# Patient Record
Sex: MALE | Race: WHITE | Employment: OTHER | URBAN - METROPOLITAN AREA
[De-identification: names, ages, dates, MRNs, and addresses within clinical notes are randomized per-mention and may not be internally consistent; named-entity substitution may affect disease eponyms.]

---

## 2017-08-05 PROBLEM — I25.10 ASCVD (ARTERIOSCLEROTIC CARDIOVASCULAR DISEASE): Status: ACTIVE | Noted: 2017-08-05

## 2017-08-05 PROBLEM — I50.9 CHF (CONGESTIVE HEART FAILURE) (HCC): Status: ACTIVE | Noted: 2017-08-05

## 2017-08-05 PROBLEM — E11.9 DIABETES MELLITUS (HCC): Status: ACTIVE | Noted: 2017-08-05

## 2017-08-05 PROBLEM — G47.00 INSOMNIA: Status: ACTIVE | Noted: 2017-08-05

## 2017-08-05 PROBLEM — I42.9 CARDIOMYOPATHY (HCC): Status: ACTIVE | Noted: 2017-08-05

## 2017-08-05 PROBLEM — K21.9 GERD (GASTROESOPHAGEAL REFLUX DISEASE): Status: ACTIVE | Noted: 2017-08-05

## 2017-08-05 PROBLEM — M19.90 DJD (DEGENERATIVE JOINT DISEASE): Status: ACTIVE | Noted: 2017-08-05

## 2017-08-05 PROBLEM — I12.9 HYPERTENSION WITH RENAL DISEASE: Status: ACTIVE | Noted: 2017-08-05

## 2017-08-05 PROBLEM — E78.5 HYPERLIPIDEMIA: Status: ACTIVE | Noted: 2017-08-05

## 2017-08-05 PROBLEM — N40.0 BPH (BENIGN PROSTATIC HYPERPLASIA): Status: ACTIVE | Noted: 2017-08-05

## 2017-08-05 PROBLEM — Z79.899 ON STATIN THERAPY: Status: ACTIVE | Noted: 2017-08-05

## 2017-08-05 PROBLEM — N18.4 CKD (CHRONIC KIDNEY DISEASE), STAGE IV (HCC): Status: ACTIVE | Noted: 2017-08-05

## 2017-08-05 PROBLEM — N52.9 ED (ERECTILE DYSFUNCTION): Status: ACTIVE | Noted: 2017-08-05

## 2017-08-05 PROBLEM — F41.9 ANXIETY: Status: ACTIVE | Noted: 2017-08-05

## 2017-08-05 PROBLEM — M10.9 GOUT: Status: ACTIVE | Noted: 2017-08-05

## 2017-08-05 PROBLEM — D64.9 ANEMIA: Status: ACTIVE | Noted: 2017-08-05

## 2017-08-05 PROBLEM — E66.9 OBESITY: Status: ACTIVE | Noted: 2017-08-05

## 2017-08-05 PROBLEM — E11.40 DIABETIC NEUROPATHY (HCC): Status: ACTIVE | Noted: 2017-08-05

## 2017-08-05 PROBLEM — E03.9 HYPOTHYROID: Status: ACTIVE | Noted: 2017-08-05

## 2017-08-05 PROBLEM — G25.81 RESTLESS LEG: Status: ACTIVE | Noted: 2017-08-05

## 2017-08-05 RX ORDER — METOLAZONE 2.5 MG/1
TABLET ORAL AS NEEDED
COMMUNITY
End: 2018-02-14 | Stop reason: SDUPTHER

## 2017-08-05 RX ORDER — PRAMIPEXOLE DIHYDROCHLORIDE 0.25 MG/1
0.25 TABLET ORAL
COMMUNITY
End: 2017-11-13 | Stop reason: SDUPTHER

## 2017-08-05 RX ORDER — CYCLOBENZAPRINE HCL 10 MG
TABLET ORAL
COMMUNITY
End: 2017-08-28 | Stop reason: ALTCHOICE

## 2017-08-05 RX ORDER — COLCHICINE 0.6 MG/1
0.6 TABLET ORAL AS NEEDED
COMMUNITY
End: 2019-05-27

## 2017-08-05 RX ORDER — VALSARTAN 320 MG/1
TABLET ORAL DAILY
COMMUNITY
End: 2018-04-18 | Stop reason: SDUPTHER

## 2017-08-05 RX ORDER — TEMAZEPAM 30 MG/1
CAPSULE ORAL
COMMUNITY
End: 2017-08-28 | Stop reason: ALTCHOICE

## 2017-08-05 RX ORDER — LANOLIN ALCOHOL/MO/W.PET/CERES
1000 CREAM (GRAM) TOPICAL DAILY
COMMUNITY
End: 2017-08-28 | Stop reason: ALTCHOICE

## 2017-08-05 RX ORDER — CARVEDILOL 12.5 MG/1
TABLET ORAL 2 TIMES DAILY WITH MEALS
COMMUNITY
End: 2018-12-14 | Stop reason: SDUPTHER

## 2017-08-05 RX ORDER — FEBUXOSTAT 40 MG/1
40 TABLET, FILM COATED ORAL DAILY
COMMUNITY
End: 2017-11-08 | Stop reason: SDUPTHER

## 2017-08-17 ENCOUNTER — HOSPITAL ENCOUNTER (EMERGENCY)
Age: 67
Discharge: HOME | End: 2017-08-17
Attending: EMERGENCY MEDICINE | Admitting: EMERGENCY MEDICINE
Payer: MEDICARE

## 2017-08-17 ENCOUNTER — APPOINTMENT (OUTPATIENT)
Dept: GENERAL RADIOLOGY | Age: 67
End: 2017-08-17
Attending: EMERGENCY MEDICINE
Payer: MEDICARE

## 2017-08-17 VITALS
RESPIRATION RATE: 16 BRPM | HEIGHT: 76 IN | DIASTOLIC BLOOD PRESSURE: 93 MMHG | WEIGHT: 252.43 LBS | SYSTOLIC BLOOD PRESSURE: 142 MMHG | BODY MASS INDEX: 30.74 KG/M2 | TEMPERATURE: 98.5 F | HEART RATE: 70 BPM | OXYGEN SATURATION: 100 %

## 2017-08-17 DIAGNOSIS — L03.032 CELLULITIS OF TOE OF LEFT FOOT: Primary | ICD-10-CM

## 2017-08-17 DIAGNOSIS — L97.922: ICD-10-CM

## 2017-08-17 LAB
ALBUMIN SERPL-MCNC: 3.3 G/DL (ref 3.5–5)
ALBUMIN/GLOB SERPL: 0.7 {RATIO} (ref 1.1–2.2)
ALP SERPL-CCNC: 102 U/L (ref 45–117)
ALT SERPL-CCNC: 21 U/L (ref 12–78)
ANION GAP SERPL CALC-SCNC: 9 MMOL/L (ref 5–15)
AST SERPL-CCNC: 19 U/L (ref 15–37)
BASOPHILS # BLD: 0 K/UL (ref 0–0.1)
BASOPHILS NFR BLD: 1 % (ref 0–1)
BILIRUB SERPL-MCNC: 0.4 MG/DL (ref 0.2–1)
BUN SERPL-MCNC: 125 MG/DL (ref 6–20)
BUN/CREAT SERPL: 37 (ref 12–20)
CALCIUM SERPL-MCNC: 8.6 MG/DL (ref 8.5–10.1)
CHLORIDE SERPL-SCNC: 90 MMOL/L (ref 97–108)
CO2 SERPL-SCNC: 27 MMOL/L (ref 21–32)
CREAT SERPL-MCNC: 3.36 MG/DL (ref 0.7–1.3)
EOSINOPHIL # BLD: 0.7 K/UL (ref 0–0.4)
EOSINOPHIL NFR BLD: 11 % (ref 0–7)
ERYTHROCYTE [DISTWIDTH] IN BLOOD BY AUTOMATED COUNT: 14.1 % (ref 11.5–14.5)
GLOBULIN SER CALC-MCNC: 4.8 G/DL (ref 2–4)
GLUCOSE SERPL-MCNC: 224 MG/DL (ref 65–100)
HCT VFR BLD AUTO: 33.3 % (ref 36.6–50.3)
HGB BLD-MCNC: 11.3 G/DL (ref 12.1–17)
LYMPHOCYTES # BLD: 0.9 K/UL (ref 0.8–3.5)
LYMPHOCYTES NFR BLD: 15 % (ref 12–49)
MCH RBC QN AUTO: 28.2 PG (ref 26–34)
MCHC RBC AUTO-ENTMCNC: 33.9 G/DL (ref 30–36.5)
MCV RBC AUTO: 83 FL (ref 80–99)
MONOCYTES # BLD: 0.5 K/UL (ref 0–1)
MONOCYTES NFR BLD: 8 % (ref 5–13)
NEUTS SEG # BLD: 4.1 K/UL (ref 1.8–8)
NEUTS SEG NFR BLD: 65 % (ref 32–75)
PLATELET # BLD AUTO: 119 K/UL (ref 150–400)
POTASSIUM SERPL-SCNC: 3.7 MMOL/L (ref 3.5–5.1)
PROT SERPL-MCNC: 8.1 G/DL (ref 6.4–8.2)
RBC # BLD AUTO: 4.01 M/UL (ref 4.1–5.7)
SODIUM SERPL-SCNC: 126 MMOL/L (ref 136–145)
WBC # BLD AUTO: 6.2 K/UL (ref 4.1–11.1)

## 2017-08-17 PROCEDURE — 85025 COMPLETE CBC W/AUTO DIFF WBC: CPT | Performed by: EMERGENCY MEDICINE

## 2017-08-17 PROCEDURE — 73630 X-RAY EXAM OF FOOT: CPT

## 2017-08-17 PROCEDURE — 36415 COLL VENOUS BLD VENIPUNCTURE: CPT | Performed by: EMERGENCY MEDICINE

## 2017-08-17 PROCEDURE — 80053 COMPREHEN METABOLIC PANEL: CPT | Performed by: EMERGENCY MEDICINE

## 2017-08-17 PROCEDURE — 99282 EMERGENCY DEPT VISIT SF MDM: CPT

## 2017-08-17 PROCEDURE — 96365 THER/PROPH/DIAG IV INF INIT: CPT

## 2017-08-17 PROCEDURE — 74011250636 HC RX REV CODE- 250/636: Performed by: EMERGENCY MEDICINE

## 2017-08-17 RX ORDER — LEVOFLOXACIN 5 MG/ML
750 INJECTION, SOLUTION INTRAVENOUS
Status: COMPLETED | OUTPATIENT
Start: 2017-08-17 | End: 2017-08-17

## 2017-08-17 RX ORDER — LEVOFLOXACIN 750 MG/1
750 TABLET ORAL EVERY OTHER DAY
Qty: 7 TAB | Refills: 0 | Status: SHIPPED | OUTPATIENT
Start: 2017-08-17 | End: 2017-08-28 | Stop reason: ALTCHOICE

## 2017-08-17 RX ADMIN — LEVOFLOXACIN 750 MG: 5 INJECTION, SOLUTION INTRAVENOUS at 17:47

## 2017-08-17 NOTE — ED PROVIDER NOTES
HPI Comments: Royal Tori Hernandez, 74 y.o. Male with PMHx of HTN, DM, CAD, DJD, ASCVD, CHF, CKD, GERD, DM neuropathy, and cardiomyopathy presents ambulatory to the ED with cc of left great toe pain, erythema and drainage from wound on bottom of left foot. Pt first noticed the sxs 3 days ago and was seen by his PCP the next day. He was placed on Bactrim and reports taking it for the last 1.5 days without improvement. Pt notes that the wound on the bottom of his left foot is from a blister that split open and became infected. He was treated here for the issue and had a debridement performed here where he acquired MRSA. Pt reports the wound never closed after the debridement and that he cleans it BID with AntiSept. He has changed the bandage twice today due to drainage from the site. He has never had osteomyelitis. He denies any fevers, chills, N/V/D, CP, or SOB. PCP: Gabriela Whipple MD    Social history significant for: - Tobacco, - EtOH, - Illicit Drug Use    There are no other complaints, changes, or physical findings at this time. Written by IDALIA Schwab, as dictated by Jim Layne M.D. The history is provided by the patient. No  was used.         Past Medical History:   Diagnosis Date    Anemia 8/5/2017    Anxiety 8/5/2017    Arrhythmia     atrial fibrillation 2014    ASCVD (arteriosclerotic cardiovascular disease) 8/5/2017    BPH (benign prostatic hyperplasia) 8/5/2017    CAD (coronary artery disease)     h/o stents    Cancer ([address]     h/o skin cancer    Cardiomyopathy ([address] 8/5/2017    CHF (congestive heart failure) ([address] 8/5/2017    CKD (chronic kidney disease), stage IV (HCC) 8/5/2017    Diabetes ([address]     Diabetes mellitus ([address] 8/5/2017    Diabetic neuropathy ([address] 8/5/2017    DJD (degenerative joint disease) 8/5/2017    ED (erectile dysfunction) 8/5/2017    GERD (gastroesophageal reflux disease) 8/5/2017    Gout 8/5/2017    High cholesterol  Hyperlipidemia 8/5/2017    Hypertension     Hypertension with renal disease 8/5/2017    Hypothyroid 8/5/2017    Insomnia 8/5/2017    Obesity 8/5/2017    On statin therapy 8/5/2017    Restless leg 8/5/2017    Thyroid disease     hypothyroid       Past Surgical History:   Procedure Laterality Date    CARDIAC SURG PROCEDURE UNLIST      cardiac stents    COLONOSCOPY N/A 6/28/2016    COLON[facility] Micki [facility]aga [facility] ENDOSCOPY    HX APPENDECTOMY      HX HEENT      HX ORTHOPAEDIC      left shoulder surgery x 2    HX ORTHOPAEDIC      back surgery x 2    HX PACEMAKER           Family History:   Problem Relation Age of Onset    Heart Disease Mother     Kidney Disease Mother     Heart Disease Father     Kidney Disease Father     Cancer Sister      Breast       Social History     Social History    Marital status: UNKNOWN     Spouse name: N/A    Number of children: N/A    Years of education: N/A     Occupational History    Not on file. Social History Main Topics    Smoking status: Former Smoker     Quit date: 3/6/1972    Smokeless tobacco: Not on file    Alcohol use Yes      Comment: rare    Drug use: No    Sexual activity: Not Currently     Other Topics Concern    Not on file     Social History Narrative         ALLERGIES: Niacin; Levemir [insulin detemir]; Other medication; Primaxin [imipenem-cilastatin]; and Xarelto [rivaroxaban]    Review of Systems   Constitutional: Negative for chills and fever. Respiratory: Negative for cough and shortness of breath. Cardiovascular: Negative for chest pain. Gastrointestinal: Negative for constipation, diarrhea, nausea and vomiting. Skin: Positive for color change (L great toe) and wound (bottom of left foot). Neurological: Negative for weakness and numbness. All other systems reviewed and are negative.       Patient Vitals for the past 12 hrs:   Temp Pulse Resp BP SpO2   08/17/17 1930 - - - (!) 142/93 -   08/17/17 1915 - - - (!) 154/98 -   08/17/17 1900 - - - 138/82 -   08/17/17 1846 - - - 138/87 -   08/17/17 1542 98.5 °F (36.9 °C) 70 16 141/79 100 %       Physical Exam   Constitutional: He is oriented to person, place, and time. He appears well-developed and well-nourished. HENT:   Head: Normocephalic and atraumatic. Eyes: Conjunctivae and EOM are normal.   Neck: Normal range of motion. Neck supple. Cardiovascular: Normal rate and regular rhythm. Murmur heard. Pulses:       Dorsalis pedis pulses are 2+ on the right side, and 2+ on the left side. Pulmonary/Chest: Effort normal and breath sounds normal. No respiratory distress. Abdominal: Soft. He exhibits no distension. There is no tenderness. Musculoskeletal: Normal range of motion. mild edema of ankle and dorsum of feet. Neurological: He is alert and oriented to person, place, and time. He displays abnormal reflex. Skin: Skin is warm and dry. L great toe swelling and erythema. At ball of L great toe pt has dime sized grade 2 ulcer. Unable to visualize any bone or muscle. Unable to express pus. There is TTP and warm to touch. Psychiatric: He has a normal mood and affect. Nursing note and vitals reviewed. MDM  Number of Diagnoses or Management Options  Cellulitis of toe of left foot:   Ulcer, chronic, neurogenic of lower limb, left, with fat layer exposed [facility]):   Diagnosis management comments: Patient presents with skin erythema and warmth of ulcer. DDx: cellulitis, abscess, osteomyelitis, necrotizing fascitis, folliculitis. Will get labs and possibly imaging. Will cover for MRSA and pseudomonas. No signs of osteomyelitis with normal CBC and afebrile.  Will give abx and discharge since vitals okay         Amount and/or Complexity of Data Reviewed  Clinical lab tests: ordered and reviewed  Tests in the radiology section of CPT®: ordered and reviewed  Review and summarize past medical records: yes    Patient Progress  Patient progress: stable    ED Course       Procedures      5:27 PM  Pt reports GFR of 20%. Pharmacy states given renal function stop Bactrim and give the pt Levaquin IV and then  mg every 48 hours for 10-14 days. Which will cover MRSA, pseudomonas and Gram negatives. Written by Tai Guo ED Scribe, as dictated by Diana Vazquez M.D.    LABORATORY TESTS:  Recent Results (from the past 12 hour(s))   CBC WITH AUTOMATED DIFF    Collection Time: 08/17/17  4:09 PM   Result Value Ref Range    WBC 6.2 4.1 - 11.1 K/uL    RBC 4.01 (L) 4.10 - 5.70 M/uL    HGB 11.3 (L) 12.1 - 17.0 g/dL    HCT 33.3 (L) 36.6 - 50.3 %    MCV 83.0 80.0 - 99.0 FL    MCH 28.2 26.0 - 34.0 PG    MCHC 33.9 30.0 - 36.5 g/dL    RDW 14.1 11.5 - 14.5 %    PLATELET 199 (L) 810 - 400 K/uL    NEUTROPHILS 65 32 - 75 %    LYMPHOCYTES 15 12 - 49 %    MONOCYTES 8 5 - 13 %    EOSINOPHILS 11 (H) 0 - 7 %    BASOPHILS 1 0 - 1 %    ABS. NEUTROPHILS 4.1 1.8 - 8.0 K/UL    ABS. LYMPHOCYTES 0.9 0.8 - 3.5 K/UL    ABS. MONOCYTES 0.5 0.0 - 1.0 K/UL    ABS. EOSINOPHILS 0.7 (H) 0.0 - 0.4 K/UL    ABS. BASOPHILS 0.0 0.0 - 0.1 K/UL   METABOLIC PANEL, COMPREHENSIVE    Collection Time: 08/17/17  4:09 PM   Result Value Ref Range    Sodium 126 (L) 136 - 145 mmol/L    Potassium 3.7 3.5 - 5.1 mmol/L    Chloride 90 (L) 97 - 108 mmol/L    CO2 27 21 - 32 mmol/L    Anion gap 9 5 - 15 mmol/L    Glucose 224 (H) 65 - 100 mg/dL     (H) 6 - 20 MG/DL    Creatinine 3.36 (H) 0.70 - 1.30 MG/DL    BUN/Creatinine ratio 37 (H) 12 - 20      GFR est AA 22 (L) >60 ml/min/1.73m2    GFR est non-AA 18 (L) >60 ml/min/1.73m2    Calcium 8.6 8.5 - 10.1 MG/DL    Bilirubin, total 0.4 0.2 - 1.0 MG/DL    ALT (SGPT) 21 12 - 78 U/L    AST (SGOT) 19 15 - 37 U/L    Alk.  phosphatase 102 45 - 117 U/L    Protein, total 8.1 6.4 - 8.2 g/dL    Albumin 3.3 (L) 3.5 - 5.0 g/dL    Globulin 4.8 (H) 2.0 - 4.0 g/dL    A-G Ratio 0.7 (L) 1.1 - 2.2         IMAGING RESULTS:  XR FOOT LT MIN 3 V   Final Result   EXAM:  XR FOOT LT MIN 3 V     INDICATION:   infection/cellulitis. Diabetic ulcer on the bottom of the left  foot, present for 18 years. Swelling, failure, and discoloration. Treated as an  outpatient with antibiotics.     COMPARISON:  None.     FINDINGS:  Three views of the left foot demonstrate ulceration and soft tissue  swelling of the plantar skin over the metatarsophalangeal joints. No definite  erosion or focal osteopenia on lateral radiograph to suggest osteomyelitis,  though this is insensitive. Well-corticated erosions of the medial first  metatarsal head with adjacent soft tissue calcifications suggest old episodes of  gout. Bony mineralization is normal.     IMPRESSION  IMPRESSION:  No radiographic findings of osteomyelitis.          MEDICATIONS GIVEN:  Medications   levoFLOXacin (LEVAQUIN) 750 mg in D5W IVPB (0 mg IntraVENous IV Completed 8/17/17 1917)       IMPRESSION:  1. Cellulitis of toe of left foot    2. Ulcer, chronic, neurogenic of lower limb, left, with fat layer exposed ([facility]        PLAN:  1. Discharge Medication List as of 8/17/2017  5:31 PM      START taking these medications    Details   levoFLOXacin (LEVAQUIN) 750 mg tablet Take 1 Tab by mouth every other day for 14 days. , Print, Disp-7 Tab, R-0         CONTINUE these medications which have NOT CHANGED    Details   glucose blood VI test strips (TRUETEST TEST STRIPS) strip by Does Not Apply route See Admin Instructions. , Historical Med      cyclobenzaprine (FLEXERIL) 10 mg tablet Take  by mouth three (3) times daily as needed for Muscle Spasm(s). , Historical Med      pramipexole (MIRAPEX) 0.25 mg tablet Take 0.25 mg by mouth three (3) times daily. , Historical Med      metOLazone (ZAROXOLYN) 2.5 mg tablet Take  by mouth daily. , Historical Med      valsartan (DIOVAN) 320 mg tablet Take  by mouth daily. , Historical Med      carvedilol (COREG) 12.5 mg tablet Take  by mouth two (2) times daily (with meals). , Historical Med      febuxostat (ULORIC) 40 mg tab tablet Take 40 mg by mouth daily. , Historical Med      PEN NEEDLE, DIABETIC (BD ULTRA-FINE DEVIN PEN NEEDLES) by Does Not Apply route., Historical Med      temazepam (RESTORIL) 30 mg capsule Take  by mouth nightly as needed for Sleep., Historical Med      cyanocobalamin (VITAMIN B-12) 1,000 mcg tablet Take 1,000 mcg by mouth daily. , Historical Med      colchicine (COLCRYS) 0.6 mg tablet Take 0.6 mg by mouth daily. , Historical Med      linagliptin (TRADJENTA) 5 mg tablet Take 5 mg by mouth daily. , Historical Med      bumetanide (BUMEX) 2 mg tablet Take 4 mg by mouth two (2) times a day., Historical Med      ferrous sulfate 325 mg (65 mg iron) tablet Take 325 mg by mouth Daily (before breakfast). , Historical Med      ascorbic acid (VITAMIN C) 250 mg tablet Take 250 mg by mouth daily. , Historical Med      Liraglutide (VICTOZA) 0.6 mg/0.1 mL (18 mg/3 mL) sub-q pen 0.6 mg by SubCUTAneous route., Historical Med      OTHER Pill for PD to help with restless legs, Historical Med      apixaban (ELIQUIS) 5 mg tablet Take 5 mg by mouth two (2) times a day., Historical Med      finasteride (PROSCAR) 5 mg tablet Take 5 mg by mouth daily. , Historical Med      omega-3 fatty acids-vitamin e (FISH OIL) 1,000 mg cap Take 1 Cap by mouth., Historical Med      omeprazole (PRILOSEC) 20 mg capsule Take 20 mg by mouth daily. , Historical Med      amLODIPine (NORVASC) 10 mg tablet Take 5 mg by mouth daily. , Historical Med      tamsulosin (FLOMAX) 0.4 mg capsule Take 0.8 mg by mouth daily. , Historical Med      clonazePAM (KLONOPIN) 1 mg tablet Take 1 mg by mouth nightly., Historical Med      levothyroxine (SYNTHROID) 50 mcg tablet Take 50 mcg by mouth daily. , Historical Med      insulin glargine (LANTUS SOLOSTAR) 100 unit/mL (3 mL) pen 18 Units by SubCUTAneous route daily. , Historical Med           2.    Follow-up Information     Follow up With Details Comments 307 Hattie Case MD   [address] [place] [address] [facility] [address]  [phone]      Your Podiatrist Schedule an appointment as soon as possible for a visit in 1 week For wound re-check         Return to ED if worse     Discharge Note:  7:55 PM  The pt is ready for discharge. The pt's signs, symptoms, diagnosis, and discharge instructions have been discussed and pt has conveyed their understanding. The pt is to follow up as recommended or return to ER should their symptoms worsen. Plan has been discussed and pt is in agreement. This note is prepared by Chrystal Wan, acting as a Scribe for AVEYR Mendenhall M.D: The scribe's documentation has been prepared under my direction and personally reviewed by me in its entirety. I confirm that the notes above accurately reflects all work, treatment, procedures, and medical decision making performed by me.

## 2017-08-17 NOTE — DISCHARGE INSTRUCTIONS

## 2017-08-17 NOTE — ED NOTES
Discharge instructions reviewed with patient. Discharge instructions given to patient per Dr. Arnav Tsai. Patient able to return/verbalize discharge instructions. Copy of discharge instructions provided. Patient condition stable, respiratory status within normal limits, neuro status intact. Ambulatory out of ER, accompanied by wife.

## 2017-08-28 ENCOUNTER — OFFICE VISIT (OUTPATIENT)
Dept: INTERNAL MEDICINE CLINIC | Age: 67
End: 2017-08-28

## 2017-08-28 VITALS
HEART RATE: 70 BPM | SYSTOLIC BLOOD PRESSURE: 144 MMHG | WEIGHT: 249.2 LBS | BODY MASS INDEX: 30.35 KG/M2 | RESPIRATION RATE: 18 BRPM | HEIGHT: 76 IN | OXYGEN SATURATION: 98 % | TEMPERATURE: 98.4 F | DIASTOLIC BLOOD PRESSURE: 86 MMHG

## 2017-08-28 DIAGNOSIS — I25.5 ISCHEMIC CARDIOMYOPATHY: ICD-10-CM

## 2017-08-28 DIAGNOSIS — N18.4 CKD (CHRONIC KIDNEY DISEASE), STAGE IV (HCC): ICD-10-CM

## 2017-08-28 DIAGNOSIS — Z79.4 TYPE 2 DIABETES MELLITUS WITH FOOT ULCER, WITH LONG-TERM CURRENT USE OF INSULIN (HCC): ICD-10-CM

## 2017-08-28 DIAGNOSIS — I48.0 PAROXYSMAL ATRIAL FIBRILLATION (HCC): ICD-10-CM

## 2017-08-28 DIAGNOSIS — E11.42 DIABETIC POLYNEUROPATHY ASSOCIATED WITH TYPE 2 DIABETES MELLITUS (HCC): ICD-10-CM

## 2017-08-28 DIAGNOSIS — E11.621 TYPE 2 DIABETES MELLITUS WITH FOOT ULCER, WITH LONG-TERM CURRENT USE OF INSULIN (HCC): ICD-10-CM

## 2017-08-28 DIAGNOSIS — E66.09 NON MORBID OBESITY DUE TO EXCESS CALORIES: ICD-10-CM

## 2017-08-28 DIAGNOSIS — I12.9 HYPERTENSION WITH RENAL DISEASE: Primary | ICD-10-CM

## 2017-08-28 DIAGNOSIS — E78.2 MIXED HYPERLIPIDEMIA: ICD-10-CM

## 2017-08-28 DIAGNOSIS — Z12.11 COLON CANCER SCREENING: ICD-10-CM

## 2017-08-28 DIAGNOSIS — M15.9 PRIMARY OSTEOARTHRITIS INVOLVING MULTIPLE JOINTS: ICD-10-CM

## 2017-08-28 DIAGNOSIS — I50.42 CHRONIC COMBINED SYSTOLIC AND DIASTOLIC CONGESTIVE HEART FAILURE (HCC): ICD-10-CM

## 2017-08-28 DIAGNOSIS — I25.10 ASCVD (ARTERIOSCLEROTIC CARDIOVASCULAR DISEASE): ICD-10-CM

## 2017-08-28 DIAGNOSIS — Z00.00 MEDICARE ANNUAL WELLNESS VISIT, INITIAL: ICD-10-CM

## 2017-08-28 DIAGNOSIS — L97.509 TYPE 2 DIABETES MELLITUS WITH FOOT ULCER, WITH LONG-TERM CURRENT USE OF INSULIN (HCC): ICD-10-CM

## 2017-08-28 DIAGNOSIS — E11.9 TYPE 2 DIABETES MELLITUS WITHOUT COMPLICATION, WITHOUT LONG-TERM CURRENT USE OF INSULIN (HCC): ICD-10-CM

## 2017-08-28 DIAGNOSIS — K21.9 GASTROESOPHAGEAL REFLUX DISEASE WITHOUT ESOPHAGITIS: ICD-10-CM

## 2017-08-28 LAB
BUN BLD-MCNC: 82 MG/DL (ref 9–20)
CALCIUM BLD-MCNC: 9.7 MG/DL (ref 8.4–10.2)
CHLORIDE BLD-SCNC: 101 MMOL/L (ref 98–107)
CO2 POC: 27 MMOL/L (ref 22–32)
CREAT BLD-MCNC: 2.1 MG/DL (ref 0.8–1.5)
EGFR (POC): 31.4
GLUCOSE POC: 141 MG/DL (ref 75–110)
GRAN# POC: 5.7 K/UL (ref 2–7.8)
GRAN% POC: 79.4 % (ref 37–92)
HCT VFR BLD CALC: 37.5 % (ref 37–51)
HGB BLD-MCNC: 12.5 G/DL (ref 12–18)
LY# POC: 1 K/UL (ref 0.6–4.1)
LY% POC: 15.6 % (ref 10–58.5)
MCH RBC QN: 28.9 PG (ref 26–32)
MCHC RBC-ENTMCNC: 33.2 G/DL (ref 30–36)
MCV RBC: 87 FL (ref 80–97)
MICROALBUMIN UR TEST STR-MCNC: >100 MG/L (ref 0–20)
MID #, POC: 0.3 K/UL (ref 0–1.8)
MID% POC: 5 % (ref 0.1–24)
PLATELET # BLD: 170 K/UL (ref 140–440)
POTASSIUM SERPL-SCNC: 4.7 MMOL/L (ref 3.6–5)
RBC # BLD: 4.31 M/UL (ref 4.2–6.3)
SODIUM SERPL-SCNC: 142 MMOL/L (ref 137–145)
WBC # BLD: 7 K/UL (ref 4.1–10.9)

## 2017-08-28 RX ORDER — LEVOFLOXACIN 750 MG/1
750 TABLET ORAL EVERY OTHER DAY
Qty: 7 TAB | Refills: 0 | Status: SHIPPED | OUTPATIENT
Start: 2017-08-28 | End: 2017-09-11

## 2017-08-28 NOTE — PROGRESS NOTES
This is an Initial Medicare Annual Wellness Exam (AWV) (Performed 12 months after IPPE or effective date of Medicare Part B enrollment, Once in a lifetime)    I have reviewed the patient's medical history in detail and updated the computerized patient record. He presents today for initial annual Medicare wellness examination. He is also here for follow-up of his complex medical problems to include hypertension, diabetes, hyperlipidemia, ASCVD, cardiomyopathy, chronic CHF, DJD, GERD, gout, diabetic neuropathy, and diabetic foot ulcer that has been nonhealing for quite some time and become worse recently. This is infected transition to care follow-up appointment as well from when he was seen in emergency room on 817 he was given Levaquin 750 every other day for 14 days and he just completed that although it has not been 14 days he says he is completed a prescription. I reviewed the ER note and noted that his BUN was 125 and creatinine was about 3-1/2 at that point which are above his baseline. He is followed by renal DrSiva Soto. He denies any chest pain shortness of breath cardiovascular complaints. There are no GI/ complaints. He notes no pain in the foot which is actually part of the problem while he develops a diabetic foot ulcer. He notes no fevers or chills. There are no other complaints on complete review of systems. He is taking his medication trying to follow his diet. He does not get all of exercise. He is really worked on trying to get his weight down however.     History     Past Medical History:   Diagnosis Date    Anemia 8/5/2017    Anxiety 8/5/2017    Arrhythmia     atrial fibrillation 2014    ASCVD (arteriosclerotic cardiovascular disease) 8/5/2017    BPH (benign prostatic hyperplasia) 8/5/2017    CAD (coronary artery disease)     h/o stents    Cancer ([address]     h/o skin cancer    Cardiomyopathy [facility] 8/5/2017    CHF (congestive heart failure) ([facility] 8/5/2017    CKD (chronic kidney disease), stage IV ([facility] 8/5/2017    Diabetes ([facility]     Diabetes mellitus ([facility] 8/5/2017    Diabetic neuropathy ([facility] 8/5/2017    DJD (degenerative joint disease) 8/5/2017    ED (erectile dysfunction) 8/5/2017    GERD (gastroesophageal reflux disease) 8/5/2017    Gout 8/5/2017    High cholesterol     Hyperlipidemia 8/5/2017    Hypertension     Hypertension with renal disease 8/5/2017    Hypothyroid 8/5/2017    Insomnia 8/5/2017    Obesity 8/5/2017    On statin therapy 8/5/2017    Restless leg 8/5/2017    Thyroid disease     hypothyroid      Past Surgical History:   Procedure Laterality Date    CARDIAC SURG PROCEDURE UNLIST      cardiac stents    COLONOSCOPY N/A 6/28/2016    COLONOSCOPY performed by Karen Paige [facility] at [facility] ENDOSCOPY    HX APPENDECTOMY      HX HEENT      HX ORTHOPAEDIC      left shoulder surgery x 2    HX ORTHOPAEDIC      back surgery x 2    HX ORTHOPAEDIC      Knee surgery x2    HX PACEMAKER       Current Outpatient Prescriptions   Medication Sig Dispense Refill    levoFLOXacin (LEVAQUIN) 750 mg tablet Take 1 Tab by mouth every other day for 14 days. 7 Tab 0    pramipexole (MIRAPEX) 0.25 mg tablet Take 0.25 mg by mouth nightly.  metOLazone (ZAROXOLYN) 2.5 mg tablet Take  by mouth as needed (pt takes approximately twice a month if has edema. ).  valsartan (DIOVAN) 320 mg tablet Take  by mouth daily.  carvedilol (COREG) 12.5 mg tablet Take  by mouth two (2) times daily (with meals).  febuxostat (ULORIC) 40 mg tab tablet Take 40 mg by mouth daily.  PEN NEEDLE, DIABETIC (BD ULTRA-FINE DEVIN PEN NEEDLES) by Does Not Apply route.  colchicine (COLCRYS) 0.6 mg tablet Take 0.6 mg by mouth daily. Takes 1 tablet 3 times a week.  bumetanide (BUMEX) 2 mg tablet Take 4 mg by mouth two (2) times a day.  Liraglutide (VICTOZA) 0.6 mg/0.1 mL (18 mg/3 mL) sub-q pen 0.6 mg by SubCUTAneous route.       apixaban (ELIQUIS) 5 mg tablet Take 5 mg by mouth two (2) times a day.  finasteride (PROSCAR) 5 mg tablet Take 5 mg by mouth daily.  omega-3 fatty acids-vitamin e (FISH OIL) 1,000 mg cap Take 1 Cap by mouth.  amLODIPine (NORVASC) 10 mg tablet Take 5 mg by mouth daily.  tamsulosin (FLOMAX) 0.4 mg capsule Take 0.8 mg by mouth daily.  clonazePAM (KLONOPIN) 1 mg tablet Take 1-2 mg by mouth nightly.  insulin glargine (LANTUS SOLOSTAR) 100 unit/mL (3 mL) pen 18 Units by SubCUTAneous route daily. Allergies   Allergen Reactions    Niacin Unknown (comments)    Levemir [Insulin Detemir] Hives    Other Medication Other (comments)     ?  Allergy to Duraprep causing chemical burn    Primaxin [Imipenem-Cilastatin] Diarrhea and Rash    Xarelto [Rivaroxaban] Rash and Itching     Family History   Problem Relation Age of Onset    Heart Disease Mother     Kidney Disease Mother     Heart Disease Father     Kidney Disease Father     Cancer Sister      Breast     Social History   Substance Use Topics    Smoking status: Former Smoker     Quit date: 3/6/1972    Smokeless tobacco: Never Used    Alcohol use No      Comment: rare, 1 drink per year     Patient Active Problem List   Diagnosis Code    Atrial fibrillation (HCC) I48.91    DJD (degenerative joint disease) M19.90    ASCVD (arteriosclerotic cardiovascular disease) I25.10    Gout M10.9    CHF (congestive heart failure) ([facility] I50.9    Anemia D64.9    On statin therapy Z79.899    Hyperlipidemia E78.5    Diabetes mellitus ([facility] E11.9    CKD (chronic kidney disease), stage IV ([facility]) N18.4    Hypertension with renal disease I12.9    Restless leg G25.81    Obesity E66.9    Insomnia G47.00    Hypothyroid E03.9    GERD (gastroesophageal reflux disease) K21.9    ED (erectile dysfunction) N52.9    Diabetic neuropathy (HCC) E11.40    Cardiomyopathy ([facility] I42.9    BPH (benign prostatic hyperplasia) N40.0    Anxiety F41.9    Type 2 diabetes mellitus with foot ulcer ([facility] E11.621, L97.509    Medicare annual wellness visit, initial Z00.00    Colon cancer screening Z12.11       Depression Risk Factor Screening:     PHQ over the last two weeks 8/28/2017   Little interest or pleasure in doing things Not at all   Feeling down, depressed or hopeless Not at all   Total Score PHQ 2 0     Alcohol Risk Factor Screening: You do not drink alcohol or very rarely. Functional Ability and Level of Safety:     Hearing Loss  Hearing is good. Activities of Daily Living  The home contains: no safety equipment  Patient does total self care    Fall Risk  Fall Risk Assessment, last 12 mths 8/28/2017   Able to walk? Yes   Fall in past 12 months? No       Abuse Screen  Patient is not abused    Cognitive Screening   Evaluation of Cognitive Function:  Has your family/caregiver stated any concerns about your memory: no  Normal     ROS:    Constitutional: He denies fevers, weight loss, sweats. Eyes: No blurred or double vision. ENT: No difficulty with swallowing, taste, speech or smell. Neck: no stiffness or swelling  Respiratory: No cough wheezing or shortness of breath. Cardiovascular: Denies chest pain, palpitations, unexplained indigestion or syncope. Gastrointestinal:  No changes in bowel movements, no abdominal pain, no bloating. Genitourinary:  He denies frequency, nocturia or stranguria. Extremities: No joint pain, stiffness or swelling. Recurrent foot ulcer bottom of his left foot over the first MTP region has been debrided by Dr. Dotty Gomez  Neurological:  No numbness, tingling, burring paresthesias or loss of motor strength. No syncope, dizziness or frequent headache, decreased sensation in both feet from mid metatarsal region distally without change  Lymphatic: no adenopathy noted  Hematologic: no easy bruising or bleeding gums  Skin:  No recent rashes or mole changes. Foot also bottom of left foot first MTP region  Psychiatric/Behavioral:  Negative for depression.       Vitals: 08/28/17 1357 08/28/17 1440   BP: (!) 156/94 144/86   Pulse: 70    Resp: 18    Temp: 98.4 °F (36.9 °C)    TempSrc: Oral    SpO2: 98%    Weight: 249 lb 3.2 oz (113 kg)    Height: 6' 4\" (1.93 m)    PainSc:   2    PainLoc: Knee         PHYSICAL EXAM:    General appearance - alert, well appearing, and in no distress  Mental status - alert, oriented to person, place, and time  HEENT:  Ears - bilateral TM's and external ear canals clear  Eyes - pupillary responses were normal.  Extraocular muscle function intact. Lids and conjunctiva not injected. Fundoscopic exam revealed sharp disc margins. eye movements intact  Pharynx- clear with teeth in good repair. No masses were noted  Neck - supple without thyromegaly or burit. No JVD noted  Lungs - clear to auscultation and percussion  Cardiac- normal rate, regular rhythm without murmurs. PMI not displaced. No gallop, rub or click  Abdomen - flat, soft, non-tender without palpable organomegaly or mass. No pulsatile mass was felt, and not bruit was heard. Bowel sounds were active  : Circumcised, Testes descended w/o masses  Rectal: normal sphincter tone, prostate normal, no masses, stool brown and hemacult negative  Extremities -  no clubbing cyanosis or edema  Lymphatics - no palpable lymphadenopathy, no hepatosplenomegaly  Hematologic: no petechiae or purpura  Peripheral vascular -Femoral, Dorsalis pedis and posterior tibial pulses felt without difficulty  Skin - no rash or unusual mole change noted. One and half centimeter foot ulcer bottom of left foot first MTP region some callus around the edges clear serous drainage no evidence of current secondary cellulitis  Neurological - Cranial nerves II-XII grossly intact. Motor strength 5/5. DTR's 2+ and symmetric. Station and gait normal no sensation to pinprick from mid forefoot distally on both feet. Absent proprioception of all toes.   Back exam - full range of motion, no tenderness, palpable spasm or pain on motion  Musculoskeletal - no joint tenderness, deformity or swelling      Patient Care Team   Patient Care Team:  Ki Kaye MD as PCP - General (Internal Medicine)    Advice/Referrals/Counseling   Education and counseling provided:  Are appropriate based on today's review and evaluation  Colorectal cancer screening tests    Assessment/Plan     ASSESSMENT:   1. Hypertension with renal disease    2. Mixed hyperlipidemia    3. Type 2 diabetes mellitus without complication, without long-term current use of insulin ([address]    4. ASCVD (arteriosclerotic cardiovascular disease)    5. Ischemic cardiomyopathy    6. Gastroesophageal reflux disease without esophagitis    7. CKD (chronic kidney disease), stage IV ([address]    8. Primary osteoarthritis involving multiple joints    9. Chronic combined systolic and diastolic congestive heart failure ([address]    10. Paroxysmal atrial fibrillation (HCC)    11. Non morbid obesity due to excess calories    12. Diabetic polyneuropathy associated with type 2 diabetes mellitus ([address]    13. Type 2 diabetes mellitus with foot ulcer, with long-term current use of insulin ([address]    14. Medicare annual wellness visit, initial    13. Colon cancer screening      Impression  1. Hypertension that is not quite controlled although better by my check at 144/86 them by the nurses check at 156/94 stressed watching the sodium in his diet  2. Hyperlipidemia reviewed his last numbers with him from July visit HDL was low at 35 triglycerides up at 291 stressed diet and exercise  3. Diabetes last A1c in July was 8.4 he has been working hard with his diet is get his weight down we will see what the numbers look like today as far as his blood sugar  4. ASCVD clinically stable  5. Cardiomyopathy stable  6. GERD stable  7. Chronic kidney disease that is becoming more of a major problem he is actively followed by renal and has appointment in 2 weeks with his renal specialist  8. DJD that is stable  9. CHF clinically compensated  10. A. fib currently in sinus rhythm  11. Obesity weight today is 249 which is improved from 260 at last visit  12. Diabetic neuropathy certainly make him prone to developing the foot ulcer  13. Diabetic foot ulcer on the renew his Levaquin and make sure that we hopefully avoid secondary infection while he continues with wound care and he does have an appointment at the wound care center which he will keep that  Medicare annual wellness examination and questionnaire performed today. The results were reviewed with him and his questions were answered. Lifestyle recommendations modifications discussed at length. And recommendations made. His wound was cleansed and sterilely dressed today. Labs are pending as noted I will make further recommendations based upon those. Tended to follow-up set up for a month or sooner should they be a problem high complexity decision making made today and this prolonged office visit of 40 minutes exclusive of the Medicare wellness exam    PLAN:  .  Orders Placed This Encounter    AMB POC COMPLETE CBC,AUTOMATED ENTER    AMB POC BASIC METABOLIC PANEL    AMB POC FECAL OCCULT BLOOD QL-3 CARDS    AMB POC URINE, MICROALBUMIN, SEMIQUANT (1 RESULT)    levoFLOXacin (LEVAQUIN) 750 mg tablet         ATTENTION:   This medical record was transcribed using an electronic medical records system. Although proofread, it may and can contain electronic and spelling errors. Other human spelling and other errors may be present. Corrections may be executed at a later time. Please feel free to contact us for any clarifications as needed. Follow-up Disposition:  Return in about 4 weeks (around 9/25/2017).       Justo Chavira MD  Health Maintenance Due   Topic Date Due    EYE EXAM RETINAL OR DILATED Q1  12/28/1958    DTaP/Tdap/Td series (1 - Tdap) 12/28/1969    FOBT Q 1 YEAR AGE 50-75  12/28/1998    ZOSTER VACCINE AGE 60>  10/28/2008    GLAUCOMA SCREENING Q2Y  12/28/2013    INFLUENZA AGE 9 TO ADULT  08/01/2017

## 2017-08-28 NOTE — PROGRESS NOTES
1 month follow up. 1. Have you been to the ER, urgent care clinic since your last visit? Hospitalized since your last visit? ED [facility] ER on 8- for diabetic ulcer on left foot. Given Levaquin 750mg 1 every other day for 14 days. 2. Have you seen or consulted any other health care providers outside of the [address] [place] [address] since your last visit? Include any pap smears or colon screening.-    Yes. Dr Luis Tierney, Ped. After ER visit for evaluation of ulcer and trimming. Was referred to a [address] [place] [address] 9-1 -2017.     Also, saw Dr. Ct Solis, kidney specialist.

## 2017-09-01 ENCOUNTER — HOSPITAL ENCOUNTER (OUTPATIENT)
Dept: WOUND CARE | Age: 67
Discharge: HOME | End: 2017-09-01
Payer: MEDICARE

## 2017-09-01 PROCEDURE — 29445 APPL RIGID TOT CNTC LEG CAST: CPT | Performed by: PODIATRIST

## 2017-09-01 PROCEDURE — 97597 DBRDMT OPN WND 1ST 20 CM/<: CPT | Performed by: PODIATRIST

## 2017-09-01 NOTE — H&P
Jadiel Villavicencio, [address] [facility]   WOUND CARE HISTORY AND PHYSICAL       Name:  Weston Law   MR#:  [id number]   :  1950   Account #:  [de-identified]        Date of Adm:  2017       CHIEF COMPLAINT: This 24-year-old white male presents for   continued treatment of a chronic ulceration, plantar left foot. The   patient states that the wound has been recurring for more than 18   years. He was recently seen in the emergency room at [facility] after the foot became red. He was given Levaquin 750 mg for   14 days. He denies any pain. PAST MEDICAL HISTORY: Anemia, anxiety, cataracts, atrial   fibrillation, atherosclerotic cardiovascular disease, benign prostatic   hyperplasia, coronary artery disease, history of stents, cancer, history   of skin cancer, cardiomyopathy, congestive heart failure, stage IV   kidney disease, diabetes, diabetic neuropathy, degenerative joint   disease, GERD, gout, hypercholesterolemia, hyperlipidemia,   hypertension, hypothyroid, insomnia, obesity, restless leg syndrome. PAST SURGICAL HISTORY: Cardiac stents, colonoscopy,   appendectomy, orthopedic surgery x2 on his shoulder, orthopedic   surgery on his back, orthopedic surgery to bilateral knees, has a   history of a pacemaker. CURRENT MEDICATIONS   1. Levaquin 750 mg twice a day. 2. Mirapex 0.2 mg nightly. 3. Zaroxolyn 2.5 mg twice a month as needed for edema. 4. Diovan 300 mg daily. 5. Coreg 12.5 mg 2 times a day. 6. Uloric 40 mg daily. 7. Colcrys 0.6 mg 1-3 tablets 3 times a week if needed. 8. Bumex 2 mg, the patient takes 4 mg 2 times a day. 9. Victoza 0.6 mg subcutaneous daily. 10. Eliquis 5 mg b.i.d.   11. Proscar 5 mg daily. 12. Omega 3 fish oil daily. 13. Norvasc 5 mg daily. 14. Flomax 0.4 mg tablets 0.8 daily. 15. Klonopin 1 mg 1-2 at night. ALLERGIES   1. NIACIN. 2. LEVEMIR.   3. IMIPENEM. 4. Leim Larry.     SOCIAL HISTORY: Former smoker. The patient quit in 1972. REVIEW OF SYSTEMS: No fevers, sweats or chills. The patient wears   glasses. Otherwise, all systems are normal and the patient has no   complaints. PHYSICAL EXAMINATION   VITAL SIGNS: Temperature 98.6, pulse rate 69, respirations 20, blood   pressure 124/75. HEAD AND NECK: No abnormal findings. LUNGS: Normal upon auscultation. CARDIOVASCULAR: Normal heart sounds. NEUROLOGIC: Cranial nerves 2-12 appear to be an grossly intact;   however, the patient does have diminished epicritic sensation, lower   extremities, diminished epicritic sensation, diminished light tactile   sensation. MUSCULOSKELETAL: No complaints of arthralgias. Upper and lower   extremities are symmetrical.   FOCUSED EVALUATION OF THE LOWER EXTREMITIES: Palpable   pedal pulses with fairly good muscle strength, lower extremities. Grossly diminished epicritic sensation. The patient has an abducted   hallux bilateral. Fat pad atrophy, plantar forefoot, plantar first left MPJ   with a granulating beefy red, grade 2 ulcer that does not appear to be   infected. Has surrounding hyperkeratotic tissue with a pale red beefy   granulating base that measures 1.3 x 1.6 x 0.1 cm. ASSESSMENT: Diabetic with neuropathy with a great toe ulcer,   plantar first left metatarsophalangeal joint. Most recent x-rays did not reveal any evidence of osteomyelitis. PLAN: Selective debridement was performed today and an aerobic as   well as anaerobic wound culture was taken. The patient was placed in   a total contact cast. He will have followup visit in the wound care center   in 1 week. I did discuss with the patient and his wife that if the wound   fails to heal, we will progress to amputation of the first left   metatarsophalangeal joint.         DORIS Morales / Светлана.Spencer   D:  09/01/2017   18:21   T:  09/01/2017   19:34   Job #:  [id number]

## 2017-09-02 NOTE — OP NOTES
Seymour Leo Villavicencio, [address] [facility]       Name:  Destiney Adkins   MR#:  [id number]   :  1950   Account #:  [de-identified]        Date of Adm:  2017       PREOPERATIVE DIAGNOSIS: Grade 2 diabetic ulcer, plantar first left   metatarsophalangeal joint. POSTOPERATIVE DIAGNOSIS: Grade 2 diabetic ulcer, plantar first   left metatarsophalangeal joint. PROCEDURES PERFORMED: Selective debridement, diabetic ulcer,   plantar first left metatarsophalangeal joint. ANESTHESIA: None needed. ESTIMATED BLOOD LOSS: Minimal.    SPECIMENS REMOVED: Wound cultures. INDICATIONS: This 75-year-old white male presents for continued   treatment of chronic ulceration of more than 18 years, plantar first left   MPJ. Most recent x-rays did not reveal any evidence of osteomyelitis. The area was recently infected and the patient was seen at [facility] and he was given Levaquin 750 mg to take twice a   day. PHYSICAL EXAMINATION   VITAL SIGNS: Temperature 98.6, pulse rate 69, respirations 20, blood   pressure 124/75. EXTREMITIES: Palpable pedal pulses, fairly good muscle strength,   lower extremities bilateral with diminished epicritic sensation. The   patient has an abducted hallux bilateral, plantar first left MPJ, with   pale, beefy red granulating ulceration that measures 1.3 x 1.6 x 0.1   cm, does not appear to be infected. DESCRIPTION OF PROCEDURE: Using a curette and a #15 blade, a   selective debridement was performed into the dermal layer, removing   dermal granulomatous hyperkeratotic tissue. Aerobic as well as   anaerobic wound cultures were taken. Wound was now dressed with   silver calcium alginate, followed by a total contact cast. It was   discussed with the patient that if at any time the cast becomes   uncomfortable, he is to call the wound care center and/or go to the   emergency room. Otherwise, he will have followup visit in the wound   care center with myself in 1 week.         DORIS Rodriguez / SERENA   D:  09/01/2017   18:24   T:  09/01/2017   20:41   Job #:  [id number]

## 2017-09-06 ENCOUNTER — HOSPITAL ENCOUNTER (OUTPATIENT)
Dept: WOUND CARE | Age: 67
Discharge: HOME | End: 2017-09-06
Payer: MEDICARE

## 2017-09-06 DIAGNOSIS — L97.529 DIABETIC ULCER OF OTHER PART OF LEFT FOOT ASSOCIATED WITH TYPE 2 DIABETES MELLITUS: ICD-10-CM

## 2017-09-06 DIAGNOSIS — E11.621 DIABETIC ULCER OF OTHER PART OF LEFT FOOT ASSOCIATED WITH TYPE 2 DIABETES MELLITUS: ICD-10-CM

## 2017-09-07 RX ORDER — CLONAZEPAM 1 MG/1
TABLET ORAL
Qty: 90 TAB | Refills: 1 | Status: SHIPPED | OUTPATIENT
Start: 2017-09-07 | End: 2017-09-19 | Stop reason: SDUPTHER

## 2017-09-07 NOTE — TELEPHONE ENCOUNTER
Requested Prescriptions     Pending Prescriptions Disp Refills    clonazePAM (KLONOPIN) 1 mg tablet [Pharmacy Med Name: CLONAZEPAM 1MG TABLETS] 90 Tab 1     Sig: TAKE 1 TABLET BY MOUTH EVERY NIGHT AT BEDTIME

## 2017-09-15 ENCOUNTER — HOSPITAL ENCOUNTER (OUTPATIENT)
Dept: WOUND CARE | Age: 67
Discharge: HOME | End: 2017-09-15
Payer: MEDICARE

## 2017-09-15 RX ORDER — CLINDAMYCIN HYDROCHLORIDE 300 MG/1
300 CAPSULE ORAL 4 TIMES DAILY
COMMUNITY
Start: 2017-09-08 | End: 2017-09-19

## 2017-09-15 NOTE — OP NOTES
[facility]   Maye, [facility] [facility]       Name:  Irma Bowen   MR#:  [id number]   :  1950   Account #:  [de-identified]        Date of Adm:  09/15/2017       PREOPERATIVE DIAGNOSIS: Diabetic ulcer, plantar aspect, left foot. POSTOPERATIVE DIAGNOSIS: Diabetic ulcer, plantar aspect, left   foot. PROCEDURES PERFORMED: Selective debridement with application   of total contact cast, ulcer, plantar aspect, left foot. SURGEON: Elizabeth Andre DPM    ANESTHESIA: None needed. ESTIMATED BLOOD LOSS: Minimal.    SPECIMENS REMOVED: None. INDICATIONS: This 64-year-old white male presents for continued   treatment of chronic ulceration, plantar aspect, left foot. Most recently   has been treated with some progress using total contact casting. PHYSICAL EXAMINATION   VITAL SIGNS: Temperature 98.7, pulse rate 69, respirations 16, blood   pressure 135/79. EXTREMITIES: Palpable pedal pulses, fairly good muscle strength,   lower extremities bilateral. Diminished epicritic sensation. He has an   abducted hallux bilateral with plantar fat pad atrophy, bilateral forefoot. Plantar aspect, first left MPJ is with a grade 2 ulcer that measures 0.5 x   0.7 x 0.1 cm. At last visit it measured 1.2 x 1.2 x 0.1 cm. It has a beefy   red granulating base with surrounding hyperkeratotic tissue, does not   appear to be infected. DESCRIPTION OF PROCEDURE: The wound was cleansed with   normal saline. Using a #15 blade, a selective debridement was   performed through the dermal layer, removing dermal granulomatous   and hyperkeratotic tissue. The wound was cleansed with normal saline   and dressed with silver calcium alginate followed by a total contact   casting. PLAN: The patient will have followup visit in the [address] [place] [address]   with myself in 1 week.       We also discussed if the wound continues to progress, we will continue   with the current therapy. If it does not, we will further discuss removal   of the first left metatarsophalangeal joint.         DORIS Tena / JULIETA   D:  09/15/2017   15:17   T:  09/15/2017   16:59   Job #:  [id number]

## 2017-09-19 ENCOUNTER — OFFICE VISIT (OUTPATIENT)
Dept: INTERNAL MEDICINE CLINIC | Age: 67
End: 2017-09-19

## 2017-09-19 ENCOUNTER — HOSPITAL ENCOUNTER (OUTPATIENT)
Dept: WOUND CARE | Age: 67
Discharge: HOME | End: 2017-09-19
Payer: MEDICARE

## 2017-09-19 VITALS
OXYGEN SATURATION: 97 % | RESPIRATION RATE: 18 BRPM | WEIGHT: 253 LBS | HEIGHT: 76 IN | BODY MASS INDEX: 30.81 KG/M2 | DIASTOLIC BLOOD PRESSURE: 80 MMHG | SYSTOLIC BLOOD PRESSURE: 140 MMHG | HEART RATE: 70 BPM

## 2017-09-19 DIAGNOSIS — N18.4 CKD (CHRONIC KIDNEY DISEASE), STAGE IV (HCC): ICD-10-CM

## 2017-09-19 DIAGNOSIS — E11.9 TYPE 2 DIABETES MELLITUS WITHOUT COMPLICATION, WITHOUT LONG-TERM CURRENT USE OF INSULIN (HCC): ICD-10-CM

## 2017-09-19 DIAGNOSIS — Z01.810 PRE-OPERATIVE CARDIOVASCULAR EXAMINATION: Primary | ICD-10-CM

## 2017-09-19 DIAGNOSIS — H25.9 AGE-RELATED CATARACT OF RIGHT EYE, UNSPECIFIED AGE-RELATED CATARACT TYPE: ICD-10-CM

## 2017-09-19 DIAGNOSIS — I25.5 ISCHEMIC CARDIOMYOPATHY: ICD-10-CM

## 2017-09-19 DIAGNOSIS — I12.9 HYPERTENSION WITH RENAL DISEASE: ICD-10-CM

## 2017-09-19 RX ORDER — CLONAZEPAM 1 MG/1
TABLET ORAL
Qty: 90 TAB | Refills: 1 | Status: SHIPPED | OUTPATIENT
Start: 2017-09-19 | End: 2018-02-14 | Stop reason: SDUPTHER

## 2017-09-19 NOTE — MR AVS SNAPSHOT
Visit Information Date & Time Provider Department Dept. Phone Encounter #  
 9/19/2017  1:50 PM Davey Eli MD [facility] [phone] [id number] Follow-up Instructions Return if symptoms worsen or fail to improve. Your Appointments 9/25/2017  1:20 PM  
[facility] 10 [facility] ([address]) Appt Note: 1 mo  follow up [address] [phone] [address] [facility] [address]  
  
    
 10/17/2017  1:20 PM  
PRE OP with MD TAMIKA Rogers [facility] ([address]) Appt Note: pre-op eye surgery [address] [phone] [address] [facility] [phone] Upcoming Health Maintenance Date Due  
 EYE EXAM RETINAL OR DILATED Q1 12/28/1958 DTaP/Tdap/Td series (1 - Tdap) 12/28/1969 FOBT Q 1 YEAR AGE 50-75 12/28/1998 ZOSTER VACCINE AGE 60> 10/28/2008 GLAUCOMA SCREENING Q2Y 12/28/2013 INFLUENZA AGE 9 TO ADULT 8/1/2017 HEMOGLOBIN A1C Q6M 1/7/2018 LIPID PANEL Q1 7/7/2018 FOOT EXAM Q1 8/28/2018 MICROALBUMIN Q1 8/28/2018 MEDICARE YEARLY EXAM 8/29/2018 Allergies as of 9/19/2017  Review Complete On: 9/19/2017 By: Davey Eli MD  
  
 Severity Noted Reaction Type Reactions Niacin High 03/06/2014    Unknown (comments) Levemir [Insulin Detemir]  04/05/2013    Hives Other Medication  05/21/2013    Other (comments) ? Allergy to Mardel Mon causing chemical burn Primaxin [Imipenem-cilastatin]  05/23/2013    Diarrhea, Rash Xarelto [Rivaroxaban]  03/06/2014    Rash, Itching Current Immunizations  Never Reviewed Name Date Influenza Vaccine 10/26/2016, 10/21/2015 Pneumococcal Conjugate (PCV-13) 8/7/2015 Pneumococcal Vaccine (Unspecified Type) 1/1/2014 Not reviewed this visit You Were Diagnosed With   
  
 Codes Comments Pre-operative cardiovascular examination    -  Primary ICD-10-CM: Z01.810 ICD-9-CM: V72.81 Age-related cataract of right eye, unspecified age-related cataract type     ICD-10-CM: H25.9 ICD-9-CM: 366.10 Hypertension with renal disease     ICD-10-CM: I12.9 ICD-9-CM: 403.90 CKD (chronic kidney disease), stage IV ([facility] [address]     ICD-10-CM: N18.4 ICD-9-CM: 585.4 Ischemic cardiomyopathy     ICD-10-CM: I25.5 ICD-9-CM: 414.8 Vitals BP Pulse Resp Height(growth percentile) Weight(growth percentile) SpO2  
 140/80 (BP 1 Location: Left arm, BP Patient Position: Sitting) 70 18 6' 4\" (1.93 m) 253 lb (114.8 kg) 97% BMI Smoking Status 30.8 kg/m2 Former Smoker BMI and BSA Data Body Mass Index Body Surface Area  
 30.8 kg/m 2 2.48 m 2 Preferred Pharmacy Pharmacy Name Phone [phone] - [address], [address] AT [place] [address] [phone] Your Updated Medication List  
  
   
This list is accurate as of: 9/19/17  3:56 PM.  Always use your most recent med list.  
  
  
  
  
 BD ULTRA-FINE DEVIN PEN NEEDLES  
by Does Not Apply route. bumetanide 2 mg tablet Commonly known as:  Brittni Bora Take 4 mg by mouth two (2) times a day. clindamycin 300 mg capsule Commonly known as:  CLEOCIN Take 300 mg by mouth four (4) times daily. clonazePAM 1 mg tablet Commonly known as:  KlonoPIN  
TAKE 1 TABLET BY MOUTH EVERY NIGHT AT BEDTIME COLCRYS 0.6 mg tablet Generic drug:  colchicine Take 0.6 mg by mouth daily. Takes 1 tablet 3 times a week. COREG 12.5 mg tablet Generic drug:  carvedilol Take  by mouth two (2) times daily (with meals). DIOVAN 320 mg tablet Generic drug:  valsartan Take  by mouth daily. ELIQUIS 5 mg tablet Generic drug:  apixaban Take 5 mg by mouth two (2) times a day. finasteride 5 mg tablet Commonly known as:  PROSCAR Take 5 mg by mouth daily. LANTUS SOLOSTAR 100 unit/mL (3 mL) Inpn Generic drug:  insulin glargine 20 Units by SubCUTAneous route daily. Liraglutide 0.6 mg/0.1 mL (18 mg/3 mL) Pnij Commonly known as:  VICTOZA  
0.6 mg by SubCUTAneous route.  
  
 metOLazone 2.5 mg tablet Commonly known as:  Luvenia Bunde Take  by mouth as needed (pt takes approximately twice a month if has edema. ). pramipexole 0.25 mg tablet Commonly known as:  MIRAPEX Take 0.25 mg by mouth nightly. tamsulosin 0.4 mg capsule Commonly known as:  FLOMAX Take 0.8 mg by mouth daily. ULORIC 40 mg Tab tablet Generic drug:  febuxostat Take 40 mg by mouth daily. Prescriptions Printed Refills  
 clonazePAM (KLONOPIN) 1 mg tablet 1 Sig: TAKE 1 TABLET BY MOUTH EVERY NIGHT AT BEDTIME Class: Print Follow-up Instructions Return if symptoms worsen or fail to improve. To-Do List   
 09/22/2017 1:45 PM  
  Appointment with Tomas Litten, DPM at [address] [place] [address]. ([phone]) Introducing \[facility] [place] [facility]\"" & HEALTH SERVICES! [address] [place] [address] introduces Like[employer] patient portal. Now you can access parts of your medical record, email your doctor's office, and request medication refills online. 1. In your internet browser, go to [email or web address] 2. Click on the First Time User? Click Here link in the Sign In box. You will see the New Member Sign Up page. 3. Enter your Like[employer] Access Code exactly as it appears below. You will not need to use this code after youve completed the sign-up process. If you do not sign up before the expiration date, you must request a new code. · Like[employer] Access Code: [email or web address] Expires: 11/15/2017  5:31 PM 
 
4.  Enter the last four digits of your Social Security Number (xxxx) and Date of Birth (mm/dd/yyyy) as indicated and click Submit. You will be taken to the next sign-up page. 5. Create a Reqlut ID. This will be your Reqlut login ID and cannot be changed, so think of one that is secure and easy to remember. 6. Create a Reqlut password. You can change your password at any time. 7. Enter your Password Reset Question and Answer. This can be used at a later time if you forget your password. 8. Enter your e-mail address. You will receive e-mail notification when new information is available in [address] 9. Click Sign Up. You can now view and download portions of your medical record. 10. Click the Download Summary menu link to download a portable copy of your medical information. If you have questions, please visit the Frequently Asked Questions section of the Reqlut website. Remember, Reqlut is NOT to be used for urgent needs. For medical emergencies, dial 911. Now available from your iPhone and Android! Please provide this summary of care documentation to your next provider. Your primary care clinician is listed as Hunter. If you have any questions after today's visit, please call [phone].

## 2017-09-19 NOTE — PROGRESS NOTES
[address]   WOUND CARE PROGRESS NOTE       Name:  Neha Moncada   MR#:  [id number]   :  1950   Account #:  [de-identified]        Date of Adm:  2017       DATE OF SERVICE: 2017     SUBJECTIVE: The patient returns with a left plantar foot diabetic ulcer. He is a patient of Dr. Hema Wellington. He is being treated with a total contact   cast, which he said was becoming painful, especially at his heel. He   presented for a cast change. PHYSICAL EXAMINATION: Revealed the ulcer to be clean and   measure approximately 0.5 x 1.5 x 0.1 cm. It is clean and granulating. There is no evidence of infection. There is no exposed tendon or bone. ASSESSMENT: Grade 2 left foot diabetic ulcer. Requires selective   debridement. PROCEDURE: Selective debridement. DESCRIPTION OF PROCEDURE: Under topical anesthesia,   nonviable tissue was sharply excised from the base of the left foot   ulcer using a ring curette in the above dimensions. Blood loss was less   than 5 mL, and there were no specimens or complications. PLAN: Total contact cast reapplied. Follow up next Friday with Dr. Hema Wellington.         Toan Peng MD        / Bhavesh Mejia   D:  2017   11:41   T:  2017   12:05   Job #:  [id number]

## 2017-09-19 NOTE — PROGRESS NOTES
HPI:   74 y.o.  presents for medical consultation and clearance before planned cataract surgery to be done on his right eye on 9/26/2017 by Dr. Karla Matt at [facility]. He did have a injection into his eye yesterday they help decrease the problems he would have with his surgery. He does have a lot of scleral injection and bleeding from the injection he received yesterday. He did have pain but that is now controlled since he was seen by the ophthalmologist today. He is normally followed by me for hypertension, chronic kidney disease, diabetes, hyperlipidemia, atherosclerotic coronary vascular disease, cardiomyopathy, DJD, compensated congestive heart failure, atrial fibrillation, gout, hypothyroidism, obesity, and BPH. He currently denies any chest pain shortness of breath or cardiorespiratory complaints. He denies any GI/ complaints. He has recently had a diabetic foot ulcer on his left foot and is wearing a tight fitting cast to help with the healing of that. There are no other complaints noted on complete review of systems other than decreased vision.     Patient Active Problem List    Diagnosis    DJD (degenerative joint disease)    ASCVD (arteriosclerotic cardiovascular disease)    CHF (congestive heart failure) (HCC)    Hyperlipidemia    Diabetes mellitus ([address]    CKD (chronic kidney disease), stage IV ([address]    Hypertension with renal disease    GERD (gastroesophageal reflux disease)    Cardiomyopathy ([address]    Atrial fibrillation ([address]    Gout    Obesity    Hypothyroid    Diabetic neuropathy (HCC)    BPH (benign prostatic hyperplasia)    Pre-operative cardiovascular examination    Age-related cataract    Type 2 diabetes mellitus with foot ulcer ([address]    Medicare annual wellness visit, initial    Anemia    On statin therapy    Restless leg    Insomnia    ED (erectile dysfunction)    Anxiety       Past Medical History:   Diagnosis Date    Anemia 8/5/2017    Anxiety 8/5/2017    Arrhythmia     atrial fibrillation 2014    ASCVD (arteriosclerotic cardiovascular disease) 8/5/2017    BPH (benign prostatic hyperplasia) 8/5/2017    CAD (coronary artery disease)     h/o stents    Cancer ([facility] [address]     h/o skin cancer    Cardiomyopathy ([facility] [address] 8/5/2017    CHF (congestive heart failure) ([facility] [address] 8/5/2017    CKD (chronic kidney disease), stage IV (HCC) 8/5/2017    Diabetes ([facility] [address]     Diabetes mellitus ([facility] [address] 8/5/2017    Diabetic neuropathy ([facility] [address] 8/5/2017    DJD (degenerative joint disease) 8/5/2017    ED (erectile dysfunction) 8/5/2017    GERD (gastroesophageal reflux disease) 8/5/2017    Gout 8/5/2017    High cholesterol     Hyperlipidemia 8/5/2017    Hypertension     Hypertension with renal disease 8/5/2017    Hypothyroid 8/5/2017    Insomnia 8/5/2017    Obesity 8/5/2017    On statin therapy 8/5/2017    Restless leg 8/5/2017    Thyroid disease     hypothyroid       Social History   Substance Use Topics    Smoking status: Former Smoker     Quit date: 3/6/1972    Smokeless tobacco: Never Used    Alcohol use No      Comment: rare, 1 drink per year       Family History   Problem Relation Age of Onset    Heart Disease Mother     Kidney Disease Mother     Heart Disease Father     Kidney Disease Father     Cancer Sister      Breast       Outpatient Prescriptions Marked as Taking for the 9/19/17 encounter (Office Visit) with Sigrid Dumont MD   Medication Sig Dispense Refill    clonazePAM (KLONOPIN) 1 mg tablet TAKE 1 TABLET BY MOUTH EVERY NIGHT AT BEDTIME 90 Tab 1    clindamycin (CLEOCIN) 300 mg capsule Take 300 mg by mouth four (4) times daily.  pramipexole (MIRAPEX) 0.25 mg tablet Take 0.25 mg by mouth nightly.  metOLazone (ZAROXOLYN) 2.5 mg tablet Take  by mouth as needed (pt takes approximately twice a month if has edema. ).  valsartan (DIOVAN) 320 mg tablet Take  by mouth daily.       carvedilol (COREG) 12.5 mg tablet Take  by mouth two (2) times daily (with meals).  febuxostat (ULORIC) 40 mg tab tablet Take 40 mg by mouth daily.  PEN NEEDLE, DIABETIC (BD ULTRA-FINE DEVIN PEN NEEDLES) by Does Not Apply route.  colchicine (COLCRYS) 0.6 mg tablet Take 0.6 mg by mouth daily. Takes 1 tablet 3 times a week.  bumetanide (BUMEX) 2 mg tablet Take 4 mg by mouth two (2) times a day.  Liraglutide (VICTOZA) 0.6 mg/0.1 mL (18 mg/3 mL) sub-q pen 0.6 mg by SubCUTAneous route.  apixaban (ELIQUIS) 5 mg tablet Take 5 mg by mouth two (2) times a day.  finasteride (PROSCAR) 5 mg tablet Take 5 mg by mouth daily.  tamsulosin (FLOMAX) 0.4 mg capsule Take 0.8 mg by mouth daily.  insulin glargine (LANTUS SOLOSTAR) 100 unit/mL (3 mL) pen 20 Units by SubCUTAneous route daily. Allergies   Allergen Reactions    Niacin Unknown (comments)    Levemir [Insulin Detemir] Hives    Other Medication Other (comments)     ? Allergy to Duraprep causing chemical burn    Primaxin [Imipenem-Cilastatin] Diarrhea and Rash    Xarelto [Rivaroxaban] Rash and Itching       ROS:     Constitutional: He denies fevers, weight loss, sweats. Eyes: No blurred or double vision. General decreased vision and bleeding into the sclera from injection yesterday. ENT: No difficulty with swallowing, taste, speech or smell. Neck: no stiffness or swelling  Respiratory: No cough wheezing or shortness of breath. Cardiovascular: Denies chest pain, palpitations, unexplained indigestion or syncope. Gastrointestinal:  No changes in bowel movements, no abdominal pain, no bloating. Genitourinary:  He denies frequency, nocturia or stranguria. Extremities: No joint pain, stiffness or swelling. Wearing a tight fitting cast left lower extremity secondary to diabetic foot ulcer  Neurological:  No numbness, tingling, burring paresthesias or loss of motor strength.   No syncope, dizziness or frequent headache  Lymphatic: no adenopathy noted  Hematologic: no easy bruising or bleeding gums  Skin:  No recent rashes or mole changes. Psychiatric/Behavioral:  Negative for depression. The patient is not nervous/anxious. PE:     Vitals:    09/19/17 1524   BP: 140/80   Pulse: 70   Resp: 18   SpO2: 97%   Weight: 253 lb (114.8 kg)   Height: 6' 4\" (1.93 m)   PainSc:   8   PainLoc: Eye        General appearance - alert, well appearing, and in no distress  Mental status - alert, oriented to person, place, and time  HEENT:  Ears - bilateral TM's and external ear canals clear  Eyes - pupillary responses were normal.  Extraocular muscle function intact. Lids and conjunctiva not injected. Fundoscopic exam revealed sharp disc margins. eye movements intact scleral injection with bleeding post injection done yesterday  Pharynx- clear with teeth in good repair. No masses were noted  Neck - supple without thyromegaly or burit. No JVD noted  Lungs - clear to auscultation and percussion  Cardiac- normal rate, regular rhythm without murmurs. PMI not displaced. No gallop, rub or click  Abdomen - flat, soft, non-tender without palpable organomegaly or mass. No pulsatile mass was felt, and not bruit was heard. Bowel sounds were active  Lymphatics - no palpable lymphadenopathy, no hepatosplenomegaly  Hematologic: no petechiae or purpura  Peripheral vascular -Femoral, Dorsalis pedis and posterior tibial pulses felt without difficulty  Skin - no rash or unusual mole change noted cast left lower extremity due to diabetic foot ulcer  Neurological - Cranial nerves II-XII grossly intact. Motor strength 5/5. DTR's 2+ and symmetric. Station and gait normal  Back exam - full range of motion, no tenderness, palpable spasm or pain on motion  Musculoskeletal - no joint tenderness, deformity or swelling      Assessment/Plan:     ASSESSMENT:   1. Pre-operative cardiovascular examination    2. Age-related cataract of right eye, unspecified age-related cataract type    3. Hypertension with renal disease    4. CKD (chronic kidney disease), stage IV ([facility] [address]    5. Ischemic cardiomyopathy    6. Type 2 diabetes mellitus without complication, without long-term current use of insulin ([facility])      Impression  1. He is medically stable for the planned surgery from a cardiovascular standpoint. 2.  Hypertension control is adequate although borderline dietary measures discussed  3. Chronic kidney disease glomerular filtration rate of 30 reviewed and numbers recently done by his renal doctor  4. Ischemic cardiomyopathy stable  5. Diabetes A1c still above goal at 8.4 most recent check 9/15  Approved for surgery copy to Dr. Alexandra Freire.    PLAN:  .  Tiana José This Encounter    clonazePAM (KLONOPIN) 1 mg tablet         ATTENTION:   This medical record was transcribed using an electronic medical records system. Although proofread, it may and can contain electronic and spelling errors. Other human spelling and other errors may be present. Corrections may be executed at a later time. Please feel free to contact us for any clarifications as needed. Follow-up Disposition:  Return if symptoms worsen or fail to improve. Nhi Foss MD    Health Maintenance reviewed - updated. Orders Placed This Encounter    clonazePAM (KLONOPIN) 1 mg tablet     Sig: TAKE 1 TABLET BY MOUTH EVERY NIGHT AT BEDTIME     Dispense:  90 Tab     Refill:  1       Medications Discontinued During This Encounter   Medication Reason    clonazePAM (KLONOPIN) 1 mg tablet Reorder       Current Outpatient Prescriptions   Medication Sig Dispense Refill    clonazePAM (KLONOPIN) 1 mg tablet TAKE 1 TABLET BY MOUTH EVERY NIGHT AT BEDTIME 90 Tab 1    clindamycin (CLEOCIN) 300 mg capsule Take 300 mg by mouth four (4) times daily.  pramipexole (MIRAPEX) 0.25 mg tablet Take 0.25 mg by mouth nightly.       metOLazone (ZAROXOLYN) 2.5 mg tablet Take  by mouth as needed (pt takes approximately twice a month if has edema. ).  valsartan (DIOVAN) 320 mg tablet Take  by mouth daily.  carvedilol (COREG) 12.5 mg tablet Take  by mouth two (2) times daily (with meals).  febuxostat (ULORIC) 40 mg tab tablet Take 40 mg by mouth daily.  PEN NEEDLE, DIABETIC (BD ULTRA-FINE DEVIN PEN NEEDLES) by Does Not Apply route.  colchicine (COLCRYS) 0.6 mg tablet Take 0.6 mg by mouth daily. Takes 1 tablet 3 times a week.  bumetanide (BUMEX) 2 mg tablet Take 4 mg by mouth two (2) times a day.  Liraglutide (VICTOZA) 0.6 mg/0.1 mL (18 mg/3 mL) sub-q pen 0.6 mg by SubCUTAneous route.  apixaban (ELIQUIS) 5 mg tablet Take 5 mg by mouth two (2) times a day.  finasteride (PROSCAR) 5 mg tablet Take 5 mg by mouth daily.  tamsulosin (FLOMAX) 0.4 mg capsule Take 0.8 mg by mouth daily.  insulin glargine (LANTUS SOLOSTAR) 100 unit/mL (3 mL) pen 20 Units by SubCUTAneous route daily. No results found for any visits on 09/19/17. Recommended healthy diet low in carbohydrates, fats, sodium and cholesterol. Recommended regular cardiovascular exercise 3-6 times per week for 30-60 minutes daily. Verbal and written instructions (see AVS) provided. Patient expresses understanding of diagnosis and treatment plan.

## 2017-09-19 NOTE — PROGRESS NOTES
1. Have you been to the ER, urgent care clinic since your last visit? Hospitalized since your last visit? No    2. Have you seen or consulted any other health care providers outside of the [address] [place] [address] since your last visit? Include any pap smears or colon screening. Yes  [facility].       Pre-op for cataract surgery , right eye on 9- by Dr. Gaye Cunningham.

## 2017-09-22 ENCOUNTER — HOSPITAL ENCOUNTER (OUTPATIENT)
Dept: WOUND CARE | Age: 67
Discharge: HOME | End: 2017-09-22
Payer: MEDICARE

## 2017-09-22 LAB
BACTERIA SPEC AEROBE CULT: ABNORMAL
BACTERIA SPEC ANAEROBE CULT: ABNORMAL
BACTERIA SPEC ANAEROBE CULT: ABNORMAL

## 2017-09-22 NOTE — OP NOTES
[place] Leo Villavicencio, [address] [facility]       Name:  Priscila Tierney   MR#:  [id number]   :  1950   Account #:  [de-identified]        Date of Adm:  2017       PREOPERATIVE DIAGNOSIS: Grade 2 diabetic ulcer, plantar aspect,   first left metatarsophalangeal joint. POSTOPERATIVE DIAGNOSIS: Grade 2 diabetic ulcer, plantar   aspect, first left metatarsophalangeal joint. PROCEDURES PERFORMED: Selective debridement, diabetic ulcer,   plantar first left metatarsophalangeal joint. ANESTHESIA: None needed. ESTIMATED BLOOD LOSS: Minimal.    SPECIMENS REMOVED: None. INDICATIONS: This 69-year-old white male presents for continued   treatment of chronic ulceration, plantar first left MPJ. Most recent   therapy has consisted of a total contact cast. The patient is on his third   total contact casting. The patient states that the wound has periodically   opened for the past more than 2 years. Two weeks ago he was given a   course of Bactrim DS twice a day to take for 10 days. The wound had   previously cultured staph. The patient has a history of heart disease   and diabetes. The patient recently had episodes of recurrent atrial   fibrillation and is being monitored by his cardiologist.     PHYSICAL EXAMINATION   VITAL SIGNS: Temperature 97.4, pulse rate 69, respirations 16, blood   pressure 109/70. EXTREMITIES: He has palpable pedal pulses, fairly good muscle   strength, lower extremities bilateral. Grossly diminished epicritic   sensation. First left toe is abducted with a hallux abductovalgus   deformity. Plantar first left MPJ is with a pale, beefy red granulating   ulceration grade 2 that has a coating of yellow exudate. It has   surrounding macerated hyperkeratotic tissue. It measures 0.5 x 1 x   0.1. At last visit, it measured 0.1 x 0.1 x 0.1.     DESCRIPTION OF PROCEDURE: Using a curette, a selective debridement was performed into the dermal layer, removing dermal   granulomatous and hyperkeratotic tissue. Good bleeding base was   achieved. Wound was flushed with copious amounts of saline and   dressed with silver calcium alginate. The patient is to continue every   other day. PLAN: At this time, I do not feel that this wound is going to heal without   surgical intervention and I do not think the patient is progressing using   the total contact casting. I will contact his cardiologist to gain clearance   for surgical intervention; however, the patient probably will need to wait   until his atrial fibrillation episode is handled; however, he will have a   followup visit in the [address] [place] [address] with myself in 1 week.         DORIS Juarez / Clare Caldera   D:  09/22/2017   14:39   T:  09/22/2017   15:13   Job #:  [id number]

## 2017-09-25 ENCOUNTER — OFFICE VISIT (OUTPATIENT)
Dept: INTERNAL MEDICINE CLINIC | Age: 67
End: 2017-09-25

## 2017-09-25 VITALS
WEIGHT: 250.6 LBS | OXYGEN SATURATION: 98 % | RESPIRATION RATE: 18 BRPM | HEART RATE: 70 BPM | TEMPERATURE: 98.3 F | HEIGHT: 77 IN | BODY MASS INDEX: 29.59 KG/M2 | SYSTOLIC BLOOD PRESSURE: 118 MMHG | DIASTOLIC BLOOD PRESSURE: 78 MMHG

## 2017-09-25 DIAGNOSIS — N18.4 CKD (CHRONIC KIDNEY DISEASE), STAGE IV (HCC): Primary | ICD-10-CM

## 2017-09-25 DIAGNOSIS — I12.9 HYPERTENSION WITH RENAL DISEASE: Primary | ICD-10-CM

## 2017-09-25 RX ORDER — CLINDAMYCIN HYDROCHLORIDE 150 MG/1
CAPSULE ORAL
Refills: 1 | COMMUNITY
Start: 2017-09-06 | End: 2017-10-17 | Stop reason: ALTCHOICE

## 2017-09-25 NOTE — MR AVS SNAPSHOT
Visit Information Date & Time Provider Department Dept. Phone Encounter #  
 9/25/2017  1:30 PM Vandana Story NP [facility] [phone] [id number] Follow-up Instructions Return in about 1 month (around 10/25/2017). Your Appointments 10/17/2017  1:20 PM  
PRE OP with Sigrid Dumont MD  
Fiorella Rollins ([address]) Appt Note: pre-op eye surgery [address] [phone] [address] [address] Upcoming Health Maintenance Date Due  
 EYE EXAM RETINAL OR DILATED Q1 12/28/1958 DTaP/Tdap/Td series (1 - Tdap) 12/28/1969 FOBT Q 1 YEAR AGE 50-75 12/28/1998 ZOSTER VACCINE AGE 60> 10/28/2008 GLAUCOMA SCREENING Q2Y 12/28/2013 INFLUENZA AGE 9 TO ADULT 10/25/2017* HEMOGLOBIN A1C Q6M 1/7/2018 LIPID PANEL Q1 7/7/2018 FOOT EXAM Q1 8/28/2018 MICROALBUMIN Q1 8/28/2018 MEDICARE YEARLY EXAM 8/29/2018 *Topic was postponed. The date shown is not the original due date. Allergies as of 9/25/2017  Review Complete On: 9/25/2017 By: Sigrid Dumont MD  
  
 Severity Noted Reaction Type Reactions Niacin High 03/06/2014    Unknown (comments) Levemir [Insulin Detemir]  04/05/2013    Hives Other Medication  05/21/2013    Other (comments) ? Allergy to Margaret Cool causing chemical burn Primaxin [Imipenem-cilastatin]  05/23/2013    Diarrhea, Rash Xarelto [Rivaroxaban]  03/06/2014    Rash, Itching Current Immunizations  Never Reviewed Name Date Influenza Vaccine 10/26/2016, 10/21/2015 Pneumococcal Conjugate (PCV-13) 8/7/2015 Pneumococcal Vaccine (Unspecified Type) 1/1/2014 Not reviewed this visit You Were Diagnosed With   
  
 Codes Comments CKD (chronic kidney disease), stage IV ([facility]    -  Primary ICD-10-CM: N18.4 ICD-9-CM: 348. 4 Vitals Smoking Status Former Smoker Preferred Pharmacy Pharmacy Name Phone [facility] [phone] - [address], [address] [place] [address] [phone] Your Updated Medication List  
  
   
This list is accurate as of: 9/25/17 11:59 PM.  Always use your most recent med list.  
  
  
  
  
 BD ULTRA-FINE DEVIN PEN NEEDLES  
by Does Not Apply route. bumetanide 2 mg tablet Commonly known as:  Tura Rose Take 4 mg by mouth two (2) times a day. clindamycin 150 mg capsule Commonly known as:  CLEOCIN  
TK ONE C PO QID  
  
 clonazePAM 1 mg tablet Commonly known as:  KlonoPIN  
TAKE 1 TABLET BY MOUTH EVERY NIGHT AT BEDTIME COLCRYS 0.6 mg tablet Generic drug:  colchicine Take 0.6 mg by mouth daily. Takes 1 tablet 3 times a week. COREG 12.5 mg tablet Generic drug:  carvedilol Take  by mouth two (2) times daily (with meals). DIOVAN 320 mg tablet Generic drug:  valsartan Take  by mouth daily. ELIQUIS 5 mg tablet Generic drug:  apixaban Take 5 mg by mouth two (2) times a day. finasteride 5 mg tablet Commonly known as:  PROSCAR Take 5 mg by mouth daily. LANTUS SOLOSTAR 100 unit/mL (3 mL) Inpn Generic drug:  insulin glargine 20 Units by SubCUTAneous route daily. Liraglutide 0.6 mg/0.1 mL (18 mg/3 mL) Pnij Commonly known as:  VICTOZA  
0.6 mg by SubCUTAneous route.  
  
 metOLazone 2.5 mg tablet Commonly known as:  Lenette Oklahoma Take  by mouth as needed (pt takes approximately twice a month if has edema. ). pramipexole 0.25 mg tablet Commonly known as:  MIRAPEX Take 0.25 mg by mouth nightly. tamsulosin 0.4 mg capsule Commonly known as:  FLOMAX Take 0.8 mg by mouth daily. ULORIC 40 mg Tab tablet Generic drug:  febuxostat Take 40 mg by mouth daily. Follow-up Instructions Return in about 1 month (around 10/25/2017). To-Do List   
 09/29/2017 1:45 PM  
  Appointment with Myra Pink DPM; [facility] at [address] [place] [address]. ([phone]) Patient Instructions End-Stage Renal Disease: Care Instructions Your Care Instructions End-stage renal (or kidney) disease happens when your kidneys can no longer do their jobs. They can't remove waste from your blood. And they aren't able to balance your body's fluids and chemicals. This stage of the disease usually occurs after you have chronic kidney disease for years. Now the kidneys work so poorly that you need dialysis or a kidney transplant. Dialysis uses a machine to filter your waste. A transplant is surgery to give you a healthy kidney from another person. Follow-up care is a key part of your treatment and safety. Be sure to make and go to all appointments, and call your doctor if you are having problems. It's also a good idea to know your test results and keep a list of the medicines you take. How can you care for yourself at home? · Be safe with medicines. Take your medicines exactly as prescribed. Call your doctor if you have any problems with your medicine. You also may take medicine to control your blood pressure or to treat diabetes. Many people who have diabetes take blood pressure medicine. · If you have diabetes, do your best to keep your blood sugar in your target range. You may do this by taking medicine, eating healthy food, and exercising. · Follow your dialysis schedule. · Do not take ibuprofen (Advil, Motrin), naproxen (Aleve), or similar medicines, unless your doctor tells you to. These may make chronic kidney disease worse. · Make sure your doctor knows all of the medicines, vitamins, supplements, and herbal remedies you take. · Do not smoke or use other tobacco products. Smoking can reduce blood flow to the kidneys. If you need help quitting, talk to your doctor about stop-smoking programs and medicines.  These can increase your chances of quitting for good. · Do not drink alcohol or use illegal drugs. · If your doctor recommends it, get more exercise. Walking is a good choice. · If you have an advance directive, let your doctor know. It may include a living will and a durable power of  for health care. If you don't have one, you may want to prepare one. It lets your doctor and loved ones know your health care wishes if you become unable to speak for yourself. Diet · Talk to a registered dietitian. He or she can help you make a meal plan that is right for you. Most people with kidney disease need to limit salt (sodium), fluids, and protein. Some also have to limit potassium and phosphorus. When should you call for help? Call 911 anytime you think you may need emergency care. For example, call if: 
· You passed out (lost consciousness). Call your doctor now or seek immediate medical care if: 
· You have nausea and vomiting. · You have a fever. · You have chest pain or shortness of breath. · You are feeling confused or are weaker or more tired than usual. 
· You do not get hungry. · You have new swelling of your arms or feet, or your swelling is worse. · You have peritoneal dialysis and you have belly pain. · You have symptoms of infection, such as: 
¨ Increased pain, swelling, warmth, or redness at your catheter or dialysis access site. ¨ Red streaks leading from the site. ¨ Pus draining from the site. ¨ A fever. Watch closely for changes in your health, and be sure to contact your doctor if you have any problems. Where can you learn more? Go to http://gildardo-palak.info/. Enter K574 in the search box to learn more about \"End-Stage Renal Disease: Care Instructions. \" Current as of: April 3, 2017 Content Version: 11.3 © 8999-3705 [employer], [employer].  Care instructions adapted under license by [employer] (which disclaims liability or warranty for this information). If you have questions about a medical condition or this instruction, always ask your healthcare professional. [facility] any warranty or liability for your use of this information. Introducing [facility] SERVICES! Brenda Rodriguez introduces EaglEyeMed patient portal. Now you can access parts of your medical record, email your doctor's office, and request medication refills online. 1. In your internet browser, go to [email or web address]/[facility] 2. Click on the First Time User? Click Here link in the Sign In box. You will see the New Member Sign Up page. 3. Enter your EaglEyeMed Access Code exactly as it appears below. You will not need to use this code after youve completed the sign-up process. If you do not sign up before the expiration date, you must request a new code. · EaglEyeMed Access Code: [email or web address] Expires: 11/15/2017  5:31 PM 
 
4. Enter the last four digits of your Social Security Number (xxxx) and Date of Birth (mm/dd/yyyy) as indicated and click Submit. You will be taken to the next sign-up page. 5. Create a EaglEyeMed ID. This will be your EaglEyeMed login ID and cannot be changed, so think of one that is secure and easy to remember. 6. Create a EaglEyeMed password. You can change your password at any time. 7. Enter your Password Reset Question and Answer. This can be used at a later time if you forget your password. 8. Enter your e-mail address. You will receive e-mail notification when new information is available in [address] 9. Click Sign Up. You can now view and download portions of your medical record. 10. Click the Download Summary menu link to download a portable copy of your medical information. If you have questions, please visit the Frequently Asked Questions section of the EaglEyeMed website. Remember, EaglEyeMed is NOT to be used for urgent needs. For medical emergencies, dial 911. Now available from your iPhone and Android! Please provide this summary of care documentation to your next provider. Your primary care clinician is listed as Hunter. If you have any questions after today's visit, please call [phone].

## 2017-09-25 NOTE — PROGRESS NOTES
Patient was not seen by me on this visit as I was called away from the office on emergency. He was seen by the nurse practitioner for evaluation of his medical problems including chronic kidney disease and hypertension.

## 2017-09-25 NOTE — PROGRESS NOTES
Chief Complaint   Patient presents with    Hypertension     f/u    Chronic Kidney Disease     f/u    Diabetes     f/u     1. Have you been to the ER, urgent care clinic since your last visit? Hospitalized since your last visit? No    2. Have you seen or consulted any other health care providers outside of the [address] [place] [address] since your last visit? Include any pap smears or colon screening.  No

## 2017-09-25 NOTE — MR AVS SNAPSHOT
Visit Information Date & Time Provider Department Dept. Phone Encounter #  
 9/25/2017  1:20 PM Olamide Solis MD 20 [facility] [phone] [id number] Your Appointments 10/17/2017  1:20 PM  
PRE OP with Olamide Solis MD  
Fiorella Maddox 26 ([facility]) Appt Note: pre-op eye surgery [address] [phone] [address] [address] Upcoming Health Maintenance Date Due  
 EYE EXAM RETINAL OR DILATED Q1 12/28/1958 DTaP/Tdap/Td series (1 - Tdap) 12/28/1969 FOBT Q 1 YEAR AGE 50-75 12/28/1998 ZOSTER VACCINE AGE 60> 10/28/2008 GLAUCOMA SCREENING Q2Y 12/28/2013 INFLUENZA AGE 9 TO ADULT 10/25/2017* HEMOGLOBIN A1C Q6M 1/7/2018 LIPID PANEL Q1 7/7/2018 FOOT EXAM Q1 8/28/2018 MICROALBUMIN Q1 8/28/2018 MEDICARE YEARLY EXAM 8/29/2018 *Topic was postponed. The date shown is not the original due date. Allergies as of 9/25/2017  Review Complete On: 9/25/2017 By: Orest Brittle, LPN Severity Noted Reaction Type Reactions Niacin High 03/06/2014    Unknown (comments) Levemir [Insulin Detemir]  04/05/2013    Hives Other Medication  05/21/2013    Other (comments) ? Allergy to Lucila Sera causing chemical burn Primaxin [Imipenem-cilastatin]  05/23/2013    Diarrhea, Rash Xarelto [Rivaroxaban]  03/06/2014    Rash, Itching Current Immunizations  Never Reviewed Name Date Influenza Vaccine 10/26/2016, 10/21/2015 Pneumococcal Conjugate (PCV-13) 8/7/2015 Pneumococcal Vaccine (Unspecified Type) 1/1/2014 Not reviewed this visit Vitals BP Pulse Temp Resp Height(growth percentile) Weight(growth percentile) 118/78 (BP 1 Location: Left arm, BP Patient Position: Sitting) 70 98.3 °F (36.8 °C) (Oral) 18 6' 5\" (1.956 m) 250 lb 9.6 oz (113.7 kg) SpO2 BMI Smoking Status 98% 29.72 kg/m2 Former Smoker BMI and BSA Data Body Mass Index Body Surface Area  
 29.72 kg/m 2 2.49 m 2 Preferred Pharmacy Pharmacy Name Phone [facility] [address] - [address], [address] AT [place] [address] [phone] Your Updated Medication List  
  
   
This list is accurate as of: 9/25/17  3:14 PM.  Always use your most recent med list.  
  
  
  
  
 BD ULTRA-FINE DEVIN PEN NEEDLES  
by Does Not Apply route. bumetanide 2 mg tablet Commonly known as:  Aram Kussmaul Take 4 mg by mouth two (2) times a day. clindamycin 150 mg capsule Commonly known as:  CLEOCIN  
TK ONE C PO QID  
  
 clonazePAM 1 mg tablet Commonly known as:  KlonoPIN  
TAKE 1 TABLET BY MOUTH EVERY NIGHT AT BEDTIME COLCRYS 0.6 mg tablet Generic drug:  colchicine Take 0.6 mg by mouth daily. Takes 1 tablet 3 times a week. COREG 12.5 mg tablet Generic drug:  carvedilol Take  by mouth two (2) times daily (with meals). DIOVAN 320 mg tablet Generic drug:  valsartan Take  by mouth daily. ELIQUIS 5 mg tablet Generic drug:  apixaban Take 5 mg by mouth two (2) times a day. finasteride 5 mg tablet Commonly known as:  PROSCAR Take 5 mg by mouth daily. LANTUS SOLOSTAR 100 unit/mL (3 mL) Inpn Generic drug:  insulin glargine 20 Units by SubCUTAneous route daily. Liraglutide 0.6 mg/0.1 mL (18 mg/3 mL) Pnij Commonly known as:  VICTOZA  
0.6 mg by SubCUTAneous route.  
  
 metOLazone 2.5 mg tablet Commonly known as:  Manjula Adele Take  by mouth as needed (pt takes approximately twice a month if has edema. ). pramipexole 0.25 mg tablet Commonly known as:  MIRAPEX Take 0.25 mg by mouth nightly. tamsulosin 0.4 mg capsule Commonly known as:  FLOMAX Take 0.8 mg by mouth daily. ULORIC 40 mg Tab tablet Generic drug:  febuxostat Take 40 mg by mouth daily. To-Do List   
 09/29/2017 1:45 PM  
  Appointment with Fany Carvajal DPM at [address] [place] [address]. ([phone]) Introducing [facility] & HEALTH SERVICES! Kiki Allan introduces enGreet patient portal. Now you can access parts of your medical record, email your doctor's office, and request medication refills online. 1. In your internet browser, go to [email or web address] [email or web address] 2. Click on the First Time User? Click Here link in the Sign In box. You will see the New Member Sign Up page. 3. Enter your enGreet Access Code exactly as it appears below. You will not need to use this code after youve completed the sign-up process. If you do not sign up before the expiration date, you must request a new code. · enGreet Access Code: [email or web address] Expires: 11/15/2017  5:31 PM 
 
4. Enter the last four digits of your Social Security Number (xxxx) and Date of Birth (mm/dd/yyyy) as indicated and click Submit. You will be taken to the next sign-up page. 5. Create a enGreet ID. This will be your enGreet login ID and cannot be changed, so think of one that is secure and easy to remember. 6. Create a enGreet password. You can change your password at any time. 7. Enter your Password Reset Question and Answer. This can be used at a later time if you forget your password. 8. Enter your e-mail address. You will receive e-mail notification when new information is available in [address] 9. Click Sign Up. You can now view and download portions of your medical record. 10. Click the Download Summary menu link to download a portable copy of your medical information. If you have questions, please visit the Frequently Asked Questions section of the enGreet website. Remember, enGreet is NOT to be used for urgent needs. For medical emergencies, dial 911. Now available from your iPhone and Android! Please provide this summary of care documentation to your next provider. Your primary care clinician is listed as Hunter. If you have any questions after today's visit, please call [phone].

## 2017-09-26 NOTE — PATIENT INSTRUCTIONS
End-Stage Renal Disease: Care Instructions  Your Care Instructions  End-stage renal (or kidney) disease happens when your kidneys can no longer do their jobs. They can't remove waste from your blood. And they aren't able to balance your body's fluids and chemicals. This stage of the disease usually occurs after you have chronic kidney disease for years. Now the kidneys work so poorly that you need dialysis or a kidney transplant. Dialysis uses a machine to filter your waste. A transplant is surgery to give you a healthy kidney from another person. Follow-up care is a key part of your treatment and safety. Be sure to make and go to all appointments, and call your doctor if you are having problems. It's also a good idea to know your test results and keep a list of the medicines you take. How can you care for yourself at home? · Be safe with medicines. Take your medicines exactly as prescribed. Call your doctor if you have any problems with your medicine. You also may take medicine to control your blood pressure or to treat diabetes. Many people who have diabetes take blood pressure medicine. · If you have diabetes, do your best to keep your blood sugar in your target range. You may do this by taking medicine, eating healthy food, and exercising. · Follow your dialysis schedule. · Do not take ibuprofen (Advil, Motrin), naproxen (Aleve), or similar medicines, unless your doctor tells you to. These may make chronic kidney disease worse. · Make sure your doctor knows all of the medicines, vitamins, supplements, and herbal remedies you take. · Do not smoke or use other tobacco products. Smoking can reduce blood flow to the kidneys. If you need help quitting, talk to your doctor about stop-smoking programs and medicines. These can increase your chances of quitting for good. · Do not drink alcohol or use illegal drugs. · If your doctor recommends it, get more exercise. Walking is a good choice.   · If you have an advance directive, let your doctor know. It may include a living will and a durable power of  for health care. If you don't have one, you may want to prepare one. It lets your doctor and loved ones know your health care wishes if you become unable to speak for yourself. Diet  · Talk to a registered dietitian. He or she can help you make a meal plan that is right for you. Most people with kidney disease need to limit salt (sodium), fluids, and protein. Some also have to limit potassium and phosphorus. When should you call for help? Call 911 anytime you think you may need emergency care. For example, call if:  · You passed out (lost consciousness). Call your doctor now or seek immediate medical care if:  · You have nausea and vomiting. · You have a fever. · You have chest pain or shortness of breath. · You are feeling confused or are weaker or more tired than usual.  · You do not get hungry. · You have new swelling of your arms or feet, or your swelling is worse. · You have peritoneal dialysis and you have belly pain. · You have symptoms of infection, such as:  ¨ Increased pain, swelling, warmth, or redness at your catheter or dialysis access site. ¨ Red streaks leading from the site. ¨ Pus draining from the site. ¨ A fever. Watch closely for changes in your health, and be sure to contact your doctor if you have any problems. Where can you learn more? Go to http://gildardo-palak.info/. Enter P928 in the search box to learn more about \"End-Stage Renal Disease: Care Instructions. \"  Current as of: April 3, 2017  Content Version: 11.3  © 0509-2013 Spark. Care instructions adapted under license by [employer] (which disclaims liability or warranty for this information).  If you have questions about a medical condition or this instruction, always ask your healthcare professional. [address] any warranty or liability for your use of this information.

## 2017-09-26 NOTE — PROGRESS NOTES
Subjective:  Mr. Abran Smiley is a pleasant 74year old gentleman, who comes in with his wife in attendance. He is a patient of Dr. Efren Gamboa, who is here for his one month follow up. Dr. Efren Gamboa had an emergency, so therefore I agreed to see him. Actually he saw Dr. Efren Gamboa about a week ago for a preop history and physical in anticipation of his right cataract surgery. Since his visit with Dr. Efren Gamboa a week ago, he has done well. Today he really has no complaints. Past Medical History:  Fairly extensive, including hypertension, CKD, diabetes, hyperlipidemia, cardiomyopathy, congestive heart failure, atrial fibrillation, gout and hypothyroidism. Today he denies any headaches, dizziness or blurred vision. He denies any chest pain or palpitations. He denies any shortness of breath. Physical Examination:  GENERAL:  On exam he is a pleasant gentleman in no acute distress. He is alert and oriented. He has a normal gait. VITALS:  BP: 118/78. P: 70.  R: 18.  T: 98.3. O2 sat: 98%. WT: 250. NECK:  Supple. CHEST:  Lungs were clear to auscultation, no rales or wheezes. CARDIAC:  Heart regular rhythm without murmur. EXTREMITIES:  Trace pitting edema. No calf tenderness. Distal pulses were present. Impression:  1. Hypertension. 2. CKD. 3. Cardiomyopathy. 4. Type 2 diabetes mellitus. Plan:  1. He will continue his current regimen and follow up with Dr. Efren Gamboa in one month. 2. He will call should he have any concerns prior to that scheduled appointment.

## 2017-09-29 ENCOUNTER — HOSPITAL ENCOUNTER (OUTPATIENT)
Dept: WOUND CARE | Age: 67
Discharge: HOME | End: 2017-09-29
Payer: MEDICARE

## 2017-09-29 PROCEDURE — 97597 DBRDMT OPN WND 1ST 20 CM/<: CPT

## 2017-09-29 NOTE — OP NOTES
Jadiel Villavicencio, [facility]       Name:  Salma Vences   MR#:  [id number]   :  1950   Account #:  [de-identified]        Date of Adm:  2017       DATE OF PROCEDURE: 2017     PREOPERATIVE DIAGNOS IS:   Grade 2 ulcer, plantar aspect, first left   metatarsophalangeal joint on neuropathic diabetic. POSTOPERATIVE DIAGNOS IS:   Grade 2 ulcer, plantar aspect, first   left metatarsophalangeal joint on neuropathic diabetic. PROCEDURES PERFORMED:  Selective debridement, grade 2   diabetic ulcer, plantar aspect of the first left metatarsophalangeal joint. ESTIMATED BLOOD LOSS:  Minimal.     SPECIMENS REMOVED:  None. ANESTHESIA:  None needed. INDICATIONS: This 68-year-old white male presents for continued   treatment of a chronic ulceration, plantar aspect, first left MPJ. He has   previously been treated using total contact casting. Most recent   treatment consisted of silver calcium alginate and dry sterile dressings. PHYSICAL EXAMINATION   VITAL SIGNS: Temperature 97.9, pulse rate 69, respirations 16, blood   pressure 130/86. EXTREMITIES: Patient palpable pedal pulses, fairly good muscle   strength, lower extremities, with diminished epicritic sensation. Plantar   aspect, first left MPJ is with a beefy red granulating grade 2 ulcer that   measures 0.4 x 1 x 0.1 cm. At last visit, it measured 0.5 x 1 x 0.1 cm. It   appears to be progressing uneventfully. DESCRIPTION OF PROCEDURE: The wound was cleansed with   normal saline. Using a curette, a selective debridement was performed   into the dermal layer, removing dermal granulomatous and   hyperkeratotic tissue. It was cleansed with normal saline, dressed with   silver calcium alginate followed by dry sterile dressings. The patient is   to continue silver calcium alginate every other day.  He will have   [facility]up [facility] with myself in 1 week. We have discussed and agreed that if the wound fails to heal, we will   proceed to amputation of the first left metatarsal head. However, at this   point, the wound appears to be responding to treatment.         DORIS Slade / Jose Damon   D:  09/29/2017   14:34   T:  09/29/2017   14:51   Job #:  [id number]

## 2017-10-06 ENCOUNTER — HOSPITAL ENCOUNTER (OUTPATIENT)
Dept: WOUND CARE | Age: 67
Discharge: HOME | End: 2017-10-06
Payer: MEDICARE

## 2017-10-06 PROCEDURE — 97597 DBRDMT OPN WND 1ST 20 CM/<: CPT

## 2017-10-06 NOTE — OP NOTES
Ashton Leo Villavicencio, [facility]       Name:  Colton Augustin   MR#:  [id number]   :  1950   Account #:  [de-identified]        Date of Adm:  10/06/2017       PREOPERATIVE DIAGNOSIS: Diabetic ulcer, plantar aspect, first left   metatarsophalangeal joint. POSTOPERATIVE DIAGNOSIS: Diabetic ulcer, plantar aspect, first   left metatarsophalangeal joint. PROCEDURES PERFORMED: Selective debridement, diabetic ulcer,   plantar aspect, first left metatarsophalangeal joint. ANESTHESIA: None needed. ESTIMATED BLOOD LOSS: Minimal.    SPECIMENS REMOVED: None. INDICATIONS: This 25-year-old white male presents for continued   treatment of chronic ulceration, plantar left foot. The patient states that   his foot was doing better until he decided to work in work boots 2 days   ago and the wound increased. The patient has a history of diabetes. Otherwise, medical history and physical is unchanged from admitting   history and physical.    PHYSICAL EXAMINATION   VITAL SIGNS: Temperature 97.8, pulse rate 69, respirations 18, blood   pressure 155/70. EXTREMITIES: The patient has palpable pedal pulses with fairly good   muscle strength, lower extremities bilateral. Diminished epicritic   sensation. He has an abducted hallux, left foot. Plantar first left MPJ is   with a granulating grade 2 ulcer with a beefy red base that measures   0.4 x 1 x 0.1 cm. It does not appear to be infected. DESCRIPTION OF PROCEDURE: The wound was cleansed with   normal saline. A selective debridement was performed using a #15   blade and a curette into the dermal layer, removing dermal,   granulomatous, and hyperkeratotic tissue. The wound was dressed   with Endoform bandaging the patient is to continue daily. PLAN: The patient is pending surgical intervention.  I plan to remove   the first metatarsal head, left foot; however, the patient is currently   undergoing cataract surgery, as well as having his pacemaker   replaced. We will try to delay his foot surgery until after those other   issues have cleared up. He will have a followup visit in the [address] [place] [address] with myself in 2 weeks.         DORIS Morales / Shakir Palu   D:  10/06/2017   15:35   T:  10/06/2017   15:55   Job #:  [id number]

## 2017-10-17 ENCOUNTER — OFFICE VISIT (OUTPATIENT)
Dept: INTERNAL MEDICINE CLINIC | Age: 67
End: 2017-10-17

## 2017-10-17 VITALS
DIASTOLIC BLOOD PRESSURE: 77 MMHG | RESPIRATION RATE: 16 BRPM | HEIGHT: 77 IN | BODY MASS INDEX: 29.4 KG/M2 | HEART RATE: 69 BPM | OXYGEN SATURATION: 98 % | WEIGHT: 249 LBS | SYSTOLIC BLOOD PRESSURE: 129 MMHG | TEMPERATURE: 97.8 F

## 2017-10-17 DIAGNOSIS — Z01.810 PRE-OPERATIVE CARDIOVASCULAR EXAMINATION: Primary | ICD-10-CM

## 2017-10-17 DIAGNOSIS — E11.9 TYPE 2 DIABETES MELLITUS WITHOUT COMPLICATION, WITHOUT LONG-TERM CURRENT USE OF INSULIN (HCC): ICD-10-CM

## 2017-10-17 DIAGNOSIS — I48.0 PAROXYSMAL ATRIAL FIBRILLATION (HCC): ICD-10-CM

## 2017-10-17 DIAGNOSIS — I12.9 HYPERTENSION WITH RENAL DISEASE: ICD-10-CM

## 2017-10-17 DIAGNOSIS — Z23 ENCOUNTER FOR IMMUNIZATION: ICD-10-CM

## 2017-10-17 DIAGNOSIS — I25.5 ISCHEMIC CARDIOMYOPATHY: ICD-10-CM

## 2017-10-17 DIAGNOSIS — H25.9 SENILE CATARACT OF RIGHT EYE, UNSPECIFIED AGE-RELATED CATARACT TYPE: ICD-10-CM

## 2017-10-17 RX ORDER — MOXIFLOXACIN 5 MG/ML
SOLUTION/ DROPS OPHTHALMIC
Refills: 1 | COMMUNITY
Start: 2017-10-24 | End: 2017-11-14

## 2017-10-17 RX ORDER — PREDNISOLONE ACETATE 10 MG/ML
SUSPENSION/ DROPS OPHTHALMIC
Refills: 1 | COMMUNITY
Start: 2017-10-24 | End: 2017-11-14

## 2017-10-17 RX ORDER — OFLOXACIN 3 MG/ML
SOLUTION/ DROPS OPHTHALMIC
Refills: 1 | COMMUNITY
Start: 2017-10-24 | End: 2017-11-14

## 2017-10-17 NOTE — MR AVS SNAPSHOT
Visit Information Date & Time Provider Department Dept. Phone Encounter #  
 10/17/2017  1:20 PM Cory Bishop MD [facility] [id number] Follow-up Instructions Return if symptoms worsen or fail to improve. Upcoming Health Maintenance Date Due  
 EYE EXAM RETINAL OR DILATED Q1 12/28/1958 DTaP/Tdap/Td series (1 - Tdap) 12/28/1969 FOBT Q 1 YEAR AGE 50-75 12/28/1998 ZOSTER VACCINE AGE 60> 10/28/2008 GLAUCOMA SCREENING Q2Y 12/28/2013 INFLUENZA AGE 9 TO ADULT 10/25/2017* HEMOGLOBIN A1C Q6M 3/15/2018 LIPID PANEL Q1 7/7/2018 FOOT EXAM Q1 8/28/2018 MICROALBUMIN Q1 8/28/2018 MEDICARE YEARLY EXAM 8/29/2018 *Topic was postponed. The date shown is not the original due date. Allergies as of 10/17/2017  Review Complete On: 10/17/2017 By: Cory Bishop MD  
  
 Severity Noted Reaction Type Reactions Niacin High 03/06/2014    Unknown (comments) Levemir [Insulin Detemir]  04/05/2013    Hives Other Medication  05/21/2013    Other (comments) ? Allergy to Maranda Budd causing chemical burn Primaxin [Imipenem-cilastatin]  05/23/2013    Diarrhea, Rash Xarelto [Rivaroxaban]  03/06/2014    Rash, Itching Current Immunizations  Never Reviewed Name Date Influenza High Dose Vaccine PF 10/17/2017 Influenza Vaccine 10/26/2016, 10/21/2015 Pneumococcal Conjugate (PCV-13) 8/7/2015 Pneumococcal Vaccine (Unspecified Type) 1/1/2014 Not reviewed this visit You Were Diagnosed With   
  
 Codes Comments Pre-operative cardiovascular examination    -  Primary ICD-10-CM: Z01.810 ICD-9-CM: V72.81 Senile cataract of right eye, unspecified age-related cataract type     ICD-10-CM: H25.9 ICD-9-CM: 366.10 Paroxysmal atrial fibrillation (HCC)     ICD-10-CM: I48.0 ICD-9-CM: 427.31  Type 2 diabetes mellitus without complication, without long-term current use of insulin ([facility].)     ICD-10-CM: E11.9 ICD-9-CM: 250.00 Hypertension with renal disease     ICD-10-CM: I12.9 ICD-9-CM: 403.90 Ischemic cardiomyopathy     ICD-10-CM: I25.5 ICD-9-CM: 414.8 Encounter for immunization     ICD-10-CM: S19 ICD-9-CM: V03.89 Vitals BP Pulse Temp Resp Height(growth percentile) Weight(growth percentile) 129/77 (BP 1 Location: Right arm, BP Patient Position: Sitting) 69 97.8 °F (36.6 °C) (Oral) 16 6' 5\" (1.956 m) 249 lb (112.9 kg) SpO2 BMI Smoking Status 98% 29.53 kg/m2 Former Smoker Vitals History BMI and BSA Data Body Mass Index Body Surface Area  
 29.53 kg/m 2 2.48 m 2 Preferred Pharmacy Pharmacy Name Phone [facility] [phone] [address], [address] AT [place] [address] [phone] Your Updated Medication List  
  
   
This list is accurate as of: 10/17/17  2:53 PM.  Always use your most recent med list.  
  
  
  
  
 BD ULTRA-FINE DEVIN PEN NEEDLES  
by Does Not Apply route. bumetanide 2 mg tablet Commonly known as:  Corinn Kiang Take 4 mg by mouth two (2) times a day. clonazePAM 1 mg tablet Commonly known as:  KlonoPIN  
TAKE 1 TABLET BY MOUTH EVERY NIGHT AT BEDTIME COLCRYS 0.6 mg tablet Generic drug:  colchicine Take 0.6 mg by mouth daily. Takes 1 tablet 3 times a week. COREG 12.5 mg tablet Generic drug:  carvedilol Take  by mouth two (2) times daily (with meals). DIOVAN 320 mg tablet Generic drug:  valsartan Take  by mouth daily. ELIQUIS 5 mg tablet Generic drug:  apixaban Take 5 mg by mouth two (2) times a day. finasteride 5 mg tablet Commonly known as:  PROSCAR Take 5 mg by mouth daily. LANTUS SOLOSTAR 100 unit/mL (3 mL) Inpn Generic drug:  insulin glargine 25 Units by SubCUTAneous route daily. Liraglutide 0.6 mg/0.1 mL (18 mg/3 mL) Pnij Commonly known as:  Oletha Andrew 0.6 mg by SubCUTAneous route.  
  
 metOLazone 2.5 mg tablet Commonly known as:  Alpha Mauricio Take  by mouth as needed (pt takes approximately twice a month if has edema. ). moxifloxacin 0.5 % ophthalmic solution Commonly known as:  Lenoria Andrew PLACE 1 DROP IN RIGHT EYE QID  
  
 ofloxacin 0.3 % ophthalmic solution Commonly known as:  FLOXIN  
INSTILL 1 GTT IN OD QID FOR 30 DAYS  
  
 pramipexole 0.25 mg tablet Commonly known as:  MIRAPEX Take 0.25 mg by mouth nightly. prednisoLONE acetate 1 % ophthalmic suspension Commonly known as:  PRED FORTE PLACE 1 DROP IN RIGHT EYE QID  
  
 tamsulosin 0.4 mg capsule Commonly known as:  FLOMAX Take 0.8 mg by mouth daily. ULORIC 40 mg Tab tablet Generic drug:  febuxostat Take 40 mg by mouth daily. We Performed the Following ADMIN INFLUENZA VIRUS VAC [ [facility]] INFLUENZA VIRUS VACCINE, HIGH DOSE SEASONAL, PRESERVATIVE FREE [48236 CPT(R)] Follow-up Instructions Return if symptoms worsen or fail to improve. To-Do List   
 10/20/2017 2:30 PM  
  Appointment with Ailyn Goldstein DPM; [facility]OUND [facility] 2 at [address] [place] [address]. ([phone]) Patient Instructions Influenza (Flu) Vaccine (Inactivated or Recombinant): What You Need to Know Why get vaccinated? Influenza (\"flu\") is a contagious disease that spreads around the [place] every winter, usually between October and May. Flu is caused by influenza viruses and is spread mainly by coughing, sneezing, and close contact. Anyone can get flu. Flu strikes suddenly and can last several days. Symptoms vary by age, but can include: · Fever/chills. · Sore throat. · Muscle aches. · Fatigue. · Cough. · Headache. · Runny or stuffy nose. Flu can also lead to pneumonia and blood infections, and cause diarrhea and seizures in children. If you have a medical condition, such as heart or lung disease, flu can make it worse. Flu is more dangerous for some people. Infants and young children, people 72years of age and older, pregnant women, and people with certain health conditions or a weakened immune system are at greatest risk. Each year thousands of people in the [facility] die from flu, and many more are hospitalized. Flu vaccine can: · Keep you from getting flu. · Make flu less severe if you do get it. · Keep you from spreading flu to your family and other people. Inactivated and recombinant flu vaccines A dose of flu vaccine is recommended every flu season. Children 6 months through 6years of age may need two doses during the same flu season. Everyone else needs only one dose each flu season. Some inactivated flu vaccines contain a very small amount of a mercury-based preservative called thimerosal. Studies have not shown thimerosal in vaccines to be harmful, but flu vaccines that do not contain thimerosal are available. There is no live flu virus in flu shots. They cannot cause the flu. There are many flu viruses, and they are always changing. Each year a new flu vaccine is made to protect against three or four viruses that are likely to cause disease in the upcoming flu season. But even when the vaccine doesn't exactly match these viruses, it may still provide some protection. Flu vaccine cannot prevent: · Flu that is caused by a virus not covered by the vaccine. · Illnesses that look like flu but are not. Some people should not get this vaccine Tell the person who is giving you the vaccine: · If you have any severe (life-threatening) allergies. If you ever had a life-threatening allergic reaction after a dose of flu vaccine, or have a severe allergy to any part of this vaccine, you may be advised not to get vaccinated. Most, but not all, types of flu vaccine contain a small amount of egg protein.  
· If you ever had Guillain-Barré syndrome (also called GBS) Some people with a history of GBS should not get this vaccine. This should be discussed with your doctor. · If you are not feeling well. It is usually okay to get flu vaccine when you have a mild illness, but you might be asked to come back when you feel better. Risks of a vaccine reaction With any medicine, including vaccines, there is a chance of reactions. These are usually mild and go away on their own, but serious reactions are also possible. Most people who get a flu shot do not have any problems with it. Minor problems following a flu shot include: · Soreness, redness, or swelling where the shot was given · Hoarseness · Sore, red or itchy eyes · Cough · Fever · Aches · Headache · Itching · Fatigue If these problems occur, they usually begin soon after the shot and last 1 or 2 days. More serious problems following a flu shot can include the following: · There may be a small increased risk of Guillain-Barré Syndrome (GBS) after inactivated flu vaccine. This risk has been estimated at 1 or 2 additional cases per million people vaccinated. This is much lower than the risk of severe complications from flu, which can be prevented by flu vaccine. · Barby Villatoro children who get the flu shot along with pneumococcal vaccine (PCV13) and/or DTaP vaccine at the same time might be slightly more likely to have a seizure caused by fever. Ask your doctor for more information. Tell your doctor if a child who is getting flu vaccine has ever had a seizure Problems that could happen after any injected vaccine: · People sometimes faint after a medical procedure, including vaccination. Sitting or lying down for about 15 minutes can help prevent fainting, and injuries caused by a fall. Tell your doctor if you feel dizzy, or have vision changes or ringing in the ears. · Some people get severe pain in the shoulder and have difficulty moving the arm where a shot was given. This happens very rarely. · Any medication can cause a severe allergic reaction. Such reactions from a vaccine are very rare, estimated at about 1 in a million doses, and would happen within a few minutes to a few hours after the vaccination. As with any medicine, there is a very remote chance of a vaccine causing a serious injury or death. The safety of vaccines is always being monitored. For more information, visit: www.cdc.gov/vaccinesafety/. What if there is a serious reaction? What should I look for? · Look for anything that concerns you, such as signs of a severe allergic reaction, very high fever, or unusual behavior. Signs of a severe allergic reaction can include hives, swelling of the face and throat, difficulty breathing, a fast heartbeat, dizziness, and weakness  usually within a few minutes to a few hours after the vaccination. What should I do? · If you think it is a severe allergic reaction or other emergency that can't wait, call 9-1-1 and get the person to the nearest hospital. Otherwise, call your doctor. · Reactions should be reported to the \"Vaccine Adverse Event Reporting System\" (VAERS). Your doctor should file this report, or you can do it yourself through the VAERS website at www.vaers. [facility].gov, or by calling 0-942.751.7082. [employer] does not give medical advice. The National Vaccine Injury Compensation Program 
The National Vaccine Injury Compensation Program (VICP) is a federal program that was created to compensate people who may have been injured by certain vaccines. Persons who believe they may have been injured by a vaccine can learn about the program and about filing a claim by calling 6-455.699.6575 or visiting the [employer] website at www.[facility].gov/vaccinecompensation. There is a time limit to file a claim for compensation. How can I learn more? · Ask your healthcare provider. He or she can give you the vaccine package insert or suggest other sources of information. · Call your local or state health department. · Contact the Centers for Disease Control and Prevention (CDC): 
¨ Call 5-192.191.1574 (1-800-CDC-INFO) or ¨ Visit CDC's website at www.cdc.gov/flu Vaccine Information Statement Inactivated Influenza Vaccine 8/7/2015) [address] TR[address] [place] 580LI-57 [facility] and [employer] Centers for Disease Control and Prevention Many Vaccine Information Statements are available in Malian and other languages. See www.immunize.org/vis. Muchas hojas de información sobre vacunas están disponibles en español y en otros idiomas. Visite www.immunize.org/vis. Care instructions adapted under license by [employer] (which disclaims liability or warranty for this information). If you have questions about a medical condition or this instruction, always ask your healthcare professional. [address] any warranty or liability for your use of this information. Introducing [facility]! [facility] introduces [employer] patient portal. Now you can access parts of your medical record, email your doctor's office, and request medication refills online. 1. In your internet browser, go to [email or web address] 2. Click on the First Time User? Click Here link in the Sign In box. You will see the New Member Sign Up page. 3. Enter your [employer] Access Code exactly as it appears below. You will not need to use this code after youve completed the sign-up process. If you do not sign up before the expiration date, you must request a new code. · [employer] Access Code: [email or web address] Expires: 11/15/2017  5:31 PM 
 
4. Enter the last four digits of your Social Security Number (xxxx) and Date of Birth (mm/dd/yyyy) as indicated and click Submit. You will be taken to the next sign-up page. 5. Create a [employer] ID. This will be your [employer] login ID and cannot be changed, so think of one that is secure and easy to remember. 6. Create a [email or web address] password. You can change your password at any time. 7. Enter your Password Reset Question and Answer. This can be used at a later time if you forget your password. 8. Enter your e-mail address. You will receive e-mail notification when new information is available in [address] 9. Click Sign Up. You can now view and download portions of your medical record. 10. Click the Download Summary menu link to download a portable copy of your medical information. If you have questions, please visit the Frequently Asked Questions section of the [email or web address] website. Remember, [email or web address] is NOT to be used for urgent needs. For medical emergencies, dial 911. Now available from your iPhone and Android! Please provide this summary of care documentation to your next provider. Your primary care clinician is listed as Hunter. If you have any questions after today's visit, please call [phone].

## 2017-10-17 NOTE — PROGRESS NOTES
Leana Alonso is a 74 y.o. male      Chief Complaint   Patient presents with    Pre-op Exam     Eye Surgery. 10/24/17 Left Eye, [facility]. 1. Have you been to the ER, urgent care clinic since your last visit? Hospitalized since your last visit? No    2. Have you seen or consulted any other health care providers outside of the [address] [place] [address] since your last visit? Include any pap smears or colon screening.  No

## 2017-10-17 NOTE — PATIENT INSTRUCTIONS

## 2017-10-17 NOTE — PROGRESS NOTES
HPI:   74 y.o.  presents for medical consultation and clearance before planned cataract surgery to be done on his left eye on 10/24/2017 by Dr. Jaimee Avendano at [facility] surgical center. He did have a injection into his eye yesterday to help decrease the problems he would have with his surgery. He does have a lot of scleral injection and bleeding from the injection he received yesterday. He is normally followed by me for hypertension, chronic kidney disease, diabetes, hyperlipidemia, atherosclerotic coronary vascular disease, cardiomyopathy, DJD, compensated congestive heart failure, atrial fibrillation, gout, hypothyroidism, obesity, and BPH. He currently denies any chest pain shortness of breath or cardiorespiratory complaints. He denies any GI/ complaints. There are no other complaints noted on complete review of systems other than decreased vision.     Patient Active Problem List    Diagnosis    DJD (degenerative joint disease)    ASCVD (arteriosclerotic cardiovascular disease)    CHF (congestive heart failure) (HCC)    Hyperlipidemia    Diabetes mellitus ([address]    CKD (chronic kidney disease), stage IV ([address]    Hypertension with renal disease    GERD (gastroesophageal reflux disease)    Cardiomyopathy ([address]    Atrial fibrillation ([address]    Gout    Obesity    Hypothyroid    Diabetic neuropathy (HCC)    BPH (benign prostatic hyperplasia)    Pre-operative cardiovascular examination    Age-related cataract    Type 2 diabetes mellitus with foot ulcer ([address]    Medicare annual wellness visit, initial    Anemia    On statin therapy    Restless leg    Insomnia    ED (erectile dysfunction)    Anxiety       Past Medical History:   Diagnosis Date    Anemia 8/5/2017    Anxiety 8/5/2017    Arrhythmia     atrial fibrillation 2014    ASCVD (arteriosclerotic cardiovascular disease) 8/5/2017    BPH (benign prostatic hyperplasia) 8/5/2017    CAD (coronary artery disease) h/o stents    Cancer ([facility]     h/o skin cancer    Cardiomyopathy ([facility] 8/5/2017    CHF (congestive heart failure) ([facility] 8/5/2017    CKD (chronic kidney disease), stage IV (HCC) 8/5/2017    Diabetes [facility]     Diabetes mellitus ([facility] 8/5/2017    Diabetic neuropathy ([email or web address] 8/5/2017    DJD (degenerative joint disease) 8/5/2017    ED (erectile dysfunction) 8/5/2017    GERD (gastroesophageal reflux disease) 8/5/2017    Gout 8/5/2017    High cholesterol     Hyperlipidemia 8/5/2017    Hypertension     Hypertension with renal disease 8/5/2017    Hypothyroid 8/5/2017    Insomnia 8/5/2017    Obesity 8/5/2017    On statin therapy 8/5/2017    Restless leg 8/5/2017    Thyroid disease     hypothyroid       Social History   Substance Use Topics    Smoking status: Former Smoker     Quit date: 3/6/1972    Smokeless tobacco: Never Used    Alcohol use No      Comment: rare, 1 drink per year       Family History   Problem Relation Age of Onset    Heart Disease Mother     Kidney Disease Mother     Heart Disease Father     Kidney Disease Father     Cancer Sister      Breast       Outpatient Prescriptions Marked as Taking for the 10/17/17 encounter (Office Visit) with Aminata Cadet MD   Medication Sig Dispense Refill    prednisoLONE acetate (PRED FORTE) 1 % ophthalmic suspension PLACE 1 DROP IN RIGHT EYE QID  1    moxifloxacin (VIGAMOX) 0.5 % ophthalmic solution PLACE 1 DROP IN RIGHT EYE QID  1    ofloxacin (FLOXIN) 0.3 % ophthalmic solution INSTILL 1 GTT IN OD QID FOR 30 DAYS  1    clonazePAM (KLONOPIN) 1 mg tablet TAKE 1 TABLET BY MOUTH EVERY NIGHT AT BEDTIME 90 Tab 1    pramipexole (MIRAPEX) 0.25 mg tablet Take 0.25 mg by mouth nightly.  metOLazone (ZAROXOLYN) 2.5 mg tablet Take  by mouth as needed (pt takes approximately twice a month if has edema. ).  valsartan (DIOVAN) 320 mg tablet Take  by mouth daily.       carvedilol (COREG) 12.5 mg tablet Take  by mouth two (2) times daily (with meals).  febuxostat (ULORIC) 40 mg tab tablet Take 40 mg by mouth daily.  PEN NEEDLE, DIABETIC (BD ULTRA-FINE DEVIN PEN NEEDLES) by Does Not Apply route.  colchicine (COLCRYS) 0.6 mg tablet Take 0.6 mg by mouth daily. Takes 1 tablet 3 times a week.  bumetanide (BUMEX) 2 mg tablet Take 4 mg by mouth two (2) times a day.  Liraglutide (VICTOZA) 0.6 mg/0.1 mL (18 mg/3 mL) sub-q pen 0.6 mg by SubCUTAneous route.  apixaban (ELIQUIS) 5 mg tablet Take 5 mg by mouth two (2) times a day.  finasteride (PROSCAR) 5 mg tablet Take 5 mg by mouth daily.  tamsulosin (FLOMAX) 0.4 mg capsule Take 0.8 mg by mouth daily.  insulin glargine (LANTUS SOLOSTAR) 100 unit/mL (3 mL) pen 25 Units by SubCUTAneous route daily. Allergies   Allergen Reactions    Niacin Unknown (comments)    Levemir [Insulin Detemir] Hives    Other Medication Other (comments)     ? Allergy to Duraprep causing chemical burn    Primaxin [Imipenem-Cilastatin] Diarrhea and Rash    Xarelto [Rivaroxaban] Rash and Itching       ROS:     Constitutional: He denies fevers, weight loss, sweats. Eyes: No blurred or double vision. General decreased vision in L eye and bleeding into the sclera from injection yesterday. ENT: No difficulty with swallowing, taste, speech or smell. Neck: no stiffness or swelling  Respiratory: No cough wheezing or shortness of breath. Cardiovascular: Denies chest pain, palpitations, unexplained indigestion or syncope. Gastrointestinal:  No changes in bowel movements, no abdominal pain, no bloating. Genitourinary:  He denies frequency, nocturia or stranguria. Extremities: No joint pain, stiffness or swelling. No current diabetic foot changes  Neurological:  No numbness, tingling, burring paresthesias or loss of motor strength.   No syncope, dizziness or frequent headache  Lymphatic: no adenopathy noted  Hematologic: no easy bruising or bleeding gums  Skin:  No recent rashes or mole changes. Psychiatric/Behavioral:  Negative for depression. The patient is not nervous/anxious. PE:     Vitals:    10/17/17 1321   BP: 129/77   Pulse: 69   Resp: 16   Temp: 97.8 °F (36.6 °C)   TempSrc: Oral   SpO2: 98%   Weight: 249 lb (112.9 kg)   Height: 6' 5\" (1.956 m)   PainSc:   5   PainLoc: Eye        General appearance - alert, well appearing, and in no distress  Mental status - alert, oriented to person, place, and time  HEENT:  Ears - bilateral TM's and external ear canals clear  Eyes - pupillary responses were normal.  Extraocular muscle function intact. Lids and conjunctiva not injected. Fundoscopic exam revealed sharp disc margins. eye movements intact. Left scleral injection with bleeding post injection done yesterday  Pharynx- clear with teeth in good repair. No masses were noted  Neck - supple without thyromegaly or burit. No JVD noted  Lungs - clear to auscultation and percussion  Cardiac- normal rate, regular rhythm without murmurs. PMI not displaced. No gallop, rub or click  Abdomen - flat, soft, non-tender without palpable organomegaly or mass. No pulsatile mass was felt, and not bruit was heard. Bowel sounds were active  Lymphatics - no palpable lymphadenopathy, no hepatosplenomegaly  Hematologic: no petechiae or purpura  Peripheral vascular -Femoral, Dorsalis pedis and posterior tibial pulses felt without difficulty  Skin - no rash or unusual mole change noted cast left lower extremity due to diabetic foot ulcer  Neurological - Cranial nerves II-XII grossly intact. Motor strength 5/5. DTR's 2+ and symmetric. Station and gait normal  Back exam - full range of motion, no tenderness, palpable spasm or pain on motion  Musculoskeletal - no joint tenderness, deformity or swelling      Assessment/Plan:     ASSESSMENT:   1   Preoperative medical clearance exam  2. Cataract left eye  3. Hypertension with renal disease  4. Diabetes  5. Cardiomyopathy  6.   Atrial fibrillation    Impression  1. He is medically stable for the planned surgery from a cardiovascular standpoint. 2.  Hypertension control is adequate although borderline dietary measures discussed  3. Chronic kidney disease glomerular filtration rate of 30 reviewed and numbers recently done by his renal doctor  4. Ischemic cardiomyopathy stable  5. Diabetes A1c still above goal at 8.4 most recent check 9/15  Approved for surgery copy to Dr. Citlalli Dasilva.  Note flu shot was given today    PLAN:  .  Orders Placed This Encounter    Influenza virus vaccine (FLUZONE HIGH-DOSE) 65 years and older (97917)    prednisoLONE acetate (PRED FORTE) 1 % ophthalmic suspension    moxifloxacin (VIGAMOX) 0.5 % ophthalmic solution    ofloxacin (FLOXIN) 0.3 % ophthalmic solution         ATTENTION:   This medical record was transcribed using an electronic medical records system. Although proofread, it may and can contain electronic and spelling errors. Other human spelling and other errors may be present. Corrections may be executed at a later time. Please feel free to contact us for any clarifications as needed. Follow-up Disposition:  Return if symptoms worsen or fail to improve. Jt Garcia MD    Health Maintenance reviewed - updated.     Orders Placed This Encounter    Influenza virus vaccine ([address]) 72 years and older ([address])    Administration fee () for Medicare insured patients    prednisoLONE acetate (PRED FORTE) 1 % ophthalmic suspension     Sig: PLACE 1 DROP IN RIGHT EYE QID     Refill:  1    moxifloxacin (VIGAMOX) 0.5 % ophthalmic solution     Sig: PLACE 1 DROP IN RIGHT EYE QID     Refill:  1    ofloxacin (FLOXIN) 0.3 % ophthalmic solution     Sig: INSTILL 1 GTT IN OD QID FOR 30 DAYS     Refill:  1       Medications Discontinued During This Encounter   Medication Reason    clindamycin (CLEOCIN) 150 mg capsule Alternate Therapy       Current Outpatient Prescriptions Medication Sig Dispense Refill    prednisoLONE acetate (PRED FORTE) 1 % ophthalmic suspension PLACE 1 DROP IN RIGHT EYE QID  1    moxifloxacin (VIGAMOX) 0.5 % ophthalmic solution PLACE 1 DROP IN RIGHT EYE QID  1    ofloxacin (FLOXIN) 0.3 % ophthalmic solution INSTILL 1 GTT IN OD QID FOR 30 DAYS  1    clonazePAM (KLONOPIN) 1 mg tablet TAKE 1 TABLET BY MOUTH EVERY NIGHT AT BEDTIME 90 Tab 1    pramipexole (MIRAPEX) 0.25 mg tablet Take 0.25 mg by mouth nightly.  metOLazone (ZAROXOLYN) 2.5 mg tablet Take  by mouth as needed (pt takes approximately twice a month if has edema. ).  valsartan (DIOVAN) 320 mg tablet Take  by mouth daily.  carvedilol (COREG) 12.5 mg tablet Take  by mouth two (2) times daily (with meals).  febuxostat (ULORIC) 40 mg tab tablet Take 40 mg by mouth daily.  PEN NEEDLE, DIABETIC (BD ULTRA-FINE DEVIN PEN NEEDLES) by Does Not Apply route.  colchicine (COLCRYS) 0.6 mg tablet Take 0.6 mg by mouth daily. Takes 1 tablet 3 times a week.  bumetanide (BUMEX) 2 mg tablet Take 4 mg by mouth two (2) times a day.  Liraglutide (VICTOZA) 0.6 mg/0.1 mL (18 mg/3 mL) sub-q pen 0.6 mg by SubCUTAneous route.  apixaban (ELIQUIS) 5 mg tablet Take 5 mg by mouth two (2) times a day.  finasteride (PROSCAR) 5 mg tablet Take 5 mg by mouth daily.  tamsulosin (FLOMAX) 0.4 mg capsule Take 0.8 mg by mouth daily.  insulin glargine (LANTUS SOLOSTAR) 100 unit/mL (3 mL) pen 25 Units by SubCUTAneous route daily. No results found for any visits on 10/17/17. Recommended healthy diet low in carbohydrates, fats, sodium and cholesterol. Recommended regular cardiovascular exercise 3-6 times per week for 30-60 minutes daily. Verbal and written instructions (see AVS) provided. Patient expresses understanding of diagnosis and treatment plan.

## 2017-10-20 ENCOUNTER — HOSPITAL ENCOUNTER (OUTPATIENT)
Dept: LAB | Age: 67
Discharge: HOME | End: 2017-10-20
Payer: MEDICARE

## 2017-10-20 ENCOUNTER — HOSPITAL ENCOUNTER (OUTPATIENT)
Dept: WOUND CARE | Age: 67
Discharge: HOME | End: 2017-10-20
Payer: MEDICARE

## 2017-10-20 PROCEDURE — 87186 SC STD MICRODIL/AGAR DIL: CPT

## 2017-10-20 PROCEDURE — 87070 CULTURE OTHR SPECIMN AEROBIC: CPT

## 2017-10-20 PROCEDURE — 87077 CULTURE AEROBIC IDENTIFY: CPT

## 2017-10-20 PROCEDURE — 97597 DBRDMT OPN WND 1ST 20 CM/<: CPT

## 2017-10-21 NOTE — OP NOTES
[place] Leo Villavicencio, [address] [facility]       Name:  Bhavin Hermosillo   MR#:  [id number]   :  1950   Account #:  [de-identified]        Date of Adm:  10/20/2017       PREOPERATIVE DIAGNOS IS:  Grade 2 ulcer, plantar first left   metatarsal phalangeal joint, on a diabetic with neuropathy. POSTOPERATIVE DIAGNOS IS:  Grade 2 ulcer, plantar first left   metatarsophalangeal joint, on a diabetic with neuropathy. PROCEDURES PERFORMED:  Selective debridement of chronic   diabetic ulcer, plantar first left metatarsophalangeal joint. ESTIMATED BLOOD LOSS:  Minimal.    SPECIMENS REMOVED:  None. ANESTHESIA:  None needed. INDICATIONS: This 25-year-old white male presents for continued   treatment of chronic wound, plantar first left toe of several years. X-  rays and MRI did not show any evidence of osteomyelitis. Most recent   therapies have included total contact casting, without any resolution. Most recent therapy consists of silver calcium alginate. PAST MEDICAL HISTORY: History and physical is unchanged from   admitting history and physical.    PHYSICAL EXAMINATION   VITAL SIGNS: Temperature 98.1, pulse rate 69, respirations 16. Blood   pressure 157/93. EXTREMITIES: Palpable pedal pulses, fairly good muscle strength,   lower extremities bilateral. Diminished epicritic sensation, lower   extremities. The patient has hallux abductovalgus deformity, left foot,   plantar first left MPJ is with a beefy red granulating grade 2 ulcer that   measures 0.7 x 0.8 x 0.1 cm. At last visit, it measured 0.4 x 1. There is   some mild odor and some erythema. DESCRIPTION OF PROCEDURE: Using a curette, a selective   debridement was performed through the dermal layer, removing   dermal granulomatous hyperkeratotic tissue. Good bleeding base was   achieved. Aerobic as well as anaerobic wound cultures were taken.    The wound was dressed with silver calcium alginate followed by dry   sterile dressings. The patient is to continue daily. I do feel that the wound will heal without surgical intervention, and the   patient agrees; however, we will continue conservative care until the   patient has completed a regime of eye surgeries he is having currently   to cataracts, after which time, pending medical and cardiac clearance,   we have discussed removing the first metatarsal head. Until that time,   the patient will have followup visit in the [address] [place] [address] with   myself on a regular basis. Antibiotic therapy will be adjusted according to wound culture results.         DORIS Morales / Kaylee Medina   D:  10/20/2017   16:59   T:  10/20/2017   20:28   Job #:  [id number]

## 2017-10-25 LAB
AMPICILLIN [SUSCEPTIBILITY] BY MINIMUM INHIBITORY CONCENTRATION (MIC): ABNORMAL UG/ML
BACTERIA SPEC AEROBE CULT: ABNORMAL
BACTERIA SPEC AEROBE CULT: ABNORMAL
CEFEPIME [SUSCEPTIBILITY] BY MINIMUM INHIBITORY CONCENTRATION (MIC): ABNORMAL UG/ML
CEFTRIAXONE ISLT MIC: ABNORMAL UG/ML
CEFUROXIME PARENTERAL [SUSCEPTIBILITY] BY MINIMUM INHIBITORY CONCENTRATION (MIC): ABNORMAL UG/ML
CIPROFLOXACIN ISLT MIC: ABNORMAL UG/ML
GENTAMICIN ISLT MIC: ABNORMAL UG/ML
LEVOFLOXACIN ISLT MIC: ABNORMAL UG/ML
PENICILLIN ISLT MIC: ABNORMAL UG/ML
PIPERACILLIN [SUSCEPTIBILITY] BY MINIMUM INHIBITORY CONCENTRATION (MIC): ABNORMAL UG/ML
TETRACYCLINE [SUSCEPTIBILITY] BY MINIMUM INHIBITORY CONCENTRATION (MIC): ABNORMAL UG/ML
TOBRAMYCIN ISLT MIC: ABNORMAL UG/ML
VANCOMYCIN [SUSCEPTIBILITY] BY MINIMUM INHIBITORY CONCENTRATION (MIC): ABNORMAL UG/ML

## 2017-10-26 NOTE — PROGRESS NOTES
I do not know where this culture came from with his on treatment for this who ordered this but I do not see where I did

## 2017-10-31 NOTE — PROGRESS NOTES
I do not know where this culture came from with his on treatment for this who ordered this but I do not see where I did. Discussed with the patient and the wound care doctor Dr. Carlos Eduardo Valdez ordered this. Asked him to call and f/up with the wound care center.

## 2017-11-03 ENCOUNTER — HOSPITAL ENCOUNTER (OUTPATIENT)
Dept: WOUND CARE | Age: 67
Discharge: HOME | End: 2017-11-03
Payer: MEDICARE

## 2017-11-03 PROCEDURE — 97597 DBRDMT OPN WND 1ST 20 CM/<: CPT

## 2017-11-03 RX ORDER — LEVOFLOXACIN 500 MG/1
500 TABLET, FILM COATED ORAL DAILY
COMMUNITY
Start: 2017-11-03 | End: 2017-11-12

## 2017-11-04 NOTE — OP NOTES
[place] Leo Villavicencio, [address] [facility]       Name:  Bhavin Hermosillo   MR#:  [id number]   :  1950   Account #:  [de-identified]        Date of Adm:  2017       PREOPERATIVE DIAGNOS IS:  Grade 2 diabetic ulcer, plantar, first   left metatarsophalangeal joint. POSTOPERATIVE DIAGNOS IS:  Grade 2 diabetic ulcer, plantar, first   left metatarsophalangeal joint. PROCEDURES PERFORMED:  Selective debridement, diabetic ulcer,   plantar aspect, first left metatarsophalangeal joint. ESTIMATED BLOOD LOSS:  Minimal.    SPECIMENS REMOVED:  None. ANESTHESIA:  None needed. SURGEON: Tracy Retana DPM    INDICATIONS: This 67-year-old white male presents for continued   treatment of a chronic ulceration, plantar first left MPJ. Most recent   therapy consisted of silver calcium alginate, changed every other day   by the patient. PAST MEDICAL HISTORY: Diabetes with neuropathy. Otherwise,   history and physical is unchanged from admitting history and physical.    PHYSICAL EXAMINATION   EXTREMITIES: Revealed palpable pedal pulses with fairly good   muscle strength, lower extremities bilateral. Diminished epicritic   sensation, lower extremities bilateral. The patient has an abducted   hallux, left foot, plantar medial is with a beefy red granulating wound,   does not appear to be infected. Measures 1 x 1.2 x 0.1 cm. Wound   cultures taken at last visit on 10/20/2017 showed Providencia rettgeri   and enterococcus, both sensitive to Levaquin. DESCRIPTION OF PROCEDURE: The wound was cleansed with   normal saline. Using a curette, a selective debridement was performed   of the grade 2 ulcer, plantar medial aspect of the first left MPJ into the   dermal layer, removing dermal granulomatous and hyperkeratotic   tissue. The wound was dressed with silver calcium alginate followed by   dry sterile dressings.  The patient is to change dressings every other   day. He was given a prescription of Levaquin 500 mg, #10, to take one   p.o. daily with 1 refill. I do not feel that this wound will heal without intervention. I have fully   discussed this with the patient and he has agreed to amputation of the   first left metatarsal head; however, the patient is also currently   undergoing treatment for cataracts and has an upcoming surgery   planned. Inasmuch as this wound has been stable for more than 1   year, I will defer surgical intervention for another 3-4 weeks, once the   patient has received cardiac clearance. He otherwise will have   followup visits in the wound with myself in 2 weeks.         DORIS Crane / Juancarlos Gonzales   D:  11/03/2017   16:46   T:  11/04/2017   00:23   Job #:  [id number]

## 2017-11-08 RX ORDER — FEBUXOSTAT 40 MG/1
TABLET ORAL
Qty: 30 TAB | Refills: 0 | Status: SHIPPED | OUTPATIENT
Start: 2017-11-08 | End: 2017-12-06 | Stop reason: SDUPTHER

## 2017-11-08 NOTE — TELEPHONE ENCOUNTER
Requested Prescriptions     Pending Prescriptions Disp Refills    ULORIC 40 mg tab tablet [Pharmacy Med Name: ULORIC 40MG TABLETS] 30 Tab 0     Sig: TAKE 1 TABLET BY MOUTH DAILY

## 2017-11-13 DIAGNOSIS — G25.81 RESTLESS LEG: Primary | ICD-10-CM

## 2017-11-14 DIAGNOSIS — G25.81 RESTLESS LEG: ICD-10-CM

## 2017-11-14 RX ORDER — PRAMIPEXOLE DIHYDROCHLORIDE 0.25 MG/1
TABLET ORAL
Qty: 90 TAB | Refills: 3 | Status: SHIPPED | OUTPATIENT
Start: 2017-11-14 | End: 2017-11-14 | Stop reason: SDUPTHER

## 2017-11-14 NOTE — TELEPHONE ENCOUNTER
Requested Prescriptions     Pending Prescriptions Disp Refills    pramipexole (MIRAPEX) 0.25 mg tablet [Pharmacy Med Name: PRAMIPEXOLE 0.25MG TABLETS] 90 Tab 3     Sig: TAKE 1 TABLET BY MOUTH EVERY NIGHT AT BEDTIME

## 2017-11-15 RX ORDER — PRAMIPEXOLE DIHYDROCHLORIDE 0.25 MG/1
TABLET ORAL
Qty: 90 TAB | Refills: 3 | Status: SHIPPED | OUTPATIENT
Start: 2017-11-15 | End: 2018-02-14 | Stop reason: SDUPTHER

## 2017-11-17 ENCOUNTER — HOSPITAL ENCOUNTER (OUTPATIENT)
Dept: WOUND CARE | Age: 67
Discharge: HOME | End: 2017-11-17
Payer: MEDICARE

## 2017-11-17 PROCEDURE — 97597 DBRDMT OPN WND 1ST 20 CM/<: CPT

## 2017-11-18 NOTE — OP NOTES
[place] Leo Villavicencio, [address] [facility]       Name:  Rachel Chen   MR#:  [id number]   :  1950   Account #:  [de-identified]        Date of Adm:  2017       PREOPERATIVE DIAGNOSIS: Grade 2 diabetic ulcer, plantar first left   metatarsophalangeal joint. POSTOPERATIVE DIAGNOSIS: Grade 2 diabetic ulcer, plantar first   left metatarsophalangeal joint. PROCEDURE: Selective debridement, grade 2 ulcer, plantar aspect,   first left toe. ANESTHESIA: None needed. ESTIMATED BLOOD LOSS: Minimal.    SPECIMENS REMOVED: None. INDICATIONS: This 56-year-old white male presents for continued   treatment of a chronic ulceration, plantar first left MPJ. Medical history   unchanged from admitting history and physical.     VITALS: Temperature 97.5, pulse rate 70, respirations 16, blood   pressure 146/89. PHYSICAL EXAMINATION   Palpable pedal pulses with fairly good muscle strength lower   extremities bilateral. Epicritic sensation diminished. The patient has   abducted hallux, left foot, plantar first left MPJ is with a granulating   grade 2 ulcer that appears to be improving. It measures 1.7 x 0.1 cm. At last visit, it measured 1 x 1.2 x 0.1 cm. It has surrounding   hyperkeratotic tissue. There is no pain. It does not appear to be   infected. DESCRIPTION OF PROCEDURE: The wound was cleansed with   normal saline. Using a curette, a selective debridement was performed   into the dermal layer, removing dermal, granulomatous tissue and   hyperkeratotic tissue. Good bleeding base was achieved. The wound   was dressed with silver calcium alginate followed by dry sterile   dressings. The patient is to continue dressing changes with calcium   alginate every other day, we have discussed potential surgical   amputation of the first left MPJ.  The patient will have a followup visit in   the [address] [place] [address] with myself in 2 daryl.        Andrea Lafleur DPM      [facility].   D:  11/17/2017   15:41   T:  11/18/2017   02:22   Job #:  [id number]

## 2017-11-21 ENCOUNTER — HOSPITAL ENCOUNTER (OUTPATIENT)
Dept: NON INVASIVE DIAGNOSTICS | Age: 67
Discharge: HOME | End: 2017-11-21
Payer: MEDICARE

## 2017-11-21 VITALS
BODY MASS INDEX: 29.52 KG/M2 | RESPIRATION RATE: 16 BRPM | SYSTOLIC BLOOD PRESSURE: 110 MMHG | HEART RATE: 62 BPM | WEIGHT: 250 LBS | OXYGEN SATURATION: 97 % | DIASTOLIC BLOOD PRESSURE: 65 MMHG | HEIGHT: 77 IN

## 2017-11-21 DIAGNOSIS — I48.91 ATRIAL FIBRILLATION (HCC): ICD-10-CM

## 2017-11-21 LAB
ATRIAL RATE: 60 BPM
ATRIAL RATE: 69 BPM
CALCULATED P AXIS, ECG09: 85 DEGREES
CALCULATED R AXIS, ECG10: -76 DEGREES
CALCULATED R AXIS, ECG10: -77 DEGREES
CALCULATED T AXIS, ECG11: 106 DEGREES
CALCULATED T AXIS, ECG11: 107 DEGREES
DIAGNOSIS, 93000: NORMAL
DIAGNOSIS, 93000: NORMAL
P-R INTERVAL, ECG05: 248 MS
Q-T INTERVAL, ECG07: 494 MS
Q-T INTERVAL, ECG07: 526 MS
QRS DURATION, ECG06: 192 MS
QRS DURATION, ECG06: 192 MS
QTC CALCULATION (BEZET), ECG08: 526 MS
QTC CALCULATION (BEZET), ECG08: 533 MS
VENTRICULAR RATE, ECG03: 60 BPM
VENTRICULAR RATE, ECG03: 70 BPM

## 2017-11-21 PROCEDURE — 74011250636 HC RX REV CODE- 250/636

## 2017-11-21 PROCEDURE — 77030018729 HC ELECTRD DEFIB PAD CARD -B

## 2017-11-21 PROCEDURE — 99152 MOD SED SAME PHYS/QHP 5/>YRS: CPT

## 2017-11-21 PROCEDURE — 93005 ELECTROCARDIOGRAM TRACING: CPT

## 2017-11-21 PROCEDURE — 92960 CARDIOVERSION ELECTRIC EXT: CPT

## 2017-11-21 RX ORDER — MIDAZOLAM HYDROCHLORIDE 1 MG/ML
.5-1 INJECTION, SOLUTION INTRAMUSCULAR; INTRAVENOUS
Status: DISCONTINUED | OUTPATIENT
Start: 2017-11-21 | End: 2017-11-21

## 2017-11-21 RX ORDER — MIDAZOLAM HYDROCHLORIDE 1 MG/ML
INJECTION, SOLUTION INTRAMUSCULAR; INTRAVENOUS
Status: COMPLETED
Start: 2017-11-21 | End: 2017-11-21

## 2017-11-21 RX ORDER — FENTANYL CITRATE 50 UG/ML
25-50 INJECTION, SOLUTION INTRAMUSCULAR; INTRAVENOUS
Status: DISCONTINUED | OUTPATIENT
Start: 2017-11-21 | End: 2017-11-21 | Stop reason: ALTCHOICE

## 2017-11-21 RX ORDER — MIDAZOLAM HYDROCHLORIDE 1 MG/ML
.5-2 INJECTION, SOLUTION INTRAMUSCULAR; INTRAVENOUS
Status: DISCONTINUED | OUTPATIENT
Start: 2017-11-21 | End: 2017-11-21 | Stop reason: ALTCHOICE

## 2017-11-21 RX ORDER — LEVOFLOXACIN 500 MG/1
500 TABLET, FILM COATED ORAL DAILY
COMMUNITY
End: 2018-02-14 | Stop reason: ALTCHOICE

## 2017-11-21 RX ORDER — FENTANYL CITRATE 50 UG/ML
25-50 INJECTION, SOLUTION INTRAMUSCULAR; INTRAVENOUS
Status: DISCONTINUED | OUTPATIENT
Start: 2017-11-21 | End: 2017-11-21

## 2017-11-21 RX ORDER — FENTANYL CITRATE 50 UG/ML
INJECTION, SOLUTION INTRAMUSCULAR; INTRAVENOUS
Status: COMPLETED
Start: 2017-11-21 | End: 2017-11-21

## 2017-11-21 RX ADMIN — FENTANYL CITRATE 50 MCG: 50 INJECTION, SOLUTION INTRAMUSCULAR; INTRAVENOUS at 08:26

## 2017-11-21 RX ADMIN — MIDAZOLAM HYDROCHLORIDE 2 MG: 1 INJECTION, SOLUTION INTRAMUSCULAR; INTRAVENOUS at 08:24

## 2017-11-21 RX ADMIN — MIDAZOLAM HYDROCHLORIDE 1 MG: 1 INJECTION, SOLUTION INTRAMUSCULAR; INTRAVENOUS at 08:26

## 2017-11-21 RX ADMIN — MIDAZOLAM HYDROCHLORIDE 2 MG: 1 INJECTION, SOLUTION INTRAMUSCULAR; INTRAVENOUS at 08:20

## 2017-11-21 RX ADMIN — FENTANYL CITRATE 50 MCG: 50 INJECTION, SOLUTION INTRAMUSCULAR; INTRAVENOUS at 08:18

## 2017-11-21 RX ADMIN — MIDAZOLAM HYDROCHLORIDE 2 MG: 1 INJECTION, SOLUTION INTRAMUSCULAR; INTRAVENOUS at 08:22

## 2017-11-21 NOTE — DISCHARGE INSTRUCTIONS
DISCHARGE SUMMARY from Drew Velasco RN        The following personal items collected during your admission are returned to you:     Clothing:  shirt        PATIENT INSTRUCTIONS: Continue taking all the same medications      The cardioversion procedure can cause redness to the skin where the patches were placed. You may treat this with Aloe or Cortisone cream over the counter lotion. If the skin appears very reddened or blistered contact your physician      Call to make a follow up appointment with Dr. José Brooks     What to do at Home:  Recommended activity: No driving today      The discharge information has been reviewed with the PATIENT . The PATIENT  verbalized understanding.

## 2017-11-21 NOTE — PROGRESS NOTES
Pt sedated with 100 mcg IV Fentanyl and 7 mg IV Versed for Elective Cardioversion. Pt given 1 synch shock at 200 joules. Rhythm converted to sinus (paced). Pacemaker checked by Shante Yip from [address] [place] [address].  Monitored sedation time 8:18 to 8:28

## 2017-11-21 NOTE — PROCEDURES
DCCV      Pre-op diagnosis: Atrial flutter    Post-op diagnosis:  Successful DCCV to NSR      Procedure performed: DCCV, Moderate conscious sedation     Procedure Type: Elective      Sedation: Moderate conscious sedation with IV fentany & versed, local anesthesia with 1% lidocaine. This was performed by non-anesthesia personnel and I provided direct supervision to a trained independent observer. Time under moderate sedation:  21 Min    Patient age:  74 y.o. Anticoagulation:  Eliquis    Complications: None      Protocol: Patient brought to the cardiovascular suite in a fasting non-sedated state and informed consent was obtained after the procedure details along with potential benefits, adverse outcomes, and alternatives were explained in detail. The patient was placed on a hemodynamic and O2 monitor. Pt placed in the left lateral recumbent position and was administered conscious sedation to maintain a RASS -4.      Next, hands free patches were placed in the AP position. The patient was then placed in the supine position in preparation for the cardioversion. 200 J synced shock delivered x 1 with successful restoration of NSR.     The patient tolerated the procedure well without any complications at the time of this documentation. Findings as mentioned below.      FINDINGS:    1. Successful DCCV to NSR      RECOMMENDATIONS:    1. Continue eliquis  2. Dr. Cowan Askew to decide on AAD, if any  3.  Standard post-anesthesia care    Ginger Nyhan III, DO

## 2017-12-01 ENCOUNTER — HOSPITAL ENCOUNTER (OUTPATIENT)
Dept: LAB | Age: 67
Discharge: HOME | End: 2017-12-01
Payer: MEDICARE

## 2017-12-01 ENCOUNTER — HOSPITAL ENCOUNTER (OUTPATIENT)
Dept: WOUND CARE | Age: 67
Discharge: HOME | End: 2017-12-01
Payer: MEDICARE

## 2017-12-01 PROCEDURE — 97597 DBRDMT OPN WND 1ST 20 CM/<: CPT

## 2017-12-01 PROCEDURE — 87076 CULTURE ANAEROBE IDENT EACH: CPT

## 2017-12-01 PROCEDURE — 87070 CULTURE OTHR SPECIMN AEROBIC: CPT

## 2017-12-02 NOTE — OP NOTES
[place] Leo Villavicencio, [address] [facility]       Name:  Meena Clark   MR#:  [id number]   :  1950   Account #:  [de-identified]        Date of Adm:  2017       PREOPERATIVE DIAGNOS IS:  Grade 2 ulcer, plantar 1st left   metatarsal phalangeal joint, in a diabetic with neuropathy. POSTOPERATIVE DIAGNOS IS:  Grade 2 ulcer, plantar 1st left   metatarsal phalangeal joint, in a diabetic with neuropathy. PROCEDURES PERFORMED:  Selective debridement, diabetic ulcer,   left foot. ESTIMATED BLOOD LOSS:  Minimal.    SPECIMENS REMOVED:  Wound cultures. ANESTHESIA:  None needed. INDICATIONS: This 58-year-old white male presents for continued   treatment of chronic plantar first left MPJ. Most recent therapy has   consisted of silver calcium alginate followed by dry sterile dressings. We are pending surgical intervention; however, the patient has not   been released from Cardiology. Past medical history includes diabetes   and heart disease. PHYSICAL EXAMINATION   VITAL SIGNS: Temperature 98.4, pulse rate 69, respirations 18, blood   pressure 152/79. EXTREMITIES: Palpable pedal pulses with fairly good muscle   strength, lower extremities bilateral. Diminished epicritic sensation,   lower extremities. He has an abducted hallux, left foot. Plantar medial   aspect, first left MPJ is with a grade 2 ulceration with surrounding   hyperkeratotic tissue. The ulceration has a red granular base. 1 o'clock   position, there is bloody purulence and malodorous. Wound measures   0.7 0.9 x 0.1 cm. DESCRIPTION OF PROCEDURE: Wound was cleansed with normal   saline. Using a curette as well as a #15 blade, a selective debridement   was performed through the dermal layer, removing granulomatous,   hyperkeratotic tissue and dermal tissue. The wound was debrided   through the dermal layer.  Aerobic as well as anaerobic wound cultures were taken. The wound was dressed with silver calcium alginate   followed by dry sterile dressings. The patient is to continue daily. He   will have followup visit in the [address] [place] [address] with myself in 2   weeks. The patient is currently taking Levaquin. He is to continue.         DORIS Enrique / Feliciano Spencer   D:  12/01/2017   16:32   T:  12/01/2017   20:40   Job #:  [id number]

## 2017-12-05 LAB
BACTERIA SPEC AEROBE CULT: ABNORMAL
BACTERIA SPEC ANAEROBE CULT: ABNORMAL

## 2017-12-06 RX ORDER — FEBUXOSTAT 40 MG/1
TABLET ORAL
Qty: 30 TAB | Refills: 11 | Status: SHIPPED | OUTPATIENT
Start: 2017-12-06 | End: 2019-01-10 | Stop reason: SDUPTHER

## 2017-12-06 NOTE — TELEPHONE ENCOUNTER
Requested Prescriptions     Pending Prescriptions Disp Refills    ULORIC 40 mg tab tablet [Pharmacy Med Name: ULORIC 40MG TABLETS] 30 Tab 11     Sig: TAKE 1 TABLET BY MOUTH DAILY

## 2017-12-15 ENCOUNTER — HOSPITAL ENCOUNTER (OUTPATIENT)
Dept: WOUND CARE | Age: 67
Discharge: HOME | End: 2017-12-15
Payer: MEDICARE

## 2017-12-15 PROCEDURE — 97597 DBRDMT OPN WND 1ST 20 CM/<: CPT

## 2017-12-18 NOTE — OP NOTES
[place] Syl[place] Lupis Vega  MR#: [id number]  : 1950  ACCOUNT #: [de-identified]   DATE OF SERVICE: 2017    PREOPERATIVE DIAGNOSIS:  Diabetic ulcer, plantar first left metatarsophalangeal joint. POSTOPERATIVE DIAGNOSIS:  Diabetic ulcer, plantar first left metatarsophalangeal joint. PROCEDURE PERFORMED:  Selective debridement diabetic ulcer first left metatarsophalangeal joint. SURGEON: SNEHA Fonseca    ANESTHESIA:  Not needed. ESTIMATED BLOOD LOSS:  Minimal.    SPECIMENS REMOVED:  None. COMPLICATIONS:  None. INDICATIONS:  This 78-year-old white male presents for continued treatment of chronic wound, plantar first left MPJ. Most recent therapy has consisted of Silver calcium alginate followed by dry sterile dressings. Patient does have a moderate bunion deformity and this may or may not heal without surgery; however, at this time, patient is recovering from a heart procedure and is unable to undergo anesthesia at this time. PAST MEDICAL HISTORY:  Includes cataracts, anemia, lymphedema, arrhythmia, atrial fibrillation, coronary artery disease, hypertension, peripheral arterial disease, diabetes, renal disease, gout, neuropathy. Otherwise, medical history and physical is unchanged from admitting history and physical.      PHYSICAL EXAMINATION:    VITAL SIGNS:  Temperature 98.3, pulse rate 71, respiration 14, blood pressure 154/79. EXTREMITIES:  Physical examination of lower extremities, palpable pedal pulses with fair to good muscle strength in lower extremities bilateral.  Grossly diminished epicritic sensation. The patient has an abducted hallux, left foot. Wound plantar first left MPJ measures 0.4 x 0.6 x 0.1, has a beefy red granulating base with surrounding hyperkeratotic tissue. There is no current erythema nor signs of infection. DESCRIPTION OF PROCEDURE:  The wound was cleansed with normal saline.   Using a curet, a selective debridement was performed into the dermal layer, removing dermal granulomatous and hyperkeratotic tissue. The wound was now dressed with Noelle, followed by dry sterile dressings. PLAN:  The patient is to continue with Noelle and dry sterile dressings every other day. He will have a followup visit in the wound care center with myself in two weeks. Patient is pending resection of the first MPJ surgically; however, I will defer the surgery until we can obtain cardiac clearance.       DORIS Raines  D: 12/15/2017 14:41     T: 12/18/2017 11:36  JOB #: [id number]

## 2018-01-05 ENCOUNTER — HOSPITAL ENCOUNTER (OUTPATIENT)
Dept: WOUND CARE | Age: 68
Discharge: HOME | End: 2018-01-05
Payer: MEDICARE

## 2018-01-05 PROCEDURE — 97597 DBRDMT OPN WND 1ST 20 CM/<: CPT

## 2018-01-05 NOTE — OP NOTES
[address]  ACUTE CARE OP NOTE    Name:OMAYRA Avalos  MR#: [id number]  : 1950  ACCOUNT #: [de-identified]   DATE OF SERVICE: 2018    SURGEON:  Beltran Isaacs DPM    PREOPERATIVE DIAGNOSIS:  Diabetic ulcer, plantar first left metatarsophalangeal joint. POSTOPERATIVE DIAGNOSIS:  Diabetic ulcer, plantar first left metatarsophalangeal joint. PROCEDURE:  Selective debridement, diabetic ulcer, plantar first left MPJ. ANESTHESIA:  None needed. BLOOD LOSS:  Minimal.    SPECIMEN:  None. COMPLICATIONS:  none    INDICATIONS:  This 58-year-old white male who presents for continued treatment of a chronic wound, plantar first left MPJ. Most recent therapy has consisted of Noelle and multiple debridements. The patient is scheduled to have surgical excision of bone from first MPJ; however, we are pending cardiac and internal medicine clearance. History and physical is unchanged from the admitting history and physical.    PAST MEDICAL HISTORY:  Includes diabetes, heart disease, cataracts, atrial fibrillation, coronary artery disease, hypertension, peripheral arterial disease, stage III renal disease, gout, neuropathy. CURRENT MEDICATIONS:  Tamsulosin HCL 0.8 mg daily, clonazepam 1 mg at bedtime. Lantus 24 units daily, bumetanide 2 mg 2 times a day, finasteride 5 mg daily, Eliquis 5 mg twice a day, Victoza 1.8 mg daily,  valsartan 320 mg daily, Uloric 40 mg daily,      PHYSICAL EXAMINATION:  Palpable pedal pulses. With a  noted lack of hair growth, lower extremities with diminished epicritic sensation. Hallux valgus deformity left foot. Plantar medial 1st MPJ with grade II ulceration. Pale granular base with surrounding hyperkeratosis. Measures 1.3x1x.2cm. Scant drainage    DESCRIPTION OF PROCEDURE:  Wound was cleansed with normal saline.   Using a curette a selective debridement was performed into the dermal layer, removing dermal and granulomatous and hyperkeratotic tissue. Wound was dressed with Onelle followed by dry sterile dressings. The patient to change  daily. We discussed surgical excision of the first MPJ. We discussed risks, benefits and expected outcome. This procedure is pending internal medicine as well as cardiac clearance. Otherwise, he will have followup visit wound care center by myself in 2 weeks if the surgery is not performed prior to that time.       DORIS Oakes  D: 01/05/2018 14:32     T: 01/05/2018 15:12  JOB #: [id number]

## 2018-01-19 ENCOUNTER — HOSPITAL ENCOUNTER (OUTPATIENT)
Dept: WOUND CARE | Age: 68
Discharge: HOME | End: 2018-01-19
Payer: MEDICARE

## 2018-01-19 PROCEDURE — 97597 DBRDMT OPN WND 1ST 20 CM/<: CPT

## 2018-01-19 NOTE — OP NOTES
[address] [facility] Lupis CUTLER  MR#: [id number]  : 1950  ACCOUNT #: [de-identified]   DATE OF SERVICE: 2018    PREOPERATIVE DIAGNOSIS:  Grade II diabetic ulcer, plantar first left metacarpophalangeal joint. POSTOPERATIVE DIAGNOSIS:  Grade II diabetic ulcer, plantar first left metacarpophalangeal joint. PROCEDURES PERFORMED:  Selective debridement, diabetic ulcer, plantar first left metacarpophalangeal joint. ANESTHESIA:  None needed. BLOOD LOSS:  Minimal.    SPECIMENS:  None. COMPLICATIONS:  None. INDICATIONS:  This is a 68-year-old white male who presents for continued treatment of chronic wound, plantar first left MPJ of more than a year. Recent MRIs as well as x-rays do not reveal any evidence of osteomyelitis. Most recent therapy has consisted of multiple debridements followed by Noelle wound matrix applied every other day. It is agreed with the patient that once we can get cardiac clearance as well as ABIs performed that I will resect his first MPJ, so that this wound will heal.    HISTORY AND PHYSICAL:  Unchanged from the admitting history and physical.    PAST MEDICAL HISTORY:  Includes cataracts, anemia, arrhythmia, atrial fibrillation, coronary artery disease, hypertension, peripheral arterial disease, diabetes, renal disease, gout, neuropathy. PHYSICAL EXAMINATION:    VITAL SIGNS:  Temperature 97.4, pulse rate 71, respirations 14, blood pressure 131/76. LOWER EXTREMITIES:  Reveal palpable pedal pulses with fairly good muscle strength in lower extremities bilateral, diminished epicritic sensation. He has an abducted hallux, left foot. Plantar medial first left MPJ is a beefy red, granulating grade II ulcer with surrounding macerated hyperkeratotic tissue. Wound measures 0.5 x 0.5 x 0.1 cm. At last visit, it measured 0.6 x 0.4 x 0.1 cm.     DESCRIPTION OF PROCEDURE:  The wound was cleansed with normal saline. Using a curet a selective debridement was performed through the dermal layer, removing dermal granulomatous and hyperkeratotic tissue. Good bleeding base was achieved. Wound was dressed with Noelle followed by dry sterile dressings. The patient is to continue every other day. PLAN:  Patient will have a followup visit in wound care center with myself in 2 weeks. We are pending resection of the 1st left MPJ including sesamoid apparatus pending CAM results, cardiac clearance, and internal medicine clearance.       DORIS Rivas / RN  D: 01/19/2018 13:36     T: 01/19/2018 14:03  JOB #: [id number]

## 2018-02-09 ENCOUNTER — HOSPITAL ENCOUNTER (OUTPATIENT)
Dept: WOUND CARE | Age: 68
Discharge: HOME | End: 2018-02-09
Payer: MEDICARE

## 2018-02-09 PROCEDURE — 97597 DBRDMT OPN WND 1ST 20 CM/<: CPT

## 2018-02-10 NOTE — OP NOTES
[address] [facility] Lupis CUTLER  MR#: [id number]  : 1950  ACCOUNT #: [de-identified]   DATE OF SERVICE: 2018    HISTORY OF CHIEF COMPLAINT:  This 41-year-old white male presents for continued treatment of a chronic wound, plantar first left MPJ of more than one year. Most recent therapies have consisted of silver calcium alginate and multiple debridements. It is planned to meet with surgical intervention to remove underlying hyperostotic bone; however, we are waiting for the patient to get medical and cardiac clearance for the surgery. History and physical is unchanged from the admitting history and physical.    PAST MEDICAL HISTORY:  Anemia, atrial fibrillation, coronary artery disease, hypertension, peripheral arterial disease, diabetes, kidney disease stage III, gout, and neuropathy. PHYSICAL EXAMINATION:  VITAL SIGNS:  Temperature 98.6, pulse rate 70, respirations 16, blood pressure 134/80. LOWER EXTREMITIES:  Reveal palpable pedal pulses with fair to good muscle strength in lower extremities bilateral.  Diminished epicritic sensation. Recent ABIs are within normal limits. Plantar first left MPJ there was a granulating grade II ulceration with surrounding hyperkeratotic tissue, has a beefy red base. No odor. Does not appear to be infected. Patient does have a deviated hallux. Recent x-rays and MRI did not indicate any osteomyelitis. ASSESSMENT:  Diabetic with neuropathy with a grade II ulcer, plantar first left MPJ. PREOPERATIVE DIAGNOSIS:  Grade II ulcer, plantar first left MPJ. POSTOPERATIVE DIAGNOSIS:  Grade II ulcer, plantar first left MPJ. PROCEDURE:  Selective debridement diabetic ulcer, plantar first left MPJ. ANESTHESIA:  None needed. BLOOD LOSS:  Minimal.    SPECIMENS:  None. COMPLICATIONS:  None. DESCRIPTION OF PROCEDURE:  The wound was cleansed with normal saline.   Using a curette, a selective debridement was performed into the dermal layer, removing dermal and hyperkeratotic tissue. Predebridement, the wound measures 0.8 x 0.7 x 0.1 cm. Following debridement, wound was cleaned with normal saline, dressed with Noelle followed by dry sterile dressings. Patient will continue daily and will have a followup visit with myself in the wound clinic in 2 weeks.       DORIS Oliva  D: 02/09/2018 14:54     T: 02/09/2018 19:12  JOB #: [id number]

## 2018-02-13 PROBLEM — N18.4 CONTROLLED TYPE 2 DIABETES MELLITUS WITH STAGE 4 CHRONIC KIDNEY DISEASE, WITHOUT LONG-TERM CURRENT USE OF INSULIN (HCC): Status: ACTIVE | Noted: 2017-08-05

## 2018-02-13 PROBLEM — E11.22 CONTROLLED TYPE 2 DIABETES MELLITUS WITH STAGE 4 CHRONIC KIDNEY DISEASE, WITHOUT LONG-TERM CURRENT USE OF INSULIN (HCC): Status: ACTIVE | Noted: 2017-08-05

## 2018-02-13 PROBLEM — E78.2 MIXED HYPERLIPIDEMIA: Status: ACTIVE | Noted: 2017-08-05

## 2018-02-13 PROBLEM — M15.9 PRIMARY OSTEOARTHRITIS INVOLVING MULTIPLE JOINTS: Status: ACTIVE | Noted: 2017-08-05

## 2018-02-14 ENCOUNTER — OFFICE VISIT (OUTPATIENT)
Dept: INTERNAL MEDICINE CLINIC | Age: 68
End: 2018-02-14

## 2018-02-14 VITALS
RESPIRATION RATE: 18 BRPM | HEART RATE: 68 BPM | WEIGHT: 249.2 LBS | TEMPERATURE: 98.4 F | OXYGEN SATURATION: 96 % | DIASTOLIC BLOOD PRESSURE: 76 MMHG | HEIGHT: 77 IN | BODY MASS INDEX: 29.43 KG/M2 | SYSTOLIC BLOOD PRESSURE: 138 MMHG

## 2018-02-14 DIAGNOSIS — I12.9 HYPERTENSION WITH RENAL DISEASE: ICD-10-CM

## 2018-02-14 DIAGNOSIS — K21.9 GASTROESOPHAGEAL REFLUX DISEASE WITHOUT ESOPHAGITIS: ICD-10-CM

## 2018-02-14 DIAGNOSIS — G25.81 RESTLESS LEG: ICD-10-CM

## 2018-02-14 DIAGNOSIS — F41.9 ANXIETY: ICD-10-CM

## 2018-02-14 DIAGNOSIS — E11.22 CONTROLLED TYPE 2 DIABETES MELLITUS WITH STAGE 4 CHRONIC KIDNEY DISEASE, WITHOUT LONG-TERM CURRENT USE OF INSULIN (HCC): ICD-10-CM

## 2018-02-14 DIAGNOSIS — Z79.4 TYPE 2 DIABETES MELLITUS WITH FOOT ULCER, WITH LONG-TERM CURRENT USE OF INSULIN (HCC): ICD-10-CM

## 2018-02-14 DIAGNOSIS — I48.0 PAROXYSMAL ATRIAL FIBRILLATION (HCC): ICD-10-CM

## 2018-02-14 DIAGNOSIS — M15.9 PRIMARY OSTEOARTHRITIS INVOLVING MULTIPLE JOINTS: ICD-10-CM

## 2018-02-14 DIAGNOSIS — I50.42 CHRONIC COMBINED SYSTOLIC AND DIASTOLIC CONGESTIVE HEART FAILURE (HCC): ICD-10-CM

## 2018-02-14 DIAGNOSIS — L97.509 TYPE 2 DIABETES MELLITUS WITH FOOT ULCER, WITH LONG-TERM CURRENT USE OF INSULIN (HCC): ICD-10-CM

## 2018-02-14 DIAGNOSIS — I25.5 ISCHEMIC CARDIOMYOPATHY: ICD-10-CM

## 2018-02-14 DIAGNOSIS — N18.4 CONTROLLED TYPE 2 DIABETES MELLITUS WITH STAGE 4 CHRONIC KIDNEY DISEASE, WITHOUT LONG-TERM CURRENT USE OF INSULIN (HCC): ICD-10-CM

## 2018-02-14 DIAGNOSIS — E78.2 MIXED HYPERLIPIDEMIA: ICD-10-CM

## 2018-02-14 DIAGNOSIS — E11.621 TYPE 2 DIABETES MELLITUS WITH FOOT ULCER, WITH LONG-TERM CURRENT USE OF INSULIN (HCC): ICD-10-CM

## 2018-02-14 DIAGNOSIS — I25.10 ASCVD (ARTERIOSCLEROTIC CARDIOVASCULAR DISEASE): Primary | ICD-10-CM

## 2018-02-14 DIAGNOSIS — N18.4 CKD (CHRONIC KIDNEY DISEASE), STAGE IV (HCC): ICD-10-CM

## 2018-02-14 LAB
ALBUMIN SERPL-MCNC: 4.3 G/DL (ref 3.9–5.4)
ALKALINE PHOS POC: 66 U/L (ref 38–126)
ALT SERPL-CCNC: 30 U/L (ref 9–52)
AST SERPL-CCNC: 24 U/L (ref 14–36)
BACTERIA UA POCT, BACTPOCT: NORMAL
BILIRUB UR QL STRIP: NEGATIVE
BUN BLD-MCNC: 122 MG/DL (ref 9–20)
CALCIUM BLD-MCNC: 9.6 MG/DL (ref 8.4–10.2)
CASTS UA POCT: NORMAL
CHLORIDE BLD-SCNC: 92 MMOL/L (ref 98–107)
CHOLEST SERPL-MCNC: 130 MG/DL (ref 0–200)
CK (CPK) POC: 78 U/L (ref 30–135)
CLUE CELLS, CLUEPOCT: NEGATIVE
CO2 POC: 33 MMOL/L (ref 22–32)
CREAT BLD-MCNC: 2.1 MG/DL (ref 0.8–1.5)
CRYSTALS UA POCT, CRYSPOCT: NEGATIVE
EGFR (POC): 31.2
EPITHELIAL CELLS POCT: NEGATIVE
GLUCOSE POC: 242 MG/DL (ref 75–110)
GLUCOSE UR-MCNC: NORMAL MG/DL
GRAN# POC: 8.9 K/UL (ref 2–7.8)
GRAN% POC: 86.7 % (ref 37–92)
HCT VFR BLD CALC: 38.3 % (ref 37–51)
HDLC SERPL-MCNC: 28 MG/DL (ref 35–130)
HGB BLD-MCNC: 12.3 G/DL (ref 12–18)
KETONES P FAST UR STRIP-MCNC: NEGATIVE MG/DL
LDL CHOLESTEROL POC: 79.6 MG/DL (ref 0–130)
LY# POC: 1 K/UL (ref 0.6–4.1)
LY% POC: 9.9 % (ref 10–58.5)
MCH RBC QN: 28.7 PG (ref 26–32)
MCHC RBC-ENTMCNC: 32.2 G/DL (ref 30–36)
MCV RBC: 89 FL (ref 80–97)
MICROALBUMIN UR TEST STR-MCNC: 20 MG/L (ref 0–20)
MID #, POC: 0.3 K/UL (ref 0–1.8)
MID% POC: 3.4 % (ref 0.1–24)
MUCUS UA POCT, MUCPOCT: NORMAL
PH UR STRIP: 6 [PH] (ref 5–7)
PLATELET # BLD: 211 K/UL (ref 140–440)
POTASSIUM SERPL-SCNC: 4.5 MMOL/L (ref 3.6–5)
PROT SERPL-MCNC: 7.7 G/DL (ref 6.3–8.2)
PROT UR QL STRIP: NORMAL
RBC # BLD: 4.3 M/UL (ref 4.2–6.3)
RBC UA POCT, RBCPOCT: NORMAL
SODIUM SERPL-SCNC: 137 MMOL/L (ref 137–145)
SP GR UR STRIP: 1.01 (ref 1.01–1.02)
T4 FREE SERPL-MCNC: 1.15 NG/DL (ref 0.71–1.85)
TCHOL/HDL RATIO (POC): 4.6 (ref 0–4)
TOTAL BILIRUBIN POC: 1 MG/DL (ref 0.2–1.3)
TRICH UA POCT, TRICHPOC: NEGATIVE
TRIGL SERPL-MCNC: 112 MG/DL (ref 0–200)
TSH BLD-ACNC: 1.65 UIU/ML (ref 0.4–4.2)
UA UROBILINOGEN AMB POC: NORMAL (ref 0.2–1)
URINALYSIS CLARITY POC: CLEAR
URINALYSIS COLOR POC: NORMAL
URINE BLOOD POC: NORMAL
URINE CULT COMMENT, POCT: NORMAL
URINE LEUKOCYTES POC: NEGATIVE
URINE NITRITES POC: NEGATIVE
VLDLC SERPL CALC-MCNC: 22.4 MG/DL
WBC # BLD: 10.2 K/UL (ref 4.1–10.9)
WBC UA POCT, WBCPOCT: 0
YEAST UA POCT, YEASTPOC: NEGATIVE

## 2018-02-14 RX ORDER — PRAMIPEXOLE DIHYDROCHLORIDE 0.5 MG/1
TABLET ORAL
Qty: 90 TAB | Status: SHIPPED | OUTPATIENT
Start: 2018-02-14 | End: 2019-11-06 | Stop reason: SDUPTHER

## 2018-02-14 RX ORDER — PRAMIPEXOLE DIHYDROCHLORIDE 0.25 MG/1
TABLET ORAL
Qty: 90 TAB | Refills: 3 | Status: SHIPPED | OUTPATIENT
Start: 2018-02-14 | End: 2018-02-14 | Stop reason: SDUPTHER

## 2018-02-14 RX ORDER — CLONAZEPAM 1 MG/1
TABLET ORAL
Qty: 90 TAB | Refills: 1 | Status: SHIPPED | OUTPATIENT
Start: 2018-02-14 | End: 2018-02-16 | Stop reason: DRUGHIGH

## 2018-02-14 RX ORDER — METOLAZONE 2.5 MG/1
2.5 TABLET ORAL AS NEEDED
Qty: 10 TAB | Status: SHIPPED | OUTPATIENT
Start: 2018-02-14 | End: 2018-10-16 | Stop reason: SDUPTHER

## 2018-02-14 NOTE — PROGRESS NOTES
Chief Complaint   Patient presents with    Breathing Problem     when he lays down cant breathe,    Medication Evaluation     running out of klonopin early because hes double dosing       SUBJECTIVE:    Royal KRISTA Pope is a 69 y.o. male who presents today after a long absence not having been seen since September for follow-up of his multiple medical problems to include hypertension, diabetes, hyperlipidemia, stage IV chronic kidney disease, anemia of chronic disease, ASCVD, atrial fibrillation, CHF, cardiomyopathy, GERD, DJD, anxiety, as well as multiple other problems. He notes that he recently gained weight and is picked up about 25 pounds before he started back on Zaroxolyn is taking about 20 pounds off. He is noting increased shortness of breath when he lays down to try to sleep at night he can only sleep a few minutes. He is having to sleep sitting up. He denies any chest pain or palpitations and has not noted any cardiac irregularity. He claims to be taking his medications and he claims he is trying to watch his salt in his diet. He does not get much exercise. He has some chronic arthritic complaints that have not changed. There are no GI or  complaints. He denies any headaches or neurologic complaints. There are no other specific complaints on complete review of systems. Current Outpatient Prescriptions   Medication Sig Dispense Refill    pramipexole (MIRAPEX) 0.25 mg tablet TAKE 1 TABLET BY MOUTH EVERY NIGHT AT BEDTIME 90 Tab 3    clonazePAM (KLONOPIN) 1 mg tablet TAKE 1 TABLET BY MOUTH EVERY NIGHT AT BEDTIME 90 Tab 1    ULORIC 40 mg tab tablet TAKE 1 TABLET BY MOUTH DAILY 30 Tab 11    metOLazone (ZAROXOLYN) 2.5 mg tablet Take  by mouth as needed (pt takes approximately twice a month if has edema. ).  valsartan (DIOVAN) 320 mg tablet Take  by mouth daily.  carvedilol (COREG) 12.5 mg tablet Take  by mouth two (2) times daily (with meals).       PEN NEEDLE, DIABETIC (BD ULTRA-FINE DEVIN PEN NEEDLES) by Does Not Apply route.  bumetanide (BUMEX) 2 mg tablet Take 4 mg by mouth two (2) times a day.  Liraglutide (VICTOZA) 0.6 mg/0.1 mL (18 mg/3 mL) sub-q pen 0.6 mg by SubCUTAneous route.  apixaban (ELIQUIS) 5 mg tablet Take 5 mg by mouth two (2) times a day.  finasteride (PROSCAR) 5 mg tablet Take 5 mg by mouth daily.  tamsulosin (FLOMAX) 0.4 mg capsule Take 0.8 mg by mouth daily.  insulin glargine (LANTUS SOLOSTAR) 100 unit/mL (3 mL) pen 22 Units by SubCUTAneous route daily.  colchicine (COLCRYS) 0.6 mg tablet Take 0.6 mg by mouth daily. Takes 1 tablet 3 times a week.   Indications: taking mitagar       Past Medical History:   Diagnosis Date    Anemia 8/5/2017    Anxiety 8/5/2017    Arrhythmia     atrial fibrillation 2014    ASCVD (arteriosclerotic cardiovascular disease) 8/5/2017    BPH (benign prostatic hyperplasia) 8/5/2017    CAD (coronary artery disease)     h/o stents    Cancer ([address]     h/o skin cancer    Cardiomyopathy ([address] 8/5/2017    CHF (congestive heart failure) ([address] 8/5/2017    CKD (chronic kidney disease), stage IV (HCC) 8/5/2017    Diabetes ([address]     Diabetes mellitus ([address] 8/5/2017    Diabetic neuropathy ([address] 8/5/2017    DJD (degenerative joint disease) 8/5/2017    ED (erectile dysfunction) 8/5/2017    GERD (gastroesophageal reflux disease) 8/5/2017    Gout 8/5/2017    High cholesterol     Hyperlipidemia 8/5/2017    Hypertension     Hypertension with renal disease 8/5/2017    Hypothyroid 8/5/2017    Insomnia 8/5/2017    Obesity 8/5/2017    On statin therapy 8/5/2017    Restless leg 8/5/2017    Thyroid disease     hypothyroid     Past Surgical History:   Procedure Laterality Date    CARDIAC SURG PROCEDURE UNLIST      cardiac stents    COLONOSCOPY N/A 6/28/2016    COLONOSCOPY performed by Maritza Melendrez [facility] at [facility] ENDOSCOPY    HX APPENDECTOMY      HX HEENT      HX ORTHOPAEDIC      left shoulder surgery x 2    HX ORTHOPAEDIC      back surgery x 2    HX ORTHOPAEDIC      Knee surgery x2    HX PACEMAKER       Allergies   Allergen Reactions    Niacin Unknown (comments)    Levemir [Insulin Detemir] Hives    Other Medication Other (comments)     ? Allergy to Duraprep causing chemical burn    Primaxin [Imipenem-Cilastatin] Diarrhea and Rash    Xarelto [Rivaroxaban] Rash and Itching       REVIEW OF SYSTEMS:  General: negative for - chills or fever, or weight loss or gain  ENT: negative for - headaches, nasal congestion or tinnitus  Eyes: no blurred or visual changes  Neck: No stiffness or swollen nodes  Respiratory: negative for - cough, hemoptysis, shortness of breath or wheezing  Cardiovascular : Positive for shortness of breath with PND, orthopnea, and dyspnea on exertion negative for palpitations,chest pain or other cardiac complaints.   Gastrointestinal: negative for - abdominal pain, blood in stools, heartburn or nausea/vomiting  Genito-Urinary: no dysuria, trouble voiding, or hematuria  Musculoskeletal: negative for - gait disturbance, joint pain, joint stiffness or joint swelling  Neurological: no TIA or stroke symptoms  Hematologic: no bruises, no bleeding  Lymphatic: no swollen glands  Integument: no lumps, mole changes, nail changes or rash  Endocrine:no malaise/lethargy poly uria or polydipsia or unexpected weight changes        Social History     Social History    Marital status:      Spouse name: N/A    Number of children: N/A    Years of education: N/A     Social History Main Topics    Smoking status: Former Smoker     Quit date: 3/6/1972    Smokeless tobacco: Never Used    Alcohol use No      Comment: rare, 1 drink per year    Drug use: No    Sexual activity: Not Currently     Other Topics Concern    None     Social History Narrative     Family History   Problem Relation Age of Onset    Heart Disease Mother     Kidney Disease Mother     Heart Disease Father     Kidney Disease Father     Cancer Sister      Breast       OBJECTIVE:     Visit Vitals    /76    Pulse 68    Temp 98.4 °F (36.9 °C) (Oral)    Resp 18    Ht 6' 5\" (1.956 m)    Wt 249 lb 3.2 oz (113 kg)    SpO2 96%    BMI 29.55 kg/m2     CONSTITUTIONAL:   well nourished, appears age appropriate  EYES: sclera anicteric, PERRL, EOMI  ENMT:nars clear, moist mucous membranes, pharynx clear  NECK: supple. Thyroid normal, No JVD or bruits  RESPIRATORY: Chest: clear to ascultation and percussion, normal inspiratory effort  CARDIOVASCULAR: Heart: regular rate and rhythm 2/6 systolic murmur without rubs or gallops, PMI not displaced, No thrills. 1+ pedal edema  GASTROINTESTINAL: Abdomen: non distended, soft, non tender, bowel sounds normal  HEMATOLOGIC: no purpura, petechiae or bruising  LYMPHATIC: No lymph node enlargemant  MUSCULOSKELETAL: Extremities: no active synovitis, pulse 1+. 1+ pedal edema  INTEGUMENT: No unusual rashes or suspicious skin lesions noted. Nails appear normal.  PERIPHERAL VASCULAR: normal pulses femoral, PT and DP  NEUROLOGIC: non-focal exam, A & O X 3  PSYCHIATRIC:, appropriate affect     ASSESSMENT:   1. ASCVD (arteriosclerotic cardiovascular disease)    2. Restless leg    3. Paroxysmal atrial fibrillation (HCC)    4. Ischemic cardiomyopathy    5. Chronic combined systolic and diastolic congestive heart failure ([address]    6. CKD (chronic kidney disease), stage IV ([address]    7. Controlled type 2 diabetes mellitus with stage 4 chronic kidney disease, without long-term current use of insulin ([address]    8. Gastroesophageal reflux disease without esophagitis    9. Hypertension with renal disease    10. Mixed hyperlipidemia    11. Primary osteoarthritis involving multiple joints    12. Anxiety    13. Type 2 diabetes mellitus with foot ulcer, with long-term current use of insulin ([address]      Pression  1. ASCVD clinically that seems to be stable  2.   Paroxysmal atrial fibrillation now with a biventricular pacemaker and he has a paced rhythm on EKG today  3. Restless leg syndrome I increase his Mirapex 2.5 at bedtime  4. Cardiomyopathy he does have some congestive failure symptoms I will increase his Zaroxolyn to twice weekly  5. CHF as noted increase Zaroxolyn continue Bumex 2 mg twice daily  6. CKD repeat status pending  7. Diabetes we will see what that status is  8. GERD that is stable  9. Hypertension is controlled  10. Hyperlipidemia that status is pending  11. DJD seems to be stable  12. Anxiety renewed his Klonopin  I will call the lab and make further recommendations adjustments if necessary. I have tentatively set him up to return in 1-2 weeks for follow-up of his congestive heart failure. I have stressed importance of follow-up on timely fashion. A total of 45 minutes spent on this office visit with high complexity decision making today. EKG obtained today reveals a paced rhythm with bundle-branch block pattern chest x-ray revealed no evidence of cardiac infiltrate but cardiomegaly was noted  Follow-up sooner should be a problem. PLAN:  .  Orders Placed This Encounter    XR CHEST PA LAT    AMB POC URINE, MICROALBUMIN, SEMIQUANT (1 RESULT)    AMB POC HEMOGLOBIN A1C    AMB POC LIPID PROFILE    AMB POC T4, FREE    AMB POC URINALYSIS DIP STICK AUTO W/ MICRO     AMB POC TSH    AMB POC COMPREHENSIVE METABOLIC PANEL    AMB POC COMPLETE CBC,AUTOMATED ENTER    AMB POC CK (CPK)    AMB POC EKG ROUTINE W/ 12 LEADS, INTER & REP    pramipexole (MIRAPEX) 0.25 mg tablet    clonazePAM (KLONOPIN) 1 mg tablet         ATTENTION:   This medical record was transcribed using an electronic medical records system. Although proofread, it may and can contain electronic and spelling errors. Other human spelling and other errors may be present. Corrections may be executed at a later time. Please feel free to contact us for any clarifications as needed.       Follow-up Disposition:  Return in about 1 week (around 2/21/2018). No results found for any visits on 02/14/18. Sarah Fernández MD    The patient verbalized understanding of the problems and plans as explained.

## 2018-02-14 NOTE — PROGRESS NOTES
Chief Complaint   Patient presents with    Breathing Problem     when he lays down cant breathe,    Medication Evaluation     running out of klonopin early because hes double dosing       1. Have you been to the ER, urgent care clinic since your last visit? Hospitalized since your last visit? no    2. Have you seen or consulted any other health care providers outside of the [address] [place] [address] since your last visit? Include any pap smears or colon screening.   no

## 2018-02-14 NOTE — PATIENT INSTRUCTIONS
Learning About a Pacemaker for Heart Failure  What is a pacemaker for heart failure? A pacemaker is a small device that is placed under the skin of your chest. It is powered by batteries. It has thin wires, called leads, that pass through a vein into your heart. A pacemaker for heart failure is a biventricular pacemaker (say \"erik-nessa-TRICK-jaclyn-liu\"). Treatment that uses this type of pacemaker is called cardiac resynchronization therapy (CRT). When you have heart failure, the lower chambers of your heart may not pump at the same time. The pacemaker sends painless electrical signals to your heart. These signals make the chambers pump at the same time. This can help your heart pump blood better and help you feel better. Your pacemaker may be combined with an ICD, or implantable cardioverter-defibrillator. It can control abnormal heart rhythms. This can prevent sudden death. You may feel worried about having a pacemaker. This is common. It can help if you learn about how the pacemaker helps your heart. Talk to your doctor about your concerns. How is a pacemaker put in place? You will get medicine before the procedure. This helps you relax and helps prevent pain. The doctor makes a cut in the skin just below your collarbone. The cut may be on either side of your chest. The doctor will put the pacemaker leads through the cut. The leads go into a large blood vessel in the upper chest. Then the doctor will guide the leads through the blood vessel into different chambers of the heart. The doctor will place the pacemaker under the skin of your chest. He or she will attach the leads to the pacemaker. Then the cut will be closed with stitches. The procedure usually takes 2 to 3 hours. You may need to spend the night in the hospital.  What can you expect when you have a pacemaker? A pacemaker can help your heart pump blood better. It may help you feel better so you can be more active.  It also may help keep you out of the hospital and help you live longer. You can live a normal, active life with a pacemaker. But you need to avoid strong magnetic and electrical fields. Your doctor or the maker of your pacemaker can give you a full list of things to avoid. But most everyday appliances are safe. Your doctor will check your pacemaker regularly to make sure it's working right. Pacemaker batteries usually last 5 to 15 years before they need to be replaced. Follow-up care is a key part of your treatment and safety. Be sure to make and go to all appointments, and call your doctor if you are having problems. It's also a good idea to know your test results and keep a list of the medicines you take. Where can you learn more? Go to http://gildardo-palak.info/. Enter Y169 in the search box to learn more about \"Learning About a Pacemaker for Heart Failure. \"  Current as of: September 21, 2016  Content Version: 11.4  © 1921-6674 [employer]. Care instructions adapted under license by [employer] (which disclaims liability or warranty for this information). If you have questions about a medical condition or this instruction, always ask your healthcare professional. [address] any warranty or liability for your use of this information.

## 2018-02-15 LAB — HBA1C MFR BLD HPLC: 8.8 % (ref 4.5–5.7)

## 2018-02-15 NOTE — PROGRESS NOTES
Kidney function is stable and lipid profile remarkable for HDL still being very low which is been his problem in the past.  Thinks it will help improve that are increasing the amount of omega-3 fish oil as well as increasing fiber as well as aerobic exercise and niacin.

## 2018-02-15 NOTE — PROGRESS NOTES
Already,  Initial labs and his glycohemoglobin was returned markedly elevated. What is exactly taking his diabetes now.

## 2018-02-16 DIAGNOSIS — G47.00 INSOMNIA, UNSPECIFIED TYPE: Primary | ICD-10-CM

## 2018-02-16 RX ORDER — INSULIN LISPRO 100 [IU]/ML
INJECTION, SOLUTION INTRAVENOUS; SUBCUTANEOUS
Qty: 1 PEN | Refills: 5 | Status: SHIPPED | OUTPATIENT
Start: 2018-02-16 | End: 2018-11-27 | Stop reason: ALTCHOICE

## 2018-02-16 RX ORDER — CLONAZEPAM 2 MG/1
2 TABLET ORAL 2 TIMES DAILY
Qty: 30 TAB | Refills: 5 | Status: SHIPPED | OUTPATIENT
Start: 2018-02-16 | End: 2018-06-14 | Stop reason: SDUPTHER

## 2018-02-16 NOTE — TELEPHONE ENCOUNTER
Requested Prescriptions     Pending Prescriptions Disp Refills    insulin lispro (HUMALOG) 100 unit/mL kwikpen 1 Pen 5     Si units before each meal three times a day.  clonazePAM (KLONOPIN) 2 mg tablet 30 Tab 5     Sig: Take 1 Tab by mouth two (2) times a day. Max Daily Amount: 4 mg.

## 2018-02-16 NOTE — PROGRESS NOTES
Initial labs and his glycohemoglobin was returned markedly elevated. What is exactly taking his diabetes now. Discussed with patient. Need better control of diabetes.   Dr. Jacobo Gonzales recommends adding humalog 5 units before each meal.

## 2018-02-20 NOTE — PROGRESS NOTES
Kidney function is stable and lipid profile remarkable for HDL still being very low which is been his problem in the past.  Thinks it will help improve that are increasing the amount of omega-3 fish oil as well as increasing fiber as well as aerobic exercise and niacin. Discussed earlier with patient.

## 2018-02-23 ENCOUNTER — HOSPITAL ENCOUNTER (OUTPATIENT)
Dept: WOUND CARE | Age: 68
Discharge: HOME | End: 2018-02-23
Payer: MEDICARE

## 2018-02-23 PROCEDURE — 97597 DBRDMT OPN WND 1ST 20 CM/<: CPT

## 2018-02-23 NOTE — OP NOTES
[address] [facility] Lupis CUTLER  MR#: [id number]  : 1950  ACCOUNT #: [de-identified]   DATE OF SERVICE: 2018    PREOPERATIVE DIAGNOSIS:  Grade II diabetic ulcer, plantar aspect of the 1st left metatarsophalangeal joint. POSTOPERATIVE DIAGNOSIS:  Grade II diabetic ulcer, plantar aspect of the 1st left metatarsophalangeal joint. PROCEDURE PERFORMED:  Selective debridement, diabetic ulcer, plantar 1st left metatarsophalangeal joint. SURGEON:  Danita Carrasco DPM    ANESTHESIA:  None needed. ESTIMATED BLOOD LOSS:  Minimal.    SPECIMENS REMOVED:  None. COMPLICATIONS:  None. INDICATIONS:  This 49-year-old white male presents for continued treatment of a chronic wound of more than 1 year, left foot. Most recent therapy has consisted of Noelle, followed by dry sterile dressings. At one point, the patient did have a staph infection and was given Levaquin. At this time, he does not appear to be infected. History and physical is unchanged from admitting history and physical.    PAST MEDICAL HISTORY:  Includes heart disease, diabetes with neuropathy and gout. CURRENT MEDICATIONS:  Tamsulosin HCL 0.8 mg daily, clonazepam 1 mg at bedtime. Lantus 24 units daily, bumetanide 2 mg twice a day, Finasteride 5 mg daily, Eliquis 5 mg twice a day, Victoza 1.8 units daily,   valsartan 320 mg daily, Uloric 40 mg daily, , pramipexole 0.25 mg daily    PHYSICAL EXAMINATION:  VITAL SIGNS:  Reveals temperature 97.8, pulse rate 72, respirations 16, blood pressure 122/72. LOWER EXTREMITIES:  Revealed palpable pedal pulses, fairly good muscle strength, lower extremities bilateral.  Diminished epicritic sensation, lower extremities. The patient has an abducted hallux, left foot. Plantar medial 1st MPJ is with a beefy red grade II ulceration, with surrounding hyperkeratotic tissue, does not appear to be infected. Wound measures 0.6 x 0.5 x 0.1 cm. At last visit, it measured 0.1 x 0.7 x 0.1 cm. ASSESSMENT:  Insulin-dependent diabetic with neuropathy, hallux valgus deformity left foot, grade II ulcer plantar 1st left metatarsophalangeal joint. PLAN:  Using a curette, a selective debridement was performed through the dermal layer, removing dermal granulomatous and hyperkeratotic tissue. The wound was dressed with Noelle, followed by dry sterile dressings. The patient is to continue every other day. Patient has received cardiac, as well as internal medicine clearance. The patient is scheduled to have resection of bone with packing of antibiotic beads of the 1st left MPJ. It is scheduled for 03/09/2018. The patient will have followup visit in the wound care center with me 1 week post-surgery.       Víctor Sweeney, DORIS COOK / KEREN  D: 02/23/2018 16:06     T: 02/23/2018 16:47  JOB #: [id number]

## 2018-03-07 ENCOUNTER — HOSPITAL ENCOUNTER (OUTPATIENT)
Dept: PREADMISSION TESTING | Age: 68
Discharge: HOME | End: 2018-03-07
Attending: PODIATRIST
Payer: MEDICARE

## 2018-03-07 VITALS
TEMPERATURE: 98.7 F | HEART RATE: 70 BPM | BODY MASS INDEX: 30.4 KG/M2 | SYSTOLIC BLOOD PRESSURE: 139 MMHG | WEIGHT: 257.5 LBS | HEIGHT: 77 IN | RESPIRATION RATE: 18 BRPM | DIASTOLIC BLOOD PRESSURE: 80 MMHG

## 2018-03-07 LAB
ANION GAP SERPL CALC-SCNC: 9 MMOL/L (ref 5–15)
BUN SERPL-MCNC: 103 MG/DL (ref 6–20)
BUN/CREAT SERPL: 46 (ref 12–20)
CALCIUM SERPL-MCNC: 9.1 MG/DL (ref 8.5–10.1)
CHLORIDE SERPL-SCNC: 99 MMOL/L (ref 97–108)
CO2 SERPL-SCNC: 28 MMOL/L (ref 21–32)
CREAT SERPL-MCNC: 2.26 MG/DL (ref 0.7–1.3)
ERYTHROCYTE [DISTWIDTH] IN BLOOD BY AUTOMATED COUNT: 14.1 % (ref 11.5–14.5)
GLUCOSE SERPL-MCNC: 168 MG/DL (ref 65–100)
HCT VFR BLD AUTO: 34.2 % (ref 36.6–50.3)
HGB BLD-MCNC: 11 G/DL (ref 12.1–17)
MCH RBC QN AUTO: 28.6 PG (ref 26–34)
MCHC RBC AUTO-ENTMCNC: 32.2 G/DL (ref 30–36.5)
MCV RBC AUTO: 89.1 FL (ref 80–99)
NRBC # BLD: 0 K/UL (ref 0–0.01)
NRBC BLD-RTO: 0 PER 100 WBC
PLATELET # BLD AUTO: 135 K/UL (ref 150–400)
PMV BLD AUTO: 10.7 FL (ref 8.9–12.9)
POTASSIUM SERPL-SCNC: 4.1 MMOL/L (ref 3.5–5.1)
RBC # BLD AUTO: 3.84 M/UL (ref 4.1–5.7)
SODIUM SERPL-SCNC: 136 MMOL/L (ref 136–145)
WBC # BLD AUTO: 4.6 K/UL (ref 4.1–11.1)

## 2018-03-07 PROCEDURE — 80048 BASIC METABOLIC PNL TOTAL CA: CPT | Performed by: PODIATRIST

## 2018-03-07 PROCEDURE — 36415 COLL VENOUS BLD VENIPUNCTURE: CPT | Performed by: PODIATRIST

## 2018-03-07 PROCEDURE — 85027 COMPLETE CBC AUTOMATED: CPT | Performed by: PODIATRIST

## 2018-03-07 NOTE — PERIOP NOTES
[facility]  Preoperative Instructions        Surgery Date 3/10/2018         Time of Arrival 6:00 AM    1. On the day of your surgery, please report to the Emergency Room to sign in and register at your designated time. 2. You must have someone with you to drive you home. You should not drive a car for 24 hours following surgery. Please make arrangements for a friend or family member to stay with you for the first 24 hours after your surgery. 3. Do not have anything to eat or drink (including water, gum, mints, coffee, juice) after midnight ?3/9/2018? Clarisa Russ ? This may not apply to medications prescribed by your physician. ?(Please note below the special instructions with medications to take the morning of your procedure.)    4. We recommend you do not drink any alcoholic beverages for 24 hours before and after your surgery. 5. Contact your surgeons office for instructions on the following medications: non-steroidal anti-inflammatory drugs (i.e. Advil, Aleve), vitamins, and supplements. (Some surgeons will want you to stop these medications prior to surgery and others may allow you to take them)  **If you are currently taking Plavix, Coumadin, Aspirin and/or other blood-thinning agents, contact your surgeon for instructions. ** Your surgeon will partner with the physician prescribing these medications to determine if it is safe to stop or if you need to continue taking. Please do not stop taking these medications without instructions from your surgeon    6. Wear comfortable clothes. Wear glasses instead of contacts. Do not bring any money or jewelry. Please bring picture ID, insurance card, and any prearranged co-payment or hospital payment. Do not wear make-up, particularly mascara the morning of your surgery. Do not wear nail polish, particularly if you are having foot /hand surgery. Wear your hair loose or down, no ponytails, buns, lisa pins or clips.   All body piercings must be removed. Please shower with antibacterial soap for three consecutive days before and on the morning of surgery, but do not apply any lotions, powders or deodorants after the shower on the day of surgery. Please use a fresh towels after each shower. Please sleep in clean clothes and change bed linens the night before surgery. Please do not shave for 48 hours prior to surgery. Shaving of the face is acceptable. 7. You should understand that if you do not follow these instructions your surgery may be cancelled. If your physical condition changes (I.e. fever, cold or flu) please contact your surgeon as soon as possible. 8. It is important that you be on time. If a situation occurs where you may be late, please call [phone] (OR Holding Area). 9. If you have any questions and or problems, please call [phone] (Pre-admission Testing). 10. Your surgery time may be subject to change. You will receive a phone call the evening prior if your time changes. 11.  If having outpatient surgery, you must have someone to drive you here, stay with you during the duration of your stay, and to drive you home at time of discharge. 12.   In an effort to improve the efficiency, privacy, and safety for all of our Pre-op patients visitors are not allowed in the Holding area. Once you arrive and are registered your family/visitors will be asked to remain in the waiting room. The Pre-op staff will get you from the Surgical Waiting Area and will explain to you and your family/visitors that the Pre-op phase is beginning. The staff will answer any questions and provide instructions for tracking of the patient, by use of the existing tracking number and color-coded status board in the waiting room. At this time the staff will also ask for your designated spokesperson information in the event that the physician or staff need to provide an update or obtain any pertinent information.  The designated spokesperson will be notified if the physician needs to speak to family during the pre-operative phase. If at any time your family/visitors has questions or concerns they may approach the volunteer desk in the waiting area for assistance. Special Instructions:Confirm with your Surgeon when to stop Eliquis prior to surgery. Confirm with Primary care doctor Insulin management prior to and the day of surgery    MEDICATIONS TO TAKE THE MORNING OF SURGERY WITH A SIP OF WATER:Carvedilol, Finasteride, Flomax,       I understand a pre-operative phone call will be made to verify my surgery time. In the event that I am not available, I give permission for a message to be left on my answering service and/or with another person?   Yes [phone]         ___________________      __________   _________    (Signature of Patient)             (Witness)                (Date and Time)

## 2018-03-09 RX ORDER — CLINDAMYCIN PHOSPHATE 900 MG/50ML
900 INJECTION INTRAVENOUS ONCE
Status: CANCELLED | OUTPATIENT
Start: 2018-03-10 | End: 2018-03-10

## 2018-03-09 RX ORDER — SODIUM CHLORIDE, SODIUM LACTATE, POTASSIUM CHLORIDE, CALCIUM CHLORIDE 600; 310; 30; 20 MG/100ML; MG/100ML; MG/100ML; MG/100ML
25 INJECTION, SOLUTION INTRAVENOUS CONTINUOUS
Status: CANCELLED | OUTPATIENT
Start: 2018-03-10

## 2018-03-09 NOTE — PERIOP NOTES
Received Fax from Dr Guido Blakely with written order to discontinue pre-op Ancef and give  Cleocin 900mg IV  pre-op 3/10/2018, order put into [facility] care.  Homero RAMAN

## 2018-03-09 NOTE — PERIOP NOTES
PC to FU on previously faxed request for pre-op antibiotic due to Pharmacy flagging the Ancef that was ordered due to drug interaction. Spoke with Yulia, she stated she would confirm with Dr Annie Perez and fax over new orders. Homero RAMAN

## 2018-03-10 ENCOUNTER — ANESTHESIA (OUTPATIENT)
Dept: SURGERY | Age: 68
End: 2018-03-10
Payer: MEDICARE

## 2018-03-10 ENCOUNTER — HOSPITAL ENCOUNTER (OUTPATIENT)
Age: 68
Setting detail: OUTPATIENT SURGERY
Discharge: HOME | End: 2018-03-10
Attending: PODIATRIST | Admitting: PODIATRIST
Payer: MEDICARE

## 2018-03-10 ENCOUNTER — ANESTHESIA EVENT (OUTPATIENT)
Dept: SURGERY | Age: 68
End: 2018-03-10
Payer: MEDICARE

## 2018-03-10 VITALS
TEMPERATURE: 97.5 F | HEART RATE: 71 BPM | SYSTOLIC BLOOD PRESSURE: 145 MMHG | OXYGEN SATURATION: 100 % | DIASTOLIC BLOOD PRESSURE: 80 MMHG | RESPIRATION RATE: 16 BRPM

## 2018-03-10 DIAGNOSIS — M85.80 HYPEROSTOSIS: Primary | ICD-10-CM

## 2018-03-10 LAB
GLUCOSE BLD STRIP.AUTO-MCNC: 125 MG/DL (ref 65–100)
GLUCOSE BLD STRIP.AUTO-MCNC: 125 MG/DL (ref 65–100)
SERVICE CMNT-IMP: ABNORMAL
SERVICE CMNT-IMP: ABNORMAL

## 2018-03-10 PROCEDURE — 74011250636 HC RX REV CODE- 250/636: Performed by: ANESTHESIOLOGY

## 2018-03-10 PROCEDURE — 87205 SMEAR GRAM STAIN: CPT | Performed by: PODIATRIST

## 2018-03-10 PROCEDURE — 87075 CULTR BACTERIA EXCEPT BLOOD: CPT | Performed by: PODIATRIST

## 2018-03-10 PROCEDURE — 88311 DECALCIFY TISSUE: CPT | Performed by: PODIATRIST

## 2018-03-10 PROCEDURE — 74011250636 HC RX REV CODE- 250/636

## 2018-03-10 PROCEDURE — 77030002916 HC SUT ETHLN J&J -A: Performed by: PODIATRIST

## 2018-03-10 PROCEDURE — 77030011640 HC PAD GRND REM COVD -A: Performed by: PODIATRIST

## 2018-03-10 PROCEDURE — 82962 GLUCOSE BLOOD TEST: CPT

## 2018-03-10 PROCEDURE — 74011000250 HC RX REV CODE- 250: Performed by: PODIATRIST

## 2018-03-10 PROCEDURE — 76210000006 HC OR PH I REC 0.5 TO 1 HR: Performed by: PODIATRIST

## 2018-03-10 PROCEDURE — 76060000033 HC ANESTHESIA 1 TO 1.5 HR: Performed by: PODIATRIST

## 2018-03-10 PROCEDURE — C1713 ANCHOR/SCREW BN/BN,TIS/BN: HCPCS | Performed by: PODIATRIST

## 2018-03-10 PROCEDURE — 76010000149 HC OR TIME 1 TO 1.5 HR: Performed by: PODIATRIST

## 2018-03-10 PROCEDURE — 74011250636 HC RX REV CODE- 250/636: Performed by: PODIATRIST

## 2018-03-10 PROCEDURE — 77030031139 HC SUT VCRL2 J&J -A: Performed by: PODIATRIST

## 2018-03-10 PROCEDURE — 76210000020 HC REC RM PH II FIRST 0.5 HR: Performed by: PODIATRIST

## 2018-03-10 PROCEDURE — P9045 ALBUMIN (HUMAN), 5%, 250 ML: HCPCS

## 2018-03-10 PROCEDURE — 88307 TISSUE EXAM BY PATHOLOGIST: CPT | Performed by: PODIATRIST

## 2018-03-10 PROCEDURE — 77030018836 HC SOL IRR NACL ICUM -A: Performed by: PODIATRIST

## 2018-03-10 DEVICE — STIMULAN® RAPID CURE PROVIDED STERILE FOR SINGLE PATIENT USE. STIMULAN® RAPID CURE CONTAINS CALCIUM SULFATE POWDER AND MIXING SOLUTION IN PRE-MEASURED QUANTITIES SO THAT WHEN MIXED TOGETHER IN A STERILE MIXING BOWL, THE RESULTANT PASTE IS TO BE DIGITALLY PACKED INTO OPEN BONE VOID/GAP TO SET INSITU OR PLACED INTO THE MOULD PROVIDED, THE MIXTURE SETS TO FORM BEADS. THE BIODEGRADABLE, RADIOPAQUE BEADS ARE RESORBED IN APPROXIMATELY 30 – 60 DAYS WHEN USED IN ACCORDANCE WITH THE DEVICE LABELLING. STIMULAN® RAPID CURE IS MANUFACTURED FROM SYNTHETIC IMPLANT GRADE CALCIUM SULFATE DIHYDRATE(CASO4.2H2O) THAT RESORBS AND IS REPLACED WITH BONE DURING THE HEALING PROCESS. ALSO, AS THE BONE VOID FILLER BEADS ARE BIODEGRADABLE AND BIOCOMPATIBLE, THEY MAY BE USED AT AN INFECTED SITE.
Type: IMPLANTABLE DEVICE | Site: FOOT | Status: FUNCTIONAL
Brand: STIMULAN® RAPID CURE

## 2018-03-10 RX ORDER — SODIUM CHLORIDE, SODIUM LACTATE, POTASSIUM CHLORIDE, CALCIUM CHLORIDE 600; 310; 30; 20 MG/100ML; MG/100ML; MG/100ML; MG/100ML
25 INJECTION, SOLUTION INTRAVENOUS CONTINUOUS
Status: DISCONTINUED | OUTPATIENT
Start: 2018-03-10 | End: 2018-03-10 | Stop reason: HOSPADM

## 2018-03-10 RX ORDER — CLINDAMYCIN PHOSPHATE 900 MG/50ML
900 INJECTION INTRAVENOUS ONCE
Status: DISCONTINUED | OUTPATIENT
Start: 2018-03-10 | End: 2018-03-10 | Stop reason: HOSPADM

## 2018-03-10 RX ORDER — DIPHENHYDRAMINE HYDROCHLORIDE 50 MG/ML
12.5 INJECTION, SOLUTION INTRAMUSCULAR; INTRAVENOUS AS NEEDED
Status: DISCONTINUED | OUTPATIENT
Start: 2018-03-10 | End: 2018-03-10 | Stop reason: HOSPADM

## 2018-03-10 RX ORDER — CLINDAMYCIN HYDROCHLORIDE 150 MG/1
150 CAPSULE ORAL 4 TIMES DAILY
Qty: 40 CAP | Refills: 0 | Status: SHIPPED | OUTPATIENT
Start: 2018-03-10 | End: 2018-03-28 | Stop reason: ALTCHOICE

## 2018-03-10 RX ORDER — SODIUM CHLORIDE 0.9 % (FLUSH) 0.9 %
5-10 SYRINGE (ML) INJECTION AS NEEDED
Status: DISCONTINUED | OUTPATIENT
Start: 2018-03-10 | End: 2018-03-10 | Stop reason: HOSPADM

## 2018-03-10 RX ORDER — LIDOCAINE HYDROCHLORIDE 10 MG/ML
0.1 INJECTION, SOLUTION EPIDURAL; INFILTRATION; INTRACAUDAL; PERINEURAL AS NEEDED
Status: DISCONTINUED | OUTPATIENT
Start: 2018-03-10 | End: 2018-03-10 | Stop reason: HOSPADM

## 2018-03-10 RX ORDER — FENTANYL CITRATE 50 UG/ML
25 INJECTION, SOLUTION INTRAMUSCULAR; INTRAVENOUS
Status: DISCONTINUED | OUTPATIENT
Start: 2018-03-10 | End: 2018-03-10 | Stop reason: HOSPADM

## 2018-03-10 RX ORDER — VANCOMYCIN HYDROCHLORIDE 1 G/20ML
INJECTION, POWDER, LYOPHILIZED, FOR SOLUTION INTRAVENOUS AS NEEDED
Status: DISCONTINUED | OUTPATIENT
Start: 2018-03-10 | End: 2018-03-10 | Stop reason: HOSPADM

## 2018-03-10 RX ORDER — PROPOFOL 10 MG/ML
INJECTION, EMULSION INTRAVENOUS
Status: DISCONTINUED | OUTPATIENT
Start: 2018-03-10 | End: 2018-03-10 | Stop reason: HOSPADM

## 2018-03-10 RX ORDER — FENTANYL CITRATE 50 UG/ML
INJECTION, SOLUTION INTRAMUSCULAR; INTRAVENOUS AS NEEDED
Status: DISCONTINUED | OUTPATIENT
Start: 2018-03-10 | End: 2018-03-10 | Stop reason: HOSPADM

## 2018-03-10 RX ORDER — HYDROMORPHONE HYDROCHLORIDE 1 MG/ML
0.2 INJECTION, SOLUTION INTRAMUSCULAR; INTRAVENOUS; SUBCUTANEOUS
Status: DISCONTINUED | OUTPATIENT
Start: 2018-03-10 | End: 2018-03-10 | Stop reason: HOSPADM

## 2018-03-10 RX ORDER — CEFAZOLIN SODIUM 1 G/3ML
INJECTION, POWDER, FOR SOLUTION INTRAMUSCULAR; INTRAVENOUS AS NEEDED
Status: DISCONTINUED | OUTPATIENT
Start: 2018-03-10 | End: 2018-03-10 | Stop reason: HOSPADM

## 2018-03-10 RX ORDER — ALBUMIN HUMAN 50 G/1000ML
SOLUTION INTRAVENOUS AS NEEDED
Status: DISCONTINUED | OUTPATIENT
Start: 2018-03-10 | End: 2018-03-10 | Stop reason: HOSPADM

## 2018-03-10 RX ORDER — BUPIVACAINE HYDROCHLORIDE AND EPINEPHRINE 5; 5 MG/ML; UG/ML
INJECTION, SOLUTION EPIDURAL; INTRACAUDAL; PERINEURAL AS NEEDED
Status: DISCONTINUED | OUTPATIENT
Start: 2018-03-10 | End: 2018-03-10 | Stop reason: HOSPADM

## 2018-03-10 RX ORDER — PHENYLEPHRINE HCL IN 0.9% NACL 0.4MG/10ML
SYRINGE (ML) INTRAVENOUS AS NEEDED
Status: DISCONTINUED | OUTPATIENT
Start: 2018-03-10 | End: 2018-03-10 | Stop reason: HOSPADM

## 2018-03-10 RX ORDER — SODIUM CHLORIDE 0.9 % (FLUSH) 0.9 %
5-10 SYRINGE (ML) INJECTION EVERY 8 HOURS
Status: DISCONTINUED | OUTPATIENT
Start: 2018-03-10 | End: 2018-03-10 | Stop reason: HOSPADM

## 2018-03-10 RX ORDER — OXYCODONE AND ACETAMINOPHEN 5; 325 MG/1; MG/1
1 TABLET ORAL
Qty: 40 TAB | Refills: 0 | Status: SHIPPED | OUTPATIENT
Start: 2018-03-10 | End: 2018-03-28 | Stop reason: ALTCHOICE

## 2018-03-10 RX ORDER — CEFAZOLIN SODIUM/WATER 2 G/20 ML
SYRINGE (ML) INTRAVENOUS
Status: DISCONTINUED
Start: 2018-03-10 | End: 2018-03-10 | Stop reason: HOSPADM

## 2018-03-10 RX ADMIN — SODIUM CHLORIDE, POTASSIUM CHLORIDE, SODIUM LACTATE AND CALCIUM CHLORIDE: 600; 310; 30; 20 INJECTION, SOLUTION INTRAVENOUS at 08:21

## 2018-03-10 RX ADMIN — FENTANYL CITRATE 50 MCG: 50 INJECTION, SOLUTION INTRAMUSCULAR; INTRAVENOUS at 08:27

## 2018-03-10 RX ADMIN — CEFAZOLIN SODIUM 2 G: 1 INJECTION, POWDER, FOR SOLUTION INTRAMUSCULAR; INTRAVENOUS at 08:29

## 2018-03-10 RX ADMIN — PROPOFOL 100 MCG/KG/MIN: 10 INJECTION, EMULSION INTRAVENOUS at 08:26

## 2018-03-10 RX ADMIN — Medication 200 MCG: at 09:00

## 2018-03-10 RX ADMIN — Medication 80 MCG: at 08:36

## 2018-03-10 RX ADMIN — ALBUMIN HUMAN 250 ML: 50 SOLUTION INTRAVENOUS at 08:50

## 2018-03-10 RX ADMIN — Medication 120 MCG: at 08:49

## 2018-03-10 NOTE — DISCHARGE INSTRUCTIONS
Narcotic-Analgesic/Acetaminophen (Percocet, Norco, Lorcet HD, Lortab 10/325) - (By mouth)   Why this medicine is used:   Relieves pain. Contact a nurse or doctor right away if you have:  · Extreme weakness, shallow breathing, slow heartbeat  · Severe confusion, lightheadedness, dizziness, fainting  · Yellow skin or eyes, dark urine or pale stools  · Severe constipation, severe stomach pain, nausea, vomiting, loss of appetite  · Sweating or cold, clammy skin     Common side effects:  · Mild constipation, nausea, vomiting  · Sleepiness, tiredness  · Itching, rash  © 2017 [facility] Information is for End User's use only and may not be sold, redistributed or otherwise used for commercial purposes. Clindamycin (Cleocin, Cleocin HCl, Cleocin Pediatric) - (By mouth)   Why this medicine is used:   Treats infections. Contact a nurse or doctor right away if you have:  · Blistering, peeling, red skin rash  · Severe or bloody diarrhea     Common side effects:  · Nausea, vomiting, mild stomach pain  · Vaginal itching or discharge (women)  © 2017 [facility] Information is for End User's use only and may not be sold, redistributed or otherwise used for commercial purposes. See Dr. Snehal Walden pre-printed discharge instructions that were provided. DISCHARGE SUMMARY from Nurse    PATIENT INSTRUCTIONS:    After general anesthesia or intravenous sedation, for 24 hours or while taking prescription Narcotics:  · Limit your activities  · Do not drive and operate hazardous machinery  · Do not make important personal or business decisions  · Do  not drink alcoholic beverages  · If you have not urinated within 8 hours after discharge, please contact your surgeon on call.     Report the following to your surgeon:  · Excessive pain, swelling, redness or odor of or around the surgical area  · Temperature over 100.5  · Nausea and vomiting lasting longer than 4 hours or if unable to take medications  · Any signs of decreased circulation or nerve impairment to extremity: change in color, persistent  numbness, tingling, coldness or increase pain  · Any questions      These are general instructions for a healthy lifestyle:    No smoking/ No tobacco products/ Avoid exposure to second hand smoke  Surgeon General's Warning:  Quitting smoking now greatly reduces serious risk to your health. Obesity, smoking, and sedentary lifestyle greatly increases your risk for illness    A healthy diet, regular physical exercise & weight monitoring are important for maintaining a healthy lifestyle    You may be retaining fluid if you have a history of heart failure or if you experience any of the following symptoms:  Weight gain of 3 pounds or more overnight or 5 pounds in a week, increased swelling in our hands or feet or shortness of breath while lying flat in bed. Please call your doctor as soon as you notice any of these symptoms; do not wait until your next office visit. Recognize signs and symptoms of STROKE:    F-face looks uneven    A-arms unable to move or move unevenly    S-speech slurred or non-existent    T-time-call 911 as soon as signs and symptoms begin-DO NOT go       Back to bed or wait to see if you get better-TIME IS BRAIN. Warning Signs of HEART ATTACK     Call 911 if you have these symptoms:   Chest discomfort. Most heart attacks involve discomfort in the center of the chest that lasts more than a few minutes, or that goes away and comes back. It can feel like uncomfortable pressure, squeezing, fullness, or pain.  Discomfort in other areas of the upper body. Symptoms can include pain or discomfort in one or both arms, the back, neck, jaw, or stomach.  Shortness of breath with or without chest discomfort.  Other signs may include breaking out in a cold sweat, nausea, or lightheadedness. Don't wait more than five minutes to call 911 - MINUTES MATTER! Fast action can save your life. Calling 911 is almost always the fastest way to get lifesaving treatment. Emergency Medical Services staff can begin treatment when they arrive -- up to an hour sooner than if someone gets to the hospital by car. The discharge information has been reviewed with the patient and spouse. The patient and spouse verbalized understanding. Discharge medications reviewed with the patient and spouse and appropriate educational materials and side effects teaching were provided.   ___________________________________________________________________________________________________________________________________

## 2018-03-10 NOTE — ANESTHESIA POSTPROCEDURE EVALUATION
Post-Anesthesia Evaluation and Assessment    Patient: Nani Maynard. MRN: [id number]  SSN: [id number]    YOB: 1950  Age: 69 y.o. Sex: male       Cardiovascular Function/Vital Signs  Visit Vitals    /80 (BP 1 Location: Right arm, BP Patient Position: At rest)    Pulse 71    Temp 36.4 °C (97.5 °F)    Resp 16    SpO2 100%       Patient is status post general, total IV anesthesia anesthesia for Procedure(s):  AMPUTATION FIRST LEFT METATARSAL PHALANGEAL JOINT WITH INSERTION OF ANTIBIOTIC BEADS. Nausea/Vomiting: None    Postoperative hydration reviewed and adequate. Pain:  Pain Scale 1: Numeric (0 - 10) (03/10/18 1000)  Pain Intensity 1: 0 (03/10/18 1000)   Managed    Neurological Status:   Neuro (WDL): Exceptions to WDL (03/10/18 0945)  Neuro  Neurologic State: Drowsy; Eyes open to voice (03/10/18 0945)  Orientation Level: Oriented to person;Oriented to place;Oriented to situation (03/10/18 0945)  Cognition: Follows commands (03/10/18 0945)  Speech: Clear (03/10/18 0945)  LUE Motor Response: Spontaneous  (03/10/18 0945)  LLE Motor Response: Spontaneous  (03/10/18 0945)  RUE Motor Response: Spontaneous  (03/10/18 0945)  RLE Motor Response: Spontaneous  (03/10/18 0945)   At baseline    Mental Status and Level of Consciousness: Arousable    Pulmonary Status:   O2 Device: Room air (03/10/18 1000)   Adequate oxygenation and airway patent    Complications related to anesthesia: None    Post-anesthesia assessment completed.  No concerns    Signed By: Francisca Joyce MD     March 10, 2018

## 2018-03-10 NOTE — ANESTHESIA PREPROCEDURE EVALUATION
Anesthetic History   No history of anesthetic complications            Review of Systems / Medical History  Patient summary reviewed, nursing notes reviewed and pertinent labs reviewed    Pulmonary                Comments: Former smoker - Quit 1972 - 2 pack years   Neuro/Psych         Psychiatric history    Comments: Anxiety  Restless Legs Syndrome  Diabetic Neuropathy Cardiovascular    Hypertension: well controlled      CHF  Dysrhythmias : atrial fibrillation  Pacemaker, CAD, cardiac stents and hyperlipidemia    Exercise tolerance: <4 METS     GI/Hepatic/Renal         Renal disease: CRI      Comments: CRI, Stage IV Endo/Other    Diabetes: poorly controlled, type 2, using insulin  Hypothyroidism: well controlled  Obesity, arthritis and anemia  Pertinent negatives: No morbid obesity  Comments:  Thrombocytopenia  H/O Skin Cancer Other Findings   Comments: Hyperostosis, 1st left toe with Osteomyelitis    BPH  ED  Insomnia         Physical Exam    Airway  Mallampati: I  TM Distance: > 6 cm  Neck ROM: normal range of motion   Mouth opening: Normal     Cardiovascular  Regular rate and rhythm,  S1 and S2 normal,  no murmur, click, rub, or gallop             Dental      Comments: Multiple fillings   Pulmonary  Breath sounds clear to auscultation               Abdominal  GI exam deferred       Other Findings            Anesthetic Plan    ASA: 3  Anesthesia type: MAC          Induction: Intravenous  Anesthetic plan and risks discussed with: Patient

## 2018-03-10 NOTE — BRIEF OP NOTE
BRIEF OPERATIVE NOTE    Date of Procedure: 3/10/2018   Preoperative Diagnosis: HYPEROSTOSIS FIRST LEFT TOE WITH OSTEOMYELITIS  Postoperative Diagnosis: HYPEROSTOSIS FIRST LEFT TOE WITH OSTEOMYELITIS    Procedure(s):  AMPUTATION FIRST LEFT METATARSAL PHALANGEAL JOINT WITH INSERTION OF ANTIBIOTIC BEADS  Surgeon(s) and Role:     * Danish Del Rio DPM - Primary         Assistant Staff: None      Surgical Staff:  Circ-1: Pau Dorado RN  Scrub Tech-1: Nory Albert  Float Staff: Xiomara Jennings  Event Time In   Incision Start 0772   Incision Close 8121     Anesthesia: MAC   Estimated Blood Loss: min  Specimens:   ID Type Source Tests Collected by Time Destination   1 :  HEAD OF METATARSAL AND SESAMOIDS Preservative Foot, left  Danish Del Rio DPM 3/10/2018 0856 Pathology   2 : BASE OF PROXIMAL PHALANX Preservative Foot, left  Danish Del Rio DPM 3/10/2018 0901 Pathology   3 : MEDIAL HEAD OF FIRST METATARSAL Preservative Foot, left  Danish Del Rio, [place] 3/10/2018 7731 Pathology   1 : WOUND CULTURE LEFT FOOT Wound Foot, left CULTURE, ANAEROBIC, CULTURE, WOUND W TIMOTEO Figueroa 3/10/2018 7722 Microbiology      Findings: enlarged and soft bone   Complications:none  Implants:   Implant Name Type Inv.  Item Serial No.  Lot No. LRB No. Used Action   GRAFT BNE PASTE RAPID CUR 10ML -- [employer]IMULAN - SNA   GRAFT BNE PASTE RAPID CUR 10ML -- [employer]AN NA [employer] [id number] Left 1 Implanted

## 2018-03-10 NOTE — PERIOP NOTES
Discharge instructions reviewed with patient and his wife. Opportunity for questions/answers given, able to verbalize understanding. Provided with 2 prescriptions, ice pack, and surgical shoe. Patient required minimal assistance with dressing. Patient ambulated to restroom to void prior to discharge home. Patient escorted out for discharge home via wheelchair by nurse. No changes in assessment from time documented to time of discharge.
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
## 2018-03-10 NOTE — PERIOP NOTES
0717 Patient in pre-op with wife. Allowed time for questions and answers. CHG wipes used and new linen placed.

## 2018-03-10 NOTE — PERIOP NOTES
Handoff Report from Operating Room to PACU    Report received from [address] [place] [address] [facility] and Mary Anne Guevara RN regarding [place] KRISTA Sommers. .      Surgeon(s):  Lucky Prader, DPM  And Procedure(s) (LRB):  AMPUTATION FIRST LEFT METATARSAL PHALANGEAL JOINT WITH INSERTION OF ANTIBIOTIC BEADS (Left)  confirmed   with allergies and dressings discussed. Anesthesia type, drugs, patient history, complications, estimated blood loss, vital signs, intake and output, and last pain medication, lines and temperature were reviewed.

## 2018-03-10 NOTE — IP AVS SNAPSHOT
[address] [facility] 
[phone] Patient: Carl Denson. MRN: [id number] :[id number] About your hospitalization You were admitted on:  March 10, 2018 You last received care in the:  [facility] PACU You were discharged on:  March 10, 2018 Why you were hospitalized Your primary diagnosis was:  Hyperostosis Follow-up Information Follow up With Details Comments Contact Info MD Rivka Scanlon [address] [facility] 
[phone] Your Scheduled Appointments 2018  1:00 PM EDT  
WOUND CARE FOLLOW UP with Aaron Campbell DPM, [facility] WOUND CARE 1  
[facility] OP WOUND CARE ([address]) [address] [address]  
[phone] 2018  1:00 PM EDT FOLLOW UP 10 with MD Janet Scanlon [facility] ([facility]) [address] [address] [phone] Discharge Orders Procedure Order Date Status Priority Quantity Spec Type Associated Dx SURGICAL BOOT 03/10/18 08 Normal Routine 1  Hyperostosis [28709] Comments:  Dispense surgical shoe left foot WEIGHT BEARING: SPECIFY 03/10/18 08 Normal Routine 1  Hyperostosis [60789] Comments:  Do not walk, stand or bear weight without surgical shoe on left. DO NOT CHANGE OR GET DRESSING WET! A check karla indicates which time of day the medication should be taken. My Medications START taking these medications Instructions Each Dose to Equal  
 Morning Noon Evening Bedtime  
 clindamycin 150 mg capsule Commonly known as:  CLEOCIN Your last dose was: Your next dose is: Take 1 Cap by mouth four (4) times daily.   
 150 mg  
    
   
   
   
  
 oxyCODONE-acetaminophen 5-325 mg per tablet Commonly known as:  PERCOCET Your last dose was: Your next dose is: Take 1 Tab by mouth every four (4) hours as needed for Pain. Max Daily Amount: 6 Tabs. Indications: prn pain 1 Tab CHANGE how you take these medications Instructions Each Dose to Equal  
 Morning Noon Evening Bedtime  
 clonazePAM 2 mg tablet Commonly known as:  Art Coker What changed:  when to take this Your last dose was: Your next dose is: Take 1 Tab by mouth two (2) times a day. Max Daily Amount: 4 mg.  
 2 mg  
    
   
   
   
  
 pramipexole 0.5 mg tablet Commonly known as:  MIRAPEX What changed:   
- how much to take 
- how to take this 
- additional instructions Your last dose was: Your next dose is: TAKE 1 TABLET BY MOUTH EVERY NIGHT AT BEDTIME CONTINUE taking these medications Instructions Each Dose to Equal  
 Morning Noon Evening Bedtime BD ULTRA-FINE DEVIN PEN NEEDLES Your last dose was: Your next dose is:    
   
   
 by Does Not Apply route. bumetanide 2 mg tablet Commonly known as:  Epimenio Weslaco Your last dose was: Your next dose is: Take 4 mg by mouth two (2) times a day. 4 mg COLCRYS 0.6 mg tablet Generic drug:  colchicine Your last dose was: Your next dose is: Take 0.6 mg by mouth daily. Takes 1 tablet 3 times a week. Indications: taking mitagar  
 0.6 mg  
    
   
   
   
  
 COREG 12.5 mg tablet Generic drug:  carvedilol Your last dose was: Your next dose is: Take  by mouth two (2) times daily (with meals). DIOVAN 320 mg tablet Generic drug:  valsartan Your last dose was: Your next dose is: Take  by mouth daily. ELIQUIS 5 mg tablet Generic drug:  apixaban Your last dose was: Your next dose is: Take 5 mg by mouth two (2) times a day. 5 mg  
    
   
   
   
  
 finasteride 5 mg tablet Commonly known as:  PROSCAR Your last dose was: Your next dose is: Take 5 mg by mouth daily. 5 mg  
    
   
   
   
  
 insulin lispro 100 unit/mL kwikpen Commonly known as:  HUMALOG Your last dose was: Your next dose is:    
   
   
 5 units before each meal three times a day. LANTUS SOLOSTAR U-100 INSULIN 100 unit/mL (3 mL) Inpn Generic drug:  insulin glargine Your last dose was: Your next dose is:    
   
   
 22 Units by SubCUTAneous route daily. 22 Units Liraglutide 0.6 mg/0.1 mL (18 mg/3 mL) Pnij Commonly known as:  Murlean Blood Your last dose was: Your next dose is: 0.6 mg by SubCUTAneous route. 0.6 mg  
    
   
   
   
  
 metOLazone 2.5 mg tablet Commonly known as:  Nita Sang Your last dose was: Your next dose is: Take 1 Tab by mouth as needed (pt takes approximately twice a month if has edema. ). Take twicw weekly for fluid 2.5 mg  
    
   
   
   
  
 tamsulosin 0.4 mg capsule Commonly known as:  FLOMAX Your last dose was: Your next dose is: Take 0.8 mg by mouth daily. 0.8 mg  
    
   
   
   
  
 ULORIC 40 mg Tab tablet Generic drug:  febuxostat Your last dose was: Your next dose is: TAKE 1 TABLET BY MOUTH DAILY Where to Get Your Medications Information on where to get these meds will be given to you by the nurse or doctor. ! Ask your nurse or doctor about these medications  
  clindamycin 150 mg capsule  
 oxyCODONE-acetaminophen 5-325 mg per tablet Discharge Instructions Narcotic-Analgesic/Acetaminophen (Percocet, Norco, Lorcet HD, Lortab 10/325) - (By mouth) Why this medicine is used:  
Relieves pain. Contact a nurse or doctor right away if you have: 
· Extreme weakness, shallow breathing, slow heartbeat · Severe confusion, lightheadedness, dizziness, fainting · Yellow skin or eyes, dark urine or pale stools · Severe constipation, severe stomach pain, nausea, vomiting, loss of appetite · Sweating or cold, clammy skin Common side effects: · Mild constipation, nausea, vomiting · Sleepiness, tiredness · Itching, rash © 2017 [facility] Information is for End User's use only and may not be sold, redistributed or otherwise used for commercial purposes. Clindamycin (Cleocin, Cleocin HCl, Cleocin Pediatric) - (By mouth) Why this medicine is used:  
Treats infections. Contact a nurse or doctor right away if you have: · Blistering, peeling, red skin rash · Severe or bloody diarrhea Common side effects: 
· Nausea, vomiting, mild stomach pain · Vaginal itching or discharge (women) © 2017 [address] Information is for End User's use only and may not be sold, redistributed or otherwise used for commercial purposes. See Dr. Claire Hill pre-printed discharge instructions that were provided. DISCHARGE SUMMARY from Nurse PATIENT INSTRUCTIONS: 
 
After general anesthesia or intravenous sedation, for 24 hours or while taking prescription Narcotics: · Limit your activities · Do not drive and operate hazardous machinery · Do not make important personal or business decisions · Do  not drink alcoholic beverages · If you have not urinated within 8 hours after discharge, please contact your surgeon on call. Report the following to your surgeon: 
· Excessive pain, swelling, redness or odor of or around the surgical area · Temperature over 100.5 · Nausea and vomiting lasting longer than 4 hours or if unable to take medications · Any signs of decreased circulation or nerve impairment to extremity: change in color, persistent  numbness, tingling, coldness or increase pain · Any questions These are general instructions for a healthy lifestyle: No smoking/ No tobacco products/ Avoid exposure to second hand smoke Surgeon General's Warning:  Quitting smoking now greatly reduces serious risk to your health. Obesity, smoking, and sedentary lifestyle greatly increases your risk for illness A healthy diet, regular physical exercise & weight monitoring are important for maintaining a healthy lifestyle You may be retaining fluid if you have a history of heart failure or if you experience any of the following symptoms:  Weight gain of 3 pounds or more overnight or 5 pounds in a week, increased swelling in our hands or feet or shortness of breath while lying flat in bed. Please call your doctor as soon as you notice any of these symptoms; do not wait until your next office visit. Recognize signs and symptoms of STROKE: 
 
F-face looks uneven A-arms unable to move or move unevenly S-speech slurred or non-existent T-time-call 911 as soon as signs and symptoms begin-DO NOT go Back to bed or wait to see if you get better-TIME IS BRAIN. Warning Signs of HEART ATTACK Call 911 if you have these symptoms: 
? Chest discomfort. Most heart attacks involve discomfort in the center of the chest that lasts more than a few minutes, or that goes away and comes back. It can feel like uncomfortable pressure, squeezing, fullness, or pain. ? Discomfort in other areas of the upper body. Symptoms can include pain or discomfort in one or both arms, the back, neck, jaw, or stomach. ? Shortness of breath with or without chest discomfort. ? Other signs may include breaking out in a cold sweat, nausea, or lightheadedness. Don't wait more than five minutes to call [address]!  Fast action can save your life. Calling 911 is almost always the fastest way to get lifesaving treatment. Emergency Medical Services staff can begin treatment when they arrive  up to an hour sooner than if someone gets to the hospital by car. The discharge information has been reviewed with the patient and spouse. The patient and spouse verbalized understanding. Discharge medications reviewed with the patient and spouse and appropriate educational materials and side effects teaching were provided. ___________________________________________________________________________________________________________________________________ ACO Transitions of Care Introducing [employer] [address] offers a voluntary care coordination program to provide high quality service and care to [facility] fee-for-service beneficiaries. Likezjemima Snowball was designed to help you enhance your health and well-being through the following services: ? Transitions of Care  support for individuals who are transitioning from one care setting to another (example: Hospital to home). ? Chronic and Complex Care Coordination  support for individuals and caregivers of those with serious or chronic illnesses or with more than one chronic (ongoing) condition and those who take a number of different medications. If you meet specific medical criteria, a [facility] [address] may call you directly to coordinate your care with your primary care physician and your other care providers. For questions about the [facility] programs, please, contact your physicians office. For general questions or additional information about Accountable Care Organizations: 
Please visit www.medicare.gov/acos. html or call 1-800-MEDICARE (1-200.952.3580) TTY users should call 3-314.769.2191. Introducing [facility] [place] [facility]! Ulises Santos introduces [employer] patient portal. Now you can access parts of your medical record, email your doctor's office, and request medication refills online. 1. In your internet browser, go to [email or web address] 2. Click on the First Time User? Click Here link in the Sign In box. You will see the New Member Sign Up page. 3. Enter your [employer] Access Code exactly as it appears below. You will not need to use this code after youve completed the sign-up process. If you do not sign up before the expiration date, you must request a new code. · [employer] Access Code: [email or web address] Expires: 5/15/2018 12:36 PM 
 
4. Enter the last four digits of your Social Security Number (xxxx) and Date of Birth (mm/dd/yyyy) as indicated and click Submit. You will be taken to the next sign-up page. 5. Create a [employer] ID. This will be your [employer] login ID and cannot be changed, so think of one that is secure and easy to remember. 6. Create a [employer] password. You can change your password at any time. 7. Enter your Password Reset Question and Answer. This can be used at a later time if you forget your password. 8. Enter your e-mail address. You will receive e-mail notification when new information is available in [address] 9. Click Sign Up. You can now view and download portions of your medical record. 10. Click the Download Summary menu link to download a portable copy of your medical information. If you have questions, please visit the Frequently Asked Questions section of the [employer] website. Remember, [employer] is NOT to be used for urgent needs. For medical emergencies, dial 911. Now available from your iPhone and Android! Unresulted Labs-Please follow up with your PCP about these lab tests Order Current Status CULTURE, ANAEROBIC In process CULTURE, WOUND W GRAM STAIN In process Providers Seen During Your Hospitalization Provider Specialty Primary office phone [place], [address] [facility] [phone] Your Primary Care Physician (PCP) Primary Care Physician Office Phone Office Fax Raf Apple [phone] You are allergic to the following Allergen Reactions Niacin Unknown (comments) Levemir (Insulin Detemir) Hives Other Medication Other (comments) ? Allergy to Easter Curl causing chemical burn Primaxin (Imipenem-Cilastatin) Diarrhea Rash Xarelto (Rivaroxaban) Rash Itching Recent Documentation Smoking Status Former Smoker Emergency Contacts Name Discharge Info Relation Home Work Mobile Zhane White DISCHARGE CAREGIVER [3] Spouse [3]   [phone] Patient Belongings The following personal items are in your possession at time of discharge: 
  Dental Appliances: None  Visual Aid: Glasses (reading glasses) Please provide this summary of care documentation to your next provider. Signatures-by signing, you are acknowledging that this After Visit Summary has been reviewed with you and you have received a copy. Patient Signature:  ____________________________________________________________ Date:  ____________________________________________________________  
  
[facility] Provider Signature:  ____________________________________________________________ Date:  ____________________________________________________________

## 2018-03-10 NOTE — OP NOTES
[facility]  ACUTE CARE OP NOTE    Name:OMAYRA Martin  MR#: [id number]  : 1950  ACCOUNT #: [de-identified]   DATE OF SERVICE: 03/10/2018    PREOPERATIVE DIAGNOSIS:  Hyperostosis, with probable osteomyelitis, 1st left metatarsophalangeal joint. POSTOPERATIVE DIAGNOSIS:  Hyperostosis, with probable osteomyelitis, 1st left metatarsophalangeal joint. PROCEDURE PERFORMED:  Amputation of bone, 1st left metatarsophalangeal joint, and insertion of antibiotic beads. SURGEON:  Anurag Mejia DPM        ANESTHESIA:  IV sedation with local.    ESTIMATED BLOOD LOSS:  Minimal.    SPECIMENS REMOVED:  Bone and wound cultures. COMPLICATIONS:  None. IMPLANTS:  Antibiotic beads    INDICATIONS:  This 24-year-old white male presents with a chronic ulceration, plantar 1st left MPJ, of more than 1 year. Conservative therapy has failed to relieve the patient of his symptoms. At this time, surgical intervention has been opted as the treatment of choice. Medical history and physical has been performed, the patient released for surgery. PHYSICAL EXAMINATION:  Revealed palpable pedal pulses, with fairly good muscle strength, lower extremities bilateral, grossly diminished epicritic sensation. He as an abducted hallux, left foot, with a chronic grade II ulceration, plantar 1st left MPJ. Ulceration measures 1 x 1 x 0.1 cm, has a beefy red granulating base. X-rays as well as an MRI shows some erosive and cortical changes, plantar MPJ, as well as medial 1st metatarsal head. These changes are inconclusive being related to DJD versus osteomyelitis. DESCRIPTION OF PROCEDURE:  The patient was brought into the operating room and placed on the operating table in supine position. Under the influence of IV sedation, anesthesia was achieved using 0.5% Marcaine with epinephrine around the base of the 1st metatarsal.  A successful time-out was completed.   Next, a pneumatic ankle tourniquet was inflated to 250 mmHg. Using a #15 blade, a curvilinear incision was placed centered over the 1st left MPJ. Incision was carried down to the bone. Capsular structures were reflected. Using a sagittal saw, the head of the metatarsal and the base of the proximal phalanx was resected. It was noted that the bone from the medial eminence of the 1st metatarsal was somewhat softened. The sesamoid apparatus medial and lateral was removed. Aerobic as well as anaerobic wound cultures were taken. The wound was now flushed using pulse lavage consisting of 3 L of saline and 150 mL of bacitracin. The wound was now packed with dissolvable antibiotic beads made with 1 g of vancomycin. The skin edges now loosely reapproximated using 2-0 nylon with simple interrupted sutures. Using a #15 blade, the hyperkeratosis surrounding the plantar ulceration was removed. Adaptic was placed on the dorsal incision and the plantar ulceration, followed by dry sterile dressings. Pneumatic ankle tourniquet was released. Good color return was noted to all digits 1-5 left foot. The patient appeared to tolerate anesthesia and procedure well, and was transferred from the operating room to recovery with all vital signs stable and vascular status intact to all digits 1-5 left foot. Preoperatively, the patient was given 2 g Ancef IV.       DORIS Sears  D: 03/10/2018 13:16     T: 03/10/2018 15:28  JOB #: [id number]

## 2018-03-14 LAB
BACTERIA SPEC CULT: NORMAL
BACTERIA SPEC CULT: NORMAL
GRAM STN SPEC: NORMAL
GRAM STN SPEC: NORMAL
SERVICE CMNT-IMP: NORMAL
SERVICE CMNT-IMP: NORMAL

## 2018-03-16 ENCOUNTER — HOSPITAL ENCOUNTER (OUTPATIENT)
Dept: WOUND CARE | Age: 68
Discharge: HOME | End: 2018-03-16
Payer: MEDICARE

## 2018-03-16 PROCEDURE — 99213 OFFICE O/P EST LOW 20 MIN: CPT

## 2018-03-16 NOTE — PROGRESS NOTES
[facility]  WOUND CARE PROGRESS NOTE    Lupis Joy  MR#: [id number]  : 1950  ACCOUNT #: [de-identified]   DATE OF SERVICE: 2018    HISTORY OF CHIEF COMPLAINT:  This 76-year-old white male is 6 days postoperative resection of bone first left MPJ and debridement of ulcer, left foot. Patient presents with dressings and surgical shoe, left foot. Patient previously had an ulceration, plantar first left MPJ of more than 2 years. Six days ago, bone was resected, the wound was packed with antibiotic beads, and the ulceration was debrided. The patient has no complaints. He is currently taking clindamycin 150 mg 4 times a day for 10 days. History and physical is unchanged from admitting history and physical.       PAST MEDICAL HISTORY:  Cataracts, anemia, heart disease, atrial fibrillation, coronary artery disease, hypertension, diabetes, stage III kidney disease, gout, and neuropathy. CURRENT MEDICATIONS:  Clindamycin 150 mg 4 times a day, tamsulosin HCL 0.8 mg 1 daily, clonazepam 1 mg at bedtime, INSULIN   24 units daily,  bumetanide 2 mg twice a day, finasteride 5 mg daily, Eliquis 5 mg twice a day, Victoza 1.8 units daily, , valsartan 320 mg daily, Uloric 40 mg daily, metolazone 2.5 mg twice a month, and pramipexole 0.25 mg daily. ALLERGIES:  NO KNOWN DRUG ALLERGIES. PHYSICAL EXAMINATION:  VITAL SIGNS:  Temperature 97.7, pulse rate 70, respirations 16, blood pressure 112/74. EXTREMITIES:  Lower extremities reveal sutures intact dorsal first left MPJ. Minimal erythema. Previous ulceration, plantar aspect first left MPJ appears well, appears to be scabbed over. Has a minor opening of 0.1 x 0.1 x 0.1. Minimal drainage. ASSESSMENT:  Postop resection of bone, first left MPJ, and debridement of ulcer, plantar first left MPJ. PLAN:  Betadine followed by dry gauze was placed on the plantar ulceration as well as incisions dorsal left foot.   The patient is to leave dressings intact. He will have a followup visit in the wound center in one week with myself. Final pathologic diagnosis from bone resected on 03/10/2018 did not reveal any evidence of osteomyelitis from any of the bone that was removed; however, it did show material compatible with gout and arthritic bone. Intraoperative wound cultures did not show growth of bacteria.       DORIS Guerrero  D: 03/16/2018 16:13     T: 03/16/2018 16:45  JOB #: [id number]

## 2018-03-19 PROCEDURE — 99213 OFFICE O/P EST LOW 20 MIN: CPT

## 2018-03-23 ENCOUNTER — HOSPITAL ENCOUNTER (OUTPATIENT)
Dept: WOUND CARE | Age: 68
Discharge: HOME | End: 2018-03-23
Payer: MEDICARE

## 2018-03-23 ENCOUNTER — HOSPITAL ENCOUNTER (OUTPATIENT)
Dept: LAB | Age: 68
Discharge: HOME | End: 2018-03-23
Payer: MEDICARE

## 2018-03-23 PROCEDURE — 87075 CULTR BACTERIA EXCEPT BLOOD: CPT

## 2018-03-23 PROCEDURE — 11055 PARING/CUTG B9 HYPRKER LES 1: CPT

## 2018-03-23 PROCEDURE — 87106 FUNGI IDENTIFICATION YEAST: CPT

## 2018-03-23 PROCEDURE — 87070 CULTURE OTHR SPECIMN AEROBIC: CPT

## 2018-03-23 NOTE — PROGRESS NOTES
[facility]  WOUND CARE PROGRESS NOTE    Yvette Riddle.Lupis  MR#: [id number]  : 1950  ACCOUNT #: [de-identified]   DATE OF SERVICE: 2018    HISTORY OF CHIEF COMPLAINT:  This 58-year-old white male is postop resection of bone 1st left MPJ with insertion of antibiotic beads. The patient previously had a recurring longstanding diabetic ulcer of more than 1 year plantar aspect 1st left MPJ that was secondary to enlarged sesamoids, as well as an enlarged 1st metatarsal head. History and physical is unchanged from admitting history and physical.    PAST MEDICAL HISTORY:  Diabetes, atrial fibrillation, arrhythmia, coronary artery disease, hypertension, diabetes, gout, neuropathy. PHYSICAL EXAMINATION:  VITAL SIGNS:  Temperature 98.6, pulse rate 70, respirations 16, blood pressure 146/72. EXTREMITIES:  Plantar aspect of the 1st left MPJ is a healing ulceration with surrounding scab tissue, integument opening is 0.1 x 0.1 x 0.1 and that was revealed after paring scab tissue away. Dorsal aspect of the 1st left digit sutures are loosely intact. There is surrounding mild erythema of the incision. Mid incision, there is 1 drop of serous drainage. This serous drainage is cultured. The foot does not appear to be warm. Intraoperative wound cultures show no growth. Bone pathology showed no evidence of osteomyelitis; however, it did show amorphous material compatible with gout and degenerative changes of the bone. ASSESSMENT:  Resolving diabetic ulcer plantar aspect of the left foot and healing incision secondary to resection of bone left foot. PLAN:  Scab tissue was removed from the plantar 1st left MPJ. Both wounds were cleansed with saline, covered with Silver calcium alginate, followed by dry sterile dressings. The patient is to have a followup visit in the wound care center with myself in 1 week.       DORIS Lott  D: 2018 15:45     T: 2018 16:17  JOB #: [id number]

## 2018-03-27 PROBLEM — E66.09 CLASS 1 OBESITY DUE TO EXCESS CALORIES WITHOUT SERIOUS COMORBIDITY WITH BODY MASS INDEX (BMI) OF 30.0 TO 30.9 IN ADULT: Status: ACTIVE | Noted: 2017-08-05

## 2018-03-28 ENCOUNTER — OFFICE VISIT (OUTPATIENT)
Dept: INTERNAL MEDICINE CLINIC | Age: 68
End: 2018-03-28

## 2018-03-28 VITALS
HEART RATE: 70 BPM | SYSTOLIC BLOOD PRESSURE: 120 MMHG | OXYGEN SATURATION: 99 % | BODY MASS INDEX: 29.8 KG/M2 | DIASTOLIC BLOOD PRESSURE: 72 MMHG | TEMPERATURE: 98.6 F | WEIGHT: 252.4 LBS | RESPIRATION RATE: 16 BRPM | HEIGHT: 77 IN

## 2018-03-28 DIAGNOSIS — I25.10 ASCVD (ARTERIOSCLEROTIC CARDIOVASCULAR DISEASE): ICD-10-CM

## 2018-03-28 DIAGNOSIS — K21.9 GASTROESOPHAGEAL REFLUX DISEASE WITHOUT ESOPHAGITIS: ICD-10-CM

## 2018-03-28 DIAGNOSIS — E11.22 CONTROLLED TYPE 2 DIABETES MELLITUS WITH STAGE 4 CHRONIC KIDNEY DISEASE, WITHOUT LONG-TERM CURRENT USE OF INSULIN (HCC): ICD-10-CM

## 2018-03-28 DIAGNOSIS — N18.4 CKD (CHRONIC KIDNEY DISEASE), STAGE IV (HCC): ICD-10-CM

## 2018-03-28 DIAGNOSIS — I12.9 HYPERTENSION WITH RENAL DISEASE: Primary | ICD-10-CM

## 2018-03-28 DIAGNOSIS — I48.0 PAROXYSMAL ATRIAL FIBRILLATION (HCC): ICD-10-CM

## 2018-03-28 DIAGNOSIS — N18.4 CONTROLLED TYPE 2 DIABETES MELLITUS WITH STAGE 4 CHRONIC KIDNEY DISEASE, WITHOUT LONG-TERM CURRENT USE OF INSULIN (HCC): ICD-10-CM

## 2018-03-28 DIAGNOSIS — I50.42 CHRONIC COMBINED SYSTOLIC AND DIASTOLIC CONGESTIVE HEART FAILURE (HCC): ICD-10-CM

## 2018-03-28 DIAGNOSIS — I25.5 ISCHEMIC CARDIOMYOPATHY: ICD-10-CM

## 2018-03-28 LAB
BUN BLD-MCNC: 85 MG/DL (ref 9–20)
CALCIUM BLD-MCNC: 9.3 MG/DL (ref 8.4–10.2)
CHLORIDE BLD-SCNC: 96 MMOL/L (ref 98–107)
CO2 POC: 28 MMOL/L (ref 22–32)
CREAT BLD-MCNC: 2.5 MG/DL (ref 0.8–1.5)
EGFR (POC): 25.3
GLUCOSE POC: 126 MG/DL (ref 75–110)
GRAN# POC: 4.5 K/UL (ref 2–7.8)
GRAN% POC: 79.4 % (ref 37–92)
HCT VFR BLD CALC: 35.1 % (ref 37–51)
HGB BLD-MCNC: 11.6 G/DL (ref 12–18)
LY# POC: 0.6 K/UL (ref 0.6–4.1)
LY% POC: 12.5 % (ref 10–58.5)
MCH RBC QN: 28.5 PG (ref 26–32)
MCHC RBC-ENTMCNC: 33.1 G/DL (ref 30–36)
MCV RBC: 86 FL (ref 80–97)
MID #, POC: 0.4 K/UL (ref 0–1.8)
MID% POC: 8.1 % (ref 0.1–24)
PLATELET # BLD: 155 K/UL (ref 140–440)
POTASSIUM SERPL-SCNC: 3.9 MMOL/L (ref 3.6–5)
RBC # BLD: 4.07 M/UL (ref 4.2–6.3)
SODIUM SERPL-SCNC: 135 MMOL/L (ref 137–145)
WBC # BLD: 5.5 K/UL (ref 4.1–10.9)

## 2018-03-28 NOTE — PROGRESS NOTES
Chief Complaint   Patient presents with    CHF     6 week f/u    Depression     6 week f/u       SUBJECTIVE:    Royal KRISTA Gunderson. is a 69 y.o. male who returns in follow-up for his medical problems include hypertension, CKD, diabetes, cardiomyopathy, CHF, ASCVD, and atrial fibrillation as well as other medical problems. Since he last saw me he had his left foot treated surgically with a bunionectomy and removal of 2 sesamoid bones of the great toe. Since then the wound is healed quite well and has had no drainage at all. He denies any significant pain. He denies any chest pain, shortness of breath, palpitations or cardiorespiratory complaints. He denies any GI or  complaints. He denies any headaches, dizziness or neurologic planes. He has no other arthritic complaints and no other complaints on complete review of systems. He is taking all his medications and trying to follow his diet and get some physical activity but not a lot of exercise. Current Outpatient Prescriptions   Medication Sig Dispense Refill    insulin lispro (HUMALOG) 100 unit/mL kwikpen 5 units before each meal three times a day. 1 Pen 5    clonazePAM (KLONOPIN) 2 mg tablet Take 1 Tab by mouth two (2) times a day. Max Daily Amount: 4 mg. (Patient taking differently: Take 2 mg by mouth every evening.) 30 Tab 5    pramipexole (MIRAPEX) 0.5 mg tablet TAKE 1 TABLET BY MOUTH EVERY NIGHT AT BEDTIME (Patient taking differently: Take 0.5 mg by mouth. TAKE 1 TABLET BY MOUTH EVERY NIGHT AT BEDTIME) 90 Tab prn    metOLazone (ZAROXOLYN) 2.5 mg tablet Take 1 Tab by mouth as needed (pt takes approximately twice a month if has edema. ). Take twicw weekly for fluid 10 Tab prn    ULORIC 40 mg tab tablet TAKE 1 TABLET BY MOUTH DAILY 30 Tab 11    valsartan (DIOVAN) 320 mg tablet Take  by mouth daily.  carvedilol (COREG) 12.5 mg tablet Take  by mouth two (2) times daily (with meals).       PEN NEEDLE, DIABETIC (BD ULTRA-FINE DEVIN PEN NEEDLES) by Does Not Apply route.  colchicine (COLCRYS) 0.6 mg tablet Take 0.6 mg by mouth daily. Takes 1 tablet 3 times a week. Indications: taking mitagar      bumetanide (BUMEX) 2 mg tablet Take 4 mg by mouth two (2) times a day.  Liraglutide (VICTOZA) 0.6 mg/0.1 mL (18 mg/3 mL) sub-q pen 0.6 mg by SubCUTAneous route daily.  apixaban (ELIQUIS) 5 mg tablet Take 5 mg by mouth two (2) times a day.  finasteride (PROSCAR) 5 mg tablet Take 5 mg by mouth daily.  tamsulosin (FLOMAX) 0.4 mg capsule Take 0.8 mg by mouth daily.  insulin glargine (LANTUS SOLOSTAR) 100 unit/mL (3 mL) pen 22 Units by SubCUTAneous route daily.        Past Medical History:   Diagnosis Date    Anemia 8/5/2017    Anxiety 8/5/2017    Arrhythmia     atrial fibrillation 2014    ASCVD (arteriosclerotic cardiovascular disease) 8/5/2017    BPH (benign prostatic hyperplasia) 8/5/2017    CAD (coronary artery disease)     h/o stents    Cancer ([address]     h/o skin cancer    Cardiomyopathy ([address] 8/5/2017    CHF (congestive heart failure) ([address] 8/5/2017    Chronic kidney disease     Stage IV    CKD (chronic kidney disease), stage IV (HCC) 8/5/2017    Diabetes ([address]     Diabetes mellitus ([address] 8/5/2017    Diabetic neuropathy ([address] 8/5/2017    DJD (degenerative joint disease) 8/5/2017    ED (erectile dysfunction) 8/5/2017    Gout 8/5/2017    High cholesterol     Hyperlipidemia 8/5/2017    Hypertension     Hypertension with renal disease 8/5/2017    Hypothyroid 8/5/2017    Insomnia 8/5/2017    Obesity 8/5/2017    On statin therapy 8/5/2017    Restless leg 8/5/2017    Thyroid disease     hypothyroid     Past Surgical History:   Procedure Laterality Date    CARDIAC SURG PROCEDURE UNLIST      cardiac stents    COLONOSCOPY N/A 6/28/2016    COLONOSCOPY performed by Concepcion Linares MD at [facility] ENDOSCOPY    HX APPENDECTOMY      HX HEENT      Bilateral Cataract surgery    HX HEENT      Tonsils    HX HEENT Axe wound to the head    HX ORTHOPAEDIC      HX PACEMAKER       Allergies   Allergen Reactions    Niacin Unknown (comments)    Levemir [Insulin Detemir] Hives    Other Medication Other (comments)     ?  Allergy to Duraprep causing chemical burn    Primaxin [Imipenem-Cilastatin] Diarrhea and Rash    Xarelto [Rivaroxaban] Rash and Itching       REVIEW OF SYSTEMS:  General: negative for - chills or fever, or weight loss or gain  ENT: negative for - headaches, nasal congestion or tinnitus  Eyes: no blurred or visual changes  Neck: No stiffness or swollen nodes  Respiratory: negative for - cough, hemoptysis, shortness of breath or wheezing  Cardiovascular : negative for - chest pain, edema, palpitations or shortness of breath  Gastrointestinal: negative for - abdominal pain, blood in stools, heartburn or nausea/vomiting  Genito-Urinary: no dysuria, trouble voiding, or hematuria  Musculoskeletal: negative for - gait disturbance, joint pain, joint stiffness or joint swelling  Neurological: no TIA or stroke symptoms  Hematologic: no bruises, no bleeding  Lymphatic: no swollen glands  Integument: no lumps, mole changes, nail changes or rash  Endocrine:no malaise/lethargy poly uria or polydipsia or unexpected weight changes        Social History     Social History    Marital status:      Spouse name: N/A    Number of children: N/A    Years of education: N/A     Social History Main Topics    Smoking status: Former Smoker     Packs/day: 0.50     Years: 4.00     Quit date: 3/6/1972    Smokeless tobacco: Never Used    Alcohol use No      Comment: rare, 1 drink per year    Drug use: No    Sexual activity: Not Currently     Other Topics Concern    None     Social History Narrative     Family History   Problem Relation Age of Onset    Heart Disease Mother     Kidney Disease Mother     Heart Disease Father     Kidney Disease Father     Cancer Sister      Breast       OBJECTIVE:     Visit Vitals    BP 120/72 (BP 1 Location: Left arm, BP Patient Position: Sitting)    Pulse 70    Temp 98.6 °F (37 °C) (Oral)    Resp 16    Ht 6' 5\" (1.956 m)    Wt 252 lb 6.4 oz (114.5 kg)    SpO2 99%    BMI 29.93 kg/m2     CONSTITUTIONAL:   well nourished, appears age appropriate  EYES: sclera anicteric, PERRL, EOMI  ENMT:nares clear, moist mucous membranes, pharynx clear  NECK: supple. Thyroid normal, No JVD or bruits  RESPIRATORY: Chest: clear to ascultation and percussion, normal inspiratory effort  CARDIOVASCULAR: Heart: regular rate and rhythm no murmurs, rubs or gallops, PMI not displaced, No thrills  GASTROINTESTINAL: Abdomen: non distended, soft, non tender, bowel sounds normal  HEMATOLOGIC: no purpura, petechiae or bruising  LYMPHATIC: No lymph node enlargemant  MUSCULOSKELETAL: Extremities: no edema or active synovitis, pulse 1+   INTEGUMENT: No unusual rashes or suspicious skin lesions noted. Nails appear normal.  PERIPHERAL VASCULAR: normal pulses femoral, PT and DP  NEUROLOGIC: non-focal exam, A & O X 3  PSYCHIATRIC:, appropriate affect     ASSESSMENT:   1. Hypertension with renal disease    2. CKD (chronic kidney disease), stage IV ([address]    3. Chronic combined systolic and diastolic congestive heart failure ([address]    4. Ischemic cardiomyopathy    5. Paroxysmal atrial fibrillation (HCC)    6. ASCVD (arteriosclerotic cardiovascular disease)    7. Gastroesophageal reflux disease without esophagitis    8. Controlled type 2 diabetes mellitus with stage 4 chronic kidney disease, without long-term current use of insulin (HCC)      Impression  1. Hypertension that is controlled continue current therapy reviewed with him  2. CKD we will see what that status is today  3. Chronic systolic CHF compensated  4. Cardiomyopathy stable  5. Atrial fibrillation currently in a paced rhythm he is due for repeat atrial ablation  6. ASCVD clinically stable  7. GERD that is stable  8.   Diabetes we will see what the blood sugar looks like, his last A1R was certainly not controlled at 8.8 and I discussed diet with him  Tentative follow-up set up for 6 weeks I will make further recommendations once I see results of today's lab. PLAN:  .  Orders Placed This Encounter    AMB POC COMPLETE CBC,AUTOMATED ENTER    AMB POC BASIC METABOLIC PANEL         ATTENTION:   This medical record was transcribed using an electronic medical records system. Although proofread, it may and can contain electronic and spelling errors. Other human spelling and other errors may be present. Corrections may be executed at a later time. Please feel free to contact us for any clarifications as needed. Follow-up Disposition:  Return in about 6 weeks (around 5/9/2018). Results for orders placed or performed in visit on 03/28/18   AMB POC COMPLETE CBC,AUTOMATED ENTER   Result Value Ref Range    WBC (POC)  4.1 - 10.9 K/uL    RBC (POC)  4.20 - 6.30 M/uL    HGB (POC)  12.0 - 18.0 g/dL    HCT (POC)  37.0 - 51.0 %    MCV (POC)  80.0 - 97.0 fL    MCH (POC)  26.0 - 32.0 pg    MCHC (POC)  30.0 - 36.0 g/dL    PLATELET (POC)  316.2 - 440.0 K/uL    ABS. LYMPHS (POC)  0.6 - 4.1 K/uL    LYMPHOCYTES (POC)  10.0 - 58.5 %    Mid # (POC)  0.0 - 1.8 K/uL    MID% POC  0.1 - 24.0 %    ABS. GRANS (POC)  2.0 - 7.8 K/uL    GRANULOCYTES (POC)  37.0 - 92.0 %   AMB POC BASIC METABOLIC PANEL   Result Value Ref Range    GLUCOSE  75 - 110 mg/dL    BUN  9 - 20 mg/dL    Creatinine (POC)  0.8 - 1.5 mg/dL    Sodium (POC)  137 - 145 MMOL/L    Potassium (POC)  3.6 - 5.0 MMOL/L    CHLORIDE  98 - 107 MMOL/L    CO2  22 - 32 MMOL/L    CALCIUM  8.4 - 10.2 mg/dL    eGFR (POC)         Chong Mendosa MD    The patient verbalized understanding of the problems and plans as explained.

## 2018-03-28 NOTE — PATIENT INSTRUCTIONS
Atrial Fibrillation: Care Instructions  Your Care Instructions    Atrial fibrillation is an irregular and often fast heartbeat. Treating this condition is important for several reasons. It can cause blood clots, which can travel from your heart to your brain and cause a stroke. If you have a fast heartbeat, you may feel lightheaded, dizzy, and weak. An irregular heartbeat can also increase your risk for heart failure. Atrial fibrillation is often the result of another heart condition, such as high blood pressure or coronary artery disease. Making changes to improve your heart condition will help you stay healthy and active. Follow-up care is a key part of your treatment and safety. Be sure to make and go to all appointments, and call your doctor if you are having problems. It's also a good idea to know your test results and keep a list of the medicines you take. How can you care for yourself at home? Medicines  ? · Take your medicines exactly as prescribed. Call your doctor if you think you are having a problem with your medicine. You will get more details on the specific medicines your doctor prescribes. ? · If your doctor has given you a blood thinner to prevent a stroke, be sure you get instructions about how to take your medicine safely. Blood thinners can cause serious bleeding problems. ? · Do not take any vitamins, over-the-counter drugs, or herbal products without talking to your doctor first.   ? Lifestyle changes  ? · Do not smoke. Smoking can increase your chance of a stroke and heart attack. If you need help quitting, talk to your doctor about stop-smoking programs and medicines. These can increase your chances of quitting for good. ? · Eat a heart-healthy diet. ? · Stay at a healthy weight. Lose weight if you need to.   ? · Limit alcohol to 2 drinks a day for men and 1 drink a day for women. Too much alcohol can cause health problems. ? · Avoid colds and flu.  Get a pneumococcal vaccine shot. If you have had one before, ask your doctor whether you need another dose. Get a flu shot every year. If you must be around people with colds or flu, wash your hands often. Activity  ? · If your doctor recommends it, get more exercise. Walking is a good choice. Bit by bit, increase the amount you walk every day. Try for at least 30 minutes on most days of the week. You also may want to swim, bike, or do other activities. Your doctor may suggest that you join a cardiac rehabilitation program so that you can have help increasing your physical activity safely. ? · Start light exercise if your doctor says it is okay. Even a small amount will help you get stronger, have more energy, and manage stress. Walking is an easy way to get exercise. Start out by walking a little more than you did in the hospital. Gradually increase the amount you walk. ? · When you exercise, watch for signs that your heart is working too hard. You are pushing too hard if you cannot talk while you are exercising. If you become short of breath or dizzy or have chest pain, sit down and rest immediately. ? · Check your pulse regularly. Place two fingers on the artery at the palm side of your wrist, in line with your thumb. If your heartbeat seems uneven or fast, talk to your doctor. When should you call for help? Call 911 anytime you think you may need emergency care. For example, call if:  ? · You have symptoms of a heart attack. These may include:  ¨ Chest pain or pressure, or a strange feeling in the chest.  ¨ Sweating. ¨ Shortness of breath. ¨ Nausea or vomiting. ¨ Pain, pressure, or a strange feeling in the back, neck, jaw, or upper belly or in one or both shoulders or arms. ¨ Lightheadedness or sudden weakness. ¨ A fast or irregular heartbeat. After you call 911, the  may tell you to chew 1 adult-strength or 2 to 4 low-dose aspirin. Wait for an ambulance. Do not try to drive yourself.    ? · You have symptoms of a stroke. These may include:  ¨ Sudden numbness, tingling, weakness, or loss of movement in your face, arm, or leg, especially on only one side of your body. ¨ Sudden vision changes. ¨ Sudden trouble speaking. ¨ Sudden confusion or trouble understanding simple statements. ¨ Sudden problems with walking or balance. ¨ A sudden, severe headache that is different from past headaches. ? · You passed out (lost consciousness). ?Call your doctor now or seek immediate medical care if:  ? · You have new or increased shortness of breath. ? · You feel dizzy or lightheaded, or you feel like you may faint. ? · Your heart rate becomes irregular. ? · You can feel your heart flutter in your chest or skip heartbeats. Tell your doctor if these symptoms are new or worse. ? Watch closely for changes in your health, and be sure to contact your doctor if you have any problems. Where can you learn more? Go to http://gildardo-palak.info/. Enter U020 in the search box to learn more about \"Atrial Fibrillation: Care Instructions. \"  Current as of: September 21, 2016  Content Version: 11.4  © 5838-7945 [employer]. Care instructions adapted under license by [employer] (which disclaims liability or warranty for this information). If you have questions about a medical condition or this instruction, always ask your healthcare professional. [address] any warranty or liability for your use of this information.

## 2018-03-28 NOTE — PROGRESS NOTES
1. Have you been to the ER, urgent care clinic since your last visit? Hospitalized since your last visit? Yes, 03-, [facility], removed 2 bones from left foot. 2. Have you seen or consulted any other health care providers outside of the [address] [place] [address] since your last visit? Include any pap smears or colon screening. Yes, Dr. Adrienne Carvajal, Endocrinologist and Weight Loss Specialist, for regular follow-up of weight loss.     Chief Complaint   Patient presents with    CHF     6 week f/u    Depression     6 week f/u     Fasting

## 2018-03-30 ENCOUNTER — HOSPITAL ENCOUNTER (OUTPATIENT)
Dept: WOUND CARE | Age: 68
Discharge: HOME | End: 2018-03-30
Payer: MEDICARE

## 2018-03-30 PROCEDURE — 99213 OFFICE O/P EST LOW 20 MIN: CPT

## 2018-03-30 NOTE — PROGRESS NOTES
[address]  WOUND CARE PROGRESS NOTE    Sakshi Ibrahim, Lupis SPENCER  MR#: [id number]  : 1950  ACCOUNT #: [de-identified]   DATE OF SERVICE: 2018    HISTORY OF CHIEF COMPLAINT:  This 64-year-old white male presents following treatment for a chronic diabetic ulcer, plantar aspect of the first left MPJ, postop amputation of the first left MPJ of 3 weeks. The patient states that 1 day ago his left leg appeared to be slightly swollen and mildly erythematous. History and physical is unchanged from admitting history and physical.    PAST MEDICAL HISTORY:  Diabetes, atrial fibrillation, coronary artery disease, hypertension, gout, neuropathy. CURRENT MEDICATIONS:  Tamsulosin HCL 0.8 mg daily, clonazepam 1 mg at bedtime, Lantus insulin 24 units daily, bumetanide 2 mg twice a day, finasteride 5 mg daily, Eliquis 5 mg twice a day, Victoza 1.8 units daily, carvedilol 12.5 mg twice a day, valsartan 320 mg daily, Uloric 40 mg daily, metolazone 2.5 mg twice a month, pramipexole 0.2 mg daily. PHYSICAL EXAMINATION:  Temperature 98.2, pulse rate 70, respirations 16, blood pressure 130/74. The patient's lower left leg is minimally warm, has some mild erythema and is edematous; however, the left forefoot has no edema, no erythema. The wound plantar first left MPJ is scabbed over with the central opening measuring 0.1 x 0.1 x 0.1 cm. Does not appear to be infected. The incision dorsal first left MPJ is loosely coapted. Mid incision there is a pustule with mild surrounding erythema. Using a #15 blade this pustule was opened and aerobic as well as anaerobic wound culture was taken. Betadine-soaked Adaptic followed by sterile compressive dressing was placed on the dorsal incision and plantar wound, left foot. The patient was sent immediately to have a venous duplex scan to rule out a DVT, left lower leg, and he was given a prescription of Keflex 500 mg, #40, to take 1 p.o. b.i.d.     Antibiotic therapy will be adjusted if needed. The patient will have a followup up visit in the wound care center by myself in 2 weeks; however, he will have a visit in the wound center in 1 week for dressing change.       DORIS Eason / Franny Jennings  D: 03/30/2018 14:30     T: 03/30/2018 15:03  JOB #: [id number]

## 2018-04-02 LAB — BACTERIA SPEC AEROBE CULT: ABNORMAL

## 2018-04-04 LAB
SPECIMEN STATUS REPORT, ROLRST: NORMAL
YEAST SPEC QL CULT: NORMAL

## 2018-04-05 RX ORDER — BUMETANIDE 2 MG/1
TABLET ORAL
Qty: 360 TAB | Refills: 3 | Status: SHIPPED | OUTPATIENT
Start: 2018-04-05 | End: 2019-05-27 | Stop reason: DRUGHIGH

## 2018-04-06 ENCOUNTER — HOSPITAL ENCOUNTER (OUTPATIENT)
Dept: WOUND CARE | Age: 68
Discharge: HOME | End: 2018-04-06
Payer: MEDICARE

## 2018-04-06 PROCEDURE — 99212 OFFICE O/P EST SF 10 MIN: CPT

## 2018-04-08 LAB
BACTERIA SPEC ANAEROBE CULT: NORMAL
SPECIMEN STATUS REPORT, ROLRST: NORMAL

## 2018-04-18 RX ORDER — VALSARTAN 320 MG/1
TABLET ORAL
Qty: 30 TAB | Refills: 5 | Status: SHIPPED | OUTPATIENT
Start: 2018-04-18 | End: 2018-05-18

## 2018-04-18 NOTE — TELEPHONE ENCOUNTER
Requested Prescriptions     Pending Prescriptions Disp Refills    valsartan (DIOVAN) 320 mg tablet [Pharmacy Med Name: VALSARTAN 320MG TABLETS] 30 Tab 5     Sig: TAKE 1 TABLET BY MOUTH DAILY     Rx refill request from the patient/pharmacy. Patient last seen 03- with labs, and next appointment scheduled for 05-.

## 2018-05-07 ENCOUNTER — OFFICE VISIT (OUTPATIENT)
Dept: INTERNAL MEDICINE CLINIC | Age: 68
End: 2018-05-07

## 2018-05-07 VITALS
HEART RATE: 69 BPM | DIASTOLIC BLOOD PRESSURE: 74 MMHG | HEIGHT: 77 IN | OXYGEN SATURATION: 98 % | TEMPERATURE: 98.5 F | SYSTOLIC BLOOD PRESSURE: 136 MMHG | WEIGHT: 255.4 LBS | BODY MASS INDEX: 30.16 KG/M2 | RESPIRATION RATE: 16 BRPM

## 2018-05-07 DIAGNOSIS — M15.9 PRIMARY OSTEOARTHRITIS INVOLVING MULTIPLE JOINTS: ICD-10-CM

## 2018-05-07 DIAGNOSIS — I12.9 HYPERTENSION WITH RENAL DISEASE: Primary | ICD-10-CM

## 2018-05-07 DIAGNOSIS — I48.0 PAROXYSMAL ATRIAL FIBRILLATION (HCC): ICD-10-CM

## 2018-05-07 DIAGNOSIS — E78.2 MIXED HYPERLIPIDEMIA: ICD-10-CM

## 2018-05-07 DIAGNOSIS — I50.22 CHRONIC SYSTOLIC CONGESTIVE HEART FAILURE (HCC): ICD-10-CM

## 2018-05-07 DIAGNOSIS — N18.4 CKD (CHRONIC KIDNEY DISEASE), STAGE IV (HCC): ICD-10-CM

## 2018-05-07 DIAGNOSIS — E11.22 CONTROLLED TYPE 2 DIABETES MELLITUS WITH STAGE 4 CHRONIC KIDNEY DISEASE, WITHOUT LONG-TERM CURRENT USE OF INSULIN (HCC): ICD-10-CM

## 2018-05-07 DIAGNOSIS — D64.9 ANEMIA, UNSPECIFIED TYPE: ICD-10-CM

## 2018-05-07 DIAGNOSIS — N18.4 CONTROLLED TYPE 2 DIABETES MELLITUS WITH STAGE 4 CHRONIC KIDNEY DISEASE, WITHOUT LONG-TERM CURRENT USE OF INSULIN (HCC): ICD-10-CM

## 2018-05-07 DIAGNOSIS — K21.9 GASTROESOPHAGEAL REFLUX DISEASE WITHOUT ESOPHAGITIS: ICD-10-CM

## 2018-05-07 DIAGNOSIS — I25.5 ISCHEMIC CARDIOMYOPATHY: ICD-10-CM

## 2018-05-07 DIAGNOSIS — I25.10 ASCVD (ARTERIOSCLEROTIC CARDIOVASCULAR DISEASE): ICD-10-CM

## 2018-05-07 PROBLEM — Z89.412 STATUS POST AMPUTATION OF LEFT GREAT TOE (HCC): Status: ACTIVE | Noted: 2018-05-07

## 2018-05-07 LAB
ALBUMIN SERPL-MCNC: 4.1 G/DL (ref 3.9–5.4)
ALKALINE PHOS POC: 99 U/L (ref 38–126)
ALT SERPL-CCNC: 30 U/L (ref 9–52)
AST SERPL-CCNC: 24 U/L (ref 14–36)
BUN BLD-MCNC: 92 MG/DL (ref 9–20)
CALCIUM BLD-MCNC: 9.5 MG/DL (ref 8.4–10.2)
CHLORIDE BLD-SCNC: 98 MMOL/L (ref 98–107)
CHOLEST SERPL-MCNC: 130 MG/DL (ref 0–200)
CK (CPK) POC: 70 U/L (ref 30–135)
CO2 POC: 32 MMOL/L (ref 22–32)
CREAT BLD-MCNC: 2.3 MG/DL (ref 0.8–1.5)
EGFR (POC): 27.9
GLUCOSE POC: 148 MG/DL (ref 75–110)
GRAN# POC: 5.1 K/UL (ref 2–7.8)
GRAN% POC: 79.1 % (ref 37–92)
HCT VFR BLD CALC: 30.8 % (ref 37–51)
HDLC SERPL-MCNC: 26 MG/DL (ref 35–130)
HGB BLD-MCNC: 10.3 G/DL (ref 12–18)
LDL CHOLESTEROL POC: 77 MG/DL (ref 0–130)
LY# POC: 0.8 K/UL (ref 0.6–4.1)
LY% POC: 13.6 % (ref 10–58.5)
MCH RBC QN: 29.8 PG (ref 26–32)
MCHC RBC-ENTMCNC: 33.5 G/DL (ref 30–36)
MCV RBC: 89 FL (ref 80–97)
MID #, POC: 0.4 K/UL (ref 0–1.8)
MID% POC: 7.3 % (ref 0.1–24)
PLATELET # BLD: 147 K/UL (ref 140–440)
POTASSIUM SERPL-SCNC: 4.1 MMOL/L (ref 3.6–5)
PROT SERPL-MCNC: 7.6 G/DL (ref 6.3–8.2)
RBC # BLD: 3.46 M/UL (ref 4.2–6.3)
SODIUM SERPL-SCNC: 142 MMOL/L (ref 137–145)
TCHOL/HDL RATIO (POC): 5 (ref 0–4)
TOTAL BILIRUBIN POC: 1.2 MG/DL (ref 0.2–1.3)
TRIGL SERPL-MCNC: 135 MG/DL (ref 0–200)
VLDLC SERPL CALC-MCNC: 27 MG/DL
WBC # BLD: 6.3 K/UL (ref 4.1–10.9)

## 2018-05-07 RX ORDER — COLCHICINE 0.6 MG/1
CAPSULE ORAL
Refills: 1 | COMMUNITY
Start: 2018-02-20 | End: 2019-01-10
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
## 2018-05-07 NOTE — PROGRESS NOTES
1. Have you been to the ER, urgent care clinic since your last visit? Hospitalized since your last visit? No    2. Have you seen or consulted any other health care providers outside of the [address] [place] [address] since your last visit? Include any pap smears or colon screening. Yes, Dr. Faiza Blanco, , for routine f/u.     Chief Complaint   Patient presents with    Diabetes     6 week f/u

## 2018-05-08 LAB — HBA1C MFR BLD HPLC: 7.6 % (ref 4.5–5.7)

## 2018-05-11 NOTE — PROGRESS NOTES
Kidney function is essentially stable. Glycohemoglobin is improved. Lipid profile is remarkable for still having very low HDL as we discussed before. The hemoglobin is lower. We will recheck that on follow-up.

## 2018-05-14 NOTE — PROGRESS NOTES
Kidney function is essentially stable.  Glycohemoglobin is improved.  Lipid profile is remarkable for still having very low HDL as we discussed before.  The hemoglobin is lower.  We will recheck that on follow-up. Patient informed.

## 2018-05-15 ENCOUNTER — OFFICE VISIT (OUTPATIENT)
Dept: INTERNAL MEDICINE CLINIC | Age: 68
End: 2018-05-15

## 2018-05-15 VITALS
HEIGHT: 77 IN | DIASTOLIC BLOOD PRESSURE: 80 MMHG | HEART RATE: 70 BPM | RESPIRATION RATE: 15 BRPM | WEIGHT: 258 LBS | TEMPERATURE: 99 F | SYSTOLIC BLOOD PRESSURE: 138 MMHG | OXYGEN SATURATION: 98 % | BODY MASS INDEX: 30.46 KG/M2

## 2018-05-15 DIAGNOSIS — M25.521 RIGHT ELBOW PAIN: Primary | ICD-10-CM

## 2018-05-15 NOTE — PATIENT INSTRUCTIONS
Joint Pain: Care Instructions  Your Care Instructions    Many people have small aches and pains from overuse or injury to muscles and joints. Joint injuries often happen during sports or recreation, work tasks, or projects around the home. An overuse injury can happen when you put too much stress on a joint or when you do an activity that stresses the joint over and over, such as using the computer or rowing a boat. You can take action at home to help your muscles and joints get better. You should feel better in 1 to 2 weeks, but it can take 3 months or more to heal completely. Follow-up care is a key part of your treatment and safety. Be sure to make and go to all appointments, and call your doctor if you are having problems. It's also a good idea to know your test results and keep a list of the medicines you take. How can you care for yourself at home? · Do not put weight on the injured joint for at least a day or two. · For the first day or two after an injury, do not take hot showers or baths, and do not use hot packs. The heat could make swelling worse. · Put ice or a cold pack on the sore joint for 10 to 20 minutes at a time. Try to do this every 1 to 2 hours for the next 3 days (when you are awake) or until the swelling goes down. Put a thin cloth between the ice and your skin. · Wrap the injury in an elastic bandage. Do not wrap it too tightly because this can cause more swelling. · Prop up the sore joint on a pillow when you ice it or anytime you sit or lie down during the next 3 days. Try to keep it above the level of your heart. This will help reduce swelling. · Take an over-the-counter pain medicine, such as acetaminophen (Tylenol), ibuprofen (Advil, Motrin), or naproxen (Aleve). Read and follow all instructions on the label. · After 1 or 2 days of rest, begin moving the joint gently.  While the joint is still healing, you can begin to exercise using activities that do not strain or hurt the painful joint. When should you call for help? Call your doctor now or seek immediate medical care if:  ? · You have signs of infection, such as:  ¨ Increased pain, swelling, warmth, and redness. ¨ Red streaks leading from the joint. ¨ A fever. ? Watch closely for changes in your health, and be sure to contact your doctor if:  ? · Your movement or symptoms are not getting better after 1 to 2 weeks of home treatment. Where can you learn more? Go to http://gildardo-palak.info/. Enter P205 in the search box to learn more about \"Joint Pain: Care Instructions. \"  Current as of: March 21, 2017  Content Version: 11.4  © 4358-3999 [employer]. Care instructions adapted under license by [employer] (which disclaims liability or warranty for this information). If you have questions about a medical condition or this instruction, always ask your healthcare professional. [address] any warranty or liability for your use of this information.

## 2018-05-15 NOTE — PROGRESS NOTES
Chief Complaint   Patient presents with    Elbow Pain     hurt elbow/fell     1. Have you been to the ER, urgent care clinic since your last visit? Hospitalized since your last visit? No    2. Have you seen or consulted any other health care providers outside of the [address] [place] [address] since your last visit? Include any pap smears or colon screening. Yes, Dr. Cynthia Maldonado for weight loss. Went on 4/5/18.

## 2018-05-15 NOTE — MR AVS SNAPSHOT
[facility] 
 
 
 [address] [address] [phone] Patient: Leo Zuluaga. MRN: [id number] :1950 Visit Information Date & Time Provider Department Dept. Phone Encounter #  
 5/15/2018  3:20 PM Rosalino De Leon Jeni Pelaez [id number] Follow-up Instructions Return for At regular scheduled appointment. Your Appointments 2018  1:40 PM  
FOLLOW UP 10 with MD QIANA De Leon [facility] ([address]) Appt Note: 1 month follow up [address] [id number] [address] [facility] [id number] Upcoming Health Maintenance Date Due  
 EYE EXAM RETINAL OR DILATED Q1 1958 DTaP/Tdap/Td series (1 - Tdap) 1969 ZOSTER VACCINE AGE 60> 10/28/2008 GLAUCOMA SCREENING Q2Y 2013 Influenza Age 5 to Adult 2018 MEDICARE YEARLY EXAM 2018 HEMOGLOBIN A1C Q6M 2018 FOOT EXAM Q1 2019 MICROALBUMIN Q1 2019 LIPID PANEL Q1 2019 COLONOSCOPY 2021 Allergies as of 5/15/2018  Review Complete On: 5/15/2018 By: Antonieta Toribio MD  
  
 Severity Noted Reaction Type Reactions Niacin High 2014    Unknown (comments) Levemir [Insulin Detemir]  2013    Hives Other Medication  2013    Other (comments) ? Allergy to Annita Money causing chemical burn Primaxin [Imipenem-cilastatin]  2013    Diarrhea, Rash Xarelto [Rivaroxaban]  2014    Rash, Itching Current Immunizations  Never Reviewed Name Date Influenza High Dose Vaccine PF 10/17/2017 Influenza Vaccine 10/26/2016, 10/21/2015 Pneumococcal Conjugate (PCV-13) 2015 Pneumococcal Vaccine (Unspecified Type) 2014 Not reviewed this visit You Were Diagnosed With   
  
 Codes Comments Right elbow pain    -  Primary ICD-10-CM: S65.444 ICD-9-CM: 719.42 Vitals BP Pulse Temp Resp Height(growth percentile) Weight(growth percentile) 138/80 70 99 °F (37.2 °C) (Oral) 15 6' 5\" (1.956 m) 258 lb (117 kg) SpO2 BMI Smoking Status 98% 30.59 kg/m2 Former Smoker Vitals History BMI and BSA Data Body Mass Index Body Surface Area 30.59 kg/m 2 2.52 m 2 Preferred Pharmacy Pharmacy Name Phone [phone] [address], [address] AT [place] [address] [phone] Your Updated Medication List  
  
   
This list is accurate as of 5/15/18  4:06 PM.  Always use your most recent med list.  
  
  
  
  
 BD ULTRA-FINE DEVIN PEN NEEDLES  
by Does Not Apply route. bumetanide 2 mg tablet Commonly known as:  [facility] TAKE 2 TABLETS BY MOUTH TWICE DAILY  
  
 clonazePAM 2 mg tablet Commonly known as:  Kirti Lone Take 1 Tab by mouth two (2) times a day. Max Daily Amount: 4 mg. * COLCRYS 0.6 mg tablet Generic drug:  colchicine Take 0.6 mg by mouth daily. Takes 1 tablet 3 times a week. Indications: taking mitagar * MITIGARE 0.6 mg capsule Generic drug:  colchicine TK 1 T PO  QD  
  
 COREG 12.5 mg tablet Generic drug:  carvedilol Take  by mouth two (2) times daily (with meals). ELIQUIS 5 mg tablet Generic drug:  apixaban Take 5 mg by mouth two (2) times a day. finasteride 5 mg tablet Commonly known as:  PROSCAR Take 5 mg by mouth daily. insulin lispro 100 unit/mL kwikpen Commonly known as:  HUMALOG  
5 units before each meal three times a day. LANTUS SOLOSTAR U-100 INSULIN 100 unit/mL (3 mL) Inpn Generic drug:  insulin glargine 30 Units by SubCUTAneous route daily. Liraglutide 0.6 mg/0.1 mL (18 mg/3 mL) Pnij Commonly known as:  VICTOZA  
0.6 mg by SubCUTAneous route daily. metOLazone 2.5 mg tablet Commonly known as:  Floyd Malling Take 1 Tab by mouth as needed (pt takes approximately twice a month if has edema. ). Take twicw weekly for fluid  
  
 pramipexole 0.5 mg tablet Commonly known as:  MIRAPEX TAKE 1 TABLET BY MOUTH EVERY NIGHT AT BEDTIME  
  
 tamsulosin 0.4 mg capsule Commonly known as:  FLOMAX Take 0.8 mg by mouth daily. ULORIC 40 mg Tab tablet Generic drug:  febuxostat TAKE 1 TABLET BY MOUTH DAILY  
  
 valsartan 320 mg tablet Commonly known as:  DIOVAN  
TAKE 1 TABLET BY MOUTH DAILY * Notice: This list has 2 medication(s) that are the same as other medications prescribed for you. Read the directions carefully, and ask your doctor or other care provider to review them with you. Follow-up Instructions Return for At regular scheduled appointment. To-Do List   
 05/15/2018 Imaging:  XR ELBOW RT MIN 3 V   
  
 05/17/2018 9:15 AM  
  Appointment with CATH EP ROOM [facility] at [address] [facility] ([phone]) NPO AFTER MIDNIGHT! ROUTINE CASES:  Please arrive 2 hour prior to your scheduled appointment time. If your scheduled appointment is for 0730, 0800, 0815, please arrive by 0645. NON ROUTINE CASES:  PATIENTS WHO REQUIRE LABS, X-RAY, EKG, or MEDS:  PLEASE ARRIVE 3 HOURS PRIOR TO YOUR SCHEDULED APPOINTMENT. If you require hydration prior to your procedure, PLEASE ARRIVE 4 HOURS PRIOR TO YOUR APPOINTMENT  **** IT IS THE OFFICE SCHEDULARS RESPONSBILITY TO NOTIFY THE CATH LAB SCHEDULAR IF THE PATIENT WILL REQUIRE ANY ADDITIONAL TIME FOR PREP FROM ROUTINE CASE *****  
  
 05/18/2018 2:30 PM  
  Appointment with Anna Abel DPM; [facility] [address] at [address] [place] [address] ([phone]) Patient Instructions Joint Pain: Care Instructions Your Care Instructions Many people have small aches and pains from overuse or injury to muscles and joints.  Joint injuries often happen during sports or recreation, work tasks, or projects around the home. An overuse injury can happen when you put too much stress on a joint or when you do an activity that stresses the joint over and over, such as using the computer or rowing a boat. You can take action at home to help your muscles and joints get better. You should feel better in 1 to 2 weeks, but it can take 3 months or more to heal completely. Follow-up care is a key part of your treatment and safety. Be sure to make and go to all appointments, and call your doctor if you are having problems. It's also a good idea to know your test results and keep a list of the medicines you take. How can you care for yourself at home? · Do not put weight on the injured joint for at least a day or two. · For the first day or two after an injury, do not take hot showers or baths, and do not use hot packs. The heat could make swelling worse. · Put ice or a cold pack on the sore joint for 10 to 20 minutes at a time. Try to do this every 1 to 2 hours for the next 3 days (when you are awake) or until the swelling goes down. Put a thin cloth between the ice and your skin. · Wrap the injury in an elastic bandage. Do not wrap it too tightly because this can cause more swelling. · Prop up the sore joint on a pillow when you ice it or anytime you sit or lie down during the next 3 days. Try to keep it above the level of your heart. This will help reduce swelling. · Take an over-the-counter pain medicine, such as acetaminophen (Tylenol), ibuprofen (Advil, Motrin), or naproxen (Aleve). Read and follow all instructions on the label. · After 1 or 2 days of rest, begin moving the joint gently. While the joint is still healing, you can begin to exercise using activities that do not strain or hurt the painful joint. When should you call for help? Call your doctor now or seek immediate medical care if: 
? · You have signs of infection, such as: 
¨ Increased pain, swelling, warmth, and redness. ¨ Red streaks leading from the joint. ¨ A fever. ? Watch closely for changes in your health, and be sure to contact your doctor if: 
? · Your movement or symptoms are not getting better after 1 to 2 weeks of home treatment. Where can you learn more? Go to http://gildardo-palak.info/. Enter P205 in the search box to learn more about \"Joint Pain: Care Instructions. \" Current as of: March 21, 2017 Content Version: 11.4 © 0628-9477 [employer]. Care instructions adapted under license by [employer] (which disclaims liability or warranty for this information). If you have questions about a medical condition or this instruction, always ask your healthcare professional. [address] any warranty or liability for your use of this information. Introducing [facility]! Dear Deanna Mckeon: 
Thank you for requesting a "[employer]" account. Our records indicate that you already have an active "[employer]" account. You can access your account anytime at [email or web address] Did you know that you can access your hospital and ER discharge instructions at any time in "[employer]"? You can also review all of your test results from your hospital stay or ER visit. Additional Information If you have questions, please visit the Frequently Asked Questions section of the "[employer]" website at [email or web address] Remember, "[employer]" is NOT to be used for urgent needs. For medical emergencies, dial 911. Now available from your iPhone and Android! Please provide this summary of care documentation to your next provider. Your primary care clinician is listed as [place]. If you have any questions after today's visit, please call [phone].

## 2018-05-15 NOTE — PROGRESS NOTES
Subjective:   Royal KRISTA Jacques. is a 69 y.o. male      Chief Complaint   Patient presents with    Elbow Pain     hurt elbow/fell        History of present illness: He presents complaining of some right elbow pain status post a fall yesterday morning at 5 AM he was trying to pull a fence and slipped falling on his right elbow. He did sustain a scrape to his elbow but the main thing is he had such severe pain he did not sleep at all last night. He notes no other joint aches or pains and did not injure himself in any other way when he fell. No other complaints on complete review of systems.   Of note he did not black out or lose consciousness    Patient Active Problem List   Diagnosis Code    Atrial fibrillation ([facility] I48.91    Primary osteoarthritis involving multiple joints M15.0    ASCVD (arteriosclerotic cardiovascular disease) I25.10    Gout M10.9    CHF (congestive heart failure) ([facility]) I50.9    Anemia D64.9    On statin therapy Z79.899    Mixed hyperlipidemia E78.2    Controlled type 2 diabetes mellitus with stage 4 chronic kidney disease, without long-term current use of insulin ([facility]) E11.22, N18.4    CKD (chronic kidney disease), stage IV ([facility]) N18.4    Hypertension with renal disease I12.9    Restless leg G25.81    Class 1 obesity due to excess calories without serious comorbidity with body mass index (BMI) of 30.0 to 30.9 in adult E66.09, Z68.30    Insomnia G47.00    Acquired hypothyroidism E03.9    Gastroesophageal reflux disease without esophagitis K21.9    ED (erectile dysfunction) N52.9    Diabetic neuropathy ([facility]) E11.40    Cardiomyopathy ([facility] I42.9    BPH (benign prostatic hyperplasia) N40.0    Anxiety F41.9    Type 2 diabetes mellitus with foot ulcer ([facility] E11.621, L97.509    Medicare annual wellness visit, initial Z00.00    Age-related cataract H25.9    Pre-operative cardiovascular examination Z01.810    Hyperostosis M85.80    Status post amputation of left great toe [facility]) Z89.412    Right elbow pain M25.521      Past Medical History:   Diagnosis Date    Anemia 8/5/2017    Anxiety 8/5/2017    Arrhythmia     atrial fibrillation 2014    ASCVD (arteriosclerotic cardiovascular disease) 8/5/2017    BPH (benign prostatic hyperplasia) 8/5/2017    CAD (coronary artery disease)     h/o stents    Cancer ([address]     h/o skin cancer    Cardiomyopathy ([address] 8/5/2017    CHF (congestive heart failure) ([address] 8/5/2017    Chronic kidney disease     Stage IV    CKD (chronic kidney disease), stage IV (HCC) 8/5/2017    Diabetes [facility]     Diabetes mellitus ([facility] 8/5/2017    Diabetic neuropathy ([facility] 8/5/2017    DJD (degenerative joint disease) 8/5/2017    ED (erectile dysfunction) 8/5/2017    Gout 8/5/2017    High cholesterol     Hyperlipidemia 8/5/2017    Hypertension     Hypertension with renal disease 8/5/2017    Hypothyroid 8/5/2017    Insomnia 8/5/2017    Obesity 8/5/2017    On statin therapy 8/5/2017    Restless leg 8/5/2017    Thyroid disease     hypothyroid      Allergies   Allergen Reactions    Niacin Unknown (comments)    Levemir [Insulin Detemir] Hives    Other Medication Other (comments)     ? Allergy to Duraprep causing chemical burn    Primaxin [Imipenem-Cilastatin] Diarrhea and Rash    Xarelto [Rivaroxaban] Rash and Itching      Family History   Problem Relation Age of Onset    Heart Disease Mother     Kidney Disease Mother     Heart Disease Father     Kidney Disease Father     Cancer Sister      Breast      Social History     Social History    Marital status:      Spouse name: N/A    Number of children: N/A    Years of education: N/A     Occupational History    Not on file.      Social History Main Topics    Smoking status: Former Smoker     Packs/day: 0.50     Years: 4.00     Quit date: 3/6/1972    Smokeless tobacco: Never Used    Alcohol use No      Comment: rare, 1 drink per year    Drug use: No    Sexual activity: Not Currently Other Topics Concern    Not on file     Social History Narrative     Prior to Admission medications    Medication Sig Start Date End Date Taking? Authorizing Provider   MITIGARE 0.6 mg capsule TK 1 T PO  QD 2/20/18  Yes Historical Provider   valsartan (DIOVAN) 320 mg tablet TAKE 1 TABLET BY MOUTH DAILY 4/18/18  Yes Chayo Kramer MD   bumetanide (BUMEX) 2 mg tablet TAKE 2 TABLETS BY MOUTH TWICE DAILY 4/5/18  Yes Chayo Kramer MD   insulin lispro (HUMALOG) 100 unit/mL kwikpen 5 units before each meal three times a day. 2/16/18  Yes Chayo Kramer MD   clonazePAM (KLONOPIN) 2 mg tablet Take 1 Tab by mouth two (2) times a day. Max Daily Amount: 4 mg. Patient taking differently: Take 2 mg by mouth every evening. 2/16/18  Yes Chayo Kramer MD   pramipexole (MIRAPEX) 0.5 mg tablet TAKE 1 TABLET BY MOUTH EVERY NIGHT AT BEDTIME  Patient taking differently: Take 0.5 mg by mouth. TAKE 1 TABLET BY MOUTH EVERY NIGHT AT BEDTIME 2/14/18  Yes Chayo Kramer MD   metOLazone (ZAROXOLYN) 2.5 mg tablet Take 1 Tab by mouth as needed (pt takes approximately twice a month if has edema. ). Take twicw weekly for fluid  Patient taking differently: Take 5 mg by mouth as needed (pt takes approximately twice a month if has edema. ). Take twicw weekly for fluid 2/14/18  Yes Chayo Kramer MD   ULORIC 40 mg tab tablet TAKE 1 TABLET BY MOUTH DAILY 12/6/17  Yes Chayo Kramer MD   carvedilol (COREG) 12.5 mg tablet Take  by mouth two (2) times daily (with meals). Yes Historical Provider   PEN NEEDLE, DIABETIC (BD ULTRA-FINE DEVIN PEN NEEDLES) by Does Not Apply route. Yes Historical Provider   colchicine (COLCRYS) 0.6 mg tablet Take 0.6 mg by mouth daily. Takes 1 tablet 3 times a week. Indications: taking mitagar   Yes Historical Provider   Liraglutide (VICTOZA) 0.6 mg/0.1 mL (18 mg/3 mL) sub-q pen 0.6 mg by SubCUTAneous route daily.    Yes Historical Provider   apixaban (ELIQUIS) 5 mg tablet Take 5 mg by mouth two (2) times a day. Yes Historical Provider   finasteride (PROSCAR) 5 mg tablet Take 5 mg by mouth daily. Yes Historical Provider   tamsulosin (FLOMAX) 0.4 mg capsule Take 0.8 mg by mouth daily. Yes Historical Provider   insulin glargine (LANTUS SOLOSTAR) 100 unit/mL (3 mL) pen 30 Units by SubCUTAneous route daily. Yes Historical Provider        Review of Systems              Constitutional:  He denies fever, weight loss, sweats or fatigue. EYES: No blurred or double vision,               ENT: no nasal congestion, no headache or dizziness. No difficulty with               swallowing, taste, speech or smell. Respiratory:  No cough, wheezing or shortness of breath. No sputum production. Cardiac:  Denies chest pain, palpitations, unexplained indigestion, syncope, edema, PND or orthopnea. GI:  No changes in bowel movements, no abdominal pain, no bloating, anorexia, nausea, vomiting or heartburn. :  No frequency or dysuria. Denies incontinence or sexual dysfunction. Extremities:  No joint pain except right elbow as noted with associated pain and swelling  Back:.no pain or soreness  Skin:  No recent rashes or mole changes. Neurological:  No numbness, tingling, burning paresthesias or loss of motor strength. No syncope, dizziness, frequent headaches or memory loss. Hematologic:  No easy bruising  Lymphatic: No lymph node enlargement    Objective:     Vitals:    05/15/18 1519 05/15/18 1547   BP: 140/80 138/80   Pulse: 70    Resp: 15    Temp: 99 °F (37.2 °C)    TempSrc: Oral    SpO2: 98%    Weight: 258 lb (117 kg)    Height: 6' 5\" (1.956 m)    PainSc:  10 - Worst pain ever    PainLoc: Elbow        Body mass index is 30.59 kg/(m^2). Physical Examination:              General Appearance:  Well-developed, well-nourished, no acute distress. HEENT:      Ears:  The TMs and ear canals were clear. Eyes:  The pupillary responses were normal.  Extraocular muscle function intact. Lids and conjunctiva not injected. Funduscopic exam revealed sharp disc margins. Nares: Clear w/o edema or erythema  Pharynx:  Clear with teeth in good repair. No masses were noted. Neck:  Supple without thyromegaly or adenopathy. No JVD noted. No carotid                bruits. Lungs:  Clear to auscultation and percussion. Cardiac:  Regular rate and rhythm without murmur. PMI not displaced. No gallop, rub or click. Abdominal: Soft, non-tender, no hepata-spleenomegally or masses  Extremities:  No clubbing, cyanosis or edema. Soft tissue swelling pain and tenderness right elbow decreased range of motion because of pain. Skin:  No rash or unusual mole changes noted. Superficial abrasion right elbow  Lymph Nodes:  None felt in the cervical, supraclavicular, axillary or inguinal region. Neurological: . DTRs 2+ and symmetric. Station and gait normal.   Hematologic:   No purpura or petechiae        Assessment/Plan:         1. Right elbow pain        Impressions/Plan:  Impression  1. Right elbow pain x-ray obtained reveals no evidence of fractures I think this is all soft tissue and will use ice for 48 hours and heat after that  2. Superficial abrasion this is cleansed and sterilely dressed and instructed in local care. Recheck as previously scheduled. Advance care planning discussed with him he does not have anything present on file will get something for us. Orders Placed This Encounter    XR ELBOW RT MIN 3 V       Follow-up Disposition:  Return for At regular scheduled appointment. No results found for any visits on 05/15/18. Lon Klein MD    The patient was given after the visit summary the patient verbalized an understanding of the plans and problems as explained.

## 2018-05-15 NOTE — ACP (ADVANCE CARE PLANNING)
Discussed Advanced Care Directives. No information on file. Information given for patient to review.

## 2018-05-17 ENCOUNTER — ANESTHESIA (OUTPATIENT)
Dept: MEDSURG UNIT | Age: 68
End: 2018-05-17
Payer: MEDICARE

## 2018-05-17 ENCOUNTER — HOSPITAL ENCOUNTER (OUTPATIENT)
Dept: NON INVASIVE DIAGNOSTICS | Age: 68
Discharge: HOME | End: 2018-05-18
Attending: INTERNAL MEDICINE | Admitting: INTERNAL MEDICINE
Payer: MEDICARE

## 2018-05-17 ENCOUNTER — ANESTHESIA EVENT (OUTPATIENT)
Dept: MEDSURG UNIT | Age: 68
End: 2018-05-17
Payer: MEDICARE

## 2018-05-17 PROBLEM — Z98.890 S/P ABLATION OF ATRIAL FIBRILLATION: Status: ACTIVE | Noted: 2018-05-17

## 2018-05-17 PROBLEM — Z86.79 S/P ABLATION OF ATRIAL FIBRILLATION: Status: ACTIVE | Noted: 2018-05-17

## 2018-05-17 LAB
ACT BLD: 164 SECS (ref 79–138)
ACT BLD: 202 SECS (ref 79–138)
ACT BLD: 208 SECS (ref 79–138)
ACT BLD: 246 SECS (ref 79–138)
ACT BLD: 268 SECS (ref 79–138)
ACT BLD: 285 SECS (ref 79–138)
ACT BLD: 312 SECS (ref 79–138)
GLUCOSE BLD STRIP.AUTO-MCNC: 170 MG/DL (ref 65–100)
GLUCOSE BLD STRIP.AUTO-MCNC: 201 MG/DL (ref 65–100)
INR BLD: 1.1 (ref 0.9–1.2)
SERVICE CMNT-IMP: ABNORMAL
SERVICE CMNT-IMP: ABNORMAL

## 2018-05-17 PROCEDURE — 77030016898 HC CBL EP EXT3 STJU -B

## 2018-05-17 PROCEDURE — 77030026438 HC STYL ET INTUB CARD -A: Performed by: NURSE ANESTHETIST, CERTIFIED REGISTERED

## 2018-05-17 PROCEDURE — 77030003700 HC NDL TSEPTL STJU -C

## 2018-05-17 PROCEDURE — 74011250636 HC RX REV CODE- 250/636

## 2018-05-17 PROCEDURE — 77030019908 HC STETH ESOPH SIMS -A: Performed by: NURSE ANESTHETIST, CERTIFIED REGISTERED

## 2018-05-17 PROCEDURE — 85610 PROTHROMBIN TIME: CPT

## 2018-05-17 PROCEDURE — 77030015398 HC CBL EP EXT STJU -C

## 2018-05-17 PROCEDURE — 77030008684 HC TU ET CUF COVD -B: Performed by: NURSE ANESTHETIST, CERTIFIED REGISTERED

## 2018-05-17 PROCEDURE — 93325 DOPPLER ECHO COLOR FLOW MAPG: CPT

## 2018-05-17 PROCEDURE — 85347 COAGULATION TIME ACTIVATED: CPT

## 2018-05-17 PROCEDURE — 77030034850

## 2018-05-17 PROCEDURE — 77030033352 HC TBNG IRR PMP COOL PNT STJU -B

## 2018-05-17 PROCEDURE — 77030013797 HC KT TRNSDUC PRSSR EDWD -A

## 2018-05-17 PROCEDURE — 76210000006 HC OR PH I REC 0.5 TO 1 HR

## 2018-05-17 PROCEDURE — 76060000038 HC ANESTHESIA 3.5 TO 4 HR

## 2018-05-17 PROCEDURE — 77030010880 HC CBL EP SUPRME STJU -C

## 2018-05-17 PROCEDURE — 77030011640 HC PAD GRND REM COVD -A

## 2018-05-17 PROCEDURE — 74011250636 HC RX REV CODE- 250/636: Performed by: INTERNAL MEDICINE

## 2018-05-17 PROCEDURE — 77030018729 HC ELECTRD DEFIB PAD CARD -B

## 2018-05-17 PROCEDURE — 93657 TX L/R ATRIAL FIB ADDL: CPT

## 2018-05-17 PROCEDURE — C1893 INTRO/SHEATH, FIXED,NON-PEEL: HCPCS

## 2018-05-17 PROCEDURE — 77030030806 HC PTCH ENSIT NAVX STJU -G

## 2018-05-17 PROCEDURE — 77030013079 HC BLNKT BAIR HGGR 3M -A: Performed by: NURSE ANESTHETIST, CERTIFIED REGISTERED

## 2018-05-17 PROCEDURE — 74011250637 HC RX REV CODE- 250/637: Performed by: INTERNAL MEDICINE

## 2018-05-17 PROCEDURE — C1730 CATH, EP, 19 OR FEW ELECT: HCPCS

## 2018-05-17 PROCEDURE — C1766 INTRO/SHEATH,STRBLE,NON-PEEL: HCPCS

## 2018-05-17 PROCEDURE — 74011000250 HC RX REV CODE- 250

## 2018-05-17 PROCEDURE — 74011636637 HC RX REV CODE- 636/637: Performed by: INTERNAL MEDICINE

## 2018-05-17 PROCEDURE — 82962 GLUCOSE BLOOD TEST: CPT

## 2018-05-17 PROCEDURE — C2630 CATH, EP, COOL-TIP: HCPCS

## 2018-05-17 PROCEDURE — C1731 CATH, EP, 20 OR MORE ELEC: HCPCS

## 2018-05-17 PROCEDURE — 77030029065 HC DRSG HEMO QCLOT ZMED -B

## 2018-05-17 PROCEDURE — C1759 CATH, INTRA ECHOCARDIOGRAPHY: HCPCS

## 2018-05-17 PROCEDURE — C1894 INTRO/SHEATH, NON-LASER: HCPCS

## 2018-05-17 PROCEDURE — 77030029359 HC PRB ESOPH TEMP CATH ANTM -F

## 2018-05-17 RX ORDER — FENTANYL CITRATE 50 UG/ML
12.5-5 INJECTION, SOLUTION INTRAMUSCULAR; INTRAVENOUS
Status: DISCONTINUED | OUTPATIENT
Start: 2018-05-17 | End: 2018-05-17

## 2018-05-17 RX ORDER — DIPHENHYDRAMINE HYDROCHLORIDE 50 MG/ML
12.5 INJECTION, SOLUTION INTRAMUSCULAR; INTRAVENOUS
Status: CANCELLED | OUTPATIENT
Start: 2018-05-17

## 2018-05-17 RX ORDER — MIDAZOLAM HYDROCHLORIDE 1 MG/ML
1-5 INJECTION, SOLUTION INTRAMUSCULAR; INTRAVENOUS
Status: DISCONTINUED | OUTPATIENT
Start: 2018-05-17 | End: 2018-05-17

## 2018-05-17 RX ORDER — DOBUTAMINE HYDROCHLORIDE 200 MG/100ML
INJECTION INTRAVENOUS
Status: DISPENSED
Start: 2018-05-17 | End: 2018-05-17

## 2018-05-17 RX ORDER — CLONAZEPAM 1 MG/1
2 TABLET ORAL EVERY EVENING
Status: DISCONTINUED | OUTPATIENT
Start: 2018-05-17 | End: 2018-05-18 | Stop reason: HOSPADM

## 2018-05-17 RX ORDER — SODIUM CHLORIDE 0.9 % (FLUSH) 0.9 %
5-10 SYRINGE (ML) INJECTION AS NEEDED
Status: CANCELLED | OUTPATIENT
Start: 2018-05-17

## 2018-05-17 RX ORDER — PRAMIPEXOLE DIHYDROCHLORIDE 0.25 MG/1
0.5 TABLET ORAL
Status: DISCONTINUED | OUTPATIENT
Start: 2018-05-17 | End: 2018-05-18 | Stop reason: HOSPADM

## 2018-05-17 RX ORDER — MIDAZOLAM HYDROCHLORIDE 1 MG/ML
INJECTION, SOLUTION INTRAMUSCULAR; INTRAVENOUS
Status: COMPLETED
Start: 2018-05-17 | End: 2018-05-17

## 2018-05-17 RX ORDER — HEPARIN SODIUM 200 [USP'U]/100ML
1500 INJECTION, SOLUTION INTRAVENOUS ONCE
Status: COMPLETED | OUTPATIENT
Start: 2018-05-17 | End: 2018-05-18

## 2018-05-17 RX ORDER — SODIUM CHLORIDE 0.9 % (FLUSH) 0.9 %
5-10 SYRINGE (ML) INJECTION EVERY 8 HOURS
Status: DISCONTINUED | OUTPATIENT
Start: 2018-05-17 | End: 2018-05-18 | Stop reason: HOSPADM

## 2018-05-17 RX ORDER — HEPARIN SODIUM 1000 [USP'U]/ML
INJECTION, SOLUTION INTRAVENOUS; SUBCUTANEOUS AS NEEDED
Status: DISCONTINUED | OUTPATIENT
Start: 2018-05-17 | End: 2018-05-17 | Stop reason: HOSPADM

## 2018-05-17 RX ORDER — PROTAMINE SULFATE 10 MG/ML
INJECTION, SOLUTION INTRAVENOUS
Status: COMPLETED
Start: 2018-05-17 | End: 2018-05-17

## 2018-05-17 RX ORDER — FINASTERIDE 5 MG/1
5 TABLET, FILM COATED ORAL DAILY
Status: DISCONTINUED | OUTPATIENT
Start: 2018-05-18 | End: 2018-05-18 | Stop reason: HOSPADM

## 2018-05-17 RX ORDER — HEPARIN SODIUM 1000 [USP'U]/ML
INJECTION, SOLUTION INTRAVENOUS; SUBCUTANEOUS
Status: COMPLETED
Start: 2018-05-17 | End: 2018-05-17

## 2018-05-17 RX ORDER — MIDAZOLAM HYDROCHLORIDE 1 MG/ML
INJECTION, SOLUTION INTRAMUSCULAR; INTRAVENOUS AS NEEDED
Status: DISCONTINUED | OUTPATIENT
Start: 2018-05-17 | End: 2018-05-17 | Stop reason: HOSPADM

## 2018-05-17 RX ORDER — PROPOFOL 10 MG/ML
INJECTION, EMULSION INTRAVENOUS AS NEEDED
Status: DISCONTINUED | OUTPATIENT
Start: 2018-05-17 | End: 2018-05-17 | Stop reason: HOSPADM

## 2018-05-17 RX ORDER — SODIUM CHLORIDE 0.9 % (FLUSH) 0.9 %
5-10 SYRINGE (ML) INJECTION EVERY 8 HOURS
Status: CANCELLED | OUTPATIENT
Start: 2018-05-17

## 2018-05-17 RX ORDER — LIDOCAINE HYDROCHLORIDE 10 MG/ML
0.1 INJECTION, SOLUTION EPIDURAL; INFILTRATION; INTRACAUDAL; PERINEURAL AS NEEDED
Status: CANCELLED | OUTPATIENT
Start: 2018-05-17

## 2018-05-17 RX ORDER — FENTANYL CITRATE 50 UG/ML
INJECTION, SOLUTION INTRAMUSCULAR; INTRAVENOUS
Status: COMPLETED
Start: 2018-05-17 | End: 2018-05-17

## 2018-05-17 RX ORDER — PROTAMINE SULFATE 10 MG/ML
INJECTION, SOLUTION INTRAVENOUS AS NEEDED
Status: DISCONTINUED | OUTPATIENT
Start: 2018-05-17 | End: 2018-05-17 | Stop reason: HOSPADM

## 2018-05-17 RX ORDER — INSULIN GLARGINE 100 [IU]/ML
30 INJECTION, SOLUTION SUBCUTANEOUS DAILY
Status: DISCONTINUED | OUTPATIENT
Start: 2018-05-17 | End: 2018-05-18 | Stop reason: HOSPADM

## 2018-05-17 RX ORDER — FENTANYL CITRATE 50 UG/ML
INJECTION, SOLUTION INTRAMUSCULAR; INTRAVENOUS AS NEEDED
Status: DISCONTINUED | OUTPATIENT
Start: 2018-05-17 | End: 2018-05-17 | Stop reason: HOSPADM

## 2018-05-17 RX ORDER — OXYCODONE AND ACETAMINOPHEN 5; 325 MG/1; MG/1
1 TABLET ORAL
Status: DISCONTINUED | OUTPATIENT
Start: 2018-05-17 | End: 2018-05-18 | Stop reason: HOSPADM

## 2018-05-17 RX ORDER — HEPARIN SODIUM 10000 [USP'U]/100ML
INJECTION, SOLUTION INTRAVENOUS
Status: DISCONTINUED
Start: 2018-05-17 | End: 2018-05-18 | Stop reason: HOSPADM

## 2018-05-17 RX ORDER — HEPARIN SODIUM 1000 [USP'U]/ML
INJECTION, SOLUTION INTRAVENOUS; SUBCUTANEOUS
Status: DISCONTINUED | OUTPATIENT
Start: 2018-05-17 | End: 2018-05-17 | Stop reason: HOSPADM

## 2018-05-17 RX ORDER — ONDANSETRON 2 MG/ML
4 INJECTION INTRAMUSCULAR; INTRAVENOUS AS NEEDED
Status: CANCELLED | OUTPATIENT
Start: 2018-05-17

## 2018-05-17 RX ORDER — CARVEDILOL 3.12 MG/1
6.25 TABLET ORAL 2 TIMES DAILY WITH MEALS
Status: DISCONTINUED | OUTPATIENT
Start: 2018-05-17 | End: 2018-05-18 | Stop reason: HOSPADM

## 2018-05-17 RX ORDER — ONDANSETRON 2 MG/ML
INJECTION INTRAMUSCULAR; INTRAVENOUS AS NEEDED
Status: DISCONTINUED | OUTPATIENT
Start: 2018-05-17 | End: 2018-05-17 | Stop reason: HOSPADM

## 2018-05-17 RX ORDER — DOBUTAMINE HYDROCHLORIDE 200 MG/100ML
0-10 INJECTION INTRAVENOUS
Status: DISCONTINUED | OUTPATIENT
Start: 2018-05-17 | End: 2018-05-17

## 2018-05-17 RX ORDER — TAMSULOSIN HYDROCHLORIDE 0.4 MG/1
0.8 CAPSULE ORAL DAILY
Status: DISCONTINUED | OUTPATIENT
Start: 2018-05-18 | End: 2018-05-18 | Stop reason: HOSPADM

## 2018-05-17 RX ORDER — MORPHINE SULFATE 10 MG/ML
2 INJECTION, SOLUTION INTRAMUSCULAR; INTRAVENOUS
Status: CANCELLED | OUTPATIENT
Start: 2018-05-17

## 2018-05-17 RX ORDER — SODIUM CHLORIDE 0.9 % (FLUSH) 0.9 %
5-10 SYRINGE (ML) INJECTION AS NEEDED
Status: DISCONTINUED | OUTPATIENT
Start: 2018-05-17 | End: 2018-05-18 | Stop reason: HOSPADM

## 2018-05-17 RX ORDER — PANTOPRAZOLE SODIUM 40 MG/1
40 TABLET, DELAYED RELEASE ORAL
Status: DISCONTINUED | OUTPATIENT
Start: 2018-05-18 | End: 2018-05-18 | Stop reason: HOSPADM

## 2018-05-17 RX ORDER — ACETAMINOPHEN 325 MG/1
650 TABLET ORAL
Status: DISCONTINUED | OUTPATIENT
Start: 2018-05-17 | End: 2018-05-18 | Stop reason: HOSPADM

## 2018-05-17 RX ORDER — COLCHICINE 0.6 MG/1
0.6 TABLET ORAL DAILY
Status: DISCONTINUED | OUTPATIENT
Start: 2018-05-18 | End: 2018-05-18 | Stop reason: HOSPADM

## 2018-05-17 RX ORDER — HEPARIN SODIUM 1000 [USP'U]/ML
30000 INJECTION, SOLUTION INTRAVENOUS; SUBCUTANEOUS ONCE
Status: DISCONTINUED | OUTPATIENT
Start: 2018-05-17 | End: 2018-05-17 | Stop reason: HOSPADM

## 2018-05-17 RX ORDER — SUCRALFATE 1 G/10ML
1 SUSPENSION ORAL
Status: DISCONTINUED | OUTPATIENT
Start: 2018-05-17 | End: 2018-05-18 | Stop reason: HOSPADM

## 2018-05-17 RX ORDER — HEPARIN SODIUM 10000 [USP'U]/100ML
12-25 INJECTION, SOLUTION INTRAVENOUS CONTINUOUS
Status: DISCONTINUED | OUTPATIENT
Start: 2018-05-17 | End: 2018-05-17 | Stop reason: HOSPADM

## 2018-05-17 RX ORDER — FENTANYL CITRATE 50 UG/ML
25 INJECTION, SOLUTION INTRAMUSCULAR; INTRAVENOUS
Status: CANCELLED | OUTPATIENT
Start: 2018-05-17

## 2018-05-17 RX ORDER — ROCURONIUM BROMIDE 10 MG/ML
INJECTION, SOLUTION INTRAVENOUS AS NEEDED
Status: DISCONTINUED | OUTPATIENT
Start: 2018-05-17 | End: 2018-05-17 | Stop reason: HOSPADM

## 2018-05-17 RX ORDER — PROTAMINE SULFATE 10 MG/ML
25-100 INJECTION, SOLUTION INTRAVENOUS
Status: DISCONTINUED | OUTPATIENT
Start: 2018-05-17 | End: 2018-05-17

## 2018-05-17 RX ORDER — SODIUM CHLORIDE, SODIUM LACTATE, POTASSIUM CHLORIDE, CALCIUM CHLORIDE 600; 310; 30; 20 MG/100ML; MG/100ML; MG/100ML; MG/100ML
25 INJECTION, SOLUTION INTRAVENOUS CONTINUOUS
Status: CANCELLED | OUTPATIENT
Start: 2018-05-17

## 2018-05-17 RX ORDER — SUCCINYLCHOLINE CHLORIDE 20 MG/ML
INJECTION INTRAMUSCULAR; INTRAVENOUS AS NEEDED
Status: DISCONTINUED | OUTPATIENT
Start: 2018-05-17 | End: 2018-05-17 | Stop reason: HOSPADM

## 2018-05-17 RX ORDER — SODIUM CHLORIDE 9 MG/ML
INJECTION, SOLUTION INTRAVENOUS
Status: DISCONTINUED | OUTPATIENT
Start: 2018-05-17 | End: 2018-05-17 | Stop reason: HOSPADM

## 2018-05-17 RX ORDER — SODIUM CHLORIDE, SODIUM LACTATE, POTASSIUM CHLORIDE, CALCIUM CHLORIDE 600; 310; 30; 20 MG/100ML; MG/100ML; MG/100ML; MG/100ML
25 INJECTION, SOLUTION INTRAVENOUS CONTINUOUS
Status: CANCELLED | OUTPATIENT
Start: 2018-05-17 | End: 2018-05-18

## 2018-05-17 RX ADMIN — CARVEDILOL 6.25 MG: 6.25 TABLET, FILM COATED ORAL at 17:06

## 2018-05-17 RX ADMIN — ROCURONIUM BROMIDE 5 MG: 10 INJECTION, SOLUTION INTRAVENOUS at 10:07

## 2018-05-17 RX ADMIN — FENTANYL CITRATE 25 MCG: 50 INJECTION, SOLUTION INTRAMUSCULAR; INTRAVENOUS at 13:30

## 2018-05-17 RX ADMIN — APIXABAN 5 MG: 5 TABLET, FILM COATED ORAL at 19:56

## 2018-05-17 RX ADMIN — HEPARIN SODIUM 5000 UNITS: 1000 INJECTION, SOLUTION INTRAVENOUS; SUBCUTANEOUS at 11:52

## 2018-05-17 RX ADMIN — SUCCINYLCHOLINE CHLORIDE 160 MG: 20 INJECTION INTRAMUSCULAR; INTRAVENOUS at 10:07

## 2018-05-17 RX ADMIN — HEPARIN SODIUM 1000 UNITS/HR: 1000 INJECTION, SOLUTION INTRAVENOUS; SUBCUTANEOUS at 10:52

## 2018-05-17 RX ADMIN — PRAMIPEXOLE DIHYDROCHLORIDE 0.5 MG: 0.25 TABLET ORAL at 21:53

## 2018-05-17 RX ADMIN — PROPOFOL 160 MG: 10 INJECTION, EMULSION INTRAVENOUS at 10:07

## 2018-05-17 RX ADMIN — FENTANYL CITRATE 25 MCG: 50 INJECTION, SOLUTION INTRAMUSCULAR; INTRAVENOUS at 10:46

## 2018-05-17 RX ADMIN — Medication 10 ML: at 17:07

## 2018-05-17 RX ADMIN — MIDAZOLAM HYDROCHLORIDE 2 MG: 1 INJECTION, SOLUTION INTRAMUSCULAR; INTRAVENOUS at 10:04

## 2018-05-17 RX ADMIN — FENTANYL CITRATE 25 MCG: 50 INJECTION, SOLUTION INTRAMUSCULAR; INTRAVENOUS at 11:30

## 2018-05-17 RX ADMIN — HEPARIN SODIUM 5000 UNITS: 1000 INJECTION, SOLUTION INTRAVENOUS; SUBCUTANEOUS at 11:30

## 2018-05-17 RX ADMIN — HEPARIN SODIUM 8000 UNITS: 1000 INJECTION, SOLUTION INTRAVENOUS; SUBCUTANEOUS at 11:10

## 2018-05-17 RX ADMIN — FENTANYL CITRATE 25 MCG: 50 INJECTION, SOLUTION INTRAMUSCULAR; INTRAVENOUS at 12:19

## 2018-05-17 RX ADMIN — HEPARIN SODIUM 5000 UNITS: 1000 INJECTION, SOLUTION INTRAVENOUS; SUBCUTANEOUS at 12:15

## 2018-05-17 RX ADMIN — OXYCODONE HYDROCHLORIDE AND ACETAMINOPHEN 1 TABLET: 5; 325 TABLET ORAL at 19:56

## 2018-05-17 RX ADMIN — FENTANYL CITRATE 100 MCG: 50 INJECTION, SOLUTION INTRAMUSCULAR; INTRAVENOUS at 10:07

## 2018-05-17 RX ADMIN — SUCRALFATE 1 G: 1 SUSPENSION ORAL at 15:27

## 2018-05-17 RX ADMIN — PROTAMINE SULFATE 25 MG: 10 INJECTION, SOLUTION INTRAVENOUS at 13:08

## 2018-05-17 RX ADMIN — SODIUM CHLORIDE: 9 INJECTION, SOLUTION INTRAVENOUS at 09:55

## 2018-05-17 RX ADMIN — PROTAMINE SULFATE 60 MG: 10 INJECTION, SOLUTION INTRAVENOUS at 12:48

## 2018-05-17 RX ADMIN — HEPARIN SODIUM 3000 UNITS: 200 INJECTION, SOLUTION INTRAVENOUS at 10:32

## 2018-05-17 RX ADMIN — Medication 10 ML: at 21:53

## 2018-05-17 RX ADMIN — CLONAZEPAM 2 MG: 1 TABLET ORAL at 21:53

## 2018-05-17 RX ADMIN — HEPARIN SODIUM 12000 UNITS: 1000 INJECTION, SOLUTION INTRAVENOUS; SUBCUTANEOUS at 10:52

## 2018-05-17 RX ADMIN — ONDANSETRON 4 MG: 2 INJECTION INTRAMUSCULAR; INTRAVENOUS at 10:40

## 2018-05-17 RX ADMIN — INSULIN GLARGINE 30 UNITS: 100 INJECTION, SOLUTION SUBCUTANEOUS at 19:57

## 2018-05-17 RX ADMIN — OXYCODONE HYDROCHLORIDE AND ACETAMINOPHEN 1 TABLET: 5; 325 TABLET ORAL at 15:26

## 2018-05-17 NOTE — IP AVS SNAPSHOT
[address] 845 [facility] 
[phone] Patient: Blake Rois. MRN: [id number] :1950 About your hospitalization You were admitted on:  May 17, 2018 You last received care in the:  [facility] 2 INTRVNTNL CARDIO You were discharged on:  May 18, 2018 Why you were hospitalized Your primary diagnosis was:  Not on File Your diagnoses also included:  S/P Ablation Of Atrial Fibrillation Follow-up Information Follow up With Details Comments Contact Info Alma Rosa Schwarz MD Go on 2018 Hospital follow-up scheduled at 11:20am (If you have questions or need to reschedule please call [address] 
[phone] Tristin Shelton NP Go on 2018 Hospital follow-up scheduled at 10:30am for post-ablation follow-up (If you have questions or need to reschedule please call [phone] [facility] [address] [facility] 
[phone] Your Scheduled Appointments Friday May 25, 2018  2:30 PM EDT  
WOUND CARE FOLLOW UP with Jg Kirkland DPM, [facility] WOUND CARE 3  
[facility] OP WOUND CARE ([address]) [address] [facility] [address] [id number] [phone] Tuesday May 29, 2018 11:20 AM EDT Office Visit with Alma Rosa Schwarz MD  
[address] ([facility]) [address] [id number] [phone] 2018  1:40 PM EDT FOLLOW UP 10 with Alma Rosa Schwarz MD  
[facility] ([facility]) [address] [id number] [phone] Discharge Orders None A check karla indicates which time of day the medication should be taken. My Medications START taking these medications  Instructions Each Dose to Equal  
 Morning Noon Evening Bedtime  
 pantoprazole 40 mg tablet Commonly known as:  PROTONIX Start taking on:  5/19/2018 Your last dose was: Your next dose is: Take 1 Tab by mouth Daily (before breakfast) for 30 days. 40 mg  
    
   
   
   
  
 sucralfate 100 mg/mL suspension Commonly known as:  Oscar Kovacs Your last dose was: Your next dose is: Take 10 mL by mouth Before breakfast and dinner for 30 days. 1 g CHANGE how you take these medications Instructions Each Dose to Equal  
 Morning Noon Evening Bedtime  
 clonazePAM 2 mg tablet Commonly known as:  Jarrett Parisi What changed:  when to take this Your last dose was: Your next dose is: Take 1 Tab by mouth two (2) times a day. Max Daily Amount: 4 mg.  
 2 mg  
    
   
   
   
  
 metOLazone 2.5 mg tablet Commonly known as:  Perri Sosa What changed:   
- how much to take 
- additional instructions Your last dose was: Your next dose is: Take 1 Tab by mouth as needed (pt takes approximately twice a month if has edema. ). Take twicw weekly for fluid 2.5 mg  
    
   
   
   
  
 pramipexole 0.5 mg tablet Commonly known as:  MIRAPEX What changed:   
- how much to take 
- how to take this 
- additional instructions Your last dose was: Your next dose is: TAKE 1 TABLET BY MOUTH EVERY NIGHT AT BEDTIME CONTINUE taking these medications Instructions Each Dose to Equal  
 Morning Noon Evening Bedtime BD ULTRA-FINE DEVIN PEN NEEDLES Your last dose was: Your next dose is:    
   
   
 by Does Not Apply route. bumetanide 2 mg tablet Commonly known as:  Joy Santillan Your last dose was: Your next dose is: TAKE 2 TABLETS BY MOUTH TWICE DAILY * COLCRYS 0.6 mg tablet Generic drug:  colchicine Your last dose was: Your next dose is: Take 0.6 mg by mouth daily. Takes 1 tablet 3 times a week. Indications: taking mitagar  
 0.6 mg  
    
   
   
   
  
 * MITIGARE 0.6 mg capsule Generic drug:  colchicine Your last dose was: Your next dose is:    
   
   
 TK 1 T PO  QD  
     
   
   
   
  
 COREG 12.5 mg tablet Generic drug:  carvedilol Your last dose was: Your next dose is: Take  by mouth two (2) times daily (with meals). ELIQUIS 5 mg tablet Generic drug:  apixaban Your last dose was: Your next dose is: Take 5 mg by mouth two (2) times a day. 5 mg  
    
   
   
   
  
 finasteride 5 mg tablet Commonly known as:  PROSCAR Your last dose was: Your next dose is: Take 5 mg by mouth daily. 5 mg  
    
   
   
   
  
 insulin lispro 100 unit/mL kwikpen Commonly known as:  HUMALOG Your last dose was: Your next dose is:    
   
   
 5 units before each meal three times a day. LANTUS SOLOSTAR U-100 INSULIN 100 unit/mL (3 mL) Inpn Generic drug:  insulin glargine Your last dose was: Your next dose is:    
   
   
 30 Units by SubCUTAneous route daily. 30 Units Liraglutide 0.6 mg/0.1 mL (18 mg/3 mL) Pnij Commonly known as:  Padmini Darrel Your last dose was: Your next dose is: 0.6 mg by SubCUTAneous route daily. 0.6 mg  
    
   
   
   
  
 tamsulosin 0.4 mg capsule Commonly known as:  FLOMAX Your last dose was: Your next dose is: Take 0.8 mg by mouth daily. 0.8 mg  
    
   
   
   
  
 ULORIC 40 mg Tab tablet Generic drug:  febuxostat Your last dose was: Your next dose is: TAKE 1 TABLET BY MOUTH DAILY * Notice: This list has 2 medication(s) that are the same as other medications prescribed for you. Read the directions carefully, and ask your doctor or other care provider to review them with you. STOP taking these medications   
 valsartan 320 mg tablet Commonly known as:  DIOVAN Where to Get Your Medications These medications were sent to [facility], [address] [place] [address] [address]  [facility] [address]  [address] [place] [address], [address] [place] [id number] Phone:  [phone]  
  pantoprazole 40 mg tablet  
 sucralfate 100 mg/mL suspension Discharge Instructions POST-EP STUDY AND ABLATION DISCHARGE INSTRUCTIONS: 
 
You had a transesophageal echo followed by an EP study and ablation for atrial arrhythmias by Dr. Steven Dickey on 5/17/2018. Please resume all your usual medications without change (any discrepancy in our hospital record, ignore) and also the two new medications (pantoprazole and sucralfate x 1 month). Do not drive, operate any machinery, or sign any legal documents for 24 hours after your procedure. You must have someone to drive you home. You may take a shower 24 hours after your cardiac procedure. Be sure to get the dressing wet and then remove it; gently wash the area with warm soapy water. Pat dry and leave open to air. To help prevent infections, be sure to keep the cath site clean and dry. No lotions, creams, powders, ointments, etc. in the cath site for approximately 1 week. ? Do not take a tub bath, get in a hot tub or swimming pool for approximately 5 days or until the cath site is completely healed. ? No strenuous activity or heavy lifting over 20 lbs. for 7 days. ? After your procedure, some bruising or discomfort is common during the healing process.   Tylenol, 1-2 tablets every 6 hours as needed, is recommended if you experience any discomfort. If you experience any signs or symptoms of infection such as fever, chills, or poorly healing incision, persistent tenderness or swelling in the groin, redness and/or warmth to the touch, numbness, significant tingling or pain at the groin site or affected extremity, rash, drainage from the site, or if the leg feels tight or swollen, call your physician right away. ? If bleeding at the site occurs, take a clean gauze pad and apply direct pressure to the groin just above the puncture site, and call your physician right away. ? If your procedure involved ablation therapy, you may feel some mild or vague chest discomfort due to delivery of heat therapy to the heart muscle. This should resolve in 1-2 days. If it gets worse or is associated with shortness of breath, dizziness, loss of consciousness, call your physician right away or call 911 if emergency medical care is needed. Signed: 
Meghan Angeles MD 
 
[facility] Transitions of Care [address] offers a voluntary care coordination program to provide high quality service and care to [facility] fee-for-service beneficiaries. Merry Bumpers was designed to help you enhance your health and well-being through the following services: ? Transitions of Care  support for individuals who are transitioning from one care setting to another (example: Hospital to home). ? Chronic and Complex Care Coordination  support for individuals and caregivers of those with serious or chronic illnesses or with more than one chronic (ongoing) condition and those who take a number of different medications. If you meet specific medical criteria, a [address] [place] [address] Rd may call you directly to coordinate your care with your primary care physician and your other care providers. For questions about the [facility] programs, please, contact your physicians office. For general questions or additional information about Accountable Care Organizations: 
Please visit www.medicare.gov/acos. html or call 1-800-MEDICARE (8-113.130.1518) TTY users should call 9-341.642.1759. Introducing [facility]! Dear Skyler Vora: 
Thank you for requesting a "[employer] account. Our records indicate that you already have an active "[employer] account. You can access your account anytime at [email or web address] Did you know that you can access your hospital and ER discharge instructions at any time in "[employer]? You can also review all of your test results from your hospital stay or ER visit. Additional Information If you have questions, please visit the Frequently Asked Questions section of the "[employer] website at [email or web address] Remember, "[employer] is NOT to be used for urgent needs. For medical emergencies, dial 911. Now available from your iPhone and Android! Introducing Yan Landin As a [facility]pkins patient, I wanted to make you aware of our electronic visit tool called Yan Landin. [facility]kins 24/7 allows you to connect within minutes with a medical provider 24 hours a day, seven days a week via a mobile device or tablet or logging into a secure website from your computer. You can access Yan Parisfin from anywhere in the [place]. A virtual visit might be right for you when you have a simple condition and feel like you just dont want to get out of bed, or cant get away from work for an appointment, when your regular Alex Tompkins provider is not available (evenings, weekends or holidays), or when youre out of town and need minor care.   Electronic visits cost only $49 and if the Yan Parismary lou provider determines a prescription is needed to treat your condition, one can be electronically transmitted to a nearby pharmacy*. Please take a moment to enroll today if you have not already done so. The enrollment process is free and takes just a few minutes. To enroll, please download the [employer] 24/7 coco to your tablet or phone, or visit [email or web address] to enroll on your computer. And, as an [address] [place] [address] patient with a [employer] account, the results of your visits will be scanned into your electronic medical record and your primary care provider will be able to view the scanned results. We urge you to continue to see your regular Gilberto Lynn provider for your ongoing medical care. And while your primary care provider may not be the one available when you seek a [employer] virtual visit, the peace of mind you get from getting a real diagnosis real time can be priceless. For more information on [employer], view our Frequently Asked Questions (FAQs) at [email or web address]. Sincerely, 
 
River Herrera MD 
Chief Medical Officer [employer] *:  certain medications cannot be prescribed via [employer] Providers Seen During Your Hospitalization Provider Specialty Primary office phone Phillip Sarah MD Cardiology [phone] Your Primary Care Physician (PCP) Primary Care Physician Office Phone Office Fax Jose Matthew [phone] You are allergic to the following Allergen Reactions Niacin Unknown (comments) Levemir (Insulin Detemir) Hives Other Medication Other (comments) ? Allergy to Amanda Hiss causing chemical burn Primaxin (Imipenem-Cilastatin) Diarrhea Rash Xarelto (Rivaroxaban) Rash Itching Recent Documentation Height Weight BMI Smoking Status 1.956 m 115.3 kg 30.13 kg/m2 Former Smoker Emergency Contacts Name Discharge Info Relation Home Work Mobile René Hull CAREGIVER [3] Spouse [3] [phone] Patient Belongings The following personal items are in your possession at time of discharge: 
  Dental Appliances: None         Home Medications: None   Jewelry: None  Clothing: At bedside    Other Valuables: None Please provide this summary of care documentation to your next provider. Signatures-by signing, you are acknowledging that this After Visit Summary has been reviewed with you and you have received a copy. Patient Signature:  ____________________________________________________________ Date:  ____________________________________________________________  
  
[facility] Provider Signature:  ____________________________________________________________ Date:  ____________________________________________________________

## 2018-05-17 NOTE — PROGRESS NOTES
Reason for Admission:   Ablation               RRAT Score: 32                 Resources/supports as identified by patient/family:   Wife,                Top Challenges facing patient (as identified by patient/family and CM): Finances/Medication cost?   none                 Transportation?  none              Support system or lack thereof? Just patients wife                     Living arrangements? Lives with wife in a one story home with no steps to enter the home           Self-care/ADLs/Cognition? Patient is independent with ADL/IADL. Patient also drives and works          Current Advanced Directive/Advance Care Plan:                            Plan for utilizing home health:   Not at this time                       Likelihood of readmission: high               Transition of Care Plan:      Patient plans to discharge home tomorrow without any needs or concerns. Patients wife will transport pt home. NN contacted. CM Specialist were asked to schedule follow-up appointments. PCP- Dr Andreas Mata on [address]    Care Management Interventions  PCP Verified by CM: Yes (Dr Ritika Francis)  Mode of Transport at Discharge:  Other (see comment) (Wife)  Transition of Care Consult (CM Consult): Discharge Planning  Discharge Durable Medical Equipment: No (no DME use)  Physical Therapy Consult: No  Occupational Therapy Consult: No  Speech Therapy Consult: No  Current Support Network: Lives with Spouse (Lives in a two story home with no steps to enter the home)  Confirm Follow Up Transport: Self  Plan discussed with Pt/Family/Caregiver: Yes  Discharge Location  Discharge Placement: Home      Fatou Brochure  Ext [phone]

## 2018-05-17 NOTE — PERIOP NOTES
Handoff Report from Operating Room to PACU    Report received from Aleksandr Up  and Shantel Hackett CRNA  regarding [place] Soraya. Hari Friedman      Surgeon(s):  Case Anesthesia  And Procedure(s) (LRB):  PACU/RECOVERY             EP/LAB (N/A)  confirmed   with allergies, drains and dressings discussed. Anesthesia type, drugs, patient history, complications, estimated blood loss, vital signs, intake and output, and last pain medication and reversal medications were reviewed.

## 2018-05-17 NOTE — PERIOP NOTES
TRANSFER - OUT REPORT:    Verbal report given to [employer] (name) on [facility] Ivania AlcantaraLaveen.  being transferred to 2160(unit) for routine post - op       Report consisted of patients Situation, Background, Assessment and   Recommendations(SBAR). Information from the following report(s) SBAR, Kardex, OR Summary, Intake/Output, MAR, Recent Results and Cardiac Rhythm paced was reviewed with the receiving nurse. Opportunity for questions and clarification was provided.       Patient transported with:   Monitor  O2 @ 1 liters  Registered Nurse  [facility]

## 2018-05-17 NOTE — ANESTHESIA POSTPROCEDURE EVALUATION
Post-Anesthesia Evaluation and Assessment    Patient: Ayush Thompson. MRN: [id number]  SSN: [id number]    YOB: 1950  Age: 69 y.o. Sex: male       Cardiovascular Function/Vital Signs  Visit Vitals    /67    Pulse 62    Temp 36.9 °C (98.5 °F)    Resp 14    Ht 6' 5\" (1.956 m)    Wt 115.3 kg (254 lb 1.6 oz)    SpO2 100%    BMI 30.13 kg/m2       Patient is status post general anesthesia for * No procedures listed *. Nausea/Vomiting: None    Postoperative hydration reviewed and adequate. Pain:  Pain Scale 1: Numeric (0 - 10) (05/17/18 0845)  Pain Intensity 1: 10 (05/17/18 0845)   Managed    Neurological Status: At baseline    Mental Status and Level of Consciousness: Arousable    Pulmonary Status:   O2 Device: Nasal cannula (05/17/18 1344)   Adequate oxygenation and airway patent    Complications related to anesthesia: None    Post-anesthesia assessment completed.  No concerns    Signed By: Asif Petersen MD     May 17, 2018

## 2018-05-17 NOTE — ANESTHESIA PREPROCEDURE EVALUATION
Anesthetic History   No history of anesthetic complications            Review of Systems / Medical History  Patient summary reviewed, nursing notes reviewed and pertinent labs reviewed    Pulmonary          Smoker      Comments: Former smoker - Quit 1972 - 2 pack years   Neuro/Psych         Psychiatric history    Comments: Anxiety  Restless Legs Syndrome  Diabetic Neuropathy Cardiovascular    Hypertension: well controlled      CHF  Dysrhythmias : atrial fibrillation and atrial flutter  Pacemaker, CAD, cardiac stents and hyperlipidemia    Exercise tolerance: <4 METS  Comments: A blocker taken around 9 am yesterday. GI/Hepatic/Renal         Renal disease: CRI      Comments: CRI, Stage IV Endo/Other    Diabetes: poorly controlled, type 2, using insulin  Hypothyroidism: well controlled  Obesity, arthritis, cancer and anemia  Pertinent negatives: No morbid obesity  Comments:  Thrombocytopenia  H/O Skin Cancer Other Findings   Comments: Hyperostosis, 1st left toe with Osteomyelitis    BPH  ED  Insomnia           Physical Exam    Airway  Mallampati: I  TM Distance: > 6 cm  Neck ROM: normal range of motion   Mouth opening: Normal     Cardiovascular    Rhythm: irregular  Rate: normal         Dental    Dentition: Bridges  Comments: Multiple fillings   Pulmonary  Breath sounds clear to auscultation               Abdominal  GI exam deferred       Other Findings            Anesthetic Plan    ASA: 3  Anesthesia type: general    Monitoring Plan: BIS      Induction: Intravenous  Anesthetic plan and risks discussed with: Patient and Spouse

## 2018-05-17 NOTE — IP AVS SNAPSHOT
[address] 
[phone] Patient: Ashely Anne MRN: [id number] :1950 A check karla indicates which time of day the medication should be taken. My Medications START taking these medications Instructions Each Dose to Equal  
 Morning Noon Evening Bedtime  
 pantoprazole 40 mg tablet Commonly known as:  PROTONIX Start taking on:  2018 Your last dose was: Your next dose is: Take 1 Tab by mouth Daily (before breakfast) for 30 days. 40 mg  
    
   
   
   
  
 sucralfate 100 mg/mL suspension Commonly known as:  Illa Hoe Your last dose was: Your next dose is: Take 10 mL by mouth Before breakfast and dinner for 30 days. 1 g CHANGE how you take these medications Instructions Each Dose to Equal  
 Morning Noon Evening Bedtime  
 clonazePAM 2 mg tablet Commonly known as:  Sheri Jain What changed:  when to take this Your last dose was: Your next dose is: Take 1 Tab by mouth two (2) times a day. Max Daily Amount: 4 mg.  
 2 mg  
    
   
   
   
  
 metOLazone 2.5 mg tablet Commonly known as:  Everett Case What changed:   
- how much to take 
- additional instructions Your last dose was: Your next dose is: Take 1 Tab by mouth as needed (pt takes approximately twice a month if has edema. ). Take twicw weekly for fluid 2.5 mg  
    
   
   
   
  
 pramipexole 0.5 mg tablet Commonly known as:  MIRAPEX What changed:   
- how much to take 
- how to take this 
- additional instructions Your last dose was: Your next dose is: TAKE 1 TABLET BY MOUTH EVERY NIGHT AT BEDTIME CONTINUE taking these medications Instructions Each Dose to Equal  
 Morning Noon Evening Bedtime BD ULTRA-FINE DEVIN PEN NEEDLES Your last dose was: Your next dose is:    
   
   
 by Does Not Apply route. bumetanide 2 mg tablet Commonly known as:  Oniel Kearns Your last dose was: Your next dose is: TAKE 2 TABLETS BY MOUTH TWICE DAILY * COLCRYS 0.6 mg tablet Generic drug:  colchicine Your last dose was: Your next dose is: Take 0.6 mg by mouth daily. Takes 1 tablet 3 times a week. Indications: taking mitagar  
 0.6 mg  
    
   
   
   
  
 * MITIGARE 0.6 mg capsule Generic drug:  colchicine Your last dose was: Your next dose is:    
   
   
 TK 1 T PO  QD  
     
   
   
   
  
 COREG 12.5 mg tablet Generic drug:  carvedilol Your last dose was: Your next dose is: Take  by mouth two (2) times daily (with meals). ELIQUIS 5 mg tablet Generic drug:  apixaban Your last dose was: Your next dose is: Take 5 mg by mouth two (2) times a day. 5 mg  
    
   
   
   
  
 finasteride 5 mg tablet Commonly known as:  PROSCAR Your last dose was: Your next dose is: Take 5 mg by mouth daily. 5 mg  
    
   
   
   
  
 insulin lispro 100 unit/mL kwikpen Commonly known as:  HUMALOG Your last dose was: Your next dose is:    
   
   
 5 units before each meal three times a day. LANTUS SOLOSTAR U-100 INSULIN 100 unit/mL (3 mL) Inpn Generic drug:  insulin glargine Your last dose was: Your next dose is:    
   
   
 30 Units by SubCUTAneous route daily. 30 Units Liraglutide 0.6 mg/0.1 mL (18 mg/3 mL) Pnij Commonly known as:  Padmini Rojo Your last dose was: Your next dose is: 0.6 mg by SubCUTAneous route daily. 0.6 mg  
    
   
   
   
  
 tamsulosin 0.4 mg capsule Commonly known as:  FLOMAX Your last dose was: Your next dose is: Take 0.8 mg by mouth daily. 0.8 mg  
    
   
   
   
  
 ULORIC 40 mg Tab tablet Generic drug:  febuxostat Your last dose was: Your next dose is: TAKE 1 TABLET BY MOUTH DAILY * Notice: This list has 2 medication(s) that are the same as other medications prescribed for you. Read the directions carefully, and ask your doctor or other care provider to review them with you. STOP taking these medications   
 valsartan 320 mg tablet Commonly known as:  DIOVAN Where to Get Your Medications These medications were sent to [address] [place] [address] [address] [place] [address], [facility] [address] [address] Phone:  [phone]  
  pantoprazole 40 mg tablet  
 sucralfate 100 mg/mL suspension

## 2018-05-17 NOTE — PROGRESS NOTES
MARIA DEL CARMEN done. Normal LV fx, mild LVH, biatrial enlargement, no shunt. No LYNN thrombus. PM leads noted. Mild MR, mild AI, mild TR (3m/s jet velocity). Mild aortic plaque. Full report to follow.

## 2018-05-17 NOTE — H&P
[address]  HISTORY AND PHYSICAL      [employer] Lupis SPENCER  MR#: [id number]  : 1950  ACCOUNT #: [de-identified]   ADMIT DATE: 2018    CHIEF COMPLAINT:  Symptomatic atrial fibrillation. HISTORY OF PRESENT ILLNESS:  This is a 25-year-old male with a significant cardiac history who I last saw in the office 2018. He has been having worsening shortness of breath and has persisted in atrial fibrillation or what may actually be an atypical atrial flutter. After our discussion in the office, he has agreed to proceed with invasive electrophysiology study and complex ablation to see if we can get him back to his normal rhythm. He has a dual chamber pacemaker initially placed due to both sinus bradycardia as well as a second-degree AV block. Currently, he is pacing almost 100% of the time. ALLERGIES:  1.  Macho Blocker. 2.  INSULIN GLARGINE.  3.  NIACIN. PAST MEDICAL HISTORY:  1. As above. 2.  Hypertensive heart disease with a history of diastolic congestive heart failure, chronic. 3.  Diabetes mellitus type 2, on chronic insulin. 4.  Hyperlipidemia. 5.  History of esophageal dilation for dysphagia. 6.  Hiatal hernia. 7.  Anemia of chronic renal disease. 8.  Chronic kidney disease stage III (creatinine 2.6). 9.  Coronary artery disease with multivessel interventions. His last stenting was in . 10.  Mild idiopathic pericardial effusion in the past.    FAMILY HISTORY:  Noncontributory. SOCIAL HISTORY:  Nonsmoker. . He formerly smoked. REVIEW OF SYSTEMS:  Noncontributory. PHYSICAL EXAMINATION:  VITAL SIGNS:  Temperature 97.9 Fahrenheit, pulse 70, blood pressure 141/83, respirations 18, oxygen saturation 100% on room air, weight 115.3 kilograms (254 pounds). GENERAL:  Not diaphoretic, not in acute distress. CARDIOVASCULAR:  Paced rhythm with atypical atrial flutter on telemetry.   NEUROLOGIC:  Awake, appropriate, grossly nonfocal.  GUERDA:  The left-sided pacemaker site is without evidence of issue. TELEMETRY:  Ventricular pacing on demand with underlying atrial flutter. ASSESSMENT:  Symptomatic atrial fibrillation or even atypical atrial flutter. RECOMMENDATIONS:  Given the potential benefits, risks, and alternatives, he agrees to proceed with invasive electrophysiology study followed by a cardiac ablation. Further evaluation and treatment pending his course. MD NASIR Garcia/MICHOACANO  D: 05/17/2018 10:29     T: 05/17/2018 10:43  JOB #: [id number]  CC: Kelby Oliva MD  CC: Ce Lofton MD

## 2018-05-17 NOTE — PROGRESS NOTES
EP study revealed atypical atrial flutter with  msec and concentric LA activation. This mapped to the LA. The RIPV had evidence of poor entrance block inferoposteriorly so ablation was done here for PVI showing disappearance of signal.  Minor touchup PVI was done as needed at the LSPV and RSPV to anchor linear lesions. During ablation with an anterior mitral isthmus line, this converted to LA flutter#2 with  msec and eccentric LA activation. A LA roof line was done based on mapping and then a line from the RSPV down septum and then anteriorly to a line of scar. Then, a line from the LIPV posteriorly toward but stopping before RIPV was done. Other areas were ablated focally based on EGM. The flutter was cardioverted to sinus (AV pacing). Then, atrial flutter was induced which showed concentric activation and  msec. A CTI line was done terminating the flutter and bidirectional block was obtained. Reinduction of atrial flutter was done and further ablation at the RA-IVC junction terminated the flutter and bidirectional block was obtained. No complications, full report to follow.

## 2018-05-18 VITALS
BODY MASS INDEX: 30 KG/M2 | RESPIRATION RATE: 18 BRPM | DIASTOLIC BLOOD PRESSURE: 79 MMHG | WEIGHT: 254.1 LBS | SYSTOLIC BLOOD PRESSURE: 118 MMHG | HEIGHT: 77 IN | OXYGEN SATURATION: 99 % | HEART RATE: 60 BPM | TEMPERATURE: 97.9 F

## 2018-05-18 LAB
GLUCOSE BLD STRIP.AUTO-MCNC: 176 MG/DL (ref 65–100)
SERVICE CMNT-IMP: ABNORMAL

## 2018-05-18 PROCEDURE — 74011250637 HC RX REV CODE- 250/637: Performed by: INTERNAL MEDICINE

## 2018-05-18 PROCEDURE — 82962 GLUCOSE BLOOD TEST: CPT

## 2018-05-18 RX ORDER — PANTOPRAZOLE SODIUM 40 MG/1
40 TABLET, DELAYED RELEASE ORAL
Qty: 30 TAB | Refills: 0 | Status: SHIPPED | OUTPATIENT
Start: 2018-05-19 | End: 2018-06-01

## 2018-05-18 RX ORDER — SUCRALFATE 1 G/10ML
1 SUSPENSION ORAL
Qty: 600 ML | Refills: 0 | Status: SHIPPED | OUTPATIENT
Start: 2018-05-18 | End: 2018-05-31 | Stop reason: ALTCHOICE

## 2018-05-18 RX ADMIN — COLCHICINE 0.6 MG: 0.6 TABLET, FILM COATED ORAL at 07:56

## 2018-05-18 RX ADMIN — FINASTERIDE 5 MG: 5 TABLET, FILM COATED ORAL at 07:52

## 2018-05-18 RX ADMIN — PANTOPRAZOLE SODIUM 40 MG: 40 TABLET, DELAYED RELEASE ORAL at 07:52

## 2018-05-18 RX ADMIN — Medication 10 ML: at 05:26

## 2018-05-18 RX ADMIN — SUCRALFATE 1 G: 1 SUSPENSION ORAL at 07:54

## 2018-05-18 RX ADMIN — CARVEDILOL 6.25 MG: 6.25 TABLET, FILM COATED ORAL at 07:52

## 2018-05-18 RX ADMIN — OXYCODONE HYDROCHLORIDE AND ACETAMINOPHEN 1 TABLET: 5; 325 TABLET ORAL at 07:51

## 2018-05-18 RX ADMIN — TAMSULOSIN HYDROCHLORIDE 0.8 MG: 0.4 CAPSULE ORAL at 07:52

## 2018-05-18 RX ADMIN — APIXABAN 5 MG: 5 TABLET, FILM COATED ORAL at 07:52

## 2018-05-18 RX ADMIN — OXYCODONE HYDROCHLORIDE AND ACETAMINOPHEN 1 TABLET: 5; 325 TABLET ORAL at 00:58

## 2018-05-18 NOTE — PROGRESS NOTES
Bedside report received from Katharine Dahl RN                  Assessment, Background, Procedure summary, Intake/Output, MAR, and recent results discussed. Care assumed. Discharge instructions reviewed with patient and family. Allowed adequate time to ask questions, all questions answered. Printed copy of AVS given to patient. All belongings gathered, IV and tele discontinued. Transported via wheelchair by volunteer to main entrance and into care of family.

## 2018-05-18 NOTE — DISCHARGE INSTRUCTIONS
POST-EP STUDY AND ABLATION DISCHARGE INSTRUCTIONS:    You had a transesophageal echo followed by an EP study and ablation for atrial arrhythmias by Dr. Wolf Wilson on 5/17/2018. Please resume all your usual medications without change (any discrepancy in our hospital record, ignore) and also the two new medications (pantoprazole and sucralfate x 1 month). Do not drive, operate any machinery, or sign any legal documents for 24 hours after your procedure. You must have someone to drive you home. You may take a shower 24 hours after your cardiac procedure. Be sure to get the dressing wet and then remove it; gently wash the area with warm soapy water. Pat dry and leave open to air. To help prevent infections, be sure to keep the cath site clean and dry. No lotions, creams, powders, ointments, etc. in the cath site for approximately 1 week.  Do not take a tub bath, get in a hot tub or swimming pool for approximately 5 days or until the cath site is completely healed.  No strenuous activity or heavy lifting over 20 lbs. for 7 days.  After your procedure, some bruising or discomfort is common during the healing process. Tylenol, 1-2 tablets every 6 hours as needed, is recommended if you experience any discomfort. If you experience any signs or symptoms of infection such as fever, chills, or poorly healing incision, persistent tenderness or swelling in the groin, redness and/or warmth to the touch, numbness, significant tingling or pain at the groin site or affected extremity, rash, drainage from the site, or if the leg feels tight or swollen, call your physician right away.  If bleeding at the site occurs, take a clean gauze pad and apply direct pressure to the groin just above the puncture site, and call your physician right away.  If your procedure involved ablation therapy, you may feel some mild or vague chest discomfort due to delivery of heat therapy to the heart muscle.   This should resolve in 1-2 days. If it gets worse or is associated with shortness of breath, dizziness, loss of consciousness, call your physician right away or call 911 if emergency medical care is needed.     Signed:  Kim Benoit MD

## 2018-05-18 NOTE — ROUTINE PROCESS
First Initial PCP CYNTHIA appt scheduled with Dr. Lucila Vela on May 29 at 11:20am. Appt added to [address] [facility], CM Specialist  Second Initial PCP HAKEEM LEE appt scheduled with  on June 1 at 10:30am. Appt added to [address] [facility], [facility] Cecilia Miguel Angel Specialist

## 2018-05-18 NOTE — PROGRESS NOTES
Assisted pt up out of bed. Pt ambulated in hallway with nurse without difficulty. Bilateral groins remain without bleeding or hematoma. Will continue to monitor.

## 2018-05-18 NOTE — PROGRESS NOTES
Doing well post-ablation. Remains in sinus (AV pacing). Ambulatory and taking oral.      Visit Vitals    /79 (BP 1 Location: Left arm, BP Patient Position: At rest)    Pulse 60    Temp 97.9 °F (36.6 °C)    Resp 18    Ht 6' 5\" (1.956 m)    Wt 115.3 kg (254 lb 1.6 oz)    SpO2 99%    BMI 30.13 kg/m2       ND, NAD.  RRR, no rub. Lungs CTAB anteriorly  Bilateral groin sites OK. Awake, appropriate, neuro grossly nonfocal.    Tele:  AV pacing    PLAN:  Discharge to home with F/U in the office in 2 weeks with my nurse practitioner, Ania Avila. All questions answered. Patient is aware of signs and sx warranting urgent med F/U or calling 911.     Signed:  Citlalli Brown MD

## 2018-05-18 NOTE — PROGRESS NOTES
Problem: Falls - Risk of  Goal: *Absence of Falls  Document Thanh Fall Risk and appropriate interventions in the flowsheet. Outcome: Progressing Towards Goal  Fall Risk Interventions:            Medication Interventions: Patient to call before getting OOB, Teach patient to arise slowly         History of Falls Interventions:  Investigate reason for fall, Room close to nurse's station

## 2018-05-23 NOTE — PROCEDURES
[address]  [phone]    Patient ID:  Patient: Faby Laurent MRN: [id number]  Age: 69 y.o.  : 1950  Gender: male  Study Date: 2018    History: This is a male with a history of persistent attrial fibrillation and atypical atrial flutter, off anticoagulation prior to ablation, here for transesophageal echo to look for intracardiac thrombus.      PROCEDURE: The study included complete 2D imaging, M-mode, complete spectral Doppler, and color Doppler. The MARIA DEL CARMEN probe was passed easily into the esophagus. Images were adequate. At the end of the procedure, the probe was removed without issue. There were no complications during the procedure. Sedation was administered by the anesthesiologist.  He was in atrial fibrillation at the time of the procedure.      FINDINGS:  LEFT VENTRICLE: Size was normal. Systolic function was normal without wall motion abnormality with an ejection fraction estimated to be 55-60%. Wall thickness was mildly increased. RIGHT VENTRICLE: The size was normal. Systolic function was normal. Wall thickness was normal.    LEFT ATRIUM: Size was moderately-enlarged. No thrombus was identified. APPENDAGE:  No thrombus was identified. DOPPLER: The function was abnormal (poor emptying velocity during atrial fibrillation). INTERATRIAL SEPTUM: No septal defect or aneurysm was noted.  No shunt with color flow doppler. RIGHT ATRIUM: Size was mildly-enlarged. No thrombus was identified. MITRAL VALVE: Normal valve structure. There was normal leaflet separation. No obvious mass, vegetation or thrombus noted. DOPPLER: There was mild non-rheumatic and central regurgitation. AORTIC VALVE: The aortic valve was trileaflet. Leaflets exhibited normal cuspal separation without stenosis. No obvious mass, vegetation, or thrombus noted. DOPPLER: There was mild regurgitation. TRICUSPID VALVE: Normal valve structure.  There was normal leaflet separation. No obvious mass, vegetation or thrombus noted. DOPPLER: There was trace non-rheumatic regurgitation. Peak tricuspid regurgitation velocity jet was 3 m/sec consistent with mild pulmonary hypertension (46 mm Hg) assuming a right atrial pressure of 10 mm Hg.      PULMONIC VALVE:  No obvious abnormality.      AORTA: Normal root. No dissection, aneurysm, or mobile plaque in visualized portions. Mild plaque was noted.      PERICARDIUM:  Nothing more than a trace pericardial effusion was noted.  Normal thickness of pericardium. PACEMAKER LEADS:  Visualized portions were without abnormality.         Preoperative Diagnosis: As above. Postoperative Diagnosis: As above. Procedure: As above. Surgeon(s) and Role: Lesley Kearns MD - Primary    Anesthesia:  General by the anesthesiologist.  Estimated Blood Loss: None. Specimens: * No specimens in log *    Findings: As above. Complications: None.         SUMMARY:  1. No intracardiac thrombus. 2. Normal LV systolic function, EF 05-18%. 3. Mild aortic valve regurgitation, nonrheumatic. 4. Mild mitral and tricuspid regurgitation.  Nonrheumatic. 5. Biatrial enlargement. 6. No evidence of intracardiac shunt.   7. Possible trace pericardial effusion.          Signed:  Lesley Kearns MD

## 2018-05-23 NOTE — PROCEDURES
[address]  [phone]    Patient ID:  Patient: Tonya France MRN: [id number]  Age: 69 y.o.  : 1950  Gender: male  Study Date: 2018    History:  This is a male with symptomatic persistent atrial fibrillation as well as atypical atrial flutter, here for invasive EP study and cardiac ablation.      Procedures Performed:   1. Comprehensive EP study with ablation via pulmonary vein isolation for Afib, transseptal access from R/L atrium (56940)   2. Additional ablation for atrial fibrillation substrate (left atrial roof line) (35961)  3. Additional ablation other than atrial fibrillation, ablation of left atrial flutter (anterior mitral isthmus line) (17878)  4. Additional ablation other than atrial fibrillation, ablation of left atrial flutter (LIPV to RIPV and RSPV to septum to anterior scar lines) (83281)  5. Additional ablation other than atrial fibrillation (CTI line for RA flutter) (74087)   6. Intracardiac electrophysiologic 3-D [facility] (52496)  7. Use and interpretation of intracardiac echocardiography (97759-07)       Summary:   1. Successful PVI. The RIPV had evidence of poor entrance block inferoposteriorly so ablation was done here for PVI. Minor touchup PVI was done as needed at the LSPV and RSPV to anchor linear lesions. 2. Additional substrate work in the left and right atrium was performed as described below for atrial fibrillation, left, and right atrial flutter. There were at least two left atrial flutters and one right atrial flutter ablated.      Plan:   1. Continue oral anticoagulant.   2. Carafate and proton pump inhibitor for 4 weeks to protect from esophageal injury.      Follow up Plan:   1. F/U in office in 2 weeks.      Procedure description:   After consent was obtained, the patient was brought to the EP lab and sedated with general anesthesia by the anesthesiologist who remained in attendance throughout the case.  One sheath was inserted into the left femoral vein and three sheaths into the right femoral vein in the usual fashion without issue.  A deflectable duodecapolar coronary sinus catheter was used for pacing and recording from the left atrium.  Initial catheters advanced into the heart under fluoroscopic guidance.  A roving catheter was used to pace and record from the right and left atrium.      The baseline rhythm was atypical atrial flutter with variable left and right atrial cycle length.  The left atrium appeared to be very consistent compared to the right at 266 msec, and with eccentric left atrial activation, was the atrium of origin.  After all ablation was complete, sinus rhythm with first degree AV block (, , ,  msec) was noted.  Antegrade conduction was midline and decremental without preexcitation.  The WBCL was 500 msec, AVNERP 600/530, AERP 600/<=250 msec.  Retrograde conduction was not identified.  The VERP was 600/230 msec.      An intra-cardiac echo probe was advanced into the right atrium and used to visualize the valves, the left atrium, the pulmonary veins, and the fossa ovale. Under fluoroscopic and echocardiographic guidance, a double transseptal puncture was carried out in the usual fashion using the SL-1 sheath and a BRK-1 needle.  An Agilis sheath was exchanged over a wire and the SL-1 sheath was kept during second access. [place] was just after the first transseptal puncture and boluses were given with a goal ACT of >300 sec.      An irrigated ablation catheter with contact-force technology and a 20-pole spiral catheter was advanced into the left atrium.     Using the [employer] [facility] [employer]) three dimensional electroanatomic mapping system, a 3D voltage map was created of the left atrium, all pulmonary veins, and the left atrial appendage.  Later the relevant right atrium was mapped.      An esophageal probe was placed in position and recorded temperatures. Lily Keara was stopped if significant esophageal heating occurred.      PVI:  Using a spiral-guided approach, focal ablation lesions were given at the ostium of the right inferior pulmonary vein until entrance block was observed using temperature and power controlled RFA. Jorge Alberto Triana was minor ablation work done at the ostium of both superior pulmonary veins to anchor substrate lines as noted below.      Additional therapy for atrial fibrillation substrate:  Because of his history of persistent fibrillation requiring cardioversion in the past, using a electrogram and 3D mapping as a guide, a series of ablation lesions were given at the left atrial roof and a line was made using temperature and power controlled RFA from the superior pulmonary veins.  This was done during atypical atrial flutter.  Later in the case, after sinus rhythm was achieved with cardioversion, block along the left atrial roof line was evidenced by either double potentials or differential pacing showing block.      Additional therapy for additional arrhythmia mechanism than atrial fibrillation:  Because of ongoing left atrial flutter (#1) with cycle length 240 msec, using a electrogram and 3D mapping as a guide, a series of ablation lesions were given starting at the left atrial roof line and extending anteriorly in front of the left atrial appendage and down to the mitral valve annulus.  This line was made using temperature and power controlled RFA during atypical atrial flutter.   During ablation, the left atrial flutter cycle lengthened to 280 msec and activation sequence was eccentric in the CS poles (#2).   Later in the case after cardioversion to sinus, block along this anterior mitral isthmus line was evidenced by either double potentials or differential pacing.      Additional therapy for additional arrhythmia mechanism than Afib:  Due to ongoing left atrial flutter which was mapped, a linear lesion was made from the left inferior pulmonary vein about 90% across the posterior left atrium toward the right inferior pulmonary vein. There was some esophageal heating posteriorly near the RIPV, even with lowered power output, so therapy was not delivered there. Additional ablation was done at the RSPV extending across the septum and then from there anteriorly to an area of anterior scar. The atrial flutter didn't terminate, so cardioversion was performed and lines of block confirmed.     Additional therapy for additional arrhythmia mechanism than Afib:   After rapid atrial pacing, atrial flutter with cycle length 410 msec and concentric CS activation was noted. Due to what appeared to be typical right atrial flutter involving the cavotricuspid isthmus, decision was made to ablate in the right atrium.  Therefore, a cavotricuspid isthmus line was completed using radiofrequency ablation via linear lesioning starting from the tricuspid valve.  The atrial flutter did terminate. Reinduction was present however, but cycle length 460 msec. Further treatment at the right atrial and IVC junction terminated the right atrial flutter. Successful therapy along the line was demonstrated as bidirectional block.      After the catheters were withdrawn into the right atrium, heparin infusion was discontinued.  The heparin was reversed with a test dose of protamine followed by the full dose to a total of 60 mg.      Once the ablation lesion set was completed, the patient remained in sinus rhythm.  The catheters were withdrawn to the inferior vena cava.      Repeat scanning with the intracardiac ultrasound did not show any significant pericardial effusion accumulation. The patient was extubated after sheaths removed and sent to the recovery area. Normal dual chamber pacemaker function was confirmed.          Preoperative Diagnosis: As above. Postoperative Diagnosis: As above. Procedure: As above.   Surgeon(s) and Role: Laura Delong MD - Primary   Anesthesia:  General.  Estimated Blood Loss: 40 cc. Specimens: * No specimens in log *   Findings: As above.   Complications: None.      Ablation therapy:  25 treatments totaling 38.1 minutes of RFA  X-ray:  30.9 minutes     Signed:  Kash Baca MD

## 2018-05-25 ENCOUNTER — HOSPITAL ENCOUNTER (OUTPATIENT)
Dept: WOUND CARE | Age: 68
Discharge: HOME | End: 2018-05-25
Payer: MEDICARE

## 2018-05-25 VITALS
WEIGHT: 251 LBS | DIASTOLIC BLOOD PRESSURE: 87 MMHG | BODY MASS INDEX: 29.64 KG/M2 | TEMPERATURE: 98.5 F | RESPIRATION RATE: 16 BRPM | HEART RATE: 62 BPM | HEIGHT: 77 IN | SYSTOLIC BLOOD PRESSURE: 155 MMHG

## 2018-05-25 PROCEDURE — 99212 OFFICE O/P EST SF 10 MIN: CPT

## 2018-05-25 PROCEDURE — 99213 OFFICE O/P EST LOW 20 MIN: CPT

## 2018-05-25 NOTE — WOUND CARE
OUTPATIENT WOUND CARE DISCHARGE NOTE    Wound is healed. Patient instructed to perform foot inspections twice a day, when putting on, and removing shoes, and to follow up with Podiatrist for regularly scheduled foot assessments/foot care. Verbalizes understanding. Ambulatory Status - Ambulatory without help    Accompanied by - Self    Follow Up Appointments - Pt to follow up with Podiatry, to schedule regular foot assessments/foot care, and call the [address] [place] [address] if needed.

## 2018-05-25 NOTE — PROGRESS NOTES
[address]  WOUND CARE PROGRESS NOTE    Lupis Joyner  MR#: [id number]  : 1950  ACCOUNT #: [de-identified]   DATE OF SERVICE: 2018    HISTORY OF CHIEF COMPLAINT:  This 66-year-old white male presents for followup treatment of a nonhealing ulceration, plantar aspect of the left foot. More than 6 weeks ago, the sesamoid apparatus and the head of the metatarsal were removed from the left foot. History and physical is unchanged from admitting history and physical.      PHYSICAL EXAMINATION:  VITAL SIGNS:  Temperature 98.5, pulse rate 62, respirations 16, blood pressure 155/89. EXTREMITIES:  Lower extremities reveal palpable pedal pulses with fairly good muscle strength in lower extremities bilateral.  Diminished epicritic sensation lower extremities. Ulceration that was previously plantar aspect of the first left MPJ is well resolved as well as the incision site, dorsal left foot. IMPRESSION:  Diabetic with neuropathy with resolved diabetic ulcer, plantar aspect of the left foot. PLAN:  We discussed diabetic foot care. The patient does not need to do any type of wound care on the left foot; however, I do recommend that he follow up with the podiatrist of his choice every 2 months for diabetic foot care. He is discharged from the [address] [facility] today.       DORIS Restrepo  D: 2018 13:59     T: 2018 14:20  JOB #: [id number]

## 2018-05-25 NOTE — WOUND CARE
Patient here for follow up for plantar foot wound. Presently there is no wound. Skin is dry and intact. Pulses are +2 in DP and PT. Pt had Ablation last Friday for a-fib and is feeling better.

## 2018-05-29 ENCOUNTER — OFFICE VISIT (OUTPATIENT)
Dept: INTERNAL MEDICINE CLINIC | Age: 68
End: 2018-05-29

## 2018-05-29 VITALS
WEIGHT: 262 LBS | HEIGHT: 77 IN | HEART RATE: 80 BPM | DIASTOLIC BLOOD PRESSURE: 88 MMHG | RESPIRATION RATE: 17 BRPM | BODY MASS INDEX: 30.94 KG/M2 | SYSTOLIC BLOOD PRESSURE: 160 MMHG | TEMPERATURE: 98.3 F | OXYGEN SATURATION: 98 %

## 2018-05-29 DIAGNOSIS — F51.01 PRIMARY INSOMNIA: ICD-10-CM

## 2018-05-29 DIAGNOSIS — I50.22 CHRONIC SYSTOLIC CONGESTIVE HEART FAILURE (HCC): ICD-10-CM

## 2018-05-29 DIAGNOSIS — N18.4 CKD (CHRONIC KIDNEY DISEASE), STAGE IV (HCC): ICD-10-CM

## 2018-05-29 DIAGNOSIS — D64.9 ANEMIA, UNSPECIFIED TYPE: ICD-10-CM

## 2018-05-29 DIAGNOSIS — I48.0 PAROXYSMAL ATRIAL FIBRILLATION (HCC): Primary | ICD-10-CM

## 2018-05-29 DIAGNOSIS — I25.5 ISCHEMIC CARDIOMYOPATHY: ICD-10-CM

## 2018-05-29 DIAGNOSIS — I25.10 ASCVD (ARTERIOSCLEROTIC CARDIOVASCULAR DISEASE): ICD-10-CM

## 2018-05-29 LAB
BUN BLD-MCNC: 94 MG/DL (ref 9–20)
CALCIUM BLD-MCNC: 9.1 MG/DL (ref 8.4–10.2)
CHLORIDE BLD-SCNC: 97 MMOL/L (ref 98–107)
CO2 POC: 29 MMOL/L (ref 22–32)
CREAT BLD-MCNC: 2.3 MG/DL (ref 0.8–1.5)
EGFR (POC): 27.9
GLUCOSE POC: 187 MG/DL (ref 75–110)
GRAN# POC: 4.6 K/UL (ref 2–7.8)
GRAN% POC: 77.7 % (ref 37–92)
HCT VFR BLD CALC: 26.5 % (ref 37–51)
HGB BLD-MCNC: 8.8 G/DL (ref 12–18)
LY# POC: 0.7 K/UL (ref 0.6–4.1)
LY% POC: 13.6 % (ref 10–58.5)
MCH RBC QN: 29.4 PG (ref 26–32)
MCHC RBC-ENTMCNC: 33.4 G/DL (ref 30–36)
MCV RBC: 88 FL (ref 80–97)
MID #, POC: 0.5 K/UL (ref 0–1.8)
MID% POC: 8.7 % (ref 0.1–24)
PLATELET # BLD: 130 K/UL (ref 140–440)
POTASSIUM SERPL-SCNC: 4.1 MMOL/L (ref 3.6–5)
RBC # BLD: 3.01 M/UL (ref 4.2–6.3)
SODIUM SERPL-SCNC: 139 MMOL/L (ref 137–145)
WBC # BLD: 5.8 K/UL (ref 4.1–10.9)

## 2018-05-29 RX ORDER — ZOLPIDEM TARTRATE 10 MG/1
10 TABLET ORAL
Qty: 10 TAB | Status: SHIPPED | OUTPATIENT
Start: 2018-05-29 | End: 2018-06-06 | Stop reason: ALTCHOICE

## 2018-05-29 RX ORDER — VALSARTAN 320 MG/1
320 TABLET ORAL DAILY
Qty: 30 TAB
Start: 2018-05-29 | End: 2018-07-18

## 2018-05-29 NOTE — MR AVS SNAPSHOT
[facility] 
 
 
 [address] [address] [phone] Patient: Triston Mayer MRN: [id number] :1950 Visit Information Date & Time Provider Department Dept. Phone Encounter #  
 2018 11:00 AM Fiorella Ackerman 26 [phone] [id number] Your Appointments 2018  1:40 PM  
FOLLOW UP 10 with Jeimy Villarreal MD  
[facility] ([address]) Appt Note: 1 month follow up [address] [phone] [address] [address] Upcoming Health Maintenance Date Due  
 EYE EXAM RETINAL OR DILATED Q1 1958 DTaP/Tdap/Td series (1 - Tdap) 1969 ZOSTER VACCINE AGE 60> 10/28/2008 GLAUCOMA SCREENING Q2Y 2013 Influenza Age 5 to Adult 2018 MEDICARE YEARLY EXAM 2018 HEMOGLOBIN A1C Q6M 2018 FOOT EXAM Q1 2019 MICROALBUMIN Q1 2019 LIPID PANEL Q1 2019 COLONOSCOPY 2021 Allergies as of 2018  Review Complete On: 2018 By: Jeimy Villarreal MD  
  
 Severity Noted Reaction Type Reactions Niacin High 2014    Unknown (comments) Levemir [Insulin Detemir]  2013    Hives Other Medication  2013    Other (comments) ? Allergy to Abhi Brilliant causing chemical burn Primaxin [Imipenem-cilastatin]  2013    Diarrhea, Rash Xarelto [Rivaroxaban]  2014    Rash, Itching Current Immunizations  Never Reviewed Name Date Influenza High Dose Vaccine PF 10/17/2017 Influenza Vaccine 10/26/2016, 10/21/2015 Pneumococcal Conjugate (PCV-13) 2015 Pneumococcal Vaccine (Unspecified Type) 2014 Not reviewed this visit You Were Diagnosed With   
  
 Codes Comments Paroxysmal atrial fibrillation (HCC)    -  Primary ICD-10-CM: I48.0 ICD-9-CM: 427.31   
 ASCVD (arteriosclerotic cardiovascular disease)     ICD-10-CM: I25.10 ICD-9-CM: 429.2, 440.9 Chronic systolic congestive heart failure (HCC)     ICD-10-CM: I50.22 ICD-9-CM: 428.22, 428.0 Ischemic cardiomyopathy     ICD-10-CM: I25.5 ICD-9-CM: 414.8 CKD (chronic kidney disease), stage IV ([facility] [address]     ICD-10-CM: N18.4 ICD-9-CM: 412. 4 Anemia, unspecified type     ICD-10-CM: D64.9 ICD-9-CM: 285.9 Primary insomnia     ICD-10-CM: F51.01 
ICD-9-CM: 307.42 Vitals BP Pulse Temp Resp Height(growth percentile) Weight(growth percentile) 160/88 (BP 1 Location: Left arm, BP Patient Position: Sitting) 80 98.3 °F (36.8 °C) (Oral) 17 6' 5\" (1.956 m) 262 lb (118.8 kg) SpO2 BMI Smoking Status 98% 31.07 kg/m2 Former Smoker Vitals History BMI and BSA Data Body Mass Index Body Surface Area 31.07 kg/m 2 2.54 m 2 Preferred Pharmacy Pharmacy Name Phone [place] [phone], [address] AT [place] [address] [phone] Your Updated Medication List  
  
   
This list is accurate as of 5/29/18 12:25 PM.  Always use your most recent med list.  
  
  
  
  
 BD ULTRA-FINE DEVIN PEN NEEDLES  
by Does Not Apply route. bumetanide 2 mg tablet Commonly known as:  Lianna Browne TAKE 2 TABLETS BY MOUTH TWICE DAILY  
  
 clonazePAM 2 mg tablet Commonly known as:  Woody Guajardo Take 1 Tab by mouth two (2) times a day. Max Daily Amount: 4 mg. * COLCRYS 0.6 mg tablet Generic drug:  colchicine Take 0.6 mg by mouth daily. Takes 1 tablet 3 times a week. Indications: taking mitagar * MITIGARE 0.6 mg capsule Generic drug:  colchicine TK 1 T PO  QD  
  
 COREG 12.5 mg tablet Generic drug:  carvedilol Take  by mouth two (2) times daily (with meals). ELIQUIS 5 mg tablet Generic drug:  apixaban Take 5 mg by mouth two (2) times a day. finasteride 5 mg tablet Commonly known as:  PROSCAR Take 5 mg by mouth daily. insulin lispro 100 unit/mL kwikpen Commonly known as:  HUMALOG  
5 units before each meal three times a day. LANTUS SOLOSTAR U-100 INSULIN 100 unit/mL (3 mL) Inpn Generic drug:  insulin glargine 30 Units by SubCUTAneous route daily. Liraglutide 0.6 mg/0.1 mL (18 mg/3 mL) Pnij Commonly known as:  VICTOZA  
0.6 mg by SubCUTAneous route daily. metOLazone 2.5 mg tablet Commonly known as:  Geraldine Most Take 1 Tab by mouth as needed (pt takes approximately twice a month if has edema. ). Take twicw weekly for fluid  
  
 pantoprazole 40 mg tablet Commonly known as:  PROTONIX Take 1 Tab by mouth Daily (before breakfast) for 30 days. pramipexole 0.5 mg tablet Commonly known as:  MIRAPEX TAKE 1 TABLET BY MOUTH EVERY NIGHT AT BEDTIME  
  
 sucralfate 100 mg/mL suspension Commonly known as:  Tuyet Sers Take 10 mL by mouth Before breakfast and dinner for 30 days. tamsulosin 0.4 mg capsule Commonly known as:  FLOMAX Take 0.8 mg by mouth daily. ULORIC 40 mg Tab tablet Generic drug:  febuxostat TAKE 1 TABLET BY MOUTH DAILY  
  
 valsartan 320 mg tablet Commonly known as:  DIOVAN Take 1 Tab by mouth daily. zolpidem 10 mg tablet Commonly known as:  AMBIEN Take 1 Tab by mouth nightly as needed for Sleep. Max Daily Amount: 10 mg.  
  
 * Notice: This list has 2 medication(s) that are the same as other medications prescribed for you. Read the directions carefully, and ask your doctor or other care provider to review them with you. Prescriptions Printed Refills  
 zolpidem (AMBIEN) 10 mg tablet prn Sig: Take 1 Tab by mouth nightly as needed for Sleep. Max Daily Amount: 10 mg.  
 Class: Print Route: Oral  
  
We Performed the Following AMB POC BASIC METABOLIC PANEL [59544 CPT(R)] AMB POC COMPLETE CBC,AUTOMATED ENTER [id number] CPT(R)] To-Do List   
 07/27/2018 1:30 PM  
  Appointment with Thuy Sutton DPM; [facility] 3 at [address] [place] [address]. ([phone]) Patient Instructions Atrial Fibrillation: Care Instructions Your Care Instructions Atrial fibrillation is an irregular and often fast heartbeat. Treating this condition is important for several reasons. It can cause blood clots, which can travel from your heart to your brain and cause a stroke. If you have a fast heartbeat, you may feel lightheaded, dizzy, and weak. An irregular heartbeat can also increase your risk for heart failure. Atrial fibrillation is often the result of another heart condition, such as high blood pressure or coronary artery disease. Making changes to improve your heart condition will help you stay healthy and active. Follow-up care is a key part of your treatment and safety. Be sure to make and go to all appointments, and call your doctor if you are having problems. It's also a good idea to know your test results and keep a list of the medicines you take. How can you care for yourself at home? Medicines ? · Take your medicines exactly as prescribed. Call your doctor if you think you are having a problem with your medicine. You will get more details on the specific medicines your doctor prescribes. ? · If your doctor has given you a blood thinner to prevent a stroke, be sure you get instructions about how to take your medicine safely. Blood thinners can cause serious bleeding problems. ? · Do not take any vitamins, over-the-counter drugs, or herbal products without talking to your doctor first. ? Lifestyle changes ? · Do not smoke. Smoking can increase your chance of a stroke and heart attack. If you need help quitting, talk to your doctor about stop-smoking programs and medicines. These can increase your chances of quitting for good. ? · Eat a heart-healthy diet. ? · Stay at a healthy weight. Lose weight if you need to. ? · Limit alcohol to 2 drinks a day for men and 1 drink a day for women. Too much alcohol can cause health problems. ? · Avoid colds and flu. Get a pneumococcal vaccine shot. If you have had one before, ask your doctor whether you need another dose. Get a flu shot every year. If you must be around people with colds or flu, wash your hands often. Activity ? · If your doctor recommends it, get more exercise. Walking is a good choice. Bit by bit, increase the amount you walk every day. Try for at least 30 minutes on most days of the week. You also may want to swim, bike, or do other activities. Your doctor may suggest that you join a cardiac rehabilitation program so that you can have help increasing your physical activity safely. ? · Start light exercise if your doctor says it is okay. Even a small amount will help you get stronger, have more energy, and manage stress. Walking is an easy way to get exercise. Start out by walking a little more than you did in the hospital. Gradually increase the amount you walk. ? · When you exercise, watch for signs that your heart is working too hard. You are pushing too hard if you cannot talk while you are exercising. If you become short of breath or dizzy or have chest pain, sit down and rest immediately. ? · Check your pulse regularly. Place two fingers on the artery at the palm side of your wrist, in line with your thumb. If your heartbeat seems uneven or fast, talk to your doctor. When should you call for help? Call 911 anytime you think you may need emergency care. For example, call if: 
? · You have symptoms of a heart attack. These may include: ¨ Chest pain or pressure, or a strange feeling in the chest. 
¨ Sweating. ¨ Shortness of breath. ¨ Nausea or vomiting. ¨ Pain, pressure, or a strange feeling in the back, neck, jaw, or upper belly or in one or both shoulders or arms. ¨ Lightheadedness or sudden weakness. ¨ A fast or irregular heartbeat. After you call 911, the  may tell you to chew 1 adult-strength or 2 to 4 low-dose aspirin. Wait for an ambulance. Do not try to drive yourself. ? · You have symptoms of a stroke. These may include: 
¨ Sudden numbness, tingling, weakness, or loss of movement in your face, arm, or leg, especially on only one side of your body. ¨ Sudden vision changes. ¨ Sudden trouble speaking. ¨ Sudden confusion or trouble understanding simple statements. ¨ Sudden problems with walking or balance. ¨ A sudden, severe headache that is different from past headaches. ? · You passed out (lost consciousness). ?Call your doctor now or seek immediate medical care if: 
? · You have new or increased shortness of breath. ? · You feel dizzy or lightheaded, or you feel like you may faint. ? · Your heart rate becomes irregular. ? · You can feel your heart flutter in your chest or skip heartbeats. Tell your doctor if these symptoms are new or worse. ? Watch closely for changes in your health, and be sure to contact your doctor if you have any problems. Where can you learn more? Go to http://gildardo-palak.info/. Enter U020 in the search box to learn more about \"Atrial Fibrillation: Care Instructions. \" Current as of: September 21, 2016 Content Version: 11.4 © 5099-0766 [employer]. Care instructions adapted under license by [employer] (which disclaims liability or warranty for this information). If you have questions about a medical condition or this instruction, always ask your healthcare professional. [place] [address] any warranty or liability for your use of this information. Introducing [facility]! Dear Jacek Harrell: 
Thank you for requesting a [employer] account. Our records indicate that you already have an active [employer] account. You can access your account anytime at [email or web address] Did you know that you can access your hospital and ER discharge instructions at any time in Spot[employer]ot? You can also review all of your test results from your hospital stay or ER visit. Additional Information If you have questions, please visit the Frequently Asked Questions section of the [employer] website at [email or web address] Remember, Spot[employer]ot is NOT to be used for urgent needs. For medical emergencies, dial 911. Now available from your iPhone and Android! Please provide this summary of care documentation to your next provider. Your primary care clinician is listed as Hunter. If you have any questions after today's visit, please call [phone].
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
## 2018-05-29 NOTE — PATIENT INSTRUCTIONS
Atrial Fibrillation: Care Instructions  Your Care Instructions    Atrial fibrillation is an irregular and often fast heartbeat. Treating this condition is important for several reasons. It can cause blood clots, which can travel from your heart to your brain and cause a stroke. If you have a fast heartbeat, you may feel lightheaded, dizzy, and weak. An irregular heartbeat can also increase your risk for heart failure. Atrial fibrillation is often the result of another heart condition, such as high blood pressure or coronary artery disease. Making changes to improve your heart condition will help you stay healthy and active. Follow-up care is a key part of your treatment and safety. Be sure to make and go to all appointments, and call your doctor if you are having problems. It's also a good idea to know your test results and keep a list of the medicines you take. How can you care for yourself at home? Medicines  ? · Take your medicines exactly as prescribed. Call your doctor if you think you are having a problem with your medicine. You will get more details on the specific medicines your doctor prescribes. ? · If your doctor has given you a blood thinner to prevent a stroke, be sure you get instructions about how to take your medicine safely. Blood thinners can cause serious bleeding problems. ? · Do not take any vitamins, over-the-counter drugs, or herbal products without talking to your doctor first.   ? Lifestyle changes  ? · Do not smoke. Smoking can increase your chance of a stroke and heart attack. If you need help quitting, talk to your doctor about stop-smoking programs and medicines. These can increase your chances of quitting for good. ? · Eat a heart-healthy diet. ? · Stay at a healthy weight. Lose weight if you need to.   ? · Limit alcohol to 2 drinks a day for men and 1 drink a day for women. Too much alcohol can cause health problems. ? · Avoid colds and flu.  Get a pneumococcal vaccine shot. If you have had one before, ask your doctor whether you need another dose. Get a flu shot every year. If you must be around people with colds or flu, wash your hands often. Activity  ? · If your doctor recommends it, get more exercise. Walking is a good choice. Bit by bit, increase the amount you walk every day. Try for at least 30 minutes on most days of the week. You also may want to swim, bike, or do other activities. Your doctor may suggest that you join a cardiac rehabilitation program so that you can have help increasing your physical activity safely. ? · Start light exercise if your doctor says it is okay. Even a small amount will help you get stronger, have more energy, and manage stress. Walking is an easy way to get exercise. Start out by walking a little more than you did in the hospital. Gradually increase the amount you walk. ? · When you exercise, watch for signs that your heart is working too hard. You are pushing too hard if you cannot talk while you are exercising. If you become short of breath or dizzy or have chest pain, sit down and rest immediately. ? · Check your pulse regularly. Place two fingers on the artery at the palm side of your wrist, in line with your thumb. If your heartbeat seems uneven or fast, talk to your doctor. When should you call for help? Call 911 anytime you think you may need emergency care. For example, call if:  ? · You have symptoms of a heart attack. These may include:  ¨ Chest pain or pressure, or a strange feeling in the chest.  ¨ Sweating. ¨ Shortness of breath. ¨ Nausea or vomiting. ¨ Pain, pressure, or a strange feeling in the back, neck, jaw, or upper belly or in one or both shoulders or arms. ¨ Lightheadedness or sudden weakness. ¨ A fast or irregular heartbeat. After you call 911, the  may tell you to chew 1 adult-strength or 2 to 4 low-dose aspirin. Wait for an ambulance. Do not try to drive yourself.    ? · You have symptoms of a stroke. These may include:  ¨ Sudden numbness, tingling, weakness, or loss of movement in your face, arm, or leg, especially on only one side of your body. ¨ Sudden vision changes. ¨ Sudden trouble speaking. ¨ Sudden confusion or trouble understanding simple statements. ¨ Sudden problems with walking or balance. ¨ A sudden, severe headache that is different from past headaches. ? · You passed out (lost consciousness). ?Call your doctor now or seek immediate medical care if:  ? · You have new or increased shortness of breath. ? · You feel dizzy or lightheaded, or you feel like you may faint. ? · Your heart rate becomes irregular. ? · You can feel your heart flutter in your chest or skip heartbeats. Tell your doctor if these symptoms are new or worse. ? Watch closely for changes in your health, and be sure to contact your doctor if you have any problems. Where can you learn more? Go to http://gildardo-palak.info/. Enter U020 in the search box to learn more about \"Atrial Fibrillation: Care Instructions. \"  Current as of: September 21, 2016  Content Version: 11.4  © 9126-6977 [employer]. Care instructions adapted under license by [employer] (which disclaims liability or warranty for this information). If you have questions about a medical condition or this instruction, always ask your healthcare professional. [address] any warranty or liability for your use of this information.

## 2018-05-29 NOTE — PROGRESS NOTES
Chief Complaint   Patient presents with    Transitions Of Care     1. Have you been to the ER, urgent care clinic since your last visit? Hospitalized since your last visit? No    2. Have you seen or consulted any other health care providers outside of the [facility] since your last visit? Include any pap smears or colon screening.  No       [id number]

## 2018-05-29 NOTE — PROGRESS NOTES
Hemoglobin is much lower at 8.8 with his hemoglobin being 10.3 done 3 weeks ago and 11.6 done 2 months ago with being 12.3 done 3 months ago based upon that we need to get him back as soon as we can hopefully today or tomorrow to reevaluate his hemoglobin and decide whether we need to do anything else we will need to do stool Hemoccult

## 2018-05-31 ENCOUNTER — OFFICE VISIT (OUTPATIENT)
Dept: INTERNAL MEDICINE CLINIC | Age: 68
End: 2018-05-31

## 2018-05-31 VITALS
TEMPERATURE: 98.9 F | SYSTOLIC BLOOD PRESSURE: 164 MMHG | WEIGHT: 257.2 LBS | OXYGEN SATURATION: 97 % | DIASTOLIC BLOOD PRESSURE: 84 MMHG | HEART RATE: 70 BPM | RESPIRATION RATE: 20 BRPM | HEIGHT: 77 IN | BODY MASS INDEX: 30.37 KG/M2

## 2018-05-31 DIAGNOSIS — I48.0 PAROXYSMAL ATRIAL FIBRILLATION (HCC): ICD-10-CM

## 2018-05-31 DIAGNOSIS — I12.9 HYPERTENSION WITH RENAL DISEASE: ICD-10-CM

## 2018-05-31 DIAGNOSIS — D64.9 ANEMIA, UNSPECIFIED TYPE: Primary | ICD-10-CM

## 2018-05-31 LAB
GRAN# POC: 5.8 K/UL (ref 2–7.8)
GRAN% POC: 82.4 % (ref 37–92)
HCT VFR BLD CALC: 25.3 % (ref 37–51)
HGB BLD-MCNC: 8.5 G/DL (ref 12–18)
IRON POC: 47 UG/DL (ref 49–181)
IRON SATURATION POC: 11 % (ref 15–55)
LY# POC: 0.7 K/UL (ref 0.6–4.1)
LY% POC: 10.2 % (ref 10–58.5)
MCH RBC QN: 29.2 PG (ref 26–32)
MCHC RBC-ENTMCNC: 33.7 G/DL (ref 30–36)
MCV RBC: 87 FL (ref 80–97)
MID #, POC: 0.5 K/UL (ref 0–1.8)
MID% POC: 7.4 % (ref 0.1–24)
PLATELET # BLD: 145 K/UL (ref 140–440)
RBC # BLD: 2.91 M/UL (ref 4.2–6.3)
TIBC POC: 416 UG/DL (ref 260–462)
WBC # BLD: 7 K/UL (ref 4.1–10.9)

## 2018-05-31 RX ORDER — ASCORBIC ACID 250 MG
250 TABLET ORAL 3 TIMES DAILY
Qty: 90 TAB | Status: ON HOLD | OUTPATIENT
Start: 2018-05-31 | End: 2019-01-03
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
## 2018-05-31 NOTE — MR AVS SNAPSHOT
[address] [facility], [place] 
 
 
 [address] [address] [phone] Patient: Felipe Bardales. MRN: [id number] :1950 Visit Information Date & Time Provider Department Dept. Phone Encounter #  
 2018 11:40 AM Alexander Navarro MD [place] [address] [phone] [id number] Your Appointments 2018  1:40 PM  
FOLLOW UP 10 with Alexander Navarro MD  
[facility] ([address]) Appt Note: 1 month follow up [address] [address] [address] [facility] [address] Upcoming Health Maintenance Date Due  
 EYE EXAM RETINAL OR DILATED Q1 1958 DTaP/Tdap/Td series (1 - Tdap) 1969 ZOSTER VACCINE AGE 60> 10/28/2008 GLAUCOMA SCREENING Q2Y 2013 Influenza Age 5 to Adult 2018 MEDICARE YEARLY EXAM 2018 HEMOGLOBIN A1C Q6M 2018 FOOT EXAM Q1 2019 MICROALBUMIN Q1 2019 LIPID PANEL Q1 2019 COLONOSCOPY 2021 Allergies as of 2018  Review Complete On: 2018 By: Pepper Briones LPN Severity Noted Reaction Type Reactions Niacin High 2014    Unknown (comments) Levemir [Insulin Detemir]  2013    Hives Other Medication  2013    Other (comments) ? Allergy to Nidia Latrell causing chemical burn Primaxin [Imipenem-cilastatin]  2013    Diarrhea, Rash Xarelto [Rivaroxaban]  2014    Rash, Itching Current Immunizations  Never Reviewed Name Date Influenza High Dose Vaccine PF 10/17/2017 Influenza Vaccine 10/26/2016, 10/21/2015 Pneumococcal Conjugate (PCV-13) 2015 Pneumococcal Vaccine (Unspecified Type) 2014 Not reviewed this visit You Were Diagnosed With   
  
 Codes Comments Anemia, unspecified type    -  Primary ICD-10-CM: D64.9 ICD-9-CM: 285.9 Vitals BP Pulse Temp Resp Height(growth percentile) Weight(growth percentile) 164/84 (BP 1 Location: Left arm, BP Patient Position: Sitting) 70 98.9 °F (37.2 °C) (Oral) 20 6' 5\" (1.956 m) 257 lb 3.2 oz (116.7 kg) SpO2 BMI Smoking Status 97% 30.5 kg/m2 Former Smoker Vitals History BMI and BSA Data Body Mass Index Body Surface Area 30.5 kg/m 2 2.52 m 2 Preferred Pharmacy Pharmacy Name [phone] - [phone], [address] [place] [address] AT [place] [address] [phone] Your Updated Medication List  
  
   
This list is accurate as of 5/31/18 12:13 PM.  Always use your most recent med list.  
  
  
  
  
 BD ULTRA-FINE DEVIN PEN NEEDLES  
by Does Not Apply route. bumetanide 2 mg tablet Commonly known as:  Lazarus Potrero TAKE 2 TABLETS BY MOUTH TWICE DAILY  
  
 clonazePAM 2 mg tablet Commonly known as:  Sheri Jain Take 1 Tab by mouth two (2) times a day. Max Daily Amount: 4 mg. * COLCRYS 0.6 mg tablet Generic drug:  colchicine Take 0.6 mg by mouth daily. Takes 1 tablet 3 times a week. Indications: taking mitagar * MITIGARE 0.6 mg capsule Generic drug:  colchicine TK 1 T PO  QD  
  
 COREG 12.5 mg tablet Generic drug:  carvedilol Take  by mouth two (2) times daily (with meals). ELIQUIS 5 mg tablet Generic drug:  apixaban Take 5 mg by mouth two (2) times a day. finasteride 5 mg tablet Commonly known as:  PROSCAR Take 5 mg by mouth daily. insulin lispro 100 unit/mL kwikpen Commonly known as:  HUMALOG  
5 units before each meal three times a day. LANTUS SOLOSTAR U-100 INSULIN 100 unit/mL (3 mL) Inpn Generic drug:  insulin glargine 30 Units by SubCUTAneous route daily. Liraglutide 0.6 mg/0.1 mL (18 mg/3 mL) Pnij Commonly known as:  VICTOZA  
0.6 mg by SubCUTAneous route daily. metOLazone 2.5 mg tablet Commonly known as:  Everett Case  
 Take 1 Tab by mouth as needed (pt takes approximately twice a month if has edema. ). Take twicw weekly for fluid  
  
 pantoprazole 40 mg tablet Commonly known as:  PROTONIX Take 1 Tab by mouth Daily (before breakfast) for 30 days. pramipexole 0.5 mg tablet Commonly known as:  MIRAPEX TAKE 1 TABLET BY MOUTH EVERY NIGHT AT BEDTIME  
  
 sucralfate 100 mg/mL suspension Commonly known as:  Amy Carrel Take 10 mL by mouth Before breakfast and dinner for 30 days. tamsulosin 0.4 mg capsule Commonly known as:  FLOMAX Take 0.8 mg by mouth daily. ULORIC 40 mg Tab tablet Generic drug:  febuxostat TAKE 1 TABLET BY MOUTH DAILY  
  
 valsartan 320 mg tablet Commonly known as:  DIOVAN Take 1 Tab by mouth daily. zolpidem 10 mg tablet Commonly known as:  AMBIEN Take 1 Tab by mouth nightly as needed for Sleep. Max Daily Amount: 10 mg.  
  
 * Notice: This list has 2 medication(s) that are the same as other medications prescribed for you. Read the directions carefully, and ask your doctor or other care provider to review them with you. We Performed the Following AMB POC COMPLETE CBC,AUTOMATED ENTER [id number] CPT(R)] To-Do List   
 07/27/2018 1:30 PM  
  Appointment with Higginbotham DORIS Yeboah; [facility] at [address] [place] [address]. ([phone]) [facility]! Dear Xochitl Burnham: 
Thank you for requesting a Pollenizer account. Our records indicate that you already have an active Pollenizer account. You can access your account anytime at [email or web address] Did you know that you can access your hospital and ER discharge instructions at any time in Pollenizer? You can also review all of your test results from your hospital stay or ER visit. Additional Information If you have questions, please visit the Frequently Asked Questions section of the [employer]enizer website at [email or web address] Remember, MashONhart is NOT to be used for urgent needs. For medical emergencies, dial 911. Now available from your iPhone and Android! Please provide this summary of care documentation to your next provider. Your primary care clinician is listed as Hunter. If you have any questions after today's visit, please call [phone].

## 2018-05-31 NOTE — PROGRESS NOTES
Hemoglobin is down 8.5 he was given 3 stool Hemoccults at the office visit.   I also started on iron and vitamin C and have set up for him to get an appointment with Dr. Aleshia Vieyra

## 2018-05-31 NOTE — PROGRESS NOTES
1. Have you been to the ER, urgent care clinic since your last visit? Hospitalized since your last visit? No    2. Have you seen or consulted any other health care providers outside of the [address] [place] [address] since your last visit? Include any pap smears or colon screening.  No      Chief Complaint   Patient presents with    Anemia     follow-up labs

## 2018-05-31 NOTE — PATIENT INSTRUCTIONS
Anemia: Care Instructions  Your Care Instructions    Anemia is a low level of red blood cells, which carry oxygen throughout your body. Many things can cause anemia. Lack of iron is one of the most common causes. Your body needs iron to make hemoglobin, a substance in red blood cells that carries oxygen from the lungs to your body's cells. Without enough iron, the body produces fewer and smaller red blood cells. As a result, your body's cells do not get enough oxygen, and you feel tired and weak. And you may have trouble concentrating. Bleeding is the most common cause of a lack of iron. You may have heavy menstrual bleeding or bleeding caused by conditions such as ulcers, hemorrhoids, or cancer. Regular use of aspirin or other anti-inflammatory medicines (such as ibuprofen) also can cause bleeding in some people. A lack of iron in your diet also can cause anemia, especially at times when the body needs more iron, such as during pregnancy, infancy, and the teen years. Your doctor may have prescribed iron pills. It may take several months of treatment for your iron levels to return to normal. Your doctor also may suggest that you eat foods that are rich in iron, such as meat and beans. There are many other causes of anemia. It is not always due to a lack of iron. Finding the specific cause of your anemia will help your doctor find the right treatment for you. Follow-up care is a key part of your treatment and safety. Be sure to make and go to all appointments, and call your doctor if you are having problems. It's also a good idea to know your test results and keep a list of the medicines you take. How can you care for yourself at home? · Take your medicines exactly as prescribed. Call your doctor if you think you are having a problem with your medicine. · If your doctor recommends iron pills, take them as directed:  ¨ Try to take the pills on an empty stomach about 1 hour before or 2 hours after meals. But you may need to take iron with food to avoid an upset stomach. ¨ Do not take antacids or drink milk or caffeine drinks (such as coffee, tea, or cola) at the same time or within 2 hours of the time that you take your iron. They can make it hard for your body to absorb the iron. ¨ Vitamin C (from food or supplements) helps your body absorb iron. Try taking iron pills with a glass of orange juice or some other food that is high in vitamin C, such as citrus fruits. ¨ Iron pills may cause stomach problems, such as heartburn, nausea, diarrhea, constipation, and cramps. Be sure to drink plenty of fluids, and include fruits, vegetables, and fiber in your diet each day. Iron pills often make your bowel movements dark or green. ¨ If you forget to take an iron pill, do not take a double dose of iron the next time you take a pill. ¨ Keep iron pills out of the reach of small children. An overdose of iron can be very dangerous. · Follow your doctor's advice about eating iron-rich foods. These include red meat, shellfish, poultry, eggs, beans, raisins, whole-grain bread, and leafy green vegetables. · Steam vegetables to help them keep their iron content. When should you call for help? Call 911 anytime you think you may need emergency care. For example, call if:  ? · You have symptoms of a heart attack. These may include:  ¨ Chest pain or pressure, or a strange feeling in the chest.  ¨ Sweating. ¨ Shortness of breath. ¨ Nausea or vomiting. ¨ Pain, pressure, or a strange feeling in the back, neck, jaw, or upper belly or in one or both shoulders or arms. ¨ Lightheadedness or sudden weakness. ¨ A fast or irregular heartbeat. After you call 911, the  may tell you to chew 1 adult-strength or 2 to 4 low-dose aspirin. Wait for an ambulance. Do not try to drive yourself. ? · You passed out (lost consciousness).    ?Call your doctor now or seek immediate medical care if:  ? · You have new or increased shortness of breath. ? · You are dizzy or lightheaded, or you feel like you may faint. ? · Your fatigue and weakness continue or get worse. ? · You have any abnormal bleeding, such as:  ¨ Nosebleeds. ¨ Vaginal bleeding that is different (heavier, more frequent, at a different time of the month) than what you are used to. ¨ Bloody or black stools, or rectal bleeding. ¨ Bloody or pink urine. ? Watch closely for changes in your health, and be sure to contact your doctor if:  ? · You do not get better as expected. Where can you learn more? Go to http://gildardo-palak.info/. Enter R301 in the search box to learn more about \"Anemia: Care Instructions. \"  Current as of: October 13, 2016  Content Version: 11.4  © 7679-6237 [employer]. Care instructions adapted under license by [employer] (which disclaims liability or warranty for this information). If you have questions about a medical condition or this instruction, always ask your healthcare professional. [place] [address] any warranty or liability for your use of this information.

## 2018-05-31 NOTE — PROGRESS NOTES
Hemoglobin is much lower at 8.8 with his hemoglobin being 10.3 done 3 weeks ago and 11.6 done 2 months ago with being 12.3 done 3 months ago based upon that we need to get him back as soon as we can hopefully today or tomorrow to reevaluate his hemoglobin and decide whether we need to do anything else we will need to do stool Hemoccult. Patient informed. Scheduled for f/up 5-.

## 2018-06-01 ENCOUNTER — TELEPHONE (OUTPATIENT)
Dept: INTERNAL MEDICINE CLINIC | Age: 68
End: 2018-06-01

## 2018-06-01 ENCOUNTER — APPOINTMENT (OUTPATIENT)
Dept: GENERAL RADIOLOGY | Age: 68
End: 2018-06-01
Attending: EMERGENCY MEDICINE
Payer: MEDICARE

## 2018-06-01 ENCOUNTER — HOSPITAL ENCOUNTER (EMERGENCY)
Age: 68
Discharge: HOME | End: 2018-06-01
Attending: EMERGENCY MEDICINE
Payer: MEDICARE

## 2018-06-01 VITALS
WEIGHT: 252.21 LBS | DIASTOLIC BLOOD PRESSURE: 82 MMHG | TEMPERATURE: 97.5 F | HEART RATE: 67 BPM | OXYGEN SATURATION: 99 % | BODY MASS INDEX: 29.78 KG/M2 | HEIGHT: 77 IN | RESPIRATION RATE: 17 BRPM | SYSTOLIC BLOOD PRESSURE: 132 MMHG

## 2018-06-01 DIAGNOSIS — N18.9 CHRONIC KIDNEY DISEASE, UNSPECIFIED CKD STAGE: ICD-10-CM

## 2018-06-01 DIAGNOSIS — D64.9 ANEMIA, UNSPECIFIED TYPE: Primary | ICD-10-CM

## 2018-06-01 LAB
ALBUMIN SERPL-MCNC: 3.4 G/DL (ref 3.5–5)
ALBUMIN/GLOB SERPL: 0.7 {RATIO} (ref 1.1–2.2)
ALP SERPL-CCNC: 125 U/L (ref 45–117)
ALT SERPL-CCNC: 13 U/L (ref 12–78)
ANION GAP SERPL CALC-SCNC: 9 MMOL/L (ref 5–15)
AST SERPL-CCNC: 14 U/L (ref 15–37)
ATRIAL RATE: 70 BPM
BASOPHILS # BLD: 0.1 K/UL (ref 0–0.1)
BASOPHILS NFR BLD: 1 % (ref 0–1)
BILIRUB SERPL-MCNC: 0.9 MG/DL (ref 0.2–1)
BNP SERPL-MCNC: 6591 PG/ML (ref 0–125)
BUN SERPL-MCNC: 93 MG/DL (ref 6–20)
BUN/CREAT SERPL: 36 (ref 12–20)
CALCIUM SERPL-MCNC: 9.2 MG/DL (ref 8.5–10.1)
CALCULATED P AXIS, ECG09: 69 DEGREES
CALCULATED R AXIS, ECG10: -79 DEGREES
CALCULATED T AXIS, ECG11: 103 DEGREES
CHLORIDE SERPL-SCNC: 91 MMOL/L (ref 97–108)
CO2 SERPL-SCNC: 32 MMOL/L (ref 21–32)
CREAT SERPL-MCNC: 2.59 MG/DL (ref 0.7–1.3)
DIAGNOSIS, 93000: NORMAL
DIFFERENTIAL METHOD BLD: ABNORMAL
EOSINOPHIL # BLD: 0 K/UL (ref 0–0.4)
EOSINOPHIL NFR BLD: 0 % (ref 0–7)
ERYTHROCYTE [DISTWIDTH] IN BLOOD BY AUTOMATED COUNT: 14.7 % (ref 11.5–14.5)
GLOBULIN SER CALC-MCNC: 4.6 G/DL (ref 2–4)
GLUCOSE SERPL-MCNC: 178 MG/DL (ref 65–100)
HCT VFR BLD AUTO: 29.2 % (ref 36.6–50.3)
HEMOCCULT STL QL: NEGATIVE
HGB BLD-MCNC: 9.6 G/DL (ref 12.1–17)
IMM GRANULOCYTES # BLD: 0 K/UL (ref 0–0.04)
IMM GRANULOCYTES NFR BLD AUTO: 0 % (ref 0–0.5)
INR PPP: 1.3 (ref 0.9–1.1)
IRON SATN MFR SERPL: 46 % (ref 20–50)
IRON SERPL-MCNC: 167 UG/DL (ref 35–150)
LYMPHOCYTES # BLD: 0.5 K/UL (ref 0.8–3.5)
LYMPHOCYTES NFR BLD: 7 % (ref 12–49)
MCH RBC QN AUTO: 29 PG (ref 26–34)
MCHC RBC AUTO-ENTMCNC: 32.9 G/DL (ref 30–36.5)
MCV RBC AUTO: 88.2 FL (ref 80–99)
MONOCYTES # BLD: 0.6 K/UL (ref 0–1)
MONOCYTES NFR BLD: 8 % (ref 5–13)
NEUTS SEG # BLD: 6.2 K/UL (ref 1.8–8)
NEUTS SEG NFR BLD: 84 % (ref 32–75)
NRBC # BLD: 0 K/UL (ref 0–0.01)
NRBC BLD-RTO: 0 PER 100 WBC
P-R INTERVAL, ECG05: 212 MS
PLATELET # BLD AUTO: 130 K/UL (ref 150–400)
PMV BLD AUTO: 10.8 FL (ref 8.9–12.9)
POTASSIUM SERPL-SCNC: 3.3 MMOL/L (ref 3.5–5.1)
PROT SERPL-MCNC: 8 G/DL (ref 6.4–8.2)
PROTHROMBIN TIME: 12.6 SEC (ref 9–11.1)
Q-T INTERVAL, ECG07: 510 MS
QRS DURATION, ECG06: 200 MS
QTC CALCULATION (BEZET), ECG08: 550 MS
RBC # BLD AUTO: 3.31 M/UL (ref 4.1–5.7)
RBC MORPH BLD: ABNORMAL
SODIUM SERPL-SCNC: 132 MMOL/L (ref 136–145)
TIBC SERPL-MCNC: 361 UG/DL (ref 250–450)
TROPONIN I SERPL-MCNC: <0.04 NG/ML
VENTRICULAR RATE, ECG03: 70 BPM
WBC # BLD AUTO: 7.4 K/UL (ref 4.1–11.1)

## 2018-06-01 PROCEDURE — 83880 ASSAY OF NATRIURETIC PEPTIDE: CPT | Performed by: EMERGENCY MEDICINE

## 2018-06-01 PROCEDURE — 85025 COMPLETE CBC W/AUTO DIFF WBC: CPT | Performed by: EMERGENCY MEDICINE

## 2018-06-01 PROCEDURE — 99285 EMERGENCY DEPT VISIT HI MDM: CPT

## 2018-06-01 PROCEDURE — 74011250637 HC RX REV CODE- 250/637: Performed by: EMERGENCY MEDICINE

## 2018-06-01 PROCEDURE — 83540 ASSAY OF IRON: CPT | Performed by: EMERGENCY MEDICINE

## 2018-06-01 PROCEDURE — 71046 X-RAY EXAM CHEST 2 VIEWS: CPT

## 2018-06-01 PROCEDURE — 93005 ELECTROCARDIOGRAM TRACING: CPT

## 2018-06-01 PROCEDURE — 82272 OCCULT BLD FECES 1-3 TESTS: CPT | Performed by: EMERGENCY MEDICINE

## 2018-06-01 PROCEDURE — 36415 COLL VENOUS BLD VENIPUNCTURE: CPT | Performed by: EMERGENCY MEDICINE

## 2018-06-01 PROCEDURE — 84484 ASSAY OF TROPONIN QUANT: CPT | Performed by: EMERGENCY MEDICINE

## 2018-06-01 PROCEDURE — 80053 COMPREHEN METABOLIC PANEL: CPT | Performed by: EMERGENCY MEDICINE

## 2018-06-01 PROCEDURE — 85610 PROTHROMBIN TIME: CPT | Performed by: EMERGENCY MEDICINE

## 2018-06-01 RX ORDER — SODIUM CHLORIDE 0.9 % (FLUSH) 0.9 %
5 SYRINGE (ML) INJECTION EVERY 8 HOURS
Status: DISCONTINUED | OUTPATIENT
Start: 2018-06-01 | End: 2018-06-01 | Stop reason: HOSPADM

## 2018-06-01 RX ORDER — POTASSIUM CHLORIDE 20 MEQ/1
40 TABLET, EXTENDED RELEASE ORAL
Status: COMPLETED | OUTPATIENT
Start: 2018-06-01 | End: 2018-06-01

## 2018-06-01 RX ADMIN — POTASSIUM CHLORIDE 40 MEQ: 20 TABLET, EXTENDED RELEASE ORAL at 13:58
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
## 2018-06-01 NOTE — ED NOTES
Patient provided with warm blanket and pillow at this time. Patient resting comfortably in stretcher. Call bell within reach.

## 2018-06-01 NOTE — ED NOTES
Assumed care of patient. Patient states he had ablation 2 weeks ago, was seen at PCP yesterday, was told he had hemoglobin of 8. Patient notes SOB, patient denies chest pain. Patient placed on monitor x 3, patient is ANO x 4. Family bedside. Call bell in reach.

## 2018-06-01 NOTE — ED NOTES
MD Patel Shearer reviewed discharge instructions with the patient. The patient verbalized understanding. Patient discharged home with stable vitals. Patient ambulatory out of ED with steady gait. Transportation provided. No further complaints noted.

## 2018-06-01 NOTE — PROGRESS NOTES
Hemoglobin is down 8.5 he was given 3 stool Hemoccults at the office visit. I also started on iron and vitamin C and have set up for him to get an appointment with Dr. Serafin Blue. Left message for patient to call back to discuss.

## 2018-06-01 NOTE — ED NOTES
Radiology at bedside to take patient to CT. Patient requesting to use urinal prior to going to xray. Xray tech states she will come back to get patient.

## 2018-06-01 NOTE — TELEPHONE ENCOUNTER
Wife called regarding patient's SOB. Is at the cardiologist office and the cardiologist wants patient to see Dr. Shon Doran. Wants to know if they should make the referral.  Told the wife that I was trying to call Mr. Brianna Figueredo regarding this as well. They left the office and went by Dr. Olivia Elias office and were told that Dr. Shon Doran was on vacation and did not have anyone available to see them. The SOB is relieve when patient sits in front of the fan. Discussed with Dr. Moira Carvalho and advised wife to take him to the ER. Dr. Moira Carvalho informed patient's wife that he will call and let them know the situation. Wife okay with this.

## 2018-06-01 NOTE — ED NOTES
Lab called and questioned if MD Danielle Gibson still wanted Pro BNP done since patient was d/c. Spoke with MD Danielle Gibson who states she wants the test done. Called lab and they state they need another sample of blood in order to run the Pro BNP because they sent the tube of blood out to have the iron panel done. MD Danielle Gibson made aware.

## 2018-06-06 ENCOUNTER — OFFICE VISIT (OUTPATIENT)
Dept: INTERNAL MEDICINE CLINIC | Age: 68
End: 2018-06-06

## 2018-06-06 VITALS
OXYGEN SATURATION: 97 % | HEIGHT: 77 IN | TEMPERATURE: 98.4 F | HEART RATE: 60 BPM | RESPIRATION RATE: 20 BRPM | DIASTOLIC BLOOD PRESSURE: 70 MMHG | BODY MASS INDEX: 30.44 KG/M2 | WEIGHT: 257.8 LBS | SYSTOLIC BLOOD PRESSURE: 130 MMHG

## 2018-06-06 DIAGNOSIS — I50.22 CHRONIC SYSTOLIC CONGESTIVE HEART FAILURE (HCC): ICD-10-CM

## 2018-06-06 DIAGNOSIS — I25.5 ISCHEMIC CARDIOMYOPATHY: ICD-10-CM

## 2018-06-06 DIAGNOSIS — I48.0 PAROXYSMAL ATRIAL FIBRILLATION (HCC): ICD-10-CM

## 2018-06-06 DIAGNOSIS — D64.9 ANEMIA, UNSPECIFIED TYPE: Primary | ICD-10-CM

## 2018-06-06 DIAGNOSIS — N18.4 CKD (CHRONIC KIDNEY DISEASE), STAGE IV (HCC): ICD-10-CM

## 2018-06-06 LAB
GRAN# POC: 3.1 K/UL (ref 2–7.8)
GRAN% POC: 72.6 % (ref 37–92)
HCT VFR BLD CALC: 28 % (ref 37–51)
HGB BLD-MCNC: 9.2 G/DL (ref 12–18)
LY# POC: 0.7 K/UL (ref 0.6–4.1)
LY% POC: 16.8 % (ref 10–58.5)
MCH RBC QN: 28.5 PG (ref 26–32)
MCHC RBC-ENTMCNC: 32.9 G/DL (ref 30–36)
MCV RBC: 87 FL (ref 80–97)
MID #, POC: 0.4 K/UL (ref 0–1.8)
MID% POC: 10.6 % (ref 0.1–24)
PLATELET # BLD: 117 K/UL (ref 140–440)
RBC # BLD: 3.22 M/UL (ref 4.2–6.3)
WBC # BLD: 4.2 K/UL (ref 4.1–10.9)

## 2018-06-06 NOTE — PROGRESS NOTES
1. Have you been to the ER, urgent care clinic since your last visit? Hospitalized since your last visit? Yes, [address] [facility], 06-, for shortness of breath. Was sent to ER by cardiologist.    2. Have you seen or consulted any other health care providers outside of the [address] [place] [address] since your last visit? Include any pap smears or colon screening. Yes, Cardiologist, 06-1-2018, for follow-up of ablation, and was sent to ER due to shortness of breath.     Chief Complaint   Patient presents with    Anemia     follow-up
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
## 2018-06-06 NOTE — PATIENT INSTRUCTIONS
Anemia: Care Instructions  Your Care Instructions    Anemia is a low level of red blood cells, which carry oxygen throughout your body. Many things can cause anemia. Lack of iron is one of the most common causes. Your body needs iron to make hemoglobin, a substance in red blood cells that carries oxygen from the lungs to your body's cells. Without enough iron, the body produces fewer and smaller red blood cells. As a result, your body's cells do not get enough oxygen, and you feel tired and weak. And you may have trouble concentrating. Bleeding is the most common cause of a lack of iron. You may have heavy menstrual bleeding or bleeding caused by conditions such as ulcers, hemorrhoids, or cancer. Regular use of aspirin or other anti-inflammatory medicines (such as ibuprofen) also can cause bleeding in some people. A lack of iron in your diet also can cause anemia, especially at times when the body needs more iron, such as during pregnancy, infancy, and the teen years. Your doctor may have prescribed iron pills. It may take several months of treatment for your iron levels to return to normal. Your doctor also may suggest that you eat foods that are rich in iron, such as meat and beans. There are many other causes of anemia. It is not always due to a lack of iron. Finding the specific cause of your anemia will help your doctor find the right treatment for you. Follow-up care is a key part of your treatment and safety. Be sure to make and go to all appointments, and call your doctor if you are having problems. It's also a good idea to know your test results and keep a list of the medicines you take. How can you care for yourself at home? · Take your medicines exactly as prescribed. Call your doctor if you think you are having a problem with your medicine. · If your doctor recommends iron pills, take them as directed:  ¨ Try to take the pills on an empty stomach about 1 hour before or 2 hours after meals. But you may need to take iron with food to avoid an upset stomach. ¨ Do not take antacids or drink milk or caffeine drinks (such as coffee, tea, or cola) at the same time or within 2 hours of the time that you take your iron. They can make it hard for your body to absorb the iron. ¨ Vitamin C (from food or supplements) helps your body absorb iron. Try taking iron pills with a glass of orange juice or some other food that is high in vitamin C, such as citrus fruits. ¨ Iron pills may cause stomach problems, such as heartburn, nausea, diarrhea, constipation, and cramps. Be sure to drink plenty of fluids, and include fruits, vegetables, and fiber in your diet each day. Iron pills often make your bowel movements dark or green. ¨ If you forget to take an iron pill, do not take a double dose of iron the next time you take a pill. ¨ Keep iron pills out of the reach of small children. An overdose of iron can be very dangerous. · Follow your doctor's advice about eating iron-rich foods. These include red meat, shellfish, poultry, eggs, beans, raisins, whole-grain bread, and leafy green vegetables. · Steam vegetables to help them keep their iron content. When should you call for help? Call 911 anytime you think you may need emergency care. For example, call if:  ? · You have symptoms of a heart attack. These may include:  ¨ Chest pain or pressure, or a strange feeling in the chest.  ¨ Sweating. ¨ Shortness of breath. ¨ Nausea or vomiting. ¨ Pain, pressure, or a strange feeling in the back, neck, jaw, or upper belly or in one or both shoulders or arms. ¨ Lightheadedness or sudden weakness. ¨ A fast or irregular heartbeat. After you call 911, the  may tell you to chew 1 adult-strength or 2 to 4 low-dose aspirin. Wait for an ambulance. Do not try to drive yourself. ? · You passed out (lost consciousness).    ?Call your doctor now or seek immediate medical care if:  ? · You have new or increased shortness of breath. ? · You are dizzy or lightheaded, or you feel like you may faint. ? · Your fatigue and weakness continue or get worse. ? · You have any abnormal bleeding, such as:  ¨ Nosebleeds. ¨ Vaginal bleeding that is different (heavier, more frequent, at a different time of the month) than what you are used to. ¨ Bloody or black stools, or rectal bleeding. ¨ Bloody or pink urine. ? Watch closely for changes in your health, and be sure to contact your doctor if:  ? · You do not get better as expected. Where can you learn more? Go to http://gildardo-palak.info/. Enter R301 in the search box to learn more about \"Anemia: Care Instructions. \"  Current as of: October 13, 2016  Content Version: 11.4  © 8799-6582 "[employer]. Care instructions adapted under license by [employer] (which disclaims liability or warranty for this information). If you have questions about a medical condition or this instruction, always ask your healthcare professional. [place] [address] any warranty or liability for your use of this information.
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
## 2018-06-14 ENCOUNTER — TELEPHONE (OUTPATIENT)
Dept: INTERNAL MEDICINE CLINIC | Age: 68
End: 2018-06-14

## 2018-06-14 DIAGNOSIS — G47.00 INSOMNIA, UNSPECIFIED TYPE: ICD-10-CM

## 2018-06-14 RX ORDER — INSULIN GLARGINE 100 [IU]/ML
INJECTION, SOLUTION SUBCUTANEOUS
Qty: 10 PEN | Refills: 2 | Status: ON HOLD | OUTPATIENT
Start: 2018-06-14 | End: 2019-01-03

## 2018-06-14 RX ORDER — CLONAZEPAM 2 MG/1
TABLET ORAL
Qty: 60 TAB | Refills: 2 | Status: SHIPPED | OUTPATIENT
Start: 2018-06-14 | End: 2018-07-09 | Stop reason: SDUPTHER

## 2018-06-14 NOTE — TELEPHONE ENCOUNTER
Patient is requesting a different rx medicine for sleep. Has tried Ambien and Temazepam 15 and 30 mg in the past and it does not work. States The Clonazepam 2 mg at bedtime works fair and wants to repeat if he wakes up. Discussed with Dr. Moira Carvalho and he okayed. Rx change sent patient's pharmacy.

## 2018-06-14 NOTE — TELEPHONE ENCOUNTER
Requested Prescriptions     Pending Prescriptions Disp Refills    LANTUS SOLOSTAR U-100 INSULIN 100 unit/mL (3 mL) inpn [Pharmacy Med Name: LANTUS SOLOSTAR PEN INJ 3ML] 10 Pen 2     Sig: USE TO INJECT 30 UNITS UNDER THE SKIN EVERY DAY       Patient Last Seen:  06- with labs    Next appointment:  06-

## 2018-06-14 NOTE — TELEPHONE ENCOUNTER
Requested Prescriptions     Pending Prescriptions Disp Refills    clonazePAM (KLONOPIN) 2 mg tablet 60 Tab      Sig: Take 1 tablet at bedtime and may repeat  If he wakes up.

## 2018-06-20 ENCOUNTER — OFFICE VISIT (OUTPATIENT)
Dept: INTERNAL MEDICINE CLINIC | Age: 68
End: 2018-06-20

## 2018-06-20 ENCOUNTER — HOSPITAL ENCOUNTER (OUTPATIENT)
Dept: INFUSION THERAPY | Age: 68
Discharge: HOME | End: 2018-06-20
Payer: MEDICARE

## 2018-06-20 VITALS
TEMPERATURE: 98.1 F | DIASTOLIC BLOOD PRESSURE: 77 MMHG | OXYGEN SATURATION: 100 % | RESPIRATION RATE: 18 BRPM | SYSTOLIC BLOOD PRESSURE: 135 MMHG | HEART RATE: 60 BPM

## 2018-06-20 VITALS
TEMPERATURE: 98.6 F | RESPIRATION RATE: 15 BRPM | SYSTOLIC BLOOD PRESSURE: 138 MMHG | OXYGEN SATURATION: 98 % | WEIGHT: 255.4 LBS | HEIGHT: 77 IN | DIASTOLIC BLOOD PRESSURE: 76 MMHG | HEART RATE: 60 BPM | BODY MASS INDEX: 30.16 KG/M2

## 2018-06-20 DIAGNOSIS — D64.9 ANEMIA, UNSPECIFIED TYPE: ICD-10-CM

## 2018-06-20 DIAGNOSIS — I25.5 ISCHEMIC CARDIOMYOPATHY: ICD-10-CM

## 2018-06-20 DIAGNOSIS — I48.0 PAROXYSMAL ATRIAL FIBRILLATION (HCC): ICD-10-CM

## 2018-06-20 DIAGNOSIS — I50.22 CHRONIC SYSTOLIC CONGESTIVE HEART FAILURE (HCC): ICD-10-CM

## 2018-06-20 DIAGNOSIS — N18.4 CKD (CHRONIC KIDNEY DISEASE), STAGE IV (HCC): ICD-10-CM

## 2018-06-20 DIAGNOSIS — I12.9 HYPERTENSION WITH RENAL DISEASE: Primary | ICD-10-CM

## 2018-06-20 LAB
ALBUMIN SERPL-MCNC: 3.3 G/DL (ref 3.5–5)
ANION GAP SERPL CALC-SCNC: 7 MMOL/L (ref 5–15)
BUN SERPL-MCNC: 93 MG/DL (ref 6–20)
BUN/CREAT SERPL: 36 (ref 12–20)
CALCIUM SERPL-MCNC: 8.6 MG/DL (ref 8.5–10.1)
CHLORIDE SERPL-SCNC: 100 MMOL/L (ref 97–108)
CO2 SERPL-SCNC: 29 MMOL/L (ref 21–32)
CREAT SERPL-MCNC: 2.6 MG/DL (ref 0.7–1.3)
FERRITIN SERPL-MCNC: 83 NG/ML (ref 26–388)
GLUCOSE SERPL-MCNC: 198 MG/DL (ref 65–100)
HCT VFR BLD AUTO: 28.3 % (ref 36.6–50.3)
HGB BLD-MCNC: 8.9 G/DL (ref 12.1–17)
IRON SATN MFR SERPL: 26 % (ref 20–50)
IRON SERPL-MCNC: 88 UG/DL (ref 35–150)
PHOSPHATE SERPL-MCNC: 3.7 MG/DL (ref 2.6–4.7)
POTASSIUM SERPL-SCNC: 3.9 MMOL/L (ref 3.5–5.1)
SODIUM SERPL-SCNC: 136 MMOL/L (ref 136–145)
TIBC SERPL-MCNC: 342 UG/DL (ref 250–450)

## 2018-06-20 PROCEDURE — 82728 ASSAY OF FERRITIN: CPT | Performed by: INTERNAL MEDICINE

## 2018-06-20 PROCEDURE — 74011250636 HC RX REV CODE- 250/636: Performed by: INTERNAL MEDICINE

## 2018-06-20 PROCEDURE — 74011000258 HC RX REV CODE- 258: Performed by: INTERNAL MEDICINE

## 2018-06-20 PROCEDURE — 80069 RENAL FUNCTION PANEL: CPT | Performed by: INTERNAL MEDICINE

## 2018-06-20 PROCEDURE — 36415 COLL VENOUS BLD VENIPUNCTURE: CPT | Performed by: INTERNAL MEDICINE

## 2018-06-20 PROCEDURE — 96372 THER/PROPH/DIAG INJ SC/IM: CPT

## 2018-06-20 PROCEDURE — 83540 ASSAY OF IRON: CPT | Performed by: INTERNAL MEDICINE

## 2018-06-20 PROCEDURE — 96374 THER/PROPH/DIAG INJ IV PUSH: CPT

## 2018-06-20 PROCEDURE — 85018 HEMOGLOBIN: CPT | Performed by: INTERNAL MEDICINE

## 2018-06-20 RX ORDER — SODIUM CHLORIDE 0.9 % (FLUSH) 0.9 %
10-40 SYRINGE (ML) INJECTION AS NEEDED
Status: ACTIVE | OUTPATIENT
Start: 2018-06-20 | End: 2018-06-21

## 2018-06-20 RX ADMIN — Medication 10 ML: at 08:30

## 2018-06-20 RX ADMIN — ERYTHROPOIETIN 40000 UNITS: 40000 INJECTION, SOLUTION INTRAVENOUS; SUBCUTANEOUS at 09:34

## 2018-06-20 RX ADMIN — FERUMOXYTOL 510 MG: 510 INJECTION INTRAVENOUS at 08:48

## 2018-06-20 RX ADMIN — Medication 10 ML: at 09:04

## 2018-06-20 NOTE — PROGRESS NOTES
Chief Complaint   Patient presents with    Hypertension     2 week follow up     Diabetes    Chronic Kidney Disease    Obesity     1. Have you been to the ER, urgent care clinic since your last visit? Hospitalized since your last visit? No    2. Have you seen or consulted any other health care providers outside of the [address] [place] [address] since your last visit? Include any pap smears or colon screening. Yes, infusion on 6/20/18 at [place] [address].

## 2018-06-20 NOTE — PROGRESS NOTES
Pt arrived to [facility] ambulatory for Feraheme (dose 1)/Procrit in no acute distress at 0810.  Assessment unremarkable except dyspnea with exertion and when lying down, and weakness and fatigue. #24g PIV established in left forearm without issue and positive blood return noted.  Labs obtained- H&H, Renal Panel, Ferritin, and Iron Profile. Visit Vitals    /74 (BP 1 Location: Left arm, BP Patient Position: Sitting)    Pulse (!) 59    Temp 98.1 °F (36.7 °C)    Resp 18    SpO2 100%     Recent Results (from the past 12 hour(s))   HGB & HCT    Collection Time: 06/20/18  8:15 AM   Result Value Ref Range    HGB 8.9 (L) 12.1 - 17.0 g/dL    HCT 28.3 (L) 36.6 - 50.3 %   RENAL FUNCTION PANEL    Collection Time: 06/20/18  8:15 AM   Result Value Ref Range    Sodium 136 136 - 145 mmol/L    Potassium 3.9 3.5 - 5.1 mmol/L    Chloride 100 97 - 108 mmol/L    CO2 29 21 - 32 mmol/L    Anion gap 7 5 - 15 mmol/L    Glucose 198 (H) 65 - 100 mg/dL    BUN 93 (H) 6 - 20 MG/DL    Creatinine 2.60 (H) 0.70 - 1.30 MG/DL    BUN/Creatinine ratio 36 (H) 12 - 20      GFR est AA 30 (L) >60 ml/min/1.73m2    GFR est non-AA 25 (L) >60 ml/min/1.73m2    Calcium 8.6 8.5 - 10.1 MG/DL    Phosphorus 3.7 2.6 - 4.7 MG/DL    Albumin 3.3 (L) 3.5 - 5.0 g/dL     Hgb 8.9    Ferritin and Iron Profile pending at time of note. See [facility] for results. The following medications administered:  Feraheme 510 mg IV over 15 minutes  Procrit 40,000 Units SQ to left upper arm    Visit Vitals    /77 (BP 1 Location: Right arm, BP Patient Position: Sitting)    Pulse 60    Temp 98.1 °F (36.7 °C)    Resp 18    SpO2 100%       Pt tolerated treatment well.  No adverse reaction noted after monitoring patient for 30 minutes post Feraheme. IV flushed per policy and removed, 2x2 and coban placed.  Pt discharged ambulatory in no acute distress at 0940, accompanied by self. Next appointment 6/27/18 @ 1100.
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
## 2018-06-20 NOTE — MR AVS SNAPSHOT
[facility] 
 
 
 [address] [address] [phone] Patient: Felipe Bardales. MRN: [id number] :1950 Visit Information Date & Time Provider Department Dept. Phone Encounter #  
 2018  1:30 PM Alexander Navarro [address] [facility] [phone] [id number] Follow-up Instructions Return in about 2 weeks (around 2018). Follow-up and Disposition History Your Appointments 2018  1:50 PM  
FOLLOW UP 10 with MD QIANA [facility] ([address]) Appt Note: 2 week follow up [address] [address] [address] [facility] [address] [address] Upcoming Health Maintenance Date Due  
 EYE EXAM RETINAL OR DILATED Q1 1958 DTaP/Tdap/Td series (1 - Tdap) 1969 ZOSTER VACCINE AGE 60> 10/28/2008 GLAUCOMA SCREENING Q2Y 2013 Influenza Age 5 to Adult 2018 MEDICARE YEARLY EXAM 2018 HEMOGLOBIN A1C Q6M 2018 FOOT EXAM Q1 2019 MICROALBUMIN Q1 2019 LIPID PANEL Q1 2019 COLONOSCOPY 2021 Allergies as of 2018  Review Complete On: 2018 By: Alexander Navarro MD  
  
 Severity Noted Reaction Type Reactions Niacin High 2014    Unknown (comments) Levemir [Insulin Detemir]  2013    Hives Other Medication  2013    Other (comments) ? Allergy to Nidia Latrell causing chemical burn Primaxin [Imipenem-cilastatin]  2013    Diarrhea, Rash Xarelto [Rivaroxaban]  2014    Rash, Itching Current Immunizations  Reviewed on 2018 Name Date Influenza High Dose Vaccine PF 10/17/2017 Influenza Vaccine 10/26/2016, 10/21/2015 Pneumococcal Conjugate (PCV-13) 2015 Pneumococcal Vaccine (Unspecified Type) 2014 Reviewed by Nicole Dasilva RN on 6/20/2018 at  8:30 AM  
You Were Diagnosed With   
  
 Codes Comments Hypertension with renal disease    -  Primary ICD-10-CM: I12.9 ICD-9-CM: 403.90 CKD (chronic kidney disease), stage IV ([facility] [address]     ICD-10-CM: N18.4 ICD-9-CM: 941. 4 Anemia, unspecified type     ICD-10-CM: D64.9 ICD-9-CM: 285.9 Paroxysmal atrial fibrillation (HCC)     ICD-10-CM: I48.0 ICD-9-CM: 427.31 Ischemic cardiomyopathy     ICD-10-CM: I25.5 ICD-9-CM: 414.8 Chronic systolic congestive heart failure (HCC)     ICD-10-CM: I50.22 ICD-9-CM: 428.22, 428.0 Vitals BP Pulse Temp Resp Height(growth percentile) Weight(growth percentile) 138/76 60 98.6 °F (37 °C) (Oral) 15 6' 5\" (1.956 m) 255 lb 6.4 oz (115.8 kg) SpO2 BMI Smoking Status 98% 30.29 kg/m2 Former Smoker Vitals History BMI and BSA Data Body Mass Index Body Surface Area  
 30.29 kg/m 2 2.51 m 2 Preferred Pharmacy [facility] [phone] [facility] [address], [address] AT [place] [address] [phone] Your Updated Medication List  
  
   
This list is accurate as of 6/20/18  2:25 PM.  Always use your most recent med list.  
  
  
  
  
 ascorbic acid (vitamin C) 250 mg tablet Commonly known as:  VITAMIN C Take 1 Tab by mouth three (3) times daily. BD ULTRA-FINE DEVIN PEN NEEDLES  
by Does Not Apply route. bumetanide 2 mg tablet Commonly known as:  Tura Rose TAKE 2 TABLETS BY MOUTH TWICE DAILY  
  
 clonazePAM 2 mg tablet Commonly known as:  Cyrilla Mayans Take 1 tablet at bedtime and may repeat  If he wakes up. * COLCRYS 0.6 mg tablet Generic drug:  colchicine Take 0.6 mg by mouth daily. Takes 1 tablet 3 times a week. Indications: taking mitagar * MITIGARE 0.6 mg capsule Generic drug:  colchicine TK 1 T PO  QD  
  
 COREG 12.5 mg tablet Generic drug:  carvedilol Take  by mouth two (2) times daily (with meals). ELIQUIS 5 mg tablet Generic drug:  apixaban Take 5 mg by mouth two (2) times a day. Ferrous Sulfate 47.5 mg iron Tber tablet Commonly known as:  SLOW FE Take 1 Tab by mouth three (3) times daily. finasteride 5 mg tablet Commonly known as:  PROSCAR Take 5 mg by mouth daily. insulin lispro 100 unit/mL kwikpen Commonly known as:  HUMALOG  
5 units before each meal three times a day. LANTUS SOLOSTAR U-100 INSULIN 100 unit/mL (3 mL) Inpn Generic drug:  insulin glargine USE TO INJECT 30 UNITS UNDER THE SKIN EVERY DAY Liraglutide 0.6 mg/0.1 mL (18 mg/3 mL) Pnij Commonly known as:  VICTOZA  
0.6 mg by SubCUTAneous route daily. metOLazone 2.5 mg tablet Commonly known as:  Simeon Scarce Take 1 Tab by mouth as needed (pt takes approximately twice a month if has edema. ). Take twicw weekly for fluid  
  
 pramipexole 0.5 mg tablet Commonly known as:  MIRAPEX TAKE 1 TABLET BY MOUTH EVERY NIGHT AT BEDTIME  
  
 tamsulosin 0.4 mg capsule Commonly known as:  FLOMAX Take 0.8 mg by mouth daily. ULORIC 40 mg Tab tablet Generic drug:  febuxostat TAKE 1 TABLET BY MOUTH DAILY  
  
 valsartan 320 mg tablet Commonly known as:  DIOVAN Take 1 Tab by mouth daily. * Notice: This list has 2 medication(s) that are the same as other medications prescribed for you. Read the directions carefully, and ask your doctor or other care provider to review them with you. Follow-up Instructions Return in about 2 weeks (around 7/4/2018). To-Do List   
 06/27/2018 11:00 AM  
  Appointment with Kulwinder Ming Hernandez [facility] ANGELINA at [facility] ([phone])  
  
 07/18/2018 8:00 AM  
  Appointment with Shoshana Brandon at [facility] ([phone])  
  
 07/27/2018 1:30 PM  
  Appointment with Eunice Mir DPM; [facility] WOUND CARE 3 at [address] [place] [address] ([phone])  
  
 08/15/2018 8:00 AM  
 Appointment with DEBRA FOX CHAIR 1 at [facility] ([phone]) Patient Instructions Anemia: Care Instructions Your Care Instructions Anemia is a low level of red blood cells, which carry oxygen throughout your body. Many things can cause anemia. Lack of iron is one of the most common causes. Your body needs iron to make hemoglobin, a substance in red blood cells that carries oxygen from the lungs to your body's cells. Without enough iron, the body produces fewer and smaller red blood cells. As a result, your body's cells do not get enough oxygen, and you feel tired and weak. And you may have trouble concentrating. Bleeding is the most common cause of a lack of iron. You may have heavy menstrual bleeding or bleeding caused by conditions such as ulcers, hemorrhoids, or cancer. Regular use of aspirin or other anti-inflammatory medicines (such as ibuprofen) also can cause bleeding in some people. A lack of iron in your diet also can cause anemia, especially at times when the body needs more iron, such as during pregnancy, infancy, and the teen years. Your doctor may have prescribed iron pills. It may take several months of treatment for your iron levels to return to normal. Your doctor also may suggest that you eat foods that are rich in iron, such as meat and beans. There are many other causes of anemia. It is not always due to a lack of iron. Finding the specific cause of your anemia will help your doctor find the right treatment for you. Follow-up care is a key part of your treatment and safety. Be sure to make and go to all appointments, and call your doctor if you are having problems. It's also a good idea to know your test results and keep a list of the medicines you take. How can you care for yourself at home? · Take your medicines exactly as prescribed. Call your doctor if you think you are having a problem with your medicine. · If your doctor recommends iron pills, take them as directed: ¨ Try to take the pills on an empty stomach about 1 hour before or 2 hours after meals. But you may need to take iron with food to avoid an upset stomach. ¨ Do not take antacids or drink milk or caffeine drinks (such as coffee, tea, or cola) at the same time or within 2 hours of the time that you take your iron. They can make it hard for your body to absorb the iron. ¨ Vitamin C (from food or supplements) helps your body absorb iron. Try taking iron pills with a glass of orange juice or some other food that is high in vitamin C, such as citrus fruits. ¨ Iron pills may cause stomach problems, such as heartburn, nausea, diarrhea, constipation, and cramps. Be sure to drink plenty of fluids, and include fruits, vegetables, and fiber in your diet each day. Iron pills often make your bowel movements dark or green. ¨ If you forget to take an iron pill, do not take a double dose of iron the next time you take a pill. ¨ Keep iron pills out of the reach of small children. An overdose of iron can be very dangerous. · Follow your doctor's advice about eating iron-rich foods. These include red meat, shellfish, poultry, eggs, beans, raisins, whole-grain bread, and leafy green vegetables. · Steam vegetables to help them keep their iron content. When should you call for help? Call 911 anytime you think you may need emergency care. For example, call if: 
? · You have symptoms of a heart attack. These may include: ¨ Chest pain or pressure, or a strange feeling in the chest. 
¨ Sweating. ¨ Shortness of breath. ¨ Nausea or vomiting. ¨ Pain, pressure, or a strange feeling in the back, neck, jaw, or upper belly or in one or both shoulders or arms. ¨ Lightheadedness or sudden weakness. ¨ A fast or irregular heartbeat. After you call 911, the  may tell you to chew 1 adult-strength or 2 to 4 low-dose aspirin. Wait for an ambulance.  Do not try to drive yourself. ? · You passed out (lost consciousness). ?Call your doctor now or seek immediate medical care if: 
? · You have new or increased shortness of breath. ? · You are dizzy or lightheaded, or you feel like you may faint. ? · Your fatigue and weakness continue or get worse. ? · You have any abnormal bleeding, such as: 
¨ Nosebleeds. ¨ Vaginal bleeding that is different (heavier, more frequent, at a different time of the month) than what you are used to. ¨ Bloody or black stools, or rectal bleeding. ¨ Bloody or pink urine. ? Watch closely for changes in your health, and be sure to contact your doctor if: 
? · You do not get better as expected. Where can you learn more? Go to http://gildardo-palak.info/. Enter R301 in the search box to learn more about \"Anemia: Care Instructions. \" Current as of: October 13, 2016 Content Version: 11.4 © 4722-7722 Nanigans. Care instructions adapted under license by OndaVia (which disclaims liability or warranty for this information). If you have questions about a medical condition or this instruction, always ask your healthcare professional. [place] [address] any warranty or liability for your use of this information. Patient Instructions History Introducing [facility] & HEALTH SERVICES! Dear Stephy Hwang: 
Thank you for requesting a [place] account. Our records indicate that you already have an active [facility] [place] account. You can access your account anytime at [email or web address] Did you know that you can access your hospital and ER discharge instructions at any time in [facility] [place]? You can also review all of your test results from your hospital stay or ER visit. Additional Information If you have questions, please visit the Frequently Asked Questions section of the [facility] [place] website at [email or web address] Remember, Operaxhart is NOT to be used for urgent needs. For medical emergencies, dial 911. Now available from your iPhone and Android! Please provide this summary of care documentation to your next provider. Your primary care clinician is listed as Hunter. If you have any questions after today's visit, please call [phone].

## 2018-06-20 NOTE — PATIENT INSTRUCTIONS
Anemia: Care Instructions  Your Care Instructions    Anemia is a low level of red blood cells, which carry oxygen throughout your body. Many things can cause anemia. Lack of iron is one of the most common causes. Your body needs iron to make hemoglobin, a substance in red blood cells that carries oxygen from the lungs to your body's cells. Without enough iron, the body produces fewer and smaller red blood cells. As a result, your body's cells do not get enough oxygen, and you feel tired and weak. And you may have trouble concentrating. Bleeding is the most common cause of a lack of iron. You may have heavy menstrual bleeding or bleeding caused by conditions such as ulcers, hemorrhoids, or cancer. Regular use of aspirin or other anti-inflammatory medicines (such as ibuprofen) also can cause bleeding in some people. A lack of iron in your diet also can cause anemia, especially at times when the body needs more iron, such as during pregnancy, infancy, and the teen years. Your doctor may have prescribed iron pills. It may take several months of treatment for your iron levels to return to normal. Your doctor also may suggest that you eat foods that are rich in iron, such as meat and beans. There are many other causes of anemia. It is not always due to a lack of iron. Finding the specific cause of your anemia will help your doctor find the right treatment for you. Follow-up care is a key part of your treatment and safety. Be sure to make and go to all appointments, and call your doctor if you are having problems. It's also a good idea to know your test results and keep a list of the medicines you take. How can you care for yourself at home? · Take your medicines exactly as prescribed. Call your doctor if you think you are having a problem with your medicine. · If your doctor recommends iron pills, take them as directed:  ¨ Try to take the pills on an empty stomach about 1 hour before or 2 hours after meals. But you may need to take iron with food to avoid an upset stomach. ¨ Do not take antacids or drink milk or caffeine drinks (such as coffee, tea, or cola) at the same time or within 2 hours of the time that you take your iron. They can make it hard for your body to absorb the iron. ¨ Vitamin C (from food or supplements) helps your body absorb iron. Try taking iron pills with a glass of orange juice or some other food that is high in vitamin C, such as citrus fruits. ¨ Iron pills may cause stomach problems, such as heartburn, nausea, diarrhea, constipation, and cramps. Be sure to drink plenty of fluids, and include fruits, vegetables, and fiber in your diet each day. Iron pills often make your bowel movements dark or green. ¨ If you forget to take an iron pill, do not take a double dose of iron the next time you take a pill. ¨ Keep iron pills out of the reach of small children. An overdose of iron can be very dangerous. · Follow your doctor's advice about eating iron-rich foods. These include red meat, shellfish, poultry, eggs, beans, raisins, whole-grain bread, and leafy green vegetables. · Steam vegetables to help them keep their iron content. When should you call for help? Call 911 anytime you think you may need emergency care. For example, call if:  ? · You have symptoms of a heart attack. These may include:  ¨ Chest pain or pressure, or a strange feeling in the chest.  ¨ Sweating. ¨ Shortness of breath. ¨ Nausea or vomiting. ¨ Pain, pressure, or a strange feeling in the back, neck, jaw, or upper belly or in one or both shoulders or arms. ¨ Lightheadedness or sudden weakness. ¨ A fast or irregular heartbeat. After you call 911, the  may tell you to chew 1 adult-strength or 2 to 4 low-dose aspirin. Wait for an ambulance. Do not try to drive yourself. ? · You passed out (lost consciousness).    ?Call your doctor now or seek immediate medical care if:  ? · You have new or increased shortness of breath. ? · You are dizzy or lightheaded, or you feel like you may faint. ? · Your fatigue and weakness continue or get worse. ? · You have any abnormal bleeding, such as:  ¨ Nosebleeds. ¨ Vaginal bleeding that is different (heavier, more frequent, at a different time of the month) than what you are used to. ¨ Bloody or black stools, or rectal bleeding. ¨ Bloody or pink urine. ? Watch closely for changes in your health, and be sure to contact your doctor if:  ? · You do not get better as expected. Where can you learn more? Go to http://gildardo-palak.info/. Enter R301 in the search box to learn more about \"Anemia: Care Instructions. \"  Current as of: October 13, 2016  Content Version: 11.4  © 3376-6136 Zinitix. Care instructions adapted under license by Neovacs (which disclaims liability or warranty for this information). If you have questions about a medical condition or this instruction, always ask your healthcare professional. [place] [address] any warranty or liability for your use of this information.

## 2018-06-27 ENCOUNTER — HOSPITAL ENCOUNTER (OUTPATIENT)
Dept: INFUSION THERAPY | Age: 68
Discharge: HOME | End: 2018-06-27
Payer: MEDICARE

## 2018-06-27 VITALS
DIASTOLIC BLOOD PRESSURE: 82 MMHG | HEART RATE: 58 BPM | RESPIRATION RATE: 18 BRPM | TEMPERATURE: 97.8 F | SYSTOLIC BLOOD PRESSURE: 137 MMHG

## 2018-06-27 PROCEDURE — 96374 THER/PROPH/DIAG INJ IV PUSH: CPT

## 2018-06-27 PROCEDURE — 74011250636 HC RX REV CODE- 250/636: Performed by: INTERNAL MEDICINE

## 2018-06-27 PROCEDURE — 74011000258 HC RX REV CODE- 258: Performed by: INTERNAL MEDICINE

## 2018-06-27 RX ADMIN — FERUMOXYTOL 510 MG: 510 INJECTION INTRAVENOUS at 11:44

## 2018-07-09 ENCOUNTER — OFFICE VISIT (OUTPATIENT)
Dept: INTERNAL MEDICINE CLINIC | Age: 68
End: 2018-07-09

## 2018-07-09 VITALS
SYSTOLIC BLOOD PRESSURE: 130 MMHG | OXYGEN SATURATION: 97 % | RESPIRATION RATE: 16 BRPM | DIASTOLIC BLOOD PRESSURE: 80 MMHG | HEART RATE: 60 BPM | WEIGHT: 261.8 LBS | HEIGHT: 77 IN | BODY MASS INDEX: 30.91 KG/M2

## 2018-07-09 DIAGNOSIS — N18.4 CKD (CHRONIC KIDNEY DISEASE), STAGE IV (HCC): ICD-10-CM

## 2018-07-09 DIAGNOSIS — I48.0 PAROXYSMAL ATRIAL FIBRILLATION (HCC): ICD-10-CM

## 2018-07-09 DIAGNOSIS — I12.9 HYPERTENSION WITH RENAL DISEASE: ICD-10-CM

## 2018-07-09 DIAGNOSIS — D64.9 ANEMIA, UNSPECIFIED TYPE: Primary | ICD-10-CM

## 2018-07-09 DIAGNOSIS — I25.5 ISCHEMIC CARDIOMYOPATHY: ICD-10-CM

## 2018-07-09 DIAGNOSIS — I50.22 CHRONIC SYSTOLIC CONGESTIVE HEART FAILURE (HCC): ICD-10-CM

## 2018-07-09 DIAGNOSIS — G47.00 INSOMNIA, UNSPECIFIED TYPE: ICD-10-CM

## 2018-07-09 LAB
GRAN# POC: 4.1 K/UL (ref 2–7.8)
GRAN% POC: 80.3 % (ref 37–92)
HCT VFR BLD CALC: 30.8 % (ref 37–51)
HGB BLD-MCNC: 9.9 G/DL (ref 12–18)
LY# POC: 0.6 K/UL (ref 0.6–4.1)
LY% POC: 13 % (ref 10–58.5)
MCH RBC QN: 29.6 PG (ref 26–32)
MCHC RBC-ENTMCNC: 32.2 G/DL (ref 30–36)
MCV RBC: 92 FL (ref 80–97)
MID #, POC: 0.3 K/UL (ref 0–1.8)
MID% POC: 6.7 % (ref 0.1–24)
PLATELET # BLD: 125 K/UL (ref 140–440)
RBC # BLD: 3.35 M/UL (ref 4.2–6.3)
WBC # BLD: 5 K/UL (ref 4.1–10.9)

## 2018-07-09 RX ORDER — CLONAZEPAM 2 MG/1
TABLET ORAL
Qty: 60 TAB | Refills: 2 | Status: SHIPPED | OUTPATIENT
Start: 2018-07-09 | End: 2018-10-22 | Stop reason: SDUPTHER

## 2018-07-09 NOTE — MR AVS SNAPSHOT
[address] [address] [phone] Patient: Nicolle Bustamante. MRN: [id number] :1950 Visit Information Date & Time Provider Department Dept. Phone Encounter #  
 2018  1:30 PM Yohan Gracia [facility] [phone] [id number] Your Appointments 2018  1:10 PM  
FOLLOW UP 10 with MD RAINA Waite [facility] ([facility]) Appt Note: 2 week follow up [address] [address] [address] [facility] [address] Upcoming Health Maintenance Date Due  
 EYE EXAM RETINAL OR DILATED Q1 1958 DTaP/Tdap/Td series (1 - Tdap) 1969 ZOSTER VACCINE AGE 60> 10/28/2008 GLAUCOMA SCREENING Q2Y 2013 Influenza Age 5 to Adult 2018 MEDICARE YEARLY EXAM 2018 HEMOGLOBIN A1C Q6M 2018 FOOT EXAM Q1 2019 MICROALBUMIN Q1 2019 LIPID PANEL Q1 2019 COLONOSCOPY 2021 Allergies as of 2018  Review Complete On: 2018 By: Gwen Rice LPN Severity Noted Reaction Type Reactions Niacin High 2014    Unknown (comments) Levemir [Insulin Detemir]  2013    Hives Other Medication  2013    Other (comments) ? Allergy to Tyler Blaze causing chemical burn Primaxin [Imipenem-cilastatin]  2013    Diarrhea, Rash Xarelto [Rivaroxaban]  2014    Rash, Itching Current Immunizations  Reviewed on 2018 Name Date Influenza High Dose Vaccine PF 10/17/2017 Influenza Vaccine 10/26/2016, 10/21/2015 Pneumococcal Conjugate (PCV-13) 2015 Pneumococcal Vaccine (Unspecified Type) 2014 Not reviewed this visit You Were Diagnosed With   
  
 Codes Comments Anemia, unspecified type    -  Primary ICD-10-CM: D64.9 ICD-9-CM: 285.9 Chronic systolic congestive heart failure (HCC)     ICD-10-CM: I50.22 ICD-9-CM: 428.22, 428.0 Paroxysmal atrial fibrillation (HCC)     ICD-10-CM: I48.0 ICD-9-CM: 427.31 Ischemic cardiomyopathy     ICD-10-CM: I25.5 ICD-9-CM: 414.8 CKD (chronic kidney disease), [facility] IV ([facility]     ICD-10-CM: N18.4 ICD-9-CM: 977. 4 Hypertension with renal disease     ICD-10-CM: I12.9 ICD-9-CM: 403.90 Vitals BP Pulse Resp Height(growth percentile) Weight(growth percentile) SpO2  
 130/80 (BP 1 Location: Left arm, BP Patient Position: Sitting) 60 16 6' 5\" (1.956 m) 261 lb 12.8 oz (118.8 kg) 97% BMI Smoking Status 31.04 kg/m2 Former Smoker Vitals History BMI and BSA Data Body Mass Index Body Surface Area 31.04 kg/m 2 2.54 m 2 Preferred Pharmacy Pharmacy Name Phone [facility] [address] [phone] - [address], [address] AT [place] [address] [phone] Your Updated Medication List  
  
   
This list is accurate as of 7/9/18  2:15 PM.  Always use your most recent med list.  
  
  
  
  
 ascorbic acid (vitamin C) 250 mg tablet Commonly known as:  VITAMIN C Take 1 Tab by mouth three (3) times daily. BD ULTRA-FINE DEVIN PEN NEEDLES  
by Does Not Apply route. bumetanide 2 mg tablet Commonly known as:  Ledon Lights TAKE 2 TABLETS BY MOUTH TWICE DAILY  
  
 clonazePAM 2 mg tablet Commonly known as:  Renard Brocks Take 1 tablet at bedtime and may repeat  If he wakes up. * COLCRYS 0.6 mg tablet Generic drug:  colchicine Take 0.6 mg by mouth daily. Indications: taking mitagar * MITIGARE 0.6 mg capsule Generic drug:  colchicine TK 1 T PO  QD  
  
 COREG 12.5 mg tablet Generic drug:  carvedilol Take  by mouth two (2) times daily (with meals). ELIQUIS 5 mg tablet Generic drug:  apixaban Take 5 mg by mouth two (2) times a day. Ferrous Sulfate 47.5 mg iron Tber tablet Commonly known as:  SLOW FE Take 1 Tab by mouth three (3) times daily. finasteride 5 mg tablet Commonly known as:  PROSCAR Take 5 mg by mouth daily. insulin lispro 100 unit/mL kwikpen Commonly known as:  HUMALOG  
5 units before each meal three times a day. LANTUS SOLOSTAR U-100 INSULIN 100 unit/mL (3 mL) Inpn Generic drug:  insulin glargine USE TO INJECT 30 UNITS UNDER THE SKIN EVERY DAY Liraglutide 0.6 mg/0.1 mL (18 mg/3 mL) Pnij Commonly known as:  VICTOZA  
0.6 mg by SubCUTAneous route daily. metOLazone 2.5 mg tablet Commonly known as:  Loyda Nate Take 1 Tab by mouth as needed (pt takes approximately twice a month if has edema. ). Take twicw weekly for fluid  
  
 pramipexole 0.5 mg tablet Commonly known as:  MIRAPEX TAKE 1 TABLET BY MOUTH EVERY NIGHT AT BEDTIME  
  
 PROCRIT INJECTION  
by Injection route. Indications: Has one injection monthly. Patient unsure of dose. tamsulosin 0.4 mg capsule Commonly known as:  FLOMAX Take 0.8 mg by mouth daily. ULORIC 40 mg Tab tablet Generic drug:  febuxostat TAKE 1 TABLET BY MOUTH DAILY  
  
 valsartan 320 mg tablet Commonly known as:  DIOVAN Take 1 Tab by mouth daily. * Notice: This list has 2 medication(s) that are the same as other medications prescribed for you. Read the directions carefully, and ask your doctor or other care provider to review them with you. To-Do List   
 07/18/2018 8:00 AM  
  Appointment with Rogelio Floyd FT CHAIR 1 at [facility] ([phone])  
  
 07/27/2018 1:30 PM  
  Appointment with Myles Landin, DPM; [facility] 3 at [facility] ([phone])  
  
 08/15/2018 10:00 AM  
  Appointment with Rogelio FOX CHAIR 1 at [facility] ([phone])  
  
 09/12/2018 10:00 AM  
  Appointment with [place] 1 at [facility] ([phone]) Introducing \[facility]\"" & HEALTH SERVICES! Dear Natalia Billing: Thank you for requesting a Sport[facility] account. Our records indicate that you already have an active SportID account. You can access your account anytime at [email or web address]. Klee Data System/Fruition Partners Did you know that you can access your hospital and ER discharge instructions at any time in SportID? You can also review all of your test results from your hospital stay or ER visit. Additional Information If you have questions, please visit the Frequently Asked Questions section of the SportID website at [email or web address] System/[email or web address] Remember, SportID is NOT to be used for urgent needs. For medical emergencies, dial 911. Now available from your iPhone and Android! Please provide this summary of care documentation to your next provider. Your primary care clinician is listed as Hunter. If you have any questions after today's visit, please call [phone].
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
## 2018-07-09 NOTE — PROGRESS NOTES
1. Have you been to the ER, urgent care clinic since your last visit? Hospitalized since your last visit? No    2. Have you seen or consulted any other health care providers outside of the [address] [place] [address] since your last visit? Include any pap smears or colon screening.  No     Chief Complaint   Patient presents with    Anemia     2 week f/u

## 2018-07-09 NOTE — PATIENT INSTRUCTIONS
Atrial Fibrillation: Care Instructions Your Care Instructions Atrial fibrillation is an irregular and often fast heartbeat. Treating this condition is important for several reasons. It can cause blood clots, which can travel from your heart to your brain and cause a stroke. If you have a fast heartbeat, you may feel lightheaded, dizzy, and weak. An irregular heartbeat can also increase your risk for heart failure. Atrial fibrillation is often the result of another heart condition, such as high blood pressure or coronary artery disease. Making changes to improve your heart condition will help you stay healthy and active. Follow-up care is a key part of your treatment and safety. Be sure to make and go to all appointments, and call your doctor if you are having problems. It's also a good idea to know your test results and keep a list of the medicines you take. How can you care for yourself at home? Medicines ? · Take your medicines exactly as prescribed. Call your doctor if you think you are having a problem with your medicine. You will get more details on the specific medicines your doctor prescribes. ? · If your doctor has given you a blood thinner to prevent a stroke, be sure you get instructions about how to take your medicine safely. Blood thinners can cause serious bleeding problems. ? · Do not take any vitamins, over-the-counter drugs, or herbal products without talking to your doctor first. ? Lifestyle changes ? · Do not smoke. Smoking can increase your chance of a stroke and heart attack. If you need help quitting, talk to your doctor about stop-smoking programs and medicines. These can increase your chances of quitting for good. ? · Eat a heart-healthy diet. ? · Stay at a healthy weight. Lose weight if you need to.  
? · Limit alcohol to 2 drinks a day for men and 1 drink a day for women. Too much alcohol can cause health problems. ? · Avoid colds and flu.  Get a pneumococcal vaccine shot. If you have had one before, ask your doctor whether you need another dose. Get a flu shot every year. If you must be around people with colds or flu, wash your hands often. Activity ? · If your doctor recommends it, get more exercise. Walking is a good choice. Bit by bit, increase the amount you walk every day. Try for at least 30 minutes on most days of the week. You also may want to swim, bike, or do other activities. Your doctor may suggest that you join a cardiac rehabilitation program so that you can have help increasing your physical activity safely. ? · Start light exercise if your doctor says it is okay. Even a small amount will help you get stronger, have more energy, and manage stress. Walking is an easy way to get exercise. Start out by walking a little more than you did in the hospital. Gradually increase the amount you walk. ? · When you exercise, watch for signs that your heart is working too hard. You are pushing too hard if you cannot talk while you are exercising. If you become short of breath or dizzy or have chest pain, sit down and rest immediately. ? · Check your pulse regularly. Place two fingers on the artery at the palm side of your wrist, in line with your thumb. If your heartbeat seems uneven or fast, talk to your doctor. When should you call for help? Call 911 anytime you think you may need emergency care. For example, call if: 
? · You have symptoms of a heart attack. These may include: ¨ Chest pain or pressure, or a strange feeling in the chest. 
¨ Sweating. ¨ Shortness of breath. ¨ Nausea or vomiting. ¨ Pain, pressure, or a strange feeling in the back, neck, jaw, or upper belly or in one or both shoulders or arms. ¨ Lightheadedness or sudden weakness. ¨ A fast or irregular heartbeat. After you call 911, the  may tell you to chew 1 adult-strength or 2 to 4 low-dose aspirin. Wait for an ambulance. Do not try to drive yourself.   
? · You have symptoms of a stroke. These may include: 
¨ Sudden numbness, tingling, weakness, or loss of movement in your face, arm, or leg, especially on only one side of your body. ¨ Sudden vision changes. ¨ Sudden trouble speaking. ¨ Sudden confusion or trouble understanding simple statements. ¨ Sudden problems with walking or balance. ¨ A sudden, severe headache that is different from past headaches. ? · You passed out (lost consciousness). ?Call your doctor now or seek immediate medical care if: 
? · You have new or increased shortness of breath. ? · You feel dizzy or lightheaded, or you feel like you may faint. ? · Your heart rate becomes irregular. ? · You can feel your heart flutter in your chest or skip heartbeats. Tell your doctor if these symptoms are new or worse. ? Watch closely for changes in your health, and be sure to contact your doctor if you have any problems. Where can you learn more? Go to http://gildardo-palak.info/. Enter U020 in the search box to learn more about \"Atrial Fibrillation: Care Instructions. \" Current as of: September 21, 2016 Content Version: 11.4 © 9293-5695 [employer]. Care instructions adapted under license by [employer] (which disclaims liability or warranty for this information). If you have questions about a medical condition or this instruction, always ask your healthcare professional. [address] any warranty or liability for your use of this information.

## 2018-07-09 NOTE — TELEPHONE ENCOUNTER
RX refill request from the patient/pharmacy. Patient last seen 06- with labs, and next appt. scheduled for 07-.

## 2018-07-10 ENCOUNTER — TELEPHONE (OUTPATIENT)
Dept: INTERNAL MEDICINE CLINIC | Age: 68
End: 2018-07-10

## 2018-07-10 LAB
BUN SERPL-MCNC: 76 MG/DL (ref 8–27)
BUN/CREAT SERPL: 32 (ref 10–24)
CALCIUM SERPL-MCNC: 9.2 MG/DL (ref 8.6–10.2)
CHLORIDE SERPL-SCNC: 94 MMOL/L (ref 96–106)
CO2 SERPL-SCNC: 28 MMOL/L (ref 20–29)
CREAT SERPL-MCNC: 2.4 MG/DL (ref 0.76–1.27)
GLUCOSE SERPL-MCNC: 107 MG/DL (ref 65–99)
POTASSIUM SERPL-SCNC: 3.7 MMOL/L (ref 3.5–5.2)
SODIUM SERPL-SCNC: 139 MMOL/L (ref 134–144)

## 2018-07-10 NOTE — TELEPHONE ENCOUNTER
----- Message from Moira Thomson MD sent at 7/10/2018  1:04 PM EDT -----  Blood count is improved and kidney numbers are about the same so continue current treatment.

## 2018-07-11 ENCOUNTER — APPOINTMENT (OUTPATIENT)
Dept: INFUSION THERAPY | Age: 68
End: 2018-07-11
Payer: MEDICARE

## 2018-07-18 ENCOUNTER — HOSPITAL ENCOUNTER (OUTPATIENT)
Dept: INFUSION THERAPY | Age: 68
Discharge: HOME | End: 2018-07-18
Payer: MEDICARE

## 2018-07-18 VITALS
TEMPERATURE: 98.8 F | DIASTOLIC BLOOD PRESSURE: 82 MMHG | RESPIRATION RATE: 18 BRPM | OXYGEN SATURATION: 97 % | SYSTOLIC BLOOD PRESSURE: 151 MMHG | HEART RATE: 62 BPM

## 2018-07-18 LAB
FERRITIN SERPL-MCNC: 239 NG/ML (ref 26–388)
HCT VFR BLD AUTO: 31.2 % (ref 36.6–50.3)
HGB BLD-MCNC: 10.2 G/DL (ref 12.1–17)
IRON SATN MFR SERPL: 18 % (ref 20–50)
IRON SERPL-MCNC: 55 UG/DL (ref 35–150)
TIBC SERPL-MCNC: 298 UG/DL (ref 250–450)

## 2018-07-18 PROCEDURE — 82728 ASSAY OF FERRITIN: CPT | Performed by: INTERNAL MEDICINE

## 2018-07-18 PROCEDURE — 36415 COLL VENOUS BLD VENIPUNCTURE: CPT | Performed by: INTERNAL MEDICINE

## 2018-07-18 PROCEDURE — 96372 THER/PROPH/DIAG INJ SC/IM: CPT

## 2018-07-18 PROCEDURE — 74011250636 HC RX REV CODE- 250/636: Performed by: INTERNAL MEDICINE

## 2018-07-18 PROCEDURE — 85018 HEMOGLOBIN: CPT | Performed by: INTERNAL MEDICINE

## 2018-07-18 PROCEDURE — 83540 ASSAY OF IRON: CPT | Performed by: INTERNAL MEDICINE

## 2018-07-18 RX ADMIN — ERYTHROPOIETIN 40000 UNITS: 40000 INJECTION, SOLUTION INTRAVENOUS; SUBCUTANEOUS at 08:45

## 2018-07-18 NOTE — PROGRESS NOTES
[facility] Stay Note:  Arrived - 36  Labs obtained: H/H, Ferritin, Iron Profile    Visit Vitals    /82 (BP 1 Location: Left arm, BP Patient Position: Sitting)    Pulse 62    Temp 98.8 °F (37.1 °C)    Resp 18    SpO2 97%     Recent Results (from the past 12 hour(s))   HGB & HCT    Collection Time: 07/18/18  8:18 AM   Result Value Ref Range    HGB 10.2 (L) 12.1 - 17.0 g/dL    HCT 31.2 (L) 36.6 - 50.3 %     See [facility] for pending results. Assessment - unchanged. Procrit 40,000 units SQ slowly in L arm.    0845 - Tolerated well. Pt [place] es any acute problems/changes. Discharged from [facility] ambulatory. No distress.  Next appt: 8/15/18 at 10

## 2018-07-24 ENCOUNTER — OFFICE VISIT (OUTPATIENT)
Dept: INTERNAL MEDICINE CLINIC | Age: 68
End: 2018-07-24

## 2018-07-24 VITALS
OXYGEN SATURATION: 97 % | WEIGHT: 259.6 LBS | BODY MASS INDEX: 30.65 KG/M2 | HEIGHT: 77 IN | SYSTOLIC BLOOD PRESSURE: 150 MMHG | HEART RATE: 60 BPM | RESPIRATION RATE: 16 BRPM | DIASTOLIC BLOOD PRESSURE: 70 MMHG

## 2018-07-24 DIAGNOSIS — I25.5 ISCHEMIC CARDIOMYOPATHY: ICD-10-CM

## 2018-07-24 DIAGNOSIS — I48.0 PAROXYSMAL ATRIAL FIBRILLATION (HCC): ICD-10-CM

## 2018-07-24 DIAGNOSIS — I25.10 ASCVD (ARTERIOSCLEROTIC CARDIOVASCULAR DISEASE): ICD-10-CM

## 2018-07-24 DIAGNOSIS — N18.4 CKD (CHRONIC KIDNEY DISEASE), STAGE IV (HCC): ICD-10-CM

## 2018-07-24 DIAGNOSIS — I50.22 CHRONIC SYSTOLIC CONGESTIVE HEART FAILURE (HCC): ICD-10-CM

## 2018-07-24 DIAGNOSIS — I12.9 HYPERTENSION WITH RENAL DISEASE: Primary | ICD-10-CM

## 2018-07-24 RX ORDER — LOSARTAN POTASSIUM 25 MG/1
25 TABLET ORAL DAILY
Qty: 30 TAB | Status: SHIPPED | OUTPATIENT
Start: 2018-07-24 | End: 2019-01-10

## 2018-07-24 NOTE — PROGRESS NOTES
Chief Complaint   Patient presents with    Hypertension     2 week follow up     Diabetes    Chronic Kidney Disease    Gout    Anemia    Anxiety     1. Have you been to the ER, urgent care clinic since your last visit? Hospitalized since your last visit? No    2. Have you seen or consulted any other health care providers outside of the [facility] since your last visit? Include any pap smears or colon screening.  No

## 2018-07-24 NOTE — MR AVS SNAPSHOT
[address] [facility] 
 
 
 [address] [id number] [phone] Patient: Caitlyn Cid. MRN: [id number] :1950 Visit Information Date & Time Provider Department Dept. Phone Encounter #  
 2018  1:10 PM Temitope Alonso MD [address] [place] [address] [phone] [id number] Follow-up Instructions Return in about 2 weeks (around 2018). Your Appointments 2018  1:40 PM  
FOLLOW UP 10 with MD QIANA Newby [facility] ([facility]) Appt Note: 2 week follow up [address] [id number] [address] [place] [address] Upcoming Health Maintenance Date Due  
 EYE EXAM RETINAL OR DILATED Q1 1958 DTaP/Tdap/Td series (1 - Tdap) 1969 ZOSTER VACCINE AGE 60> 10/28/2008 GLAUCOMA SCREENING Q2Y 2013 Influenza Age 5 to Adult 2018 MEDICARE YEARLY EXAM 2018 HEMOGLOBIN A1C Q6M 2018 FOOT EXAM Q1 2019 MICROALBUMIN Q1 2019 LIPID PANEL Q1 2019 COLONOSCOPY 2021 Allergies as of 2018  Review Complete On: 2018 By: Temitope Alonso MD  
  
 Severity Noted Reaction Type Reactions Niacin High 2014    Unknown (comments) Other reaction(s): Unknown (comments) Imipenem  2013    Diarrhea Other reaction(s): Rash Levemir [Insulin Detemir]  2013    Hives Other Medication  2013    Other (comments) ? Allergy to [place] causing chemical burn Primaxin [Imipenem-cilastatin]  2013    Diarrhea, Rash Xarelto [Rivaroxaban]  2014    Rash, Itching Other reaction(s): Rash Current Immunizations  Reviewed on 2018 Name Date Influenza High Dose Vaccine PF 10/17/2017 Influenza Vaccine 10/26/2016, 10/21/2015 Pneumococcal Conjugate (PCV-13) 8/7/2015 Pneumococcal Vaccine (Unspecified Type) 1/1/2014 Not reviewed this visit You Were Diagnosed With   
  
 Codes Comments Hypertension with renal disease    -  Primary ICD-10-CM: I12.9 ICD-9-CM: 403.90 Chronic systolic congestive heart failure (HCC)     ICD-10-CM: I50.22 ICD-9-CM: 428.22, 428.0 CKD (chronic kidney disease), stage IV ([facility] [address]     ICD-10-CM: N18.4 ICD-9-CM: 585.4 Ischemic cardiomyopathy     ICD-10-CM: I25.5 ICD-9-CM: 414.8 ASCVD (arteriosclerotic cardiovascular disease)     ICD-10-CM: I25.10 ICD-9-CM: 429.2, 440.9 Paroxysmal atrial fibrillation (HCC)     ICD-10-CM: I48.0 ICD-9-CM: 427.31 Vitals BP Pulse Resp Height(growth percentile) Weight(growth percentile) SpO2  
 150/70 (BP 1 Location: Left arm, BP Patient Position: Sitting) 60 16 6' 5\" (1.956 m) 259 lb 9.6 oz (117.8 kg) 97% BMI Smoking Status 30.78 kg/m2 Former Smoker Vitals History BMI and BSA Data Body Mass Index Body Surface Area 30.78 kg/m 2 2.53 m 2 Preferred Pharmacy [phone] - [address], [address] AT [place] [address] [phone] Your Updated Medication List  
  
   
This list is accurate as of 7/24/18  1:22 PM.  Always use your most recent med list.  
  
  
  
  
 ascorbic acid (vitamin C) 250 mg tablet Commonly known as:  VITAMIN C Take 1 Tab by mouth three (3) times daily. BD ULTRA-FINE DEVIN PEN NEEDLES  
by Does Not Apply route. bumetanide 2 mg tablet Commonly known as:  Hassan [place] TAKE 2 TABLETS BY MOUTH TWICE DAILY  
  
 clonazePAM 2 mg tablet Commonly known as:  Tim Narrow Take 1 tablet at bedtime and may repeat  If he wakes up. * COLCRYS 0.6 mg tablet Generic drug:  colchicine Take 0.6 mg by mouth daily. Indications: taking mitagar * MITIGARE 0.6 mg capsule Generic drug:  colchicine TK 1 T PO  QD  
  
 COREG 12.5 mg tablet Generic drug:  carvedilol Take  by mouth two (2) times daily (with meals). ELIQUIS 5 mg tablet Generic drug:  apixaban Take 5 mg by mouth two (2) times a day. Ferrous Sulfate 47.5 mg iron Tber tablet Commonly known as:  SLOW FE Take 1 Tab by mouth three (3) times daily. finasteride 5 mg tablet Commonly known as:  PROSCAR Take 5 mg by mouth daily. insulin lispro 100 unit/mL kwikpen Commonly known as:  HUMALOG  
5 units before each meal three times a day. LANTUS SOLOSTAR U-100 INSULIN 100 unit/mL (3 mL) Inpn Generic drug:  insulin glargine USE TO INJECT 30 UNITS UNDER THE SKIN EVERY DAY Liraglutide 0.6 mg/0.1 mL (18 mg/3 mL) Pnij Commonly known as:  VICTOZA  
0.6 mg by SubCUTAneous route daily. losartan 25 mg tablet Commonly known as:  COZAAR Take 1 Tab by mouth daily. metOLazone 2.5 mg tablet Commonly known as:  Marny Halt Take 1 Tab by mouth as needed (pt takes approximately twice a month if has edema. ). Take twicw weekly for fluid  
  
 pramipexole 0.5 mg tablet Commonly known as:  MIRAPEX TAKE 1 TABLET BY MOUTH EVERY NIGHT AT BEDTIME  
  
 PROCRIT INJECTION  
by Injection route. Indications: Has one injection monthly. Patient unsure of dose. tamsulosin 0.4 mg capsule Commonly known as:  FLOMAX Take 0.8 mg by mouth daily. ULORIC 40 mg Tab tablet Generic drug:  febuxostat TAKE 1 TABLET BY MOUTH DAILY * Notice: This list has 2 medication(s) that are the same as other medications prescribed for you. Read the directions carefully, and ask your doctor or other care provider to review them with you. Prescriptions Sent to Pharmacy Refills  
 losartan (COZAAR) 25 mg tablet prn Sig: Take 1 Tab by mouth daily.   
 Class: Normal  
 Pharmacy: [facility] [address] [place] [address] Tenisha Goodman  [facility] #: [phone] Route: Oral  
  
We Performed the Following METABOLIC PANEL, BASIC [13422 CPT(R)] Follow-up Instructions Return in about 2 weeks (around 8/7/2018). To-Do List   
 08/03/2018 1:15 PM  
  Appointment with Faustino Torres DPM; [facility] 3 at [facility] ([phone])  
  
 08/15/2018 10:00 AM  
  Appointment with Jefferson Ladd FT CHAIR 1 at [facility] ([phone])  
  
 09/12/2018 10:00 AM  
  Appointment with Cherry Shack 1 at [facility] ([phone]) 10/10/2018 10:00 AM  
  Appointment with DEBRA FT CHAIR 1 at [facility] ([phone]) Patient Instructions Atrial Fibrillation: Care Instructions Your Care Instructions Atrial fibrillation is an irregular and often fast heartbeat. Treating this condition is important for several reasons. It can cause blood clots, which can travel from your heart to your brain and cause a stroke. If you have a fast heartbeat, you may feel lightheaded, dizzy, and weak. An irregular heartbeat can also increase your risk for heart failure. Atrial fibrillation is often the result of another heart condition, such as high blood pressure or coronary artery disease. Making changes to improve your heart condition will help you stay healthy and active. Follow-up care is a key part of your treatment and safety. Be sure to make and go to all appointments, and call your doctor if you are having problems. It's also a good idea to know your test results and keep a list of the medicines you take. How can you care for yourself at home? Medicines 
  · Take your medicines exactly as prescribed. Call your doctor if you think you are having a problem with your medicine.  You will get more details on the specific medicines your doctor prescribes.  
  · If your doctor has given you a blood thinner to prevent a stroke, be sure you get instructions about how to take your medicine safely. Blood thinners can cause serious bleeding problems.  
  · Do not take any vitamins, over-the-counter drugs, or herbal products without talking to your doctor first.  
 Lifestyle changes 
  · Do not smoke. Smoking can increase your chance of a stroke and heart attack. If you need help quitting, talk to your doctor about stop-smoking programs and medicines. These can increase your chances of quitting for good.  
  · Eat a heart-healthy diet.  
  · Stay at a healthy weight. Lose weight if you need to.  
  · Limit alcohol to 2 drinks a day for men and 1 drink a day for women. Too much alcohol can cause health problems.  
  · Avoid colds and flu. Get a pneumococcal vaccine shot. If you have had one before, ask your doctor whether you need another dose. Get a flu shot every year. If you must be around people with colds or flu, wash your hands often. Activity 
  · If your doctor recommends it, get more exercise. Walking is a good choice. Bit by bit, increase the amount you walk every day. Try for at least 30 minutes on most days of the week. You also may want to swim, bike, or do other activities. Your doctor may suggest that you join a cardiac rehabilitation program so that you can have help increasing your physical activity safely.  
  · Start light exercise if your doctor says it is okay. Even a small amount will help you get stronger, have more energy, and manage stress. Walking is an easy way to get exercise. Start out by walking a little more than you did in the hospital. Gradually increase the amount you walk.  
  · When you exercise, watch for signs that your heart is working too hard. You are pushing too hard if you cannot talk while you are exercising. If you become short of breath or dizzy or have chest pain, sit down and rest immediately.  
  · Check your pulse regularly.  Place two fingers on the artery at the palm side of your wrist, in line with your thumb. If your heartbeat seems uneven or fast, talk to your doctor. When should you call for help? Call 911 anytime you think you may need emergency care. For example, call if: 
  · You have symptoms of a heart attack. These may include: ¨ Chest pain or pressure, or a strange feeling in the chest. 
¨ Sweating. ¨ Shortness of breath. ¨ Nausea or vomiting. ¨ Pain, pressure, or a strange feeling in the back, neck, jaw, or upper belly or in one or both shoulders or arms. ¨ Lightheadedness or sudden weakness. ¨ A fast or irregular heartbeat. After you call 911, the  may tell you to chew 1 adult-strength or 2 to 4 low-dose aspirin. Wait for an ambulance. Do not try to drive yourself.  
  · You have symptoms of a stroke. These may include: 
¨ Sudden numbness, tingling, weakness, or loss of movement in your face, arm, or leg, especially on only one side of your body. ¨ Sudden vision changes. ¨ Sudden trouble speaking. ¨ Sudden confusion or trouble understanding simple statements. ¨ Sudden problems with walking or balance. ¨ A sudden, severe headache that is different from past headaches.  
  · You passed out (lost consciousness).  
 Call your doctor now or seek immediate medical care if: 
  · You have new or increased shortness of breath.  
  · You feel dizzy or lightheaded, or you feel like you may faint.  
  · Your heart rate becomes irregular.  
  · You can feel your heart flutter in your chest or skip heartbeats. Tell your doctor if these symptoms are new or worse.  
 Watch closely for changes in your health, and be sure to contact your doctor if you have any problems. Where can you learn more? Go to http://gildardo-palak.info/. Enter U020 in the search box to learn more about \"Atrial Fibrillation: Care Instructions. \" Current as of: December 6, 2017 Content Version: 11.7 © 3334-7081 Healthwise, Incorporated. Care instructions adapted under license by [employer] (which disclaims liability or warranty for this information). If you have questions about a medical condition or this instruction, always ask your healthcare professional. [address] any warranty or liability for your use of this information. Introducing [facility]! Dear Sussy Beard: 
Thank you for requesting a [employer] account. Our records indicate that you already have an active [employer] account. You can access your account anytime at [email or web address] Did you know that you can access your hospital and ER discharge instructions at any time in [employer]? You can also review all of your test results from your hospital stay or ER visit. Additional Information If you have questions, please visit the Frequently Asked Questions section of the [employer] website at [email or web address] Remember, [employer] is NOT to be used for urgent needs. For medical emergencies, dial 911. Now available from your iPhone and Android! Please provide this summary of care documentation to your next provider. Your primary care clinician is listed as Hunter. If you have any questions after today's visit, please call [phone].

## 2018-07-24 NOTE — PATIENT INSTRUCTIONS
Atrial Fibrillation: Care Instructions Your Care Instructions Atrial fibrillation is an irregular and often fast heartbeat. Treating this condition is important for several reasons. It can cause blood clots, which can travel from your heart to your brain and cause a stroke. If you have a fast heartbeat, you may feel lightheaded, dizzy, and weak. An irregular heartbeat can also increase your risk for heart failure. Atrial fibrillation is often the result of another heart condition, such as high blood pressure or coronary artery disease. Making changes to improve your heart condition will help you stay healthy and active. Follow-up care is a key part of your treatment and safety. Be sure to make and go to all appointments, and call your doctor if you are having problems. It's also a good idea to know your test results and keep a list of the medicines you take. How can you care for yourself at home? Medicines 
  · Take your medicines exactly as prescribed. Call your doctor if you think you are having a problem with your medicine. You will get more details on the specific medicines your doctor prescribes.  
  · If your doctor has given you a blood thinner to prevent a stroke, be sure you get instructions about how to take your medicine safely. Blood thinners can cause serious bleeding problems.  
  · Do not take any vitamins, over-the-counter drugs, or herbal products without talking to your doctor first.  
 Lifestyle changes 
  · Do not smoke. Smoking can increase your chance of a stroke and heart attack. If you need help quitting, talk to your doctor about stop-smoking programs and medicines. These can increase your chances of quitting for good.  
  · Eat a heart-healthy diet.  
  · Stay at a healthy weight. Lose weight if you need to.  
  · Limit alcohol to 2 drinks a day for men and 1 drink a day for women. Too much alcohol can cause health problems.  
  · Avoid colds and flu.  Get a pneumococcal vaccine shot. If you have had one before, ask your doctor whether you need another dose. Get a flu shot every year. If you must be around people with colds or flu, wash your hands often. Activity 
  · If your doctor recommends it, get more exercise. Walking is a good choice. Bit by bit, increase the amount you walk every day. Try for at least 30 minutes on most days of the week. You also may want to swim, bike, or do other activities. Your doctor may suggest that you join a cardiac rehabilitation program so that you can have help increasing your physical activity safely.  
  · Start light exercise if your doctor says it is okay. Even a small amount will help you get stronger, have more energy, and manage stress. Walking is an easy way to get exercise. Start out by walking a little more than you did in the hospital. Gradually increase the amount you walk.  
  · When you exercise, watch for signs that your heart is working too hard. You are pushing too hard if you cannot talk while you are exercising. If you become short of breath or dizzy or have chest pain, sit down and rest immediately.  
  · Check your pulse regularly. Place two fingers on the artery at the palm side of your wrist, in line with your thumb. If your heartbeat seems uneven or fast, talk to your doctor. When should you call for help? Call 911 anytime you think you may need emergency care. For example, call if: 
  · You have symptoms of a heart attack. These may include: ¨ Chest pain or pressure, or a strange feeling in the chest. 
¨ Sweating. ¨ Shortness of breath. ¨ Nausea or vomiting. ¨ Pain, pressure, or a strange feeling in the back, neck, jaw, or upper belly or in one or both shoulders or arms. ¨ Lightheadedness or sudden weakness. ¨ A fast or irregular heartbeat. After you call 911, the  may tell you to chew 1 adult-strength or 2 to 4 low-dose aspirin. Wait for an ambulance.  Do not try to drive yourself.  
  · You have symptoms of a stroke. These may include: 
¨ Sudden numbness, tingling, weakness, or loss of movement in your face, arm, or leg, especially on only one side of your body. ¨ Sudden vision changes. ¨ Sudden trouble speaking. ¨ Sudden confusion or trouble understanding simple statements. ¨ Sudden problems with walking or balance. ¨ A sudden, severe headache that is different from past headaches.  
  · You passed out (lost consciousness).  
 Call your doctor now or seek immediate medical care if: 
  · You have new or increased shortness of breath.  
  · You feel dizzy or lightheaded, or you feel like you may faint.  
  · Your heart rate becomes irregular.  
  · You can feel your heart flutter in your chest or skip heartbeats. Tell your doctor if these symptoms are new or worse.  
 Watch closely for changes in your health, and be sure to contact your doctor if you have any problems. Where can you learn more? Go to http://gildardo-palak.info/. Enter U020 in the search box to learn more about \"Atrial Fibrillation: Care Instructions. \" Current as of: December 6, 2017 Content Version: 11.7 © 7236-9946 Healthwise, Incorporated. Care instructions adapted under license by [employer] (which disclaims liability or warranty for this information). If you have questions about a medical condition or this instruction, always ask your healthcare professional. [place] [address] any warranty or liability for your use of this information.
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
## 2018-07-25 LAB
BUN SERPL-MCNC: 82 MG/DL (ref 8–27)
BUN/CREAT SERPL: 30 (ref 10–24)
CALCIUM SERPL-MCNC: 9.4 MG/DL (ref 8.6–10.2)
CHLORIDE SERPL-SCNC: 88 MMOL/L (ref 96–106)
CO2 SERPL-SCNC: 31 MMOL/L (ref 20–29)
CREAT SERPL-MCNC: 2.73 MG/DL (ref 0.76–1.27)
GLUCOSE SERPL-MCNC: 171 MG/DL (ref 65–99)
POTASSIUM SERPL-SCNC: 3.3 MMOL/L (ref 3.5–5.2)
SODIUM SERPL-SCNC: 137 MMOL/L (ref 134–144)

## 2018-07-27 NOTE — PROGRESS NOTES
Kidney function is just a little bit worse so we need to follow that up at his next visit which should be within 2 weeks. His potassium is low so I would increase potassium dosing by 20 mEq daily.

## 2018-07-30 ENCOUNTER — TELEPHONE (OUTPATIENT)
Dept: INTERNAL MEDICINE CLINIC | Age: 68
End: 2018-07-30

## 2018-07-30 RX ORDER — POTASSIUM CHLORIDE 20 MEQ/1
20 TABLET, EXTENDED RELEASE ORAL DAILY
Qty: 90 TAB | Refills: 1 | Status: SHIPPED | OUTPATIENT
Start: 2018-07-30 | End: 2019-01-10

## 2018-07-30 NOTE — TELEPHONE ENCOUNTER
Patient informed. Rx generated for his potassium chloride 20 meq daily. Sent to patient's pharmacy. F/up as scheduled.

## 2018-07-30 NOTE — TELEPHONE ENCOUNTER
Requested Prescriptions     Pending Prescriptions Disp Refills    potassium chloride (K-DUR, KLOR-CON) 20 mEq tablet 90 Tab 1     Sig: Take 1 Tab by mouth daily.

## 2018-07-30 NOTE — TELEPHONE ENCOUNTER
----- Message from Davide Montanez MD sent at 7/27/2018  6:16 PM EDT -----  Kidney function is just a little bit worse so we need to follow that up at his next visit which should be within 2 weeks. His potassium is low so I would increase potassium dosing by 20 mEq daily.

## 2018-08-07 ENCOUNTER — OFFICE VISIT (OUTPATIENT)
Dept: INTERNAL MEDICINE CLINIC | Age: 68
End: 2018-08-07

## 2018-08-07 VITALS
DIASTOLIC BLOOD PRESSURE: 80 MMHG | HEART RATE: 71 BPM | WEIGHT: 257.2 LBS | RESPIRATION RATE: 16 BRPM | BODY MASS INDEX: 30.37 KG/M2 | OXYGEN SATURATION: 97 % | SYSTOLIC BLOOD PRESSURE: 138 MMHG | HEIGHT: 77 IN

## 2018-08-07 DIAGNOSIS — M17.11 PRIMARY OSTEOARTHRITIS OF RIGHT KNEE: ICD-10-CM

## 2018-08-07 DIAGNOSIS — M17.12 PRIMARY OSTEOARTHRITIS OF LEFT KNEE: ICD-10-CM

## 2018-08-07 DIAGNOSIS — E11.22 CONTROLLED TYPE 2 DIABETES MELLITUS WITH STAGE 4 CHRONIC KIDNEY DISEASE, WITHOUT LONG-TERM CURRENT USE OF INSULIN (HCC): ICD-10-CM

## 2018-08-07 DIAGNOSIS — I25.10 ASCVD (ARTERIOSCLEROTIC CARDIOVASCULAR DISEASE): ICD-10-CM

## 2018-08-07 DIAGNOSIS — I50.22 CHRONIC SYSTOLIC CONGESTIVE HEART FAILURE (HCC): ICD-10-CM

## 2018-08-07 DIAGNOSIS — N39.0 URINARY TRACT INFECTION WITHOUT HEMATURIA, SITE UNSPECIFIED: ICD-10-CM

## 2018-08-07 DIAGNOSIS — I48.0 PAROXYSMAL ATRIAL FIBRILLATION (HCC): ICD-10-CM

## 2018-08-07 DIAGNOSIS — M15.9 PRIMARY OSTEOARTHRITIS INVOLVING MULTIPLE JOINTS: ICD-10-CM

## 2018-08-07 DIAGNOSIS — I25.5 ISCHEMIC CARDIOMYOPATHY: ICD-10-CM

## 2018-08-07 DIAGNOSIS — Z12.5 PROSTATE CANCER SCREENING: ICD-10-CM

## 2018-08-07 DIAGNOSIS — E78.2 MIXED HYPERLIPIDEMIA: ICD-10-CM

## 2018-08-07 DIAGNOSIS — I12.9 HYPERTENSION WITH RENAL DISEASE: Primary | ICD-10-CM

## 2018-08-07 DIAGNOSIS — K21.9 GASTROESOPHAGEAL REFLUX DISEASE WITHOUT ESOPHAGITIS: ICD-10-CM

## 2018-08-07 DIAGNOSIS — N18.4 CKD (CHRONIC KIDNEY DISEASE), STAGE IV (HCC): ICD-10-CM

## 2018-08-07 DIAGNOSIS — E66.09 CLASS 1 OBESITY DUE TO EXCESS CALORIES WITHOUT SERIOUS COMORBIDITY WITH BODY MASS INDEX (BMI) OF 30.0 TO 30.9 IN ADULT: ICD-10-CM

## 2018-08-07 DIAGNOSIS — N18.4 CONTROLLED TYPE 2 DIABETES MELLITUS WITH STAGE 4 CHRONIC KIDNEY DISEASE, WITHOUT LONG-TERM CURRENT USE OF INSULIN (HCC): ICD-10-CM

## 2018-08-07 DIAGNOSIS — Z00.00 MEDICARE ANNUAL WELLNESS VISIT, SUBSEQUENT: ICD-10-CM

## 2018-08-07 LAB
BACTERIA UA POCT, BACTPOCT: ABNORMAL
BILIRUB UR QL STRIP: NEGATIVE
CASTS UA POCT: ABNORMAL
CLUE CELLS, CLUEPOCT: NEGATIVE
CRYSTALS UA POCT, CRYSPOCT: NEGATIVE
EPITHELIAL CELLS POCT: ABNORMAL
GLUCOSE UR-MCNC: NEGATIVE MG/DL
GRAN# POC: 4.9 K/UL (ref 2–7.8)
GRAN% POC: 81.6 % (ref 37–92)
HCT VFR BLD CALC: 34.5 % (ref 37–51)
HGB BLD-MCNC: 11.4 G/DL (ref 12–18)
KETONES P FAST UR STRIP-MCNC: NEGATIVE MG/DL
LY# POC: 0.7 K/UL (ref 0.6–4.1)
LY% POC: 11.7 % (ref 10–58.5)
MCH RBC QN: 30.2 PG (ref 26–32)
MCHC RBC-ENTMCNC: 33.1 G/DL (ref 30–36)
MCV RBC: 91 FL (ref 80–97)
MICROALBUMIN UR TEST STR-MCNC: 50 MG/L (ref 0–20)
MID #, POC: 0.4 K/UL (ref 0–1.8)
MID% POC: 6.7 % (ref 0.1–24)
MUCUS UA POCT, MUCPOCT: ABNORMAL
PH UR STRIP: 6 [PH] (ref 5–7)
PLATELET # BLD: 149 K/UL (ref 140–440)
PROT UR QL STRIP: ABNORMAL
RBC # BLD: 3.78 M/UL (ref 4.2–6.3)
RBC UA POCT, RBCPOCT: ABNORMAL
SP GR UR STRIP: 1.01 (ref 1.01–1.02)
TRICH UA POCT, TRICHPOC: NEGATIVE
UA UROBILINOGEN AMB POC: NORMAL (ref 0.2–1)
URINALYSIS CLARITY POC: CLEAR
URINALYSIS COLOR POC: YELLOW
URINE BLOOD POC: ABNORMAL
URINE CULT COMMENT, POCT: ABNORMAL
URINE LEUKOCYTES POC: ABNORMAL
URINE NITRITES POC: NEGATIVE
WBC # BLD: 6 K/UL (ref 4.1–10.9)
WBC UA POCT, WBCPOCT: ABNORMAL
YEAST UA POCT, YEASTPOC: NEGATIVE

## 2018-08-07 RX ORDER — METHYLPREDNISOLONE ACETATE 40 MG/ML
40 INJECTION, SUSPENSION INTRA-ARTICULAR; INTRALESIONAL; INTRAMUSCULAR; SOFT TISSUE ONCE
Qty: 1 VIAL | Refills: 0
Start: 2018-08-07 | End: 2018-08-07

## 2018-08-07 NOTE — PATIENT INSTRUCTIONS
Arthritis: Care Instructions  Your Care Instructions  Arthritis, also called osteoarthritis, is a breakdown of the cartilage that cushions your joints. When the cartilage wears down, your bones rub against each other. This causes pain and stiffness. Many people have some arthritis as they age. Arthritis most often affects the joints of the spine, hands, hips, knees, or feet. You can take simple measures to protect your joints, ease your pain, and help you stay active. Follow-up care is a key part of your treatment and safety. Be sure to make and go to all appointments, and call your doctor if you are having problems. It's also a good idea to know your test results and keep a list of the medicines you take. How can you care for yourself at home? · Stay at a healthy weight. Being overweight puts extra strain on your joints. · Talk to your doctor or physical therapist about exercises that will help ease joint pain. ¨ Stretch. You may enjoy gentle forms of yoga to help keep your joints and muscles flexible. ¨ Walk instead of jog. Other types of exercise that are less stressful on the joints include riding a bicycle, swimming, bang chi, or water exercise. ¨ Lift weights. Strong muscles help reduce stress on your joints. Stronger thigh muscles, for example, take some of the stress off of the knees and hips. Learn the right way to lift weights so you do not make joint pain worse. · Take your medicines exactly as prescribed. Call your doctor if you think you are having a problem with your medicine. · Take pain medicines exactly as directed. ¨ If the doctor gave you a prescription medicine for pain, take it as prescribed. ¨ If you are not taking a prescription pain medicine, ask your doctor if you can take an over-the-counter medicine. · Use a cane, crutch, walker, or another device if you need help to get around. These can help rest your joints.  You also can use other things to make life easier, such as a higher toilet seat and padded handles on kitchen utensils. · Do not sit in low chairs, which can make it hard to get up. · Put heat or cold on your sore joints as needed. Use whichever helps you most. You also can take turns with hot and cold packs. ¨ Apply heat 2 or 3 times a day for 20 to 30 minutes-using a heating pad, hot shower, or hot pack-to relieve pain and stiffness. ¨ Put ice or a cold pack on your sore joint for 10 to 20 minutes at a time. Put a thin cloth between the ice and your skin. When should you call for help? Call your doctor now or seek immediate medical care if:    · You have sudden swelling, warmth, or pain in any joint.     · You have joint pain and a fever or rash.     · You have such bad pain that you cannot use a joint.    Watch closely for changes in your health, and be sure to contact your doctor if:    · You have mild joint symptoms that continue even with more than 6 weeks of care at home.     · You have stomach pain or other problems with your medicine. Where can you learn more? Go to http://gildardo-palak.info/. Enter N124 in the search box to learn more about \"Arthritis: Care Instructions. \"  Current as of: October 10, 2017  Content Version: 11.7  © 4028-5716 [employer]. Care instructions adapted under license by [employer] (which disclaims liability or warranty for this information). If you have questions about a medical condition or this instruction, always ask your healthcare professional. [place] [address] any warranty or liability for your use of this information.

## 2018-08-07 NOTE — PROGRESS NOTES
Per verbal order of Dr. Keely Sparks, 2 injections of Depo-Medrol 40 mg/mL and 1/2 mL of 1% Lidocaine were drawn up. Consent form was filled out and signed by patient, witnessed by nurse and signed by provider. Dr. Chuck Edwards injected patient's Right Knee and Left Knee. Patient's pain level prior to procedure was 0 and post-procedure was 0. Patient tolerated procedure well and was monitored for 15 minutes. No side effects noted. Notified patient to call the office with any adverse reactions.

## 2018-08-07 NOTE — PROGRESS NOTES
Chief Complaint   Patient presents with    Hypertension     2 week follow up     Diabetes    Chronic Kidney Disease    Anemia    Anxiety    Gout     1. Have you been to the ER, urgent care clinic since your last visit? Hospitalized since your last visit? No    2. Have you seen or consulted any other health care providers outside of the [address] [place] [address] since your last visit? Include any pap smears or colon screening.  No
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
## 2018-08-07 NOTE — MR AVS SNAPSHOT
[address] [place] [address] 70 [address] [address] [phone] Patient: Audrey Beltran MRN: [id number] :1950 Visit Information Date & Time Provider Department Dept. Phone Encounter #  
 2018  1:40 PM Fiorella Joshi 26 [phone] [id number] Upcoming Health Maintenance Date Due  
 EYE EXAM RETINAL OR DILATED Q1 1958 DTaP/Tdap/Td series (1 - Tdap) 1969 ZOSTER VACCINE AGE 60> 10/28/2008 GLAUCOMA SCREENING Q2Y 2013 Influenza Age 5 to Adult 2018 HEMOGLOBIN A1C Q6M 2018 FOOT EXAM Q1 2019 MICROALBUMIN Q1 2019 LIPID PANEL Q1 2019 MEDICARE YEARLY EXAM 2019 COLONOSCOPY 2021 Allergies as of 2018  Review Complete On: 2018 By: Jacques Do MD  
  
 Severity Noted Reaction Type Reactions Niacin High 2014    Unknown (comments) Other reaction(s): Unknown (comments) Imipenem  2013    Diarrhea Other reaction(s): Rash Levemir [Insulin Detemir]  2013    Hives Other Medication  2013    Other (comments) ? Allergy to [place] causing chemical burn Primaxin [Imipenem-cilastatin]  2013    Diarrhea, Rash Xarelto [Rivaroxaban]  2014    Rash, Itching Other reaction(s): Rash Current Immunizations  Reviewed on 2018 Name Date Influenza High Dose Vaccine PF 10/17/2017 Influenza Vaccine 10/26/2016, 10/21/2015 Pneumococcal Conjugate (PCV-13) 2015 Pneumococcal Vaccine (Unspecified Type) 2014 Not reviewed this visit You Were Diagnosed With   
  
 Codes Comments Hypertension with renal disease    -  Primary ICD-10-CM: I12.9 ICD-9-CM: 403.90 Controlled type 2 diabetes mellitus with stage 4 chronic kidney disease, without long-term current use of insulin (HCC)     ICD-10-CM: E11.22, N18.4 ICD-9-CM: 250.40, 585.4 Mixed hyperlipidemia     ICD-10-CM: E78.2 ICD-9-CM: 272.2 Gastroesophageal reflux disease without esophagitis     ICD-10-CM: K21.9 ICD-9-CM: 530.81 Primary osteoarthritis involving multiple joints     ICD-10-CM: M15.0 ICD-9-CM: 715.09   
 ASCVD (arteriosclerotic cardiovascular disease)     ICD-10-CM: I25.10 ICD-9-CM: 429.2, 440.9 Paroxysmal atrial fibrillation (HCC)     ICD-10-CM: I48.0 ICD-9-CM: 427.31 Ischemic cardiomyopathy     ICD-10-CM: I25.5 ICD-9-CM: 414.8 Chronic systolic congestive heart failure (HCC)     ICD-10-CM: I50.22 ICD-9-CM: 428.22, 428.0 CKD (chronic kidney disease), stage IV ([facility] [address]     ICD-10-CM: N18.4 ICD-9-CM: 339. 4 Class 1 obesity due to excess calories without serious comorbidity with body mass index (BMI) of 30.0 to 30.9 in adult     ICD-10-CM: E66.09, Z68.30 ICD-9-CM: 278.00, V85.30 Primary osteoarthritis of right knee     ICD-10-CM: M17.11 ICD-9-CM: 715.16 Primary osteoarthritis of left knee     ICD-10-CM: M17.12 
ICD-9-CM: 715.16 Prostate cancer screening     ICD-10-CM: Z12.5 ICD-9-CM: V76.44 Medicare annual wellness visit, subsequent     ICD-10-CM: Z00.00 ICD-9-CM: V70.0 Vitals BP Pulse Resp Height(growth percentile) Weight(growth percentile) SpO2  
 138/80 71 16 6' 5\" (1.956 m) 257 lb 3.2 oz (116.7 kg) 97% BMI Smoking Status 30.5 kg/m2 Former Smoker Vitals History BMI and BSA Data Body Mass Index Body Surface Area 30.5 kg/m 2 2.52 m 2 Preferred Pharmacy Pharmacy Name Phone [facility] [phone], [address] AT [place] [address] [phone] Your Updated Medication List  
  
   
This list is accurate as of 8/7/18  2:36 PM.  Always use your most recent med list.  
  
  
  
  
 ascorbic acid (vitamin C) 250 mg tablet Commonly known as:  VITAMIN C  
 Take 1 Tab by mouth three (3) times daily. BD ULTRA-FINE DEVIN PEN NEEDLES  
by Does Not Apply route. bumetanide 2 mg tablet Commonly known as:  [place] TAKE 2 TABLETS BY MOUTH TWICE DAILY  
  
 clonazePAM 2 mg tablet Commonly known as:  Reddy Sheets Take 1 tablet at bedtime and may repeat  If he wakes up. * COLCRYS 0.6 mg tablet Generic drug:  colchicine Take 0.6 mg by mouth daily. Indications: taking mitagar * MITIGARE 0.6 mg capsule Generic drug:  colchicine TK 1 T PO  QD  
  
 COREG 12.5 mg tablet Generic drug:  carvedilol Take  by mouth two (2) times daily (with meals). ELIQUIS 5 mg tablet Generic drug:  apixaban Take 5 mg by mouth two (2) times a day. Ferrous Sulfate 47.5 mg iron Tber tablet Commonly known as:  SLOW FE Take 1 Tab by mouth three (3) times daily. finasteride 5 mg tablet Commonly known as:  PROSCAR Take 5 mg by mouth daily. insulin lispro 100 unit/mL kwikpen Commonly known as:  HUMALOG  
5 units before each meal three times a day. LANTUS SOLOSTAR U-100 INSULIN 100 unit/mL (3 mL) Inpn Generic drug:  insulin glargine USE TO INJECT 30 UNITS UNDER THE SKIN EVERY DAY Liraglutide 0.6 mg/0.1 mL (18 mg/3 mL) Pnij Commonly known as:  VICTOZA  
0.6 mg by SubCUTAneous route daily. losartan 25 mg tablet Commonly known as:  COZAAR Take 1 Tab by mouth daily. * methylPREDNISolone acetate 40 mg/mL injection Commonly known as:  DEPO-MEDROL 1 mL by IntraMUSCular route once for 1 dose. * methylPREDNISolone acetate 40 mg/mL injection Commonly known as:  DEPO-MEDROL 1 mL by IntraMUSCular route once for 1 dose. metOLazone 2.5 mg tablet Commonly known as:  Forestine Blew Take 1 Tab by mouth as needed (pt takes approximately twice a month if has edema. ). Take twicw weekly for fluid  
  
 potassium chloride 20 mEq tablet Commonly known as:  K-DUR, KLOR-CON Take 1 Tab by mouth daily. pramipexole 0.5 mg tablet Commonly known as:  MIRAPEX TAKE 1 TABLET BY MOUTH EVERY NIGHT AT BEDTIME  
  
 PROCRIT INJECTION  
by Injection route. Indications: Has one injection monthly. Patient unsure of dose. tamsulosin 0.4 mg capsule Commonly known as:  FLOMAX Take 0.8 mg by mouth daily. ULORIC 40 mg Tab tablet Generic drug:  febuxostat TAKE 1 TABLET BY MOUTH DAILY * Notice: This list has 4 medication(s) that are the same as other medications prescribed for you. Read the directions carefully, and ask your doctor or other care provider to review them with you. We Performed the Following AMB POC COMPLETE CBC,AUTOMATED ENTER C5964213 CPT(R)] AMB POC EKG ROUTINE W/ 12 LEADS, INTER & REP [28461 CPT(R)] AMB POC URINALYSIS DIP STICK AUTO W/ MICRO  [53309 CPT(R)] AMB POC URINE, MICROALBUMIN, SEMIQUANT (1 RESULT) [28153 CPT(R)] CK [id number] CPT(R)] DRAIN/INJECT LARGE JOINT/BURSA [id number] CPT(R)] DRAIN/INJECT LARGE JOINT/BURSA [id number] CPT(R)] HEMOGLOBIN A1C WITH EAG [55864 CPT(R)] LIPID PANEL [29959 CPT(R)] METABOLIC PANEL, COMPREHENSIVE [56369 CPT(R)] METHYLPREDNISOLONE ACETATE INJECTION 40 MG [ [facility]] METHYLPREDNISOLONE ACETATE INJECTION 40 MG [ [facility]] CA THER/PROPH/DIAG INJECTION, SUBCUT/IM [id number] CPT(R)] CA THER/PROPH/DIAG INJECTION, SUBCUT/IM [id number] CPT(R)] PSA, DIAGNOSTIC (PROSTATE SPECIFIC AG) [id number] CPT(R)] T4, FREE [id number] CPT(R)] TSH 3RD GENERATION [99245 CPT(R)] To-Do List   
 08/15/2018 10:00 AM  
  Appointment with Carlton Rios FT CHAIR 1 at [facility] ([phone])  
  
 09/12/2018 10:00 AM  
  Appointment with Sugey Lam 1 at [facility] ([phone]) 10/10/2018 10:00 AM  
  Appointment with DEBRA FT CHAIR 1 at [facility] ([phone]) Introducing [address]! Dear El Stanley: 
Thank you for requesting a [employer] account.   Our records indicate that you already have an active [employer] account. You can access your account anytime at [email or web address] Did you know that you can access your hospital and ER discharge instructions at any time in [employer]oir? You can also review all of your test results from your hospital stay or ER visit. Additional Information If you have questions, please visit the Frequently Asked Questions section of the [employer] website at [email or web address] Remember, Keynoir is NOT to be used for urgent needs. For medical emergencies, dial 911. Now available from your iPhone and Android! Please provide this summary of care documentation to your next provider. Your primary care clinician is listed as Hunter. If you have any questions after today's visit, please call [phone].

## 2018-08-08 ENCOUNTER — APPOINTMENT (OUTPATIENT)
Dept: INFUSION THERAPY | Age: 68
End: 2018-08-08
Payer: MEDICARE

## 2018-08-08 LAB
ALBUMIN SERPL-MCNC: 4.3 G/DL (ref 3.6–4.8)
ALBUMIN/GLOB SERPL: 1.3 {RATIO} (ref 1.2–2.2)
ALP SERPL-CCNC: 116 IU/L (ref 39–117)
ALT SERPL-CCNC: 9 IU/L (ref 0–44)
AST SERPL-CCNC: 14 IU/L (ref 0–40)
BILIRUB SERPL-MCNC: 0.9 MG/DL (ref 0–1.2)
BUN SERPL-MCNC: 90 MG/DL (ref 8–27)
BUN/CREAT SERPL: 35 (ref 10–24)
CALCIUM SERPL-MCNC: 9.6 MG/DL (ref 8.6–10.2)
CHLORIDE SERPL-SCNC: 92 MMOL/L (ref 96–106)
CHOLEST SERPL-MCNC: 138 MG/DL (ref 100–199)
CK SERPL-CCNC: 81 U/L (ref 24–204)
CO2 SERPL-SCNC: 28 MMOL/L (ref 20–29)
CREAT SERPL-MCNC: 2.55 MG/DL (ref 0.76–1.27)
EST. AVERAGE GLUCOSE BLD GHB EST-MCNC: 197 MG/DL
GLOBULIN SER CALC-MCNC: 3.3 G/DL (ref 1.5–4.5)
GLUCOSE SERPL-MCNC: 156 MG/DL (ref 65–99)
HBA1C MFR BLD: 8.5 % (ref 4.8–5.6)
HDLC SERPL-MCNC: 25 MG/DL
LDLC SERPL CALC-MCNC: 90 MG/DL (ref 0–99)
POTASSIUM SERPL-SCNC: 3.8 MMOL/L (ref 3.5–5.2)
PROT SERPL-MCNC: 7.6 G/DL (ref 6–8.5)
PSA SERPL-MCNC: 1.7 NG/ML (ref 0–4)
SODIUM SERPL-SCNC: 136 MMOL/L (ref 134–144)
T4 FREE SERPL-MCNC: 1.56 NG/DL (ref 0.82–1.77)
TRIGL SERPL-MCNC: 117 MG/DL (ref 0–149)
TSH SERPL DL<=0.005 MIU/L-ACNC: 3.06 UIU/ML (ref 0.45–4.5)
VLDLC SERPL CALC-MCNC: 23 MG/DL (ref 5–40)

## 2018-08-09 NOTE — PROGRESS NOTES
Kidney function is stable other labs are okay except for the glycohemoglobin is still very high and according to what I have the his Lantus dose is currently 30 units daily I would suggest we increase that to 30 units in the morning and 20 in the evening to see if we get a better dosing and better blood sugar control

## 2018-08-10 LAB
BACTERIA UR CULT: ABNORMAL
BACTERIA UR CULT: ABNORMAL

## 2018-08-14 DIAGNOSIS — G47.00 INSOMNIA, UNSPECIFIED TYPE: Primary | ICD-10-CM

## 2018-08-14 RX ORDER — ZOLPIDEM TARTRATE 10 MG/1
TABLET ORAL
Qty: 30 TAB | Refills: 0 | Status: SHIPPED | OUTPATIENT
Start: 2018-08-14 | End: 2018-09-12 | Stop reason: ALTCHOICE

## 2018-08-14 RX ORDER — TAMSULOSIN HYDROCHLORIDE 0.4 MG/1
CAPSULE ORAL
Qty: 180 CAP | Refills: 3 | Status: SHIPPED | OUTPATIENT
Start: 2018-08-14 | End: 2019-08-29 | Stop reason: SDUPTHER

## 2018-08-14 NOTE — TELEPHONE ENCOUNTER
Requested Prescriptions     Pending Prescriptions Disp Refills    tamsulosin (FLOMAX) 0.4 mg capsule [Pharmacy Med Name: TAMSULOSIN 0.4MG CAPSULES] 180 Cap 3     Sig: TAKE 2 CAPSULES BY MOUTH EVERY DAY    zolpidem (AMBIEN) 10 mg tablet [Pharmacy Med Name: ZOLPIDEM 10MG TABLETS] 30 Tab 0     Sig: TAKE 1 TABLET BY MOUTH EVERY DAY AT BEDTIME AS NEEDED       Patient Last Seen:  08- with labs    Last labs done: CAPRI    Next appointment:  08-

## 2018-08-15 ENCOUNTER — HOSPITAL ENCOUNTER (OUTPATIENT)
Dept: INFUSION THERAPY | Age: 68
Discharge: HOME | End: 2018-08-15
Payer: MEDICARE

## 2018-08-15 VITALS
HEART RATE: 66 BPM | DIASTOLIC BLOOD PRESSURE: 84 MMHG | OXYGEN SATURATION: 99 % | SYSTOLIC BLOOD PRESSURE: 150 MMHG | TEMPERATURE: 98.7 F | RESPIRATION RATE: 18 BRPM

## 2018-08-15 LAB
ALBUMIN SERPL-MCNC: 3.3 G/DL (ref 3.5–5)
ANION GAP SERPL CALC-SCNC: 5 MMOL/L (ref 5–15)
BUN SERPL-MCNC: 101 MG/DL (ref 6–20)
BUN/CREAT SERPL: 40 (ref 12–20)
CALCIUM SERPL-MCNC: 8.4 MG/DL (ref 8.5–10.1)
CHLORIDE SERPL-SCNC: 93 MMOL/L (ref 97–108)
CO2 SERPL-SCNC: 35 MMOL/L (ref 21–32)
CREAT SERPL-MCNC: 2.55 MG/DL (ref 0.7–1.3)
FERRITIN SERPL-MCNC: 79 NG/ML (ref 26–388)
GLUCOSE SERPL-MCNC: 276 MG/DL (ref 65–100)
HCT VFR BLD AUTO: 26.2 % (ref 36.6–50.3)
HGB BLD-MCNC: 8.6 G/DL (ref 12.1–17)
IRON SATN MFR SERPL: 10 % (ref 20–50)
IRON SERPL-MCNC: 37 UG/DL (ref 35–150)
PHOSPHATE SERPL-MCNC: 3.9 MG/DL (ref 2.6–4.7)
POTASSIUM SERPL-SCNC: 3.1 MMOL/L (ref 3.5–5.1)
SODIUM SERPL-SCNC: 133 MMOL/L (ref 136–145)
TIBC SERPL-MCNC: 353 UG/DL (ref 250–450)

## 2018-08-15 PROCEDURE — 80069 RENAL FUNCTION PANEL: CPT | Performed by: INTERNAL MEDICINE

## 2018-08-15 PROCEDURE — 82728 ASSAY OF FERRITIN: CPT | Performed by: INTERNAL MEDICINE

## 2018-08-15 PROCEDURE — 74011250636 HC RX REV CODE- 250/636: Performed by: INTERNAL MEDICINE

## 2018-08-15 PROCEDURE — 96372 THER/PROPH/DIAG INJ SC/IM: CPT

## 2018-08-15 PROCEDURE — 36415 COLL VENOUS BLD VENIPUNCTURE: CPT | Performed by: INTERNAL MEDICINE

## 2018-08-15 PROCEDURE — 83540 ASSAY OF IRON: CPT | Performed by: INTERNAL MEDICINE

## 2018-08-15 PROCEDURE — 85018 HEMOGLOBIN: CPT | Performed by: INTERNAL MEDICINE

## 2018-08-15 RX ADMIN — ERYTHROPOIETIN 40000 UNITS: 40000 INJECTION, SOLUTION INTRAVENOUS; SUBCUTANEOUS at 10:05

## 2018-08-15 NOTE — PROGRESS NOTES
0920 Pt admit to [address] [place] [address] for Q 4 week Procrit ambulatory in stable condition. Assessment completed. No new concerns voiced. Labs drawn peripherally and sent. NOT ALL LABS RESULTED AT TIME OF NOTE. PLEASE FOLLOW UP IN [facility] CARE. Visit Vitals    /84 (BP 1 Location: Left arm, BP Patient Position: Sitting)    Pulse 66    Temp 98.7 °F (37.1 °C)    Resp 18    SpO2 99%       Medications:  Procrit 40,000 Units SQ in left upper arm    1010 Pt tolerated treatment well. D/c home ambulatory in no distress. Pt aware of next [facility] appointment scheduled for 09/12/18.     Recent Results (from the past 12 hour(s))   HGB & HCT    Collection Time: 08/15/18  9:35 AM   Result Value Ref Range    HGB 8.6 (L) 12.1 - 17.0 g/dL    HCT 26.2 (L) 36.6 - 50.3 %   RENAL FUNCTION PANEL    Collection Time: 08/15/18  9:35 AM   Result Value Ref Range    Sodium 133 (L) 136 - 145 mmol/L    Potassium 3.1 (L) 3.5 - 5.1 mmol/L    Chloride 93 (L) 97 - 108 mmol/L    CO2 35 (H) 21 - 32 mmol/L    Anion gap 5 5 - 15 mmol/L    Glucose 276 (H) 65 - 100 mg/dL     (H) 6 - 20 MG/DL    Creatinine 2.55 (H) 0.70 - 1.30 MG/DL    BUN/Creatinine ratio 40 (H) 12 - 20      GFR est AA 30 (L) >60 ml/min/1.73m2    GFR est non-AA 25 (L) >60 ml/min/1.73m2    Calcium 8.4 (L) 8.5 - 10.1 MG/DL    Phosphorus 3.9 2.6 - 4.7 MG/DL    Albumin 3.3 (L) 3.5 - 5.0 g/dL

## 2018-08-23 ENCOUNTER — TELEPHONE (OUTPATIENT)
Dept: INTERNAL MEDICINE CLINIC | Age: 68
End: 2018-08-23

## 2018-08-23 RX ORDER — NITROFURANTOIN 25; 75 MG/1; MG/1
100 CAPSULE ORAL 2 TIMES DAILY
Qty: 14 CAP | Refills: 0 | Status: SHIPPED | OUTPATIENT
Start: 2018-08-23 | End: 2018-09-26 | Stop reason: ALTCHOICE

## 2018-08-23 NOTE — TELEPHONE ENCOUNTER
Patient informed of lab result and recommendation made by Dr. Jesse Kruger to begin Macrobid 100 mg twice a day for 7 days for UTI. Per verbal order from Dr. Jesse Kruger, sent in Mago Mcpherson Colin 103 100 mg twice a day for 7 days to [facility]. Requested Prescriptions     Pending Prescriptions Disp Refills    nitrofurantoin, macrocrystal-monohydrate, (MACROBID) 100 mg capsule 14 Cap 0     Sig: Take 1 Cap by mouth two (2) times a day.

## 2018-08-23 NOTE — TELEPHONE ENCOUNTER
Patient informed of lab results and recommendations made by Dr. Darya Miller to increase Lantus to 30 units in the morning and 20 units in the evening. Patient voiced understanding to increase his Lantus insulin. Patient also states on 08/15/2018, his Hgb was ~8.6 when he went for his Procrit injection and plans to discuss this with Dr. Darya Miller when he sees him on Monday.

## 2018-08-23 NOTE — TELEPHONE ENCOUNTER
----- Message from Stefania Bell MD sent at 8/10/2018 12:33 PM EDT -----  Urine is infected so start Macrobid 100 twice daily for 7 days

## 2018-08-23 NOTE — TELEPHONE ENCOUNTER
----- Message from Yohan Gracia MD sent at 8/9/2018  5:29 PM EDT -----  Kidney function is stable other labs are okay except for the glycohemoglobin is still very high and according to what I have the his Lantus dose is currently 30 units daily I would suggest we increase that to 30 units in the morning and 20 in the evening to see if we get a better dosing and better blood sugar control

## 2018-08-27 ENCOUNTER — OFFICE VISIT (OUTPATIENT)
Dept: INTERNAL MEDICINE CLINIC | Age: 68
End: 2018-08-27

## 2018-08-27 VITALS
HEART RATE: 60 BPM | SYSTOLIC BLOOD PRESSURE: 130 MMHG | RESPIRATION RATE: 17 BRPM | BODY MASS INDEX: 30.09 KG/M2 | OXYGEN SATURATION: 98 % | DIASTOLIC BLOOD PRESSURE: 74 MMHG | HEIGHT: 77 IN | WEIGHT: 254.8 LBS

## 2018-08-27 DIAGNOSIS — I48.0 PAROXYSMAL ATRIAL FIBRILLATION (HCC): ICD-10-CM

## 2018-08-27 DIAGNOSIS — D64.9 ANEMIA, UNSPECIFIED TYPE: ICD-10-CM

## 2018-08-27 DIAGNOSIS — N18.4 CKD (CHRONIC KIDNEY DISEASE), STAGE IV (HCC): ICD-10-CM

## 2018-08-27 DIAGNOSIS — I25.5 ISCHEMIC CARDIOMYOPATHY: ICD-10-CM

## 2018-08-27 DIAGNOSIS — I12.9 HYPERTENSION WITH RENAL DISEASE: Primary | ICD-10-CM

## 2018-08-27 DIAGNOSIS — E66.09 CLASS 1 OBESITY DUE TO EXCESS CALORIES WITHOUT SERIOUS COMORBIDITY WITH BODY MASS INDEX (BMI) OF 30.0 TO 30.9 IN ADULT: ICD-10-CM

## 2018-08-27 NOTE — PROGRESS NOTES
Chief Complaint   Patient presents with    Knee Pain     2 week follow up     Diabetes    Insomnia     1. Have you been to the ER, urgent care clinic since your last visit? Hospitalized since your last visit? No    2. Have you seen or consulted any other health care providers outside of the [address] [place] [address] since your last visit? Include any pap smears or colon screening. Yes, started therapy at [facility] for leg strength and balance.        Fasting

## 2018-08-27 NOTE — MR AVS SNAPSHOT
[address] [place] [address] 70 [address] [address] [phone] Patient: Chi Conway. MRN: [id number] :1950 Visit Information Date & Time Provider Department Dept. Phone Encounter #  
 2018 11:10 AM Candy Mccullough MD [place] [address] [phone] [id number] Upcoming Health Maintenance Date Due DTaP/Tdap/Td series (1 - Tdap) 1969 ZOSTER VACCINE AGE 60> 10/28/2008 Influenza Age 5 to Adult 2018 HEMOGLOBIN A1C Q6M 2019 FOOT EXAM Q1 2019 EYE EXAM RETINAL OR DILATED Q1 2019 MICROALBUMIN Q1 2019 LIPID PANEL Q1 2019 MEDICARE YEARLY EXAM 2019 GLAUCOMA SCREENING Q2Y 2020 COLONOSCOPY 2021 Allergies as of 2018  Review Complete On: 2018 By: Mindi Curtis CMA Severity Noted Reaction Type Reactions Niacin High 2014    Unknown (comments) Other reaction(s): Unknown (comments) Imipenem  2013    Diarrhea Other reaction(s): Rash Levemir [Insulin Detemir]  2013    Hives Other Medication  2013    Other (comments) ? Allergy to [place] causing chemical burn Primaxin [Imipenem-cilastatin]  2013    Diarrhea, Rash Xarelto [Rivaroxaban]  2014    Rash, Itching Other reaction(s): Rash Current Immunizations  Reviewed on 8/15/2018 Name Date Influenza High Dose Vaccine PF 10/17/2017 Influenza Vaccine 10/26/2016, 10/21/2015 Pneumococcal Conjugate (PCV-13) 2015 Pneumococcal Vaccine (Unspecified Type) 2014 Not reviewed this visit Vitals BP Pulse Resp Height(growth percentile) Weight(growth percentile) SpO2  
 130/74 (BP 1 Location: Left arm, BP Patient Position: Sitting) 60 17 6' 5\" (1.956 m) 254 lb 12.8 oz (115.6 kg) 98% BMI Smoking Status 30.21 kg/m2 Former Smoker Vitals History BMI and BSA Data Body Mass Index Body Surface Area  
 30.21 kg/m 2 2.51 m 2 Preferred Pharmacy Pharmacy Name Phone [facility] [phone], [address] AT [place] [address] [phone] Your Updated Medication List  
  
   
This list is accurate as of 8/27/18 12:08 PM.  Always use your most recent med list.  
  
  
  
  
 ascorbic acid (vitamin C) 250 mg tablet Commonly known as:  VITAMIN C Take 1 Tab by mouth three (3) times daily. BD ULTRA-FINE DEVIN PEN NEEDLES  
by Does Not Apply route. bumetanide 2 mg tablet Commonly known as:  Allegra Peaches TAKE 2 TABLETS BY MOUTH TWICE DAILY  
  
 clonazePAM 2 mg tablet Commonly known as:  Airam Kava Take 1 tablet at bedtime and may repeat  If he wakes up. * COLCRYS 0.6 mg tablet Generic drug:  colchicine Take 0.6 mg by mouth daily. Indications: taking mitagar * MITIGARE 0.6 mg capsule Generic drug:  colchicine TK 1 T PO  QD  
  
 COREG 12.5 mg tablet Generic drug:  carvedilol Take  by mouth two (2) times daily (with meals). ELIQUIS 5 mg tablet Generic drug:  apixaban Take 5 mg by mouth two (2) times a day. Ferrous Sulfate 47.5 mg iron Tber tablet Commonly known as:  SLOW FE Take 1 Tab by mouth three (3) times daily. finasteride 5 mg tablet Commonly known as:  PROSCAR Take 5 mg by mouth daily. insulin lispro 100 unit/mL kwikpen Commonly known as:  HUMALOG  
5 units before each meal three times a day. LANTUS SOLOSTAR U-100 INSULIN 100 unit/mL (3 mL) Inpn Generic drug:  insulin glargine USE TO INJECT 30 UNITS UNDER THE SKIN EVERY DAY Liraglutide 0.6 mg/0.1 mL (18 mg/3 mL) Pnij Commonly known as:  VICTOZA  
0.6 mg by SubCUTAneous route daily. losartan 25 mg tablet Commonly known as:  COZAAR Take 1 Tab by mouth daily. metOLazone 2.5 mg tablet Commonly known as:  Finas Dirk  
 Take 1 Tab by mouth as needed (pt takes approximately twice a month if has edema. ). Take twicw weekly for fluid  
  
 nitrofurantoin (macrocrystal-monohydrate) 100 mg capsule Commonly known as:  MACROBID Take 1 Cap by mouth two (2) times a day. potassium chloride 20 mEq tablet Commonly known as:  K-DUR, KLOR-CON Take 1 Tab by mouth daily. pramipexole 0.5 mg tablet Commonly known as:  MIRAPEX TAKE 1 TABLET BY MOUTH EVERY NIGHT AT BEDTIME  
  
 PROCRIT INJECTION  
by Injection route. Indications: Has one injection monthly. Patient unsure of dose. tamsulosin 0.4 mg capsule Commonly known as:  FLOMAX TAKE 2 CAPSULES BY MOUTH EVERY DAY  
  
 ULORIC 40 mg Tab tablet Generic drug:  febuxostat TAKE 1 TABLET BY MOUTH DAILY  
  
 zolpidem 10 mg tablet Commonly known as:  AMBIEN  
TAKE 1 TABLET BY MOUTH EVERY DAY AT BEDTIME AS NEEDED * Notice: This list has 2 medication(s) that are the same as other medications prescribed for you. Read the directions carefully, and ask your doctor or other care provider to review them with you. To-Do List   
 09/12/2018 10:00 AM  
  Appointment with Larry Louise 1 at [facility] ([phone]) 10/10/2018 10:00 AM  
  Appointment with DEBRA SMITH 1 at [facility] ([phone]) [facility] SERVICES! Dear Juan F Brewer: 
Thank you for requesting a [employer] account. Our records indicate that you already have an active [employer] account. You can access your account anytime at [email or web address] Did you know that you can access your hospital and ER discharge instructions at any time in [employer]? You can also review all of your test results from your hospital stay or ER visit. Additional Information If you have questions, please visit the Frequently Asked Questions section of the [employer] website at [email or web address] Remember, MyChart is NOT to be used for urgent needs. For medical emergencies, dial 911. Now available from your iPhone and Android! Please provide this summary of care documentation to your next provider. Your primary care clinician is listed as Hunter. If you have any questions after today's visit, please call [phone].
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
## 2018-08-27 NOTE — PATIENT INSTRUCTIONS
Anemia: Care Instructions  Your Care Instructions    Anemia is a low level of red blood cells, which carry oxygen throughout your body. Many things can cause anemia. Lack of iron is one of the most common causes. Your body needs iron to make hemoglobin, a substance in red blood cells that carries oxygen from the lungs to your body's cells. Without enough iron, the body produces fewer and smaller red blood cells. As a result, your body's cells do not get enough oxygen, and you feel tired and weak. And you may have trouble concentrating. Bleeding is the most common cause of a lack of iron. You may have heavy menstrual bleeding or bleeding caused by conditions such as ulcers, hemorrhoids, or cancer. Regular use of aspirin or other anti-inflammatory medicines (such as ibuprofen) also can cause bleeding in some people. A lack of iron in your diet also can cause anemia, especially at times when the body needs more iron, such as during pregnancy, infancy, and the teen years. Your doctor may have prescribed iron pills. It may take several months of treatment for your iron levels to return to normal. Your doctor also may suggest that you eat foods that are rich in iron, such as meat and beans. There are many other causes of anemia. It is not always due to a lack of iron. Finding the specific cause of your anemia will help your doctor find the right treatment for you. Follow-up care is a key part of your treatment and safety. Be sure to make and go to all appointments, and call your doctor if you are having problems. It's also a good idea to know your test results and keep a list of the medicines you take. How can you care for yourself at home? · Take your medicines exactly as prescribed. Call your doctor if you think you are having a problem with your medicine. · If your doctor recommends iron pills, take them as directed:  ¨ Try to take the pills on an empty stomach about 1 hour before or 2 hours after meals. But you may need to take iron with food to avoid an upset stomach. ¨ Do not take antacids or drink milk or caffeine drinks (such as coffee, tea, or cola) at the same time or within 2 hours of the time that you take your iron. They can make it hard for your body to absorb the iron. ¨ Vitamin C (from food or supplements) helps your body absorb iron. Try taking iron pills with a glass of orange juice or some other food that is high in vitamin C, such as citrus fruits. ¨ Iron pills may cause stomach problems, such as heartburn, nausea, diarrhea, constipation, and cramps. Be sure to drink plenty of fluids, and include fruits, vegetables, and fiber in your diet each day. Iron pills often make your bowel movements dark or green. ¨ If you forget to take an iron pill, do not take a double dose of iron the next time you take a pill. ¨ Keep iron pills out of the reach of small children. An overdose of iron can be very dangerous. · Follow your doctor's advice about eating iron-rich foods. These include red meat, shellfish, poultry, eggs, beans, raisins, whole-grain bread, and leafy green vegetables. · Steam vegetables to help them keep their iron content. When should you call for help? Call 911 anytime you think you may need emergency care. For example, call if:    · You have symptoms of a heart attack. These may include:  ¨ Chest pain or pressure, or a strange feeling in the chest.  ¨ Sweating. ¨ Shortness of breath. ¨ Nausea or vomiting. ¨ Pain, pressure, or a strange feeling in the back, neck, jaw, or upper belly or in one or both shoulders or arms. ¨ Lightheadedness or sudden weakness. ¨ A fast or irregular heartbeat. After you call 911, the  may tell you to chew 1 adult-strength or 2 to 4 low-dose aspirin. Wait for an ambulance.  Do not try to drive yourself.     · You passed out (lost consciousness).    Call your doctor now or seek immediate medical care if:    · You have new or increased shortness of breath.     · You are dizzy or lightheaded, or you feel like you may faint.     · Your fatigue and weakness continue or get worse.     · You have any abnormal bleeding, such as:  ¨ Nosebleeds. ¨ Vaginal bleeding that is different (heavier, more frequent, at a different time of the month) than what you are used to. ¨ Bloody or black stools, or rectal bleeding. ¨ Bloody or pink urine.    Watch closely for changes in your health, and be sure to contact your doctor if:    · You do not get better as expected. Where can you learn more? Go to http://gildardo-palak.info/. Enter R301 in the search box to learn more about \"Anemia: Care Instructions. \"  Current as of: October 9, 2017  Content Version: 11.7  © 5341-7068 [employer]. Care instructions adapted under license by [employer] (which disclaims liability or warranty for this information). If you have questions about a medical condition or this instruction, always ask your healthcare professional. [address] any warranty or liability for your use of this information.

## 2018-08-28 ENCOUNTER — TELEPHONE (OUTPATIENT)
Dept: INTERNAL MEDICINE CLINIC | Age: 68
End: 2018-08-28

## 2018-08-28 LAB
BASOPHILS # BLD AUTO: 0 X10E3/UL (ref 0–0.2)
BASOPHILS NFR BLD AUTO: 1 %
BUN SERPL-MCNC: 84 MG/DL (ref 8–27)
BUN/CREAT SERPL: 36 (ref 10–24)
CALCIUM SERPL-MCNC: 9 MG/DL (ref 8.6–10.2)
CHLORIDE SERPL-SCNC: 93 MMOL/L (ref 96–106)
CO2 SERPL-SCNC: 27 MMOL/L (ref 20–29)
CREAT SERPL-MCNC: 2.34 MG/DL (ref 0.76–1.27)
EOSINOPHIL # BLD AUTO: 0.5 X10E3/UL (ref 0–0.4)
EOSINOPHIL NFR BLD AUTO: 10 %
ERYTHROCYTE [DISTWIDTH] IN BLOOD BY AUTOMATED COUNT: 15.9 % (ref 12.3–15.4)
GLUCOSE SERPL-MCNC: 139 MG/DL (ref 65–99)
HCT VFR BLD AUTO: 30.9 % (ref 37.5–51)
HGB BLD-MCNC: 9.8 G/DL (ref 13–17.7)
IMM GRANULOCYTES # BLD: 0 X10E3/UL (ref 0–0.1)
IMM GRANULOCYTES NFR BLD: 0 %
LYMPHOCYTES # BLD AUTO: 0.6 X10E3/UL (ref 0.7–3.1)
LYMPHOCYTES NFR BLD AUTO: 11 %
MCH RBC QN AUTO: 28.5 PG (ref 26.6–33)
MCHC RBC AUTO-ENTMCNC: 31.7 G/DL (ref 31.5–35.7)
MCV RBC AUTO: 90 FL (ref 79–97)
MONOCYTES # BLD AUTO: 0.5 X10E3/UL (ref 0.1–0.9)
MONOCYTES NFR BLD AUTO: 8 %
NEUTROPHILS # BLD AUTO: 4 X10E3/UL (ref 1.4–7)
NEUTROPHILS NFR BLD AUTO: 70 %
PLATELET # BLD AUTO: 113 X10E3/UL (ref 150–379)
POTASSIUM SERPL-SCNC: 3.8 MMOL/L (ref 3.5–5.2)
RBC # BLD AUTO: 3.44 X10E6/UL (ref 4.14–5.8)
SODIUM SERPL-SCNC: 137 MMOL/L (ref 134–144)
WBC # BLD AUTO: 5.7 X10E3/UL (ref 3.4–10.8)

## 2018-08-28 NOTE — TELEPHONE ENCOUNTER
----- Message from Geena Gonzales MD sent at 8/28/2018  8:20 AM EDT -----  Hemoglobin is a little better at 9.8 up from 8.6 and kidney function is slightly improved so continue current treatment.

## 2018-08-28 NOTE — PROGRESS NOTES
Hemoglobin is a little better at 9.8 up from 8.6 and kidney function is slightly improved so continue current treatment.

## 2018-09-12 ENCOUNTER — OFFICE VISIT (OUTPATIENT)
Dept: INTERNAL MEDICINE CLINIC | Age: 68
End: 2018-09-12

## 2018-09-12 ENCOUNTER — HOSPITAL ENCOUNTER (OUTPATIENT)
Dept: INFUSION THERAPY | Age: 68
Discharge: HOME | End: 2018-09-12
Payer: MEDICARE

## 2018-09-12 VITALS
RESPIRATION RATE: 17 BRPM | OXYGEN SATURATION: 99 % | BODY MASS INDEX: 30.6 KG/M2 | SYSTOLIC BLOOD PRESSURE: 128 MMHG | WEIGHT: 259.2 LBS | HEIGHT: 77 IN | HEART RATE: 93 BPM | DIASTOLIC BLOOD PRESSURE: 70 MMHG

## 2018-09-12 VITALS
RESPIRATION RATE: 20 BRPM | HEART RATE: 65 BPM | OXYGEN SATURATION: 97 % | DIASTOLIC BLOOD PRESSURE: 69 MMHG | SYSTOLIC BLOOD PRESSURE: 119 MMHG | TEMPERATURE: 97.6 F

## 2018-09-12 DIAGNOSIS — D64.9 ANEMIA, UNSPECIFIED TYPE: ICD-10-CM

## 2018-09-12 DIAGNOSIS — I25.5 ISCHEMIC CARDIOMYOPATHY: ICD-10-CM

## 2018-09-12 DIAGNOSIS — I12.9 HYPERTENSION WITH RENAL DISEASE: Primary | ICD-10-CM

## 2018-09-12 DIAGNOSIS — I50.22 CHRONIC SYSTOLIC CONGESTIVE HEART FAILURE (HCC): ICD-10-CM

## 2018-09-12 DIAGNOSIS — N18.4 CKD (CHRONIC KIDNEY DISEASE), STAGE IV (HCC): ICD-10-CM

## 2018-09-12 LAB
ALBUMIN SERPL-MCNC: 3.5 G/DL (ref 3.5–5)
ANION GAP SERPL CALC-SCNC: 10 MMOL/L (ref 5–15)
BUN SERPL-MCNC: 119 MG/DL (ref 6–20)
BUN/CREAT SERPL: 41 (ref 12–20)
CALCIUM SERPL-MCNC: 8.4 MG/DL (ref 8.5–10.1)
CHLORIDE SERPL-SCNC: 98 MMOL/L (ref 97–108)
CO2 SERPL-SCNC: 28 MMOL/L (ref 21–32)
CREAT SERPL-MCNC: 2.92 MG/DL (ref 0.7–1.3)
FERRITIN SERPL-MCNC: 63 NG/ML (ref 26–388)
GLUCOSE SERPL-MCNC: 103 MG/DL (ref 65–100)
HCT VFR BLD AUTO: 28.8 % (ref 36.6–50.3)
HGB BLD-MCNC: 9.2 G/DL (ref 12.1–17)
IRON SATN MFR SERPL: 15 % (ref 20–50)
IRON SERPL-MCNC: 51 UG/DL (ref 35–150)
PHOSPHATE SERPL-MCNC: 5.1 MG/DL (ref 2.6–4.7)
POTASSIUM SERPL-SCNC: 3.5 MMOL/L (ref 3.5–5.1)
SODIUM SERPL-SCNC: 136 MMOL/L (ref 136–145)
TIBC SERPL-MCNC: 337 UG/DL (ref 250–450)

## 2018-09-12 PROCEDURE — 74011250636 HC RX REV CODE- 250/636: Performed by: INTERNAL MEDICINE

## 2018-09-12 PROCEDURE — 85018 HEMOGLOBIN: CPT | Performed by: INTERNAL MEDICINE

## 2018-09-12 PROCEDURE — 82728 ASSAY OF FERRITIN: CPT | Performed by: INTERNAL MEDICINE

## 2018-09-12 PROCEDURE — 83540 ASSAY OF IRON: CPT | Performed by: INTERNAL MEDICINE

## 2018-09-12 PROCEDURE — 36415 COLL VENOUS BLD VENIPUNCTURE: CPT | Performed by: INTERNAL MEDICINE

## 2018-09-12 PROCEDURE — 96372 THER/PROPH/DIAG INJ SC/IM: CPT

## 2018-09-12 PROCEDURE — 80069 RENAL FUNCTION PANEL: CPT | Performed by: INTERNAL MEDICINE

## 2018-09-12 RX ADMIN — ERYTHROPOIETIN 40000 UNITS: 40000 INJECTION, SOLUTION INTRAVENOUS; SUBCUTANEOUS at 10:58

## 2018-09-12 NOTE — PROGRESS NOTES
Chief Complaint Patient presents with  Hypertension 2 week follow up 1. Have you been to the ER, urgent care clinic since your last visit? Hospitalized since your last visit? No 
 
2. Have you seen or consulted any other health care providers outside of the [address] [place] [address] since your last visit? Include any pap smears or colon screening. No 
Visit Vitals  /70 (BP 1 Location: Left arm, BP Patient Position: Sitting)  Pulse 93  Resp 17  Ht 6' 5\" (1.956 m)  Wt 259 lb 3.2 oz (117.6 kg)  SpO2 99%  BMI 30.74 kg/m2

## 2018-09-12 NOTE — PATIENT INSTRUCTIONS
Heart Failure: Care Instructions Your Care Instructions Heart failure occurs when your heart does not pump as much blood as the body needs. Failure does not mean that the heart has stopped pumping but rather that it is not pumping as well as it should. Over time, this causes fluid buildup in your lungs and other parts of your body. Fluid buildup can cause shortness of breath, fatigue, swollen ankles, and other problems. By taking medicines regularly, reducing sodium (salt) in your diet, checking your weight every day, and making lifestyle changes, you can feel better and live longer. Follow-up care is a key part of your treatment and safety. Be sure to make and go to all appointments, and call your doctor if you are having problems. It's also a good idea to know your test results and keep a list of the medicines you take. How can you care for yourself at home? Medicines 
  · Be safe with medicines. Take your medicines exactly as prescribed. Call your doctor if you think you are having a problem with your medicine.  
  · Do not take any vitamins, over-the-counter medicine, or herbal products without talking to your doctor first. Karl Cook not take ibuprofen (Advil or Motrin) and naproxen (Aleve) without talking to your doctor first. They could make your heart failure worse.  
  · You may be taking some of the following medicine. ¨ Beta-blockers can slow heart rate, decrease blood pressure, and improve your condition. Taking a beta-blocker may lower your chance of needing to be hospitalized. ¨ Angiotensin-converting enzyme inhibitors (ACEIs) reduce the heart's workload, lower blood pressure, and reduce swelling. Taking an ACEI may lower your chance of needing to be hospitalized again. ¨ Angiotensin II receptor blockers (ARBs) work like ACEIs. Your doctor may prescribe them instead of ACEIs. ¨ Diuretics, also called water pills, reduce swelling. ¨ Potassium supplements replace this important mineral, which is sometimes lost with diuretics. ¨ Aspirin and other blood thinners prevent blood clots, which can cause a stroke or heart attack.  
 You will get more details on the specific medicines your doctor prescribes. Diet 
  · Your doctor may suggest that you limit sodium to 2,000 milligrams (mg) a day or less. That is less than 1 teaspoon of salt a day, including all the salt you eat in cooking or in packaged foods. People get most of their sodium from processed foods. Fast food and restaurant meals also tend to be very high in sodium.  
  · Ask your doctor how much liquid you can drink each day. You may have to limit liquids.  
 Weight 
  · Weigh yourself without clothing at the same time each day. Record your weight. Call your doctor if you have a sudden weight gain, such as more than 2 to 3 pounds in a day or 5 pounds in a week. (Your doctor may suggest a different range of weight gain.) A sudden weight gain may mean that your heart failure is getting worse.  
 Activity level 
  · Start light exercise (if your doctor says it is okay). Even if you can only do a small amount, exercise will help you get stronger, have more energy, and manage your weight and your stress. Walking is an easy way to get exercise. Start out by walking a little more than you did before. Bit by bit, increase the amount you walk.  
  · When you exercise, watch for signs that your heart is working too hard. You are pushing yourself too hard if you cannot talk while you are exercising. If you become short of breath or dizzy or have chest pain, stop, sit down, and rest.  
  · If you feel \"wiped out\" the day after you exercise, walk slower or for a shorter distance until you can work up to a better pace.  
  · Get enough rest at night. Sleeping with 1 or 2 pillows under your upper body and head may help you breathe easier.  
 Lifestyle changes   · Do not smoke. Smoking can make a heart condition worse. If you need help quitting, talk to your doctor about stop-smoking programs and medicines. These can increase your chances of quitting for good. Quitting smoking may be the most important step you can take to protect your heart.  
  · Limit alcohol to 2 drinks a day for men and 1 drink a day for women. Too much alcohol can cause health problems.  
  · Avoid getting sick from colds and the flu. Get a pneumococcal vaccine shot. If you have had one before, ask your doctor whether you need another dose. Get a flu shot each year. If you must be around people with colds or the flu, wash your hands often. When should you call for help? Call 911 if you have symptoms of sudden heart failure such as: 
  · You have severe trouble breathing.  
  · You cough up pink, foamy mucus.  
  · You have a new irregular or rapid heartbeat.  
 Call your doctor now or seek immediate medical care if: 
  · You have new or increased shortness of breath.  
  · You are dizzy or lightheaded, or you feel like you may faint.  
  · You have sudden weight gain, such as more than 2 to 3 pounds in a day or 5 pounds in a week. (Your doctor may suggest a different range of weight gain.)  
  · You have increased swelling in your legs, ankles, or feet.  
  · You are suddenly so tired or weak that you cannot do your usual activities.  
 Watch closely for changes in your health, and be sure to contact your doctor if you develop new symptoms. Where can you learn more? Go to http://gildardo-palak.info/. Enter B220 in the search box to learn more about \"Heart Failure: Care Instructions. \" Current as of: May 10, 2017 Content Version: 11.7 © 6446-0023 [employer]. Care instructions adapted under license by [employer] (which disclaims liability or warranty for this information).  If you have questions about a medical condition or this instruction, always ask your healthcare professional. [place] [address] any warranty or liability for your use of this information.

## 2018-09-12 NOTE — MR AVS SNAPSHOT
[address] [facility], [place], operated by [facility] 
 
 
 [address] [address] [phone] Patient: Laxmi Mata. MRN: [id number] :1950 Visit Information Date & Time Provider Department Dept. Phone Encounter #  
 2018 11:10 AM Celio Zayas MD [place] [address] [phone] [id number] Follow-up Instructions Return in about 2 weeks (around 2018). Follow-up and Disposition History Your Appointments 2018  1:50 PM  
FOLLOW UP 10 with MD QIANA Gonzales [facility] ([address]) Appt Note: 2 week follow up [address] [address] [address] [facility] [phone] Upcoming Health Maintenance Date Due DTaP/Tdap/Td series (1 - Tdap) 1969 ZOSTER VACCINE AGE 60> 10/28/2008 Influenza Age 5 to Adult 2018 HEMOGLOBIN A1C Q6M 2019 FOOT EXAM Q1 2019 EYE EXAM RETINAL OR DILATED Q1 2019 MICROALBUMIN Q1 2019 LIPID PANEL Q1 2019 MEDICARE YEARLY EXAM 2019 GLAUCOMA SCREENING Q2Y 2020 COLONOSCOPY 2021 Allergies as of 2018  Review Complete On: 2018 By: Celio Zayas MD  
  
 Severity Noted Reaction Type Reactions Niacin High 2014    Unknown (comments) Other reaction(s): Unknown (comments) Imipenem  2013    Diarrhea Other reaction(s): Rash Levemir [Insulin Detemir]  2013    Hives Other Medication  2013    Other (comments) ? Allergy to Dulcy Rockers causing chemical burn Primaxin [Imipenem-cilastatin]  2013    Diarrhea, Rash Xarelto [Rivaroxaban]  2014    Rash, Itching Other reaction(s): Rash Current Immunizations  Reviewed on 2018 Name Date Influenza High Dose Vaccine PF 10/17/2017 Influenza Vaccine 10/26/2016, 10/21/2015 Pneumococcal Conjugate (PCV-13) 8/7/2015 Pneumococcal Vaccine (Unspecified Type) 1/1/2014 Reviewed by River Crow RN on 9/12/2018 at 10:12 AM  
 Reviewed by River Crow RN on 9/12/2018 at 10:13 AM  
You Were Diagnosed With   
  
 Codes Comments Hypertension with renal disease    -  Primary ICD-10-CM: I12.9 ICD-9-CM: 403.90 CKD (chronic kidney disease), stage IV ([facility] [address]     ICD-10-CM: N18.4 ICD-9-CM: 087. 4 Chronic systolic congestive heart failure (HCC)     ICD-10-CM: I50.22 ICD-9-CM: 428.22, 428.0 Ischemic cardiomyopathy     ICD-10-CM: I25.5 ICD-9-CM: 414.8 Anemia, unspecified type     ICD-10-CM: D64.9 ICD-9-CM: 709. 9 Vitals BP Pulse Resp Height(growth percentile) Weight(growth percentile) SpO2  
 128/70 (BP 1 Location: Left arm, BP Patient Position: Sitting) 93 17 6' 5\" (1.956 m) 259 lb 3.2 oz (117.6 kg) 99% BMI Smoking Status 30.74 kg/m2 Former Smoker BMI and BSA Data Body Mass Index Body Surface Area 30.74 kg/m 2 2.53 m 2 Preferred Pharmacy Pharmacy Name Phone [place] [phone] , [facility] AT [place] [address] [phone] Your Updated Medication List  
  
   
This list is accurate as of 9/12/18  1:09 PM.  Always use your most recent med list.  
  
  
  
  
 ascorbic acid (vitamin C) 250 mg tablet Commonly known as:  VITAMIN C Take 1 Tab by mouth three (3) times daily. BD ULTRA-FINE DEVIN PEN NEEDLES  
by Does Not Apply route. bumetanide 2 mg tablet Commonly known as:  Jai Manuel TAKE 2 TABLETS BY MOUTH TWICE DAILY  
  
 clonazePAM 2 mg tablet Commonly known as:  Sandrita Broaden Take 1 tablet at bedtime and may repeat  If he wakes up. * COLCRYS 0.6 mg tablet Generic drug:  colchicine Take 0.6 mg by mouth daily. Indications: taking mitagar * MITIGARE 0.6 mg capsule Generic drug:  colchicine TK 1 T PO  QD  
  
 COREG 12.5 mg tablet Generic drug:  carvedilol Take  by mouth two (2) times daily (with meals). ELIQUIS 5 mg tablet Generic drug:  apixaban Take 5 mg by mouth two (2) times a day. Ferrous Sulfate 47.5 mg iron Tber tablet Commonly known as:  SLOW FE Take 1 Tab by mouth three (3) times daily. finasteride 5 mg tablet Commonly known as:  PROSCAR Take 5 mg by mouth daily. insulin lispro 100 unit/mL kwikpen Commonly known as:  HUMALOG  
5 units before each meal three times a day. LANTUS SOLOSTAR U-100 INSULIN 100 unit/mL (3 mL) Inpn Generic drug:  insulin glargine USE TO INJECT 30 UNITS UNDER THE SKIN EVERY DAY Liraglutide 0.6 mg/0.1 mL (18 mg/3 mL) Pnij Commonly known as:  VICTOZA  
0.6 mg by SubCUTAneous route daily. losartan 25 mg tablet Commonly known as:  COZAAR Take 1 Tab by mouth daily. metOLazone 2.5 mg tablet Commonly known as:  Samul [place] Take 1 Tab by mouth as needed (pt takes approximately twice a month if has edema. ). Take twicw weekly for fluid  
  
 nitrofurantoin (macrocrystal-monohydrate) 100 mg capsule Commonly known as:  MACROBID Take 1 Cap by mouth two (2) times a day. potassium chloride 20 mEq tablet Commonly known as:  K-DUR, KLOR-CON Take 1 Tab by mouth daily. pramipexole 0.5 mg tablet Commonly known as:  MIRAPEX TAKE 1 TABLET BY MOUTH EVERY NIGHT AT BEDTIME  
  
 PROCRIT INJECTION  
by Injection route. Indications: Has one injection monthly. Patient unsure of dose. tamsulosin 0.4 mg capsule Commonly known as:  FLOMAX TAKE 2 CAPSULES BY MOUTH EVERY DAY  
  
 ULORIC 40 mg Tab tablet Generic drug:  febuxostat TAKE 1 TABLET BY MOUTH DAILY * Notice: This list has 2 medication(s) that are the same as other medications prescribed for you. Read the directions carefully, and ask your doctor or other care provider to review them with you. We Performed the Following METABOLIC PANEL, BASIC [13143 CPT(R)] Follow-up Instructions Return in about 2 weeks (around 9/26/2018). To-Do List   
 10/10/2018 10:00 AM  
  Appointment with Aleksandr Hernandez FT CHAIR 1 at [facility] ([phone])  
  
 11/07/2018 10:00 AM  
  Appointment with Owen Kyra 1 at [facility] ([phone]) 12/05/2018 10:00 AM  
  Appointment with DEBRA FT CHAIR 1 at [facility] ([phone]) Patient Instructions Heart Failure: Care Instructions Your Care Instructions Heart failure occurs when your heart does not pump as much blood as the body needs. Failure does not mean that the heart has stopped pumping but rather that it is not pumping as well as it should. Over time, this causes fluid buildup in your lungs and other parts of your body. Fluid buildup can cause shortness of breath, fatigue, swollen ankles, and other problems. By taking medicines regularly, reducing sodium (salt) in your diet, checking your weight every day, and making lifestyle changes, you can feel better and live longer. Follow-up care is a key part of your treatment and safety. Be sure to make and go to all appointments, and call your doctor if you are having problems. It's also a good idea to know your test results and keep a list of the medicines you take. How can you care for yourself at home? Medicines 
  · Be safe with medicines. Take your medicines exactly as prescribed. Call your doctor if you think you are having a problem with your medicine.  
  · Do not take any vitamins, over-the-counter medicine, or herbal products without talking to your doctor first. Aminata Villalobos not take ibuprofen (Advil or Motrin) and naproxen (Aleve) without talking to your doctor first. They could make your heart failure worse.  
  · You may be taking some of the following medicine.  
¨ Beta-blockers can slow heart rate, decrease blood pressure, and improve your condition. Taking a beta-blocker may lower your chance of needing to be hospitalized. ¨ Angiotensin-converting enzyme inhibitors (ACEIs) reduce the heart's workload, lower blood pressure, and reduce swelling. Taking an ACEI may lower your chance of needing to be hospitalized again. ¨ Angiotensin II receptor blockers (ARBs) work like ACEIs. Your doctor may prescribe them instead of ACEIs. ¨ Diuretics, also called water pills, reduce swelling. ¨ Potassium supplements replace this important mineral, which is sometimes lost with diuretics. ¨ Aspirin and other blood thinners prevent blood clots, which can cause a stroke or heart attack.  
 You will get more details on the specific medicines your doctor prescribes. Diet 
  · Your doctor may suggest that you limit sodium to 2,000 milligrams (mg) a day or less. That is less than 1 teaspoon of salt a day, including all the salt you eat in cooking or in packaged foods. People get most of their sodium from processed foods. Fast food and restaurant meals also tend to be very high in sodium.  
  · Ask your doctor how much liquid you can drink each day. You may have to limit liquids.  
 Weight 
  · Weigh yourself without clothing at the same time each day. Record your weight. Call your doctor if you have a sudden weight gain, such as more than 2 to 3 pounds in a day or 5 pounds in a week. (Your doctor may suggest a different range of weight gain.) A sudden weight gain may mean that your heart failure is getting worse.  
 Activity level 
  · Start light exercise (if your doctor says it is okay). Even if you can only do a small amount, exercise will help you get stronger, have more energy, and manage your weight and your stress. Walking is an easy way to get exercise. Start out by walking a little more than you did before.  Bit by bit, increase the amount you walk.  
  · When you exercise, watch for signs that your heart is working too hard. You are pushing yourself too hard if you cannot talk while you are exercising. If you become short of breath or dizzy or have chest pain, stop, sit down, and rest.  
  · If you feel \"wiped out\" the day after you exercise, walk slower or for a shorter distance until you can work up to a better pace.  
  · Get enough rest at night. Sleeping with 1 or 2 pillows under your upper body and head may help you breathe easier.  
 Lifestyle changes 
  · Do not smoke. Smoking can make a heart condition worse. If you need help quitting, talk to your doctor about stop-smoking programs and medicines. These can increase your chances of quitting for good. Quitting smoking may be the most important step you can take to protect your heart.  
  · Limit alcohol to 2 drinks a day for men and 1 drink a day for women. Too much alcohol can cause health problems.  
  · Avoid getting sick from colds and the flu. Get a pneumococcal vaccine shot. If you have had one before, ask your doctor whether you need another dose. Get a flu shot each year. If you must be around people with colds or the flu, wash your hands often. When should you call for help? Call 911 if you have symptoms of sudden heart failure such as: 
  · You have severe trouble breathing.  
  · You cough up pink, foamy mucus.  
  · You have a new irregular or rapid heartbeat.  
 Call your doctor now or seek immediate medical care if: 
  · You have new or increased shortness of breath.  
  · You are dizzy or lightheaded, or you feel like you may faint.  
  · You have sudden weight gain, such as more than 2 to 3 pounds in a day or 5 pounds in a week. (Your doctor may suggest a different range of weight gain.)  
  · You have increased swelling in your legs, ankles, or feet.  
  · You are suddenly so tired or weak that you cannot do your usual activities.  
 Watch closely for changes in your health, and be sure to contact your doctor if you develop new symptoms. Where can you learn more? Go to http://gildardo-palak.info/. Enter U539 in the search box to learn more about \"Heart Failure: Care Instructions. \" Current as of: May 10, 2017 Content Version: 11.7 © 2290-6707 [employer]. Care instructions adapted under license by [employer] (which disclaims liability or warranty for this information). If you have questions about a medical condition or this instruction, always ask your healthcare professional. [address] any warranty or liability for your use of this information. Patient Instructions History Introducing [facility]! Dear José Adan: 
Thank you for requesting a Pow[employer] Now account. Our records indicate that you already have an active Pow[employer] Now account. You can access your account anytime at [email or web address] Did you know that you can access your hospital and ER discharge instructions at any time in Powered Now? You can also review all of your test results from your hospital stay or ER visit. Additional Information If you have questions, please visit the Frequently Asked Questions section of the Powered Now website at [email or web address] Remember, Powered Now is NOT to be used for urgent needs. For medical emergencies, dial 911. Now available from your iPhone and Android! Please provide this summary of care documentation to your next provider. Your primary care clinician is listed as Hunter. If you have any questions after today's visit, please call [phone].

## 2018-09-12 NOTE — PROGRESS NOTES
Chief Complaint Patient presents with  Hypertension 2 week follow up SUBJECTIVE: 
 
Royal VELASCO Meghan Guevara. is a 69 y.o. male who returns in follow-up of his medical problems include hypertension, CHF, ASCVD, atrial fibrillation, CKD, and other medical problems. He is taking his medications and trying to follow his diet and he actually feels a little better than he is getting some physical therapy does help to strengthen his legs. He recently saw his hematologist and had a Procrit injection for hemoglobin of 9.4. He denies any chest pain, shortness breath, palpitations or cardiorespiratory complaints. He denies any GI or  complaints. He has no other complaints on complete review of systems and is taking all his medications. Current Outpatient Prescriptions Medication Sig Dispense Refill  nitrofurantoin, macrocrystal-monohydrate, (MACROBID) 100 mg capsule Take 1 Cap by mouth two (2) times a day. 14 Cap 0  
 tamsulosin (FLOMAX) 0.4 mg capsule TAKE 2 CAPSULES BY MOUTH EVERY  Cap 3  potassium chloride (K-DUR, KLOR-CON) 20 mEq tablet Take 1 Tab by mouth daily. 90 Tab 1  
 losartan (COZAAR) 25 mg tablet Take 1 Tab by mouth daily. 30 Tab prn  clonazePAM (KLONOPIN) 2 mg tablet Take 1 tablet at bedtime and may repeat  If he wakes up. 60 Tab 2  
 epoetin mary (PROCRIT INJECTION) by Injection route. Indications: Has one injection monthly. Patient unsure of dose.  LANTUS SOLOSTAR U-100 INSULIN 100 unit/mL (3 mL) inpn USE TO INJECT 30 UNITS UNDER THE SKIN EVERY DAY 10 Pen 2  
 Ferrous Sulfate (SLOW FE) 47.5 mg iron TbER tablet Take 1 Tab by mouth three (3) times daily. 90 Tab prn  ascorbic acid, vitamin C, (VITAMIN C) 250 mg tablet Take 1 Tab by mouth three (3) times daily. 90 Tab prn  MITIGARE 0.6 mg capsule TK 1 T PO  QD  1  
 bumetanide (BUMEX) 2 mg tablet TAKE 2 TABLETS BY MOUTH TWICE DAILY 360 Tab 3  
  insulin lispro (HUMALOG) 100 unit/mL kwikpen 5 units before each meal three times a day. 1 Pen 5  pramipexole (MIRAPEX) 0.5 mg tablet TAKE 1 TABLET BY MOUTH EVERY NIGHT AT BEDTIME (Patient taking differently: Take 0.5 mg by mouth. TAKE 1 TABLET BY MOUTH EVERY NIGHT AT BEDTIME) 90 Tab prn  metOLazone (ZAROXOLYN) 2.5 mg tablet Take 1 Tab by mouth as needed (pt takes approximately twice a month if has edema. ). Take twicw weekly for fluid (Patient taking differently: Take twicw weekly for fluid) 10 Tab prn  ULORIC 40 mg tab tablet TAKE 1 TABLET BY MOUTH DAILY 30 Tab 11  
 carvedilol (COREG) 12.5 mg tablet Take  by mouth two (2) times daily (with meals).  PEN NEEDLE, DIABETIC (BD ULTRA-FINE DEVIN PEN NEEDLES) by Does Not Apply route.  colchicine (COLCRYS) 0.6 mg tablet Take 0.6 mg by mouth daily. Indications: taking mitagar  Liraglutide (VICTOZA) 0.6 mg/0.1 mL (18 mg/3 mL) sub-q pen 0.6 mg by SubCUTAneous route daily.  apixaban (ELIQUIS) 5 mg tablet Take 5 mg by mouth two (2) times a day.  finasteride (PROSCAR) 5 mg tablet Take 5 mg by mouth daily. Past Medical History:  
Diagnosis Date  Anemia 8/5/2017  Anxiety 8/5/2017  Arrhythmia   
 atrial fibrillation 2014  ASCVD (arteriosclerotic cardiovascular disease) 8/5/2017  BPH (benign prostatic hyperplasia) 8/5/2017  CAD (coronary artery disease)   
 h/o stents  Cancer [facility])   
 h/o skin cancer  Cardiomyopathy ([facility] [address] 8/5/2017  CHF (congestive heart failure) ([address] 8/5/2017  Chronic kidney disease Stage IV  CKD (chronic kidney disease), stage IV ([address] 8/5/2017  Diabetes ([facility] [address]  Diabetes mellitus ([address] 8/5/2017  Diabetic neuropathy ([address] 8/5/2017  DJD (degenerative joint disease) 8/5/2017  ED (erectile dysfunction) 8/5/2017  Gout 8/5/2017  High cholesterol  Hyperlipidemia 8/5/2017  Hypertension  Hypertension with renal disease 8/5/2017  Hypothyroid 8/5/2017  Insomnia 8/5/2017  Obesity 8/5/2017  On statin therapy 8/5/2017  Restless leg 8/5/2017  Thyroid disease   
 hypothyroid Past Surgical History:  
Procedure Laterality Date  CARDIAC SURG PROCEDURE UNLIST    
 cardiac stents  CARDIAC SURG PROCEDURE UNLIST Ablation 5/17/2018 [facility]  COLONOSCOPY N/A 6/28/2016 COLONOSCOPY performed by Nathaniel Coates MD at \[facility]\"" ENDOSCOPY  
 HX APPENDECTOMY  HX BUNIONECTOMY    
 and removal of 2 seasmoid bone of the great toe 2/2018  HX HEENT Bilateral Cataract surgery  HX HEENT Tonsils  HX HEENT Axe wound to the head  HX KNEE ARTHROSCOPY X 2  
 HX ORTHOPAEDIC    
 HX PACEMAKER Allergies Allergen Reactions  Niacin Unknown (comments) Other reaction(s): Unknown (comments)  Imipenem Diarrhea Other reaction(s): Rash  Levemir [Insulin Detemir] Hives  Other Medication Other (comments) ? Allergy to Raisa Ahuja causing chemical burn  Primaxin [Imipenem-Cilastatin] Diarrhea and Rash  Xarelto [Rivaroxaban] Rash and Itching Other reaction(s): Rash REVIEW OF SYSTEMS: 
General: negative for - chills or fever, or weight loss or gain positive fatigue and generalized weakness ENT: negative for - headaches, nasal congestion or tinnitus Eyes: no blurred or visual changes Neck: No stiffness or swollen nodes Respiratory: negative for - cough, hemoptysis, shortness of breath or wheezing Cardiovascular : negative for - chest pain, edema, palpitations or shortness of breath Gastrointestinal: negative for - abdominal pain, blood in stools, heartburn or nausea/vomiting Genito-Urinary: no dysuria, trouble voiding, or hematuria Musculoskeletal: negative for - gait disturbance, joint pain, joint stiffness or joint swelling Neurological: no TIA or stroke symptoms of chronic numbness in lower extremities consistent with his neuropathy Hematologic: no bruises, no bleeding Lymphatic: no swollen glands Integument: no lumps, mole changes, nail changes or rash Endocrine:no malaise/lethargy poly uria or polydipsia or unexpected weight changes Social History Social History  Marital status:  Spouse name: N/A  
 Number of children: N/A  
 Years of education: N/A Social History Main Topics  Smoking status: Former Smoker Packs/day: 0.50 Years: 4.00 Quit date: 3/6/1972  Smokeless tobacco: Never Used  Alcohol use No  
   Comment: rare, 1 drink per year  Drug use: No  
 Sexual activity: Not Currently Other Topics Concern  None Social History Narrative Family History Problem Relation Age of Onset  Heart Disease Mother  Kidney Disease Mother  Heart Disease Father  Kidney Disease Father  Cancer Sister Breast  
 
 
OBJECTIVE:  
 
Visit Vitals  /70 (BP 1 Location: Left arm, BP Patient Position: Sitting)  Pulse 93  Resp 17  Ht 6' 5\" (1.956 m)  Wt 259 lb 3.2 oz (117.6 kg)  SpO2 99%  BMI 30.74 kg/m2 CONSTITUTIONAL:   well nourished, appears age appropriate EYES: sclera anicteric, PERRL, EOMI 
ENMT:nares clear, moist mucous membranes, pharynx clear NECK: supple. Thyroid normal, No JVD or bruits RESPIRATORY: Chest: clear to ascultation and percussion, normal inspiratory effort CARDIOVASCULAR: Heart: regular rate and rhythm no murmurs, rubs or gallops, PMI not displaced, No thrills GASTROINTESTINAL: Abdomen: non distended, soft, non tender, bowel sounds normal 
HEMATOLOGIC: no purpura, petechiae or bruising LYMPHATIC: No lymph node enlargemant MUSCULOSKELETAL: Extremities: no edema or active synovitis, pulse 1+ INTEGUMENT: No unusual rashes or suspicious skin lesions noted. Nails appear normal. 
PERIPHERAL VASCULAR: normal pulses femoral, PT and DP NEUROLOGIC: non-focal exam, A & O X 3 have a generalized decreased sensation in his lower extremities from his mid calves down bilateral 
PSYCHIATRIC:, appropriate affect ASSESSMENT:  
1. Hypertension with renal disease 2. CKD (chronic kidney disease), stage IV ([address] 3. Chronic systolic congestive heart failure ([facility] [address] 4. Ischemic cardiomyopathy 5. Anemia, unspecified type Impression 1. Hypertension that is controlled today so continue current therapy as ordered 2. CHF compensated 3. CKD stage IV repeat status pending 4. Ischemic cardiomyopathy stable 5. ASCVD clinically stable continue aspirin 6. Paroxysmal atrial fibrillation now seems to be in sinus rhythm 7. Anemia just checked by his hematologist and that Procrit injection Continue current medications pending results of today's lab. Follow-up scheduled for 2 weeks or sooner should there be a problem. PLAN: 
. Orders Placed This Encounter  METABOLIC PANEL, BASIC  
 
 
 
ATTENTION:  
This medical record was transcribed using an electronic medical records system. Although proofread, it may and can contain electronic and spelling errors. Other human spelling and other errors may be present. Corrections may be executed at a later time. Please feel free to contact us for any clarifications as needed. Follow-up Disposition: 
Return in about 2 weeks (around 9/26/2018). Results for orders placed or performed during the hospital encounter of 09/12/18 HGB & HCT Result Value Ref Range HGB 9.2 (L) 12.1 - 17.0 g/dL HCT 28.8 (L) 36.6 - 50.3 % RENAL FUNCTION PANEL Result Value Ref Range Sodium 136 136 - 145 mmol/L Potassium 3.5 3.5 - 5.1 mmol/L Chloride 98 97 - 108 mmol/L  
 CO2 28 21 - 32 mmol/L Anion gap 10 5 - 15 mmol/L Glucose 103 (H) 65 - 100 mg/dL  (H) 6 - 20 MG/DL Creatinine 2.92 (H) 0.70 - 1.30 MG/DL  
 BUN/Creatinine ratio 41 (H) 12 - 20 GFR est AA 26 (L) >60 ml/min/1.73m2 GFR est non-AA 22 (L) >60 ml/min/1.73m2 Calcium 8.4 (L) 8.5 - 10.1 MG/DL Phosphorus 5.1 (H) 2.6 - 4.7 MG/DL Albumin 3.5 3.5 - 5.0 g/dL Ivy Siemens, MD 
 
The patient verbalized understanding of the problems and plans as explained.

## 2018-09-12 NOTE — PROGRESS NOTES
[facility] Stay Note: 
Arrived - 12 Visit Vitals  /69 (BP 1 Location: Left arm, BP Patient Position: At rest;Sitting)  Pulse 65  Temp 97.6 °F (36.4 °C)  Resp 20  SpO2 97% Assessment - unchanged. No complaints or concerns voiced. Labs: 
Recent Results (from the past 12 hour(s)) HGB & HCT Collection Time: 09/12/18 10:15 AM  
Result Value Ref Range HGB 9.2 (L) 12.1 - 17.0 g/dL HCT 28.8 (L) 36.6 - 50.3 % RENAL FUNCTION PANEL Collection Time: 09/12/18 10:15 AM  
Result Value Ref Range Sodium 136 136 - 145 mmol/L Potassium 3.5 3.5 - 5.1 mmol/L Chloride 98 97 - 108 mmol/L  
 CO2 28 21 - 32 mmol/L Anion gap 10 5 - 15 mmol/L Glucose 103 (H) 65 - 100 mg/dL  (H) 6 - 20 MG/DL Creatinine 2.92 (H) 0.70 - 1.30 MG/DL  
 BUN/Creatinine ratio 41 (H) 12 - 20 GFR est AA 26 (L) >60 ml/min/1.73m2 GFR est non-AA 22 (L) >60 ml/min/1.73m2 Calcium 8.4 (L) 8.5 - 10.1 MG/DL Phosphorus 5.1 (H) 2.6 - 4.7 MG/DL Albumin 3.5 3.5 - 5.0 g/dL FERRITIN Collection Time: 09/12/18 10:15 AM  
Result Value Ref Range Ferritin 63 26 - 388 NG/ML  
IRON PROFILE Collection Time: 09/12/18 10:15 AM  
Result Value Ref Range Iron 51 35 - 150 ug/dL TIBC 337 250 - 450 ug/dL Iron % saturation 15 (L) 20 - 50 % Procrit 40,000 units SQ slowly in Left arm. 
 
1100 - Tolerated well. Pt denies any acute problems/changes. Discharged from [facility] ambulatory. No distress.  Next appt: 10/10/18 @ 10 am.

## 2018-09-13 LAB
BUN SERPL-MCNC: 106 MG/DL (ref 8–27)
BUN/CREAT SERPL: 38 (ref 10–24)
CALCIUM SERPL-MCNC: 9.1 MG/DL (ref 8.6–10.2)
CHLORIDE SERPL-SCNC: 93 MMOL/L (ref 96–106)
CO2 SERPL-SCNC: 23 MMOL/L (ref 20–29)
CREAT SERPL-MCNC: 2.76 MG/DL (ref 0.76–1.27)
GLUCOSE SERPL-MCNC: 120 MG/DL (ref 65–99)
POTASSIUM SERPL-SCNC: 3.7 MMOL/L (ref 3.5–5.2)
SODIUM SERPL-SCNC: 139 MMOL/L (ref 134–144)

## 2018-09-26 ENCOUNTER — OFFICE VISIT (OUTPATIENT)
Dept: INTERNAL MEDICINE CLINIC | Age: 68
End: 2018-09-26

## 2018-09-26 VITALS
HEART RATE: 64 BPM | BODY MASS INDEX: 30.72 KG/M2 | OXYGEN SATURATION: 98 % | TEMPERATURE: 98.5 F | RESPIRATION RATE: 20 BRPM | DIASTOLIC BLOOD PRESSURE: 78 MMHG | SYSTOLIC BLOOD PRESSURE: 136 MMHG | HEIGHT: 77 IN | WEIGHT: 260.2 LBS

## 2018-09-26 DIAGNOSIS — I25.5 ISCHEMIC CARDIOMYOPATHY: ICD-10-CM

## 2018-09-26 DIAGNOSIS — N18.4 CKD (CHRONIC KIDNEY DISEASE), STAGE IV (HCC): ICD-10-CM

## 2018-09-26 DIAGNOSIS — Z23 ENCOUNTER FOR IMMUNIZATION: ICD-10-CM

## 2018-09-26 DIAGNOSIS — I48.0 PAROXYSMAL ATRIAL FIBRILLATION (HCC): ICD-10-CM

## 2018-09-26 DIAGNOSIS — I25.10 ASCVD (ARTERIOSCLEROTIC CARDIOVASCULAR DISEASE): ICD-10-CM

## 2018-09-26 DIAGNOSIS — D64.9 ANEMIA, UNSPECIFIED TYPE: ICD-10-CM

## 2018-09-26 DIAGNOSIS — I50.22 CHRONIC SYSTOLIC CONGESTIVE HEART FAILURE (HCC): ICD-10-CM

## 2018-09-26 DIAGNOSIS — I12.9 HYPERTENSION WITH RENAL DISEASE: Primary | ICD-10-CM

## 2018-09-26 LAB
GRAN# POC: 3.5 K/UL (ref 2–7.8)
GRAN% POC: 77.3 % (ref 37–92)
HCT VFR BLD CALC: 28.6 % (ref 37–51)
HGB BLD-MCNC: 9.5 G/DL (ref 12–18)
LY# POC: 0.6 K/UL (ref 0.6–4.1)
LY% POC: 15.7 % (ref 10–58.5)
MCH RBC QN: 28.5 PG (ref 26–32)
MCHC RBC-ENTMCNC: 33 G/DL (ref 30–36)
MCV RBC: 86 FL (ref 80–97)
MID #, POC: 0.3 K/UL (ref 0–1.8)
MID% POC: 7 % (ref 0.1–24)
PLATELET # BLD: 125 K/UL (ref 140–440)
RBC # BLD: 3.31 M/UL (ref 4.2–6.3)
WBC # BLD: 4.4 K/UL (ref 4.1–10.9)

## 2018-09-26 NOTE — PATIENT INSTRUCTIONS
Arthritis: Care Instructions Your Care Instructions Arthritis, also called osteoarthritis, is a breakdown of the cartilage that cushions your joints. When the cartilage wears down, your bones rub against each other. This causes pain and stiffness. Many people have some arthritis as they age. Arthritis most often affects the joints of the spine, hands, hips, knees, or feet. You can take simple measures to protect your joints, ease your pain, and help you stay active. Follow-up care is a key part of your treatment and safety. Be sure to make and go to all appointments, and call your doctor if you are having problems. It's also a good idea to know your test results and keep a list of the medicines you take. How can you care for yourself at home? · Stay at a healthy weight. Being overweight puts extra strain on your joints. · Talk to your doctor or physical therapist about exercises that will help ease joint pain. ¨ Stretch. You may enjoy gentle forms of yoga to help keep your joints and muscles flexible. ¨ Walk instead of jog. Other types of exercise that are less stressful on the joints include riding a bicycle, swimming, bang chi, or water exercise. ¨ Lift weights. Strong muscles help reduce stress on your joints. Stronger thigh muscles, for example, take some of the stress off of the knees and hips. Learn the right way to lift weights so you do not make joint pain worse. · Take your medicines exactly as prescribed. Call your doctor if you think you are having a problem with your medicine. · Take pain medicines exactly as directed. ¨ If the doctor gave you a prescription medicine for pain, take it as prescribed. ¨ If you are not taking a prescription pain medicine, ask your doctor if you can take an over-the-counter medicine. · Use a cane, crutch, walker, or another device if you need help to get around. These can help rest your joints.  You also can use other things to make life easier, such as a higher toilet seat and padded handles on kitchen utensils. · Do not sit in low chairs, which can make it hard to get up. · Put heat or cold on your sore joints as needed. Use whichever helps you most. You also can take turns with hot and cold packs. ¨ Apply heat 2 or 3 times a day for 20 to 30 minutes-using a heating pad, hot shower, or hot pack-to relieve pain and stiffness. ¨ Put ice or a cold pack on your sore joint for 10 to 20 minutes at a time. Put a thin cloth between the ice and your skin. When should you call for help? Call your doctor now or seek immediate medical care if: 
  · You have sudden swelling, warmth, or pain in any joint.  
  · You have joint pain and a fever or rash.  
  · You have such bad pain that you cannot use a joint.  
 Watch closely for changes in your health, and be sure to contact your doctor if: 
  · You have mild joint symptoms that continue even with more than 6 weeks of care at home.  
  · You have stomach pain or other problems with your medicine. Where can you learn more? Go to http://gildardo-palak.info/. Enter W036 in the search box to learn more about \"Arthritis: Care Instructions. \" Current as of: October 10, 2017 Content Version: 11.7 © 3296-1214 [employer]. Care instructions adapted under license by [employer] (which disclaims liability or warranty for this information). If you have questions about a medical condition or this instruction, always ask your healthcare professional. [place] [address] any warranty or liability for your use of this information.

## 2018-09-26 NOTE — PROGRESS NOTES
Chief Complaint Patient presents with  Hypertension 2 week follow up  Fall  
  patient had a fall last night 1. Have you been to the ER, urgent care clinic since your last visit? No    Hospitalized since your last visit? No   
 
2. Have you seen or consulted any other health care providers outside of the [address] [place] [address] since your last visit?  No

## 2018-09-26 NOTE — PROGRESS NOTES
After obtaining consent, and per verbal order from Dr. Keri Lester, patient received influenza vaccine given by Bhaskar Chaudhari LPN in Left Deltoid. Influenza Vaccine 0.5 mL IM now. Patient was observed for 15 minutes post injection. Patient tolerated injection well with no adverse effects. VIS given.

## 2018-09-26 NOTE — PROGRESS NOTES
Chief Complaint Patient presents with  Hypertension 2 week follow up  Fall  
  patient had a fall last night SUBJECTIVE: 
 
Jessy Gonsales is a 69 y.o. male who returns in follow-up for his medical problems include hypertension, CKD stage IV, anemia, ASCVD, atrial fibrillation, CHF, cardiomyopathy and other medical problems. He seems to be doing okay except he did have a follow-up. Yesterday the back of his head but he did not lose consciousness and he feels better today and does not feel like anything needs to be x-rayed. He fell on his right hip at that time as well but says he is getting around well and does not want an x-ray. He denies any chest pain, shortness breath except dyspnea on exertion going up stairs which has not improved although was initially after receiving his IV iron it is not as good now. He still getting physical therapy. He denies any other cardiorespiratory complaints and then no GI  complaints. He denies any neurologic complaints but does have the headache from the fall although that is better today. His chronic arthritic complaints are unchanged. Current Outpatient Prescriptions Medication Sig Dispense Refill  tamsulosin (FLOMAX) 0.4 mg capsule TAKE 2 CAPSULES BY MOUTH EVERY  Cap 3  potassium chloride (K-DUR, KLOR-CON) 20 mEq tablet Take 1 Tab by mouth daily. 90 Tab 1  
 losartan (COZAAR) 25 mg tablet Take 1 Tab by mouth daily. 30 Tab prn  clonazePAM (KLONOPIN) 2 mg tablet Take 1 tablet at bedtime and may repeat  If he wakes up. 60 Tab 2  
 epoetin mary (PROCRIT INJECTION) by Injection route. Indications: Has one injection monthly. Patient unsure of dose.  LANTUS SOLOSTAR U-100 INSULIN 100 unit/mL (3 mL) inpn USE TO INJECT 30 UNITS UNDER THE SKIN EVERY DAY 10 Pen 2  
 Ferrous Sulfate (SLOW FE) 47.5 mg iron TbER tablet Take 1 Tab by mouth three (3) times daily. 90 Tab prn  ascorbic acid, vitamin C, (VITAMIN C) 250 mg tablet Take 1 Tab by mouth three (3) times daily. 90 Tab prn  MITIGARE 0.6 mg capsule TK 1 T PO  QD  1  
 bumetanide (BUMEX) 2 mg tablet TAKE 2 TABLETS BY MOUTH TWICE DAILY 360 Tab 3  
 insulin lispro (HUMALOG) 100 unit/mL kwikpen 5 units before each meal three times a day. 1 Pen 5  pramipexole (MIRAPEX) 0.5 mg tablet TAKE 1 TABLET BY MOUTH EVERY NIGHT AT BEDTIME (Patient taking differently: Take 0.5 mg by mouth. TAKE 1 TABLET BY MOUTH EVERY NIGHT AT BEDTIME) 90 Tab prn  metOLazone (ZAROXOLYN) 2.5 mg tablet Take 1 Tab by mouth as needed (pt takes approximately twice a month if has edema. ). Take twicw weekly for fluid (Patient taking differently: Take twicw weekly for fluid) 10 Tab prn  ULORIC 40 mg tab tablet TAKE 1 TABLET BY MOUTH DAILY 30 Tab 11  
 carvedilol (COREG) 12.5 mg tablet Take  by mouth two (2) times daily (with meals).  PEN NEEDLE, DIABETIC (BD ULTRA-FINE DEVIN PEN NEEDLES) by Does Not Apply route.  colchicine (COLCRYS) 0.6 mg tablet Take 0.6 mg by mouth daily. Indications: taking mitagar  Liraglutide (VICTOZA) 0.6 mg/0.1 mL (18 mg/3 mL) sub-q pen 0.6 mg by SubCUTAneous route daily.  apixaban (ELIQUIS) 5 mg tablet Take 5 mg by mouth two (2) times a day.  finasteride (PROSCAR) 5 mg tablet Take 5 mg by mouth daily. Past Medical History:  
Diagnosis Date  Anemia 8/5/2017  Anxiety 8/5/2017  Arrhythmia   
 atrial fibrillation 2014  ASCVD (arteriosclerotic cardiovascular disease) 8/5/2017  BPH (benign prostatic hyperplasia) 8/5/2017  CAD (coronary artery disease)   
 h/o stents  Cancer [facility])   
 h/o skin cancer  Cardiomyopathy ([facility] [address] 8/5/2017  CHF (congestive heart failure) ([facility] [address] 8/5/2017  Chronic kidney disease Stage IV  CKD (chronic kidney disease), stage IV ([facility] [address] 8/5/2017  Diabetes ([facility] [address]  Diabetes mellitus ([facility] 8/5/2017  Diabetic neuropathy ([facility] 8/5/2017  DJD (degenerative joint disease) 8/5/2017  ED (erectile dysfunction) 8/5/2017  Gout 8/5/2017  High cholesterol  Hyperlipidemia 8/5/2017  Hypertension  Hypertension with renal disease 8/5/2017  Hypothyroid 8/5/2017  Insomnia 8/5/2017  Obesity 8/5/2017  On statin therapy 8/5/2017  Restless leg 8/5/2017  Thyroid disease   
 hypothyroid Past Surgical History:  
Procedure Laterality Date  CARDIAC SURG PROCEDURE UNLIST    
 cardiac stents  CARDIAC SURG PROCEDURE UNLIST Ablation 5/17/2018 [id number] [place]  COLONOSCOPY N/A 6/28/2016 COLONOSCOPY performed by Rita Bernstein MD at [facility] ENDOSCOPY  
 HX APPENDECTOMY  HX BUNIONECTOMY    
 and removal of 2 seasmoid bone of the great toe 2/2018  HX HEENT Bilateral Cataract surgery  HX HEENT Tonsils  HX HEENT Axe wound to the head  HX KNEE ARTHROSCOPY X 2  
 HX ORTHOPAEDIC    
 HX PACEMAKER Allergies Allergen Reactions  Niacin Unknown (comments) Other reaction(s): Unknown (comments)  Imipenem Diarrhea Other reaction(s): Rash  Levemir [Insulin Detemir] Hives  Other Medication Other (comments) ? Allergy to Elda Hey causing chemical burn  Primaxin [Imipenem-Cilastatin] Diarrhea and Rash  Xarelto [Rivaroxaban] Rash and Itching Other reaction(s): Rash REVIEW OF SYSTEMS: 
General: negative for - chills or fever, or weight loss or gain ENT: negative for - headaches except posterior headache related to a fall yesterday as described, nasal congestion or tinnitus Eyes: no blurred or visual changes Neck: No stiffness or swollen nodes Respiratory: negative for - cough, hemoptysis, shortness of breath or wheezing. Dyspnea on exertion going up stairs Cardiovascular : negative for - chest pain, edema, palpitations or shortness of breath Gastrointestinal: negative for - abdominal pain, blood in stools, heartburn or nausea/vomiting Genito-Urinary: no dysuria, trouble voiding, or hematuria Musculoskeletal: negative for - gait disturbance, joint pain, joint stiffness or joint swelling Neurological: no TIA or stroke symptoms Hematologic: no bruises, no bleeding Lymphatic: no swollen glands Integument: no lumps, mole changes, nail changes or rash Endocrine:no malaise/lethargy poly uria or polydipsia or unexpected weight changes Social History Social History  Marital status:  Spouse name: N/A  
 Number of children: N/A  
 Years of education: N/A Social History Main Topics  Smoking status: Former Smoker Packs/day: 0.50 Years: 4.00 Quit date: 3/6/1972  Smokeless tobacco: Never Used  Alcohol use No  
   Comment: rare, 1 drink per year  Drug use: No  
 Sexual activity: Not Currently Other Topics Concern  None Social History Narrative Family History Problem Relation Age of Onset  Heart Disease Mother  Kidney Disease Mother  Heart Disease Father  Kidney Disease Father  Cancer Sister Breast  
 
 
OBJECTIVE:  
 
Visit Vitals  /78  Pulse 64  Temp 98.5 °F (36.9 °C) (Oral)  Resp 20  
 Ht 6' 5\" (1.956 m)  Wt 260 lb 3.2 oz (118 kg)  SpO2 98%  BMI 30.86 kg/m2 CONSTITUTIONAL:   well nourished, appears age appropriate EYES: sclera anicteric, PERRL, EOMI 
ENMT:nares clear, moist mucous membranes, pharynx clear NECK: supple. Thyroid normal, No JVD or bruits RESPIRATORY: Chest: clear to ascultation and percussion, normal inspiratory effort CARDIOVASCULAR: Heart: regular rate and rhythm no murmurs, rubs or gallops, PMI not displaced, No thrills GASTROINTESTINAL: Abdomen: non distended, soft, non tender, bowel sounds normal 
HEMATOLOGIC: no purpura, petechiae or bruising LYMPHATIC: No lymph node enlargemant MUSCULOSKELETAL: Extremities: no edema or active synovitis, pulse 1+ INTEGUMENT: No unusual rashes or suspicious skin lesions noted. Nails appear normal. 
PERIPHERAL VASCULAR: normal pulses femoral, PT and DP NEUROLOGIC: non-focal exam, A & O X 3 PSYCHIATRIC:, appropriate affect ASSESSMENT:  
1. Hypertension with renal disease 2. CKD (chronic kidney disease), stage IV ([facility] [address] 3. Chronic systolic congestive heart failure ([address] 4. Anemia, unspecified type 5. ASCVD (arteriosclerotic cardiovascular disease) 6. Ischemic cardiomyopathy 7. Paroxysmal atrial fibrillation (HCC) 8. Encounter for immunization Impression 1. Hypertension that is controlled to continue current therapy reviewed with him 2. CKD repeat status pending and prior labs reviewed not soaks okay 3. CHF clinically clear 4. Anemia repeat status is pending with the dyspnea on exertion I will make sure that that is not the point where he needs Procrit 5. ASCVD clinically stable 6. Ischemic cardiomyopathy stable 7. Paroxysmal atrial fibrillation currently controlled Flu shot given today. I will call the lab and make further recommendations or adjustments. Follow-up as scheduled again for 4 weeks or sooner should they be a problem peer PLAN: 
. Orders Placed This Encounter  Influenza Vaccine Inactivated (IIV)(FLUAD), Subunit, Adjuvanted, IM, ([id number])  METABOLIC PANEL, BASIC  
 AMB POC COMPLETE CBC,AUTOMATED ENTER  
 
 
 
ATTENTION:  
This medical record was transcribed using an electronic medical records system. Although proofread, it may and can contain electronic and spelling errors. Other human spelling and other errors may be present. Corrections may be executed at a later time. Please feel free to contact us for any clarifications as needed. Follow-up Disposition: 
Return in about 4 weeks (around 10/24/2018). No results found for any visits on 09/26/18. Candy Mccullough MD 
 
The patient verbalized understanding of the problems and plans as explained.

## 2018-09-26 NOTE — MR AVS SNAPSHOT
[address] [place] [address] [address] [id number] [phone] Patient: Radha Carmona. MRN: [id number] :1950 Visit Information Date & Time Provider Department Dept. Phone Encounter #  
 2018  1:50 PM Kenton Upton MD [facility] [id number] Follow-up Instructions Return in about 4 weeks (around 10/24/2018). Upcoming Health Maintenance Date Due DTaP/Tdap/Td series (1 - Tdap) 1969 Shingrix Vaccine Age 50> (1 of 2) 1998 Influenza Age 5 to Adult 2018 HEMOGLOBIN A1C Q6M 2019 FOOT EXAM Q1 2019 MICROALBUMIN Q1 2019 LIPID PANEL Q1 2019 MEDICARE YEARLY EXAM 2019 EYE EXAM RETINAL OR DILATED Q1 2019 GLAUCOMA SCREENING Q2Y 2020 COLONOSCOPY 2021 Allergies as of 2018  Review Complete On: 2018 By: Kenton Upton MD  
  
 Severity Noted Reaction Type Reactions Niacin High 2014    Unknown (comments) Other reaction(s): Unknown (comments) Imipenem  2013    Diarrhea Other reaction(s): Rash Levemir [Insulin Detemir]  2013    Hives Other Medication  2013    Other (comments) ? Allergy to Marielle Walker causing chemical burn Primaxin [Imipenem-cilastatin]  2013    Diarrhea, Rash Xarelto [Rivaroxaban]  2014    Rash, Itching Other reaction(s): Rash Current Immunizations  Reviewed on 2018 Name Date Influenza High Dose Vaccine PF 10/17/2017 Influenza Vaccine 10/26/2016, 10/21/2015 Influenza Vaccine (Tri) Adjuvanted  Incomplete Pneumococcal Conjugate (PCV-13) 2015 Pneumococcal Vaccine (Unspecified Type) 2014 Not reviewed this visit You Were Diagnosed With   
  
 Codes Comments Hypertension with renal disease    -  Primary ICD-10-CM: I12.9 ICD-9-CM: 403.90 CKD (chronic kidney disease), stage IV ([facility] [address]     ICD-10-CM: N18.4 ICD-9-CM: 685. 4 Chronic systolic congestive heart failure (HCC)     ICD-10-CM: I50.22 ICD-9-CM: 428.22, 428.0 Anemia, unspecified type     ICD-10-CM: D64.9 ICD-9-CM: 285.9 ASCVD (arteriosclerotic cardiovascular disease)     ICD-10-CM: I25.10 ICD-9-CM: 429.2, 440.9 Ischemic cardiomyopathy     ICD-10-CM: I25.5 ICD-9-CM: 414.8 Paroxysmal atrial fibrillation (HCC)     ICD-10-CM: I48.0 ICD-9-CM: 427.31 Encounter for immunization     ICD-10-CM: H50 ICD-9-CM: V03.89 Vitals BP Pulse Temp Resp Height(growth percentile) Weight(growth percentile) 136/78 64 98.5 °F (36.9 °C) (Oral) 20 6' 5\" (1.956 m) 260 lb 3.2 oz (118 kg) SpO2 BMI Smoking Status 98% 30.86 kg/m2 Former Smoker Vitals History BMI and BSA Data Body Mass Index Body Surface Area  
 30.86 kg/m 2 2.53 m 2 Preferred Pharmacy Pharmacy Name Phone [facility] [phone], [address] AT [place] [address] [phone] Your Updated Medication List  
  
   
This list is accurate as of 9/26/18  3:07 PM.  Always use your most recent med list.  
  
  
  
  
 ascorbic acid (vitamin C) 250 mg tablet Commonly known as:  VITAMIN C Take 1 Tab by mouth three (3) times daily. BD ULTRA-FINE DEVIN PEN NEEDLES  
by Does Not Apply route. bumetanide 2 mg tablet Commonly known as:  Allegra Peaches TAKE 2 TABLETS BY MOUTH TWICE DAILY  
  
 clonazePAM 2 mg tablet Commonly known as:  Airam Kava Take 1 tablet at bedtime and may repeat  If he wakes up. * COLCRYS 0.6 mg tablet Generic drug:  colchicine Take 0.6 mg by mouth daily. Indications: taking mitagar * MITIGARE 0.6 mg capsule Generic drug:  colchicine TK 1 T PO  QD  
  
 COREG 12.5 mg tablet Generic drug:  carvedilol Take  by mouth two (2) times daily (with meals). ELIQUIS 5 mg tablet Generic drug:  apixaban Take 5 mg by mouth two (2) times a day. Ferrous Sulfate 47.5 mg iron Tber tablet Commonly known as:  SLOW FE Take 1 Tab by mouth three (3) times daily. finasteride 5 mg tablet Commonly known as:  PROSCAR Take 5 mg by mouth daily. insulin lispro 100 unit/mL kwikpen Commonly known as:  HUMALOG  
5 units before each meal three times a day. LANTUS SOLOSTAR U-100 INSULIN 100 unit/mL (3 mL) Inpn Generic drug:  insulin glargine USE TO INJECT 30 UNITS UNDER THE SKIN EVERY DAY Liraglutide 0.6 mg/0.1 mL (18 mg/3 mL) Pnij Commonly known as:  VICTOZA  
0.6 mg by SubCUTAneous route daily. losartan 25 mg tablet Commonly known as:  COZAAR Take 1 Tab by mouth daily. metOLazone 2.5 mg tablet Commonly known as:  Baldo Quick Take 1 Tab by mouth as needed (pt takes approximately twice a month if has edema. ). Take twicw weekly for fluid  
  
 potassium chloride 20 mEq tablet Commonly known as:  K-DUR, KLOR-CON Take 1 Tab by mouth daily. pramipexole 0.5 mg tablet Commonly known as:  MIRAPEX TAKE 1 TABLET BY MOUTH EVERY NIGHT AT BEDTIME  
  
 PROCRIT INJECTION  
by Injection route. Indications: Has one injection monthly. Patient unsure of dose. tamsulosin 0.4 mg capsule Commonly known as:  FLOMAX TAKE 2 CAPSULES BY MOUTH EVERY DAY  
  
 ULORIC 40 mg Tab tablet Generic drug:  febuxostat TAKE 1 TABLET BY MOUTH DAILY * Notice: This list has 2 medication(s) that are the same as other medications prescribed for you. Read the directions carefully, and ask your doctor or other care provider to review them with you. We Performed the Following ADMIN INFLUENZA VIRUS VAC [ HCPCS] AMB POC COMPLETE CBC,AUTOMATED ENTER [id number] CPT(R)] INFLUENZA VACCINE INACTIVATED (IIV), SUBUNIT, ADJUVANTED, IM [id number] CPT(R)] METABOLIC PANEL, BASIC [33464 CPT(R)] Follow-up Instructions Return in about 4 weeks (around 10/24/2018). To-Do List   
 10/10/2018 10:00 AM  
  Appointment with Manas Herrera FT CHAIR 1 at [facility] ([phone])  
  
 11/07/2018 10:00 AM  
  Appointment with Lulu Inman 1 at [facility] ([phone]) 12/05/2018 10:00 AM  
  Appointment with DEBRA FT CHAIR 1 at [facility] ([phone]) Patient Instructions Arthritis: Care Instructions Your Care Instructions Arthritis, also called osteoarthritis, is a breakdown of the cartilage that cushions your joints. When the cartilage wears down, your bones rub against each other. This causes pain and stiffness. Many people have some arthritis as they age. Arthritis most often affects the joints of the spine, hands, hips, knees, or feet. You can take simple measures to protect your joints, ease your pain, and help you stay active. Follow-up care is a key part of your treatment and safety. Be sure to make and go to all appointments, and call your doctor if you are having problems. It's also a good idea to know your test results and keep a list of the medicines you take. How can you care for yourself at home? · Stay at a healthy weight. Being overweight puts extra strain on your joints. · Talk to your doctor or physical therapist about exercises that will help ease joint pain. ¨ Stretch. You may enjoy gentle forms of yoga to help keep your joints and muscles flexible. ¨ Walk instead of jog. Other types of exercise that are less stressful on the joints include riding a bicycle, swimming, bang chi, or water exercise. ¨ Lift weights. Strong muscles help reduce stress on your joints. Stronger thigh muscles, for example, take some of the stress off of the knees and hips. Learn the right way to lift weights so you do not make joint pain worse. · Take your medicines exactly as prescribed.  Call your doctor if you think you are having a problem with your medicine. · Take pain medicines exactly as directed. ¨ If the doctor gave you a prescription medicine for pain, take it as prescribed. ¨ If you are not taking a prescription pain medicine, ask your doctor if you can take an over-the-counter medicine. · Use a cane, crutch, walker, or another device if you need help to get around. These can help rest your joints. You also can use other things to make life easier, such as a higher toilet seat and padded handles on kitchen utensils. · Do not sit in low chairs, which can make it hard to get up. · Put heat or cold on your sore joints as needed. Use whichever helps you most. You also can take turns with hot and cold packs. ¨ Apply heat 2 or 3 times a day for 20 to 30 minutes-using a heating pad, hot shower, or hot pack-to relieve pain and stiffness. ¨ Put ice or a cold pack on your sore joint for 10 to 20 minutes at a time. Put a thin cloth between the ice and your skin. When should you call for help? Call your doctor now or seek immediate medical care if: 
  · You have sudden swelling, warmth, or pain in any joint.  
  · You have joint pain and a fever or rash.  
  · You have such bad pain that you cannot use a joint.  
 Watch closely for changes in your health, and be sure to contact your doctor if: 
  · You have mild joint symptoms that continue even with more than 6 weeks of care at home.  
  · You have stomach pain or other problems with your medicine. Where can you learn more? Go to http://gildardo-palak.info/. Enter M482 in the search box to learn more about \"Arthritis: Care Instructions. \" Current as of: October 10, 2017 Content Version: 11.7 © 4896-3709 [employer]. Care instructions adapted under license by [employer] (which disclaims liability or warranty for this information).  If you have questions about a medical condition or this instruction, always ask your healthcare professional. [address] any warranty or liability for your use of this information. Introducing [facility]! Dear Cynthia Reid: 
Thank you for requesting a Broadlink account. Our records indicate that you already have an active Broadlink account. You can access your account anytime at https://[employer]) Did you know that you can access your hospital and ER discharge instructions at any time in Broadlink? You can also review all of your test results from your hospital stay or ER visit. Additional Information If you have questions, please visit the Frequently Asked Questions section of the Broadlink website at https://[employer])/. Remember, Broadlink is NOT to be used for urgent needs. For medical emergencies, dial 911. Now available from your iPhone and Android! Please provide this summary of care documentation to your next provider. Your primary care clinician is listed as [place]. If you have any questions after today's visit, please call [phone].

## 2018-09-27 LAB
BUN SERPL-MCNC: 92 MG/DL (ref 8–27)
BUN/CREAT SERPL: 39 (ref 10–24)
CALCIUM SERPL-MCNC: 9.5 MG/DL (ref 8.6–10.2)
CHLORIDE SERPL-SCNC: 99 MMOL/L (ref 96–106)
CO2 SERPL-SCNC: 28 MMOL/L (ref 20–29)
CREAT SERPL-MCNC: 2.36 MG/DL (ref 0.76–1.27)
GLUCOSE SERPL-MCNC: 124 MG/DL (ref 65–99)
POTASSIUM SERPL-SCNC: 5.2 MMOL/L (ref 3.5–5.2)
SODIUM SERPL-SCNC: 142 MMOL/L (ref 134–144)

## 2018-09-28 ENCOUNTER — TELEPHONE (OUTPATIENT)
Dept: INTERNAL MEDICINE CLINIC | Age: 68
End: 2018-09-28

## 2018-09-28 NOTE — TELEPHONE ENCOUNTER
----- Message from Jeremiah Gill MD sent at 9/27/2018  1:15 PM EDT -----  Kidney function stable hemoglobin is then a little bit to 9.5 so he might want to contact his doctor about getting another Procrit injection

## 2018-10-10 ENCOUNTER — HOSPITAL ENCOUNTER (OUTPATIENT)
Dept: INFUSION THERAPY | Age: 68
Discharge: HOME | End: 2018-10-10
Payer: MEDICARE

## 2018-10-10 LAB
ALBUMIN SERPL-MCNC: 3.4 G/DL (ref 3.5–5)
ANION GAP SERPL CALC-SCNC: 9 MMOL/L (ref 5–15)
BUN SERPL-MCNC: 99 MG/DL (ref 6–20)
BUN/CREAT SERPL: 40 (ref 12–20)
CALCIUM SERPL-MCNC: 9 MG/DL (ref 8.5–10.1)
CHLORIDE SERPL-SCNC: 98 MMOL/L (ref 97–108)
CO2 SERPL-SCNC: 27 MMOL/L (ref 21–32)
CREAT SERPL-MCNC: 2.48 MG/DL (ref 0.7–1.3)
FERRITIN SERPL-MCNC: 68 NG/ML (ref 26–388)
GLUCOSE SERPL-MCNC: 204 MG/DL (ref 65–100)
HCT VFR BLD AUTO: 30 % (ref 36.6–50.3)
HGB BLD-MCNC: 9.5 G/DL (ref 12.1–17)
IRON SATN MFR SERPL: 15 % (ref 20–50)
IRON SERPL-MCNC: 53 UG/DL (ref 35–150)
PHOSPHATE SERPL-MCNC: 4.2 MG/DL (ref 2.6–4.7)
POTASSIUM SERPL-SCNC: 3.5 MMOL/L (ref 3.5–5.1)
SODIUM SERPL-SCNC: 134 MMOL/L (ref 136–145)
TIBC SERPL-MCNC: 363 UG/DL (ref 250–450)

## 2018-10-10 PROCEDURE — 83540 ASSAY OF IRON: CPT | Performed by: INTERNAL MEDICINE

## 2018-10-10 PROCEDURE — 82728 ASSAY OF FERRITIN: CPT | Performed by: INTERNAL MEDICINE

## 2018-10-10 PROCEDURE — 74011250636 HC RX REV CODE- 250/636: Performed by: INTERNAL MEDICINE

## 2018-10-10 PROCEDURE — 36415 COLL VENOUS BLD VENIPUNCTURE: CPT | Performed by: INTERNAL MEDICINE

## 2018-10-10 PROCEDURE — 80069 RENAL FUNCTION PANEL: CPT | Performed by: INTERNAL MEDICINE

## 2018-10-10 PROCEDURE — 85018 HEMOGLOBIN: CPT | Performed by: INTERNAL MEDICINE

## 2018-10-10 PROCEDURE — 96372 THER/PROPH/DIAG INJ SC/IM: CPT

## 2018-10-10 RX ADMIN — ERYTHROPOIETIN 40000 UNITS: 40000 INJECTION, SOLUTION INTRAVENOUS; SUBCUTANEOUS at 11:19

## 2018-10-10 NOTE — PROGRESS NOTES
21  Pt arrived at [address] [place] [address] and in no distress for Procrit. Assessment completed, no new complaints voiced. Ongoin PT for bilateral leg weakness. Recent Results (from the past 12 hour(s)) RENAL FUNCTION PANEL Collection Time: 10/10/18 10:16 AM  
Result Value Ref Range Sodium 134 (L) 136 - 145 mmol/L Potassium 3.5 3.5 - 5.1 mmol/L Chloride 98 97 - 108 mmol/L  
 CO2 27 21 - 32 mmol/L Anion gap 9 5 - 15 mmol/L Glucose 204 (H) 65 - 100 mg/dL BUN 99 (H) 6 - 20 MG/DL Creatinine 2.48 (H) 0.70 - 1.30 MG/DL  
 BUN/Creatinine ratio 40 (H) 12 - 20 GFR est AA 31 (L) >60 ml/min/1.73m2 GFR est non-AA 26 (L) >60 ml/min/1.73m2 Calcium 9.0 8.5 - 10.1 MG/DL Phosphorus 4.2 2.6 - 4.7 MG/DL Albumin 3.4 (L) 3.5 - 5.0 g/dL HGB & HCT Collection Time: 10/10/18 10:16 AM  
Result Value Ref Range HGB 9.5 (L) 12.1 - 17.0 g/dL HCT 30.0 (L) 36.6 - 50.3 % Iron Profile and Ferritin level pending. See [facility]. Medications received: 
Procrit 1120 Tolerated treatment well, no adverse reaction noted. D/Cd from [address] [place] [address] and in no distress accompanied by self. Next appt 11/7 @ [facility] in [place].

## 2018-10-16 ENCOUNTER — OFFICE VISIT (OUTPATIENT)
Dept: INTERNAL MEDICINE CLINIC | Age: 68
End: 2018-10-16

## 2018-10-16 VITALS
BODY MASS INDEX: 30.46 KG/M2 | RESPIRATION RATE: 17 BRPM | WEIGHT: 258 LBS | SYSTOLIC BLOOD PRESSURE: 130 MMHG | HEIGHT: 77 IN | OXYGEN SATURATION: 99 % | HEART RATE: 65 BPM | DIASTOLIC BLOOD PRESSURE: 70 MMHG

## 2018-10-16 DIAGNOSIS — I25.10 ASCVD (ARTERIOSCLEROTIC CARDIOVASCULAR DISEASE): ICD-10-CM

## 2018-10-16 DIAGNOSIS — I25.5 ISCHEMIC CARDIOMYOPATHY: ICD-10-CM

## 2018-10-16 DIAGNOSIS — R60.9 EDEMA, UNSPECIFIED TYPE: Primary | ICD-10-CM

## 2018-10-16 DIAGNOSIS — I50.22 CHRONIC SYSTOLIC CONGESTIVE HEART FAILURE (HCC): ICD-10-CM

## 2018-10-16 DIAGNOSIS — N18.4 CKD (CHRONIC KIDNEY DISEASE), STAGE IV (HCC): ICD-10-CM

## 2018-10-16 RX ORDER — METOLAZONE 2.5 MG/1
2.5 TABLET ORAL EVERY OTHER DAY
Qty: 45 TAB | Status: SHIPPED | OUTPATIENT
Start: 2018-10-16 | End: 2019-01-10

## 2018-10-16 RX ORDER — METOLAZONE 2.5 MG/1
2.5 TABLET ORAL EVERY OTHER DAY
Qty: 15 TAB | Status: SHIPPED | OUTPATIENT
Start: 2018-10-16 | End: 2018-10-16 | Stop reason: SDUPTHER

## 2018-10-16 NOTE — PATIENT INSTRUCTIONS
Iron Deficiency Anemia: Care Instructions Your Care Instructions Anemia means that you do not have enough red blood cells. Red blood cells carry oxygen around your body. When you have anemia, it can make you pale, weak, and tired. Many things can cause anemia. The most common cause is loss of blood. This can happen if you have heavy menstrual periods. It can also happen if you have bleeding in your stomach or bowel. You can also get anemia if you don't have enough iron in your diet or if it's hard for your body to absorb iron. In some cases, pregnancy causes anemia. That's because a pregnant woman needs more iron. Your doctor may do more tests to find the cause of your anemia. If a disease or other health problem is causing it, your doctor will treat that problem. It's important to follow up with your doctor to make sure that your iron level returns to normal. 
Follow-up care is a key part of your treatment and safety. Be sure to make and go to all appointments, and call your doctor if you are having problems. It's also a good idea to know your test results and keep a list of the medicines you take. How can you care for yourself at home? · If your doctor recommended iron pills, take them as directed. ¨ Try to take the pills on an empty stomach. You can do this about 1 hour before or 2 hours after meals. But you may need to take iron with food to avoid an upset stomach. ¨ Do not take antacids or drink milk or anything with caffeine within 2 hours of when you take your iron. They can keep your body from absorbing the iron well. ¨ Vitamin C helps your body absorb iron. You may want to take iron pills with a glass of orange juice or some other food high in vitamin C. 
¨ Iron pills may cause stomach problems. These include heartburn, nausea, diarrhea, constipation, and cramps. It can help to drink plenty of fluids and include fruits, vegetables, and fiber in your diet. ¨ It's normal for iron pills to make your stool a greenish or grayish black. But internal bleeding can also cause dark stool. So it's important to tell your doctor about any color changes. ¨ Call your doctor if you think you are having a problem with your iron pills. Even after you start to feel better, it will take several months for your body to build up its supply of iron. ¨ If you miss a pill, don't take a double dose. ¨ Keep iron pills out of the reach of small children. Too much iron can be very dangerous. · Eat foods with a lot of iron. These include red meat, shellfish, poultry, and eggs. They also include beans, raisins, whole-grain bread, and leafy green vegetables. · Steam your vegetables. This is the best way to prepare them if you want to get as much iron as possible. · Be safe with medicines. Do not take nonsteroidal anti-inflammatory pain relievers unless your doctor tells you to. These include aspirin, naproxen (Aleve), and ibuprofen (Advil, Motrin). · Liquid iron can stain your teeth. But you can mix it with water or juice and drink it with a straw. Then it won't get on your teeth. When should you call for help? Call 911 anytime you think you may need emergency care. For example, call if: 
  · You passed out (lost consciousness).  
 Call your doctor now or seek immediate medical care if: 
  · You are short of breath.  
  · You are dizzy or light-headed, or you feel like you may faint.  
  · You have new or worse bleeding.  
 Watch closely for changes in your health, and be sure to contact your doctor if: 
  · You feel weaker or more tired than usual.  
  · You do not get better as expected. Where can you learn more? Go to http://gildardo-palak.info/. Enter M260 in the search box to learn more about \"Iron Deficiency Anemia: Care Instructions. \" Current as of: May 7, 2018 Content Version: 11.8 © 1735-7410 [employer].  Care instructions adapted under license by [address] (which disclaims liability or warranty for this information). If you have questions about a medical condition or this instruction, always ask your healthcare professional. [address] any warranty or liability for your use of this information.

## 2018-10-16 NOTE — TELEPHONE ENCOUNTER
RX refill request from the patient/pharmacy. Patient last seen 10- with labs, and next appt. scheduled for 10-  Requested Prescriptions     Pending Prescriptions Disp Refills    metOLazone (ZAROXOLYN) 2.5 mg tablet 45 Tab prn     Sig: Take 1 Tab by mouth every other day. Take twicw weekly for fluid   .

## 2018-10-16 NOTE — PROGRESS NOTES
Subjective:  
Royal KRISTA Valero. is a 69 y.o. male Chief Complaint Patient presents with  Leg Swelling  
  right leg History of present illness: He presents today with increasing swelling in his right leg which she has had a couple times before. On those occasions we have done Doppler test and did not reveal evidence of DVT and by increasing his diuretic it has resolved. He notes his again today. Has been like that for the past several days. He notes no history of trauma. He notes no increased shortness of breath increased PND orthopnea or other cardiorespiratory complaints. He has no other complaints noted. Patient Active Problem List  
Diagnosis Code  Atrial fibrillation ([facility]) I48.91  
 Primary osteoarthritis involving multiple joints M15.0  ASCVD (arteriosclerotic cardiovascular disease) I25.10  Gout M10.9  CHF (congestive heart failure) ([facility]) I50.9  Anemia D64.9  Mixed hyperlipidemia E78.2  Controlled type 2 diabetes mellitus with stage 4 chronic kidney disease, without long-term current use of insulin ([facility]) E11.22, N18.4  CKD (chronic kidney disease), stage IV ([facility]) N18.4  Hypertension with renal disease I12.9  Restless leg G25.81  
 Class 1 obesity due to excess calories without serious comorbidity with body mass index (BMI) of 30.0 to 30.9 in adult E66.09, Z68.30  Insomnia G47.00  Acquired hypothyroidism E03.9  Gastroesophageal reflux disease without esophagitis K21.9  ED (erectile dysfunction) N52.9  Diabetic neuropathy ([facility]) E11.40  Cardiomyopathy ([facility] I42.9  BPH (benign prostatic hyperplasia) N40.0  Anxiety F41.9  Type 2 diabetes mellitus with foot ulcer ([address] E11.621, L97.509  Medicare annual wellness visit, initial Z00.00  Age-related cataract H25.9  Medicare annual wellness visit, subsequent Z00.00  Hyperostosis M85.80  Status post amputation of left great toe ([address] V19.481  Right elbow pain M25.521  S/P ablation of atrial fibrillation Z98.890, Z86.79  
 Primary osteoarthritis of right knee M17.11  
 Primary osteoarthritis of left knee M17.12 Past Medical History:  
Diagnosis Date  Anemia 8/5/2017  Anxiety 8/5/2017  Arrhythmia   
 atrial fibrillation 2014  ASCVD (arteriosclerotic cardiovascular disease) 8/5/2017  BPH (benign prostatic hyperplasia) 8/5/2017  CAD (coronary artery disease)   
 h/o stents  Cancer [facility])   
 h/o skin cancer  Cardiomyopathy ([address] 8/5/2017  CHF (congestive heart failure) ([address] 8/5/2017  Chronic kidney disease Stage IV  CKD (chronic kidney disease), stage IV ([address] 8/5/2017  Diabetes ([address]  Diabetes mellitus ([address] 8/5/2017  Diabetic neuropathy ([address] 8/5/2017  DJD (degenerative joint disease) 8/5/2017  ED (erectile dysfunction) 8/5/2017  Gout 8/5/2017  High cholesterol  Hyperlipidemia 8/5/2017  Hypertension  Hypertension with renal disease 8/5/2017  Hypothyroid 8/5/2017  Insomnia 8/5/2017  Obesity 8/5/2017  On statin therapy 8/5/2017  Restless leg 8/5/2017  Thyroid disease   
 hypothyroid Allergies Allergen Reactions  Niacin Unknown (comments) Other reaction(s): Unknown (comments)  Imipenem Diarrhea Other reaction(s): Rash  Levemir [Insulin Detemir] Hives  Other Medication Other (comments) ? Allergy to [place] causing chemical burn  Primaxin [Imipenem-Cilastatin] Diarrhea and Rash  Xarelto [Rivaroxaban] Rash and Itching Other reaction(s): Rash Family History Problem Relation Age of Onset  Heart Disease Mother  Kidney Disease Mother  Heart Disease Father  Kidney Disease Father  Cancer Sister Breast  
  
Social History Social History  Marital status:  Spouse name: N/A  
 Number of children: N/A  
 Years of education: N/A Occupational History  Not on file. Social History Main Topics  Smoking status: Former Smoker Packs/day: 0.50 Years: 4.00 Quit date: 3/6/1972  Smokeless tobacco: Never Used  Alcohol use No  
   Comment: rare, 1 drink per year  Drug use: No  
 Sexual activity: Not Currently Other Topics Concern  Not on file Social History Narrative Prior to Admission medications Medication Sig Start Date End Date Taking? Authorizing Provider  
metOLazone (ZAROXOLYN) 2.5 mg tablet Take 1 Tab by mouth every other day. Take twicw weekly for fluid 10/16/18  Yes Prachi Ceballos MD  
tamsulosin [facility]) 0.4 mg capsule TAKE 2 CAPSULES BY MOUTH EVERY DAY 8/14/18  Yes Prachi Ceballos MD  
potassium chloride (K-DUR, KLOR-CON) 20 mEq tablet Take 1 Tab by mouth daily. 7/30/18  Yes Prachi Ceballos MD  
losartan (COZAAR) 25 mg tablet Take 1 Tab by mouth daily. 7/24/18  Yes Prachi Ceballos MD  
clonazePAM [place]) 2 mg tablet Take 1 tablet at bedtime and may repeat  If he wakes up. 7/9/18  Yes Prachi Ceballos MD  
epoetin mary (PROCRIT INJECTION) by Injection route. Indications: Has one injection monthly. Patient unsure of dose. Yes Historical Provider LANTUS SOLOSTAR U-100 INSULIN 100 unit/mL (3 mL) inpn USE TO INJECT 30 UNITS UNDER THE SKIN EVERY DAY 6/14/18  Yes Prachi Ceballos MD  
Ferrous Sulfate (SLOW FE) 47.5 mg iron TbER tablet Take 1 Tab by mouth three (3) times daily. 5/31/18  Yes Prachi Ceballos MD  
ascorbic acid, vitamin C, (VITAMIN C) 250 mg tablet Take 1 Tab by mouth three (3) times daily. 5/31/18  Yes Prachi Ceballos MD  
MITIGARE 0.6 mg capsule TK 1 T PO  QD 2/20/18  Yes Historical Provider  
bumetanide (BUMEX) 2 mg tablet TAKE 2 TABLETS BY MOUTH TWICE DAILY 4/5/18  Yes Prachi Ceballos MD  
insulin lispro (HUMALOG) 100 unit/mL kwikpen 5 units before each meal three times a day.  2/16/18  Yes Prachi Ceballos MD  
pramipexole (MIRAPEX) 0.5 mg tablet TAKE 1 TABLET BY MOUTH EVERY NIGHT AT BEDTIME Patient taking differently: Take 0.5 mg by mouth. TAKE 1 TABLET BY MOUTH EVERY NIGHT AT BEDTIME 2/14/18  Yes Malini Fish MD  
ULORIC 40 mg tab tablet TAKE 1 TABLET BY MOUTH DAILY 12/6/17  Yes Malini Fish MD  
carvedilol (COREG) 12.5 mg tablet Take  by mouth two (2) times daily (with meals). Yes Historical Provider PEN NEEDLE, DIABETIC (BD ULTRA-FINE DEVIN PEN NEEDLES) by Does Not Apply route. Yes Historical Provider  
colchicine (COLCRYS) 0.6 mg tablet Take 0.6 mg by mouth daily. Indications: taking mitagar   Yes Historical Provider Liraglutide (VICTOZA) 0.6 mg/0.1 mL (18 mg/3 mL) sub-q pen 0.6 mg by SubCUTAneous route daily. Yes Historical Provider  
apixaban (ELIQUIS) 5 mg tablet Take 5 mg by mouth two (2) times a day. Yes Historical Provider  
finasteride (PROSCAR) 5 mg tablet Take 5 mg by mouth daily. Yes Historical Provider Review of Systems Constitutional:  He denies fever, weight loss, sweats or fatigue. EYES: No blurred or double vision, ENT: no nasal congestion, no headache or dizziness. No difficulty with               swallowing, taste, speech or smell. Respiratory:  No cough, wheezing or shortness of breath. No sputum production. Cardiac:  Denies chest pain, palpitations, unexplained indigestion, syncope, edema, PND or orthopnea. GI:  No changes in bowel movements, no abdominal pain, no bloating, anorexia, nausea, vomiting or heartburn. :  No frequency or dysuria. Denies incontinence or sexual dysfunction. Extremities:  No joint pain, stiffness bilateral lower extremity edema right increase greater than normal 
Back:.no pain or soreness Skin:  No recent rashes or mole changes. Neurological:  No numbness, tingling, burning paresthesias or loss of motor strength. No syncope, dizziness, frequent headaches or memory loss. Hematologic:  No easy bruising Lymphatic: No lymph node enlargement Objective: Vitals:  
 10/16/18 [id number] BP: 130/70 Pulse: 65 Resp: 17 SpO2: 99% Weight: 258 lb (117 kg) Height: 6' 5\" (1.956 m) PainSc:   0 - No pain Body mass index is 30.59 kg/(m^2). Physical Examination:  
           General Appearance:  Well-developed, well-nourished, no acute distress. HEENT:   
  Ears:  The TMs and ear canals were clear. Eyes:  The pupillary responses were normal.  Extraocular muscle function intact. Lids and conjunctiva not injected. Funduscopic exam revealed sharp disc margins. Nares: Clear w/o edema or erythema Pharynx:  Clear with teeth in good repair. No masses were noted. Neck:  Supple without thyromegaly or adenopathy. No JVD noted. No carotid                bruits. Lungs:  Clear to auscultation and percussion. Cardiac:  Regular rate and rhythm without murmur. PMI not displaced. No gallop, rub or click. Abdominal: Soft, non-tender, no hepata-spleenomegally or masses Extremities:  No clubbing, cyanosis but 1+ edema left lower extremity and 2-3+ right lower extremities no venous cords or tenderness and no evidence of cellulitis Skin:  No rash or unusual mole changes noted. Lymph Nodes:  None felt in the cervical, supraclavicular, axillary or inguinal region. Neurological: . DTRs 2+ and symmetric. Station and gait normal.  
Hematologic:   No purpura or petechiae Assessment/Plan: 1. Edema, unspecified type 2. CKD (chronic kidney disease), stage IV ([address] 3. ASCVD (arteriosclerotic cardiovascular disease) 4. Ischemic cardiomyopathy 5. Chronic systolic congestive heart failure ([address] Impressions/Plan: 
Impression 1. Edema unilateral twice before this is happened and resolved with increasing diuretic and Doppler test but negative so not repeated Dopplers this time I will put him on metolazone 2.5 every other day along with his Bumex to 2 mg twice daily we will do this for 3 doses and I will recheck him next week 2.  CKD I will recheck that when he returns next week 3. ASCVD clinically stable 4 cardiomyopathy stable except for the edema 5. CHF clinically stable except for the edema As noted follow-up 1 week Orders Placed This Encounter  metOLazone (ZAROXOLYN) 2.5 mg tablet Follow-up Disposition: 
Return in about 1 week (around 10/23/2018). No results found for any visits on 10/16/18. Imer Clarke MD 
 
The patient was given after the visit summary the patient verbalized an understanding of the plans and problems as explained.
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
## 2018-10-16 NOTE — PROGRESS NOTES
Chief Complaint Patient presents with  Leg Swelling  
  right leg 1. Have you been to the ER, urgent care clinic since your last visit? Hospitalized since your last visit? No 
 
2. Have you seen or consulted any other health care providers outside of the [address] [place] [address] since your last visit? Include any pap smears or colon screening. No 
Visit Vitals  /70 (BP 1 Location: Left arm, BP Patient Position: Sitting)  Pulse 65  Resp 17  Ht 6' 5\" (1.956 m)  Wt 258 lb (117 kg)  SpO2 99%  BMI 30.59 kg/m2 Not [place]

## 2018-10-22 DIAGNOSIS — G47.00 INSOMNIA, UNSPECIFIED TYPE: ICD-10-CM

## 2018-10-23 RX ORDER — CLONAZEPAM 2 MG/1
TABLET ORAL
Qty: 60 TAB | Refills: 0 | Status: SHIPPED | OUTPATIENT
Start: 2018-10-23 | End: 2019-02-07 | Stop reason: SDUPTHER

## 2018-10-23 NOTE — TELEPHONE ENCOUNTER
RX refill request from the patient/pharmacy. Patient last seen 10- with labs, and next appt. scheduled for 10-  Requested Prescriptions     Pending Prescriptions Disp Refills    clonazePAM (KLONOPIN) 2 mg tablet [Pharmacy Med Name: CLONAZEPAM 2MG TABLETS] 60 Tab 0     Sig: TAKE 1 TABLET BY MOUTH EVERY NIGHT AT BEDTIME. MAY REPEAT DOSE IF YOU WAKE UP   .

## 2018-10-24 ENCOUNTER — OFFICE VISIT (OUTPATIENT)
Dept: INTERNAL MEDICINE CLINIC | Age: 68
End: 2018-10-24

## 2018-10-24 VITALS
HEIGHT: 77 IN | DIASTOLIC BLOOD PRESSURE: 76 MMHG | OXYGEN SATURATION: 97 % | BODY MASS INDEX: 29.5 KG/M2 | RESPIRATION RATE: 16 BRPM | WEIGHT: 249.8 LBS | SYSTOLIC BLOOD PRESSURE: 138 MMHG | HEART RATE: 62 BPM

## 2018-10-24 DIAGNOSIS — I12.9 HYPERTENSION WITH RENAL DISEASE: Primary | ICD-10-CM

## 2018-10-24 DIAGNOSIS — I25.5 ISCHEMIC CARDIOMYOPATHY: ICD-10-CM

## 2018-10-24 DIAGNOSIS — D64.9 ANEMIA, UNSPECIFIED TYPE: ICD-10-CM

## 2018-10-24 DIAGNOSIS — N18.4 CKD (CHRONIC KIDNEY DISEASE), STAGE IV (HCC): ICD-10-CM

## 2018-10-24 DIAGNOSIS — I48.0 PAROXYSMAL ATRIAL FIBRILLATION (HCC): ICD-10-CM

## 2018-10-24 LAB
GRAN# POC: 4.3 K/UL (ref 2–7.8)
GRAN% POC: 78.3 % (ref 37–92)
HCT VFR BLD CALC: 32.6 % (ref 37–51)
HGB BLD-MCNC: 10.6 G/DL (ref 12–18)
LY# POC: 0.7 K/UL (ref 0.6–4.1)
LY% POC: 13.6 % (ref 10–58.5)
MCH RBC QN: 27.6 PG (ref 26–32)
MCHC RBC-ENTMCNC: 32.5 G/DL (ref 30–36)
MCV RBC: 85 FL (ref 80–97)
MID #, POC: 0.4 K/UL (ref 0–1.8)
MID% POC: 8.1 % (ref 0.1–24)
PLATELET # BLD: 157 K/UL (ref 140–440)
RBC # BLD: 3.83 M/UL (ref 4.2–6.3)
WBC # BLD: 5.4 K/UL (ref 4.1–10.9)

## 2018-10-24 NOTE — PROGRESS NOTES
Chief Complaint Patient presents with  Follow-up 2 weeks f/u 1. Have you been to the ER, urgent care clinic since your last visit? Hospitalized since your last visit? no 
 
2. Have you seen or consulted any other health care providers outside of the [facility] since your last visit? Include any pap smears or colon screening. No 
 
Had flu shot already.

## 2018-10-24 NOTE — PATIENT INSTRUCTIONS
Anemia: Care Instructions Your Care Instructions Anemia is a low level of red blood cells, which carry oxygen throughout your body. Many things can cause anemia. Lack of iron is one of the most common causes. Your body needs iron to make hemoglobin, a substance in red blood cells that carries oxygen from the lungs to your body's cells. Without enough iron, the body produces fewer and smaller red blood cells. As a result, your body's cells do not get enough oxygen, and you feel tired and weak. And you may have trouble concentrating. Bleeding is the most common cause of a lack of iron. You may have heavy menstrual bleeding or bleeding caused by conditions such as ulcers, hemorrhoids, or cancer. Regular use of aspirin or other anti-inflammatory medicines (such as ibuprofen) also can cause bleeding in some people. A lack of iron in your diet also can cause anemia, especially at times when the body needs more iron, such as during pregnancy, infancy, and the teen years. Your doctor may have prescribed iron pills. It may take several months of treatment for your iron levels to return to normal. Your doctor also may suggest that you eat foods that are rich in iron, such as meat and beans. There are many other causes of anemia. It is not always due to a lack of iron. Finding the specific cause of your anemia will help your doctor find the right treatment for you. Follow-up care is a key part of your treatment and safety. Be sure to make and go to all appointments, and call your doctor if you are having problems. It's also a good idea to know your test results and keep a list of the medicines you take. How can you care for yourself at home? · Take your medicines exactly as prescribed. Call your doctor if you think you are having a problem with your medicine. · If your doctor recommends iron pills, take them as directed: ? Try to take the pills on an empty stomach about 1 hour before or 2 hours after meals. But you may need to take iron with food to avoid an upset stomach. ? Do not take antacids or drink milk or caffeine drinks (such as coffee, tea, or cola) at the same time or within 2 hours of the time that you take your iron. They can make it hard for your body to absorb the iron. ? Vitamin C (from food or supplements) helps your body absorb iron. Try taking iron pills with a glass of orange juice or some other food that is high in vitamin C, such as citrus fruits. ? Iron pills may cause stomach problems, such as heartburn, nausea, diarrhea, constipation, and cramps. Be sure to drink plenty of fluids, and include fruits, vegetables, and fiber in your diet each day. Iron pills often make your bowel movements dark or green. ? If you forget to take an iron pill, do not take a double dose of iron the next time you take a pill. ? Keep iron pills out of the reach of small children. An overdose of iron can be very dangerous. · Follow your doctor's advice about eating iron-rich foods. These include red meat, shellfish, poultry, eggs, beans, raisins, whole-grain bread, and leafy green vegetables. · Steam vegetables to help them keep their iron content. When should you call for help? Call 911 anytime you think you may need emergency care. For example, call if: 
  · You have symptoms of a heart attack. These may include: 
? Chest pain or pressure, or a strange feeling in the chest. 
? Sweating. ? Shortness of breath. ? Nausea or vomiting. ? Pain, pressure, or a strange feeling in the back, neck, jaw, or upper belly or in one or both shoulders or arms. ? Lightheadedness or sudden weakness. ? A fast or irregular heartbeat. After you call 911, the  may tell you to chew 1 adult-strength or 2 to 4 low-dose aspirin. Wait for an ambulance. Do not try to drive yourself.  
  · You passed out (lost consciousness).  
 Call your doctor now or seek immediate medical care if:   · You have new or increased shortness of breath.  
  · You are dizzy or lightheaded, or you feel like you may faint.  
  · Your fatigue and weakness continue or get worse.  
  · You have any abnormal bleeding, such as: 
? Nosebleeds. ? Vaginal bleeding that is different (heavier, more frequent, at a different time of the month) than what you are used to. 
? Bloody or black stools, or rectal bleeding. ? Bloody or pink urine.  
 Watch closely for changes in your health, and be sure to contact your doctor if: 
  · You do not get better as expected. Where can you learn more? Go to http://gildardo-palak.info/. Enter R301 in the search box to learn more about \"Anemia: Care Instructions. \" Current as of: May 7, 2018 Content Version: 11.8 © 2920-2249 [employer]. Care instructions adapted under license by [employer] (which disclaims liability or warranty for this information). If you have questions about a medical condition or this instruction, always ask your healthcare professional. [place] [address] any warranty or liability for your use of this information.

## 2018-10-24 NOTE — PROGRESS NOTES
Chief Complaint Patient presents with  Follow-up 2 weeks f/u SUBJECTIVE: 
 
Royal VELASCO Zully Man is a 69 y.o. male who returns in follow-up of his medical problems include hypertension, CKD stage IV, cardiomyopathy, atrial fibrillation, anemia and other medical problems. He generally is feeling much better now after taken 2 doses of metolazone he had significant diuresis and has maintained his weight into 245 range at home when he first gets up in the morning. He notes his shortness of breath seemed to resolve when he had less edema. He denies any chest pain, chest tightness, PND, orthopnea or other cardiorespiratory complaints except he does have nocturia about 5 times per night but he is gotten used to dealing with that. He has no other specific complaints include no neurologic complaints. Current Outpatient Medications Medication Sig Dispense Refill  clonazePAM (KLONOPIN) 2 mg tablet TAKE 1 TABLET BY MOUTH EVERY NIGHT AT BEDTIME. MAY REPEAT DOSE IF YOU WAKE UP 60 Tab 0  
 metOLazone (ZAROXOLYN) 2.5 mg tablet Take 1 Tab by mouth every other day. Take twicw weekly for fluid 45 Tab prn  tamsulosin (FLOMAX) 0.4 mg capsule TAKE 2 CAPSULES BY MOUTH EVERY  Cap 3  potassium chloride (K-DUR, KLOR-CON) 20 mEq tablet Take 1 Tab by mouth daily. 90 Tab 1  
 losartan (COZAAR) 25 mg tablet Take 1 Tab by mouth daily. 30 Tab prn  epoetin mary (PROCRIT INJECTION) by Injection route. Indications: Has one injection monthly. Patient unsure of dose.  LANTUS SOLOSTAR U-100 INSULIN 100 unit/mL (3 mL) inpn USE TO INJECT 30 UNITS UNDER THE SKIN EVERY DAY 10 Pen 2  
 Ferrous Sulfate (SLOW FE) 47.5 mg iron TbER tablet Take 1 Tab by mouth three (3) times daily. 90 Tab prn  ascorbic acid, vitamin C, (VITAMIN C) 250 mg tablet Take 1 Tab by mouth three (3) times daily. 90 Tab prn  MITIGARE 0.6 mg capsule TK 1 T PO  QD  1  bumetanide (BUMEX) 2 mg tablet TAKE 2 TABLETS BY MOUTH TWICE DAILY 360 Tab 3  
 insulin lispro (HUMALOG) 100 unit/mL kwikpen 5 units before each meal three times a day. 1 Pen 5  pramipexole (MIRAPEX) 0.5 mg tablet TAKE 1 TABLET BY MOUTH EVERY NIGHT AT BEDTIME (Patient taking differently: Take 0.5 mg by mouth. TAKE 1 TABLET BY MOUTH EVERY NIGHT AT BEDTIME) 90 Tab prn  ULORIC 40 mg tab tablet TAKE 1 TABLET BY MOUTH DAILY 30 Tab 11  
 carvedilol (COREG) 12.5 mg tablet Take  by mouth two (2) times daily (with meals).  PEN NEEDLE, DIABETIC (BD ULTRA-FINE DEVIN PEN NEEDLES) by Does Not Apply route.  colchicine (COLCRYS) 0.6 mg tablet Take 0.6 mg by mouth daily. Indications: taking mitagar  Liraglutide (VICTOZA) 0.6 mg/0.1 mL (18 mg/3 mL) sub-q pen 0.6 mg by SubCUTAneous route daily.  apixaban (ELIQUIS) 5 mg tablet Take 5 mg by mouth two (2) times a day.  finasteride (PROSCAR) 5 mg tablet Take 5 mg by mouth daily. Past Medical History:  
Diagnosis Date  Anemia 8/5/2017  Anxiety 8/5/2017  Arrhythmia   
 atrial fibrillation 2014  ASCVD (arteriosclerotic cardiovascular disease) 8/5/2017  BPH (benign prostatic hyperplasia) 8/5/2017  CAD (coronary artery disease)   
 h/o stents  Cancer [facility])   
 h/o skin cancer  Cardiomyopathy ([address] 8/5/2017  CHF (congestive heart failure) ([address] 8/5/2017  Chronic kidney disease Stage IV  CKD (chronic kidney disease), stage IV ([address] 8/5/2017  Diabetes ([address]  Diabetes mellitus ([address] 8/5/2017  Diabetic neuropathy ([address] 8/5/2017  DJD (degenerative joint disease) 8/5/2017  ED (erectile dysfunction) 8/5/2017  Gout 8/5/2017  High cholesterol  Hyperlipidemia 8/5/2017  Hypertension  Hypertension with renal disease 8/5/2017  Hypothyroid 8/5/2017  Insomnia 8/5/2017  Obesity 8/5/2017  On statin therapy 8/5/2017  Restless leg 8/5/2017  Thyroid disease   
 hypothyroid Past Surgical History:  
Procedure Laterality Date  CARDIAC SURG PROCEDURE UNLIST    
 cardiac stents  CARDIAC SURG PROCEDURE UNLIST Ablation 5/17/2018 [facility]  HX APPENDECTOMY  HX BUNIONECTOMY    
 and removal of 2 seasmoid bone of the great toe 2/2018  HX HEENT Bilateral Cataract surgery  HX HEENT Tonsils  HX HEENT Axe wound to the head  HX KNEE ARTHROSCOPY X 2  
 HX ORTHOPAEDIC    
 HX PACEMAKER Allergies Allergen Reactions  Niacin Unknown (comments) Other reaction(s): Unknown (comments)  Imipenem Diarrhea Other reaction(s): Rash  Levemir [Insulin Detemir] Hives  Other Medication Other (comments) ? Allergy to Karlee Yovana causing chemical burn  Primaxin [Imipenem-Cilastatin] Diarrhea and Rash  Xarelto [Rivaroxaban] Rash and Itching Other reaction(s): Rash REVIEW OF SYSTEMS: 
General: negative for - chills or fever, or weight loss or gain ENT: negative for - headaches, nasal congestion or tinnitus Eyes: no blurred or visual changes Neck: No stiffness or swollen nodes Respiratory: negative for - cough, hemoptysis, shortness of breath or wheezing Cardiovascular : negative for - chest pain, edema, palpitations or shortness of breath Gastrointestinal: negative for - abdominal pain, blood in stools, heartburn or nausea/vomiting Genito-Urinary: no dysuria, trouble voiding, or hematuria. Persistent nocturia Musculoskeletal: negative for - gait disturbance, joint pain, joint stiffness or joint swelling Neurological: no TIA or stroke symptoms Hematologic: no bruises, no bleeding Lymphatic: no swollen glands Integument: no lumps, mole changes, nail changes or rash Endocrine:no malaise/lethargy poly uria or polydipsia or unexpected weight changes Social History Socioeconomic History  Marital status:  Spouse name: Not on file  Number of children: Not on file  Years of education: Not on file  Highest education level: Not on file Social Needs  Financial resource strain: Not on file  Food insecurity - worry: Not on file  Food insecurity - inability: Not on file  Transportation needs - medical: Not on file  Transportation needs - non-medical: Not on file Occupational History  Not on file Tobacco Use  Smoking status: Former Smoker Packs/day: 0.50 Years: 4.00 Pack years: 2.00 Last attempt to quit: 3/6/1972 Years since quittin.6  Smokeless tobacco: Never Used Substance and Sexual Activity  Alcohol use: No  
  Comment: rare, 1 drink per year  Drug use: No  
 Sexual activity: Not Currently Other Topics Concern  Not on file Social History Narrative  Not on file Family History Problem Relation Age of Onset  Heart Disease Mother  Kidney Disease Mother  Heart Disease Father  Kidney Disease Father  Cancer Sister Breast  
 
 
OBJECTIVE:  
 
Visit Vitals /76 Pulse 62 Resp 16 Ht 6' 5\" (1.956 m) Wt 249 lb 12.8 oz (113.3 kg) SpO2 97% BMI 29.62 kg/m² CONSTITUTIONAL:   well nourished, appears age appropriate EYES: sclera anicteric, PERRL, EOMI 
ENMT:nares clear, moist mucous membranes, pharynx clear NECK: supple. Thyroid normal, No JVD or bruits RESPIRATORY: Chest: clear to ascultation and percussion, normal inspiratory effort CARDIOVASCULAR: Heart: regular rate and rhythm no murmurs, rubs or gallops, PMI not displaced, No thrills GASTROINTESTINAL: Abdomen: non distended, soft, non tender, bowel sounds normal 
HEMATOLOGIC: no purpura, petechiae or bruising LYMPHATIC: No lymph node enlargemant MUSCULOSKELETAL: Extremities: no edema or active synovitis, pulse 1+ INTEGUMENT: No unusual rashes or suspicious skin lesions noted. Nails appear normal. 
PERIPHERAL VASCULAR: normal pulses femoral, PT and DP NEUROLOGIC: non-focal exam, A & O X 3 
 PSYCHIATRIC:, appropriate affect ASSESSMENT:  
1. Hypertension with renal disease 2. CKD (chronic kidney disease), stage IV ([facility] 3. Ischemic cardiomyopathy 4. Paroxysmal atrial fibrillation (HCC) 5. Anemia, unspecified type Impression 1. Hypertension that is controlled to continue current therapy reviewed with him 2. CKD we will see what that status is 
3 ischemic cardiomyopathy that seems to be stable 4.  paroxysmal atrial fibrillation daily 5. Anemia stable and repeat status pending 6. CHF clinically improved we will aim for a weight of 245 when he first gets up in the morning and he will take Zaroxolyn if his weight gets above that. Otherwise continue current medicines. Recheck schedule IV months or sooner if there is a problem. PLAN: 
. Orders Placed This Encounter  METABOLIC PANEL, BASIC  
 AMB POC COMPLETE CBC,AUTOMATED ENTER  
 
 
 
ATTENTION:  
This medical record was transcribed using an electronic medical records system. Although proofread, it may and can contain electronic and spelling errors. Other human spelling and other errors may be present. Corrections may be executed at a later time. Please feel free to contact us for any clarifications as needed. Follow-up Disposition: 
Return in about 4 weeks (around 11/21/2018). No results found for any visits on 10/24/18. Wild Saucedo MD 
 
The patient verbalized understanding of the problems and plans as explained.

## 2018-10-25 LAB
BUN SERPL-MCNC: 104 MG/DL (ref 8–27)
BUN/CREAT SERPL: 42 (ref 10–24)
CALCIUM SERPL-MCNC: 9.2 MG/DL (ref 8.6–10.2)
CHLORIDE SERPL-SCNC: 91 MMOL/L (ref 96–106)
CO2 SERPL-SCNC: 28 MMOL/L (ref 20–29)
CREAT SERPL-MCNC: 2.45 MG/DL (ref 0.76–1.27)
GLUCOSE SERPL-MCNC: 170 MG/DL (ref 65–99)
POTASSIUM SERPL-SCNC: 4.3 MMOL/L (ref 3.5–5.2)
SODIUM SERPL-SCNC: 139 MMOL/L (ref 134–144)

## 2018-10-26 ENCOUNTER — TELEPHONE (OUTPATIENT)
Dept: INTERNAL MEDICINE CLINIC | Age: 68
End: 2018-10-26

## 2018-10-26 NOTE — TELEPHONE ENCOUNTER
----- Message from Timothy Mcclure MD sent at 10/25/2018 12:29 PM EDT -----  Hemoglobin is improved and kidney function is stable so continue current therapy

## 2018-11-07 ENCOUNTER — HOSPITAL ENCOUNTER (OUTPATIENT)
Dept: INFUSION THERAPY | Age: 68
Discharge: HOME | End: 2018-11-07
Payer: MEDICARE

## 2018-11-07 VITALS
OXYGEN SATURATION: 97 % | TEMPERATURE: 97.8 F | SYSTOLIC BLOOD PRESSURE: 134 MMHG | HEART RATE: 74 BPM | RESPIRATION RATE: 18 BRPM | DIASTOLIC BLOOD PRESSURE: 75 MMHG

## 2018-11-07 LAB
ALBUMIN SERPL-MCNC: 3.3 G/DL (ref 3.5–5)
ANION GAP SERPL CALC-SCNC: 10 MMOL/L (ref 5–15)
BUN SERPL-MCNC: 114 MG/DL (ref 6–20)
BUN/CREAT SERPL: 37 (ref 12–20)
CALCIUM SERPL-MCNC: 8.7 MG/DL (ref 8.5–10.1)
CHLORIDE SERPL-SCNC: 94 MMOL/L (ref 97–108)
CO2 SERPL-SCNC: 30 MMOL/L (ref 21–32)
CREAT SERPL-MCNC: 3.05 MG/DL (ref 0.7–1.3)
FERRITIN SERPL-MCNC: 56 NG/ML (ref 26–388)
GLUCOSE SERPL-MCNC: 245 MG/DL (ref 65–100)
HCT VFR BLD AUTO: 31.4 % (ref 36.6–50.3)
HGB BLD-MCNC: 10 G/DL (ref 12.1–17)
IRON SATN MFR SERPL: 8 % (ref 20–50)
IRON SERPL-MCNC: 32 UG/DL (ref 35–150)
PHOSPHATE SERPL-MCNC: 4.2 MG/DL (ref 2.6–4.7)
POTASSIUM SERPL-SCNC: 3.3 MMOL/L (ref 3.5–5.1)
SODIUM SERPL-SCNC: 134 MMOL/L (ref 136–145)
TIBC SERPL-MCNC: 386 UG/DL (ref 250–450)

## 2018-11-07 PROCEDURE — 82728 ASSAY OF FERRITIN: CPT | Performed by: INTERNAL MEDICINE

## 2018-11-07 PROCEDURE — 96372 THER/PROPH/DIAG INJ SC/IM: CPT

## 2018-11-07 PROCEDURE — 74011250636 HC RX REV CODE- 250/636: Performed by: INTERNAL MEDICINE

## 2018-11-07 PROCEDURE — 36415 COLL VENOUS BLD VENIPUNCTURE: CPT | Performed by: INTERNAL MEDICINE

## 2018-11-07 PROCEDURE — 80069 RENAL FUNCTION PANEL: CPT | Performed by: INTERNAL MEDICINE

## 2018-11-07 PROCEDURE — 83540 ASSAY OF IRON: CPT | Performed by: INTERNAL MEDICINE

## 2018-11-07 PROCEDURE — 85018 HEMOGLOBIN: CPT | Performed by: INTERNAL MEDICINE

## 2018-11-07 RX ADMIN — ERYTHROPOIETIN 40000 UNITS: 40000 INJECTION, SOLUTION INTRAVENOUS; SUBCUTANEOUS at 10:18

## 2018-11-07 NOTE — PROGRESS NOTES
[facility] [place] Stay Note: 
Arrived - [address] Labs obtained: H/H, Renal Panel, Ferritin, Iron Profile Visit Vitals /75 (BP 1 Location: Right arm, BP Patient Position: Sitting) Pulse 74 Temp 97.8 °F (36.6 °C) Resp 18 SpO2 97% Recent Results (from the past 12 hour(s)) HGB & HCT Collection Time: 11/07/18  9:47 AM  
Result Value Ref Range HGB 10.0 (L) 12.1 - 17.0 g/dL HCT 31.4 (L) 36.6 - 50.3 % See [facility] for pending results. Assessment - unchanged. Procrit 40,000 units SQ slowly in L arm. 1020 - Tolerated well. Pt [place] es any acute problems/changes. Discharged from [facility] ambulatory. No distress. Next appt: 12/5/18 at 1000

## 2018-11-27 ENCOUNTER — OFFICE VISIT (OUTPATIENT)
Dept: INTERNAL MEDICINE CLINIC | Age: 68
End: 2018-11-27

## 2018-11-27 VITALS
OXYGEN SATURATION: 99 % | WEIGHT: 251.8 LBS | HEART RATE: 62 BPM | RESPIRATION RATE: 14 BRPM | HEIGHT: 78 IN | DIASTOLIC BLOOD PRESSURE: 78 MMHG | SYSTOLIC BLOOD PRESSURE: 128 MMHG | BODY MASS INDEX: 29.13 KG/M2

## 2018-11-27 DIAGNOSIS — N18.4 CONTROLLED TYPE 2 DIABETES MELLITUS WITH STAGE 4 CHRONIC KIDNEY DISEASE, WITHOUT LONG-TERM CURRENT USE OF INSULIN (HCC): ICD-10-CM

## 2018-11-27 DIAGNOSIS — D64.9 ANEMIA, UNSPECIFIED TYPE: ICD-10-CM

## 2018-11-27 DIAGNOSIS — N18.4 CKD (CHRONIC KIDNEY DISEASE), STAGE IV (HCC): ICD-10-CM

## 2018-11-27 DIAGNOSIS — I12.9 HYPERTENSION WITH RENAL DISEASE: Primary | ICD-10-CM

## 2018-11-27 DIAGNOSIS — I50.22 CHRONIC SYSTOLIC CONGESTIVE HEART FAILURE (HCC): ICD-10-CM

## 2018-11-27 DIAGNOSIS — K21.9 GASTROESOPHAGEAL REFLUX DISEASE WITHOUT ESOPHAGITIS: ICD-10-CM

## 2018-11-27 DIAGNOSIS — E78.2 MIXED HYPERLIPIDEMIA: ICD-10-CM

## 2018-11-27 DIAGNOSIS — I48.0 PAROXYSMAL ATRIAL FIBRILLATION (HCC): ICD-10-CM

## 2018-11-27 DIAGNOSIS — I25.5 ISCHEMIC CARDIOMYOPATHY: ICD-10-CM

## 2018-11-27 DIAGNOSIS — M15.9 PRIMARY OSTEOARTHRITIS INVOLVING MULTIPLE JOINTS: ICD-10-CM

## 2018-11-27 DIAGNOSIS — I25.10 ASCVD (ARTERIOSCLEROTIC CARDIOVASCULAR DISEASE): ICD-10-CM

## 2018-11-27 DIAGNOSIS — E66.09 CLASS 1 OBESITY DUE TO EXCESS CALORIES WITHOUT SERIOUS COMORBIDITY WITH BODY MASS INDEX (BMI) OF 30.0 TO 30.9 IN ADULT: ICD-10-CM

## 2018-11-27 DIAGNOSIS — E11.22 CONTROLLED TYPE 2 DIABETES MELLITUS WITH STAGE 4 CHRONIC KIDNEY DISEASE, WITHOUT LONG-TERM CURRENT USE OF INSULIN (HCC): ICD-10-CM

## 2018-11-27 LAB
GRAN# POC: 6.2 K/UL (ref 2–7.8)
GRAN% POC: 82.7 % (ref 37–92)
HCT VFR BLD CALC: 28.5 % (ref 37–51)
HGB BLD-MCNC: 9.3 G/DL (ref 12–18)
LY# POC: 0.7 K/UL (ref 0.6–4.1)
LY% POC: 10.6 % (ref 10–58.5)
MCH RBC QN: 27.6 PG (ref 26–32)
MCHC RBC-ENTMCNC: 32.5 G/DL (ref 30–36)
MCV RBC: 85 FL (ref 80–97)
MID #, POC: 0.4 K/UL (ref 0–1.8)
MID% POC: 6.7 % (ref 0.1–24)
PLATELET # BLD: 175 K/UL (ref 140–440)
RBC # BLD: 3.35 M/UL (ref 4.2–6.3)
WBC # BLD: 7.3 K/UL (ref 4.1–10.9)

## 2018-11-27 NOTE — PROGRESS NOTES
Chief Complaint Patient presents with  Hypertension  Irregular Heart Beat SUBJECTIVE: 
 
Royal KRISTA Peñaloza is a 69 y.o. male who returns in follow-up of his chronic medical problems include hypertension, CKD stage IV, diabetes, hyperlipidemia, ASCVD, atrial fibrillation status post ablation, CHF, cardiomyopathy, DJD, diabetic neuropathy, Gout and other problems. In addition to the chronic problems he has an acute issue that he feels like he is back in atrial fibrillation again because he has no wind again which is what usually happens when he goes back in atrial fib and he feels like he can hardly walk across the room for is completely out of when now. He denies any associated chest pain with this but does seem to have marked shortness of breath and palpitations. He notes no lightheadedness or dizziness. He recently did receive a Procrit injection and is due to go back to the hematologist later this week. He is noted no changes in bowel habits. He has no nausea vomiting no other GI complaints. There are no current  complaints. He notes no visual symptoms or sequela of his blood sugar will be out of control. He notes no other complaints on complete review of systems other just generally extremely weak with marked fatigue and dyspnea with any type of activity. Current Outpatient Medications Medication Sig Dispense Refill  clonazePAM (KLONOPIN) 2 mg tablet TAKE 1 TABLET BY MOUTH EVERY NIGHT AT BEDTIME. MAY REPEAT DOSE IF YOU WAKE UP 60 Tab 0  
 metOLazone (ZAROXOLYN) 2.5 mg tablet Take 1 Tab by mouth every other day. Take twicw weekly for fluid 45 Tab prn  tamsulosin (FLOMAX) 0.4 mg capsule TAKE 2 CAPSULES BY MOUTH EVERY  Cap 3  potassium chloride (K-DUR, KLOR-CON) 20 mEq tablet Take 1 Tab by mouth daily. 90 Tab 1  
 losartan (COZAAR) 25 mg tablet Take 1 Tab by mouth daily. 30 Tab prn  epoetin mary (PROCRIT INJECTION) by Injection route. Indications:  Has one injection monthly. Patient unsure of dose.  LANTUS SOLOSTAR U-100 INSULIN 100 unit/mL (3 mL) inpn USE TO INJECT 30 UNITS UNDER THE SKIN EVERY DAY 10 Pen 2  
 Ferrous Sulfate (SLOW FE) 47.5 mg iron TbER tablet Take 1 Tab by mouth three (3) times daily. 90 Tab prn  ascorbic acid, vitamin C, (VITAMIN C) 250 mg tablet Take 1 Tab by mouth three (3) times daily. 90 Tab prn  MITIGARE 0.6 mg capsule TK 1 T PO  QD  1  
 bumetanide (BUMEX) 2 mg tablet TAKE 2 TABLETS BY MOUTH TWICE DAILY 360 Tab 3  pramipexole (MIRAPEX) 0.5 mg tablet TAKE 1 TABLET BY MOUTH EVERY NIGHT AT BEDTIME (Patient taking differently: Take 0.5 mg by mouth. TAKE 1 TABLET BY MOUTH EVERY NIGHT AT BEDTIME) 90 Tab prn  ULORIC 40 mg tab tablet TAKE 1 TABLET BY MOUTH DAILY 30 Tab 11  
 carvedilol (COREG) 12.5 mg tablet Take  by mouth two (2) times daily (with meals).  PEN NEEDLE, DIABETIC (BD ULTRA-FINE DEVIN PEN NEEDLES) by Does Not Apply route.  colchicine (COLCRYS) 0.6 mg tablet Take 0.6 mg by mouth daily. Indications: taking mitagar  Liraglutide (VICTOZA) 0.6 mg/0.1 mL (18 mg/3 mL) sub-q pen 0.6 mg by SubCUTAneous route daily.  apixaban (ELIQUIS) 5 mg tablet Take 5 mg by mouth two (2) times a day.  finasteride (PROSCAR) 5 mg tablet Take 5 mg by mouth daily. Past Medical History:  
Diagnosis Date  Anemia 8/5/2017  Anxiety 8/5/2017  Arrhythmia   
 atrial fibrillation 2014  ASCVD (arteriosclerotic cardiovascular disease) 8/5/2017  BPH (benign prostatic hyperplasia) 8/5/2017  CAD (coronary artery disease)   
 h/o stents  Cancer [facility])   
 h/o skin cancer  Cardiomyopathy ([facility] 8/5/2017  CHF (congestive heart failure) ([facility] 8/5/2017  Chronic kidney disease Stage IV  CKD (chronic kidney disease), stage IV ([address] 8/5/2017  Diabetes ([facility]  Diabetes mellitus ([facility] [address] 8/5/2017  Diabetic neuropathy ([facility] 8/5/2017  DJD (degenerative joint disease) 8/5/2017  ED (erectile dysfunction) 8/5/2017  Gout 8/5/2017  High cholesterol  Hyperlipidemia 8/5/2017  Hypertension  Hypertension with renal disease 8/5/2017  Hypothyroid 8/5/2017  Insomnia 8/5/2017  Obesity 8/5/2017  On statin therapy 8/5/2017  Restless leg 8/5/2017  Thyroid disease   
 hypothyroid Past Surgical History:  
Procedure Laterality Date  CARDIAC SURG PROCEDURE UNLIST    
 cardiac stents  CARDIAC SURG PROCEDURE UNLIST Ablation 5/17/2018 [id number] [place]  HX APPENDECTOMY  HX BUNIONECTOMY    
 and removal of 2 seasmoid bone of the great toe 2/2018  HX HEENT Bilateral Cataract surgery  HX HEENT Tonsils  HX HEENT Axe wound to the head  HX KNEE ARTHROSCOPY X 2  
 HX ORTHOPAEDIC    
 HX PACEMAKER Allergies Allergen Reactions  Niacin Unknown (comments) Other reaction(s): Unknown (comments)  Imipenem Diarrhea Other reaction(s): Rash  Levemir [Insulin Detemir] Hives  Other Medication Other (comments) ? Allergy to Sonia Nelson causing chemical burn  Primaxin [Imipenem-Cilastatin] Diarrhea and Rash  Xarelto [Rivaroxaban] Rash and Itching Other reaction(s): Rash REVIEW OF SYSTEMS: 
General: negative for - chills or fever, or weight loss or gain. No energy ENT: negative for - headaches, nasal congestion or tinnitus Eyes: no blurred or visual changes Neck: No stiffness or swollen nodes Respiratory: negative for - cough, hemoptysis, shortness of breath or wheezing Cardiovascular : negative for - chest pain. Positive for edema in his legs with marked dyspnea on exertion and palpitations and irregular heartbeat Gastrointestinal: negative for - abdominal pain, blood in stools, heartburn or nausea/vomiting Genito-Urinary: no dysuria, trouble voiding, or hematuria Musculoskeletal: negative for - gait disturbance, joint pain, joint stiffness or joint swelling Neurological: no TIA or stroke symptoms Hematologic: no bruises, no bleeding Lymphatic: no swollen glands Integument: no lumps, mole changes, nail changes or rash Endocrine:no malaise/lethargy poly uria or polydipsia or unexpected weight changes Social History Socioeconomic History  Marital status:  Spouse name: Not on file  Number of children: Not on file  Years of education: Not on file  Highest education level: Not on file Tobacco Use  Smoking status: Former Smoker Packs/day: 0.50 Years: 4.00 Pack years: 2.00 Last attempt to quit: 3/6/1972 Years since quittin.7  Smokeless tobacco: Never Used Substance and Sexual Activity  Alcohol use: No  
  Comment: rare, 1 drink per year  Drug use: No  
 Sexual activity: Not Currently Family History Problem Relation Age of Onset  Heart Disease Mother  Kidney Disease Mother  Heart Disease Father  Kidney Disease Father  Cancer Sister Breast  
 
 
OBJECTIVE:  
 
Visit Vitals /78 (BP 1 Location: Left arm, BP Patient Position: Sitting) Pulse 62 Resp 14 Ht 6' 6\" (1.981 m) Wt 251 lb 12.8 oz (114.2 kg) SpO2 99% BMI 29.10 kg/m² CONSTITUTIONAL:   well nourished, appears age appropriate EYES: sclera anicteric, PERRL, EOMI 
ENMT:nares clear, moist mucous membranes, pharynx clear NECK: supple. Thyroid normal, No JVD or bruits RESPIRATORY: Chest: clear to ascultation and percussion with overall decreased breath sounds CARDIOVASCULAR: Heart: regular rate and rhythm with what appears to be frequent PVCs, no murmurs, rubs or gallops, PMI not displaced, No thrills. 2+ peripheral edema GASTROINTESTINAL: Abdomen: non distended, soft, non tender, bowel sounds normal 
HEMATOLOGIC: no purpura, petechiae or bruising LYMPHATIC: No lymph node enlargemant MUSCULOSKELETAL: Extremities: no active synovitis, pulse 1+ INTEGUMENT: No unusual rashes or suspicious skin lesions noted. Nails appear normal. 
PERIPHERAL VASCULAR: normal pulses femoral, PT and DP NEUROLOGIC: non-focal exam, A & O X 3 PSYCHIATRIC:, appropriate affect ASSESSMENT:  
1. Hypertension with renal disease 2. Controlled type 2 diabetes mellitus with stage 4 chronic kidney disease, without long-term current use of insulin ([facility] 3. Mixed hyperlipidemia 4. Chronic systolic congestive heart failure ([address] 5. CKD (chronic kidney disease), stage IV ([address] 6. Gastroesophageal reflux disease without esophagitis 7. ASCVD (arteriosclerotic cardiovascular disease) 8. Primary osteoarthritis involving multiple joints 9. Paroxysmal atrial fibrillation (HCC) 10. Ischemic cardiomyopathy 11. Class 1 obesity due to excess calories without serious comorbidity with body mass index (BMI) of 30.0 to 30.9 in adult 12. Anemia, unspecified type Impression 1. Hypertension that is adequately controlled with no changes in treatment for that 2. Diabetes repeat status is pending and prior labs reviewed no make adjustments if necessary. 3.  Hyperlipidemia prior lab reviewed repeat status pending I will adjust if needed. 4.  CHF chronic with appears to have some acute component. Chest x-ray obtained today reveals no evidence of infiltrate or pulmonary edema. He does have peripheral edema. At this point with his increased dyspnea on exertion I am going to get him to take his metolazone before his morning Bumex every other day until I see him back next week. 5.  CKD we will see what that status is today 6. GERD that is stable 
7   ASCVD clinically he seems to be stable we will continue aspirin daily 8. DJD that is stable 9. Paroxysmal atrial fibrillation EKG obtained today because of his sensation was in atrial fibrillation revealed sinus rhythm with frequent PVCs and old anterior septal and old inferior MIs but no acute process. 10.  Ischemic cardiomyopathy I do not see evidence of ischemia causing his symptoms although if he does not improve with diuresis we may have to look at that issue 11. Obesity we discussed diet, exercise and weight reduction for overall health benefit. 12.  Anemia stat CBC today hemoglobin is 9.3 which is not appreciably different than his baseline so I do not think that is playing a big role in his dyspnea At this point we will see if he can control his dyspnea which is probably related to CHF by increasing metolazone to every other day and I will recheck him in a week. I will call with all the lab results. High complexity decision making today on this high complexity patient with high risk of decompensation. 40 minutes spent on this office visit today. Above discussed with his wife present with him today as well. PLAN: 
. Orders Placed This Encounter  XR CHEST PA LAT  METABOLIC PANEL, COMPREHENSIVE  LIPID PANEL  
 CK  
 HEMOGLOBIN A1C WITH EAG  
 AMB POC COMPLETE CBC,AUTOMATED ENTER  AMB POC EKG ROUTINE W/ 12 LEADS, INTER & REP  
 
 
 
ATTENTION:  
This medical record was transcribed using an electronic medical records system. Although proofread, it may and can contain electronic and spelling errors. Other human spelling and other errors may be present. Corrections may be executed at a later time. Please feel free to contact us for any clarifications as needed. Follow-up Disposition: 
Return in about 4 weeks (around 12/25/2018). No results found for any visits on 11/27/18. Alan Rios MD 
 
The patient verbalized understanding of the problems and plans as explained.

## 2018-11-27 NOTE — PROGRESS NOTES
Chief Complaint Patient presents with  Hypertension  Irregular Heart Beat 1. Have you been to the ER, urgent care clinic since your last visit? Hospitalized since your last visit? No 
 
2. Have you seen or consulted any other health care providers outside of the [address] [place] [address] since your last visit? Include any pap smears or colon screening.  Yes dr. Kayy Marin endocirinologist

## 2018-11-27 NOTE — PATIENT INSTRUCTIONS
Anemia: Care Instructions Your Care Instructions Anemia is a low level of red blood cells, which carry oxygen throughout your body. Many things can cause anemia. Lack of iron is one of the most common causes. Your body needs iron to make hemoglobin, a substance in red blood cells that carries oxygen from the lungs to your body's cells. Without enough iron, the body produces fewer and smaller red blood cells. As a result, your body's cells do not get enough oxygen, and you feel tired and weak. And you may have trouble concentrating. Bleeding is the most common cause of a lack of iron. You may have heavy menstrual bleeding or bleeding caused by conditions such as ulcers, hemorrhoids, or cancer. Regular use of aspirin or other anti-inflammatory medicines (such as ibuprofen) also can cause bleeding in some people. A lack of iron in your diet also can cause anemia, especially at times when the body needs more iron, such as during pregnancy, infancy, and the teen years. Your doctor may have prescribed iron pills. It may take several months of treatment for your iron levels to return to normal. Your doctor also may suggest that you eat foods that are rich in iron, such as meat and beans. There are many other causes of anemia. It is not always due to a lack of iron. Finding the specific cause of your anemia will help your doctor find the right treatment for you. Follow-up care is a key part of your treatment and safety. Be sure to make and go to all appointments, and call your doctor if you are having problems. It's also a good idea to know your test results and keep a list of the medicines you take. How can you care for yourself at home? · Take your medicines exactly as prescribed. Call your doctor if you think you are having a problem with your medicine. · If your doctor recommends iron pills, take them as directed: ? Try to take the pills on an empty stomach about 1 hour before or 2 hours after meals. But you may need to take iron with food to avoid an upset stomach. ? Do not take antacids or drink milk or caffeine drinks (such as coffee, tea, or cola) at the same time or within 2 hours of the time that you take your iron. They can make it hard for your body to absorb the iron. ? Vitamin C (from food or supplements) helps your body absorb iron. Try taking iron pills with a glass of orange juice or some other food that is high in vitamin C, such as citrus fruits. ? Iron pills may cause stomach problems, such as heartburn, nausea, diarrhea, constipation, and cramps. Be sure to drink plenty of fluids, and include fruits, vegetables, and fiber in your diet each day. Iron pills often make your bowel movements dark or green. ? If you forget to take an iron pill, do not take a double dose of iron the next time you take a pill. ? Keep iron pills out of the reach of small children. An overdose of iron can be very dangerous. · Follow your doctor's advice about eating iron-rich foods. These include red meat, shellfish, poultry, eggs, beans, raisins, whole-grain bread, and leafy green vegetables. · Steam vegetables to help them keep their iron content. When should you call for help? Call 911 anytime you think you may need emergency care. For example, call if: 
  · You have symptoms of a heart attack. These may include: 
? Chest pain or pressure, or a strange feeling in the chest. 
? Sweating. ? Shortness of breath. ? Nausea or vomiting. ? Pain, pressure, or a strange feeling in the back, neck, jaw, or upper belly or in one or both shoulders or arms. ? Lightheadedness or sudden weakness. ? A fast or irregular heartbeat. After you call 911, the  may tell you to chew 1 adult-strength or 2 to 4 low-dose aspirin. Wait for an ambulance. Do not try to drive yourself.  
  · You passed out (lost consciousness).  
 Call your doctor now or seek immediate medical care if:   · You have new or increased shortness of breath.  
  · You are dizzy or lightheaded, or you feel like you may faint.  
  · Your fatigue and weakness continue or get worse.  
  · You have any abnormal bleeding, such as: 
? Nosebleeds. ? Vaginal bleeding that is different (heavier, more frequent, at a different time of the month) than what you are used to. 
? Bloody or black stools, or rectal bleeding. ? Bloody or pink urine.  
 Watch closely for changes in your health, and be sure to contact your doctor if: 
  · You do not get better as expected. Where can you learn more? Go to http://gildardo-palak.info/. Enter R301 in the search box to learn more about \"Anemia: Care Instructions. \" Current as of: May 7, 2018 Content Version: 11.8 © 5324-4240 [employer]. Care instructions adapted under license by [employer] (which disclaims liability or warranty for this information). If you have questions about a medical condition or this instruction, always ask your healthcare professional. [place] [address] any warranty or liability for your use of this information.

## 2018-11-28 LAB
ALBUMIN SERPL-MCNC: 3.9 G/DL (ref 3.6–4.8)
ALBUMIN/GLOB SERPL: 1.3 {RATIO} (ref 1.2–2.2)
ALP SERPL-CCNC: 102 IU/L (ref 39–117)
ALT SERPL-CCNC: 12 IU/L (ref 0–44)
AST SERPL-CCNC: 19 IU/L (ref 0–40)
BILIRUB SERPL-MCNC: 0.6 MG/DL (ref 0–1.2)
BUN SERPL-MCNC: 87 MG/DL (ref 8–27)
BUN/CREAT SERPL: 40 (ref 10–24)
CALCIUM SERPL-MCNC: 8.9 MG/DL (ref 8.6–10.2)
CHLORIDE SERPL-SCNC: 96 MMOL/L (ref 96–106)
CHOLEST SERPL-MCNC: 112 MG/DL (ref 100–199)
CK SERPL-CCNC: 72 U/L (ref 24–204)
CO2 SERPL-SCNC: 26 MMOL/L (ref 20–29)
CREAT SERPL-MCNC: 2.15 MG/DL (ref 0.76–1.27)
EST. AVERAGE GLUCOSE BLD GHB EST-MCNC: 226 MG/DL
GLOBULIN SER CALC-MCNC: 2.9 G/DL (ref 1.5–4.5)
GLUCOSE SERPL-MCNC: 103 MG/DL (ref 65–99)
HBA1C MFR BLD: 9.5 % (ref 4.8–5.6)
HDLC SERPL-MCNC: 27 MG/DL
LDLC SERPL CALC-MCNC: 68 MG/DL (ref 0–99)
POTASSIUM SERPL-SCNC: 3.8 MMOL/L (ref 3.5–5.2)
PROT SERPL-MCNC: 6.8 G/DL (ref 6–8.5)
SODIUM SERPL-SCNC: 138 MMOL/L (ref 134–144)
TRIGL SERPL-MCNC: 85 MG/DL (ref 0–149)
VLDLC SERPL CALC-MCNC: 17 MG/DL (ref 5–40)

## 2018-11-28 NOTE — PROGRESS NOTES
Glycohemoglobin is elevated so would increase Lantus from 30 units daily to 36 units daily. Kidney function is without significant change. HDL cholesterol remains low without change. Increasing fiber in her diet and adding 2 additional fish oil tablets would help that.

## 2018-11-29 ENCOUNTER — TELEPHONE (OUTPATIENT)
Dept: INTERNAL MEDICINE CLINIC | Age: 68
End: 2018-11-29

## 2018-11-29 LAB
ALBUMIN SERPL ELPH-MCNC: 3.4 G/DL (ref 2.9–4.4)
ALBUMIN/GLOB SERPL: 1 {RATIO} (ref 0.7–1.7)
ALPHA1 GLOB SERPL ELPH-MCNC: 0.3 G/DL (ref 0–0.4)
ALPHA2 GLOB SERPL ELPH-MCNC: 0.8 G/DL (ref 0.4–1)
B-GLOBULIN SERPL ELPH-MCNC: 1.1 G/DL (ref 0.7–1.3)
GAMMA GLOB SERPL ELPH-MCNC: 1.3 G/DL (ref 0.4–1.8)
GLOBULIN SER CALC-MCNC: 3.5 G/DL (ref 2.2–3.9)
M PROTEIN SERPL ELPH-MCNC: NORMAL G/DL
PLEASE NOTE, 011150: NORMAL
PROT SERPL-MCNC: 6.9 G/DL (ref 6–8.5)

## 2018-11-29 NOTE — TELEPHONE ENCOUNTER
----- Message from Yahaira Juan MD sent at 11/28/2018  6:35 PM EST -----  Glycohemoglobin is elevated so would increase Lantus from 30 units daily to 36 units daily. Kidney function is without significant change. HDL cholesterol remains low without change. Increasing fiber in her diet and adding 2 additional fish oil tablets would help that.

## 2018-12-03 ENCOUNTER — TELEPHONE (OUTPATIENT)
Dept: INTERNAL MEDICINE CLINIC | Age: 68
End: 2018-12-03

## 2018-12-03 NOTE — TELEPHONE ENCOUNTER
----- Message from Javi Paul MD sent at 11/30/2018  6:35 PM EST -----  Protein electrophoresis is normal

## 2018-12-04 ENCOUNTER — OFFICE VISIT (OUTPATIENT)
Dept: INTERNAL MEDICINE CLINIC | Age: 68
End: 2018-12-04

## 2018-12-04 VITALS
RESPIRATION RATE: 18 BRPM | BODY MASS INDEX: 29.02 KG/M2 | SYSTOLIC BLOOD PRESSURE: 120 MMHG | WEIGHT: 250.8 LBS | HEIGHT: 78 IN | OXYGEN SATURATION: 100 % | DIASTOLIC BLOOD PRESSURE: 70 MMHG | HEART RATE: 70 BPM

## 2018-12-04 DIAGNOSIS — I25.5 ISCHEMIC CARDIOMYOPATHY: ICD-10-CM

## 2018-12-04 DIAGNOSIS — I50.22 CHRONIC SYSTOLIC CONGESTIVE HEART FAILURE (HCC): ICD-10-CM

## 2018-12-04 DIAGNOSIS — I12.9 HYPERTENSION WITH RENAL DISEASE: Primary | ICD-10-CM

## 2018-12-04 DIAGNOSIS — I48.0 PAROXYSMAL ATRIAL FIBRILLATION (HCC): ICD-10-CM

## 2018-12-04 DIAGNOSIS — D64.9 ANEMIA, UNSPECIFIED TYPE: ICD-10-CM

## 2018-12-04 DIAGNOSIS — N18.4 CKD (CHRONIC KIDNEY DISEASE), STAGE IV (HCC): ICD-10-CM

## 2018-12-04 LAB
GRAN# POC: 3.6 K/UL (ref 2–7.8)
GRAN% POC: 79.9 % (ref 37–92)
HCT VFR BLD CALC: 27.3 % (ref 37–51)
HGB BLD-MCNC: 9.2 G/DL (ref 12–18)
LY# POC: 0.6 K/UL (ref 0.6–4.1)
LY% POC: 13.4 % (ref 10–58.5)
MCH RBC QN: 27.8 PG (ref 26–32)
MCHC RBC-ENTMCNC: 33.8 G/DL (ref 30–36)
MCV RBC: 82 FL (ref 80–97)
MID #, POC: 0.3 K/UL (ref 0–1.8)
MID% POC: 6.7 % (ref 0.1–24)
PLATELET # BLD: 162 K/UL (ref 140–440)
RBC # BLD: 3.31 M/UL (ref 4.2–6.3)
WBC # BLD: 4.5 K/UL (ref 4.1–10.9)

## 2018-12-04 NOTE — PROGRESS NOTES
Chief Complaint Patient presents with  Irregular Heart Beat  
  1 week follow up 1. Have you been to the ER, urgent care clinic since your last visit? Hospitalized since your last visit? No 
 
2. Have you seen or consulted any other health care providers outside of the [address] [place] [address] since your last visit? Include any pap smears or colon screening. No 
Visit Vitals /70 (BP 1 Location: Left arm, BP Patient Position: Sitting) Pulse 70 Resp 18 Ht 6' 6\" (1.981 m) Wt 250 lb 12.8 oz (113.8 kg) SpO2 100% BMI 28.98 kg/m²

## 2018-12-04 NOTE — PATIENT INSTRUCTIONS
Hemoglobin A1c: About This Test 
What is it? Hemoglobin A1c is a blood test that checks your average blood sugar level over the past 2 to 3 months. This test also is called a glycohemoglobin test or an A1c test. 
Why is this test done? The A1c test is done to check how well your diabetes has been controlled over the past 2 to 3 months. Your doctor can use this information to adjust your medicine and diabetes treatment, if needed. This test is also one of the tests used to diagnose diabetes in adults. How can you prepare for the test? 
You don't need to stop eating before you have an A1c test. This test can be done at any time during the day, even after a meal. 
What happens during the test? 
The health professional taking a sample of your blood will: · Wrap an elastic band around your upper arm. This makes the veins below the band larger so it is easier to put a needle into the vein. · Clean the needle site with alcohol. · Put the needle into the vein. · Attach a tube to the needle to fill it with blood. · Remove the band from your arm when enough blood is collected. · Put a gauze pad or cotton ball over the needle site as the needle is removed. · Put pressure on the site and then put on a bandage. What else should you know about the test? 
The test result is usually given as a percentage. The normal A1c is less than 5.7%. You have a higher risk for diabetes if your A1c is 5.7% to 6.4%. If your level is 6.5% or higher, you have diabetes. The A1c test result also can be used to find your estimated average glucose, or eAG. Your eAG and A1c show the same thing in two different ways. They both help you learn more about your average blood sugar range over the past 2 to 3 months. A1c is shown as a percentage, while eAG uses the same units (mg/dl) as your glucose meter. Examples: · 6% A1c = 126 mg/dL · 7% A1c = 154 mg/dL · 8% A1c = 183 mg/dL · 9% A1c = 212 mg/dL · 10% A1c = 240 mg/dL · 11% A1c = 269 mg/dL · 12% A1c = 298 mg/dL Where can you learn more? Go to http://gildardo-palak.info/. Enter U216 in the search box to learn more about \"Hemoglobin A1c: About This Test.\" Current as of: December 7, 2017 Content Version: 11.8 © 3237-2605 [employer]. Care instructions adapted under license by [employer] (which disclaims liability or warranty for this information). If you have questions about a medical condition or this instruction, always ask your healthcare professional. [place] [address] any warranty or liability for your use of this information.

## 2018-12-04 NOTE — PROGRESS NOTES
Chief Complaint Patient presents with  Irregular Heart Beat  
  1 week follow up SUBJECTIVE: 
 
Royal KRISTA Grace is a 69 y.o. male who returns in follow-up of his chronic medical problems include hypertension, CKD stage IV,  ASCVD, atrial fibrillation status post ablation, CHF, cardiomyopathy, DJD, diabetic neuropathy, Gout and other problems. He is winded and breathing is much better now he is back out of atrial fibrillation. He denies any associated chest pain or current palpitations since he also has some swelling in his ankles although that has improved. He notes no lightheadedness or dizziness. He is noted no changes in bowel habits. He has no nausea vomiting no other GI complaints. There are no current  complaints. He notes no visual symptoms or sequela of his blood sugar will be out of control. He notes no other complaints on complete review of systems other just generally extremely weak with marked fatigue and dyspnea with any type of activity. Current Outpatient Medications Medication Sig Dispense Refill  clonazePAM (KLONOPIN) 2 mg tablet TAKE 1 TABLET BY MOUTH EVERY NIGHT AT BEDTIME. MAY REPEAT DOSE IF YOU WAKE UP 60 Tab 0  
 metOLazone (ZAROXOLYN) 2.5 mg tablet Take 1 Tab by mouth every other day. Take twicw weekly for fluid 45 Tab prn  tamsulosin (FLOMAX) 0.4 mg capsule TAKE 2 CAPSULES BY MOUTH EVERY  Cap 3  potassium chloride (K-DUR, KLOR-CON) 20 mEq tablet Take 1 Tab by mouth daily. 90 Tab 1  
 losartan (COZAAR) 25 mg tablet Take 1 Tab by mouth daily. 30 Tab prn  epoetin mary (PROCRIT INJECTION) by Injection route. Indications: Has one injection monthly. Patient unsure of dose.  LANTUS SOLOSTAR U-100 INSULIN 100 unit/mL (3 mL) inpn USE TO INJECT 30 UNITS UNDER THE SKIN EVERY DAY 10 Pen 2  
 Ferrous Sulfate (SLOW FE) 47.5 mg iron TbER tablet Take 1 Tab by mouth three (3) times daily. 90 Tab prn  ascorbic acid, vitamin C, (VITAMIN C) 250 mg tablet Take 1 Tab by mouth three (3) times daily. 90 Tab prn  MITIGARE 0.6 mg capsule TK 1 T PO  QD  1  
 bumetanide (BUMEX) 2 mg tablet TAKE 2 TABLETS BY MOUTH TWICE DAILY 360 Tab 3  pramipexole (MIRAPEX) 0.5 mg tablet TAKE 1 TABLET BY MOUTH EVERY NIGHT AT BEDTIME (Patient taking differently: Take 0.5 mg by mouth. TAKE 1 TABLET BY MOUTH EVERY NIGHT AT BEDTIME) 90 Tab prn  ULORIC 40 mg tab tablet TAKE 1 TABLET BY MOUTH DAILY 30 Tab 11  
 carvedilol (COREG) 12.5 mg tablet Take  by mouth two (2) times daily (with meals).  PEN NEEDLE, DIABETIC (BD ULTRA-FINE DEVIN PEN NEEDLES) by Does Not Apply route.  colchicine (COLCRYS) 0.6 mg tablet Take 0.6 mg by mouth daily. Indications: taking mitagar  Liraglutide (VICTOZA) 0.6 mg/0.1 mL (18 mg/3 mL) sub-q pen 0.6 mg by SubCUTAneous route daily.  apixaban (ELIQUIS) 5 mg tablet Take 5 mg by mouth two (2) times a day.  finasteride (PROSCAR) 5 mg tablet Take 5 mg by mouth daily. Facility-Administered Medications Ordered in Other Visits Medication Dose Route Frequency Provider Last Rate Last Dose  [START ON 12/5/2018] epoetin mary (EPOGEN;PROCRIT) injection 40,000 Units  40,000 Units SubCUTAneous ONCE Dion Jeffery MD      
 
Past Medical History:  
Diagnosis Date  Anemia 8/5/2017  Anxiety 8/5/2017  Arrhythmia   
 atrial fibrillation 2014  ASCVD (arteriosclerotic cardiovascular disease) 8/5/2017  BPH (benign prostatic hyperplasia) 8/5/2017  CAD (coronary artery disease)   
 h/o stents  Cancer [facility])   
 h/o skin cancer  Cardiomyopathy [facility] 8/5/2017  CHF (congestive heart failure) ([address] 8/5/2017  Chronic kidney disease Stage IV  CKD (chronic kidney disease), stage IV ([address] 8/5/2017  Diabetes ([facility] [address]  Diabetes mellitus ([facility] [address] 8/5/2017  Diabetic neuropathy ([facility] [address] 8/5/2017  DJD (degenerative joint disease) 8/5/2017  ED (erectile dysfunction) 8/5/2017  Gout 8/5/2017  High cholesterol  Hyperlipidemia 8/5/2017  Hypertension  Hypertension with renal disease 8/5/2017  Hypothyroid 8/5/2017  Insomnia 8/5/2017  Obesity 8/5/2017  On statin therapy 8/5/2017  Restless leg 8/5/2017  Thyroid disease   
 hypothyroid Past Surgical History:  
Procedure Laterality Date  CARDIAC SURG PROCEDURE UNLIST    
 cardiac stents  CARDIAC SURG PROCEDURE UNLIST [facility]blation 5/17/2018 [facility]  COLONOSCOPY N/A 6/28/2016 COLONOSCOPY performed by Jorge Kaba MD at [facility] ENDOSCOPY  
 HX APPENDECTOMY  HX BUNIONECTOMY    
 and removal of 2 seasmoid bone of the great toe 2/2018  HX HEENT Bilateral Cataract surgery  HX HEENT Tonsils  HX HEENT Axe wound to the head  HX KNEE ARTHROSCOPY X 2  
 HX ORTHOPAEDIC    
 HX PACEMAKER Allergies Allergen Reactions  Niacin Unknown (comments) Other reaction(s): Unknown (comments)  Imipenem Diarrhea Other reaction(s): Rash  Levemir [Insulin Detemir] Hives  Other Medication Other (comments) ? Allergy to Ruth Barban causing chemical burn  Primaxin [Imipenem-Cilastatin] Diarrhea and Rash  Xarelto [Rivaroxaban] Rash and Itching Other reaction(s): Rash REVIEW OF SYSTEMS: 
General: negative for - chills or fever, or weight loss or gain. No energy ENT: negative for - headaches, nasal congestion or tinnitus Eyes: no blurred or visual changes Neck: No stiffness or swollen nodes Respiratory: negative for - cough, hemoptysis, shortness of breath or wheezing Cardiovascular : negative for - chest pain. Positive for edema in his legs with his Dyspnea on exertion back at his baseline Gastrointestinal: negative for - abdominal pain, blood in stools, heartburn or nausea/vomiting Genito-Urinary: no dysuria, trouble voiding, or hematuria Musculoskeletal: negative for - gait disturbance, joint pain, joint stiffness or joint swelling Neurological: no TIA or stroke symptoms Hematologic: no bruises, no bleeding Lymphatic: no swollen glands Integument: no lumps, mole changes, nail changes or rash Endocrine:no malaise/lethargy poly uria or polydipsia or unexpected weight changes Social History Socioeconomic History  Marital status:  Spouse name: Not on file  Number of children: Not on file  Years of education: Not on file  Highest education level: Not on file Tobacco Use  Smoking status: Former Smoker Packs/day: 0.50 Years: 4.00 Pack years: 2.00 Last attempt to quit: 3/6/1972 Years since quittin.7  Smokeless tobacco: Never Used Substance and Sexual Activity  Alcohol use: No  
  Comment: rare, 1 drink per year  Drug use: No  
 Sexual activity: Not Currently Family History Problem Relation Age of Onset  Heart Disease Mother  Kidney Disease Mother  Heart Disease Father  Kidney Disease Father  Cancer Sister Breast  
 
 
OBJECTIVE:  
 
Visit Vitals /70 (BP 1 Location: Left arm, BP Patient Position: Sitting) Pulse 70 Resp 18 Ht 6' 6\" (1.981 m) Wt 250 lb 12.8 oz (113.8 kg) SpO2 100% BMI 28.98 kg/m² CONSTITUTIONAL:   well nourished, appears age appropriate EYES: sclera anicteric, PERRL, EOMI 
ENMT:nares clear, moist mucous membranes, pharynx clear NECK: supple. Thyroid normal, No JVD or bruits RESPIRATORY: Chest: clear to ascultation and percussion with overall decreased breath sounds CARDIOVASCULAR: Heart: regular rate and rhythm with what appears to be occ PVCs, no murmurs, rubs or gallops, PMI not displaced, No thrills. 2+ peripheral edema GASTROINTESTINAL: Abdomen: non distended, soft, non tender, bowel sounds normal 
HEMATOLOGIC: no purpura, petechiae or bruising LYMPHATIC: No lymph node enlargemant MUSCULOSKELETAL: Extremities: no active synovitis, pulse 1+ INTEGUMENT: No unusual rashes or suspicious skin lesions noted. Nails appear normal. 
PERIPHERAL VASCULAR: normal pulses femoral, PT and DP NEUROLOGIC: non-focal exam, A & O X 3 PSYCHIATRIC:, appropriate affect ASSESSMENT:  
1. Hypertension with renal disease 2. CKD (chronic kidney disease), stage IV ([address] 3. Paroxysmal atrial fibrillation (HCC) 4. Chronic systolic congestive heart failure ([address] 5. Ischemic cardiomyopathy 6. Anemia, unspecified type Impression 1. Hypertension that is adequately controlled with no changes in treatment for that 2. CHF chronic with appears to be at baseline so will continue current diuretic 3. CKD we will see what that status is today 4. GERD that is stable 5   ASCVD clinically he seems to be stable we will continue aspirin daily 6. DJD that is stable 7. Paroxysmal atrial fibrillation now appears in sinus rhythm 8. Ischemic cardiomyopathy currently stable 9. Anemia with CBC repeat pending today I will call with all the lab results. If all is stable I will recheck him in 2 weeks but I will see him sooner if there is a problem. PLAN: 
. Orders Placed This Encounter  METABOLIC PANEL, BASIC  
 AMB POC COMPLETE CBC,AUTOMATED ENTER  
 
 
 
ATTENTION:  
This medical record was transcribed using an electronic medical records system. Although proofread, it may and can contain electronic and spelling errors. Other human spelling and other errors may be present. Corrections may be executed at a later time. Please feel free to contact us for any clarifications as needed. Follow-up Disposition: 
Return in about 2 weeks (around 12/18/2018). No results found for any visits on 12/04/18. Javi Paul MD 
 
The patient verbalized understanding of the problems and plans as explained.

## 2018-12-05 ENCOUNTER — TELEPHONE (OUTPATIENT)
Dept: INTERNAL MEDICINE CLINIC | Age: 68
End: 2018-12-05

## 2018-12-05 ENCOUNTER — HOSPITAL ENCOUNTER (OUTPATIENT)
Dept: INFUSION THERAPY | Age: 68
Discharge: HOME | End: 2018-12-05
Payer: MEDICARE

## 2018-12-05 VITALS
HEART RATE: 70 BPM | RESPIRATION RATE: 20 BRPM | SYSTOLIC BLOOD PRESSURE: 135 MMHG | OXYGEN SATURATION: 99 % | DIASTOLIC BLOOD PRESSURE: 73 MMHG | TEMPERATURE: 97.7 F

## 2018-12-05 LAB
ALBUMIN SERPL-MCNC: 3.3 G/DL (ref 3.5–5)
ANION GAP SERPL CALC-SCNC: 8 MMOL/L (ref 5–15)
BUN SERPL-MCNC: 105 MG/DL (ref 8–27)
BUN SERPL-MCNC: 114 MG/DL (ref 6–20)
BUN/CREAT SERPL: 39 (ref 12–20)
BUN/CREAT SERPL: 44 (ref 10–24)
CALCIUM SERPL-MCNC: 8.5 MG/DL (ref 8.5–10.1)
CALCIUM SERPL-MCNC: 8.9 MG/DL (ref 8.6–10.2)
CHLORIDE SERPL-SCNC: 95 MMOL/L (ref 96–106)
CHLORIDE SERPL-SCNC: 95 MMOL/L (ref 97–108)
CO2 SERPL-SCNC: 26 MMOL/L (ref 20–29)
CO2 SERPL-SCNC: 32 MMOL/L (ref 21–32)
CREAT SERPL-MCNC: 2.4 MG/DL (ref 0.76–1.27)
CREAT SERPL-MCNC: 2.9 MG/DL (ref 0.7–1.3)
FERRITIN SERPL-MCNC: 51 NG/ML (ref 26–388)
GLUCOSE SERPL-MCNC: 118 MG/DL (ref 65–99)
GLUCOSE SERPL-MCNC: 230 MG/DL (ref 65–100)
IRON SATN MFR SERPL: 8 % (ref 20–50)
IRON SERPL-MCNC: 32 UG/DL (ref 35–150)
PHOSPHATE SERPL-MCNC: 4.2 MG/DL (ref 2.6–4.7)
POTASSIUM SERPL-SCNC: 3.8 MMOL/L (ref 3.5–5.2)
POTASSIUM SERPL-SCNC: 4 MMOL/L (ref 3.5–5.1)
SODIUM SERPL-SCNC: 135 MMOL/L (ref 136–145)
SODIUM SERPL-SCNC: 139 MMOL/L (ref 134–144)
TIBC SERPL-MCNC: 381 UG/DL (ref 250–450)

## 2018-12-05 PROCEDURE — 80069 RENAL FUNCTION PANEL: CPT

## 2018-12-05 PROCEDURE — 82728 ASSAY OF FERRITIN: CPT

## 2018-12-05 PROCEDURE — 83540 ASSAY OF IRON: CPT

## 2018-12-05 PROCEDURE — 36415 COLL VENOUS BLD VENIPUNCTURE: CPT

## 2018-12-05 PROCEDURE — 74011250636 HC RX REV CODE- 250/636: Performed by: INTERNAL MEDICINE

## 2018-12-05 PROCEDURE — 96372 THER/PROPH/DIAG INJ SC/IM: CPT

## 2018-12-05 RX ADMIN — ERYTHROPOIETIN 40000 UNITS: 40000 INJECTION, SOLUTION INTRAVENOUS; SUBCUTANEOUS at 10:27

## 2018-12-05 NOTE — PROGRESS NOTES
[facility] Stay Note: 
Arrived - 0374 Visit Vitals /73 (BP 1 Location: Right arm, BP Patient Position: Sitting) Pulse 70 Temp 97.7 °F (36.5 °C) Resp 20 SpO2 99% Assessment - completed, pt reports dyspnea with exertion. Hgb - 9.2, Hct 27.3 on 12/4. Renal panel, Iron Profile, and Ferritin drawn today. Recent Results (from the past 12 hour(s)) FERRITIN Collection Time: 12/05/18 10:03 AM  
Result Value Ref Range Ferritin 51 26 - 388 NG/ML  
IRON PROFILE Collection Time: 12/05/18 10:03 AM  
Result Value Ref Range Iron 32 (L) 35 - 150 ug/dL TIBC 381 250 - 450 ug/dL Iron % saturation 8 (L) 20 - 50 % RENAL FUNCTION PANEL Collection Time: 12/05/18 10:03 AM  
Result Value Ref Range Sodium 135 (L) 136 - 145 mmol/L Potassium 4.0 3.5 - 5.1 mmol/L Chloride 95 (L) 97 - 108 mmol/L  
 CO2 32 21 - 32 mmol/L Anion gap 8 5 - 15 mmol/L Glucose 230 (H) 65 - 100 mg/dL  (H) 6 - 20 MG/DL Creatinine 2.90 (H) 0.70 - 1.30 MG/DL  
 BUN/Creatinine ratio 39 (H) 12 - 20 GFR est AA 26 (L) >60 ml/min/1.73m2 GFR est non-AA 22 (L) >60 ml/min/1.73m2 Calcium 8.5 8.5 - 10.1 MG/DL Phosphorus 4.2 2.6 - 4.7 MG/DL Albumin 3.3 (L) 3.5 - 5.0 g/dL Procrit 40,000 units SQ slowly in left arm. 1030 - Tolerated well. Pt denies any acute problems/changes. Discharged from [facility] ambulatory. No distress. Next appt: 1/2/19 @ 1000.

## 2018-12-05 NOTE — TELEPHONE ENCOUNTER
----- Message from Emilia Angeles MD sent at 12/5/2018 12:50 PM EST -----  Lab results without significant change to continue current treatment.

## 2018-12-05 NOTE — TELEPHONE ENCOUNTER
----- Message from Javi Paul MD sent at 12/5/2018 12:50 PM EST -----  Lab results without significant change to continue current treatment.

## 2018-12-17 RX ORDER — CARVEDILOL 12.5 MG/1
TABLET ORAL
Qty: 60 TAB | Refills: 11 | Status: SHIPPED | OUTPATIENT
Start: 2018-12-17 | End: 2019-12-19

## 2018-12-17 NOTE — TELEPHONE ENCOUNTER
RX refill request from the patient/pharmacy. Patient last seen 12- with labs, and next appt. scheduled for 12-  Requested Prescriptions     Pending Prescriptions Disp Refills    carvedilol (COREG) 12.5 mg tablet [Pharmacy Med Name: CARVEDILOL 12.5MG TABLETS] 60 Tab 11     Sig: TAKE 1 TABLET BY MOUTH TWICE DAILY   .

## 2018-12-19 ENCOUNTER — OFFICE VISIT (OUTPATIENT)
Dept: INTERNAL MEDICINE CLINIC | Age: 68
End: 2018-12-19

## 2018-12-19 VITALS
BODY MASS INDEX: 28.93 KG/M2 | HEIGHT: 78 IN | WEIGHT: 250 LBS | SYSTOLIC BLOOD PRESSURE: 138 MMHG | HEART RATE: 88 BPM | RESPIRATION RATE: 18 BRPM | OXYGEN SATURATION: 95 % | DIASTOLIC BLOOD PRESSURE: 84 MMHG

## 2018-12-19 DIAGNOSIS — R53.83 FATIGUE, UNSPECIFIED TYPE: ICD-10-CM

## 2018-12-19 DIAGNOSIS — I50.22 CHRONIC SYSTOLIC CONGESTIVE HEART FAILURE (HCC): Primary | ICD-10-CM

## 2018-12-19 DIAGNOSIS — N18.4 CKD (CHRONIC KIDNEY DISEASE), STAGE IV (HCC): ICD-10-CM

## 2018-12-19 DIAGNOSIS — E11.42 DIABETIC POLYNEUROPATHY ASSOCIATED WITH TYPE 2 DIABETES MELLITUS (HCC): ICD-10-CM

## 2018-12-19 DIAGNOSIS — D64.9 ANEMIA, UNSPECIFIED TYPE: ICD-10-CM

## 2018-12-19 DIAGNOSIS — I12.9 HYPERTENSION WITH RENAL DISEASE: ICD-10-CM

## 2018-12-19 DIAGNOSIS — I25.5 ISCHEMIC CARDIOMYOPATHY: ICD-10-CM

## 2018-12-19 LAB
GRAN# POC: 4.1 K/UL (ref 2–7.8)
GRAN% POC: 78.3 % (ref 37–92)
HCT VFR BLD CALC: 29 % (ref 37–51)
HGB BLD-MCNC: 9.4 G/DL (ref 12–18)
LY# POC: 0.7 K/UL (ref 0.6–4.1)
LY% POC: 14.4 % (ref 10–58.5)
MCH RBC QN: 26.5 PG (ref 26–32)
MCHC RBC-ENTMCNC: 32.6 G/DL (ref 30–36)
MCV RBC: 81 FL (ref 80–97)
MID #, POC: 0.3 K/UL (ref 0–1.8)
MID% POC: 74.3 % (ref 0.1–24)
PLATELET # BLD: 154 K/UL (ref 140–440)
RBC # BLD: 3.57 M/UL (ref 4.2–6.3)
WBC # BLD: 5.1 K/UL (ref 4.1–10.9)

## 2018-12-19 RX ORDER — PREGABALIN 75 MG/1
75 CAPSULE ORAL 2 TIMES DAILY
Qty: 60 CAP | Status: SHIPPED | OUTPATIENT
Start: 2018-12-19 | End: 2019-01-02 | Stop reason: ALTCHOICE
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
## 2018-12-19 NOTE — PATIENT INSTRUCTIONS
Anemia: Care Instructions  Your Care Instructions    Anemia is a low level of red blood cells, which carry oxygen throughout your body. Many things can cause anemia. Lack of iron is one of the most common causes. Your body needs iron to make hemoglobin, a substance in red blood cells that carries oxygen from the lungs to your body's cells. Without enough iron, the body produces fewer and smaller red blood cells. As a result, your body's cells do not get enough oxygen, and you feel tired and weak. And you may have trouble concentrating. Bleeding is the most common cause of a lack of iron. You may have heavy menstrual bleeding or bleeding caused by conditions such as ulcers, hemorrhoids, or cancer. Regular use of aspirin or other anti-inflammatory medicines (such as ibuprofen) also can cause bleeding in some people. A lack of iron in your diet also can cause anemia, especially at times when the body needs more iron, such as during pregnancy, infancy, and the teen years. Your doctor may have prescribed iron pills. It may take several months of treatment for your iron levels to return to normal. Your doctor also may suggest that you eat foods that are rich in iron, such as meat and beans. There are many other causes of anemia. It is not always due to a lack of iron. Finding the specific cause of your anemia will help your doctor find the right treatment for you. Follow-up care is a key part of your treatment and safety. Be sure to make and go to all appointments, and call your doctor if you are having problems. It's also a good idea to know your test results and keep a list of the medicines you take. How can you care for yourself at home? · Take your medicines exactly as prescribed. Call your doctor if you think you are having a problem with your medicine. · If your doctor recommends iron pills, take them as directed:  ? Try to take the pills on an empty stomach about 1 hour before or 2 hours after meals. But you may need to take iron with food to avoid an upset stomach. ? Do not take antacids or drink milk or caffeine drinks (such as coffee, tea, or cola) at the same time or within 2 hours of the time that you take your iron. They can make it hard for your body to absorb the iron. ? Vitamin C (from food or supplements) helps your body absorb iron. Try taking iron pills with a glass of orange juice or some other food that is high in vitamin C, such as citrus fruits. ? Iron pills may cause stomach problems, such as heartburn, nausea, diarrhea, constipation, and cramps. Be sure to drink plenty of fluids, and include fruits, vegetables, and fiber in your diet each day. Iron pills often make your bowel movements dark or green. ? If you forget to take an iron pill, do not take a double dose of iron the next time you take a pill. ? Keep iron pills out of the reach of small children. An overdose of iron can be very dangerous. · Follow your doctor's advice about eating iron-rich foods. These include red meat, shellfish, poultry, eggs, beans, raisins, whole-grain bread, and leafy green vegetables. · Steam vegetables to help them keep their iron content. When should you call for help? Call 911 anytime you think you may need emergency care. For example, call if:    · You have symptoms of a heart attack. These may include:  ? Chest pain or pressure, or a strange feeling in the chest.  ? Sweating. ? Shortness of breath. ? Nausea or vomiting. ? Pain, pressure, or a strange feeling in the back, neck, jaw, or upper belly or in one or both shoulders or arms. ? Lightheadedness or sudden weakness. ? A fast or irregular heartbeat. After you call 911, the  may tell you to chew 1 adult-strength or 2 to 4 low-dose aspirin. Wait for an ambulance.  Do not try to drive yourself.     · You passed out (lost consciousness).    Call your doctor now or seek immediate medical care if:    · You have new or increased shortness of breath.     · You are dizzy or lightheaded, or you feel like you may faint.     · Your fatigue and weakness continue or get worse.     · You have any abnormal bleeding, such as:  ? Nosebleeds. ? Vaginal bleeding that is different (heavier, more frequent, at a different time of the month) than what you are used to.  ? Bloody or black stools, or rectal bleeding. ? Bloody or pink urine.    Watch closely for changes in your health, and be sure to contact your doctor if:    · You do not get better as expected. Where can you learn more? Go to http://gildardo-palak.info/. Enter R301 in the search box to learn more about \"Anemia: Care Instructions. \"  Current as of: May 7, 2018  Content Version: 11.8  © 3962-6992 Healthwise, Incorporated. Care instructions adapted under license by [employer] (which disclaims liability or warranty for this information). If you have questions about a medical condition or this instruction, always ask your healthcare professional. [address] any warranty or liability for your use of this information.

## 2018-12-19 NOTE — PROGRESS NOTES
Chief Complaint   Patient presents with    Hypertension     2 week follow up     1. Have you been to the ER, urgent care clinic since your last visit? Hospitalized since your last visit? No    2. Have you seen or consulted any other health care providers outside of the [facility] since your last visit? Include any pap smears or colon screening.  No  Visit Vitals  /84 (BP 1 Location: Left arm, BP Patient Position: Sitting)   Pulse 88   Resp 18   Ht 6' 6\" (1.981 m)   Wt 250 lb (113.4 kg)   SpO2 95%   BMI 28.89 kg/m²

## 2018-12-20 LAB
BUN SERPL-MCNC: 96 MG/DL (ref 8–27)
BUN/CREAT SERPL: 41 (ref 10–24)
CALCIUM SERPL-MCNC: 9.7 MG/DL (ref 8.6–10.2)
CHLORIDE SERPL-SCNC: 95 MMOL/L (ref 96–106)
CO2 SERPL-SCNC: 28 MMOL/L (ref 20–29)
CREAT SERPL-MCNC: 2.32 MG/DL (ref 0.76–1.27)
GLUCOSE SERPL-MCNC: 88 MG/DL (ref 65–99)
POTASSIUM SERPL-SCNC: 3.3 MMOL/L (ref 3.5–5.2)
SODIUM SERPL-SCNC: 142 MMOL/L (ref 134–144)

## 2018-12-21 ENCOUNTER — TELEPHONE (OUTPATIENT)
Dept: INTERNAL MEDICINE CLINIC | Age: 68
End: 2018-12-21

## 2018-12-21 LAB
TESTOST FREE SERPL-MCNC: 5.7 PG/ML (ref 6.6–18.1)
TESTOST SERPL-MCNC: 339 NG/DL (ref 264–916)

## 2018-12-21 NOTE — PROGRESS NOTES
Total testosterone value is normal but free testosterone value is so as I discussed at the office visit would refer to urology regarding possibility of treatment.

## 2018-12-21 NOTE — TELEPHONE ENCOUNTER
----- Message from Dl Taylor MD sent at 12/20/2018  3:37 PM EST -----  Kidney function test is stable

## 2019-01-02 ENCOUNTER — OFFICE VISIT (OUTPATIENT)
Dept: INTERNAL MEDICINE CLINIC | Age: 69
End: 2019-01-02

## 2019-01-02 ENCOUNTER — HOSPITAL ENCOUNTER (INPATIENT)
Age: 69
LOS: 8 days | Discharge: HOME HEALTH | DRG: 291 | End: 2019-01-10
Attending: INTERNAL MEDICINE | Admitting: INTERNAL MEDICINE
Payer: MEDICARE

## 2019-01-02 ENCOUNTER — APPOINTMENT (OUTPATIENT)
Dept: GENERAL RADIOLOGY | Age: 69
DRG: 291 | End: 2019-01-02
Attending: INTERNAL MEDICINE
Payer: MEDICARE

## 2019-01-02 ENCOUNTER — HOSPITAL ENCOUNTER (OUTPATIENT)
Dept: INFUSION THERAPY | Age: 69
Discharge: HOME | End: 2019-01-02
Payer: MEDICARE

## 2019-01-02 VITALS
WEIGHT: 267.8 LBS | SYSTOLIC BLOOD PRESSURE: 118 MMHG | RESPIRATION RATE: 20 BRPM | BODY MASS INDEX: 30.98 KG/M2 | HEIGHT: 78 IN | HEART RATE: 69 BPM | OXYGEN SATURATION: 96 % | DIASTOLIC BLOOD PRESSURE: 70 MMHG

## 2019-01-02 VITALS
SYSTOLIC BLOOD PRESSURE: 96 MMHG | RESPIRATION RATE: 18 BRPM | OXYGEN SATURATION: 100 % | HEART RATE: 70 BPM | DIASTOLIC BLOOD PRESSURE: 64 MMHG | TEMPERATURE: 97.9 F

## 2019-01-02 DIAGNOSIS — N18.4 CONTROLLED TYPE 2 DIABETES MELLITUS WITH STAGE 4 CHRONIC KIDNEY DISEASE, WITHOUT LONG-TERM CURRENT USE OF INSULIN (HCC): ICD-10-CM

## 2019-01-02 DIAGNOSIS — K21.9 GASTROESOPHAGEAL REFLUX DISEASE WITHOUT ESOPHAGITIS: ICD-10-CM

## 2019-01-02 DIAGNOSIS — I25.5 ISCHEMIC CARDIOMYOPATHY: ICD-10-CM

## 2019-01-02 DIAGNOSIS — I12.9 HYPERTENSION WITH RENAL DISEASE: ICD-10-CM

## 2019-01-02 DIAGNOSIS — I25.10 ASCVD (ARTERIOSCLEROTIC CARDIOVASCULAR DISEASE): ICD-10-CM

## 2019-01-02 DIAGNOSIS — I48.0 PAROXYSMAL ATRIAL FIBRILLATION (HCC): ICD-10-CM

## 2019-01-02 DIAGNOSIS — I50.23 SYSTOLIC CHF, ACUTE ON CHRONIC (HCC): Primary | ICD-10-CM

## 2019-01-02 DIAGNOSIS — I50.23 SYSTOLIC CHF, ACUTE ON CHRONIC (HCC): ICD-10-CM

## 2019-01-02 DIAGNOSIS — E11.22 CONTROLLED TYPE 2 DIABETES MELLITUS WITH STAGE 4 CHRONIC KIDNEY DISEASE, WITHOUT LONG-TERM CURRENT USE OF INSULIN (HCC): ICD-10-CM

## 2019-01-02 DIAGNOSIS — D64.9 ANEMIA, UNSPECIFIED TYPE: ICD-10-CM

## 2019-01-02 DIAGNOSIS — N18.4 CKD (CHRONIC KIDNEY DISEASE), STAGE IV (HCC): ICD-10-CM

## 2019-01-02 LAB
ALBUMIN SERPL-MCNC: 3.2 G/DL (ref 3.5–5)
ALBUMIN SERPL-MCNC: 3.3 G/DL (ref 3.5–5)
ALBUMIN/GLOB SERPL: 0.8 {RATIO} (ref 1.1–2.2)
ALP SERPL-CCNC: 103 U/L (ref 45–117)
ALT SERPL-CCNC: 15 U/L (ref 12–78)
ANION GAP SERPL CALC-SCNC: 12 MMOL/L (ref 5–15)
ANION GAP SERPL CALC-SCNC: 8 MMOL/L (ref 5–15)
AST SERPL-CCNC: 22 U/L (ref 15–37)
BASOPHILS # BLD: 0 K/UL (ref 0–0.1)
BASOPHILS NFR BLD: 1 % (ref 0–1)
BILIRUB SERPL-MCNC: 0.6 MG/DL (ref 0.2–1)
BUN SERPL-MCNC: 110 MG/DL (ref 6–20)
BUN SERPL-MCNC: 113 MG/DL (ref 6–20)
BUN/CREAT SERPL: 39 (ref 12–20)
BUN/CREAT SERPL: 41 (ref 12–20)
CALCIUM SERPL-MCNC: 8.6 MG/DL (ref 8.5–10.1)
CALCIUM SERPL-MCNC: 8.6 MG/DL (ref 8.5–10.1)
CHLORIDE SERPL-SCNC: 90 MMOL/L (ref 97–108)
CHLORIDE SERPL-SCNC: 91 MMOL/L (ref 97–108)
CO2 SERPL-SCNC: 30 MMOL/L (ref 21–32)
CO2 SERPL-SCNC: 33 MMOL/L (ref 21–32)
CREAT SERPL-MCNC: 2.77 MG/DL (ref 0.7–1.3)
CREAT SERPL-MCNC: 2.79 MG/DL (ref 0.7–1.3)
DIFFERENTIAL METHOD BLD: ABNORMAL
EOSINOPHIL # BLD: 0 K/UL (ref 0–0.4)
EOSINOPHIL NFR BLD: 0 % (ref 0–7)
ERYTHROCYTE [DISTWIDTH] IN BLOOD BY AUTOMATED COUNT: 16 % (ref 11.5–14.5)
FERRITIN SERPL-MCNC: 40 NG/ML (ref 26–388)
GLOBULIN SER CALC-MCNC: 4.1 G/DL (ref 2–4)
GLUCOSE BLD STRIP.AUTO-MCNC: 192 MG/DL (ref 65–100)
GLUCOSE BLD STRIP.AUTO-MCNC: 213 MG/DL (ref 65–100)
GLUCOSE SERPL-MCNC: 175 MG/DL (ref 65–100)
GLUCOSE SERPL-MCNC: 178 MG/DL (ref 65–100)
HCT VFR BLD AUTO: 24.6 % (ref 36.6–50.3)
HCT VFR BLD AUTO: 25.8 % (ref 36.6–50.3)
HGB BLD-MCNC: 7.5 G/DL (ref 12.1–17)
HGB BLD-MCNC: 7.8 G/DL (ref 12.1–17)
IMM GRANULOCYTES # BLD: 0 K/UL (ref 0–0.04)
IMM GRANULOCYTES NFR BLD AUTO: 0 % (ref 0–0.5)
IRON SATN MFR SERPL: 19 % (ref 20–50)
IRON SERPL-MCNC: 78 UG/DL (ref 35–150)
LYMPHOCYTES # BLD: 0.4 K/UL (ref 0.8–3.5)
LYMPHOCYTES NFR BLD: 9 % (ref 12–49)
MCH RBC QN AUTO: 25.2 PG (ref 26–34)
MCHC RBC AUTO-ENTMCNC: 30.2 G/DL (ref 30–36.5)
MCV RBC AUTO: 83.5 FL (ref 80–99)
MONOCYTES # BLD: 0.5 K/UL (ref 0–1)
MONOCYTES NFR BLD: 12 % (ref 5–13)
NEUTS SEG # BLD: 3.4 K/UL (ref 1.8–8)
NEUTS SEG NFR BLD: 78 % (ref 32–75)
NRBC # BLD: 0 K/UL (ref 0–0.01)
NRBC BLD-RTO: 0 PER 100 WBC
PHOSPHATE SERPL-MCNC: 3.8 MG/DL (ref 2.6–4.7)
PLATELET # BLD AUTO: 102 K/UL (ref 150–400)
PMV BLD AUTO: 12.1 FL (ref 8.9–12.9)
POTASSIUM SERPL-SCNC: 3.5 MMOL/L (ref 3.5–5.1)
POTASSIUM SERPL-SCNC: 3.7 MMOL/L (ref 3.5–5.1)
PROT SERPL-MCNC: 7.4 G/DL (ref 6.4–8.2)
RBC # BLD AUTO: 3.09 M/UL (ref 4.1–5.7)
RBC MORPH BLD: ABNORMAL
SERVICE CMNT-IMP: ABNORMAL
SERVICE CMNT-IMP: ABNORMAL
SODIUM SERPL-SCNC: 132 MMOL/L (ref 136–145)
SODIUM SERPL-SCNC: 132 MMOL/L (ref 136–145)
TIBC SERPL-MCNC: 411 UG/DL (ref 250–450)
WBC # BLD AUTO: 4.3 K/UL (ref 4.1–11.1)

## 2019-01-02 PROCEDURE — 65660000000 HC RM CCU STEPDOWN

## 2019-01-02 PROCEDURE — 96372 THER/PROPH/DIAG INJ SC/IM: CPT

## 2019-01-02 PROCEDURE — 83540 ASSAY OF IRON: CPT

## 2019-01-02 PROCEDURE — 36415 COLL VENOUS BLD VENIPUNCTURE: CPT

## 2019-01-02 PROCEDURE — 80069 RENAL FUNCTION PANEL: CPT

## 2019-01-02 PROCEDURE — 74011636637 HC RX REV CODE- 636/637: Performed by: INTERNAL MEDICINE

## 2019-01-02 PROCEDURE — 82962 GLUCOSE BLOOD TEST: CPT

## 2019-01-02 PROCEDURE — 82728 ASSAY OF FERRITIN: CPT

## 2019-01-02 PROCEDURE — 85025 COMPLETE CBC W/AUTO DIFF WBC: CPT

## 2019-01-02 PROCEDURE — 85018 HEMOGLOBIN: CPT

## 2019-01-02 PROCEDURE — 87449 NOS EACH ORGANISM AG IA: CPT

## 2019-01-02 PROCEDURE — 74011000250 HC RX REV CODE- 250: Performed by: INTERNAL MEDICINE

## 2019-01-02 PROCEDURE — 80053 COMPREHEN METABOLIC PANEL: CPT

## 2019-01-02 PROCEDURE — 74011250637 HC RX REV CODE- 250/637: Performed by: INTERNAL MEDICINE

## 2019-01-02 PROCEDURE — 71045 X-RAY EXAM CHEST 1 VIEW: CPT

## 2019-01-02 PROCEDURE — 74011250636 HC RX REV CODE- 250/636: Performed by: INTERNAL MEDICINE

## 2019-01-02 RX ORDER — CARVEDILOL 12.5 MG/1
12.5 TABLET ORAL 2 TIMES DAILY WITH MEALS
Status: DISCONTINUED | OUTPATIENT
Start: 2019-01-02 | End: 2019-01-10 | Stop reason: HOSPADM

## 2019-01-02 RX ORDER — PRAMIPEXOLE DIHYDROCHLORIDE 0.25 MG/1
0.5 TABLET ORAL
Status: DISCONTINUED | OUTPATIENT
Start: 2019-01-02 | End: 2019-01-10 | Stop reason: HOSPADM

## 2019-01-02 RX ORDER — TAMSULOSIN HYDROCHLORIDE 0.4 MG/1
0.4 CAPSULE ORAL DAILY
Status: DISCONTINUED | OUTPATIENT
Start: 2019-01-03 | End: 2019-01-10 | Stop reason: HOSPADM

## 2019-01-02 RX ORDER — SODIUM CHLORIDE 0.9 % (FLUSH) 0.9 %
5-10 SYRINGE (ML) INJECTION EVERY 8 HOURS
Status: DISCONTINUED | OUTPATIENT
Start: 2019-01-02 | End: 2019-01-10 | Stop reason: HOSPADM

## 2019-01-02 RX ORDER — DIPHENOXYLATE HYDROCHLORIDE AND ATROPINE SULFATE 2.5; .025 MG/1; MG/1
1 TABLET ORAL
Status: DISCONTINUED | OUTPATIENT
Start: 2019-01-02 | End: 2019-01-10 | Stop reason: HOSPADM

## 2019-01-02 RX ORDER — SODIUM CHLORIDE 0.9 % (FLUSH) 0.9 %
5-10 SYRINGE (ML) INJECTION AS NEEDED
Status: DISCONTINUED | OUTPATIENT
Start: 2019-01-02 | End: 2019-01-10 | Stop reason: HOSPADM

## 2019-01-02 RX ORDER — FINASTERIDE 5 MG/1
5 TABLET, FILM COATED ORAL DAILY
Status: DISCONTINUED | OUTPATIENT
Start: 2019-01-03 | End: 2019-01-10 | Stop reason: HOSPADM

## 2019-01-02 RX ORDER — LOSARTAN POTASSIUM 25 MG/1
25 TABLET ORAL DAILY
Status: DISCONTINUED | OUTPATIENT
Start: 2019-01-03 | End: 2019-01-06

## 2019-01-02 RX ORDER — LANOLIN ALCOHOL/MO/W.PET/CERES
325 CREAM (GRAM) TOPICAL 3 TIMES DAILY
Status: DISCONTINUED | OUTPATIENT
Start: 2019-01-02 | End: 2019-01-10 | Stop reason: HOSPADM

## 2019-01-02 RX ORDER — INSULIN GLARGINE 100 [IU]/ML
30 INJECTION, SOLUTION SUBCUTANEOUS
Status: DISCONTINUED | OUTPATIENT
Start: 2019-01-02 | End: 2019-01-10 | Stop reason: HOSPADM

## 2019-01-02 RX ORDER — ACETAMINOPHEN 325 MG/1
650 TABLET ORAL
Status: DISCONTINUED | OUTPATIENT
Start: 2019-01-02 | End: 2019-01-10 | Stop reason: HOSPADM

## 2019-01-02 RX ORDER — POTASSIUM CHLORIDE 20 MEQ/1
20 TABLET, EXTENDED RELEASE ORAL DAILY
Status: DISCONTINUED | OUTPATIENT
Start: 2019-01-03 | End: 2019-01-02

## 2019-01-02 RX ORDER — ZOLPIDEM TARTRATE 5 MG/1
10 TABLET ORAL
Status: DISCONTINUED | OUTPATIENT
Start: 2019-01-02 | End: 2019-01-10 | Stop reason: HOSPADM

## 2019-01-02 RX ORDER — POTASSIUM CHLORIDE 20 MEQ/1
20 TABLET, EXTENDED RELEASE ORAL 2 TIMES DAILY
Status: DISCONTINUED | OUTPATIENT
Start: 2019-01-02 | End: 2019-01-03

## 2019-01-02 RX ORDER — CLONAZEPAM 0.5 MG/1
2 TABLET ORAL
Status: DISCONTINUED | OUTPATIENT
Start: 2019-01-02 | End: 2019-01-10 | Stop reason: HOSPADM

## 2019-01-02 RX ORDER — COLCHICINE 0.6 MG/1
0.6 TABLET ORAL DAILY
Status: DISCONTINUED | OUTPATIENT
Start: 2019-01-03 | End: 2019-01-10 | Stop reason: HOSPADM

## 2019-01-02 RX ORDER — FEBUXOSTAT 40 MG/1
40 TABLET, FILM COATED ORAL DAILY
Status: DISCONTINUED | OUTPATIENT
Start: 2019-01-03 | End: 2019-01-10 | Stop reason: HOSPADM

## 2019-01-02 RX ORDER — METOLAZONE 2.5 MG/1
2.5 TABLET ORAL EVERY OTHER DAY
Status: DISCONTINUED | OUTPATIENT
Start: 2019-01-02 | End: 2019-01-05

## 2019-01-02 RX ORDER — BUMETANIDE 0.25 MG/ML
2 INJECTION INTRAMUSCULAR; INTRAVENOUS EVERY 12 HOURS
Status: DISCONTINUED | OUTPATIENT
Start: 2019-01-02 | End: 2019-01-10 | Stop reason: HOSPADM

## 2019-01-02 RX ORDER — ASCORBIC ACID 500 MG
250 TABLET ORAL 3 TIMES DAILY
Status: DISCONTINUED | OUTPATIENT
Start: 2019-01-02 | End: 2019-01-10 | Stop reason: HOSPADM

## 2019-01-02 RX ADMIN — DIPHENOXYLATE HYDROCHLORIDE AND ATROPINE SULFATE 1 TABLET: 2.5; .025 TABLET ORAL at 19:23

## 2019-01-02 RX ADMIN — INSULIN GLARGINE 30 UNITS: 100 INJECTION, SOLUTION SUBCUTANEOUS at 21:00

## 2019-01-02 RX ADMIN — APIXABAN 5 MG: 5 TABLET, FILM COATED ORAL at 18:41

## 2019-01-02 RX ADMIN — Medication 10 ML: at 22:44

## 2019-01-02 RX ADMIN — BUMETANIDE 2 MG: 0.25 INJECTION INTRAMUSCULAR; INTRAVENOUS at 18:41

## 2019-01-02 RX ADMIN — PRAMIPEXOLE DIHYDROCHLORIDE 0.5 MG: 0.25 TABLET ORAL at 22:42

## 2019-01-02 RX ADMIN — POTASSIUM CHLORIDE 20 MEQ: 20 TABLET, EXTENDED RELEASE ORAL at 22:42

## 2019-01-02 RX ADMIN — OXYCODONE HYDROCHLORIDE AND ACETAMINOPHEN 250 MG: 500 TABLET ORAL at 18:41

## 2019-01-02 RX ADMIN — CARVEDILOL 12.5 MG: 12.5 TABLET, FILM COATED ORAL at 18:41

## 2019-01-02 RX ADMIN — METOLAZONE 2.5 MG: 2.5 TABLET ORAL at 18:41

## 2019-01-02 RX ADMIN — ERYTHROPOIETIN 60000 UNITS: 40000 INJECTION, SOLUTION INTRAVENOUS; SUBCUTANEOUS at 11:34

## 2019-01-02 RX ADMIN — Medication 10 ML: at 17:00

## 2019-01-02 RX ADMIN — OXYCODONE HYDROCHLORIDE AND ACETAMINOPHEN 250 MG: 500 TABLET ORAL at 22:42

## 2019-01-02 RX ADMIN — FERROUS SULFATE TAB 325 MG (65 MG ELEMENTAL FE) 325 MG: 325 (65 FE) TAB at 22:42

## 2019-01-02 RX ADMIN — CLONAZEPAM 2 MG: 0.5 TABLET ORAL at 22:42

## 2019-01-02 NOTE — PROGRESS NOTES
1005 Pt arrived at [facility] ambulatory for Procrit. Assessment completed. Pt c/o increased SOB with activity and generalized weakness. 1100 MD notified of Hgb 7.5 and pt symptoms. Orders received to increase Procrit from 40,000 units to 60,000 units. Visit Vitals BP 96/64 (BP 1 Location: Left arm, BP Patient Position: Sitting) Pulse 70 Temp 97.9 °F (36.6 °C) Resp 18 SpO2 100% Recent Results (from the past 12 hour(s)) FERRITIN Collection Time: 01/02/19 10:10 AM  
Result Value Ref Range Ferritin 40 26 - 388 NG/ML  
IRON PROFILE Collection Time: 01/02/19 10:10 AM  
Result Value Ref Range Iron 78 35 - 150 ug/dL TIBC 411 250 - 450 ug/dL Iron % saturation 19 (L) 20 - 50 % RENAL FUNCTION PANEL Collection Time: 01/02/19 10:10 AM  
Result Value Ref Range Sodium 132 (L) 136 - 145 mmol/L Potassium 3.5 3.5 - 5.1 mmol/L Chloride 91 (L) 97 - 108 mmol/L  
 CO2 33 (H) 21 - 32 mmol/L Anion gap 8 5 - 15 mmol/L Glucose 175 (H) 65 - 100 mg/dL  (H) 6 - 20 MG/DL Creatinine 2.79 (H) 0.70 - 1.30 MG/DL  
 BUN/Creatinine ratio 39 (H) 12 - 20 GFR est AA 27 (L) >60 ml/min/1.73m2 GFR est non-AA 23 (L) >60 ml/min/1.73m2 Calcium 8.6 8.5 - 10.1 MG/DL Phosphorus 3.8 2.6 - 4.7 MG/DL Albumin 3.2 (L) 3.5 - 5.0 g/dL HGB & HCT Collection Time: 01/02/19 10:10 AM  
Result Value Ref Range HGB 7.5 (L) 12.1 - 17.0 g/dL HCT 24.6 (L) 36.6 - 50.3 % Medications received: 
Procrit 60,000 units sq 
 
1135 Tolerated treatment well, no adverse reaction noted. Pt instructed to notify MD or go to ED if symptoms become worse. D/Cd from [facility] via wc accompanied by family. Next appt 1/30.

## 2019-01-02 NOTE — PROGRESS NOTES
Total testosterone value is normal but free testosterone value is so as I discussed at the office visit would refer to urology regarding possibility of treatment. Discussed at visit.

## 2019-01-02 NOTE — PROGRESS NOTES
Chief Complaint Patient presents with  Breathing Problem  
  difficulty breathing since this morning 1. Have you been to the ER, urgent care clinic since your last visit? No  Hospitalized since your last visit? No   
 
2. Have you seen or consulted any other health care providers outside of the [address] [place] [address] since your last visit? Dr. Savannah Kimball

## 2019-01-02 NOTE — H&P
ADMISSION NOTE 
 
NAME:  Moe Silva. :   1950 MRN:   [id number] Date/Time:  2019 4:04 PM 
Subjective: CHIEF COMPLAINT: Marked increased shortness of breath HISTORY OF PRESENT ILLNESS:    
Juanito France is a 77 y.o. white male who presents with marked increased shortness of breath is been progressive over the past several days but has become significantly worse over the past 24-48 hours. He has not been able to lay flat in bed to sleep because of shortness of breath and over the past 24 hours he can only walk about 5-6 feet before he gets markedly dyspneic. He has noted he increased his Zaroxolyn because his weight has increased about 25 pounds over the  holiday yet despite that his weight is still up 18 pounds today from what it was when he was seen . He did go to have his blood count checked today and is hemoglobin was 7.4 and he was given a iron infusion. He denies any chest pain or palpitations but does note the edema along with the PND, orthopnea and marked dyspnea on exertion. He denies any GI or  complaints. He denies any headaches or dizziness but has marked generalized weakness. He has his chronic unchanged arthritic complaints. He is taking his medications including the additional doses of Zaroxolyn is found here to be in congestive heart failure and is felt prudent to admit him to the hospital for aggressive IV diuretic therapy and watch closely as far as his renal function. Past Medical History:  
Diagnosis Date  Anemia 2017  Anxiety 2017  Arrhythmia   
 atrial fibrillation   ASCVD (arteriosclerotic cardiovascular disease) 2017  BPH (benign prostatic hyperplasia) 2017  CAD (coronary artery disease)   
 h/o stents  Cancer [facility])   
 h/o skin cancer  Cardiomyopathy ([facility] 2017  CHF (congestive heart failure) ([facility] 2017  Chronic kidney disease  Stage IV  
  CKD (chronic kidney disease), stage IV ([facility] 2017  Diabetes ([facility]  Diabetes mellitus ([facility] 2017  Diabetic neuropathy ([facility] 2017  DJD (degenerative joint disease) 2017  ED (erectile dysfunction) 2017  Gout 2017  High cholesterol  Hyperlipidemia 2017  Hypertension  Hypertension with renal disease 2017  Hypothyroid 2017  Insomnia 2017  Obesity 2017  On statin therapy 2017  Restless leg 2017  Thyroid disease   
 hypothyroid Past Surgical History:  
Procedure Laterality Date  CARDIAC SURG PROCEDURE UNLIST    
 cardiac stents  CARDIAC SURG PROCEDURE UNLIST Ablation 2018 ED [facility]  COLONOSCOPY N/A 2016 COLONOSCOPY performed by Lalo Roa MD at [facility] ENDOSCOPY  
 HX APPENDECTOMY  HX BUNIONECTOMY    
 and removal of 2 seasmoid bone of the great toe 2018  HX HEENT Bilateral Cataract surgery  HX HEENT Tonsils  HX HEENT Axe wound to the head  HX KNEE ARTHROSCOPY X 2  
 HX ORTHOPAEDIC    
 HX PACEMAKER Social History Tobacco Use  Smoking status: Former Smoker Packs/day: 0.50 Years: 4.00 Pack years: 2.00 Last attempt to quit: 3/6/1972 Years since quittin.8  Smokeless tobacco: Never Used Substance Use Topics  Alcohol use: No  
  Comment: rare, 1 drink per year Family History Problem Relation Age of Onset  Heart Disease Mother  Kidney Disease Mother  Heart Disease Father  Kidney Disease Father  Cancer Sister Breast  
  
 
Allergies Allergen Reactions  Niacin Unknown (comments) Other reaction(s): Unknown (comments)  Imipenem Diarrhea Other reaction(s): Rash  Levemir [Insulin Detemir] Hives  Other Medication Other (comments) ? Allergy to [place] causing chemical burn  Primaxin [Imipenem-Cilastatin] Diarrhea and Rash  Xarelto [Rivaroxaban] Rash and Itching Other reaction(s): Rash Prior to Admission medications Medication Sig Start Date End Date Taking? Authorizing Provider  
carvedilol (COREG) 12.5 mg tablet TAKE 1 TABLET BY MOUTH TWICE DAILY 12/17/18   Teresa Ardon MD  
clonazePAM (KLONOPIN) 2 mg tablet TAKE 1 TABLET BY MOUTH EVERY NIGHT AT BEDTIME. MAY REPEAT DOSE IF YOU WAKE UP 10/23/18   Teresa Ardon MD  
metOLazone (ZAROXOLYN) 2.5 mg tablet Take 1 Tab by mouth every other day. Take twicw weekly for fluid 10/16/18   Teresa Ardon MD  
tamsulosin [facility]) 0.4 mg capsule TAKE 2 CAPSULES BY MOUTH EVERY DAY 8/14/18   Teresa Ardon MD  
potassium chloride (K-DUR, KLOR-CON) 20 mEq tablet Take 1 Tab by mouth daily. 7/30/18   Teresa Ardon MD  
losartan (COZAAR) 25 mg tablet Take 1 Tab by mouth daily. 7/24/18   Teresa Ardon MD  
epoetin mary (PROCRIT INJECTION) by Injection route. Indications: Has one injection monthly. Patient unsure of dose. Provider, Historical  
LANALYSSA SOLOSTAR U-100 INSULIN 100 unit/mL (3 mL) inpn USE TO INJECT 30 UNITS UNDER THE SKIN EVERY DAY 6/14/18   Teresa Ardon MD  
Ferrous Sulfate (SLOW FE) 47.5 mg iron TbER tablet Take 1 Tab by mouth three (3) times daily. 5/31/18   Teresa Ardon MD  
ascorbic acid, vitamin C, (VITAMIN C) 250 mg tablet Take 1 Tab by mouth three (3) times daily. 5/31/18   Teresa Ardon MD  
MITIGARE 0.6 mg capsule TK 1 T PO  QD 2/20/18   Provider, Historical  
bumetanide (BUMEX) 2 mg tablet TAKE 2 TABLETS BY MOUTH TWICE DAILY 4/5/18   Teresa Ardon MD  
pramipexole (MIRAPEX) 0.5 mg tablet TAKE 1 TABLET BY MOUTH EVERY NIGHT AT BEDTIME Patient taking differently: Take 0.5 mg by mouth.  TAKE 1 TABLET BY MOUTH EVERY NIGHT AT BEDTIME 2/14/18   Teresa Ardon MD  
ULORIC 40 mg tab tablet TAKE 1 TABLET BY MOUTH DAILY 12/6/17   Teresa Ardon MD  
 PEN NEEDLE, DIABETIC (BD ULTRA-FINE DEVIN PEN NEEDLES) by Does Not Apply route. Provider, Historical  
colchicine (COLCRYS) 0.6 mg tablet Take 0.6 mg by mouth daily. Indications: taking mitagar    Provider, Historical  
Liraglutide (VICTOZA) 0.6 mg/0.1 mL (18 mg/3 mL) sub-q pen 0.6 mg by SubCUTAneous route daily. Provider, Historical  
apixaban (ELIQUIS) 5 mg tablet Take 5 mg by mouth two (2) times a day. Provider, Historical  
finasteride (PROSCAR) 5 mg tablet Take 5 mg by mouth daily. Provider, Historical  
 
 
REVIEW OF SYSTEMS:   
Constitutional:  negative for fevers, chills, anorexia and weight loss. Positive for generalized nonfocal weakness Eyes:   negative for visual disturbance and irritation ENT:   negative for hearing loss, tinnitus, nasal congestion, epistaxis, sore throat Neck:              negative for stiffness or swollen glands Respiratory:  negative for cough, hemoptysis, pleurisy/chest pain, wheezing but positive for dyspnea on exertion Cards:   negative for chest pain, palpitations but positive for marked dyspnea on exertion as well as orthopnea, PND, and increased lower extremity edema GI:   negative for dysphagia, nausea, vomiting, diarrhea, constipation and abdominal pain Genitourinary: negative for frequency, dysuria and hematuria Integument:  negative for rash and pruritus Hematologic:  negative for easy bruising and bleeding Lymphatic:      negative for swollen lymph nodes or night sweats Musculoskel: negative for myalgias, arthralgias, back pain and muscle weakness Neurological:  negative for headaches, dizziness, vertigo, memory problems and gait problems Behavl/Psych:  negative for anxiety, depression and illegal drug usage Objective: VITALS:   
There were no vitals taken for this visit. PHYSICAL EXAM:  
General:    Alert, cooperative, no distress, appears stated age. Head:   Normocephalic, without obvious abnormality, atraumatic. Eyes:   Conjunctivae/corneas clear. PERRL Nose:  Nares normal. No drainage or sinus tenderness. Throat:    Lips, mucosa, and tongue normal.  No Thrush Neck:  Supple, symmetrical,  no adenopathy, thyroid: non tender 
  no carotid bruit and no JVD. Back:    Symmetric,  No CVA tenderness. Lungs:   Clear to auscultation bilaterally. No Wheezing or Rhonchi. No rales. Chest wall:  No tenderness or deformity. No Accessory muscle use. Heart:   Regular rate and rhythm,  no murmur, rub or gallop. Abdomen:   Soft, non-tender. Not distended. Bowel sounds normal. No masses Extremities: Extremities normal, atraumatic, No cyanosis. No edema. No clubbing Skin:     Texture, turgor normal. No rashes or lesions. Not Jaundiced Lymph nodes: Cervical, supraclavicular normal. 
Psych:  Good insight. Not depressed. Not anxious or agitated. Neurologic: EOMs intact. No facial asymmetry. No aphasia or slurred speech. Normal   strength, Alert and oriented X 3. LAB DATA REVIEWED:   
Recent Results (from the past 24 hour(s)) FERRITIN Collection Time: 01/02/19 10:10 AM  
Result Value Ref Range Ferritin 40 26 - 388 NG/ML  
IRON PROFILE Collection Time: 01/02/19 10:10 AM  
Result Value Ref Range Iron 78 35 - 150 ug/dL TIBC 411 250 - 450 ug/dL Iron % saturation 19 (L) 20 - 50 % RENAL FUNCTION PANEL Collection Time: 01/02/19 10:10 AM  
Result Value Ref Range Sodium 132 (L) 136 - 145 mmol/L Potassium 3.5 3.5 - 5.1 mmol/L Chloride 91 (L) 97 - 108 mmol/L  
 CO2 33 (H) 21 - 32 mmol/L Anion gap 8 5 - 15 mmol/L Glucose 175 (H) 65 - 100 mg/dL  (H) 6 - 20 MG/DL Creatinine 2.79 (H) 0.70 - 1.30 MG/DL  
 BUN/Creatinine ratio 39 (H) 12 - 20 GFR est AA 27 (L) >60 ml/min/1.73m2 GFR est non-AA 23 (L) >60 ml/min/1.73m2 Calcium 8.6 8.5 - 10.1 MG/DL Phosphorus 3.8 2.6 - 4.7 MG/DL Albumin 3.2 (L) 3.5 - 5.0 g/dL HGB & HCT  Collection Time: 01/02/19 10:10 AM  
 Result Value Ref Range HGB 7.5 (L) 12.1 - 17.0 g/dL HCT 24.6 (L) 36.6 - 50.3 % Assessment/Plan:  
  
Principal Problem: 
  Systolic CHF, acute on chronic ([facility] (1/2/2019) Active Problems: 
  ASCVD (arteriosclerotic cardiovascular disease) (8/5/2017) CHF (congestive heart failure) ([facility] (8/5/2017) CKD (chronic kidney disease), stage IV ([facility] (8/5/2017) Hypertension with renal disease (8/5/2017) Gastroesophageal reflux disease without esophagitis (8/5/2017) Cardiomyopathy ([facility] (8/5/2017) 
 
  
___________________________________________________ PLAN:   
Risk of deterioration:  []Low    []Moderate  [x]High 1. Diuresis with IV Bumex 2 mg twice daily 2. Continue Zaroxolyn 2.5 mg before a.m. dose of Bumex 3. Repeat echocardiogram 
4. Follow renal function closely noted admission  creatinine 2.79 
5. Follow hemoglobin closely with admission hemoglobin of 7.5, transfuse as needed 6. Continue iron and vitamin C 
7. Supplemental oxygen as needed 8. Continue Eliquis and monitor closely for any signs of bleeding 9. Continue Coreg and follow blood pressure closely 10. Unfortunately cannot use ACE or ARB with his chronic kidney disease Prophylaxis:  [x] Eliquis Disposition:  [x]Home w/ Family   [] PT,OT,RN   []SNF/LTC   []SAH/Rehab Discussed Code Status:    [x]Full Code      []DNR    
___________________________________________________ Care Plan discussed with: 
  [x]Patient   [x]Family  []Specialist : 
___________________________________________________ Admitting Physician: Bandar Bradley MD

## 2019-01-02 NOTE — PATIENT INSTRUCTIONS
Complete Blood Count (CBC): About This Test 
What is it? A complete blood count (CBC) is a blood test that gives important information about your blood cells, especially red blood cells, white blood cells, and platelets. Why is this test done? A CBC may be done as part of a regular physical exam. There are many other reasons that a doctor may want this blood test, including to: · Find the cause of symptoms such as fatigue, weakness, fever, bruising, or weight loss. · Find anemia or an infection. · See how much blood has been lost if there is bleeding. · Diagnose diseases of the blood, such as leukemia or polycythemia. How can you prepare for the test? 
You do not need to do anything before having this test. 
What happens during the test? 
The health professional taking a sample of your blood will: · Wrap an elastic band around your upper arm. This makes the veins below the band larger so it is easier to put a needle into the vein. · Clean the needle site with alcohol. · Put the needle into the vein. · Attach a tube to the needle to fill it with blood. · Remove the band from your arm when enough blood is collected. · Put a gauze pad or cotton ball over the needle site as the needle is removed. · Put pressure on the site and then put on a bandage. If this blood test is done on a baby, a heel stick may be done instead of a blood draw from a vein. What happens after the test? 
· You will probably be able to go home right away. · You can go back to your usual activities right away. Follow-up care is a key part of your treatment and safety. Be sure to make and go to all appointments, and call your doctor if you are having problems. It's also a good idea to keep a list of the medicines you take. Ask your doctor when you can expect to have your test results. Where can you learn more? Go to http://gildardo-palak.info/. Enter B512 in the search box to learn more about \"Complete Blood Count (CBC): About This Test.\" Current as of: June 26, 2018 Content Version: 11.8 © 1789-1969 Healthwise, Incorporated. Care instructions adapted under license by [employer] (which disclaims liability or warranty for this information). If you have questions about a medical condition or this instruction, always ask your healthcare professional. [place] [address] any warranty or liability for your use of this information.

## 2019-01-02 NOTE — PROGRESS NOTES
Chief Complaint Patient presents with  Breathing Problem  
  difficulty breathing since this morning SUBJECTIVE: 
 
Royal KRISTA Ocampo is a 77 y.o. male who returns today with increasing shortness of breath with associated PND, orthopnea and dyspnea on exertion. Current Outpatient Medications Medication Sig Dispense Refill  carvedilol (COREG) 12.5 mg tablet TAKE 1 TABLET BY MOUTH TWICE DAILY 60 Tab 11  
 clonazePAM (KLONOPIN) 2 mg tablet TAKE 1 TABLET BY MOUTH EVERY NIGHT AT BEDTIME. MAY REPEAT DOSE IF YOU WAKE UP 60 Tab 0  
 metOLazone (ZAROXOLYN) 2.5 mg tablet Take 1 Tab by mouth every other day. Take twicw weekly for fluid 45 Tab prn  tamsulosin (FLOMAX) 0.4 mg capsule TAKE 2 CAPSULES BY MOUTH EVERY  Cap 3  potassium chloride (K-DUR, KLOR-CON) 20 mEq tablet Take 1 Tab by mouth daily. 90 Tab 1  
 losartan (COZAAR) 25 mg tablet Take 1 Tab by mouth daily. 30 Tab prn  epoetin mary (PROCRIT INJECTION) by Injection route. Indications: Has one injection monthly. Patient unsure of dose.  LANTUS SOLOSTAR U-100 INSULIN 100 unit/mL (3 mL) inpn USE TO INJECT 30 UNITS UNDER THE SKIN EVERY DAY 10 Pen 2  
 Ferrous Sulfate (SLOW FE) 47.5 mg iron TbER tablet Take 1 Tab by mouth three (3) times daily. 90 Tab prn  ascorbic acid, vitamin C, (VITAMIN C) 250 mg tablet Take 1 Tab by mouth three (3) times daily. 90 Tab prn  MITIGARE 0.6 mg capsule TK 1 T PO  QD  1  
 bumetanide (BUMEX) 2 mg tablet TAKE 2 TABLETS BY MOUTH TWICE DAILY 360 Tab 3  pramipexole (MIRAPEX) 0.5 mg tablet TAKE 1 TABLET BY MOUTH EVERY NIGHT AT BEDTIME (Patient taking differently: Take 0.5 mg by mouth. TAKE 1 TABLET BY MOUTH EVERY NIGHT AT BEDTIME) 90 Tab prn  ULORIC 40 mg tab tablet TAKE 1 TABLET BY MOUTH DAILY 30 Tab 11  
 PEN NEEDLE, DIABETIC (BD ULTRA-FINE DEVIN PEN NEEDLES) by Does Not Apply route.     
 Liraglutide (VICTOZA) 0.6 mg/0.1 mL (18 mg/3 mL) sub-q pen 0.6 mg by SubCUTAneous route daily.  apixaban (ELIQUIS) 5 mg tablet Take 5 mg by mouth two (2) times a day.  finasteride (PROSCAR) 5 mg tablet Take 5 mg by mouth daily.  colchicine (COLCRYS) 0.6 mg tablet Take 0.6 mg by mouth daily. Indications: taking mitagar Past Medical History:  
Diagnosis Date  Anemia 8/5/2017  Anxiety 8/5/2017  Arrhythmia   
 atrial fibrillation 2014  ASCVD (arteriosclerotic cardiovascular disease) 8/5/2017  BPH (benign prostatic hyperplasia) 8/5/2017  CAD (coronary artery disease)   
 h/o stents  Cancer [facility])   
 h/o skin cancer  Cardiomyopathy ([address] 8/5/2017  CHF (congestive heart failure) ([address] 8/5/2017  Chronic kidney disease Stage IV  CKD (chronic kidney disease), stage IV ([address] 8/5/2017  Diabetes ([address]  Diabetes mellitus ([address] 8/5/2017  Diabetic neuropathy ([address] 8/5/2017  DJD (degenerative joint disease) 8/5/2017  ED (erectile dysfunction) 8/5/2017  Gout 8/5/2017  High cholesterol  Hyperlipidemia 8/5/2017  Hypertension  Hypertension with renal disease 8/5/2017  Hypothyroid 8/5/2017  Insomnia 8/5/2017  Obesity 8/5/2017  On statin therapy 8/5/2017  Restless leg 8/5/2017  Thyroid disease   
 hypothyroid Past Surgical History:  
Procedure Laterality Date  CARDIAC SURG PROCEDURE UNLIST    
 cardiac stents  CARDIAC SURG PROCEDURE UNLIST Ablation 5/17/2018 ED [facility]  COLONOSCOPY N/A 6/28/2016 COLONOSCOPY performed by Wallace Ewing MD at [facility] ENDOSCOPY  
 HX APPENDECTOMY  HX BUNIONECTOMY    
 and removal of 2 seasmoid bone of the great toe 2/2018  HX HEENT Bilateral Cataract surgery  HX HEENT Tonsils  HX HEENT Axe wound to the head  HX KNEE ARTHROSCOPY X 2  
 HX ORTHOPAEDIC    
 HX PACEMAKER Allergies Allergen Reactions  Niacin Unknown (comments) Other reaction(s): Unknown (comments)  Imipenem Diarrhea Other reaction(s): Rash  Levemir [Insulin Detemir] Hives  Other Medication Other (comments) ? Allergy to Minetta Amandeep causing chemical burn  Primaxin [Imipenem-Cilastatin] Diarrhea and Rash  Xarelto [Rivaroxaban] Rash and Itching Other reaction(s): Rash REVIEW OF SYSTEMS: 
General: negative for - chills or fever, or weight loss or gain ENT: negative for - headaches, nasal congestion or tinnitus Eyes: no blurred or visual changes Neck: No stiffness or swollen nodes Respiratory: negative for - cough, hemoptysis, shortness of breath or wheezing Cardiovascular : negative for - chest pain, palpitations positive for increased edema with PND and orthopnea as well as marked shortness of breath Gastrointestinal: negative for - abdominal pain, blood in stools, heartburn or nausea/vomiting Genito-Urinary: no dysuria, trouble voiding, or hematuria Musculoskeletal: negative for - gait disturbance, joint pain, joint stiffness or joint swelling Neurological: no TIA or stroke symptoms Hematologic: no bruises, no bleeding Lymphatic: no swollen glands Integument: no lumps, mole changes, nail changes or rash Endocrine:no malaise/lethargy poly uria or polydipsia or unexpected weight changes Social History Socioeconomic History  Marital status:  Spouse name: Not on file  Number of children: Not on file  Years of education: Not on file  Highest education level: Not on file Tobacco Use  Smoking status: Former Smoker Packs/day: 0.50 Years: 4.00 Pack years: 2.00 Last attempt to quit: 3/6/1972 Years since quittin.8  Smokeless tobacco: Never Used Substance and Sexual Activity  Alcohol use: No  
  Comment: rare, 1 drink per year  Drug use: No  
 Sexual activity: Not Currently Family History Problem Relation Age of Onset  Heart Disease Mother  Kidney Disease Mother  Heart Disease Father  Kidney Disease Father  Cancer Sister Breast  
 
 
OBJECTIVE:  
 
Visit Vitals /70 (BP 1 Location: Left arm, BP Patient Position: Sitting) Pulse 69 Resp 20 Ht 6' 6\" (1.981 m) Wt 267 lb 12.8 oz (121.5 kg) SpO2 96% BMI 30.95 kg/m² CONSTITUTIONAL:   well nourished, appears age appropriate EYES: sclera anicteric, PERRL, EOMI 
ENMT:nares clear, moist mucous membranes, pharynx clear NECK: supple. Thyroid normal, No JVD or bruits RESPIRATORY: Chest: clear to ascultation and percussion, normal inspiratory effort CARDIOVASCULAR: Heart: regular rate and rhythm no murmurs, rubs or gallops, PMI not displaced, No thrills. 2+ peripheral edema, dry rales at both bases GASTROINTESTINAL: Abdomen: non distended, soft, non tender, bowel sounds normal 
HEMATOLOGIC: no purpura, petechiae or bruising LYMPHATIC: No lymph node enlargemant MUSCULOSKELETAL: Extremities: no edema or active synovitis, pulse 1+ INTEGUMENT: No unusual rashes or suspicious skin lesions noted. Nails appear normal. 
PERIPHERAL VASCULAR: normal pulses femoral, PT and DP NEUROLOGIC: non-focal exam, A & O X 3 PSYCHIATRIC:, appropriate affect ASSESSMENT:  
1. Systolic CHF, acute on chronic (HCC) Impression acute on chronic systolic CHF will admit to [facility] [address] telemetry for aggressive treatment of congestive heart failure. PLAN: 
. No orders of the defined types were placed in this encounter. ATTENTION:  
This medical record was transcribed using an electronic medical records system. Although proofread, it may and can contain electronic and spelling errors. Other human spelling and other errors may be present. Corrections may be executed at a later time. Please feel free to contact us for any clarifications as needed. Follow-up Disposition: 
Return TBD. Results for orders placed or performed during the hospital encounter of 01/02/19 FERRITIN Result Value Ref Range  Ferritin 40 26 - 388 NG/ML  
IRON PROFILE  
 Result Value Ref Range Iron 78 35 - 150 ug/dL TIBC 411 250 - 450 ug/dL Iron % saturation 19 (L) 20 - 50 % RENAL FUNCTION PANEL Result Value Ref Range Sodium 132 (L) 136 - 145 mmol/L Potassium 3.5 3.5 - 5.1 mmol/L Chloride 91 (L) 97 - 108 mmol/L  
 CO2 33 (H) 21 - 32 mmol/L Anion gap 8 5 - 15 mmol/L Glucose 175 (H) 65 - 100 mg/dL  (H) 6 - 20 MG/DL Creatinine 2.79 (H) 0.70 - 1.30 MG/DL  
 BUN/Creatinine ratio 39 (H) 12 - 20 GFR est AA 27 (L) >60 ml/min/1.73m2 GFR est non-AA 23 (L) >60 ml/min/1.73m2 Calcium 8.6 8.5 - 10.1 MG/DL Phosphorus 3.8 2.6 - 4.7 MG/DL Albumin 3.2 (L) 3.5 - 5.0 g/dL HGB & HCT Result Value Ref Range HGB 7.5 (L) 12.1 - 17.0 g/dL HCT 24.6 (L) 36.6 - 50.3 % Alma Rosa Schwarz MD 
 
The patient verbalized understanding of the problems and plans as explained.

## 2019-01-02 NOTE — PROGRESS NOTES
1530: Pt arrived to room, direct admit from Dr. Azael Toro office. Dr. Yomi Godfrey aware, awaiting orders. 1800: Pt had 3 watery BMs since admission at 1530, Dr. Kenyon Gutierrez aware, ordered r/o C diff panel and PRN lomotil. Pt on enteric precautions, stool sample sent. 1900: Bedside shift change report given to Bishop Kristen RN (oncoming nurse). Report included the following information SBAR, Kardex, Intake/Output, MAR, Recent Results and Cardiac Rhythm Paced. SHIFT SUMMARY: 
 
 
 
 
 
[facility] NURSING NOTE Admission Date 1/2/2019 Admission Diagnosis CHF 
CHF (congestive heart failure) [address] Consults None Cardiac Monitoring [x] Yes [] No  
  
Purposeful Hourly Rounding [x] Yes   
Thanh Score Total Score: 4 Thanh score 3 or > [x] Bed Alarm [] Avasys [] 1:1 sitter [] Patient refused (Signed refusal form in chart) Aurelio Score Aurelio Score: 19 Aurelio score 14 or < [] PMT consult [] Wound Care consult  
 []  Specialty bed  [] Nutrition consult Influenza Vaccine Received Flu Vaccine for Current Season (usually Sept-March): Yes(9/26/18) Oxygen needs? [x] Room air Oxygen @  []1L    []2L    []3L   []4L    []5L   []6L via NC Chronic home O2 use? [] Yes [] No 
Perform O2 challenge test and document in progress note using smartphrase (.Homeoxygen) Last bowel movement Last Bowel Movement Date: 01/02/19 Urinary Catheter LDAs Peripheral IV 01/02/19 Anterior; Inferior;Left;Lower;Proximal Arm (Active) Site Assessment Clean, dry, & intact 1/2/2019  7:12 PM  
Phlebitis Assessment 0 1/2/2019  4:18 PM  
Infiltration Assessment 0 1/2/2019  4:18 PM  
Dressing Status Clean, dry, & intact 1/2/2019  4:18 PM  
Dressing Type Transparent 1/2/2019  4:18 PM  
Hub Color/Line Status Pink;Flushed;Patent 1/2/2019  4:18 PM  
                  
  
Readmission Risk Assessment Tool Score High Risk 45 Total Score 3 Has Seen PCP in Last 6 Months (Yes=3, No=0) 2 . Living with Significant Other. Assisted Living. LTAC. SNF. or  
Rehab  
 5 Pt. Coverage (Medicare=5 , Medicaid, or Self-Pay=4) 28 Charlson Comorbidity Score (Age + Comorbid Conditions) Criteria that do not apply:  
 Patient Length of Stay (>5 days = 3) IP Visits Last 12 Months (1-3=4, 4=9, >4=11) Expected Length of Stay - - - Actual Length of Stay 0

## 2019-01-03 ENCOUNTER — PATIENT OUTREACH (OUTPATIENT)
Dept: CASE MANAGEMENT | Age: 69
End: 2019-01-03

## 2019-01-03 LAB
ANION GAP SERPL CALC-SCNC: 11 MMOL/L (ref 5–15)
BUN SERPL-MCNC: 116 MG/DL (ref 6–20)
BUN/CREAT SERPL: 41 (ref 12–20)
C DIFF GDH STL QL: NEGATIVE
C DIFF TOX A+B STL QL IA: NEGATIVE
CALCIUM SERPL-MCNC: 8.4 MG/DL (ref 8.5–10.1)
CHLORIDE SERPL-SCNC: 90 MMOL/L (ref 97–108)
CO2 SERPL-SCNC: 28 MMOL/L (ref 21–32)
CREAT SERPL-MCNC: 2.84 MG/DL (ref 0.7–1.3)
ERYTHROCYTE [DISTWIDTH] IN BLOOD BY AUTOMATED COUNT: 15.8 % (ref 11.5–14.5)
GLUCOSE BLD STRIP.AUTO-MCNC: 135 MG/DL (ref 65–100)
GLUCOSE BLD STRIP.AUTO-MCNC: 177 MG/DL (ref 65–100)
GLUCOSE BLD STRIP.AUTO-MCNC: 224 MG/DL (ref 65–100)
GLUCOSE BLD STRIP.AUTO-MCNC: 239 MG/DL (ref 65–100)
GLUCOSE BLD STRIP.AUTO-MCNC: 239 MG/DL (ref 65–100)
GLUCOSE SERPL-MCNC: 152 MG/DL (ref 65–100)
HCT VFR BLD AUTO: 24.3 % (ref 36.6–50.3)
HGB BLD-MCNC: 7.3 G/DL (ref 12.1–17)
INTERPRETATION: NORMAL
MCH RBC QN AUTO: 25.3 PG (ref 26–34)
MCHC RBC AUTO-ENTMCNC: 30 G/DL (ref 30–36.5)
MCV RBC AUTO: 84.1 FL (ref 80–99)
NRBC # BLD: 0 K/UL (ref 0–0.01)
NRBC BLD-RTO: 0 PER 100 WBC
PLATELET # BLD AUTO: 94 K/UL (ref 150–400)
PMV BLD AUTO: 11.8 FL (ref 8.9–12.9)
POTASSIUM SERPL-SCNC: 3.2 MMOL/L (ref 3.5–5.1)
RBC # BLD AUTO: 2.89 M/UL (ref 4.1–5.7)
SERVICE CMNT-IMP: ABNORMAL
SODIUM SERPL-SCNC: 129 MMOL/L (ref 136–145)
WBC # BLD AUTO: 5.4 K/UL (ref 4.1–11.1)

## 2019-01-03 PROCEDURE — 86923 COMPATIBILITY TEST ELECTRIC: CPT

## 2019-01-03 PROCEDURE — 36415 COLL VENOUS BLD VENIPUNCTURE: CPT

## 2019-01-03 PROCEDURE — 36430 TRANSFUSION BLD/BLD COMPNT: CPT

## 2019-01-03 PROCEDURE — 74011000250 HC RX REV CODE- 250: Performed by: INTERNAL MEDICINE

## 2019-01-03 PROCEDURE — 82962 GLUCOSE BLOOD TEST: CPT

## 2019-01-03 PROCEDURE — 97116 GAIT TRAINING THERAPY: CPT

## 2019-01-03 PROCEDURE — 97162 PT EVAL MOD COMPLEX 30 MIN: CPT

## 2019-01-03 PROCEDURE — 65660000000 HC RM CCU STEPDOWN

## 2019-01-03 PROCEDURE — P9016 RBC LEUKOCYTES REDUCED: HCPCS

## 2019-01-03 PROCEDURE — 86850 RBC ANTIBODY SCREEN: CPT

## 2019-01-03 PROCEDURE — 74011636637 HC RX REV CODE- 636/637: Performed by: INTERNAL MEDICINE

## 2019-01-03 PROCEDURE — 30233N1 TRANSFUSION OF NONAUTOLOGOUS RED BLOOD CELLS INTO PERIPHERAL VEIN, PERCUTANEOUS APPROACH: ICD-10-PCS | Performed by: INTERNAL MEDICINE

## 2019-01-03 PROCEDURE — 74011250637 HC RX REV CODE- 250/637: Performed by: INTERNAL MEDICINE

## 2019-01-03 PROCEDURE — 85027 COMPLETE CBC AUTOMATED: CPT

## 2019-01-03 PROCEDURE — 80048 BASIC METABOLIC PNL TOTAL CA: CPT

## 2019-01-03 RX ORDER — POTASSIUM CHLORIDE 1.5 G/1.77G
20 POWDER, FOR SOLUTION ORAL 2 TIMES DAILY WITH MEALS
Status: DISCONTINUED | OUTPATIENT
Start: 2019-01-03 | End: 2019-01-04

## 2019-01-03 RX ORDER — MECLIZINE HCL 12.5 MG 12.5 MG/1
12.5 TABLET ORAL 3 TIMES DAILY
Status: DISCONTINUED | OUTPATIENT
Start: 2019-01-03 | End: 2019-01-05

## 2019-01-03 RX ORDER — INSULIN LISPRO 100 [IU]/ML
5 INJECTION, SOLUTION INTRAVENOUS; SUBCUTANEOUS
COMMUNITY
End: 2019-03-04 | Stop reason: ALTCHOICE

## 2019-01-03 RX ORDER — ONDANSETRON 2 MG/ML
4 INJECTION INTRAMUSCULAR; INTRAVENOUS
Status: DISCONTINUED | OUTPATIENT
Start: 2019-01-03 | End: 2019-01-10 | Stop reason: HOSPADM

## 2019-01-03 RX ORDER — TRAVOPROST OPHTHALMIC SOLUTION 0.04 MG/ML
1 SOLUTION OPHTHALMIC
COMMUNITY
End: 2019-02-01 | Stop reason: ALTCHOICE

## 2019-01-03 RX ORDER — ASCORBIC ACID 250 MG
250 TABLET ORAL DAILY
COMMUNITY
End: 2019-01-10

## 2019-01-03 RX ORDER — INSULIN GLARGINE 100 [IU]/ML
20 INJECTION, SOLUTION SUBCUTANEOUS
COMMUNITY
End: 2019-08-14 | Stop reason: ALTCHOICE

## 2019-01-03 RX ORDER — INSULIN ASPART 100 [IU]/ML
5 INJECTION, SOLUTION INTRAVENOUS; SUBCUTANEOUS
Status: ON HOLD | COMMUNITY
End: 2019-01-03

## 2019-01-03 RX ORDER — PRAMIPEXOLE DIHYDROCHLORIDE 0.25 MG/1
0.5 TABLET ORAL
Status: DISCONTINUED | OUTPATIENT
Start: 2019-01-03 | End: 2019-01-10 | Stop reason: HOSPADM

## 2019-01-03 RX ORDER — SODIUM CHLORIDE 9 MG/ML
250 INJECTION, SOLUTION INTRAVENOUS AS NEEDED
Status: DISCONTINUED | OUTPATIENT
Start: 2019-01-03 | End: 2019-01-10 | Stop reason: HOSPADM

## 2019-01-03 RX ADMIN — COLCHICINE 0.6 MG: 0.6 TABLET, FILM COATED ORAL at 10:09

## 2019-01-03 RX ADMIN — POTASSIUM CHLORIDE 20 MEQ: 1.5 POWDER, FOR SOLUTION ORAL at 19:53

## 2019-01-03 RX ADMIN — OXYCODONE HYDROCHLORIDE AND ACETAMINOPHEN 250 MG: 500 TABLET ORAL at 10:09

## 2019-01-03 RX ADMIN — OXYCODONE HYDROCHLORIDE AND ACETAMINOPHEN 250 MG: 500 TABLET ORAL at 18:52

## 2019-01-03 RX ADMIN — INSULIN GLARGINE 30 UNITS: 100 INJECTION, SOLUTION SUBCUTANEOUS at 22:08

## 2019-01-03 RX ADMIN — Medication 10 ML: at 05:31

## 2019-01-03 RX ADMIN — BUMETANIDE 2 MG: 0.25 INJECTION INTRAMUSCULAR; INTRAVENOUS at 22:24

## 2019-01-03 RX ADMIN — CLONAZEPAM 2 MG: 0.5 TABLET ORAL at 22:08

## 2019-01-03 RX ADMIN — APIXABAN 5 MG: 5 TABLET, FILM COATED ORAL at 10:09

## 2019-01-03 RX ADMIN — POTASSIUM CHLORIDE 20 MEQ: 20 TABLET, EXTENDED RELEASE ORAL at 10:09

## 2019-01-03 RX ADMIN — APIXABAN 5 MG: 5 TABLET, FILM COATED ORAL at 18:52

## 2019-01-03 RX ADMIN — CARVEDILOL 12.5 MG: 12.5 TABLET, FILM COATED ORAL at 10:10

## 2019-01-03 RX ADMIN — PRAMIPEXOLE DIHYDROCHLORIDE 0.5 MG: 0.25 TABLET ORAL at 22:07

## 2019-01-03 RX ADMIN — FERROUS SULFATE TAB 325 MG (65 MG ELEMENTAL FE) 325 MG: 325 (65 FE) TAB at 10:09

## 2019-01-03 RX ADMIN — FEBUXOSTAT 40 MG: 40 TABLET ORAL at 10:09

## 2019-01-03 RX ADMIN — Medication 10 ML: at 22:07

## 2019-01-03 RX ADMIN — FINASTERIDE 5 MG: 5 TABLET, FILM COATED ORAL at 10:08

## 2019-01-03 RX ADMIN — LOSARTAN POTASSIUM 25 MG: 25 TABLET ORAL at 10:09

## 2019-01-03 RX ADMIN — Medication 10 ML: at 18:56

## 2019-01-03 RX ADMIN — TAMSULOSIN HYDROCHLORIDE 0.4 MG: 0.4 CAPSULE ORAL at 10:09

## 2019-01-03 RX ADMIN — FERROUS SULFATE TAB 325 MG (65 MG ELEMENTAL FE) 325 MG: 325 (65 FE) TAB at 22:08

## 2019-01-03 RX ADMIN — DIPHENOXYLATE HYDROCHLORIDE AND ATROPINE SULFATE 1 TABLET: 2.5; .025 TABLET ORAL at 20:02

## 2019-01-03 RX ADMIN — OXYCODONE HYDROCHLORIDE AND ACETAMINOPHEN 250 MG: 500 TABLET ORAL at 22:08

## 2019-01-03 RX ADMIN — BUMETANIDE 2 MG: 0.25 INJECTION INTRAMUSCULAR; INTRAVENOUS at 10:10

## 2019-01-03 RX ADMIN — CARVEDILOL 12.5 MG: 12.5 TABLET, FILM COATED ORAL at 18:52

## 2019-01-03 NOTE — PROGRESS NOTES
[facility] met with patient today to provide an introduction to self, the [id number] at [facility]. Bundled Payment Care Program: 
 
Patient was provided a copy of the [facility] letter. Patient states that they have a functioning scale at home. Patient was not provided a scale during this visit. Reinforced the importance of checking their weight at the same time daily and calling the cardiologist if their weight is up 3 lbs. in a day or 5 lbs. in a week (or per MD guidelines). Patient  has not received a \"Living with Heart Failure\" patient education book. Hug At Home Program: 
 
Provided patient demonstration of "[employer] program on training iPad. Patient was not interested in the "[employer] program. Patient states he prefers to record his weight on a calendar. All questions answered at this time. .  
Patient verbalized understanding of all information discussed.

## 2019-01-03 NOTE — PROGRESS NOTES
Problem: Mobility Impaired (Adult and Pediatric) Goal: *Acute Goals and Plan of Care (Insert Text) Physical Therapy Goals Initiated 1/3/2019 1. Patient will move from supine to sit and sit to supine , scoot up and down and roll side to side in bed with independence within 7 day(s). 2.  Patient will transfer from bed to chair and chair to bed with modified independence using the least restrictive device within 7 day(s). 3.  Patient will perform sit to stand with modified independence within 7 day(s). 4.  Patient will ambulate with modified independence for 200 feet with the least restrictive device within 7 day(s). 5.  Patient will ascend/descend 16 stairs with 1 handrail(s) with modified independence within 7 day(s). physical Therapy EVALUATION Patient: Ashely Anne (67 y.o. male) Date: 1/3/2019 Primary Diagnosis: CHF 
CHF (congestive heart failure) ([facility] [address] Precautions:  Fall ASSESSMENT : 
Based on the objective data described below, the patient presents with impaired functional mobility secondary to impaired standing balance, impaired gait mechanics, generalized weakness, impaired sensation, mild coordination impairments, and decreased activity tolerance following admission for CHF. Pt received supine in bed and agreeable to PT evaluation. Pt cleared by nursing for mobility. VSS at rest on RA. Bed mobility performed with SBA to come to seated position at EOB. Sitting balance intact, however pt needed mod A to reinaldo shoes prior to mobilizing OOB. Pt with numbness from knees to toes in BLE and in CARLO hands due to neuropathy, however reports that he does wear his shoes all of the time. He stood from elevated bed height with min A x 1 and increased time. Standing balance good-fair with walking stick support.  He ambulated 35 ft in room with CGA/min A x 1 and walking stick for support, however demonstrates mildly unsteady gait with widened ARIEL and increased trunk sway throughout. Encouraged pt to ambulate more, however pt with c/o BLE weakness and fatigue. Pt requesting to return to bed, however needed min A x 1 for BLEs to transfer sit>supine. He was left supine in bed with all needs met, RN aware, and bed alarm on following therapy session. Anticipate pt will progress well in acute PT and return home with family support and HHPT at discharge. PT will continue to follow to address mobility impairments as noted above. Patient will benefit from skilled intervention to address the above impairments. Patients rehabilitation potential is considered to be Fair Factors which may influence rehabilitation potential include:  
[]         None noted 
[]         Mental ability/status [x]         Medical condition 
[]         Home/family situation and support systems 
[]         Safety awareness 
[]         Pain tolerance/management 
[]         Other: PLAN : 
Recommendations and Planned Interventions: 
[x]           Bed Mobility Training             []    Neuromuscular Re-Education 
[x]           Transfer Training                   []    Orthotic/Prosthetic Training 
[x]           Gait Training                         []    Modalities [x]           Therapeutic Exercises           []    Edema Management/Control 
[x]           Therapeutic Activities            [x]    Patient and Family Training/Education 
[]           Other (comment): Frequency/Duration: Patient will be followed by physical therapy  4 times a week to address goals. Discharge Recommendations: Home Health and family support Further Equipment Recommendations for Discharge: None SUBJECTIVE:  
Patient stated I just finished therapy.  OBJECTIVE DATA SUMMARY:  
HISTORY:   
Past Medical History:  
Diagnosis Date  Anemia 8/5/2017  Anxiety 8/5/2017  Arrhythmia   
 atrial fibrillation 2014  ASCVD (arteriosclerotic cardiovascular disease) 8/5/2017  BPH (benign prostatic hyperplasia) 8/5/2017  CAD (coronary artery disease)   
 h/o stents  Cancer [facility])   
 h/o skin cancer  Cardiomyopathy ([facility] 8/5/2017  CHF (congestive heart failure) ([facility] 8/5/2017  Chronic kidney disease Stage IV  CKD (chronic kidney disease), stage IV ([address] 8/5/2017  Diabetes ([facility]  Diabetes mellitus ([facility] 8/5/2017  Diabetic neuropathy ([facility] 8/5/2017  DJD (degenerative joint disease) 8/5/2017  ED (erectile dysfunction) 8/5/2017  Gout 8/5/2017  High cholesterol  Hyperlipidemia 8/5/2017  Hypertension  Hypertension with renal disease 8/5/2017  Hypothyroid 8/5/2017  Insomnia 8/5/2017  Obesity 8/5/2017  On statin therapy 8/5/2017  Restless leg 8/5/2017  Thyroid disease   
 hypothyroid Past Surgical History:  
Procedure Laterality Date  CARDIAC SURG PROCEDURE UNLIST    
 cardiac stents  CARDIAC SURG PROCEDURE UNLIST Ablation 5/17/2018 [facility]  COLONOSCOPY N/A 6/28/2016 COLONOSCOPY performed by Eulice Severin, MD at [facility] ENDOSCOPY  
 HX APPENDECTOMY  HX BUNIONECTOMY    
 and removal of 2 seasmoid bone of the great toe 2/2018  HX HEENT Bilateral Cataract surgery  HX HEENT Tonsils  HX HEENT Axe wound to the head  HX KNEE ARTHROSCOPY X 2  
 HX ORTHOPAEDIC    
 HX PACEMAKER Prior Level of Function/Home Situation: Pt is mod I with walking stick in the community and independent in his home for mobility. Performs ADLs independently. Lives with spouse. Reports 5 GLFs within the past year. Reported completing 16 weeks of OP PT to address balance and gait (finishing in Nov 2018). Personal factors and/or comorbidities impacting plan of care: neuropathy; CHF; anxiety; CAD; CKD; gout Home Situation Home Environment: Private residence # Steps to Enter: 0 One/Two Story Residence: Two story # of Interior Steps: 12 Interior Rails: Left Living Alone: No 
Support Systems: Spouse/Significant Other/Partner Patient Expects to be Discharged to[de-identified] Private residence Current DME Used/Available at Home: Cane, straight, Grab bars Tub or Shower Type: Tub/Shower combination EXAMINATION/PRESENTATION/DECISION MAKING: Critical Behavior: 
Neurologic State: Alert Cognition: Appropriate decision making, Appropriate for age attention/concentration, Appropriate safety awareness Hearing: Auditory Auditory Impairment: NoneSkin:  Intact Edema: None Range Of Motion: 
AROM: Within functional limits Strength:   
Strength: Generally decreased, functional 
  
  
  
  
  
  
Tone & Sensation:  
Tone: Normal 
  
  
  
  
Sensation: Impaired Coordination: 
Coordination: Generally decreased, functional 
Vision:  
  
Functional Mobility: 
Bed Mobility: 
Rolling: Stand-by assistance Supine to Sit: Stand-by assistance Sit to Supine: Minimum assistance;Assist x1 Scooting: Stand-by assistance Transfers: 
Sit to Stand: Minimum assistance Stand to Sit: Contact guard assistance Balance:  
Sitting: Intact Standing: Impaired; With support Standing - Static: Good Standing - Dynamic : FairAmbulation/Gait Training:Distance (ft): 35 Feet (ft) Assistive Device: Gait belt(walking stick) Ambulation - Level of Assistance: Contact guard assistance;Minimal assistance;Assist x1;Additional time; Adaptive equipment Gait Description (WDL): Exceptions to [facility] Gait Abnormalities: Decreased step clearance;Trunk sway increased Base of Support: Widened Speed/Anna: Slow Step Length: Left shortened;Right shortened Functional Measure: 
Barthel Index: 
 
Bathin Bladder: 10 Bowels: 10 
Groomin Dressin Feeding: 10 Mobility: 0 Stairs: 0 Toilet Use: 5 Transfer (Bed to Chair and Back): 10 Total: 50 The Barthel ADL Index: Guidelines 1. The index should be used as a record of what a patient does, not as a record of what a patient could do. 2. The main aim is to establish degree of independence from any help, physical or verbal, however minor and for whatever reason. 3. The need for supervision renders the patient not independent. 4. A patient's performance should be established using the best available evidence. Asking the patient, friends/relatives and nurses are the usual sources, but direct observation and common sense are also important. However direct testing is not needed. 5. Usually the patient's performance over the preceding 24-48 hours is important, but occasionally longer periods will be relevant. 6. Middle categories imply that the patient supplies over 50 per cent of the effort. 7. Use of aids to be independent is allowed. Kristofer Ramírez., Barthel, D.W. (3004). Functional evaluation: the Barthel Index. [address] [facility] (14)2. Jessy Zavala sarina DIANA Saeed, Matthias Peña., Jennifer Olivier., [place], [facility] (1999). Measuring the change indisability after inpatient rehabilitation; comparison of the responsiveness of the Barthel Index and Functional Harney Measure. Journal of Neurology, Neurosurgery, and Psychiatry, 66(4), 697-710. Dotty Livingston, N.[place].A, ALIZA Lui, & Krishan Adams, M.A. (2004.) Assessment of post-stroke quality of life in cost-effectiveness studies: The usefulness of the Barthel Index and the EuroQoL-5D. [facility], 13, 115-68 Physical Therapy Evaluation Charge Determination History Examination Presentation Decision-Making HIGH Complexity :3+ comorbidities / personal factors will impact the outcome/ POC  HIGH Complexity : 4+ Standardized tests and measures addressing body structure, function, activity limitation and / or participation in recreation  MEDIUM Complexity : Evolving with changing characteristics  Other outcome measures Barthel Index  MEDIUM Based on the above components, the patient evaluation is determined to be of the following complexity level: MEDIUM Pain: Pain Scale 1: Numeric (0 - 10) Pain Intensity 1: 0 Activity Tolerance:  
Fair - increased fatigue and weakness in BLEs with activity; mild SOB; VSS on RA; c/o chronic CARLO knee pain Please refer to the flowsheet for vital signs taken during this treatment. After treatment:  
[]         Patient left in no apparent distress sitting up in chair 
[x]         Patient left in no apparent distress in bed 
[x]         Call bell left within reach [x]         Nursing notified 
[]         Caregiver present [x]         Bed alarm activated COMMUNICATION/EDUCATION:  
The patients plan of care was discussed with: Physical Therapist and Registered Nurse. [x]         Fall prevention education was provided and the patient/caregiver indicated understanding. [x]         Patient/family have participated as able in goal setting and plan of care. [x]         Patient/family agree to work toward stated goals and plan of care. []         Patient understands intent and goals of therapy, but is neutral about his/her participation. []         Patient is unable to participate in goal setting and plan of care. Thank you for this referral. 
Ralph Lindsay, PT, DPT Time Calculation: 27 mins

## 2019-01-03 NOTE — PROGRESS NOTES
PROGRESS NOTE 
 
NAME:  Royal Michael Hackett. :   1950 MRN:   [id number] Date/Time:  1/3/2019 5:29 AM 
Subjective:  
History:  Chart reviewed and patient seen and examined this AM and D/W his nurse and all events noted. He has a known Ischemic Cardiomyopathy and presented to the office yesterday with an 18 pound weight gain and increased SOB, ANDERS and peripheral edema and was admitted with Acute on chronic systolic CHF. He has had good diuresis overnight; however due to associated diarrhea output isn't accurate. He notes that the watery diarrhea that he developed is better today. He has had no recent antibiotics and no one else has been sick at home. He denies abdominal pains and has no other GI c/o. He has no  c/o. He does seem a little less SOB walking to BR and can now lie flat in bed with nasal oxygen on. He has no CP or other cardio/respiratory c/o. He has no other c/o on complete ROS except weakness Medications reviewed: 
Current Facility-Administered Medications Medication Dose Route Frequency  apixaban (ELIQUIS) tablet 5 mg  5 mg Oral BID  ascorbic acid (vitamin C) (VITAMIN C) tablet 250 mg  250 mg Oral TID  carvedilol (COREG) tablet 12.5 mg  12.5 mg Oral BID WITH MEALS  clonazePAM (KlonoPIN) tablet 2 mg  2 mg Oral QHS  colchicine tablet 0.6 mg  0.6 mg Oral DAILY  ferrous sulfate tablet 325 mg  325 mg Oral TID  finasteride (PROSCAR) tablet 5 mg  5 mg Oral DAILY  insulin glargine (LANTUS) injection 30 Units  30 Units SubCUTAneous QHS  . PHARMACY TO SUBSTITUTE PER PROTOCOL (Reordered from: Liraglutide (VICTOZA) 0.6 mg/0.1 mL (18 mg/3 mL) sub-q pen)    Per Protocol  losartan (COZAAR) tablet 25 mg  25 mg Oral DAILY  metOLazone (ZAROXOLYN) tablet 2.5 mg  2.5 mg Oral EVERY OTHER DAY  pramipexole (MIRAPEX) tablet 0.5 mg  0.5 mg Oral QHS  tamsulosin (FLOMAX) capsule 0.4 mg  0.4 mg Oral DAILY  febuxostat (ULORIC) tablet 40 mg  40 mg Oral DAILY  sodium chloride (NS) flush 5-10 mL  5-10 mL IntraVENous Q8H  
 sodium chloride (NS) flush 5-10 mL  5-10 mL IntraVENous PRN  
 zolpidem (AMBIEN) tablet 10 mg  10 mg Oral QHS PRN  
 acetaminophen (TYLENOL) tablet 650 mg  650 mg Oral Q4H PRN  
 bumetanide (BUMEX) injection 2 mg  2 mg IntraVENous Q12H  potassium chloride (K-DUR, KLOR-CON) SR tablet 20 mEq  20 mEq Oral BID  diphenoxylate-atropine (LOMOTIL) tablet 1 Tab  1 Tab Oral QID PRN Objective:  
Vitals: 
Visit Vitals /62 (BP 1 Location: Left arm, BP Patient Position: At rest) Pulse 60 Temp 97.5 °F (36.4 °C) Resp 18 Wt 259 lb 9.6 oz (117.8 kg) SpO2 100% BMI 30.00 kg/m² O2 Device: Room air Temp (24hrs), Av.5 °F (36.4 °C), Min:97.4 °F (36.3 °C), Max:97.9 °F (36.6 °C) Last 24hr Input/Output: 
 
Intake/Output Summary (Last 24 hours) at 1/3/2019 3487 Last data filed at 1/3/2019 2128 Gross per 24 hour Intake 480 ml Output 450 ml Net 30 ml PHYSICAL EXAM: 
General:     Alert, cooperative, no distress, appears stated age. Head:    Normocephalic, without obvious abnormality, atraumatic. Eyes:    Conjunctivae/corneas clear. PERRLA Nose:   Nares normal. No drainage or sinus tenderness. Throat:     Lips, mucosa, and tongue normal.  No Thrush Neck:   Supple, symmetrical,  no adenopathy, thyroid: non tender 
   no carotid bruit and no JVD. Back:     Symmetric,  No CVA tenderness. Lungs:    Clear to auscultation bilaterally except bibasilar dry rales. No Wheezing or Rhonchi. Heart:    Regular rate and rhythm,  no murmur, rub or gallop. Abdomen:    Soft, non-tender. Not distended. Bowel sounds normal. No masses Extremities:  Extremities normal, atraumatic, No cyanosis. 1-2+ edema. No clubbing Lymph nodes:  Cervical, supraclavicular normal. 
Neurologic:  Normal strength, Alert and oriented X 3. Skin:                 No rash Lab Data Reviewed: 
 
Recent Results (from the past 24 hour(s)) FERRITIN Collection Time: 01/02/19 10:10 AM  
Result Value Ref Range Ferritin 40 26 - 388 NG/ML  
IRON PROFILE Collection Time: 01/02/19 10:10 AM  
Result Value Ref Range Iron 78 35 - 150 ug/dL TIBC 411 250 - 450 ug/dL Iron % saturation 19 (L) 20 - 50 % RENAL FUNCTION PANEL Collection Time: 01/02/19 10:10 AM  
Result Value Ref Range Sodium 132 (L) 136 - 145 mmol/L Potassium 3.5 3.5 - 5.1 mmol/L Chloride 91 (L) 97 - 108 mmol/L  
 CO2 33 (H) 21 - 32 mmol/L Anion gap 8 5 - 15 mmol/L Glucose 175 (H) 65 - 100 mg/dL  (H) 6 - 20 MG/DL Creatinine 2.79 (H) 0.70 - 1.30 MG/DL  
 BUN/Creatinine ratio 39 (H) 12 - 20 GFR est AA 27 (L) >60 ml/min/1.73m2 GFR est non-AA 23 (L) >60 ml/min/1.73m2 Calcium 8.6 8.5 - 10.1 MG/DL Phosphorus 3.8 2.6 - 4.7 MG/DL Albumin 3.2 (L) 3.5 - 5.0 g/dL HGB & HCT Collection Time: 01/02/19 10:10 AM  
Result Value Ref Range HGB 7.5 (L) 12.1 - 17.0 g/dL HCT 24.6 (L) 36.6 - 50.3 % METABOLIC PANEL, COMPREHENSIVE Collection Time: 01/02/19  4:33 PM  
Result Value Ref Range Sodium 132 (L) 136 - 145 mmol/L Potassium 3.7 3.5 - 5.1 mmol/L Chloride 90 (L) 97 - 108 mmol/L  
 CO2 30 21 - 32 mmol/L Anion gap 12 5 - 15 mmol/L Glucose 178 (H) 65 - 100 mg/dL  (H) 6 - 20 MG/DL Creatinine 2.77 (H) 0.70 - 1.30 MG/DL  
 BUN/Creatinine ratio 41 (H) 12 - 20 GFR est AA 28 (L) >60 ml/min/1.73m2 GFR est non-AA 23 (L) >60 ml/min/1.73m2 Calcium 8.6 8.5 - 10.1 MG/DL Bilirubin, total 0.6 0.2 - 1.0 MG/DL  
 ALT (SGPT) 15 12 - 78 U/L  
 AST (SGOT) 22 15 - 37 U/L Alk. phosphatase 103 45 - 117 U/L Protein, total 7.4 6.4 - 8.2 g/dL Albumin 3.3 (L) 3.5 - 5.0 g/dL Globulin 4.1 (H) 2.0 - 4.0 g/dL A-G Ratio 0.8 (L) 1.1 - 2.2    
CBC WITH AUTOMATED DIFF Collection Time: 01/02/19  4:33 PM  
Result Value Ref Range WBC 4.3 4.1 - 11.1 K/uL  
 RBC 3.09 (L) 4.10 - 5.70 M/uL HGB 7.8 (L) 12.1 - 17.0 g/dL HCT 25.8 (L) 36.6 - 50.3 % MCV 83.5 80.0 - 99.0 FL  
 MCH 25.2 (L) 26.0 - 34.0 PG  
 MCHC 30.2 30.0 - 36.5 g/dL  
 RDW 16.0 (H) 11.5 - 14.5 % PLATELET 988 (L) 382 - 400 K/uL MPV 12.1 8.9 - 12.9 FL  
 NRBC 0.0 0  WBC ABSOLUTE NRBC 0.00 0.00 - 0.01 K/uL NEUTROPHILS 78 (H) 32 - 75 % LYMPHOCYTES 9 (L) 12 - 49 % MONOCYTES 12 5 - 13 % EOSINOPHILS 0 0 - 7 % BASOPHILS 1 0 - 1 % IMMATURE GRANULOCYTES 0 0.0 - 0.5 % ABS. NEUTROPHILS 3.4 1.8 - 8.0 K/UL  
 ABS. LYMPHOCYTES 0.4 (L) 0.8 - 3.5 K/UL  
 ABS. MONOCYTES 0.5 0.0 - 1.0 K/UL  
 ABS. EOSINOPHILS 0.0 0.0 - 0.4 K/UL  
 ABS. BASOPHILS 0.0 0.0 - 0.1 K/UL  
 ABS. IMM. GRANS. 0.0 0.00 - 0.04 K/UL  
 DF SMEAR SCANNED    
 RBC COMMENTS ANISOCYTOSIS 
HYPOCHROMIA OVALOCYTES 
    
GLUCOSE, POC Collection Time: 01/02/19  4:47 PM  
Result Value Ref Range Glucose (POC) 213 (H) 65 - 100 mg/dL Performed by Madeleine Salguero (PCT) GLUCOSE, POC Collection Time: 01/02/19  8:51 PM  
Result Value Ref Range Glucose (POC) 192 (H) 65 - 100 mg/dL Performed by Brittany Miles ([facility]ly [facility]) METABOLIC PANEL, BASIC Collection Time: 01/03/19  1:08 AM  
Result Value Ref Range Sodium 129 (L) 136 - 145 mmol/L Potassium 3.2 (L) 3.5 - 5.1 mmol/L Chloride 90 (L) 97 - 108 mmol/L  
 CO2 28 21 - 32 mmol/L Anion gap 11 5 - 15 mmol/L Glucose 152 (H) 65 - 100 mg/dL  (H) 6 - 20 MG/DL Creatinine 2.84 (H) 0.70 - 1.30 MG/DL  
 BUN/Creatinine ratio 41 (H) 12 - 20 GFR est AA 27 (L) >60 ml/min/1.73m2 GFR est non-AA 22 (L) >60 ml/min/1.73m2 Calcium 8.4 (L) 8.5 - 10.1 MG/DL  
CBC W/O DIFF Collection Time: 01/03/19  1:08 AM  
Result Value Ref Range WBC 5.4 4.1 - 11.1 K/uL  
 RBC 2.89 (L) 4.10 - 5.70 M/uL HGB 7.3 (L) 12.1 - 17.0 g/dL HCT 24.3 (L) 36.6 - 50.3 % MCV 84.1 80.0 - 99.0 FL  
 MCH 25.3 (L) 26.0 - 34.0 PG  
 MCHC 30.0 30.0 - 36.5 g/dL  
 RDW 15.8 (H) 11.5 - 14.5 % PLATELET 94 (L) 250 - 400 K/uL MPV 11.8 8.9 - 12.9 FL  
 NRBC 0.0 0  WBC ABSOLUTE NRBC 0.00 0.00 - 0.01 K/uL Assessment/Plan:  
 
Principal Problem: 
  Systolic CHF, acute on chronic ([facility] (1/2/2019) Active Problems: 
  Atrial fibrillation ([facility] (3/7/2014) ASCVD (arteriosclerotic cardiovascular disease) (8/5/2017) CHF (congestive heart failure) ([facility] (8/5/2017) Controlled type 2 diabetes mellitus with stage 4 chronic kidney disease, without long-term current use of insulin ([facility] (8/5/2017) CKD (chronic kidney disease), stage IV ([facility] (8/5/2017) Hypertension with renal disease (8/5/2017) Gastroesophageal reflux disease without esophagitis (8/5/2017) Cardiomyopathy ([facility] (8/5/2017) Anemia (8/5/2017) 
 
  
___________________________________________________ PLAN: 
 
1. Continue diuresis with Bumex IV and add AM Zaroxlyn 2. If no improvement of output, consider adding Dobutamine or Milrinone 3. Cardiology Consult 4. Follow renal function (pqc730/2.77), 116/2.84 today 5. Hgb down to 7.3 from 7.8, With his cardiac situation will benefit from transfusion 6. Follow Na 132 on adm to 129 now 7. Continue Eliquis with PAF 8. Replete K, 3.5 on adm to 3.2 now 9. Continue diabetic meds and follow BS 
10. Continue other home meds 11. C. Diff. sent but doubt it will be positive as no recent antibiotics 40 minutes spent in direct care of this high complexity patient today with high risk of decompensation 
 
 
___________________________________________________ Attending Physician: Nathan Holman MD

## 2019-01-03 NOTE — DIABETES MGMT
DTC Progress Note Recommendations/ Comments: If appropriate, please consider adding Lispro Correctional insulin with normal sensitivity Chart reviewed on Royal KRISTA Brown. Meño Hsu Attempted to see patient for elevated A1c- patient currently working with case management at this time- will attempt to see at a later time. Patient is a 77 y.o. male with known diabetes on intensive insulin regimen at home. A1c:  
Lab Results Component Value Date/Time Hemoglobin A1c 9.5 (H) 11/27/2018 02:55 PM  
 Hemoglobin A1c 8.5 (H) 08/07/2018 02:50 PM  
 
 
Recent Glucose Results:  
Lab Results Component Value Date/Time  (H) 01/03/2019 01:08 AM  
 GLUCPOC 239 (H) 01/03/2019 01:41 PM  
 GLUCPOC 239 (H) 01/03/2019 12:13 PM  
 GLUCPOC 135 (H) 01/03/2019 07:35 AM  
  
 
Lab Results Component Value Date/Time Creatinine 2.84 (H) 01/03/2019 01:08 AM  
 
Estimated Creatinine Clearance: 34.9 mL/min (A) (based on SCr of 2.84 mg/dL (H)). Active Orders Diet DIET DIABETIC CONSISTENT CARB Regular PO intake:  
Patient Vitals for the past 72 hrs: 
 % Diet Eaten 01/03/19 0845 100 % 01/02/19 1813 90 % Current hospital DM medication: Lantus 30 units daily Will continue to follow as needed. Thank you. Chapito Gann RD, CDE [facility] Office:  [phone] Time spent: 15 minutes

## 2019-01-03 NOTE — PROGRESS NOTES
Reason for Admission:   CHF 
            
RRAT Score:  38 Resources/supports as identified by patient/family:  Supportive wife Top Challenges facing patient (as identified by patient/family and CM): Finances/Medication cost?  Has Medicare A&B with [employer] supplement. No concerns for prescription coverage. Transportation? Does not drive due to peripheral neuropathy. Wife is primary mode of transportation. Support system or lack thereof? Wife is main support system. She does not work and is available most of the time to help with ADL/IADL's. Living arrangements? Lives with wife in private residence - 1 story, 0 DRE. Self-care/ADLs/Cognition? Independent at baseline. Has required assistance with ADL/IADL's prior to discharge. Pt is A/Ox4. Current Advanced Directive/Advance Care Plan:  Not on file. Full code. Plan for utilizing home health: Will likely need HH following discharge. Pt stated he has used [facility] in the past but was unable to recall the agency. Likelihood of readmission: Mod to High 
              
Transition of Care Plan:   CM met with pt at bedside to introduce role, verify demographics, and discuss discharge planning. Pt was awake and A/Ox4 throughout the discussion. Pt informed CM that he recently completed outpatient rehab at [facility] that lasted approx. 16 weeks. Pt has also utilized [facility] in the past but has not been to a SNF. Pt stated he would be open to [facility] if recommended once medically stable. CM will continue to follow and facilitate an appropriate transition of care. Care Management Interventions PCP Verified by CM: Nish Yeh [phone]) Last Visit to PCP: 01/02/19 Mode of Transport at Discharge: Other (see comment)(wife) Transition of Care Consult (CM Consult): Discharge Planning(initial eval) Alondra Signup: No 
Discharge Durable Medical Equipment: No(has RW and walking stick) Physical Therapy Consult: Yes Occupational Therapy Consult: No 
Speech Therapy Consult: No 
Current Support Network: Lives with Spouse, Own Home, Family Lives [facility] Follow Up Transport: Family Plan discussed with Pt/Family/Caregiver: Yes(discussed with pt at bedside, a/ox4) CHRISTA Torres, [facility] Care Management [phone]

## 2019-01-03 NOTE — PROGRESS NOTES
Pharmacy Medication Reconciliation The patient was interviewed regarding current PTA medication list, use and drug allergies. The patient was questioned regarding use of any other inhalers, topical products, over the counter medications, herbal medications, vitamin products or ophthalmic/nasal/otic medication use. Allergy Update: No changes Recommendations/Findings: The following amendments were made to the patient's active medication list on file at [facility]:  
1) Additions: - Travatan eye drops- Patient given a sample bottle from Dr Vlad Arriaza at the [facility] 
    - CBD cream- applies to knees daily as needed 2) Deletions: None 3) Changes: - Vitamin C: 1 tablet daily - Colchicine/Mitigare: Take 2 tablets at the start of a gouty attack then 1 tablet every hour as needed - Epoetin: 60,000 units every month - Humalo units with meals - Lantus: 35 units at bedtime 4) Additional Information: 
     - Pramipexole: Patient reports that he will take an additional tablet if he needs it for symptom relief but he does not do that often. 
 
 
-Clarified PTA med list with Rx Query and the patient. PTA medication list was corrected to the following:  
 
Prior to Admission Medications Prescriptions Last Dose Informant Patient Reported? Taking? Ferrous Sulfate (SLOW FE) 47.5 mg iron TbER tablet 2019 at Unknown time  No Yes Sig: Take 1 Tab by mouth three (3) times daily. Liraglutide (VICTOZA) 0.6 mg/0.1 mL (18 mg/3 mL) sub-q pen 2019 at Unknown time  Yes Yes Si.6 mg by SubCUTAneous route daily. MITIGARE 0.6 mg capsule 2018 at Unknown time  Yes Yes Sig: Take 2 tablets at onset of gouty attack then 1 tablet every hour as needed. May fill colchicine if Michela Redhead is not covered by insurance. OTHER   Yes Yes Sig: Apply  to affected area daily as needed (Knee Pain). CBD Cream:  Apply daily as needed for knee pain ULORIC 40 mg tab tablet 2018 at Unknown time  No Yes Sig: TAKE 1 TABLET BY MOUTH DAILY  
apixaban (ELIQUIS) 5 mg tablet 2019 at Unknown time Self Yes Yes Sig: Take 5 mg by mouth two (2) times a day. ascorbic acid, vitamin C, (VITAMIN C) 250 mg tablet   Yes Yes Sig: Take 250 mg by mouth daily. bumetanide (BUMEX) 2 mg tablet 2019 at Unknown time  No Yes Sig: TAKE 2 TABLETS BY MOUTH TWICE DAILY  
carvedilol (COREG) 12.5 mg tablet 2019 at Unknown time  No Yes Sig: TAKE 1 TABLET BY MOUTH TWICE DAILY  
clonazePAM (KLONOPIN) 2 mg tablet 2019 at Unknown time  No Yes Sig: TAKE 1 TABLET BY MOUTH EVERY NIGHT AT BEDTIME. MAY REPEAT DOSE IF YOU WAKE UP  
colchicine (COLCRYS) 0.6 mg tablet 2018 at Unknown time  Yes Yes Sig: Take 0.6 mg by mouth as needed (Gouty attack). Take 2 tablets at onset of gouty attack then 1 tablet every hour as needed  
epoetin mary (EPOGEN;PROCRIT) 40,000 unit/mL injection 2019 at Unknown time  Yes Yes Si,000 Units by SubCUTAneous route every thirty (30) days. finasteride (PROSCAR) 5 mg tablet 2019 at Unknown time Self Yes Yes Sig: Take 5 mg by mouth daily. insulin glargine (LANTUS SOLOSTAR U-100 INSULIN) 100 unit/mL (3 mL) inpn   Yes Yes Si Units by SubCUTAneous route nightly. insulin lispro (HUMALOG) 100 unit/mL kwikpen   Yes Yes Si Units by SubCUTAneous route Before breakfast, lunch, and dinner. losartan (COZAAR) 25 mg tablet 2019 at Unknown time  No Yes Sig: Take 1 Tab by mouth daily. metOLazone (ZAROXOLYN) 2.5 mg tablet 2018 at Unknown time  No Yes Sig: Take 1 Tab by mouth every other day. Take twicw weekly for fluid  
potassium chloride (K-DUR, KLOR-CON) 20 mEq tablet 2019 at Unknown time  No Yes Sig: Take 1 Tab by mouth daily. pramipexole (MIRAPEX) 0.5 mg tablet 2019 at Unknown time  No Yes Sig: TAKE 1 TABLET BY MOUTH EVERY NIGHT AT BEDTIME  
 Patient taking differently: May take an additional tablet if needed  
tamsulosin (FLOMAX) 0.4 mg capsule 1/1/2019 at Unknown time  No Yes Sig: TAKE 2 CAPSULES BY MOUTH EVERY DAY  
travoprost (TRAVATAN Z) 0.004 % ophthalmic solution   Yes Yes Sig: Administer 1 Drop to right eye nightly. Facility-Administered Medications: None Thank you, Mariann Patterson, PHARMD

## 2019-01-03 NOTE — PROGRESS NOTES
4753 
Report received from Son, [facility]. SBAR, Kardex, ED Summary, Procedure Summary, Intake/Output, MAR, Accordion, Recent Results, Med Rec Status and Cardiac Rhythm NSR to paced were discussed. [address] Ambulated patient to the restroom. Patient felt lightheaded and experienced ANDERS. O2 100% and /62. 
 
1240 
1 unit PRBC started. Consent signed. Dr. Rachana Leach explained blood transfusion to patient. Patient had no questions. [address] Patient stated he is feeling lightheaded, nauseous and dizzy, stated, \"I feel like I am going to faint. Patient is receiving blood for the first time. Nurse stopped blood. Checked vitals and BG. Jayce CONNORS.  Had RR nurse assess. Dr. Rachana Leach notified of symptoms. MD and RR nurse do not think the symptoms are a transfusion reaction. New orders received. Zofran 4mg IV QID PRN; Meclizine 12.5 mg TID. Blood restarted. Blood completed at 1530. About 1 hour post transfusion patient noted 2 purple area's on L hand that started itching and appeared to be a rash. Patient applied lotion and itching stopped. Nurse searched patients body for any further areas, none noted. Patient stated he is having problems today with RLS today and takes Mirapex at home at bedtime as well as PRN during the day. Discussed with Dr. Rachana Leach. OK to administer PRN dose during the day. Patient had large loose BM and large amount of urine output (per patient report). Patient stated nausea has subsided as well as dizziness. He would like to hold off on Meclizine and does not need Zofran now. Patient is diuresing well but is urinating during diarrhea episodes. Diarrhea is subsiding but patient did have 4-5 episodes today.

## 2019-01-04 ENCOUNTER — DOCUMENTATION ONLY (OUTPATIENT)
Dept: CASE MANAGEMENT | Age: 69
End: 2019-01-04

## 2019-01-04 ENCOUNTER — APPOINTMENT (OUTPATIENT)
Dept: GENERAL RADIOLOGY | Age: 69
DRG: 291 | End: 2019-01-04
Attending: INTERNAL MEDICINE
Payer: MEDICARE

## 2019-01-04 LAB
ANION GAP SERPL CALC-SCNC: 12 MMOL/L (ref 5–15)
APPEARANCE UR: CLEAR
BACTERIA URNS QL MICRO: NEGATIVE /HPF
BASOPHILS # BLD: 0 K/UL (ref 0–0.1)
BASOPHILS NFR BLD: 0 % (ref 0–1)
BILIRUB UR QL: NEGATIVE
BUN SERPL-MCNC: 110 MG/DL (ref 6–20)
BUN/CREAT SERPL: 36 (ref 12–20)
CALCIUM SERPL-MCNC: 7.6 MG/DL (ref 8.5–10.1)
CHLORIDE SERPL-SCNC: 91 MMOL/L (ref 97–108)
CO2 SERPL-SCNC: 26 MMOL/L (ref 21–32)
COLOR UR: ABNORMAL
CREAT SERPL-MCNC: 3.06 MG/DL (ref 0.7–1.3)
DIFFERENTIAL METHOD BLD: ABNORMAL
EOSINOPHIL # BLD: 0 K/UL (ref 0–0.4)
EOSINOPHIL NFR BLD: 0 % (ref 0–7)
EPITH CASTS URNS QL MICRO: ABNORMAL /LPF
ERYTHROCYTE [DISTWIDTH] IN BLOOD BY AUTOMATED COUNT: 15.9 % (ref 11.5–14.5)
GLUCOSE BLD STRIP.AUTO-MCNC: 136 MG/DL (ref 65–100)
GLUCOSE BLD STRIP.AUTO-MCNC: 148 MG/DL (ref 65–100)
GLUCOSE BLD STRIP.AUTO-MCNC: 149 MG/DL (ref 65–100)
GLUCOSE BLD STRIP.AUTO-MCNC: 153 MG/DL (ref 65–100)
GLUCOSE SERPL-MCNC: 128 MG/DL (ref 65–100)
GLUCOSE UR STRIP.AUTO-MCNC: NEGATIVE MG/DL
HCT VFR BLD AUTO: 25.3 % (ref 36.6–50.3)
HGB BLD-MCNC: 7.7 G/DL (ref 12.1–17)
HGB UR QL STRIP: ABNORMAL
HYALINE CASTS URNS QL MICRO: ABNORMAL /LPF (ref 0–5)
IMM GRANULOCYTES # BLD: 0 K/UL (ref 0–0.04)
IMM GRANULOCYTES NFR BLD AUTO: 1 % (ref 0–0.5)
KETONES UR QL STRIP.AUTO: NEGATIVE MG/DL
LEUKOCYTE ESTERASE UR QL STRIP.AUTO: NEGATIVE
LYMPHOCYTES # BLD: 0.5 K/UL (ref 0.8–3.5)
LYMPHOCYTES NFR BLD: 11 % (ref 12–49)
MCH RBC QN AUTO: 25.5 PG (ref 26–34)
MCHC RBC AUTO-ENTMCNC: 30.4 G/DL (ref 30–36.5)
MCV RBC AUTO: 83.8 FL (ref 80–99)
MONOCYTES # BLD: 0.5 K/UL (ref 0–1)
MONOCYTES NFR BLD: 10 % (ref 5–13)
NEUTS SEG # BLD: 3.9 K/UL (ref 1.8–8)
NEUTS SEG NFR BLD: 79 % (ref 32–75)
NITRITE UR QL STRIP.AUTO: NEGATIVE
NRBC # BLD: 0 K/UL (ref 0–0.01)
NRBC BLD-RTO: 0 PER 100 WBC
PH UR STRIP: 5.5 [PH] (ref 5–8)
PLATELET # BLD AUTO: 93 K/UL (ref 150–400)
PMV BLD AUTO: 12 FL (ref 8.9–12.9)
POTASSIUM SERPL-SCNC: 3.2 MMOL/L (ref 3.5–5.1)
PROT UR STRIP-MCNC: ABNORMAL MG/DL
RBC # BLD AUTO: 3.02 M/UL (ref 4.1–5.7)
RBC #/AREA URNS HPF: ABNORMAL /HPF (ref 0–5)
SERVICE CMNT-IMP: ABNORMAL
SODIUM SERPL-SCNC: 129 MMOL/L (ref 136–145)
SP GR UR REFRACTOMETRY: 1.01 (ref 1–1.03)
UA: UC IF INDICATED,UAUC: ABNORMAL
UROBILINOGEN UR QL STRIP.AUTO: 0.2 EU/DL (ref 0.2–1)
WBC # BLD AUTO: 5 K/UL (ref 4.1–11.1)
WBC URNS QL MICRO: ABNORMAL /HPF (ref 0–4)

## 2019-01-04 PROCEDURE — 74011636637 HC RX REV CODE- 636/637: Performed by: INTERNAL MEDICINE

## 2019-01-04 PROCEDURE — 81001 URINALYSIS AUTO W/SCOPE: CPT

## 2019-01-04 PROCEDURE — P9016 RBC LEUKOCYTES REDUCED: HCPCS

## 2019-01-04 PROCEDURE — 82962 GLUCOSE BLOOD TEST: CPT

## 2019-01-04 PROCEDURE — 74011250637 HC RX REV CODE- 250/637: Performed by: INTERNAL MEDICINE

## 2019-01-04 PROCEDURE — 97116 GAIT TRAINING THERAPY: CPT

## 2019-01-04 PROCEDURE — 71045 X-RAY EXAM CHEST 1 VIEW: CPT

## 2019-01-04 PROCEDURE — 74011250636 HC RX REV CODE- 250/636: Performed by: INTERNAL MEDICINE

## 2019-01-04 PROCEDURE — 36430 TRANSFUSION BLD/BLD COMPNT: CPT

## 2019-01-04 PROCEDURE — 85025 COMPLETE CBC W/AUTO DIFF WBC: CPT

## 2019-01-04 PROCEDURE — 36415 COLL VENOUS BLD VENIPUNCTURE: CPT

## 2019-01-04 PROCEDURE — 80048 BASIC METABOLIC PNL TOTAL CA: CPT

## 2019-01-04 PROCEDURE — 65660000000 HC RM CCU STEPDOWN

## 2019-01-04 PROCEDURE — 74011000250 HC RX REV CODE- 250: Performed by: INTERNAL MEDICINE

## 2019-01-04 RX ORDER — SODIUM CHLORIDE 9 MG/ML
250 INJECTION, SOLUTION INTRAVENOUS AS NEEDED
Status: DISCONTINUED | OUTPATIENT
Start: 2019-01-04 | End: 2019-01-10 | Stop reason: HOSPADM

## 2019-01-04 RX ORDER — CIPROFLOXACIN 500 MG/1
500 TABLET ORAL 2 TIMES DAILY
Status: DISCONTINUED | OUTPATIENT
Start: 2019-01-04 | End: 2019-01-10 | Stop reason: HOSPADM

## 2019-01-04 RX ORDER — POTASSIUM CHLORIDE 1.5 G/1.77G
40 POWDER, FOR SOLUTION ORAL 2 TIMES DAILY WITH MEALS
Status: DISCONTINUED | OUTPATIENT
Start: 2019-01-04 | End: 2019-01-10 | Stop reason: HOSPADM

## 2019-01-04 RX ADMIN — INSULIN GLARGINE 30 UNITS: 100 INJECTION, SOLUTION SUBCUTANEOUS at 21:37

## 2019-01-04 RX ADMIN — CIPROFLOXACIN HYDROCHLORIDE 500 MG: 500 TABLET, FILM COATED ORAL at 15:44

## 2019-01-04 RX ADMIN — FERROUS SULFATE TAB 325 MG (65 MG ELEMENTAL FE) 325 MG: 325 (65 FE) TAB at 09:04

## 2019-01-04 RX ADMIN — CARVEDILOL 12.5 MG: 12.5 TABLET, FILM COATED ORAL at 18:29

## 2019-01-04 RX ADMIN — BUMETANIDE 2 MG: 0.25 INJECTION INTRAMUSCULAR; INTRAVENOUS at 09:11

## 2019-01-04 RX ADMIN — POTASSIUM CHLORIDE 40 MEQ: 1.5 POWDER, FOR SOLUTION ORAL at 18:29

## 2019-01-04 RX ADMIN — LOSARTAN POTASSIUM 25 MG: 25 TABLET ORAL at 09:04

## 2019-01-04 RX ADMIN — COLCHICINE 0.6 MG: 0.6 TABLET, FILM COATED ORAL at 09:12

## 2019-01-04 RX ADMIN — CIPROFLOXACIN HYDROCHLORIDE 500 MG: 500 TABLET, FILM COATED ORAL at 18:29

## 2019-01-04 RX ADMIN — Medication 10 ML: at 15:44

## 2019-01-04 RX ADMIN — PRAMIPEXOLE DIHYDROCHLORIDE 0.5 MG: 0.25 TABLET ORAL at 18:29

## 2019-01-04 RX ADMIN — OXYCODONE HYDROCHLORIDE AND ACETAMINOPHEN 250 MG: 500 TABLET ORAL at 21:38

## 2019-01-04 RX ADMIN — FERROUS SULFATE TAB 325 MG (65 MG ELEMENTAL FE) 325 MG: 325 (65 FE) TAB at 14:30

## 2019-01-04 RX ADMIN — PRAMIPEXOLE DIHYDROCHLORIDE 0.5 MG: 0.25 TABLET ORAL at 21:38

## 2019-01-04 RX ADMIN — Medication 10 ML: at 21:37

## 2019-01-04 RX ADMIN — CLONAZEPAM 2 MG: 0.5 TABLET ORAL at 21:38

## 2019-01-04 RX ADMIN — FERROUS SULFATE TAB 325 MG (65 MG ELEMENTAL FE) 325 MG: 325 (65 FE) TAB at 21:38

## 2019-01-04 RX ADMIN — MECLIZINE 12.5 MG: 12.5 TABLET ORAL at 15:44

## 2019-01-04 RX ADMIN — TAMSULOSIN HYDROCHLORIDE 0.4 MG: 0.4 CAPSULE ORAL at 09:04

## 2019-01-04 RX ADMIN — FEBUXOSTAT 40 MG: 40 TABLET ORAL at 09:12

## 2019-01-04 RX ADMIN — POTASSIUM CHLORIDE 40 MEQ: 1.5 POWDER, FOR SOLUTION ORAL at 09:05

## 2019-01-04 RX ADMIN — APIXABAN 5 MG: 5 TABLET, FILM COATED ORAL at 09:04

## 2019-01-04 RX ADMIN — FINASTERIDE 5 MG: 5 TABLET, FILM COATED ORAL at 09:04

## 2019-01-04 RX ADMIN — SODIUM CHLORIDE 250 ML: 900 INJECTION, SOLUTION INTRAVENOUS at 10:16

## 2019-01-04 RX ADMIN — OXYCODONE HYDROCHLORIDE AND ACETAMINOPHEN 250 MG: 500 TABLET ORAL at 15:44

## 2019-01-04 RX ADMIN — MECLIZINE 12.5 MG: 12.5 TABLET ORAL at 09:00

## 2019-01-04 RX ADMIN — METOLAZONE 2.5 MG: 2.5 TABLET ORAL at 18:29

## 2019-01-04 RX ADMIN — BUMETANIDE 2 MG: 0.25 INJECTION INTRAMUSCULAR; INTRAVENOUS at 21:38

## 2019-01-04 RX ADMIN — OXYCODONE HYDROCHLORIDE AND ACETAMINOPHEN 250 MG: 500 TABLET ORAL at 09:04

## 2019-01-04 RX ADMIN — CARVEDILOL 12.5 MG: 12.5 TABLET, FILM COATED ORAL at 09:04

## 2019-01-04 NOTE — PROGRESS NOTES
Problem: Falls - Risk of 
Goal: *Absence of Falls Document Ruben Jimenezs Fall Risk and appropriate interventions in the flowsheet. Outcome: Progressing Towards Goal 
Fall Risk Interventions: 
Mobility Interventions: Bed/chair exit alarm, Patient to call before getting OOB, Utilize walker, cane, or other assistive device Medication Interventions: Bed/chair exit alarm, Assess postural VS orthostatic hypotension, Patient to call before getting OOB, Teach patient to arise slowly Elimination Interventions: Bed/chair exit alarm, Call light in reach, Patient to call for help with toileting needs, Urinal in reach History of Falls Interventions: Bed/chair exit alarm

## 2019-01-04 NOTE — PROGRESS NOTES
CARDIOLOGY Progress Note Patient ID: 
Patient: Nazario Byrd. MRN: [id number] Age: 77 y.o.  : 1950 Date of  Admission: 2019  3:24 PM  
PCP:  Melonie Mack MD 
 
Assessment: 1. Acute on chronic diastolic heart failure with small R pleural effusion, worsening lower ext edema, and CHF sx. Admitted dietary indiscretion and worsening anemia. Last 3 Recorded Weights in this Encounter 19 1606 19 [id number] 19 [id number] Weight: 119.1 kg (262 lb 8 oz) 117.8 kg (259 lb 9.6 oz) 117.7 kg (259 lb 7.7 oz) 2. Paroxysmal atrial fibrillation and atypical atrial flutter s/p ablation in 2018. NO atrial fibrillation or flutter since the ablation. 3. Hypertensive heart disease with a history of diastolic congestive heart failure and CKD stage 4. EF 55% on prior echo. 4. Coronary artery disease with multivessel interventions. His last stenting was in . 
5. Mild idiopathic pericardial effusion in the past. 
6. Diabetes mellitus type 2, on chronic insulin. 7. Hyperlipidemia. 8. History of esophageal dilation for dysphagia. 9. Hiatal hernia. 10. Anemia of chronic renal disease. On EPO (and Fe supplement). 11. Mild thrombocytopenia. 12. Chronic anticoagulation. 13. Mild hyponatremia, multifactorial (volume overload, Na loss during diuresis, etc). Plan: 1. Continue diuresis, agree with watching renal indices, very slowly climbing. Challenging. 2. Echo PENDING, if EF 50% or less, then I'd recommend upgrade to BIV pacemaker since he's pacer-dependent and has heart failure. If we're ventricular pacing this much in the setting of even mild cardiomyopathy, we'd want a superior mode to limit heart failure. 3. 2g Na diet. 4. Interrogated pacemaker on --changed base rate to 70, increased AV delays (225/200), made rate adaptive behavior more aggressive to have faster HR's with activity. 5. Continue beta-blocker. 6. On paper, not a good candidate for ACEI or ARB or spironolactone due to renal dysfunction (risk of hyperkalemia, acidosis, worsening renal failure) but also controversial whether discontinuing this would improve renal function. Certainly hasn't shown hyperkalemia, acidosis, etc. here. I'll defer to Dr. Federico Tee on this. 7. I'll stop Eliquis at least temporarily since bleeding is a possible explanation for H&H drop, since he has not had any Afib since his ablation in 5/2018. Can be restarted after evaluation. Echo pending. If EF 50% or less, would consider upgrade to BIV pacemaker though would try not to use contrast dye for visualization given renal insufficiency. If EF is >50%, then may still upgrade if this is refractory diastolic heart failure. [x]       High complexity decision making was performed in this patient with multiple organ involvement. Royal KRISTA Ocampo is a 77 y.o. male with a history of atrial arrhythmias s/p ablation 5/2018, has done well from an arrhythmia standpoint. He has had chronic issues with fluid retention. Over the holidays, he admits to dietary indiscretion (salt, fluid, volume) and gained weight and lower extremity edema over several days. He became more dyspneic and got to the point when he started to get some rest symptoms. +orthopnea, PND. He saw Dr. Federico Tee who admitted him for management of his heart failure and fluid retention. Here he's lost weight, his legs look much better to him, but still some weight to drop. He had some diarrhea of unclear etiology recently, resolved. TODAY:  No syncope, near-syncope, TIA, or stroke symptoms. Does have postural dizziness for a few seconds. Denies resting dyspnea, but does have severe exertional dyspnea and some orthopnea. No reported bleeding problems on Eliquis though he does have significant anemia however and has gotten 2 blood transfusions so far here for declining H&H. Echo pending. Allergies Allergen Reactions  Niacin Unknown (comments) Other reaction(s): Unknown (comments)  Imipenem Diarrhea Other reaction(s): Rash  Levemir [Insulin Detemir] Hives  Other Medication Other (comments) ? Allergy to Maryjo Osler causing chemical burn  Primaxin [Imipenem-Cilastatin] Diarrhea and Rash  Xarelto [Rivaroxaban] Rash and Itching Other reaction(s): Rash Current Facility-Administered Medications Medication Dose Route Frequency  0.9% sodium chloride infusion 250 mL  250 mL IntraVENous PRN  potassium chloride (KLOR-CON) packet for solution 40 mEq  40 mEq Oral BID WITH MEALS  ciprofloxacin HCl (CIPRO) tablet 500 mg  500 mg Oral BID  liraglutide (VICTOZA) 0.6 mg/0.1 mL (18 mg/3 mL) sub-q pen 1.2 mg (Patient Supplied)  1.2 mg SubCUTAneous DAILY  0.9% sodium chloride infusion 250 mL  250 mL IntraVENous PRN  
 meclizine (ANTIVERT) tablet 12.5 mg  12.5 mg Oral TID  pramipexole (MIRAPEX) tablet 0.5 mg  0.5 mg Oral DAILY PRN  
 ondansetron (ZOFRAN) injection 4 mg  4 mg IntraVENous QID PRN  
 apixaban (ELIQUIS) tablet 5 mg  5 mg Oral BID  ascorbic acid (vitamin C) (VITAMIN C) tablet 250 mg  250 mg Oral TID  carvedilol (COREG) tablet 12.5 mg  12.5 mg Oral BID WITH MEALS  clonazePAM (KlonoPIN) tablet 2 mg  2 mg Oral QHS  colchicine tablet 0.6 mg  0.6 mg Oral DAILY  ferrous sulfate tablet 325 mg  325 mg Oral TID  finasteride (PROSCAR) tablet 5 mg  5 mg Oral DAILY  insulin glargine (LANTUS) injection 30 Units  30 Units SubCUTAneous QHS  losartan (COZAAR) tablet 25 mg  25 mg Oral DAILY  metOLazone (ZAROXOLYN) tablet 2.5 mg  2.5 mg Oral EVERY OTHER DAY  pramipexole (MIRAPEX) tablet 0.5 mg  0.5 mg Oral QHS  tamsulosin (FLOMAX) capsule 0.4 mg  0.4 mg Oral DAILY  febuxostat (ULORIC) tablet 40 mg  40 mg Oral DAILY  sodium chloride (NS) flush 5-10 mL  5-10 mL IntraVENous Q8H  
  sodium chloride (NS) flush 5-10 mL  5-10 mL IntraVENous PRN  
 zolpidem (AMBIEN) tablet 10 mg  10 mg Oral QHS PRN  
 acetaminophen (TYLENOL) tablet 650 mg  650 mg Oral Q4H PRN  
 bumetanide (BUMEX) injection 2 mg  2 mg IntraVENous Q12H  diphenoxylate-atropine (LOMOTIL) tablet 1 Tab  1 Tab Oral QID PRN Review of Symptoms:   
Cardiology: negative for chest pain, palpitations, PND, syncope Gastrointestinal: negative for abdominal pain, N/V, dysphagia :  +gross hematuria, no dysuria Objective:  
  
Physical Exam: 
Temp (24hrs), Av.7 °F (36.5 °C), Min:97.3 °F (36.3 °C), Max:98.3 °F (36.8 °C) Patient Vitals for the past 8 hrs: 
 Pulse 19 1442 (!) 59  
19 1248 60  
19 1200 60  
19 1129 61  
19 1057 60  
19 1041 60  
19 1018 60 Patient Vitals for the past 8 hrs: 
 Resp  
19 1442 20  
19 1248 18  
19 1200 20  
19 1129 20  
19 1057 14  
19 1041 16  
19 1018 16 Patient Vitals for the past 8 hrs: 
 BP  
19 1442 109/68  
19 1248 115/72  
19 1200 115/76  
19 1129 112/74  
19 1057 118/79  
19 1041 112/77  
19 1018 113/72 Intake/Output Summary (Last 24 hours) at 2019 1735 Last data filed at 2019 1558 Gross per 24 hour Intake 1380 ml Output 660 ml Net 720 ml Nondiaphoretic, not in acute distress. Supple, no palpable thyromegaly. No scleral icterus, mucous membranes moist, conjuctivae pink, no xanthelasma. Unlabored, clear to auscultation bilaterally anteriorly, symmetric air movement. Regular rate and paced rhythm, no new murmur, pericardial rub, knock, or gallop. No JVD but 1-2+peripheral edema. Palpable radial pulses bilaterally. Abdomen, soft, nontender, nondistended. No abdominal bruit or pulstatile masses. Extremities without cyanosis or clubbing.   Muscle tone and bulk normal. 
 Skin warm and dry. No rashes or ulcers but there is some erythema and excoriations of the pretibial area. Neuro grossly nonfocal.  No tremor. Awake and appropriate. CARDIOGRAPHICS and STUDIES, I reviewed: 
 
Telemetry:  AV pacing. Transesophageal Echo 5/2018: 
LEFT VENTRICLE: Size was normal. Systolic function was normal without wall motion abnormality with an ejection fraction estimated to be 55-60%. Wall thickness was mildly increased. RIGHT VENTRICLE: The size was normal. Systolic function was normal. Wall thickness was normal. 
 
LEFT ATRIUM: Size was moderately-enlarged. No thrombus was identified. APPENDAGE:  No thrombus was identified. DOPPLER: The function was abnormal (poor emptying velocity during atrial fibrillation). INTERATRIAL SEPTUM: No septal defect or aneurysm was noted.  No shunt with color flow doppler. RIGHT ATRIUM: Size was mildly-enlarged. No thrombus was identified. MITRAL VALVE: Normal valve structure. There was normal leaflet separation. No obvious mass, vegetation or thrombus noted. DOPPLER: There was mild non-rheumatic and central regurgitation. AORTIC VALVE: The aortic valve was trileaflet. Leaflets exhibited normal cuspal separation without stenosis. No obvious mass, vegetation, or thrombus noted. DOPPLER: There was mild regurgitation. TRICUSPID VALVE: Normal valve structure. There was normal leaflet separation. No obvious mass, vegetation or thrombus noted. DOPPLER: There was trace non-rheumatic regurgitation. Peak tricuspid regurgitation velocity jet was 3 m/sec consistent with mild pulmonary hypertension (46 mm Hg) assuming a right atrial pressure of 10 mm Hg. 
   
PULMONIC VALVE:  No obvious abnormality. 
   
AORTA: Normal root. No dissection, aneurysm, or mobile plaque in visualized portions.   Mild plaque was noted. 
   
PERICARDIUM:  Nothing more than a trace pericardial effusion was noted.  Normal thickness of pericardium. 
  
 PACEMAKER LEADS:  Visualized portions were without abnormality. CXR 1/2:  Small R pleural effusion. Labs: 
No results for input(s): CPK, CKMB, CKNDX, TROIQ in the last 72 hours. No lab exists for component: CPKMB Lab Results Component Value Date/Time Cholesterol, total 112 11/27/2018 02:55 PM  
 HDL Cholesterol 27 (L) 11/27/2018 02:55 PM  
 LDL, calculated 68 11/27/2018 02:55 PM  
 Triglyceride 85 11/27/2018 02:55 PM  
 
No results for input(s): INR, PTP, APTT in the last 72 hours. No lab exists for component: INREXT, INREXT Recent Labs 01/04/19 
0546 01/03/19 
0108 01/02/19 
1633 01/02/19 [address] * 129* 132* 132* K 3.2* 3.2* 3.7 3.5 CL 91* 90* 90* 91* CO2 26 28 30 33* * 116* 113* 110* CREA 3.06* 2.84* 2.77* 2.79* * 152* 178* 175* PHOS  --   --   --  3.8 CA 7.6* 8.4* 8.6 8.6 ALB  --   --  3.3* 3.2* WBC 5.0 5.4 4.3  --   
HGB 7.7* 7.3* 7.8* 7.5* HCT 25.3* 24.3* 25.8* 24.6* PLT 93* 94* 102*  --   
 
Recent Labs 01/02/19 
1633 01/02/19 [address] SGOT 22  --   
  --   
TP 7.4  --   
ALB 3.3* 3.2*  
GLOB 4.1*  -- No components found for: [place] No results for input(s): PH, PCO2, PO2 in the last 72 hours. Edward Alcaraz MD 
1/4/2019

## 2019-01-04 NOTE — PROGRESS NOTES
Bedside and Verbal shift change report given to Amanda Castro RN (oncoming nurse). Report included the following information SBAR, Kardex, ED Summary, Procedure Summary, Intake/Output, MAR, Recent Results and Cardiac Rhythm (Paced). SHIFT SUMMARY: 
 
 
 
 
 
[facility] NURSING NOTE Admission Date 1/2/2019 Admission Diagnosis CHF 
CHF (congestive heart failure) ([address] Consults IP CONSULT TO CARDIOLOGY Cardiac Monitoring [x] Yes [] No  
  
Purposeful Hourly Rounding [x] Yes   
Thanh Score Total Score: 3 Thanh score 3 or > [x] Bed Alarm [] Avasys [] 1:1 sitter [] Patient refused (Signed refusal form in chart) Aurelio Score Aurelio Score: 20 Aurelio score 14 or < [] PMT consult [] Wound Care consult  
 []  Specialty bed  [] Nutrition consult Influenza Vaccine Received Flu Vaccine for Current Season (usually Sept-March): Yes(9/26/18) Oxygen needs? [x] Room air Oxygen @  []1L    []2L    []3L   []4L    []5L   []6L via NC Chronic home O2 use? [] Yes [x] No 
Perform O2 challenge test and document in progress note using smartphrase (.Homeoxygen) Last bowel movement Last Bowel Movement Date: 01/03/19 Urinary Catheter LDAs Peripheral IV 01/02/19 Anterior; Inferior;Left;Lower;Proximal Arm (Active) Site Assessment Clean, dry, & intact 1/4/2019  3:09 AM  
Phlebitis Assessment 0 1/4/2019  3:09 AM  
Infiltration Assessment 0 1/4/2019  3:09 AM  
Dressing Status Clean, dry, & intact 1/4/2019  3:09 AM  
Dressing Type Transparent 1/4/2019  3:09 AM  
Hub Color/Line Status Pink;Flushed 1/4/2019  3:09 AM  
                  
  
Readmission Risk Assessment Tool Score High Risk 45 Total Score 3 Has Seen PCP in Last 6 Months (Yes=3, No=0) 2 . Living with Significant Other. Assisted Living. LTAC. SNF. or  
Rehab  
 5 Pt. Coverage (Medicare=5 , Medicaid, or Self-Pay=4) 28 Charlson Comorbidity Score (Age + Comorbid Conditions) Criteria that do not apply:  
 Patient Length of Stay (>5 days = 3) IP Visits Last 12 Months (1-3=4, 4=9, >4=11) Expected Length of Stay 4d 2h Actual Length of Stay 2

## 2019-01-04 NOTE — PROGRESS NOTES
Problem: Mobility Impaired (Adult and Pediatric) Goal: *Acute Goals and Plan of Care (Insert Text) Physical Therapy Goals Initiated 1/3/2019 1. Patient will move from supine to sit and sit to supine , scoot up and down and roll side to side in bed with independence within 7 day(s). 2.  Patient will transfer from bed to chair and chair to bed with modified independence using the least restrictive device within 7 day(s). 3.  Patient will perform sit to stand with modified independence within 7 day(s). 4.  Patient will ambulate with modified independence for 200 feet with the least restrictive device within 7 day(s). 5.  Patient will ascend/descend 16 stairs with 1 handrail(s) with modified independence within 7 day(s). physical Therapy TREATMENT Patient: Chica Rayo (67 y.o. male) Date: 1/4/2019 Diagnosis: CHF 
CHF (congestive heart failure) (HCC) Systolic CHF, acute on chronic (HCC) Precautions: Fall Chart, physical therapy assessment, plan of care and goals were reviewed. ASSESSMENT: 
Pt received supine in bed with family present and agreeable to PT intervention. Pt cleared by nursing for mobility. Pt with good progress towards therapy goals this date. Pt with improvements in activity tolerance and gait distance, however pt needing RW vs walking stick due to BLE weakness. Pt reporting that he typically does not like to use RW, however admits that he currently is benefiting due to weakness. Bed mobility performed with supervision and HOB elevated. Sitting balance intact. He stood from slightly elevated bed height with min A x 1 and increased time to achieve standing. Pt able to pull up boxers in standing without support with good standing balance. He ambulated to/from the bathroom with CGA and RW support. Able to stand mod I from commode with use of grab bar.  He ambulated additional 70 ft with CGA and RW support in hallways, exhibiting slow gait speed, mild trunk flexion, widened ARIEL, decreased step clearance, and increased trunk sway throughout. He experienced one balance check during last ~ 10 ft of gait due to poor foot clearance on L. Gait distance limited by BLE weakness and fatigue. Pt was assisted into sitting on EOB and left with all needs met, RN aware, and family present following therapy session. Educated pt on using RW (pt owns) during initial days home for fall prevention and to improve independence due to weakness; pt and family verbalized understanding. Recommend pt discharge home with HHPT, initial 24/7 supervision, and use of RW pending progress in acute PT. Progression toward goals: 
[]    Improving appropriately and progressing toward goals [x]    Improving slowly and progressing toward goals 
[]    Not making progress toward goals and plan of care will be adjusted PLAN: 
Patient continues to benefit from skilled intervention to address the above impairments. Continue treatment per established plan of care. Discharge Recommendations:  Home Health and initial 24/7 supervision Further Equipment Recommendations for Discharge:  RW - Pt owns SUBJECTIVE:  
Patient stated I don't like the walker.  OBJECTIVE DATA SUMMARY:  
Critical Behavior: 
Neurologic State: Alert Cognition: Appropriate decision making, Appropriate for age attention/concentration, Appropriate safety awareness Functional Mobility Training: 
Bed Mobility: 
Rolling: Supervision Supine to Sit: Supervision(HOB elevated) Scooting: Supervision Transfers: 
Sit to Stand: Minimum assistance;Assist x1(from EOB; independent from commode) Stand to Sit: Stand-by assistance Balance: 
Sitting: Intact Standing: Impaired Standing - Static: Good Standing - Dynamic : Good(with RW support)Ambulation/Gait Training: 
Distance (ft): 70 Feet (ft) Assistive Device: Gait belt;Walker, rolling Ambulation - Level of Assistance: Contact guard assistance;Assist x1;Additional time; Adaptive equipment Gait Abnormalities: Decreased step clearance;Trunk sway increased(increased trunk flexion) Base of Support: Widened Speed/Anna: Slow Step Length: Left shortened;Right shortened Pain: 
Pain Scale 1: Numeric (0 - 10) Pain Intensity 1: 0 Activity Tolerance:  
Improving - increased gait distance and energy; continues to c/o BLE weakness and fatigue with ambulation; no SOB or lightheadedness during session Please refer to the flowsheet for vital signs taken during this treatment. After treatment:  
[]    Patient left in no apparent distress sitting up in chair 
[x]    Patient left in no apparent distress in bed - sitting on EOB [x]    Call bell left within reach [x]    Nursing notified 
[x]    Caregiver present 
[]    Bed alarm activated COMMUNICATION/COLLABORATION:  
The patients plan of care was discussed with: Physical Therapist and Registered Nurse Chan Brown PT, DPT Time Calculation: 27 mins

## 2019-01-04 NOTE — PROGRESS NOTES
The objective of todays visit was to review the transitional care plan with the patient. We discussed the importance of transitional care as it relates to reducing the likelihood of readmission. We reviewed the goals for the first 30 days following hospital discharge. The patient and I discussed wrap around services including nurse navigation, care coordination, home health, transitional care appointments with their primary care provider and specialist team and the role of dispatch health. Patient education focused on readmission zones as described as  The Red Zone: High risk for readmission, days 1-21  The Yellow Zone: Moderate risk for readmission, days 22-29  The Green Zone: Lower risk for readmission, days 30 and after    The patient was instructed on worrisome symptoms for worsening of their medical conditions and sepsis. Learning was assessed using teach back in the form of role playing. The patient identified appropriate wrap around services during this interaction. A written individual care plan was reviewed with the patient as well today. The patient expressed the following specific concerns regarding their discharge  None  Pt has had HF diagnosis for some time. Diligent to weigh self daily and follow reduced sodium diet. Follows Dr Andi Holman instructions regarding weight range.  Pt completed advanced care plan document- see ACP documentation
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
## 2019-01-04 NOTE — PROGRESS NOTES
PROGRESS NOTE 
 
NAME:  Royal Deidre Laurent. :   1950 MRN:   [id number] Date/Time:  2019 6:57 AM 
Subjective:  
History:  Chart reviewed and patient seen and examined this AM and D/W his nurse and all events noted. He has a known Ischemic Cardiomyopathy ( although EFx 50% on Echo 2017) and presented to the office on  with an 18 pound weight gain and increased SOB, ANDERS and peripheral edema and was admitted with Acute on chronic systolic CHF. He has had good diuresis since admission; however due to associated diarrhea output isn't accurate. He notes that the watery diarrhea that he developed has now resolved today. He has had no recent antibiotics and no one else has been sick at home and C. Diff, negative as expected. He denies abdominal pains and has no other GI c/o. He has no  c/o. He does seem a little less SOB walking to BR and can now lie flat in bed with nasal oxygen on; however he is still dizzy when he gets up. He has no CP or other cardio/respiratory c/o. He has no other c/o on complete ROS except weakness Medications reviewed: 
Current Facility-Administered Medications Medication Dose Route Frequency  0.9% sodium chloride infusion 250 mL  250 mL IntraVENous PRN  
 meclizine (ANTIVERT) tablet 12.5 mg  12.5 mg Oral TID  pramipexole (MIRAPEX) tablet 0.5 mg  0.5 mg Oral DAILY PRN  
 ondansetron (ZOFRAN) injection 4 mg  4 mg IntraVENous QID PRN  potassium chloride (KLOR-CON) packet for solution 20 mEq  20 mEq Oral BID WITH MEALS  
 apixaban (ELIQUIS) tablet 5 mg  5 mg Oral BID  ascorbic acid (vitamin C) (VITAMIN C) tablet 250 mg  250 mg Oral TID  carvedilol (COREG) tablet 12.5 mg  12.5 mg Oral BID WITH MEALS  clonazePAM (KlonoPIN) tablet 2 mg  2 mg Oral QHS  colchicine tablet 0.6 mg  0.6 mg Oral DAILY  ferrous sulfate tablet 325 mg  325 mg Oral TID  finasteride (PROSCAR) tablet 5 mg  5 mg Oral DAILY  insulin glargine (LANTUS) injection 30 Units  30 Units SubCUTAneous QHS  [START ON 2019] liraglutide (VICTOZA) 0.6 mg/0.1 mL (18 mg/3 mL) sub-q pen 1.2 mg (Patient Supplied)  1.2 mg SubCUTAneous every Tuesday  losartan (COZAAR) tablet 25 mg  25 mg Oral DAILY  metOLazone (ZAROXOLYN) tablet 2.5 mg  2.5 mg Oral EVERY OTHER DAY  pramipexole (MIRAPEX) tablet 0.5 mg  0.5 mg Oral QHS  tamsulosin (FLOMAX) capsule 0.4 mg  0.4 mg Oral DAILY  febuxostat (ULORIC) tablet 40 mg  40 mg Oral DAILY  sodium chloride (NS) flush 5-10 mL  5-10 mL IntraVENous Q8H  
 sodium chloride (NS) flush 5-10 mL  5-10 mL IntraVENous PRN  
 zolpidem (AMBIEN) tablet 10 mg  10 mg Oral QHS PRN  
 acetaminophen (TYLENOL) tablet 650 mg  650 mg Oral Q4H PRN  
 bumetanide (BUMEX) injection 2 mg  2 mg IntraVENous Q12H  diphenoxylate-atropine (LOMOTIL) tablet 1 Tab  1 Tab Oral QID PRN Objective:  
Vitals: 
Visit Vitals /70 (BP 1 Location: Left arm, BP Patient Position: At rest;Supine) Pulse 63 Temp 97.9 °F (36.6 °C) Resp 16 Wt 259 lb 7.7 oz (117.7 kg) SpO2 100% BMI 29.99 kg/m² O2 Device: Room air Temp (24hrs), Av.8 °F (36.6 °C), Min:97.4 °F (36.3 °C), Max:98.6 °F (37 °C) Last 24hr Input/Output: 
 
Intake/Output Summary (Last 24 hours) at 2019 3040 Last data filed at 2019 2754 Gross per 24 hour Intake 1480 ml Output 610 ml Net 870 ml PHYSICAL EXAM: 
General:     Alert, cooperative, no distress, appears stated age. Head:    Normocephalic, without obvious abnormality, atraumatic. Eyes:    Conjunctivae/corneas clear. PERRLA Nose:   Nares normal. No drainage or sinus tenderness. Throat:     Lips, mucosa, and tongue normal.  No Thrush Neck:   Supple, symmetrical,  no adenopathy, thyroid: non tender 
   no carotid bruit and no JVD. Back:     Symmetric,  No CVA tenderness. Lungs:    Clear to auscultation bilaterally except bibasilar dry rales. No Wheezing or Rhonchi. Heart:    Regular rate and rhythm,  no murmur, rub or gallop. Abdomen:    Soft, non-tender. Not distended. Bowel sounds normal. No masses Extremities:  Extremities normal, atraumatic, No cyanosis. 1-2+ edema. No clubbing Lymph nodes:  Cervical, supraclavicular normal. 
Neurologic:  Normal strength, Alert and oriented X 3. Skin:                 No rash Lab Data Reviewed: 
 
Recent Results (from the past 24 hour(s)) GLUCOSE, POC Collection Time: 01/03/19  7:35 AM  
Result Value Ref Range Glucose (POC) 135 (H) 65 - 100 mg/dL Performed by Claudia Rai (PCT) TYPE + CROSSMATCH Collection Time: 01/03/19 10:30 AM  
Result Value Ref Range Crossmatch Expiration 01/06/2019 ABO/Rh(D) AB POSITIVE Antibody screen NEG Unit number [id number] Blood component type RC LR Unit division 00 Status of unit ISSUED Crossmatch result Compatible GLUCOSE, POC Collection Time: 01/03/19 12:13 PM  
Result Value Ref Range Glucose (POC) 239 (H) 65 - 100 mg/dL Performed by Acosta Malhotra (PCT) GLUCOSE, POC Collection Time: 01/03/19  1:41 PM  
Result Value Ref Range Glucose (POC) 239 (H) 65 - 100 mg/dL Performed by Acosta Malhotra (PCT) GLUCOSE, POC Collection Time: 01/03/19  4:46 PM  
Result Value Ref Range Glucose (POC) 224 (H) 65 - 100 mg/dL Performed by Acosta Malhotra (PCT) GLUCOSE, POC Collection Time: 01/03/19  9:13 PM  
Result Value Ref Range Glucose (POC) 177 (H) 65 - 100 mg/dL Performed by Laura CALDWELL ([facility]) PCT   
CBC WITH AUTOMATED DIFF Collection Time: 01/04/19  5:46 AM  
Result Value Ref Range WBC 5.0 4.1 - 11.1 K/uL  
 RBC 3.02 (L) 4.10 - 5.70 M/uL HGB 7.7 (L) 12.1 - 17.0 g/dL HCT 25.3 (L) 36.6 - 50.3 % MCV 83.8 80.0 - 99.0 FL  
 MCH 25.5 (L) 26.0 - 34.0 PG  
 MCHC 30.4 30.0 - 36.5 g/dL  
 RDW 15.9 (H) 11.5 - 14.5 % PLATELET 93 (L) 294 - 400 K/uL  MPV 12.0 8.9 - 12.9 FL  
 NRBC 0.0 0  WBC ABSOLUTE NRBC 0.00 0.00 - 0.01 K/uL NEUTROPHILS 79 (H) 32 - 75 % LYMPHOCYTES 11 (L) 12 - 49 % MONOCYTES 10 5 - 13 % EOSINOPHILS 0 0 - 7 % BASOPHILS 0 0 - 1 % IMMATURE GRANULOCYTES 1 (H) 0.0 - 0.5 % ABS. NEUTROPHILS 3.9 1.8 - 8.0 K/UL  
 ABS. LYMPHOCYTES 0.5 (L) 0.8 - 3.5 K/UL  
 ABS. MONOCYTES 0.5 0.0 - 1.0 K/UL  
 ABS. EOSINOPHILS 0.0 0.0 - 0.4 K/UL  
 ABS. BASOPHILS 0.0 0.0 - 0.1 K/UL  
 ABS. IMM. GRANS. 0.0 0.00 - 0.04 K/UL  
 DF AUTOMATED METABOLIC PANEL, BASIC Collection Time: 01/04/19  5:46 AM  
Result Value Ref Range Sodium 129 (L) 136 - 145 mmol/L Potassium 3.2 (L) 3.5 - 5.1 mmol/L Chloride 91 (L) 97 - 108 mmol/L  
 CO2 26 21 - 32 mmol/L Anion gap 12 5 - 15 mmol/L Glucose 128 (H) 65 - 100 mg/dL  (H) 6 - 20 MG/DL Creatinine 3.06 (H) 0.70 - 1.30 MG/DL  
 BUN/Creatinine ratio 36 (H) 12 - 20 GFR est AA 25 (L) >60 ml/min/1.73m2 GFR est non-AA 20 (L) >60 ml/min/1.73m2 Calcium 7.6 (L) 8.5 - 10.1 MG/DL Assessment/Plan:  
 
Principal Problem: 
  Systolic CHF, acute on chronic ([facility] (1/2/2019) Active Problems: 
  Atrial fibrillation ([facility] (3/7/2014) ASCVD (arteriosclerotic cardiovascular disease) (8/5/2017) CHF (congestive heart failure) ([facility] (8/5/2017) Controlled type 2 diabetes mellitus with stage 4 chronic kidney disease, without long-term current use of insulin ([facility] (8/5/2017) CKD (chronic kidney disease), stage IV ([facility] (8/5/2017) Hypertension with renal disease (8/5/2017) Gastroesophageal reflux disease without esophagitis (8/5/2017) Cardiomyopathy ([facility] (8/5/2017) Anemia (8/5/2017) 
 
  
___________________________________________________ PLAN: 
 
1. Continue diuresis with Bumex IV and added AM [place] 2. Repeat Echo 3. Cardiology Consult note reviewed and appreciated 4. Follow renal function ([id number]), 110/3.06 today 5.  Hgb down to 7.3 yesterday from 7.8 on adm and only 7.7 today S/P 1 U PRBC, With his cardiac situation will benefit from transfusion of a 2nd unit 6. Follow Na 132 on adm to 129 now w/o change from yesterday 7. Continue Eliquis with PAF 8. Continue to replete K, 3.5 on adm to 3.2 now 9. Continue diabetic meds and follow BS 
10. Continue other home meds 11. C. Diff. Is negative as no recent antibiotics 
 
 
 
___________________________________________________ Attending Physician: Ki Kaye MD

## 2019-01-04 NOTE — CARDIO/PULMONARY
Cardiopulmonary Rehab Nursing Entry: 
 
Pt is on CHF Bundle List 
 
Chart reviewed and pt visited for home CHF care instruction. Pt is a 76 yo admitting diagnosis: systolic CHF, current echo results pending. Cardiac Rehab: Living with Heart Failure Booklet previously given to Markus 12. per pt. Pt given 2019 calendar and Symptoms of Heart Failure magnet. Educated using teach back method. Discussed diagnosis definition and assessed patient understanding. Reviewed importance of daily weight monitoring and Low Sodium diet (7604-3359 mg. daily). Encouraged activity and rest periods within symptom limitations and as ordered by physician. Reviewed bumex, purpose of medication, potential side effects, compliance, and what to do if dose is missed. Discussed importance of reporting signs and symptoms of exacerbation, and when to report them to the doctor, to prevent re-hospitalization. Royal KRISTA Perez was encouraged to keep all appointments with doctor. Smoking history assessed. Patient is a former smoker. Royal KRISTA Perez is well versed in home CHF care as evidenced by his responses. Pt follows a low sodium diet, sees a weight loss specialist, weighs in daily and states his weight parameters. Pt adheres to medications and tries to walk short distances regularly. He is s/p a 16 week course of rehab therapy and has home exercises to do as well.

## 2019-01-04 NOTE — PROGRESS NOTES
0700 
Bedside shift change report given to Griffith Curling, RN by  [employer], RN (offgoing nurse). Report included the following information SBAR, Kardex, ED Summary, Procedure Summary, MAR, Accordion, Recent Results, Med Rec Status and Cardiac Rhythm SR with 1st degree BBB. 6606 Pt is reporting blood in urine and burning sensation when peeing. When pt wiped penis found blood on toilet paper. Assessed penis and blood was present in hole of penis. MD notified R Tiffany Dobbins 23 Pt states he feels he is still feeling SOB and feels dizziness. (more so when he is laying down but still feels it when he is sitting up or walking around. ) MD notified. [address] Chest XRay ordered

## 2019-01-05 LAB
ABO + RH BLD: NORMAL
ANION GAP SERPL CALC-SCNC: 10 MMOL/L (ref 5–15)
BASOPHILS # BLD: 0 K/UL (ref 0–0.1)
BASOPHILS NFR BLD: 0 % (ref 0–1)
BLD PROD TYP BPU: NORMAL
BLD PROD TYP BPU: NORMAL
BLOOD GROUP ANTIBODIES SERPL: NORMAL
BPU ID: NORMAL
BPU ID: NORMAL
BUN SERPL-MCNC: 106 MG/DL (ref 6–20)
BUN/CREAT SERPL: 36 (ref 12–20)
CALCIUM SERPL-MCNC: 7.6 MG/DL (ref 8.5–10.1)
CHLORIDE SERPL-SCNC: 92 MMOL/L (ref 97–108)
CO2 SERPL-SCNC: 26 MMOL/L (ref 21–32)
CREAT SERPL-MCNC: 2.97 MG/DL (ref 0.7–1.3)
CROSSMATCH RESULT,%XM: NORMAL
CROSSMATCH RESULT,%XM: NORMAL
DIFFERENTIAL METHOD BLD: ABNORMAL
EOSINOPHIL # BLD: 0 K/UL (ref 0–0.4)
EOSINOPHIL NFR BLD: 0 % (ref 0–7)
ERYTHROCYTE [DISTWIDTH] IN BLOOD BY AUTOMATED COUNT: 16.1 % (ref 11.5–14.5)
GLUCOSE BLD STRIP.AUTO-MCNC: 108 MG/DL (ref 65–100)
GLUCOSE BLD STRIP.AUTO-MCNC: 133 MG/DL (ref 65–100)
GLUCOSE BLD STRIP.AUTO-MCNC: 171 MG/DL (ref 65–100)
GLUCOSE BLD STRIP.AUTO-MCNC: 223 MG/DL (ref 65–100)
GLUCOSE SERPL-MCNC: 83 MG/DL (ref 65–100)
HCT VFR BLD AUTO: 28.7 % (ref 36.6–50.3)
HGB BLD-MCNC: 9 G/DL (ref 12.1–17)
IMM GRANULOCYTES # BLD: 0 K/UL (ref 0–0.04)
IMM GRANULOCYTES NFR BLD AUTO: 0 % (ref 0–0.5)
LYMPHOCYTES # BLD: 0.5 K/UL (ref 0.8–3.5)
LYMPHOCYTES NFR BLD: 10 % (ref 12–49)
MCH RBC QN AUTO: 26.5 PG (ref 26–34)
MCHC RBC AUTO-ENTMCNC: 31.4 G/DL (ref 30–36.5)
MCV RBC AUTO: 84.4 FL (ref 80–99)
MONOCYTES # BLD: 0.5 K/UL (ref 0–1)
MONOCYTES NFR BLD: 10 % (ref 5–13)
NEUTS SEG # BLD: 4 K/UL (ref 1.8–8)
NEUTS SEG NFR BLD: 80 % (ref 32–75)
NRBC # BLD: 0 K/UL (ref 0–0.01)
NRBC BLD-RTO: 0 PER 100 WBC
PLATELET # BLD AUTO: 89 K/UL (ref 150–400)
PMV BLD AUTO: 11.6 FL (ref 8.9–12.9)
POTASSIUM SERPL-SCNC: 3.3 MMOL/L (ref 3.5–5.1)
RBC # BLD AUTO: 3.4 M/UL (ref 4.1–5.7)
SERVICE CMNT-IMP: ABNORMAL
SODIUM SERPL-SCNC: 128 MMOL/L (ref 136–145)
SPECIMEN EXP DATE BLD: NORMAL
STATUS OF UNIT,%ST: NORMAL
STATUS OF UNIT,%ST: NORMAL
UNIT DIVISION, %UDIV: 0
UNIT DIVISION, %UDIV: 0
WBC # BLD AUTO: 5.1 K/UL (ref 4.1–11.1)

## 2019-01-05 PROCEDURE — 36415 COLL VENOUS BLD VENIPUNCTURE: CPT

## 2019-01-05 PROCEDURE — 74011000250 HC RX REV CODE- 250: Performed by: INTERNAL MEDICINE

## 2019-01-05 PROCEDURE — 74011250637 HC RX REV CODE- 250/637: Performed by: INTERNAL MEDICINE

## 2019-01-05 PROCEDURE — 82962 GLUCOSE BLOOD TEST: CPT

## 2019-01-05 PROCEDURE — 65660000000 HC RM CCU STEPDOWN

## 2019-01-05 PROCEDURE — 74011250636 HC RX REV CODE- 250/636: Performed by: INTERNAL MEDICINE

## 2019-01-05 PROCEDURE — 80048 BASIC METABOLIC PNL TOTAL CA: CPT

## 2019-01-05 PROCEDURE — 85025 COMPLETE CBC W/AUTO DIFF WBC: CPT

## 2019-01-05 PROCEDURE — 74011636637 HC RX REV CODE- 636/637: Performed by: INTERNAL MEDICINE

## 2019-01-05 RX ORDER — MECLIZINE HCL 12.5 MG 12.5 MG/1
12.5 TABLET ORAL
Status: DISCONTINUED | OUTPATIENT
Start: 2019-01-05 | End: 2019-01-10 | Stop reason: HOSPADM

## 2019-01-05 RX ADMIN — FERROUS SULFATE TAB 325 MG (65 MG ELEMENTAL FE) 325 MG: 325 (65 FE) TAB at 22:09

## 2019-01-05 RX ADMIN — PRAMIPEXOLE DIHYDROCHLORIDE 0.5 MG: 0.25 TABLET ORAL at 22:08

## 2019-01-05 RX ADMIN — POTASSIUM CHLORIDE 40 MEQ: 1.5 POWDER, FOR SOLUTION ORAL at 09:29

## 2019-01-05 RX ADMIN — CARVEDILOL 12.5 MG: 12.5 TABLET, FILM COATED ORAL at 09:29

## 2019-01-05 RX ADMIN — CIPROFLOXACIN HYDROCHLORIDE 500 MG: 500 TABLET, FILM COATED ORAL at 18:44

## 2019-01-05 RX ADMIN — INSULIN GLARGINE 30 UNITS: 100 INJECTION, SOLUTION SUBCUTANEOUS at 22:09

## 2019-01-05 RX ADMIN — CARVEDILOL 12.5 MG: 12.5 TABLET, FILM COATED ORAL at 18:44

## 2019-01-05 RX ADMIN — BUMETANIDE 2 MG: 0.25 INJECTION INTRAMUSCULAR; INTRAVENOUS at 11:31

## 2019-01-05 RX ADMIN — FINASTERIDE 5 MG: 5 TABLET, FILM COATED ORAL at 09:00

## 2019-01-05 RX ADMIN — OXYCODONE HYDROCHLORIDE AND ACETAMINOPHEN 250 MG: 500 TABLET ORAL at 22:08

## 2019-01-05 RX ADMIN — LOSARTAN POTASSIUM 25 MG: 25 TABLET ORAL at 09:29

## 2019-01-05 RX ADMIN — OXYCODONE HYDROCHLORIDE AND ACETAMINOPHEN 250 MG: 500 TABLET ORAL at 15:17

## 2019-01-05 RX ADMIN — Medication 10 ML: at 15:17

## 2019-01-05 RX ADMIN — PRAMIPEXOLE DIHYDROCHLORIDE 0.5 MG: 0.25 TABLET ORAL at 15:27

## 2019-01-05 RX ADMIN — TAMSULOSIN HYDROCHLORIDE 0.4 MG: 0.4 CAPSULE ORAL at 09:29

## 2019-01-05 RX ADMIN — BUMETANIDE 2 MG: 0.25 INJECTION INTRAMUSCULAR; INTRAVENOUS at 22:09

## 2019-01-05 RX ADMIN — COLCHICINE 0.6 MG: 0.6 TABLET, FILM COATED ORAL at 09:29

## 2019-01-05 RX ADMIN — Medication 10 ML: at 05:11

## 2019-01-05 RX ADMIN — FERROUS SULFATE TAB 325 MG (65 MG ELEMENTAL FE) 325 MG: 325 (65 FE) TAB at 09:29

## 2019-01-05 RX ADMIN — FEBUXOSTAT 40 MG: 40 TABLET ORAL at 09:29

## 2019-01-05 RX ADMIN — FERROUS SULFATE TAB 325 MG (65 MG ELEMENTAL FE) 325 MG: 325 (65 FE) TAB at 15:17

## 2019-01-05 RX ADMIN — Medication 10 ML: at 22:12

## 2019-01-05 RX ADMIN — CIPROFLOXACIN HYDROCHLORIDE 500 MG: 500 TABLET, FILM COATED ORAL at 09:29

## 2019-01-05 RX ADMIN — OXYCODONE HYDROCHLORIDE AND ACETAMINOPHEN 250 MG: 500 TABLET ORAL at 09:29

## 2019-01-05 RX ADMIN — CLONAZEPAM 2 MG: 0.5 TABLET ORAL at 22:08

## 2019-01-05 RX ADMIN — POTASSIUM CHLORIDE 40 MEQ: 1.5 POWDER, FOR SOLUTION ORAL at 18:44

## 2019-01-05 NOTE — PROGRESS NOTES
0730 
Report received from [facility]. SBAR, Kardex, ED Summary, Procedure Summary, Intake/Output, MAR, Accordion, Recent Results, Med Rec Status and Cardiac Rhythm AV paced were discussed. [address]

## 2019-01-05 NOTE — PROGRESS NOTES
PROGRESS NOTE 
 
NAME:  Royal Luz Billy. :   1950 MRN:   [id number] Date/Time:  2019 11:47 AM 
Subjective:  
History:  Chart reviewed and patient seen and examined this AM and D/W his nurse and all events noted. He has a known Ischemic Cardiomyopathy ( although EFx 50% on Echo 2017) and presented to the office on  with an 18 pound weight gain and increased SOB, ANDERS and peripheral edema and was admitted with Acute on chronic systolic CHF. He has had good diuresis since admission; however due to associated diarrhea output hasn't been real accurate. He notes that the watery diarrhea that he developed has now resolved as of yesterday. He has had no recent antibiotics and no one else has been sick at home and C. Diff, negative as expected. He denies abdominal pains and has no other GI c/o. He had no  c/o until yesterday he developed hematuria with marked dysuria which is today better after Cipro started yesterday. He is less SOB walking to BR and can now lie flat in bed with nasal oxygen on; however he was still dizzy when he got up until 2nd U PRBC given yesterday. He has no CP or other cardio/respiratory c/o. He has no other c/o on complete ROS except weakness Medications reviewed: 
Current Facility-Administered Medications Medication Dose Route Frequency  meclizine (ANTIVERT) tablet 12.5 mg  12.5 mg Oral TID PRN  
 0.9% sodium chloride infusion 250 mL  250 mL IntraVENous PRN  potassium chloride (KLOR-CON) packet for solution 40 mEq  40 mEq Oral BID WITH MEALS  ciprofloxacin HCl (CIPRO) tablet 500 mg  500 mg Oral BID  liraglutide (VICTOZA) 0.6 mg/0.1 mL (18 mg/3 mL) sub-q pen 1.2 mg (Patient Supplied)  1.2 mg SubCUTAneous DAILY  0.9% sodium chloride infusion 250 mL  250 mL IntraVENous PRN  pramipexole (MIRAPEX) tablet 0.5 mg  0.5 mg Oral DAILY PRN  
 ondansetron (ZOFRAN) injection 4 mg  4 mg IntraVENous QID PRN  
  ascorbic acid (vitamin C) (VITAMIN C) tablet 250 mg  250 mg Oral TID  carvedilol (COREG) tablet 12.5 mg  12.5 mg Oral BID WITH MEALS  clonazePAM (KlonoPIN) tablet 2 mg  2 mg Oral QHS  colchicine tablet 0.6 mg  0.6 mg Oral DAILY  ferrous sulfate tablet 325 mg  325 mg Oral TID  finasteride (PROSCAR) tablet 5 mg  5 mg Oral DAILY  insulin glargine (LANTUS) injection 30 Units  30 Units SubCUTAneous QHS  losartan (COZAAR) tablet 25 mg  25 mg Oral DAILY  pramipexole (MIRAPEX) tablet 0.5 mg  0.5 mg Oral QHS  tamsulosin (FLOMAX) capsule 0.4 mg  0.4 mg Oral DAILY  febuxostat (ULORIC) tablet 40 mg  40 mg Oral DAILY  sodium chloride (NS) flush 5-10 mL  5-10 mL IntraVENous Q8H  
 sodium chloride (NS) flush 5-10 mL  5-10 mL IntraVENous PRN  
 zolpidem (AMBIEN) tablet 10 mg  10 mg Oral QHS PRN  
 acetaminophen (TYLENOL) tablet 650 mg  650 mg Oral Q4H PRN  
 bumetanide (BUMEX) injection 2 mg  2 mg IntraVENous Q12H  diphenoxylate-atropine (LOMOTIL) tablet 1 Tab  1 Tab Oral QID PRN Objective:  
Vitals: 
Visit Vitals /67 (BP 1 Location: Right arm, BP Patient Position: Sitting) Pulse 78 Temp 97.5 °F (36.4 °C) Resp 18 Wt 259 lb 0.7 oz (117.5 kg) SpO2 100% BMI 29.94 kg/m² O2 Device: Room air Temp (24hrs), Av.5 °F (36.4 °C), Min:96.8 °F (36 °C), Max:98.3 °F (36.8 °C) Last 24hr Input/Output: 
 
Intake/Output Summary (Last 24 hours) at 2019 1147 Last data filed at 2019 6494 Gross per 24 hour Intake 800 ml Output 3275 ml Net -2475 ml PHYSICAL EXAM: 
General:     Alert, cooperative, no distress, appears stated age. Head:    Normocephalic, without obvious abnormality, atraumatic. Eyes:    Conjunctivae/corneas clear. PERRLA Nose:   Nares normal. No drainage or sinus tenderness. Throat:     Lips, mucosa, and tongue normal.  No Thrush Neck:   Supple, symmetrical,  no adenopathy, thyroid: non tender 
   no carotid bruit and no JVD. Back:     Symmetric,  No CVA tenderness. Lungs:    Clear to auscultation bilaterally except bibasilar dry rales. No Wheezing or Rhonchi. Heart:    Regular rate and rhythm,  no murmur, rub or gallop. Abdomen:    Soft, non-tender. Not distended. Bowel sounds normal. No masses Extremities:  Extremities normal, atraumatic, No cyanosis. 1-2+ edema. No clubbing Lymph nodes:  Cervical, supraclavicular normal. 
Neurologic:  Normal strength, Alert and oriented X 3. Skin:                 No rash Lab Data Reviewed: 
 
Recent Results (from the past 24 hour(s)) GLUCOSE, POC Collection Time: 01/04/19 11:56 AM  
Result Value Ref Range Glucose (POC) 149 (H) 65 - 100 mg/dL Performed by [place]) GLUCOSE, POC Collection Time: 01/04/19  4:36 PM  
Result Value Ref Range Glucose (POC) 153 (H) 65 - 100 mg/dL Performed by Wendi SIGALA) GLUCOSE, POC Collection Time: 01/04/19  9:09 PM  
Result Value Ref Range Glucose (POC) 148 (H) 65 - 100 mg/dL Performed by Lyly Serrano CBC WITH AUTOMATED DIFF Collection Time: 01/05/19  2:34 AM  
Result Value Ref Range WBC 5.1 4.1 - 11.1 K/uL  
 RBC 3.40 (L) 4.10 - 5.70 M/uL HGB 9.0 (L) 12.1 - 17.0 g/dL HCT 28.7 (L) 36.6 - 50.3 % MCV 84.4 80.0 - 99.0 FL  
 MCH 26.5 26.0 - 34.0 PG  
 MCHC 31.4 30.0 - 36.5 g/dL  
 RDW 16.1 (H) 11.5 - 14.5 % PLATELET 89 (L) 199 - 400 K/uL MPV 11.6 8.9 - 12.9 FL  
 NRBC 0.0 0  WBC ABSOLUTE NRBC 0.00 0.00 - 0.01 K/uL NEUTROPHILS 80 (H) 32 - 75 % LYMPHOCYTES 10 (L) 12 - 49 % MONOCYTES 10 5 - 13 % EOSINOPHILS 0 0 - 7 % BASOPHILS 0 0 - 1 % IMMATURE GRANULOCYTES 0 0.0 - 0.5 % ABS. NEUTROPHILS 4.0 1.8 - 8.0 K/UL  
 ABS. LYMPHOCYTES 0.5 (L) 0.8 - 3.5 K/UL  
 ABS. MONOCYTES 0.5 0.0 - 1.0 K/UL  
 ABS. EOSINOPHILS 0.0 0.0 - 0.4 K/UL  
 ABS. BASOPHILS 0.0 0.0 - 0.1 K/UL  
 ABS. IMM. GRANS. 0.0 0.00 - 0.04 K/UL  
 DF AUTOMATED METABOLIC PANEL, BASIC  
 Collection Time: 01/05/19  2:34 AM  
Result Value Ref Range Sodium 128 (L) 136 - 145 mmol/L Potassium 3.3 (L) 3.5 - 5.1 mmol/L Chloride 92 (L) 97 - 108 mmol/L  
 CO2 26 21 - 32 mmol/L Anion gap 10 5 - 15 mmol/L Glucose 83 65 - 100 mg/dL  (H) 6 - 20 MG/DL Creatinine 2.97 (H) 0.70 - 1.30 MG/DL  
 BUN/Creatinine ratio 36 (H) 12 - 20 GFR est AA 25 (L) >60 ml/min/1.73m2 GFR est non-AA 21 (L) >60 ml/min/1.73m2 Calcium 7.6 (L) 8.5 - 10.1 MG/DL  
GLUCOSE, POC Collection Time: 01/05/19  7:14 AM  
Result Value Ref Range Glucose (POC) 108 (H) 65 - 100 mg/dL Performed by Purvi Calderon GLUCOSE, POC Collection Time: 01/05/19 11:38 AM  
Result Value Ref Range Glucose (POC) 133 (H) 65 - 100 mg/dL Performed by Dayne Bryant (PCT) Assessment/Plan:  
 
Principal Problem: 
  Systolic CHF, acute on chronic ([facility] (1/2/2019) Active Problems: 
  Atrial fibrillation ([facility] (3/7/2014) ASCVD (arteriosclerotic cardiovascular disease) (8/5/2017) CHF (congestive heart failure) ([facility] (8/5/2017) Controlled type 2 diabetes mellitus with stage 4 chronic kidney disease, without long-term current use of insulin ([facility] (8/5/2017) CKD (chronic kidney disease), stage IV ([facility] (8/5/2017) Hypertension with renal disease (8/5/2017) Gastroesophageal reflux disease without esophagitis (8/5/2017) Cardiomyopathy ([facility] (8/5/2017) Anemia (8/5/2017) 
 
  
___________________________________________________ PLAN: 
 
1. Continue diuresis with Bumex IV and added AM [place]ox[place] 2. Repeat Echo done yesterday with results pending 3. Cardiology Consult note reviewed and appreciated 4. Follow renal function (dxg918/2.77), 106/2.97 today 5. Hgb down to 7.3 on 1/3 from 7.8 on adm and only 7.7 on 1/4 S/P 1 U PRBC, With his cardiac situation transfused 2nd unit and now 9.0 6. Follow Na 132 on adm to 128 now w/o change from yesterday 7.  Continue Eliquis with PAF 8. Continue to replete K, 3.5 on adm to 3.3 now, check Mag 
9. Continue diabetic meds and follow BS 
10. Continue other home meds 11. C. Diff. Is negative as no recent antibiotics 12. Urine culture sent yesterday and added Cipro 
 
 
 
___________________________________________________ Attending Physician: Nimesh Nelson MD

## 2019-01-05 NOTE — PROGRESS NOTES
CARDIOLOGY Progress Note Patient ID: 
Patient: Natalia Duran MRN: [id number] Age: 77 y.o.  : 1950 Date of  Admission: 2019  3:24 PM  
PCP:  Leidy Bowens MD 
 
Assessment: 1. Acute on chronic diastolic heart failure with small R pleural effusion, worsening lower ext edema, and CHF sx. Admitted dietary indiscretion and worsening anemia. Last 3 Recorded Weights in this Encounter 19 0128 19 8250 19 3568 Weight: 117.8 kg (259 lb 9.6 oz) 117.7 kg (259 lb 7.7 oz) 117.5 kg (259 lb 0.7 oz) 2. Paroxysmal atrial fibrillation and atypical atrial flutter s/p ablation in 2018. NO atrial fibrillation or flutter since the ablation. 3. Hypertensive heart disease with a history of diastolic congestive heart failure and CKD stage 4. EF 55% on prior echo. 4. Coronary artery disease with multivessel interventions. His last stenting was in . 
5. Mild idiopathic pericardial effusion in the past. 
6. Diabetes mellitus type 2, on chronic insulin. 7. Hyperlipidemia. 8. History of esophageal dilation for dysphagia. 9. Hiatal hernia. 10. Anemia of chronic renal disease. On EPO (and Fe supplement). 11. Mild thrombocytopenia. 12. Chronic anticoagulation. 13. Mild hyponatremia, multifactorial (volume overload, Na loss during diuresis, etc). Plan: 1. Continue diuresis, agree with watching renal indices, very slowly climbing. Challenging. 2. Echo PENDING, if EF 50% or less, then I'd recommend upgrade to BIV pacemaker since he's pacer-dependent and has heart failure. If we're ventricular pacing this much in the setting of even mild cardiomyopathy, we'd want a superior mode to limit heart failure. 3. 2g Na diet. 4. Interrogated pacemaker on --changed base rate to 70, increased AV delays (225/200), made rate adaptive behavior more aggressive to have faster HR's with activity. 5. Continue beta-blocker. 6. On paper, not a good candidate for ACEI or ARB or spironolactone due to renal dysfunction (risk of hyperkalemia, acidosis, worsening renal failure) but also controversial whether discontinuing this would improve renal function. Certainly hasn't shown hyperkalemia, acidosis, etc. here. I'll defer to Dr. Sarah Rust on this. 7. I'll stop Eliquis at least temporarily since bleeding is a possible explanation for H&H drop, since he has not had any Afib since his ablation in 5/2018. Can be restarted after evaluation. Echo pending. If EF 50% or less, would consider upgrade to BIV pacemaker though would try not to use contrast dye for visualization given renal insufficiency. If EF is >50%, then may still upgrade if this is refractory diastolic heart failure. 1/5:   Edema improved. Sodium decreasing, will hold zaroxolyn for now. Fluid restrict, follow bmp. Will try to get echo this weekend. [x]       High complexity decision making was performed in this patient with multiple organ involvement. [place]  Jaciel Farris. is a 77 y.o. male with a history of atrial arrhythmias s/p ablation 5/2018, has done well from an arrhythmia standpoint. He has had chronic issues with fluid retention. Over the holidays, he admits to dietary indiscretion (salt, fluid, volume) and gained weight and lower extremity edema over several days. He became more dyspneic and got to the point when he started to get some rest symptoms. +orthopnea, PND. He saw Dr. Sarah Rust who admitted him for management of his heart failure and fluid retention. Here he's lost weight, his legs look much better to him, but still some weight to drop. He had some diarrhea of unclear etiology recently, resolved. TODAY:  No syncope, near-syncope, TIA, or stroke symptoms. Does have postural dizziness for a few seconds. Denies resting dyspnea, but does have severe exertional dyspnea and some orthopnea.   No reported bleeding problems on Eliquis though he does have significant anemia however and has gotten 2 blood transfusions so far here for declining H&H. Echo pending. Allergies Allergen Reactions  Niacin Unknown (comments) Other reaction(s): Unknown (comments)  Imipenem Diarrhea Other reaction(s): Rash  Levemir [Insulin Detemir] Hives  Other Medication Other (comments) ? Allergy to Manuel Palmer causing chemical burn  Primaxin [Imipenem-Cilastatin] Diarrhea and Rash  Xarelto [Rivaroxaban] Rash and Itching Other reaction(s): Rash Current Facility-Administered Medications Medication Dose Route Frequency  meclizine (ANTIVERT) tablet 12.5 mg  12.5 mg Oral TID PRN  
 0.9% sodium chloride infusion 250 mL  250 mL IntraVENous PRN  potassium chloride (KLOR-CON) packet for solution 40 mEq  40 mEq Oral BID WITH MEALS  ciprofloxacin HCl (CIPRO) tablet 500 mg  500 mg Oral BID  liraglutide (VICTOZA) 0.6 mg/0.1 mL (18 mg/3 mL) sub-q pen 1.2 mg (Patient Supplied)  1.2 mg SubCUTAneous DAILY  0.9% sodium chloride infusion 250 mL  250 mL IntraVENous PRN  pramipexole (MIRAPEX) tablet 0.5 mg  0.5 mg Oral DAILY PRN  
 ondansetron (ZOFRAN) injection 4 mg  4 mg IntraVENous QID PRN  
 ascorbic acid (vitamin C) (VITAMIN C) tablet 250 mg  250 mg Oral TID  carvedilol (COREG) tablet 12.5 mg  12.5 mg Oral BID WITH MEALS  clonazePAM (KlonoPIN) tablet 2 mg  2 mg Oral QHS  colchicine tablet 0.6 mg  0.6 mg Oral DAILY  ferrous sulfate tablet 325 mg  325 mg Oral TID  finasteride (PROSCAR) tablet 5 mg  5 mg Oral DAILY  insulin glargine (LANTUS) injection 30 Units  30 Units SubCUTAneous QHS  losartan (COZAAR) tablet 25 mg  25 mg Oral DAILY  pramipexole (MIRAPEX) tablet 0.5 mg  0.5 mg Oral QHS  tamsulosin (FLOMAX) capsule 0.4 mg  0.4 mg Oral DAILY  febuxostat (ULORIC) tablet 40 mg  40 mg Oral DAILY  sodium chloride (NS) flush 5-10 mL  5-10 mL IntraVENous Q8H  
  sodium chloride (NS) flush 5-10 mL  5-10 mL IntraVENous PRN  
 zolpidem (AMBIEN) tablet 10 mg  10 mg Oral QHS PRN  
 acetaminophen (TYLENOL) tablet 650 mg  650 mg Oral Q4H PRN  
 bumetanide (BUMEX) injection 2 mg  2 mg IntraVENous Q12H  diphenoxylate-atropine (LOMOTIL) tablet 1 Tab  1 Tab Oral QID PRN Review of Symptoms:   
Cardiology: negative for chest pain, palpitations, PND, syncope Gastrointestinal: negative for abdominal pain, N/V, dysphagia :  +gross hematuria, no dysuria Objective:  
  
Physical Exam: 
Temp (24hrs), Av.5 °F (36.4 °C), Min:96.8 °F (36 °C), Max:98.3 °F (36.8 °C) Patient Vitals for the past 8 hrs: 
 Pulse 19 0723 91 Patient Vitals for the past 8 hrs: 
 Resp  
19 0723 16 Patient Vitals for the past 8 hrs: 
 BP  
19 0723 105/62 Intake/Output Summary (Last 24 hours) at 2019 1133 Last data filed at 2019 0910 Gross per 24 hour Intake 800 ml Output 3275 ml Net -2475 ml Nondiaphoretic, not in acute distress. Supple, no palpable thyromegaly. No scleral icterus, mucous membranes moist, conjuctivae pink, no xanthelasma. Unlabored, clear to auscultation bilaterally anteriorly, symmetric air movement. Regular rate and paced rhythm, no new murmur, pericardial rub, knock, or gallop. No JVD but 1-2+peripheral edema. Palpable radial pulses bilaterally. Abdomen, soft, nontender, nondistended. No abdominal bruit or pulstatile masses. Extremities without cyanosis or clubbing. Muscle tone and bulk normal. 
Skin warm and dry. No rashes or ulcers but there is some erythema and excoriations of the pretibial area. Neuro grossly nonfocal.  No tremor. Awake and appropriate. CARDIOGRAPHICS and STUDIES, I reviewed: 
 
Telemetry:  AV pacing.  
 
 
Transesophageal Echo 2018: 
LEFT VENTRICLE: Size was normal. Systolic function was normal without wall motion abnormality with an ejection fraction estimated to be 55-60%. Wall thickness was mildly increased. RIGHT VENTRICLE: The size was normal. Systolic function was normal. Wall thickness was normal. 
 
LEFT ATRIUM: Size was moderately-enlarged. No thrombus was identified. APPENDAGE:  No thrombus was identified. DOPPLER: The function was abnormal (poor emptying velocity during atrial fibrillation). INTERATRIAL SEPTUM: No septal defect or aneurysm was noted.  No shunt with color flow doppler. RIGHT ATRIUM: Size was mildly-enlarged. No thrombus was identified. MITRAL VALVE: Normal valve structure. There was normal leaflet separation. No obvious mass, vegetation or thrombus noted. DOPPLER: There was mild non-rheumatic and central regurgitation. AORTIC VALVE: The aortic valve was trileaflet. Leaflets exhibited normal cuspal separation without stenosis. No obvious mass, vegetation, or thrombus noted. DOPPLER: There was mild regurgitation. TRICUSPID VALVE: Normal valve structure. There was normal leaflet separation. No obvious mass, vegetation or thrombus noted. DOPPLER: There was trace non-rheumatic regurgitation. Peak tricuspid regurgitation velocity jet was 3 m/sec consistent with mild pulmonary hypertension (46 mm Hg) assuming a right atrial pressure of 10 mm Hg. 
   
PULMONIC VALVE:  No obvious abnormality. 
   
AORTA: Normal root. No dissection, aneurysm, or mobile plaque in visualized portions. Mild plaque was noted. 
   
PERICARDIUM:  Nothing more than a trace pericardial effusion was noted.  Normal thickness of pericardium. 
  
PACEMAKER LEADS:  Visualized portions were without abnormality. CXR 1/2:  Small R pleural effusion. Labs: 
No results for input(s): CPK, CKMB, CKNDX, TROIQ in the last 72 hours. No lab exists for component: CPKMB Lab Results Component Value Date/Time  Cholesterol, total 112 11/27/2018 02:55 PM  
 HDL Cholesterol 27 (L) 11/27/2018 02:55 PM  
 LDL, calculated 68 11/27/2018 02:55 PM  
 Triglyceride 85 11/27/2018 02:55 PM  
 
No results for input(s): INR, PTP, APTT in the last 72 hours. No lab exists for component: INREXT, INREXT Recent Labs 01/05/19 
0234 01/04/19 
4687 01/03/19 
0108 01/02/19 
1633 * 129* 129* 132* K 3.3* 3.2* 3.2* 3.7 CL 92* 91* 90* 90* CO2 26 26 28 30 * 110* 116* 113* CREA 2.97* 3.06* 2.84* 2.77* GLU 83 128* 152* 178* CA 7.6* 7.6* 8.4* 8.6 ALB  --   --   --  3.3* WBC 5.1 5.0 5.4 4.3 HGB 9.0* 7.7* 7.3* 7.8* HCT 28.7* 25.3* 24.3* 25.8* PLT 89* 93* 94* 102* Recent Labs 01/02/19 
1633 SGOT 22   
TP 7.4 ALB 3.3*  
GLOB 4.1* No components found for: [place] No results for input(s): PH, PCO2, PO2 in the last 72 hours. Floyd Spangler MD 
1/5/2019

## 2019-01-06 LAB
ANION GAP SERPL CALC-SCNC: 9 MMOL/L (ref 5–15)
BASOPHILS # BLD: 0 K/UL (ref 0–0.1)
BASOPHILS NFR BLD: 1 % (ref 0–1)
BUN SERPL-MCNC: 106 MG/DL (ref 6–20)
BUN/CREAT SERPL: 37 (ref 12–20)
CALCIUM SERPL-MCNC: 8.1 MG/DL (ref 8.5–10.1)
CHLORIDE SERPL-SCNC: 93 MMOL/L (ref 97–108)
CO2 SERPL-SCNC: 28 MMOL/L (ref 21–32)
CREAT SERPL-MCNC: 2.9 MG/DL (ref 0.7–1.3)
DIFFERENTIAL METHOD BLD: ABNORMAL
EOSINOPHIL # BLD: 0 K/UL (ref 0–0.4)
EOSINOPHIL NFR BLD: 0 % (ref 0–7)
ERYTHROCYTE [DISTWIDTH] IN BLOOD BY AUTOMATED COUNT: 16.4 % (ref 11.5–14.5)
GLUCOSE BLD STRIP.AUTO-MCNC: 129 MG/DL (ref 65–100)
GLUCOSE BLD STRIP.AUTO-MCNC: 139 MG/DL (ref 65–100)
GLUCOSE BLD STRIP.AUTO-MCNC: 152 MG/DL (ref 65–100)
GLUCOSE BLD STRIP.AUTO-MCNC: 161 MG/DL (ref 65–100)
GLUCOSE SERPL-MCNC: 114 MG/DL (ref 65–100)
HCT VFR BLD AUTO: 28.6 % (ref 36.6–50.3)
HGB BLD-MCNC: 8.8 G/DL (ref 12.1–17)
IMM GRANULOCYTES # BLD: 0 K/UL (ref 0–0.04)
IMM GRANULOCYTES NFR BLD AUTO: 0 % (ref 0–0.5)
LYMPHOCYTES # BLD: 0.4 K/UL (ref 0.8–3.5)
LYMPHOCYTES NFR BLD: 10 % (ref 12–49)
MCH RBC QN AUTO: 26.1 PG (ref 26–34)
MCHC RBC AUTO-ENTMCNC: 30.8 G/DL (ref 30–36.5)
MCV RBC AUTO: 84.9 FL (ref 80–99)
MONOCYTES # BLD: 0.5 K/UL (ref 0–1)
MONOCYTES NFR BLD: 12 % (ref 5–13)
NEUTS SEG # BLD: 3.2 K/UL (ref 1.8–8)
NEUTS SEG NFR BLD: 78 % (ref 32–75)
NRBC # BLD: 0 K/UL (ref 0–0.01)
NRBC BLD-RTO: 0 PER 100 WBC
PLATELET # BLD AUTO: 89 K/UL (ref 150–400)
PMV BLD AUTO: 11.2 FL (ref 8.9–12.9)
POTASSIUM SERPL-SCNC: 3.5 MMOL/L (ref 3.5–5.1)
RBC # BLD AUTO: 3.37 M/UL (ref 4.1–5.7)
SERVICE CMNT-IMP: ABNORMAL
SODIUM SERPL-SCNC: 130 MMOL/L (ref 136–145)
WBC # BLD AUTO: 4.1 K/UL (ref 4.1–11.1)

## 2019-01-06 PROCEDURE — 74011250636 HC RX REV CODE- 250/636: Performed by: INTERNAL MEDICINE

## 2019-01-06 PROCEDURE — 80048 BASIC METABOLIC PNL TOTAL CA: CPT

## 2019-01-06 PROCEDURE — C8923 2D TTE W OR W/O FOL W/CON,CO: HCPCS

## 2019-01-06 PROCEDURE — 36415 COLL VENOUS BLD VENIPUNCTURE: CPT

## 2019-01-06 PROCEDURE — 65660000000 HC RM CCU STEPDOWN

## 2019-01-06 PROCEDURE — 74011250637 HC RX REV CODE- 250/637: Performed by: INTERNAL MEDICINE

## 2019-01-06 PROCEDURE — 85025 COMPLETE CBC W/AUTO DIFF WBC: CPT

## 2019-01-06 PROCEDURE — 74011636637 HC RX REV CODE- 636/637: Performed by: INTERNAL MEDICINE

## 2019-01-06 PROCEDURE — 74011000250 HC RX REV CODE- 250: Performed by: INTERNAL MEDICINE

## 2019-01-06 PROCEDURE — 82962 GLUCOSE BLOOD TEST: CPT

## 2019-01-06 PROCEDURE — 94760 N-INVAS EAR/PLS OXIMETRY 1: CPT

## 2019-01-06 RX ADMIN — Medication 10 ML: at 15:45

## 2019-01-06 RX ADMIN — ACETAMINOPHEN 650 MG: 325 TABLET ORAL at 09:45

## 2019-01-06 RX ADMIN — FERROUS SULFATE TAB 325 MG (65 MG ELEMENTAL FE) 325 MG: 325 (65 FE) TAB at 20:11

## 2019-01-06 RX ADMIN — CIPROFLOXACIN HYDROCHLORIDE 500 MG: 500 TABLET, FILM COATED ORAL at 17:57

## 2019-01-06 RX ADMIN — CARVEDILOL 12.5 MG: 12.5 TABLET, FILM COATED ORAL at 09:25

## 2019-01-06 RX ADMIN — BUMETANIDE 2 MG: 0.25 INJECTION INTRAMUSCULAR; INTRAVENOUS at 20:09

## 2019-01-06 RX ADMIN — Medication 10 ML: at 04:20

## 2019-01-06 RX ADMIN — INSULIN GLARGINE 30 UNITS: 100 INJECTION, SOLUTION SUBCUTANEOUS at 20:20

## 2019-01-06 RX ADMIN — FINASTERIDE 5 MG: 5 TABLET, FILM COATED ORAL at 09:26

## 2019-01-06 RX ADMIN — FERROUS SULFATE TAB 325 MG (65 MG ELEMENTAL FE) 325 MG: 325 (65 FE) TAB at 09:23

## 2019-01-06 RX ADMIN — OXYCODONE HYDROCHLORIDE AND ACETAMINOPHEN 250 MG: 500 TABLET ORAL at 17:54

## 2019-01-06 RX ADMIN — FEBUXOSTAT 40 MG: 40 TABLET ORAL at 09:43

## 2019-01-06 RX ADMIN — PRAMIPEXOLE DIHYDROCHLORIDE 0.5 MG: 0.25 TABLET ORAL at 20:10

## 2019-01-06 RX ADMIN — PERFLUTREN 2 ML: 6.52 INJECTION, SUSPENSION INTRAVENOUS at 15:40

## 2019-01-06 RX ADMIN — Medication 10 ML: at 20:22

## 2019-01-06 RX ADMIN — FERROUS SULFATE TAB 325 MG (65 MG ELEMENTAL FE) 325 MG: 325 (65 FE) TAB at 15:45

## 2019-01-06 RX ADMIN — TAMSULOSIN HYDROCHLORIDE 0.4 MG: 0.4 CAPSULE ORAL at 09:26

## 2019-01-06 RX ADMIN — OXYCODONE HYDROCHLORIDE AND ACETAMINOPHEN 250 MG: 500 TABLET ORAL at 09:23

## 2019-01-06 RX ADMIN — POTASSIUM CHLORIDE 40 MEQ: 1.5 POWDER, FOR SOLUTION ORAL at 09:26

## 2019-01-06 RX ADMIN — CARVEDILOL 12.5 MG: 12.5 TABLET, FILM COATED ORAL at 17:58

## 2019-01-06 RX ADMIN — CIPROFLOXACIN HYDROCHLORIDE 500 MG: 500 TABLET, FILM COATED ORAL at 09:25

## 2019-01-06 RX ADMIN — CLONAZEPAM 2 MG: 0.5 TABLET ORAL at 20:09

## 2019-01-06 RX ADMIN — OXYCODONE HYDROCHLORIDE AND ACETAMINOPHEN 250 MG: 500 TABLET ORAL at 20:10

## 2019-01-06 RX ADMIN — BUMETANIDE 2 MG: 0.25 INJECTION INTRAMUSCULAR; INTRAVENOUS at 10:44

## 2019-01-06 RX ADMIN — POTASSIUM CHLORIDE 40 MEQ: 1.5 POWDER, FOR SOLUTION ORAL at 18:19

## 2019-01-06 NOTE — PROGRESS NOTES
CARDIOLOGY Progress Note Patient ID: 
Patient: Dee Ruiz. MRN: [id number] Age: 77 y.o.  : 1950 Date of  Admission: 2019  3:24 PM  
PCP:  Maria Elena Hammond MD 
 
Assessment: 1. Acute on chronic diastolic heart failure with small R pleural effusion, worsening lower ext edema, and CHF sx. Admitted dietary indiscretion and worsening anemia. Last 3 Recorded Weights in this Encounter 19 [id number] 19 0239 19 6239 Weight: 117.7 kg (259 lb 7.7 oz) 117.5 kg (259 lb 0.7 oz) 116 kg (255 lb 11.7 oz) 2. Paroxysmal atrial fibrillation and atypical atrial flutter s/p ablation in 2018. NO atrial fibrillation or flutter since the ablation. 3. Hypertensive heart disease with a history of diastolic congestive heart failure and CKD stage 4. EF 55% on prior echo. 4. Coronary artery disease with multivessel interventions. His last stenting was in . 
5. Mild idiopathic pericardial effusion in the past. 
6. Diabetes mellitus type 2, on chronic insulin. 7. Hyperlipidemia. 8. History of esophageal dilation for dysphagia. 9. Hiatal hernia. 10. Anemia of chronic renal disease. On EPO (and Fe supplement). 11. Mild thrombocytopenia. 12. Chronic anticoagulation. 13. Mild hyponatremia, multifactorial (volume overload, Na loss during diuresis, etc). Plan: 1. Continue diuresis, agree with watching renal indices, very slowly climbing. Challenging. 2. Echo PENDING, if EF 50% or less, then I'd recommend upgrade to BIV pacemaker since he's pacer-dependent and has heart failure. If we're ventricular pacing this much in the setting of even mild cardiomyopathy, we'd want a superior mode to limit heart failure. 3. 2g Na diet. 4. Interrogated pacemaker on --changed base rate to 70, increased AV delays (225/200), made rate adaptive behavior more aggressive to have faster HR's with activity. 5. Continue beta-blocker. 6. On paper, not a good candidate for ACEI or ARB or spironolactone due to renal dysfunction (risk of hyperkalemia, acidosis, worsening renal failure) but also controversial whether discontinuing this would improve renal function. Certainly hasn't shown hyperkalemia, acidosis, etc. here. I'll defer to Dr. Vahe Gerard on this. 7. I'll stop Eliquis at least temporarily since bleeding is a possible explanation for H&H drop, since he has not had any Afib since his ablation in 5/2018. Can be restarted after evaluation. Echo pending. If EF 50% or less, would consider upgrade to BIV pacemaker though would try not to use contrast dye for visualization given renal insufficiency. If EF is >50%, then may still upgrade if this is refractory diastolic heart failure. 1/5:   Edema improved. Sodium decreasing, will hold zaroxolyn for now. Fluid restrict, follow bmp. Will try to get echo this weekend. 1/6: Edema improving. Sodium improved to 130 today, has decreased water/ice intake, cont iv diuresis, echo still pending. [x]       High complexity decision making was performed in this patient with multiple organ involvement. Royal KRISTA Nolan. is a 77 y.o. male with a history of atrial arrhythmias s/p ablation 5/2018, has done well from an arrhythmia standpoint. He has had chronic issues with fluid retention. Over the holidays, he admits to dietary indiscretion (salt, fluid, volume) and gained weight and lower extremity edema over several days. He became more dyspneic and got to the point when he started to get some rest symptoms. +orthopnea, PND. He saw Dr. Vahe Gerard who admitted him for management of his heart failure and fluid retention. Here he's lost weight, his legs look much better to him, but still some weight to drop. He had some diarrhea of unclear etiology recently, resolved. TODAY:  No syncope, near-syncope, TIA, or stroke symptoms.   Does have postural dizziness for a few seconds. Denies resting dyspnea, but does have severe exertional dyspnea and some orthopnea. No reported bleeding problems on Eliquis though he does have significant anemia however and has gotten 2 blood transfusions so far here for declining H&H. Echo pending. Allergies Allergen Reactions  Niacin Unknown (comments) Other reaction(s): Unknown (comments)  Imipenem Diarrhea Other reaction(s): Rash  Levemir [Insulin Detemir] Hives  Other Medication Other (comments) ? Allergy to Dorrine Nails causing chemical burn  Primaxin [Imipenem-Cilastatin] Diarrhea and Rash  Xarelto [Rivaroxaban] Rash and Itching Other reaction(s): Rash Current Facility-Administered Medications Medication Dose Route Frequency  meclizine (ANTIVERT) tablet 12.5 mg  12.5 mg Oral TID PRN  
 0.9% sodium chloride infusion 250 mL  250 mL IntraVENous PRN  potassium chloride (KLOR-CON) packet for solution 40 mEq  40 mEq Oral BID WITH MEALS  ciprofloxacin HCl (CIPRO) tablet 500 mg  500 mg Oral BID  liraglutide (VICTOZA) 0.6 mg/0.1 mL (18 mg/3 mL) sub-q pen 1.2 mg (Patient Supplied)  1.2 mg SubCUTAneous DAILY  0.9% sodium chloride infusion 250 mL  250 mL IntraVENous PRN  pramipexole (MIRAPEX) tablet 0.5 mg  0.5 mg Oral DAILY PRN  
 ondansetron (ZOFRAN) injection 4 mg  4 mg IntraVENous QID PRN  
 ascorbic acid (vitamin C) (VITAMIN C) tablet 250 mg  250 mg Oral TID  carvedilol (COREG) tablet 12.5 mg  12.5 mg Oral BID WITH MEALS  clonazePAM (KlonoPIN) tablet 2 mg  2 mg Oral QHS  colchicine tablet 0.6 mg  0.6 mg Oral DAILY  ferrous sulfate tablet 325 mg  325 mg Oral TID  finasteride (PROSCAR) tablet 5 mg  5 mg Oral DAILY  insulin glargine (LANTUS) injection 30 Units  30 Units SubCUTAneous QHS  pramipexole (MIRAPEX) tablet 0.5 mg  0.5 mg Oral QHS  tamsulosin (FLOMAX) capsule 0.4 mg  0.4 mg Oral DAILY  febuxostat (ULORIC) tablet 40 mg  40 mg Oral DAILY  sodium chloride (NS) flush 5-10 mL  5-10 mL IntraVENous Q8H  
 sodium chloride (NS) flush 5-10 mL  5-10 mL IntraVENous PRN  
 zolpidem (AMBIEN) tablet 10 mg  10 mg Oral QHS PRN  
 acetaminophen (TYLENOL) tablet 650 mg  650 mg Oral Q4H PRN  
 bumetanide (BUMEX) injection 2 mg  2 mg IntraVENous Q12H  diphenoxylate-atropine (LOMOTIL) tablet 1 Tab  1 Tab Oral QID PRN Review of Symptoms:   
Cardiology: negative for chest pain, palpitations, PND, syncope Gastrointestinal: negative for abdominal pain, N/V, dysphagia :  +gross hematuria, no dysuria Objective:  
  
Physical Exam: 
Temp (24hrs), Av.9 °F (36.6 °C), Min:97.3 °F (36.3 °C), Max:98.7 °F (37.1 °C) Patient Vitals for the past 8 hrs: 
 Pulse 19 0725 84 Patient Vitals for the past 8 hrs: 
 Resp  
19 0725 16 Patient Vitals for the past 8 hrs: 
 BP  
19 0725 126/76 Intake/Output Summary (Last 24 hours) at 2019 1140 Last data filed at 2019 1491 Gross per 24 hour Intake 1280 ml Output 2825 ml Net -1545 ml Nondiaphoretic, not in acute distress. Supple, no palpable thyromegaly. No scleral icterus, mucous membranes moist, conjuctivae pink, no xanthelasma. Unlabored, clear to auscultation bilaterally anteriorly, symmetric air movement. Regular rate and paced rhythm, no new murmur, pericardial rub, knock, or gallop. No JVD but 1-2+peripheral edema. Palpable radial pulses bilaterally. Abdomen, soft, nontender, nondistended. No abdominal bruit or pulstatile masses. Extremities without cyanosis or clubbing. Muscle tone and bulk normal. 
Skin warm and dry. No rashes or ulcers but there is some erythema and excoriations of the pretibial area. Neuro grossly nonfocal.  No tremor. Awake and appropriate. CARDIOGRAPHICS and STUDIES, I reviewed: 
 
Telemetry:  AV pacing. Transesophageal Echo 2018: LEFT VENTRICLE: Size was normal. Systolic function was normal without wall motion abnormality with an ejection fraction estimated to be 55-60%. Wall thickness was mildly increased. RIGHT VENTRICLE: The size was normal. Systolic function was normal. Wall thickness was normal. 
 
LEFT ATRIUM: Size was moderately-enlarged. No thrombus was identified. APPENDAGE:  No thrombus was identified. DOPPLER: The function was abnormal (poor emptying velocity during atrial fibrillation). INTERATRIAL SEPTUM: No septal defect or aneurysm was noted.  No shunt with color flow doppler. RIGHT ATRIUM: Size was mildly-enlarged. No thrombus was identified. MITRAL VALVE: Normal valve structure. There was normal leaflet separation. No obvious mass, vegetation or thrombus noted. DOPPLER: There was mild non-rheumatic and central regurgitation. AORTIC VALVE: The aortic valve was trileaflet. Leaflets exhibited normal cuspal separation without stenosis. No obvious mass, vegetation, or thrombus noted. DOPPLER: There was mild regurgitation. TRICUSPID VALVE: Normal valve structure. There was normal leaflet separation. No obvious mass, vegetation or thrombus noted. DOPPLER: There was trace non-rheumatic regurgitation. Peak tricuspid regurgitation velocity jet was 3 m/sec consistent with mild pulmonary hypertension (46 mm Hg) assuming a right atrial pressure of 10 mm Hg. 
   
PULMONIC VALVE:  No obvious abnormality. 
   
AORTA: Normal root. No dissection, aneurysm, or mobile plaque in visualized portions. Mild plaque was noted. 
   
PERICARDIUM:  Nothing more than a trace pericardial effusion was noted.  Normal thickness of pericardium. 
  
PACEMAKER LEADS:  Visualized portions were without abnormality. CXR 1/2:  Small R pleural effusion. Labs: 
No results for input(s): CPK, CKMB, CKNDX, TROIQ in the last 72 hours. No lab exists for component: CPKMB Lab Results Component Value Date/Time Cholesterol, total 112 11/27/2018 02:55 PM  
 HDL Cholesterol 27 (L) 11/27/2018 02:55 PM  
 LDL, calculated 68 11/27/2018 02:55 PM  
 Triglyceride 85 11/27/2018 02:55 PM  
 
No results for input(s): INR, PTP, APTT in the last 72 hours. No lab exists for component: INREXT, INREXT Recent Labs 01/06/19 
0111 01/05/19 
0234 01/04/19 
7770 * 128* 129*  
K 3.5 3.3* 3.2*  
CL 93* 92* 91* CO2 28 26 26 * 106* 110* CREA 2.90* 2.97* 3.06* * 83 128* CA 8.1* 7.6* 7.6* WBC 4.1 5.1 5.0 HGB 8.8* 9.0* 7.7* HCT 28.6* 28.7* 25.3*  
PLT 89* 89* 93* No results for input(s): SGOT, GPT, AP, TBIL, TP, ALB, GLOB, GGT, AML, LPSE in the last 72 hours. No lab exists for component: AMYP, HLPSE No components found for: [place] No results for input(s): PH, PCO2, PO2 in the last 72 hours. Louise Mejia MD 
1/6/2019

## 2019-01-06 NOTE — PROGRESS NOTES
Problem: Falls - Risk of 
Goal: *Absence of Falls Document Attila Angeles Fall Risk and appropriate interventions in the flowsheet. Outcome: Progressing Towards Goal 
Fall Risk Interventions: 
Mobility Interventions: Bed/chair exit alarm, Communicate number of staff needed for ambulation/transfer, Mechanical lift, OT consult for ADLs, Patient to call before getting OOB, PT Consult for mobility concerns Medication Interventions: Bed/chair exit alarm, Evaluate medications/consider consulting pharmacy, Patient to call before getting OOB, Teach patient to arise slowly Elimination Interventions: Bed/chair exit alarm, Call light in reach, Elevated toilet seat, Patient to call for help with toileting needs, Toilet paper/wipes in reach, Toileting schedule/hourly rounds, Urinal in reach History of Falls Interventions: Bed/chair exit alarm, Consult care management for discharge planning, Door open when patient unattended, Evaluate medications/consider consulting pharmacy, Investigate reason for fall, Room close to nurse's station

## 2019-01-06 NOTE — PROGRESS NOTES
Problem: Falls - Risk of 
Goal: *Absence of Falls Document Mason Quick Fall Risk and appropriate interventions in the flowsheet. Outcome: Progressing Towards Goal 
Fall Risk Interventions: 
Mobility Interventions: Assess mobility with egress test, Communicate number of staff needed for ambulation/transfer, Patient to call before getting OOB, Utilize walker, cane, or other assistive device Medication Interventions: Evaluate medications/consider consulting pharmacy, Patient to call before getting OOB, Teach patient to arise slowly Elimination Interventions: Bed/chair exit alarm, Call light in reach, Elevated toilet seat, Patient to call for help with toileting needs, Toilet paper/wipes in reach, Toileting schedule/hourly rounds, Urinal in reach History of Falls Interventions: Door open when patient unattended, Evaluate medications/consider consulting pharmacy, Investigate reason for fall, Room close to nurse's station

## 2019-01-06 NOTE — PROGRESS NOTES
PROGRESS NOTE 
 
NAME:  Royal Michael Hackett. :   1950 MRN:   [id number] Date/Time:  2019 10:12 AM 
Subjective:  
History:  Chart reviewed and patient seen and examined this AM and D/W his nurse and all events noted. He has a known Ischemic Cardiomyopathy ( although EFx 50% on Echo 2017) and presented to the office on  with an 18 pound weight gain and increased SOB, NADERS and peripheral edema and was admitted with Acute on chronic systolic CHF. He has had good diuresis since admission; however due to associated diarrhea output hasn't been real accurate. He notes that the watery diarrhea that he developed has now resolved as of . He has had no recent antibiotics and no one else has been sick at home and C. Diff, negative as expected. He denies abdominal pains and has no other GI c/o. He had no  c/o until when he developed hematuria with marked dysuria which is better after Cipro started on . He is less SOB walking to BR and can now lie flat in bed with nasal oxygen on; however he was still dizzy when he got up until 2nd U PRBC given on . He has no CP or other cardio/respiratory c/o. He has no other c/o on complete ROS except weakness Medications reviewed: 
Current Facility-Administered Medications Medication Dose Route Frequency  meclizine (ANTIVERT) tablet 12.5 mg  12.5 mg Oral TID PRN  
 0.9% sodium chloride infusion 250 mL  250 mL IntraVENous PRN  potassium chloride (KLOR-CON) packet for solution 40 mEq  40 mEq Oral BID WITH MEALS  ciprofloxacin HCl (CIPRO) tablet 500 mg  500 mg Oral BID  liraglutide (VICTOZA) 0.6 mg/0.1 mL (18 mg/3 mL) sub-q pen 1.2 mg (Patient Supplied)  1.2 mg SubCUTAneous DAILY  0.9% sodium chloride infusion 250 mL  250 mL IntraVENous PRN  pramipexole (MIRAPEX) tablet 0.5 mg  0.5 mg Oral DAILY PRN  
 ondansetron (ZOFRAN) injection 4 mg  4 mg IntraVENous QID PRN  
 ascorbic acid (vitamin C) (VITAMIN C) tablet 250 mg  250 mg Oral TID  carvedilol (COREG) tablet 12.5 mg  12.5 mg Oral BID WITH MEALS  clonazePAM (KlonoPIN) tablet 2 mg  2 mg Oral QHS  colchicine tablet 0.6 mg  0.6 mg Oral DAILY  ferrous sulfate tablet 325 mg  325 mg Oral TID  finasteride (PROSCAR) tablet 5 mg  5 mg Oral DAILY  insulin glargine (LANTUS) injection 30 Units  30 Units SubCUTAneous QHS  pramipexole (MIRAPEX) tablet 0.5 mg  0.5 mg Oral QHS  tamsulosin (FLOMAX) capsule 0.4 mg  0.4 mg Oral DAILY  febuxostat (ULORIC) tablet 40 mg  40 mg Oral DAILY  sodium chloride (NS) flush 5-10 mL  5-10 mL IntraVENous Q8H  
 sodium chloride (NS) flush 5-10 mL  5-10 mL IntraVENous PRN  
 zolpidem (AMBIEN) tablet 10 mg  10 mg Oral QHS PRN  
 acetaminophen (TYLENOL) tablet 650 mg  650 mg Oral Q4H PRN  
 bumetanide (BUMEX) injection 2 mg  2 mg IntraVENous Q12H  diphenoxylate-atropine (LOMOTIL) tablet 1 Tab  1 Tab Oral QID PRN Objective:  
Vitals: 
Visit Vitals /76 (BP 1 Location: Left arm, BP Patient Position: At rest;Supine) Pulse 84 Temp 98.2 °F (36.8 °C) Resp 16 Wt 255 lb 11.7 oz (116 kg) SpO2 100% BMI 29.55 kg/m² O2 Device: Room air Temp (24hrs), Av.8 °F (36.6 °C), Min:97.3 °F (36.3 °C), Max:98.7 °F (37.1 °C) Last 24hr Input/Output: 
 
Intake/Output Summary (Last 24 hours) at 2019 1012 Last data filed at 2019 1303 Gross per 24 hour Intake 1520 ml Output 2825 ml Net -1305 ml PHYSICAL EXAM: 
General:     Alert, cooperative, no distress, appears stated age. Head:    Normocephalic, without obvious abnormality, atraumatic. Eyes:    Conjunctivae/corneas clear. PERRLA Nose:   Nares normal. No drainage or sinus tenderness. Throat:     Lips, mucosa, and tongue normal.  No Thrush Neck:   Supple, symmetrical,  no adenopathy, thyroid: non tender 
   no carotid bruit and no JVD. Back:     Symmetric,  No CVA tenderness. Lungs:    Clear to auscultation bilaterally except bibasilar dry rales. No Wheezing or Rhonchi. Heart:    Regular rate and rhythm,  no murmur, rub or gallop. Abdomen:    Soft, non-tender. Not distended. Bowel sounds normal. No masses Extremities:  Extremities normal, atraumatic, No cyanosis. 1-2+ edema. No clubbing Lymph nodes:  Cervical, supraclavicular normal. 
Neurologic:  Normal strength, Alert and oriented X 3. Skin:                 No rash Lab Data Reviewed: 
 
Recent Results (from the past 24 hour(s)) GLUCOSE, POC Collection Time: 01/05/19 11:38 AM  
Result Value Ref Range Glucose (POC) 133 (H) 65 - 100 mg/dL Performed by Mellisa Walls (PCT) GLUCOSE, POC Collection Time: 01/05/19  4:31 PM  
Result Value Ref Range Glucose (POC) 171 (H) 65 - 100 mg/dL Performed by Mellisa Walls (PCT) GLUCOSE, POC Collection Time: 01/05/19  8:49 PM  
Result Value Ref Range Glucose (POC) 223 (H) 65 - 100 mg/dL Performed by Steve Holcomb CBC WITH AUTOMATED DIFF Collection Time: 01/06/19  1:11 AM  
Result Value Ref Range WBC 4.1 4.1 - 11.1 K/uL  
 RBC 3.37 (L) 4.10 - 5.70 M/uL HGB 8.8 (L) 12.1 - 17.0 g/dL HCT 28.6 (L) 36.6 - 50.3 % MCV 84.9 80.0 - 99.0 FL  
 MCH 26.1 26.0 - 34.0 PG  
 MCHC 30.8 30.0 - 36.5 g/dL  
 RDW 16.4 (H) 11.5 - 14.5 % PLATELET 89 (L) 964 - 400 K/uL MPV 11.2 8.9 - 12.9 FL  
 NRBC 0.0 0  WBC ABSOLUTE NRBC 0.00 0.00 - 0.01 K/uL NEUTROPHILS 78 (H) 32 - 75 % LYMPHOCYTES 10 (L) 12 - 49 % MONOCYTES 12 5 - 13 % EOSINOPHILS 0 0 - 7 % BASOPHILS 1 0 - 1 % IMMATURE GRANULOCYTES 0 0.0 - 0.5 % ABS. NEUTROPHILS 3.2 1.8 - 8.0 K/UL  
 ABS. LYMPHOCYTES 0.4 (L) 0.8 - 3.5 K/UL  
 ABS. MONOCYTES 0.5 0.0 - 1.0 K/UL  
 ABS. EOSINOPHILS 0.0 0.0 - 0.4 K/UL  
 ABS. BASOPHILS 0.0 0.0 - 0.1 K/UL  
 ABS. IMM. GRANS. 0.0 0.00 - 0.04 K/UL  
 DF AUTOMATED METABOLIC PANEL, BASIC Collection Time: 01/06/19  1:11 AM  
Result Value Ref Range  Sodium 130 (L) 136 - 145 mmol/L  
 Potassium 3.5 3.5 - 5.1 mmol/L Chloride 93 (L) 97 - 108 mmol/L  
 CO2 28 21 - 32 mmol/L Anion gap 9 5 - 15 mmol/L Glucose 114 (H) 65 - 100 mg/dL  (H) 6 - 20 MG/DL Creatinine 2.90 (H) 0.70 - 1.30 MG/DL  
 BUN/Creatinine ratio 37 (H) 12 - 20 GFR est AA 26 (L) >60 ml/min/1.73m2 GFR est non-AA 22 (L) >60 ml/min/1.73m2 Calcium 8.1 (L) 8.5 - 10.1 MG/DL  
GLUCOSE, POC Collection Time: 01/06/19  7:13 AM  
Result Value Ref Range Glucose (POC) 129 (H) 65 - 100 mg/dL Performed by RALPH LANG(JULIA) Assessment/Plan:  
 
Principal Problem: 
  Systolic CHF, acute on chronic ([facility] [address] (1/2/2019) Active Problems: 
  Atrial fibrillation ([facility] [address] (3/7/2014) ASCVD (arteriosclerotic cardiovascular disease) (8/5/2017) CHF (congestive heart failure) ([facility] [address] (8/5/2017) Controlled type 2 diabetes mellitus with stage 4 chronic kidney disease, without long-term current use of insulin ([address] (8/5/2017) CKD (chronic kidney disease), stage IV ([address] (8/5/2017) Hypertension with renal disease (8/5/2017) Gastroesophageal reflux disease without esophagitis (8/5/2017) Cardiomyopathy ([address] (8/5/2017) Anemia (8/5/2017) 
 
  
___________________________________________________ PLAN: 
 
1. Continue diuresis with Bumex IV and Zaroxlyn stopped 1/5, Weight 255 (basline 250#) Adm 268# 2. Repeat Echo pending 3. Cardiology Consult note reviewed and appreciated 4. Follow renal function (gom522/2.77), 106/2.90 today 5. Hgb down to 7.3 on 1/3 from 7.8 on adm and only 7.7 on 1/4 S/P 1 U PRBC, With his cardiac situation transfused 2nd unit and now 8.8 6. Follow Na 132 on adm to 130 now slightly up from yesterday 7. Now off Eliquis with PAF, since no Afib since before June 8. Continue to replete K, 3.5 on adm to 3.5 now, ordered Mag,but not done 9. Continue diabetic meds and follow BS 
10. Continue other home meds 11. C. Diff. Is negative as no recent antibiotics 12. Urine culture sent 1/4 and added Cipro 13. D/C Keven to see if renal function improves 
 
 
 
___________________________________________________ Attending Physician: Sigrid Dumont MD

## 2019-01-06 NOTE — PROGRESS NOTES
Bedside shift change report given to Bobby Wan RN (oncoming nurse). Report included the following information SBAR, Kardex, Intake/Output, MAR and Recent Results. SHIFT SUMMARY: 
 
 
 
 
 
[facility] NURSING NOTE Admission Date 1/2/2019 Admission Diagnosis CHF 
CHF (congestive heart failure) ([address] Consults IP CONSULT TO CARDIOLOGY Cardiac Monitoring [x] Yes [] No  
  
Purposeful Hourly Rounding [x] Yes   
Thanh Score Total Score: 4 Thanh score 3 or > [x] Bed Alarm [] Avasys [] 1:1 sitter [] Patient refused (Signed refusal form in chart) Aurelio Score Aurelio Score: 19 Aurelio score 14 or < [] PMT consult [] Wound Care consult  
 []  Specialty bed  [] Nutrition consult Influenza Vaccine Received Flu Vaccine for Current Season (usually Sept-March): Yes(9/26/18) Oxygen needs? [x] Room air Oxygen @  []1L    []2L    []3L   []4L    []5L   []6L via NC Chronic home O2 use? [] Yes [x] No 
Perform O2 challenge test and document in progress note using smartphrase (.Homeoxygen) Last bowel movement Last Bowel Movement Date: 01/04/19 Urinary Catheter LDAs Peripheral IV 01/02/19 Anterior; Inferior;Left;Lower;Proximal Arm (Active) Site Assessment Clean, dry, & intact 1/5/2019  7:24 PM  
Phlebitis Assessment 0 1/5/2019  7:24 PM  
Infiltration Assessment 0 1/5/2019  7:24 PM  
Dressing Status Clean, dry, & intact 1/5/2019  7:24 PM  
Dressing Type Transparent 1/5/2019  7:24 PM  
Hub Color/Line Status Pink;Capped 1/5/2019  7:24 PM  
                  
  
Readmission Risk Assessment Tool Score High Risk 45 Total Score 3 Has Seen PCP in Last 6 Months (Yes=3, No=0) 2 . Living with Significant Other. Assisted Living. LTAC. SNF. or  
Rehab  
 5 Pt. Coverage (Medicare=5 , Medicaid, or Self-Pay=4) 28 Charlson Comorbidity Score (Age + Comorbid Conditions) Criteria that do not apply:  
 Patient Length of Stay (>5 days = 3) IP Visits Last 12 Months (1-3=4, 4=9, >4=11) Expected Length of Stay 4d 2h Actual Length of Stay 3

## 2019-01-07 LAB
ANION GAP SERPL CALC-SCNC: 10 MMOL/L (ref 5–15)
BUN SERPL-MCNC: 94 MG/DL (ref 6–20)
BUN/CREAT SERPL: 32 (ref 12–20)
CALCIUM SERPL-MCNC: 8.6 MG/DL (ref 8.5–10.1)
CHLORIDE SERPL-SCNC: 94 MMOL/L (ref 97–108)
CO2 SERPL-SCNC: 27 MMOL/L (ref 21–32)
CREAT SERPL-MCNC: 2.92 MG/DL (ref 0.7–1.3)
GLUCOSE BLD STRIP.AUTO-MCNC: 140 MG/DL (ref 65–100)
GLUCOSE BLD STRIP.AUTO-MCNC: 145 MG/DL (ref 65–100)
GLUCOSE BLD STRIP.AUTO-MCNC: 215 MG/DL (ref 65–100)
GLUCOSE BLD STRIP.AUTO-MCNC: 99 MG/DL (ref 65–100)
GLUCOSE SERPL-MCNC: 98 MG/DL (ref 65–100)
POTASSIUM SERPL-SCNC: 3.4 MMOL/L (ref 3.5–5.1)
SERVICE CMNT-IMP: ABNORMAL
SERVICE CMNT-IMP: NORMAL
SODIUM SERPL-SCNC: 131 MMOL/L (ref 136–145)

## 2019-01-07 PROCEDURE — 80048 BASIC METABOLIC PNL TOTAL CA: CPT

## 2019-01-07 PROCEDURE — 74011636637 HC RX REV CODE- 636/637: Performed by: INTERNAL MEDICINE

## 2019-01-07 PROCEDURE — 82962 GLUCOSE BLOOD TEST: CPT

## 2019-01-07 PROCEDURE — 74011250637 HC RX REV CODE- 250/637: Performed by: INTERNAL MEDICINE

## 2019-01-07 PROCEDURE — 74011000250 HC RX REV CODE- 250: Performed by: INTERNAL MEDICINE

## 2019-01-07 PROCEDURE — 65660000000 HC RM CCU STEPDOWN

## 2019-01-07 PROCEDURE — 36415 COLL VENOUS BLD VENIPUNCTURE: CPT

## 2019-01-07 RX ADMIN — FERROUS SULFATE TAB 325 MG (65 MG ELEMENTAL FE) 325 MG: 325 (65 FE) TAB at 10:02

## 2019-01-07 RX ADMIN — POTASSIUM CHLORIDE 40 MEQ: 1.5 POWDER, FOR SOLUTION ORAL at 10:00

## 2019-01-07 RX ADMIN — CARVEDILOL 12.5 MG: 12.5 TABLET, FILM COATED ORAL at 10:02

## 2019-01-07 RX ADMIN — COLCHICINE 0.6 MG: 0.6 TABLET, FILM COATED ORAL at 10:01

## 2019-01-07 RX ADMIN — INSULIN GLARGINE 30 UNITS: 100 INJECTION, SOLUTION SUBCUTANEOUS at 21:16

## 2019-01-07 RX ADMIN — BUMETANIDE 2 MG: 0.25 INJECTION INTRAMUSCULAR; INTRAVENOUS at 21:18

## 2019-01-07 RX ADMIN — CLONAZEPAM 2 MG: 0.5 TABLET ORAL at 21:18

## 2019-01-07 RX ADMIN — TAMSULOSIN HYDROCHLORIDE 0.4 MG: 0.4 CAPSULE ORAL at 10:02

## 2019-01-07 RX ADMIN — Medication 10 ML: at 02:51

## 2019-01-07 RX ADMIN — FINASTERIDE 5 MG: 5 TABLET, FILM COATED ORAL at 09:00

## 2019-01-07 RX ADMIN — FERROUS SULFATE TAB 325 MG (65 MG ELEMENTAL FE) 325 MG: 325 (65 FE) TAB at 15:37

## 2019-01-07 RX ADMIN — Medication 10 ML: at 21:19

## 2019-01-07 RX ADMIN — FERROUS SULFATE TAB 325 MG (65 MG ELEMENTAL FE) 325 MG: 325 (65 FE) TAB at 21:18

## 2019-01-07 RX ADMIN — FEBUXOSTAT 40 MG: 40 TABLET ORAL at 10:02

## 2019-01-07 RX ADMIN — POTASSIUM CHLORIDE 40 MEQ: 1.5 POWDER, FOR SOLUTION ORAL at 17:50

## 2019-01-07 RX ADMIN — CIPROFLOXACIN HYDROCHLORIDE 500 MG: 500 TABLET, FILM COATED ORAL at 10:02

## 2019-01-07 RX ADMIN — OXYCODONE HYDROCHLORIDE AND ACETAMINOPHEN 250 MG: 500 TABLET ORAL at 15:37

## 2019-01-07 RX ADMIN — BUMETANIDE 2 MG: 0.25 INJECTION INTRAMUSCULAR; INTRAVENOUS at 10:03

## 2019-01-07 RX ADMIN — DIPHENOXYLATE HYDROCHLORIDE AND ATROPINE SULFATE 1 TABLET: 2.5; .025 TABLET ORAL at 15:37

## 2019-01-07 RX ADMIN — OXYCODONE HYDROCHLORIDE AND ACETAMINOPHEN 250 MG: 500 TABLET ORAL at 21:18

## 2019-01-07 RX ADMIN — CIPROFLOXACIN HYDROCHLORIDE 500 MG: 500 TABLET, FILM COATED ORAL at 17:50

## 2019-01-07 RX ADMIN — CARVEDILOL 12.5 MG: 12.5 TABLET, FILM COATED ORAL at 17:50

## 2019-01-07 RX ADMIN — OXYCODONE HYDROCHLORIDE AND ACETAMINOPHEN 250 MG: 500 TABLET ORAL at 10:03

## 2019-01-07 RX ADMIN — PRAMIPEXOLE DIHYDROCHLORIDE 0.5 MG: 0.25 TABLET ORAL at 21:18

## 2019-01-07 NOTE — PROGRESS NOTES
CARDIOLOGY Progress Note Patient ID: 
Patient: Jovanni Thurman MRN: [id number] Age: 77 y.o.  : 1950 Date of  Admission: 2019  3:24 PM  
PCP:  Rene Herrera MD 
 
Assessment: 1. Acute on chronic systolic heart failure. Admitted dietary indiscretion and worsening anemia. Last 3 Recorded Weights in this Encounter 19 [id number] 19 8027 19 4736 Weight: 117.5 kg (259 lb 0.7 oz) 116 kg (255 lb 11.7 oz) 114.9 kg (253 lb 4.9 oz) 2. Paroxysmal atrial fibrillation and atypical atrial flutter s/p ablation in 2018. NO atrial fibrillation or flutter since the ablation. 3. Hypertensive heart disease with a history of diastolic congestive heart failure and CKD stage 4. EF 55% on prior echo. 4. Coronary artery disease with multivessel interventions. His last stenting was in . 
5. Mild idiopathic pericardial effusion in the past. 
6. Diabetes mellitus type 2, on chronic insulin. 7. Hyperlipidemia. 8. History of esophageal dilation for dysphagia. 9. Hiatal hernia. 10. Anemia of chronic renal disease. On EPO (and Fe supplement). 11. Mild thrombocytopenia. 12. Chronic anticoagulation. 13. Mild hyponatremia, multifactorial (volume overload, Na loss during diuresis, etc). Plan: 1. Continue diuresis. 2. I recommend upgrade to BIV pacemaker since he's pacer-dependent and has heart failure. If we're ventricular pacing this much in the setting of even mild cardiomyopathy, we'd want a superior mode to limit heart failure. 3. 2g Na diet. 4. Interrogated pacemaker on --changed base rate to 70, increased AV delays (225/200), made rate adaptive behavior more aggressive to have faster HR's with activity. 5. Continue beta-blocker. 6. On paper, not a good candidate for ACEI or ARB or spironolactone due to renal dysfunction (risk of hyperkalemia, acidosis, worsening renal failure) but sometimes an agent is used. 7. Stopped Eliquis given severe anemia requiring transfusion. EPO has been increased. Optimize medical regimen before upgrade to BIV pacemaker. Would try to use little to no contrast dye for visualization given renal insufficiency. [x]       High complexity decision making was performed in this patient with multiple organ involvement. Royal KRISTA Faith is a 77 y.o. male with a history of atrial arrhythmias s/p ablation 5/2018, has done well from an arrhythmia standpoint. He has had chronic issues with fluid retention. Over the holidays, he admits to dietary indiscretion (salt, fluid, volume) and gained weight and lower extremity edema over several days. He became more dyspneic and got to the point when he started to get some rest symptoms. +orthopnea, PND. He saw Dr. Vahe Gerard who admitted him for management of his heart failure and fluid retention. Here he's lost weight, his legs look much better to him, but still some weight to drop. He had some diarrhea of unclear etiology recently, resolved. TODAY:  No syncope, near-syncope, TIA, or stroke symptoms. Says his postural dizziness for a few seconds has resolved. Denies resting dyspnea, but does have severe exertional dyspnea and some orthopnea--this has improved some. No reported bleeding problems on Eliquis though he does have significant anemia however and has gotten 2 uprbc so far here for declining H&H. He will try to walk around today. Allergies Allergen Reactions  Niacin Unknown (comments) Other reaction(s): Unknown (comments)  Imipenem Diarrhea Other reaction(s): Rash  Levemir [Insulin Detemir] Hives  Other Medication Other (comments) ? Allergy to Nidia Latrell causing chemical burn  Primaxin [Imipenem-Cilastatin] Diarrhea and Rash  Xarelto [Rivaroxaban] Rash and Itching Other reaction(s): Rash Current Facility-Administered Medications Medication Dose Route Frequency  meclizine (ANTIVERT) tablet 12.5 mg  12.5 mg Oral TID PRN  
 0.9% sodium chloride infusion 250 mL  250 mL IntraVENous PRN  potassium chloride (KLOR-CON) packet for solution 40 mEq  40 mEq Oral BID WITH MEALS  ciprofloxacin HCl (CIPRO) tablet 500 mg  500 mg Oral BID  liraglutide (VICTOZA) 0.6 mg/0.1 mL (18 mg/3 mL) sub-q pen 1.2 mg (Patient Supplied)  1.2 mg SubCUTAneous DAILY  0.9% sodium chloride infusion 250 mL  250 mL IntraVENous PRN  pramipexole (MIRAPEX) tablet 0.5 mg  0.5 mg Oral DAILY PRN  
 ondansetron (ZOFRAN) injection 4 mg  4 mg IntraVENous QID PRN  
 ascorbic acid (vitamin C) (VITAMIN C) tablet 250 mg  250 mg Oral TID  carvedilol (COREG) tablet 12.5 mg  12.5 mg Oral BID WITH MEALS  clonazePAM (KlonoPIN) tablet 2 mg  2 mg Oral QHS  colchicine tablet 0.6 mg  0.6 mg Oral DAILY  ferrous sulfate tablet 325 mg  325 mg Oral TID  finasteride (PROSCAR) tablet 5 mg  5 mg Oral DAILY  insulin glargine (LANTUS) injection 30 Units  30 Units SubCUTAneous QHS  pramipexole (MIRAPEX) tablet 0.5 mg  0.5 mg Oral QHS  tamsulosin (FLOMAX) capsule 0.4 mg  0.4 mg Oral DAILY  febuxostat (ULORIC) tablet 40 mg  40 mg Oral DAILY  sodium chloride (NS) flush 5-10 mL  5-10 mL IntraVENous Q8H  
 sodium chloride (NS) flush 5-10 mL  5-10 mL IntraVENous PRN  
 zolpidem (AMBIEN) tablet 10 mg  10 mg Oral QHS PRN  
 acetaminophen (TYLENOL) tablet 650 mg  650 mg Oral Q4H PRN  
 bumetanide (BUMEX) injection 2 mg  2 mg IntraVENous Q12H  diphenoxylate-atropine (LOMOTIL) tablet 1 Tab  1 Tab Oral QID PRN Review of Symptoms:   
Cardiology: negative for chest pain, palpitations, PND, syncope Gastrointestinal: negative for abdominal pain, N/V, dysphagia :  + resolved gross hematuria, no dysuria Objective:  
  
Physical Exam: 
Temp (24hrs), Av.8 °F (36.6 °C), Min:97.3 °F (36.3 °C), Max:98.4 °F (36.9 °C) Patient Vitals for the past 8 hrs: 
 Pulse 19 0715 87 01/07/19 0349 81 Patient Vitals for the past 8 hrs: 
 Resp  
01/07/19 0715 20  
01/07/19 0349 20 Patient Vitals for the past 8 hrs: 
 BP  
01/07/19 0715 122/78  
01/07/19 0349 132/87 Intake/Output Summary (Last 24 hours) at 1/7/2019 1024 Last data filed at 1/7/2019 0777 Gross per 24 hour Intake 1020 ml Output 2950 ml Net -1930 ml Nondiaphoretic, not in acute distress. Supple, no palpable thyromegaly. No scleral icterus, mucous membranes moist, conjuctivae pink, no xanthelasma. L chest pacer site without evidence of erosion or infection. Unlabored, clear to auscultation bilaterally anteriorly, symmetric air movement. Regular rate and paced rhythm, no new murmur, pericardial rub, knock, or gallop. No JVD but 1+peripheral edema. Palpable radial pulses bilaterally. Abdomen, soft, nontender, nondistended. No abdominal bruit or pulstatile masses. Extremities without cyanosis or clubbing. Muscle tone and bulk normal. 
Skin warm and dry. No rashes or ulcers but there is some venostasis changes and excoriations of the pretibial area. Neuro grossly nonfocal.  No tremor. Awake and appropriate. CARDIOGRAPHICS and STUDIES, I reviewed: 
 
Telemetry:  AV pacing. Echo here: LEFT VENTRICLE: The ventricle was mildly dilated. Systolic function was 
mildly to moderately reduced. Ejection fraction was estimated in the range 
of 35 % to 40 %. There was mild diffuse hypokinesis. Wall thickness was 
normal. 
 
RIGHT VENTRICLE: The ventricle was mildly dilated. Systolic function was 
mildly reduced. Wall thickness was normal. 
 
LEFT ATRIUM: The atrium appeared elongated. RIGHT ATRIUM: Size was normal. 
 
MITRAL VALVE: Normal valve structure. There was normal leaflet separation. DOPPLER: The transmitral velocity was within the normal range. There was 
no evidence for stenosis. There was mild regurgitation. AORTIC VALVE: The valve was trileaflet.  Leaflets exhibited normal 
 thickness, mild calcification, and normal cuspal separation. DOPPLER: 
Transaortic velocity was within the normal range. There was no stenosis. There was mild regurgitation. TRICUSPID VALVE: Normal valve structure. There was normal leaflet 
separation. DOPPLER: The transtricuspid velocity was within the normal 
range. There was no evidence for tricuspid stenosis. There was mild to 
moderate regurgitation. Pulmonary artery systolic pressure: 30 mmHg. PULMONIC VALVE: Leaflets exhibited normal cuspal separation. DOPPLER: The 
transpulmonic velocity was within the normal range. There was no 
regurgitation. AORTA: The root exhibited mild dilatation. PERICARDIUM: There was no pericardial effusion. CXR 1/2:  Small R pleural effusion. Labs: 
No results for input(s): CPK, CKMB, CKNDX, TROIQ in the last 72 hours. No lab exists for component: CPKMB Lab Results Component Value Date/Time Cholesterol, total 112 11/27/2018 02:55 PM  
 HDL Cholesterol 27 (L) 11/27/2018 02:55 PM  
 LDL, calculated 68 11/27/2018 02:55 PM  
 Triglyceride 85 11/27/2018 02:55 PM  
 
No results for input(s): INR, PTP, APTT in the last 72 hours. No lab exists for component: INREXT, INREXT Recent Labs 01/07/19 
0334 01/06/19 
0111 01/05/19 
0234 * 130* 128* K 3.4* 3.5 3.3*  
CL 94* 93* 92* CO2 27 28 26 BUN 94* 106* 106* CREA 2.92* 2.90* 2.97* GLU 98 114* 83  
CA 8.6 8.1* 7.6* WBC  --  4.1 5.1 HGB  --  8.8* 9.0* HCT  --  28.6* 28.7*  
PLT  --  89* 89* No results for input(s): SGOT, GPT, AP, TBIL, TP, ALB, GLOB, GGT, AML, LPSE in the last 72 hours. No lab exists for component: AMYP, HLPSE No components found for: [place] No results for input(s): PH, PCO2, PO2 in the last 72 hours. Agueda Negrete MD 
1/7/2019

## 2019-01-07 NOTE — DIABETES MGMT
[facility] Elevated A1C Visit Note Recommendations/ Comments: please add lispro correction. Current hospital DM medications: Victoza - pt supplied, Lantus 30 units Patient is a 77 y.o. male with known DM on Lantus 30 units, Victoza 1.2 mg, humalog 5 units with meals at home. Pt shared that he has had 65 lb weight loss in last 2.5 years, sees endocrinologist and weight loss specialist. Pt checks BG 5x/weekly at varying times, reports that fasting BG 80 - 130s and may be 143 - 160 mg/dL pre-lunch and/or dinner. Pt shared that he gets only 2-3 hours of sleep nightly. Meals vary, often skips meals. Following high protein diet per weight loss specialist advice, takes protein shake for breakfast, bagel with peanut butter 2 hours later. Chicken sandwich with cheese. 4 cups spaghetti. Snacks on fruit in between meals. A1c:  
Lab Results Component Value Date/Time Hemoglobin A1c 9.5 (H) 11/27/2018 02:55 PM  
 Hemoglobin A1c 8.5 (H) 08/07/2018 02:50 PM  
 
 
Recent Glucose Results:  
Lab Results Component Value Date/Time GLU 98 01/07/2019 03:34 AM  
 GLUCPOC 145 (H) 01/07/2019 11:53 AM  
 GLUCPOC 99 01/07/2019 08:11 AM  
 GLUCPOC 161 (H) 01/06/2019 08:08 PM  
  
 
Lab Results Component Value Date/Time Creatinine 2.92 (H) 01/07/2019 03:34 AM  
 
 
Active Orders Diet DIET DIABETIC CONSISTENT CARB Regular; 2 GM NA (House Low NA) Assessed and instructed patient on the following:  
·  blood sugar goals, complications of diabetes mellitus, illness management, nutrition and referred to Diabetes Educator · Discussed eating meals 4-5 hours apart , avoiding skipped meals, aiming to have CHO with protein if snacking but ultimately to avoid snacking out of boredom (pt unwilling to snack on non-starchy vegetables) · Discussed MyPlate for meal pattern at meals · Discussed trying to check 2 hour post meal BG to help understand spikes in BG (given hgb A1c elevated but pt reports lower BG values from BG checks) Provided patient with the following: [x]          Diabetes Self-Care Guide 
             []          Insulin education materials 
             []          Diabetes survival skills handout []          BG guidelines for post-op patients []          \"Decreasing  the Cost of Diabetes Care\"                           [x]          Outpatient DTC contact number Patient to follow up with with endocrinologist after discharge. Will continue to follow as needed. Thank you. Clint Fleming RD [facility] Office: [phone] Time spent: 20 minutes

## 2019-01-07 NOTE — PROGRESS NOTES
Bedside shift change report given to LAMINE Thorne (oncoming nurse). Report included the following information SBAR, Kardex, Intake/Output, MAR and Recent Results. SHIFT SUMMARY: 
 
 
 
 
 
[facility] NURSING NOTE Admission Date 1/2/2019 Admission Diagnosis CHF 
CHF (congestive heart failure) ([address] Consults IP CONSULT TO CARDIOLOGY Cardiac Monitoring [x] Yes [] No  
  
Purposeful Hourly Rounding [x] Yes   
Thanh Score Total Score: 2 Thanh score 3 or > [] Bed Alarm [] Avasys [] 1:1 sitter [] Patient refused (Signed refusal form in chart) Aurelio Score Aurelio Score: 22 Aurelio score 14 or < [] PMT consult [] Wound Care consult  
 []  Specialty bed  [] Nutrition consult Influenza Vaccine Received Flu Vaccine for Current Season (usually Sept-March): Yes(9/26/18) Oxygen needs? [x] Room air Oxygen @  []1L    []2L    []3L   []4L    []5L   []6L via NC Chronic home O2 use? [] Yes [x] No 
Perform O2 challenge test and document in progress note using smartphrase (.Homeoxygen) Last bowel movement Last Bowel Movement Date: 01/04/19 Urinary Catheter LDAs Peripheral IV 01/02/19 Anterior; Inferior;Left;Lower;Proximal Arm (Active) Site Assessment Clean, dry, & intact 1/6/2019  7:22 PM  
Phlebitis Assessment 0 1/6/2019  7:22 PM  
Infiltration Assessment 0 1/6/2019  7:22 PM  
Dressing Status Clean, dry, & intact 1/6/2019  7:22 PM  
Dressing Type Transparent 1/6/2019  7:22 PM  
Hub Color/Line Status Pink;Capped 1/6/2019  7:22 PM  
                  
  
Readmission Risk Assessment Tool Score High Risk 45 Total Score 3 Has Seen PCP in Last 6 Months (Yes=3, No=0) 2 . Living with Significant Other. Assisted Living. LTAC. SNF. or  
Rehab  
 5 Pt. Coverage (Medicare=5 , Medicaid, or Self-Pay=4) 28 Charlson Comorbidity Score (Age + Comorbid Conditions) Criteria that do not apply:  
 Patient Length of Stay (>5 days = 3) IP Visits Last 12 Months (1-3=4, 4=9, >4=11) Expected Length of Stay 4d 2h Actual Length of Stay 4

## 2019-01-07 NOTE — PROGRESS NOTES
PROGRESS NOTE 
 
NAME:  Royal Misty Zarate. :   1950 MRN:   [id number] Date/Time:  2019 7:18 AM 
Subjective:  
History:  Chart reviewed and patient seen and examined this AM and D/W his nurse and all events noted. He has a known Ischemic Cardiomyopathy ( although EFx 50% on Echo 2017, now EFx 35%) and presented to the office on  with an 18 pound weight gain and increased SOB, ANDERS and peripheral edema and was admitted with Acute on chronic systolic CHF. He has had good diuresis since admission; however due to associated diarrhea output hasn't been real accurate. He notes that the watery diarrhea that he developed has now resolved as of . He has had no recent antibiotics and no one else has been sick at home and C. Diff, negative as expected. He denies abdominal pains and has no other GI c/o. He had no  c/o until when he developed hematuria with marked dysuria which is better after Cipro started on . He is less SOB walking to BR and can now lie flat in bed with nasal oxygen on; however he had significant SOB earlier this AM although better now. He was still dizzy when he got up until 2nd U PRBC given on . He has no CP or other cardio/respiratory c/o. He has no other c/o on complete ROS except weakness Medications reviewed: 
Current Facility-Administered Medications Medication Dose Route Frequency  meclizine (ANTIVERT) tablet 12.5 mg  12.5 mg Oral TID PRN  
 0.9% sodium chloride infusion 250 mL  250 mL IntraVENous PRN  potassium chloride (KLOR-CON) packet for solution 40 mEq  40 mEq Oral BID WITH MEALS  ciprofloxacin HCl (CIPRO) tablet 500 mg  500 mg Oral BID  liraglutide (VICTOZA) 0.6 mg/0.1 mL (18 mg/3 mL) sub-q pen 1.2 mg (Patient Supplied)  1.2 mg SubCUTAneous DAILY  0.9% sodium chloride infusion 250 mL  250 mL IntraVENous PRN  pramipexole (MIRAPEX) tablet 0.5 mg  0.5 mg Oral DAILY PRN  
 ondansetron (ZOFRAN) injection 4 mg  4 mg IntraVENous QID PRN  
  ascorbic acid (vitamin C) (VITAMIN C) tablet 250 mg  250 mg Oral TID  carvedilol (COREG) tablet 12.5 mg  12.5 mg Oral BID WITH MEALS  clonazePAM (KlonoPIN) tablet 2 mg  2 mg Oral QHS  colchicine tablet 0.6 mg  0.6 mg Oral DAILY  ferrous sulfate tablet 325 mg  325 mg Oral TID  finasteride (PROSCAR) tablet 5 mg  5 mg Oral DAILY  insulin glargine (LANTUS) injection 30 Units  30 Units SubCUTAneous QHS  pramipexole (MIRAPEX) tablet 0.5 mg  0.5 mg Oral QHS  tamsulosin (FLOMAX) capsule 0.4 mg  0.4 mg Oral DAILY  febuxostat (ULORIC) tablet 40 mg  40 mg Oral DAILY  sodium chloride (NS) flush 5-10 mL  5-10 mL IntraVENous Q8H  
 sodium chloride (NS) flush 5-10 mL  5-10 mL IntraVENous PRN  
 zolpidem (AMBIEN) tablet 10 mg  10 mg Oral QHS PRN  
 acetaminophen (TYLENOL) tablet 650 mg  650 mg Oral Q4H PRN  
 bumetanide (BUMEX) injection 2 mg  2 mg IntraVENous Q12H  diphenoxylate-atropine (LOMOTIL) tablet 1 Tab  1 Tab Oral QID PRN Objective:  
Vitals: 
Visit Vitals /87 (BP 1 Location: Right arm, BP Patient Position: At rest) Pulse 81 Temp 97.3 °F (36.3 °C) Resp 20 Wt 253 lb 4.9 oz (114.9 kg) SpO2 100% BMI 29.27 kg/m² O2 Device: Room air Temp (24hrs), Av.8 °F (36.6 °C), Min:97.3 °F (36.3 °C), Max:98.4 °F (36.9 °C) Last 24hr Input/Output: 
 
Intake/Output Summary (Last 24 hours) at 2019 7274 Last data filed at 2019 8121 Gross per 24 hour Intake 840 ml Output 3300 ml Net -2460 ml PHYSICAL EXAM: 
General:     Alert, cooperative, no distress, appears stated age. Head:    Normocephalic, without obvious abnormality, atraumatic. Eyes:    Conjunctivae/corneas clear. PERRLA Nose:   Nares normal. No drainage or sinus tenderness. Throat:     Lips, mucosa, and tongue normal.  No Thrush Neck:   Supple, symmetrical,  no adenopathy, thyroid: non tender 
   no carotid bruit and no JVD. Back:     Symmetric,  No CVA tenderness. Lungs:    Clear to auscultation bilaterally except bibasilar dry rales. No Wheezing or Rhonchi. Heart:    Regular rate and rhythm,  no murmur, rub or gallop. Abdomen:    Soft, non-tender. Not distended. Bowel sounds normal. No masses Extremities:  Extremities normal, atraumatic, No cyanosis. 1-2+ edema. No clubbing Lymph nodes:  Cervical, supraclavicular normal. 
Neurologic:  Normal strength, Alert and oriented X 3. Skin:                 No rash Lab Data Reviewed: 
 
Recent Results (from the past 24 hour(s)) GLUCOSE, POC Collection Time: 01/06/19 11:24 AM  
Result Value Ref Range Glucose (POC) 152 (H) 65 - 100 mg/dL Performed by RALPH LANG(CON) GLUCOSE, POC Collection Time: 01/06/19  4:41 PM  
Result Value Ref Range Glucose (POC) 139 (H) 65 - 100 mg/dL Performed by RALPH LANG(CON) GLUCOSE, POC Collection Time: 01/06/19  8:08 PM  
Result Value Ref Range Glucose (POC) 161 (H) 65 - 100 mg/dL Performed by Joseph Guerrier METABOLIC PANEL, BASIC Collection Time: 01/07/19  3:34 AM  
Result Value Ref Range Sodium 131 (L) 136 - 145 mmol/L Potassium 3.4 (L) 3.5 - 5.1 mmol/L Chloride 94 (L) 97 - 108 mmol/L  
 CO2 27 21 - 32 mmol/L Anion gap 10 5 - 15 mmol/L Glucose 98 65 - 100 mg/dL BUN 94 (H) 6 - 20 MG/DL Creatinine 2.92 (H) 0.70 - 1.30 MG/DL  
 BUN/Creatinine ratio 32 (H) 12 - 20 GFR est AA 26 (L) >60 ml/min/1.73m2 GFR est non-AA 21 (L) >60 ml/min/1.73m2 Calcium 8.6 8.5 - 10.1 MG/DL Assessment/Plan:  
 
Principal Problem: 
  Systolic CHF, acute on chronic ([facility] (1/2/2019) Active Problems: 
  Atrial fibrillation ([facility] (3/7/2014) ASCVD (arteriosclerotic cardiovascular disease) (8/5/2017) CHF (congestive heart failure) ([facility] (8/5/2017) Controlled type 2 diabetes mellitus with stage 4 chronic kidney disease, without long-term current use of insulin ([facility] (8/5/2017) CKD (chronic kidney disease), stage IV ([facility] (8/5/2017) Hypertension with renal disease (8/5/2017) Gastroesophageal reflux disease without esophagitis (8/5/2017) Cardiomyopathy ([facility] (8/5/2017) Anemia (8/5/2017) 
 
  
___________________________________________________ PLAN: 
 
1. Continue diuresis with Bumex IV and Zaroxlyn stopped 1/5, Weight 253 (basline 250#) Adm 268# 2. Repeat Echo pending 3. Cardiology Consult note reviewed and appreciated 4. Follow renal function ([id number]/2.77), 942.92 today 5. Hgb down to 7.3 on 1/3 from 7.8 on adm and only 7.7 on 1/4 S/P 1 U PRBC, With his cardiac situation transfused 2nd unit and now 8.8 6. Follow Na 132 on adm to 131 now slightly up from yesterday 7. Now off Eliquis with PAF, since no Afib since before June 8. Continue to replete K, 3.5 on adm to 3.4 now, ordered Mag,but not done 9. Continue diabetic meds and follow BS 
10. Continue other home meds 11. C. Diff. Is negative as no recent antibiotics 12. Urine culture sent 1/4 and added Cipro 13. D/Jay Jay Losarten on 1/6 to see if renal function improves, May now benefit from low dose Entresto, but will wait to see how renal numbers level out 
 
 
 
___________________________________________________ Attending Physician: Dl Taylor MD

## 2019-01-07 NOTE — PROGRESS NOTES
Problem: Falls - Risk of 
Goal: *Absence of Falls Document Ida Huang Fall Risk and appropriate interventions in the flowsheet. Outcome: Progressing Towards Goal 
Fall Risk Interventions: 
Mobility Interventions: Bed/chair exit alarm, Communicate number of staff needed for ambulation/transfer, Mechanical lift, OT consult for ADLs, Patient to call before getting OOB, PT Consult for mobility concerns Medication Interventions: Bed/chair exit alarm, Evaluate medications/consider consulting pharmacy, Patient to call before getting OOB, Teach patient to arise slowly Elimination Interventions: Call light in reach, Elevated toilet seat, Patient to call for help with toileting needs, Toilet paper/wipes in reach, Toileting schedule/hourly rounds, Urinal in reach History of Falls Interventions: Bed/chair exit alarm, Consult care management for discharge planning, Door open when patient unattended, Evaluate medications/consider consulting pharmacy, Investigate reason for fall, Room close to nurse's station

## 2019-01-07 NOTE — PROGRESS NOTES
Attempted to see pt this pm and pt declined, stated he has been up several times and is now to tired to participate. Will continue to follow and tx when pt is able.

## 2019-01-07 NOTE — PROGRESS NOTES
Problem: Falls - Risk of 
Goal: *Absence of Falls Document Rissa Giang Fall Risk and appropriate interventions in the flowsheet. Outcome: Progressing Towards Goal 
Fall Risk Interventions: 
Mobility Interventions: Bed/chair exit alarm, Communicate number of staff needed for ambulation/transfer, Mechanical lift, OT consult for ADLs, Patient to call before getting OOB, PT Consult for mobility concerns Medication Interventions: Bed/chair exit alarm, Evaluate medications/consider consulting pharmacy, Patient to call before getting OOB, Teach patient to arise slowly, Utilize gait belt for transfers/ambulation Elimination Interventions: Bed/chair exit alarm, Call light in reach, Patient to call for help with toileting needs, Toilet paper/wipes in reach, Toileting schedule/hourly rounds, Urinal in reach History of Falls Interventions: Bed/chair exit alarm, Consult care management for discharge planning, Door open when patient unattended, Evaluate medications/consider consulting pharmacy, Investigate reason for fall, Room close to nurse's station

## 2019-01-07 NOTE — PROGRESS NOTES
1451 
 Pt had small episode of diarrhea and noticed some blood while cleaning himself. Dr. Jorge Alberto Pritchett notified and new orders received, Will continue to monitor.  
 
Bedside(SBAR) Shift report given to [facility]

## 2019-01-07 NOTE — PROGRESS NOTES
Echo EF 35-40%, a decline from prior normal LVEF. He's dealing now with systolic CHF. I recommend BIV pacemaker upgrade to his current dual chamber system, a good chance he'll respond to CRT and will help the CHF, will talk to him tomorrow. If agreeable, then will develop timeline for the procedure. Signed By: Gregor Dawson MD   
 January 6, 2019

## 2019-01-08 LAB
ANION GAP SERPL CALC-SCNC: 7 MMOL/L (ref 5–15)
BASOPHILS # BLD: 0 K/UL (ref 0–0.1)
BASOPHILS NFR BLD: 1 % (ref 0–1)
BUN SERPL-MCNC: 94 MG/DL (ref 6–20)
BUN/CREAT SERPL: 33 (ref 12–20)
CALCIUM SERPL-MCNC: 8.5 MG/DL (ref 8.5–10.1)
CHLORIDE SERPL-SCNC: 94 MMOL/L (ref 97–108)
CO2 SERPL-SCNC: 29 MMOL/L (ref 21–32)
CREAT SERPL-MCNC: 2.87 MG/DL (ref 0.7–1.3)
DIFFERENTIAL METHOD BLD: ABNORMAL
EOSINOPHIL # BLD: 0 K/UL (ref 0–0.4)
EOSINOPHIL NFR BLD: 0 % (ref 0–7)
ERYTHROCYTE [DISTWIDTH] IN BLOOD BY AUTOMATED COUNT: 18.1 % (ref 11.5–14.5)
GLUCOSE BLD STRIP.AUTO-MCNC: 110 MG/DL (ref 65–100)
GLUCOSE BLD STRIP.AUTO-MCNC: 122 MG/DL (ref 65–100)
GLUCOSE BLD STRIP.AUTO-MCNC: 159 MG/DL (ref 65–100)
GLUCOSE BLD STRIP.AUTO-MCNC: 84 MG/DL (ref 65–100)
GLUCOSE SERPL-MCNC: 102 MG/DL (ref 65–100)
HCT VFR BLD AUTO: 30.2 % (ref 36.6–50.3)
HGB BLD-MCNC: 9.3 G/DL (ref 12.1–17)
IMM GRANULOCYTES # BLD: 0 K/UL (ref 0–0.04)
IMM GRANULOCYTES NFR BLD AUTO: 1 % (ref 0–0.5)
LYMPHOCYTES # BLD: 0.7 K/UL (ref 0.8–3.5)
LYMPHOCYTES NFR BLD: 14 % (ref 12–49)
MCH RBC QN AUTO: 26.7 PG (ref 26–34)
MCHC RBC AUTO-ENTMCNC: 30.8 G/DL (ref 30–36.5)
MCV RBC AUTO: 86.8 FL (ref 80–99)
MONOCYTES # BLD: 0.5 K/UL (ref 0–1)
MONOCYTES NFR BLD: 10 % (ref 5–13)
NEUTS SEG # BLD: 3.5 K/UL (ref 1.8–8)
NEUTS SEG NFR BLD: 74 % (ref 32–75)
NRBC # BLD: 0 K/UL (ref 0–0.01)
NRBC BLD-RTO: 0 PER 100 WBC
PLATELET # BLD AUTO: 95 K/UL (ref 150–400)
PMV BLD AUTO: 10.9 FL (ref 8.9–12.9)
POTASSIUM SERPL-SCNC: 3.9 MMOL/L (ref 3.5–5.1)
RBC # BLD AUTO: 3.48 M/UL (ref 4.1–5.7)
SERVICE CMNT-IMP: ABNORMAL
SERVICE CMNT-IMP: NORMAL
SODIUM SERPL-SCNC: 130 MMOL/L (ref 136–145)
WBC # BLD AUTO: 4.7 K/UL (ref 4.1–11.1)

## 2019-01-08 PROCEDURE — 74011636637 HC RX REV CODE- 636/637: Performed by: INTERNAL MEDICINE

## 2019-01-08 PROCEDURE — 94760 N-INVAS EAR/PLS OXIMETRY 1: CPT

## 2019-01-08 PROCEDURE — 74011250637 HC RX REV CODE- 250/637: Performed by: INTERNAL MEDICINE

## 2019-01-08 PROCEDURE — 80048 BASIC METABOLIC PNL TOTAL CA: CPT

## 2019-01-08 PROCEDURE — 65660000000 HC RM CCU STEPDOWN

## 2019-01-08 PROCEDURE — 74011000250 HC RX REV CODE- 250: Performed by: INTERNAL MEDICINE

## 2019-01-08 PROCEDURE — 85025 COMPLETE CBC W/AUTO DIFF WBC: CPT

## 2019-01-08 PROCEDURE — 36415 COLL VENOUS BLD VENIPUNCTURE: CPT

## 2019-01-08 PROCEDURE — 82962 GLUCOSE BLOOD TEST: CPT

## 2019-01-08 RX ORDER — BISACODYL 5 MG
10 TABLET, DELAYED RELEASE (ENTERIC COATED) ORAL
Status: COMPLETED | OUTPATIENT
Start: 2019-01-08 | End: 2019-01-08

## 2019-01-08 RX ORDER — POLYETHYLENE GLYCOL 3350, SODIUM SULFATE ANHYDROUS, SODIUM BICARBONATE, SODIUM CHLORIDE, POTASSIUM CHLORIDE 236; 22.74; 6.74; 5.86; 2.97 G/4L; G/4L; G/4L; G/4L; G/4L
4000 POWDER, FOR SOLUTION ORAL ONCE
Status: COMPLETED | OUTPATIENT
Start: 2019-01-08 | End: 2019-01-08

## 2019-01-08 RX ADMIN — CLONAZEPAM 2 MG: 0.5 TABLET ORAL at 21:46

## 2019-01-08 RX ADMIN — Medication 10 ML: at 03:07

## 2019-01-08 RX ADMIN — PRAMIPEXOLE DIHYDROCHLORIDE 0.5 MG: 0.25 TABLET ORAL at 21:47

## 2019-01-08 RX ADMIN — PRAMIPEXOLE DIHYDROCHLORIDE 0.5 MG: 0.25 TABLET ORAL at 12:16

## 2019-01-08 RX ADMIN — BISACODYL 10 MG: 5 TABLET, COATED ORAL at 15:49

## 2019-01-08 RX ADMIN — FEBUXOSTAT 40 MG: 40 TABLET ORAL at 09:44

## 2019-01-08 RX ADMIN — POTASSIUM CHLORIDE 40 MEQ: 1.5 POWDER, FOR SOLUTION ORAL at 18:34

## 2019-01-08 RX ADMIN — CIPROFLOXACIN HYDROCHLORIDE 500 MG: 500 TABLET, FILM COATED ORAL at 08:03

## 2019-01-08 RX ADMIN — CIPROFLOXACIN HYDROCHLORIDE 500 MG: 500 TABLET, FILM COATED ORAL at 18:34

## 2019-01-08 RX ADMIN — Medication 10 ML: at 21:47

## 2019-01-08 RX ADMIN — CARVEDILOL 12.5 MG: 12.5 TABLET, FILM COATED ORAL at 08:07

## 2019-01-08 RX ADMIN — FERROUS SULFATE TAB 325 MG (65 MG ELEMENTAL FE) 325 MG: 325 (65 FE) TAB at 08:03

## 2019-01-08 RX ADMIN — INSULIN GLARGINE 30 UNITS: 100 INJECTION, SOLUTION SUBCUTANEOUS at 21:45

## 2019-01-08 RX ADMIN — Medication 10 ML: at 15:57

## 2019-01-08 RX ADMIN — ACETAMINOPHEN 650 MG: 325 TABLET ORAL at 09:53

## 2019-01-08 RX ADMIN — TAMSULOSIN HYDROCHLORIDE 0.4 MG: 0.4 CAPSULE ORAL at 08:03

## 2019-01-08 RX ADMIN — BUMETANIDE 2 MG: 0.25 INJECTION INTRAMUSCULAR; INTRAVENOUS at 08:08

## 2019-01-08 RX ADMIN — FINASTERIDE 5 MG: 5 TABLET, FILM COATED ORAL at 08:03

## 2019-01-08 RX ADMIN — POTASSIUM CHLORIDE 40 MEQ: 1.5 POWDER, FOR SOLUTION ORAL at 08:03

## 2019-01-08 RX ADMIN — POLYETHYLENE GLYCOL-3350 AND ELECTROLYTES 4000 ML: 236; 6.74; 5.86; 2.97; 22.74 POWDER, FOR SOLUTION ORAL at 18:33

## 2019-01-08 RX ADMIN — OXYCODONE HYDROCHLORIDE AND ACETAMINOPHEN 250 MG: 500 TABLET ORAL at 21:46

## 2019-01-08 RX ADMIN — OXYCODONE HYDROCHLORIDE AND ACETAMINOPHEN 250 MG: 500 TABLET ORAL at 15:56

## 2019-01-08 RX ADMIN — FERROUS SULFATE TAB 325 MG (65 MG ELEMENTAL FE) 325 MG: 325 (65 FE) TAB at 21:47

## 2019-01-08 RX ADMIN — CARVEDILOL 12.5 MG: 12.5 TABLET, FILM COATED ORAL at 18:33

## 2019-01-08 RX ADMIN — BUMETANIDE 2 MG: 0.25 INJECTION INTRAMUSCULAR; INTRAVENOUS at 21:46

## 2019-01-08 RX ADMIN — COLCHICINE 0.6 MG: 0.6 TABLET, FILM COATED ORAL at 09:44

## 2019-01-08 RX ADMIN — SACUBITRIL AND VALSARTAN 1 TABLET: 24; 26 TABLET, FILM COATED ORAL at 09:44

## 2019-01-08 RX ADMIN — FERROUS SULFATE TAB 325 MG (65 MG ELEMENTAL FE) 325 MG: 325 (65 FE) TAB at 15:56

## 2019-01-08 RX ADMIN — OXYCODONE HYDROCHLORIDE AND ACETAMINOPHEN 250 MG: 500 TABLET ORAL at 08:03

## 2019-01-08 NOTE — PROGRESS NOTES
0800 
Report received from [facility]. SBAR, Kardex, ED Summary, Procedure Summary, Intake/Output, MAR, Accordion, Recent Results, Med Rec Status and Cardiac Rhythm paced were discussed. [address] Patient reported having 3 very small episodes of diarrhea yesterday afternoon. When he wiped all 3 times there was bright red blood. Per patient, stool appeared to have normal color. No stool this morning.

## 2019-01-08 NOTE — PROGRESS NOTES
Problem: Falls - Risk of 
Goal: *Absence of Falls Document [facility] Fall Risk and appropriate interventions in the flowsheet. Outcome: Progressing Towards Goal 
Fall Risk Interventions: 
Mobility Interventions: Bed/chair exit alarm, Communicate number of staff needed for ambulation/transfer, Mechanical lift, OT consult for ADLs, Patient to call before getting OOB, PT Consult for mobility concerns Medication Interventions: Assess postural VS orthostatic hypotension, Bed/chair exit alarm, Evaluate medications/consider consulting pharmacy, Patient to call before getting OOB, Teach patient to arise slowly Elimination Interventions: Call light in reach, Elevated toilet seat, Patient to call for help with toileting needs, Toilet paper/wipes in reach, Toileting schedule/hourly rounds, Urinal in reach History of Falls Interventions: Consult care management for discharge planning, Door open when patient unattended, Evaluate medications/consider consulting pharmacy, Investigate reason for fall, Room close to nurse's station

## 2019-01-08 NOTE — PROGRESS NOTES
PROGRESS NOTE 
 
NAME:  Royal Jacboo Mcdowell. :   1950 MRN:   [id number] Date/Time:  2019 6:52 AM 
Subjective:  
History:  Chart reviewed and patient seen and examined this AM and D/W his nurse and all events noted. He has a known Ischemic Cardiomyopathy ( although EFx 50% on Echo 2017, now EFx 35%) and presented to the office on  with an 18 pound weight gain and increased SOB, ANDERS and peripheral edema and was admitted with Acute on chronic systolic CHF. He has had good diuresis since admission; however due to associated diarrhea output hasn't been real accurate. He notes that the watery diarrhea that he developed the first couple of days subsequently resolved as of , but diarrhea again yesterday and some bright red blood on toilet tissue. He had no recent antibiotics PTA and no one else had been sick at home and C. Diff, negative as expected. He denies abdominal pains and has no other GI c/o. He had no  c/o until  when he developed hematuria with marked dysuria which is better after Cipro started on . He is less SOB walking to BR and can now lie flat in bed with nasal oxygen on. He was still dizzy when he got up until 2nd U PRBC given on . He has no CP or other cardio/respiratory c/o. He has no other c/o on complete ROS except weakness Medications reviewed: 
Current Facility-Administered Medications Medication Dose Route Frequency  meclizine (ANTIVERT) tablet 12.5 mg  12.5 mg Oral TID PRN  
 0.9% sodium chloride infusion 250 mL  250 mL IntraVENous PRN  potassium chloride (KLOR-CON) packet for solution 40 mEq  40 mEq Oral BID WITH MEALS  ciprofloxacin HCl (CIPRO) tablet 500 mg  500 mg Oral BID  liraglutide (VICTOZA) 0.6 mg/0.1 mL (18 mg/3 mL) sub-q pen 1.2 mg (Patient Supplied)  1.2 mg SubCUTAneous DAILY  0.9% sodium chloride infusion 250 mL  250 mL IntraVENous PRN  pramipexole (MIRAPEX) tablet 0.5 mg  0.5 mg Oral DAILY PRN  
  ondansetron (ZOFRAN) injection 4 mg  4 mg IntraVENous QID PRN  
 ascorbic acid (vitamin C) (VITAMIN C) tablet 250 mg  250 mg Oral TID  carvedilol (COREG) tablet 12.5 mg  12.5 mg Oral BID WITH MEALS  clonazePAM (KlonoPIN) tablet 2 mg  2 mg Oral QHS  colchicine tablet 0.6 mg  0.6 mg Oral DAILY  ferrous sulfate tablet 325 mg  325 mg Oral TID  finasteride (PROSCAR) tablet 5 mg  5 mg Oral DAILY  insulin glargine (LANTUS) injection 30 Units  30 Units SubCUTAneous QHS  pramipexole (MIRAPEX) tablet 0.5 mg  0.5 mg Oral QHS  tamsulosin (FLOMAX) capsule 0.4 mg  0.4 mg Oral DAILY  febuxostat (ULORIC) tablet 40 mg  40 mg Oral DAILY  sodium chloride (NS) flush 5-10 mL  5-10 mL IntraVENous Q8H  
 sodium chloride (NS) flush 5-10 mL  5-10 mL IntraVENous PRN  
 zolpidem (AMBIEN) tablet 10 mg  10 mg Oral QHS PRN  
 acetaminophen (TYLENOL) tablet 650 mg  650 mg Oral Q4H PRN  
 bumetanide (BUMEX) injection 2 mg  2 mg IntraVENous Q12H  diphenoxylate-atropine (LOMOTIL) tablet 1 Tab  1 Tab Oral QID PRN Objective:  
Vitals: 
Visit Vitals /68 Pulse 80 Temp 97.8 °F (36.6 °C) Resp 18 Wt 252 lb 6.8 oz (114.5 kg) SpO2 99% BMI 29.17 kg/m² O2 Device: Room air Temp (24hrs), Av.8 °F (36.6 °C), Min:97.6 °F (36.4 °C), Max:97.9 °F (36.6 °C) Last 24hr Input/Output: 
 
Intake/Output Summary (Last 24 hours) at 2019 1439 Last data filed at 2019 2765 Gross per 24 hour Intake 2270 ml Output 2525 ml Net -255 ml PHYSICAL EXAM: 
General:     Alert, cooperative, no distress, appears stated age. Head:    Normocephalic, without obvious abnormality, atraumatic. Eyes:    Conjunctivae/corneas clear. PERRLA Nose:   Nares normal. No drainage or sinus tenderness. Throat:     Lips, mucosa, and tongue normal.  No Thrush Neck:   Supple, symmetrical,  no adenopathy, thyroid: non tender 
   no carotid bruit and no JVD. Back:     Symmetric,  No CVA tenderness. Lungs:    Clear to auscultation bilaterally except bibasilar dry rales. No Wheezing or Rhonchi. Heart:    Regular rate and rhythm,  no murmur, rub or gallop. Abdomen:    Soft, non-tender. Not distended. Bowel sounds normal. No masses Extremities:  Extremities normal, atraumatic, No cyanosis. 1-2+ edema. No clubbing Lymph nodes:  Cervical, supraclavicular normal. 
Neurologic:  Normal strength, Alert and oriented X 3. Skin:                 No rash Lab Data Reviewed: 
 
Recent Results (from the past 24 hour(s)) GLUCOSE, POC Collection Time: 01/07/19  8:11 AM  
Result Value Ref Range Glucose (POC) 99 65 - 100 mg/dL Performed by Thayne Eisenmenger ([facility]) GLUCOSE, POC Collection Time: 01/07/19 11:53 AM  
Result Value Ref Range Glucose (POC) 145 (H) 65 - 100 mg/dL Performed by Thayne Eisenmenger ([facility]) GLUCOSE, POC Collection Time: 01/07/19  4:34 PM  
Result Value Ref Range Glucose (POC) 140 (H) 65 - 100 mg/dL Performed by Thayne Eisenmenger ([facility]) GLUCOSE, POC Collection Time: 01/07/19  8:53 PM  
Result Value Ref Range Glucose (POC) 215 (H) 65 - 100 mg/dL Performed by Dana Winter ([facility]) METABOLIC PANEL, BASIC Collection Time: 01/08/19  2:19 AM  
Result Value Ref Range Sodium 130 (L) 136 - 145 mmol/L Potassium 3.9 3.5 - 5.1 mmol/L Chloride 94 (L) 97 - 108 mmol/L  
 CO2 29 21 - 32 mmol/L Anion gap 7 5 - 15 mmol/L Glucose 102 (H) 65 - 100 mg/dL BUN 94 (H) 6 - 20 MG/DL Creatinine 2.87 (H) 0.70 - 1.30 MG/DL  
 BUN/Creatinine ratio 33 (H) 12 - 20 GFR est AA 27 (L) >60 ml/min/1.73m2 GFR est non-AA 22 (L) >60 ml/min/1.73m2 Calcium 8.5 8.5 - 10.1 MG/DL  
CBC WITH AUTOMATED DIFF Collection Time: 01/08/19  2:19 AM  
Result Value Ref Range WBC 4.7 4.1 - 11.1 K/uL  
 RBC 3.48 (L) 4.10 - 5.70 M/uL HGB 9.3 (L) 12.1 - 17.0 g/dL HCT 30.2 (L) 36.6 - 50.3 % MCV 86.8 80.0 - 99.0 FL  
 MCH 26.7 26.0 - 34.0 PG  
 MCHC 30.8 30.0 - 36.5 g/dL RDW 18.1 (H) 11.5 - 14.5 % PLATELET 95 (L) 249 - 400 K/uL MPV 10.9 8.9 - 12.9 FL  
 NRBC 0.0 0  WBC ABSOLUTE NRBC 0.00 0.00 - 0.01 K/uL NEUTROPHILS 74 32 - 75 % LYMPHOCYTES 14 12 - 49 % MONOCYTES 10 5 - 13 % EOSINOPHILS 0 0 - 7 % BASOPHILS 1 0 - 1 % IMMATURE GRANULOCYTES 1 (H) 0.0 - 0.5 % ABS. NEUTROPHILS 3.5 1.8 - 8.0 K/UL  
 ABS. LYMPHOCYTES 0.7 (L) 0.8 - 3.5 K/UL  
 ABS. MONOCYTES 0.5 0.0 - 1.0 K/UL  
 ABS. EOSINOPHILS 0.0 0.0 - 0.4 K/UL  
 ABS. BASOPHILS 0.0 0.0 - 0.1 K/UL  
 ABS. IMM. GRANS. 0.0 0.00 - 0.04 K/UL  
 DF AUTOMATED Assessment/Plan:  
 
Principal Problem: 
  Systolic CHF, acute on chronic ([facility] (1/2/2019) Active Problems: 
  Atrial fibrillation ([facility] (3/7/2014) ASCVD (arteriosclerotic cardiovascular disease) (8/5/2017) CHF (congestive heart failure) ([facility] (8/5/2017) Controlled type 2 diabetes mellitus with stage 4 chronic kidney disease, without long-term current use of insulin ([facility] (8/5/2017) CKD (chronic kidney disease), stage IV ([facility] (8/5/2017) Hypertension with renal disease (8/5/2017) Gastroesophageal reflux disease without esophagitis (8/5/2017) Cardiomyopathy ([facility] (8/5/2017) Anemia (8/5/2017) 
 
  
___________________________________________________ PLAN: 
 
1. Continue diuresis with Bumex IV and Zaroxlyn stopped 1/5, Weight 252.5 (basline 250#) Adm 268# 2. Repeat Echo pending 3. Cardiology Consult note reviewed and appreciated 4. Follow renal function (lgz123/2.77), 94/2.87 today 5. Hgb down to 7.3 on 1/3 from 7.8 on adm and only 7.7 on 1/4 S/P 1 U PRBC, With his cardiac situation transfused 2nd unit and now 9.3 6. Follow Na 132 on adm to 130 now slightly up from yesterday 7. Now off Eliquis with PAF, since no Afib since before June 8. Continue to replete K, 3.5 on adm to 3.9 now, ordered Mag,but not done 9. Continue diabetic meds and follow BS 
10. Continue other home meds 11. C. Diff. Is negative as no recent antibiotics 12. Urine culture sent 1/4 and added Cipro 13. D/Jay Jay Losarten on 1/6 to see if renal function improves, and will try low dose Entresto at every day dose only as now systolic HF 14. Will have GI see hime with blood on BM yesterday, ? Flex sig 
 
 
 
___________________________________________________ Attending Physician: Corrine Nguyen MD

## 2019-01-08 NOTE — PROGRESS NOTES
CARDIOLOGY Progress Note Patient ID: 
Patient: Jose Martin Singh. MRN: [id number] Age: 77 y.o.  : 1950 Date of  Admission: 2019  3:24 PM  
PCP:  Sudarshan Chao MD 
 
Assessment: 1. Acute on chronic systolic heart failure, improving. Admitted dietary indiscretion and worsening anemia. Last 3 Recorded Weights in this Encounter 19 [id number] 19 [id number] 19 0214 Weight: 116 kg (255 lb 11.7 oz) 114.9 kg (253 lb 4.9 oz) 114.5 kg (252 lb 6.8 oz) 2. Paroxysmal atrial fibrillation and atypical atrial flutter s/p ablation in 2018. NO atrial fibrillation or flutter since the ablation. 3. Hypertensive heart disease with a history of diastolic congestive heart failure and CKD stage 4. EF 55% on prior echo. 4. Coronary artery disease with multivessel interventions. His last stenting was in . 
5. Mild idiopathic pericardial effusion in the past. 
6. Diabetes mellitus type 2, on chronic insulin. 7. Hyperlipidemia. 8. History of esophageal dilation for dysphagia. 9. Hiatal hernia. 10. Anemia of chronic renal disease. On EPO (and Fe supplement). 11. Mild thrombocytopenia. 12. Chronic anticoagulation. 13. Mild hyponatremia, multifactorial (volume overload, Na loss during diuresis, etc). Plan: 1. Continue diuresis, 2g Na diet. 2. Recommend upgrade to BIV pacemaker since he's pacer-dependent and has heart failure. If we're ventricular pacing this much in the setting of even mild cardiomyopathy, we'd want a superior mode to limit heart failure. 3. Interrogated pacemaker on --changed base rate to 70, increased AV delays (225/200), made rate adaptive behavior more aggressive to have faster HR's with activity. 4. Continue beta-blocker.  
5. On paper, not a good candidate for ACEI or ARB or spironolactone due to renal dysfunction (risk of hyperkalemia, acidosis, worsening renal failure) but sometimes an agent is used. Losartan discontinued, started on Entresto here. 6. Stopped Eliquis given severe anemia requiring transfusion. EPO has been increased. GI evaluation underway, colonoscopy tomorrow. Optimize medical regimen before upgrade to BIV pacemaker. Would try to use little to no contrast dye for visualization given renal insufficiency. All questions answered. [x]       High complexity decision making was performed in this patient with multiple organ involvement. Royal KRISTA Watts is a 77 y.o. male with a history of atrial arrhythmias s/p ablation 5/2018, has done well from an arrhythmia standpoint. He has had chronic issues with fluid retention. Over the holidays, he admits to dietary indiscretion (salt, fluid, volume) and gained weight and lower extremity edema over several days. He became more dyspneic and got to the point when he started to get some rest symptoms. +orthopnea, PND. He saw Dr. Ashley Wilson who admitted him for management of his heart failure and fluid retention. Here he's lost weight, his legs look much better to him, but still some weight to drop. He had some diarrhea of unclear etiology recently, resolved. TODAY:  No syncope, near-syncope, TIA, or stroke symptoms. Denies resting dyspnea, but does have severe exertional dyspnea and some orthopnea--this has improved some. Allergies Allergen Reactions  Niacin Unknown (comments) Other reaction(s): Unknown (comments)  Imipenem Diarrhea Other reaction(s): Rash  Levemir [Insulin Detemir] Hives  Other Medication Other (comments) ? Allergy to [employer] Phlegm causing chemical burn  Primaxin [Imipenem-Cilastatin] Diarrhea and Rash  Xarelto [Rivaroxaban] Rash and Itching Other reaction(s): Rash Current Facility-Administered Medications Medication Dose Route Frequency  sacubitril-valsartan (ENTRESTO) 24-26 mg tablet 1 Tab  1 Tab Oral Q24H  PEG 3350-Electrolytes (GO-LYTELY) SUSPENSION 4,000 mL  4,000 mL Oral ONCE  
 meclizine (ANTIVERT) tablet 12.5 mg  12.5 mg Oral TID PRN  
 0.9% sodium chloride infusion 250 mL  250 mL IntraVENous PRN  potassium chloride (KLOR-CON) packet for solution 40 mEq  40 mEq Oral BID WITH MEALS  ciprofloxacin HCl (CIPRO) tablet 500 mg  500 mg Oral BID  liraglutide (VICTOZA) 0.6 mg/0.1 mL (18 mg/3 mL) sub-q pen 1.2 mg (Patient Supplied)  1.2 mg SubCUTAneous DAILY  0.9% sodium chloride infusion 250 mL  250 mL IntraVENous PRN  pramipexole (MIRAPEX) tablet 0.5 mg  0.5 mg Oral DAILY PRN  
 ondansetron (ZOFRAN) injection 4 mg  4 mg IntraVENous QID PRN  
 ascorbic acid (vitamin C) (VITAMIN C) tablet 250 mg  250 mg Oral TID  carvedilol (COREG) tablet 12.5 mg  12.5 mg Oral BID WITH MEALS  clonazePAM (KlonoPIN) tablet 2 mg  2 mg Oral QHS  colchicine tablet 0.6 mg  0.6 mg Oral DAILY  ferrous sulfate tablet 325 mg  325 mg Oral TID  finasteride (PROSCAR) tablet 5 mg  5 mg Oral DAILY  insulin glargine (LANTUS) injection 30 Units  30 Units SubCUTAneous QHS  pramipexole (MIRAPEX) tablet 0.5 mg  0.5 mg Oral QHS  tamsulosin (FLOMAX) capsule 0.4 mg  0.4 mg Oral DAILY  febuxostat (ULORIC) tablet 40 mg  40 mg Oral DAILY  sodium chloride (NS) flush 5-10 mL  5-10 mL IntraVENous Q8H  
 sodium chloride (NS) flush 5-10 mL  5-10 mL IntraVENous PRN  
 zolpidem (AMBIEN) tablet 10 mg  10 mg Oral QHS PRN  
 acetaminophen (TYLENOL) tablet 650 mg  650 mg Oral Q4H PRN  
 bumetanide (BUMEX) injection 2 mg  2 mg IntraVENous Q12H  diphenoxylate-atropine (LOMOTIL) tablet 1 Tab  1 Tab Oral QID PRN Review of Symptoms:   
Cardiology: negative for chest pain, palpitations, PND, syncope Gastrointestinal: negative for abdominal pain, N/V, dysphagia :  + resolved gross hematuria, no dysuria  Objective:  
  
Physical Exam: 
Temp (24hrs), Av.8 °F (36.6 °C), Min:97.4 °F (36.3 °C), Max:98.1 °F (36.7 °C) Patient Vitals for the past 8 hrs: 
 Pulse 01/08/19 1519 92  
01/08/19 1041 85 Patient Vitals for the past 8 hrs: 
 Resp  
01/08/19 1519 20  
01/08/19 1041 20 Patient Vitals for the past 8 hrs: 
 BP  
01/08/19 1519 118/76  
01/08/19 1041 124/79 Intake/Output Summary (Last 24 hours) at 1/8/2019 1824 Last data filed at 1/8/2019 3507 Gross per 24 hour Intake 1380 ml Output 1600 ml Net -220 ml Nondiaphoretic, not in acute distress. Supple, no palpable thyromegaly. No scleral icterus, mucous membranes moist, conjuctivae pink, no xanthelasma. L chest pacer site without evidence of erosion or infection. Unlabored, clear to auscultation bilaterally anteriorly, symmetric air movement. Regular rate and paced rhythm, no new murmur, pericardial rub, knock, or gallop. No JVD but 1+peripheral edema. Palpable radial pulses bilaterally. Abdomen, soft, nontender, nondistended. No abdominal bruit or pulstatile masses. Extremities without cyanosis or clubbing. Muscle tone and bulk normal. 
Skin warm and dry. No rashes or ulcers but there is some venostasis changes and excoriations of the pretibial area. Neuro grossly nonfocal.  No tremor. Awake and appropriate. CARDIOGRAPHICS and STUDIES, I reviewed: 
 
Telemetry:  AV pacing. Echo here: LEFT VENTRICLE: The ventricle was mildly dilated. Systolic function was 
mildly to moderately reduced. Ejection fraction was estimated in the range 
of 35 % to 40 %. There was mild diffuse hypokinesis. Wall thickness was 
normal. 
 
RIGHT VENTRICLE: The ventricle was mildly dilated. Systolic function was 
mildly reduced. Wall thickness was normal. 
 
LEFT ATRIUM: The atrium appeared elongated. RIGHT ATRIUM: Size was normal. 
 
MITRAL VALVE: Normal valve structure. There was normal leaflet separation. DOPPLER: The transmitral velocity was within the normal range. There was 
no evidence for stenosis. There was mild regurgitation. AORTIC VALVE: The valve was trileaflet. Leaflets exhibited normal 
thickness, mild calcification, and normal cuspal separation. DOPPLER: 
Transaortic velocity was within the normal range. There was no stenosis. There was mild regurgitation. TRICUSPID VALVE: Normal valve structure. There was normal leaflet 
separation. DOPPLER: The transtricuspid velocity was within the normal 
range. There was no evidence for tricuspid stenosis. There was mild to 
moderate regurgitation. Pulmonary artery systolic pressure: 30 mmHg. PULMONIC VALVE: Leaflets exhibited normal cuspal separation. DOPPLER: The 
transpulmonic velocity was within the normal range. There was no 
regurgitation. AORTA: The root exhibited mild dilatation. PERICARDIUM: There was no pericardial effusion. CXR 1/2:  Small R pleural effusion. Labs: 
No results for input(s): CPK, CKMB, CKNDX, TROIQ in the last 72 hours. No lab exists for component: CPKMB Lab Results Component Value Date/Time Cholesterol, total 112 11/27/2018 02:55 PM  
 HDL Cholesterol 27 (L) 11/27/2018 02:55 PM  
 LDL, calculated 68 11/27/2018 02:55 PM  
 Triglyceride 85 11/27/2018 02:55 PM  
 
No results for input(s): INR, PTP, APTT in the last 72 hours. No lab exists for component: INREXT, INREXT Recent Labs 01/08/19 
0219 01/07/19 
0334 01/06/19 
0111 * 131* 130*  
K 3.9 3.4* 3.5 CL 94* 94* 93* CO2 29 27 28 BUN 94* 94* 106* CREA 2.87* 2.92* 2.90* * 98 114* CA 8.5 8.6 8.1* WBC 4.7  --  4.1 HGB 9.3*  --  8.8* HCT 30.2*  --  28.6* PLT 95*  --  89* No results for input(s): SGOT, GPT, AP, TBIL, TP, ALB, GLOB, GGT, AML, LPSE in the last 72 hours. No lab exists for component: AMYP, HLPSE No components found for: [place] No results for input(s): PH, PCO2, PO2 in the last 72 hours. Edward Alcaraz MD 
1/8/2019

## 2019-01-08 NOTE — PROGRESS NOTES
Patient cleared by nursing for PT treatment, with caveat that patient had been walking the halls today with his wife and may be tired. Patient supine in bed, wife is in the room. PT had actually observed patient/wife ambulate (>/= 200 feet) with rolling walker earlier; he states he has done this twice already today. Verbal review of stairs/stairs technique with patient/wife, they verbalize understanding, will attempt to assess patient on next visit. Patient is scheduled for colonoscopy tomorrow at 11 am. Patient states that he may also receive pacemaker while here, will continue to follow. Time spent with patient = 9 minutes, but will defer for today.
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
## 2019-01-08 NOTE — PROGRESS NOTES
Problem: Falls - Risk of 
Goal: *Absence of Falls Document Orrogerio Tomlinsony Fall Risk and appropriate interventions in the flowsheet. Outcome: Progressing Towards Goal 
Fall Risk Interventions: 
Mobility Interventions: Bed/chair exit alarm, Communicate number of staff needed for ambulation/transfer, Mechanical lift, OT consult for ADLs, Patient to call before getting OOB, PT Consult for mobility concerns Medication Interventions: Teach patient to arise slowly, Patient to call before getting OOB, Evaluate medications/consider consulting pharmacy Elimination Interventions: Call light in reach, Elevated toilet seat, Patient to call for help with toileting needs, Toilet paper/wipes in reach, Toileting schedule/hourly rounds, Urinal in reach History of Falls Interventions: Consult care management for discharge planning, Door open when patient unattended, Evaluate medications/consider consulting pharmacy, Investigate reason for fall, Room close to nurse's station

## 2019-01-08 NOTE — PROGRESS NOTES
Bedside and Verbal shift change report received from Imer Cortez, [address] [place] [address] (offgoing nurse). Report included the following information SBAR, Kardex, Intake/Output, MAR, Recent Results and Cardiac Rhythm Paced.

## 2019-01-08 NOTE — PROGRESS NOTES
Bedside shift change report given to Elizabeth Rangel RN (oncoming nurse). Report included the following information SBAR, Kardex, Intake/Output, MAR, Accordion and Recent Results. SHIFT SUMMARY: 
 
 
 
 
 
[facility] NURSING NOTE Admission Date 1/2/2019 Admission Diagnosis CHF 
CHF (congestive heart failure) ([facility] Consults IP CONSULT TO CARDIOLOGY Cardiac Monitoring [x] Yes [] No  
  
Purposeful Hourly Rounding [x] Yes   
Thanh Score Total Score: 2 Thanh score 3 or > [] Bed Alarm [] Avasys [] 1:1 sitter [] Patient refused (Signed refusal form in chart) Aurelio Score Aurelio Score: 22 Aurelio score 14 or < [] PMT consult [] Wound Care consult  
 []  Specialty bed  [] Nutrition consult Influenza Vaccine Received Flu Vaccine for Current Season (usually Sept-March): Yes(9/26/18) Oxygen needs? [x] Room air Oxygen @  []1L    []2L    []3L   []4L    []5L   []6L via NC Chronic home O2 use? [] Yes [x] No 
Perform O2 challenge test and document in progress note using smartphgoTennae (.Home[employer]) Last bowel movement Last Bowel Movement Date: 01/06/19 Urinary Catheter LDAs Peripheral IV 01/02/19 Anterior; Inferior;Left;Lower;Proximal Arm (Active) Site Assessment Clean, dry, & intact 1/7/2019  7:40 PM  
Phlebitis Assessment 0 1/7/2019  7:40 PM  
Infiltration Assessment 0 1/7/2019  7:40 PM  
Dressing Status Clean, dry, & intact 1/7/2019  7:40 PM  
Dressing Type Transparent 1/7/2019  7:40 PM  
Hub Color/Line Status Pink;Capped 1/7/2019  7:40 PM  
                  
  
Readmission Risk Assessment Tool Score High Risk 39 Total Score 3 Has Seen PCP in Last 6 Months (Yes=3, No=0) 2 . Living with Significant Other. Assisted Living. LTAC. SNF. or  
Rehab  
 3 Patient Length of Stay (>5 days = 3)  
 5 Pt. Coverage (Medicare=5 , Medicaid, or Self-Pay=4) 28 Charlson Comorbidity Score (Age + Comorbid Conditions) Criteria that do not apply: IP Visits Last 12 Months (1-3=4, 4=9, >4=11) Expected Length of Stay 4d 2h Actual Length of Stay 5

## 2019-01-09 ENCOUNTER — HOME HEALTH ADMISSION (OUTPATIENT)
Dept: HOME HEALTH SERVICES | Facility: HOME HEALTH | Age: 69
End: 2019-01-09

## 2019-01-09 ENCOUNTER — ANESTHESIA EVENT (OUTPATIENT)
Dept: ENDOSCOPY | Age: 69
DRG: 291 | End: 2019-01-09
Payer: MEDICARE

## 2019-01-09 ENCOUNTER — ANESTHESIA (OUTPATIENT)
Dept: ENDOSCOPY | Age: 69
DRG: 291 | End: 2019-01-09
Payer: MEDICARE

## 2019-01-09 LAB
ANION GAP SERPL CALC-SCNC: 12 MMOL/L (ref 5–15)
BASOPHILS # BLD: 0.1 K/UL (ref 0–0.1)
BASOPHILS NFR BLD: 1 % (ref 0–1)
BUN SERPL-MCNC: 86 MG/DL (ref 6–20)
BUN/CREAT SERPL: 33 (ref 12–20)
CALCIUM SERPL-MCNC: 8.3 MG/DL (ref 8.5–10.1)
CHLORIDE SERPL-SCNC: 93 MMOL/L (ref 97–108)
CO2 SERPL-SCNC: 26 MMOL/L (ref 21–32)
CREAT SERPL-MCNC: 2.58 MG/DL (ref 0.7–1.3)
DIFFERENTIAL METHOD BLD: ABNORMAL
EOSINOPHIL # BLD: 0 K/UL (ref 0–0.4)
EOSINOPHIL NFR BLD: 0 % (ref 0–7)
ERYTHROCYTE [DISTWIDTH] IN BLOOD BY AUTOMATED COUNT: 18.5 % (ref 11.5–14.5)
GLUCOSE BLD STRIP.AUTO-MCNC: 100 MG/DL (ref 65–100)
GLUCOSE BLD STRIP.AUTO-MCNC: 149 MG/DL (ref 65–100)
GLUCOSE BLD STRIP.AUTO-MCNC: 150 MG/DL (ref 65–100)
GLUCOSE BLD STRIP.AUTO-MCNC: 77 MG/DL (ref 65–100)
GLUCOSE BLD STRIP.AUTO-MCNC: 78 MG/DL (ref 65–100)
GLUCOSE BLD STRIP.AUTO-MCNC: 81 MG/DL (ref 65–100)
GLUCOSE SERPL-MCNC: 72 MG/DL (ref 65–100)
HCT VFR BLD AUTO: 30 % (ref 36.6–50.3)
HGB BLD-MCNC: 9.5 G/DL (ref 12.1–17)
IMM GRANULOCYTES # BLD: 0 K/UL (ref 0–0.04)
IMM GRANULOCYTES NFR BLD AUTO: 1 % (ref 0–0.5)
LYMPHOCYTES # BLD: 0.6 K/UL (ref 0.8–3.5)
LYMPHOCYTES NFR BLD: 11 % (ref 12–49)
MCH RBC QN AUTO: 26.6 PG (ref 26–34)
MCHC RBC AUTO-ENTMCNC: 31.7 G/DL (ref 30–36.5)
MCV RBC AUTO: 84 FL (ref 80–99)
MONOCYTES # BLD: 0.6 K/UL (ref 0–1)
MONOCYTES NFR BLD: 10 % (ref 5–13)
NEUTS SEG # BLD: 4.5 K/UL (ref 1.8–8)
NEUTS SEG NFR BLD: 78 % (ref 32–75)
NRBC # BLD: 0 K/UL (ref 0–0.01)
NRBC BLD-RTO: 0 PER 100 WBC
PLATELET # BLD AUTO: 112 K/UL (ref 150–400)
PMV BLD AUTO: 10.4 FL (ref 8.9–12.9)
POTASSIUM SERPL-SCNC: 3.6 MMOL/L (ref 3.5–5.1)
RBC # BLD AUTO: 3.57 M/UL (ref 4.1–5.7)
SERVICE CMNT-IMP: ABNORMAL
SERVICE CMNT-IMP: ABNORMAL
SERVICE CMNT-IMP: NORMAL
SODIUM SERPL-SCNC: 131 MMOL/L (ref 136–145)
WBC # BLD AUTO: 5.8 K/UL (ref 4.1–11.1)

## 2019-01-09 PROCEDURE — 74011000250 HC RX REV CODE- 250: Performed by: INTERNAL MEDICINE

## 2019-01-09 PROCEDURE — 76060000031 HC ANESTHESIA FIRST 0.5 HR: Performed by: INTERNAL MEDICINE

## 2019-01-09 PROCEDURE — 74011250637 HC RX REV CODE- 250/637: Performed by: INTERNAL MEDICINE

## 2019-01-09 PROCEDURE — 85025 COMPLETE CBC W/AUTO DIFF WBC: CPT

## 2019-01-09 PROCEDURE — 82962 GLUCOSE BLOOD TEST: CPT

## 2019-01-09 PROCEDURE — 80048 BASIC METABOLIC PNL TOTAL CA: CPT

## 2019-01-09 PROCEDURE — 74011250637 HC RX REV CODE- 250/637

## 2019-01-09 PROCEDURE — 36415 COLL VENOUS BLD VENIPUNCTURE: CPT

## 2019-01-09 PROCEDURE — 65660000000 HC RM CCU STEPDOWN

## 2019-01-09 PROCEDURE — 97530 THERAPEUTIC ACTIVITIES: CPT | Performed by: PHYSICAL THERAPIST

## 2019-01-09 PROCEDURE — 74011250636 HC RX REV CODE- 250/636

## 2019-01-09 PROCEDURE — 76040000019: Performed by: INTERNAL MEDICINE

## 2019-01-09 PROCEDURE — 97116 GAIT TRAINING THERAPY: CPT | Performed by: PHYSICAL THERAPIST

## 2019-01-09 PROCEDURE — 74011000250 HC RX REV CODE- 250: Performed by: ANESTHESIOLOGY

## 2019-01-09 PROCEDURE — 74011636637 HC RX REV CODE- 636/637: Performed by: INTERNAL MEDICINE

## 2019-01-09 PROCEDURE — 0DJD8ZZ INSPECTION OF LOWER INTESTINAL TRACT, VIA NATURAL OR ARTIFICIAL OPENING ENDOSCOPIC: ICD-10-PCS | Performed by: INTERNAL MEDICINE

## 2019-01-09 RX ORDER — ATROPINE SULFATE 0.1 MG/ML
0.5 INJECTION INTRAVENOUS
Status: DISCONTINUED | OUTPATIENT
Start: 2019-01-09 | End: 2019-01-09 | Stop reason: HOSPADM

## 2019-01-09 RX ORDER — NALOXONE HYDROCHLORIDE 0.4 MG/ML
0.4 INJECTION, SOLUTION INTRAMUSCULAR; INTRAVENOUS; SUBCUTANEOUS
Status: DISCONTINUED | OUTPATIENT
Start: 2019-01-09 | End: 2019-01-09 | Stop reason: HOSPADM

## 2019-01-09 RX ORDER — EPINEPHRINE 0.1 MG/ML
1 INJECTION INTRACARDIAC; INTRAVENOUS
Status: DISCONTINUED | OUTPATIENT
Start: 2019-01-09 | End: 2019-01-09 | Stop reason: HOSPADM

## 2019-01-09 RX ORDER — DEXTROMETHORPHAN/PSEUDOEPHED 2.5-7.5/.8
1.2 DROPS ORAL
Status: DISCONTINUED | OUTPATIENT
Start: 2019-01-09 | End: 2019-01-09 | Stop reason: HOSPADM

## 2019-01-09 RX ORDER — FENTANYL CITRATE 50 UG/ML
25 INJECTION, SOLUTION INTRAMUSCULAR; INTRAVENOUS
Status: DISCONTINUED | OUTPATIENT
Start: 2019-01-09 | End: 2019-01-09 | Stop reason: HOSPADM

## 2019-01-09 RX ORDER — SODIUM CHLORIDE 0.9 % (FLUSH) 0.9 %
5-40 SYRINGE (ML) INJECTION EVERY 8 HOURS
Status: DISCONTINUED | OUTPATIENT
Start: 2019-01-09 | End: 2019-01-10 | Stop reason: HOSPADM

## 2019-01-09 RX ORDER — LIDOCAINE HYDROCHLORIDE 20 MG/ML
INJECTION, SOLUTION EPIDURAL; INFILTRATION; INTRACAUDAL; PERINEURAL AS NEEDED
Status: DISCONTINUED | OUTPATIENT
Start: 2019-01-09 | End: 2019-01-09 | Stop reason: HOSPADM

## 2019-01-09 RX ORDER — SODIUM CHLORIDE 9 MG/ML
75 INJECTION, SOLUTION INTRAVENOUS CONTINUOUS
Status: DISPENSED | OUTPATIENT
Start: 2019-01-09 | End: 2019-01-09

## 2019-01-09 RX ORDER — MIDAZOLAM HYDROCHLORIDE 1 MG/ML
.25-5 INJECTION, SOLUTION INTRAMUSCULAR; INTRAVENOUS
Status: DISCONTINUED | OUTPATIENT
Start: 2019-01-09 | End: 2019-01-09 | Stop reason: HOSPADM

## 2019-01-09 RX ORDER — SODIUM CHLORIDE 0.9 % (FLUSH) 0.9 %
5-40 SYRINGE (ML) INJECTION AS NEEDED
Status: DISCONTINUED | OUTPATIENT
Start: 2019-01-09 | End: 2019-01-10 | Stop reason: HOSPADM

## 2019-01-09 RX ORDER — PHENYLEPHRINE HCL IN 0.9% NACL 0.4MG/10ML
SYRINGE (ML) INTRAVENOUS AS NEEDED
Status: DISCONTINUED | OUTPATIENT
Start: 2019-01-09 | End: 2019-01-09 | Stop reason: HOSPADM

## 2019-01-09 RX ORDER — FLUMAZENIL 0.1 MG/ML
0.2 INJECTION INTRAVENOUS
Status: DISCONTINUED | OUTPATIENT
Start: 2019-01-09 | End: 2019-01-09 | Stop reason: HOSPADM

## 2019-01-09 RX ORDER — DEXTROSE 50 % IN WATER (D50W) INTRAVENOUS SYRINGE
12.5
Status: COMPLETED | OUTPATIENT
Start: 2019-01-09 | End: 2019-01-09

## 2019-01-09 RX ORDER — PROPOFOL 10 MG/ML
INJECTION, EMULSION INTRAVENOUS AS NEEDED
Status: DISCONTINUED | OUTPATIENT
Start: 2019-01-09 | End: 2019-01-09 | Stop reason: HOSPADM

## 2019-01-09 RX ORDER — MAGNESIUM SULFATE 100 %
CRYSTALS MISCELLANEOUS
Status: COMPLETED
Start: 2019-01-09 | End: 2019-01-09

## 2019-01-09 RX ADMIN — POTASSIUM CHLORIDE 40 MEQ: 1.5 POWDER, FOR SOLUTION ORAL at 16:19

## 2019-01-09 RX ADMIN — PROPOFOL 80 MG: 10 INJECTION, EMULSION INTRAVENOUS at 11:07

## 2019-01-09 RX ADMIN — Medication 10 ML: at 21:27

## 2019-01-09 RX ADMIN — Medication 80 MCG: at 11:00

## 2019-01-09 RX ADMIN — BUMETANIDE 2 MG: 0.25 INJECTION INTRAMUSCULAR; INTRAVENOUS at 21:29

## 2019-01-09 RX ADMIN — FEBUXOSTAT 40 MG: 40 TABLET ORAL at 09:34

## 2019-01-09 RX ADMIN — Medication 40 MCG: at 10:50

## 2019-01-09 RX ADMIN — BUMETANIDE 2 MG: 0.25 INJECTION INTRAMUSCULAR; INTRAVENOUS at 09:30

## 2019-01-09 RX ADMIN — PRAMIPEXOLE DIHYDROCHLORIDE 0.5 MG: 0.25 TABLET ORAL at 21:28

## 2019-01-09 RX ADMIN — OXYCODONE HYDROCHLORIDE AND ACETAMINOPHEN 250 MG: 500 TABLET ORAL at 16:20

## 2019-01-09 RX ADMIN — INSULIN GLARGINE 30 UNITS: 100 INJECTION, SOLUTION SUBCUTANEOUS at 21:30

## 2019-01-09 RX ADMIN — OXYCODONE HYDROCHLORIDE AND ACETAMINOPHEN 250 MG: 500 TABLET ORAL at 21:28

## 2019-01-09 RX ADMIN — Medication 80 MCG: at 10:54

## 2019-01-09 RX ADMIN — ZOLPIDEM TARTRATE 10 MG: 5 TABLET ORAL at 22:54

## 2019-01-09 RX ADMIN — CARVEDILOL 12.5 MG: 12.5 TABLET, FILM COATED ORAL at 09:30

## 2019-01-09 RX ADMIN — TAMSULOSIN HYDROCHLORIDE 0.4 MG: 0.4 CAPSULE ORAL at 09:30

## 2019-01-09 RX ADMIN — Medication 40 G: at 06:48

## 2019-01-09 RX ADMIN — COLCHICINE 0.6 MG: 0.6 TABLET, FILM COATED ORAL at 09:34

## 2019-01-09 RX ADMIN — Medication 40 MCG: at 10:52

## 2019-01-09 RX ADMIN — CARVEDILOL 12.5 MG: 12.5 TABLET, FILM COATED ORAL at 16:20

## 2019-01-09 RX ADMIN — Medication 10 ML: at 14:08

## 2019-01-09 RX ADMIN — FERROUS SULFATE TAB 325 MG (65 MG ELEMENTAL FE) 325 MG: 325 (65 FE) TAB at 14:08

## 2019-01-09 RX ADMIN — Medication 10 ML: at 04:15

## 2019-01-09 RX ADMIN — DEXTROSE MONOHYDRATE 12.5 G: 25 INJECTION, SOLUTION INTRAVENOUS at 10:46

## 2019-01-09 RX ADMIN — CIPROFLOXACIN HYDROCHLORIDE 500 MG: 500 TABLET, FILM COATED ORAL at 17:54

## 2019-01-09 RX ADMIN — FERROUS SULFATE TAB 325 MG (65 MG ELEMENTAL FE) 325 MG: 325 (65 FE) TAB at 21:29

## 2019-01-09 RX ADMIN — CIPROFLOXACIN HYDROCHLORIDE 500 MG: 500 TABLET, FILM COATED ORAL at 09:30

## 2019-01-09 RX ADMIN — SACUBITRIL AND VALSARTAN 1 TABLET: 24; 26 TABLET, FILM COATED ORAL at 09:30

## 2019-01-09 RX ADMIN — LIDOCAINE HYDROCHLORIDE 40 MG: 20 INJECTION, SOLUTION EPIDURAL; INFILTRATION; INTRACAUDAL; PERINEURAL at 10:48

## 2019-01-09 RX ADMIN — CLONAZEPAM 2 MG: 0.5 TABLET ORAL at 21:27

## 2019-01-09 RX ADMIN — ZOLPIDEM TARTRATE 5 MG: 5 TABLET ORAL at 00:58

## 2019-01-09 RX ADMIN — FINASTERIDE 5 MG: 5 TABLET, FILM COATED ORAL at 09:30

## 2019-01-09 NOTE — DIABETES MGMT
[facility] Progress note Recommendations/ Comments:pt with low BG this am. Please reduce Lantus to 25 units. Current hospital DM medications: Victoza - pt supplied, Lantus 30 units Patient is a 77 y.o. male with known DM on Lantus 30 units, Victoza 1.2 mg, humalog 5 units with meals at home. A1c:  
Lab Results Component Value Date/Time Hemoglobin A1c 9.5 (H) 11/27/2018 02:55 PM  
 Hemoglobin A1c 8.5 (H) 08/07/2018 02:50 PM  
 
 
Recent Glucose Results:  
Lab Results Component Value Date/Time GLU 72 01/09/2019 04:47 AM  
 GLUCPOC 150 (H) 01/09/2019 04:39 PM  
 GLUCPOC 100 01/09/2019 11:38 AM  
 GLUCPOC 77 01/09/2019 10:40 AM  
  
 
Lab Results Component Value Date/Time Creatinine 2.58 (H) 01/09/2019 04:47 AM  
 
 
Active Orders Diet DIET DIABETIC CONSISTENT CARB Regular; 2 GM NA (House Low NA) Will continue to follow as needed. Thank you. Leonardo Zaldivar RD [facility] Office: [phone] Time spent: 4 minutes

## 2019-01-09 NOTE — PROCEDURES
NAME:  Yolanda Lawson. :   1950 MRN:   [id number] Date/Time:  2019 11:12 AMColonoscopy Operative Report Procedure Type:   Colonoscopy --diagnostic Indications:     Lower GI bleeding Pre-operative Diagnosis: see indication above Post-operative Diagnosis:  See findings below :  Nereida Reeves MD 
Referring Provider: Jonathan Crow MD 
 
Exam: Airway: clear, no airway problems anticipated Heart: RRR, without gallops or rubs Lungs: clear bilaterally without wheezes, crackles, or rhonchi Abdomen: soft, nontender, nondistended, bowel sounds present Mental Status: awake, alert and oriented to person, place and time Sedation:  MAC anesthesia Propofol 80mg IV Procedure Details:  After informed consent was obtained with all risks and benefits of procedure explained and preoperative exam completed, the patient was taken to the endoscopy suite and placed in the left lateral decubitus position. Upon sequential sedation as per above, a digital rectal exam was performed demonstrating internal hemorrhoids. The Olympus videocolonoscope  was inserted in the rectum and carefully advanced to the cecum, which was identified by the ileocecal valve and appendiceal orifice. The distal terminal ileum was evalauted. The quality of preparation was adequate. The colonoscope was slowly withdrawn with careful evaluation between folds. Retroflexion in the rectum was completed demonstrating internal hemorrhoids. Findings:    
-Normal terminal ileum 
-Rare, small-mouthed sigmoid colon diverticuli 
-Grade 1 internal hemorrhoids, felt to be likely source of bleeding 
-No colon masses, polyps, or bleeding Specimen Removed:  None. Complications: None. EBL:  None. Impression:   
-Normal terminal ileum 
-Rare, small-mouthed sigmoid colon diverticuli 
-Grade 1 internal hemorrhoids, felt to be likely source of bleeding 
-No colon masses, polyps, or bleeding Recommendations: - 
 -Repeat colonoscopy in 5 years.  
-High fiber diet.   
-Resume normal medication(s). -Consider for discharge home today Discharge Disposition:  To room after recovery in Endoscopy Mehran Henderson MD

## 2019-01-09 NOTE — PROGRESS NOTES
PROGRESS NOTE 
 
NAME:  Royal Tommy Hinojosa. :   1950 MRN:   [id number] Date/Time:  2019 6:54 AM 
Subjective:  
History:  Chart reviewed and patient seen and examined this AM and D/W his nurse and all events noted. He has a known Ischemic Cardiomyopathy ( although EFx 50% on Echo 2017, now EFx 35%) and presented to the office on  with an 18 pound weight gain and increased SOB, ANDERS and peripheral edema and was admitted with Acute on chronic systolic CHF. He has had good diuresis since admission; however due to associated diarrhea output hasn't been real accurate. He notes that the watery diarrhea that he developed the first couple of days subsequently resolved as of , but diarrhea again  and some bright red blood on toilet tissue. He had no recent antibiotics PTA and no one else had been sick at home and C. Diff, negative as expected. He denies abdominal pains and has no other GI c/o. He had no  c/o until  when he developed hematuria with marked dysuria which is better after Cipro started on . He is less SOB walking to BR and walked in mane yesterday and can now lie flat in bed with nasal oxygen on. He was still dizzy when he got up until 2nd U PRBC given on . He has no CP or other cardio/respiratory c/o. He has no other c/o on complete ROS except weakness Medications reviewed: 
Current Facility-Administered Medications Medication Dose Route Frequency  sacubitril-valsartan (ENTRESTO) 24-26 mg tablet 1 Tab  1 Tab Oral Q24H  
 meclizine (ANTIVERT) tablet 12.5 mg  12.5 mg Oral TID PRN  
 0.9% sodium chloride infusion 250 mL  250 mL IntraVENous PRN  potassium chloride (KLOR-CON) packet for solution 40 mEq  40 mEq Oral BID WITH MEALS  ciprofloxacin HCl (CIPRO) tablet 500 mg  500 mg Oral BID  liraglutide (VICTOZA) 0.6 mg/0.1 mL (18 mg/3 mL) sub-q pen 1.2 mg (Patient Supplied)  1.2 mg SubCUTAneous DAILY  0.9% sodium chloride infusion 250 mL  250 mL IntraVENous PRN  pramipexole (MIRAPEX) tablet 0.5 mg  0.5 mg Oral DAILY PRN  
 ondansetron (ZOFRAN) injection 4 mg  4 mg IntraVENous QID PRN  
 ascorbic acid (vitamin C) (VITAMIN C) tablet 250 mg  250 mg Oral TID  carvedilol (COREG) tablet 12.5 mg  12.5 mg Oral BID WITH MEALS  clonazePAM (KlonoPIN) tablet 2 mg  2 mg Oral QHS  colchicine tablet 0.6 mg  0.6 mg Oral DAILY  ferrous sulfate tablet 325 mg  325 mg Oral TID  finasteride (PROSCAR) tablet 5 mg  5 mg Oral DAILY  insulin glargine (LANTUS) injection 30 Units  30 Units SubCUTAneous QHS  pramipexole (MIRAPEX) tablet 0.5 mg  0.5 mg Oral QHS  tamsulosin (FLOMAX) capsule 0.4 mg  0.4 mg Oral DAILY  febuxostat (ULORIC) tablet 40 mg  40 mg Oral DAILY  sodium chloride (NS) flush 5-10 mL  5-10 mL IntraVENous Q8H  
 sodium chloride (NS) flush 5-10 mL  5-10 mL IntraVENous PRN  
 zolpidem (AMBIEN) tablet 10 mg  10 mg Oral QHS PRN  
 acetaminophen (TYLENOL) tablet 650 mg  650 mg Oral Q4H PRN  
 bumetanide (BUMEX) injection 2 mg  2 mg IntraVENous Q12H  diphenoxylate-atropine (LOMOTIL) tablet 1 Tab  1 Tab Oral QID PRN Objective:  
Vitals: 
Visit Vitals /71 (BP 1 Location: Left arm, BP Patient Position: At rest) Pulse 80 Temp 98 °F (36.7 °C) Resp 18 Wt 251 lb 14.4 oz (114.3 kg) SpO2 100% BMI 29.11 kg/m² O2 Device: Room air Temp (24hrs), Av.8 °F (36.6 °C), Min:97.2 °F (36.2 °C), Max:98.3 °F (36.8 °C) Last 24hr Input/Output: 
 
Intake/Output Summary (Last 24 hours) at 2019 [facility] Last data filed at 2019 3275 Gross per 24 hour Intake 3980 ml Output 2650 ml Net 1330 ml PHYSICAL EXAM: 
General:     Alert, cooperative, no distress, appears stated age. Head:    Normocephalic, without obvious abnormality, atraumatic. Eyes:    Conjunctivae/corneas clear. PERRLA Nose:   Nares normal. No drainage or sinus tenderness. Throat:     Lips, mucosa, and tongue normal.  No Thrush Neck:   Supple, symmetrical,  no adenopathy, thyroid: non tender 
   no carotid bruit and no JVD. Back:     Symmetric,  No CVA tenderness. Lungs:    Clear to auscultation bilaterally except bibasilar dry rales. No Wheezing or Rhonchi. Heart:    Regular rate and rhythm,  no murmur, rub or gallop. Abdomen:    Soft, non-tender. Not distended. Bowel sounds normal. No masses Extremities:  Extremities normal, atraumatic, No cyanosis. 1-2+ edema. No clubbing Lymph nodes:  Cervical, supraclavicular normal. 
Neurologic:  Normal strength, Alert and oriented X 3. Skin:                 No rash Lab Data Reviewed: 
 
Recent Results (from the past 24 hour(s)) GLUCOSE, POC Collection Time: 01/08/19  6:55 AM  
Result Value Ref Range Glucose (POC) 84 65 - 100 mg/dL Performed by Óscar Olson (PCT) GLUCOSE, POC Collection Time: 01/08/19 11:30 AM  
Result Value Ref Range Glucose (POC) 122 (H) 65 - 100 mg/dL Performed by Mk Herring ([facility]) GLUCOSE, POC Collection Time: 01/08/19  4:35 PM  
Result Value Ref Range Glucose (POC) 110 (H) 65 - 100 mg/dL Performed by Mk Herring ([facility]) GLUCOSE, POC Collection Time: 01/08/19  9:02 PM  
Result Value Ref Range Glucose (POC) 159 (H) 65 - 100 mg/dL Performed by George Love (PCT) METABOLIC PANEL, BASIC Collection Time: 01/09/19  4:47 AM  
Result Value Ref Range Sodium 131 (L) 136 - 145 mmol/L Potassium 3.6 3.5 - 5.1 mmol/L Chloride 93 (L) 97 - 108 mmol/L  
 CO2 26 21 - 32 mmol/L Anion gap 12 5 - 15 mmol/L Glucose 72 65 - 100 mg/dL BUN 86 (H) 6 - 20 MG/DL Creatinine 2.58 (H) 0.70 - 1.30 MG/DL  
 BUN/Creatinine ratio 33 (H) 12 - 20 GFR est AA 30 (L) >60 ml/min/1.73m2 GFR est non-AA 25 (L) >60 ml/min/1.73m2 Calcium 8.3 (L) 8.5 - 10.1 MG/DL  
CBC WITH AUTOMATED DIFF Collection Time: 01/09/19  4:47 AM  
Result Value Ref Range WBC 5.8 4.1 - 11.1 K/uL  
 RBC 3.57 (L) 4.10 - 5.70 M/uL HGB 9.5 (L) 12.1 - 17.0 g/dL HCT 30.0 (L) 36.6 - 50.3 % MCV 84.0 80.0 - 99.0 FL  
 MCH 26.6 26.0 - 34.0 PG  
 MCHC 31.7 30.0 - 36.5 g/dL  
 RDW 18.5 (H) 11.5 - 14.5 % PLATELET 306 (L) 546 - 400 K/uL MPV 10.4 8.9 - 12.9 FL  
 NRBC 0.0 0  WBC ABSOLUTE NRBC 0.00 0.00 - 0.01 K/uL NEUTROPHILS 78 (H) 32 - 75 % LYMPHOCYTES 11 (L) 12 - 49 % MONOCYTES 10 5 - 13 % EOSINOPHILS 0 0 - 7 % BASOPHILS 1 0 - 1 % IMMATURE GRANULOCYTES 1 (H) 0.0 - 0.5 % ABS. NEUTROPHILS 4.5 1.8 - 8.0 K/UL  
 ABS. LYMPHOCYTES 0.6 (L) 0.8 - 3.5 K/UL  
 ABS. MONOCYTES 0.6 0.0 - 1.0 K/UL  
 ABS. EOSINOPHILS 0.0 0.0 - 0.4 K/UL  
 ABS. BASOPHILS 0.1 0.0 - 0.1 K/UL  
 ABS. IMM. GRANS. 0.0 0.00 - 0.04 K/UL  
 DF AUTOMATED    
GLUCOSE, POC Collection Time: 01/09/19  6:40 AM  
Result Value Ref Range Glucose (POC) 78 65 - 100 mg/dL Performed by Ermias Garcia (PCT) Assessment/Plan:  
 
Principal Problem: 
  Systolic CHF, acute on chronic ([facility] (1/2/2019) Active Problems: 
  Atrial fibrillation ([facility] (3/7/2014) ASCVD (arteriosclerotic cardiovascular disease) (8/5/2017) CHF (congestive heart failure) ([facility] (8/5/2017) Controlled type 2 diabetes mellitus with stage 4 chronic kidney disease, without long-term current use of insulin ([facility] (8/5/2017) CKD (chronic kidney disease), stage IV ([facility] (8/5/2017) Hypertension with renal disease (8/5/2017) Gastroesophageal reflux disease without esophagitis (8/5/2017) Cardiomyopathy ([address] (8/5/2017) Anemia (8/5/2017) 
 
  
___________________________________________________ PLAN: 
 
1. Continue diuresis with Bumex IV and Zaroxlyn stopped 1/5, Weight 251 (basline 250#) Adm 268# 2. Repeat Echo EFx 35%, mild diffuse hypokinesis 3. Cardiology Consult note reviewed and appreciated 4. Follow renal function (uoi751/2.77), 86/2.58 today 5.  Hgb down to 7.3 on 1/3 from 7.8 on adm and only 7.7 on 1/4 S/P 1 U PRBC, With his cardiac situation transfused 2nd unit and now 9.5 6. Follow Na 132 on adm to 131 now slightly up from yesterday 7. Now off Eliquis with PAF, since no Afib since before June 8. Continue to replete K, 3.5 on adm to 3.6 now, ordered Mag,but not done 9. Continue diabetic meds and follow BS 
10. Continue other home meds 11. C. Diff. Is negative as no recent antibiotics 12. Urine culture sent 1/4 and added Cipro 13. D/Jay Jay Losarten on 1/6 to see if renal function improves, and added low dose Entresto at once a day dose only, as now systolic HF 14. Colonoscopy today, D/W Dr. Loco Elise 
 
 
 
___________________________________________________ Attending Physician: Ludy Robison MD

## 2019-01-09 NOTE — ROUTINE PROCESS
Alexis Bynum. 
1950 
[id number] Situation: 
Verbal report received from: Aaron Felton RN Procedure: Procedure(s): 
COLONOSCOPY Background: 
 
Preoperative diagnosis: bleeding Postoperative diagnosis: Diverticulosis, Hemorrhoids :  Dr. Renetta Michaud Assistant(s): Endoscopy Technician-1: Reshma Colbert Endoscopy RN-1: Leisa Schaefer RN; Deidre Liriano RN Specimens: * No specimens in log * H. Pylori  no Assessment: 
Intra-procedure medications Anesthesia gave intra-procedure sedation and medications, see anesthesia flow sheet yes Intravenous fluids: NS@ [facility] Vital signs stable Abdominal assessment: round and soft Recommendation: 
Discharge patient per MD order. Return to floor yes [phone] Family or Friend  Lay S/O  Maybe upstairs.  
 
Permission to share finding with family or friend n/a

## 2019-01-09 NOTE — PROGRESS NOTES
Anesthesia reports 80 mg Propofol, 40 mg Lidocaine and 450 mL NS, 240 mcg Neosinephrine given during procedure. Received report from anesthesia staff on vital signs and status of patient.

## 2019-01-09 NOTE — PROGRESS NOTES
Problem: Falls - Risk of 
Goal: *Absence of Falls Document Agnes Byrd Fall Risk and appropriate interventions in the flowsheet. Outcome: Progressing Towards Goal 
Fall Risk Interventions: 
Mobility Interventions: Bed/chair exit alarm Medication Interventions: Patient to call before getting OOB Elimination Interventions: Bed/chair exit alarm History of Falls Interventions: Bed/chair exit alarm

## 2019-01-09 NOTE — PROGRESS NOTES
Problem: Mobility Impaired (Adult and Pediatric) Goal: *Acute Goals and Plan of Care (Insert Text) Physical Therapy Goals Initiated 1/3/2019 1. Patient will move from supine to sit and sit to supine , scoot up and down and roll side to side in bed with independence within 7 day(s). 2.  Patient will transfer from bed to chair and chair to bed with modified independence using the least restrictive device within 7 day(s). 3.  Patient will perform sit to stand with modified independence within 7 day(s). 4.  Patient will ambulate with modified independence for 200 feet with the least restrictive device within 7 day(s). 5.  Patient will ascend/descend 16 stairs with 1 handrail(s) with modified independence within 7 day(s). physical Therapy TREATMENT/DISCHARGE Patient: Kasie Prieto (67 y.o. male) Date: 1/9/2019 Diagnosis: CHF 
CHF (congestive heart failure) (HCC) Systolic CHF, acute on chronic (HCC) Procedure(s) (LRB): 
COLONOSCOPY (N/A) Day of Surgery Precautions: Fall Chart, physical therapy assessment, plan of care and goals were reviewed. ASSESSMENT: 
Patient making steady progress toward goals. Patient received supine in bed and needing only SBA for transfers and ambulation. Amb approx 250 feet with RW and SBA with no overt LOB. Patient returned to room and has no difficulty with ambulation. Recommend continued  PT follow up but does not require further skilled acute PT. Please re-consult if patient status changes. Progression toward goals: 
[]      Improving appropriately and progressing toward goals [x]      Improving slowly and progressing toward goals 
[]      Not making progress toward goals and plan of care will be adjusted PLAN: 
Patient will be discharged from acute skilled physical therapy at this time.  
Rationale for discharge: 
[x] Goals Achieved 
[] Plateau Reached 
[] Patient not participating in therapy 
[] Other: 
Discharge Recommendations:  [place] [facility] 
 Further Equipment Recommendations for Discharge:  none SUBJECTIVE:  
Patient stated I'm doing better.  OBJECTIVE DATA SUMMARY:  
Critical Behavior: 
Neurologic State: Alert Cognition: Follows commands Functional Mobility Training: 
Bed Mobility: 
Rolling: Supervision Supine to Sit: Supervision Sit to Supine: Supervision Scooting: Supervision Transfers: 
Sit to Stand: Minimum assistance Stand to Sit: Stand-by assistance Balance: 
Sitting: Intact Standing: Impaired Standing - Static: Constant support;Good Standing - Dynamic : GoodAmbulation/Gait Training: 
Distance (ft): 250 Feet (ft) Assistive Device: Gait belt;Walker, rolling Ambulation - Level of Assistance: Contact guard assistance Gait Abnormalities: Decreased step clearance;Shuffling gait Base of Support: Widened Speed/Anna: Pace decreased (<100 feet/min); Slow Step Length: Left shortened;Right shortened Pain: 
Pain Scale 1: Numeric (0 - 10) Pain Intensity 1: 0 Activity Tolerance: VSS Please refer to the flowsheet for vital signs taken during this treatment. After treatment:  
[x] Patient left in no apparent distress sitting up in chair 
[] Patient left in no apparent distress in bed 
[x] Call bell left within reach [x] Nursing notified 
[x] Caregiver present 
[] Bed alarm activated COMMUNICATION/COLLABORATION:  
The patients plan of care was discussed with: Physical Therapist, Occupational Therapist and Registered Nurse Melissa Dietz PT, DPT Time Calculation: 23 mins

## 2019-01-09 NOTE — ANESTHESIA POSTPROCEDURE EVALUATION
Procedure(s): 
COLONOSCOPY. Anesthesia Post Evaluation Patient location during evaluation: PACU Note status: Adequate. Level of consciousness: responsive to verbal stimuli and sleepy but conscious Pain management: satisfactory to patient Airway patency: patent Anesthetic complications: no 
Cardiovascular status: acceptable Respiratory status: acceptable Hydration status: acceptable Comments: +Post-Anesthesia Evaluation and Assessment Patient: Moe Hoffman MRN: [id number]  SSN: [id number] YOB: 1950  Age: 77 y.o. Sex: male Cardiovascular Function/Vital Signs /77 (BP 1 Location: Left arm, BP Patient Position: At rest)   Pulse 88   Temp 36.6 °C (97.8 °F)   Resp 16   Wt 114.3 kg (251 lb 14.4 oz)   SpO2 100%   BMI 29.11 kg/m² Patient is status post Procedure(s): 
COLONOSCOPY. Nausea/Vomiting: Controlled. Postoperative hydration reviewed and adequate. Pain: 
Pain Scale 1: Numeric (0 - 10) (01/09/19 1203) Pain Intensity 1: 0 (01/09/19 1203) Managed. Neurological Status: At baseline. Mental Status and Level of Consciousness: Arousable. Pulmonary Status:  
O2 Device: Room air (01/09/19 1203) Adequate oxygenation and airway patent. Complications related to anesthesia: None Post-anesthesia assessment completed. No concerns. Signed By: Akua Carrion DO  
 1/9/2019 Post anesthesia nausea and vomiting:  controlled Visit Vitals /77 (BP 1 Location: Left arm, BP Patient Position: At rest) Pulse 88 Temp 36.6 °C (97.8 °F) Resp 16 Wt 114.3 kg (251 lb 14.4 oz) SpO2 100% BMI 29.11 kg/m²

## 2019-01-09 NOTE — ANESTHESIA PREPROCEDURE EVALUATION
Anesthetic History No history of anesthetic complications Review of Systems / Medical History Patient summary reviewed, nursing notes reviewed and pertinent labs reviewed Pulmonary Comments: Distant smoking hx, 2 pk yr, quit in 1972 Neuro/Psych Within defined limits Cardiovascular Hypertension Dysrhythmias : atrial fibrillation Pacemaker (PM), CAD and cardiac stents Exercise tolerance: >4 METS Comments: Cardiomyopathy EF 35-40% GI/Hepatic/Renal 
  
 
 
Renal disease (CKD Stage 4) Comments: bleeding Endo/Other Diabetes Hypothyroidism Obesity and arthritis Other Findings Comments: Gout Physical Exam 
 
Airway Mallampati: II 
TM Distance: 4 - 6 cm Neck ROM: normal range of motion Cardiovascular Regular rate and rhythm,  S1 and S2 normal,  no murmur, click, rub, or gallop Dental 
 
Dentition: Harjinder Gutting Pulmonary Breath sounds clear to auscultation Abdominal 
GI exam deferred Other Findings Anesthetic Plan ASA: 3 Anesthesia type: total IV anesthesia Induction: Intravenous Anesthetic plan and risks discussed with: Patient

## 2019-01-09 NOTE — PROGRESS NOTES
TRANSFER - OUT REPORT: 
 
Verbal report given to Suzanne Donaldson RN (name) on [facility] 12.  being transferred to room 431 3995 (unit) for routine progression of care Report consisted of patients Situation, Background, Assessment and  
Recommendations(SBAR). Information from the following report(s) Procedure Summary, Intake/Output, MAR and Recent Results was reviewed with the receiving nurse. Lines:  
Peripheral IV 01/02/19 Anterior; Inferior;Left;Lower;Proximal Arm (Active) Site Assessment Clean, dry, & intact 1/9/2019 11:33 AM  
Phlebitis Assessment 0 1/9/2019 11:33 AM  
Infiltration Assessment 0 1/9/2019 11:33 AM  
Dressing Status Clean, dry, & intact 1/9/2019 11:33 AM  
Dressing Type Transparent 1/9/2019 11:33 AM  
Hub Color/Line Status Capped 1/9/2019 11:33 AM  
  
 
Opportunity for questions and clarification was provided.

## 2019-01-09 NOTE — PROGRESS NOTES
Received report from [address] at bedside. Patient had just started Go-Lytely. Colonoscopy tomorrow.

## 2019-01-09 NOTE — PROGRESS NOTES
Initial Nutrition Assessment: 
 
INTERVENTIONS/RECOMMENDATIONS:  
· Continue current diet ASSESSMENT:  
Chart reviewed, medically noted for PMH shown below. Assessing pt due to LOS. Flowsheet documents good appetite and pt consuming % of meals. Weight history shows not significant weight loss PTA. Past Medical History:  
Diagnosis Date  Anemia 8/5/2017  Anxiety 8/5/2017  Arrhythmia   
 atrial fibrillation 2014  ASCVD (arteriosclerotic cardiovascular disease) 8/5/2017  BPH (benign prostatic hyperplasia) 8/5/2017  CAD (coronary artery disease)   
 h/o stents  Cancer [facility])   
 h/o skin cancer  Cardiomyopathy ([address] 8/5/2017  CHF (congestive heart failure) ([facility] 8/5/2017  Chronic kidney disease Stage IV  CKD (chronic kidney disease), stage IV ([address] 8/5/2017  Diabetes ([facility]  Diabetes mellitus ([facility] 8/5/2017  Diabetic neuropathy ([facility] 8/5/2017  DJD (degenerative joint disease) 8/5/2017  ED (erectile dysfunction) 8/5/2017  Gout 8/5/2017  High cholesterol  Hyperlipidemia 8/5/2017  Hypertension  Hypertension with renal disease 8/5/2017  Hypothyroid 8/5/2017  Insomnia 8/5/2017  Obesity 8/5/2017  On statin therapy 8/5/2017  Restless leg 8/5/2017  Thyroid disease   
 hypothyroid Diet Order: Consistent carb(2g Na) 
% Eaten:   
Patient Vitals for the past 72 hrs: 
 % Diet Eaten 01/08/19 1840 100 % 01/08/19 1519 50 % 01/08/19 0954 100 % 01/07/19 1752 100 % 01/07/19 1241 85 % 01/07/19 0814 100 % Pertinent Medications: [x]Reviewed: lantus, victoza, Pertinent Labs: [x]Reviewed:  
Food Allergies: [x]NKFA  []Other Last BM: 1/9 Edema:        []RUE   []LUE   []RLE   []LLE Pressure Injury:      [] Stage I   [] Stage II   [] Stage III   [] Stage IV Wt Readings from Last 30 Encounters:  
01/09/19 114.3 kg (251 lb 14.4 oz) 01/02/19 121.5 kg (267 lb 12.8 oz) 12/19/18 113.4 kg (250 lb) 12/04/18 113.8 kg (250 lb 12.8 oz)  
11/27/18 114.2 kg (251 lb 12.8 oz) 10/24/18 113.3 kg (249 lb 12.8 oz) 10/16/18 117 kg (258 lb)  
09/26/18 118 kg (260 lb 3.2 oz) 09/12/18 117.6 kg (259 lb 3.2 oz) 08/27/18 115.6 kg (254 lb 12.8 oz) 08/07/18 116.7 kg (257 lb 3.2 oz)  
07/24/18 117.8 kg (259 lb 9.6 oz) 07/09/18 118.8 kg (261 lb 12.8 oz) 06/20/18 115.8 kg (255 lb 6.4 oz) 06/06/18 116.9 kg (257 lb 12.8 oz) 06/01/18 114.4 kg (252 lb 3.3 oz) 05/31/18 116.7 kg (257 lb 3.2 oz) 05/29/18 118.8 kg (262 lb)  
05/25/18 113.9 kg (251 lb) 05/17/18 115.3 kg (254 lb 1.6 oz) 05/15/18 117 kg (258 lb) 05/07/18 115.8 kg (255 lb 6.4 oz) 03/28/18 114.5 kg (252 lb 6.4 oz) 03/07/18 116.8 kg (257 lb 8 oz) 02/14/18 113 kg (249 lb 3.2 oz)  
11/21/17 113.4 kg (250 lb) 10/17/17 112.9 kg (249 lb)  
09/25/17 113.7 kg (250 lb 9.6 oz) 09/19/17 114.8 kg (253 lb)  
08/28/17 113 kg (249 lb 3.2 oz) Anthropometrics:  
Height:   Weight: 114.3 kg (251 lb 14.4 oz) IBW (%IBW):   ( ) UBW (%UBW):   (  %) Last Weight Metrics: 
Weight Loss Metrics 1/9/2019 1/2/2019 1/2/2019 1/2/2019 12/19/2018 12/4/2018 11/27/2018 Today's Wt 251 lb 14.4 oz - 267 lb 12.8 oz - 250 lb 250 lb 12.8 oz 251 lb 12.8 oz BMI - 29.11 kg/m2 - 30.95 kg/m2 28.89 kg/m2 28.98 kg/m2 29.1 kg/m2 BMI: Body mass index is 29.11 kg/m². This BMI is indicative of: 
 []Underweight    []Normal    [x]Overweight    [] Obesity   [] Extreme Obesity (BMI>40) Estimated Nutrition Needs (Based on):  
2548 Kcals/day(BMR: 2025 x 1.2) , 115 g(1 g/kg) Protein Carbohydrate: At Least 130 g/day  Fluids: 2425 mL/day (1ml/kcal) or per primary team 
 
NUTRITION DIAGNOSES:  
Problem:  No nutritional diagnosis at this time Etiology: related to Signs/Symptoms: as evidenced by NUTRITION INTERVENTIONS: 
Meals/Snacks: General/healthful diet GOAL:  
consume >75% of meals in 5-7 days LEARNING NEEDS (Diet, Food/Nutrient-Drug Interaction):  
 [x] None Identified 
 [] Identified and Education Provided/Documented 
 [] Identified and Pt declined/was not appropriate Cultureal, Orthodoxy, OR Ethnic Dietary Needs:  
 [x] None Identified 
 [] Identified and Addressed 
 
 [x] Interdisciplinary Care Plan Reviewed/Documented  
 [x] Discharge Planning:  Heart healthy, consistent carb diet MONITORING /EVALUATION:  
  
Food/Nutrient Intake Outcomes: Total energy intake Physical Signs/Symptoms Outcomes: Weight/weight change, Electrolyte and renal profile, Glucose profile, GI 
 
NUTRITION RISK:  
 [] High              [] Moderate           [x]  Low  []  Minimal/Uncompromised PT SEEN FOR:  
 []  MD Consult: []Calorie Count []Diabetic Diet Education []Diet Education []Electrolyte Management []General Nutrition Management and Supplements []Management of Tube Feeding []TPN Recommendations []  RN Referral:  []MST score >=2 
   []Enteral/Parenteral Nutrition PTA []Pregnant: Gestational DM or Multigestation 
   []Pressure Ulcer/Wound Care needs 
     
[]  Low BMI [x]  LOS Referral  
 
 
Del Fillers, RDN Pager [phone] Weekend Pager [phone]

## 2019-01-09 NOTE — PROGRESS NOTES
Physical Therapy Patient off the floor for procedure. Will continue to follow.  
Melissa Dietz, PT, DPT

## 2019-01-09 NOTE — PROGRESS NOTES
Referral sent to [facility] via [address] [place] [address]. FOC signed. CHRISTA Hussein, [facility] [phone] 
 
4:20 PM 
Pt accepted by [facility] for [facility] PT/OT/SN.

## 2019-01-10 ENCOUNTER — DOCUMENTATION ONLY (OUTPATIENT)
Dept: CASE MANAGEMENT | Age: 69
End: 2019-01-10

## 2019-01-10 VITALS
RESPIRATION RATE: 18 BRPM | DIASTOLIC BLOOD PRESSURE: 78 MMHG | HEART RATE: 80 BPM | BODY MASS INDEX: 28.66 KG/M2 | TEMPERATURE: 98.7 F | SYSTOLIC BLOOD PRESSURE: 126 MMHG | OXYGEN SATURATION: 100 % | WEIGHT: 248.02 LBS

## 2019-01-10 LAB
ANION GAP SERPL CALC-SCNC: 9 MMOL/L (ref 5–15)
BASOPHILS # BLD: 0.1 K/UL (ref 0–0.1)
BASOPHILS NFR BLD: 1 % (ref 0–1)
BUN SERPL-MCNC: 73 MG/DL (ref 6–20)
BUN/CREAT SERPL: 26 (ref 12–20)
CALCIUM SERPL-MCNC: 8.2 MG/DL (ref 8.5–10.1)
CHLORIDE SERPL-SCNC: 95 MMOL/L (ref 97–108)
CO2 SERPL-SCNC: 27 MMOL/L (ref 21–32)
CREAT SERPL-MCNC: 2.83 MG/DL (ref 0.7–1.3)
DIFFERENTIAL METHOD BLD: ABNORMAL
EOSINOPHIL # BLD: 0 K/UL (ref 0–0.4)
EOSINOPHIL NFR BLD: 0 % (ref 0–7)
ERYTHROCYTE [DISTWIDTH] IN BLOOD BY AUTOMATED COUNT: 18.9 % (ref 11.5–14.5)
GLUCOSE BLD STRIP.AUTO-MCNC: 91 MG/DL (ref 65–100)
GLUCOSE BLD STRIP.AUTO-MCNC: 93 MG/DL (ref 65–100)
GLUCOSE BLD STRIP.AUTO-MCNC: 98 MG/DL (ref 65–100)
GLUCOSE SERPL-MCNC: 74 MG/DL (ref 65–100)
HCT VFR BLD AUTO: 32.8 % (ref 36.6–50.3)
HGB BLD-MCNC: 10 G/DL (ref 12.1–17)
IMM GRANULOCYTES # BLD AUTO: 0 K/UL (ref 0–0.04)
IMM GRANULOCYTES NFR BLD AUTO: 0 % (ref 0–0.5)
LYMPHOCYTES # BLD: 0.7 K/UL (ref 0.8–3.5)
LYMPHOCYTES NFR BLD: 14 % (ref 12–49)
MCH RBC QN AUTO: 26.1 PG (ref 26–34)
MCHC RBC AUTO-ENTMCNC: 30.5 G/DL (ref 30–36.5)
MCV RBC AUTO: 85.6 FL (ref 80–99)
MONOCYTES # BLD: 0.6 K/UL (ref 0–1)
MONOCYTES NFR BLD: 11 % (ref 5–13)
NEUTS SEG # BLD: 3.6 K/UL (ref 1.8–8)
NEUTS SEG NFR BLD: 74 % (ref 32–75)
NRBC # BLD: 0 K/UL (ref 0–0.01)
NRBC BLD-RTO: 0 PER 100 WBC
PLATELET # BLD AUTO: 97 K/UL (ref 150–400)
PMV BLD AUTO: 11.1 FL (ref 8.9–12.9)
POTASSIUM SERPL-SCNC: 3.8 MMOL/L (ref 3.5–5.1)
RBC # BLD AUTO: 3.83 M/UL (ref 4.1–5.7)
SERVICE CMNT-IMP: NORMAL
SODIUM SERPL-SCNC: 131 MMOL/L (ref 136–145)
WBC # BLD AUTO: 4.9 K/UL (ref 4.1–11.1)

## 2019-01-10 PROCEDURE — 94760 N-INVAS EAR/PLS OXIMETRY 1: CPT

## 2019-01-10 PROCEDURE — 36415 COLL VENOUS BLD VENIPUNCTURE: CPT

## 2019-01-10 PROCEDURE — 82962 GLUCOSE BLOOD TEST: CPT

## 2019-01-10 PROCEDURE — 80048 BASIC METABOLIC PNL TOTAL CA: CPT

## 2019-01-10 PROCEDURE — 85025 COMPLETE CBC W/AUTO DIFF WBC: CPT

## 2019-01-10 PROCEDURE — 74011000250 HC RX REV CODE- 250: Performed by: INTERNAL MEDICINE

## 2019-01-10 PROCEDURE — 74011250637 HC RX REV CODE- 250/637: Performed by: INTERNAL MEDICINE

## 2019-01-10 RX ORDER — CIPROFLOXACIN 500 MG/1
500 TABLET ORAL 2 TIMES DAILY
Qty: 20 TAB | Refills: 0 | Status: SHIPPED | OUTPATIENT
Start: 2019-01-10 | End: 2019-03-04 | Stop reason: ALTCHOICE

## 2019-01-10 RX ORDER — ASCORBIC ACID 250 MG
250 TABLET ORAL 3 TIMES DAILY
Qty: 100 TAB | Status: SHIPPED | OUTPATIENT
Start: 2019-01-10 | End: 2020-04-28

## 2019-01-10 RX ORDER — POTASSIUM CHLORIDE 1.5 G/1.77G
20 POWDER, FOR SOLUTION ORAL 2 TIMES DAILY WITH MEALS
Qty: 60 PACKET | Status: SHIPPED | OUTPATIENT
Start: 2019-01-10 | End: 2019-04-04 | Stop reason: SDUPTHER

## 2019-01-10 RX ADMIN — TAMSULOSIN HYDROCHLORIDE 0.4 MG: 0.4 CAPSULE ORAL at 09:05

## 2019-01-10 RX ADMIN — FEBUXOSTAT 40 MG: 40 TABLET ORAL at 09:10

## 2019-01-10 RX ADMIN — COLCHICINE 0.6 MG: 0.6 TABLET, FILM COATED ORAL at 09:10

## 2019-01-10 RX ADMIN — CIPROFLOXACIN HYDROCHLORIDE 500 MG: 500 TABLET, FILM COATED ORAL at 09:06

## 2019-01-10 RX ADMIN — POTASSIUM CHLORIDE 40 MEQ: 1.5 POWDER, FOR SOLUTION ORAL at 09:05

## 2019-01-10 RX ADMIN — BUMETANIDE 2 MG: 0.25 INJECTION INTRAMUSCULAR; INTRAVENOUS at 09:05

## 2019-01-10 RX ADMIN — FINASTERIDE 5 MG: 5 TABLET, FILM COATED ORAL at 09:05

## 2019-01-10 RX ADMIN — SACUBITRIL AND VALSARTAN 1 TABLET: 24; 26 TABLET, FILM COATED ORAL at 09:05

## 2019-01-10 RX ADMIN — Medication 10 ML: at 03:24

## 2019-01-10 RX ADMIN — CARVEDILOL 12.5 MG: 12.5 TABLET, FILM COATED ORAL at 09:05

## 2019-01-10 RX ADMIN — FERROUS SULFATE TAB 325 MG (65 MG ELEMENTAL FE) 325 MG: 325 (65 FE) TAB at 09:06

## 2019-01-10 RX ADMIN — OXYCODONE HYDROCHLORIDE AND ACETAMINOPHEN 250 MG: 500 TABLET ORAL at 09:05

## 2019-01-10 NOTE — DISCHARGE INSTRUCTIONS
[address]ragon [address]  [phone]      Patient Discharge Instructions    Royal Tommy Hinojosa / [id number] : 1950    Admitted 2019 Discharged: 1/10/19     Principal Problem:    Systolic CHF, acute on chronic ([address] (2019)    Active Problems:    Atrial fibrillation ([address] (3/7/2014)      ASCVD (arteriosclerotic cardiovascular disease) (2017)      CHF (congestive heart failure) ([address] (2017)      Controlled type 2 diabetes mellitus with stage 4 chronic kidney disease, without long-term current use of insulin ([address] (2017)      CKD (chronic kidney disease), stage IV ([address] (2017)      Hypertension with renal disease (2017)      Gastroesophageal reflux disease without esophagitis (2017)      Cardiomyopathy ([address] (2017)      Anemia (2017)          Allergies   Allergen Reactions    Niacin Unknown (comments)     Other reaction(s): Unknown (comments)    Imipenem Diarrhea     Other reaction(s): Rash    Levemir [Insulin Detemir] Hives    Other Medication Other (comments)     ? Allergy to Duraprep causing chemical burn    Primaxin [Imipenem-Cilastatin] Diarrhea and Rash    Xarelto [Rivaroxaban] Rash and Itching     Other reaction(s): Rash       · It is important that you take the medication exactly as they are prescribed. · Do not take other medications without consulting your doctor. What to do at Next Level of Care    Disposition:  home    Recommended diet: Diabetic    Recommended activity: Usual          Information obtained by :  I understand that if any problems occur once I am at home I am to contact my physician. I understand and acknowledge receipt of the instructions indicated above.                                                                                                                                            Physician's or R.N.'s Signature Date/Time                                                                                                                                              Patient or Representative Signature                                                          Date/Time

## 2019-01-10 NOTE — PROGRESS NOTES
Transitional Care Plan: Patient is to discharge home today, transported by his wife in a personal vehicle. Pt will receive home health services through [facility] for PT/OT/SN. Pt is to also follow-up with his PCP and nephrologist. No concerns indicated at this time. Care Management Interventions PCP Verified by CM: Ross Dorman) Last Visit to PCP: 01/02/19 Mode of Transport at Discharge: Other (see comment)(wife) Transition of Care Consult (CM Consult): Home Health([facility]) [address] Yes MyChart Signup: No 
Discharge Durable Medical Equipment: No 
Physical Therapy Consult: Yes Occupational Therapy Consult: No 
Speech Therapy Consult: No 
Current Support Network: Lives with Spouse, Own Home Confirm Follow Up Transport: Family Plan discussed with Pt/Family/Caregiver: Yes Freedom of Choice Offered: Yes Discharge Location Discharge Placement: Home with home health([facility]) CHRISTA Hussein, HP-A Care Management [phone]

## 2019-01-10 NOTE — ROUTINE PROCESS
[phone] will contact the patient for further information. Apt added to AVS.

## 2019-01-10 NOTE — PROGRESS NOTES
The objective of todays visit was to review the transitional care plan with the patient. We discussed the importance of transitional care as it relates to reducing the likelihood of readmission. We reviewed the goals for the first 30 days following hospital discharge. The patient and I discussed wrap around services including nurse navigation, care coordination, home health, transitional care appointments with their primary care provider and specialist team and the role of dispatch health. Patient education focused on readmission zones as described as  The Red Zone: High risk for readmission, days 1-21  The Yellow Zone: Moderate risk for readmission, days 22-29  The Green Zone: Lower risk for readmission, days 30 and after    Medication reconciliation was performed. In addition, the patient was instructed on worrisome symptoms for worsening of their medical conditions and sepsis. Learning was assessed using teach back in the form of role playing. The patient identified appropriate wrap around services during this interaction. A written individual care plan was reviewed with the patient as well today. The patient expressed the following specific concerns regarding their discharge  None  Pt given Entresto discount card, but aware that when he sees his nephrologist that medication may be discontinued due to reduced renal function( pt already is only taking once daily instead of twice) Pt has follow up appointment with Dr Marguerite Almanza Friday 1/11 along with the nephrology appointment. He also is to call PCP office to schedule an appointment for Tuesday 1/15. He is setting up for [facility] services. He is anticipating planned readmission later this month for Bi-V device placement. Pt did complete an advanced directive while hospitalized.  No other questions

## 2019-01-10 NOTE — DISCHARGE SUMMARY
[facility] DISCHARGE SUMMARY ROYAL TJ Greenberg 
MR#: [id number] : 1950 ACCOUNT #: [de-identified] ADMIT DATE: 2019 DISCHARGE DATE: 01/10/2019 FINAL DIAGNOSES: 
1. Acute on chronic systolic congestive heart failure. 2.  Atrial fibrillation, status post ablation in 2018 without recurrence. 3.  Atherosclerotic cardiovascular disease. 4.  Type 2 diabetes. 5.  Chronic kidney disease stage IV. 6.  Hypertension. 7.  Gastroesophageal reflux disease. 8.  Cardiomyopathy, ejection fraction of 35% on this hospitalization. 9.  Anemia. 10.  Internal hemorrhoids, bleeding. CONSULTATIONS:  Cardiology, Dr. Triston Onofre, GI, Dr. Kannan Tariq. PROCEDURES:  Echocardiogram and colonoscope. COMPLICATIONS:  None. For details of admission history and physical, please see admit note. HOSPITAL COURSE:  Briefly, the patient is a 77-year-old white male who presented to the office on the day of admission with marked dyspnea and was noted to have an 18-pound weight gain from his recent baseline, and was hypoxemic. Based upon the above, he was admitted to the hospital and started on IV Bumex for diuresis, and with this, he seemed to have gradual diuresis. He also was noted to have significant anemia and was transfused 2 units of packed red blood cells with improved hemoglobin, which stabilized after that. He did have an echocardiogram revealing an ejection fraction of 35-40%, and he was seen by Dr. Pastor Lopez in consult. Dr. Pastor Lopez adjusted his pacemaker for improved function, and he did benefit from the combination of treatment of his heart failure, transfusion, and pacemaker adjustment, to the point where he was ambulating without shortness of breath. He, however, did develop some problems with bleeding with bowel movements and was seen by Dr. Igor Minor.   Colonoscopy was performed revealing only internal hemorrhoids and no other source of bleeding. He did develop a problem with urinary infection, probably related to prostatitis, and was placed on Cipro, and those symptoms seemed to resolve. At this time, he has marked improvement. Weight is 248 pounds, which is down 20 pounds from admission. His BUN is 73 with a creatinine of 2.83, both improved from admission, and hemoglobin of 10.0. It is felt at this point, the maximal hospital benefit has been achieved. He will be discharged home on a new medication of Entresto 24/26 one tablet daily, Cipro 500 mg b.i.d. for an additional 10 days, potassium 20 mEq b.i.d. His Eliquis was discontinued as well was his losartan, and his Zaroxolyn, and he did have his vitamin C increased from once daily to 3 times daily. His other medications will be the same as per admission, which consist of Bumex 2 mg b.i.d., Coreg 12.5 mg b.i.d., Klonopin 2 mg at bedtime, and may take additional dose if needed, Colcrys 0.6 daily, Epogen shots based upon hemoglobin, iron 1 tablet 3 times daily, Proscar 5 mg daily, Lantus insulin 35 units q.a.m., Humalog 5 units before breakfast, lunch, and dinner, Victoza 0.6 subcutaneous daily, Mirapex 0.5 at bedtime, Flomax 0.4 daily, Uloric 40 mg daily, and Travatan 0.004% ophthalmic solution 1 drop right eye at nighttime. He will follow up with me in the office in 5-7 days. DISPOSITION:  Discharged to home. MD ANGELA Smart/KEREN 
D: 01/10/2019 07:24    
T: 01/10/2019 13:05 JOB #: [id number] CC: Radha Saleh MD 
CC: Kaley Coleman MD

## 2019-01-10 NOTE — PROGRESS NOTES
Pharmacist Discharge Medication Reconciliation Significant PMH:  
Past Medical History:  
Diagnosis Date  Anemia 8/5/2017  Anxiety 8/5/2017  Arrhythmia   
 atrial fibrillation 2014  ASCVD (arteriosclerotic cardiovascular disease) 8/5/2017  BPH (benign prostatic hyperplasia) 8/5/2017  CAD (coronary artery disease)   
 h/o stents  Cancer [facility])   
 h/o skin cancer  Cardiomyopathy ([address] 8/5/2017  CHF (congestive heart failure) ([address] 8/5/2017  Chronic kidney disease Stage IV  CKD (chronic kidney disease), stage IV ([address] 8/5/2017  Diabetes ([address]  Diabetes mellitus ([address] 8/5/2017  Diabetic neuropathy ([address] 8/5/2017  DJD (degenerative joint disease) 8/5/2017  ED (erectile dysfunction) 8/5/2017  Gout 8/5/2017  High cholesterol  Hyperlipidemia 8/5/2017  Hypertension  Hypertension with renal disease 8/5/2017  Hypothyroid 8/5/2017  Insomnia 8/5/2017  Obesity 8/5/2017  On statin therapy 8/5/2017  Restless leg 8/5/2017  Thyroid disease   
 hypothyroid Chief Complaint for this Admission: No chief complaint on file. Allergies: Niacin; Imipenem; Levemir [insulin detemir]; Other medication; Primaxin [imipenem-cilastatin]; and Xarelto [rivaroxaban] Discharge Medications:  
Current Discharge Medication List  
  
 
START taking these medications Details  
potassium chloride (KLOR-CON) 20 mEq Take 1 Packet by mouth two (2) times daily (with meals). Qty: 60 Packet, Refills: prn  
  
ciprofloxacin HCl (CIPRO) 500 mg tablet Take 1 Tab by mouth two (2) times a day. Qty: 20 Tab, Refills: 0  
  
sacubitril-valsartan (ENTRESTO) 24 mg/26 mg tablet Take 1 Tab by mouth every twenty-four (24) hours. Qty: 30 Tab, Refills: prn CONTINUE these medications which have CHANGED Details  
ascorbic acid, vitamin C, (VITAMIN C) 250 mg tablet Take 1 Tab by mouth three (3) times daily.  
Qty: 100 Tab, Refills: prn  
  
  
 
 CONTINUE these medications which have NOT CHANGED Details  
epoetin mary (EPOGEN;PROCRIT) 40,000 unit/mL injection 60,000 Units by SubCUTAneous route every thirty (30) days. insulin glargine (LANTUS SOLOSTAR U-100 INSULIN) 100 unit/mL (3 mL) inpn 35 Units by SubCUTAneous route nightly. insulin lispro (HUMALOG) 100 unit/mL kwikpen 5 Units by SubCUTAneous route Before breakfast, lunch, and dinner. OTHER Apply  to affected area daily as needed (Knee Pain). CBD Cream:  Apply daily as needed for knee pain  
  
travoprost (TRAVATAN Z) 0.004 % ophthalmic solution Administer 1 Drop to right eye nightly. carvedilol (COREG) 12.5 mg tablet TAKE 1 TABLET BY MOUTH TWICE DAILY Qty: 60 Tab, Refills: 11  
  
clonazePAM (KLONOPIN) 2 mg tablet TAKE 1 TABLET BY MOUTH EVERY NIGHT AT BEDTIME. MAY REPEAT DOSE IF YOU WAKE UP Qty: 60 Tab, Refills: 0 Associated Diagnoses: Insomnia, unspecified type  
  
tamsulosin (FLOMAX) 0.4 mg capsule TAKE 2 CAPSULES BY MOUTH EVERY DAY Qty: 180 Cap, Refills: 3 Ferrous Sulfate (SLOW FE) 47.5 mg iron TbER tablet Take 1 Tab by mouth three (3) times daily. Qty: 90 Tab, Refills: prn  
  
bumetanide (BUMEX) 2 mg tablet TAKE 2 TABLETS BY MOUTH TWICE DAILY Qty: 360 Tab, Refills: 3  
  
pramipexole (MIRAPEX) 0.5 mg tablet TAKE 1 TABLET BY MOUTH EVERY NIGHT AT BEDTIME Qty: 90 Tab, Refills: prn  
 Associated Diagnoses: Restless leg ULORIC 40 mg tab tablet TAKE 1 TABLET BY MOUTH DAILY Qty: 30 Tab, Refills: 11  
  
colchicine (COLCRYS) 0.6 mg tablet Take 0.6 mg by mouth as needed (Gouty attack). Take 2 tablets at onset of gouty attack then 1 tablet every hour as needed Liraglutide (VICTOZA) 0.6 mg/0.1 mL (18 mg/3 mL) sub-q pen 0.6 mg by SubCUTAneous route daily. finasteride (PROSCAR) 5 mg tablet Take 5 mg by mouth daily. STOP taking these medications  
  
 metOLazone (ZAROXOLYN) 2.5 mg tablet Comments:  
Reason for Stopping: potassium chloride (K-DUR, KLOR-CON) 20 mEq tablet Comments:  
Reason for Stopping:   
   
 losartan (COZAAR) 25 mg tablet Comments:  
Reason for Stopping: MITIGARE 0.6 mg capsule Comments:  
Reason for Stopping:   
   
 apixaban (ELIQUIS) 5 mg tablet Comments:  
Reason for Stopping:   
   
  
 
 
The patient's chart, MAR and AVS were reviewed by Mariann Patterson, PHARMD. Discharging Provider: Dr. Moira Carvalho Thank You, Mariann Patterson, PHARMD

## 2019-01-10 NOTE — PROGRESS NOTES
0700: Bedside shift change report given to Nelly Christianson RN (oncoming nurse) by Merrill Villavicencio RN (offgoing nurse). Report included the following information SBAR, Kardex, ED Summary, Procedure Summary, Intake/Output, MAR and Recent Results. 1130: Pt is to be discharged home today. Discharge instructions have been reviewed thoroughly and time has been allotted for questions and patient teaching. Pt has no questions at this time. PIV and telemetry have been removed from patient and all personal belongings have been removed from patient room. Pt is being transported home via private vehicle Problem: Falls - Risk of 
Goal: *Absence of Falls Document Nasmia Greenwood Fall Risk and appropriate interventions in the flowsheet. Outcome: Progressing Towards Goal 
Fall Risk Interventions: 
Mobility Interventions: Bed/chair exit alarm, Patient to call before getting OOB Medication Interventions: Bed/chair exit alarm, Assess postural VS orthostatic hypotension Elimination Interventions: Bed/chair exit alarm, Call light in reach History of Falls Interventions: Bed/chair exit alarm, Consult care management for discharge planning, Investigate reason for fall

## 2019-01-10 NOTE — PROGRESS NOTES
PCP CYNTHIA appt scheduled with Dr. Vahe Gerard on 1/14/2019 at 10:40am Appt added to AVS. LAURYN Lux CM Specialist

## 2019-01-10 NOTE — PROGRESS NOTES
CARDIOLOGY Progress Note Patient ID: 
Patient: Nate Aggarwal. MRN: [id number] Age: 77 y.o.  : 1950 Date of  Admission: 2019  3:24 PM  
PCP:  Woody Sahu MD 
 
Assessment: 1. Acute on chronic systolic heart failure, improving. Admitted dietary indiscretion and worsening anemia. Last 3 Recorded Weights in this Encounter 19 0214 19 0407 01/10/19 4739 Weight: 114.5 kg (252 lb 6.8 oz) 114.3 kg (251 lb 14.4 oz) 112.5 kg (248 lb 0.3 oz) 2. Paroxysmal atrial fibrillation and atypical atrial flutter s/p ablation in 2018. NO atrial fibrillation or flutter since the ablation. 3. Hypertensive heart disease with a history of congestive heart failure and CKD stage 4. EF 55% on prior echo, now 35-40% by echo this admission. 4. Coronary artery disease with multivessel interventions. His last stenting was in . 
5. Mild idiopathic pericardial effusion in the past. 
6. Diabetes mellitus type 2, on chronic insulin. 7. Hyperlipidemia. 8. BRBPR with grade 1 internal hemorrhoids noted on colo here. Presumed source of GI bleed. 9. History of esophageal dilation for dysphagia. 10. Hiatal hernia. 11. Anemia of chronic renal disease. On EPO (and Fe supplement). 12. Mild thrombocytopenia. 13. Chronic anticoagulation stopped this admission. 14. Mild hyponatremia, multifactorial (volume overload, Na loss during diuresis, etc). Plan: 1. Continue diuretic (bumetanide alone), 2g Na diet. 2. Recommend upgrade to BIV pacemaker since he's pacer-dependent and has heart failure. If we're ventricular pacing this much in the setting of even mild cardiomyopathy, we'd want a superior mode to limit heart failure. 3. Interrogated pacemaker on --changed base rate to 70, increased AV delays (225/200), made rate adaptive behavior more aggressive to have faster HR's with activity. 4. Continue beta-blocker. 5. On paper, not a good candidate for ACEI or ARB or spironolactone due to renal dysfunction (risk of hyperkalemia, acidosis, worsening renal failure) but sometimes an agent is used. Losartan discontinued, started on Entresto here. He'll follow up with his nephrologist. 
6. Stopped Eliquis given severe anemia requiring transfusion. EPO has been increased. Optimized medical regimen before upgrade to BIV pacemaker. Would try to use little to no contrast dye for visualization given renal insufficiency. All questions answered. He has an appt with me tomorrow. [x]       High complexity decision making was performed in this patient with multiple organ involvement. Royal KRISTA Guzman. is a 77 y.o. male with a history of atrial arrhythmias s/p ablation 5/2018, has done well from an arrhythmia standpoint. He has had chronic issues with fluid retention. Over the holidays, he admits to dietary indiscretion (salt, fluid, volume) and gained weight and lower extremity edema over several days. He became more dyspneic and got to the point when he started to get some rest symptoms. +orthopnea, PND. He saw Dr. Ritika Francis who admitted him for management of his heart failure and fluid retention. Here he's lost weight, his legs look much better to him, but still some weight to drop. He had some diarrhea of unclear etiology recently, resolved. TODAY:  No syncope, near-syncope, TIA, or stroke symptoms. Denies resting dyspnea, but does have severe exertional dyspnea and some orthopnea--this has improved significantly. Allergies Allergen Reactions  Niacin Unknown (comments) Other reaction(s): Unknown (comments)  Imipenem Diarrhea Other reaction(s): Rash  Levemir [Insulin Detemir] Hives  Other Medication Other (comments) ? Allergy to [place] Amandeep causing chemical burn  Primaxin [Imipenem-Cilastatin] Diarrhea and Rash  Xarelto [Rivaroxaban] Rash and Itching Other reaction(s): Rash Current Facility-Administered Medications Medication Dose Route Frequency  sodium chloride (NS) flush 5-40 mL  5-40 mL IntraVENous Q8H  
 sodium chloride (NS) flush 5-40 mL  5-40 mL IntraVENous PRN  
 sodium chloride (NS) flush 5-40 mL  5-40 mL IntraVENous Q8H  
 sodium chloride (NS) flush 5-40 mL  5-40 mL IntraVENous PRN  
 sodium chloride (NS) flush 5-40 mL  5-40 mL IntraVENous Q8H  
 sodium chloride (NS) flush 5-40 mL  5-40 mL IntraVENous PRN  
 sacubitril-valsartan (ENTRESTO) 24-26 mg tablet 1 Tab  1 Tab Oral Q24H  
 meclizine (ANTIVERT) tablet 12.5 mg  12.5 mg Oral TID PRN  
 0.9% sodium chloride infusion 250 mL  250 mL IntraVENous PRN  potassium chloride (KLOR-CON) packet for solution 40 mEq  40 mEq Oral BID WITH MEALS  ciprofloxacin HCl (CIPRO) tablet 500 mg  500 mg Oral BID  liraglutide (VICTOZA) 0.6 mg/0.1 mL (18 mg/3 mL) sub-q pen 1.2 mg (Patient Supplied)  1.2 mg SubCUTAneous DAILY  0.9% sodium chloride infusion 250 mL  250 mL IntraVENous PRN  pramipexole (MIRAPEX) tablet 0.5 mg  0.5 mg Oral DAILY PRN  
 ondansetron (ZOFRAN) injection 4 mg  4 mg IntraVENous QID PRN  
 ascorbic acid (vitamin C) (VITAMIN C) tablet 250 mg  250 mg Oral TID  carvedilol (COREG) tablet 12.5 mg  12.5 mg Oral BID WITH MEALS  clonazePAM (KlonoPIN) tablet 2 mg  2 mg Oral QHS  colchicine tablet 0.6 mg  0.6 mg Oral DAILY  ferrous sulfate tablet 325 mg  325 mg Oral TID  finasteride (PROSCAR) tablet 5 mg  5 mg Oral DAILY  insulin glargine (LANTUS) injection 30 Units  30 Units SubCUTAneous QHS  pramipexole (MIRAPEX) tablet 0.5 mg  0.5 mg Oral QHS  tamsulosin (FLOMAX) capsule 0.4 mg  0.4 mg Oral DAILY  febuxostat (ULORIC) tablet 40 mg  40 mg Oral DAILY  sodium chloride (NS) flush 5-10 mL  5-10 mL IntraVENous Q8H  
 sodium chloride (NS) flush 5-10 mL  5-10 mL IntraVENous PRN  
 zolpidem (AMBIEN) tablet 10 mg  10 mg Oral QHS PRN  
  acetaminophen (TYLENOL) tablet 650 mg  650 mg Oral Q4H PRN  
 bumetanide (BUMEX) injection 2 mg  2 mg IntraVENous Q12H  diphenoxylate-atropine (LOMOTIL) tablet 1 Tab  1 Tab Oral QID PRN Review of Symptoms:   
Cardiology: negative for chest pain, palpitations, PND, syncope Gastrointestinal: + BRBPR, negative for abdominal pain, N/V, dysphagia :  + resolved gross hematuria, no dysuria Objective:  
  
Physical Exam: 
Temp (24hrs), Av.9 °F (36.6 °C), Min:97.3 °F (36.3 °C), Max:98.7 °F (37.1 °C) Patient Vitals for the past 8 hrs: 
 Pulse 01/10/19 1034 80  
01/10/19 0728 94  
01/10/19 0256 91 Patient Vitals for the past 8 hrs: 
 Resp  
01/10/19 1034 18  
01/10/19 0728 18  
01/10/19 0256 18 Patient Vitals for the past 8 hrs: 
 BP  
01/10/19 1034 126/78  
01/10/19 0728 134/86  
01/10/19 0256 119/83 Intake/Output Summary (Last 24 hours) at 1/10/2019 1048 Last data filed at 1/10/2019 9140 Gross per 24 hour Intake 1160 ml Output 2625 ml Net -1465 ml Nondiaphoretic, not in acute distress. No scleral icterus, mucous membranes moist, conjuctivae pink, no xanthelasma. L chest pacer site without evidence of erosion or infection. Unlabored, clear to auscultation bilaterally anteriorly, symmetric air movement. Regular rate and paced rhythm, no new murmur, pericardial rub, knock, or gallop. No JVD but trace peripheral edema. Palpable radial pulses bilaterally. Abdomen, soft, nontender, nondistended. No abdominal bruit or pulstatile masses. Extremities without cyanosis or clubbing. Muscle tone and bulk normal. 
Skin warm and dry. No rashes or ulcers but there is some venostasis changes and excoriations of the pretibial area. Neuro grossly nonfocal.  No tremor. Awake and appropriate. CARDIOGRAPHICS and STUDIES, I reviewed: 
 
Telemetry:  AV pacing. Echo here: LEFT VENTRICLE: The ventricle was mildly dilated. Systolic function was mildly to moderately reduced. Ejection fraction was estimated in the range 
of 35 % to 40 %. There was mild diffuse hypokinesis. Wall thickness was 
normal. 
 
RIGHT VENTRICLE: The ventricle was mildly dilated. Systolic function was 
mildly reduced. Wall thickness was normal. 
 
LEFT ATRIUM: The atrium appeared elongated. RIGHT ATRIUM: Size was normal. 
 
MITRAL VALVE: Normal valve structure. There was normal leaflet separation. DOPPLER: The transmitral velocity was within the normal range. There was 
no evidence for stenosis. There was mild regurgitation. AORTIC VALVE: The valve was trileaflet. Leaflets exhibited normal 
thickness, mild calcification, and normal cuspal separation. DOPPLER: 
Transaortic velocity was within the normal range. There was no stenosis. There was mild regurgitation. TRICUSPID VALVE: Normal valve structure. There was normal leaflet 
separation. DOPPLER: The transtricuspid velocity was within the normal 
range. There was no evidence for tricuspid stenosis. There was mild to 
moderate regurgitation. Pulmonary artery systolic pressure: 30 mmHg. PULMONIC VALVE: Leaflets exhibited normal cuspal separation. DOPPLER: The 
transpulmonic velocity was within the normal range. There was no 
regurgitation. AORTA: The root exhibited mild dilatation. PERICARDIUM: There was no pericardial effusion. CXR 1/2:  Small R pleural effusion. Labs: 
No results for input(s): CPK, CKMB, CKNDX, TROIQ in the last 72 hours. No lab exists for component: CPKMB Lab Results Component Value Date/Time Cholesterol, total 112 11/27/2018 02:55 PM  
 HDL Cholesterol 27 (L) 11/27/2018 02:55 PM  
 LDL, calculated 68 11/27/2018 02:55 PM  
 Triglyceride 85 11/27/2018 02:55 PM  
 
No results for input(s): INR, PTP, APTT in the last 72 hours. No lab exists for component: INREXT, INREXT Recent Labs  
  01/10/19 
0306 01/09/19 
0447 01/08/19 
8953 * 131* 130*  
K 3.8 3.6 3.9 CL 95* 93* 94* CO2 27 26 29 BUN 73* 86* 94* CREA 2.83* 2.58* 2.87* GLU 74 72 102* CA 8.2* 8.3* 8.5 WBC 4.9 5.8 4.7 HGB 10.0* 9.5* 9.3* HCT 32.8* 30.0* 30.2* PLT 97* 112* 95* No results for input(s): SGOT, GPT, AP, TBIL, TP, ALB, GLOB, GGT, AML, LPSE in the last 72 hours. No lab exists for component: AMYP, HLPSE No components found for: [place] No results for input(s): PH, PCO2, PO2 in the last 72 hours. Kenyon Coronado MD 
1/10/2019

## 2019-01-10 NOTE — PROGRESS NOTES
Problem: Falls - Risk of 
Goal: *Absence of Falls Document Hakeem Burnett Fall Risk and appropriate interventions in the flowsheet. Outcome: Progressing Towards Goal 
Fall Risk Interventions: 
Mobility Interventions: Patient to call before getting OOB, Utilize walker, cane, or other assistive device Medication Interventions: Patient to call before getting OOB Elimination Interventions: Patient to call for help with toileting needs History of Falls Interventions: Door open when patient unattended, Room close to nurse's station

## 2019-01-11 ENCOUNTER — PATIENT OUTREACH (OUTPATIENT)
Dept: INTERNAL MEDICINE CLINIC | Age: 69
End: 2019-01-11

## 2019-01-11 RX ORDER — FEBUXOSTAT 40 MG/1
TABLET ORAL
Qty: 90 TAB | Refills: 3 | Status: SHIPPED | OUTPATIENT
Start: 2019-01-11 | End: 2019-05-27

## 2019-01-11 NOTE — PROGRESS NOTES
Hospital Discharge Follow-Up Date/Time:  1/15/2019 2:02 PM 
 
Patient was directly admitted to [facility] from PCP office on 19 and discharged on 1/10/19 for Increased SOB. The physician discharge summary was available at the time of outreach. Patient was contacted within 1 business days of discharge. Top Challenges reviewed with the provider  
-Consider BMP, Creatinine 2.83 at discharge 
 
-Consider CBC, Hgb 7.3-10.0, recieved 2 units of PRBC Method of communication with provider :chart routing Inpatient RRAT score: 41 Was this a readmission? no  
Patient stated reason for the readmission: n/a Nurse Navigator (NN) contacted the patient by telephone to perform post hospital discharge assessment. Verified name and  with patient as identifiers. Provided introduction to self, and explanation of the Nurse Navigator role. NN spoke to patient. He is \" feeling great\". He states he went to [facility] and the elevator was broken, and had to walk up two flights of stairs and felt fine. \" He is currently in the parking lot at his cardiologist office waiting to go in for a appointment. Reviewed discharge instructions and red flags with patient who verbalized understanding. Patient given an opportunity to ask questions and does not have any further questions or concerns at this time. The patient agrees to contact the PCP office for questions related to their healthcare. NN provided contact information for future reference. Disease Specific:   CHF Heart Failure Note Do you have a Scale:    yes How often do you weigh:  daily Daily Weight (document daily weights in flowsheets):   Decrease Amount:  Reports today 246.0 Provider Notified:   No  
 
Zone:(Pt Reported)  green EF: 35-40%(result) on 19 (date) Type of HF:   HFrEF Cardiac Device present: pacemaker Heart Failure Medications: ACE/ARB, Betablocker, Diuretic, Potassium Summary of patient's top problems: 
1. CHF-presented to [facility] for direct admit from PCP office for increased SOB. IV Bumex. CXR shows small right pleural effusion. IV Bumex. David Middleton Possible BiVpacemaker upgrade outpatient. On PO Bumex and entresto. To follow up with Cardiology 2. Anemia- Hgb trended up to 10.0 form 7.3 after 2 units PRBC. Colonoscopy showed Internal hemorrhoids, bleeding. To follow up with PCP. 3. CKD-creatine 2.79, peaked at  3.06, trending down to 2.83. To follow up with PCP Home Health orders at discharge: PT, OT, SN Home Health company: [facility] Date of initial visit: [facility] Durable Medical Equipment ordered/company: n/a Durable Medical Equipment received: n/a Barriers to care? none identified at this time. Advance Care Planning:  
Does patient have an Advance Directive:  reviewed and current Medication(s):  
New Medications at Discharge: Cipro, Entresto, Potassium Changed Medications at Discharge: Vitamin C Discontinued Medications at Discharge: Eliquis, Metolazone, losartan Medication reconciliation was performed with patient, who verbalizes understanding of administration of home medications. There were no barriers to obtaining medications identified at this time. Referral to Pharm D needed: no  
 
Current Outpatient Medications Medication Sig  
 ULORIC 40 mg tab tablet TAKE 1 TABLET BY MOUTH DAILY  ascorbic acid, vitamin C, (VITAMIN C) 250 mg tablet Take 1 Tab by mouth three (3) times daily.  potassium chloride (KLOR-CON) 20 mEq Take 1 Packet by mouth two (2) times daily (with meals).  ciprofloxacin HCl (CIPRO) 500 mg tablet Take 1 Tab by mouth two (2) times a day.  sacubitril-valsartan (ENTRESTO) 24 mg/26 mg tablet Take 1 Tab by mouth every twenty-four (24) hours.  epoetin mary (EPOGEN;PROCRIT) 40,000 unit/mL injection 60,000 Units by SubCUTAneous route every thirty (30) days.   
 insulin glargine (LANTUS SOLOSTAR U-100 INSULIN) 100 unit/mL (3 mL) inpn 35 Units by SubCUTAneous route nightly.  insulin lispro (HUMALOG) 100 unit/mL kwikpen 5 Units by SubCUTAneous route Before breakfast, lunch, and dinner.  OTHER Apply  to affected area daily as needed (Knee Pain). CBD Cream:  Apply daily as needed for knee pain  travoprost (TRAVATAN Z) 0.004 % ophthalmic solution Administer 1 Drop to right eye nightly.  carvedilol (COREG) 12.5 mg tablet TAKE 1 TABLET BY MOUTH TWICE DAILY  clonazePAM (KLONOPIN) 2 mg tablet TAKE 1 TABLET BY MOUTH EVERY NIGHT AT BEDTIME. MAY REPEAT DOSE IF YOU WAKE UP  
 tamsulosin (FLOMAX) 0.4 mg capsule TAKE 2 CAPSULES BY MOUTH EVERY DAY  Ferrous Sulfate (SLOW FE) 47.5 mg iron TbER tablet Take 1 Tab by mouth three (3) times daily.  bumetanide (BUMEX) 2 mg tablet TAKE 2 TABLETS BY MOUTH TWICE DAILY  pramipexole (MIRAPEX) 0.5 mg tablet TAKE 1 TABLET BY MOUTH EVERY NIGHT AT BEDTIME (Patient taking differently: May take an additional tablet if needed)  colchicine (COLCRYS) 0.6 mg tablet Take 0.6 mg by mouth as needed (Gouty attack). Take 2 tablets at onset of gouty attack then 1 tablet every hour as needed  Liraglutide (VICTOZA) 0.6 mg/0.1 mL (18 mg/3 mL) sub-q pen 0.6 mg by SubCUTAneous route daily.  finasteride (PROSCAR) 5 mg tablet Take 5 mg by mouth daily. No current facility-administered medications for this visit. There are no discontinued medications. [facility] follow up appointment(s):  
Future Appointments Date Time Provider Leonardo Thomas 1/21/2019 11:00 AM Ashley Baez MD 3 [facility] Pelaez  
1/30/2019 10:00 AM [place] FT CHAIR 1 [facility]  
2/27/2019 10:00 AM [place] FT CHAIR 1 [facility]  
3/27/2019 10:00 AM [facility] CHAIR 1 [facility] Non-[facility] follow up appointment(s): Cardiology 1/11/19 at 3:00pm 
[facility]:  scheduled Goals  Maintains daily weight.   
  1/11/19 
- will weight daily 
- reports today's weight 246.0 -will report weight gain >3lbs in 1 day or >5lbs in a week to provider. -NN to follow up in 1 week Genevieve rey  Reduce risk of CHF exacerbations and complications. 1/11/19 
-reports Green zone 
-will attend f/u with Cardiology 1/11/19 
-will attend f/u with PCP on 1/14/19 
-NN to follow up in 1 week Genevieve P

## 2019-01-11 NOTE — TELEPHONE ENCOUNTER
RX refill request from the patient/pharmacy. Patient last seen 01- with labs, and next appt. scheduled for 01-  Requested Prescriptions     Pending Prescriptions Disp Refills    ULORIC 40 mg tab tablet [Pharmacy Med Name: ULORIC 40MG TABLETS] 90 Tab 3     Sig: TAKE 1 TABLET BY MOUTH DAILY   .

## 2019-01-12 ENCOUNTER — HOME CARE VISIT (OUTPATIENT)
Dept: SCHEDULING | Facility: HOME HEALTH | Age: 69
End: 2019-01-12

## 2019-01-13 NOTE — PROGRESS NOTES
Transitions Of Care (d/c 1-10-18) HPI: 
Shiloh Bryant is a 77y.o. year old male who is here for a follow up visit for hospitalization transition of care. He was last seen by me on 1/2/2019 at the office and on 1/10/2019 at time of hospital discharge. Discharged on: 1/10/19 Diagnosis in hospital: 1. Acute on chronic systolic CHF 2. Cardiomyopathy 3. CKD stage IV 4. Anemia of chronic disease required 2 units transfusion during hospitalization 5. ASCVD 6. Paroxysmal atrial fibrillation status post ablation in May 2018 without evidence recurrence 7. Internal hemorrhoids noted a colonoscopy during this admission 8. Diabetes mellitus 9. Hypertension 10. GERD Complications in hospital: No complications although he did have some bright red rectal bleeding and a colonoscopy performed showing some internal hemorrhoids. Medication changes: Started Cipro to complete treatment for prostate infection, started Entresto 24/26 1 daily, started potassium supplements. Discontinued Eliquis, losartan and metolazone Discharge Summary reviewed. Today 1/14/2019 He reports the following: Since discharge she claims to be feeling great with much more energy than he had before his hospitalization and even in the month prior to that. He is accompanied by his wife today who confirms the same. His appetite is good. He is monitoring his weight on a daily basis and there was 251 at home this morning when he first arose with our goal being 250 or below and his last hospital weight was 248. He denies any cough or chest congestion has no chest pain, palpitations, PND, orthopnea. He is due to have a pacemaker upgrade done in about 2 weeks. He is having good urine output and no no swelling in his ankles. He denies any diarrhea or GI complaints of any type and then no  complaints. He has noted no fevers or chills or sweats.   He has some chronic unchanged arthritic complaints but overall he is getting around quite well and using no is steps without having evidence of dyspnea. No other complaints are noted. Visit Vitals /64 (BP 1 Location: Left arm, BP Patient Position: Sitting) Pulse 97 Resp 18 Ht 6' 6\" (1.981 m) Wt 258 lb (117 kg) SpO2 98% BMI 29.81 kg/m² Historical Data Past Medical History:  
Diagnosis Date  Anemia 8/5/2017  Anxiety 8/5/2017  Arrhythmia   
 atrial fibrillation 2014  ASCVD (arteriosclerotic cardiovascular disease) 8/5/2017  BPH (benign prostatic hyperplasia) 8/5/2017  CAD (coronary artery disease)   
 h/o stents  Cancer [facility])   
 h/o skin cancer  Cardiomyopathy ([address] 8/5/2017  CHF (congestive heart failure) ([address] 8/5/2017  Chronic kidney disease Stage IV  CKD (chronic kidney disease), stage IV ([address] 8/5/2017  Diabetes ([address]  Diabetes mellitus ([address] 8/5/2017  Diabetic neuropathy ([address] 8/5/2017  DJD (degenerative joint disease) 8/5/2017  ED (erectile dysfunction) 8/5/2017  Gout 8/5/2017  High cholesterol  Hyperlipidemia 8/5/2017  Hypertension  Hypertension with renal disease 8/5/2017  Hypothyroid 8/5/2017  Insomnia 8/5/2017  Obesity 8/5/2017  On statin therapy 8/5/2017  Restless leg 8/5/2017  Thyroid disease   
 hypothyroid Past Surgical History:  
Procedure Laterality Date  CARDIAC SURG PROCEDURE UNLIST    
 cardiac stents  CARDIAC SURG PROCEDURE UNLIST Ablation 5/17/2018 ED [facility]  COLONOSCOPY N/A 6/28/2016 COLONOSCOPY performed by Carolina Valentino MD at [facility] ENDOSCOPY  COLONOSCOPY N/A 1/9/2019 COLONOSCOPY performed by Chinedu Coombs MD at [facility] ENDOSCOPY  COLONOSCOPY,DIAGNOSTIC  1/9/2019  HX APPENDECTOMY  HX BUNIONECTOMY    
 and removal of 2 seasmoid bone of the great toe 2/2018  HX HEENT Bilateral Cataract surgery  HX HEENT Tonsils  HX HEENT Axe wound to the head  HX KNEE ARTHROSCOPY X 2  
 HX ORTHOPAEDIC    
 HX ORTHOPAEDIC    
 partial laminectomy  HX PACEMAKER    
 HX ROTATOR CUFF REPAIR    
 bilateral  
 
 
Outpatient Encounter Medications as of 1/14/2019 Medication Sig Dispense Refill  ULORIC 40 mg tab tablet TAKE 1 TABLET BY MOUTH DAILY 90 Tab 3  
 ascorbic acid, vitamin C, (VITAMIN C) 250 mg tablet Take 1 Tab by mouth three (3) times daily. 100 Tab prn  potassium chloride (KLOR-CON) 20 mEq Take 1 Packet by mouth two (2) times daily (with meals). 60 Packet prn  ciprofloxacin HCl (CIPRO) 500 mg tablet Take 1 Tab by mouth two (2) times a day. 20 Tab 0  
 sacubitril-valsartan (ENTRESTO) 24 mg/26 mg tablet Take 1 Tab by mouth every twenty-four (24) hours. 30 Tab prn  epoetin mary (EPOGEN;PROCRIT) 40,000 unit/mL injection 60,000 Units by SubCUTAneous route every thirty (30) days.  insulin glargine (LANTUS SOLOSTAR U-100 INSULIN) 100 unit/mL (3 mL) inpn 35 Units by SubCUTAneous route nightly.  insulin lispro (HUMALOG) 100 unit/mL kwikpen 5 Units by SubCUTAneous route Before breakfast, lunch, and dinner.  OTHER Apply  to affected area daily as needed (Knee Pain). CBD Cream:  Apply daily as needed for knee pain  travoprost (TRAVATAN Z) 0.004 % ophthalmic solution Administer 1 Drop to right eye nightly.  carvedilol (COREG) 12.5 mg tablet TAKE 1 TABLET BY MOUTH TWICE DAILY 60 Tab 11  
 clonazePAM (KLONOPIN) 2 mg tablet TAKE 1 TABLET BY MOUTH EVERY NIGHT AT BEDTIME. MAY REPEAT DOSE IF YOU WAKE UP 60 Tab 0  
 tamsulosin (FLOMAX) 0.4 mg capsule TAKE 2 CAPSULES BY MOUTH EVERY  Cap 3  Ferrous Sulfate (SLOW FE) 47.5 mg iron TbER tablet Take 1 Tab by mouth three (3) times daily. 90 Tab prn  bumetanide (BUMEX) 2 mg tablet TAKE 2 TABLETS BY MOUTH TWICE DAILY 360 Tab 3  pramipexole (MIRAPEX) 0.5 mg tablet TAKE 1 TABLET BY MOUTH EVERY NIGHT AT BEDTIME (Patient taking differently: May take an additional tablet if needed) 90 Tab prn  colchicine (COLCRYS) 0.6 mg tablet Take 0.6 mg by mouth as needed (Gouty attack). Take 2 tablets at onset of gouty attack then 1 tablet every hour as needed  Liraglutide (VICTOZA) 0.6 mg/0.1 mL (18 mg/3 mL) sub-q pen 0.6 mg by SubCUTAneous route daily.  finasteride (PROSCAR) 5 mg tablet Take 5 mg by mouth daily. No facility-administered encounter medications on file as of 2019. Allergies Allergen Reactions  Niacin Unknown (comments) Other reaction(s): Unknown (comments)  Imipenem Diarrhea Other reaction(s): Rash  Levemir [Insulin Detemir] Hives  Other Medication Other (comments) ? Allergy to Nidia Latrell causing chemical burn  Primaxin [Imipenem-Cilastatin] Diarrhea and Rash  Xarelto [Rivaroxaban] Rash and Itching Other reaction(s): Rash Social History Socioeconomic History  Marital status:  Spouse name: Not on file  Number of children: Not on file  Years of education: Not on file  Highest education level: Not on file Social Needs  Financial resource strain: Not on file  Food insecurity - worry: Not on file  Food insecurity - inability: Not on file  Transportation needs - medical: Not on file  Transportation needs - non-medical: Not on file Occupational History  Not on file Tobacco Use  Smoking status: Former Smoker Packs/day: 0.50 Years: 4.00 Pack years: 2.00 Last attempt to quit: 3/6/1972 Years since quittin.8  Smokeless tobacco: Never Used Substance and Sexual Activity  Alcohol use: No  
  Comment: rare, 1 drink per year  Drug use: No  
 Sexual activity: Not Currently Other Topics Concern  Not on file Social History Narrative  Not on file REVIEW OF SYSTEMS: 
General: negative for - chills or fever ENT: negative for - headaches, nasal congestion or tinnitus Eyes: no blurred or visual changes Neck: No stiffness or swollen nodes Respiratory: negative for - cough, hemoptysis, shortness of breath or wheezing Cardiovascular : negative for - chest pain, edema, palpitations or shortness of breath Gastrointestinal: negative for - abdominal pain, blood in stools, heartburn or nausea/vomiting Genito-Urinary: no dysuria, trouble voiding, or hematuria Musculoskeletal: negative for - gait disturbance, change of his chronic joint pain, joint stiffness or joint swelling Neurological: no TIA or stroke symptoms Hematologic: no bruises, no bleeding Lymphatic: no swollen glands Integument: no lumps, mole changes, nail changes or rash Endocrine:no malaise/lethargy poly uria or polydipsia or unexpected weight changes Visit Vitals /64 (BP 1 Location: Left arm, BP Patient Position: Sitting) Pulse 97 Resp 18 Ht 6' 6\" (1.981 m) Wt 258 lb (117 kg) SpO2 98% BMI 29.81 kg/m² CONSTITUTIONAL: well , well nourished, appears age appropriate HEAD: normocephalic, atraumatic EYES: sclera anicteric, PERRL, EOMI 
ENMT:moist mucous membranes, pharynx clear Nares: w/o erythema or edema NECK: supple. Thyroid normal, No JVD or bruits RESPIRATORY: Chest: clear to ascultation and percussion CARDIOVASCULAR: Heart: regular rate and rhythm no murmurs, rubs or gallops, PMI not displaced, no thrill. Trace lower extremity edema GASTROINTESTINAL: Abdomen: non distended, soft, non-tender, bowel sounds normal 
HEMATOLOGIC: no petechiae or purpura LYMPHATIC: no lymphadenopathy MUSCULOSKELETAL: Extremities: no active synovitis, pulse 1+ BACK; no point or CVAT INTEGUMENT: No unusual rashes or suspicious skin lesions noted. Nails appear normal.  No diabetic foot changes noted. NEUROLOGIC: non-focal exam  
MENTAL STATUS: alert and oriented, appropriate affect.   Normal distal sensation and proprioception all toes both feet. PSYCHIATRIC: normal affect ASSESSMENT:  
1. Systolic CHF, acute on chronic (HCC) 2. Ischemic cardiomyopathy 3. ASCVD (arteriosclerotic cardiovascular disease) 4. Paroxysmal atrial fibrillation (HCC) 5. S/P ablation of atrial fibrillation 6. CKD (chronic kidney disease), stage IV [address] 7. Controlled type 2 diabetes mellitus with stage 4 chronic kidney disease, without long-term current use of insulin ([facility] 8. Anemia, unspecified type Impression 1. Systolic CHF acute on chronic currently compensated continue current dose of Bumex as well as Entresto which is dosed once daily secondary to his renal dysfunction. 2.  Cardiomyopathy EF during hospital 35-40% by echo 3. ASCVD clinically stable with no recent interventions needed 4. Paroxysmal atrial fibrillation with no evidence of atrial fibrillation recorded on his pacemaker since his ablation done in May 2018 5. CKD stage IV we will see what his function is today now that he has been on Entresto for about a week 6. Anemia repeat status pending hemoglobin at time of discharge was 10 after he did receive 2 units of blood during hospitalization 7. Diabetes blood sugars according him a running okay I will call the lab and make further recommendations or adjustments if necessary. Follow-up with me in 1 week. Note he is extremely high risk of recurrent decompensation and hospitalization with his major medical problems. He and his wife are informed of this and they are to watch his weight on a daily basis and if his weight gets above 255 pounds I want him to call me. I did tell him the goal is to keep the weight at 250 pounds. PLAN: 
. Orders Placed This Encounter  METABOLIC PANEL, BASIC  
 AMB POC COMPLETE CBC,AUTOMATED ENTER  
 
 
 
ATTENTION:  
This medical record was transcribed using an electronic medical records system. Although proofread, it may and can contain electronic and spelling errors. Other human spelling and other errors may be present. Corrections may be executed at a later time. Please feel free to contact us for any clarifications as needed. Follow-up Disposition: 
Return in about 1 week (around 1/21/2019). Kelley Mcelroy MD 
 
Orders Placed This Encounter  METABOLIC PANEL, BASIC  
 AMB POC COMPLETE CBC,AUTOMATED ENTER  
 HM DIABETES FOOT EXAM  
  
 
 
No results found for any visits on 01/14/19. I have reviewed the patient's medical history in detail and updated the computerized patient record. We had a prolonged discussion about these complex clinical issues and went over the various important aspects to consider. All questions were answered. Advised him to call back or return to office if symptoms do not improve, change in nature, or persist. 
 
He was given an after visit summary or informed of [facility] which includes patient instructions, diagnoses, current medications, & vitals. He expressed understanding with the diagnosis and plan.

## 2019-01-14 ENCOUNTER — OFFICE VISIT (OUTPATIENT)
Dept: INTERNAL MEDICINE CLINIC | Age: 69
End: 2019-01-14

## 2019-01-14 ENCOUNTER — HOME CARE VISIT (OUTPATIENT)
Dept: HOME HEALTH SERVICES | Facility: HOME HEALTH | Age: 69
End: 2019-01-14

## 2019-01-14 VITALS
DIASTOLIC BLOOD PRESSURE: 64 MMHG | OXYGEN SATURATION: 98 % | SYSTOLIC BLOOD PRESSURE: 130 MMHG | BODY MASS INDEX: 29.85 KG/M2 | HEIGHT: 78 IN | HEART RATE: 97 BPM | WEIGHT: 258 LBS | RESPIRATION RATE: 18 BRPM

## 2019-01-14 DIAGNOSIS — I25.5 ISCHEMIC CARDIOMYOPATHY: ICD-10-CM

## 2019-01-14 DIAGNOSIS — I48.0 PAROXYSMAL ATRIAL FIBRILLATION (HCC): ICD-10-CM

## 2019-01-14 DIAGNOSIS — I50.23 SYSTOLIC CHF, ACUTE ON CHRONIC (HCC): Primary | ICD-10-CM

## 2019-01-14 DIAGNOSIS — N18.4 CKD (CHRONIC KIDNEY DISEASE), STAGE IV (HCC): ICD-10-CM

## 2019-01-14 DIAGNOSIS — I25.10 ASCVD (ARTERIOSCLEROTIC CARDIOVASCULAR DISEASE): ICD-10-CM

## 2019-01-14 DIAGNOSIS — N18.4 CONTROLLED TYPE 2 DIABETES MELLITUS WITH STAGE 4 CHRONIC KIDNEY DISEASE, WITHOUT LONG-TERM CURRENT USE OF INSULIN (HCC): ICD-10-CM

## 2019-01-14 DIAGNOSIS — Z86.79 S/P ABLATION OF ATRIAL FIBRILLATION: ICD-10-CM

## 2019-01-14 DIAGNOSIS — D64.9 ANEMIA, UNSPECIFIED TYPE: ICD-10-CM

## 2019-01-14 DIAGNOSIS — Z98.890 S/P ABLATION OF ATRIAL FIBRILLATION: ICD-10-CM

## 2019-01-14 DIAGNOSIS — E11.22 CONTROLLED TYPE 2 DIABETES MELLITUS WITH STAGE 4 CHRONIC KIDNEY DISEASE, WITHOUT LONG-TERM CURRENT USE OF INSULIN (HCC): ICD-10-CM

## 2019-01-14 LAB
GRAN# POC: 4.5 K/UL (ref 2–7.8)
GRAN% POC: 80 % (ref 37–92)
HCT VFR BLD CALC: 31 % (ref 37–51)
HGB BLD-MCNC: 10.1 G/DL (ref 12–18)
LY# POC: 0.5 K/UL (ref 0.6–4.1)
LY% POC: 10.8 % (ref 10–58.5)
MCH RBC QN: 27.4 PG (ref 26–32)
MCHC RBC-ENTMCNC: 32.5 G/DL (ref 30–36)
MCV RBC: 84 FL (ref 80–97)
MID #, POC: 0.4 K/UL (ref 0–1.8)
MID% POC: 9.2 % (ref 0.1–24)
PLATELET # BLD: 99 K/UL (ref 140–440)
RBC # BLD: 3.67 M/UL (ref 4.2–6.3)
WBC # BLD: 5.4 K/UL (ref 4.1–10.9)

## 2019-01-14 NOTE — PATIENT INSTRUCTIONS

## 2019-01-14 NOTE — PROGRESS NOTES
Identified pt with two pt identifiers(name and ). Reviewed record in preparation for visit and have obtained necessary documentation. Chief Complaint Patient presents with  Transitions Of Care d/c 1-10-18 Health Maintenance Due Topic  DTaP/Tdap/Td series (1 - Tdap)  Shingrix Vaccine Age 50> (1 of 2)  AAA Screening 73-69 YO Male Smoking Patients  FOOT EXAM Q1 Coordination of Care Questionnaire: 
:  
1) Have you been to an emergency room, urgent care, or hospitalized since your last visit? yes If yes, where when, and reason for visit? [id number] [facility] admission 18. 2. Have seen or consulted any other health care provider since your last visit? YES If yes, where when, and reason for visit? Dr. Saul Hernandez 3) Do you have an Advanced Directive/ Living Will in place? YES If yes, do we have a copy on file YES If no, would you like information NO Patient is accompanied by spouse I have received verbal consent from Moe iSlva. to discuss any/all medical information while they are present in the room.

## 2019-01-15 ENCOUNTER — TELEPHONE (OUTPATIENT)
Dept: INTERNAL MEDICINE CLINIC | Age: 69
End: 2019-01-15

## 2019-01-15 LAB
BUN SERPL-MCNC: 84 MG/DL (ref 8–27)
BUN/CREAT SERPL: 28 (ref 10–24)
CALCIUM SERPL-MCNC: 9.2 MG/DL (ref 8.6–10.2)
CHLORIDE SERPL-SCNC: 98 MMOL/L (ref 96–106)
CO2 SERPL-SCNC: 26 MMOL/L (ref 20–29)
CREAT SERPL-MCNC: 2.99 MG/DL (ref 0.76–1.27)
GLUCOSE SERPL-MCNC: 123 MG/DL (ref 65–99)
POTASSIUM SERPL-SCNC: 5 MMOL/L (ref 3.5–5.2)
SODIUM SERPL-SCNC: 140 MMOL/L (ref 134–144)

## 2019-01-15 NOTE — TELEPHONE ENCOUNTER
----- Message from Davey Eli MD sent at 1/14/2019  4:50 PM EST -----  Globin is stable at 10.1, kidney functions pending

## 2019-01-15 NOTE — TELEPHONE ENCOUNTER
----- Message from Lizbeth Day MD sent at 1/15/2019 12:07 PM EST -----  Kidney function is without significant change so we will continue to follow

## 2019-01-18 NOTE — PROGRESS NOTES
Chief Complaint Patient presents with  Chronic Kidney Disease 1 week follow up SUBJECTIVE: 
 
Royal KRISTA Ni is a 77 y.o. male who returns in follow-up with his recent hospitalization for acute on chronic systolic CHF, CKD, hypertension, ASCVD, atrial fibrillation and other medical problems. He notes despite taken to [employer] on a regular basis is not seeming to work as well and he feels like he needs to go back to the metolazone which worked well before. He is due to have a upgrade of his pacemaker to be done a week from today. He denies any PND orthopnea but notes increased swelling in his ankles. He denies any increased chest pain or increased shortness of breath or other cardiorespiratory complaints. Current Outpatient Medications Medication Sig Dispense Refill  metOLazone (ZAROXOLYN) 2.5 mg tablet Take 1 Tab by mouth every other day. 30 Tab prn  ULORIC 40 mg tab tablet TAKE 1 TABLET BY MOUTH DAILY 90 Tab 3  
 ascorbic acid, vitamin C, (VITAMIN C) 250 mg tablet Take 1 Tab by mouth three (3) times daily. 100 Tab prn  potassium chloride (KLOR-CON) 20 mEq Take 1 Packet by mouth two (2) times daily (with meals). 60 Packet prn  ciprofloxacin HCl (CIPRO) 500 mg tablet Take 1 Tab by mouth two (2) times a day. 20 Tab 0  
 sacubitril-valsartan (ENTRESTO) 24 mg/26 mg tablet Take 1 Tab by mouth every twenty-four (24) hours. 30 Tab prn  epoetin mary (EPOGEN;PROCRIT) 40,000 unit/mL injection 60,000 Units by SubCUTAneous route every thirty (30) days.  insulin glargine (LANTUS SOLOSTAR U-100 INSULIN) 100 unit/mL (3 mL) inpn 35 Units by SubCUTAneous route nightly.  insulin lispro (HUMALOG) 100 unit/mL kwikpen 5 Units by SubCUTAneous route Before breakfast, lunch, and dinner.  OTHER Apply  to affected area daily as needed (Knee Pain). CBD Cream:  Apply daily as needed for knee pain  travoprost (TRAVATAN Z) 0.004 % ophthalmic solution Administer 1 Drop to right eye nightly.  carvedilol (COREG) 12.5 mg tablet TAKE 1 TABLET BY MOUTH TWICE DAILY 60 Tab 11  
 clonazePAM (KLONOPIN) 2 mg tablet TAKE 1 TABLET BY MOUTH EVERY NIGHT AT BEDTIME. MAY REPEAT DOSE IF YOU WAKE UP 60 Tab 0  
 tamsulosin (FLOMAX) 0.4 mg capsule TAKE 2 CAPSULES BY MOUTH EVERY  Cap 3  Ferrous Sulfate (SLOW FE) 47.5 mg iron TbER tablet Take 1 Tab by mouth three (3) times daily. 90 Tab prn  bumetanide (BUMEX) 2 mg tablet TAKE 2 TABLETS BY MOUTH TWICE DAILY 360 Tab 3  pramipexole (MIRAPEX) 0.5 mg tablet TAKE 1 TABLET BY MOUTH EVERY NIGHT AT BEDTIME (Patient taking differently: May take an additional tablet if needed) 90 Tab prn  colchicine (COLCRYS) 0.6 mg tablet Take 0.6 mg by mouth as needed (Gouty attack). Take 2 tablets at onset of gouty attack then 1 tablet every hour as needed  Liraglutide (VICTOZA) 0.6 mg/0.1 mL (18 mg/3 mL) sub-q pen 0.6 mg by SubCUTAneous route daily.  finasteride (PROSCAR) 5 mg tablet Take 5 mg by mouth daily. Past Medical History:  
Diagnosis Date  Anemia 8/5/2017  Anxiety 8/5/2017  Arrhythmia   
 atrial fibrillation 2014  ASCVD (arteriosclerotic cardiovascular disease) 8/5/2017  BPH (benign prostatic hyperplasia) 8/5/2017  CAD (coronary artery disease)   
 h/o stents  Cancer [facility])   
 h/o skin cancer  Cardiomyopathy ([address] 8/5/2017  CHF (congestive heart failure) ([address] 8/5/2017  Chronic kidney disease Stage IV  CKD (chronic kidney disease), stage IV ([address] 8/5/2017  Diabetes ([address]  Diabetes mellitus ([address] 8/5/2017  Diabetic neuropathy ([address] 8/5/2017  DJD (degenerative joint disease) 8/5/2017  ED (erectile dysfunction) 8/5/2017  Gout 8/5/2017  High cholesterol  Hyperlipidemia 8/5/2017  Hypertension  Hypertension with renal disease 8/5/2017  Hypothyroid 8/5/2017  Insomnia 8/5/2017  Obesity 8/5/2017  On statin therapy 8/5/2017  Restless leg 8/5/2017  Thyroid disease   
 hypothyroid Past Surgical History:  
Procedure Laterality Date  CARDIAC SURG PROCEDURE UNLIST    
 cardiac stents  CARDIAC SURG PROCEDURE UNLIST Ablation 5/17/2018 [facility]  COLONOSCOPY N/A 6/28/2016 COLONOSCOPY performed by Eulice Severin, MD at \[facility]"" ENDOSCOPY  COLONOSCOPY N/A 1/9/2019 COLONOSCOPY performed by Brown Lott MD at \[facility]\"" ENDOSCOPY  COLONOSCOPY,DIAGNOSTIC  1/9/2019  HX APPENDECTOMY  HX BUNIONECTOMY    
 and removal of 2 seasmoid bone of the great toe 2/2018  HX HEENT Bilateral Cataract surgery  HX HEENT Tonsils  HX HEENT Axe wound to the head  HX KNEE ARTHROSCOPY X 2  
 HX ORTHOPAEDIC    
 HX ORTHOPAEDIC    
 partial laminectomy  HX PACEMAKER    
 HX ROTATOR CUFF REPAIR    
 bilateral  
 
Allergies Allergen Reactions  Niacin Unknown (comments) Other reaction(s): Unknown (comments)  Imipenem Diarrhea Other reaction(s): Rash  Levemir [Insulin Detemir] Hives  Other Medication Other (comments) ? Allergy to Blanca Goldstein causing chemical burn  Primaxin [Imipenem-Cilastatin] Diarrhea and Rash  Xarelto [Rivaroxaban] Rash and Itching Other reaction(s): Rash REVIEW OF SYSTEMS: 
General: negative for - chills or fever, or weight loss or gain ENT: negative for - headaches, nasal congestion or tinnitus Eyes: no blurred or visual changes Neck: No stiffness or swollen nodes Respiratory: negative for - cough, hemoptysis, shortness of breath or wheezing Cardiovascular : negative for - chest pain, palpitations or shortness of breath. Positive increased edema Gastrointestinal: negative for - abdominal pain, blood in stools, heartburn or nausea/vomiting Genito-Urinary: no dysuria, trouble voiding, or hematuria Musculoskeletal: negative for - gait disturbance, joint pain, joint stiffness or joint swelling Neurological: no TIA or stroke symptoms Hematologic: no bruises, no bleeding Lymphatic: no swollen glands Integument: no lumps, mole changes, nail changes or rash Endocrine:no malaise/lethargy poly uria or polydipsia or unexpected weight changes Social History Socioeconomic History  Marital status:  Spouse name: Not on file  Number of children: Not on file  Years of education: Not on file  Highest education level: Not on file Tobacco Use  Smoking status: Former Smoker Packs/day: 0.50 Years: 4.00 Pack years: 2.00 Last attempt to quit: 3/6/1972 Years since quittin.9  Smokeless tobacco: Never Used Substance and Sexual Activity  Alcohol use: No  
  Comment: rare, 1 drink per year  Drug use: No  
 Sexual activity: Not Currently Family History Problem Relation Age of Onset  Heart Disease Mother  Kidney Disease Mother  Heart Disease Father  Kidney Disease Father  Cancer Sister Breast  
 
 
OBJECTIVE:  
 
Visit Vitals /78 (BP 1 Location: Left arm, BP Patient Position: Sitting) Pulse 82 Temp 99 °F (37.2 °C) (Oral) Resp 22 Ht 6' 6\" (1.981 m) Wt 267 lb (121.1 kg) SpO2 100% BMI 30.85 kg/m² CONSTITUTIONAL:   well nourished, appears age appropriate EYES: sclera anicteric, PERRL, EOMI 
ENMT:nares clear, moist mucous membranes, pharynx clear NECK: supple. Thyroid normal, No JVD or bruits RESPIRATORY: Chest: clear to ascultation and percussion, normal inspiratory effort CARDIOVASCULAR: Heart: regular rate and rhythm no murmurs, rubs or gallops, PMI not displaced, No thrills. 2+ peripheral edema GASTROINTESTINAL: Abdomen: non distended, soft, bowel sounds normal.  Negative abdominal pain HEMATOLOGIC: no purpura, petechiae or bruising LYMPHATIC: No lymph node enlargemant MUSCULOSKELETAL: Extremities: no active synovitis, pulse 1+ INTEGUMENT: No unusual rashes or suspicious skin lesions noted.  Nails appear normal. 
 PERIPHERAL VASCULAR: normal pulses femoral, PT and DP NEUROLOGIC: non-focal exam, A & O X 3 PSYCHIATRIC:, appropriate affect ASSESSMENT:  
1. Chronic systolic congestive heart failure ([address] 2. Ischemic cardiomyopathy 3. Paroxysmal atrial fibrillation (HCC) 4. ASCVD (arteriosclerotic cardiovascular disease) 5. CKD (chronic kidney disease), stage IV ([address] 6. Hypertension with renal disease 7. Anemia, unspecified type Impression 1. Chronic systolic CHF now with a recent acute exacerbation now with edema increased weight of 9 pounds over the past week at this point I will add Zaroxolyn 2.5 before his morning Bumex on an every other day basis and recheck him in 4 days. 2.  Cardiomyopathy due for an upgrade of his AICD. 3.  Atrial fibrillation currently paced 4. ASCVD clinically stable next #5 CKD repeat status is pending 6. Hypertension blood pressures controlled 7. Anemia repeat status pending Follow-up scheduled for 4 days or sooner should to be a problem. Moderate complexity decision making on this patient with multiple medical problems at high risk of decompensation with 25 minutes spent on this visit today with greater than 50% with counseling coordination of care. PLAN: 
. Orders Placed This Encounter  METABOLIC PANEL, BASIC  
 AMB POC COMPLETE CBC,AUTOMATED ENTER  metOLazone (ZAROXOLYN) 2.5 mg tablet ATTENTION:  
This medical record was transcribed using an electronic medical records system. Although proofread, it may and can contain electronic and spelling errors. Other human spelling and other errors may be present. Corrections may be executed at a later time. Please feel free to contact us for any clarifications as needed. Follow-up Disposition: 
Return in about 4 days (around 1/25/2019). No results found for any visits on 01/21/19. Kevon Devine MD 
 
The patient verbalized understanding of the problems and plans as explained.

## 2019-01-21 ENCOUNTER — PATIENT OUTREACH (OUTPATIENT)
Dept: INTERNAL MEDICINE CLINIC | Age: 69
End: 2019-01-21

## 2019-01-21 ENCOUNTER — OFFICE VISIT (OUTPATIENT)
Dept: INTERNAL MEDICINE CLINIC | Age: 69
End: 2019-01-21

## 2019-01-21 VITALS
BODY MASS INDEX: 30.89 KG/M2 | WEIGHT: 267 LBS | OXYGEN SATURATION: 100 % | DIASTOLIC BLOOD PRESSURE: 78 MMHG | TEMPERATURE: 99 F | HEIGHT: 78 IN | SYSTOLIC BLOOD PRESSURE: 134 MMHG | RESPIRATION RATE: 22 BRPM | HEART RATE: 82 BPM

## 2019-01-21 DIAGNOSIS — I50.22 CHRONIC SYSTOLIC CONGESTIVE HEART FAILURE (HCC): Primary | ICD-10-CM

## 2019-01-21 DIAGNOSIS — I25.10 ASCVD (ARTERIOSCLEROTIC CARDIOVASCULAR DISEASE): ICD-10-CM

## 2019-01-21 DIAGNOSIS — D64.9 ANEMIA, UNSPECIFIED TYPE: ICD-10-CM

## 2019-01-21 DIAGNOSIS — I25.5 ISCHEMIC CARDIOMYOPATHY: ICD-10-CM

## 2019-01-21 DIAGNOSIS — N18.4 CKD (CHRONIC KIDNEY DISEASE), STAGE IV (HCC): ICD-10-CM

## 2019-01-21 DIAGNOSIS — I12.9 HYPERTENSION WITH RENAL DISEASE: ICD-10-CM

## 2019-01-21 DIAGNOSIS — I48.0 PAROXYSMAL ATRIAL FIBRILLATION (HCC): ICD-10-CM

## 2019-01-21 LAB
GRAN# POC: 4.9 K/UL (ref 2–7.8)
GRAN% POC: 83.2 % (ref 37–92)
HCT VFR BLD CALC: 31.5 % (ref 37–51)
HGB BLD-MCNC: 10.2 G/DL (ref 12–18)
LY# POC: 0.5 K/UL (ref 0.6–4.1)
LY% POC: 10.2 % (ref 10–58.5)
MCH RBC QN: 27.3 PG (ref 26–32)
MCHC RBC-ENTMCNC: 32.2 G/DL (ref 30–36)
MCV RBC: 85 FL (ref 80–97)
MID #, POC: 0.3 K/UL (ref 0–1.8)
MID% POC: 6.6 % (ref 0.1–24)
PLATELET # BLD: 127 K/UL (ref 140–440)
RBC # BLD: 3.72 M/UL (ref 4.2–6.3)
WBC # BLD: 5.7 K/UL (ref 4.1–10.9)

## 2019-01-21 RX ORDER — METOLAZONE 2.5 MG/1
2.5 TABLET ORAL EVERY OTHER DAY
Qty: 30 TAB
Start: 2019-01-21 | End: 2019-05-27 | Stop reason: DRUGHIGH

## 2019-01-21 NOTE — PATIENT INSTRUCTIONS
Noninsulin Medicines for Type 2 Diabetes: Care Instructions Your Care Instructions There are different types of noninsulin medicines for diabetes. Each works in a different way. But they all help you control your blood sugar. Some types help your body make insulin to lower your blood sugar. Others lower how much insulin your body needs. Some can slow how fast your body digests sugars. And some can remove extra glucose through your urine. · Alpha-glucosidase inhibitors. These keep starches from breaking down. This means that they lower the amount of glucose absorbed when you eat. They don't help your body make more insulin. So they will not cause low blood sugar unless you use them with other medicines for diabetes. They include acarbose and miglitol. · DPP-4 inhibitors. These help your body raise the level of insulin after you eat. They also help your body make less of a hormone that raises blood sugar. They include linagliptin, saxagliptin, and sitagliptin. · Incretin hormones (GLP-1 receptor agonists). Your body makes a protein that can raise your insulin level. It also can lower your blood sugar and make you less hungry. You can get shots of hormones that work the same way. They include exenatide and liraglutide. · Meglitinides. These help your body release insulin. They also help slow how your body digests sugars. So they can keep your blood sugar from rising too fast after you eat. They include nateglinide and repaglinide. · Metformin. This lowers how much glucose your liver makes. And it helps you respond better to insulin. It also lowers the amount of stored sugar that your liver releases when you are not eating. · SGLT2 inhibitors. These help to remove extra glucose through your urine. They may also help some people lose weight. They include canagliflozin, dapagliflozin, and empagliflozin. · Sulfonylureas. These help your body release more insulin.  Some work for many hours. They can cause low blood sugar if you don't eat as you planned. They include glipizide and glyburide. · Thiazolidinediones. These reduce the amount of blood glucose. They also help you respond better to insulin. They include pioglitazone and rosiglitazone. You may need to take more than one medicine for diabetes. Two or more medicines may work better to lower your blood sugar level than just one does. Follow-up care is a key part of your treatment and safety. Be sure to make and go to all appointments, and call your doctor if you are having problems. It's also a good idea to know your test results and keep a list of the medicines you take. How can you care for yourself at home? · Eat a healthy diet. Get some exercise each day. This may help you to reduce how much medicine you need. · Do not take other prescription or over-the-counter medicines, vitamins, herbal products, or supplements without talking to your doctor first. Some medicines for type 2 diabetes can cause problems with other medicines or supplements. · Tell your doctor if you plan to get pregnant. Some of these drugs are not safe for pregnant women. · Be safe with medicines. Take your medicines exactly as prescribed. Meglitinides and sulfonylureas can cause your blood sugar to drop very low. Call your doctor if you think you are having a problem with your medicine. · Check your blood sugar often. You can use a glucose monitor. Keeping track can help you know how certain foods, activities, and medicines affect your blood sugar. And it can help you keep your blood sugar from getting so low that it's not safe. When should you call for help? Call 911 anytime you think you may need emergency care.  For example, call if: 
  · You passed out (lost consciousness).  
  · You are confused or cannot think clearly.  
  · Your blood sugar is very high or very low.  
 Watch closely for changes in your health, and be sure to contact your doctor if: 
  · Your blood sugar stays outside the level your doctor set for you.  
  · You have any problems. Where can you learn more? Go to http://gildardo-palak.info/. Enter H153 in the search box to learn more about \"Noninsulin Medicines for Type 2 Diabetes: Care Instructions. \" Current as of: July 25, 2018 Content Version: 11.9 © 6694-1386 [employer]. Care instructions adapted under license by [employer] (which disclaims liability or warranty for this information). If you have questions about a medical condition or this instruction, always ask your healthcare professional. [place] [address] any warranty or liability for your use of this information.

## 2019-01-21 NOTE — PROGRESS NOTES
Identified pt with two pt identifiers(name and ). Reviewed record in preparation for visit and have obtained necessary documentation. Chief Complaint Patient presents with  Chronic Kidney Disease 1 week follow up Health Maintenance Due Topic  DTaP/Tdap/Td series (1 - Tdap)  Shingrix Vaccine Age 50> (1 of 2)  AAA Screening 73-67 YO Male Smoking Patients Coordination of Care Questionnaire: 
:  
1) Have you been to an emergency room, urgent care, or hospitalized since your last visit?   no If yes, where when, and reason for visit? 2. Have seen or consulted any other health care provider since your last visit? NO If yes, where when, and reason for visit? 3) Do you have an Advanced Directive/ Living Will in place? YES If yes, do we have a copy on file YES If no, would you like information NO Patient is accompanied by wife I have received verbal consent from Markus Hatch. to discuss any/all medical information while they are present in the room.

## 2019-01-22 ENCOUNTER — TELEPHONE (OUTPATIENT)
Dept: INTERNAL MEDICINE CLINIC | Age: 69
End: 2019-01-22

## 2019-01-22 LAB
BUN SERPL-MCNC: 112 MG/DL (ref 8–27)
BUN/CREAT SERPL: 41 (ref 10–24)
CALCIUM SERPL-MCNC: 9 MG/DL (ref 8.6–10.2)
CHLORIDE SERPL-SCNC: 98 MMOL/L (ref 96–106)
CO2 SERPL-SCNC: 25 MMOL/L (ref 20–29)
CREAT SERPL-MCNC: 2.74 MG/DL (ref 0.76–1.27)
GLUCOSE SERPL-MCNC: 96 MG/DL (ref 65–99)
POTASSIUM SERPL-SCNC: 4.6 MMOL/L (ref 3.5–5.2)
SODIUM SERPL-SCNC: 138 MMOL/L (ref 134–144)

## 2019-01-22 NOTE — TELEPHONE ENCOUNTER
----- Message from Davey Eli MD sent at 1/22/2019  8:49 AM EST -----  Hemoglobin is stable and creatinine is better so continue current treatment.

## 2019-01-23 ENCOUNTER — PATIENT OUTREACH (OUTPATIENT)
Dept: INTERNAL MEDICINE CLINIC | Age: 69
End: 2019-01-23

## 2019-01-23 NOTE — PROGRESS NOTES
Goals  Maintains daily weight.   
  1/23/19 
-patient states he \"has gained a few lbs\" but does not have exact numbers available 
-Cardiologist is aware of weight gain and has appointment tomorrow 
-NN reviewed patient's diet and encouraged patient to eat low sodium foods and read food labels  
-NN to follow up in 1 week [facility] 
 
1/11/19 
- will weight daily 
- reports today's weight 246.0 
-will report weight gain >3lbs in 1 day or >5lbs in a week to provider. -NN to follow up in 1 week Genevieve rey  Reduce risk of CHF exacerbations and complications. 1/23/19 
-attended f/u appointments 
-Cardiologist has planned patient to have a pacemaker upgrade from dual chamber to CRT-P on 1/28/19 
-NN reviewed HF zones 
-reports [facility] NN to follow up next week post upgrade [facility] 
 
1/11/19 
-reports [facility] 
-will attend f/u with Cardiology 1/11/19 
-will attend f/u with PCP on 1/14/19 
-NN to follow up in 1 week Genevieve P

## 2019-01-24 NOTE — PROGRESS NOTES
Chief Complaint Patient presents with  Rash  
   pt states he has a rash and itches on chest, back, shoulders since starting entresto  CHF  
  follow up SUBJECTIVE: 
 
Royal KRISTA Calloway is a 77 y.o. male who returns in follow-up for his acute on chronic systolic CHF, anemia, CKD stage IV, hypertension, and other problems. He was recently hospitalized for marked edema and congestive heart failure as well as anemia and had transfusion and was discharged home he returns in follow-up at which time he was noted to have a weight increase of 9 pounds over 1 week and at that time we added Zaroxolyn before his morning diuretic and he returns today in follow-up. He feels like he has had good diuresis and overall feels much better. He denies any chest pain, shortness of breath, PND, orthopnea and his swelling is much less. He denies any palpitations or other cardiorespiratory complaints. He denies any headaches, dizziness or neurologic complaints. He has no other complaints other than his generalized weakness which is really unchanged. Current Outpatient Medications Medication Sig Dispense Refill  metOLazone (ZAROXOLYN) 2.5 mg tablet Take 1 Tab by mouth every other day. 30 Tab prn  ULORIC 40 mg tab tablet TAKE 1 TABLET BY MOUTH DAILY 90 Tab 3  
 ascorbic acid, vitamin C, (VITAMIN C) 250 mg tablet Take 1 Tab by mouth three (3) times daily. 100 Tab prn  potassium chloride (KLOR-CON) 20 mEq Take 1 Packet by mouth two (2) times daily (with meals). 60 Packet prn  ciprofloxacin HCl (CIPRO) 500 mg tablet Take 1 Tab by mouth two (2) times a day. 20 Tab 0  
 sacubitril-valsartan (ENTRESTO) 24 mg/26 mg tablet Take 1 Tab by mouth every twenty-four (24) hours. 30 Tab prn  epoetin mary (EPOGEN;PROCRIT) 40,000 unit/mL injection 60,000 Units by SubCUTAneous route every thirty (30) days.     
 insulin glargine (LANTUS SOLOSTAR U-100 INSULIN) 100 unit/mL (3 mL) inpn 35 Units by SubCUTAneous route nightly.  insulin lispro (HUMALOG) 100 unit/mL kwikpen 5 Units by SubCUTAneous route Before breakfast, lunch, and dinner.  OTHER Apply  to affected area daily as needed (Knee Pain). CBD Cream:  Apply daily as needed for knee pain  travoprost (TRAVATAN Z) 0.004 % ophthalmic solution Administer 1 Drop to right eye nightly.  carvedilol (COREG) 12.5 mg tablet TAKE 1 TABLET BY MOUTH TWICE DAILY 60 Tab 11  
 clonazePAM (KLONOPIN) 2 mg tablet TAKE 1 TABLET BY MOUTH EVERY NIGHT AT BEDTIME. MAY REPEAT DOSE IF YOU WAKE UP 60 Tab 0  
 tamsulosin (FLOMAX) 0.4 mg capsule TAKE 2 CAPSULES BY MOUTH EVERY  Cap 3  Ferrous Sulfate (SLOW FE) 47.5 mg iron TbER tablet Take 1 Tab by mouth three (3) times daily. 90 Tab prn  bumetanide (BUMEX) 2 mg tablet TAKE 2 TABLETS BY MOUTH TWICE DAILY 360 Tab 3  pramipexole (MIRAPEX) 0.5 mg tablet TAKE 1 TABLET BY MOUTH EVERY NIGHT AT BEDTIME (Patient taking differently: May take an additional tablet if needed) 90 Tab prn  colchicine (COLCRYS) 0.6 mg tablet Take 0.6 mg by mouth as needed (Gouty attack). Take 2 tablets at onset of gouty attack then 1 tablet every hour as needed  Liraglutide (VICTOZA) 0.6 mg/0.1 mL (18 mg/3 mL) sub-q pen 0.6 mg by SubCUTAneous route daily.  finasteride (PROSCAR) 5 mg tablet Take 5 mg by mouth daily. Past Medical History:  
Diagnosis Date  Anemia 8/5/2017  Anxiety 8/5/2017  Arrhythmia   
 atrial fibrillation 2014  ASCVD (arteriosclerotic cardiovascular disease) 8/5/2017  BPH (benign prostatic hyperplasia) 8/5/2017  CAD (coronary artery disease)   
 h/o stents  Cancer [facility])   
 h/o skin cancer  Cardiomyopathy ([facility] [address] 8/5/2017  CHF (congestive heart failure) ([facility] 8/5/2017  Chronic kidney disease Stage IV  CKD (chronic kidney disease), stage IV ([address] 8/5/2017  Diabetes ([facility]  Diabetes mellitus ([facility] [address] [facility] 8/5/2017  Diabetic neuropathy ([address] 8/5/2017  DJD (degenerative joint disease) 8/5/2017  ED (erectile dysfunction) 8/5/2017  Gout 8/5/2017  High cholesterol  Hyperlipidemia 8/5/2017  Hypertension  Hypertension with renal disease 8/5/2017  Hypothyroid 8/5/2017  Insomnia 8/5/2017  Obesity 8/5/2017  On statin therapy 8/5/2017  Restless leg 8/5/2017  Thyroid disease   
 hypothyroid Past Surgical History:  
Procedure Laterality Date  CARDIAC SURG PROCEDURE UNLIST    
 cardiac stents  CARDIAC SURG PROCEDURE UNLIST Ablation 5/17/2018 ED [facility]  COLONOSCOPY N/A 6/28/2016 COLONOSCOPY performed by Carolina Valentino MD at [facility] ENDOSCOPY  COLONOSCOPY N/A 1/9/2019 COLONOSCOPY performed by Chinedu Coombs MD at [facility] ENDOSCOPY  COLONOSCOPY,DIAGNOSTIC  1/9/2019  HX APPENDECTOMY  HX BUNIONECTOMY    
 and removal of 2 seasmoid bone of the great toe 2/2018  HX HEENT Bilateral Cataract surgery  HX HEENT Tonsils  HX HEENT Axe wound to the head  HX KNEE ARTHROSCOPY X 2  
 HX ORTHOPAEDIC    
 HX ORTHOPAEDIC    
 partial laminectomy  HX PACEMAKER    
 HX ROTATOR CUFF REPAIR    
 bilateral  
 
Allergies Allergen Reactions  Niacin Unknown (comments) Other reaction(s): Unknown (comments)  Imipenem Diarrhea Other reaction(s): Rash  Levemir [Insulin Detemir] Hives  Other Medication Other (comments) ? Allergy to Darnella Nanas causing chemical burn  Primaxin [Imipenem-Cilastatin] Diarrhea and Rash  Xarelto [Rivaroxaban] Rash and Itching Other reaction(s): Rash REVIEW OF SYSTEMS: 
General: negative for - chills or fever, or weight loss or gain. Unchanged generalized weakness ENT: negative for - headaches, nasal congestion or tinnitus Eyes: no blurred or visual changes Neck: No stiffness or swollen nodes Respiratory: negative for - cough, hemoptysis, shortness of breath or wheezing Cardiovascular : negative for - chest pain, edema, palpitations or shortness of breath Gastrointestinal: negative for - abdominal pain, blood in stools, heartburn or nausea/vomiting Genito-Urinary: no dysuria, trouble voiding, or hematuria Musculoskeletal: negative for - gait disturbance, joint pain, joint stiffness or joint swelling Neurological: no TIA or stroke symptoms Hematologic: no bruises, no bleeding Lymphatic: no swollen glands Integument: no lumps, mole changes, nail changes or rash Endocrine:no malaise/lethargy poly uria or polydipsia or unexpected weight changes Social History Socioeconomic History  Marital status:  Spouse name: Not on file  Number of children: Not on file  Years of education: Not on file  Highest education level: Not on file Tobacco Use  Smoking status: Former Smoker Packs/day: 0.50 Years: 4.00 Pack years: 2.00 Last attempt to quit: 3/6/1972 Years since quittin.9  Smokeless tobacco: Never Used Substance and Sexual Activity  Alcohol use: No  
  Comment: rare, 1 drink per year  Drug use: No  
 Sexual activity: Not Currently Family History Problem Relation Age of Onset  Heart Disease Mother  Kidney Disease Mother  Heart Disease Father  Kidney Disease Father  Cancer Sister Breast  
 
 
OBJECTIVE:  
 
Visit Vitals /72 (BP 1 Location: Left arm, BP Patient Position: Sitting) Pulse 81 Temp 98.1 °F (36.7 °C) (Oral) Resp 20 Ht 6' 6\" (1.981 m) Wt 257 lb (116.6 kg) SpO2 99% BMI 29.70 kg/m² CONSTITUTIONAL:   well nourished, appears age appropriate EYES: sclera anicteric, PERRL, EOMI 
ENMT:nares clear, moist mucous membranes, pharynx clear NECK: supple. Thyroid normal, No JVD or bruits RESPIRATORY: Chest: clear to ascultation and percussion, normal inspiratory effort CARDIOVASCULAR: Heart: regular rate and rhythm no murmurs, rubs or gallops, PMI not displaced, No thrills GASTROINTESTINAL: Abdomen: non distended, soft, non tender, bowel sounds normal 
HEMATOLOGIC: no purpura, petechiae or bruising LYMPHATIC: No lymph node enlargemant MUSCULOSKELETAL: Extremities: no edema or active synovitis, pulse 1+ INTEGUMENT: No unusual rashes or suspicious skin lesions noted. Nails appear normal. 
PERIPHERAL VASCULAR: normal pulses femoral, PT and DP NEUROLOGIC: non-focal exam, A & O X 3 PSYCHIATRIC:, appropriate affect ASSESSMENT:  
1. Chronic systolic congestive heart failure ([address] 2. Ischemic cardiomyopathy 3. CKD (chronic kidney disease), stage IV ([address] 4. Paroxysmal atrial fibrillation ([address] 5. Hypertension with renal disease 6. Anemia, unspecified type Impression 1. Chronic systolic heart failure at this point he is diuresed 10 pounds over the past week so we will continue current regimen. 2.  Ischemic cardiomyopathy that is currently stable he is due to have a pacemaker upgrade done early next week at the hospital 
3. CKD stage IV repeat status is pending 4. Paroxysmal atrial fibrillation currently in sinus rhythm 5. Hypertension that is controlled 6. Anemia repeat status is pending I will recheck him myself in a week or sooner if there is a problem. All of the above discussed with his wife present with him today. PLAN: 
. Orders Placed This Encounter  METABOLIC PANEL, BASIC  
 AMB POC COMPLETE CBC,AUTOMATED ENTER  
 
 
 
ATTENTION:  
This medical record was transcribed using an electronic medical records system. Although proofread, it may and can contain electronic and spelling errors. Other human spelling and other errors may be present. Corrections may be executed at a later time. Please feel free to contact us for any clarifications as needed. Follow-up Disposition: 
Return in about 1 week (around 2/1/2019). No results found for any visits on 01/25/19. Purvi Isaac MD 
 
 The patient verbalized understanding of the problems and plans as explained.

## 2019-01-25 ENCOUNTER — OFFICE VISIT (OUTPATIENT)
Dept: INTERNAL MEDICINE CLINIC | Age: 69
End: 2019-01-25

## 2019-01-25 VITALS
BODY MASS INDEX: 29.73 KG/M2 | RESPIRATION RATE: 20 BRPM | DIASTOLIC BLOOD PRESSURE: 72 MMHG | OXYGEN SATURATION: 99 % | TEMPERATURE: 98.1 F | WEIGHT: 257 LBS | HEART RATE: 81 BPM | SYSTOLIC BLOOD PRESSURE: 118 MMHG | HEIGHT: 78 IN

## 2019-01-25 DIAGNOSIS — D64.9 ANEMIA, UNSPECIFIED TYPE: ICD-10-CM

## 2019-01-25 DIAGNOSIS — N18.4 CKD (CHRONIC KIDNEY DISEASE), STAGE IV (HCC): ICD-10-CM

## 2019-01-25 DIAGNOSIS — I48.0 PAROXYSMAL ATRIAL FIBRILLATION (HCC): ICD-10-CM

## 2019-01-25 DIAGNOSIS — I25.5 ISCHEMIC CARDIOMYOPATHY: ICD-10-CM

## 2019-01-25 DIAGNOSIS — I50.22 CHRONIC SYSTOLIC CONGESTIVE HEART FAILURE (HCC): Primary | ICD-10-CM

## 2019-01-25 DIAGNOSIS — I12.9 HYPERTENSION WITH RENAL DISEASE: ICD-10-CM

## 2019-01-25 LAB
GRAN# POC: 3.7 K/UL (ref 2–7.8)
GRAN% POC: 78.7 % (ref 37–92)
HCT VFR BLD CALC: 30.7 % (ref 37–51)
HGB BLD-MCNC: 10.1 G/DL (ref 12–18)
LY# POC: 0.6 K/UL (ref 0.6–4.1)
LY% POC: 14.7 % (ref 10–58.5)
MCH RBC QN: 27.6 PG (ref 26–32)
MCHC RBC-ENTMCNC: 32.8 G/DL (ref 30–36)
MCV RBC: 84 FL (ref 80–97)
MID #, POC: 0.3 K/UL (ref 0–1.8)
MID% POC: 6.6 % (ref 0.1–24)
PLATELET # BLD: 126 K/UL (ref 140–440)
RBC # BLD: 3.65 M/UL (ref 4.2–6.3)
WBC # BLD: 4.6 K/UL (ref 4.1–10.9)

## 2019-01-25 NOTE — PATIENT INSTRUCTIONS
Noninsulin Medicines for Type 2 Diabetes: Care Instructions Your Care Instructions There are different types of noninsulin medicines for diabetes. Each works in a different way. But they all help you control your blood sugar. Some types help your body make insulin to lower your blood sugar. Others lower how much insulin your body needs. Some can slow how fast your body digests sugars. And some can remove extra glucose through your urine. · Alpha-glucosidase inhibitors. These keep starches from breaking down. This means that they lower the amount of glucose absorbed when you eat. They don't help your body make more insulin. So they will not cause low blood sugar unless you use them with other medicines for diabetes. They include acarbose and miglitol. · DPP-4 inhibitors. These help your body raise the level of insulin after you eat. They also help your body make less of a hormone that raises blood sugar. They include linagliptin, saxagliptin, and sitagliptin. · Incretin hormones (GLP-1 receptor agonists). Your body makes a protein that can raise your insulin level. It also can lower your blood sugar and make you less hungry. You can get shots of hormones that work the same way. They include exenatide and liraglutide. · Meglitinides. These help your body release insulin. They also help slow how your body digests sugars. So they can keep your blood sugar from rising too fast after you eat. They include nateglinide and repaglinide. · Metformin. This lowers how much glucose your liver makes. And it helps you respond better to insulin. It also lowers the amount of stored sugar that your liver releases when you are not eating. · SGLT2 inhibitors. These help to remove extra glucose through your urine. They may also help some people lose weight. They include canagliflozin, dapagliflozin, and empagliflozin. · Sulfonylureas. These help your body release more insulin.  Some work for many hours. They can cause low blood sugar if you don't eat as you planned. They include glipizide and glyburide. · Thiazolidinediones. These reduce the amount of blood glucose. They also help you respond better to insulin. They include pioglitazone and rosiglitazone. You may need to take more than one medicine for diabetes. Two or more medicines may work better to lower your blood sugar level than just one does. Follow-up care is a key part of your treatment and safety. Be sure to make and go to all appointments, and call your doctor if you are having problems. It's also a good idea to know your test results and keep a list of the medicines you take. How can you care for yourself at home? · Eat a healthy diet. Get some exercise each day. This may help you to reduce how much medicine you need. · Do not take other prescription or over-the-counter medicines, vitamins, herbal products, or supplements without talking to your doctor first. Some medicines for type 2 diabetes can cause problems with other medicines or supplements. · Tell your doctor if you plan to get pregnant. Some of these drugs are not safe for pregnant women. · Be safe with medicines. Take your medicines exactly as prescribed. Meglitinides and sulfonylureas can cause your blood sugar to drop very low. Call your doctor if you think you are having a problem with your medicine. · Check your blood sugar often. You can use a glucose monitor. Keeping track can help you know how certain foods, activities, and medicines affect your blood sugar. And it can help you keep your blood sugar from getting so low that it's not safe. When should you call for help? Call 911 anytime you think you may need emergency care.  For example, call if: 
  · You passed out (lost consciousness).  
  · You are confused or cannot think clearly.  
  · Your blood sugar is very high or very low.  
 Watch closely for changes in your health, and be sure to contact your doctor if: 
  · Your blood sugar stays outside the level your doctor set for you.  
  · You have any problems. Where can you learn more? Go to http://gildardo-palak.info/. Enter H153 in the search box to learn more about \"Noninsulin Medicines for Type 2 Diabetes: Care Instructions. \" Current as of: July 25, 2018 Content Version: 11.9 © 7101-7458 [employer]. Care instructions adapted under license by [employer] (which disclaims liability or warranty for this information). If you have questions about a medical condition or this instruction, always ask your healthcare professional. [place] [address] any warranty or liability for your use of this information.

## 2019-01-25 NOTE — PROGRESS NOTES
Chief Complaint Patient presents with  Rash  
   pt states he has a rash and itches on chest, back, shoulders since starting entresto  CHF  
  follow up 1. Have you been to the ER, urgent care clinic since your last visit? Hospitalized since your last visit? No 
 
2. Have you seen or consulted any other health care providers outside of the [facility] since your last visit? Include any pap smears or colon screening.  No

## 2019-01-26 LAB
BUN SERPL-MCNC: 133 MG/DL (ref 8–27)
BUN/CREAT SERPL: 42 (ref 10–24)
CALCIUM SERPL-MCNC: 8.9 MG/DL (ref 8.6–10.2)
CHLORIDE SERPL-SCNC: 91 MMOL/L (ref 96–106)
CO2 SERPL-SCNC: 21 MMOL/L (ref 20–29)
CREAT SERPL-MCNC: 3.15 MG/DL (ref 0.76–1.27)
GLUCOSE SERPL-MCNC: 147 MG/DL (ref 65–99)
POTASSIUM SERPL-SCNC: 4.2 MMOL/L (ref 3.5–5.2)
SODIUM SERPL-SCNC: 135 MMOL/L (ref 134–144)

## 2019-01-28 ENCOUNTER — APPOINTMENT (OUTPATIENT)
Dept: GENERAL RADIOLOGY | Age: 69
End: 2019-01-28
Attending: INTERNAL MEDICINE
Payer: MEDICARE

## 2019-01-28 ENCOUNTER — ANESTHESIA EVENT (OUTPATIENT)
Dept: CARDIAC CATH/INVASIVE PROCEDURES | Age: 69
End: 2019-01-28
Payer: MEDICARE

## 2019-01-28 ENCOUNTER — ANESTHESIA (OUTPATIENT)
Dept: CARDIAC CATH/INVASIVE PROCEDURES | Age: 69
End: 2019-01-28
Payer: MEDICARE

## 2019-01-28 ENCOUNTER — HOSPITAL ENCOUNTER (OUTPATIENT)
Dept: NON INVASIVE DIAGNOSTICS | Age: 69
Discharge: HOME | End: 2019-01-28
Attending: INTERNAL MEDICINE | Admitting: INTERNAL MEDICINE
Payer: MEDICARE

## 2019-01-28 VITALS
TEMPERATURE: 97.8 F | HEIGHT: 76 IN | HEART RATE: 73 BPM | BODY MASS INDEX: 30.08 KG/M2 | SYSTOLIC BLOOD PRESSURE: 158 MMHG | OXYGEN SATURATION: 98 % | WEIGHT: 247 LBS | DIASTOLIC BLOOD PRESSURE: 85 MMHG | RESPIRATION RATE: 19 BRPM

## 2019-01-28 DIAGNOSIS — I50.9 HEART FAILURE (HCC): ICD-10-CM

## 2019-01-28 LAB — MAGNESIUM SERPL-MCNC: 2 MG/DL (ref 1.6–2.4)

## 2019-01-28 PROCEDURE — C1751 CATH, INF, PER/CENT/MIDLINE: HCPCS | Performed by: INTERNAL MEDICINE

## 2019-01-28 PROCEDURE — 33224 INSERT PACING LEAD & CONNECT: CPT | Performed by: INTERNAL MEDICINE

## 2019-01-28 PROCEDURE — 77030002996 HC SUT SLK J&J -A

## 2019-01-28 PROCEDURE — 74011000250 HC RX REV CODE- 250: Performed by: INTERNAL MEDICINE

## 2019-01-28 PROCEDURE — 77030031139 HC SUT VCRL2 J&J -A: Performed by: INTERNAL MEDICINE

## 2019-01-28 PROCEDURE — 71045 X-RAY EXAM CHEST 1 VIEW: CPT

## 2019-01-28 PROCEDURE — C1769 GUIDE WIRE: HCPCS | Performed by: INTERNAL MEDICINE

## 2019-01-28 PROCEDURE — 74011250636 HC RX REV CODE- 250/636

## 2019-01-28 PROCEDURE — 77030018673

## 2019-01-28 PROCEDURE — C1769 GUIDE WIRE: HCPCS

## 2019-01-28 PROCEDURE — C1751 CATH, INF, PER/CENT/MIDLINE: HCPCS

## 2019-01-28 PROCEDURE — C1894 INTRO/SHEATH, NON-LASER: HCPCS

## 2019-01-28 PROCEDURE — 36415 COLL VENOUS BLD VENIPUNCTURE: CPT

## 2019-01-28 PROCEDURE — 77030028698 HC BLD TISS PLSM MEDT -D

## 2019-01-28 PROCEDURE — 77030037400 HC ADH TISS HI VISC EXOFIN CHMP -B: Performed by: INTERNAL MEDICINE

## 2019-01-28 PROCEDURE — 77030018729 HC ELECTRD DEFIB PAD CARD -B

## 2019-01-28 PROCEDURE — 74011250636 HC RX REV CODE- 250/636: Performed by: INTERNAL MEDICINE

## 2019-01-28 PROCEDURE — 99153 MOD SED SAME PHYS/QHP EA: CPT | Performed by: INTERNAL MEDICINE

## 2019-01-28 PROCEDURE — 77030039266 HC ADH SKN EXOFIN S2SG -A

## 2019-01-28 PROCEDURE — 76060000033 HC ANESTHESIA 1 TO 1.5 HR

## 2019-01-28 PROCEDURE — 77030037029 HC IMPL ENV ICD ANTIBACT ABSRB TYRX MEDT -G

## 2019-01-28 PROCEDURE — 77030018836 HC SOL IRR NACL ICUM -A

## 2019-01-28 PROCEDURE — 33229 REMV&REPLC PM GEN MULT LEADS: CPT | Performed by: INTERNAL MEDICINE

## 2019-01-28 PROCEDURE — 77030028698 HC BLD TISS PLSM MEDT -D: Performed by: INTERNAL MEDICINE

## 2019-01-28 PROCEDURE — A4565 SLINGS: HCPCS

## 2019-01-28 PROCEDURE — 83735 ASSAY OF MAGNESIUM: CPT

## 2019-01-28 PROCEDURE — C1894 INTRO/SHEATH, NON-LASER: HCPCS | Performed by: INTERNAL MEDICINE

## 2019-01-28 PROCEDURE — C1900 LEAD, CORONARY VENOUS: HCPCS | Performed by: INTERNAL MEDICINE

## 2019-01-28 PROCEDURE — 99152 MOD SED SAME PHYS/QHP 5/>YRS: CPT | Performed by: INTERNAL MEDICINE

## 2019-01-28 PROCEDURE — C1893 INTRO/SHEATH, FIXED,NON-PEEL: HCPCS

## 2019-01-28 PROCEDURE — C1893 INTRO/SHEATH, FIXED,NON-PEEL: HCPCS | Performed by: INTERNAL MEDICINE

## 2019-01-28 PROCEDURE — C2621 PMKR, OTHER THAN SING/DUAL: HCPCS | Performed by: INTERNAL MEDICINE

## 2019-01-28 PROCEDURE — 33225 L VENTRIC PACING LEAD ADD-ON: CPT | Performed by: INTERNAL MEDICINE

## 2019-01-28 PROCEDURE — 74011636320 HC RX REV CODE- 636/320: Performed by: INTERNAL MEDICINE

## 2019-01-28 PROCEDURE — 77030002996 HC SUT SLK J&J -A: Performed by: INTERNAL MEDICINE

## 2019-01-28 PROCEDURE — 77030031139 HC SUT VCRL2 J&J -A

## 2019-01-28 DEVICE — IMPLANTABLE DEVICE: Type: IMPLANTABLE DEVICE | Status: FUNCTIONAL

## 2019-01-28 DEVICE — LEAD PACE 47FR L86CM 60MM SPACE L HRT 16 CRV TRAD S SHP: Type: IMPLANTABLE DEVICE | Status: FUNCTIONAL

## 2019-01-28 RX ORDER — BACITRACIN 50000 [IU]/1
INJECTION, POWDER, FOR SOLUTION INTRAMUSCULAR AS NEEDED
Status: DISCONTINUED | OUTPATIENT
Start: 2019-01-28 | End: 2019-01-28 | Stop reason: HOSPADM

## 2019-01-28 RX ORDER — PROPOFOL 10 MG/ML
INJECTION, EMULSION INTRAVENOUS
Status: DISCONTINUED | OUTPATIENT
Start: 2019-01-28 | End: 2019-01-28 | Stop reason: HOSPADM

## 2019-01-28 RX ORDER — PROPOFOL 10 MG/ML
INJECTION, EMULSION INTRAVENOUS AS NEEDED
Status: DISCONTINUED | OUTPATIENT
Start: 2019-01-28 | End: 2019-01-28 | Stop reason: HOSPADM

## 2019-01-28 RX ORDER — HEPARIN SODIUM 200 [USP'U]/100ML
INJECTION, SOLUTION INTRAVENOUS
Status: COMPLETED | OUTPATIENT
Start: 2019-01-28 | End: 2019-01-28

## 2019-01-28 RX ORDER — MIDAZOLAM HYDROCHLORIDE 1 MG/ML
INJECTION, SOLUTION INTRAMUSCULAR; INTRAVENOUS AS NEEDED
Status: DISCONTINUED | OUTPATIENT
Start: 2019-01-28 | End: 2019-01-28 | Stop reason: HOSPADM

## 2019-01-28 RX ORDER — LIDOCAINE HYDROCHLORIDE AND EPINEPHRINE 10; 10 MG/ML; UG/ML
INJECTION, SOLUTION INFILTRATION; PERINEURAL AS NEEDED
Status: DISCONTINUED | OUTPATIENT
Start: 2019-01-28 | End: 2019-01-28 | Stop reason: HOSPADM

## 2019-01-28 RX ORDER — SODIUM CHLORIDE 9 MG/ML
INJECTION, SOLUTION INTRAVENOUS
Status: DISCONTINUED | OUTPATIENT
Start: 2019-01-28 | End: 2019-01-28 | Stop reason: HOSPADM

## 2019-01-28 RX ORDER — LIDOCAINE HYDROCHLORIDE 20 MG/ML
INJECTION, SOLUTION EPIDURAL; INFILTRATION; INTRACAUDAL; PERINEURAL AS NEEDED
Status: DISCONTINUED | OUTPATIENT
Start: 2019-01-28 | End: 2019-01-28 | Stop reason: HOSPADM

## 2019-01-28 RX ADMIN — MIDAZOLAM HYDROCHLORIDE 1 MG: 1 INJECTION, SOLUTION INTRAMUSCULAR; INTRAVENOUS at 10:04

## 2019-01-28 RX ADMIN — LIDOCAINE HYDROCHLORIDE 40 MG: 20 INJECTION, SOLUTION EPIDURAL; INFILTRATION; INTRACAUDAL; PERINEURAL at 10:08

## 2019-01-28 RX ADMIN — PROPOFOL 20 MG: 10 INJECTION, EMULSION INTRAVENOUS at 10:24

## 2019-01-28 RX ADMIN — PROPOFOL 30 MG: 10 INJECTION, EMULSION INTRAVENOUS at 10:19

## 2019-01-28 RX ADMIN — PROPOFOL 20 MG: 10 INJECTION, EMULSION INTRAVENOUS at 10:10

## 2019-01-28 RX ADMIN — PROPOFOL 20 MG: 10 INJECTION, EMULSION INTRAVENOUS at 10:09

## 2019-01-28 RX ADMIN — PROPOFOL 30 MG: 10 INJECTION, EMULSION INTRAVENOUS at 10:43

## 2019-01-28 RX ADMIN — SODIUM CHLORIDE: 9 INJECTION, SOLUTION INTRAVENOUS at 09:53

## 2019-01-28 RX ADMIN — PROPOFOL 50 MCG/KG/MIN: 10 INJECTION, EMULSION INTRAVENOUS at 10:08

## 2019-01-28 NOTE — PROGRESS NOTES
TRANSFER - IN REPORT: 
 
Verbal report received from Dionicio Demarco 1, CRNA on [employer].  being received from EP for routine progression of care. Report consisted of patients Situation, Background, Assessment and Recommendations(SBAR). Information from the following report(s) Procedure Summary and MAR was reviewed with the receiving clinician. Opportunity for questions and clarification was provided. Assessment completed upon patients arrival to [address] [place] [address] and care assumed. Cardiac Cath Lab Recovery Arrival Note: 
 
Royal Mejia Sat. arrived to [address] [facility]. Patient procedure= BiVPPI. Patient on cardiac monitor, non-invasive blood pressure, SPO2 monitor. On O2 @ 2 lpm via nc. IV  of vanc on pump at 125 ml/hr. Patient status doing well without problems. Patient is A&Ox 3. Patient reports no c/o. PROCEDURE SITE CHECK: 
 
Procedure site:without any bleeding and no hematoma, no pain/discomfort reported at procedure site. No change in patient status. Continue to monitor patient and status.

## 2019-01-28 NOTE — ANESTHESIA PREPROCEDURE EVALUATION
Anesthetic History No history of anesthetic complications Review of Systems / Medical History Patient summary reviewed, nursing notes reviewed and pertinent labs reviewed Pulmonary Comments: Distant smoking hx, 2 pk yr, quit in 1972 Neuro/Psych Within defined limits Cardiovascular Hypertension Valvular problems/murmurs: mitral insufficiency and aortic insufficiency Dysrhythmias : atrial fibrillation Pacemaker (PM), CAD and cardiac stents Exercise tolerance: >4 METS Comments: Cardiomyopathy EF 35-40% GI/Hepatic/Renal 
  
 
 
Renal disease (CKD Stage 4): CRI Comments: H/O recent GI Bleed CRI, Stage IV Endo/Other Diabetes: type 2, using insulin Hypothyroidism Obesity and arthritis Pertinent negatives: No morbid obesity Other Findings Comments: Gout Physical Exam 
 
Airway Mallampati: II 
TM Distance: 4 - 6 cm Neck ROM: normal range of motion Cardiovascular Regular rate and rhythm,  S1 and S2 normal,  no murmur, click, rub, or gallop Dental 
 
Dentition: [place]al [place] Pulmonary Breath sounds clear to auscultation Abdominal 
GI exam deferred Other Findings Anesthetic Plan ASA: 3 Anesthesia type: MAC and total IV anesthesia Induction: Intravenous Anesthetic plan and risks discussed with: Patient

## 2019-01-28 NOTE — PROGRESS NOTES
Patient tolerated post procedure without complications. Vital signs stable. Patient up with assist and ambulating tolerating well. Tolerated PO with no nausea or vomiting. Patient discharged home.

## 2019-01-28 NOTE — ANESTHESIA POSTPROCEDURE EVALUATION
* No procedures listed *. Anesthesia Post Evaluation Patient location during evaluation: bedside Patient participation: complete - patient participated Level of consciousness: awake Pain management: adequate Airway patency: patent Anesthetic complications: no 
Cardiovascular status: acceptable Respiratory status: acceptable Hydration status: acceptable Comments: Seen bedside in cath lab without complaints Post anesthesia nausea and vomiting:  none Visit Vitals /75 Pulse 70 Temp 36.6 °C (97.8 °F) Resp 18 Ht 6' 4\" (1.93 m) Wt 112 kg (247 lb) SpO2 96% BMI 30.07 kg/m²

## 2019-01-28 NOTE — Clinical Note
Mallampati: Class II - soft palate, uvula, fauces visible. ASA: Class 3 - patient with severe systemic disease.

## 2019-01-28 NOTE — ROUTINE PROCESS
Cardiac Cath Lab Recovery Arrival Note: 
 
 
[facility]. arrived to Cardiac Cath Lab, Recovery Area. Staff introduced to patient. Patient identifiers verified with NAME and DATE OF BIRTH. Procedure verified with patient. Consent forms reviewed and signed by patient or authorized representative and verified. Allergies verified. Patient and family oriented to department. Patient and family informed of procedure and plan of care. Questions answered with review. Patient prepped for procedure, per orders from physician, prior to arrival. 
 
Patient on cardiac monitor, non-invasive blood pressure, SPO2 monitor. On room air. Patient is A&Ox 3. Patient reports no c/o. Patient in stretcher, in low position, with side rails up, call bell within reach, patient instructed to call if assistance as needed. Patient prep in: [address], [place]. Patient family has pager # none Family in: CCL waiting.   
Prep by: Candie Cabrera RN

## 2019-01-28 NOTE — DISCHARGE INSTRUCTIONS
DISCHARGE INSTRUCTIONS FOR PATIENTS WITH PACEMAKERS    You had a biventricular pacemaker upgrade to your prior dual chamber pacing system on 1/28/2019 with Dr. Ludivina Chung. 1. Remember to call for an appointment in 2-4 weeks [phone] to check healing and implant programming with Dr. Evonnie Hammans nurse, [place].  2. Margot Orn are available from your pharmacist to wear at all times if you choose to wear one. 3. Carry your ID card for pacemaker with you at all times. This card will be given to you in the hospital or mailed to you. 4. The pacemaker will bulge slightly under your skin. The bulge will decrease in size over the next few weeks. Please notify the doctor's office if you notice any of the following around your site:   A.  A bruise that does not go away. B.  Soreness or yellow, green, or brown drainage from the site. C. Any swelling from the site. D. If you have a fever of 100 degrees or higher that lasts for a few days. INCISION CARE       1.  Leave skin glue over your site until it starts to fall off, usually in a few weeks. 2.  You may shower after 3 days as long as your incision isnt submerged or directly sprayed upon until well healed. 3.  For comfort, wear loose fitting clothing. 4.  Report any signs of infection, fever, pain, swelling, redness, oozing, or heat at site especially if these symptoms increase after the first 3 to 4 days. ACTIVITY PRECAUTIONS     1. Avoid rough contact with the implant site. 2. No driving for 14 days. 3. Avoid lifting your arm over your head, carrying anything on the affected side, or lifting over 10 pounds for 30 days. For the first 2 days only bend your arm at the elbow. 4. Any extreme activity such as golf, weight lifting or exercise biking should be restricted for 60 days. 5. Do not carry objects by holding them against your implant site. 6.  No shooting rifles or any type of gun with the affected shoulder permanently.     SPECIAL PRECAUTIONS     1. You should avoid all strong magnetic fields, such as arc welding, large transformers, large motors. 2.  You may not have an MRI which uses a strong magnet to take pictures unless given the OK by a cardiologist.  3.  Treatments or surgery that requires diathermy or electrocautery should be discussed with your doctor before scheduled. 4. Avoid radio frequency transmitters, including radar. 5. Advise dentist or other medical personnel you see that you have a pacemaker. 6.  Cell phones and microwave oven use is okay. 7.  If you plan to move or take a trip to a new area, the doctor's office will give you a name of a doctor to contact for any problems. ANTIBIOTIC THERAPY    During the first 8 weeks after your pacemaker insertion, you may need antibiotics before any dental work or certain tests or operations. Let the dentist or doctor who is caring for you know that you have had an implanted device.       Signed By: Bobby Irizarry MD     January 28, 2019

## 2019-01-30 ENCOUNTER — HOSPITAL ENCOUNTER (OUTPATIENT)
Dept: INFUSION THERAPY | Age: 69
Discharge: HOME | End: 2019-01-30
Payer: MEDICARE

## 2019-01-30 VITALS
SYSTOLIC BLOOD PRESSURE: 144 MMHG | HEIGHT: 76 IN | HEART RATE: 70 BPM | OXYGEN SATURATION: 100 % | DIASTOLIC BLOOD PRESSURE: 77 MMHG | TEMPERATURE: 99 F | BODY MASS INDEX: 30.07 KG/M2 | RESPIRATION RATE: 18 BRPM

## 2019-01-30 LAB
ALBUMIN SERPL-MCNC: 3.3 G/DL (ref 3.5–5)
ANION GAP SERPL CALC-SCNC: 11 MMOL/L (ref 5–15)
BUN SERPL-MCNC: 155 MG/DL (ref 6–20)
BUN/CREAT SERPL: 46 (ref 12–20)
CALCIUM SERPL-MCNC: 8.3 MG/DL (ref 8.5–10.1)
CHLORIDE SERPL-SCNC: 93 MMOL/L (ref 97–108)
CO2 SERPL-SCNC: 30 MMOL/L (ref 21–32)
CREAT SERPL-MCNC: 3.36 MG/DL (ref 0.7–1.3)
FERRITIN SERPL-MCNC: 110 NG/ML (ref 26–388)
GLUCOSE SERPL-MCNC: 157 MG/DL (ref 65–100)
HCT VFR BLD AUTO: 31.4 % (ref 36.6–50.3)
HGB BLD-MCNC: 9.9 G/DL (ref 12.1–17)
IRON SATN MFR SERPL: 19 % (ref 20–50)
IRON SERPL-MCNC: 60 UG/DL (ref 35–150)
PHOSPHATE SERPL-MCNC: 5 MG/DL (ref 2.6–4.7)
POTASSIUM SERPL-SCNC: 3.3 MMOL/L (ref 3.5–5.1)
SODIUM SERPL-SCNC: 134 MMOL/L (ref 136–145)
TIBC SERPL-MCNC: 313 UG/DL (ref 250–450)

## 2019-01-30 PROCEDURE — 82728 ASSAY OF FERRITIN: CPT

## 2019-01-30 PROCEDURE — 74011250636 HC RX REV CODE- 250/636

## 2019-01-30 PROCEDURE — 96372 THER/PROPH/DIAG INJ SC/IM: CPT

## 2019-01-30 PROCEDURE — 85018 HEMOGLOBIN: CPT

## 2019-01-30 PROCEDURE — 83540 ASSAY OF IRON: CPT

## 2019-01-30 PROCEDURE — 80069 RENAL FUNCTION PANEL: CPT

## 2019-01-30 PROCEDURE — 36415 COLL VENOUS BLD VENIPUNCTURE: CPT

## 2019-01-30 RX ADMIN — ERYTHROPOIETIN 40000 UNITS: 40000 INJECTION, SOLUTION INTRAVENOUS; SUBCUTANEOUS at 11:05

## 2019-01-30 NOTE — PROGRESS NOTES
[facility] Stay Note: 
Arrived - 1 Pt had pacemaker on Monday. Recovering well. Visit Vitals /77 (BP 1 Location: Left arm, BP Patient Position: Sitting) Pulse 70 Temp 99 °F (37.2 °C) Resp 18 Ht 6' 4\" (1.93 m) SpO2 100% BMI 30.07 kg/m² Recent Results (from the past 12 hour(s)) HGB & HCT Collection Time: 01/30/19 10:14 AM  
Result Value Ref Range HGB 9.9 (L) 12.1 - 17.0 g/dL HCT 31.4 (L) 36.6 - 50.3 % RENAL FUNCTION PANEL Collection Time: 01/30/19 10:14 AM  
Result Value Ref Range Sodium 134 (L) 136 - 145 mmol/L Potassium 3.3 (L) 3.5 - 5.1 mmol/L Chloride 93 (L) 97 - 108 mmol/L  
 CO2 30 21 - 32 mmol/L Anion gap 11 5 - 15 mmol/L Glucose 157 (H) 65 - 100 mg/dL  (H) 6 - 20 MG/DL Creatinine 3.36 (H) 0.70 - 1.30 MG/DL  
 BUN/Creatinine ratio 46 (H) 12 - 20 GFR est AA 22 (L) >60 ml/min/1.73m2 GFR est non-AA 18 (L) >60 ml/min/1.73m2 Calcium 8.3 (L) 8.5 - 10.1 MG/DL Phosphorus 5.0 (H) 2.6 - 4.7 MG/DL Albumin 3.3 (L) 3.5 - 5.0 g/dL FERRITIN Collection Time: 01/30/19 10:14 AM  
Result Value Ref Range Ferritin 110 26 - 388 NG/ML  
IRON PROFILE Collection Time: 01/30/19 10:14 AM  
Result Value Ref Range Iron 60 35 - 150 ug/dL TIBC 313 250 - 450 ug/dL Iron % saturation 19 (L) 20 - 50 % Procrit 40,000 units SQ slowly in L arm. 1110 - Tolerated well. Pt denies any acute problems/changes. Discharged from [address] [place] [address] ambulatory. No distress. Next appt: 2/27/19 at 1000.

## 2019-01-31 ENCOUNTER — PATIENT OUTREACH (OUTPATIENT)
Dept: INTERNAL MEDICINE CLINIC | Age: 69
End: 2019-01-31

## 2019-01-31 NOTE — PROGRESS NOTES
Chief Complaint Patient presents with  CHF  
  1 week follow up SUBJECTIVE: 
 
Royal KRISTA Hebert is a 77 y.o. male who returns in follow-up for his congestive heart failure, ASCVD, atrial fibrillation, cardiomyopathy, diabetes, CKD stage IV, anemia and other multiple medical problems. He did have a upgrade of his pacemaker done on the 28th and has a lot of bruising on the chest wall post surgery. He currently denies any chest pain, palpitations, PND, orthopnea or other cardiorespiratory complaints. The severe shortness of breath he had has markedly improved. He has baseline dyspnea on exertion but feels like he is back to baseline now. He denies any other complaints including no change of his chronic arthritic complaints and overall his energy level weakness are markedly improved since he had hospitalization with congestive heart failure and has had a pacemaker upgrade as well as a transfusion. He did receive a Procrit shot earlier this week because his hemoglobin was 9.9. Current Outpatient Medications Medication Sig Dispense Refill  metOLazone (ZAROXOLYN) 2.5 mg tablet Take 1 Tab by mouth every other day. 30 Tab prn  ULORIC 40 mg tab tablet TAKE 1 TABLET BY MOUTH DAILY 90 Tab 3  
 ascorbic acid, vitamin C, (VITAMIN C) 250 mg tablet Take 1 Tab by mouth three (3) times daily. 100 Tab prn  potassium chloride (KLOR-CON) 20 mEq Take 1 Packet by mouth two (2) times daily (with meals). 60 Packet prn  ciprofloxacin HCl (CIPRO) 500 mg tablet Take 1 Tab by mouth two (2) times a day. 20 Tab 0  
 sacubitril-valsartan (ENTRESTO) 24 mg/26 mg tablet Take 1 Tab by mouth every twenty-four (24) hours. 30 Tab prn  epoetin mary (EPOGEN;PROCRIT) 40,000 unit/mL injection 60,000 Units by SubCUTAneous route every thirty (30) days.  insulin glargine (LANTUS SOLOSTAR U-100 INSULIN) 100 unit/mL (3 mL) inpn 35 Units by SubCUTAneous route nightly.  insulin lispro (HUMALOG) 100 unit/mL kwikpen 5 Units by SubCUTAneous route Before breakfast, lunch, and dinner.  OTHER Apply  to affected area daily as needed (Knee Pain). CBD Cream:  Apply daily as needed for knee pain  carvedilol (COREG) 12.5 mg tablet TAKE 1 TABLET BY MOUTH TWICE DAILY 60 Tab 11  
 clonazePAM (KLONOPIN) 2 mg tablet TAKE 1 TABLET BY MOUTH EVERY NIGHT AT BEDTIME. MAY REPEAT DOSE IF YOU WAKE UP 60 Tab 0  
 tamsulosin (FLOMAX) 0.4 mg capsule TAKE 2 CAPSULES BY MOUTH EVERY  Cap 3  Ferrous Sulfate (SLOW FE) 47.5 mg iron TbER tablet Take 1 Tab by mouth three (3) times daily. 90 Tab prn  bumetanide (BUMEX) 2 mg tablet TAKE 2 TABLETS BY MOUTH TWICE DAILY 360 Tab 3  pramipexole (MIRAPEX) 0.5 mg tablet TAKE 1 TABLET BY MOUTH EVERY NIGHT AT BEDTIME (Patient taking differently: May take an additional tablet if needed) 90 Tab prn  colchicine (COLCRYS) 0.6 mg tablet Take 0.6 mg by mouth as needed (Gouty attack). Take 2 tablets at onset of gouty attack then 1 tablet every hour as needed  Liraglutide (VICTOZA) 0.6 mg/0.1 mL (18 mg/3 mL) sub-q pen 0.6 mg by SubCUTAneous route daily.  finasteride (PROSCAR) 5 mg tablet Take 5 mg by mouth daily. Past Medical History:  
Diagnosis Date  Anemia 8/5/2017  Anxiety 8/5/2017  Arrhythmia   
 atrial fibrillation 2014  ASCVD (arteriosclerotic cardiovascular disease) 8/5/2017  BPH (benign prostatic hyperplasia) 8/5/2017  CAD (coronary artery disease)   
 h/o stents  Cancer [facility])   
 h/o skin cancer  Cardiomyopathy ([address] 8/5/2017  CHF (congestive heart failure) ([address] 8/5/2017  Chronic kidney disease Stage IV  CKD (chronic kidney disease), stage IV ([address] 8/5/2017  Diabetes [facility]  Diabetes mellitus ([address] 8/5/2017  Diabetic neuropathy ([address] 8/5/2017  DJD (degenerative joint disease) 8/5/2017  ED (erectile dysfunction) 8/5/2017  Gout 8/5/2017  High cholesterol  Hyperlipidemia 8/5/2017  Hypertension  Hypertension with renal disease 8/5/2017  Hypothyroid 8/5/2017  Insomnia 8/5/2017  Obesity 8/5/2017  On statin therapy 8/5/2017  Restless leg 8/5/2017  Thyroid disease   
 hypothyroid Past Surgical History:  
Procedure Laterality Date  CARDIAC SURG PROCEDURE UNLIST    
 cardiac stents  CARDIAC SURG PROCEDURE UNLIST Ablation 5/17/2018 [id number] [address]  COLONOSCOPY N/A 6/28/2016 COLONOSCOPY performed by Eliezer Mehta MD at [facility] ENDOSCOPY  COLONOSCOPY N/A 1/9/2019 COLONOSCOPY performed by Carlee Barnes MD at [facility] ENDOSCOPY  COLONOSCOPY,DIAGNOSTIC  1/9/2019  HX APPENDECTOMY  HX BUNIONECTOMY    
 and removal of 2 seasmoid bone of the great toe 2/2018  HX HEENT Bilateral Cataract surgery  HX HEENT Tonsils  HX HEENT Axe wound to the head  HX KNEE ARTHROSCOPY X 2  
 HX ORTHOPAEDIC    
 HX ORTHOPAEDIC    
 partial laminectomy  HX PACEMAKER    
 HX ROTATOR CUFF REPAIR    
 bilateral  
 PA INSJ ELTRD CAR DACIA SYS ATTCH PM/CVDFB PLS GEN N/A 1/28/2019 Lv Lead Placement To Previous Generator performed by Zeke Nazario MD at [facility] CARDIAC CATH LAB  PA REMVL PERM PM PLS GEN W/REPL PLSE GEN MULT LEAD N/A 1/28/2019 Remove & Replace Ppm Gen Biv Multi Leads performed by Zeke Nazario MD at [id number] [place] [address] CATH LAB Allergies Allergen Reactions  Niacin Unknown (comments) Other reaction(s): Unknown (comments)  Imipenem Diarrhea Other reaction(s): Rash  Levemir [Insulin Detemir] Hives  Other Medication Other (comments) ? Allergy to Graciela Jenningsut causing chemical burn  Primaxin [Imipenem-Cilastatin] Diarrhea and Rash  Xarelto [Rivaroxaban] Rash and Itching Other reaction(s): Rash REVIEW OF SYSTEMS: 
General: negative for - chills or fever, or weight loss or gain ENT: negative for - headaches, nasal congestion or tinnitus Eyes: no blurred or visual changes Neck: No stiffness or swollen nodes Respiratory: negative for - cough, hemoptysis, shortness of breath or wheezing Cardiovascular : negative for - chest pain, edema, palpitations or shortness of breath Gastrointestinal: negative for - abdominal pain, blood in stools, heartburn or nausea/vomiting Genito-Urinary: no dysuria, trouble voiding, or hematuria Musculoskeletal: negative for - gait disturbance, joint pain, joint stiffness or joint swelling Neurological: no TIA or stroke symptoms Hematologic: no bruises, no bleeding Lymphatic: no swollen glands Integument: no lumps, mole changes, nail changes or rash. Marked bruising chest wall secondary to pacemaker upgrade Endocrine:no malaise/lethargy poly uria or polydipsia or unexpected weight changes Social History Socioeconomic History  Marital status:  Spouse name: Not on file  Number of children: Not on file  Years of education: Not on file  Highest education level: Not on file Tobacco Use  Smoking status: Former Smoker Packs/day: 0.50 Years: 4.00 Pack years: 2.00 Last attempt to quit: 3/6/1972 Years since quittin.9  Smokeless tobacco: Never Used Substance and Sexual Activity  Alcohol use: No  
  Comment: rare, 1 drink per year  Drug use: No  
 Sexual activity: Not Currently Family History Problem Relation Age of Onset  Heart Disease Mother  Kidney Disease Mother  Heart Disease Father  Kidney Disease Father  Cancer Sister Breast  
 
 
OBJECTIVE:  
 
Visit Vitals /72 (BP 1 Location: Left arm, BP Patient Position: Sitting) Pulse 70 Temp 98.8 °F (37.1 °C) (Oral) Resp 16 Ht 6' 4\" (1.93 m) Wt 249 lb 9.6 oz (113.2 kg) SpO2 95% BMI 30.38 kg/m² CONSTITUTIONAL:   well nourished, appears age appropriate EYES: sclera anicteric, PERRL, EOMI 
ENMT:nares clear, moist mucous membranes, pharynx clear NECK: supple. Thyroid normal, No JVD or bruits RESPIRATORY: Chest: clear to ascultation and percussion, normal inspiratory effort CARDIOVASCULAR: Heart: regular rate and rhythm no murmurs, rubs or gallops, PMI not displaced, No thrills. Subcutaneous hematoma about the size of a tennis ball just below the pacemaker site with marked amount of subcutaneous ecchymosis across the entire upper chest wall GASTROINTESTINAL: Abdomen: non distended, soft, non tender, bowel sounds normal 
HEMATOLOGIC: no purpura, petechiae or bruising LYMPHATIC: No lymph node enlargemant MUSCULOSKELETAL: Extremities: no edema or active synovitis, pulse 1+ INTEGUMENT: No unusual rashes or suspicious skin lesions noted. Nails appear normal. 
PERIPHERAL VASCULAR: normal pulses femoral, PT and DP NEUROLOGIC: non-focal exam, A & O X 3 PSYCHIATRIC:, appropriate affect ASSESSMENT:  
1. Chronic systolic congestive heart failure ([address] 2. Ischemic cardiomyopathy 3. Paroxysmal atrial fibrillation (HCC) 4. ASCVD (arteriosclerotic cardiovascular disease) 5. Hypertension with renal disease 6. Class 1 obesity due to excess calories without serious comorbidity with body mass index (BMI) of 30.0 to 30.9 in adult 7. Anemia, unspecified type 8. Type 2 diabetes mellitus with foot ulcer, with long-term current use of insulin ([address] 9. Controlled type 2 diabetes mellitus with stage 4 chronic kidney disease, without long-term current use of insulin ([address] 10. CKD (chronic kidney disease), stage IV ([address] 11. Status post amputation of left great toe ([address] Impression 1. Systolic CHF that is currently compensated 2. Ischemic cardiomyopathy currently improved with the pacemaker upgrade 3. Paroxysmal atrial fibrillation he has had no atrial fib since he had his most recent cardioversion done in May 2018 4. ASCVD clinically stable 5. Hypertension that is stable 6.  Obesity we did discuss diet and exercise for weight reduction and overall health benefit. 7.  Anemia I am going to recheck that status particular the significant amount of hematoma he has from his recent pacemaker placement. 8.  Diabetes with prior foot ulcer that has resolved and he did have an amputation of 1 of his toes and has had no subsequent problems from that. 9.  Diabetes as noted blood sugars pending on the BMP today 10. CKD stage IV repeat status pending I will call the lab results and make further recommendations or adjustments as necessary. Follow-up as scheduled for 2 weeks or sooner if I need to based upon labs should he develop any further symptoms. PLAN: 
. Orders Placed This Encounter  METABOLIC PANEL, BASIC  
 AMB POC COMPLETE CBC,AUTOMATED ENTER  
 
 
 
ATTENTION:  
This medical record was transcribed using an electronic medical records system. Although proofread, it may and can contain electronic and spelling errors. Other human spelling and other errors may be present. Corrections may be executed at a later time. Please feel free to contact us for any clarifications as needed. Follow-up Disposition: 
Return in about 2 weeks (around 2/15/2019). No results found for any visits on 02/01/19. Kevon Devine MD 
 
The patient verbalized understanding of the problems and plans as explained.

## 2019-02-01 ENCOUNTER — OFFICE VISIT (OUTPATIENT)
Dept: INTERNAL MEDICINE CLINIC | Age: 69
End: 2019-02-01

## 2019-02-01 VITALS
WEIGHT: 249.6 LBS | DIASTOLIC BLOOD PRESSURE: 72 MMHG | OXYGEN SATURATION: 95 % | HEART RATE: 70 BPM | SYSTOLIC BLOOD PRESSURE: 132 MMHG | TEMPERATURE: 98.8 F | BODY MASS INDEX: 30.4 KG/M2 | RESPIRATION RATE: 16 BRPM | HEIGHT: 76 IN

## 2019-02-01 DIAGNOSIS — Z89.412 STATUS POST AMPUTATION OF LEFT GREAT TOE (HCC): ICD-10-CM

## 2019-02-01 DIAGNOSIS — E66.09 CLASS 1 OBESITY DUE TO EXCESS CALORIES WITHOUT SERIOUS COMORBIDITY WITH BODY MASS INDEX (BMI) OF 30.0 TO 30.9 IN ADULT: ICD-10-CM

## 2019-02-01 DIAGNOSIS — N18.4 CKD (CHRONIC KIDNEY DISEASE), STAGE IV (HCC): ICD-10-CM

## 2019-02-01 DIAGNOSIS — L97.509 TYPE 2 DIABETES MELLITUS WITH FOOT ULCER, WITH LONG-TERM CURRENT USE OF INSULIN (HCC): ICD-10-CM

## 2019-02-01 DIAGNOSIS — I50.22 CHRONIC SYSTOLIC CONGESTIVE HEART FAILURE (HCC): Primary | ICD-10-CM

## 2019-02-01 DIAGNOSIS — N18.4 CONTROLLED TYPE 2 DIABETES MELLITUS WITH STAGE 4 CHRONIC KIDNEY DISEASE, WITHOUT LONG-TERM CURRENT USE OF INSULIN (HCC): ICD-10-CM

## 2019-02-01 DIAGNOSIS — D64.9 ANEMIA, UNSPECIFIED TYPE: ICD-10-CM

## 2019-02-01 DIAGNOSIS — Z79.4 TYPE 2 DIABETES MELLITUS WITH FOOT ULCER, WITH LONG-TERM CURRENT USE OF INSULIN (HCC): ICD-10-CM

## 2019-02-01 DIAGNOSIS — E11.22 CONTROLLED TYPE 2 DIABETES MELLITUS WITH STAGE 4 CHRONIC KIDNEY DISEASE, WITHOUT LONG-TERM CURRENT USE OF INSULIN (HCC): ICD-10-CM

## 2019-02-01 DIAGNOSIS — I48.0 PAROXYSMAL ATRIAL FIBRILLATION (HCC): ICD-10-CM

## 2019-02-01 DIAGNOSIS — E11.621 TYPE 2 DIABETES MELLITUS WITH FOOT ULCER, WITH LONG-TERM CURRENT USE OF INSULIN (HCC): ICD-10-CM

## 2019-02-01 DIAGNOSIS — I12.9 HYPERTENSION WITH RENAL DISEASE: ICD-10-CM

## 2019-02-01 DIAGNOSIS — I25.10 ASCVD (ARTERIOSCLEROTIC CARDIOVASCULAR DISEASE): ICD-10-CM

## 2019-02-01 DIAGNOSIS — I25.5 ISCHEMIC CARDIOMYOPATHY: ICD-10-CM

## 2019-02-01 LAB
GRAN# POC: 4 K/UL (ref 2–7.8)
GRAN% POC: 77.3 % (ref 37–92)
HCT VFR BLD CALC: 29.3 % (ref 37–51)
HGB BLD-MCNC: 9.6 G/DL (ref 12–18)
LY# POC: 0.6 K/UL (ref 0.6–4.1)
LY% POC: 11.9 % (ref 10–58.5)
MCH RBC QN: 27.5 PG (ref 26–32)
MCHC RBC-ENTMCNC: 32.9 G/DL (ref 30–36)
MCV RBC: 84 FL (ref 80–97)
MID #, POC: 0.5 K/UL (ref 0–1.8)
MID% POC: 10.8 % (ref 0.1–24)
PLATELET # BLD: 110 K/UL (ref 140–440)
RBC # BLD: 3.51 M/UL (ref 4.2–6.3)
WBC # BLD: 5.1 K/UL (ref 4.1–10.9)

## 2019-02-01 NOTE — PATIENT INSTRUCTIONS
Iron Deficiency Anemia: Care Instructions Your Care Instructions Anemia means that you do not have enough red blood cells. Red blood cells carry oxygen around your body. When you have anemia, it can make you pale, weak, and tired. Many things can cause anemia. The most common cause is loss of blood. This can happen if you have heavy menstrual periods. It can also happen if you have bleeding in your stomach or bowel. You can also get anemia if you don't have enough iron in your diet or if it's hard for your body to absorb iron. In some cases, pregnancy causes anemia. That's because a pregnant woman needs more iron. Your doctor may do more tests to find the cause of your anemia. If a disease or other health problem is causing it, your doctor will treat that problem. It's important to follow up with your doctor to make sure that your iron level returns to normal. 
Follow-up care is a key part of your treatment and safety. Be sure to make and go to all appointments, and call your doctor if you are having problems. It's also a good idea to know your test results and keep a list of the medicines you take. How can you care for yourself at home? · If your doctor recommended iron pills, take them as directed. ? Try to take the pills on an empty stomach. You can do this about 1 hour before or 2 hours after meals. But you may need to take iron with food to avoid an upset stomach. ? Do not take antacids or drink milk or anything with caffeine within 2 hours of when you take your iron. They can keep your body from absorbing the iron well. ? Vitamin C helps your body absorb iron. You may want to take iron pills with a glass of orange juice or some other food high in vitamin C. 
? Iron pills may cause stomach problems. These include heartburn, nausea, diarrhea, constipation, and cramps. It can help to drink plenty of fluids and include fruits, vegetables, and fiber in your diet. ? It's normal for iron pills to make your stool a greenish or grayish black. But internal bleeding can also cause dark stool. So it's important to tell your doctor about any color changes. ? Call your doctor if you think you are having a problem with your iron pills. Even after you start to feel better, it will take several months for your body to build up its supply of iron. ? If you miss a pill, don't take a double dose. ? Keep iron pills out of the reach of small children. Too much iron can be very dangerous. · Eat foods with a lot of iron. These include red meat, shellfish, poultry, and eggs. They also include beans, raisins, whole-grain bread, and leafy green vegetables. · Steam your vegetables. This is the best way to prepare them if you want to get as much iron as possible. · Be safe with medicines. Do not take nonsteroidal anti-inflammatory pain relievers unless your doctor tells you to. These include aspirin, naproxen (Aleve), and ibuprofen (Advil, Motrin). · Liquid iron can stain your teeth. But you can mix it with water or juice and drink it with a straw. Then it won't get on your teeth. When should you call for help? Call 911 anytime you think you may need emergency care. For example, call if: 
  · You passed out (lost consciousness).  
 Call your doctor now or seek immediate medical care if: 
  · You are short of breath.  
  · You are dizzy or light-headed, or you feel like you may faint.  
  · You have new or worse bleeding.  
 Watch closely for changes in your health, and be sure to contact your doctor if: 
  · You feel weaker or more tired than usual.  
  · You do not get better as expected. Where can you learn more? Go to http://gildardo-palak.info/. Enter U525 in the search box to learn more about \"Iron Deficiency Anemia: Care Instructions. \" Current as of: May 6, 2018 Content Version: 11.9 © 2466-0236 [employer].  Care instructions adapted under license by [address] (which disclaims liability or warranty for this information). If you have questions about a medical condition or this instruction, always ask your healthcare professional. [address] any warranty or liability for your use of this information.

## 2019-02-02 LAB
BUN SERPL-MCNC: 131 MG/DL (ref 8–27)
BUN/CREAT SERPL: 47 (ref 10–24)
CALCIUM SERPL-MCNC: 8.8 MG/DL (ref 8.6–10.2)
CHLORIDE SERPL-SCNC: 90 MMOL/L (ref 96–106)
CO2 SERPL-SCNC: 29 MMOL/L (ref 20–29)
CREAT SERPL-MCNC: 2.8 MG/DL (ref 0.76–1.27)
GLUCOSE SERPL-MCNC: 210 MG/DL (ref 65–99)
POTASSIUM SERPL-SCNC: 3.4 MMOL/L (ref 3.5–5.2)
SODIUM SERPL-SCNC: 138 MMOL/L (ref 134–144)

## 2019-02-04 NOTE — PROGRESS NOTES
Hemoglobin is slightly lower but not significant and kidney function is slightly better so continue current treatment

## 2019-02-07 ENCOUNTER — PATIENT OUTREACH (OUTPATIENT)
Dept: INTERNAL MEDICINE CLINIC | Age: 69
End: 2019-02-07

## 2019-02-07 DIAGNOSIS — G47.00 INSOMNIA, UNSPECIFIED TYPE: ICD-10-CM

## 2019-02-08 RX ORDER — CLONAZEPAM 2 MG/1
TABLET ORAL
Qty: 60 TAB | Refills: 0 | Status: SHIPPED | OUTPATIENT
Start: 2019-02-08 | End: 2019-05-27

## 2019-02-08 NOTE — TELEPHONE ENCOUNTER
RX refill request from the patient/pharmacy. Patient last seen 02- with labs, and next appt. scheduled for 02-  Requested Prescriptions     Pending Prescriptions Disp Refills    clonazePAM (KLONOPIN) 2 mg tablet [Pharmacy Med Name: CLONAZEPAM 2MG TABLETS] 60 Tab 0     Sig: TAKE 1 TABLET BY MOUTH EVERY NIGHT AT BEDTIME. MAY REPEAT DOSE IF YOU WAKE UP   .

## 2019-02-13 ENCOUNTER — PATIENT OUTREACH (OUTPATIENT)
Dept: INTERNAL MEDICINE CLINIC | Age: 69
End: 2019-02-13

## 2019-02-14 NOTE — PROGRESS NOTES
Chief Complaint Patient presents with  CHF  
  2 week follow up SUBJECTIVE: 
 
Royal VELASCO Jaciel Farris. is a 77 y.o. male who returns in follow-up for his hypertension, CKD, CHF, ASCVD, cardiomyopathy, anemia, diabetes and other medical problems. He does want to get an A1c checked today per his endocrinologist.  He denies any chest pain, shortness of breath, palpitations, PND, orthopnea or cardiorespiratory complaints. He does have his chronic dyspnea on exertion that has not changed. He notes he has some swelling in his ankles and that is stable. But he did actually get his fluid pills mixed up for a while of about a week was only taken 1 a day versus his normal 3 a day. He is no longer taking the metolazone. He did change his diet to renal diet and his pruritus has improved. He denies any current GI or  complaints. He denies any headaches, dizziness or neurologic complaints. There are no other specific complaints. Current Outpatient Medications Medication Sig Dispense Refill  clonazePAM (KLONOPIN) 2 mg tablet TAKE 1 TABLET BY MOUTH EVERY NIGHT AT BEDTIME. MAY REPEAT DOSE IF YOU WAKE UP (Patient taking differently: TAKE 1 TABLET BY MOUTH EVERY NIGHT AT BEDTIME. MAY REPEAT DOSE IF YOU WAKE UP; 2 tabs at bedtime) 60 Tab 0  
 ULORIC 40 mg tab tablet TAKE 1 TABLET BY MOUTH DAILY (Patient taking differently: TAKE 1 TABLET BY MOUTH DAILY; as needed) 90 Tab 3  
 ascorbic acid, vitamin C, (VITAMIN C) 250 mg tablet Take 1 Tab by mouth three (3) times daily. 100 Tab prn  potassium chloride (KLOR-CON) 20 mEq Take 1 Packet by mouth two (2) times daily (with meals). 60 Packet prn  epoetin mray (EPOGEN;PROCRIT) 40,000 unit/mL injection 60,000 Units by SubCUTAneous route every thirty (30) days.  insulin glargine (LANTUS SOLOSTAR U-100 INSULIN) 100 unit/mL (3 mL) inpn 35 Units by SubCUTAneous route nightly.     
 carvedilol (COREG) 12.5 mg tablet TAKE 1 TABLET BY MOUTH TWICE DAILY 60 Tab 11  tamsulosin (FLOMAX) 0.4 mg capsule TAKE 2 CAPSULES BY MOUTH EVERY  Cap 3  Ferrous Sulfate (SLOW FE) 47.5 mg iron TbER tablet Take 1 Tab by mouth three (3) times daily. 90 Tab prn  bumetanide (BUMEX) 2 mg tablet TAKE 2 TABLETS BY MOUTH TWICE DAILY 360 Tab 3  pramipexole (MIRAPEX) 0.5 mg tablet TAKE 1 TABLET BY MOUTH EVERY NIGHT AT BEDTIME (Patient taking differently: May take an additional tablet if needed) 90 Tab prn  colchicine (COLCRYS) 0.6 mg tablet Take 0.6 mg by mouth as needed (Gouty attack). Take 2 tablets at onset of gouty attack then 1 tablet every hour as needed  Liraglutide (VICTOZA) 0.6 mg/0.1 mL (18 mg/3 mL) sub-q pen 0.6 mg by SubCUTAneous route daily.  finasteride (PROSCAR) 5 mg tablet Take 5 mg by mouth daily.  metOLazone (ZAROXOLYN) 2.5 mg tablet Take 1 Tab by mouth every other day. 30 Tab prn  ciprofloxacin HCl (CIPRO) 500 mg tablet Take 1 Tab by mouth two (2) times a day. 20 Tab 0  
 sacubitril-valsartan (ENTRESTO) 24 mg/26 mg tablet Take 1 Tab by mouth every twenty-four (24) hours. 30 Tab prn  insulin lispro (HUMALOG) 100 unit/mL kwikpen 5 Units by SubCUTAneous route Before breakfast, lunch, and dinner.  OTHER Apply  to affected area daily as needed (Knee Pain). CBD Cream:  Apply daily as needed for knee pain Past Medical History:  
Diagnosis Date  Anemia 8/5/2017  Anxiety 8/5/2017  Arrhythmia   
 atrial fibrillation 2014  ASCVD (arteriosclerotic cardiovascular disease) 8/5/2017  BPH (benign prostatic hyperplasia) 8/5/2017  CAD (coronary artery disease)   
 h/o stents  Cancer [facility])   
 h/o skin cancer  Cardiomyopathy ([facility] [address] 8/5/2017  CHF (congestive heart failure) ([facility] [address] 8/5/2017  Chronic kidney disease Stage IV  CKD (chronic kidney disease), stage IV ([facility] [address] 8/5/2017  Diabetes ([facility] [address]  Diabetes mellitus ([facility] [address] 8/5/2017  Diabetic neuropathy ([facility] [address] 8/5/2017  DJD (degenerative joint disease) 8/5/2017  ED (erectile dysfunction) 8/5/2017  Gout 8/5/2017  High cholesterol  Hyperlipidemia 8/5/2017  Hypertension  Hypertension with renal disease 8/5/2017  Hypothyroid 8/5/2017  Insomnia 8/5/2017  Obesity 8/5/2017  On statin therapy 8/5/2017  Restless leg 8/5/2017  Thyroid disease   
 hypothyroid Past Surgical History:  
Procedure Laterality Date  CARDIAC SURG PROCEDURE UNLIST    
 cardiac stents  CARDIAC SURG PROCEDURE UNLIST Ablation 5/17/2018 ED [facility]  COLONOSCOPY N/A 6/28/2016 COLONOSCOPY performed by Diego Pacheco MD at [facility] ENDOSCOPY  COLONOSCOPY N/A 1/9/2019 COLONOSCOPY performed by Jacqui Sandhoff, MD at [facility] ENDOSCOPY  COLONOSCOPY,DIAGNOSTIC  1/9/2019  HX APPENDECTOMY  HX BUNIONECTOMY    
 and removal of 2 seasmoid bone of the great toe 2/2018  HX HEENT Bilateral Cataract surgery  HX HEENT Tonsils  HX HEENT Axe wound to the head  HX KNEE ARTHROSCOPY X 2  
 HX ORTHOPAEDIC    
 HX ORTHOPAEDIC    
 partial laminectomy  HX PACEMAKER    
 HX ROTATOR CUFF REPAIR    
 bilateral  
 FL INSJ ELTRD CAR DACIA SYS ATTCH PM/CVDFB PLS GEN N/A 1/28/2019 Lv Lead Placement To Previous Generator performed by Estefanía Sterling MD at [facility] C[facility] CATH LAB  FL REMVL PERM PM PLS GEN W/REPL PLSE GEN MULT LEAD N/A 1/28/2019 Remove & Replace Ppm Gen Biv Multi Leads performed by Estefanía Sterling MD at [id number] CATH LAB Allergies Allergen Reactions  Niacin Unknown (comments) Other reaction(s): Unknown (comments)  Imipenem Diarrhea Other reaction(s): Rash  Levemir [Insulin Detemir] Hives  Other Medication Other (comments) ? Allergy to Annita Money causing chemical burn  Primaxin [Imipenem-Cilastatin] Diarrhea and Rash  Xarelto [Rivaroxaban] Rash and Itching Other reaction(s): Rash REVIEW OF SYSTEMS: 
 General: negative for - chills or fever, or weight loss or gain ENT: negative for - headaches, nasal congestion or tinnitus Eyes: no blurred or visual changes Neck: No stiffness or swollen nodes Respiratory: negative for - cough, hemoptysis, shortness of breath or wheezing Cardiovascular : negative for - chest pain, age of his chronic edema, palpitations or change of his chronic dyspnea on exertion or shortness of breath Gastrointestinal: negative for - abdominal pain, blood in stools, heartburn or nausea/vomiting Genito-Urinary: no dysuria, trouble voiding, or hematuria Musculoskeletal: negative for - gait disturbance, joint pain, joint stiffness or joint swelling Neurological: no TIA or stroke symptoms Hematologic: no bruises, no bleeding Lymphatic: no swollen glands Integument: no lumps, mole changes, nail changes or rash Endocrine:no malaise/lethargy poly uria or polydipsia or unexpected weight changes Social History Socioeconomic History  Marital status:  Spouse name: Not on file  Number of children: Not on file  Years of education: Not on file  Highest education level: Not on file Tobacco Use  Smoking status: Former Smoker Packs/day: 0.50 Years: 4.00 Pack years: 2.00 Last attempt to quit: 3/6/1972 Years since quittin.9  Smokeless tobacco: Never Used Substance and Sexual Activity  Alcohol use: No  
  Comment: rare, 1 drink per year  Drug use: No  
 Sexual activity: Not Currently Family History Problem Relation Age of Onset  Heart Disease Mother  Kidney Disease Mother  Heart Disease Father  Kidney Disease Father  Cancer Sister Breast  
 
 
OBJECTIVE:  
 
Visit Vitals /82 (BP 1 Location: Left arm, BP Patient Position: Sitting) Pulse 69 Temp 98.6 °F (37 °C) (Oral) Resp 14 Ht 6' 4\" (1.93 m) Wt 259 lb (117.5 kg) SpO2 99% BMI 31.53 kg/m² CONSTITUTIONAL:   well nourished, appears age appropriate EYES: sclera anicteric, PERRL, EOMI 
ENMT:nares clear, moist mucous membranes, pharynx clear NECK: supple. Thyroid normal, No JVD or bruits RESPIRATORY: Chest: clear to ascultation and percussion, normal inspiratory effort CARDIOVASCULAR: Heart: regular rate and rhythm no murmurs, rubs or gallops, PMI not displaced, No thrills. 1+ peripheral edema GASTROINTESTINAL: Abdomen: non distended, soft, non tender, bowel sounds normal 
HEMATOLOGIC: no purpura, petechiae or bruising LYMPHATIC: No lymph node enlargemant MUSCULOSKELETAL: Extremities: no edema or active synovitis, pulse 1+ INTEGUMENT: No unusual rashes or suspicious skin lesions noted. Nails appear normal. 
PERIPHERAL VASCULAR: normal pulses femoral, PT and DP NEUROLOGIC: non-focal exam, A & O X 3 PSYCHIATRIC:, appropriate affect ASSESSMENT:  
1. Hypertension with renal disease 2. CKD (chronic kidney disease), stage IV ([address] 3. Controlled type 2 diabetes mellitus with stage 4 chronic kidney disease, without long-term current use of insulin ([address] 4. Chronic systolic congestive heart failure ([address] 5. Ischemic cardiomyopathy 6. Class 1 obesity due to excess calories without serious comorbidity with body mass index (BMI) of 30.0 to 30.9 in adult 7. Anemia, unspecified type Impression 1. Hypertension that is controlled to continue current therapy reviewed with him. 2.  CKD repeat status is pending we will see if we need to make any adjustments. 3   Diabetes type 2 blood sugars pending we will see what the A1c looks like today. 4.  CHF currently stable 5. Cardiomyopathy stable 6. Obesity weight is up 10 pounds but that is probably related to getting his fluid pills been mixed up and he feels like he is diuresing good now he is back on the correct dose. No changes will be made. 7.  Anemia repeat status is pending I will call the lab results and make further recommendations or adjustments. Follow-up as scheduled again for 2 weeks or sooner if there is a problem. PLAN: 
. Orders Placed This Encounter  CBC WITH AUTOMATED DIFF ([facility])  METABOLIC PANEL, BASIC ([facility])  HEMOGLOBIN A1C WITH EAG  
 
 
 
ATTENTION:  
This medical record was transcribed using an electronic medical records system. Although proofread, it may and can contain electronic and spelling errors. Other human spelling and other errors may be present. Corrections may be executed at a later time. Please feel free to contact us for any clarifications as needed. Follow-up Disposition: 
Return in about 2 weeks (around 3/1/2019). No results found for any visits on 02/15/19. Lizbeth Day MD 
 
The patient verbalized understanding of the problems and plans as explained.

## 2019-02-15 ENCOUNTER — OFFICE VISIT (OUTPATIENT)
Dept: INTERNAL MEDICINE CLINIC | Age: 69
End: 2019-02-15

## 2019-02-15 VITALS
TEMPERATURE: 98.6 F | WEIGHT: 259 LBS | HEART RATE: 69 BPM | RESPIRATION RATE: 14 BRPM | DIASTOLIC BLOOD PRESSURE: 82 MMHG | BODY MASS INDEX: 31.54 KG/M2 | HEIGHT: 76 IN | SYSTOLIC BLOOD PRESSURE: 136 MMHG | OXYGEN SATURATION: 99 %

## 2019-02-15 DIAGNOSIS — I50.22 CHRONIC SYSTOLIC CONGESTIVE HEART FAILURE (HCC): ICD-10-CM

## 2019-02-15 DIAGNOSIS — D64.9 ANEMIA, UNSPECIFIED TYPE: ICD-10-CM

## 2019-02-15 DIAGNOSIS — N18.4 CKD (CHRONIC KIDNEY DISEASE), STAGE IV (HCC): ICD-10-CM

## 2019-02-15 DIAGNOSIS — E66.09 CLASS 1 OBESITY DUE TO EXCESS CALORIES WITHOUT SERIOUS COMORBIDITY WITH BODY MASS INDEX (BMI) OF 30.0 TO 30.9 IN ADULT: ICD-10-CM

## 2019-02-15 DIAGNOSIS — E11.22 CONTROLLED TYPE 2 DIABETES MELLITUS WITH STAGE 4 CHRONIC KIDNEY DISEASE, WITHOUT LONG-TERM CURRENT USE OF INSULIN (HCC): ICD-10-CM

## 2019-02-15 DIAGNOSIS — N18.4 CONTROLLED TYPE 2 DIABETES MELLITUS WITH STAGE 4 CHRONIC KIDNEY DISEASE, WITHOUT LONG-TERM CURRENT USE OF INSULIN (HCC): ICD-10-CM

## 2019-02-15 DIAGNOSIS — I12.9 HYPERTENSION WITH RENAL DISEASE: Primary | ICD-10-CM

## 2019-02-15 DIAGNOSIS — I25.5 ISCHEMIC CARDIOMYOPATHY: ICD-10-CM

## 2019-02-15 LAB
ERYTHROCYTE [DISTWIDTH] IN BLOOD BY AUTOMATED COUNT: 18.8 %
HCT VFR BLD AUTO: 32 % (ref 37–51)
HGB BLD-MCNC: 10.4 G/DL (ref 12–18)
LYMPHOCYTES ABSOLUTE: 0.5 K/UL (ref 0.6–4.1)
LYMPHOCYTES NFR BLD: 8.6 % (ref 10–58.5)
MCH RBC QN AUTO: 28.6 PG (ref 26–32)
MCHC RBC AUTO-ENTMCNC: 32.5 G/DL (ref 30–36)
MCV RBC AUTO: 87.9 FL (ref 80–97)
MONOCYTES ABS-DIF,2141: 0.5 K/UL (ref 0–1.8)
MONOCYTES NFR BLD: 8.4 % (ref 0.1–24)
NEUTROPHILS # BLD: 83 % (ref 37–92)
NEUTROPHILS ABS,2156: 5.2 K/UL (ref 2–7.8)
PLATELET # BLD AUTO: 131 K/UL (ref 140–440)
PMV BLD AUTO: 8.2 FL
RBC # BLD AUTO: 3.64 M/UL (ref 4.2–6.3)
WBC # BLD AUTO: 6.3 K/UL (ref 4.1–10.9)

## 2019-02-15 NOTE — PATIENT INSTRUCTIONS
Learning About a Pacemaker for Heart Failure What is a pacemaker for heart failure? A pacemaker is a small device that is placed under the skin of your chest. It is powered by batteries. It has thin wires, called leads, that pass through a vein into your heart. A pacemaker for heart failure is a biventricular pacemaker (say \"erik-nessa-TRICK-jaclyn-liu\"). Treatment that uses this type of pacemaker is called cardiac resynchronization therapy (CRT). When you have heart failure, the lower chambers of your heart may not pump at the same time. The pacemaker sends painless electrical signals to your heart. These signals make the chambers pump at the same time. This can help your heart pump blood better and help you feel better. Your pacemaker may be combined with an ICD, or implantable cardioverter-defibrillator. It can control abnormal heart rhythms. This can prevent sudden death. You may feel worried about having a pacemaker. This is common. It can help if you learn about how the pacemaker helps your heart. Talk to your doctor about your concerns. How is a pacemaker put in place? You will get medicine before the procedure. This helps you relax and helps prevent pain. The doctor makes a cut in the skin just below your collarbone. The cut may be on either side of your chest. The doctor will put the pacemaker leads through the cut. The leads go into a large blood vessel in the upper chest. Then the doctor will guide the leads through the blood vessel into different chambers of the heart. The doctor will place the pacemaker under the skin of your chest. He or she will attach the leads to the pacemaker. Then the cut will be closed with stitches. The procedure usually takes 2 to 3 hours. You may need to spend the night in the hospital. 
What can you expect when you have a pacemaker? A pacemaker can help your heart pump blood better.  It may help you feel better so you can be more active. It also may help keep you out of the hospital and help you live longer. You can live a normal, active life with a pacemaker. But you need to avoid strong magnetic and electrical fields. Your doctor or the maker of your pacemaker can give you a full list of things to avoid. But most everyday appliances are safe. Your doctor will check your pacemaker regularly to make sure it's working right. Pacemaker batteries usually last 5 to 15 years before they need to be replaced. Follow-up care is a key part of your treatment and safety. Be sure to make and go to all appointments, and call your doctor if you are having problems. It's also a good idea to know your test results and keep a list of the medicines you take. Where can you learn more? Go to http://gildardo-palak.info/. Enter Y169 in the search box to learn more about \"Learning About a Pacemaker for Heart Failure. \" Current as of: July 22, 2018 Content Version: 11.9 © 0305-9869 [employer]. Care instructions adapted under license by [employer] (which disclaims liability or warranty for this information). If you have questions about a medical condition or this instruction, always ask your healthcare professional. [address] any warranty or liability for your use of this information.

## 2019-02-15 NOTE — PROGRESS NOTES
Reviewed record in preparation for visit and have obtained necessary documentation. Identified pt with two pt identifiers(name and ). Chief Complaint Patient presents with  CHF  
  2 week follow up Health Maintenance Due Topic Date Due  
 DTaP/Tdap/Td series (1 - Tdap) 1969  Shingrix Vaccine Age 50> (1 of 2) 1998  AAA Screening 73-67 YO Male Smoking Patients  2013 Visit Vitals /82 (BP 1 Location: Left arm, BP Patient Position: Sitting) Pulse 69 Temp 98.6 °F (37 °C) (Oral) Resp 14 Ht 6' 4\" (1.93 m) Wt 259 lb (117.5 kg) SpO2 99% BMI 31.53 kg/m² Mr. Arya Cruz has a reminder for a \"due or due soon\" health maintenance. I have asked that he discuss health maintenance topic(s) due with His  primary care provider. Coordination of Care Questionnaire: 
:  
 
1) Have you been to an emergency room, urgent care clinic since your last visit? no  
Hospitalized since your last visit? no          
 
2) Have you seen or consulted any other health care providers outside of [address] [place] [address] since your last visit? no  (Include any pap smears or colon screenings in this section.) 3) Do you have an Advance Directive on file? Yes  
 
4) Are you interested in receiving information on Advance Directives? No 
 
Patient is accompanied by self I have received verbal consent from Markus 12. to discuss any/all medical information while they are present in the room.

## 2019-02-16 LAB
EST. AVERAGE GLUCOSE BLD GHB EST-MCNC: 151 MG/DL
HBA1C MFR BLD: 6.9 % (ref 4.8–5.6)

## 2019-02-21 ENCOUNTER — PATIENT OUTREACH (OUTPATIENT)
Dept: INTERNAL MEDICINE CLINIC | Age: 69
End: 2019-02-21

## 2019-02-27 ENCOUNTER — HOSPITAL ENCOUNTER (OUTPATIENT)
Dept: INFUSION THERAPY | Age: 69
Discharge: HOME | End: 2019-02-27
Payer: MEDICARE

## 2019-02-27 VITALS
SYSTOLIC BLOOD PRESSURE: 115 MMHG | TEMPERATURE: 97.7 F | OXYGEN SATURATION: 96 % | HEART RATE: 77 BPM | RESPIRATION RATE: 18 BRPM | DIASTOLIC BLOOD PRESSURE: 71 MMHG

## 2019-02-27 LAB
ALBUMIN SERPL-MCNC: 3.4 G/DL (ref 3.5–5)
ANION GAP SERPL CALC-SCNC: 9 MMOL/L (ref 5–15)
BUN SERPL-MCNC: 85 MG/DL (ref 6–20)
BUN/CREAT SERPL: 38 (ref 12–20)
CALCIUM SERPL-MCNC: 8.5 MG/DL (ref 8.5–10.1)
CHLORIDE SERPL-SCNC: 98 MMOL/L (ref 97–108)
CO2 SERPL-SCNC: 29 MMOL/L (ref 21–32)
CREAT SERPL-MCNC: 2.26 MG/DL (ref 0.7–1.3)
FERRITIN SERPL-MCNC: 115 NG/ML (ref 26–388)
GLUCOSE SERPL-MCNC: 131 MG/DL (ref 65–100)
HCT VFR BLD AUTO: 31.7 % (ref 36.6–50.3)
HGB BLD-MCNC: 9.9 G/DL (ref 12.1–17)
IRON SATN MFR SERPL: 16 % (ref 20–50)
IRON SERPL-MCNC: 49 UG/DL (ref 35–150)
PHOSPHATE SERPL-MCNC: 2.6 MG/DL (ref 2.6–4.7)
POTASSIUM SERPL-SCNC: 3.7 MMOL/L (ref 3.5–5.1)
SODIUM SERPL-SCNC: 136 MMOL/L (ref 136–145)
TIBC SERPL-MCNC: 300 UG/DL (ref 250–450)

## 2019-02-27 PROCEDURE — 80069 RENAL FUNCTION PANEL: CPT

## 2019-02-27 PROCEDURE — 36415 COLL VENOUS BLD VENIPUNCTURE: CPT

## 2019-02-27 PROCEDURE — 74011250636 HC RX REV CODE- 250/636: Performed by: INTERNAL MEDICINE

## 2019-02-27 PROCEDURE — 83540 ASSAY OF IRON: CPT

## 2019-02-27 PROCEDURE — 82728 ASSAY OF FERRITIN: CPT

## 2019-02-27 PROCEDURE — 96372 THER/PROPH/DIAG INJ SC/IM: CPT

## 2019-02-27 PROCEDURE — 85018 HEMOGLOBIN: CPT

## 2019-02-27 RX ADMIN — ERYTHROPOIETIN 40000 UNITS: 40000 INJECTION, SOLUTION INTRAVENOUS; SUBCUTANEOUS at 10:59

## 2019-02-27 NOTE — PROGRESS NOTES
[facility] [place]: 
Arrived - 1 Labs obtained: H/H, Renal Panel, Ferritin, Iron Profile Visit Vitals /71 (BP 1 Location: Right arm, BP Patient Position: Sitting) Pulse 77 Temp 97.7 °F (36.5 °C) Resp 18 SpO2 96% Recent Results (from the past 12 hour(s)) HGB & HCT Collection Time: 02/27/19 10:30 AM  
Result Value Ref Range HGB 9.9 (L) 12.1 - 17.0 g/dL HCT 31.7 (L) 36.6 - 50.3 % See [facility] for pending results. Assessment - unchanged. Procrit 40,000 units SQ slowly in L arm. 
 
1100 - Tolerated well. Pt [place] es any acute problems/changes. Discharged from [facility] ambulatory. No distress. Next appt: 3/27/19 at 1000

## 2019-03-01 ENCOUNTER — PATIENT OUTREACH (OUTPATIENT)
Dept: INTERNAL MEDICINE CLINIC | Age: 69
End: 2019-03-01

## 2019-03-03 NOTE — PROGRESS NOTES
Chief Complaint Patient presents with  CHF  
  2 week follow up SUBJECTIVE: 
 
Royal VELASCO Ivania Shields. is a 77 y.o. male who returns in follow-up of his medical problems include hypertension, CKD stage IV, anemia, atrial fibrillation, ASCVD, compensated CHF with cardiomyopathy and other medical problems. He is taking his medications and trying to follow his diet and trying to remain physically active. He does not note any significant swelling although he has some chronic swelling of his ankles that is always present when his weight is above 250. He denies any chest pain, shortness of breath, palpitations, PND, orthopnea or other cardiorespiratory complaints. He denies any GI or  complaints. He denies any headaches, dizziness or neurologic complaints. Current Outpatient Medications Medication Sig Dispense Refill  clonazePAM (KLONOPIN) 2 mg tablet TAKE 1 TABLET BY MOUTH EVERY NIGHT AT BEDTIME. MAY REPEAT DOSE IF YOU WAKE UP (Patient taking differently: TAKE 1 TABLET BY MOUTH EVERY NIGHT AT BEDTIME. MAY REPEAT DOSE IF YOU WAKE UP; 2 tabs at bedtime) 60 Tab 0  
 metOLazone (ZAROXOLYN) 2.5 mg tablet Take 1 Tab by mouth every other day. 30 Tab prn  ULORIC 40 mg tab tablet TAKE 1 TABLET BY MOUTH DAILY (Patient taking differently: TAKE 1 TABLET BY MOUTH DAILY; as needed) 90 Tab 3  
 ascorbic acid, vitamin C, (VITAMIN C) 250 mg tablet Take 1 Tab by mouth three (3) times daily. 100 Tab prn  potassium chloride (KLOR-CON) 20 mEq Take 1 Packet by mouth two (2) times daily (with meals). 60 Packet prn  
 sacubitril-valsartan (ENTRESTO) 24 mg/26 mg tablet Take 1 Tab by mouth every twenty-four (24) hours. 30 Tab prn  epoetin mary (EPOGEN;PROCRIT) 40,000 unit/mL injection 60,000 Units by SubCUTAneous route every thirty (30) days.  insulin glargine (LANTUS SOLOSTAR U-100 INSULIN) 100 unit/mL (3 mL) inpn 35 Units by SubCUTAneous route nightly.  OTHER Apply  to affected area daily as needed (Knee Pain). CBD Cream:  Apply daily as needed for knee pain  carvedilol (COREG) 12.5 mg tablet TAKE 1 TABLET BY MOUTH TWICE DAILY 60 Tab 11  tamsulosin (FLOMAX) 0.4 mg capsule TAKE 2 CAPSULES BY MOUTH EVERY  Cap 3  Ferrous Sulfate (SLOW FE) 47.5 mg iron TbER tablet Take 1 Tab by mouth three (3) times daily. 90 Tab prn  bumetanide (BUMEX) 2 mg tablet TAKE 2 TABLETS BY MOUTH TWICE DAILY 360 Tab 3  pramipexole (MIRAPEX) 0.5 mg tablet TAKE 1 TABLET BY MOUTH EVERY NIGHT AT BEDTIME (Patient taking differently: May take an additional tablet if needed) 90 Tab prn  colchicine (COLCRYS) 0.6 mg tablet Take 0.6 mg by mouth as needed (Gouty attack). Take 2 tablets at onset of gouty attack then 1 tablet every hour as needed  Liraglutide (VICTOZA) 0.6 mg/0.1 mL (18 mg/3 mL) sub-q pen 0.6 mg by SubCUTAneous route daily.  finasteride (PROSCAR) 5 mg tablet Take 5 mg by mouth daily. Past Medical History:  
Diagnosis Date  Anemia 8/5/2017  Anxiety 8/5/2017  Arrhythmia   
 atrial fibrillation 2014  ASCVD (arteriosclerotic cardiovascular disease) 8/5/2017  BPH (benign prostatic hyperplasia) 8/5/2017  CAD (coronary artery disease)   
 h/o stents  Cancer [facility])   
 h/o skin cancer  Cardiomyopathy ([address] 8/5/2017  CHF (congestive heart failure) ([address] 8/5/2017  Chronic kidney disease Stage IV  CKD (chronic kidney disease), stage IV ([address] 8/5/2017  Diabetes ([address]  Diabetes mellitus ([address] 8/5/2017  Diabetic neuropathy ([address] 8/5/2017  DJD (degenerative joint disease) 8/5/2017  ED (erectile dysfunction) 8/5/2017  Gout 8/5/2017  High cholesterol  Hyperlipidemia 8/5/2017  Hypertension  Hypertension with renal disease 8/5/2017  Hypothyroid 8/5/2017  Insomnia 8/5/2017  Obesity 8/5/2017  On statin therapy 8/5/2017  Restless leg 8/5/2017  Thyroid disease   
 hypothyroid Past Surgical History:  
Procedure Laterality Date  CARDIAC SURG PROCEDURE UNLIST    
 cardiac stents  CARDIAC SURG PROCEDURE UNLIST Ablation 5/17/2018 [facility] [place]  COLONOSCOPY N/A 6/28/2016 COLONOSCOPY performed by Riana Chow MD at [facility] ENDOSCOPY  COLONOSCOPY N/A 1/9/2019 COLONOSCOPY performed by Richie Johnson MD at [facility] ENDOSCOPY  COLONOSCOPY,DIAGNOSTIC  1/9/2019  HX APPENDECTOMY  HX BUNIONECTOMY    
 and removal of 2 seasmoid bone of the great toe 2/2018  HX HEENT Bilateral Cataract surgery  HX HEENT Tonsils  HX HEENT Axe wound to the head  HX KNEE ARTHROSCOPY X 2  
 HX ORTHOPAEDIC    
 HX ORTHOPAEDIC    
 partial laminectomy  HX PACEMAKER    
 HX ROTATOR CUFF REPAIR    
 bilateral  
 [place] INSJ ELTRD CAR DACIA SYS ATTCH PM/CVDFB PLS GEN N/A 1/28/2019 Lv Lead Placement To Previous Generator performed by Duane Bedoya MD at [facility] CARDIAC CATH LAB  [place] REMVL PERM PM PLS GEN W/REPL PLSE GEN MULT LEAD N/A 1/28/2019 Remove & Replace Ppm Gen Biv Multi Leads performed by Duane Bedoya MD at [address] [place] [address] CATH LAB Allergies Allergen Reactions  Niacin Unknown (comments) Other reaction(s): Unknown (comments)  Imipenem Diarrhea Other reaction(s): Rash  Levemir [Insulin Detemir] Hives  Other Medication Other (comments) ? Allergy to Amanda Hiss causing chemical burn  Primaxin [Imipenem-Cilastatin] Diarrhea and Rash  Xarelto [Rivaroxaban] Rash and Itching Other reaction(s): Rash REVIEW OF SYSTEMS: 
General: negative for - chills or fever, or weight loss or gain ENT: negative for - headaches, nasal congestion or tinnitus Eyes: no blurred or visual changes Neck: No stiffness or swollen nodes Respiratory: negative for - cough, hemoptysis, shortness of breath or wheezing Cardiovascular : negative for - chest pain, change of his chronic edema, palpitations or change of his chronic shortness of breath Gastrointestinal: negative for - abdominal pain, blood in stools, heartburn or nausea/vomiting Genito-Urinary: no dysuria, trouble voiding, or hematuria Musculoskeletal: negative for - gait disturbance, joint pain, joint stiffness or joint swelling Neurological: no TIA or stroke symptoms Hematologic: no bruises, no bleeding Lymphatic: no swollen glands Integument: no lumps, mole changes, nail changes or rash Endocrine:no malaise/lethargy poly uria or polydipsia or unexpected weight changes Social History Socioeconomic History  Marital status:  Spouse name: Not on file  Number of children: Not on file  Years of education: Not on file  Highest education level: Not on file Tobacco Use  Smoking status: Former Smoker Packs/day: 0.50 Years: 4.00 Pack years: 2.00 Last attempt to quit: 3/6/1972 Years since quittin.0  Smokeless tobacco: Never Used Substance and Sexual Activity  Alcohol use: No  
  Comment: rare, 1 drink per year  Drug use: No  
 Sexual activity: Not Currently Family History Problem Relation Age of Onset  Heart Disease Mother  Kidney Disease Mother  Heart Disease Father  Kidney Disease Father  Cancer Sister Breast  
 
 
OBJECTIVE:  
 
Visit Vitals /82 Pulse 68 Temp 99 °F (37.2 °C) (Oral) Resp 18 Ht 6' 4\" (1.93 m) Wt 256 lb 9.6 oz (116.4 kg) SpO2 99% BMI 31.23 kg/m² CONSTITUTIONAL:   well nourished, appears age appropriate EYES: sclera anicteric, PERRL, EOMI 
ENMT:nares clear, moist mucous membranes, pharynx clear NECK: supple. Thyroid normal, No JVD or bruits RESPIRATORY: Chest: clear to ascultation and percussion, normal inspiratory effort CARDIOVASCULAR: Heart: regular rate and rhythm no murmurs, rubs or gallops, PMI not displaced, No thrills. 1+ peripheral edema GASTROINTESTINAL: Abdomen: non distended, soft, non tender, bowel sounds normal 
HEMATOLOGIC: no purpura, petechiae or bruising LYMPHATIC: No lymph node enlargemant MUSCULOSKELETAL: Extremities: no edema or active synovitis, pulse 1+ INTEGUMENT: No unusual rashes or suspicious skin lesions noted. Nails appear normal. 
PERIPHERAL VASCULAR: normal pulses femoral, PT and DP NEUROLOGIC: non-focal exam, A & O X 3 PSYCHIATRIC:, appropriate affect ASSESSMENT:  
1. Hypertension with renal disease 2. CKD (chronic kidney disease), stage IV ([address] 3. Chronic systolic congestive heart failure ([address] 4. Paroxysmal atrial fibrillation ([address] 5. Ischemic cardiomyopathy 6. ASCVD (arteriosclerotic cardiovascular disease) 7. Controlled type 2 diabetes mellitus with stage 4 chronic kidney disease, without long-term current use of insulin ([address] 8. Anemia, unspecified type Impression 1. Hypertension that is controlled to continue current therapy reviewed with he and his wife. 2.  CKD stage IV repeat status is pending 3. Systolic CHF seem to be stable so continue current therapy 4. Paroxysmal atrial fibrillation rate controlled now in sinus rhythm as a pacemaker in place 5. Cardiomyopathy stable 6. ASCVD clinically stable continue aspirin 7. Diabetes stable on last check with A1c at that time 6.9 8. Anemia repeat status pending I will call the lab results and make further recommendations or adjustments if necessary. Follow-up as scheduled again for 2 weeks or sooner if there is a problem. PLAN: 
. Orders Placed This Encounter  CBC WITH AUTOMATED DIFF ([employer] In-Serious Parody)  METABOLIC PANEL, BASIC ([employer] In-House) ATTENTION:  
This medical record was transcribed using an electronic medical records system. Although proofread, it may and can contain electronic and spelling errors. Other human spelling and other errors may be present. Corrections may be executed at a later time. Please feel free to contact us for any clarifications as needed. Follow-up Disposition: 
Return in about 2 weeks (around 3/18/2019). No results found for any visits on 03/04/19. Lizbeth Day MD 
 
The patient verbalized understanding of the problems and plans as explained.

## 2019-03-04 ENCOUNTER — OFFICE VISIT (OUTPATIENT)
Dept: INTERNAL MEDICINE CLINIC | Age: 69
End: 2019-03-04

## 2019-03-04 VITALS
DIASTOLIC BLOOD PRESSURE: 82 MMHG | BODY MASS INDEX: 31.25 KG/M2 | WEIGHT: 256.6 LBS | TEMPERATURE: 99 F | SYSTOLIC BLOOD PRESSURE: 136 MMHG | RESPIRATION RATE: 18 BRPM | OXYGEN SATURATION: 99 % | HEART RATE: 68 BPM | HEIGHT: 76 IN

## 2019-03-04 DIAGNOSIS — I25.5 ISCHEMIC CARDIOMYOPATHY: ICD-10-CM

## 2019-03-04 DIAGNOSIS — I48.0 PAROXYSMAL ATRIAL FIBRILLATION (HCC): ICD-10-CM

## 2019-03-04 DIAGNOSIS — I12.9 HYPERTENSION WITH RENAL DISEASE: Primary | ICD-10-CM

## 2019-03-04 DIAGNOSIS — E11.22 CONTROLLED TYPE 2 DIABETES MELLITUS WITH STAGE 4 CHRONIC KIDNEY DISEASE, WITHOUT LONG-TERM CURRENT USE OF INSULIN (HCC): ICD-10-CM

## 2019-03-04 DIAGNOSIS — I25.10 ASCVD (ARTERIOSCLEROTIC CARDIOVASCULAR DISEASE): ICD-10-CM

## 2019-03-04 DIAGNOSIS — D64.9 ANEMIA, UNSPECIFIED TYPE: ICD-10-CM

## 2019-03-04 DIAGNOSIS — N18.4 CKD (CHRONIC KIDNEY DISEASE), STAGE IV (HCC): ICD-10-CM

## 2019-03-04 DIAGNOSIS — N18.4 CONTROLLED TYPE 2 DIABETES MELLITUS WITH STAGE 4 CHRONIC KIDNEY DISEASE, WITHOUT LONG-TERM CURRENT USE OF INSULIN (HCC): ICD-10-CM

## 2019-03-04 DIAGNOSIS — I50.22 CHRONIC SYSTOLIC CONGESTIVE HEART FAILURE (HCC): ICD-10-CM

## 2019-03-04 LAB
ANION GAP SERPL CALC-SCNC: 12 MMOL/L
BUN SERPL-MCNC: 79 MG/DL (ref 9–20)
CALCIUM SERPL-MCNC: 9.4 MG/DL (ref 8.4–10.2)
CHLORIDE SERPL-SCNC: 98 MMOL/L (ref 98–107)
CO2 SERPL-SCNC: 27 MMOL/L (ref 22–32)
CREAT SERPL-MCNC: 2.4 MG/DL (ref 0.8–1.5)
ERYTHROCYTE [DISTWIDTH] IN BLOOD BY AUTOMATED COUNT: 19.3 %
GLUCOSE SERPL-MCNC: 147 MG/DL (ref 75–110)
HCT VFR BLD AUTO: 32.9 % (ref 37–51)
HGB BLD-MCNC: 11.1 G/DL (ref 12–18)
LYMPHOCYTES ABSOLUTE: 0.6 K/UL (ref 0.6–4.1)
LYMPHOCYTES NFR BLD: 11.4 % (ref 10–58.5)
MCH RBC QN AUTO: 30.3 PG (ref 26–32)
MCHC RBC AUTO-ENTMCNC: 33.7 G/DL (ref 30–36)
MCV RBC AUTO: 89.9 FL (ref 80–97)
MONOCYTES ABS-DIF,2141: 0.5 K/UL (ref 0–1.8)
MONOCYTES NFR BLD: 8.7 % (ref 0.1–24)
NEUTROPHILS # BLD: 79.9 % (ref 37–92)
NEUTROPHILS ABS,2156: 4.5 K/UL (ref 2–7.8)
PLATELET # BLD AUTO: 132 K/UL (ref 140–440)
PMV BLD AUTO: 9.1 FL
POTASSIUM SERPL-SCNC: 3.8 MMOL/L (ref 3.6–5)
RBC # BLD AUTO: 3.66 M/UL (ref 4.2–6.3)
SODIUM SERPL-SCNC: 137 MMOL/L (ref 137–145)
WBC # BLD AUTO: 5.6 K/UL (ref 4.1–10.9)

## 2019-03-04 NOTE — PATIENT INSTRUCTIONS
Anemia: Care Instructions Your Care Instructions Anemia is a low level of red blood cells, which carry oxygen throughout your body. Many things can cause anemia. Lack of iron is one of the most common causes. Your body needs iron to make hemoglobin, a substance in red blood cells that carries oxygen from the lungs to your body's cells. Without enough iron, the body produces fewer and smaller red blood cells. As a result, your body's cells do not get enough oxygen, and you feel tired and weak. And you may have trouble concentrating. Bleeding is the most common cause of a lack of iron. You may have heavy menstrual bleeding or bleeding caused by conditions such as ulcers, hemorrhoids, or cancer. Regular use of aspirin or other anti-inflammatory medicines (such as ibuprofen) also can cause bleeding in some people. A lack of iron in your diet also can cause anemia, especially at times when the body needs more iron, such as during pregnancy, infancy, and the teen years. Your doctor may have prescribed iron pills. It may take several months of treatment for your iron levels to return to normal. Your doctor also may suggest that you eat foods that are rich in iron, such as meat and beans. There are many other causes of anemia. It is not always due to a lack of iron. Finding the specific cause of your anemia will help your doctor find the right treatment for you. Follow-up care is a key part of your treatment and safety. Be sure to make and go to all appointments, and call your doctor if you are having problems. It's also a good idea to know your test results and keep a list of the medicines you take. How can you care for yourself at home? · Take your medicines exactly as prescribed. Call your doctor if you think you are having a problem with your medicine. · If your doctor recommends iron pills, take them as directed: ? Try to take the pills on an empty stomach about 1 hour before or 2 hours after meals. But you may need to take iron with food to avoid an upset stomach. ? Do not take antacids or drink milk or caffeine drinks (such as coffee, tea, or cola) at the same time or within 2 hours of the time that you take your iron. They can make it hard for your body to absorb the iron. ? Vitamin C (from food or supplements) helps your body absorb iron. Try taking iron pills with a glass of orange juice or some other food that is high in vitamin C, such as citrus fruits. ? Iron pills may cause stomach problems, such as heartburn, nausea, diarrhea, constipation, and cramps. Be sure to drink plenty of fluids, and include fruits, vegetables, and fiber in your diet each day. Iron pills often make your bowel movements dark or green. ? If you forget to take an iron pill, do not take a double dose of iron the next time you take a pill. ? Keep iron pills out of the reach of small children. An overdose of iron can be very dangerous. · Follow your doctor's advice about eating iron-rich foods. These include red meat, shellfish, poultry, eggs, beans, raisins, whole-grain bread, and leafy green vegetables. · Steam vegetables to help them keep their iron content. When should you call for help? Call 911 anytime you think you may need emergency care. For example, call if: 
  · You have symptoms of a heart attack. These may include: 
? Chest pain or pressure, or a strange feeling in the chest. 
? Sweating. ? Shortness of breath. ? Nausea or vomiting. ? Pain, pressure, or a strange feeling in the back, neck, jaw, or upper belly or in one or both shoulders or arms. ? Lightheadedness or sudden weakness. ? A fast or irregular heartbeat. After you call 911, the  may tell you to chew 1 adult-strength or 2 to 4 low-dose aspirin. Wait for an ambulance. Do not try to drive yourself.  
  · You passed out (lost consciousness).  
 Call your doctor now or seek immediate medical care if:   · You have new or increased shortness of breath.  
  · You are dizzy or lightheaded, or you feel like you may faint.  
  · Your fatigue and weakness continue or get worse.  
  · You have any abnormal bleeding, such as: 
? Nosebleeds. ? Vaginal bleeding that is different (heavier, more frequent, at a different time of the month) than what you are used to. 
? Bloody or black stools, or rectal bleeding. ? Bloody or pink urine.  
 Watch closely for changes in your health, and be sure to contact your doctor if: 
  · You do not get better as expected. Where can you learn more? Go to http://gildardo-palak.info/. Enter R301 in the search box to learn more about \"Anemia: Care Instructions. \" Current as of: May 6, 2018 Content Version: 11.9 © 3236-2326 [employer]. Care instructions adapted under license by [employer] (which disclaims liability or warranty for this information). If you have questions about a medical condition or this instruction, always ask your healthcare professional. [place] [address] any warranty or liability for your use of this information.

## 2019-03-04 NOTE — PROGRESS NOTES
Identified pt with two pt identifiers(name and ). Reviewed record in preparation for visit and have obtained necessary documentation. No chief complaint on file. Health Maintenance Due Topic  DTaP/Tdap/Td series (1 - Tdap)  Shingrix Vaccine Age 50> (1 of 2)  AAA Screening 73-67 YO Male Smoking Patients Coordination of Care Questionnaire: 
:  
1) Have you been to an emergency room, urgent care, or hospitalized since your last visit? If yes, where when, and reason for visit? no  
 
 
2. Have seen or consulted any other health care provider since your last visit? If yes, where when, and reason for visit? NO 
 
 
3) Do you have an Advanced Directive/ Living Will in place? YES If yes, do we have a copy on file YES If no, would you like information NO Patient is accompanied by wife I have received verbal consent from Markus 12. to discuss any/all medical information while they are present in the room.

## 2019-03-06 RX ORDER — GABAPENTIN 300 MG/1
300 CAPSULE ORAL 3 TIMES DAILY
Qty: 30 CAP | Refills: 5 | Status: SHIPPED | OUTPATIENT
Start: 2019-03-06 | End: 2019-04-22

## 2019-03-06 NOTE — TELEPHONE ENCOUNTER
RX refill request from the patient/pharmacy. Patient last seen 03- with labs, and next appt. scheduled for 03-  Requested Prescriptions     Pending Prescriptions Disp Refills    gabapentin (NEURONTIN) 300 mg capsule 30 Cap 5     Sig: Take 1 Cap by mouth three (3) times daily. Ellen Dickey

## 2019-03-07 RX ORDER — GABAPENTIN 300 MG/1
300 CAPSULE ORAL
Qty: 30 CAP | Refills: 5 | Status: CANCELLED | OUTPATIENT
Start: 2019-03-07

## 2019-03-07 NOTE — TELEPHONE ENCOUNTER
RX refill request from the patient/pharmacy. Patient last seen 03- with labs, and next appt. scheduled for 03-  Requested Prescriptions      No prescriptions requested or ordered in this encounter   .

## 2019-03-14 ENCOUNTER — NURSE NAVIGATOR (OUTPATIENT)
Dept: PALLATIVE CARE | Age: 69
End: 2019-03-14

## 2019-03-14 ENCOUNTER — TELEPHONE (OUTPATIENT)
Dept: PALLATIVE CARE | Age: 69
End: 2019-03-14

## 2019-03-14 DIAGNOSIS — F41.9 ANXIETY: ICD-10-CM

## 2019-03-14 DIAGNOSIS — E11.42 DIABETIC POLYNEUROPATHY ASSOCIATED WITH TYPE 2 DIABETES MELLITUS (HCC): ICD-10-CM

## 2019-03-14 DIAGNOSIS — I48.0 PAROXYSMAL ATRIAL FIBRILLATION (HCC): ICD-10-CM

## 2019-03-14 DIAGNOSIS — I25.5 ISCHEMIC CARDIOMYOPATHY: ICD-10-CM

## 2019-03-14 DIAGNOSIS — I50.23 ACUTE ON CHRONIC SYSTOLIC CONGESTIVE HEART FAILURE (HCC): Primary | ICD-10-CM

## 2019-03-14 DIAGNOSIS — E11.22 CONTROLLED TYPE 2 DIABETES MELLITUS WITH STAGE 4 CHRONIC KIDNEY DISEASE, WITHOUT LONG-TERM CURRENT USE OF INSULIN (HCC): ICD-10-CM

## 2019-03-14 DIAGNOSIS — F51.01 PRIMARY INSOMNIA: ICD-10-CM

## 2019-03-14 DIAGNOSIS — N18.4 CONTROLLED TYPE 2 DIABETES MELLITUS WITH STAGE 4 CHRONIC KIDNEY DISEASE, WITHOUT LONG-TERM CURRENT USE OF INSULIN (HCC): ICD-10-CM

## 2019-03-14 NOTE — TELEPHONE ENCOUNTER
Zhane is calling to refer patient to Palliative. Patient would prefer to go to [facility] office. Advised that referral nurse would call her back to discuss Palliative services and review history with her.

## 2019-03-14 NOTE — PROGRESS NOTES
[facility] Palliative Medicine Office  Nursing Note  [phone]  Fax [phone]      Name:  Felipe Bardales. YOB: 1950    Pt's wife Zhane called inquiring about a Palliative Medicine appt for her  Royal TJ Nolan for symptom management and care decisions. Chart  reviewed. Felipe Bardales. is a 77 y.o. male with hypertension, CKD stage IV, anemia, atrial fibrillation, ASCVD, acute on chronic compensated systolic CHF with cardiomyopathy , DM type 2 with neuropathy. Pt's most recent office visit with his PCP Dr. Jadiel Gurrola was on 3/4/19. ACP:    [facility] signed on 1/4/19 is on file.                                                                                                                                              Wife states pt is declining and he is experiencing occasional LE edema and weight gain, insomnia, and pruritus. She says pt is compliant with prescribed medications and he weighs himself daily.   Outpatient Palliative appt scheduled for 4/22/19 at 10:30am, earlier appt offered but wife prefers [address] [place] [address] location.  UMESH ChaudhariN, RN-BC  Clinical Referral Navigator  [phone]

## 2019-03-18 ENCOUNTER — OFFICE VISIT (OUTPATIENT)
Dept: INTERNAL MEDICINE CLINIC | Age: 69
End: 2019-03-18

## 2019-03-18 VITALS
SYSTOLIC BLOOD PRESSURE: 130 MMHG | HEART RATE: 70 BPM | HEIGHT: 76 IN | BODY MASS INDEX: 32.03 KG/M2 | WEIGHT: 263 LBS | OXYGEN SATURATION: 98 % | RESPIRATION RATE: 20 BRPM | DIASTOLIC BLOOD PRESSURE: 76 MMHG | TEMPERATURE: 97.7 F

## 2019-03-18 DIAGNOSIS — I50.23 ACUTE ON CHRONIC SYSTOLIC CONGESTIVE HEART FAILURE (HCC): ICD-10-CM

## 2019-03-18 DIAGNOSIS — I25.5 ISCHEMIC CARDIOMYOPATHY: ICD-10-CM

## 2019-03-18 DIAGNOSIS — I12.9 HYPERTENSION WITH RENAL DISEASE: Primary | ICD-10-CM

## 2019-03-18 DIAGNOSIS — D64.9 ANEMIA, UNSPECIFIED TYPE: ICD-10-CM

## 2019-03-18 DIAGNOSIS — N18.4 CKD (CHRONIC KIDNEY DISEASE), STAGE IV (HCC): ICD-10-CM

## 2019-03-18 LAB
ANION GAP SERPL CALC-SCNC: 14 MMOL/L
BUN SERPL-MCNC: 89 MG/DL (ref 9–20)
CALCIUM SERPL-MCNC: 8.8 MG/DL (ref 8.4–10.2)
CHLORIDE SERPL-SCNC: 95 MMOL/L (ref 98–107)
CO2 SERPL-SCNC: 27 MMOL/L (ref 22–32)
CREAT SERPL-MCNC: 2.2 MG/DL (ref 0.8–1.5)
ERYTHROCYTE [DISTWIDTH] IN BLOOD BY AUTOMATED COUNT: 18.3 %
GLUCOSE SERPL-MCNC: 83 MG/DL (ref 75–110)
HCT VFR BLD AUTO: 33.4 % (ref 37–51)
HGB BLD-MCNC: 10.9 G/DL (ref 12–18)
MCH RBC QN AUTO: 29.3 PG (ref 26–32)
MCHC RBC AUTO-ENTMCNC: 32.6 G/DL (ref 30–36)
MCV RBC AUTO: 89.8 FL (ref 80–97)
PLATELET # BLD AUTO: 160 K/UL (ref 140–440)
PMV BLD AUTO: 8.8 FL
POTASSIUM SERPL-SCNC: 4.4 MMOL/L (ref 3.6–5)
RBC # BLD AUTO: 3.72 M/UL (ref 4.2–6.3)
SODIUM SERPL-SCNC: 136 MMOL/L (ref 137–145)
WBC # BLD AUTO: 5.4 K/UL (ref 4.1–10.9)
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
## 2019-03-18 NOTE — PROGRESS NOTES
Identified pt with two pt identifiers(name and ). Reviewed record in preparation for visit and have obtained necessary documentation. Chief Complaint   Patient presents with    CHF     2 week follow up        Health Maintenance Due   Topic    DTaP/Tdap/Td series (1 - Tdap)    Shingrix Vaccine Age 49> (1 of 2)    AAA Screening 73-67 YO Male Smoking Patients        Coordination of Care Questionnaire:  :   1) Have you been to an emergency room, urgent care, or hospitalized since your last visit? If yes, where when, and reason for visit? no       2. Have seen or consulted any other health care provider since your last visit? If yes, where when, and reason for visit? NO      3) Do you have an Advanced Directive/ Living Will in place? YES  If yes, do we have a copy on file YES  If no, would you like information NO    Patient is accompanied by self I have received verbal consent from Markus Hatch. to discuss any/all medical information while they are present in the room.   \

## 2019-03-18 NOTE — PATIENT INSTRUCTIONS

## 2019-03-25 ENCOUNTER — PATIENT OUTREACH (OUTPATIENT)
Dept: INTERNAL MEDICINE CLINIC | Age: 69
End: 2019-03-25

## 2019-04-02 ENCOUNTER — OFFICE VISIT (OUTPATIENT)
Dept: INTERNAL MEDICINE CLINIC | Age: 69
End: 2019-04-02

## 2019-04-02 VITALS
HEART RATE: 70 BPM | DIASTOLIC BLOOD PRESSURE: 80 MMHG | OXYGEN SATURATION: 99 % | TEMPERATURE: 98.7 F | WEIGHT: 252.8 LBS | RESPIRATION RATE: 20 BRPM | HEIGHT: 76 IN | SYSTOLIC BLOOD PRESSURE: 138 MMHG | BODY MASS INDEX: 30.78 KG/M2

## 2019-04-02 DIAGNOSIS — I50.23 ACUTE ON CHRONIC SYSTOLIC CONGESTIVE HEART FAILURE (HCC): ICD-10-CM

## 2019-04-02 DIAGNOSIS — I25.5 ISCHEMIC CARDIOMYOPATHY: ICD-10-CM

## 2019-04-02 DIAGNOSIS — N18.4 CKD (CHRONIC KIDNEY DISEASE), STAGE IV (HCC): ICD-10-CM

## 2019-04-02 DIAGNOSIS — M17.11 PRIMARY OSTEOARTHRITIS OF RIGHT KNEE: ICD-10-CM

## 2019-04-02 DIAGNOSIS — D64.9 ANEMIA, UNSPECIFIED TYPE: ICD-10-CM

## 2019-04-02 DIAGNOSIS — I48.0 PAROXYSMAL ATRIAL FIBRILLATION (HCC): ICD-10-CM

## 2019-04-02 DIAGNOSIS — I12.9 HYPERTENSION WITH RENAL DISEASE: Primary | ICD-10-CM

## 2019-04-02 DIAGNOSIS — M17.12 PRIMARY OSTEOARTHRITIS OF LEFT KNEE: ICD-10-CM

## 2019-04-02 LAB
ANION GAP SERPL CALC-SCNC: 19 MMOL/L
BUN SERPL-MCNC: 99 MG/DL (ref 9–20)
CALCIUM SERPL-MCNC: 9.8 MG/DL (ref 8.4–10.2)
CHLORIDE SERPL-SCNC: 85 MMOL/L (ref 98–107)
CO2 SERPL-SCNC: 34 MMOL/L (ref 22–32)
CREAT SERPL-MCNC: 2.4 MG/DL (ref 0.8–1.5)
ERYTHROCYTE [DISTWIDTH] IN BLOOD BY AUTOMATED COUNT: 16.6 %
GLUCOSE SERPL-MCNC: 118 MG/DL (ref 75–110)
HCT VFR BLD AUTO: 30 % (ref 37–51)
HGB BLD-MCNC: 10.3 G/DL (ref 12–18)
LYMPHOCYTES ABSOLUTE: 0.7 K/UL (ref 0.6–4.1)
LYMPHOCYTES NFR BLD: 11.2 % (ref 10–58.5)
MCH RBC QN AUTO: 31.6 PG (ref 26–32)
MCHC RBC AUTO-ENTMCNC: 34.3 G/DL (ref 30–36)
MCV RBC AUTO: 92 FL (ref 80–97)
MONOCYTES ABS-DIF,2141: 0.7 K/UL (ref 0–1.8)
MONOCYTES NFR BLD: 10.8 % (ref 0.1–24)
NEUTROPHILS # BLD: 78 % (ref 37–92)
NEUTROPHILS ABS,2156: 4.9 K/UL (ref 2–7.8)
PLATELET # BLD AUTO: 147 K/UL (ref 140–440)
PMV BLD AUTO: 8.3 FL
POTASSIUM SERPL-SCNC: 3.4 MMOL/L (ref 3.6–5)
RBC # BLD AUTO: 3.26 M/UL (ref 4.2–6.3)
SODIUM SERPL-SCNC: 138 MMOL/L (ref 137–145)
WBC # BLD AUTO: 6.3 K/UL (ref 4.1–10.9)

## 2019-04-02 RX ORDER — METHYLPREDNISOLONE ACETATE 40 MG/ML
40 INJECTION, SUSPENSION INTRA-ARTICULAR; INTRALESIONAL; INTRAMUSCULAR; SOFT TISSUE ONCE
Qty: 1 VIAL | Refills: 0
Start: 2019-04-02 | End: 2019-04-02

## 2019-04-02 NOTE — PROGRESS NOTES
Identified pt with two pt identifiers(name and ). Reviewed record in preparation for visit and have obtained necessary documentation. Chief Complaint Patient presents with  CHF  
  2 weeks follow up  Knee Pain  
  bilateral    
  
 
Health Maintenance Due Topic  DTaP/Tdap/Td series (1 - Tdap)  Shingrix Vaccine Age 50> (1 of 2)  AAA Screening 73-69 YO Male Smoking Patients Coordination of Care Questionnaire: 
:  
1) Have you been to an emergency room, urgent care, or hospitalized since your last visit? If yes, where when, and reason for visit? no  
 
 
2. Have seen or consulted any other health care provider since your last visit? If yes, where when, and reason for visit? NO 
 
 
3) Do you have an Advanced Directive/ Living Will in place? YES If yes, do we have a copy on file YES If no, would you like information NO Patient is accompanied by wife I have received verbal consent from Markus 12. to discuss any/all medical information while they are present in the room.
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
## 2019-04-02 NOTE — PATIENT INSTRUCTIONS
Arthritis: Care Instructions Your Care Instructions Arthritis, also called osteoarthritis, is a breakdown of the cartilage that cushions your joints. When the cartilage wears down, your bones rub against each other. This causes pain and stiffness. Many people have some arthritis as they age. Arthritis most often affects the joints of the spine, hands, hips, knees, or feet. You can take simple measures to protect your joints, ease your pain, and help you stay active. Follow-up care is a key part of your treatment and safety. Be sure to make and go to all appointments, and call your doctor if you are having problems. It's also a good idea to know your test results and keep a list of the medicines you take. How can you care for yourself at home? · Stay at a healthy weight. Being overweight puts extra strain on your joints. · Talk to your doctor or physical therapist about exercises that will help ease joint pain. ? Stretch. You may enjoy gentle forms of yoga to help keep your joints and muscles flexible. ? Walk instead of jog. Other types of exercise that are less stressful on the joints include riding a bicycle, swimming, bang chi, or water exercise. ? Lift weights. Strong muscles help reduce stress on your joints. Stronger thigh muscles, for example, take some of the stress off of the knees and hips. Learn the right way to lift weights so you do not make joint pain worse. · Take your medicines exactly as prescribed. Call your doctor if you think you are having a problem with your medicine. · Take pain medicines exactly as directed. ? If the doctor gave you a prescription medicine for pain, take it as prescribed. ? If you are not taking a prescription pain medicine, ask your doctor if you can take an over-the-counter medicine. · Use a cane, crutch, walker, or another device if you need help to get around. These can help rest your joints.  You also can use other things to make life easier, such as a higher toilet seat and padded handles on kitchen utensils. · Do not sit in low chairs, which can make it hard to get up. · Put heat or cold on your sore joints as needed. Use whichever helps you most. You also can take turns with hot and cold packs. ? Apply heat 2 or 3 times a day for 20 to 30 minutesusing a heating pad, hot shower, or hot packto relieve pain and stiffness. ? Put ice or a cold pack on your sore joint for 10 to 20 minutes at a time. Put a thin cloth between the ice and your skin. When should you call for help? Call your doctor now or seek immediate medical care if: 
  · You have sudden swelling, warmth, or pain in any joint.  
  · You have joint pain and a fever or rash.  
  · You have such bad pain that you cannot use a joint.  
 Watch closely for changes in your health, and be sure to contact your doctor if: 
  · You have mild joint symptoms that continue even with more than 6 weeks of care at home.  
  · You have stomach pain or other problems with your medicine. Where can you learn more? Go to http://gildardo-palak.info/. Enter J262 in the search box to learn more about \"Arthritis: Care Instructions. \" Current as of: Gi 10, 2018 Content Version: 11.9 © 7511-9911 [employer]. Care instructions adapted under license by [employer] (which disclaims liability or warranty for this information). If you have questions about a medical condition or this instruction, always ask your healthcare professional. [place] [address] any warranty or liability for your use of this information.

## 2019-04-02 NOTE — PROGRESS NOTES
Per verbal order of Dr. Ramesh Gamboa, Depo-Medrol 40 mg/mL and 1/2 mL of 1% Lidocaine were drawn up. Consent form was filled out and signed by patient, witnessed by nurse and signed by provider. Dr. Brian Zhong injected patient's right and left. Patient tolerated procedure well and was monitored for 15 minutes. No side effects noted. Notified patient to call the office with any adverse reactions.

## 2019-04-03 NOTE — PROGRESS NOTES
Kidney function and hemoglobin are stable. Potassium is a little bit low. See what dose of potassium he is currently on so we can Adjustment in that.

## 2019-04-04 RX ORDER — POTASSIUM CHLORIDE 1.5 G/1.77G
POWDER, FOR SOLUTION ORAL
Qty: 90 PACKET | Status: ON HOLD | OUTPATIENT
Start: 2019-04-04 | End: 2020-02-17

## 2019-04-04 NOTE — PROGRESS NOTES
Discussed lab with patient's wife. Refill for increased dose of patient's potassium generated per verbal order of Dr. Chuck Edwards. Fup as scheduled.

## 2019-04-04 NOTE — TELEPHONE ENCOUNTER
RX refill request from the patient/pharmacy. Patient last seen 04- with labs, and next appt. scheduled for 04-  Requested Prescriptions     Pending Prescriptions Disp Refills    potassium chloride (KLOR-CON) 20 mEq pack 90 Packet prn     Sig: One packet three times daily   .

## 2019-04-08 RX ORDER — PRAMIPEXOLE DIHYDROCHLORIDE 0.25 MG/1
TABLET ORAL
Qty: 90 TAB | Refills: 3 | Status: SHIPPED | OUTPATIENT
Start: 2019-04-08 | End: 2019-04-17 | Stop reason: ALTCHOICE

## 2019-04-08 NOTE — TELEPHONE ENCOUNTER
RX refill request from the patient/pharmacy. Patient last seen 04- with labs, and next appt. scheduled for 04-  Requested Prescriptions     Pending Prescriptions Disp Refills    pramipexole (MIRAPEX) 0.25 mg tablet [Pharmacy Med Name: PRAMIPEXOLE 0.25MG TABLETS] 90 Tab 3     Sig: TAKE 1 TABLET BY MOUTH EVERY NIGHT AT BEDTIME   .

## 2019-04-16 NOTE — PROGRESS NOTES
Chief Complaint Patient presents with  CHF  
  2 week follow up  Hypertension  Diabetes  Chronic Kidney Disease SUBJECTIVE: 
 
Royal KRISTA Colmenares. is a 77 y.o. male who returns in follow-up of his medical problems include hypertension, CHF compensated, atrial fibrillation, CKD stage IV, anemia, cardiomyopathy and other medical problems. He is taking his medications and trying to follow his diet and trying to get around and get some physical activities none. He currently denies any chest pain, shortness of breath, palpitations, PND, orthopnea or other cardiorespiratory complaints. He is sleeping better except he did not sleep well last night. He does note the swelling in his ankles is less. He has no other complaints noted  Except his chronic unchanged arthritic complaints. Current Outpatient Medications Medication Sig Dispense Refill  potassium chloride (KLOR-CON) 20 mEq pack One packet three times daily 90 Packet prn  
 gabapentin (NEURONTIN) 300 mg capsule Take 1 Cap by mouth three (3) times daily. 30 Cap 5  clonazePAM (KLONOPIN) 2 mg tablet TAKE 1 TABLET BY MOUTH EVERY NIGHT AT BEDTIME. MAY REPEAT DOSE IF YOU WAKE UP (Patient taking differently: TAKE 1 TABLET BY MOUTH EVERY NIGHT AT BEDTIME. MAY REPEAT DOSE IF YOU WAKE UP; 2 tabs at bedtime) 60 Tab 0  
 metOLazone (ZAROXOLYN) 2.5 mg tablet Take 1 Tab by mouth every other day. 30 Tab prn  ULORIC 40 mg tab tablet TAKE 1 TABLET BY MOUTH DAILY (Patient taking differently: TAKE 1 TABLET BY MOUTH DAILY; as needed) 90 Tab 3  
 ascorbic acid, vitamin C, (VITAMIN C) 250 mg tablet Take 1 Tab by mouth three (3) times daily. 100 Tab prn  insulin glargine (LANTUS SOLOSTAR U-100 INSULIN) 100 unit/mL (3 mL) inpn 35 Units by SubCUTAneous route nightly.  OTHER Apply  to affected area daily as needed (Knee Pain). CBD Cream:  Apply daily as needed for knee pain  carvedilol (COREG) 12.5 mg tablet TAKE 1 TABLET BY MOUTH TWICE DAILY 60 Tab 11  tamsulosin (FLOMAX) 0.4 mg capsule TAKE 2 CAPSULES BY MOUTH EVERY  Cap 3  Ferrous Sulfate (SLOW FE) 47.5 mg iron TbER tablet Take 1 Tab by mouth three (3) times daily. 90 Tab prn  bumetanide (BUMEX) 2 mg tablet TAKE 2 TABLETS BY MOUTH TWICE DAILY 360 Tab 3  pramipexole (MIRAPEX) 0.5 mg tablet TAKE 1 TABLET BY MOUTH EVERY NIGHT AT BEDTIME (Patient taking differently: May take an additional tablet if needed) 90 Tab prn  colchicine (COLCRYS) 0.6 mg tablet Take 0.6 mg by mouth as needed (Gouty attack). Take 2 tablets at onset of gouty attack then 1 tablet every hour as needed  Liraglutide (VICTOZA) 0.6 mg/0.1 mL (18 mg/3 mL) sub-q pen 0.6 mg by SubCUTAneous route daily.  finasteride (PROSCAR) 5 mg tablet Take 5 mg by mouth daily. Past Medical History:  
Diagnosis Date  Anemia 8/5/2017  Anxiety 8/5/2017  Arrhythmia   
 atrial fibrillation 2014  ASCVD (arteriosclerotic cardiovascular disease) 8/5/2017  BPH (benign prostatic hyperplasia) 8/5/2017  CAD (coronary artery disease)   
 h/o stents  Cancer [facility])   
 h/o skin cancer  Cardiomyopathy ([address] 8/5/2017  CHF (congestive heart failure) ([address] 8/5/2017  Chronic kidney disease Stage IV  CKD (chronic kidney disease), stage IV ([address] 8/5/2017  Diabetes ([address]  Diabetes mellitus ([address] 8/5/2017  Diabetic neuropathy ([address] 8/5/2017  DJD (degenerative joint disease) 8/5/2017  ED (erectile dysfunction) 8/5/2017  Gout 8/5/2017  High cholesterol  Hyperlipidemia 8/5/2017  Hypertension  Hypertension with renal disease 8/5/2017  Hypothyroid 8/5/2017  Insomnia 8/5/2017  Obesity 8/5/2017  On statin therapy 8/5/2017  Restless leg 8/5/2017  Thyroid disease   
 hypothyroid Past Surgical History:  
Procedure Laterality Date  CARDIAC SURG PROCEDURE UNLIST    
 cardiac stents  CARDIAC SURG PROCEDURE UNLIST Ablation 5/17/2018 [address] [place] Alcira Parrish  COLONOSCOPY N/A 6/28/2016 COLONOSCOPY performed by Andrés Dickey MD at [facility] ENDOSCOPY  COLONOSCOPY N/A 1/9/2019 COLONOSCOPY performed by Ifeoma Godfrey MD at [facility] ENDOSCOPY  COLONOSCOPY,DIAGNOSTIC  1/9/2019  HX APPENDECTOMY  HX BUNIONECTOMY    
 and removal of 2 seasmoid bone of the great toe 2/2018  HX HEENT Bilateral Cataract surgery  HX HEENT Tonsils  HX HEENT Axe wound to the head  HX KNEE ARTHROSCOPY X 2  
 HX ORTHOPAEDIC    
 HX ORTHOPAEDIC    
 partial laminectomy  HX PACEMAKER    
 HX ROTATOR CUFF REPAIR    
 bilateral  
 OH INSJ ELTRD CAR DACIA SYS ATTCH PM/CVDFB PLS GEN N/A 1/28/2019 Lv Lead Placement To Previous Generator performed by Graciela Eagle MD at [facility] CARDIAC CATH LAB  OH REMVL PERM PM PLS GEN W/REPL PLSE GEN MULT LEAD N/A 1/28/2019 Remove & Replace Ppm Gen Biv Multi Leads performed by Graciela Eagle MD at [id number] [place] [address] CATH LAB Allergies Allergen Reactions  Niacin Unknown (comments) Other reaction(s): Unknown (comments)  Imipenem Diarrhea Other reaction(s): Rash  Levemir [Insulin Detemir] Hives  Other Medication Other (comments) ? Allergy to Jeremiah Daryn causing chemical burn  Primaxin [Imipenem-Cilastatin] Diarrhea and Rash  Xarelto [Rivaroxaban] Rash and Itching Other reaction(s): Rash REVIEW OF SYSTEMS: 
General: negative for - chills or fever, or weight loss or gain ENT: negative for - headaches, nasal congestion or tinnitus Eyes: no blurred or visual changes Neck: No stiffness or swollen nodes Respiratory: negative for - cough, hemoptysis, shortness of breath or wheezing Cardiovascular : negative for - chest pain, edema, palpitations or shortness of breath Gastrointestinal: negative for - abdominal pain, blood in stools, heartburn or nausea/vomiting Genito-Urinary: no dysuria, trouble voiding, or hematuria Musculoskeletal: negative for - gait disturbance, change of his chronic joint pain, joint stiffness or joint swelling Neurological: no TIA or stroke symptoms Hematologic: no bruises, no bleeding Lymphatic: no swollen glands Integument: no lumps, mole changes, nail changes or rash Endocrine:no malaise/lethargy poly uria or polydipsia or unexpected weight changes Social History Socioeconomic History  Marital status:  Spouse name: Not on file  Number of children: Not on file  Years of education: Not on file  Highest education level: Not on file Tobacco Use  Smoking status: Former Smoker Packs/day: 0.50 Years: 4.00 Pack years: 2.00 Last attempt to quit: 3/6/1972 Years since quittin.1  Smokeless tobacco: Never Used Substance and Sexual Activity  Alcohol use: No  
  Comment: rare, 1 drink per year  Drug use: No  
 Sexual activity: Not Currently Family History Problem Relation Age of Onset  Heart Disease Mother  Kidney Disease Mother  Heart Disease Father  Kidney Disease Father  Cancer Sister Breast  
 
 
OBJECTIVE:  
 
Visit Vitals /80 (BP 1 Location: Left arm, BP Patient Position: Sitting) Pulse 67 Temp 99.1 °F (37.3 °C) (Oral) Resp 18 Ht 6' 4\" (1.93 m) Wt 244 lb 9.6 oz (110.9 kg) SpO2 97% BMI 29.77 kg/m² CONSTITUTIONAL:   well nourished, appears age appropriate EYES: sclera anicteric, PERRL, EOMI 
ENMT:nares clear, moist mucous membranes, pharynx clear NECK: supple. Thyroid normal, No JVD or bruits RESPIRATORY: Chest: clear to ascultation and percussion, normal inspiratory effort CARDIOVASCULAR: Heart: regular rate and rhythm no murmurs, rubs or gallops, PMI not displaced, No thrills. Trace to 1+ peripheral edema GASTROINTESTINAL: Abdomen: non distended, soft, non tender, bowel sounds normal 
 HEMATOLOGIC: no purpura, petechiae or bruising LYMPHATIC: No lymph node enlargemant MUSCULOSKELETAL: Extremities: no edema or active synovitis, pulse 1+ INTEGUMENT: No unusual rashes or suspicious skin lesions noted. Nails appear normal. 
PERIPHERAL VASCULAR: normal pulses femoral, PT and DP NEUROLOGIC: non-focal exam, A & O X 3 PSYCHIATRIC:, appropriate affect ASSESSMENT:  
1. Hypertension with renal disease 2. CKD (chronic kidney disease), stage IV ([address] 3. Ischemic cardiomyopathy 4. Paroxysmal atrial fibrillation (HCC) 5. ASCVD (arteriosclerotic cardiovascular disease) 6. Anemia, unspecified type Impression 1. Hypertension that is controlled to continue current therapy reviewed with him. 2.  CKD stage IV repeat status pending 3. ASCVD clinically stable continue aspirin 4. Anemia repeat status pending 5. Cardiomyopathy stable 6. Paroxysmal atrial fibrillation stable 7. CHF now compensated I will call the lab results and make further recommendations or adjustments if necessary. Follow-up as scheduled again for 2 weeks or sooner should to be a problem. PLAN: 
. Orders Placed This Encounter  CBC WITH AUTOMATED DIFF  
 METABOLIC PANEL, BASIC  
 
 
 
ATTENTION:  
This medical record was transcribed using an electronic medical records system. Although proofread, it may and can contain electronic and spelling errors. Other human spelling and other errors may be present. Corrections may be executed at a later time. Please feel free to contact us for any clarifications as needed. Follow-up and Dispositions · Return in about 2 weeks (around 5/1/2019). No results found for any visits on 04/17/19. Stefania Bell MD 
 
The patient verbalized understanding of the problems and plans as explained.

## 2019-04-17 ENCOUNTER — OFFICE VISIT (OUTPATIENT)
Dept: INTERNAL MEDICINE CLINIC | Age: 69
End: 2019-04-17

## 2019-04-17 VITALS
DIASTOLIC BLOOD PRESSURE: 80 MMHG | TEMPERATURE: 99.1 F | RESPIRATION RATE: 18 BRPM | HEIGHT: 76 IN | HEART RATE: 67 BPM | OXYGEN SATURATION: 97 % | WEIGHT: 244.6 LBS | SYSTOLIC BLOOD PRESSURE: 130 MMHG | BODY MASS INDEX: 29.79 KG/M2

## 2019-04-17 DIAGNOSIS — I25.10 ASCVD (ARTERIOSCLEROTIC CARDIOVASCULAR DISEASE): ICD-10-CM

## 2019-04-17 DIAGNOSIS — D64.9 ANEMIA, UNSPECIFIED TYPE: ICD-10-CM

## 2019-04-17 DIAGNOSIS — I12.9 HYPERTENSION WITH RENAL DISEASE: Primary | ICD-10-CM

## 2019-04-17 DIAGNOSIS — N18.4 CKD (CHRONIC KIDNEY DISEASE), STAGE IV (HCC): ICD-10-CM

## 2019-04-17 DIAGNOSIS — I48.0 PAROXYSMAL ATRIAL FIBRILLATION (HCC): ICD-10-CM

## 2019-04-17 DIAGNOSIS — I25.5 ISCHEMIC CARDIOMYOPATHY: ICD-10-CM

## 2019-04-17 NOTE — PROGRESS NOTES
Royal David Carlisle. Identified pt with two pt identifiers(name and ). Chief Complaint Patient presents with  CHF  
  2 week follow up  Hypertension  Diabetes  Chronic Kidney Disease 1. Have you been to the ER, urgent care clinic since your last visit? Hospitalized since your last visit? No 
 
2. Have you seen or consulted any other health care providers outside of the [address] [place] [address] since your last visit? Include any pap smears or colon screening. No 
 
 
Health Maintenance Topics with due status: Overdue Topic Date Due DTaP/Tdap/Td series 1969 Shingrix Vaccine Age 50> 1998 AAA Screening 73-67 YO Male Smoking Patients 2013 Health Maintenance Topics with due status: Not Due Topic Last Completion Date MEDICARE YEARLY EXAM 2018 MICROALBUMIN Q1 2018 GLAUCOMA SCREENING Q2Y 2018 EYE EXAM RETINAL OR DILATED 2018 Influenza Age 5 to Adult 2018 LIPID PANEL Q1 2018 COLONOSCOPY 2019 FOOT EXAM Q1 2019 HEMOGLOBIN A1C Q6M 02/15/2019 Health Maintenance Topics with due status: Completed Topic Last Completion Date Pneumococcal 65+ years 2015 Health Maintenance Topics with due status: Addressed Topic Date Due Hepatitis C Screening Addressed Medication reconciliation up to date and corrected with patient at this time. Today's provider has been notified of reason for visit, vitals and flowsheets obtained on patients. Reviewed record in preparation for visit, huddled with provider and have obtained necessary documentation. Wt Readings from Last 3 Encounters:  
19 244 lb 9.6 oz (110.9 kg) 19 252 lb 12.8 oz (114.7 kg) 19 263 lb (119.3 kg) Temp Readings from Last 3 Encounters:  
19 99.1 °F (37.3 °C) (Oral) 19 98.7 °F (37.1 °C) (Oral) 19 97.7 °F (36.5 °C) (Oral) BP Readings from Last 3 Encounters:  
04/17/19 130/80  
04/02/19 138/80  
03/18/19 130/76 Pulse Readings from Last 3 Encounters:  
04/17/19 67  
04/02/19 70  
03/18/19 70 Vitals:  
 04/17/19 1339 BP: 130/80 Pulse: 67 Resp: 18 Temp: 99.1 °F (37.3 °C) TempSrc: Oral  
SpO2: 97% Weight: 244 lb 9.6 oz (110.9 kg) Height: 6' 4\" (1.93 m) PainSc:   0 - No pain Learning Assessment: 
:  
 
Learning Assessment 8/28/2017 PRIMARY LEARNER Patient HIGHEST LEVEL OF EDUCATION - PRIMARY LEARNER  [employer]LE[employer] PRIMARY LANGUAGE ENGLISH  
LEARNER PREFERENCE PRIMARY LISTENING  
ANSWERED BY patient RELATIONSHIP SELF Depression Screening: 
:  
 
3 most recent PHQ Screens 4/2/2019 Little interest or pleasure in doing things Not at all Feeling down, depressed, irritable, or hopeless Not at all Total Score PHQ 2 0 No flowsheet data found. Fall Risk Assessment: 
:  
 
Fall Risk Assessment, last 12 mths 4/2/2019 Able to walk? Yes Fall in past 12 months? No  
Fall with injury? -  
Number of falls in past 12 months - Fall Risk Score -  
 
 
Abuse Screening: 
:  
 
Abuse Screening Questionnaire 2/15/2019 8/28/2017 Do you ever feel afraid of your partner? Betsy Argue Are you in a relationship with someone who physically or mentally threatens you? Betsy Argue Is it safe for you to go home? Y Y  
 
 
ADL Screening: 
:  
 

## 2019-04-17 NOTE — PATIENT INSTRUCTIONS

## 2019-04-18 LAB
BASOPHILS # BLD AUTO: 0 X10E3/UL (ref 0–0.2)
BASOPHILS NFR BLD AUTO: 0 %
BUN SERPL-MCNC: 101 MG/DL (ref 8–27)
BUN/CREAT SERPL: 40 (ref 10–24)
CALCIUM SERPL-MCNC: 9.3 MG/DL (ref 8.6–10.2)
CHLORIDE SERPL-SCNC: 89 MMOL/L (ref 96–106)
CO2 SERPL-SCNC: 30 MMOL/L (ref 20–29)
CREAT SERPL-MCNC: 2.52 MG/DL (ref 0.76–1.27)
EOSINOPHIL # BLD AUTO: 0.4 X10E3/UL (ref 0–0.4)
EOSINOPHIL NFR BLD AUTO: 5 %
ERYTHROCYTE [DISTWIDTH] IN BLOOD BY AUTOMATED COUNT: 17.3 % (ref 12.3–15.4)
GLUCOSE SERPL-MCNC: 132 MG/DL (ref 65–99)
HCT VFR BLD AUTO: 31.9 % (ref 37.5–51)
HGB BLD-MCNC: 10 G/DL (ref 13–17.7)
IMM GRANULOCYTES # BLD AUTO: 0 X10E3/UL (ref 0–0.1)
IMM GRANULOCYTES NFR BLD AUTO: 0 %
LYMPHOCYTES # BLD AUTO: 0.7 X10E3/UL (ref 0.7–3.1)
LYMPHOCYTES NFR BLD AUTO: 9 %
MCH RBC QN AUTO: 28.8 PG (ref 26.6–33)
MCHC RBC AUTO-ENTMCNC: 31.3 G/DL (ref 31.5–35.7)
MCV RBC AUTO: 92 FL (ref 79–97)
MONOCYTES # BLD AUTO: 0.5 X10E3/UL (ref 0.1–0.9)
MONOCYTES NFR BLD AUTO: 6 %
NEUTROPHILS # BLD AUTO: 5.9 X10E3/UL (ref 1.4–7)
NEUTROPHILS NFR BLD AUTO: 80 %
PLATELET # BLD AUTO: 125 X10E3/UL (ref 150–379)
POTASSIUM SERPL-SCNC: 3.5 MMOL/L (ref 3.5–5.2)
RBC # BLD AUTO: 3.47 X10E6/UL (ref 4.14–5.8)
SODIUM SERPL-SCNC: 136 MMOL/L (ref 134–144)
WBC # BLD AUTO: 7.6 X10E3/UL (ref 3.4–10.8)

## 2019-04-22 ENCOUNTER — OFFICE VISIT (OUTPATIENT)
Dept: PALLATIVE CARE | Age: 69
End: 2019-04-22

## 2019-04-22 VITALS
BODY MASS INDEX: 30.66 KG/M2 | HEIGHT: 76 IN | WEIGHT: 251.8 LBS | DIASTOLIC BLOOD PRESSURE: 88 MMHG | RESPIRATION RATE: 19 BRPM | OXYGEN SATURATION: 97 % | TEMPERATURE: 97.6 F | HEART RATE: 70 BPM | SYSTOLIC BLOOD PRESSURE: 158 MMHG

## 2019-04-22 DIAGNOSIS — G47.00 INSOMNIA, UNSPECIFIED TYPE: ICD-10-CM

## 2019-04-22 DIAGNOSIS — Z71.89 GOALS OF CARE, COUNSELING/DISCUSSION: ICD-10-CM

## 2019-04-22 DIAGNOSIS — R06.02 SHORTNESS OF BREATH: Primary | ICD-10-CM

## 2019-04-22 DIAGNOSIS — M25.511 CHRONIC PAIN OF BOTH SHOULDERS: ICD-10-CM

## 2019-04-22 DIAGNOSIS — M25.512 CHRONIC PAIN OF BOTH SHOULDERS: ICD-10-CM

## 2019-04-22 DIAGNOSIS — R53.82 CHRONIC FATIGUE: ICD-10-CM

## 2019-04-22 DIAGNOSIS — G89.29 CHRONIC PAIN OF BOTH SHOULDERS: ICD-10-CM

## 2019-04-22 RX ORDER — OXYCODONE AND ACETAMINOPHEN 5; 325 MG/1; MG/1
1 TABLET ORAL
Qty: 30 TAB | Refills: 0 | Status: SHIPPED | OUTPATIENT
Start: 2019-04-22 | End: 2019-05-20 | Stop reason: SDUPTHER

## 2019-04-22 RX ORDER — GABAPENTIN 300 MG/1
300 CAPSULE ORAL
Qty: 30 CAP | Refills: 5 | Status: SHIPPED | OUTPATIENT
Start: 2019-04-22 | End: 2019-11-08

## 2019-04-22 RX ORDER — NALOXONE HYDROCHLORIDE 4 MG/.1ML
SPRAY NASAL
Qty: 2 EACH | Refills: 0 | Status: SHIPPED | OUTPATIENT
Start: 2019-04-22 | End: 2019-10-16 | Stop reason: ALTCHOICE

## 2019-04-22 NOTE — ACP (ADVANCE CARE PLANNING)
Advance Care Planning (ACP) Provider [facility] Date of ACP Conversation: 04/22/19 Persons included in Conversation:  patient and family Length of ACP Conversation in minutes:  20 minutes Authorized Decision Maker (if patient is incapable of making informed decisions): This person is:  
Healthcare Agent/Medical Power of  under Advance Directive For Patients with Decision Making Capacity:  
Values/Goals: Exploration of values, goals, and preferences if recovery is not expected, even with continued medical treatment in the event of:  Imminent death Conversation Outcomes / Follow-Up Plan:  
Entered DNR order (If yes, complete Durable DNR form) Completed note dated DNR and reviewed existing AMD in detail. 
- You and your wife have excellent understanding of your multiple chronic illnesses. We talked about our role in your care at this time. You want better symptom management and you what you have right now and focus on quality of life which is why you choose palliative care. - You are not leaning towards hemodialysis should it come to that. You want us involved in helping you make make medical decisions. 
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 You want a protective DNR and so we signed one today.  
- You have an AMD.

## 2019-04-22 NOTE — PROGRESS NOTES
Palliative Medicine Social Work Narrative Emily Grande is a 77 y.o. male being seen for an initial medical evaluation with Dr. Lurlene Schirmer. Patient's wife was also in attendance. This writer sat in on part of the first appointment. Introduced Palliative Social Work as part of the PM supportive team by providing emotional support, resource referrals, advocacy, assistance with AMDs and counseling Assessment / Action: 
Patient was alert and oriented x4. Patient's mood was euthymic and affect congruent. Patient was pleasant and cooperative. Introduced self and role of this  on the Palliative Medicine Team. Informed the patient of the ability to connect with resources if needed. Plan:  
Continue to meet with the patient when he returns to the clinic for ongoing assessment of the patients adjustment to treatment. Ongoing psychosocial support as desired by patient. [facility] Pall[facility],  [phone]

## 2019-04-22 NOTE — PROGRESS NOTES
Palliative Medicine Office Visit Palliative Medicine Nurse Check In 
[phone] Patient Name: Lizzy Rust. YOB: 1950 Date of Office Visit: 4/22/2019 Patient states: \"  \" 
 
From Check In Sheet (scanned in Media): 
Has a medical provider talked with you about cardiopulmonary resuscitation (CPR)? [x] Yes   [] No   [] Unable to obtain Nurse reminder to complete or update ACP FlowSheet: 
 
Is ACP on the Problem List?    [x] Yes    [] No 
IF ACP Document is ON FILE; Nurse to place ACP on Problem List  
 
Is there an ACP Note in Chart Review/Note? [x] Yes    [] No  
If NO: ALERT PROVIDER Advance Care Planning 1/4/2019 Patient's Healthcare Decision Maker is: Named in scanned ACP document Primary Decision Maker Name -  
Primary Decision Maker Phone Number -  
Primary Decision Maker Relationship to Patient -  
Confirm Advance Directive Yes, on file Does the patient have other document types - Is there anything that we should know about you as a person in order to provide you the best care possible? Have you been to the ER, urgent care clinic since your last visit? [] Yes   [x] No   [] Unable to obtain Have you been hospitalized since your last visit? [] Yes   [x] No   [] Unable to obtain Have you seen or consulted any other health care providers outside of the [address] [place] [address] since your last visit? [] Yes   [x] No   [] Unable to obtain Functional status (describe):  
 
 
 
Last BM:  
 
 accessed (date): 4/22/2019 Bottle review (for opioid pain medication): 
Medication 1:  
Date filled:  
Directions:  
# filled: # left: # pills taking per day: 
Last dose taken: 
 
Medication 2:  
Date filled:  
Directions:  
# filled: # left: # pills taking per day: 
Last dose taken: 
 
Medication 3:  
Date filled:  
Directions:  
# filled: # left: # pills taking per day: 
Last dose taken: 
 
Medication 4:  
Date filled: Directions:  
# filled: # left: # pills taking per day: 
Last dose taken:

## 2019-04-22 NOTE — PATIENT INSTRUCTIONS
Dear Nicolle Bustamante. , 
 
It was a pleasure seeing you today at [id number] [facility] office We will see you again in 4 weeks Your stated goal:  
 
Your described symptoms were: Fatigue: 10 Drowsiness: 10  
Depression: 0 Pain: 8 Anxiety: 0 Nausea: 0 Anorexia: 0 Dyspnea: 0 Best Well-Bein Constipation: No  
Other Problem (Comment): 10 This is the plan we talked about: 1. Diabetic neuropathy - You have severe grade 4 neuropathy in feet up to your knees and hands. - You are constantly cold. We talked about using heavy blankets (weighted blankets) to help. - You did not tolerate Lyrica or higher doses of Gabapentin, you can only tolerate 300 mg capsule at bedtime. 2. Bilateral shoulder pains - You have seen Dr. Omar Clark to help with pain, you had shoulder cuff surgeries and an injection for pain but none has helped. - You had knee treatments from him and this helped. - You are miserable from severe shoulder pain that keeps you up all night. - You have had percocet in the past with good benefit and so we agreed to start you back on it for now until you see Dr. Oamr Clark. - Take Percocet 5/325 mg tab at bedtime if needed for pain. I am providing you with 30 tabs for now. 3. Sleep - You are unable to sleep at all, you have been up several nights now. - You did not tolerate Ambien. You are on Klonopin 4 mg every night without any benefit. 
-We agreed to decrease Klonopin to 2 mg at bedtime for 1 week and then decrease to half a tab x 1 week and then stop. 4. Constipation 
-Constipation is a common side effect of pain medications as they slow your bowels. -Please start Pericolace 2 tabs daily and increase to 2 tabs two times a day if needed. This is necessary to keep your bowels soft. - Add Miralax every other day as a laxative. - Goal is to have soft bowel movements every day or every other day. - Please call us if you do not have bowel movements for more than 3 days. 5. Goals of care - You and your wife have excellent understanding of your multiple chronic illnesses. We talked about our role in your care at this time. You want better symptom management and you what you have right now and focus on quality of life which is why you choose palliative care. - You are not leaning towards hemodialysis should it come to that. You want us involved in helping you make make medical decisions. 
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 You want a protective DNR and so we signed one today. - You have an AMD. 
 
This is what you have shared with us about Advance Care Planning: 
 
 
Advance Care Planning 1/4/2019 Patient's Healthcare Decision Maker is: Named in scanned ACP document Primary Decision Maker Name -  
Primary Decision Maker Phone Number -  
Primary Decision Maker Relationship to Patient -  
Confirm Advance Directive Yes, on file Does the patient have other document types - The Palliative Medicine Team is here to support you and your family.   
 
 
 
Sincerely, 
 
 
Penny Reeves MD and the Palliative Medicine Team

## 2019-04-22 NOTE — PROGRESS NOTES
Palliative Medicine Outpatient Services [place]: [phone] ([phone]) Patient Name: Laxmi Mata. YOB: 1950 Date of Current Visit: 19 Location of Current Visit:   
[] [facility] Office 
[] [facility] Office [x] [facility] Office 
[] Home 
[] Other:   
 
Date of Initial Visit: 19 Referral from: Self- patient's wife Date of Initial Referral:3/14 Primary Care Physician: Teresa Paige MD 
  
 SUMMARY:  
Laxmi Stone is a 77y.o. year old with a  history of hypertension, CKD stage IV, anemia, atrial fibrillation acute on chronic compensated systolic CHF with cardiomyopathy , DM type 2 with severe neuropathy, chronic severe bilateral shoulder pain who was referred to Palliative Medicine by self referral from wife for management of symptoms and psychosocial support. 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 Palliative Medicine is addressing the following current patient/family concerns: Insomnia, severe pain. Initial Referral Intake note from Nuvia Hernandez RN reviewed prior to visit PALLIATIVE DIAGNOSES:  
 
  ICD-10-CM ICD-9-CM 1. Shortness of breath R06.02 786.05   
2. Chronic pain of both shoulders M25.511 719.41 oxyCODONE-acetaminophen (PERCOCET) 5-325 mg per tablet G89.29 338.29   
 M25.512    
3. Chronic fatigue R53.82 780.79   
4. Insomnia, unspecified type G47.00 780.52   
5. Goals of care, counseling/discussion Z71.89 V65.49 PLAN:  
Patient Instructions Dear Laxmi Mata. , 
 
It was a pleasure seeing you today at [facility] office We will see you again in 4 weeks Your stated goal:  
 
Your described symptoms were: Fatigue: 10 Drowsiness: 10  
Depression: 0 Pain: 8 Anxiety: 0 Nausea: 0 Anorexia: 0 Dyspnea: 0 Best Well-Bein Constipation: No  
 Other Problem (Comment): 10 This is the plan we talked about: 1. Diabetic neuropathy - You have severe grade 4 neuropathy in feet up to your knees and hands. - You are constantly cold. We talked about using heavy blankets (weighted blankets) to help. - You did not tolerate Lyrica or higher doses of Gabapentin, you can only tolerate 300 mg capsule at bedtime. 2. Bilateral shoulder pains - You have seen Dr. Omar Clark to help with pain, you had shoulder cuff surgeries and an injection for pain but none has helped. - You had knee treatments from him and this helped. - You are miserable from severe shoulder pain that keeps you up all night. - You have had percocet in the past with good benefit and so we agreed to start you back on it for now until you see Dr. Omar Clark. - Take Percocet 5/325 mg tab at bedtime if needed for pain. I am providing you with 30 tabs for now. 3. Sleep - You are unable to sleep at all, you have been up several nights now. - You did not tolerate Ambien. You are on Klonopin 4 mg every night without any benefit. 
-We agreed to decrease Klonopin to 2 mg at bedtime for 1 week and then decrease to half a tab x 1 week and then stop. 4. Constipation 
-Constipation is a common side effect of pain medications as they slow your bowels. -Please start Pericolace 2 tabs daily and increase to 2 tabs two times a day if needed. This is necessary to keep your bowels soft. - Add Miralax every other day as a laxative. - Goal is to have soft bowel movements every day or every other day. - Please call us if you do not have bowel movements for more than 3 days. 5. Goals of care - You and your wife have excellent understanding of your multiple chronic illnesses. We talked about our role in your care at this time. You want better symptom management and you what you have right now and focus on quality of life which is why you choose palliative care. - You are not leaning towards hemodialysis should it come to that. You want us involved in helping you make make medical decisions. 
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 You want a protective DNR and so we signed one today. - You have an AMD. 
 
This is what you have shared with us about Advance Care Planning: 
 
 
Advance Care Planning 1/4/2019 Patient's Healthcare Decision Maker is: Named in scanned ACP document Primary Decision Maker Name -  
Primary Decision Maker Phone Number -  
Primary Decision Maker Relationship to Patient -  
Confirm Advance Directive Yes, on file Does the patient have other document types - The Palliative Medicine Team is here to support you and your family. Sincerely, 
 
 
Humphrey Padilla MD and the Palliative Medicine Team 
 
 
 GOALS OF CARE / TREATMENT PREFERENCES:  
[====Goals of Care====] GOALS OF CARE: 
Patient / health care proxy stated goals: See Patient Instructions / Summary TREATMENT PREFERENCES:  
Code Status:  [] Attempt Resuscitation       [x] Do Not Attempt Resuscitation Advance Care Planning: 
[x] [facility] Interdisciplinary Team has updated the ACP Navigator with Decision Maker and Patient Capacity Advance Care Planning 1/4/2019 Patient's Healthcare Decision Maker is: Named in scanned ACP document Primary Decision Maker Name -  
Primary Decision Maker Phone Number -  
Primary Decision Maker Relationship to Patient -  
Confirm Advance Directive Yes, on file Does the patient have other document types - Other: 
(If patient appropriate for POST, consider using PALLPOST smart phrase here) The palliative care team has discussed with patient / health care proxy about goals of care / treatment preferences for patient. 
[====Goals of Care====] PRESCRIPTIONS GIVEN:  
 
Medications Ordered Today Medications  gabapentin (NEURONTIN) 300 mg capsule Sig: Take 1 Cap by mouth nightly. Dispense:  30 Cap Refill:  5  
 oxyCODONE-acetaminophen (PERCOCET) 5-325 mg per tablet Sig: Take 1 Tab by mouth every eight (8) hours as needed for Pain for up to 30 days. Max Daily Amount: 3 Tabs. Dispense:  30 Tab Refill:  0  
 naloxone (NARCAN) 4 mg/actuation nasal spray Sig: Use 1 spray intranasally, then discard. Repeat with new spray every 2 min as needed for opioid overdose symptoms, alternating nostrils. Dispense:  2 Each Refill:  0  
  
 
 
 FOLLOW UP: Future Appointments Date Time Provider Leonardo Thomas 4/24/2019 10:00 AM [place] FT CHAIR 1 [facility]  
5/1/2019  1:40 PM Mary Gonzalez MD 3 [facility]  
5/20/2019 11:30 AM Thor Hernandez MD [facility]  
5/22/2019 10:00 AM [place] FT CHAIR 1 [address] [facility] PHYSICIANS INVOLVED IN CARE:  
Patient Care Team: 
Mary Gonzalez MD as PCP - General (Internal Medicine) Laura Navas MD (Cardiology) HISTORY:  
Reviewed patient-completed ESAS and advance care planning form. Reviewed patient record in prescription monitoring program. 
 
CHIEF COMPLAINT: No chief complaint on file. HPI/SUBJECTIVE: The patient is: [x] Verbal / [] Nonverbal  
 
Pleasant gentleman here with his wife Zhane. Shoulder pain-he has had bilateral rotator cuff surgeries and injections by Dr. Gabriella Alonzo. He has not seen him for over a year and his shoulder is been more painful. He was on and off on Percocet up until 2 years ago. He has not been on any opioids over the last 2 years and is unable to use ibuprofen because of worsening kidney function. He reports being absolutely miserable because of the shoulder pain and unable to get any rest and is requesting pain medicine for the same.   He is very clear about not wanting to use the pain medicine on a regular basis but only when his pain is severe and lack of sleep is no longer unacceptable. At this point he tells me he has not slept for 4 days and he is very miserable. Insomnia-he reports inability to sleep secondary to severe shoulder pain and severe neuropathy in his feet up to his legs. He gets really cold, unable to get warm and therefore his legs awake along with shoulder pain. He has tried Ambien which made him more wired then help him sleep. He is also on Klonopin which has not been helpful at all. Neuropathy/balance/fall-he has severe neuropathy in his feet unable to feel his feet and therefore was falling until he started using his cane. He no longer drives because he is unable to feel his feet. He worked with [employer] [employer] for over 3 months to help with gait and balance which he thinks helps some but not a whole lot. He was unable to tolerate Lyrica and higher doses of gabapentin therefore he is only on 300 mg at bedtime of gabapentin. He is following with an endocrinologist and podiatrist. 
 
Kidney function-he has very good understanding of his multiple medical problems. His kidney function has actually improved after getting worse earlier in the year. He plans not to have dialysis should it ever come to that and wants to focus on comfort and quality of life. Clinical Pain Assessment (nonverbal scale for nonverbal patients):  
[++++ Clinical Pain Assessment++++] [++++Pain Severity++++]: Pain: 8 
[++++Pain Character++++]: twisting, knifelike [++++Pain Duration++++]: months 
[++++Pain Effect++++]: functional and emotional 
[++++Pain Factors++++]: none in particular 
[++++Pain Frequency++++]: constant [++++Pain Location++++]: bilateral shoulders [++++ Clinical Pain Assessment++++] FUNCTIONAL ASSESSMENT:  
 
Palliative Performance Scale (PPS): PPS: 70  PSYCHOSOCIAL/SPIRITUAL SCREENING:  
 
Any spiritual / Jain concerns: 
[] Yes /  [x] No 
 
Caregiver Burnout: 
 [] Yes /  [x] No /  [] No Caregiver Present Anticipatory grief assessment:  
[x] Normal  / [] Maladaptive ESAS Anxiety: Anxiety: 0 
 
ESAS Depression: Depression: 0 REVIEW OF SYSTEMS:  
 
The following systems were [x] reviewed / [] unable to be reviewed Systems: constitutional, ears/nose/mouth/throat, respiratory, gastrointestinal, genitourinary, musculoskeletal, integumentary, neurologic, psychiatric, endocrine. Positive findings noted below. Modified ESAS Completed by: provider Fatigue: 10 Drowsiness: 10  
Depression: 0 Pain: 8 Anxiety: 0 Nausea: 0 Anorexia: 0 Dyspnea: 0 Best Well-Bein Constipation: No  
Other Problem (Comment): 10 PHYSICAL EXAM:  
 
Wt Readings from Last 3 Encounters:  
19 251 lb 12.8 oz (114.2 kg) 19 244 lb 9.6 oz (110.9 kg) 19 252 lb 12.8 oz (114.7 kg) Blood pressure 158/88, pulse 70, temperature 97.6 °F (36.4 °C), temperature source Oral, resp. rate 19, height 6' 4\" (1.93 m), weight 251 lb 12.8 oz (114.2 kg), SpO2 97 %. Last bowel movement: See Nursing Note Constitutional: Alert, oriented Eyes: pupils equal, anicteric ENMT: no nasal discharge, moist mucous membranes Cardiovascular: regular rhythm, distal pulses intact Respiratory: breathing not labored, symmetric Gastrointestinal: soft non-tender, +bowel sounds Musculoskeletal: Tenderness to palpation in bilateral shoulders, movement restricted due to pain in both shoulders. Skin: warm, dry Neurologic: following commands, moving all extremities Psychiatric: full affect, no hallucinations Other: 
 
 
 HISTORY:  
 
Past Medical History:  
Diagnosis Date  Anemia 2017  Anxiety 2017  Arrhythmia   
 atrial fibrillation   ASCVD (arteriosclerotic cardiovascular disease) 2017  BPH (benign prostatic hyperplasia) 2017  CAD (coronary artery disease)   
 h/o stents  Cancer [facility])   
 h/o skin cancer  Cardiomyopathy ([facility] [address] 2017  CHF (congestive heart failure) ([facility] [address] 8/5/2017  Chronic kidney disease Stage IV  CKD (chronic kidney disease), stage IV ([facility] [address] 8/5/2017  Diabetes ([facility] [address]  Diabetes mellitus ([facility] [address] 8/5/2017  Diabetic neuropathy ([facility] [address] 8/5/2017  DJD (degenerative joint disease) 8/5/2017  ED (erectile dysfunction) 8/5/2017  Gout 8/5/2017  High cholesterol  Hyperlipidemia 8/5/2017  Hypertension  Hypertension with renal disease 8/5/2017  Hypothyroid 8/5/2017  Insomnia 8/5/2017  Obesity 8/5/2017  On statin therapy 8/5/2017  Restless leg 8/5/2017  Thyroid disease   
 hypothyroid Past Surgical History:  
Procedure Laterality Date  CARDIAC SURG PROCEDURE UNLIST    
 cardiac stents  CARDIAC SURG PROCEDURE UNLIST Ablation 5/17/2018 ED [facility]  COLONOSCOPY N/A 6/28/2016 COLONOSCOPY performed by Jerel Brandon MD at [facility] ENDOSCOPY  COLONOSCOPY N/A 1/9/2019 COLONOSCOPY performed by Sherin Lomas MD at [facility] ENDOSCOPY  COLONOSCOPY,DIAGNOSTIC  1/9/2019  HX APPENDECTOMY  HX BUNIONECTOMY    
 and removal of 2 seasmoid bone of the great toe 2/2018  HX HEENT Bilateral Cataract surgery  HX HEENT Tonsils  HX HEENT Axe wound to the head  HX KNEE ARTHROSCOPY X 2  
 HX ORTHOPAEDIC    
 HX ORTHOPAEDIC    
 partial laminectomy  HX PACEMAKER    
 HX ROTATOR CUFF REPAIR    
 bilateral  
 WA INSJ ELTRD CAR DACIA SYS ATTCH PM/CVDFB PLS GEN N/A 1/28/2019 Lv Lead Placement To Previous Generator performed by Sarah Infante MD at [facility] C[facility] CATH LAB  WA REMVL PERM PM PLS GEN W/REPL PLSE GEN MULT LEAD N/A 1/28/2019 Remove & Replace Ppm Gen Biv Multi Leads performed by Sarah Infante MD at [address] CATH LAB Family History Problem Relation Age of Onset  Heart Disease Mother  Kidney Disease Mother  Heart Disease Father  Kidney Disease Father  Cancer Sister      Breast  
  
 History reviewed, no pertinent family history. Social History Tobacco Use  Smoking status: Former Smoker Packs/day: 0.50 Years: 4.00 Pack years: 2.00 Last attempt to quit: 3/6/1972 Years since quittin.1  Smokeless tobacco: Never Used Substance Use Topics  Alcohol use: No  
  Comment: rare, 1 drink per year Allergies Allergen Reactions  Niacin Unknown (comments) Other reaction(s): Unknown (comments)  Imipenem Diarrhea Other reaction(s): Rash  Levemir [Insulin Detemir] Hives  Other Medication Other (comments) ? Allergy to Holly Hawking causing chemical burn  Primaxin [Imipenem-Cilastatin] Diarrhea and Rash  Xarelto [Rivaroxaban] Rash and Itching Other reaction(s): Rash Current Outpatient Medications Medication Sig  
 gabapentin (NEURONTIN) 300 mg capsule Take 1 Cap by mouth nightly.  oxyCODONE-acetaminophen (PERCOCET) 5-325 mg per tablet Take 1 Tab by mouth every eight (8) hours as needed for Pain for up to 30 days. Max Daily Amount: 3 Tabs.  naloxone (NARCAN) 4 mg/actuation nasal spray Use 1 spray intranasally, then discard. Repeat with new spray every 2 min as needed for opioid overdose symptoms, alternating nostrils.  potassium chloride (KLOR-CON) 20 mEq pack One packet three times daily  clonazePAM (KLONOPIN) 2 mg tablet TAKE 1 TABLET BY MOUTH EVERY NIGHT AT BEDTIME. MAY REPEAT DOSE IF YOU WAKE UP (Patient taking differently: TAKE 1 TABLET BY MOUTH EVERY NIGHT AT BEDTIME. MAY REPEAT DOSE IF YOU WAKE UP; 2 tabs at bedtime)  metOLazone (ZAROXOLYN) 2.5 mg tablet Take 1 Tab by mouth every other day.  ULORIC 40 mg tab tablet TAKE 1 TABLET BY MOUTH DAILY (Patient taking differently: TAKE 1 TABLET BY MOUTH DAILY; as needed)  ascorbic acid, vitamin C, (VITAMIN C) 250 mg tablet Take 1 Tab by mouth three (3) times daily.   
 insulin glargine (LANTUS SOLOSTAR U-100 INSULIN) 100 unit/mL (3 mL) inpn 35 Units by SubCUTAneous route nightly.  OTHER Apply  to affected area daily as needed (Knee Pain). CBD Cream:  Apply daily as needed for knee pain  carvedilol (COREG) 12.5 mg tablet TAKE 1 TABLET BY MOUTH TWICE DAILY  tamsulosin (FLOMAX) 0.4 mg capsule TAKE 2 CAPSULES BY MOUTH EVERY DAY  Ferrous Sulfate (SLOW FE) 47.5 mg iron TbER tablet Take 1 Tab by mouth three (3) times daily.  bumetanide (BUMEX) 2 mg tablet TAKE 2 TABLETS BY MOUTH TWICE DAILY  pramipexole (MIRAPEX) 0.5 mg tablet TAKE 1 TABLET BY MOUTH EVERY NIGHT AT BEDTIME (Patient taking differently: May take an additional tablet if needed)  colchicine (COLCRYS) 0.6 mg tablet Take 0.6 mg by mouth as needed (Gouty attack). Take 2 tablets at onset of gouty attack then 1 tablet every hour as needed  Liraglutide (VICTOZA) 0.6 mg/0.1 mL (18 mg/3 mL) sub-q pen 0.6 mg by SubCUTAneous route daily.  finasteride (PROSCAR) 5 mg tablet Take 5 mg by mouth daily. No current facility-administered medications for this visit. Facility-Administered Medications Ordered in Other Visits Medication Dose Route Frequency  [START ON 4/24/2019] epoetin mayr (EPOGEN;PROCRIT) injection 40,000 Units  40,000 Units SubCUTAneous ONCE  
 
 
 
 LAB DATA REVIEWED:  
 
Lab Results Component Value Date/Time WBC 7.6 04/17/2019 02:18 PM  
 HGB 10.0 (L) 04/17/2019 02:18 PM  
 PLATELET 878 (L) 79/56/9522 02:18 PM  
 
Lab Results Component Value Date/Time Sodium 136 04/17/2019 02:18 PM  
 Potassium 3.5 04/17/2019 02:18 PM  
 Chloride 89 (L) 04/17/2019 02:18 PM  
 CO2 30 (H) 04/17/2019 02:18 PM  
  (HH) 04/17/2019 02:18 PM  
 Creatinine 2.52 (H) 04/17/2019 02:18 PM  
 Calcium 9.3 04/17/2019 02:18 PM  
 Magnesium 2.0 01/28/2019 08:52 AM  
 Phosphorus 2.6 02/27/2019 10:30 AM  
  
Lab Results Component Value Date/Time AST (SGOT) 22 01/02/2019 04:33 PM  
 Alk.  phosphatase 103 01/02/2019 04:33 PM  
 Protein, total 7.4 01/02/2019 04:33 PM  
 Albumin 3.4 (L) 02/27/2019 10:30 AM  
 Globulin 4.1 (H) 01/02/2019 04:33 PM  
 
Lab Results Component Value Date/Time INR 1.3 (H) 06/01/2018 12:21 PM  
 Prothrombin time 12.6 (H) 06/01/2018 12:21 PM  
 aPTT 26.6 06/10/2014 11:20 AM  
  
Lab Results Component Value Date/Time Iron 49 02/27/2019 10:30 AM  
 TIBC 300 02/27/2019 10:30 AM  
 Iron % saturation 16 (L) 02/27/2019 10:30 AM  
 Ferritin 115 02/27/2019 10:30 AM  
  
 
 
 CONTROLLED SUBSTANCES SAFETY ASSESSMENT (IF ON CONTROLLED SUBSTANCES):  
 
Reviewed opioid safety handout:  [x] Yes   [] No 
24 hour opioid dose >150mg morphine equivalent/day:  [] Yes   [x] No 
Benzodiazepines:  [x] Yes   [] No 
Sleep apnea:  [] Yes   [x] No 
Urine Toxicology Testing within last 6 months:  [] Yes   [x] No 
History of or new aberrant medication taking behaviors:  [] Yes   [x] No 
Has Narcan been prescribed [] Yes   [x] No 
 
   
 
Total time: 70m Counseling / coordination time: 50m 
> 50% counseling / coordination?:

## 2019-04-24 ENCOUNTER — HOSPITAL ENCOUNTER (OUTPATIENT)
Dept: INFUSION THERAPY | Age: 69
Discharge: HOME | End: 2019-04-24
Payer: MEDICARE

## 2019-04-24 ENCOUNTER — TELEPHONE (OUTPATIENT)
Dept: PALLATIVE CARE | Age: 69
End: 2019-04-24

## 2019-04-24 VITALS
SYSTOLIC BLOOD PRESSURE: 144 MMHG | OXYGEN SATURATION: 99 % | TEMPERATURE: 97.6 F | DIASTOLIC BLOOD PRESSURE: 84 MMHG | HEART RATE: 70 BPM | RESPIRATION RATE: 18 BRPM

## 2019-04-24 LAB
ALBUMIN SERPL-MCNC: 3.3 G/DL (ref 3.5–5)
ANION GAP SERPL CALC-SCNC: 8 MMOL/L (ref 5–15)
BUN SERPL-MCNC: 89 MG/DL (ref 6–20)
BUN/CREAT SERPL: 34 (ref 12–20)
CALCIUM SERPL-MCNC: 8.4 MG/DL (ref 8.5–10.1)
CHLORIDE SERPL-SCNC: 97 MMOL/L (ref 97–108)
CO2 SERPL-SCNC: 28 MMOL/L (ref 21–32)
CREAT SERPL-MCNC: 2.6 MG/DL (ref 0.7–1.3)
FERRITIN SERPL-MCNC: 130 NG/ML (ref 26–388)
GLUCOSE SERPL-MCNC: 140 MG/DL (ref 65–100)
HCT VFR BLD AUTO: 30.5 % (ref 36.6–50.3)
HGB BLD-MCNC: 9.9 G/DL (ref 12.1–17)
IRON SATN MFR SERPL: 14 % (ref 20–50)
IRON SERPL-MCNC: 44 UG/DL (ref 35–150)
PHOSPHATE SERPL-MCNC: 3.2 MG/DL (ref 2.6–4.7)
POTASSIUM SERPL-SCNC: 4.2 MMOL/L (ref 3.5–5.1)
SODIUM SERPL-SCNC: 133 MMOL/L (ref 136–145)
TIBC SERPL-MCNC: 305 UG/DL (ref 250–450)

## 2019-04-24 PROCEDURE — 74011250636 HC RX REV CODE- 250/636: Performed by: INTERNAL MEDICINE

## 2019-04-24 PROCEDURE — 80069 RENAL FUNCTION PANEL: CPT

## 2019-04-24 PROCEDURE — 36415 COLL VENOUS BLD VENIPUNCTURE: CPT

## 2019-04-24 PROCEDURE — 83540 ASSAY OF IRON: CPT

## 2019-04-24 PROCEDURE — 85018 HEMOGLOBIN: CPT

## 2019-04-24 PROCEDURE — 96372 THER/PROPH/DIAG INJ SC/IM: CPT

## 2019-04-24 PROCEDURE — 82728 ASSAY OF FERRITIN: CPT

## 2019-04-24 RX ADMIN — ERYTHROPOIETIN 40000 UNITS: 40000 INJECTION, SOLUTION INTRAVENOUS; SUBCUTANEOUS at 16:15

## 2019-04-24 NOTE — TELEPHONE ENCOUNTER
Spoke with Mr. Glen Reagan and he states that he fainted when he went outside to check on his dogs. He said he saw someone in his backyard and he jumped up out of his seat, ran up the stairs and then ran out to the deck. He realized it was his neighbor and while he was talking to her he fainted. He said that he feels fine and thinks it was just the events of running and being scared about the dogs that caused him to faint. He was told by the PM nurse to drink extra fluids (water) tonight and to eat regularly to make sure that he is not dehydrated and does not drop his blood sugar. Pm nurse will call in am to check on him. He was also told to call the on-call for PM if he had any other problems.     Leonid Gómez RN  Palliative Medicine

## 2019-04-24 NOTE — TELEPHONE ENCOUNTER
Patients wife calling to advise patient had an episode today around 1pm patient was outside on the deck and passed out for a few seconds states he was feeling ok.  Only scrapped his knee

## 2019-04-24 NOTE — PROGRESS NOTES
[address] [facility] Stay Note: 
 
6940- Arrived for Procrit Patient Vitals for the past 12 hrs: 
 Temp Pulse Resp BP SpO2  
04/24/19 1550 97.6 °F (36.4 °C) 70 18 144/84 99 % Assessment- completed, pt reports he \"passed out\" earlier and landed on knees after standing up too quickly. Pt called and notified MD office. Labs drawn per order and sent for processing. Hgb- 9.9 Recent Results (from the past 12 hour(s)) HGB & HCT Collection Time: 04/24/19  3:48 PM  
Result Value Ref Range HGB 9.9 (L) 12.1 - 17.0 g/dL HCT 30.5 (L) 36.6 - 50.3 % Procrit 40,000 units SQ injection given slowly in left arm 
 
1615- Tolerated well. Pt denies any acute problems/changes. Discharged from [facility]. No distress. Next appt: 5/22 at 1 PM.  
 
*Some labs still in process at time of note. Please follow up in [address] [facility].

## 2019-04-26 ENCOUNTER — NURSE NAVIGATOR (OUTPATIENT)
Dept: PALLATIVE CARE | Age: 69
End: 2019-04-26

## 2019-04-26 ENCOUNTER — TELEPHONE (OUTPATIENT)
Dept: PALLATIVE CARE | Age: 69
End: 2019-04-26

## 2019-04-26 RX ORDER — FINASTERIDE 5 MG/1
TABLET, FILM COATED ORAL
Qty: 90 TAB | Refills: 3 | Status: SHIPPED | OUTPATIENT
Start: 2019-04-26 | End: 2020-07-17

## 2019-04-26 NOTE — TELEPHONE ENCOUNTER
Spoke with Mr. Maci Davis and he states that he has not had anymore fainting spells but when he fainted on Wednesday he hurt his left knee. He has been putting ice on his knee and he has some CBD cream and both seem to be helping but it is swollen and painful. He already has an appointment with an orthopedic doctor on Monday and he will have him look at it then. He was told to call if he has any other problems.     Rosalina Wren RN  Palliative Medicine

## 2019-04-26 NOTE — PROGRESS NOTES
Palliative Medicine  Nursing Note  [phone]  Fax [phone]      Telephone Call  Patient Name: Shalini Peters YOB: [birth year]         Advance Care Planning 4/24/2019   Patient's Healthcare Decision Maker is: -   Primary Decision Maker Name -   Primary Decision Maker Phone Number -   Primary Decision Maker Relationship to Patient -   Confirm Advance Directive Yes, on file   Does the patient have other document types -     Message left for Mr. Su Oliveira, PM nurse calling in follow up to him fainting on 4/24/2019. PM nurse requested a call back if Mr. Su Oliveira feels he has any needs or issues that PM can assist him with.     Information was shared with Dr. Stevie Vaughan    Phone Call Start Time 9:35 am   Phone Call End Time 9:40 am    Coordination of care with Providers, IDT, Community Resources n/a    Leanna Pichardo, UMESHN, RN, OCN, [facility]  Palliative Medicine

## 2019-04-26 NOTE — TELEPHONE ENCOUNTER
RX refill request from the patient/pharmacy. Patient last seen 04- with labs, and next appt. scheduled for 05-  Requested Prescriptions     Pending Prescriptions Disp Refills    finasteride (PROSCAR) 5 mg tablet [Pharmacy Med Name: FINASTERIDE 5MG TABLETS] 90 Tab 3     Sig: TAKE 1 TABLET BY MOUTH DAILY   .

## 2019-04-30 NOTE — PROGRESS NOTES
Chief Complaint Patient presents with  Diabetes 2 week follow up  Chronic Kidney Disease  Gout  CHF  Fall  
  fell on the ouside deck on Thursday 4/25/19 and hit both knees SUBJECTIVE: 
 
Royal KRISTA Senior is a 77 y.o. male who returns in follow-up for his hypertension, CKD, anemia, compensated CHF, gout, and other medical problems. Unfortunately a week ago he had a fall where he completely blacked out. He had heard his dog barking outside and ran upstairs to go check and see what was going on and actually ran out through the kitchen onto the deck and was done on the deck talking to his neighbors that it turned out to be and was actually standing there for about 10 to 20 seconds talk to them and all of a sudden blacked out without any warning symptoms whatsoever. He noted no palpitations, chest pain, increased shortness of breath, focal arm or leg weakness, seizure activity, headache or other complaints but just suddenly fell to the deck. He said he was out for the most 20 seconds when his neighbors had run up onto the deck and will call him by name he awakened as if what happened. After he was able to sit up and take care of the scrape on his left knee he seemed to do well and has had no symptoms since that time. He did scrape his left knee and did not have any pain in his knees until about 3 hours after the fall. He thinks he hyperextended them. He has noted no lightheadedness or dizziness since that episode and has had no chest pain, palpitations, PND, orthopnea or other complaints except for the knee pain since then. He denies any dizziness or headaches or neurologic complaints. There have been no other complaints noted. Current Outpatient Medications Medication Sig Dispense Refill  finasteride (PROSCAR) 5 mg tablet TAKE 1 TABLET BY MOUTH DAILY 90 Tab 3  
 gabapentin (NEURONTIN) 300 mg capsule Take 1 Cap by mouth nightly.  30 Cap 5  
  oxyCODONE-acetaminophen (PERCOCET) 5-325 mg per tablet Take 1 Tab by mouth every eight (8) hours as needed for Pain for up to 30 days. Max Daily Amount: 3 Tabs. 30 Tab 0  
 naloxone (NARCAN) 4 mg/actuation nasal spray Use 1 spray intranasally, then discard. Repeat with new spray every 2 min as needed for opioid overdose symptoms, alternating nostrils. 2 Each 0  
 potassium chloride (KLOR-CON) 20 mEq pack One packet three times daily 90 Packet prn  clonazePAM (KLONOPIN) 2 mg tablet TAKE 1 TABLET BY MOUTH EVERY NIGHT AT BEDTIME. MAY REPEAT DOSE IF YOU WAKE UP (Patient taking differently: TAKE 1 TABLET BY MOUTH EVERY NIGHT AT BEDTIME. MAY REPEAT DOSE IF YOU WAKE UP; 2 tabs at bedtime) 60 Tab 0  
 metOLazone (ZAROXOLYN) 2.5 mg tablet Take 1 Tab by mouth every other day. 30 Tab prn  ULORIC 40 mg tab tablet TAKE 1 TABLET BY MOUTH DAILY (Patient taking differently: TAKE 1 TABLET BY MOUTH DAILY; as needed) 90 Tab 3  
 ascorbic acid, vitamin C, (VITAMIN C) 250 mg tablet Take 1 Tab by mouth three (3) times daily. 100 Tab prn  insulin glargine (LANTUS SOLOSTAR U-100 INSULIN) 100 unit/mL (3 mL) inpn 35 Units by SubCUTAneous route nightly.  OTHER Apply  to affected area daily as needed (Knee Pain). CBD Cream:  Apply daily as needed for knee pain  carvedilol (COREG) 12.5 mg tablet TAKE 1 TABLET BY MOUTH TWICE DAILY 60 Tab 11  tamsulosin (FLOMAX) 0.4 mg capsule TAKE 2 CAPSULES BY MOUTH EVERY  Cap 3  Ferrous Sulfate (SLOW FE) 47.5 mg iron TbER tablet Take 1 Tab by mouth three (3) times daily. 90 Tab prn  bumetanide (BUMEX) 2 mg tablet TAKE 2 TABLETS BY MOUTH TWICE DAILY 360 Tab 3  pramipexole (MIRAPEX) 0.5 mg tablet TAKE 1 TABLET BY MOUTH EVERY NIGHT AT BEDTIME (Patient taking differently: May take an additional tablet if needed) 90 Tab prn  colchicine (COLCRYS) 0.6 mg tablet Take 0.6 mg by mouth as needed (Gouty attack).  Take 2 tablets at onset of gouty attack then 1 tablet every hour as needed  Liraglutide (VICTOZA) 0.6 mg/0.1 mL (18 mg/3 mL) sub-q pen 0.6 mg by SubCUTAneous route daily. Past Medical History:  
Diagnosis Date  Anemia 8/5/2017  Anxiety 8/5/2017  Arrhythmia   
 atrial fibrillation 2014  ASCVD (arteriosclerotic cardiovascular disease) 8/5/2017  BPH (benign prostatic hyperplasia) 8/5/2017  CAD (coronary artery disease)   
 h/o stents  Cancer [facility])   
 h/o skin cancer  Cardiomyopathy ([address] 8/5/2017  CHF (congestive heart failure) ([address] 8/5/2017  Chronic kidney disease Stage IV  CKD (chronic kidney disease), stage IV ([facility] 8/5/2017  Diabetes ([facility]  Diabetes mellitus ([facility] 8/5/2017  Diabetic neuropathy ([facility] 8/5/2017  DJD (degenerative joint disease) 8/5/2017  ED (erectile dysfunction) 8/5/2017  Gout 8/5/2017  High cholesterol  Hyperlipidemia 8/5/2017  Hypertension  Hypertension with renal disease 8/5/2017  Hypothyroid 8/5/2017  Insomnia 8/5/2017  Obesity 8/5/2017  On statin therapy 8/5/2017  Restless leg 8/5/2017  Thyroid disease   
 hypothyroid Past Surgical History:  
Procedure Laterality Date  CARDIAC SURG PROCEDURE UNLIST    
 cardiac stents  CARDIAC SURG PROCEDURE UNLIST Ablation 5/17/2018 [id number] [place] [address]ira Par[place]  COLONOSCOPY N/A 6/28/2016 COLONOSCOPY performed by Fabiana Holman MD at \[facility]\"" ENDOSCOPY  COLONOSCOPY N/A 1/9/2019 COLONOSCOPY performed by Anh Graf MD at \[facility]\"" ENDOSCOPY  COLONOSCOPY,DIAGNOSTIC  1/9/2019  HX APPENDECTOMY  HX BUNIONECTOMY    
 and removal of 2 seasmoid bone of the great toe 2/2018  HX HEENT Bilateral Cataract surgery  HX HEENT Tonsils  HX HEENT Axe wound to the head  HX KNEE ARTHROSCOPY X 2  
 HX ORTHOPAEDIC    
 HX ORTHOPAEDIC    
 partial laminectomy  HX PACEMAKER    
 HX ROTATOR CUFF REPAIR    
 bilateral  
 [place] [facility] ELTRD CAR DACIA SYS ATTCH PM/CVDFB PLS GEN N/A 1/28/2019 Lv Lead Placement To Previous Generator performed by Tae Gonzales MD at [facility] CARDIAC CATH LAB  MD REMVL PERM PM PLS GEN W/REPL PLSE GEN MULT LEAD N/A 1/28/2019 Remove & Replace Ppm Gen Biv Multi Leads performed by Tae Gonzales MD at [address] CATH LAB Allergies Allergen Reactions  Niacin Unknown (comments) Other reaction(s): Unknown (comments)  Imipenem Diarrhea Other reaction(s): Rash  Levemir [Insulin Detemir] Hives  Other Medication Other (comments) ? Allergy to [place] Look causing chemical burn  Primaxin [Imipenem-Cilastatin] Diarrhea and Rash  Xarelto [Rivaroxaban] Rash and Itching Other reaction(s): Rash REVIEW OF SYSTEMS: 
General: negative for - chills or fever, or weight loss or gain ENT: negative for - headaches, nasal congestion or tinnitus Eyes: no blurred or visual changes Neck: No stiffness or swollen nodes Respiratory: negative for - cough, hemoptysis, shortness of breath or wheezing Cardiovascular : negative for - chest pain, edema, palpitations or shortness of breath Gastrointestinal: negative for - abdominal pain, blood in stools, heartburn or nausea/vomiting Genito-Urinary: no dysuria, trouble voiding, or hematuria Musculoskeletal: negative for - gait disturbance, joint pain, joint stiffness or joint swelling Neurological: no TIA or stroke symptoms. Syncopal episode as described above Hematologic: no bruises, no bleeding Lymphatic: no swollen glands Integument: no lumps, mole changes, nail changes or rash Endocrine:no malaise/lethargy poly uria or polydipsia or unexpected weight changes Social History Socioeconomic History  Marital status:  Spouse name: Not on file  Number of children: Not on file  Years of education: Not on file  Highest education level: Not on file Tobacco Use  Smoking status: Former Smoker Packs/day: 0.50 Years: 4.00   Pack years: 2.00  
 Last attempt to quit: 3/6/1972 Years since quittin.1  Smokeless tobacco: Never Used Substance and Sexual Activity  Alcohol use: No  
  Comment: rare, 1 drink per year  Drug use: No  
 Sexual activity: Not Currently Family History Problem Relation Age of Onset  Heart Disease Mother  Kidney Disease Mother  Heart Disease Father  Kidney Disease Father  Cancer Sister Breast  
 
 
OBJECTIVE:  
 
Visit Vitals /76 (BP 1 Location: Left arm, BP Patient Position: Sitting) Pulse 91 Temp 98.6 °F (37 °C) (Oral) Resp 19 Ht 6' 4\" (1.93 m) Wt 249 lb (112.9 kg) SpO2 97% BMI 30.31 kg/m² CONSTITUTIONAL:   well nourished, appears age appropriate EYES: sclera anicteric, PERRL, EOMI 
ENMT:nares clear, moist mucous membranes, pharynx clear NECK: supple. Thyroid normal, No JVD or bruits RESPIRATORY: Chest: clear to ascultation and percussion, normal inspiratory effort CARDIOVASCULAR: Heart: regular rate and rhythm no murmurs, rubs or gallops, PMI not displaced, No thrills GASTROINTESTINAL: Abdomen: non distended, soft, non tender, bowel sounds normal 
HEMATOLOGIC: no purpura, petechiae or bruising LYMPHATIC: No lymph node enlargemant MUSCULOSKELETAL: Extremities: no edema or active synovitis, pulse 1+ INTEGUMENT: No unusual rashes or suspicious skin lesions noted. Nails appear normal. 
PERIPHERAL VASCULAR: normal pulses femoral, PT and DP NEUROLOGIC: non-focal exam, A & O X 3 PSYCHIATRIC:, appropriate affect ASSESSMENT:  
1. Acute on chronic systolic congestive heart failure ([address] 2. Ischemic cardiomyopathy 3. Paroxysmal atrial fibrillation (HCC) 4. CKD (chronic kidney disease), stage IV ([address] 5. Anemia, unspecified type Impression 1. Acute on chronic systolic heart failure that is compensated 2. Cardiomyopathy with no defibrillator in but he does have a pacemaker. 3.  Paroxysmal atrial fibrillation today rate is controlled 4.  CKD stage IV repeat status pending 5   Anemia status pending 6. Syncopal episode my feeling this probably was related to V. fib or V. tach from his exertion. He is scheduled to see his cardiologist next week so I think the best way to find what happened this can be to interpret late his pacemaker. And I told him he may need to be converted to a defibrillator. 7.  Bilateral knee pain he has an orthopedic appointment already scheduled so I will leave that treatment to them since the pulmonary already scheduled I will recheck a myself again in 2 weeks. I will call the lab results in the interim. PLAN: 
. Orders Placed This Encounter  CBC WITH AUTOMATED DIFF ([facility] In[facility])  METABOLIC PANEL, BASIC ([facility]) ATTENTION:  
This medical record was transcribed using an electronic medical records system. Although proofread, it may and can contain electronic and spelling errors. Other human spelling and other errors may be present. Corrections may be executed at a later time. Please feel free to contact us for any clarifications as needed. Follow-up and Dispositions · Return in about 2 weeks (around 5/15/2019). No results found for any visits on 05/01/19. Jt Garcia MD 
 
The patient verbalized understanding of the problems and plans as explained.

## 2019-05-01 ENCOUNTER — OFFICE VISIT (OUTPATIENT)
Dept: INTERNAL MEDICINE CLINIC | Age: 69
End: 2019-05-01

## 2019-05-01 VITALS
RESPIRATION RATE: 19 BRPM | TEMPERATURE: 98.6 F | BODY MASS INDEX: 30.32 KG/M2 | WEIGHT: 249 LBS | HEART RATE: 91 BPM | HEIGHT: 76 IN | DIASTOLIC BLOOD PRESSURE: 76 MMHG | SYSTOLIC BLOOD PRESSURE: 122 MMHG | OXYGEN SATURATION: 97 %

## 2019-05-01 DIAGNOSIS — N18.4 CKD (CHRONIC KIDNEY DISEASE), STAGE IV (HCC): ICD-10-CM

## 2019-05-01 DIAGNOSIS — I50.23 ACUTE ON CHRONIC SYSTOLIC CONGESTIVE HEART FAILURE (HCC): Primary | ICD-10-CM

## 2019-05-01 DIAGNOSIS — D64.9 ANEMIA, UNSPECIFIED TYPE: ICD-10-CM

## 2019-05-01 DIAGNOSIS — I48.0 PAROXYSMAL ATRIAL FIBRILLATION (HCC): ICD-10-CM

## 2019-05-01 DIAGNOSIS — I25.5 ISCHEMIC CARDIOMYOPATHY: ICD-10-CM

## 2019-05-01 LAB
ANION GAP SERPL CALC-SCNC: 17 MMOL/L
BUN SERPL-MCNC: 99 MG/DL (ref 9–20)
CALCIUM SERPL-MCNC: 8.7 MG/DL (ref 8.4–10.2)
CHLORIDE SERPL-SCNC: 90 MMOL/L (ref 98–107)
CO2 SERPL-SCNC: 26 MMOL/L (ref 22–32)
CREAT SERPL-MCNC: 2.7 MG/DL (ref 0.8–1.5)
ERYTHROCYTE [DISTWIDTH] IN BLOOD BY AUTOMATED COUNT: 15.2 %
GLUCOSE SERPL-MCNC: 138 MG/DL (ref 75–110)
HCT VFR BLD AUTO: 31 % (ref 37–51)
HGB BLD-MCNC: 10.3 G/DL (ref 12–18)
LYMPHOCYTES ABSOLUTE: 0.8 K/UL (ref 0.6–4.1)
LYMPHOCYTES NFR BLD: 14.5 % (ref 10–58.5)
MCH RBC QN AUTO: 30.8 PG (ref 26–32)
MCHC RBC AUTO-ENTMCNC: 33.2 G/DL (ref 30–36)
MCV RBC AUTO: 92.9 FL (ref 80–97)
MONOCYTES ABS-DIF,2141: 0.5 K/UL (ref 0–1.8)
MONOCYTES NFR BLD: 9.7 % (ref 0.1–24)
NEUTROPHILS # BLD: 75.8 % (ref 37–92)
NEUTROPHILS ABS,2156: 3.9 K/UL (ref 2–7.8)
PLATELET # BLD AUTO: 146 K/UL (ref 140–440)
PMV BLD AUTO: 7.8 FL
POTASSIUM SERPL-SCNC: 3.7 MMOL/L (ref 3.6–5)
RBC # BLD AUTO: 3.34 M/UL (ref 4.2–6.3)
SODIUM SERPL-SCNC: 133 MMOL/L (ref 137–145)
WBC # BLD AUTO: 5.2 K/UL (ref 4.1–10.9)

## 2019-05-01 NOTE — PROGRESS NOTES
Royal Rio Lombardo. Identified pt with two pt identifiers(name and ). Chief Complaint Patient presents with  Diabetes 2 week follow up  Chronic Kidney Disease  Gout  CHF  Fall  
  fell on the ouside deck on 19 and hit both knees 1. Have you been to the ER, urgent care clinic since your last visit? Hospitalized since your last visit? No 
 
2. Have you seen or consulted any other health care providers outside of the [address] [place] [address] since your last visit? Include any pap smears or colon screening. No 
 
 
Health Maintenance Topics with due status: Overdue Topic Date Due DTaP/Tdap/Td series 1969 Shingrix Vaccine Age 50> 1998 AAA Screening 73-69 YO Male Smoking Patients 2013 Health Maintenance Topics with due status: Not Due Topic Last Completion Date MEDICARE YEARLY EXAM 2018 MICROALBUMIN Q1 2018 Influenza Age 5 to Adult 2018 LIPID PANEL Q1 2018 COLONOSCOPY 2019 FOOT EXAM Q1 2019 HEMOGLOBIN A1C Q6M 02/15/2019 GLAUCOMA SCREENING Q2Y 2019 EYE EXAM RETINAL OR DILATED 2019 Health Maintenance Topics with due status: Completed Topic Last Completion Date Pneumococcal 65+ years 2015 Health Maintenance Topics with due status: Addressed Topic Date Due Hepatitis C Screening Addressed Medication reconciliation up to date and corrected with patient at this time. Today's provider has been notified of reason for visit, vitals and flowsheets obtained on patients. Reviewed record in preparation for visit, huddled with provider and have obtained necessary documentation. Wt Readings from Last 3 Encounters:  
19 249 lb (112.9 kg) 19 251 lb 12.8 oz (114.2 kg) 19 244 lb 9.6 oz (110.9 kg) Temp Readings from Last 3 Encounters:  
19 98.6 °F (37 °C) (Oral) 19 97.6 °F (36.4 °C) 04/22/19 97.6 °F (36.4 °C) (Oral) BP Readings from Last 3 Encounters:  
05/01/19 122/76  
04/24/19 144/84  
04/22/19 158/88 Pulse Readings from Last 3 Encounters:  
05/01/19 91  
04/24/19 70  
04/22/19 70 Vitals:  
 05/01/19 1359 BP: 122/76 Pulse: 91  
Resp: 19 Temp: 98.6 °F (37 °C) TempSrc: Oral  
SpO2: 97% Weight: 249 lb (112.9 kg) Height: 6' 4\" (1.93 m) PainSc:  10 - Worst pain ever PainLoc: Knee Learning Assessment: 
:  
 
Learning Assessment 8/28/2017 PRIMARY LEARNER Patient HIGHEST LEVEL OF EDUCATION - PRIMARY LEARNER  SOME COLLEGE PRIMARY LANGUAGE ENGLISH  
LEARNER PREFERENCE PRIMARY LISTENING  
ANSWERED BY patient RELATIONSHIP SELF Depression Screening: 
:  
 
3 most recent PHQ Screens 4/22/2019 Little interest or pleasure in doing things Not at all Feeling down, depressed, irritable, or hopeless Not at all Total Score PHQ 2 0 No flowsheet data found. Fall Risk Assessment: 
:  
 
Fall Risk Assessment, last 12 mths 5/1/2019 Able to walk? Yes Fall in past 12 months? Yes Fall with injury? No  
Number of falls in past 12 months 1 Fall Risk Score 1 Abuse Screening: 
:  
 
Abuse Screening Questionnaire 4/22/2019 2/15/2019 8/28/2017 Do you ever feel afraid of your partner? N N N Are you in a relationship with someone who physically or mentally threatens you? N N N Is it safe for you to go home? Barbra Stacy  
 
 
ADL Screening: 
:  
 

## 2019-05-01 NOTE — PATIENT INSTRUCTIONS

## 2019-05-02 NOTE — PROGRESS NOTES
Called and spoke to patient Two pt identifiers confirmed Informed patient per Dr. Rishi Ayala that lab results are stable and to continue current treatment. Patient verbalized understanding of information discussed  with no further questions at this time.

## 2019-05-14 NOTE — PROGRESS NOTES
Chief Complaint Patient presents with  Hypertension 2 week follow up  Diabetes  CHF  Chronic Kidney Disease SUBJECTIVE: 
 
Royal KRISTA Alonzo. is a 77 y.o. male who returns in follow-up for his hypertension, congestive heart failure, CKD, anemia, atrial fibrillation, cardiomyopathy and other medical problems. He is taking his medication generally feels quite well as he is lost a significant amount of weight since last time was here. Once his weight went below 240 he is taken no more than metolazone and his weight is ranged between 230 and 240 with a being to 35 today clothes. He currently denies any chest pain, shortness breath, palpitations, PND, orthopnea or other cardiorespiratory complaints. He denies any GI or  complaints. He denies any headaches, dizziness or lightheaded sensation or neurologic complaints. His arthritis actually feels better with less weight and then no other complaints. Current Outpatient Medications Medication Sig Dispense Refill  finasteride (PROSCAR) 5 mg tablet TAKE 1 TABLET BY MOUTH DAILY 90 Tab 3  
 gabapentin (NEURONTIN) 300 mg capsule Take 1 Cap by mouth nightly. 30 Cap 5  
 oxyCODONE-acetaminophen (PERCOCET) 5-325 mg per tablet Take 1 Tab by mouth every eight (8) hours as needed for Pain for up to 30 days. Max Daily Amount: 3 Tabs. 30 Tab 0  
 naloxone (NARCAN) 4 mg/actuation nasal spray Use 1 spray intranasally, then discard. Repeat with new spray every 2 min as needed for opioid overdose symptoms, alternating nostrils. 2 Each 0  
 potassium chloride (KLOR-CON) 20 mEq pack One packet three times daily 90 Packet prn  clonazePAM (KLONOPIN) 2 mg tablet TAKE 1 TABLET BY MOUTH EVERY NIGHT AT BEDTIME. MAY REPEAT DOSE IF YOU WAKE UP (Patient taking differently: TAKE 1 TABLET BY MOUTH EVERY NIGHT AT BEDTIME. MAY REPEAT DOSE IF YOU WAKE UP; 2 tabs at bedtime) 60 Tab 0  
 metOLazone (ZAROXOLYN) 2.5 mg tablet Take 1 Tab by mouth every other day. 30 Tab prn  ULORIC 40 mg tab tablet TAKE 1 TABLET BY MOUTH DAILY (Patient taking differently: TAKE 1 TABLET BY MOUTH DAILY; as needed) 90 Tab 3  
 ascorbic acid, vitamin C, (VITAMIN C) 250 mg tablet Take 1 Tab by mouth three (3) times daily. 100 Tab prn  insulin glargine (LANTUS SOLOSTAR U-100 INSULIN) 100 unit/mL (3 mL) inpn 35 Units by SubCUTAneous route nightly.  OTHER Apply  to affected area daily as needed (Knee Pain). CBD Cream:  Apply daily as needed for knee pain  carvedilol (COREG) 12.5 mg tablet TAKE 1 TABLET BY MOUTH TWICE DAILY 60 Tab 11  tamsulosin (FLOMAX) 0.4 mg capsule TAKE 2 CAPSULES BY MOUTH EVERY  Cap 3  Ferrous Sulfate (SLOW FE) 47.5 mg iron TbER tablet Take 1 Tab by mouth three (3) times daily. 90 Tab prn  bumetanide (BUMEX) 2 mg tablet TAKE 2 TABLETS BY MOUTH TWICE DAILY 360 Tab 3  pramipexole (MIRAPEX) 0.5 mg tablet TAKE 1 TABLET BY MOUTH EVERY NIGHT AT BEDTIME (Patient taking differently: May take an additional tablet if needed) 90 Tab prn  colchicine (COLCRYS) 0.6 mg tablet Take 0.6 mg by mouth as needed (Gouty attack). Take 2 tablets at onset of gouty attack then 1 tablet every hour as needed  Liraglutide (VICTOZA) 0.6 mg/0.1 mL (18 mg/3 mL) sub-q pen 0.6 mg by SubCUTAneous route daily. Past Medical History:  
Diagnosis Date  Anemia 8/5/2017  Anxiety 8/5/2017  Arrhythmia   
 atrial fibrillation 2014  ASCVD (arteriosclerotic cardiovascular disease) 8/5/2017  BPH (benign prostatic hyperplasia) 8/5/2017  CAD (coronary artery disease)   
 h/o stents  Cancer [facility])   
 h/o skin cancer  Cardiomyopathy ([facility] [address] 8/5/2017  CHF (congestive heart failure) ([address] 8/5/2017  Chronic kidney disease Stage IV  CKD (chronic kidney disease), stage IV ([address] 8/5/2017  Diabetes ([address]  Diabetes mellitus ([address] 8/5/2017  Diabetic neuropathy ([address] 8/5/2017  DJD (degenerative joint disease) 8/5/2017  ED (erectile dysfunction) 8/5/2017  Gout 8/5/2017  High cholesterol  Hyperlipidemia 8/5/2017  Hypertension  Hypertension with renal disease 8/5/2017  Hypothyroid 8/5/2017  Insomnia 8/5/2017  Obesity 8/5/2017  On statin therapy 8/5/2017  Restless leg 8/5/2017  Thyroid disease   
 hypothyroid Past Surgical History:  
Procedure Laterality Date  CARDIAC SURG PROCEDURE UNLIST    
 cardiac stents  CARDIAC SURG PROCEDURE UNLIST Ablation 5/17/2018 [id number] [facility]  COLONOSCOPY N/A 6/28/2016 COLONOSCOPY performed by Carie Zhou MD at [facility] ENDOSCOPY  COLONOSCOPY N/A 1/9/2019 COLONOSCOPY performed by Guero Martinez MD at [facility] ENDOSCOPY  COLONOSCOPY,DIAGNOSTIC  1/9/2019  HX APPENDECTOMY  HX BUNIONECTOMY    
 and removal of 2 seasmoid bone of the great toe 2/2018  HX HEENT Bilateral Cataract surgery  HX HEENT Tonsils  HX HEENT Axe wound to the head  HX KNEE ARTHROSCOPY X 2  
 HX ORTHOPAEDIC    
 HX ORTHOPAEDIC    
 partial laminectomy  HX PACEMAKER    
 HX ROTATOR CUFF REPAIR    
 bilateral  
 SD INSJ ELTRD CAR DACIA SYS ATTCH PM/CVDFB PLS GEN N/A 1/28/2019 Lv Lead Placement To Previous Generator performed by Miranda Valdez MD at [facility] CARDIAC CATH LAB  SD REMVL PERM PM PLS GEN W/REPL PLSE GEN MULT LEAD N/A 1/28/2019 Remove & Replace Ppm Gen Biv Multi Leads performed by Miranda Valdez MD at [address] [facility] CATH LAB Allergies Allergen Reactions  Niacin Unknown (comments) Other reaction(s): Unknown (comments)  Imipenem Diarrhea Other reaction(s): Rash  Levemir [Insulin Detemir] Hives  Other Medication Other (comments) ? Allergy to Dwayne Sorrel causing chemical burn  Primaxin [Imipenem-Cilastatin] Diarrhea and Rash  Xarelto [Rivaroxaban] Rash and Itching Other reaction(s): Rash REVIEW OF SYSTEMS: 
General: negative for - chills or fever, or weight loss or gain ENT: negative for - headaches, nasal congestion or tinnitus Eyes: no blurred or visual changes Neck: No stiffness or swollen nodes Respiratory: negative for - cough, hemoptysis, shortness of breath or wheezing Cardiovascular : negative for - chest pain, edema, palpitations or shortness of breath Gastrointestinal: negative for - abdominal pain, blood in stools, heartburn or nausea/vomiting Genito-Urinary: no dysuria, trouble voiding, or hematuria Musculoskeletal: negative for - gait disturbance, joint pain, joint stiffness or joint swelling Neurological: no TIA or stroke symptoms Hematologic: no bruises, no bleeding Lymphatic: no swollen glands Integument: no lumps, mole changes, nail changes or rash Endocrine:no malaise/lethargy poly uria or polydipsia or unexpected weight changes Social History Socioeconomic History  Marital status:  Spouse name: Not on file  Number of children: Not on file  Years of education: Not on file  Highest education level: Not on file Tobacco Use  Smoking status: Former Smoker Packs/day: 0.50 Years: 4.00 Pack years: 2.00 Last attempt to quit: 3/6/1972 Years since quittin.3  Smokeless tobacco: Never Used Substance and Sexual Activity  Alcohol use: No  
  Comment: rare, 1 drink per year  Drug use: No  
 Sexual activity: Not Currently Family History Problem Relation Age of Onset  Heart Disease Mother  Kidney Disease Mother  Heart Disease Father  Kidney Disease Father  Cancer Sister Breast  
 
 
OBJECTIVE:  
 
Visit Vitals /88 (BP 1 Location: Left arm, BP Patient Position: Sitting) Pulse 90 Temp 98.5 °F (36.9 °C) (Oral) Resp 19 Ht 6' 4\" (1.93 m) Wt 235 lb (106.6 kg) SpO2 98% BMI 28.61 kg/m² CONSTITUTIONAL:   well nourished, appears age appropriate EYES: sclera anicteric, PERRL, EOMI 
ENMT:nares clear, moist mucous membranes, pharynx clear NECK: supple. Thyroid normal, No JVD or bruits RESPIRATORY: Chest: clear to ascultation and percussion, normal inspiratory effort CARDIOVASCULAR: Heart: regular rate and rhythm no murmurs, rubs or gallops, PMI not displaced, No thrills GASTROINTESTINAL: Abdomen: non distended, soft, non tender, bowel sounds normal 
HEMATOLOGIC: no purpura, petechiae or bruising LYMPHATIC: No lymph node enlargemant MUSCULOSKELETAL: Extremities: no edema or active synovitis, pulse 1+ INTEGUMENT: No unusual rashes or suspicious skin lesions noted. Nails appear normal. 
PERIPHERAL VASCULAR: normal pulses femoral, PT and DP NEUROLOGIC: non-focal exam, A & O X 3 PSYCHIATRIC:, appropriate affect ASSESSMENT:  
1. Systolic CHF, acute on chronic (HCC) 2. CKD (chronic kidney disease), stage IV ([facility] [address] 3. Paroxysmal atrial fibrillation (HCC) 4. Ischemic cardiomyopathy 5. Anemia, unspecified type Impression 1. CHF compensated 2. CKD we will see what that status is 3. Paroxysmal atrial fibrillation seems to be in sinus today 4. Cardiomyopathy stable 5. Anemia status pending I will call the lab and make further recommendations or adjustments if necessary. Follow-up in 2 weeks or sooner if there is a problem. PLAN: 
. Orders Placed This Encounter  CBC WITH AUTOMATED DIFF ([employer])  METABOLIC PANEL, BASIC ([employer]) ATTENTION:  
This medical record was transcribed using an electronic medical records system. Although proofread, it may and can contain electronic and spelling errors. Other human spelling and other errors may be present. Corrections may be executed at a later time. Please feel free to contact us for any clarifications as needed. Follow-up and Dispositions · Return in about 2 weeks (around 5/29/2019). No results found for any visits on 05/15/19. Filippo Chacon MD 
 
The patient verbalized understanding of the problems and plans as explained.

## 2019-05-15 ENCOUNTER — OFFICE VISIT (OUTPATIENT)
Dept: INTERNAL MEDICINE CLINIC | Age: 69
End: 2019-05-15

## 2019-05-15 VITALS
HEIGHT: 76 IN | BODY MASS INDEX: 28.62 KG/M2 | RESPIRATION RATE: 19 BRPM | TEMPERATURE: 98.5 F | WEIGHT: 235 LBS | OXYGEN SATURATION: 98 % | HEART RATE: 90 BPM | DIASTOLIC BLOOD PRESSURE: 88 MMHG | SYSTOLIC BLOOD PRESSURE: 130 MMHG

## 2019-05-15 DIAGNOSIS — N18.4 CKD (CHRONIC KIDNEY DISEASE), STAGE IV (HCC): ICD-10-CM

## 2019-05-15 DIAGNOSIS — I50.23 SYSTOLIC CHF, ACUTE ON CHRONIC (HCC): Primary | ICD-10-CM

## 2019-05-15 DIAGNOSIS — D64.9 ANEMIA, UNSPECIFIED TYPE: ICD-10-CM

## 2019-05-15 DIAGNOSIS — I48.0 PAROXYSMAL ATRIAL FIBRILLATION (HCC): ICD-10-CM

## 2019-05-15 DIAGNOSIS — I25.5 ISCHEMIC CARDIOMYOPATHY: ICD-10-CM

## 2019-05-15 LAB
ANION GAP SERPL CALC-SCNC: 18 MMOL/L
BUN SERPL-MCNC: 98 MG/DL (ref 9–20)
CALCIUM SERPL-MCNC: 9 MG/DL (ref 8.4–10.2)
CHLORIDE SERPL-SCNC: 95 MMOL/L (ref 98–107)
CO2 SERPL-SCNC: 27 MMOL/L (ref 22–32)
CREAT SERPL-MCNC: 2.1 MG/DL (ref 0.8–1.5)
ERYTHROCYTE [DISTWIDTH] IN BLOOD BY AUTOMATED COUNT: 14.5 %
GLUCOSE SERPL-MCNC: 124 MG/DL (ref 75–110)
HCT VFR BLD AUTO: 34.9 % (ref 37–51)
HGB BLD-MCNC: 11.8 G/DL (ref 12–18)
LYMPHOCYTES ABSOLUTE: 0.8 K/UL (ref 0.6–4.1)
LYMPHOCYTES NFR BLD: 12.8 % (ref 10–58.5)
MCH RBC QN AUTO: 31.4 PG (ref 26–32)
MCHC RBC AUTO-ENTMCNC: 33.8 G/DL (ref 30–36)
MCV RBC AUTO: 92.7 FL (ref 80–97)
MONOCYTES ABS-DIF,2141: 0.5 K/UL (ref 0–1.8)
MONOCYTES NFR BLD: 7.5 % (ref 0.1–24)
NEUTROPHILS # BLD: 79.7 % (ref 37–92)
NEUTROPHILS ABS,2156: 5 K/UL (ref 2–7.8)
PLATELET # BLD AUTO: 136 K/UL (ref 140–440)
PMV BLD AUTO: 8.9 FL
POTASSIUM SERPL-SCNC: 3.9 MMOL/L (ref 3.6–5)
RBC # BLD AUTO: 3.76 M/UL (ref 4.2–6.3)
SODIUM SERPL-SCNC: 140 MMOL/L (ref 137–145)
WBC # BLD AUTO: 6.3 K/UL (ref 4.1–10.9)

## 2019-05-15 NOTE — PATIENT INSTRUCTIONS
Arthritis: Care Instructions Your Care Instructions Arthritis, also called osteoarthritis, is a breakdown of the cartilage that cushions your joints. When the cartilage wears down, your bones rub against each other. This causes pain and stiffness. Many people have some arthritis as they age. Arthritis most often affects the joints of the spine, hands, hips, knees, or feet. You can take simple measures to protect your joints, ease your pain, and help you stay active. Follow-up care is a key part of your treatment and safety. Be sure to make and go to all appointments, and call your doctor if you are having problems. It's also a good idea to know your test results and keep a list of the medicines you take. How can you care for yourself at home? · Stay at a healthy weight. Being overweight puts extra strain on your joints. · Talk to your doctor or physical therapist about exercises that will help ease joint pain. ? Stretch. You may enjoy gentle forms of yoga to help keep your joints and muscles flexible. ? Walk instead of jog. Other types of exercise that are less stressful on the joints include riding a bicycle, swimming, bang chi, or water exercise. ? Lift weights. Strong muscles help reduce stress on your joints. Stronger thigh muscles, for example, take some of the stress off of the knees and hips. Learn the right way to lift weights so you do not make joint pain worse. · Take your medicines exactly as prescribed. Call your doctor if you think you are having a problem with your medicine. · Take pain medicines exactly as directed. ? If the doctor gave you a prescription medicine for pain, take it as prescribed. ? If you are not taking a prescription pain medicine, ask your doctor if you can take an over-the-counter medicine. · Use a cane, crutch, walker, or another device if you need help to get around. These can help rest your joints.  You also can use other things to make life easier, such as a higher toilet seat and padded handles on kitchen utensils. · Do not sit in low chairs, which can make it hard to get up. · Put heat or cold on your sore joints as needed. Use whichever helps you most. You also can take turns with hot and cold packs. ? Apply heat 2 or 3 times a day for 20 to 30 minutesusing a heating pad, hot shower, or hot packto relieve pain and stiffness. ? Put ice or a cold pack on your sore joint for 10 to 20 minutes at a time. Put a thin cloth between the ice and your skin. When should you call for help? Call your doctor now or seek immediate medical care if: 
  · You have sudden swelling, warmth, or pain in any joint.  
  · You have joint pain and a fever or rash.  
  · You have such bad pain that you cannot use a joint.  
 Watch closely for changes in your health, and be sure to contact your doctor if: 
  · You have mild joint symptoms that continue even with more than 6 weeks of care at home.  
  · You have stomach pain or other problems with your medicine. Where can you learn more? Go to http://gildardo-palak.info/. Enter X007 in the search box to learn more about \"Arthritis: Care Instructions. \" Current as of: Gi 10, 2018 Content Version: 11.9 © 6213-4493 [employer]. Care instructions adapted under license by [employer] (which disclaims liability or warranty for this information). If you have questions about a medical condition or this instruction, always ask your healthcare professional. [place] [address] any warranty or liability for your use of this information.

## 2019-05-15 NOTE — PROGRESS NOTES
[facility]. Identified pt with two pt identifiers(name and ). Chief Complaint Patient presents with  Hypertension 2 week follow up  Diabetes  CHF  Chronic Kidney Disease 1. Have you been to the ER, urgent care clinic since your last visit? Hospitalized since your last visit? No 
 
2. Have you seen or consulted any other health care providers outside of the [address] [place] [address] since your last visit? Include any pap smears or colon screening. No 
 
 
Health Maintenance Topics with due status: Overdue Topic Date Due DTaP/Tdap/Td series 1969 Shingrix Vaccine Age 50> 1998 AAA Screening 73-67 YO Male Smoking Patients 2013 Health Maintenance Topics with due status: Not Due Topic Last Completion Date MEDICARE YEARLY EXAM 2018 MICROALBUMIN Q1 2018 Influenza Age 5 to Adult 2018 LIPID PANEL Q1 2018 COLONOSCOPY 2019 FOOT EXAM Q1 2019 HEMOGLOBIN A1C Q6M 02/15/2019 GLAUCOMA SCREENING Q2Y 2019 EYE EXAM RETINAL OR DILATED 2019 Health Maintenance Topics with due status: Completed Topic Last Completion Date Pneumococcal 65+ years 2015 Health Maintenance Topics with due status: Addressed Topic Date Due Hepatitis C Screening Addressed Medication reconciliation up to date and corrected with patient at this time. Today's provider has been notified of reason for visit, vitals and flowsheets obtained on patients. Reviewed record in preparation for visit, huddled with provider and have obtained necessary documentation. Wt Readings from Last 3 Encounters:  
05/15/19 235 lb (106.6 kg) 19 249 lb (112.9 kg) 19 251 lb 12.8 oz (114.2 kg) Temp Readings from Last 3 Encounters:  
05/15/19 98.5 °F (36.9 °C) (Oral) 19 98.6 °F (37 °C) (Oral) 19 97.6 °F (36.4 °C) BP Readings from Last 3 Encounters:  
05/15/19 130/88  
05/01/19 122/76  
04/24/19 144/84 Pulse Readings from Last 3 Encounters:  
05/15/19 90  
05/01/19 91  
04/24/19 70 Vitals:  
 05/15/19 1427 BP: 130/88 Pulse: 90 Resp: 19 Temp: 98.5 °F (36.9 °C) TempSrc: Oral  
SpO2: 98% Weight: 235 lb (106.6 kg) Height: 6' 4\" (1.93 m) PainSc:   0 - No pain Learning Assessment: 
:  
 
Learning Assessment 8/28/2017 PRIMARY LEARNER Patient HIGHEST LEVEL OF EDUCATION - PRIMARY LEARNER  SOME COLLEGE PRIMARY LANGUAGE ENGLISH  
LEARNER PREFERENCE PRIMARY LISTENING  
ANSWERED BY patient RELATIONSHIP SELF Depression Screening: 
:  
 
3 most recent PHQ Screens 5/1/2019 Little interest or pleasure in doing things Not at all Feeling down, depressed, irritable, or hopeless Not at all Total Score PHQ 2 0 No flowsheet data found. Fall Risk Assessment: 
:  
 
Fall Risk Assessment, last 12 mths 5/1/2019 Able to walk? Yes Fall in past 12 months? Yes Fall with injury? Yes  
Number of falls in past 12 months 1 Fall Risk Score 2 Abuse Screening: 
:  
 
Abuse Screening Questionnaire 4/22/2019 2/15/2019 8/28/2017 Do you ever feel afraid of your partner? N N N Are you in a relationship with someone who physically or mentally threatens you? N N N Is it safe for you to go home? Sunil Love  
 
 
ADL Screening: 
:  
 

## 2019-05-17 NOTE — PROGRESS NOTES
Called and spoke to patient Two pt identifiers confirmed Informed patient per Dr. Janet Whelan that kidney function was stable and hemoglobin is improved and to continue current treatment. Patient verbalized understanding of information discussed  with no further questions at this time.

## 2019-05-20 ENCOUNTER — OFFICE VISIT (OUTPATIENT)
Dept: PALLATIVE CARE | Age: 69
End: 2019-05-20

## 2019-05-20 VITALS
DIASTOLIC BLOOD PRESSURE: 94 MMHG | HEART RATE: 70 BPM | HEIGHT: 76 IN | SYSTOLIC BLOOD PRESSURE: 168 MMHG | WEIGHT: 243.8 LBS | RESPIRATION RATE: 18 BRPM | TEMPERATURE: 97.6 F | OXYGEN SATURATION: 98 % | BODY MASS INDEX: 29.69 KG/M2

## 2019-05-20 DIAGNOSIS — G47.00 INSOMNIA, UNSPECIFIED TYPE: ICD-10-CM

## 2019-05-20 DIAGNOSIS — M25.511 CHRONIC PAIN OF BOTH SHOULDERS: ICD-10-CM

## 2019-05-20 DIAGNOSIS — F41.9 ANXIETY: ICD-10-CM

## 2019-05-20 DIAGNOSIS — M25.512 CHRONIC PAIN OF BOTH SHOULDERS: ICD-10-CM

## 2019-05-20 DIAGNOSIS — R53.82 CHRONIC FATIGUE: Primary | ICD-10-CM

## 2019-05-20 DIAGNOSIS — G89.29 CHRONIC PAIN OF BOTH SHOULDERS: ICD-10-CM

## 2019-05-20 RX ORDER — OXYCODONE AND ACETAMINOPHEN 5; 325 MG/1; MG/1
1 TABLET ORAL
Qty: 30 TAB | Refills: 0 | Status: SHIPPED | OUTPATIENT
Start: 2019-05-20 | End: 2019-06-19

## 2019-05-20 NOTE — PATIENT INSTRUCTIONS
Dear Marcos Julien. , 
 
It was a pleasure seeing you today at [facility] office We will see you again in 3 months Your described symptoms were: Fatigue: 5 Drowsiness: 6 Depression: 0 Pain: 4 Anxiety: 0 Nausea: 0 Anorexia: 2 Dyspnea: 5 Best Well-Bein Constipation: Yes Other Problem (Comment): 0 This is the plan we talked about: 1. Diabetic neuropathy - You have severe grade 4 neuropathy in feet up to your knees and hands. - You are constantly cold. We talked about using heavy blankets (weighted blankets) to help. - You did not tolerate Lyrica or higher doses of Gabapentin, you can only tolerate 300 mg capsule at bedtime. 2. Bilateral shoulder pains - You have seen Dr. Barbra Baptiste to help with pain, you had shoulder cuff surgeries and an injection for pain but none has helped. - You had knee treatments from him and this helped. - You are miserable from severe shoulder pain that keeps you up all night. - You have had percocet in the past with good benefit and so we agreed to start you back on it for now until you see Dr. Barbra Baptiste. - Take Percocet 5/325 mg tab at bedtime if needed for pain. You have 9 out of the 30 tabs I provided you last time. - You met with Dr. Eugenia Gipson who diagnosed you with a torn rotator cuff in each shoulder.  
- We talked about you asking your PCP Dr. Vesta Monroy about taking over your Percocet prescription. 3. Sleep - You are unable to sleep at all, you have been up several nights now. - You did not tolerate Ambien. You are on Klonopin 4 mg every night without any benefit. - You are completely off Klonopin now. 4. Constipation 
-Constipation is a common side effect of pain medications as they slow your bowels. -Please start Pericolace 2 tabs daily and increase to 2 tabs two times a day if needed. This is necessary to keep your bowels soft. - Add Miralax every other day as a laxative. - Goal is to have soft bowel movements every day or every other day. - Please call us if you do not have bowel movements for more than 3 days. 5. Goals of care - You have an AMD and DDNR. 
- You are getting every 2 weeks blood work from your PCP. You want to discuss with him about extending the periods in between blood work. This is what you have shared with us about Advance Care Planning: 
 
 
Advance Care Planning 4/24/2019 Patient's Healthcare Decision Maker is: -  
Primary Decision Maker Name -  
Primary Decision Maker Phone Number -  
Primary Decision Maker Relationship to Patient -  
Confirm Advance Directive Yes, on file Does the patient have other document types - The Palliative Medicine Team is here to support you and your family.   
 
 
 
Sincerely, 
 
 
Becky Gipson MD and the Palliative Medicine Team

## 2019-05-20 NOTE — PROGRESS NOTES
Palliative Medicine Office Visit Palliative Medicine Nurse Check In 
[phone] Patient Name: Tamela Ayoub. YOB: 1950 Date of Office Visit: 5/20/2019 Patient states: \"  \" 
 
From Check In Sheet (scanned in Media): 
Has a medical provider talked with you about cardiopulmonary resuscitation (CPR)? [x] Yes   [] No   [] Unable to obtain Nurse reminder to complete or update ACP FlowSheet: 
 
Is ACP on the Problem List?    [x] Yes    [] No 
IF ACP Document is ON FILE; Nurse to place ACP on Problem List  
 
Is there an ACP Note in Chart Review/Note? [x] Yes    [] No  
If NO: ALERT PROVIDER Advance Care Planning 4/24/2019 Patient's Healthcare Decision Maker is: -  
Primary Decision Maker Name -  
Primary Decision Maker Phone Number -  
Primary Decision Maker Relationship to Patient -  
Confirm Advance Directive Yes, on file Does the patient have other document types - Is there anything that we should know about you as a person in order to provide you the best care possible? Have you been to the ER, urgent care clinic since your last visit? [] Yes   [x] No   [] Unable to obtain Have you been hospitalized since your last visit? [] Yes   [x] No   [] Unable to obtain Have you seen or consulted any other health care providers outside of the [address] [place] [address] since your last visit? [] Yes   [x] No   [] Unable to obtain Functional status (describe):  
 
 
Last BM: 5/29/2019  accessed (date): 5/20/2019 Bottle review (for opioid pain medication): 
Medication 1:  
Date filled:  
Directions:  
# filled: # left: # pills taking per day: 
Last dose taken: 
 
Medication 2:  
Date filled:  
Directions:  
# filled: # left: # pills taking per day: 
Last dose taken: 
 
Medication 3:  
Date filled:  
Directions:  
# filled: # left: # pills taking per day: 
Last dose taken: 
 
Medication 4:  
Date filled:  
Directions: # filled: # left: # pills taking per day: 
Last dose taken:

## 2019-05-20 NOTE — PROGRESS NOTES
Palliative Medicine Outpatient Services [place]: [phone] ([phone]) Patient Name: Krystyna Box. YOB: 1950 Date of Current Visit: 19 Location of Current Visit:   
[] [facility] Office 
[] [facility] Office [x] [facility] Office 
[] Home 
[] Other:   
 
Date of Initial Visit: 19 Referral from: Self- patient's wife Date of Initial Referral:3/14 Primary Care Physician: Soham Gann MD 
  
 SUMMARY:  
Krystyna Mcneal is a 77y.o. year old with a  history of hypertension, CKD stage IV, anemia, atrial fibrillation acute on chronic compensated systolic CHF with cardiomyopathy , DM type 2 with severe neuropathy, chronic severe bilateral shoulder pain who was referred to Palliative Medicine by self referral from wife for management of symptoms and psychosocial support. 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 Palliative Medicine is addressing the following current patient/family concerns: Insomnia, severe pain. PALLIATIVE DIAGNOSES:  
 
  ICD-10-CM ICD-9-CM 1. Chronic fatigue R53.82 780.79   
2. Chronic pain of both shoulders M25.511 719.41 oxyCODONE-acetaminophen (PERCOCET) 5-325 mg per tablet G89.29 338.29   
 M25.512    
3. Insomnia, unspecified type G47.00 780.52   
4. Anxiety F41.9 300.00 PLAN:  
Patient Instructions Dear Krystyna Box. , 
 
It was a pleasure seeing you today at [facility] office We will see you again in 3 months Your described symptoms were: Fatigue: 5 Drowsiness: 6 Depression: 0 Pain: 4 Anxiety: 0 Nausea: 0 Anorexia: 2 Dyspnea: 5 Best Well-Bein Constipation: Yes Other Problem (Comment): 0 This is the plan we talked about: 1. Diabetic neuropathy - You have severe grade 4 neuropathy in feet up to your knees and hands. - You are constantly cold. We talked about using heavy blankets (weighted blankets) to help. - You did not tolerate Lyrica or higher doses of Gabapentin, you can only tolerate 300 mg capsule at bedtime. 2. Bilateral shoulder pains - You have seen Dr. Odessa Reddy to help with pain, you had shoulder cuff surgeries and an injection for pain but none has helped. - You had knee treatments from him and this helped. - You are miserable from severe shoulder pain that keeps you up all night. - You have had percocet in the past with good benefit and so we agreed to start you back on it for now until you see Dr. Odessa Reddy. - Take Percocet 5/325 mg tab at bedtime if needed for pain. You have 9 out of the 30 tabs I provided you last time. - You met with Dr. Mirza Colbert who diagnosed you with a torn rotator cuff in each shoulder.  
- We talked about you asking your PCP Dr. Patricia Johnson about taking over your Percocet prescription. 3. Sleep - You are unable to sleep at all, you have been up several nights now. - You did not tolerate Ambien. You are on Klonopin 4 mg every night without any benefit. - You are completely off Klonopin now. 4. Constipation 
-Constipation is a common side effect of pain medications as they slow your bowels. -Please start Pericolace 2 tabs daily and increase to 2 tabs two times a day if needed. This is necessary to keep your bowels soft. - Add Miralax every other day as a laxative. - Goal is to have soft bowel movements every day or every other day. - Please call us if you do not have bowel movements for more than 3 days. 5. Goals of care - You have an AMD and DDNR. 
- You are getting every 2 weeks blood work from your PCP. You want to discuss with him about extending the periods in between blood work. This is what you have shared with us about Advance Care Planning: 
 
 
Advance Care Planning 4/24/2019 Patient's Healthcare Decision Maker is: -  
Primary Decision Maker Name -  
Primary Decision Maker Phone Number -  
Primary Decision Maker Relationship to Patient -  
Confirm Advance Directive Yes, on file Does the patient have other document types - The Palliative Medicine Team is here to support you and your family. Sincerely, 
 
 
Dede Redding MD and the Palliative Medicine Team 
 
 
 GOALS OF CARE / TREATMENT PREFERENCES:  
[====Goals of Care====] GOALS OF CARE: 
Patient / health care proxy stated goals: See Patient Instructions / Summary TREATMENT PREFERENCES:  
Code Status:  [] Attempt Resuscitation       [x] Do Not Attempt Resuscitation Advance Care Planning: 
[x] The [employer] Interdisciplinary Team has updated the ACP Navigator with Decision Maker and Patient Capacity Advance Care Planning 4/24/2019 Patient's Healthcare Decision Maker is: -  
Primary Decision Maker Name -  
Primary Decision Maker Phone Number -  
Primary Decision Maker Relationship to Patient -  
Confirm Advance Directive Yes, on file Does the patient have other document types - Other: 
(If patient appropriate for POST, consider using PALLPOST smart phrase here) The palliative care team has discussed with patient / health care proxy about goals of care / treatment preferences for patient. 
[====Goals of Care====] PRESCRIPTIONS GIVEN:  
 
Medications Ordered Today Medications  oxyCODONE-acetaminophen (PERCOCET) 5-325 mg per tablet Sig: Take 1 Tab by mouth every eight (8) hours as needed for Pain for up to 30 days. Max Daily Amount: 3 Tabs. Dispense:  30 Tab Refill:  0  
  
 
 
 FOLLOW UP: Future Appointments Date Time Provider Leonardo Martha 5/22/2019  1:00 PM [facility] CHAIR 2 [address] REG  
5/31/2019  2:30 PM Carey Tatum MD [address]  
6/19/2019 10:00 AM [facility] CHAIR 2 [facility] REG  
 7/17/2019  1:00 PM [facility] CHAIR 2 [facility] REG  
8/14/2019 10:00 AM [facility] CHAIR 2 [facility] REG  
8/14/2019 12:30 PM Chelly Diaz MD [facility] [address] PHYSICIANS INVOLVED IN CARE:  
Patient Care Team: 
JuanF Solorzano MD as PCP - General (Internal Medicine) Graciela Eagle MD (Cardiology) HISTORY:  
Reviewed patient-completed ESAS and advance care planning form. Reviewed patient record in prescription monitoring program. 
 
CHIEF COMPLAINT: No chief complaint on file. HPI/SUBJECTIVE: The patient is: [x] Verbal / [] Nonverbal  
 
Patient here with his wife for routine follow-up. Shoulder pain-he saw an orthopedist Dr. Arleth Worthington who told him that he has a torn rotator cuff in each shoulder which is not operable. He continues to have significant shoulder and knee pain. He does not take any pain medications for it for the most part and only takes it but the pain is severe. He has 9 tablets left from his supply of 30 tablets 4 weeks ago. He continues to not want to take any opioids but needs it when the pain is severe and agreeable to taking it only when pain is severe. He had many questions about his every 2 weeks and blood work with Dr. Charlie Flores his PCP. His understanding is that it is to keep a close eye on his kidney function and anemia. He tells me that he would not want dialysis short or long-term should it comes to that. I have encouraged him to talk to his PCP about spreading out the blood work. We also talked about him requesting the PCP to write for his opioid prescriptions. His wife wants to continue seeing us for support and to continued relationship should patient decline and move towards end of life. 
 
--------- Initial visit Pleasant gentleman here with his wife Zhane. Shoulder pain-he has had bilateral rotator cuff surgeries and injections by Dr. Bing Bach.   He has not seen him for over a year and his shoulder is been more painful. He was on and off on Percocet up until 2 years ago. He has not been on any opioids over the last 2 years and is unable to use ibuprofen because of worsening kidney function. He reports being absolutely miserable because of the shoulder pain and unable to get any rest and is requesting pain medicine for the same. He is very clear about not wanting to use the pain medicine on a regular basis but only when his pain is severe and lack of sleep is no longer unacceptable. At this point he tells me he has not slept for 4 days and he is very miserable. Insomnia-he reports inability to sleep secondary to severe shoulder pain and severe neuropathy in his feet up to his legs. He gets really cold, unable to get warm and therefore his legs awake along with shoulder pain. He has tried Ambien which made him more wired then help him sleep. He is also on Klonopin which has not been helpful at all. Neuropathy/balance/fall-he has severe neuropathy in his feet unable to feel his feet and therefore was falling until he started using his cane. He no longer drives because he is unable to feel his feet. He worked with [employer] for over 3 months to help with gait and balance which he thinks helps some but not a whole lot. He was unable to tolerate Lyrica and higher doses of gabapentin therefore he is only on 300 mg at bedtime of gabapentin. He is following with an endocrinologist and podiatrist. 
 
Kidney function-he has very good understanding of his multiple medical problems. His kidney function has actually improved after getting worse earlier in the year. He plans not to have dialysis should it ever come to that and wants to focus on comfort and quality of life. Clinical Pain Assessment (nonverbal scale for nonverbal patients):  
[++++ Clinical Pain Assessment++++] [++++Pain Severity++++]: Pain: 4 
[++++Pain Character++++]: twisting, knifelike [++++Pain Duration++++]: months 
[++++Pain Effect++++]: functional and emotional 
[++++Pain Factors++++]: none in particular 
[++++Pain Frequency++++]: constant [++++Pain Location++++]: bilateral shoulders [++++ Clinical Pain Assessment++++] FUNCTIONAL ASSESSMENT:  
 
Palliative Performance Scale (PPS): PPS: 70 PSYCHOSOCIAL/SPIRITUAL SCREENING:  
 
Any spiritual / Mandaeism concerns: 
[] Yes /  [x] No 
 
Caregiver Burnout: 
[] Yes /  [x] No /  [] No Caregiver Present Anticipatory grief assessment:  
[x] Normal  / [] Maladaptive ESAS Anxiety: Anxiety: 0 
 
ESAS Depression: Depression: 0 REVIEW OF SYSTEMS:  
 
The following systems were [x] reviewed / [] unable to be reviewed Systems: constitutional, ears/nose/mouth/throat, respiratory, gastrointestinal, genitourinary, musculoskeletal, integumentary, neurologic, psychiatric, endocrine. Positive findings noted below. Modified ESAS Completed by: provider Fatigue: 5 Drowsiness: 6 Depression: 0 Pain: 4 Anxiety: 0 Nausea: 0 Anorexia: 2 Dyspnea: 5 Best Well-Bein Constipation: Yes Other Problem (Comment): 0 PHYSICAL EXAM:  
 
Wt Readings from Last 3 Encounters:  
19 243 lb 12.8 oz (110.6 kg) 05/15/19 235 lb (106.6 kg) 19 249 lb (112.9 kg) Blood pressure (!) 168/94, pulse 70, temperature 97.6 °F (36.4 °C), temperature source Oral, resp. rate 18, height 6' 4\" (1.93 m), weight 243 lb 12.8 oz (110.6 kg), SpO2 98 %. Last bowel movement: See Nursing Note Constitutional: Alert, oriented Eyes: pupils equal, anicteric ENMT: no nasal discharge, moist mucous membranes Cardiovascular: regular rhythm, distal pulses intact Respiratory: breathing not labored, symmetric Gastrointestinal: soft non-tender, +bowel sounds Musculoskeletal: Tenderness to palpation in bilateral shoulders, movement restricted due to pain in both shoulders. Skin: warm, dry Neurologic: following commands, moving all extremities Psychiatric: full affect, no hallucinations Other: 
 
 
 HISTORY:  
 
Past Medical History:  
Diagnosis Date  Anemia 8/5/2017  Anxiety 8/5/2017  Arrhythmia   
 atrial fibrillation 2014  ASCVD (arteriosclerotic cardiovascular disease) 8/5/2017  BPH (benign prostatic hyperplasia) 8/5/2017  CAD (coronary artery disease)   
 h/o stents  Cancer [facility])   
 h/o skin cancer  Cardiomyopathy ([address] 8/5/2017  CHF (congestive heart failure) ([address] 8/5/2017  Chronic kidney disease Stage IV  CKD (chronic kidney disease), stage IV ([address] 8/5/2017  Diabetes ([address]  Diabetes mellitus ([address] 8/5/2017  Diabetic neuropathy ([address] 8/5/2017  DJD (degenerative joint disease) 8/5/2017  ED (erectile dysfunction) 8/5/2017  Gout 8/5/2017  High cholesterol  Hyperlipidemia 8/5/2017  Hypertension  Hypertension with renal disease 8/5/2017  Hypothyroid 8/5/2017  Insomnia 8/5/2017  Obesity 8/5/2017  On statin therapy 8/5/2017  Restless leg 8/5/2017  Thyroid disease   
 hypothyroid Past Surgical History:  
Procedure Laterality Date  CARDIAC SURG PROCEDURE UNLIST    
 cardiac stents  CARDIAC SURG PROCEDURE UNLIST Ablation 5/17/2018 ED [facility]  COLONOSCOPY N/A 6/28/2016 COLONOSCOPY performed by Ramin Acosta MD at [facility] ENDOSCOPY  COLONOSCOPY N/A 1/9/2019 COLONOSCOPY performed by Reubin Epley, MD at [facility] ENDOSCOPY  COLONOSCOPY,DIAGNOSTIC  1/9/2019  HX APPENDECTOMY  HX BUNIONECTOMY    
 and removal of 2 seasmoid bone of the great toe 2/2018  HX HEENT Bilateral Cataract surgery  HX HEENT Tonsils  HX HEENT Axe wound to the head  HX KNEE ARTHROSCOPY X 2  
 HX ORTHOPAEDIC    
 HX ORTHOPAEDIC    
 partial laminectomy  HX PACEMAKER    
 HX ROTATOR CUFF REPAIR    
 bilateral  
  WI INSJ ELTRD CAR DACIA SYS ATTCH PM/CVDFB PLS GEN N/A 2019 Lv Lead Placement To Previous Generator performed by Ramón Whipple MD at [facility] CARDIAC CATH LAB  WI REMVL PERM PM PLS GEN W/REPL PLSE GEN MULT LEAD N/A 2019 Remove & Replace Ppm Gen Biv Multi Leads performed by Ramón [facility]ipple MD at [address] CATH LAB Family History Problem Relation Age of Onset  Heart Disease Mother  Kidney Disease Mother  Heart Disease Father  Kidney Disease Father  Cancer Sister Breast  
  
History reviewed, no pertinent family history. Social History Tobacco Use  Smoking status: Former Smoker Packs/day: 0.50 Years: 4.00 Pack years: 2.00 Last attempt to quit: 3/6/1972 Years since quittin.3  Smokeless tobacco: Never Used Substance Use Topics  Alcohol use: No  
  Comment: rare, 1 drink per year Allergies Allergen Reactions  Niacin Unknown (comments) Other reaction(s): Unknown (comments)  Imipenem Diarrhea Other reaction(s): Rash  Levemir [Insulin Detemir] Hives  Other Medication Other (comments) ? Allergy to Tricia Dave causing chemical burn  Primaxin [Imipenem-Cilastatin] Diarrhea and Rash  Xarelto [Rivaroxaban] Rash and Itching Other reaction(s): Rash Current Outpatient Medications Medication Sig  
 oxyCODONE-acetaminophen (PERCOCET) 5-325 mg per tablet Take 1 Tab by mouth every eight (8) hours as needed for Pain for up to 30 days. Max Daily Amount: 3 Tabs.  finasteride (PROSCAR) 5 mg tablet TAKE 1 TABLET BY MOUTH DAILY  gabapentin (NEURONTIN) 300 mg capsule Take 1 Cap by mouth nightly.  potassium chloride (KLOR-CON) 20 mEq pack One packet three times daily  metOLazone (ZAROXOLYN) 2.5 mg tablet Take 1 Tab by mouth every other day.  ULORIC 40 mg tab tablet TAKE 1 TABLET BY MOUTH DAILY (Patient taking differently: TAKE 1 TABLET BY MOUTH DAILY; as needed)  ascorbic acid, vitamin C, (VITAMIN C) 250 mg tablet Take 1 Tab by mouth three (3) times daily.  insulin glargine (LANTUS SOLOSTAR U-100 INSULIN) 100 unit/mL (3 mL) inpn 35 Units by SubCUTAneous route nightly.  OTHER Apply  to affected area daily as needed (Knee Pain). CBD Cream:  Apply daily as needed for knee pain  carvedilol (COREG) 12.5 mg tablet TAKE 1 TABLET BY MOUTH TWICE DAILY  tamsulosin (FLOMAX) 0.4 mg capsule TAKE 2 CAPSULES BY MOUTH EVERY DAY  Ferrous Sulfate (SLOW FE) 47.5 mg iron TbER tablet Take 1 Tab by mouth three (3) times daily.  bumetanide (BUMEX) 2 mg tablet TAKE 2 TABLETS BY MOUTH TWICE DAILY  pramipexole (MIRAPEX) 0.5 mg tablet TAKE 1 TABLET BY MOUTH EVERY NIGHT AT BEDTIME (Patient taking differently: May take an additional tablet if needed)  colchicine (COLCRYS) 0.6 mg tablet Take 0.6 mg by mouth as needed (Gouty attack). Take 2 tablets at onset of gouty attack then 1 tablet every hour as needed  Liraglutide (VICTOZA) 0.6 mg/0.1 mL (18 mg/3 mL) sub-q pen 0.6 mg by SubCUTAneous route daily.  naloxone (NARCAN) 4 mg/actuation nasal spray Use 1 spray intranasally, then discard. Repeat with new spray every 2 min as needed for opioid overdose symptoms, alternating nostrils.  clonazePAM (KLONOPIN) 2 mg tablet TAKE 1 TABLET BY MOUTH EVERY NIGHT AT BEDTIME. MAY REPEAT DOSE IF YOU WAKE UP (Patient taking differently: TAKE 1 TABLET BY MOUTH EVERY NIGHT AT BEDTIME. MAY REPEAT DOSE IF YOU WAKE UP; 2 tabs at bedtime) No current facility-administered medications for this visit. Facility-Administered Medications Ordered in Other Visits Medication Dose Route Frequency  [START ON 5/22/2019] epoetin mary (EPOGEN;PROCRIT) injection 40,000 Units  40,000 Units SubCUTAneous ONCE  
 
 
 
 LAB DATA REVIEWED:  
 
Lab Results Component Value Date/Time  WBC 6.3 05/15/2019 03:21 PM  
 HGB 11.8 (L) 05/15/2019 03:21 PM  
 PLATELET 200.2 (L) 77/62/6724 03:21 PM  
 
 Lab Results Component Value Date/Time Sodium 140 05/15/2019 03:21 PM  
 Potassium 3.9 05/15/2019 03:21 PM  
 Chloride 95 (L) 05/15/2019 03:21 PM  
 CO2 27.0 05/15/2019 03:21 PM  
 BUN 98.0 (H) 05/15/2019 03:21 PM  
 Creatinine 2.1 (H) 05/15/2019 03:21 PM  
 Calcium 9.0 05/15/2019 03:21 PM  
 Magnesium 2.0 01/28/2019 08:52 AM  
 Phosphorus 3.2 04/24/2019 03:48 PM  
  
Lab Results Component Value Date/Time AST (SGOT) 22 01/02/2019 04:33 PM  
 Alk. phosphatase 103 01/02/2019 04:33 PM  
 Protein, total 7.4 01/02/2019 04:33 PM  
 Albumin 3.3 (L) 04/24/2019 03:48 PM  
 Globulin 4.1 (H) 01/02/2019 04:33 PM  
 
Lab Results Component Value Date/Time INR 1.3 (H) 06/01/2018 12:21 PM  
 Prothrombin time 12.6 (H) 06/01/2018 12:21 PM  
 aPTT 26.6 06/10/2014 11:20 AM  
  
Lab Results Component Value Date/Time Iron 44 04/24/2019 03:48 PM  
 TIBC 305 04/24/2019 03:48 PM  
 Iron % saturation 14 (L) 04/24/2019 03:48 PM  
 Ferritin 130 04/24/2019 03:48 PM  
  
 
 
 CONTROLLED SUBSTANCES SAFETY ASSESSMENT (IF ON CONTROLLED SUBSTANCES):  
 
Reviewed opioid safety handout:  [x] Yes   [] No 
24 hour opioid dose >150mg morphine equivalent/day:  [] Yes   [x] No 
Benzodiazepines:  [x] Yes   [] No 
Sleep apnea:  [] Yes   [x] No 
Urine Toxicology Testing within last 6 months:  [] Yes   [x] No 
History of or new aberrant medication taking behaviors:  [] Yes   [x] No 
Has Narcan been prescribed [] Yes   [x] No 
 
   
 
Total time: 70m Counseling / coordination time: 50m 
> 50% counseling / coordination?:

## 2019-05-22 ENCOUNTER — HOSPITAL ENCOUNTER (OUTPATIENT)
Dept: INFUSION THERAPY | Age: 69
Discharge: HOME | End: 2019-05-22
Payer: MEDICARE

## 2019-05-22 ENCOUNTER — APPOINTMENT (OUTPATIENT)
Dept: INFUSION THERAPY | Age: 69
End: 2019-05-22
Payer: MEDICARE

## 2019-05-22 VITALS
HEART RATE: 71 BPM | DIASTOLIC BLOOD PRESSURE: 91 MMHG | OXYGEN SATURATION: 100 % | SYSTOLIC BLOOD PRESSURE: 157 MMHG | RESPIRATION RATE: 18 BRPM | TEMPERATURE: 97.8 F

## 2019-05-22 LAB
ALBUMIN SERPL-MCNC: 3.4 G/DL (ref 3.5–5)
ANION GAP SERPL CALC-SCNC: 6 MMOL/L (ref 5–15)
BUN SERPL-MCNC: 77 MG/DL (ref 6–20)
BUN/CREAT SERPL: 33 (ref 12–20)
CALCIUM SERPL-MCNC: 8.6 MG/DL (ref 8.5–10.1)
CHLORIDE SERPL-SCNC: 99 MMOL/L (ref 97–108)
CO2 SERPL-SCNC: 30 MMOL/L (ref 21–32)
CREAT SERPL-MCNC: 2.31 MG/DL (ref 0.7–1.3)
ERYTHROCYTE [DISTWIDTH] IN BLOOD BY AUTOMATED COUNT: 15.5 % (ref 11.5–14.5)
FERRITIN SERPL-MCNC: 121 NG/ML (ref 26–388)
GLUCOSE SERPL-MCNC: 176 MG/DL (ref 65–100)
HCT VFR BLD AUTO: 32.9 % (ref 36.6–50.3)
HGB BLD-MCNC: 10.6 G/DL (ref 12.1–17)
IRON SATN MFR SERPL: 17 % (ref 20–50)
IRON SERPL-MCNC: 48 UG/DL (ref 35–150)
MCH RBC QN AUTO: 29.9 PG (ref 26–34)
MCHC RBC AUTO-ENTMCNC: 32.2 G/DL (ref 30–36.5)
MCV RBC AUTO: 92.7 FL (ref 80–99)
NRBC # BLD: 0 K/UL (ref 0–0.01)
NRBC BLD-RTO: 0 PER 100 WBC
PHOSPHATE SERPL-MCNC: 3.4 MG/DL (ref 2.6–4.7)
PLATELET # BLD AUTO: 89 K/UL (ref 150–400)
PMV BLD AUTO: 10.8 FL (ref 8.9–12.9)
POTASSIUM SERPL-SCNC: 3.7 MMOL/L (ref 3.5–5.1)
RBC # BLD AUTO: 3.55 M/UL (ref 4.1–5.7)
SODIUM SERPL-SCNC: 135 MMOL/L (ref 136–145)
TIBC SERPL-MCNC: 283 UG/DL (ref 250–450)
WBC # BLD AUTO: 5.9 K/UL (ref 4.1–11.1)

## 2019-05-22 PROCEDURE — 82728 ASSAY OF FERRITIN: CPT

## 2019-05-22 PROCEDURE — 83540 ASSAY OF IRON: CPT

## 2019-05-22 PROCEDURE — 36415 COLL VENOUS BLD VENIPUNCTURE: CPT

## 2019-05-22 PROCEDURE — 85027 COMPLETE CBC AUTOMATED: CPT

## 2019-05-22 PROCEDURE — 74011250636 HC RX REV CODE- 250/636

## 2019-05-22 PROCEDURE — 96372 THER/PROPH/DIAG INJ SC/IM: CPT

## 2019-05-22 PROCEDURE — 80069 RENAL FUNCTION PANEL: CPT

## 2019-05-22 RX ADMIN — EPOETIN ALFA-EPBX 60000 UNITS: 40000 INJECTION, SOLUTION INTRAVENOUS; SUBCUTANEOUS at 13:54

## 2019-05-22 NOTE — PROGRESS NOTES
[address] [facility] Stay Note: 
Arrived - 0 Visit Vitals BP (!) 157/91 (BP 1 Location: Left arm, BP Patient Position: Sitting) Pulse 71 Temp 97.8 °F (36.6 °C) Resp 18 SpO2 100% Assessment - completed. Pt reports dyspnea at rest. Was seen by his cardiologist recently and will have some additional tests done. Hgb - 10.3, Hct 32.9 Retacrit 60,000 units SQ slowly in left and right arm (divided in two doses) 1400 - Tolerated well. Pt denies any acute problems/changes. Discharged from [facility] ambulatory. No distress. Next appt: 6/19/19 @ 1300.

## 2019-05-27 ENCOUNTER — APPOINTMENT (OUTPATIENT)
Dept: NON INVASIVE DIAGNOSTICS | Age: 69
DRG: 291 | End: 2019-05-27
Attending: EMERGENCY MEDICINE
Payer: MEDICARE

## 2019-05-27 ENCOUNTER — APPOINTMENT (OUTPATIENT)
Dept: GENERAL RADIOLOGY | Age: 69
DRG: 291 | End: 2019-05-27
Attending: EMERGENCY MEDICINE
Payer: MEDICARE

## 2019-05-27 ENCOUNTER — HOSPITAL ENCOUNTER (INPATIENT)
Age: 69
LOS: 9 days | Discharge: HOME | DRG: 291 | End: 2019-06-05
Attending: EMERGENCY MEDICINE | Admitting: INTERNAL MEDICINE
Payer: MEDICARE

## 2019-05-27 DIAGNOSIS — E16.2 LOW BLOOD SUGAR: ICD-10-CM

## 2019-05-27 DIAGNOSIS — I12.9 HYPERTENSION WITH RENAL DISEASE: ICD-10-CM

## 2019-05-27 DIAGNOSIS — Z71.89 DNR (DO NOT RESUSCITATE) DISCUSSION: ICD-10-CM

## 2019-05-27 DIAGNOSIS — I25.10 ASCVD (ARTERIOSCLEROTIC CARDIOVASCULAR DISEASE): ICD-10-CM

## 2019-05-27 DIAGNOSIS — E78.2 MIXED HYPERLIPIDEMIA: ICD-10-CM

## 2019-05-27 DIAGNOSIS — K59.09 OTHER CONSTIPATION: ICD-10-CM

## 2019-05-27 DIAGNOSIS — M25.512 CHRONIC PAIN OF BOTH SHOULDERS: ICD-10-CM

## 2019-05-27 DIAGNOSIS — N18.4 CKD (CHRONIC KIDNEY DISEASE), STAGE IV (HCC): ICD-10-CM

## 2019-05-27 DIAGNOSIS — I48.0 PAROXYSMAL ATRIAL FIBRILLATION (HCC): ICD-10-CM

## 2019-05-27 DIAGNOSIS — H83.03 LABYRINTHITIS OF BOTH EARS: ICD-10-CM

## 2019-05-27 DIAGNOSIS — G89.29 CHRONIC PAIN OF BOTH SHOULDERS: ICD-10-CM

## 2019-05-27 DIAGNOSIS — E11.22 CONTROLLED TYPE 2 DIABETES MELLITUS WITH STAGE 4 CHRONIC KIDNEY DISEASE, WITHOUT LONG-TERM CURRENT USE OF INSULIN (HCC): ICD-10-CM

## 2019-05-27 DIAGNOSIS — I25.5 ISCHEMIC CARDIOMYOPATHY: ICD-10-CM

## 2019-05-27 DIAGNOSIS — Z71.89 ADVANCED CARE PLANNING/COUNSELING DISCUSSION: ICD-10-CM

## 2019-05-27 DIAGNOSIS — I50.33 ACUTE ON CHRONIC DIASTOLIC CONGESTIVE HEART FAILURE, NYHA CLASS 3 (HCC): Primary | ICD-10-CM

## 2019-05-27 DIAGNOSIS — M15.9 PRIMARY OSTEOARTHRITIS INVOLVING MULTIPLE JOINTS: ICD-10-CM

## 2019-05-27 DIAGNOSIS — K21.9 GASTROESOPHAGEAL REFLUX DISEASE WITHOUT ESOPHAGITIS: ICD-10-CM

## 2019-05-27 DIAGNOSIS — M25.511 CHRONIC PAIN OF BOTH SHOULDERS: ICD-10-CM

## 2019-05-27 DIAGNOSIS — N18.4 CONTROLLED TYPE 2 DIABETES MELLITUS WITH STAGE 4 CHRONIC KIDNEY DISEASE, WITHOUT LONG-TERM CURRENT USE OF INSULIN (HCC): ICD-10-CM

## 2019-05-27 LAB
ALBUMIN SERPL-MCNC: 3.3 G/DL (ref 3.5–5)
ALBUMIN/GLOB SERPL: 0.8 {RATIO} (ref 1.1–2.2)
ALP SERPL-CCNC: 130 U/L (ref 45–117)
ALT SERPL-CCNC: 20 U/L (ref 12–78)
ANION GAP SERPL CALC-SCNC: 6 MMOL/L (ref 5–15)
AST SERPL-CCNC: 14 U/L (ref 15–37)
ATRIAL RATE: 59 BPM
BASOPHILS # BLD: 0 K/UL (ref 0–0.1)
BASOPHILS NFR BLD: 0 % (ref 0–1)
BILIRUB SERPL-MCNC: 0.9 MG/DL (ref 0.2–1)
BNP SERPL-MCNC: ABNORMAL PG/ML
BUN SERPL-MCNC: 54 MG/DL (ref 6–20)
BUN/CREAT SERPL: 27 (ref 12–20)
CALCIUM SERPL-MCNC: 8.4 MG/DL (ref 8.5–10.1)
CALCULATED P AXIS, ECG09: -25 DEGREES
CALCULATED R AXIS, ECG10: -70 DEGREES
CALCULATED T AXIS, ECG11: 111 DEGREES
CHLORIDE SERPL-SCNC: 101 MMOL/L (ref 97–108)
CK SERPL-CCNC: 62 U/L (ref 39–308)
CO2 SERPL-SCNC: 28 MMOL/L (ref 21–32)
COMMENT, HOLDF: NORMAL
CREAT SERPL-MCNC: 1.99 MG/DL (ref 0.7–1.3)
D DIMER PPP FEU-MCNC: 0.85 MG/L FEU (ref 0–0.65)
DIAGNOSIS, 93000: NORMAL
DIFFERENTIAL METHOD BLD: ABNORMAL
ECHO AO ROOT DIAM: 4.02 CM
ECHO AV AREA PEAK VELOCITY: 1.6 CM2
ECHO AV AREA/BSA PEAK VELOCITY: 1.1 CM2/M2
ECHO AV CUSP MM: 1.68 CM
ECHO AV PEAK GRADIENT: 14 MMHG
ECHO AV PEAK VELOCITY: 186.85 CM/S
ECHO AV REGURGITANT PHT: 488.2 CM
ECHO EST RA PRESSURE: 3 MMHG
ECHO LA AREA 2C: 23.1 CM2
ECHO LA AREA 4C: 25.3 CM2
ECHO LA MAJOR AXIS: 3.99 CM
ECHO LA TO AORTIC ROOT RATIO: 0.99
ECHO LA VOL 2C: 79.23 ML (ref 18–58)
ECHO LA VOL 4C: 75.89 ML (ref 18–58)
ECHO LA VOL BP: 81.5 ML (ref 18–58)
ECHO LA VOL/BSA BIPLANE: 34.47 ML/M2 (ref 16–28)
ECHO LA VOLUME INDEX A2C: 33.51 ML/M2 (ref 16–28)
ECHO LA VOLUME INDEX A4C: 32.1 ML/M2 (ref 16–28)
ECHO LV E' LATERAL VELOCITY: 4.75 CM/S
ECHO LV E' SEPTAL VELOCITY: 2.18 CM/S
ECHO LV INTERNAL DIMENSION DIASTOLIC: 4.67 CM (ref 4.2–5.9)
ECHO LV INTERNAL DIMENSION SYSTOLIC: 3.96 CM
ECHO LV IVSD: 1.82 CM (ref 0.6–1)
ECHO LV MASS 2D: 362 G (ref 88–224)
ECHO LV MASS INDEX 2D: 153.1 G/M2 (ref 49–115)
ECHO LV POSTERIOR WALL DIASTOLIC: 1.25 CM (ref 0.6–1)
ECHO LVOT DIAM: 2.08 CM
ECHO LVOT PEAK GRADIENT: 3.2 MMHG
ECHO LVOT PEAK VELOCITY: 89.04 CM/S
ECHO MV A VELOCITY: 26.58 CM/S
ECHO MV E DECELERATION TIME (DT): 152.4 MS
ECHO MV E VELOCITY: 79.95 CM/S
ECHO MV E/A RATIO: 3.01
ECHO MV E/E' LATERAL: 16.83
ECHO MV E/E' RATIO (AVERAGED): 26.75
ECHO MV E/E' SEPTAL: 36.67
ECHO MV REGURGITANT PEAK GRADIENT: 58.5 MMHG
ECHO MV REGURGITANT PEAK VELOCITY: 382.34 CM/S
ECHO PULMONARY ARTERY SYSTOLIC PRESSURE (PASP): 31.9 MMHG
ECHO PV MAX VELOCITY: 70.11 CM/S
ECHO PV PEAK GRADIENT: 2 MMHG
ECHO RIGHT VENTRICULAR SYSTOLIC PRESSURE (RVSP): 31.9 MMHG
ECHO RV INTERNAL DIMENSION: 4.28 CM
ECHO TV REGURGITANT MAX VELOCITY: 268.86 CM/S
ECHO TV REGURGITANT PEAK GRADIENT: 28.9 MMHG
EOSINOPHIL # BLD: 0 K/UL (ref 0–0.4)
EOSINOPHIL NFR BLD: 0 % (ref 0–7)
ERYTHROCYTE [DISTWIDTH] IN BLOOD BY AUTOMATED COUNT: 16.1 % (ref 11.5–14.5)
GLOBULIN SER CALC-MCNC: 4.2 G/DL (ref 2–4)
GLUCOSE BLD STRIP.AUTO-MCNC: 228 MG/DL (ref 65–100)
GLUCOSE SERPL-MCNC: 159 MG/DL (ref 65–100)
HCT VFR BLD AUTO: 34 % (ref 36.6–50.3)
HGB BLD-MCNC: 10.6 G/DL (ref 12.1–17)
IMM GRANULOCYTES # BLD AUTO: 0.1 K/UL (ref 0–0.04)
IMM GRANULOCYTES NFR BLD AUTO: 1 % (ref 0–0.5)
LYMPHOCYTES # BLD: 0.5 K/UL (ref 0.8–3.5)
LYMPHOCYTES NFR BLD: 8 % (ref 12–49)
MCH RBC QN AUTO: 29.3 PG (ref 26–34)
MCHC RBC AUTO-ENTMCNC: 31.2 G/DL (ref 30–36.5)
MCV RBC AUTO: 93.9 FL (ref 80–99)
MONOCYTES # BLD: 0.4 K/UL (ref 0–1)
MONOCYTES NFR BLD: 7 % (ref 5–13)
NEUTS SEG # BLD: 5 K/UL (ref 1.8–8)
NEUTS SEG NFR BLD: 84 % (ref 32–75)
NRBC # BLD: 0 K/UL (ref 0–0.01)
NRBC BLD-RTO: 0 PER 100 WBC
P-R INTERVAL, ECG05: 178 MS
PISA AR MAX VEL: 414.49 CM/S
PLATELET # BLD AUTO: 72 K/UL (ref 150–400)
PMV BLD AUTO: 11.4 FL (ref 8.9–12.9)
POTASSIUM SERPL-SCNC: 3.5 MMOL/L (ref 3.5–5.1)
PROT SERPL-MCNC: 7.5 G/DL (ref 6.4–8.2)
Q-T INTERVAL, ECG07: 492 MS
QRS DURATION, ECG06: 172 MS
QTC CALCULATION (BEZET), ECG08: 531 MS
RBC # BLD AUTO: 3.62 M/UL (ref 4.1–5.7)
RBC MORPH BLD: ABNORMAL
SAMPLES BEING HELD,HOLD: NORMAL
SERVICE CMNT-IMP: ABNORMAL
SODIUM SERPL-SCNC: 135 MMOL/L (ref 136–145)
TROPONIN I SERPL-MCNC: <0.05 NG/ML
VENTRICULAR RATE, ECG03: 70 BPM
WBC # BLD AUTO: 6 K/UL (ref 4.1–11.1)

## 2019-05-27 PROCEDURE — 82962 GLUCOSE BLOOD TEST: CPT

## 2019-05-27 PROCEDURE — 96376 TX/PRO/DX INJ SAME DRUG ADON: CPT

## 2019-05-27 PROCEDURE — 82550 ASSAY OF CK (CPK): CPT

## 2019-05-27 PROCEDURE — 74011000250 HC RX REV CODE- 250: Performed by: EMERGENCY MEDICINE

## 2019-05-27 PROCEDURE — 84484 ASSAY OF TROPONIN QUANT: CPT

## 2019-05-27 PROCEDURE — 85379 FIBRIN DEGRADATION QUANT: CPT

## 2019-05-27 PROCEDURE — 94760 N-INVAS EAR/PLS OXIMETRY 1: CPT

## 2019-05-27 PROCEDURE — 74011250637 HC RX REV CODE- 250/637: Performed by: EMERGENCY MEDICINE

## 2019-05-27 PROCEDURE — 74011636637 HC RX REV CODE- 636/637: Performed by: INTERNAL MEDICINE

## 2019-05-27 PROCEDURE — 85025 COMPLETE CBC W/AUTO DIFF WBC: CPT

## 2019-05-27 PROCEDURE — 93306 TTE W/DOPPLER COMPLETE: CPT

## 2019-05-27 PROCEDURE — 71045 X-RAY EXAM CHEST 1 VIEW: CPT

## 2019-05-27 PROCEDURE — 83880 ASSAY OF NATRIURETIC PEPTIDE: CPT

## 2019-05-27 PROCEDURE — 99285 EMERGENCY DEPT VISIT HI MDM: CPT

## 2019-05-27 PROCEDURE — 65660000000 HC RM CCU STEPDOWN

## 2019-05-27 PROCEDURE — 74011250637 HC RX REV CODE- 250/637: Performed by: INTERNAL MEDICINE

## 2019-05-27 PROCEDURE — 96374 THER/PROPH/DIAG INJ IV PUSH: CPT

## 2019-05-27 PROCEDURE — 80053 COMPREHEN METABOLIC PANEL: CPT

## 2019-05-27 PROCEDURE — 93005 ELECTROCARDIOGRAM TRACING: CPT

## 2019-05-27 PROCEDURE — 36415 COLL VENOUS BLD VENIPUNCTURE: CPT

## 2019-05-27 RX ORDER — POTASSIUM CHLORIDE 1.5 G/1.77G
20 POWDER, FOR SOLUTION ORAL DAILY
Status: DISCONTINUED | OUTPATIENT
Start: 2019-05-28 | End: 2019-05-30

## 2019-05-27 RX ORDER — BUMETANIDE 0.25 MG/ML
1 INJECTION INTRAMUSCULAR; INTRAVENOUS 2 TIMES DAILY
Status: DISCONTINUED | OUTPATIENT
Start: 2019-05-28 | End: 2019-05-28

## 2019-05-27 RX ORDER — BUMETANIDE 0.25 MG/ML
2 INJECTION INTRAMUSCULAR; INTRAVENOUS
Status: COMPLETED | OUTPATIENT
Start: 2019-05-27 | End: 2019-05-27

## 2019-05-27 RX ORDER — TIMOLOL MALEATE 5 MG/ML
1 SOLUTION/ DROPS OPHTHALMIC
Status: ON HOLD | COMMUNITY
End: 2020-02-17

## 2019-05-27 RX ORDER — OXYCODONE AND ACETAMINOPHEN 5; 325 MG/1; MG/1
1 TABLET ORAL
Status: DISCONTINUED | OUTPATIENT
Start: 2019-05-27 | End: 2019-06-05 | Stop reason: HOSPADM

## 2019-05-27 RX ORDER — POLYETHYLENE GLYCOL 3350 17 G/17G
17 POWDER, FOR SOLUTION ORAL DAILY PRN
Status: DISCONTINUED | OUTPATIENT
Start: 2019-05-27 | End: 2019-06-01

## 2019-05-27 RX ORDER — LANOLIN ALCOHOL/MO/W.PET/CERES
325 CREAM (GRAM) TOPICAL DAILY
Status: DISCONTINUED | OUTPATIENT
Start: 2019-05-28 | End: 2019-06-05 | Stop reason: HOSPADM

## 2019-05-27 RX ORDER — CARVEDILOL 12.5 MG/1
12.5 TABLET ORAL 2 TIMES DAILY WITH MEALS
Status: DISCONTINUED | OUTPATIENT
Start: 2019-05-27 | End: 2019-06-05 | Stop reason: HOSPADM

## 2019-05-27 RX ORDER — COLCHICINE 0.6 MG/1
0.6 TABLET ORAL AS NEEDED
Status: DISCONTINUED | OUTPATIENT
Start: 2019-05-27 | End: 2019-06-05 | Stop reason: HOSPADM

## 2019-05-27 RX ORDER — INSULIN GLARGINE 100 [IU]/ML
35 INJECTION, SOLUTION SUBCUTANEOUS
Status: DISCONTINUED | OUTPATIENT
Start: 2019-05-27 | End: 2019-05-30

## 2019-05-27 RX ORDER — GABAPENTIN 300 MG/1
300 CAPSULE ORAL
Status: DISCONTINUED | OUTPATIENT
Start: 2019-05-27 | End: 2019-06-05 | Stop reason: HOSPADM

## 2019-05-27 RX ORDER — FINASTERIDE 5 MG/1
5 TABLET, FILM COATED ORAL DAILY
Status: DISCONTINUED | OUTPATIENT
Start: 2019-05-28 | End: 2019-06-05 | Stop reason: HOSPADM

## 2019-05-27 RX ORDER — SODIUM CHLORIDE 0.9 % (FLUSH) 0.9 %
5-40 SYRINGE (ML) INJECTION EVERY 8 HOURS
Status: DISCONTINUED | OUTPATIENT
Start: 2019-05-27 | End: 2019-06-05 | Stop reason: HOSPADM

## 2019-05-27 RX ORDER — PRAMIPEXOLE DIHYDROCHLORIDE 0.25 MG/1
0.5 TABLET ORAL
Status: DISCONTINUED | OUTPATIENT
Start: 2019-05-27 | End: 2019-06-05 | Stop reason: HOSPADM

## 2019-05-27 RX ORDER — BUMETANIDE 2 MG/1
2 TABLET ORAL
COMMUNITY
End: 2019-06-05

## 2019-05-27 RX ORDER — NITROGLYCERIN 0.4 MG/1
0.4 TABLET SUBLINGUAL
Status: COMPLETED | OUTPATIENT
Start: 2019-05-27 | End: 2019-05-30

## 2019-05-27 RX ORDER — TAMSULOSIN HYDROCHLORIDE 0.4 MG/1
0.4 CAPSULE ORAL
Status: DISCONTINUED | OUTPATIENT
Start: 2019-05-27 | End: 2019-06-05 | Stop reason: HOSPADM

## 2019-05-27 RX ORDER — SENNOSIDES 8.6 MG/1
1 TABLET ORAL
Status: DISCONTINUED | OUTPATIENT
Start: 2019-05-27 | End: 2019-06-05 | Stop reason: HOSPADM

## 2019-05-27 RX ORDER — DOCUSATE SODIUM 100 MG/1
100 CAPSULE, LIQUID FILLED ORAL 2 TIMES DAILY
Status: DISCONTINUED | OUTPATIENT
Start: 2019-05-27 | End: 2019-06-05 | Stop reason: HOSPADM

## 2019-05-27 RX ORDER — SODIUM CHLORIDE 0.9 % (FLUSH) 0.9 %
5-40 SYRINGE (ML) INJECTION AS NEEDED
Status: DISCONTINUED | OUTPATIENT
Start: 2019-05-27 | End: 2019-06-05 | Stop reason: HOSPADM

## 2019-05-27 RX ORDER — METOLAZONE 2.5 MG/1
2.5 TABLET ORAL
COMMUNITY
End: 2020-02-10

## 2019-05-27 RX ORDER — BUMETANIDE 0.25 MG/ML
1 INJECTION INTRAMUSCULAR; INTRAVENOUS 2 TIMES DAILY
Status: DISCONTINUED | OUTPATIENT
Start: 2019-05-27 | End: 2019-05-27

## 2019-05-27 RX ORDER — FEBUXOSTAT 40 MG/1
40 TABLET, FILM COATED ORAL DAILY
Status: DISCONTINUED | OUTPATIENT
Start: 2019-05-28 | End: 2019-06-05 | Stop reason: HOSPADM

## 2019-05-27 RX ORDER — METOLAZONE 2.5 MG/1
2.5 TABLET ORAL EVERY OTHER DAY
Status: DISCONTINUED | OUTPATIENT
Start: 2019-05-28 | End: 2019-05-31

## 2019-05-27 RX ORDER — BUMETANIDE 2 MG/1
4 TABLET ORAL DAILY
COMMUNITY
End: 2019-06-05

## 2019-05-27 RX ADMIN — Medication 10 ML: at 14:28

## 2019-05-27 RX ADMIN — TAMSULOSIN HYDROCHLORIDE 0.4 MG: 0.4 CAPSULE ORAL at 21:17

## 2019-05-27 RX ADMIN — BUMETANIDE 2 MG: 0.25 INJECTION INTRAMUSCULAR; INTRAVENOUS at 08:48

## 2019-05-27 RX ADMIN — SENNOSIDES 8.6 MG: 8.6 TABLET, FILM COATED ORAL at 21:16

## 2019-05-27 RX ADMIN — INSULIN GLARGINE 35 UNITS: 100 INJECTION, SOLUTION SUBCUTANEOUS at 21:16

## 2019-05-27 RX ADMIN — BUMETANIDE 2 MG: 0.25 INJECTION INTRAMUSCULAR; INTRAVENOUS at 10:35

## 2019-05-27 RX ADMIN — NITROGLYCERIN 0.4 MG: 0.4 TABLET, ORALLY DISINTEGRATING SUBLINGUAL at 08:15

## 2019-05-27 RX ADMIN — POLYETHYLENE GLYCOL 3350 17 G: 17 POWDER, FOR SOLUTION ORAL at 18:05

## 2019-05-27 RX ADMIN — NITROGLYCERIN 0.4 MG: 0.4 TABLET, ORALLY DISINTEGRATING SUBLINGUAL at 07:51

## 2019-05-27 RX ADMIN — Medication 10 ML: at 21:17

## 2019-05-27 RX ADMIN — PRAMIPEXOLE DIHYDROCHLORIDE 0.5 MG: 0.25 TABLET ORAL at 21:16

## 2019-05-27 RX ADMIN — DOCUSATE SODIUM 100 MG: 100 CAPSULE, LIQUID FILLED ORAL at 18:05

## 2019-05-27 RX ADMIN — CARVEDILOL 12.5 MG: 12.5 TABLET, FILM COATED ORAL at 17:01

## 2019-05-27 RX ADMIN — GABAPENTIN 300 MG: 300 CAPSULE ORAL at 21:16

## 2019-05-27 NOTE — ED NOTES
TRANSFER - OUT REPORT: 
 
Verbal report given to Ashley Shay. on Preston  Erick Winchester.  being transferred to [facility] (unit) for routine progression of care Report consisted of patients Situation, Background, Assessment and  
Recommendations(SBAR). Information from the following report(s) SBAR, Kardex, [facility] and Cardiac Rhythm Paced was reviewed with the receiving nurse. Lines:  
Peripheral IV 05/27/19 Left Arm (Active) Site Assessment Clean, dry, & intact 5/27/2019  7:42 AM  
Phlebitis Assessment 0 5/27/2019  7:42 AM  
Infiltration Assessment 0 5/27/2019  7:42 AM  
Dressing Status Clean, dry, & intact 5/27/2019  7:42 AM  
Dressing Type Transparent 5/27/2019  7:42 AM  
Hub Color/Line Status Capped 5/27/2019  7:42 AM  
  
 
Opportunity for questions and clarification was provided. Patient transported with: 
 Tech (Transport)

## 2019-05-27 NOTE — ED NOTES
Patient complaining of constipation that is causing abdominal upset since eating a full tray. Patient requesting medication for constipation. Dr. Carlos Miller paged at this time

## 2019-05-27 NOTE — PROGRESS NOTES
TRANSFER - IN REPORT: 
 
Verbal report received from Norma(name) on Royal VELASCO Julianne Mims.  being received from ED(unit) for routine progression of care Report consisted of patients Situation, Background, Assessment and  
Recommendations(SBAR). Information from the following report(s) SBAR and Kardex was reviewed with the receiving nurse. Opportunity for questions and clarification was provided. Assessment completed upon patients arrival to unit and care assumed.

## 2019-05-27 NOTE — PROGRESS NOTES
Pharmacy Clarification of the Prior to Admission Medication Regimen Retrospective to the Admission Medication Reconciliation The patient was interviewed regarding clarification of the prior to admission medication regimen. Wife was present in room and obtained permission from patient to discuss drug regimen with visitor(s) present. Patient was questioned regarding use of any other inhalers, topical products, over the counter medications, herbal medications, vitamin products or ophthalmic/nasal/otic medication use. Information Obtained From: Patient, Armond Salinas Recommendations/Findings: The following amendments were made to the patient's active medication list on file at [facility]:  
 
1) Additions:  
timolol (TIMOPTIC) 0.5 % ophthalmic solution 2) Removals:  
colchicine 
uloric 3) Changes: 
bumetanide (BUMEX) 2 mg tablet (Old regimen: 4 mg BID /New regimen: 4 mg daily and 2 mg QHS) 
metOLazone (ZAROXOLYN) 2.5 mg tablet (Old regimen: 2.5 mg daily /New regimen: 2.5 mg daily PRN) 4) Pertinent Pharmacy Findings: 
naloxone (NARCAN) 4 mg/actuation nasal spray: Patient stated he has this agent at home but has not needed it as of 19 PTA medication list was corrected to the following:  
 
Prior to Admission Medications Prescriptions Last Dose Informant Patient Reported? Taking? Ferrous Sulfate (SLOW FE) 47.5 mg iron TbER tablet 2019 at Unknown time Self No Yes Sig: Take 1 Tab by mouth three (3) times daily. Liraglutide (VICTOZA) 0.6 mg/0.1 mL (18 mg/3 mL) sub-q pen 2019 at Unknown time Self Yes Yes Si.6 mg by SubCUTAneous route daily. OTHER 2019 at Unknown time Self Yes Yes Sig: Apply  to affected area daily as needed (Knee Pain). CBD Cream:  Apply daily as needed for knee pain  
ascorbic acid, vitamin C, (VITAMIN C) 250 mg tablet 2019 at Unknown time Self No Yes Sig: Take 1 Tab by mouth three (3) times daily. bumetanide (BUMEX) 2 mg tablet 2019 at Unknown time Self Yes Yes Sig: Take 4 mg by mouth daily. Patient takes 4 mg daily and 2 mg QHS  
bumetanide (BUMEX) 2 mg tablet 2019 at Unknown time Self Yes Yes Sig: Take 2 mg by mouth nightly. Patient takes 4 mg daily and 2 mg QHS  
carvedilol (COREG) 12.5 mg tablet 2019 at Unknown time Self No Yes Sig: TAKE 1 TABLET BY MOUTH TWICE DAILY  
finasteride (PROSCAR) 5 mg tablet 2019 at Unknown time Self No Yes Sig: TAKE 1 TABLET BY MOUTH DAILY  
gabapentin (NEURONTIN) 300 mg capsule 2019 at Unknown time Self No Yes Sig: Take 1 Cap by mouth nightly. insulin glargine (LANTUS SOLOSTAR U-100 INSULIN) 100 unit/mL (3 mL) inpn 2019 at Unknown time Self Yes Yes Si Units by SubCUTAneous route nightly. metOLazone (ZAROXOLYN) 2.5 mg tablet 2019 at Unknown time Self Yes Yes Sig: Take 2.5 mg by mouth daily as needed (swelling). naloxone (NARCAN) 4 mg/actuation nasal spray Not Taking at Unknown time Self No No  
Sig: Use 1 spray intranasally, then discard. Repeat with new spray every 2 min as needed for opioid overdose symptoms, alternating nostrils. oxyCODONE-acetaminophen (PERCOCET) 5-325 mg per tablet 2019 at Unknown time Self No Yes Sig: Take 1 Tab by mouth every eight (8) hours as needed for Pain for up to 30 days. Max Daily Amount: 3 Tabs. potassium chloride (KLOR-CON) 20 mEq pack 2019 at Unknown time Self No Yes Sig: One packet three times daily  
pramipexole (MIRAPEX) 0.5 mg tablet 2019 at Unknown time Self No Yes Sig: TAKE 1 TABLET BY MOUTH EVERY NIGHT AT BEDTIME  
tamsulosin (FLOMAX) 0.4 mg capsule 2019 at Unknown time Self No Yes Sig: TAKE 2 CAPSULES BY MOUTH EVERY DAY  
timolol (TIMOPTIC) 0.5 % ophthalmic solution 2019 at Unknown time Self Yes Yes Sig: Administer 1 Drop to right eye two (2) times a day. Facility-Administered Medications: None Thank you, Loren Padilla, CPhT Medication History Pharmacy Technician

## 2019-05-27 NOTE — ED PROVIDER NOTES
EMERGENCY DEPARTMENT HISTORY AND PHYSICAL EXAM 
 
 
Date: 5/27/2019 Patient Name: Nicolle Varghese. History of Presenting Illness Chief Complaint Patient presents with  Chest Pain  Shortness of Breath History Provided By: Patient HPI: Royal Quita Matta., 77 y.o. male with PMHx significant for diastolic CHF, paroxysmal afib/flutter s/p ablation in 5/18 and BIV pacemaker upgrade to prior dual chamber system in January this year, DM, renal failure, presents to the ED with cc of shortness of breath and chest pain. The symptoms have been ongoing for several days but became acutely worse yesterday. He now experiences orthopnea, sob and cp with exertion but symptoms also at rest. No syncope, palpitations. Has been compliant with his medications. No dietary indiscretion. No recent illness. Brought by EMS who gave patient a full ASA en route. There are no other complaints, changes, or physical findings at this time. PCP: Brett Azevedo MD 
 
No current facility-administered medications on file prior to encounter. Current Outpatient Medications on File Prior to Encounter Medication Sig Dispense Refill  oxyCODONE-acetaminophen (PERCOCET) 5-325 mg per tablet Take 1 Tab by mouth every eight (8) hours as needed for Pain for up to 30 days. Max Daily Amount: 3 Tabs. 30 Tab 0  
 finasteride (PROSCAR) 5 mg tablet TAKE 1 TABLET BY MOUTH DAILY 90 Tab 3  
 gabapentin (NEURONTIN) 300 mg capsule Take 1 Cap by mouth nightly. 30 Cap 5  
 naloxone (NARCAN) 4 mg/actuation nasal spray Use 1 spray intranasally, then discard. Repeat with new spray every 2 min as needed for opioid overdose symptoms, alternating nostrils. 2 Each 0  
 potassium chloride (KLOR-CON) 20 mEq pack One packet three times daily 90 Packet prn  clonazePAM (KLONOPIN) 2 mg tablet TAKE 1 TABLET BY MOUTH EVERY NIGHT AT BEDTIME. MAY REPEAT DOSE IF YOU WAKE UP (Patient taking differently: TAKE 1 TABLET BY MOUTH EVERY NIGHT AT BEDTIME. MAY REPEAT DOSE IF YOU WAKE UP; 2 tabs at bedtime) 60 Tab 0  
 metOLazone (ZAROXOLYN) 2.5 mg tablet Take 1 Tab by mouth every other day. 30 Tab prn  ULORIC 40 mg tab tablet TAKE 1 TABLET BY MOUTH DAILY (Patient taking differently: TAKE 1 TABLET BY MOUTH DAILY; as needed) 90 Tab 3  
 ascorbic acid, vitamin C, (VITAMIN C) 250 mg tablet Take 1 Tab by mouth three (3) times daily. 100 Tab prn  insulin glargine (LANTUS SOLOSTAR U-100 INSULIN) 100 unit/mL (3 mL) inpn 35 Units by SubCUTAneous route nightly.  OTHER Apply  to affected area daily as needed (Knee Pain). CBD Cream:  Apply daily as needed for knee pain  carvedilol (COREG) 12.5 mg tablet TAKE 1 TABLET BY MOUTH TWICE DAILY 60 Tab 11  tamsulosin (FLOMAX) 0.4 mg capsule TAKE 2 CAPSULES BY MOUTH EVERY  Cap 3  Ferrous Sulfate (SLOW FE) 47.5 mg iron TbER tablet Take 1 Tab by mouth three (3) times daily. 90 Tab prn  bumetanide (BUMEX) 2 mg tablet TAKE 2 TABLETS BY MOUTH TWICE DAILY 360 Tab 3  pramipexole (MIRAPEX) 0.5 mg tablet TAKE 1 TABLET BY MOUTH EVERY NIGHT AT BEDTIME (Patient taking differently: May take an additional tablet if needed) 90 Tab prn  colchicine (COLCRYS) 0.6 mg tablet Take 0.6 mg by mouth as needed (Gouty attack). Take 2 tablets at onset of gouty attack then 1 tablet every hour as needed  Liraglutide (VICTOZA) 0.6 mg/0.1 mL (18 mg/3 mL) sub-q pen 0.6 mg by SubCUTAneous route daily. Past History Past Medical History: 
Past Medical History:  
Diagnosis Date  Anemia 8/5/2017  Anxiety 8/5/2017  Arrhythmia   
 atrial fibrillation 2014  ASCVD (arteriosclerotic cardiovascular disease) 8/5/2017  BPH (benign prostatic hyperplasia) 8/5/2017  CAD (coronary artery disease)   
 h/o stents  Cancer [facility])   
 h/o skin cancer  Cardiomyopathy ([facility] [address] 8/5/2017  CHF (congestive heart failure) ([facility] [address] 8/5/2017  Chronic kidney disease  Stage IV  
  CKD (chronic kidney disease), stage IV ([facility] [address] 8/5/2017  Diabetes ([facility] [address]  Diabetes mellitus ([facility] [address] 8/5/2017  Diabetic neuropathy ([facility] [address] 8/5/2017  DJD (degenerative joint disease) 8/5/2017  ED (erectile dysfunction) 8/5/2017  Gout 8/5/2017  High cholesterol  Hyperlipidemia 8/5/2017  Hypertension  Hypertension with renal disease 8/5/2017  Hypothyroid 8/5/2017  Insomnia 8/5/2017  Obesity 8/5/2017  On statin therapy 8/5/2017  Restless leg 8/5/2017  Thyroid disease   
 hypothyroid Past Surgical History: 
Past Surgical History:  
Procedure Laterality Date  CARDIAC SURG PROCEDURE UNLIST    
 cardiac stents  CARDIAC SURG PROCEDURE UNLIST Ablation 5/17/2018 ED [facility]  COLONOSCOPY N/A 6/28/2016 COLONOSCOPY performed by Mario Grant MD at [facility] ENDOSCOPY  COLONOSCOPY N/A 1/9/2019 COLONOSCOPY performed by Angelica Huber MD at [facility] ENDOSCOPY  COLONOSCOPY,DIAGNOSTIC  1/9/2019  HX APPENDECTOMY  HX BUNIONECTOMY    
 and removal of 2 seasmoid bone of the great toe 2/2018  HX HEENT Bilateral Cataract surgery  HX HEENT Tonsils  HX HEENT Axe wound to the head  HX KNEE ARTHROSCOPY X 2  
 HX ORTHOPAEDIC    
 HX ORTHOPAEDIC    
 partial laminectomy  HX PACEMAKER    
 HX ROTATOR CUFF REPAIR    
 bilateral  
 MT INSJ ELTRD CAR DACIA SYS ATTCH PM/CVDFB PLS GEN N/A 1/28/2019 Lv Lead Placement To Previous Generator performed by Colette Kebede MD at [facility] CARDIAC CATH LAB  MT REMVL PERM PM PLS GEN W/REPL PLSE GEN MULT LEAD N/A 1/28/2019 Remove & Replace Ppm Gen Biv Multi Leads performed by Colette Kebede MD at [id number] CATH LAB Family History: 
Family History Problem Relation Age of Onset  Heart Disease Mother  Kidney Disease Mother  Heart Disease Father  Kidney Disease Father  Cancer Sister Breast  
 
 
Social History: 
Social History Tobacco Use  
  Smoking status: Former Smoker Packs/day: 0.50 Years: 4.00 Pack years: 2.00 Last attempt to quit: 3/6/1972 Years since quittin.3  Smokeless tobacco: Never Used Substance Use Topics  Alcohol use: No  
  Comment: rare, 1 drink per year  Drug use: No  
 
 
Allergies: Allergies Allergen Reactions  Niacin Unknown (comments) Other reaction(s): Unknown (comments)  Imipenem Diarrhea Other reaction(s): Rash  Levemir [Insulin Detemir] Hives  Other Medication Other (comments) ? Allergy to Tamera Tricia causing chemical burn  Primaxin [Imipenem-Cilastatin] Diarrhea and Rash  Xarelto [Rivaroxaban] Rash and Itching Other reaction(s): Rash Review of Systems Review of Systems Constitutional: Negative for chills, fatigue and fever. Respiratory: Positive for shortness of breath. Negative for cough, choking and chest tightness. Cardiovascular: Positive for leg swelling. Negative for chest pain and palpitations. Gastrointestinal: Negative for abdominal distention, abdominal pain, blood in stool, diarrhea, nausea and vomiting. Genitourinary: Negative for difficulty urinating, dysuria, flank pain and hematuria. Musculoskeletal: Negative for arthralgias and myalgias. Skin: Negative for color change. Neurological: Negative for dizziness, weakness, light-headedness and headaches. All other systems reviewed and are negative. Physical Exam  
Physical Exam  
Constitutional: He is oriented to person, place, and time. He appears well-developed and well-nourished. No distress. HENT:  
Head: Atraumatic. Mouth/Throat: Oropharynx is clear and moist.  
Eyes: Pupils are equal, round, and reactive to light. Conjunctivae and EOM are normal. No scleral icterus. Neck: Normal range of motion. Neck supple. No JVD present. Cardiovascular: Normal rate, regular rhythm, normal heart sounds and intact distal pulses. Pulmonary/Chest: No respiratory distress. He has wheezes. He exhibits no tenderness. Increased wob but able to speak in full sentences. Abdominal: Soft. Bowel sounds are normal. He exhibits no distension. There is no tenderness. Musculoskeletal: Normal range of motion. He exhibits no edema. Neurological: He is alert and oriented to person, place, and time. No cranial nerve deficit. Skin: Skin is warm and dry. He is not diaphoretic. Nursing note and vitals reviewed. Diagnostic Study Results Labs - Recent Results (from the past 24 hour(s)) EKG, 12 LEAD, INITIAL Collection Time: 05/27/19  7:21 AM  
Result Value Ref Range Ventricular Rate 70 BPM  
 Atrial Rate 59 BPM  
 P-R Interval 178 ms QRS Duration 172 ms Q-T Interval 492 ms QTC Calculation (Bezet) 531 ms Calculated P Axis -25 degrees Calculated R Axis -70 degrees Calculated T Axis 111 degrees Diagnosis AV dual-paced rhythm Biventricular pacemaker detected When compared with ECG of 01-JUN-2018 12:04, Electronic ventricular pacemaker has replaced Sinus rhythm Radiologic Studies -  
XR CHEST PORT    (Results Pending) CT Results  (Last 48 hours) None CXR Results  (Last 48 hours) None Medical Decision Making I am the first provider for this patient. I reviewed the vital signs, available nursing notes, past medical history, past surgical history, family history and social history. Vital Signs-Reviewed the patient's vital signs. Patient Vitals for the past 24 hrs: 
 Temp Pulse Resp SpO2  
05/27/19 0726 99 °F (37.2 °C) 70 18 100 % Records Reviewed: Nursing Notes and Old Medical Records Differential Diagnosis: 
CHF, ACS, PE, Pneumonia Provider Notes (Medical Decision Making): Mr. Parish Smith presents with acute SOB and new fluid retention, likely due to an acute CHF exacerbation.  Initially had some chest pressure, this has resolved after nitro administrations. Vital signs are within acceptable limits and ECG does not show new changes concerning for ischemia. He seems fairly stable for now. Labs have been sent, CXR ordered. ED Course as of May 28 0007 Mon May 27, 2019  
2392 Feeling better, chest pain has resolved but he still feels sob. BNP is high as expected given his exam. No significant pulmonary edema, however. Will add d-dimer to rule out PE given his persistant sob. [TZ] 9955 Low risk for PE based on wells score of 1.5 and age-adjusted dimer makes PE unlikely. [TZ] 1004 Patient stable. 2nd page to cardiology. [TZ] 2700 Nathan Pelaez with dr. Palak Reinoso. He will see patient in the Ed. Will continue to diurese and monitor. He remains stable. Ordered Echo.  
 [TZ] ED Course User Index [TZ] Preethi Storm MD  
 
 
 
Initial assessment performed. The patients presenting problems have been discussed, and they are in agreement with the care plan formulated and outlined with them. I have encouraged them to ask questions as they arise throughout their visit. Critical Care Time:  
none Disposition: 
Admit to cardiology Diagnosis Clinical Impression: 1. Acute on chronic diastolic congestive heart failure, NYHA class 3 ([address]

## 2019-05-27 NOTE — PROGRESS NOTES
Reason for Admission:   Heart failure Pt stated \" I just could not breath, I got chest tightness, tingling in my legs\". RRAT Score:     38 Resources/supports as identified by patient/family:   Family support Top Challenges facing patient (as identified by patient/family and CM): Finances/Medication cost?     Medicare A/B, Radha Dies supplement Pharmacy of choice [address] Transportation? Wife to transport at [place], as well as Home at [facility] Support system or lack thereof? Supportive wife/family Living arrangements? Lives in 2 story home with no DRE, bedroom is on second floor approx 10 steps to bedroom. Self-care/ADLs/Cognition? Pt lying in bed, alert and oriented to person, place, time and situation. Pt endorsed independence with ADL's/IADL's. No DME in home, however does use a walking stick as needed. Pt does not drive, wife transports at needed. Current Advanced Directive/Advance Care Plan: On file verified. Pt wants FULL  Code (Pt has a DDNR scanned in chart). Pt stated he want resuscitation measures. CM placed palliative care consult  per MD to review code status. Plan for utilizing home health:  Pt has utilized home health services in the past, cannot remember company. Pt has utilized OP PT via [facility] in the past as well as PT services with [facility]. Likelihood of readmission:  High r/t CHF diagnosis Current  PCP                  Dr Pily Ghosh  Last visit 5/21/19 per pt. Cardiologist       Dr. Leah Nuñez Cardiology EP   Dr. Jona Pop Transition of Care Plan:    
CM introduced self,role to pt in room. Pt verbalized understanding. Pt verified demographic information with CM at bedside. Care Management Interventions PCP Verified by CM: Yes Last Visit to PCP: 05/21/19 Mode of Transport at Discharge: Other (see comment)(wife to transport home at DC) Transition of Care Consult (CM Consult): Discharge Planning Discharge Durable Medical Equipment: No(pt has walking stick) Physical Therapy Consult: No 
Occupational Therapy Consult: No 
Speech Therapy Consult: No 
Current Support Network: Lives with Spouse, Own Home Confirm Follow Up Transport: Family Plan discussed with Pt/Family/Caregiver: Yes(spoke with pt at bedside) Discharge Location Discharge Placement: Home with home health 1) pt goal home with family assistance 2) pt will qualify for [facility] visit due to CHF diagnosis 3) pt will need follow up apt with PCP and Cardiology at Discharge 4) Pt would benefit from dispatch health information Cheryle Rilee. RN, BSN [facility] [phone]

## 2019-05-27 NOTE — ED NOTES
Inquired about the patient's chest tightness. Patient reports his tightness has resolved. MD updated on patient status. Rebeca Flores MD ordered second dose to be administered, dose administered.

## 2019-05-27 NOTE — ED NOTES
Patient sitting in bed, reports he is comfortable, call bell within reach. 75% of meal tray consumed. Patients lung sounds are clear. Patient reports he is feeling better.

## 2019-05-27 NOTE — ED NOTES
Spoke with Charge RN about ordered Echo. Charge RN will work on getting echo tech called in to continue patient's care. Primary RN, June Pedroza made aware

## 2019-05-27 NOTE — ED NOTES
Patient presents to ED vis EMS complaining of shortness of breath & chest tightness. Patient acknowledges cardiac history and reports his chest tightness and shortness of breath is worst than baseline. Patient reports he has been experiencing discomfort for a few days and has a Echo scheduled for tomorrow. Patient reports taking 80 asa with no relief. Patient is accompanied be his wife. Caryl Cohen MD is at bedside. Monitor 3x, Call bell within reach.

## 2019-05-27 NOTE — H&P
CARDIOLOGY Admission Note Patient ID: 
Patient: Tamela Ayoub. MRN: [id number] Age: 77 y.o.  : 1950 Date of  Admission: 2019  7:16 AM  
PCP:  Kathy Muhammad MD  
Usual cardiologist:  José Antonio Akhtar MD 
  
Assessment: 1. Acute diastolic heart failure with volume overload, elevated proBNP, sx. 
  
Last 3 Recorded Weights in this Encounter 19 0726 19 1042 Weight: 105.7 kg (233 lb) 105.7 kg (233 lb) 2. Paroxysmal atrial fibrillation and atypical atrial flutter s/p ablation in 2018. NO atrial fibrillation or flutter since the ablation. 3. Hypertensive heart disease with a history of congestive heart failure and CKD stage 4. EF 55% on prior echo, then 35-40% early , prelim 40-45% by echo in ER. 4. Coronary artery disease with multivessel interventions.  His last stenting was in . 
5. Mild idiopathic pericardial effusion in the past. 
6. Diabetes mellitus type 2, on chronic insulin. 7. Hyperlipidemia. 8. BRBPR in 2019 with grade 1 internal hemorrhoids noted on colo here. Presumed source of GI bleed. 9. History of esophageal dilation for dysphagia. 10. Hiatal hernia. 11. Anemia of chronic renal disease. On EPO (and Fe supplement). 12. Moderate thrombocytopenia. 13. Chronic anticoagulation stopped 2019. 14. Mild hyponatremia, multifactorial (volume overload, Na loss during diuresis, etc).   
Plan:  
  
1. Final read on echo pending. 2. Continue IV diuretic, 2g Na diet. 3. Continue beta-blocker. 4. Not a candidate for ACEI or ARB or spironolactone. 5. No program changes made today to his BIV pacemaker today. 6. Stopped Eliquis given severe anemia requiring transfusion in 2019. 
7. Not on antiplatelet therapy. 8. Low suspicion for pulmonary embolism. Will consider pulmonary consult to evaluate for an extracardiac cause for his dyspnea. PFT's? Overnight pulse ox? I'll see if an ischemia evaluation would be useful, though exposure to contrast dye with cath or CTA would put him at risk for acute renal failure.  
  
 [x]       High complexity decision making was performed in this patient with multiple organ involvement. 
  
Royal KRISTA Huang. is a 77 y.o. male with a history of atrial arrhythmias s/p ablation 5/2018, has done well from an arrhythmia standpoint. He has had chronic issues with fluid retention. I saw him in the office about a week ago, he reported some ANDERS. He denied dietary indiscretion (salt, fluid, volume) but had noticed that he had gained weight and lower extremity edema over several days. Over the last day, he became more dyspneic and got to the point when he started to get some rest symptoms. +orthopnea, PND. Last night was difficult. He came to the ER today for evaluation. Got IV diuretic in the ER. An echo and CXR was done in the ER. No syncope, near-syncope, TIA, or stroke symptoms. Denies resting dyspnea, but does have severe exertional dyspnea and some orthopnea.  
  
 
 
Past Medical History:  
Diagnosis Date  Anemia 8/5/2017  Anxiety 8/5/2017  Arrhythmia   
 atrial fibrillation 2014  ASCVD (arteriosclerotic cardiovascular disease) 8/5/2017  BPH (benign prostatic hyperplasia) 8/5/2017  CAD (coronary artery disease)   
 h/o stents  Cancer [facility])   
 h/o skin cancer  Cardiomyopathy ([address] 8/5/2017  CHF (congestive heart failure) ([address] 8/5/2017  Chronic kidney disease Stage IV  CKD (chronic kidney disease), stage IV ([address] 8/5/2017  Diabetes ([address]  Diabetes mellitus ([address] 8/5/2017  Diabetic neuropathy ([address] 8/5/2017  DJD (degenerative joint disease) 8/5/2017  ED (erectile dysfunction) 8/5/2017  Gout 8/5/2017  High cholesterol  Hyperlipidemia 8/5/2017  Hypertension  Hypertension with renal disease 8/5/2017  Hypothyroid 8/5/2017  Insomnia 8/5/2017  Obesity 2017  On statin therapy 2017  Restless leg 2017  Thyroid disease   
 hypothyroid Past Surgical History:  
Procedure Laterality Date  CARDIAC SURG PROCEDURE UNLIST    
 cardiac stents  CARDIAC SURG PROCEDURE UNLIST Ablation 2018 [facility]  COLONOSCOPY N/A 2016 COLONOSCOPY performed by Melissa De Souza MD at \[facility]\"" ENDOSCOPY  COLONOSCOPY N/A 2019 COLONOSCOPY performed by Bhavin Lofton MD at \[facility]\"" ENDOSCOPY  COLONOSCOPY,DIAGNOSTIC  2019  HX APPENDECTOMY  HX BUNIONECTOMY    
 and removal of 2 seasmoid bone of the great toe 2018  HX HEENT Bilateral Cataract surgery  HX HEENT Tonsils  HX HEENT Axe wound to the head  HX KNEE ARTHROSCOPY X 2  
 HX ORTHOPAEDIC    
 HX ORTHOPAEDIC    
 partial laminectomy  HX PACEMAKER    
 HX ROTATOR CUFF REPAIR    
 bilateral  
 IL INSJ ELTRD CAR DACIA SYS ATTCH PM/CVDFB PLS GEN N/A 2019 Lv Lead Placement To Previous Generator performed by Brunilda Olivera MD at [facility] CATH LAB  IL REMVL PERM PM PLS GEN W/REPL PLSE GEN MULT LEAD N/A 2019 Remove & Replace Ppm Gen Biv Multi Leads performed by Brunilda Olivera MD at [address] [place] [address] CATH LAB Social History Tobacco Use  Smoking status: Former Smoker Packs/day: 0.50 Years: 4.00 Pack years: 2.00 Last attempt to quit: 3/6/1972 Years since quittin.3  Smokeless tobacco: Never Used Substance Use Topics  Alcohol use: No  
  Comment: rare, 1 drink per year Family History Problem Relation Age of Onset  Heart Disease Mother  Kidney Disease Mother  Heart Disease Father  Kidney Disease Father  Cancer Sister Breast  
  
 
Allergies Allergen Reactions  Niacin Unknown (comments) Other reaction(s): Unknown (comments)  Imipenem Diarrhea Other reaction(s): Rash  Levemir [Insulin Detemir] Hives  Other Medication Other (comments) ? Allergy to Kurtis Flavin causing chemical burn  Primaxin [Imipenem-Cilastatin] Diarrhea and Rash  Xarelto [Rivaroxaban] Rash and Itching Other reaction(s): Rash Current Facility-Administered Medications Medication Dose Route Frequency  nitroglycerin (NITROSTAT) tablet 0.4 mg  0.4 mg SubLINGual Q5MIN PRN Current Outpatient Medications Medication Sig  
 oxyCODONE-acetaminophen (PERCOCET) 5-325 mg per tablet Take 1 Tab by mouth every eight (8) hours as needed for Pain for up to 30 days. Max Daily Amount: 3 Tabs.  finasteride (PROSCAR) 5 mg tablet TAKE 1 TABLET BY MOUTH DAILY  gabapentin (NEURONTIN) 300 mg capsule Take 1 Cap by mouth nightly.  naloxone (NARCAN) 4 mg/actuation nasal spray Use 1 spray intranasally, then discard. Repeat with new spray every 2 min as needed for opioid overdose symptoms, alternating nostrils.  potassium chloride (KLOR-CON) 20 mEq pack One packet three times daily  metOLazone (ZAROXOLYN) 2.5 mg tablet Take 1 Tab by mouth every other day.  ULORIC 40 mg tab tablet TAKE 1 TABLET BY MOUTH DAILY (Patient taking differently: TAKE 1 TABLET BY MOUTH DAILY; as needed)  ascorbic acid, vitamin C, (VITAMIN C) 250 mg tablet Take 1 Tab by mouth three (3) times daily.  insulin glargine (LANTUS SOLOSTAR U-100 INSULIN) 100 unit/mL (3 mL) inpn 35 Units by SubCUTAneous route nightly.  OTHER Apply  to affected area daily as needed (Knee Pain). CBD Cream:  Apply daily as needed for knee pain  carvedilol (COREG) 12.5 mg tablet TAKE 1 TABLET BY MOUTH TWICE DAILY  tamsulosin (FLOMAX) 0.4 mg capsule TAKE 2 CAPSULES BY MOUTH EVERY DAY  Ferrous Sulfate (SLOW FE) 47.5 mg iron TbER tablet Take 1 Tab by mouth three (3) times daily.  bumetanide (BUMEX) 2 mg tablet TAKE 2 TABLETS BY MOUTH TWICE DAILY  pramipexole (MIRAPEX) 0.5 mg tablet TAKE 1 TABLET BY MOUTH EVERY NIGHT AT BEDTIME  
  colchicine (COLCRYS) 0.6 mg tablet Take 0.6 mg by mouth as needed (Gouty attack). Take 2 tablets at onset of gouty attack then 1 tablet every hour as needed  Liraglutide (VICTOZA) 0.6 mg/0.1 mL (18 mg/3 mL) sub-q pen 0.6 mg by SubCUTAneous route daily. Review of Symptoms:  See HPI as well. General: negative for fever, chills, sweats, weakness, weight loss Eyes: negative for blurred vision, eye pain, loss of vision, diplopia Ear Nose and Throat: negative for rhinorrhea, pharyngitis, otalgia, tinnitus, speech or swallowing difficulties Respiratory: negative for cough, sputum production, wheezing, hemoptysis, pleuritic pain  
Cardiology: negative for chest pain, palpitations, orthopnea, PND, syncope Gastrointestinal: negative for abdominal pain, N/V, dysphagia, change in bowel habits, bleeding Genitourinary: negative for frequency, urgency, dysuria, hematuria, incontinence Muskuloskeletal : negative for arthralgia, myalgia Hematology: negative for easy bruising, bleeding, lymphadenopathy Dermatological: negative for rash, ulceration, mole change, new lesion Endocrine: negative for hot flashes or polydipsia Neurological: negative for headache, dizziness, confusion, focal weakness, paresthesia, memory loss, gait disturbance Psychological: negative for anxiety, depression, agitation Objective:  
  
Physical Exam: 
Temp (24hrs), Av °F (37.2 °C), Min:99 °F (37.2 °C), Max:99 °F (37.2 °C) Patient Vitals for the past 8 hrs: 
 Pulse 19 1100 70  
19 1035 70  
19 0930 70  
19 0848 70  
19 0821 71  
19 0815 70  
19 0726 70 Patient Vitals for the past 8 hrs: 
 Resp  
19 1100 12  
19 0930 13  
19 0821 16  
19 0726 18 Patient Vitals for the past 8 hrs: 
 BP  
19 1100 132/81  
19 1042 133/74  
19 1035 133/74  
19 0930 134/78  
19 0848 135/78  
19 0821 139/69 05/27/19 0815 139/69  
05/27/19 0726 141/80 No intake or output data in the 24 hours ending 05/27/19 1241 Nondiaphoretic, not in acute distress. Supple, no palpable thyromegaly. No scleral icterus, mucous membranes moist, conjuctivae pink, no xanthelasma. L BIV pacer site OK. Unlabored, clear to auscultation bilaterally, symmetric air movement. Regular rate and rhythm, no new murmur, pericardial rub, knock, or gallop. No JVD or peripheral edema. No carotid bruit. Palpable radial pulses bilaterally. Abdomen obese, soft, nontender, nondistended. No abdominal pulstatile masses. Extremities without cyanosis or clubbing. Muscle tone and bulk normal. 
Skin warm and dry. No rashes or ulcers. Neuro grossly nonfocal.  No tremor. Awake and appropriate. CARDIOGRAPHICS and STUDIES, I reviewed: 
 
Telemetry:  SR with BIV pacing. ECG:  AV pacing (with BIV pacing) at 70 bpm. 
 
Echo:  PENDING. CXR:   Maybe mild interstitial changes, no pleural effusion or infiltrate. Labs: 
Recent Labs  
  05/27/19 
3550 TROIQ <0.05 Lab Results Component Value Date/Time Cholesterol, total 112 11/27/2018 02:55 PM  
 HDL Cholesterol 27 (L) 11/27/2018 02:55 PM  
 LDL, calculated 68 11/27/2018 02:55 PM  
 Triglyceride 85 11/27/2018 02:55 PM  
 
No results for input(s): INR, PTP, APTT in the last 72 hours. No lab exists for component: INREXT Recent Labs  
  05/27/19 
3951 * K 3.5  CO2 28 BUN 54* CREA 1.99* * CA 8.4* ALB 3.3* WBC 6.0 HGB 10.6* HCT 34.0*  
PLT 72* Recent Labs  
  05/27/19 
7906 SGOT 14* * TP 7.5 ALB 3.3*  
GLOB 4.2* No components found for: [place] No results for input(s): PH, PCO2, PO2 in the last 72 hours. Loren Jo MD 
5/27/2019

## 2019-05-28 ENCOUNTER — DOCUMENTATION ONLY (OUTPATIENT)
Dept: CASE MANAGEMENT | Age: 69
End: 2019-05-28

## 2019-05-28 ENCOUNTER — PATIENT OUTREACH (OUTPATIENT)
Dept: CARDIOLOGY CLINIC | Age: 69
End: 2019-05-28

## 2019-05-28 ENCOUNTER — HOME HEALTH ADMISSION (OUTPATIENT)
Dept: HOME HEALTH SERVICES | Facility: HOME HEALTH | Age: 69
End: 2019-05-28

## 2019-05-28 LAB
ANION GAP SERPL CALC-SCNC: 7 MMOL/L (ref 5–15)
BUN SERPL-MCNC: 50 MG/DL (ref 6–20)
BUN/CREAT SERPL: 26 (ref 12–20)
CALCIUM SERPL-MCNC: 8.3 MG/DL (ref 8.5–10.1)
CHLORIDE SERPL-SCNC: 102 MMOL/L (ref 97–108)
CO2 SERPL-SCNC: 25 MMOL/L (ref 21–32)
CREAT SERPL-MCNC: 1.92 MG/DL (ref 0.7–1.3)
GLUCOSE BLD STRIP.AUTO-MCNC: 114 MG/DL (ref 65–100)
GLUCOSE BLD STRIP.AUTO-MCNC: 141 MG/DL (ref 65–100)
GLUCOSE BLD STRIP.AUTO-MCNC: 144 MG/DL (ref 65–100)
GLUCOSE BLD STRIP.AUTO-MCNC: 213 MG/DL (ref 65–100)
GLUCOSE SERPL-MCNC: 115 MG/DL (ref 65–100)
POTASSIUM SERPL-SCNC: 3.3 MMOL/L (ref 3.5–5.1)
SERVICE CMNT-IMP: ABNORMAL
SODIUM SERPL-SCNC: 134 MMOL/L (ref 136–145)

## 2019-05-28 PROCEDURE — 74011000250 HC RX REV CODE- 250: Performed by: INTERNAL MEDICINE

## 2019-05-28 PROCEDURE — 74011250637 HC RX REV CODE- 250/637: Performed by: INTERNAL MEDICINE

## 2019-05-28 PROCEDURE — 93005 ELECTROCARDIOGRAM TRACING: CPT

## 2019-05-28 PROCEDURE — 76450000000

## 2019-05-28 PROCEDURE — 65660000000 HC RM CCU STEPDOWN

## 2019-05-28 PROCEDURE — 80048 BASIC METABOLIC PNL TOTAL CA: CPT

## 2019-05-28 PROCEDURE — 82962 GLUCOSE BLOOD TEST: CPT

## 2019-05-28 PROCEDURE — 74011636637 HC RX REV CODE- 636/637: Performed by: INTERNAL MEDICINE

## 2019-05-28 PROCEDURE — 94760 N-INVAS EAR/PLS OXIMETRY 1: CPT

## 2019-05-28 PROCEDURE — 36415 COLL VENOUS BLD VENIPUNCTURE: CPT

## 2019-05-28 RX ORDER — BUMETANIDE 0.25 MG/ML
2 INJECTION INTRAMUSCULAR; INTRAVENOUS 2 TIMES DAILY
Status: DISCONTINUED | OUTPATIENT
Start: 2019-05-28 | End: 2019-05-31

## 2019-05-28 RX ADMIN — SENNOSIDES 8.6 MG: 8.6 TABLET, FILM COATED ORAL at 21:03

## 2019-05-28 RX ADMIN — PRAMIPEXOLE DIHYDROCHLORIDE 0.5 MG: 0.25 TABLET ORAL at 21:03

## 2019-05-28 RX ADMIN — METOLAZONE 2.5 MG: 2.5 TABLET ORAL at 09:48

## 2019-05-28 RX ADMIN — CARVEDILOL 12.5 MG: 12.5 TABLET, FILM COATED ORAL at 09:48

## 2019-05-28 RX ADMIN — OXYCODONE HYDROCHLORIDE AND ACETAMINOPHEN 1 TABLET: 5; 325 TABLET ORAL at 09:58

## 2019-05-28 RX ADMIN — FEBUXOSTAT 40 MG: 40 TABLET ORAL at 09:58

## 2019-05-28 RX ADMIN — TAMSULOSIN HYDROCHLORIDE 0.4 MG: 0.4 CAPSULE ORAL at 21:03

## 2019-05-28 RX ADMIN — DOCUSATE SODIUM 100 MG: 100 CAPSULE, LIQUID FILLED ORAL at 09:48

## 2019-05-28 RX ADMIN — FINASTERIDE 5 MG: 5 TABLET, FILM COATED ORAL at 09:47

## 2019-05-28 RX ADMIN — DOCUSATE SODIUM 100 MG: 100 CAPSULE, LIQUID FILLED ORAL at 18:28

## 2019-05-28 RX ADMIN — FERROUS SULFATE TAB 325 MG (65 MG ELEMENTAL FE) 325 MG: 325 (65 FE) TAB at 09:48

## 2019-05-28 RX ADMIN — BUMETANIDE 1 MG: 0.25 INJECTION INTRAMUSCULAR; INTRAVENOUS at 09:47

## 2019-05-28 RX ADMIN — POTASSIUM CHLORIDE 20 MEQ: 1.5 POWDER, FOR SOLUTION ORAL at 09:47

## 2019-05-28 RX ADMIN — GABAPENTIN 300 MG: 300 CAPSULE ORAL at 21:03

## 2019-05-28 RX ADMIN — CARVEDILOL 12.5 MG: 12.5 TABLET, FILM COATED ORAL at 18:28

## 2019-05-28 RX ADMIN — Medication 10 ML: at 14:00

## 2019-05-28 RX ADMIN — INSULIN GLARGINE 35 UNITS: 100 INJECTION, SOLUTION SUBCUTANEOUS at 21:03

## 2019-05-28 RX ADMIN — BUMETANIDE 2 MG: 0.25 INJECTION INTRAMUSCULAR; INTRAVENOUS at 18:28

## 2019-05-28 RX ADMIN — Medication 10 ML: at 06:03

## 2019-05-28 RX ADMIN — Medication 10 ML: at 21:03

## 2019-05-28 NOTE — PROGRESS NOTES
Physical Therapy Screening: 
Services are not indicated at this time. Pt independent with mobility back in January 2019. Should mobilize with nursing for duration of hospital stay. An [facility] screening referral was triggered for physical therapy based on results obtained during the nursing admission assessment. The patients chart was reviewed and the patient is not appropriate for a skilled therapy evaluation at this time. Please consult physical therapy if any therapy needs arise. Thank you.  
 
Marquis Foster, PT, DPT

## 2019-05-28 NOTE — PROGRESS NOTES
Transitional Care Team: Initial [facility] Note    Date of Assessment: 05/28/19  Time of Assessment:  9:57 AM    Royal KRISTA Linares is a 77 y.o. male inpatient at [facility]. Assessment & Plan   Pt A+O. Denies resp distress at this time- but states was severely dyspneic triggering admission. Suspects that dietary indiscretion contributed to this episode. Home weight had been down to 226 pounds but bounded up to 240 just before admission. Pt plans to return to home, may benefit with [facility] services         Primary Diagnosis:heart failure    Advance Directive:  Pt has completed AMD with designated health care proxy. Current Code Status:  DDNR    Referral to Hospice/ Palliative Care Appropriate: no.    Awareness of Medical Conditions: (Trajectory of illness and pts expectations). Pt aware HF is a progressive disease, but still has good quality of life. Discharge Needs: (to include safety issues)possible benefit with [facility] services    Barriers Identified: none  Patient is willing to go to SNF/Inpatient Rehab if recommended: not needed    Medication Review:  Await AVS.    Can patient afford medications:  yes. Patient is Compliant with Medication regimen:yes    Who manages medications at home: self  [facility]  [address] (Healthy Understanding of Goals) program introduced to patient/family. The Transitional Care Team bridges the gaps in care and education surrounding discharge from the acute care facility. The objective is to empower the patient and family in taking a proactive role in preventing readmission within the first thirty days after discharge. The team is also involved in the efforts to reduce readmission to the acute care setting after stabilization and discharge from the acute care environment either to skilled nursing facilities or community.      [facility] RN/NP will return with [facility] Calendar/ follow up appointments/ Ambulatory Nurse Navigator name and contact information when the patient is ready for discharge. Future Appointments   Date Time Provider Leonardo Martha   5/31/2019  2:30 PM Rolanda Rodriguez MD [address]   6/19/2019 10:00 AM [place] FT CHAIR 2 [facility] REG   7/17/2019  1:00 PM [place] FT CHAIR 2 [address] REG   8/14/2019 10:00 AM [facility] CHAIR 2 [address] REG   8/14/2019 12:30 PM Gisela Clay MD [facility] 10.       Non-[facility] follow up appointment(s): none yet    [facility]: call information given to pt      Patient education focused on readmission zones as described as: The Red Zone: High risk for readmission, days 1-21  The Yellow Zone: Moderate  risk for readmission, days 22-29   The Green Zone: Lower risk for readmission, days                30 and after    [facility] Team will attempt to follow the patient from a distance while inpatient as well as be available for further transition/disposition needs. The [facility] team will continue to offer support during the 30- 90 day discharge from acute care setting. Will notify Ambulatory ,CYNTHIA RN.     Past Medical History:   Diagnosis Date    Anemia 8/5/2017    Anxiety 8/5/2017    Arrhythmia     atrial fibrillation 2014    ASCVD (arteriosclerotic cardiovascular disease) 8/5/2017    BPH (benign prostatic hyperplasia) 8/5/2017    CAD (coronary artery disease)     h/o stents    Cancer ([address]     h/o skin cancer    Cardiomyopathy ([address] 8/5/2017    CHF (congestive heart failure) ([address] 8/5/2017    Chronic kidney disease     Stage IV    CKD (chronic kidney disease), stage IV (HCC) 8/5/2017    Diabetes ([address]     Diabetes mellitus ([address] 8/5/2017    Diabetic neuropathy ([address] 8/5/2017    DJD (degenerative joint disease) 8/5/2017    ED (erectile dysfunction) 8/5/2017    Gout 8/5/2017    High cholesterol     Hyperlipidemia 8/5/2017    Hypertension     Hypertension with renal disease 8/5/2017    Hypothyroid 8/5/2017    Insomnia 8/5/2017    Obesity 8/5/2017    On statin therapy 8/5/2017    Restless leg 8/5/2017    Thyroid disease     hypothyroid

## 2019-05-28 NOTE — PROGRESS NOTES
CYNTHIA: Home with Follow-up Appointment. CM met with pt at bedside to discuss d/c planning. CM provided pt with information on [facility]. CM discussed with pt about [facility] to Home. Pt was interested in service. [address] document signed by pt and placed in pt's bedside chart. Referral was sent to [facility] via [facility]. CM will continue to follow patient for discharge planning needs and arrange for services as deemed necessary. Sarbjit Alcaraz [address] 
[phone]

## 2019-05-28 NOTE — ACP (ADVANCE CARE PLANNING)
[facility] Medicine[facility]: [phone] ([phone]) [facility] ED: [phone]) Primary Decision Maker: Alma Fry - Spouse - [phone] Secondary Decision Maker: Drew Solorzano - Son - [phone] Advance Care Planning 5/28/2019 Patient's Healthcare Decision Maker is: Named in scanned ACP document Primary Decision Maker Name -  
Primary Decision Maker Phone Number -  
Primary Decision Maker Relationship to Patient -  
Confirm Advance Directive Yes, on file Does the patient have other document types -  
 
 
Palliative team of Dr Maria Eugenia Dennis and Treasure Kayser [facility] met with patient and his wife Zhane \"Candy\" Fannie Meagan, to provide support and to review goals of care, which seem to need updates. Palliative team was asked to see patient as he had noted to staff that he wishes to be Full Code and no longer DNAR. Patient has a DNR order that needs to be rescinded if these are his wishes. In the presence of his wife, patient discussed that he does indeed want CPR \"if I have a heart attack or something at home or here, then I would want them to try to see if they can revive me, including intubate me. If I am not improving then my wife or the doctors can make a decision to take me off life support\". Patient understands what CPR involves and he is realistic that he would not want to be kept alive on machines, if there is no hope for recovery. DNR rescinded by patient, witnessed by Dr Maria Eugenia Dennis. A copy was placed in chart and given to wife. Patient also completed a new AMD, reflecting his wishes for not prolonging his life if he is imminent with no hope for recovery. He noted he would NOT want life prolonging measures if he is in an unresponsive state if there is reasonable certainty that he will not recover. Patient named his wife and son to be his decision makers, as in his previous AMD. His wife Breonna Mccall understands his wishes. Clarified with patient and Lurena Conception that if patient changes his mind at any time regarding Code status, that can be done with his doctors on an outpatient basis. Copies of both documents given to patient/family and placed in chart for scanning.

## 2019-05-28 NOTE — PROGRESS NOTES
Problem: Heart Failure: Day 1 Goal: Off Pathway (Use only if patient is Off Pathway) Outcome: Progressing Towards Goal 
Goal: Activity/Safety Outcome: Progressing Towards Goal 
Goal: Consults, if ordered Outcome: Progressing Towards Goal 
Goal: Diagnostic Test/Procedures Outcome: Progressing Towards Goal 
Goal: Nutrition/Diet Outcome: Progressing Towards Goal 
Goal: Discharge Planning Outcome: Progressing Towards Goal

## 2019-05-28 NOTE — CONSULTS
Palliative Medicine Consult James: [phone] ([id number]) Patient Name: Audrey Matthew. YOB: 1950 Date of Initial Consult: 5/28/19 Reason for Consult: Care decisions Requesting Provider: Willian Martinez MD  
Primary Care Physician: Jonatan Herbert MD 
 
 SUMMARY:  
Royal KRISTA Senior is a 77 y.o. male with a past history of diastolic heart failure ,cardiomyopathy EF 30%,  paroxysmal atrila fibrillation and atrial flutter s/p ablation 5/2018 , HTN heart disease , Cad with multivessel stenting last stent 2010, DM with severe neuropathy , CKD stage IV , anemia,   who was admitted on 5/27/2019 from home with a diagnosis of acute diastolic heart failure with volume overload . Current medical issues leading to Palliative Medicine involvement include: care decisions /reassess code status \" Pt has DDNR on Chart , placed by  Out patient palliative care (April 2019) . 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 PALLIATIVE DIAGNOSES:  
1. Advance medical directives. 2. DNR discussion 3. SOB 4. Chronic bilateral shoulder pain (torn rotator cuff ). 5. Severe peripheral neuropathy 6. Diastolic heart failure . PLAN:  
1. Met with patient and his wife along with LEONELA Kong (refer to her note ). 2. He is very pleased with the care in the hospital  and support from out patient palliative care . 3. He notes he is feeling better , atributes his symptoms of fluid overload due to eating too much salt in last one week . 4. DDNR review : discuss DDNR signed by him in April with  Out patient palliative care  team ,  currently he is full code , he tells me , he was unclear about the [facility] when he signed it .  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 DNR rescinded by the patient . 5. Advance  directives : current AMD reviewed , he completed a new AMD (refer to ACP note from Leanne Iglesias ). 6. Pain in let shoulder: on prn percocet , he try to avoids narcotics due to side  effects (somnolence and constipation , used one dose today at 9 am . 7. Peripheral neuropathy : reports , numbness of both legs below knees , and also hands : on gabapentin 8. Constipation : continue with bowel regimen . 9. Initial consult note routed to primary continuity provider and/or primary health care team members 10. Communicated plan of care with: Palliative IDTNishant 192 Team and Dr Alexia Morgan . GOALS OF CARE / TREATMENT PREFERENCES:  
 
GOALS OF CARE: 
Patient/Health Care Proxy Stated Goals: (treatment of active medical issues ) TREATMENT PREFERENCES:  
Code Status: Full Code Advance Care Planning: 
[] The [facility] Interdisciplinary Team has updated the ACP Navigator with Jed and Patient Capacity Advance Care Planning 5/27/2019 Patient's Healthcare Decision Maker is: -  
Primary Decision Maker Name -  
Primary Decision Maker Phone Number -  
Primary Decision Maker Relationship to Patient -  
Confirm Advance Directive None Does the patient have other document types - Medical Interventions: Full interventions(wants to be full code , does not want to prolong on \" machines\") Other Instructions: Other: As far as possible, the palliative care team has discussed with patient / health care proxy about goals of care / treatment preferences for patient. HISTORY:  
 
History obtained from: chart , patient and spouse CHIEF COMPLAINT: sob HPI/SUBJECTIVE: The patient is:  
[] Verbal and participatory For past one week patient has gained weight , with lower extremity edema , followed by sob got worse to the point of orthopnea, patient attributed his symptoms to dietary non compliance in last one week , he was eating \" too good food with salt  \" brought by people  
 
 
currntly he feels sob is better with oxygen , he c/o pain in left 9/10, shoulder , relieved with percocet , he has  bilateral rotator cuff,  he has constipation x 4 days , had bowel movement yesterday , he is  very mindful of side effects of percocet ( somnolence and constipation and avoids it unless in severe pain ). Food does not taste good . Clinical Pain Assessment (nonverbal scale for severity on nonverbal patients):  
Clinical Pain Assessment Severity: 9 Location: left shoulder Character: dull Duration: chronic due to torn rotator cuff Effect: cant move the arm Factors: percocet helps , concerned about getting sleepy Frequency: constant Duration: for how long has pt been experiencing pain (e.g., 2 days, 1 month, years) Frequency: how often pain is an issue (e.g., several times per day, once every few days, constant) FUNCTIONAL ASSESSMENT:  
 
Palliative Performance Scale (PPS): PSYCHOSOCIAL/SPIRITUAL SCREENING:  
 
Palliative IDT has assessed this patient for cultural preferences / practices and a referral made as appropriate to needs (Cultural Services, Patient Advocacy, Ethics, etc.) Any spiritual / Religion concerns: 
[] Yes /  [x] No 
 
Caregiver Burnout: 
[] Yes /  [x] No /  [] No Caregiver Present Anticipatory grief assessment:  
[x] Normal  / [] Maladaptive ESAS Anxiety: Anxiety: 3 ESAS Depression: Depression: 0 REVIEW OF SYSTEMS:  
 
Positive and pertinent negative findings in ROS are noted above in HPI. The following systems were [x] reviewed / [] unable to be reviewed as noted in HPI Other findings are noted below. Systems: constitutional, ears/nose/mouth/throat, respiratory, gastrointestinal, genitourinary, musculoskeletal, integumentary, neurologic, psychiatric, endocrine. Positive findings noted below. Modified ESAS Completed by: provider Fatigue: 6 Drowsiness: 0 Depression: 0 Pain: 9 Anxiety: 3 Nausea: 0 Anorexia: 2 Dyspnea: 2 Constipation: (for past days , had bowel movement last night ) PHYSICAL EXAM:  
 
From RN flowsheet: 
Wt Readings from Last 3 Encounters:  
05/28/19 240 lb 1.3 oz (108.9 kg) 05/20/19 243 lb 12.8 oz (110.6 kg) 05/15/19 235 lb (106.6 kg) Blood pressure 147/84, pulse 70, temperature 97.6 °F (36.4 °C), resp. rate 20, height 6' 4\" (1.93 m), weight 240 lb 1.3 oz (108.9 kg), SpO2 100 %. Pain Scale 1: Numeric (0 - 10) Pain Intensity 1: 0 Pain Onset 1: Acute Pain Location 1: Shoulder Pain Orientation 1: Right Pain Description 1: Shooting Pain Intervention(s) 1: Medication (see MAR) Last bowel movement, if known:  
 
Constitutional: age appropriate , alert , oriented , talkative Eyes: pupils equal, anicteric ENMT: no nasal discharge, moist mucous membranes Cardiovascular: regular rhythm, + edema Respiratory: breathing not labored, symmetric Gastrointestinal: soft non-tender, +bowel sounds Musculoskeletal: no deformity, no tenderness to palpation Skin: warm, dry Neurologic: following commands, moving all extremities Psychiatric: anxious ,  no hallucinations Other: 
 
 
 HISTORY:  
 
Active Problems: 
  Acute on chronic diastolic congestive heart failure, NYHA class 3 ([facility] (5/27/2019) Past Medical History:  
Diagnosis Date  Anemia 8/5/2017  Anxiety 8/5/2017  Arrhythmia   
 atrial fibrillation 2014  ASCVD (arteriosclerotic cardiovascular disease) 8/5/2017  BPH (benign prostatic hyperplasia) 8/5/2017  CAD (coronary artery disease)   
 h/o stents  Cancer [facility])   
 h/o skin cancer  Cardiomyopathy ([facility] 8/5/2017  CHF (congestive heart failure) ([facility] 8/5/2017  Chronic kidney disease Stage IV  CKD (chronic kidney disease), stage IV ([facility] 8/5/2017  Diabetes ([facility]  Diabetes mellitus ([facility] 8/5/2017  Diabetic neuropathy ([facility] 8/5/2017  DJD (degenerative joint disease) 8/5/2017  ED (erectile dysfunction) 8/5/2017  Gout 8/5/2017  High cholesterol  Hyperlipidemia 8/5/2017  Hypertension  Hypertension with renal disease 8/5/2017  Hypothyroid 8/5/2017  Insomnia 8/5/2017  Obesity 8/5/2017  On statin therapy 8/5/2017  Restless leg 8/5/2017  Thyroid disease   
 hypothyroid Past Surgical History:  
Procedure Laterality Date  CARDIAC SURG PROCEDURE UNLIST    
 cardiac stents  CARDIAC SURG PROCEDURE UNLIST Ablation 5/17/2018 ED [facility]  COLONOSCOPY N/A 6/28/2016 COLONOSCOPY performed by Maryjane Ashby MD at [facility] ENDOSCOPY  COLONOSCOPY N/A 1/9/2019 COLONOSCOPY performed by Raymon Cherry MD at [facility] ENDOSCOPY  COLONOSCOPY,DIAGNOSTIC  1/9/2019  HX APPENDECTOMY  HX BUNIONECTOMY    
 and removal of 2 seasmoid bone of the great toe 2/2018  HX HEENT Bilateral Cataract surgery  HX HEENT Tonsils  HX HEENT Axe wound to the head  HX KNEE ARTHROSCOPY X 2  
 HX ORTHOPAEDIC    
 HX ORTHOPAEDIC    
 partial laminectomy  HX PACEMAKER    
 HX ROTATOR CUFF REPAIR    
 bilateral  
 LA INSJ ELTRD CAR DACIA SYS ATTCH PM/CVDFB PLS GEN N/A 1/28/2019 Lv Lead Placement To Previous Generator performed by Ramón Whipple MD at [facility] CARDIAC CATH LAB  LA REMVL PERM PM PLS GEN W/REPL PLSE GEN MULT LEAD N/A 1/28/2019 Remove & Replace Ppm Gen Biv Multi Leads performed by Ramón Whipple MD at [address] [place] [address] 28 CATH LAB Family History Problem Relation Age of Onset  Heart Disease Mother  Kidney Disease Mother  Heart Disease Father  Kidney Disease Father  Cancer Sister      Breast  
  
 History reviewed, no pertinent family history. Social History Tobacco Use  Smoking status: Former Smoker Packs/day: 0.50 Years: 4.00 Pack years: 2.00 Last attempt to quit: 3/6/1972 Years since quittin.3  Smokeless tobacco: Never Used Substance Use Topics  Alcohol use: No  
  Comment: rare, 1 drink per year Allergies Allergen Reactions  Niacin Unknown (comments) Other reaction(s): Unknown (comments)  Imipenem Diarrhea Other reaction(s): Rash  Levemir [Insulin Detemir] Hives  Other Medication Other (comments) ? Allergy to Tripp Wills causing chemical burn  Primaxin [Imipenem-Cilastatin] Diarrhea and Rash  Xarelto [Rivaroxaban] Rash and Itching Other reaction(s): Rash Current Facility-Administered Medications Medication Dose Route Frequency  bumetanide (BUMEX) injection 2 mg  2 mg IntraVENous BID  nitroglycerin (NITROSTAT) tablet 0.4 mg  0.4 mg SubLINGual Q5MIN PRN  
 . PHARMACY TO SUBSTITUTE PER PROTOCOL (Reordered from: Liraglutide (VICTOZA) 0.6 mg/0.1 mL (18 mg/3 mL) sub-q pen)    Per Protocol  colchicine tablet 0.6 mg  0.6 mg Oral PRN  pramipexole (MIRAPEX) tablet 0.5 mg  0.5 mg Oral QHS  ferrous sulfate tablet 325 mg  325 mg Oral DAILY  tamsulosin (FLOMAX) capsule 0.4 mg  0.4 mg Oral QHS  carvedilol (COREG) tablet 12.5 mg  12.5 mg Oral BID WITH MEALS  insulin glargine (LANTUS) injection 35 Units  35 Units SubCUTAneous QHS  febuxostat (ULORIC) tablet 40 mg  40 mg Oral DAILY  metOLazone (ZAROXOLYN) tablet 2.5 mg  2.5 mg Oral EVERY OTHER DAY  potassium chloride (KLOR-CON) packet for solution 20 mEq  20 mEq Oral DAILY  gabapentin (NEURONTIN) capsule 300 mg  300 mg Oral QHS  finasteride (PROSCAR) tablet 5 mg  5 mg Oral DAILY  oxyCODONE-acetaminophen (PERCOCET) 5-325 mg per tablet 1 Tab  1 Tab Oral Q8H PRN  
 sodium chloride (NS) flush 5-40 mL  5-40 mL IntraVENous Q8H  
  sodium chloride (NS) flush 5-40 mL  5-40 mL IntraVENous PRN  
 docusate sodium (COLACE) capsule 100 mg  100 mg Oral BID  polyethylene glycol (MIRALAX) packet 17 g  17 g Oral DAILY PRN  
 senna (SENOKOT) tablet 8.6 mg  1 Tab Oral QHS  
 
 
 
 LAB AND IMAGING FINDINGS:  
 
Lab Results Component Value Date/Time WBC 6.0 05/27/2019 07:43 AM  
 HGB 10.6 (L) 05/27/2019 07:43 AM  
 PLATELET 72 (L) 49/86/1935 07:43 AM  
 
Lab Results Component Value Date/Time Sodium 134 (L) 05/28/2019 04:19 AM  
 Potassium 3.3 (L) 05/28/2019 04:19 AM  
 Chloride 102 05/28/2019 04:19 AM  
 CO2 25 05/28/2019 04:19 AM  
 BUN 50 (H) 05/28/2019 04:19 AM  
 Creatinine 1.92 (H) 05/28/2019 04:19 AM  
 Calcium 8.3 (L) 05/28/2019 04:19 AM  
 Magnesium 2.0 01/28/2019 08:52 AM  
 Phosphorus 3.4 05/22/2019 01:15 PM  
  
Lab Results Component Value Date/Time AST (SGOT) 14 (L) 05/27/2019 07:43 AM  
 Alk. phosphatase 130 (H) 05/27/2019 07:43 AM  
 Protein, total 7.5 05/27/2019 07:43 AM  
 Albumin 3.3 (L) 05/27/2019 07:43 AM  
 Globulin 4.2 (H) 05/27/2019 07:43 AM  
 
Lab Results Component Value Date/Time INR 1.3 (H) 06/01/2018 12:21 PM  
 Prothrombin time 12.6 (H) 06/01/2018 12:21 PM  
 aPTT 26.6 06/10/2014 11:20 AM  
  
Lab Results Component Value Date/Time Iron 48 05/22/2019 01:15 PM  
 TIBC 283 05/22/2019 01:15 PM  
 Iron % saturation 17 (L) 05/22/2019 01:15 PM  
 Ferritin 121 05/22/2019 01:15 PM  
  
No results found for: PH, PCO2, PO2 No components found for: [place] No results found for: CPK, CKMB Total time:  
Counseling / coordination time, spent as noted above:  
> 50% counseling / coordination?:  
 
Prolonged service was provided for  []30 min   []75 min in face to face time in the presence of the patient, spent as noted above. Time Start:  
Time End:  
Note: this can only be billed with 25135 (initial) or 34092 (follow up). If multiple start / stop times, list each separately.

## 2019-05-28 NOTE — PROGRESS NOTES
CARDIOLOGY Progress Note Patient ID: 
Patient: Mirta Mack. MRN: [id number] Age: 77 y.o.  : 1950 Date of  Admission: 2019  7:16 AM  
PCP:  Kavon Del Cid MD  
Usual cardiologist:  [place] We[place] MD 
  
Assessment: 1. Acute systolic heart failure with volume overload, elevated proBNP, sx. 
  
Last 3 Recorded Weights in this Encounter 19 0072 19 1042 19 0448 Weight: 105.7 kg (233 lb) 105.7 kg (233 lb) 108.9 kg (240 lb 1.3 oz) 2. Paroxysmal atrial fibrillation and atypical atrial flutter s/p ablation in 2018. NO atrial fibrillation or flutter since the ablation. 3. Hypertensive heart disease with a history of congestive heart failure and CKD stage 4. EF 55% on prior echo, then 35-40% early . EF 35% by echo in ER. 4. Coronary artery disease with multivessel interventions.  His last stenting was in . 
5. Mild idiopathic pericardial effusion in the past. 
6. Diabetes mellitus type 2, on chronic insulin. 7. Hyperlipidemia. 8. BRBPR in 2019 with grade 1 internal hemorrhoids noted on colo here. Presumed source of GI bleed. 9. History of esophageal dilation for dysphagia. 10. Hiatal hernia. 11. Anemia of chronic renal disease. On EPO (and Fe supplement). 12. Moderate thrombocytopenia. 13. Chronic anticoagulation stopped 2019. 14. Mild hyponatremia, multifactorial (volume overload, Na loss during diuresis, etc).   
Plan:  
  
1. Continue IV Bumex which will be increased, 2g Na diet. Has metolazone 2.5 mg po every other day. 2. Continue beta-blocker. 3. Not a candidate for ACEI or ARB or spironolactone. 4. BIV pacer program change:  Changed LV>RV to 50 msec from 20 msec. Tried to enable MPP using poles 3 and 4 but vectors using these did not capture at max output. Check ECG. 5. Stopped Eliquis given severe anemia requiring transfusion in . 6. Not on antiplatelet therapy. 7. Low suspicion for pulmonary embolism. Will consider pulmonary consult to evaluate for an extracardiac cause of dyspnea beyond CHF if treating CHF doesn't work and ischemia evaluation is nondiagnostic. PFT's? I'll see if an ischemia evaluation would be useful, though exposure to contrast dye with cath or CTA would put him at risk for acute renal failure. Will talk to one of my partners.  
  
 [x]       High complexity decision making was performed in this patient with multiple organ involvement. 
  
Royal VELASCO Mundo Guadalupe. is a 77 y.o. male with a history of atrial arrhythmias s/p ablation 5/2018, has done well from an arrhythmia standpoint. He has had chronic issues with fluid retention. I saw him in the office about a week ago PTA, he reported some ANDERS. He initially denied dietary indiscretion (salt, fluid, volume) but had noticed that he had gained weight and lower extremity edema over several days. Finally admits that he was eating some food that probably was higher in salt. Over the last day PTA, he became more dyspneic and got to the point when he started to get some rest symptoms. +orthopnea, PND. Last night PTA was difficult. He came to the ER for evaluation. Got IV diuretic in the ER. An echo and CXR was done in the ER. No syncope, near-syncope, TIA, or stroke symptoms. Denies resting dyspnea, but does have severe exertional dyspnea and some orthopnea. Urinating some. 
  
 
 
Past Medical History:  
Diagnosis Date  Anemia 8/5/2017  Anxiety 8/5/2017  Arrhythmia   
 atrial fibrillation 2014  ASCVD (arteriosclerotic cardiovascular disease) 8/5/2017  BPH (benign prostatic hyperplasia) 8/5/2017  CAD (coronary artery disease)   
 h/o stents  Cancer [facility])   
 h/o skin cancer  Cardiomyopathy ([facility] [address] 8/5/2017  CHF (congestive heart failure) ([facility] [address] 8/5/2017  Chronic kidney disease Stage IV  CKD (chronic kidney disease), stage IV ([address] 8/5/2017  Diabetes ([facility] [address]  Diabetes mellitus ([facility] [address] 2017  Diabetic neuropathy ([facility] [address] 2017  DJD (degenerative joint disease) 2017  ED (erectile dysfunction) 2017  Gout 2017  High cholesterol  Hyperlipidemia 2017  Hypertension  Hypertension with renal disease 2017  Hypothyroid 2017  Insomnia 2017  Obesity 2017  On statin therapy 2017  Restless leg 2017  Thyroid disease   
 hypothyroid Past Surgical History:  
Procedure Laterality Date  CARDIAC SURG PROCEDURE UNLIST    
 cardiac stents  CARDIAC SURG PROCEDURE UNLIST Ablation 2018 [id number] [facility]  COLONOSCOPY N/A 2016 COLONOSCOPY performed by Nathaniel Coates MD at [facility]OSCOPY  COLONOSCOPY N/A 2019 COLONOSCOPY performed by Marleny Huntley MD at [facility] ENDOSCOPY  COLONOSCOPY,DIAGNOSTIC  2019  HX APPENDECTOMY  HX BUNIONECTOMY    
 and removal of 2 seasmoid bone of the great toe 2018  HX HEENT Bilateral Cataract surgery  HX HEENT Tonsils  HX HEENT Axe wound to the head  HX KNEE ARTHROSCOPY X 2  
 HX ORTHOPAEDIC    
 HX ORTHOPAEDIC    
 partial laminectomy  HX PACEMAKER    
 HX ROTATOR CUFF REPAIR    
 bilateral  
 [place] INSJ ELTRD CAR DACIA SYS ATTCH PM/CVDFB PLS GEN N/A 2019 Lv Lead Placement To Previous Generator performed by Jacquie Bucio MD at [facility] CARDIAC CATH LAB  KS REMVL PERM PM PLS GEN W/REPL PLSE GEN MULT LEAD N/A 2019 Remove & Replace Ppm Gen Biv Multi Leads performed by Jacquie Bucio MD at [address] [place] [address] CATH LAB Social History Tobacco Use  Smoking status: Former Smoker Packs/day: 0.50 Years: 4.00 Pack years: 2.00 Last attempt to quit: 3/6/1972 Years since quittin.3  Smokeless tobacco: Never Used Substance Use Topics  Alcohol use: No  
  Comment: rare, 1 drink per year Family History Problem Relation Age of Onset  Heart Disease Mother  Kidney Disease Mother  Heart Disease Father  Kidney Disease Father  Cancer Sister Breast  
  
 
Allergies Allergen Reactions  Niacin Unknown (comments) Other reaction(s): Unknown (comments)  Imipenem Diarrhea Other reaction(s): Rash  Levemir [Insulin Detemir] Hives  Other Medication Other (comments) ? Allergy to Lexi Aid causing chemical burn  Primaxin [Imipenem-Cilastatin] Diarrhea and Rash  Xarelto [Rivaroxaban] Rash and Itching Other reaction(s): Rash Current Facility-Administered Medications Medication Dose Route Frequency  bumetanide (BUMEX) injection 2 mg  2 mg IntraVENous BID  nitroglycerin (NITROSTAT) tablet 0.4 mg  0.4 mg SubLINGual Q5MIN PRN  
 . PHARMACY TO SUBSTITUTE PER PROTOCOL (Reordered from: Liraglutide (VICTOZA) 0.6 mg/0.1 mL (18 mg/3 mL) sub-q pen)    Per Protocol  colchicine tablet 0.6 mg  0.6 mg Oral PRN  pramipexole (MIRAPEX) tablet 0.5 mg  0.5 mg Oral QHS  ferrous sulfate tablet 325 mg  325 mg Oral DAILY  tamsulosin (FLOMAX) capsule 0.4 mg  0.4 mg Oral QHS  carvedilol (COREG) tablet 12.5 mg  12.5 mg Oral BID WITH MEALS  insulin glargine (LANTUS) injection 35 Units  35 Units SubCUTAneous QHS  febuxostat (ULORIC) tablet 40 mg  40 mg Oral DAILY  metOLazone (ZAROXOLYN) tablet 2.5 mg  2.5 mg Oral EVERY OTHER DAY  potassium chloride (KLOR-CON) packet for solution 20 mEq  20 mEq Oral DAILY  gabapentin (NEURONTIN) capsule 300 mg  300 mg Oral QHS  finasteride (PROSCAR) tablet 5 mg  5 mg Oral DAILY  oxyCODONE-acetaminophen (PERCOCET) 5-325 mg per tablet 1 Tab  1 Tab Oral Q8H PRN  
 sodium chloride (NS) flush 5-40 mL  5-40 mL IntraVENous Q8H  
 sodium chloride (NS) flush 5-40 mL  5-40 mL IntraVENous PRN  
 docusate sodium (COLACE) capsule 100 mg  100 mg Oral BID  polyethylene glycol (MIRALAX) packet 17 g  17 g Oral DAILY PRN  
  senna (SENOKOT) tablet 8.6 mg  1 Tab Oral QHS Review of Symptoms:  See HPI as well. General: negative for fever, chills, sweats, weakness, weight loss Eyes: negative for blurred vision, eye pain, loss of vision, diplopia Ear Nose and Throat: negative for rhinorrhea, pharyngitis, otalgia, tinnitus, speech or swallowing difficulties Respiratory: negative for cough, sputum production, wheezing, hemoptysis, pleuritic pain  
Cardiology: negative for chest pain, palpitations, orthopnea, PND, syncope Gastrointestinal: negative for abdominal pain, N/V, dysphagia, change in bowel habits, bleeding Genitourinary: negative for frequency, urgency, dysuria, hematuria, incontinence Muskuloskeletal : negative for arthralgia, myalgia Hematology: negative for easy bruising, bleeding, lymphadenopathy Dermatological: negative for rash, ulceration, mole change, new lesion Endocrine: negative for hot flashes or polydipsia Neurological: negative for headache, dizziness, confusion, focal weakness, paresthesia, memory loss, gait disturbance Psychological: negative for anxiety, depression, agitation Objective:  
  
Physical Exam: 
Temp (24hrs), Av.9 °F (36.6 °C), Min:97.3 °F (36.3 °C), Max:98.6 °F (37 °C) Patient Vitals for the past 8 hrs: 
 Pulse 19 1057 70  
19 0728 70 Patient Vitals for the past 8 hrs: 
 Resp  
19 1057 20  
19 0728 20 Patient Vitals for the past 8 hrs: 
 BP  
19 1057 147/84  
19 0728 151/90 Intake/Output Summary (Last 24 hours) at 2019 1318 Last data filed at 2019 8068 Gross per 24 hour Intake  Output 800 ml Net -800 ml Nondiaphoretic, not in acute distress. No scleral icterus, mucous membranes moist, conjuctivae pink, no xanthelasma. L BIV pacer site OK. Unlabored, clear to auscultation bilaterally, symmetric air movement. Regular rate and rhythm, no new murmur, pericardial rub, knock, or gallop. No JVD or peripheral edema. Palpable radial pulses bilaterally. Abdomen obese, soft, nontender, nondistended. No abdominal pulstatile masses. Extremities without cyanosis or clubbing. Muscle tone and bulk normal. 
Skin warm and dry. No rashes or ulcers. Neuro grossly nonfocal.  No tremor. Awake and appropriate. CARDIOGRAPHICS and STUDIES, I reviewed: 
 
Telemetry:  SR with BIV pacing. ECG:  AV pacing (with BIV pacing) at 70 bpm. 
 
Echo here: 
Left Ventricle The estimated ejection fraction is 31 - 35%. The calculated ejection fraction is 33%. Left Atrium Left atrium not well visualized. Moderately dilated and elongated left atrium. Left Atrium volume index is 77.31 mL/m2. Right Ventricle Dilated right ventricle. Reduced systolic function. Right Atrium Dilated right atrium. Aortic Valve Normal valve structure and trileaflet valve structure. Aortic valve sclerosis. Mild to moderate aortic valve regurgitation. Mitral Valve Mild regurgitation. Tricuspid Valve Mild to moderate tricuspid valve regurgitation. Pulmonary arterial systolic pressure is 55.6 mmHg. Pulmonic Valve Moderate regurgitation. Aorta Normal aortic root. Dilated sinuses of Valsalva; diameter is 4 cm. Dilated aortic root; diameter is 3.3 cm. Dilated ascending aorta; diameter is 3.7 cm. Pulmonary Artery Pulmonary arterial systolic pressure (PASP) is 49 mmHg. IVC/Hepatic Veins Normal central venous pressure (0-5 mmHg); IVC diameter is less than 21 mm and collapses more than 50% with respiration. Mildly dilated inferior vena cava. Pericardium Insignificant pericardial effusion or fat. CXR:   Maybe mild interstitial changes, no pleural effusion or infiltrate. Labs: 
Recent Labs  
  05/27/19 2072 TROIQ <0.05 Lab Results Component Value Date/Time  Cholesterol, total 112 11/27/2018 02:55 PM  
 HDL Cholesterol 27 (L) 11/27/2018 02:55 PM  
 LDL, calculated 68 11/27/2018 02:55 PM  
 Triglyceride 85 11/27/2018 02:55 PM  
 
No results for input(s): INR, PTP, APTT in the last 72 hours. No lab exists for component: INREXT, INREXT Recent Labs  
  05/28/19 
0419 05/27/19 
2462 * 135* K 3.3* 3.5  101 CO2 25 28 BUN 50* 54* CREA 1.92* 1.99* * 159* CA 8.3* 8.4* ALB  --  3.3* WBC  --  6.0 HGB  --  10.6* HCT  --  34.0*  
PLT  --  72* Recent Labs  
  05/27/19 
5072 SGOT 14* * TP 7.5 ALB 3.3*  
GLOB 4.2* No components found for: [place] No results for input(s): PH, PCO2, PO2 in the last 72 hours. Rebecca Maher MD 
5/28/2019

## 2019-05-28 NOTE — PROGRESS NOTES
Initial Nutrition Assessment: 
 
INTERVENTIONS/RECOMMENDATIONS:  
Meals/Snacks: General/healthful diet : Continue current diet ASSESSMENT:  
Chart reviewed, patient medically noted for acute on chronic diastolic congestive heart failure. PMH CKD, CHF, type 2 DM, A fib and hypertension. Visited patient at bedside per [facility] referral. Patient was finishing breakfast at the time of visit, he said his appetite has been okay, but he doesn't like eggs so he couldn't finish his breakfast. Patient said he was familiar with the room service menu and is very excited about ordering stir-garza for lunch and perhaps dinner. When asked about his recent weight loss, patient stated that he had lost 15 lbs in 2 weeks. He said he had no idea what contributed to the sudden weight loss. Patient reported his weight fluctuates quite often and he had  been urinating frequently during the weight loss period in question. Per chart history patient has lost ~12 lbs in ~ 1 month. Patient's weight loss was possibly due to fluid loss and not necessarily nutrition related. He said that he has regained almost all the weight previously lost. Patient seemed to be knowledgeable about his current dietary restrictions, he listed foods that he typically avoids, he noted a recent slip during the holiday weekend where he may have eaten a little too much sodium. Patient will continue to monitor his sodium and carbohydrate  intake. Patient  had no additional questions or concerns. Diet Order: Consistent carb 
% Eaten:  No data found. Pertinent Medications: [x]Reviewed []Other : Coreg, Colace, Lantus, Bumex Pertinent Labs: [x]Reviewed []Other Food Allergies: [x]None []Other Last BM: 05/27  []Active     []Hyperactive  []Hypoactive       [] Absent BS Skin:    [] Intact   [] Incision  [] Breakdown  [] Other: Anthropometrics:  
Height: 6' 4\" (193 cm) Weight: 108.9 kg (240 lb 1.3 oz) IBW (%IBW):   ( ) UBW (%UBW):   (  %) Last Weight Metrics: Weight Loss Metrics 5/28/2019 5/20/2019 5/15/2019 5/1/2019 4/22/2019 4/17/2019 4/2/2019 Today's Wt 240 lb 1.3 oz 243 lb 12.8 oz 235 lb 249 lb 251 lb 12.8 oz 244 lb 9.6 oz 252 lb 12.8 oz  
BMI 29.22 kg/m2 29.68 kg/m2 28.61 kg/m2 30.31 kg/m2 30.65 kg/m2 29.77 kg/m2 30.77 kg/m2 BMI: Body mass index is 29.22 kg/m². This BMI is indicative of: 
 []Underweight    []Normal    [x]Overweight    [] Obesity   [] Extreme Obesity (BMI>40) Estimated Nutrition Needs (Based on):  
6927 Kcals/day(BMR (1950) X1.2 AF  ) , 190 g(1 g/kg BW ) Protein Carbohydrate: At Least 130 g/day  Fluids: 2340 mL/day (1ml/kcal) NUTRITION DIAGNOSES:  
Problem:  Unintended weight loss Etiology: related to CKD+CHF and likely due to fluid status Signs/Symptoms: as evidenced by Patient reports 15 lb weight loss in 2 weeks NUTRITION INTERVENTIONS: 
Meals/Snacks: General/healthful diet GOAL:  
PO intake >75% of meals next 5-7 days LEARNING NEEDS (Diet, Food/Nutrient-Drug Interaction):  
 [x] None Identified 
 [] Identified and Education Provided/Documented 
 [] Identified and Pt declined/was not appropriate Cultureal, Restorationism, OR Ethnic Dietary Needs:  
 [x] None Identified 
 [] Identified and Addressed 
 
 [x] Interdisciplinary Care Plan Reviewed/Documented  
 [x] Discharge Planning: Continue consistent carb / low sodium diet MONITORING /EVALUATION:  
 
Food/Nutrient Intake Outcomes: Total energy intake Physical Signs/Symptoms Outcomes: Weight/weight change, Electrolyte and renal profile, GI profile, Glucose profile NUTRITION RISK:  
 [x] Patient At Nutritional Risk  
 [] Patient Not At Nutritional Risk PT SEEN FOR:  
 []  MD Consult: []Calorie Count []Diabetic Diet Education []Diet Education []Electrolyte Management []General Nutrition Management and Supplements []Management of Tube Feeding []TPN Recommendations  [x]  RN Referral:  [x]MST score >=2 
 []Enteral/Parenteral Nutrition PTA []Pregnant: Gestational DM or Multigestation 
   []Pressure Ulcer/Wound Care needs 
     
[]  Low BMI 
[]  DAJA Gordon Pager [phone] Weekend Pager [phone]

## 2019-05-28 NOTE — PROGRESS NOTES
[address] [place] [address] met with patient today to provide an introduction to self, the [address] at [facility]. Bundled Payment Care Program: 
 
Patient was provided a copy of the [facility] letter. Patient states that they have a functioning scale at home. Patient was not provided a scale during this visit. Reinforced the importance of checking their weight at the same time daily and calling the cardiologist if their weight is up 3 lbs. in a day or 5 lbs. in a week (or per MD guidelines). Patient  Has received a \"Living with Heart Failure\" patient education book. Hug At Home Program: 
 
Provided patient demonstration of [place] program on training iPad. Patient was interested in the [place] program. PM will return tomorrow with information patient had another visitor at this time Spencer at [facility] nurse set up patients profile on the [address] [place] e and assisted patient in  setting up a PIN for iPad, Logging into iPad, recording data on iPad, exploring FAQ section, exploring sodium coco Patient was educated on the return process for iPad at the completion of the Uus 6 number [id number] All questions answered at this time. Provided patient contact number for the [address] [place] [address] for further questions or concerns. Patient verbalized understanding of all information discussed. Appointments:  
 
Patient stated best day(s) of the week for provider appointment(s):any day Patient stated best time of day for provider appointment(s):  After 11

## 2019-05-28 NOTE — PROGRESS NOTES
Spiritual Care Assessment/Progress Note [address] 
 
 
NAME: Royal Yelena Oliver. MRN: [id number] AGE: 77 y.o. SEX: male Zoroastrian Affiliation: No preference Language: [place] 5/28/2019     Total Time (in minutes): 23 Spiritual Assessment begun in [facility] 2 CARDIOPULMONARY CARE through conversation with: 
  
    [x]Patient        [] Family    [] Friend(s) Reason for Consult: Palliative Care, Initial/Spiritual Assessment Spiritual beliefs: (Please include comment if needed) [x] Identifies with a isrrael tradition:     
   [x] Supported by a isrrael community:        
   [] Claims no spiritual orientation:       
   [] Seeking spiritual identity:            
   [] Adheres to an individual form of spirituality:       
   [] Not able to assess:                   
 
    
Identified resources for coping:  
   [x] Prayer                           
   [] Music                  [] Guided Imagery [x] Family/friends                 [] Pet visits [] Devotional reading                         [] Unknown 
   [] Other:                                     
 
 
Interventions offered during this visit: (See comments for more details) Patient Interventions: Affirmation of isrrael, Iconic (affirming the presence of God/Higher Power), Prayer (assurance of) Plan of Care: 
 
 [x] Support spiritual and/or cultural needs  
 [] Support AMD and/or advance care planning process    
 [] Support grieving process 
 [] Coordinate Rites and/or Rituals  
 [] Coordination with community clergy 
 [x] No spiritual needs identified at this time 
 [] Detailed Plan of Care below (See Comments)  [] Make referral to Music Therapy 
[] Make referral to Pet Therapy    
[] Make referral to Addiction services 
[] Make referral to [facility] 
[] Make referral to Spiritual Care Partner 
[] No future visits requested       
[x] Follow up visits as needed Comments: The patient was resting in bed when I knocked and entered the room.  had just delivered the patient's meal. I introduced myself and explained the purpose of the visit. The patient extended his hand to shake. The patient was very receptive to the visit. 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 The patient explained that he is not the most spiritual person, but he is spiritual. As we were talking, a friend of the patient arrived. At the appropriate time I ended my visit and assured the patient that I will keep him in my prayers. The patient was grateful for the visit. The patient appeared to be settled in terms of his medical challenges. The patient appears to have a generous and mature spirit about him. Rev. Adrian Aguilera EdD MDiv Palliative  Fellow For Qu[address] Page [phone]

## 2019-05-29 PROBLEM — H83.03 LABYRINTHITIS OF BOTH EARS: Status: ACTIVE | Noted: 2019-05-29

## 2019-05-29 LAB
ANION GAP SERPL CALC-SCNC: 5 MMOL/L (ref 5–15)
ATRIAL RATE: 70 BPM
BACTERIA SPEC CULT: NORMAL
BACTERIA SPEC CULT: NORMAL
BUN SERPL-MCNC: 52 MG/DL (ref 6–20)
BUN/CREAT SERPL: 26 (ref 12–20)
CALCIUM SERPL-MCNC: 8.4 MG/DL (ref 8.5–10.1)
CALCULATED R AXIS, ECG10: -65 DEGREES
CALCULATED T AXIS, ECG11: 102 DEGREES
CHLORIDE SERPL-SCNC: 99 MMOL/L (ref 97–108)
CO2 SERPL-SCNC: 29 MMOL/L (ref 21–32)
CREAT SERPL-MCNC: 2.03 MG/DL (ref 0.7–1.3)
DIAGNOSIS, 93000: NORMAL
GLUCOSE BLD STRIP.AUTO-MCNC: 122 MG/DL (ref 65–100)
GLUCOSE BLD STRIP.AUTO-MCNC: 190 MG/DL (ref 65–100)
GLUCOSE BLD STRIP.AUTO-MCNC: 192 MG/DL (ref 65–100)
GLUCOSE BLD STRIP.AUTO-MCNC: 79 MG/DL (ref 65–100)
GLUCOSE BLD STRIP.AUTO-MCNC: 89 MG/DL (ref 65–100)
GLUCOSE SERPL-MCNC: 85 MG/DL (ref 65–100)
P-R INTERVAL, ECG05: 176 MS
POTASSIUM SERPL-SCNC: 3.7 MMOL/L (ref 3.5–5.1)
Q-T INTERVAL, ECG07: 504 MS
QRS DURATION, ECG06: 170 MS
QTC CALCULATION (BEZET), ECG08: 544 MS
SERVICE CMNT-IMP: ABNORMAL
SERVICE CMNT-IMP: NORMAL
SODIUM SERPL-SCNC: 133 MMOL/L (ref 136–145)
VENTRICULAR RATE, ECG03: 70 BPM

## 2019-05-29 PROCEDURE — 36415 COLL VENOUS BLD VENIPUNCTURE: CPT

## 2019-05-29 PROCEDURE — 77010033678 HC OXYGEN DAILY

## 2019-05-29 PROCEDURE — 74011250637 HC RX REV CODE- 250/637: Performed by: INTERNAL MEDICINE

## 2019-05-29 PROCEDURE — 94760 N-INVAS EAR/PLS OXIMETRY 1: CPT

## 2019-05-29 PROCEDURE — 74011000250 HC RX REV CODE- 250: Performed by: INTERNAL MEDICINE

## 2019-05-29 PROCEDURE — 82962 GLUCOSE BLOOD TEST: CPT

## 2019-05-29 PROCEDURE — 74011636637 HC RX REV CODE- 636/637: Performed by: INTERNAL MEDICINE

## 2019-05-29 PROCEDURE — 74011250636 HC RX REV CODE- 250/636: Performed by: INTERNAL MEDICINE

## 2019-05-29 PROCEDURE — 80048 BASIC METABOLIC PNL TOTAL CA: CPT

## 2019-05-29 PROCEDURE — 65660000000 HC RM CCU STEPDOWN

## 2019-05-29 RX ORDER — MECLIZINE HCL 12.5 MG 12.5 MG/1
12.5 TABLET ORAL 3 TIMES DAILY
Status: DISCONTINUED | OUTPATIENT
Start: 2019-05-29 | End: 2019-06-05

## 2019-05-29 RX ORDER — PANTOPRAZOLE SODIUM 40 MG/1
40 TABLET, DELAYED RELEASE ORAL
Status: DISCONTINUED | OUTPATIENT
Start: 2019-05-29 | End: 2019-06-05 | Stop reason: HOSPADM

## 2019-05-29 RX ORDER — ENOXAPARIN SODIUM 100 MG/ML
40 INJECTION SUBCUTANEOUS EVERY 24 HOURS
Status: DISCONTINUED | OUTPATIENT
Start: 2019-05-29 | End: 2019-06-05 | Stop reason: HOSPADM

## 2019-05-29 RX ADMIN — DOCUSATE SODIUM 100 MG: 100 CAPSULE, LIQUID FILLED ORAL at 17:04

## 2019-05-29 RX ADMIN — PANTOPRAZOLE SODIUM 40 MG: 40 TABLET, DELAYED RELEASE ORAL at 08:56

## 2019-05-29 RX ADMIN — FEBUXOSTAT 40 MG: 40 TABLET ORAL at 09:34

## 2019-05-29 RX ADMIN — GABAPENTIN 300 MG: 300 CAPSULE ORAL at 21:48

## 2019-05-29 RX ADMIN — Medication 10 ML: at 03:23

## 2019-05-29 RX ADMIN — MECLIZINE 12.5 MG: 12.5 TABLET ORAL at 16:23

## 2019-05-29 RX ADMIN — FERROUS SULFATE TAB 325 MG (65 MG ELEMENTAL FE) 325 MG: 325 (65 FE) TAB at 08:39

## 2019-05-29 RX ADMIN — CARVEDILOL 12.5 MG: 12.5 TABLET, FILM COATED ORAL at 08:39

## 2019-05-29 RX ADMIN — MECLIZINE 12.5 MG: 12.5 TABLET ORAL at 21:47

## 2019-05-29 RX ADMIN — SENNOSIDES 8.6 MG: 8.6 TABLET, FILM COATED ORAL at 21:47

## 2019-05-29 RX ADMIN — TAMSULOSIN HYDROCHLORIDE 0.4 MG: 0.4 CAPSULE ORAL at 21:48

## 2019-05-29 RX ADMIN — PRAMIPEXOLE DIHYDROCHLORIDE 0.5 MG: 0.25 TABLET ORAL at 21:47

## 2019-05-29 RX ADMIN — Medication 10 ML: at 16:23

## 2019-05-29 RX ADMIN — BUMETANIDE 2 MG: 0.25 INJECTION INTRAMUSCULAR; INTRAVENOUS at 08:40

## 2019-05-29 RX ADMIN — DOCUSATE SODIUM 100 MG: 100 CAPSULE, LIQUID FILLED ORAL at 08:39

## 2019-05-29 RX ADMIN — INSULIN GLARGINE 35 UNITS: 100 INJECTION, SOLUTION SUBCUTANEOUS at 21:48

## 2019-05-29 RX ADMIN — BUMETANIDE 2 MG: 0.25 INJECTION INTRAMUSCULAR; INTRAVENOUS at 16:22

## 2019-05-29 RX ADMIN — POTASSIUM CHLORIDE 20 MEQ: 1.5 POWDER, FOR SOLUTION ORAL at 08:39

## 2019-05-29 RX ADMIN — CARVEDILOL 12.5 MG: 12.5 TABLET, FILM COATED ORAL at 17:04

## 2019-05-29 RX ADMIN — FINASTERIDE 5 MG: 5 TABLET, FILM COATED ORAL at 08:39

## 2019-05-29 RX ADMIN — ENOXAPARIN SODIUM 40 MG: 40 INJECTION SUBCUTANEOUS at 08:56

## 2019-05-29 RX ADMIN — MECLIZINE 12.5 MG: 12.5 TABLET ORAL at 08:56

## 2019-05-29 RX ADMIN — Medication 10 ML: at 08:42

## 2019-05-29 RX ADMIN — Medication 10 ML: at 21:48

## 2019-05-29 NOTE — PROGRESS NOTES
CARDIOLOGY Progress Note Patient ID: 
Patient: Nicolle Varghese. MRN: [id number] Age: 77 y.o.  : 1950 Date of  Admission: 2019  7:16 AM  
PCP:  Brett Azevedo MD  
Usual cardiologist:  Vesta Bates MD 
  
Assessment: 1. Acute systolic heart failure with volume overload, elevated proBNP, sx. 
  
Last 3 Recorded Weights in this Encounter 19 1042 19 [id number] 19 [id number] Weight: 105.7 kg (233 lb) 108.9 kg (240 lb 1.3 oz) 109.1 kg (240 lb 8 oz) 2. Paroxysmal atrial fibrillation and atypical atrial flutter s/p ablation in 2018. NO atrial fibrillation or flutter since the ablation. 3. Hypertensive heart disease with a history of congestive heart failure and CKD stage 4. EF 55% on prior echo, then 35-40% early . EF 35% by echo in ER. 4. Coronary artery disease with multivessel interventions.  His last stenting was in . Cath in  showed a nondominant RCA with LPDA occluded and filled by collaterals, otherwise up to moderate CAD. 5. Mild idiopathic pericardial effusion in the past. 
6. Diabetes mellitus type 2, on chronic insulin. 7. Hyperlipidemia. 8. BRBPR in 2019 with grade 1 internal hemorrhoids noted on colo here. Presumed source of GI bleed. 9. History of esophageal dilation for dysphagia. 10. Hiatal hernia. 11. Anemia of chronic renal disease. On EPO (and Fe supplement). 12. Moderate thrombocytopenia. 13. Chronic anticoagulation stopped 2019. 14. Mild hyponatremia, multifactorial (volume overload, Na loss during diuresis, etc).   
Plan:  
  
1. Continue IV Bumex, 2g Na diet. Has metolazone 2.5 mg po every other day. 2. Continue beta-blocker. 3. Not a candidate for ACEI or ARB or spironolactone. 4. BIV pacer program change on :  Changed LV>RV to 50 msec from 20 msec. Tried to enable MPP using poles 3 and 4 but vectors using these did not capture at max output. 5. Stopped Eliquis given severe anemia requiring transfusion in 2019. 6. Not on antiplatelet therapy. 7. Low suspicion for pulmonary embolism. Will consider pulmonary consult to evaluate for an extracardiac cause of dyspnea beyond CHF if treating CHF doesn't work and ischemia evaluation is nondiagnostic. PFT's? I'd do an ischemia evaluation as an OP using cardiac PET, a better resolution study than the typical nuclear testing. Exposure to contrast dye with cath or CTA would put him at risk for acute renal failure. If his PET is positive, then I'll get Dr. Barrera Viveros involved. Dr. Xu Alcantar' help appreciated.  
  
 [x]       High complexity decision making was performed in this patient with multiple organ involvement. 
  
Royal KRISTA Palmer. is a 77 y.o. male with a history of atrial arrhythmias s/p ablation 5/2018, has done well from an arrhythmia standpoint. He has had chronic issues with fluid retention. I saw him in the office about a week ago PTA, he reported some ANDERS. He initially denied dietary indiscretion (salt, fluid, volume) but had noticed that he had gained weight and lower extremity edema over several days. Finally admits that he was eating some food that probably was higher in salt. Over the last day PTA, he became more dyspneic and got to the point when he started to get some rest symptoms. +orthopnea, PND. Last night PTA was difficult. He came to the ER for evaluation. Got IV diuretic in the ER. An echo and CXR was done in the ER. No syncope, near-syncope, TIA, or stroke symptoms. Denies resting dyspnea, but does have severe exertional dyspnea and some orthopnea. Urinating with diuretic. CATH from Dr. Barrera Viveros 2014: \"Left main trunk: Normal. 
  
Left anterior descending artery: This is a large artery. It is moderately 
calcified. There is diffuse atherosclerosis, areas of up to 30% narrowing 
with no significant obstructive disease.  The diagonal branch is small to 
 moderate sizes and is free of disease. The LAD wraps around the apex of the 
heart. 
  
The circumflex artery is dominant. This is a large artery with diffuse 
atherosclerotic changes and diffuse calcification. There is no significant 
obstructive disease in the AV circumflex artery. A high lateral branch 
shows 50% stenosis in its proximal segment. A marginal branch has about 50% 
narrowing in its proximal segment. The  posterior ventricular branch has 
diffuse moderate atherosclerosis. The PDA comes off the circ distally. It 
appeared to be occluded in its mid segment and the distal segment fills by 
collaterals from the other circumflex branches. 
  
The right coronary artery is nondominant. There is 70% stenosis in the 
proximal right coronary artery, in this nondominant vessel, and then more 
distally it is diffusely and severely diseased. 
  
Left ventricle: Ventricle contractility is very dynamic. All segments seem 
to contract well. Ejection fraction estimated to be 65%. 
  
CONCLUSIONS 1. Diffuse moderate coronary atherosclerosis as described. 2. Normal left ventricular systolic function. 
  
Based on the angiographic findings, there is no indication for intervention 
and I think this is best treated medically at this time. \" 
  
 
 
 
Allergies Allergen Reactions  Niacin Unknown (comments) Other reaction(s): Unknown (comments)  Imipenem Diarrhea Other reaction(s): Rash  Levemir [Insulin Detemir] Hives  Other Medication Other (comments) ? Allergy to Ozell Mandes causing chemical burn  Primaxin [Imipenem-Cilastatin] Diarrhea and Rash  Xarelto [Rivaroxaban] Rash and Itching Other reaction(s): Rash Current Facility-Administered Medications Medication Dose Route Frequency  enoxaparin (LOVENOX) injection 40 mg  40 mg SubCUTAneous Q24H  
 meclizine (ANTIVERT) tablet 12.5 mg  12.5 mg Oral TID  pantoprazole (PROTONIX) tablet 40 mg  40 mg Oral ACB  bumetanide (BUMEX) injection 2 mg  2 mg IntraVENous BID  nitroglycerin (NITROSTAT) tablet 0.4 mg  0.4 mg SubLINGual Q5MIN PRN  
 . PHARMACY TO SUBSTITUTE PER PROTOCOL (Reordered from: Liraglutide (VICTOZA) 0.6 mg/0.1 mL (18 mg/3 mL) sub-q pen)    Per Protocol  colchicine tablet 0.6 mg  0.6 mg Oral PRN  pramipexole (MIRAPEX) tablet 0.5 mg  0.5 mg Oral QHS  ferrous sulfate tablet 325 mg  325 mg Oral DAILY  tamsulosin (FLOMAX) capsule 0.4 mg  0.4 mg Oral QHS  carvedilol (COREG) tablet 12.5 mg  12.5 mg Oral BID WITH MEALS  insulin glargine (LANTUS) injection 35 Units  35 Units SubCUTAneous QHS  febuxostat (ULORIC) tablet 40 mg  40 mg Oral DAILY  metOLazone (ZAROXOLYN) tablet 2.5 mg  2.5 mg Oral EVERY OTHER DAY  potassium chloride (KLOR-CON) packet for solution 20 mEq  20 mEq Oral DAILY  gabapentin (NEURONTIN) capsule 300 mg  300 mg Oral QHS  finasteride (PROSCAR) tablet 5 mg  5 mg Oral DAILY  oxyCODONE-acetaminophen (PERCOCET) 5-325 mg per tablet 1 Tab  1 Tab Oral Q8H PRN  
 sodium chloride (NS) flush 5-40 mL  5-40 mL IntraVENous Q8H  
 sodium chloride (NS) flush 5-40 mL  5-40 mL IntraVENous PRN  
 docusate sodium (COLACE) capsule 100 mg  100 mg Oral BID  polyethylene glycol (MIRALAX) packet 17 g  17 g Oral DAILY PRN  
 senna (SENOKOT) tablet 8.6 mg  1 Tab Oral QHS Review of Symptoms:  No change from H&P. General: negative for fever, chills, sweats, weakness, weight loss Eyes: negative for blurred vision, eye pain, loss of vision, diplopia Ear Nose and Throat: negative for rhinorrhea, pharyngitis, otalgia, tinnitus, speech or swallowing difficulties Respiratory: negative for cough, sputum production, wheezing, hemoptysis, pleuritic pain  
Cardiology: negative for chest pain, palpitations, orthopnea, PND, syncope Gastrointestinal: negative for abdominal pain, N/V, dysphagia, change in bowel habits, bleeding Genitourinary: negative for frequency, urgency, dysuria, hematuria, incontinence Muskuloskeletal : negative for arthralgia, myalgia Hematology: negative for easy bruising, bleeding, lymphadenopathy Dermatological: negative for rash, ulceration, mole change, new lesion Endocrine: negative for hot flashes or polydipsia Neurological: negative for headache, dizziness, confusion, focal weakness, paresthesia, memory loss, gait disturbance Psychological: negative for anxiety, depression, agitation Objective:  
  
Physical Exam: 
Temp (24hrs), Av.3 °F (36.3 °C), Min:96.5 °F (35.8 °C), Max:97.9 °F (36.6 °C) Patient Vitals for the past 8 hrs: 
 Pulse 19 1055 70  
19 0732 70 Patient Vitals for the past 8 hrs: 
 Resp  
19 1055 20  
19 0732 20 Patient Vitals for the past 8 hrs: 
 BP  
19 1055 138/80  
19 0732 156/89 Intake/Output Summary (Last 24 hours) at 2019 1331 Last data filed at 2019 8142 Gross per 24 hour Intake 1080 ml Output 2850 ml Net -1770 ml Nondiaphoretic, not in acute distress. No scleral icterus, mucous membranes moist, conjuctivae pink, no xanthelasma. L BIV pacer site OK. Unlabored, clear to auscultation bilaterally, symmetric air movement. Regular rate and rhythm, no new murmur, pericardial rub, knock, or gallop. No JVD and trace peripheral edema. Palpable radial pulses bilaterally. Abdomen obese, soft, nontender, nondistended. No abdominal pulstatile masses. Extremities without cyanosis or clubbing. Muscle tone and bulk normal. 
Skin warm and dry. No rashes or ulcers. Neuro grossly nonfocal.  No tremor. Awake and appropriate. CARDIOGRAPHICS and STUDIES, I reviewed: 
 
Telemetry:  SR with BIV pacing. ECG:  AV pacing (with BIV pacing) at 70 bpm. 
 
Echo here: 
Left Ventricle The estimated ejection fraction is 31 - 35%. The calculated ejection fraction is 33%. Left Atrium Left atrium not well visualized. Moderately dilated and elongated left atrium. Left Atrium volume index is 77.31 mL/m2. Right Ventricle Dilated right ventricle. Reduced systolic function. Right Atrium Dilated right atrium. Aortic Valve Normal valve structure and trileaflet valve structure. Aortic valve sclerosis. Mild to moderate aortic valve regurgitation. Mitral Valve Mild regurgitation. Tricuspid Valve Mild to moderate tricuspid valve regurgitation. Pulmonary arterial systolic pressure is 63.8 mmHg. Pulmonic Valve Moderate regurgitation. Aorta Normal aortic root. Dilated sinuses of Valsalva; diameter is 4 cm. Dilated aortic root; diameter is 3.3 cm. Dilated ascending aorta; diameter is 3.7 cm. Pulmonary Artery Pulmonary arterial systolic pressure (PASP) is 49 mmHg. IVC/Hepatic Veins Normal central venous pressure (0-5 mmHg); IVC diameter is less than 21 mm and collapses more than 50% with respiration. Mildly dilated inferior vena cava. Pericardium Insignificant pericardial effusion or fat. CXR:   Maybe mild interstitial changes, no pleural effusion or infiltrate. Labs: 
Recent Labs  
  05/27/19 
3640 TROIQ <0.05 Lab Results Component Value Date/Time Cholesterol, total 112 11/27/2018 02:55 PM  
 HDL Cholesterol 27 (L) 11/27/2018 02:55 PM  
 LDL, calculated 68 11/27/2018 02:55 PM  
 Triglyceride 85 11/27/2018 02:55 PM  
 
No results for input(s): INR, PTP, APTT in the last 72 hours. No lab exists for component: INREXT, INREXT Recent Labs  
  05/29/19 
0322 05/28/19 
0419 05/27/19 
2767 * 134* 135* K 3.7 3.3* 3.5 CL 99 102 101 CO2 29 25 28 BUN 52* 50* 54* CREA 2.03* 1.92* 1.99* GLU 85 115* 159* CA 8.4* 8.3* 8.4* ALB  --   --  3.3* WBC  --   --  6.0 HGB  --   --  10.6* HCT  --   --  34.0*  
PLT  --   --  72* Recent Labs  
  05/27/19 
0013 SGOT 14* * TP 7.5 ALB 3.3*  
GLOB 4.2*  
 
 No components found for: [place] No results for input(s): PH, PCO2, PO2 in the last 72 hours. Eileen Butts MD 
5/29/2019

## 2019-05-29 NOTE — PROGRESS NOTES
Problem: Heart Failure: Day 1 Goal: Off Pathway (Use only if patient is Off Pathway) Outcome: Progressing Towards Goal 
Goal: Activity/Safety Outcome: Progressing Towards Goal 
Goal: Consults, if ordered Outcome: Progressing Towards Goal 
Goal: Diagnostic Test/Procedures Outcome: Progressing Towards Goal 
Goal: Nutrition/Diet Outcome: Progressing Towards Goal 
Goal: Discharge Planning Outcome: Progressing Towards Goal 
Goal: Medications Outcome: Progressing Towards Goal 
Goal: Respiratory Outcome: Progressing Towards Goal 
Goal: Treatments/Interventions/Procedures Outcome: Progressing Towards Goal 
Goal: Psychosocial 
Outcome: Progressing Towards Goal 
Goal: *Oxygen saturation within defined limits Outcome: Progressing Towards Goal 
Goal: *Hemodynamically stable Outcome: Progressing Towards Goal 
Goal: *Optimal pain control at patient's stated goal 
Outcome: Progressing Towards Goal 
Goal: *Anxiety reduced or absent Outcome: Progressing Towards Goal 
  
Problem: Heart Failure: Day 2 Goal: Off Pathway (Use only if patient is Off Pathway) Outcome: Progressing Towards Goal 
Goal: Activity/Safety Outcome: Progressing Towards Goal 
Goal: Consults, if ordered Outcome: Progressing Towards Goal 
Goal: Diagnostic Test/Procedures Outcome: Progressing Towards Goal 
Goal: Nutrition/Diet Outcome: Progressing Towards Goal 
Goal: Discharge Planning Outcome: Progressing Towards Goal 
Goal: Medications Outcome: Progressing Towards Goal 
Goal: Respiratory Outcome: Progressing Towards Goal 
Goal: Treatments/Interventions/Procedures Outcome: Progressing Towards Goal 
Goal: Psychosocial 
Outcome: Progressing Towards Goal 
Goal: *Oxygen saturation within defined limits Outcome: Progressing Towards Goal 
Goal: *Hemodynamically stable Outcome: Progressing Towards Goal 
Goal: *Optimal pain control at patient's stated goal 
Outcome: Progressing Towards Goal 
Goal: *Anxiety reduced or absent Outcome: Progressing Towards Goal 
Goal: *Demonstrates progressive activity Outcome: Progressing Towards Goal 
  
Problem: Heart Failure: Day 3 Goal: Off Pathway (Use only if patient is Off Pathway) Outcome: Progressing Towards Goal 
Goal: Activity/Safety Outcome: Progressing Towards Goal 
Goal: Diagnostic Test/Procedures Outcome: Progressing Towards Goal 
Goal: Nutrition/Diet Outcome: Progressing Towards Goal 
Goal: Discharge Planning Outcome: Progressing Towards Goal 
Goal: Medications Outcome: Progressing Towards Goal 
Goal: Respiratory Outcome: Progressing Towards Goal 
Goal: Treatments/Interventions/Procedures Outcome: Progressing Towards Goal 
Goal: Psychosocial 
Outcome: Progressing Towards Goal 
Goal: *Oxygen saturation within defined limits Outcome: Progressing Towards Goal 
Goal: *Hemodynamically stable Outcome: Progressing Towards Goal 
Goal: *Optimal pain control at patient's stated goal 
Outcome: Progressing Towards Goal 
Goal: *Anxiety reduced or absent Outcome: Progressing Towards Goal 
Goal: *Demonstrates progressive activity Outcome: Progressing Towards Goal 
  
Problem: General Infection Care Plan (Adult and Pediatric) Goal: Improvement in signs and symptoms of infection Outcome: Progressing Towards Goal 
Goal: *Optimize nutritional status Outcome: Progressing Towards Goal 
  
Problem: Falls - Risk of 
Goal: *Absence of Falls Description Document Tish Crook Fall Risk and appropriate interventions in the flowsheet. Outcome: Progressing Towards Goal 
  
Problem: Patient Education: Go to Patient Education Activity Goal: Patient/Family Education Outcome: Progressing Towards Goal

## 2019-05-29 NOTE — PROGRESS NOTES
Bedside shift change report given to Juanita Clinton RN (oncoming nurse). Report included the following information SBAR and Cardiac Rhythm Paced. SHIFT SUMMARY: 
Uneventful night, no distress or complaints. VSS CONCERNS TO ADDRESS WITH MD: 
none [address] NURSING NOTE Admission Date 5/27/2019 Admission Diagnosis Acute on chronic diastolic congestive heart failure, NYHA class 3 ([facility] [address] [I50.33] Consults IP CONSULT TO CARDIOLOGY 
IP CONSULT TO PALLIATIVE CARE - PROVIDER Cardiac Monitoring [x] Yes [] No  
  
Purposeful Hourly Rounding [x] Yes   
Thanh Score Total Score: 2 Thanh score 3 or > [x] Bed Alarm [] Avasys [] 1:1 sitter [] Patient refused (Signed refusal form in chart) Aurelio Score Aurelio Score: 18 Aurelio score 14 or < [] PMT consult [] Wound Care consult  
 []  Specialty bed  [] Nutrition consult Influenza Vaccine Received Flu Vaccine for Current Season (usually Sept-March): Not Flu Season Oxygen needs? [] Room air Oxygen @  []1L    []2L    [x]3L   []4L    []5L   []6L via NC Chronic home O2 use? [] Yes [] No 
Perform O2 challenge test and document in progress note using [employer]) Last bowel movement Last Bowel Movement Date: 05/28/19 Urinary Catheter LDAs Peripheral IV 05/27/19 Left Arm (Active) Site Assessment Clean, dry, & intact 5/29/2019  3:17 AM  
Phlebitis Assessment 0 5/29/2019  3:17 AM  
Infiltration Assessment 0 5/29/2019  3:17 AM  
Dressing Status Clean, dry, & intact 5/29/2019  3:17 AM  
Dressing Type Transparent 5/29/2019  3:17 AM  
Hub Color/Line Status Pink;Flushed 5/29/2019  3:17 AM  
                  
  
Readmission Risk Assessment Tool Score High Risk 36 Total Score 3 Has Seen PCP in Last 6 Months (Yes=3, No=0)  
 4 IP Visits Last 12 Months (1-3=4, 4=9, >4=11) 5 Pt. Coverage (Medicare=5 , Medicaid, or Self-Pay=4) 28 Charlson Comorbidity Score (Age + Comorbid Conditions) Criteria that do not apply:  
 . Living with Significant Other. Assisted Living. LTAC. SNF. or  
Rehab Patient Length of Stay (>5 days = 3) Expected Length of Stay 4d 2h Actual Length of Stay 2

## 2019-05-29 NOTE — PROGRESS NOTES
MEDICAL CONSULTATION NOTE 
 
NAME:  Royal Rio Lombardo. :   1950 MRN:   [id number] Date/Time:  2019 7:30 AM 
Subjective: CHIEF COMPLAINT:  Dizziness and nausea HISTORY OF PRESENT ILLNESS:    
Dung Angeles is a 77 y.o.  white male who presents with increasing SOB and progressive CHF acute on chronic. He asked for me to see him this AM for evaluation of dizziness and nausea which he has awakened with. He does have DM and says it feels a little like low BS. The dizziness increases with head movement although he is dizzy lying at rest also. He denies CP or increased SOB and has had good diuresis over the last 24 hr. He has no current orthopnea which he had on admission and has no SOB on oxygen. He has no other cardiac or respiratoru c/o. He has no GI c/o except the nausea and a feeling that his stomach is empty. He has no  c/o. There are no neurologic c/o except the dizziness. He has no other c/o on complete ROS. Of note is that despite his gaining weight and progressive SOB for a few days PTA he had not taken his Zaroxolyn which he has to take in this situation Past Medical History:  
Diagnosis Date  Anemia 2017  Anxiety 2017  Arrhythmia   
 atrial fibrillation   ASCVD (arteriosclerotic cardiovascular disease) 2017  BPH (benign prostatic hyperplasia) 2017  CAD (coronary artery disease)   
 h/o stents  Cancer [facility])   
 h/o skin cancer  Cardiomyopathy ([address] 2017  CHF (congestive heart failure) ([address] 2017  Chronic kidney disease Stage IV  CKD (chronic kidney disease), stage IV ([address] 2017  Diabetes ([address]  Diabetes mellitus ([address] 2017  Diabetic neuropathy ([address] 2017  DJD (degenerative joint disease) 2017  ED (erectile dysfunction) 2017  Gout 2017  High cholesterol  Hyperlipidemia 2017  Hypertension  Hypertension with renal disease 2017  Hypothyroid 2017  Insomnia 2017  Obesity 2017  On statin therapy 2017  Restless leg 2017  Thyroid disease   
 hypothyroid Past Surgical History:  
Procedure Laterality Date  CARDIAC SURG PROCEDURE UNLIST    
 cardiac stents  CARDIAC SURG PROCEDURE UNLIST Ablation 2018 [facility]  COLONOSCOPY N/A 2016 COLONOSCOPY performed by Shania Enriquez MD at [facility] ENDOSCOPY  COLONOSCOPY N/A 2019 COLONOSCOPY performed by Joe Zamudio MD at [facility] ENDOSCOPY  COLONOSCOPY,DIAGNOSTIC  2019  HX APPENDECTOMY  HX BUNIONECTOMY    
 and removal of 2 seasmoid bone of the great toe 2018  HX HEENT Bilateral Cataract surgery  HX HEENT Tonsils  HX HEENT Axe wound to the head  HX KNEE ARTHROSCOPY X 2  
 HX ORTHOPAEDIC    
 HX ORTHOPAEDIC    
 partial laminectomy  HX PACEMAKER    
 HX ROTATOR CUFF REPAIR    
 bilateral  
 DC INSJ ELTRD CAR DACIA SYS ATTCH PM/CVDFB PLS GEN N/A 2019 Lv Lead Placement To Previous Generator performed by Shante Fonseca MD at [facility] CARDIAC CATH LAB  DC REMVL PERM PM PLS GEN W/REPL PLSE GEN MULT LEAD N/A 2019 Remove & Replace Ppm Gen Biv Multi Leads performed by Shante Fonseca MD at [address] [facility] CATH LAB Social History Tobacco Use  Smoking status: Former Smoker Packs/day: 0.50 Years: 4.00 Pack years: 2.00 Last attempt to quit: 3/6/1972 Years since quittin.3  Smokeless tobacco: Never Used Substance Use Topics  Alcohol use: No  
  Comment: rare, 1 drink per year Family History Problem Relation Age of Onset  Heart Disease Mother  Kidney Disease Mother  Heart Disease Father  Kidney Disease Father  Cancer Sister Breast  
  
 
Allergies Allergen Reactions  Niacin Unknown (comments) Other reaction(s): Unknown (comments)  Imipenem Diarrhea Other reaction(s): Rash  Levemir [Insulin Detemir] Hives  Other Medication Other (comments) ? Allergy to Terri Mandadrien causing chemical burn  Primaxin [Imipenem-Cilastatin] Diarrhea and Rash  Xarelto [Rivaroxaban] Rash and Itching Other reaction(s): Rash Prior to Admission medications Medication Sig Start Date End Date Taking? Authorizing Provider  
bumetanide (BUMEX) 2 mg tablet Take 4 mg by mouth daily. Patient takes 4 mg daily and 2 mg QHS   Yes Provider, Historical  
bumetanide (BUMEX) 2 mg tablet Take 2 mg by mouth nightly. Patient takes 4 mg daily and 2 mg QHS   Yes Provider, Historical  
metOLazone (ZAROXOLYN) 2.5 mg tablet Take 2.5 mg by mouth daily as needed (swelling). Yes Provider, Historical  
timolol (TIMOPTIC) 0.5 % ophthalmic solution Administer 1 Drop to right eye two (2) times a day. Yes Provider, Historical  
oxyCODONE-acetaminophen (PERCOCET) 5-325 mg per tablet Take 1 Tab by mouth every eight (8) hours as needed for Pain for up to 30 days. Max Daily Amount: 3 Tabs. 5/20/19 6/19/19 Yes Amanda Ramsey MD  
finasteride (PROSCAR) 5 mg tablet TAKE 1 TABLET BY MOUTH DAILY 4/26/19  Yes Ny Grijalva MD  
gabapentin (NEURONTIN) 300 mg capsule Take 1 Cap by mouth nightly. 4/22/19  Yes Amanda Ramsey MD  
potassium chloride (KLOR-CON) 20 mEq pack One packet three times daily 4/4/19  Yes Ny Grijalva MD  
ascorbic acid, vitamin C, (VITAMIN C) 250 mg tablet Take 1 Tab by mouth three (3) times daily. 1/10/19  Yes Ny Grijalva MD  
insulin glargine (LANTUS SOLOSTAR U-100 INSULIN) 100 unit/mL (3 mL) inpn 35 Units by SubCUTAneous route nightly. Yes Provider, Historical  
OTHER Apply  to affected area daily as needed (Knee Pain).  CBD Cream:  Apply daily as needed for knee pain   Yes Provider, Historical  
carvedilol (COREG) 12.5 mg tablet TAKE 1 TABLET BY MOUTH TWICE DAILY 12/17/18  Yes Ny Grijalva MD  
tamsulosin (FLOMAX) 0.4 mg capsule TAKE 2 CAPSULES BY MOUTH EVERY DAY 8/14/18  Yes Kathy Muhammad MD  
Ferrous Sulfate (SLOW FE) 47.5 mg iron TbER tablet Take 1 Tab by mouth three (3) times daily. 5/31/18  Yes Kathy Muhammad MD  
pramipexole (MIRAPEX) 0.5 mg tablet TAKE 1 TABLET BY MOUTH EVERY NIGHT AT BEDTIME 2/14/18  Yes Kathy Muhammad MD  
Liraglutide (VICTOZA) 0.6 mg/0.1 mL (18 mg/3 mL) sub-q pen 0.6 mg by SubCUTAneous route daily. Yes Provider, Historical  
naloxone (NARCAN) 4 mg/actuation nasal spray Use 1 spray intranasally, then discard. Repeat with new spray every 2 min as needed for opioid overdose symptoms, alternating nostrils. 4/22/19   Sheridan Lake MD Nick  
 
 
REVIEW OF SYSTEMS:   
 : 
Constitutional:  negative for fevers, chills, anorexia and weight loss Eyes:   negative for visual disturbance and irritation ENT:   negative for hearing loss, tinnitus, nasal congestion, epistaxis, sore throat. Positive for dizziness that increases with movement Neck:              negative for stiffness or swollen glands Respiratory:  negative for cough, hemoptysis, pleurisy/chest pain, wheezing or dyspnea on exertion Cards:   negative for chest pain, palpitations, orthopnea, PND, lower extremity edema GI:  Positive for nausea and empty feeling in stomach. negative for dysphagia, vomiting, diarrhea, constipation and abdominal pain Genitourinary: negative for frequency, dysuria and hematuria Integument:  negative for rash and pruritus Hematologic:  negative for easy bruising and bleeding Lymphatic:      negative for swollen lymph nodes or night sweats Musculoskel: negative for myalgias, arthralgias, back pain and muscle weakness Neurological:  negative for headaches, dizziness, vertigo, memory problems and gait problems Behavl/Psych:  negative for anxiety, depression and illegal drug usage Objective: VITALS:   
Visit Vitals /89 (BP 1 Location: Right arm, BP Patient Position: Supine; Head of bed elevated (Comment degrees)) Comment (BP Patient Position): 30 degrees Pulse 70 Temp 96.8 °F (36 °C) Resp 20 Ht 6' 4\" (1.93 m) Wt 240 lb 8 oz (109.1 kg) SpO2 100% BMI 29.27 kg/m² PHYSICAL EXAM:  
General:    Alert, cooperative, no distress, appears stated age. Head:   Normocephalic, without obvious abnormality, atraumatic. Slight increased dizziness with head movement Eyes:   Conjunctivae/corneas clear. PERRL Nose:  Nares normal. No drainage or sinus tenderness. Throat:    Lips, mucosa, and tongue normal.  No Thrush Neck:  Supple, symmetrical,  no adenopathy, thyroid: non tender 
  no carotid bruit and no JVD. Back:    Symmetric,  No CVA tenderness. Lungs:   Clear to auscultation bilaterally except dry bibasilar rales. No Wheezing or Rhonchi. No rales. Chest wall:  No tenderness or deformity. No Accessory muscle use. Heart:   Regular rate and rhythm,  no murmur, rub or gallop. Trace peripheral edema Abdomen:   Soft, non-tender. Not distended. Bowel sounds normal. No masses Extremities: Extremities normal, atraumatic, No cyanosis. Tr edema. No clubbing Skin:     Texture, turgor normal. No rashes or lesions. Not Jaundiced Lymph nodes: Cervical, supraclavicular normal. 
Psych:  Good insight. Not depressed. Not anxious or agitated. Neurologic: EOMs intact. No facial asymmetry. No aphasia or slurred speech. Normal   strength, Alert and oriented X 3. LAB DATA REVIEWED:   
Recent Results (from the past 24 hour(s)) GLUCOSE, POC Collection Time: 05/28/19 11:22 AM  
Result Value Ref Range Glucose (POC) 141 (H) 65 - 100 mg/dL Performed by Joce Smith) EKG, 12 LEAD, INITIAL Collection Time: 05/28/19  3:56 PM  
Result Value Ref Range Ventricular Rate 70 BPM  
 Atrial Rate 70 BPM  
 P-R Interval 166 ms  
 QRS Duration 68 ms Q-T Interval 384 ms QTC Calculation (Bezet) 414 ms Calculated P Axis 117 degrees Calculated R Axis 180 degrees Calculated T Axis 114 degrees Diagnosis ** Suspect arm lead reversal, interpretation assumes no reversal 
Normal sinus rhythm Right axis deviation Low voltage QRS Anterior infarct , possibly acute Inferolateral injury pattern ** ACUTE MI ** When compared with ECG of 27-MAY-2019 07:21, Sinus rhythm has replaced Electronic ventricular pacemaker GLUCOSE, POC Collection Time: 05/28/19  4:48 PM  
Result Value Ref Range Glucose (POC) 144 (H) 65 - 100 mg/dL Performed by Marlene Lieu) GLUCOSE, POC Collection Time: 05/28/19  7:55 PM  
Result Value Ref Range Glucose (POC) 213 (H) 65 - 100 mg/dL Performed by Bayron Blake BASIC Collection Time: 05/29/19  3:22 AM  
Result Value Ref Range Sodium 133 (L) 136 - 145 mmol/L Potassium 3.7 3.5 - 5.1 mmol/L Chloride 99 97 - 108 mmol/L  
 CO2 29 21 - 32 mmol/L Anion gap 5 5 - 15 mmol/L Glucose 85 65 - 100 mg/dL BUN 52 (H) 6 - 20 MG/DL Creatinine 2.03 (H) 0.70 - 1.30 MG/DL  
 BUN/Creatinine ratio 26 (H) 12 - 20 GFR est AA 40 (L) >60 ml/min/1.73m2 GFR est non-AA 33 (L) >60 ml/min/1.73m2 Calcium 8.4 (L) 8.5 - 10.1 MG/DL  
GLUCOSE, POC Collection Time: 05/29/19  7:30 AM  
Result Value Ref Range Glucose (POC) 79 65 - 100 mg/dL Performed by Marlene Lie) Assessment/Plan:  
  
Principal Problem: 
  Acute on chronic diastolic congestive heart failure, NYHA class 3 ([address] (5/27/2019) Active Problems: 
  Atrial fibrillation ([address] (3/7/2014) Overview: S/P ablation ASCVD (arteriosclerotic cardiovascular disease) (8/5/2017) Controlled type 2 diabetes mellitus with stage 4 chronic kidney disease, without long-term current use of insulin ([address] (8/5/2017) CKD (chronic kidney disease), stage IV ([address] (8/5/2017) Hypertension with renal disease (8/5/2017) Labyrinthitis of both ears (5/29/2019) 
 
  
___________________________________________________ PLAN:   
Risk of deterioration:  []Low    [x]Moderate  []High 1. Stat BS was 79 so doubt it is cause of dizziness 2. Add meclizine to cover labyrinthitis possibility 3. BP actually elevated so over diuresis is not the cause 4. Not BUN still in 50 range with baseline when dry  BUN, Follow renal function 5. Continue Diuresis (Takes Bumex 4mg AM and 2 mg PM at home) 6. PRN Zaroxolyn 7. Note ? Of ischemia and agree with Dr. Joey Tanner thoughts on that 8. Add Lovenox for DVT prevention 9. Follow BS with DM and treat with SSI, Continue Lantus 10. Add Protonix for nausea and SUP 10. Continue all other current orders, reviewed Prophylaxis:  [x]Lovenox  []Coumadin  []Hep SQ  []SCDs  []H2B/PPI Disposition:  [x]Home w/ Family   []HH PT,OT,RN   []SNF/LTC   []SAH/Rehab Discussed Code Status:    [x]Full Code      []DNR    
___________________________________________________ Care Plan discussed with: 
  [x]Patient   [x]Family     []Specialist : 
   
___________________________________________________ Consulting Physician: Cory Bishop MD

## 2019-05-29 NOTE — PROGRESS NOTES
Problem: General Infection Care Plan (Adult and Pediatric) Goal: Improvement in signs and symptoms of infection Outcome: Progressing Towards Goal 
Goal: *Optimize nutritional status Outcome: Progressing Towards Goal 
  
Problem: Falls - Risk of 
Goal: *Absence of Falls Description Document Vincent Marvin Fall Risk and appropriate interventions in the flowsheet. Outcome: Progressing Towards Goal 
  
Problem: Patient Education: Go to Patient Education Activity Goal: Patient/Family Education Outcome: Progressing Towards Goal 
  
Problem: Pressure Injury - Risk of 
Goal: *Prevention of pressure injury Description Document Aurelio Scale and appropriate interventions in the flowsheet. Outcome: Progressing Towards Goal 
  
Problem: Patient Education: Go to Patient Education Activity Goal: Patient/Family Education Outcome: Progressing Towards Goal

## 2019-05-29 NOTE — PROGRESS NOTES
0700: Bedside shift change report given to HARLEY Marshall RN (oncoming nurse) by Bernice Locke RN (offgoing nurse). Report included the following information SBAR and Kardex. 0730: HYPOGLYCEMIC EPISODE DOCUMENTATION Patient with hypoglycemic episode(s) at 0730 (time) on 5/29/19 (date). BG value(s) pre-treatment 79 Was patient symptomatic? [x] yes, [] no 
Patient was treated with the following rescue medications/treatments: [] D50 [] Glucose tablets 
              [] Glucagon 
              [x] 4oz juice 
              [] 6oz reg soda 
              [] 8oz low fat milk BG value post-treatment: 89 Once BG treated and value greater than 80mg/dl, pt was provided with the following: 
[x] snack 
[] meal 
Name of MD notified: Dr. Ju Mondragon The following orders were received: none, pt stable 
 
1900:  
Bedside shift change report given to Becca Spears RN (oncoming nurse). Report included the following information SBAR and Kardex. SHIFT SUMMARY: 
Hypoglycemia this morning, BG stable today, possible d/c in next couple of days CONCERNS TO ADDRESS WITH MD: 
D/c plans? [address] NURSING NOTE Admission Date 5/27/2019 Admission Diagnosis Acute on chronic diastolic congestive heart failure, NYHA class 3 ([facility] [I50.33] Consults IP CONSULT TO CARDIOLOGY 
IP CONSULT TO PALLIATIVE CARE - PROVIDER Cardiac Monitoring [] Yes [] No  
  
Purposeful Hourly Rounding [] Yes   
Thanh Score Total Score: 2 Thanh score 3 or > [] Bed Alarm [] Avasys [] 1:1 sitter [] Patient refused (Signed refusal form in chart) Aurelio Score Aurelio Score: 19 Aurelio score 14 or < [] PMT consult [] Wound Care consult  
 []  Specialty bed  [] Nutrition consult Influenza Vaccine Received Flu Vaccine for Current Season (usually Sept-March): Not Flu Season Oxygen needs? [] Room air Oxygen @  []1L    []2L    []3L   []4L    []5L   []6L via NC Chronic home O2 use?  [] Yes [] No 
 Perform O2 challenge test and document in progress note using smartphrase (.Homeoxygen) Last bowel movement Last Bowel Movement Date: 05/28/19 Urinary Catheter LDAs Peripheral IV 05/27/19 Left Arm (Active) Site Assessment Clean, dry, & intact 5/29/2019  3:00 PM  
Phlebitis Assessment 0 5/29/2019  3:00 PM  
Infiltration Assessment 0 5/29/2019  3:00 PM  
Dressing Status Clean, dry, & intact 5/29/2019  3:00 PM  
Dressing Type Transparent;Tape 5/29/2019  3:00 PM  
Hub Color/Line Status Pink 5/29/2019  3:00 PM  
                  
  
Readmission Risk Assessment Tool Score High Risk 36 Total Score 3 Has Seen PCP in Last 6 Months (Yes=3, No=0)  
 4 IP Visits Last 12 Months (1-3=4, 4=9, >4=11) 5 Pt. Coverage (Medicare=5 , Medicaid, or Self-Pay=4) 28 Charlson Comorbidity Score (Age + Comorbid Conditions) Criteria that do not apply:  
 . Living with Significant Other. Assisted Living. LTAC. SNF. or  
Rehab Patient Length of Stay (>5 days = 3) Expected Length of Stay 4d 2h Actual Length of Stay 2

## 2019-05-29 NOTE — CARDIO/PULMONARY
Cardiac Rehab Note: chart review Pt is a 78 yo admitted with acute diastolic heart failure with volume overload, PAF, HTN. 
 
CHF BUNDLE   
 
EF 33%  on 5/27/19 per echo Smoking history assessed. Patient is a former smoker. Smoking Cessation Program link has not been added to the AVS. Current inpatient diet: DIET DIABETIC CONSISTENT CARB \"Living with Heart Failure\" book with daily weight calendar and s/s of heart failure magnet provided to Carlos Martell on 5/29/19. Educated using teach back method. Instruction given on s/s of CHF, checking weight every am and calling MD if weight is up 2-3 lbs in a day or 5 lbs in a week (or as directed by the physician), fluid/Na restrictions, s/s of worsening CHF and when to call MD. Pt is aware of recent sodium indiscretion r/t Fablistic. He generally uses Mrs. Mitchell, olive oil, fresh vegetables and reads nutritional labels. He weighs himself every day and has a rescue plan if his weight increases per the direction of Dr. Lexi Strong. He adheres to his medications via weekly pillbox filling. Reviewed activity as tolerated with frequent rest periods as needed, taking medications as prescribed, and the importance of follow up visits with physician. Pt has multiple orthopaedic injuries and balance issues requiring DME and assistance with mobility. He does try to attend a local gym for exercise with his wife's assistance and use of a cane. Royal KRISTA Solitario. verbalized understanding with questions answered.   Gerardo Castro RN

## 2019-05-29 NOTE — PROGRESS NOTES
Pharmacy  Enoxaparin (Lovenox®) Monitoring Indication: VTE ppx Current Dose: Enoxaparin 30 mg subcutaneously every 24 hours Creatinine Clearance (mL/min): 42 mL/min Current Weight: 109.1 kg Labs: 
Recent Labs  
  05/29/19 
0322 05/28/19 
0419 05/27/19 
9255 CREA 2.03* 1.92* 1.99* HGB  --   --  10.6* PLT  --   --  72* Wt Readings from Last 1 Encounters:  
05/29/19 109.1 kg (240 lb 8 oz) Ht Readings from Last 1 Encounters:  
05/27/19 193 cm (76\") Impression/Plan:  
Will change to 40 mg daily for weight and renal function per dosing protocol. Thanks, Corbin Gómez, PHARMD 
 
  [email or web address]%20Dose%20Adjustment%20protocol. pdf

## 2019-05-29 NOTE — PROGRESS NOTES
1600: Pulse oximetry assessment 99 % at rest on room air (if 88% or less, skip next steps) 96 % while ambulating on room air 96 % at rest on 0 LPM 
96 % while ambulating on 0 LPM

## 2019-05-29 NOTE — DIABETES MGMT
[facility] Progress Note Recommendations/ Comments: If appropriate, please consider: 
1) decreasing Lantus to 32 units daily due to BG 79 mg/dl by POC today fasting. 2) adding Lispro correctional insulin -  high sensitivity scale ac and hs. Current hospital DM medication: Lantus 35 units. Chart reviewed on Royal KRISTA Robison. Gonzales Chun Patient is a 77 y.o. male with known Type 2 DM complicated by CKD 4 on Lantus 35 units and Victoza 0.6 mg daily at home. A1c:  
Lab Results Component Value Date/Time Hemoglobin A1c 6.9 (H) 02/15/2019 02:45 PM  
 Hemoglobin A1c 9.5 (H) 11/27/2018 02:55 PM  
 
 
Recent Glucose Results:  
Lab Results Component Value Date/Time GLU 85 05/29/2019 03:22 AM  
 GLUCPOC 190 (H) 05/29/2019 11:26 AM  
 GLUCPOC 89 05/29/2019 07:46 AM  
 GLUCPOC 79 05/29/2019 07:30 AM  
  
 
Lab Results Component Value Date/Time Creatinine 2.03 (H) 05/29/2019 03:22 AM  
 
Estimated Creatinine Clearance: 45.8 mL/min (A) (based on SCr of 2.03 mg/dL (H)). Active Orders Diet DIET DIABETIC CONSISTENT CARB Regular; 2 GM NA (House Low NA) PO intake: No data found. Will continue to follow as needed. Thank you Gennaro Chua RD, CDE [facility] Time spent: 5 minutes

## 2019-05-29 NOTE — CONSULTS
Palliative Medicine Consult James: [phone] (1985) Patient Name: Nicolle Bustamante. YOB: 1950 Date of Initial Consult: 5/28/19 Reason for Consult: Care decisions Requesting Provider: Aileen Freeman MD  
Primary Care Physician: Angel Barger MD 
 
 SUMMARY:  
Royal KRISTA Mendenhall is a 77 y.o. male with a past history of diastolic heart failure ,cardiomyopathy EF 30%,  paroxysmal atrila fibrillation and atrial flutter s/p ablation 5/2018 , HTN heart disease , Cad with multivessel stenting last stent 2010, DM with severe neuropathy , CKD stage IV , anemia,   who was admitted on 5/27/2019 from home with a diagnosis of acute diastolic heart failure with volume overload . Current medical issues leading to Palliative Medicine involvement include: care decisions /reassess code status \" Pt has DDNR on Chart , placed by  Out patient palliative care (April 2019) . 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 PALLIATIVE DIAGNOSES:  
1. weakness /sweating due to low blood sugar(resolved ). 2. Advance medical directives. 3. DNR discussion 4. Dyspnea on exertion 5. Chronic bilateral shoulder pain (torn rotator cuff ). 6. Constipation 7. Severe peripheral neuropathy 8. Diastolic heart failure . 9. Cardiomyopathy PLAN:  
1. Case discussed  with dr Cristine Liz , plan for out patient ischemic work up . 2. Visited patient for support . 3. He is very expressive gentleman, and good historian . 4. Weakness /sweating due to low blood sugars : [place] information below about sugar of 79 this am his morning sugars usually run in 120 ,  due to prolong fast no snacks  in between dinner and breakfast  
 - advised for late evening  Snacks to prevent future episodes . 5. Chronic bilateral shoulder pain : Currently better had episode of pain in left shoulder , resolved with change in position , he is amenable for non medicinal  approach , like  ice packs that helps ,  reluctant to use  opioids due to side effects unless in distress because of pain . 6. Dyspnea on exertion : he described episode of \" short winded \" after walking in hallway , we discuss  6 mins walking test to determine if he would require oxygen at home for activity , discuss with  . 7. Current Goals  of care : full restorative measures , revoked DDNR now full code . 8. He is again very appreciative  of support from palliative  care in patient and  out patient he likes \" Palliative care spent lot of time with patient \". 9. Peripheral neuropathy : reports , numbness of both legs below knees , and also hands : on gabapentin 10. Constipation : resolved , continue with bowel regimen . 11. Initial consult note routed to primary continuity provider and/or primary health care team members 12. Communicated plan of care with: Palliative IDT, [facility] Team and Dr Lew Centeno . GOALS OF CARE / TREATMENT PREFERENCES:  
 
GOALS OF CARE: 
Patient/Health Care Proxy Stated Goals: (treatment of active medical issues .) TREATMENT PREFERENCES:  
Code Status: Full Code Advance Care Planning: 
[x] [facility] Interdisciplinary Team has updated the ACP Navigator with Jed and Patient Capacity Primary Decision Maker: Donna Iverson - Spouse - [phone] Secondary Decision Maker: Hari De La Cruz - Son - [phone]  Advance Care Planning 5/28/2019 Patient's Healthcare Decision Maker is: Named in scanned ACP document Primary Decision Maker Name -  
Primary Decision Maker Phone Number -  
Primary Decision Maker Relationship to Patient -  
Confirm Advance Directive Yes, on file Does the patient have other document types - Medical intervention : full intervention . Other Instructions: Other: As far as possible, the palliative care team has discussed with patient / health care proxy about goals of care / treatment preferences for patient. HISTORY:  
 
History obtained from: chart , patient and spouse CHIEF COMPLAINT: had episodes of low sugar this morning feeling better now . HPI/SUBJECTIVE: The patient is:  
[] Verbal and participatory For past one week patient has gained weight , with lower extremity edema , followed by sob got worse to the point of orthopnea, patient attributed his symptoms to dietary non compliance in last one week , he was eating \" too good food with salt  \" brought by people  
 
 
currntly he feels sob is better with oxygen , he c/o pain in left 9/10, shoulder , relieved with percocet , he has  bilateral rotator cuff,  he has constipation x 4 days , had bowel movement yesterday , he is  very mindful of side effects of percocet ( somnolence and constipation and avoids it unless in severe pain ). Food does not taste good . 5/29/19: 
 
Patient had episode of low sugar 79 this morning , after not eating anything since dinner 4: 30 pm till breakfast this am ,. Woke up \" sick \" funny feeling in stomach, drank apple juice , had good breakfast , and is feeling much better after that . He self discontinue the oxygen , and not feeling any difficult breathing or orthopnea however  after walking in hallway he was short winded . He had flare up of pain in left shoulder last  night , changing position and ice packs helps , he is reluctant  to use pain medication /narcotics unless pain is severe . Constipation is resolving had bowel movement yesterday Clinical Pain Assessment (nonverbal scale for severity on nonverbal patients):  
Clinical Pain Assessment Severity: 2 Location: left shoulder Character: dull Duration: chronic due to torn rotaor cuff Effect: increases in laying position Factors: channging position , sitting , ice packs helps Frequency: constant Duration: for how long has pt been experiencing pain (e.g., 2 days, 1 month, years) Frequency: how often pain is an issue (e.g., several times per day, once every few days, constant) FUNCTIONAL ASSESSMENT:  
 
Palliative Performance Scale (PPS): PPS: 60 PSYCHOSOCIAL/SPIRITUAL SCREENING:  
 
Palliative IDT has assessed this patient for cultural preferences / practices and a referral made as appropriate to needs (Cultural Services, Patient Advocacy, Ethics, etc.) Any spiritual / Confucianist concerns: 
[] Yes /  [x] No 
 
Caregiver Burnout: 
[] Yes /  [x] No /  [] No Caregiver Present Anticipatory grief assessment:  
[x] Normal  / [] Maladaptive ESAS Anxiety: Anxiety: 2 ESAS Depression: Depression: 0 REVIEW OF SYSTEMS:  
 
Positive and pertinent negative findings in ROS are noted above in HPI. The following systems were [x] reviewed / [] unable to be reviewed as noted in HPI Other findings are noted below. Systems: constitutional, ears/nose/mouth/throat, respiratory, gastrointestinal, genitourinary, musculoskeletal, integumentary, neurologic, psychiatric, endocrine. Positive findings noted below. Modified ESAS Completed by: provider Fatigue: 4 Drowsiness: 0 Depression: 0 Pain: 2 Anxiety: 2 Nausea: 0 Anorexia: 3 Dyspnea: 2 Constipation: No  
     
 
 
 PHYSICAL EXAM:  
 
From RN flowsheet: 
Wt Readings from Last 3 Encounters:  
05/29/19 240 lb 8 oz (109.1 kg) 05/20/19 243 lb 12.8 oz (110.6 kg) 05/15/19 235 lb (106.6 kg) Blood pressure 142/87, pulse 70, temperature 98.2 °F (36.8 °C), resp. rate 18, height 6' 4\" (1.93 m), weight 240 lb 8 oz (109.1 kg), SpO2 100 %. Pain Scale 1: Numeric (0 - 10) Pain Intensity 1: 0 Pain Onset 1: Acute Pain Location 1: Shoulder Pain Orientation 1: Right Pain Description 1: Shooting Pain Intervention(s) 1: Medication (see MAR) Last bowel movement, if known:  
 
Constitutional: age appropriate , alert , oriented , talkative , not wearing oxygen . Eyes: pupils equal, anicteric ENMT: no nasal discharge, moist mucous membranes Cardiovascular: regular rhythm, + edema Respiratory: breathing not labored, symmetric, no Rhales or Rhonchi . Gastrointestinal: soft non-tender, +bowel sounds Musculoskeletal: no deformity, no tenderness to palpation Skin: warm, dry Neurologic: following commands, moving all extremities Psychiatric: full , upbeat . Other: 
 
 
 HISTORY:  
 
Principal Problem: 
  Acute on chronic diastolic congestive heart failure, NYHA class 3 ([address] (5/27/2019) Active Problems: 
  Atrial fibrillation ([address] (3/7/2014) Overview: S/P ablation ASCVD (arteriosclerotic cardiovascular disease) (8/5/2017) Controlled type 2 diabetes mellitus with stage 4 chronic kidney disease, without long-term current use of insulin ([address] (8/5/2017) CKD (chronic kidney disease), stage IV ([address] (8/5/2017) Hypertension with renal disease (8/5/2017) Labyrinthitis of both ears (5/29/2019) Past Medical History:  
Diagnosis Date  Anemia 8/5/2017  Anxiety 8/5/2017  Arrhythmia   
 atrial fibrillation 2014  ASCVD (arteriosclerotic cardiovascular disease) 8/5/2017  BPH (benign prostatic hyperplasia) 8/5/2017  CAD (coronary artery disease)   
 h/o stents  Cancer [facility])   
 h/o skin cancer  Cardiomyopathy ([address] 8/5/2017  CHF (congestive heart failure) ([address] 8/5/2017  Chronic kidney disease Stage IV  CKD (chronic kidney disease), stage IV ([address] 8/5/2017  Diabetes ([address]  Diabetes mellitus ([address] 8/5/2017  Diabetic neuropathy ([address] 8/5/2017  DJD (degenerative joint disease) 8/5/2017  ED (erectile dysfunction) 8/5/2017  Gout 8/5/2017  High cholesterol  Hyperlipidemia 8/5/2017  Hypertension  Hypertension with renal disease 2017  Hypothyroid 2017  Insomnia 2017  Obesity 2017  On statin therapy 2017  Restless leg 2017  Thyroid disease   
 hypothyroid Past Surgical History:  
Procedure Laterality Date  CARDIAC SURG PROCEDURE UNLIST    
 cardiac stents  CARDIAC SURG PROCEDURE UNLIST Ablation 2018 [id number] [place] Alcira Parrish  COLONOSCOPY N/A 2016 COLONOSCOPY performed by Yuli Ramirez MD at [facility] ENDOSCOPY  COLONOSCOPY N/A 2019 COLONOSCOPY performed by Radha Vasquez MD at [facility] ENDOSCOPY  COLONOSCOPY,DIAGNOSTIC  2019  HX APPENDECTOMY  HX BUNIONECTOMY    
 and removal of 2 seasmoid bone of the great toe 2018  HX HEENT Bilateral Cataract surgery  HX HEENT Tonsils  HX HEENT Axe wound to the head  HX KNEE ARTHROSCOPY X 2  
 HX ORTHOPAEDIC    
 HX ORTHOPAEDIC    
 partial laminectomy  HX PACEMAKER    
 HX ROTATOR CUFF REPAIR    
 bilateral  
 [place] INSJ ELTRD CAR DACIA SYS ATTCH PM/CVDFB PLS GEN N/A 2019 Lv Lead Placement To Previous Generator performed by Alfonso Lujan MD at [facility]ATH [facility]  [place] REMVL PERM PM PLS GEN W/REPL PLSE GEN MULT LEAD N/A 2019 Remove & Replace Ppm Gen Biv Multi Leads performed by Alfonso Lujan MD at [address] [facility] CATH LAB Family History Problem Relation Age of Onset  Heart Disease Mother  Kidney Disease Mother  Heart Disease Father  Kidney Disease Father  Cancer Sister Breast  
  
History reviewed, no pertinent family history. Social History Tobacco Use  Smoking status: Former Smoker Packs/day: 0.50 Years: 4.00 Pack years: 2.00 Last attempt to quit: 3/6/1972 Years since quittin.3  Smokeless tobacco: Never Used Substance Use Topics  Alcohol use: No  
  Comment: rare, 1 drink per year Allergies Allergen Reactions  Niacin Unknown (comments) Other reaction(s): Unknown (comments)  Imipenem Diarrhea Other reaction(s): Rash  Levemir [Insulin Detemir] Hives  Other Medication Other (comments) ? Allergy to Jeermiah Daryn causing chemical burn  Primaxin [Imipenem-Cilastatin] Diarrhea and Rash  Xarelto [Rivaroxaban] Rash and Itching Other reaction(s): Rash Current Facility-Administered Medications Medication Dose Route Frequency  enoxaparin (LOVENOX) injection 40 mg  40 mg SubCUTAneous Q24H  
 meclizine (ANTIVERT) tablet 12.5 mg  12.5 mg Oral TID  pantoprazole (PROTONIX) tablet 40 mg  40 mg Oral ACB  bumetanide (BUMEX) injection 2 mg  2 mg IntraVENous BID  nitroglycerin (NITROSTAT) tablet 0.4 mg  0.4 mg SubLINGual Q5MIN PRN  
 . PHARMACY TO SUBSTITUTE PER PROTOCOL (Reordered from: Liraglutide (VICTOZA) 0.6 mg/0.1 mL (18 mg/3 mL) sub-q pen)    Per Protocol  colchicine tablet 0.6 mg  0.6 mg Oral PRN  pramipexole (MIRAPEX) tablet 0.5 mg  0.5 mg Oral QHS  ferrous sulfate tablet 325 mg  325 mg Oral DAILY  tamsulosin (FLOMAX) capsule 0.4 mg  0.4 mg Oral QHS  carvedilol (COREG) tablet 12.5 mg  12.5 mg Oral BID WITH MEALS  insulin glargine (LANTUS) injection 35 Units  35 Units SubCUTAneous QHS  febuxostat (ULORIC) tablet 40 mg  40 mg Oral DAILY  metOLazone (ZAROXOLYN) tablet 2.5 mg  2.5 mg Oral EVERY OTHER DAY  potassium chloride (KLOR-CON) packet for solution 20 mEq  20 mEq Oral DAILY  gabapentin (NEURONTIN) capsule 300 mg  300 mg Oral QHS  finasteride (PROSCAR) tablet 5 mg  5 mg Oral DAILY  oxyCODONE-acetaminophen (PERCOCET) 5-325 mg per tablet 1 Tab  1 Tab Oral Q8H PRN  
 sodium chloride (NS) flush 5-40 mL  5-40 mL IntraVENous Q8H  
 sodium chloride (NS) flush 5-40 mL  5-40 mL IntraVENous PRN  
 docusate sodium (COLACE) capsule 100 mg  100 mg Oral BID  polyethylene glycol (MIRALAX) packet 17 g  17 g Oral DAILY PRN  
  senna (SENOKOT) tablet 8.6 mg  1 Tab Oral QHS  
 
 
 
 LAB AND IMAGING FINDINGS:  
 
Lab Results Component Value Date/Time WBC 6.0 05/27/2019 07:43 AM  
 HGB 10.6 (L) 05/27/2019 07:43 AM  
 PLATELET 72 (L) 65/40/8442 07:43 AM  
 
Lab Results Component Value Date/Time Sodium 133 (L) 05/29/2019 03:22 AM  
 Potassium 3.7 05/29/2019 03:22 AM  
 Chloride 99 05/29/2019 03:22 AM  
 CO2 29 05/29/2019 03:22 AM  
 BUN 52 (H) 05/29/2019 03:22 AM  
 Creatinine 2.03 (H) 05/29/2019 03:22 AM  
 Calcium 8.4 (L) 05/29/2019 03:22 AM  
 Magnesium 2.0 01/28/2019 08:52 AM  
 Phosphorus 3.4 05/22/2019 01:15 PM  
  
Lab Results Component Value Date/Time AST (SGOT) 14 (L) 05/27/2019 07:43 AM  
 Alk. phosphatase 130 (H) 05/27/2019 07:43 AM  
 Protein, total 7.5 05/27/2019 07:43 AM  
 Albumin 3.3 (L) 05/27/2019 07:43 AM  
 Globulin 4.2 (H) 05/27/2019 07:43 AM  
 
Lab Results Component Value Date/Time INR 1.3 (H) 06/01/2018 12:21 PM  
 Prothrombin time 12.6 (H) 06/01/2018 12:21 PM  
 aPTT 26.6 06/10/2014 11:20 AM  
  
Lab Results Component Value Date/Time Iron 48 05/22/2019 01:15 PM  
 TIBC 283 05/22/2019 01:15 PM  
 Iron % saturation 17 (L) 05/22/2019 01:15 PM  
 Ferritin 121 05/22/2019 01:15 PM  
  
No results found for: PH, PCO2, PO2 No components found for: [place] No results found for: CPK, CKMB Total time:  
Counseling / coordination time, spent as noted above:  
> 50% counseling / coordination?:  
 
Prolonged service was provided for  []30 min   []75 min in face to face time in the presence of the patient, spent as noted above. Time Start:  
Time End:  
Note: this can only be billed with 65994 (initial) or 76854 (follow up). If multiple start / stop times, list each separately.

## 2019-05-30 ENCOUNTER — APPOINTMENT (OUTPATIENT)
Dept: NUCLEAR MEDICINE | Age: 69
DRG: 291 | End: 2019-05-30
Attending: INTERNAL MEDICINE
Payer: MEDICARE

## 2019-05-30 LAB
ANION GAP SERPL CALC-SCNC: 6 MMOL/L (ref 5–15)
ATRIAL RATE: 40 BPM
BUN SERPL-MCNC: 55 MG/DL (ref 6–20)
BUN/CREAT SERPL: 26 (ref 12–20)
CALCIUM SERPL-MCNC: 7.9 MG/DL (ref 8.5–10.1)
CALCULATED R AXIS, ECG10: -62 DEGREES
CALCULATED T AXIS, ECG11: 108 DEGREES
CHLORIDE SERPL-SCNC: 96 MMOL/L (ref 97–108)
CK MB CFR SERPL CALC: 4 % (ref 0–2.5)
CK MB SERPL-MCNC: 1.9 NG/ML (ref 5–25)
CK SERPL-CCNC: 48 U/L (ref 39–308)
CO2 SERPL-SCNC: 28 MMOL/L (ref 21–32)
CREAT SERPL-MCNC: 2.08 MG/DL (ref 0.7–1.3)
DIAGNOSIS, 93000: NORMAL
GLUCOSE BLD STRIP.AUTO-MCNC: 142 MG/DL (ref 65–100)
GLUCOSE BLD STRIP.AUTO-MCNC: 189 MG/DL (ref 65–100)
GLUCOSE BLD STRIP.AUTO-MCNC: 207 MG/DL (ref 65–100)
GLUCOSE BLD STRIP.AUTO-MCNC: 79 MG/DL (ref 65–100)
GLUCOSE BLD STRIP.AUTO-MCNC: 81 MG/DL (ref 65–100)
GLUCOSE BLD STRIP.AUTO-MCNC: 99 MG/DL (ref 65–100)
GLUCOSE SERPL-MCNC: 74 MG/DL (ref 65–100)
POTASSIUM SERPL-SCNC: 3.3 MMOL/L (ref 3.5–5.1)
Q-T INTERVAL, ECG07: 512 MS
QRS DURATION, ECG06: 180 MS
QTC CALCULATION (BEZET), ECG08: 556 MS
SERVICE CMNT-IMP: ABNORMAL
SERVICE CMNT-IMP: NORMAL
SODIUM SERPL-SCNC: 130 MMOL/L (ref 136–145)
TROPONIN I SERPL-MCNC: <0.05 NG/ML
VENTRICULAR RATE, ECG03: 71 BPM

## 2019-05-30 PROCEDURE — 77010033678 HC OXYGEN DAILY

## 2019-05-30 PROCEDURE — 74011000250 HC RX REV CODE- 250: Performed by: INTERNAL MEDICINE

## 2019-05-30 PROCEDURE — 74011636637 HC RX REV CODE- 636/637: Performed by: INTERNAL MEDICINE

## 2019-05-30 PROCEDURE — 74011250636 HC RX REV CODE- 250/636: Performed by: INTERNAL MEDICINE

## 2019-05-30 PROCEDURE — 65660000000 HC RM CCU STEPDOWN

## 2019-05-30 PROCEDURE — 74011250637 HC RX REV CODE- 250/637: Performed by: INTERNAL MEDICINE

## 2019-05-30 PROCEDURE — 82550 ASSAY OF CK (CPK): CPT

## 2019-05-30 PROCEDURE — 93005 ELECTROCARDIOGRAM TRACING: CPT

## 2019-05-30 PROCEDURE — 82962 GLUCOSE BLOOD TEST: CPT

## 2019-05-30 PROCEDURE — 80048 BASIC METABOLIC PNL TOTAL CA: CPT

## 2019-05-30 PROCEDURE — 36415 COLL VENOUS BLD VENIPUNCTURE: CPT

## 2019-05-30 PROCEDURE — 84484 ASSAY OF TROPONIN QUANT: CPT

## 2019-05-30 PROCEDURE — 94760 N-INVAS EAR/PLS OXIMETRY 1: CPT

## 2019-05-30 PROCEDURE — A9540 TC99M MAA: HCPCS

## 2019-05-30 RX ORDER — INSULIN GLARGINE 100 [IU]/ML
20 INJECTION, SOLUTION SUBCUTANEOUS
Status: DISCONTINUED | OUTPATIENT
Start: 2019-05-30 | End: 2019-06-05 | Stop reason: HOSPADM

## 2019-05-30 RX ORDER — POTASSIUM CHLORIDE 750 MG/1
TABLET, FILM COATED, EXTENDED RELEASE ORAL
Status: DISPENSED
Start: 2019-05-30 | End: 2019-05-30

## 2019-05-30 RX ORDER — ISOSORBIDE MONONITRATE 30 MG/1
30 TABLET, EXTENDED RELEASE ORAL DAILY
Status: DISCONTINUED | OUTPATIENT
Start: 2019-05-31 | End: 2019-06-05 | Stop reason: HOSPADM

## 2019-05-30 RX ORDER — METOLAZONE 2.5 MG/1
2.5 TABLET ORAL ONCE
Status: COMPLETED | OUTPATIENT
Start: 2019-05-30 | End: 2019-05-30

## 2019-05-30 RX ORDER — POTASSIUM CHLORIDE 1.5 G/1.77G
20 POWDER, FOR SOLUTION ORAL 2 TIMES DAILY WITH MEALS
Status: DISCONTINUED | OUTPATIENT
Start: 2019-05-30 | End: 2019-05-31

## 2019-05-30 RX ORDER — NITROGLYCERIN 0.4 MG/1
0.4 TABLET SUBLINGUAL AS NEEDED
Status: DISCONTINUED | OUTPATIENT
Start: 2019-05-30 | End: 2019-06-05 | Stop reason: HOSPADM

## 2019-05-30 RX ORDER — POTASSIUM CHLORIDE 750 MG/1
40 TABLET, FILM COATED, EXTENDED RELEASE ORAL
Status: COMPLETED | OUTPATIENT
Start: 2019-05-30 | End: 2019-05-30

## 2019-05-30 RX ADMIN — DOCUSATE SODIUM 100 MG: 100 CAPSULE, LIQUID FILLED ORAL at 17:43

## 2019-05-30 RX ADMIN — SENNOSIDES 8.6 MG: 8.6 TABLET, FILM COATED ORAL at 21:35

## 2019-05-30 RX ADMIN — TAMSULOSIN HYDROCHLORIDE 0.4 MG: 0.4 CAPSULE ORAL at 21:35

## 2019-05-30 RX ADMIN — PRAMIPEXOLE DIHYDROCHLORIDE 0.5 MG: 0.25 TABLET ORAL at 21:35

## 2019-05-30 RX ADMIN — MECLIZINE 12.5 MG: 12.5 TABLET ORAL at 08:35

## 2019-05-30 RX ADMIN — INSULIN GLARGINE 20 UNITS: 100 INJECTION, SOLUTION SUBCUTANEOUS at 21:35

## 2019-05-30 RX ADMIN — DOCUSATE SODIUM 100 MG: 100 CAPSULE, LIQUID FILLED ORAL at 08:35

## 2019-05-30 RX ADMIN — POTASSIUM CHLORIDE 20 MEQ: 1.5 POWDER, FOR SOLUTION ORAL at 08:36

## 2019-05-30 RX ADMIN — FEBUXOSTAT 40 MG: 40 TABLET ORAL at 08:35

## 2019-05-30 RX ADMIN — PANTOPRAZOLE SODIUM 40 MG: 40 TABLET, DELAYED RELEASE ORAL at 08:34

## 2019-05-30 RX ADMIN — CARVEDILOL 12.5 MG: 12.5 TABLET, FILM COATED ORAL at 17:43

## 2019-05-30 RX ADMIN — BUMETANIDE 2 MG: 0.25 INJECTION INTRAMUSCULAR; INTRAVENOUS at 08:38

## 2019-05-30 RX ADMIN — METOLAZONE 2.5 MG: 2.5 TABLET ORAL at 08:36

## 2019-05-30 RX ADMIN — CARVEDILOL 12.5 MG: 12.5 TABLET, FILM COATED ORAL at 08:35

## 2019-05-30 RX ADMIN — POTASSIUM CHLORIDE 40 MEQ: 750 TABLET, EXTENDED RELEASE ORAL at 08:35

## 2019-05-30 RX ADMIN — POTASSIUM CHLORIDE 20 MEQ: 1.5 POWDER, FOR SOLUTION ORAL at 17:43

## 2019-05-30 RX ADMIN — Medication 10 ML: at 21:35

## 2019-05-30 RX ADMIN — FERROUS SULFATE TAB 325 MG (65 MG ELEMENTAL FE) 325 MG: 325 (65 FE) TAB at 08:34

## 2019-05-30 RX ADMIN — MECLIZINE 12.5 MG: 12.5 TABLET ORAL at 17:43

## 2019-05-30 RX ADMIN — ENOXAPARIN SODIUM 40 MG: 40 INJECTION SUBCUTANEOUS at 08:39

## 2019-05-30 RX ADMIN — MECLIZINE 12.5 MG: 12.5 TABLET ORAL at 21:35

## 2019-05-30 RX ADMIN — FINASTERIDE 5 MG: 5 TABLET, FILM COATED ORAL at 08:36

## 2019-05-30 RX ADMIN — Medication 10 ML: at 06:25

## 2019-05-30 RX ADMIN — GABAPENTIN 300 MG: 300 CAPSULE ORAL at 21:35

## 2019-05-30 RX ADMIN — BUMETANIDE 2 MG: 0.25 INJECTION INTRAMUSCULAR; INTRAVENOUS at 17:43

## 2019-05-30 RX ADMIN — NITROGLYCERIN 0.4 MG: 0.4 TABLET, ORALLY DISINTEGRATING SUBLINGUAL at 05:14

## 2019-05-30 RX ADMIN — Medication 10 ML: at 17:46

## 2019-05-30 RX ADMIN — NITROGLYCERIN 0.4 MG: 0.4 TABLET, ORALLY DISINTEGRATING SUBLINGUAL at 05:20

## 2019-05-30 NOTE — PROGRESS NOTES
RAPID RESPONSE TEAM 
 
Responded to rapid response chest pain in room 2209. Patient is alert, in no distress, c/o chest tightness in his left chest.  Patient denies any radiating pain, no nausea, no SOB. /81, HR 70 paced, O2 sats 100%, RR 17. Patient admitted for chest pain, patient states this is similar pain when he presented to the hospital.  EKG obtained. Cardiac enzymes ordered. SL nitro given x2, with relief. Dr. Gilles Lu at bedside, orders received to notify cardiology for further orders. Patient to remain in room. Please call with any needs or concerns. Chuck Tavera Rapid Response [facility] [employer]

## 2019-05-30 NOTE — PROGRESS NOTES
CARDIOLOGY Progress Note    Patient ID:  Patient: Laxmi Stone MRN: [id number]  Age: 77 y.o.  : 1950    Date of  Admission: 2019  7:16 AM   PCP:  Teresa Paige MD   Usual cardiologist:  Jorge Luis Ward MD     Assessment: 1. Acute systolic heart failure with volume overload, elevated proBNP, sx.     Last 3 Recorded Weights in this Encounter    19 0448 19 0317 19 0350   Weight: 108.9 kg (240 lb 1.3 oz) 109.1 kg (240 lb 8 oz) 110.4 kg (243 lb 4.8 oz)     2. Paroxysmal atrial fibrillation and atypical atrial flutter s/p ablation in 2018. NO atrial fibrillation or flutter since the ablation. 3. Hypertensive heart disease with a history of congestive heart failure and CKD stage 4. EF 55% on prior echo, then 35-40% early . EF 35% by echo in ER. 4. Coronary artery disease with multivessel interventions.  His last stenting was in . Cath in  showed a nondominant RCA with LPDA occluded and filled by collaterals, otherwise up to moderate CAD. 5. Mild idiopathic pericardial effusion in the past.  6. Diabetes mellitus type 2, on chronic insulin. 7. Hyperlipidemia. 8. BRBPR in 2019 with grade 1 internal hemorrhoids noted on colo here. Presumed source of GI bleed. 9. History of esophageal dilation for dysphagia. 10. Hiatal hernia. 11. Anemia of chronic renal disease. On EPO (and Fe supplement). 12. Moderate thrombocytopenia. 13. Chronic anticoagulation stopped 2019. 14. Mild hyponatremia, multifactorial (volume overload, Na loss during diuresis, etc).    Plan:      1. Continue IV Bumex, 2g Na diet. Has metolazone 2.5 mg po every other day. 2. Continue beta-blocker. 3. Not a candidate for ACEI or ARB or spironolactone. 4. BIV pacer program change on :  Changed LV>RV to 50 msec from 20 msec. Tried to enable MPP using poles 3 and 4 but vectors using these did not capture at max output.   5. Stopped Eliquis given severe anemia requiring transfusion in 2019. 6. Not on antiplatelet therapy. 7. Low suspicion for pulmonary embolism. Will consider pulmonary consult to evaluate for an extracardiac cause of dyspnea beyond CHF if treating CHF doesn't work and ischemia evaluation is nondiagnostic. 8. Overnight pulse oximetry or sleep study? Regarding the CP, at risk for angina, but may not be, let's medically manage for now, adding oral nitrate. Focus on volume status, still may need an ischemia evaluation as an OP using cardiac PET, a better resolution study than the typical nuclear testing. If his PET is positive, then I'll get Dr. Nery Avelar involved to discuss risk:benefit of cath. Exposure to contrast dye with cath +/-PCI would put him at risk for acute renal failure--he says he will NOT due dialysis, would rather die. Dr. Vesta Monroy' help appreciated.       [x]       High complexity decision making was performed in this patient with multiple organ involvement.     Royal KRISTA Granger. is a 77 y.o. male with a history of atrial arrhythmias s/p ablation 5/2018, has done well from an arrhythmia standpoint. He has had chronic issues with fluid retention. I saw him in the office about a week ago PTA, he reported some ANDERS. He initially denied dietary indiscretion (salt, fluid, volume) but had noticed that he had gained weight and lower extremity edema over several days. Finally admits that he was eating some food that probably was higher in salt. Over the last day PTA, he became more dyspneic and got to the point when he started to get some rest symptoms. +orthopnea, PND. Last night PTA was difficult. He came to the ER for evaluation. Got IV diuretic in the ER. An echo and CXR was done in the ER.    + chest pain earlier, resolved. No syncope, near-syncope, TIA, or stroke symptoms. Denies resting dyspnea, but does have severe exertional dyspnea and some orthopnea. Urinating with diuretic.     CATH from Dr. Nery Avelar 2014:  \"Left main trunk: Normal.     Left anterior descending artery: This is a large artery. It is moderately  calcified. There is diffuse atherosclerosis, areas of up to 30% narrowing  with no significant obstructive disease. The diagonal branch is small to  moderate sizes and is free of disease. The LAD wraps around the apex of the  heart.     The circumflex artery is dominant. This is a large artery with diffuse  atherosclerotic changes and diffuse calcification. There is no significant  obstructive disease in the AV circumflex artery. A high lateral branch  shows 50% stenosis in its proximal segment. A marginal branch has about 50%  narrowing in its proximal segment. The  posterior ventricular branch has  diffuse moderate atherosclerosis. The PDA comes off the circ distally. It  appeared to be occluded in its mid segment and the distal segment fills by  collaterals from the other circumflex branches.     The right coronary artery is nondominant. There is 70% stenosis in the  proximal right coronary artery, in this nondominant vessel, and then more  distally it is diffusely and severely diseased.     Left ventricle: Ventricle contractility is very dynamic. All segments seem  to contract well. Ejection fraction estimated to be 65%.     CONCLUSIONS  1. Diffuse moderate coronary atherosclerosis as described. 2. Normal left ventricular systolic function.     Based on the angiographic findings, there is no indication for intervention  and I think this is best treated medically at this time. \"           Allergies   Allergen Reactions    Niacin Unknown (comments)     Other reaction(s): Unknown (comments)    Imipenem Diarrhea     Other reaction(s): Rash    Levemir [Insulin Detemir] Hives    Other Medication Other (comments)     ?  Allergy to Duraprep causing chemical burn    Primaxin [Imipenem-Cilastatin] Diarrhea and Rash    Xarelto [Rivaroxaban] Rash and Itching     Other reaction(s): Rash          Current Facility-Administered Medications   Medication Dose Route Frequency    nitroglycerin (NITROSTAT) tablet 0.4 mg  0.4 mg SubLINGual PRN    potassium chloride (KLOR-CON) packet for solution 20 mEq  20 mEq Oral BID WITH MEALS    insulin glargine (LANTUS) injection 20 Units  20 Units SubCUTAneous QHS    potassium chloride SR (KLOR-CON 10) 10 mEq tablet        enoxaparin (LOVENOX) injection 40 mg  40 mg SubCUTAneous Q24H    meclizine (ANTIVERT) tablet 12.5 mg  12.5 mg Oral TID    pantoprazole (PROTONIX) tablet 40 mg  40 mg Oral ACB    bumetanide (BUMEX) injection 2 mg  2 mg IntraVENous BID    . PHARMACY TO SUBSTITUTE PER PROTOCOL (Reordered from: Liraglutide (VICTOZA) 0.6 mg/0.1 mL (18 mg/3 mL) sub-q pen)    Per Protocol    colchicine tablet 0.6 mg  0.6 mg Oral PRN    pramipexole (MIRAPEX) tablet 0.5 mg  0.5 mg Oral QHS    ferrous sulfate tablet 325 mg  325 mg Oral DAILY    tamsulosin (FLOMAX) capsule 0.4 mg  0.4 mg Oral QHS    carvedilol (COREG) tablet 12.5 mg  12.5 mg Oral BID WITH MEALS    febuxostat (ULORIC) tablet 40 mg  40 mg Oral DAILY    metOLazone (ZAROXOLYN) tablet 2.5 mg  2.5 mg Oral EVERY OTHER DAY    gabapentin (NEURONTIN) capsule 300 mg  300 mg Oral QHS    finasteride (PROSCAR) tablet 5 mg  5 mg Oral DAILY    oxyCODONE-acetaminophen (PERCOCET) 5-325 mg per tablet 1 Tab  1 Tab Oral Q8H PRN    sodium chloride (NS) flush 5-40 mL  5-40 mL IntraVENous Q8H    sodium chloride (NS) flush 5-40 mL  5-40 mL IntraVENous PRN    docusate sodium (COLACE) capsule 100 mg  100 mg Oral BID    polyethylene glycol (MIRALAX) packet 17 g  17 g Oral DAILY PRN    senna (SENOKOT) tablet 8.6 mg  1 Tab Oral QHS       Review of Symptoms:    Cardiology: negative for palpitations, orthopnea, PND, syncope   Gastrointestinal: negative for abdominal pain, N/V, dysphagia, change in bowel habits, bleeding   Genitourinary: negative for frequency, urgency, dysuria, hematuria, incontinence        Objective:      Physical Exam:  Temp (24hrs), Av.3 °F (36.8 °C), Min:97.5 °F (36.4 °C), Max:100.5 °F (38.1 °C)    Patient Vitals for the past 8 hrs:   Pulse   19 1544 70   19 1017 70    Patient Vitals for the past 8 hrs:   Resp   19 1544 18   19 1017 18    Patient Vitals for the past 8 hrs:   BP   19 1544 145/86   19 1017 130/82          Intake/Output Summary (Last 24 hours) at 2019 1759  Last data filed at 2019 1752  Gross per 24 hour   Intake 1620 ml   Output 3525 ml   Net -1905 ml       Nondiaphoretic, not in acute distress. No scleral icterus, mucous membranes moist, conjuctivae pink, no xanthelasma. L BIV pacer site OK. Unlabored, clear to auscultation bilaterally, symmetric air movement. Regular rate and rhythm, no new murmur, pericardial rub, knock, or gallop. No JVD and trace peripheral edema. Palpable radial pulses bilaterally. Abdomen obese, soft, nontender, nondistended. No abdominal pulstatile masses. Extremities without cyanosis or clubbing. Muscle tone and bulk normal.  Skin warm and dry. No rashes or ulcers. Neuro grossly nonfocal.  No tremor. Awake and appropriate. CARDIOGRAPHICS and STUDIES, I reviewed:    Telemetry:  SR with BIV pacing. ECG:  AV pacing (with BIV pacing) at 70 bpm.    Echo here:  Left Ventricle The estimated ejection fraction is 31 - 35%. The calculated ejection fraction is 33%. Left Atrium Left atrium not well visualized. Moderately dilated and elongated left atrium. Left Atrium volume index is 77.31 mL/m2. Right Ventricle Dilated right ventricle. Reduced systolic function. Right Atrium Dilated right atrium. Aortic Valve Normal valve structure and trileaflet valve structure. Aortic valve sclerosis. Mild to moderate aortic valve regurgitation. Mitral Valve Mild regurgitation. Tricuspid Valve Mild to moderate tricuspid valve regurgitation. Pulmonary arterial systolic pressure is 59.6 mmHg.    Pulmonic Valve Moderate regurgitation. Aorta Normal aortic root. Dilated sinuses of Valsalva; diameter is 4 cm. Dilated aortic root; diameter is 3.3 cm. Dilated ascending aorta; diameter is 3.7 cm. Pulmonary Artery Pulmonary arterial systolic pressure (PASP) is 49 mmHg. IVC/Hepatic Veins Normal central venous pressure (0-5 mmHg); IVC diameter is less than 21 mm and collapses more than 50% with respiration. Mildly dilated inferior vena cava. Pericardium Insignificant pericardial effusion or fat. CXR:   Maybe mild interstitial changes, no pleural effusion or infiltrate. Labs:  Recent Labs     05/30/19  0533   CPK 48   CKMB 1.9   CKNDX 4.0*   TROIQ <0.05     Lab Results   Component Value Date/Time    Cholesterol, total 112 11/27/2018 02:55 PM    HDL Cholesterol 27 (L) 11/27/2018 02:55 PM    LDL, calculated 68 11/27/2018 02:55 PM    Triglyceride 85 11/27/2018 02:55 PM     No results for input(s): INR, PTP, APTT in the last 72 hours. No lab exists for component: Nicole Jensenandrewvladimir   Recent Labs     05/30/19  0336 05/29/19  0322 05/28/19  0419   * 133* 134*   K 3.3* 3.7 3.3*   CL 96* 99 102   CO2 28 29 25   BUN 55* 52* 50*   CREA 2.08* 2.03* 1.92*   GLU 74 85 115*   CA 7.9* 8.4* 8.3*     No results for input(s): SGOT, GPT, AP, TBIL, TP, ALB, GLOB, GGT, AML, LPSE in the last 72 hours. No lab exists for component: AMYP, HLPSE  No components found for: GLPOC  No results for input(s): PH, PCO2, PO2 in the last 72 hours.         Afshin Teixeira MD  5/30/2019

## 2019-05-30 NOTE — ROUTINE PROCESS
RAPID RESPONSE TEAM    Rounded on patient due to rapid response for chest pain. He is sitting up in bed, alert, NAD, VSS, He states he had another episode this morning which was relieved with NTG. No RRT interventions currently indicated. Please call back if needed.   Moore Organ

## 2019-05-30 NOTE — PROGRESS NOTES
0515 Gave nitro, pt stated pain was similar when admitted. 2274  RRT chest pain. Called chest pain. Feels like tightness and sob. 
0520 Nitro given again. Ekg. Doctor arrived and ordered trops. Read EKG. 0530 Pt feeling better. 2657 Pt complains of leg numbness, worse than normal.  Took off eddi hose. Feels much better. 6130 Feel just icky. Waiting for labs to come back to call cardiologist. 
0700 Spoke to Doctor Ry Davies, ordered zaroxolyn 2.5 before bumex, vq scan, 40 oral potassium, and apple juice for low blood sugar. 1536 Spoke to Doctor Karen. Pt feeling better. Nitro took pain away.

## 2019-05-30 NOTE — PROGRESS NOTES
Bedside and Verbal shift change report given to LAMINE perry (oncoming nurse). Report included the following information SBAR, Kardex, ED Summary, Procedure Summary, Intake/Output and MAR. SHIFT SUMMARY: 
865: rn spoke to dr Carlo Ayala. md states only 2.5mg of zaroxyln total to be given. Uneventful shift.

## 2019-05-30 NOTE — PROGRESS NOTES
Bedside report received from [facility], [address] [place] [address]. Assumed care of patient    1925: Patient c/o SOB. Has been off O2 all day, but just started feeling SOB 30 minutes ago. Resting in bed. On 2L O2 now. 2130: Pt sates feeling less SOB. Resting quietly in bed. On 2L    2300: patient states feeling SOB. Also feels bloated and gas. States \"I feel pressure pushing up on my diaphragm\". States \"I can't catch my breath\". O2 sat 100%. Lung sounds diminished, no crackles. Patient is now sitting up in the chair. Also states feeling anxious. 2315: Paged oncall doctor for Dr Charlie Flores. No call back. 2320: Patient states headache 4/10. Percocet offered, patient refused. 2330: Patient stated bloating relieved after passing flatus and belching. Breathing improved. Returned to bed and resting quietly.

## 2019-05-30 NOTE — CARDIO/PULMONARY
Cardiac Rehab Note: chart review  
  
Pt is a 78 yo admitted with acute diastolic heart failure with volume overload, PAF, HTN. 
  
CHF BUNDLE   
  
EF 33%  on 5/27/19 per echo 
  
Current inpatient diet: DIET DIABETIC CONSISTENT CARB  
  
\"Living with Heart Failure\" book with daily weight calendar and s/s of heart failure magnet provided to Nicolle Bustamante. on 5/29/19. 
                                           
Educated using teach back method. Instruction given on s/s of CHF, checking weight every am and calling MD if weight is up 2-3 lbs in a day or 5 lbs in a week (or as directed by the physician), fluid/Na restrictions, s/s of worsening CHF and when to call MD 
He is well versed,knows when to call his MD,knows to read labels and plans to work out at gym at home. Reminded if any questions do not hesitate to let Cardiac rehab or staff know. No new questions.

## 2019-05-30 NOTE — PROGRESS NOTES
CYNTHIA: Home with [facility] to Home Visit and  Follow-up Appointment. CM met with pt to discuss d/c plan. No needs at this time. CM will continue to follow pt for d/c planning needs.      Sarbjit Ratliff [address] [place] [address] [facility]  [phone]

## 2019-05-30 NOTE — PROGRESS NOTES
Bedside shift change report given to [facility] , RN (oncoming nurse). Report included the following information SBAR, Kardex and Recent Results. SHIFT SUMMARY: 
Patient complaining of Chest pain, tightness and SOB at 0515. RRT called. Resolved with nitroglycerine. Physician notified. CONCERNS TO ADDRESS WITH MD: 
ELISE 
 
 
[facility] NURSING NOTE Admission Date 5/27/2019 Admission Diagnosis Acute on chronic diastolic congestive heart failure, NYHA class 3 ([address] [I50.33] Consults IP CONSULT TO CARDIOLOGY 
IP CONSULT TO PALLIATIVE CARE - PROVIDER Cardiac Monitoring [x] Yes [] No  
  
Purposeful Hourly Rounding [x] Yes   
Thanh Score Total Score: 2 Thanh score 3 or > [] Bed Alarm [] Avasys [] 1:1 sitter [] Patient refused (Signed refusal form in chart) Aurelio Score Aurelio Score: 19 Aurelio score 14 or < [] PMT consult [] Wound Care consult  
 []  Specialty bed  [] Nutrition consult Influenza Vaccine Received Flu Vaccine for Current Season (usually Sept-March): Not Flu Season Oxygen needs? [x] Room air Oxygen @  []1L    []2L    [x]3L   []4L    []5L   []6L via NC Chronic home O2 use? [] Yes [x] No 
Perform O2 challenge test and document in progress note using Seegrid Corpe (.Homeoxygen) Last bowel movement Last Bowel Movement Date: 05/28/19 Urinary Catheter LDAs Peripheral IV 05/27/19 Left Arm (Active) Site Assessment Clean, dry, & intact 5/29/2019 11:23 PM  
Phlebitis Assessment 0 5/29/2019 11:23 PM  
Infiltration Assessment 0 5/29/2019 11:23 PM  
Dressing Status Clean, dry, & intact 5/29/2019 11:23 PM  
Dressing Type Tape;Transparent 5/29/2019 11:23 PM  
Hub Color/Line Status Pink 5/29/2019 11:23 PM  
                  
  
Readmission Risk Assessment Tool Score High Risk 36 Total Score 3 Has Seen PCP in Last 6 Months (Yes=3, No=0)  
 4 IP Visits Last 12 Months (1-3=4, 4=9, >4=11) 5 Pt. Coverage (Medicare=5 , Medicaid, or Self-Pay=4) 28 Charlson Comorbidity Score (Age + Comorbid Conditions) Criteria that do not apply:  
 . Living with Significant Other. Assisted Living. LTAC. SNF. or  
Rehab Patient Length of Stay (>5 days = 3) Expected Length of Stay 4d 2h Actual Length of Stay 3

## 2019-05-30 NOTE — PROGRESS NOTES
Problem: Heart Failure: Day 1 Goal: Activity/Safety Outcome: Progressing Towards Goal 
Goal: Diagnostic Test/Procedures Outcome: Progressing Towards Goal 
Goal: Treatments/Interventions/Procedures Outcome: Progressing Towards Goal 
Goal: Psychosocial 
Outcome: Progressing Towards Goal 
Goal: *Oxygen saturation within defined limits Outcome: Progressing Towards Goal 
  
Problem: Pressure Injury - Risk of 
Goal: *Prevention of pressure injury Description Document Aurelio Scale and appropriate interventions in the flowsheet.  
Outcome: Progressing Towards Goal

## 2019-05-30 NOTE — PROGRESS NOTES
PROGRESS NOTE 
 
NAME:  Royal Chema Orlando. :   1950 MRN:   [id number] Date/Time:  2019 7:38 AM 
Subjective:  
History:  Diamante Oswald is a 77 y.o.  white male who presented with increasing SOB and progressive CHF acute on chronic. He asked for me to see him for evaluation of dizziness and nausea which he awakened with on  as well as other medical problems. He does have DM and says it feels a little like low BS again this AM and STAT BS now is 79. He has had good diuresis over the last 24 hr. (-) yet his weight is recorded as 3 # higher. He has no current orthopnea which he had on admission and has had no SOB on oxygen until this AM he has a little SOB and had some CP relieved with 2 NTG SL. He has no other cardiac or respiratory c/o. He has no GI c/o as well as he has no  c/o. There are no neurologic c/o except the dizziness intermittently which is better. He has no other c/o on complete ROS. Of note is that despite his gaining weight and progressive SOB for a few days PTA he had not taken his Zaroxolyn which he has to take in this situation. He has no other c/o on complete ROS. Medications reviewed: 
Current Facility-Administered Medications Medication Dose Route Frequency  potassium chloride SR (KLOR-CON 10) tablet 40 mEq  40 mEq Oral NOW  metOLazone (ZAROXOLYN) tablet 2.5 mg  2.5 mg Oral ONCE  
 nitroglycerin (NITROSTAT) tablet 0.4 mg  0.4 mg SubLINGual PRN  
 enoxaparin (LOVENOX) injection 40 mg  40 mg SubCUTAneous Q24H  
 meclizine (ANTIVERT) tablet 12.5 mg  12.5 mg Oral TID  pantoprazole (PROTONIX) tablet 40 mg  40 mg Oral ACB  bumetanide (BUMEX) injection 2 mg  2 mg IntraVENous BID  
 . PHARMACY TO SUBSTITUTE PER PROTOCOL (Reordered from: Liraglutide (VICTOZA) 0.6 mg/0.1 mL (18 mg/3 mL) sub-q pen)    Per Protocol  colchicine tablet 0.6 mg  0.6 mg Oral PRN  pramipexole (MIRAPEX) tablet 0.5 mg  0.5 mg Oral QHS  ferrous sulfate tablet 325 mg  325 mg Oral DAILY  tamsulosin (FLOMAX) capsule 0.4 mg  0.4 mg Oral QHS  carvedilol (COREG) tablet 12.5 mg  12.5 mg Oral BID WITH MEALS  insulin glargine (LANTUS) injection 35 Units  35 Units SubCUTAneous QHS  febuxostat (ULORIC) tablet 40 mg  40 mg Oral DAILY  metOLazone (ZAROXOLYN) tablet 2.5 mg  2.5 mg Oral EVERY OTHER DAY  potassium chloride (KLOR-CON) packet for solution 20 mEq  20 mEq Oral DAILY  gabapentin (NEURONTIN) capsule 300 mg  300 mg Oral QHS  finasteride (PROSCAR) tablet 5 mg  5 mg Oral DAILY  oxyCODONE-acetaminophen (PERCOCET) 5-325 mg per tablet 1 Tab  1 Tab Oral Q8H PRN  
 sodium chloride (NS) flush 5-40 mL  5-40 mL IntraVENous Q8H  
 sodium chloride (NS) flush 5-40 mL  5-40 mL IntraVENous PRN  
 docusate sodium (COLACE) capsule 100 mg  100 mg Oral BID  polyethylene glycol (MIRALAX) packet 17 g  17 g Oral DAILY PRN  
 senna (SENOKOT) tablet 8.6 mg  1 Tab Oral QHS Objective:  
Vitals: 
Visit Vitals /82 (BP 1 Location: Right arm, BP Patient Position: At rest) Pulse 70 Temp 97.5 °F (36.4 °C) Resp 18 Ht 6' 4\" (1.93 m) Wt 243 lb 4.8 oz (110.4 kg) SpO2 100% BMI 29.62 kg/m² O2 Flow Rate (L/min): 3 l/min O2 Device: Nasal cannula Temp (24hrs), Av.7 °F (36.5 °C), Min:96.5 °F (35.8 °C), Max:98.5 °F (36.9 °C) Last 24hr Input/Output: 
 
Intake/Output Summary (Last 24 hours) at 2019 9651 Last data filed at 2019 0615 Gross per 24 hour Intake 600 ml Output 2425 ml Net -1825 ml PHYSICAL EXAM: 
General:     Alert, cooperative, no distress, appears stated age. Head:    Normocephalic, without obvious abnormality, atraumatic. Eyes:    Conjunctivae/corneas clear. PERRLA Nose:   Nares normal. No drainage or sinus tenderness. Throat:     Lips, mucosa, and tongue normal.  No Thrush Neck:   Supple, symmetrical,  no adenopathy, thyroid: non tender 
   no carotid bruit and no JVD. Back:     Symmetric,  No CVA tenderness. Lungs:    Clear to auscultation bilaterally. No Wheezing or Rhonchi. No rales. Heart:    Regular rate and rhythm,  no murmur, rub or gallop. Abdomen:    Soft, non-tender. Not distended. Bowel sounds normal. No masses Extremities:  Extremities normal, atraumatic, No cyanosis. No edema. No clubbing Lymph nodes:  Cervical, supraclavicular normal. 
Neurologic:  Normal strength, Alert and oriented X 3. Skin:                No rash Lab Data Reviewed: 
 
Recent Results (from the past 24 hour(s)) GLUCOSE, POC Collection Time: 05/29/19  7:46 AM  
Result Value Ref Range Glucose (POC) 89 65 - 100 mg/dL Performed by Harjinder Nickerson) GLUCOSE, POC Collection Time: 05/29/19 11:26 AM  
Result Value Ref Range Glucose (POC) 190 (H) 65 - 100 mg/dL Performed by Harjinder Nickerson) GLUCOSE, POC Collection Time: 05/29/19  4:29 PM  
Result Value Ref Range Glucose (POC) 122 (H) 65 - 100 mg/dL Performed by Toña Tai GLUCOSE, POC Collection Time: 05/29/19  8:25 PM  
Result Value Ref Range Glucose (POC) 192 (H) 65 - 100 mg/dL Performed by Indira Mak GLUCOSE, POC Collection Time: 05/30/19  3:25 AM  
Result Value Ref Range Glucose (POC) 99 65 - 100 mg/dL Performed by Boni Odell (PCT) METABOLIC PANEL, BASIC Collection Time: 05/30/19  3:36 AM  
Result Value Ref Range Sodium 130 (L) 136 - 145 mmol/L Potassium 3.3 (L) 3.5 - 5.1 mmol/L Chloride 96 (L) 97 - 108 mmol/L  
 CO2 28 21 - 32 mmol/L Anion gap 6 5 - 15 mmol/L Glucose 74 65 - 100 mg/dL BUN 55 (H) 6 - 20 MG/DL Creatinine 2.08 (H) 0.70 - 1.30 MG/DL  
 BUN/Creatinine ratio 26 (H) 12 - 20 GFR est AA 38 (L) >60 ml/min/1.73m2 GFR est non-AA 32 (L) >60 ml/min/1.73m2 Calcium 7.9 (L) 8.5 - 10.1 MG/DL  
EKG, 12 LEAD, INITIAL Collection Time: 05/30/19  5:20 AM  
Result Value Ref Range  Ventricular Rate 71 BPM  
 Atrial Rate 40 BPM  
 QRS Duration 180 ms  
 Q-T Interval 512 ms QTC Calculation (Bezet) 556 ms Calculated R Axis -62 degrees Calculated T Axis 108 degrees Diagnosis Wide QRS rhythm with occasional premature ventricular complexes Left axis deviation Right bundle branch block Possible Lateral infarct , age undetermined Inferior infarct , age undetermined When compared with ECG of 28-MAY-2019 15:57, 
Previous ECG has undetermined rhythm, needs review TROPONIN I Collection Time: 05/30/19  5:33 AM  
Result Value Ref Range Troponin-I, Qt. <0.05 <0.05 ng/mL CK W/ CKMB & INDEX Collection Time: 05/30/19  5:33 AM  
Result Value Ref Range CK 48 39 - 308 U/L  
 CK - MB 1.9 <3.6 NG/ML  
 CK-MB Index 4.0 (H) 0.0 - 2.5 GLUCOSE, POC Collection Time: 05/30/19  7:00 AM  
Result Value Ref Range Glucose (POC) 79 65 - 100 mg/dL Performed by Ronni Rodas (PCT) GLUCOSE, POC Collection Time: 05/30/19  7:12 AM  
Result Value Ref Range Glucose (POC) 81 65 - 100 mg/dL Performed by Patric  Assessment/Plan:  
 
Principal Problem: 
  Acute on chronic diastolic congestive heart failure, [facility] class 3 [facility] (5/27/2019) Active Problems: 
  Atrial fibrillation ([facility] (3/7/2014) Overview: S/P ablation ASCVD (arteriosclerotic cardiovascular disease) (8/5/2017) Controlled type 2 diabetes mellitus with stage 4 chronic kidney disease, without long-term current use of insulin ([facility] (8/5/2017) CKD (chronic kidney disease), stage IV ([facility] (8/5/2017) Hypertension with renal disease (8/5/2017) Labyrinthitis of both ears (5/29/2019) 
 
  
___________________________________________________ PLAN: 
 
1. Stat BS was 79 so doubt it is cause of dizziness but asked his nurse to give him some juice anyway 2. Added meclizine on 5/29to cover labyrinthitis possibility 3. BP actually elevated on occasion so over diuresis is not the cause 4. Note BUN still in 50 range with baseline when dry  BUN, Follow renal function 5. Continue Diuresis (Takes Bumex 4mg AM and 2 mg PM at home) 6. PRN Zaroxolyn, ordered a dose for this AM 
7. Note ? Of ischemia and agree with Dr. Sumi Minaya thoughts on that; however may need to resort to catherization 8. Added Lovenox for DVT prevention, but to be sure of cause of CP and SOB this AM will get V/Q scan 9. Follow BS with DM and treat with SSI, Continue Lantus, but decrease to 20 U hs 
10. Added Protonix for nausea and SUP 11. Replete K 
12. Continue all other current orders, reviewed 45 minutes spent in direct care of this high complexity patient today with extremely high risk of decompensation 
 
 
 
 
___________________________________________________ Attending Physician: Kvng Eastman MD

## 2019-05-30 NOTE — PROGRESS NOTES
Patient evaluated for chest pain. Reports having a strange sensation in his hands and feet along with chest tightness and dyspnea. Receiving 2nd SL NTG with resolution of pain. Appears comfortable, heart RRR no added sounds, trace b/l LE edema, lungs CTA b/l. ECG paced. Will check a set of cardiac biomarkers. Noted cardiology considering ischemic work-up.

## 2019-05-31 LAB
ANION GAP SERPL CALC-SCNC: 9 MMOL/L (ref 5–15)
BASOPHILS # BLD: 0 K/UL (ref 0–0.1)
BASOPHILS NFR BLD: 1 % (ref 0–1)
BUN SERPL-MCNC: 62 MG/DL (ref 6–20)
BUN/CREAT SERPL: 29 (ref 12–20)
CALCIUM SERPL-MCNC: 8.1 MG/DL (ref 8.5–10.1)
CHLORIDE SERPL-SCNC: 94 MMOL/L (ref 97–108)
CO2 SERPL-SCNC: 26 MMOL/L (ref 21–32)
CREAT SERPL-MCNC: 2.16 MG/DL (ref 0.7–1.3)
DIFFERENTIAL METHOD BLD: ABNORMAL
EOSINOPHIL # BLD: 0 K/UL (ref 0–0.4)
EOSINOPHIL NFR BLD: 0 % (ref 0–7)
ERYTHROCYTE [DISTWIDTH] IN BLOOD BY AUTOMATED COUNT: 16.1 % (ref 11.5–14.5)
GLUCOSE BLD STRIP.AUTO-MCNC: 126 MG/DL (ref 65–100)
GLUCOSE BLD STRIP.AUTO-MCNC: 135 MG/DL (ref 65–100)
GLUCOSE BLD STRIP.AUTO-MCNC: 135 MG/DL (ref 65–100)
GLUCOSE BLD STRIP.AUTO-MCNC: 170 MG/DL (ref 65–100)
GLUCOSE SERPL-MCNC: 104 MG/DL (ref 65–100)
HCT VFR BLD AUTO: 29.7 % (ref 36.6–50.3)
HGB BLD-MCNC: 9.5 G/DL (ref 12.1–17)
IMM GRANULOCYTES # BLD AUTO: 0 K/UL (ref 0–0.04)
IMM GRANULOCYTES NFR BLD AUTO: 0 % (ref 0–0.5)
LYMPHOCYTES # BLD: 0.5 K/UL (ref 0.8–3.5)
LYMPHOCYTES NFR BLD: 11 % (ref 12–49)
MCH RBC QN AUTO: 29.5 PG (ref 26–34)
MCHC RBC AUTO-ENTMCNC: 32 G/DL (ref 30–36.5)
MCV RBC AUTO: 92.2 FL (ref 80–99)
MONOCYTES # BLD: 0.4 K/UL (ref 0–1)
MONOCYTES NFR BLD: 9 % (ref 5–13)
NEUTS SEG # BLD: 3.7 K/UL (ref 1.8–8)
NEUTS SEG NFR BLD: 79 % (ref 32–75)
NRBC # BLD: 0 K/UL (ref 0–0.01)
NRBC BLD-RTO: 0 PER 100 WBC
PLATELET # BLD AUTO: 71 K/UL (ref 150–400)
PMV BLD AUTO: 12 FL (ref 8.9–12.9)
POTASSIUM SERPL-SCNC: 3.5 MMOL/L (ref 3.5–5.1)
RBC # BLD AUTO: 3.22 M/UL (ref 4.1–5.7)
SERVICE CMNT-IMP: ABNORMAL
SODIUM SERPL-SCNC: 129 MMOL/L (ref 136–145)
WBC # BLD AUTO: 4.6 K/UL (ref 4.1–11.1)

## 2019-05-31 PROCEDURE — 74011636637 HC RX REV CODE- 636/637: Performed by: INTERNAL MEDICINE

## 2019-05-31 PROCEDURE — 94760 N-INVAS EAR/PLS OXIMETRY 1: CPT

## 2019-05-31 PROCEDURE — 36415 COLL VENOUS BLD VENIPUNCTURE: CPT

## 2019-05-31 PROCEDURE — 74011250636 HC RX REV CODE- 250/636: Performed by: INTERNAL MEDICINE

## 2019-05-31 PROCEDURE — 74011000250 HC RX REV CODE- 250: Performed by: INTERNAL MEDICINE

## 2019-05-31 PROCEDURE — 85025 COMPLETE CBC W/AUTO DIFF WBC: CPT

## 2019-05-31 PROCEDURE — 74011250637 HC RX REV CODE- 250/637: Performed by: INTERNAL MEDICINE

## 2019-05-31 PROCEDURE — 80048 BASIC METABOLIC PNL TOTAL CA: CPT

## 2019-05-31 PROCEDURE — 65660000000 HC RM CCU STEPDOWN

## 2019-05-31 PROCEDURE — 82962 GLUCOSE BLOOD TEST: CPT

## 2019-05-31 RX ORDER — POTASSIUM CHLORIDE 1.5 G/1.77G
40 POWDER, FOR SOLUTION ORAL 2 TIMES DAILY WITH MEALS
Status: DISCONTINUED | OUTPATIENT
Start: 2019-05-31 | End: 2019-06-05 | Stop reason: HOSPADM

## 2019-05-31 RX ORDER — BUMETANIDE 0.25 MG/ML
2 INJECTION INTRAMUSCULAR; INTRAVENOUS DAILY
Status: DISCONTINUED | OUTPATIENT
Start: 2019-06-01 | End: 2019-06-01

## 2019-05-31 RX ORDER — METOLAZONE 2.5 MG/1
2.5 TABLET ORAL EVERY OTHER DAY
Status: DISCONTINUED | OUTPATIENT
Start: 2019-05-31 | End: 2019-06-01

## 2019-05-31 RX ORDER — SIMETHICONE 80 MG
80 TABLET,CHEWABLE ORAL
Status: DISCONTINUED | OUTPATIENT
Start: 2019-05-31 | End: 2019-06-05 | Stop reason: HOSPADM

## 2019-05-31 RX ADMIN — Medication 10 ML: at 05:53

## 2019-05-31 RX ADMIN — DOCUSATE SODIUM 100 MG: 100 CAPSULE, LIQUID FILLED ORAL at 09:17

## 2019-05-31 RX ADMIN — ENOXAPARIN SODIUM 40 MG: 40 INJECTION SUBCUTANEOUS at 09:18

## 2019-05-31 RX ADMIN — METOLAZONE 2.5 MG: 2.5 TABLET ORAL at 09:17

## 2019-05-31 RX ADMIN — OXYCODONE HYDROCHLORIDE AND ACETAMINOPHEN 1 TABLET: 5; 325 TABLET ORAL at 07:18

## 2019-05-31 RX ADMIN — MECLIZINE 12.5 MG: 12.5 TABLET ORAL at 16:59

## 2019-05-31 RX ADMIN — BUMETANIDE 2 MG: 0.25 INJECTION INTRAMUSCULAR; INTRAVENOUS at 09:17

## 2019-05-31 RX ADMIN — SIMETHICONE CHEW TAB 80 MG 80 MG: 80 TABLET ORAL at 09:17

## 2019-05-31 RX ADMIN — MECLIZINE 12.5 MG: 12.5 TABLET ORAL at 09:17

## 2019-05-31 RX ADMIN — Medication 10 ML: at 22:41

## 2019-05-31 RX ADMIN — ISOSORBIDE MONONITRATE 30 MG: 30 TABLET, EXTENDED RELEASE ORAL at 09:17

## 2019-05-31 RX ADMIN — MECLIZINE 12.5 MG: 12.5 TABLET ORAL at 22:37

## 2019-05-31 RX ADMIN — FINASTERIDE 5 MG: 5 TABLET, FILM COATED ORAL at 09:17

## 2019-05-31 RX ADMIN — POTASSIUM CHLORIDE 40 MEQ: 1.5 POWDER, FOR SOLUTION ORAL at 09:18

## 2019-05-31 RX ADMIN — CARVEDILOL 12.5 MG: 12.5 TABLET, FILM COATED ORAL at 09:17

## 2019-05-31 RX ADMIN — GABAPENTIN 300 MG: 300 CAPSULE ORAL at 22:37

## 2019-05-31 RX ADMIN — CARVEDILOL 12.5 MG: 12.5 TABLET, FILM COATED ORAL at 16:59

## 2019-05-31 RX ADMIN — DOCUSATE SODIUM 100 MG: 100 CAPSULE, LIQUID FILLED ORAL at 17:00

## 2019-05-31 RX ADMIN — Medication 10 ML: at 17:00

## 2019-05-31 RX ADMIN — PANTOPRAZOLE SODIUM 40 MG: 40 TABLET, DELAYED RELEASE ORAL at 09:17

## 2019-05-31 RX ADMIN — FERROUS SULFATE TAB 325 MG (65 MG ELEMENTAL FE) 325 MG: 325 (65 FE) TAB at 09:17

## 2019-05-31 RX ADMIN — SIMETHICONE CHEW TAB 80 MG 80 MG: 80 TABLET ORAL at 16:59

## 2019-05-31 RX ADMIN — PRAMIPEXOLE DIHYDROCHLORIDE 0.5 MG: 0.25 TABLET ORAL at 22:37

## 2019-05-31 RX ADMIN — TAMSULOSIN HYDROCHLORIDE 0.4 MG: 0.4 CAPSULE ORAL at 22:37

## 2019-05-31 RX ADMIN — FEBUXOSTAT 40 MG: 40 TABLET ORAL at 10:02

## 2019-05-31 RX ADMIN — POTASSIUM CHLORIDE 40 MEQ: 1.5 POWDER, FOR SOLUTION ORAL at 16:59

## 2019-05-31 RX ADMIN — SENNOSIDES 8.6 MG: 8.6 TABLET, FILM COATED ORAL at 22:37

## 2019-05-31 RX ADMIN — INSULIN GLARGINE 20 UNITS: 100 INJECTION, SOLUTION SUBCUTANEOUS at 22:38

## 2019-05-31 NOTE — PROGRESS NOTES
CYNTHIA: Home with [facility] to Home Visit and  Follow-up Appointment. Medicare pt has received, reviewed, and signed 2nd IM letter informing them of their right to appeal the discharge. Signed copy has been placed on pt bedside chart. CM will continue to follow patient for discharge planning needs and arrange for services as deemed necessary.     Sarbjit Batista [address] [place] [address]  [phone]

## 2019-05-31 NOTE — PROGRESS NOTES
Problem: Heart Failure: Day 1  Goal: Off Pathway (Use only if patient is Off Pathway)  Outcome: Progressing Towards Goal  Goal: Activity/Safety  Outcome: Progressing Towards Goal  Goal: Diagnostic Test/Procedures  Outcome: Progressing Towards Goal  Goal: Medications  Outcome: Progressing Towards Goal  Goal: *Oxygen saturation within defined limits  Outcome: Progressing Towards Goal     Problem: Falls - Risk of  Goal: *Absence of Falls  Description  Document Thanh Fall Risk and appropriate interventions in the flowsheet. Outcome: Progressing Towards Goal     Problem: Pressure Injury - Risk of  Goal: *Prevention of pressure injury  Description  Document Aurelio Scale and appropriate interventions in the flowsheet.   Outcome: Progressing Towards Goal

## 2019-05-31 NOTE — PROGRESS NOTES
PROGRESS NOTE    NAME:  Royal Elisha Lama. :   1950   MRN:   [id number]     Date/Time:  2019 7:13 AM  Subjective:   History:  Rolanda Kolb is a 77 y.o.  white male who presented with increasing SOB and progressive CHF acute on chronic. He asked for me to see him for evaluation of dizziness and nausea which he awakened with on  as well as other medical problems. He has no further dizziness or N/V, but has had a lot of gas and bloating over the last 24 hr. With some relief last PM. He has been breathing better since passage of a lot of gas. He has had good diuresis over the last 24 hr. (-1705) yet his weight is recorded as essentially unchanged. He has no current orthopnea which he had on admission and has had no SOB on oxygen overnight. Yesterday he had some CP relieved with 2 NTG SL. He has no other cardiac or respiratory c/o. He has no other GI c/o as well as he has no  c/o. There are no further neurologic c/o. He has no other c/o on complete ROS. Of note is that despite his gaining weight and progressive SOB for a few days PTA he had not taken his Zaroxolyn which he has to take in this situation. He has no other c/o on complete ROS. Medications reviewed:  Current Facility-Administered Medications   Medication Dose Route Frequency    nitroglycerin (NITROSTAT) tablet 0.4 mg  0.4 mg SubLINGual PRN    potassium chloride (KLOR-CON) packet for solution 20 mEq  20 mEq Oral BID WITH MEALS    insulin glargine (LANTUS) injection 20 Units  20 Units SubCUTAneous QHS    isosorbide mononitrate ER (IMDUR) tablet 30 mg  30 mg Oral DAILY    enoxaparin (LOVENOX) injection 40 mg  40 mg SubCUTAneous Q24H    meclizine (ANTIVERT) tablet 12.5 mg  12.5 mg Oral TID    pantoprazole (PROTONIX) tablet 40 mg  40 mg Oral ACB    bumetanide (BUMEX) injection 2 mg  2 mg IntraVENous BID    . PHARMACY TO SUBSTITUTE PER PROTOCOL (Reordered from: Liraglutide (VICTOZA) 0.6 mg/0.1 mL (18 mg/3 mL) sub-q pen)    Per Protocol    colchicine tablet 0.6 mg  0.6 mg Oral PRN    pramipexole (MIRAPEX) tablet 0.5 mg  0.5 mg Oral QHS    ferrous sulfate tablet 325 mg  325 mg Oral DAILY    tamsulosin (FLOMAX) capsule 0.4 mg  0.4 mg Oral QHS    carvedilol (COREG) tablet 12.5 mg  12.5 mg Oral BID WITH MEALS    febuxostat (ULORIC) tablet 40 mg  40 mg Oral DAILY    metOLazone (ZAROXOLYN) tablet 2.5 mg  2.5 mg Oral EVERY OTHER DAY    gabapentin (NEURONTIN) capsule 300 mg  300 mg Oral QHS    finasteride (PROSCAR) tablet 5 mg  5 mg Oral DAILY    oxyCODONE-acetaminophen (PERCOCET) 5-325 mg per tablet 1 Tab  1 Tab Oral Q8H PRN    sodium chloride (NS) flush 5-40 mL  5-40 mL IntraVENous Q8H    sodium chloride (NS) flush 5-40 mL  5-40 mL IntraVENous PRN    docusate sodium (COLACE) capsule 100 mg  100 mg Oral BID    polyethylene glycol (MIRALAX) packet 17 g  17 g Oral DAILY PRN    senna (SENOKOT) tablet 8.6 mg  1 Tab Oral QHS        Objective:   Vitals:  Visit Vitals  /83 (BP 1 Location: Right arm, BP Patient Position: At rest;Supine)   Pulse 70   Temp 97.1 °F (36.2 °C)   Resp 18   Ht 6' 4\" (1.93 m)   Wt 242 lb 8.1 oz (110 kg)   SpO2 100%   BMI 29.52 kg/m²    O2 Flow Rate (L/min): 2 l/min O2 Device: Room air Temp (24hrs), Av.4 °F (36.9 °C), Min:97.1 °F (36.2 °C), Max:100.5 °F (38.1 °C)      Last 24hr Input/Output:    Intake/Output Summary (Last 24 hours) at 2019 0713  Last data filed at 2019 0553  Gross per 24 hour   Intake 1980 ml   Output 3685 ml   Net -1705 ml        PHYSICAL EXAM:  General:     Alert, cooperative, no distress, appears stated age. Head:    Normocephalic, without obvious abnormality, atraumatic. Eyes:    Conjunctivae/corneas clear. PERRLA  Nose:   Nares normal. No drainage or sinus tenderness. Throat:     Lips, mucosa, and tongue normal.  No Thrush  Neck:   Supple, symmetrical,  no adenopathy, thyroid: non tender     no carotid bruit and no JVD.   Back:     Symmetric,  No CVA tenderness. Lungs:    Clear to auscultation bilaterally. No Wheezing or Rhonchi. No rales. Heart:    Regular rate and rhythm,  no murmur, rub or gallop. Abdomen:    Soft, non-tender. Not distended. Bowel sounds normal. No masses  Extremities:  Extremities normal, atraumatic, No cyanosis. No edema. No clubbing  Lymph nodes:  Cervical, supraclavicular normal.  Neurologic:  Normal strength, Alert and oriented X 3.    Skin:                No rash      Lab Data Reviewed:    Recent Results (from the past 24 hour(s))   GLUCOSE, POC    Collection Time: 05/30/19 11:36 AM   Result Value Ref Range    Glucose (POC) 189 (H) 65 - 100 mg/dL    Performed by Ellijay Harman ([facility])    GLUCOSE, POC    Collection Time: 05/30/19  4:28 PM   Result Value Ref Range    Glucose (POC) 142 (H) 65 - 100 mg/dL    Performed by Tacho Hammer ([facility])    GLUCOSE, POC    Collection Time: 05/30/19  9:00 PM   Result Value Ref Range    Glucose (POC) 207 (H) 65 - 100 mg/dL    Performed by Jem Farley (PCT)    METABOLIC PANEL, BASIC    Collection Time: 05/31/19  2:55 AM   Result Value Ref Range    Sodium 129 (L) 136 - 145 mmol/L    Potassium 3.5 3.5 - 5.1 mmol/L    Chloride 94 (L) 97 - 108 mmol/L    CO2 26 21 - 32 mmol/L    Anion gap 9 5 - 15 mmol/L    Glucose 104 (H) 65 - 100 mg/dL    BUN 62 (H) 6 - 20 MG/DL    Creatinine 2.16 (H) 0.70 - 1.30 MG/DL    BUN/Creatinine ratio 29 (H) 12 - 20      GFR est AA 37 (L) >60 ml/min/1.73m2    GFR est non-AA 30 (L) >60 ml/min/1.73m2    Calcium 8.1 (L) 8.5 - 10.1 MG/DL   CBC WITH AUTOMATED DIFF    Collection Time: 05/31/19  2:55 AM   Result Value Ref Range    WBC 4.6 4.1 - 11.1 K/uL    RBC 3.22 (L) 4.10 - 5.70 M/uL    HGB 9.5 (L) 12.1 - 17.0 g/dL    HCT 29.7 (L) 36.6 - 50.3 %    MCV 92.2 80.0 - 99.0 FL    MCH 29.5 26.0 - 34.0 PG    MCHC 32.0 30.0 - 36.5 g/dL    RDW 16.1 (H) 11.5 - 14.5 %    PLATELET 71 (L) 497 - 400 K/uL    MPV 12.0 8.9 - 12.9 FL    NRBC 0.0 0  WBC    ABSOLUTE NRBC 0.00 0.00 - 0.01 K/uL NEUTROPHILS 79 (H) 32 - 75 %    LYMPHOCYTES 11 (L) 12 - 49 %    MONOCYTES 9 5 - 13 %    EOSINOPHILS 0 0 - 7 %    BASOPHILS 1 0 - 1 %    IMMATURE GRANULOCYTES 0 0.0 - 0.5 %    ABS. NEUTROPHILS 3.7 1.8 - 8.0 K/UL    ABS. LYMPHOCYTES 0.5 (L) 0.8 - 3.5 K/UL    ABS. MONOCYTES 0.4 0.0 - 1.0 K/UL    ABS. EOSINOPHILS 0.0 0.0 - 0.4 K/UL    ABS. BASOPHILS 0.0 0.0 - 0.1 K/UL    ABS. IMM. GRANS. 0.0 0.00 - 0.04 K/UL    DF AUTOMATED           Assessment/Plan:     Principal Problem:    Acute on chronic diastolic congestive heart failure, NYHA class 3 ([facility]) (5/27/2019)    Active Problems:    Atrial fibrillation ([facility] (3/7/2014)      Overview: S/P ablation      ASCVD (arteriosclerotic cardiovascular disease) (8/5/2017)      Controlled type 2 diabetes mellitus with stage 4 chronic kidney disease, without long-term current use of insulin ([facility] (8/5/2017)      CKD (chronic kidney disease), stage IV ([facility] (8/5/2017)      Hypertension with renal disease (8/5/2017)      Labyrinthitis of both ears (5/29/2019)       ___________________________________________________  PLAN:    1. BS s OK and continue to follow and treat with SSI PRN  2. Added meclizine on 5/29 to cover labyrinthitis possibility  3. BP actually elevated on occasion so follow closely  4. Note BUN 62 with baseline when dry  BUN, Follow renal function  5. Continue Diuresis (Takes Bumex 4mg AM and 2 mg PM at home)  6. PRN Zaroxolyn, ordered another dose for this AM  7. Note ? Of ischemia and agree with Dr. Vonnie Rainey thoughts on that; however may need to resort to catherization  8. Continue Lovenox for DVT prevention, but to be sure of cause of CP and SOB obtained V/Q scan yesterday (Low Probability)  9. Continue Lantus, but decrease to 20 U hs  10. Continue Protonix for nausea and SUP  11. Replete K, 3.5 today  12. Continue all other current orders, reviewed  13.  Add Simethicone for bloating      35 minutes spent in direct care of this high complexity patient today with extremely high risk of decompensation          ___________________________________________________    Attending Physician: Zunilda Benítez MD

## 2019-05-31 NOTE — PROGRESS NOTES
CARDIOLOGY Progress Note    Patient ID:  Patient: Sam Pittman. MRN: [id number]  Age: 77 y.o.  : 1950    Date of  Admission: 2019  7:16 AM   PCP:  Jolly Kehr, MD   Usual cardiologist:  Britany Alvarez MD     Assessment: 1. Acute systolic heart failure with volume overload, elevated proBNP, sx.     Last 3 Recorded Weights in this Encounter    19 0317 19 0350 19 0258   Weight: 109.1 kg (240 lb 8 oz) 110.4 kg (243 lb 4.8 oz) 110 kg (242 lb 8.1 oz)     2. Paroxysmal atrial fibrillation and atypical atrial flutter s/p ablation in 2018. NO atrial fibrillation or flutter since the ablation. 3. Hypertensive heart disease with a history of congestive heart failure and CKD stage 4. EF 55% on prior echo, then 35-40% early . EF 35% by echo in ER. 4. Coronary artery disease with multivessel interventions.  His last stenting was in . Cath in  showed a nondominant RCA with LPDA occluded and filled by collaterals, otherwise up to moderate CAD. 5. Mild idiopathic pericardial effusion in the past.  6. Diabetes mellitus type 2, on chronic insulin. 7. Hyperlipidemia. 8. BRBPR in 2019 with grade 1 internal hemorrhoids noted on colo here. Presumed source of GI bleed. 9. History of esophageal dilation for dysphagia. 10. Hiatal hernia. 11. Anemia of chronic renal disease. On EPO (and Fe supplement). 12. Moderate thrombocytopenia. 13. Chronic anticoagulation stopped 2019. 14. Progressive hyponatremia, multifactorial (volume overload, Na loss during diuresis, etc).    Plan:      1. Continue IV Bumex but DECREASE to daily. Continue 2g Na diet. Has metolazone 2.5 mg po every other day. 2. Continue beta-blocker. 3. Not a candidate for ACEI or ARB or spironolactone. 4. BIV pacer program change on :  Changed LV>RV to 50 msec from 20 msec.   Tried to enable MPP using poles 3 and 4 but vectors using these did not capture at max output. 5. Stopped Eliquis given severe anemia requiring transfusion in 2019. On DVT prophylaxis with enoxaparin. 6. Not on antiplatelet therapy. 7. Low suspicion for pulmonary embolism. Will consider pulmonary consult to evaluate for an extracardiac cause of dyspnea beyond CHF if treating CHF doesn't work and ischemia evaluation is nondiagnostic. 8. Overnight pulse oximetry or sleep study? Regarding the CP, at risk for angina, but may not be, let's medically manage for now, adding oral nitrate. Focus on volume status, still may need an ischemia evaluation as an OP using cardiac PET, a better resolution study than the typical nuclear testing. If his PET is positive, then I'll get Dr. Matthew Farfan involved to discuss risk:benefit of cath. Exposure to contrast dye with cath +/-PCI would put him at risk for acute renal failure--he says he will NOT due dialysis, would rather die. Dr. Kiki Huerta' help appreciated.       [x]       High complexity decision making was performed in this patient with multiple organ involvement.     Royal KRISTA Varela. is a 77 y.o. male with a history of atrial arrhythmias s/p ablation 5/2018, has done well from an arrhythmia standpoint. He has had chronic issues with fluid retention. I saw him in the office about a week ago PTA, he reported some ANDERS. He initially denied dietary indiscretion (salt, fluid, volume) but had noticed that he had gained weight and lower extremity edema over several days. Finally admits that he was eating some food that probably was higher in salt. Over the last day PTA, he became more dyspneic and got to the point when he started to get some rest symptoms. +orthopnea, PND. Last night PTA was difficult. He came to the ER for evaluation. Got IV diuretic in the ER. An echo and CXR was done in the ER. No chest pain today. Had gas and belching, felt better after this. No syncope, near-syncope, TIA, or stroke symptoms.    Denies resting dyspnea, but does have severe exertional dyspnea and some orthopnea. Urinating with diuretic some. CATH from Dr. Charleen Vaughn 2014:  \"Left main trunk: Normal.     Left anterior descending artery: This is a large artery. It is moderately  calcified. There is diffuse atherosclerosis, areas of up to 30% narrowing  with no significant obstructive disease. The diagonal branch is small to  moderate sizes and is free of disease. The LAD wraps around the apex of the  heart.     The circumflex artery is dominant. This is a large artery with diffuse  atherosclerotic changes and diffuse calcification. There is no significant  obstructive disease in the AV circumflex artery. A high lateral branch  shows 50% stenosis in its proximal segment. A marginal branch has about 50%  narrowing in its proximal segment. The  posterior ventricular branch has  diffuse moderate atherosclerosis. The PDA comes off the circ distally. It  appeared to be occluded in its mid segment and the distal segment fills by  collaterals from the other circumflex branches.     The right coronary artery is nondominant. There is 70% stenosis in the  proximal right coronary artery, in this nondominant vessel, and then more  distally it is diffusely and severely diseased.     Left ventricle: Ventricle contractility is very dynamic. All segments seem  to contract well. Ejection fraction estimated to be 65%.     CONCLUSIONS  1. Diffuse moderate coronary atherosclerosis as described. 2. Normal left ventricular systolic function.     Based on the angiographic findings, there is no indication for intervention  and I think this is best treated medically at this time. \"           Allergies   Allergen Reactions    Niacin Unknown (comments)     Other reaction(s): Unknown (comments)    Imipenem Diarrhea     Other reaction(s): Rash    Levemir [Insulin Detemir] Hives    Other Medication Other (comments)     ?  Allergy to Duraprep causing chemical burn    Primaxin [Imipenem-Cilastatin] Diarrhea and Rash    Xarelto [Rivaroxaban] Rash and Itching     Other reaction(s): Rash          Current Facility-Administered Medications   Medication Dose Route Frequency    simethicone (MYLICON) tablet 80 mg  80 mg Oral TIDAC    metOLazone (ZAROXOLYN) tablet 2.5 mg  2.5 mg Oral EVERY OTHER DAY    potassium chloride (KLOR-CON) packet for solution 40 mEq  40 mEq Oral BID WITH MEALS    **Can pt supply own Victoza? Please send message to pharmacy  1 Each Other ONCE    [START ON 6/1/2019] bumetanide (BUMEX) injection 2 mg  2 mg IntraVENous DAILY    nitroglycerin (NITROSTAT) tablet 0.4 mg  0.4 mg SubLINGual PRN    insulin glargine (LANTUS) injection 20 Units  20 Units SubCUTAneous QHS    isosorbide mononitrate ER (IMDUR) tablet 30 mg  30 mg Oral DAILY    enoxaparin (LOVENOX) injection 40 mg  40 mg SubCUTAneous Q24H    meclizine (ANTIVERT) tablet 12.5 mg  12.5 mg Oral TID    pantoprazole (PROTONIX) tablet 40 mg  40 mg Oral ACB    . PHARMACY TO SUBSTITUTE PER PROTOCOL (Reordered from: Liraglutide (VICTOZA) 0.6 mg/0.1 mL (18 mg/3 mL) sub-q pen)    Per Protocol    colchicine tablet 0.6 mg  0.6 mg Oral PRN    pramipexole (MIRAPEX) tablet 0.5 mg  0.5 mg Oral QHS    ferrous sulfate tablet 325 mg  325 mg Oral DAILY    tamsulosin (FLOMAX) capsule 0.4 mg  0.4 mg Oral QHS    carvedilol (COREG) tablet 12.5 mg  12.5 mg Oral BID WITH MEALS    febuxostat (ULORIC) tablet 40 mg  40 mg Oral DAILY    gabapentin (NEURONTIN) capsule 300 mg  300 mg Oral QHS    finasteride (PROSCAR) tablet 5 mg  5 mg Oral DAILY    oxyCODONE-acetaminophen (PERCOCET) 5-325 mg per tablet 1 Tab  1 Tab Oral Q8H PRN    sodium chloride (NS) flush 5-40 mL  5-40 mL IntraVENous Q8H    sodium chloride (NS) flush 5-40 mL  5-40 mL IntraVENous PRN    docusate sodium (COLACE) capsule 100 mg  100 mg Oral BID    polyethylene glycol (MIRALAX) packet 17 g  17 g Oral DAILY PRN    senna (SENOKOT) tablet 8.6 mg  1 Tab Oral QHS       Review of Symptoms: Cardiology: negative for palpitations, orthopnea, PND, syncope   Gastrointestinal: negative for abdominal pain, N/V, dysphagia, change in bowel habits, bleeding   Genitourinary: negative for frequency, urgency, dysuria, hematuria, incontinence        Objective:      Physical Exam:  Temp (24hrs), Av.3 °F (36.8 °C), Min:97.1 °F (36.2 °C), Max:100.5 °F (38.1 °C)    Patient Vitals for the past 8 hrs:   Pulse   19 0724 72   19 0247 70    Patient Vitals for the past 8 hrs:   Resp   19 0724 18   19 0247 18    Patient Vitals for the past 8 hrs:   BP   19 0724 134/81   19 139/83          Intake/Output Summary (Last 24 hours) at 2019 0942  Last data filed at 2019 0919  Gross per 24 hour   Intake 1620 ml   Output 3715 ml   Net -2095 ml       Nondiaphoretic, not in acute distress. No scleral icterus, mucous membranes moist, conjuctivae pink, no xanthelasma. L BIV pacer site OK. Unlabored, clear to auscultation bilaterally, symmetric air movement. Regular rate and rhythm, no new murmur, pericardial rub, knock, or gallop. No JVD and trace peripheral edema. Palpable radial pulses bilaterally. Abdomen obese, soft, nontender, nondistended. No abdominal pulstatile masses. Extremities without cyanosis or clubbing. Muscle tone and bulk normal.  Skin warm and dry. No rashes or ulcers. Neuro grossly nonfocal.  No tremor. Awake and appropriate. CARDIOGRAPHICS and STUDIES, I reviewed:    Telemetry:  SR with BIV pacing. ECG:  AV pacing (with BIV pacing) at 70 bpm.    Echo here:  Left Ventricle The estimated ejection fraction is 31 - 35%. The calculated ejection fraction is 33%. Left Atrium Left atrium not well visualized. Moderately dilated and elongated left atrium. Left Atrium volume index is 77.31 mL/m2. Right Ventricle Dilated right ventricle. Reduced systolic function. Right Atrium Dilated right atrium.    Aortic Valve Normal valve structure and trileaflet valve structure. Aortic valve sclerosis. Mild to moderate aortic valve regurgitation. Mitral Valve Mild regurgitation. Tricuspid Valve Mild to moderate tricuspid valve regurgitation. Pulmonary arterial systolic pressure is 81.6 mmHg. Pulmonic Valve Moderate regurgitation. Aorta Normal aortic root. Dilated sinuses of Valsalva; diameter is 4 cm. Dilated aortic root; diameter is 3.3 cm. Dilated ascending aorta; diameter is 3.7 cm. Pulmonary Artery Pulmonary arterial systolic pressure (PASP) is 49 mmHg. IVC/Hepatic Veins Normal central venous pressure (0-5 mmHg); IVC diameter is less than 21 mm and collapses more than 50% with respiration. Mildly dilated inferior vena cava. Pericardium Insignificant pericardial effusion or fat. CXR:   Maybe mild interstitial changes, no pleural effusion or infiltrate. Labs:  Recent Labs     05/30/19  0533   CPK 48   CKMB 1.9   CKNDX 4.0*   TROIQ <0.05     Lab Results   Component Value Date/Time    Cholesterol, total 112 11/27/2018 02:55 PM    HDL Cholesterol 27 (L) 11/27/2018 02:55 PM    LDL, calculated 68 11/27/2018 02:55 PM    Triglyceride 85 11/27/2018 02:55 PM     No results for input(s): INR, PTP, APTT in the last 72 hours. No lab exists for component: Dave Ask   Recent Labs     05/31/19  0255 05/30/19  0336 05/29/19  0322   * 130* 133*   K 3.5 3.3* 3.7   CL 94* 96* 99   CO2 26 28 29   BUN 62* 55* 52*   CREA 2.16* 2.08* 2.03*   * 74 85   CA 8.1* 7.9* 8.4*   WBC 4.6  --   --    HGB 9.5*  --   --    HCT 29.7*  --   --    PLT 71*  --   --      No results for input(s): SGOT, GPT, AP, TBIL, TP, ALB, GLOB, GGT, AML, LPSE in the last 72 hours. No lab exists for component: AMYP, HLPSE  No components found for: GLPOC  No results for input(s): PH, PCO2, PO2 in the last 72 hours.         Yin Driscoll MD  5/31/2019

## 2019-05-31 NOTE — PROGRESS NOTES
Bedside and Verbal shift change report given to LAMINE fink (oncoming nurse). Report included the following information SBAR, Kardex, ED Summary, Procedure Summary, Intake/Output and MAR. SHIFT SUMMARY:  Uneventful shift.

## 2019-05-31 NOTE — ROUTINE PROCESS
RAPID RESPONSE TEAM     Rounded on patient due to rapid response for chest pain.    D/W Melody Uriarte RN  NAD, VSS  No RRT interventions currently indicated. Please call back if needed.   Naty Thompson

## 2019-05-31 NOTE — PROGRESS NOTES
Bedside shift change report given to Portia Eugene RN (oncoming nurse). Report included the following information SBAR, Accordion and Recent Results. SHIFT SUMMARY:       CONCERNS TO ADDRESS WITH MD:        [facility] NURSING NOTE   Admission Date 5/27/2019   Admission Diagnosis Acute on chronic diastolic congestive heart failure, NYHA class 3 ([facility] [address] [I50.33]   Consults IP CONSULT TO CARDIOLOGY  IP CONSULT TO PALLIATIVE CARE - PROVIDER      Cardiac Monitoring [x] Yes [] No      Purposeful Hourly Rounding [x] Yes    Thanh Score Total Score: 2   Thanh score 3 or > [x] Bed Alarm [] Avasys [] 1:1 sitter [] Patient refused (Signed refusal form in chart)   Aurelio Score Aurelio Score: 18   Aurelio score 14 or < [] PMT consult [] Wound Care consult    []  Specialty bed  [] Nutrition consult      Influenza Vaccine Received Flu Vaccine for Current Season (usually Sept-March): Not Flu Season           Oxygen needs? [] Room air Oxygen @  []1L    [x]2L    []3L   []4L    []5L   []6L via  NC   Chronic home O2 use? [] Yes [] No  Perform O2 challenge test and document in progress note using [employer])      Last bowel movement Last Bowel Movement Date: 05/30/19      Urinary Catheter             LDAs               Peripheral IV 05/27/19 Left Arm (Active)   Site Assessment Clean, dry, & intact 5/31/2019  3:00 AM   Phlebitis Assessment 0 5/31/2019  3:00 AM   Infiltration Assessment 0 5/31/2019  3:00 AM   Dressing Status Clean, dry, & intact 5/31/2019  3:00 AM   Dressing Type Tape;Transparent 5/31/2019  3:00 AM   Hub Color/Line Status Pink 5/31/2019  3:00 AM                         Readmission Risk Assessment Tool Score High Risk            40       Total Score        3 Has Seen PCP in Last 6 Months (Yes=3, No=0)    4 IP Visits Last 12 Months (1-3=4, 4=9, >4=11)    5 Pt. Coverage (Medicare=5 , Medicaid, or Self-Pay=4)    28 Charlson Comorbidity Score (Age + Comorbid Conditions)        Criteria that do not apply:    .  Living with Significant Other. Assisted Living. LTAC. SNF.  or   Rehab    Patient Length of Stay (>5 days = 3)       Expected Length of Stay 4d 2h   Actual Length of Stay 4

## 2019-05-31 NOTE — PROGRESS NOTES
RAPID RESPONSE TEAM- Follow Up    Rounded on patient due to recent rapid response Chest Pain (5/30/19). Discussed with primary RN, Luciano Dawn. No acute concerns, VSS. No more c/o chest pain. No RRT interventions indicated at this time. Please call with any questions or concerns.      Laura Mora  Rapid Response RN  Latanya Chawla

## 2019-06-01 ENCOUNTER — APPOINTMENT (OUTPATIENT)
Dept: GENERAL RADIOLOGY | Age: 69
DRG: 291 | End: 2019-06-01
Attending: INTERNAL MEDICINE
Payer: MEDICARE

## 2019-06-01 LAB
ANION GAP SERPL CALC-SCNC: 6 MMOL/L (ref 5–15)
BASOPHILS # BLD: 0 K/UL (ref 0–0.1)
BASOPHILS NFR BLD: 1 % (ref 0–1)
BUN SERPL-MCNC: 66 MG/DL (ref 6–20)
BUN/CREAT SERPL: 29 (ref 12–20)
CALCIUM SERPL-MCNC: 7.9 MG/DL (ref 8.5–10.1)
CHLORIDE SERPL-SCNC: 95 MMOL/L (ref 97–108)
CO2 SERPL-SCNC: 27 MMOL/L (ref 21–32)
CREAT SERPL-MCNC: 2.27 MG/DL (ref 0.7–1.3)
DIFFERENTIAL METHOD BLD: ABNORMAL
EOSINOPHIL # BLD: 0 K/UL (ref 0–0.4)
EOSINOPHIL NFR BLD: 0 % (ref 0–7)
ERYTHROCYTE [DISTWIDTH] IN BLOOD BY AUTOMATED COUNT: 16.3 % (ref 11.5–14.5)
GLUCOSE BLD STRIP.AUTO-MCNC: 108 MG/DL (ref 65–100)
GLUCOSE BLD STRIP.AUTO-MCNC: 115 MG/DL (ref 65–100)
GLUCOSE BLD STRIP.AUTO-MCNC: 123 MG/DL (ref 65–100)
GLUCOSE BLD STRIP.AUTO-MCNC: 167 MG/DL (ref 65–100)
GLUCOSE SERPL-MCNC: 94 MG/DL (ref 65–100)
HCT VFR BLD AUTO: 30.8 % (ref 36.6–50.3)
HGB BLD-MCNC: 9.7 G/DL (ref 12.1–17)
IMM GRANULOCYTES # BLD AUTO: 0 K/UL (ref 0–0.04)
IMM GRANULOCYTES NFR BLD AUTO: 1 % (ref 0–0.5)
LYMPHOCYTES # BLD: 0.4 K/UL (ref 0.8–3.5)
LYMPHOCYTES NFR BLD: 9 % (ref 12–49)
MCH RBC QN AUTO: 29.4 PG (ref 26–34)
MCHC RBC AUTO-ENTMCNC: 31.5 G/DL (ref 30–36.5)
MCV RBC AUTO: 93.3 FL (ref 80–99)
MONOCYTES # BLD: 0.4 K/UL (ref 0–1)
MONOCYTES NFR BLD: 9 % (ref 5–13)
NEUTS SEG # BLD: 3.3 K/UL (ref 1.8–8)
NEUTS SEG NFR BLD: 80 % (ref 32–75)
NRBC # BLD: 0 K/UL (ref 0–0.01)
NRBC BLD-RTO: 0 PER 100 WBC
PLATELET # BLD AUTO: 69 K/UL (ref 150–400)
PMV BLD AUTO: 11.4 FL (ref 8.9–12.9)
POTASSIUM SERPL-SCNC: 4 MMOL/L (ref 3.5–5.1)
RBC # BLD AUTO: 3.3 M/UL (ref 4.1–5.7)
SERVICE CMNT-IMP: ABNORMAL
SODIUM SERPL-SCNC: 128 MMOL/L (ref 136–145)
WBC # BLD AUTO: 4.1 K/UL (ref 4.1–11.1)

## 2019-06-01 PROCEDURE — 74011000250 HC RX REV CODE- 250: Performed by: INTERNAL MEDICINE

## 2019-06-01 PROCEDURE — 94760 N-INVAS EAR/PLS OXIMETRY 1: CPT

## 2019-06-01 PROCEDURE — 80048 BASIC METABOLIC PNL TOTAL CA: CPT

## 2019-06-01 PROCEDURE — 74011250637 HC RX REV CODE- 250/637: Performed by: INTERNAL MEDICINE

## 2019-06-01 PROCEDURE — 85025 COMPLETE CBC W/AUTO DIFF WBC: CPT

## 2019-06-01 PROCEDURE — 74011636637 HC RX REV CODE- 636/637: Performed by: INTERNAL MEDICINE

## 2019-06-01 PROCEDURE — 71046 X-RAY EXAM CHEST 2 VIEWS: CPT

## 2019-06-01 PROCEDURE — 82962 GLUCOSE BLOOD TEST: CPT

## 2019-06-01 PROCEDURE — 74011250636 HC RX REV CODE- 250/636: Performed by: INTERNAL MEDICINE

## 2019-06-01 PROCEDURE — 65660000000 HC RM CCU STEPDOWN

## 2019-06-01 PROCEDURE — 36415 COLL VENOUS BLD VENIPUNCTURE: CPT

## 2019-06-01 PROCEDURE — 74019 RADEX ABDOMEN 2 VIEWS: CPT

## 2019-06-01 RX ORDER — POLYETHYLENE GLYCOL 3350 17 G/17G
17 POWDER, FOR SOLUTION ORAL 2 TIMES DAILY
Status: DISCONTINUED | OUTPATIENT
Start: 2019-06-01 | End: 2019-06-05 | Stop reason: HOSPADM

## 2019-06-01 RX ORDER — TEMAZEPAM 15 MG/1
30 CAPSULE ORAL
Status: DISCONTINUED | OUTPATIENT
Start: 2019-06-01 | End: 2019-06-05 | Stop reason: HOSPADM

## 2019-06-01 RX ORDER — BUMETANIDE 1 MG/1
4 TABLET ORAL DAILY
Status: DISCONTINUED | OUTPATIENT
Start: 2019-06-02 | End: 2019-06-02

## 2019-06-01 RX ADMIN — DOCUSATE SODIUM 100 MG: 100 CAPSULE, LIQUID FILLED ORAL at 10:22

## 2019-06-01 RX ADMIN — INSULIN GLARGINE 20 UNITS: 100 INJECTION, SOLUTION SUBCUTANEOUS at 22:14

## 2019-06-01 RX ADMIN — TEMAZEPAM 30 MG: 15 CAPSULE ORAL at 22:17

## 2019-06-01 RX ADMIN — CARVEDILOL 12.5 MG: 12.5 TABLET, FILM COATED ORAL at 10:22

## 2019-06-01 RX ADMIN — GABAPENTIN 300 MG: 300 CAPSULE ORAL at 22:16

## 2019-06-01 RX ADMIN — PRAMIPEXOLE DIHYDROCHLORIDE 0.5 MG: 0.25 TABLET ORAL at 22:15

## 2019-06-01 RX ADMIN — CARVEDILOL 12.5 MG: 12.5 TABLET, FILM COATED ORAL at 17:19

## 2019-06-01 RX ADMIN — SIMETHICONE CHEW TAB 80 MG 80 MG: 80 TABLET ORAL at 17:18

## 2019-06-01 RX ADMIN — FERROUS SULFATE TAB 325 MG (65 MG ELEMENTAL FE) 325 MG: 325 (65 FE) TAB at 10:22

## 2019-06-01 RX ADMIN — FINASTERIDE 5 MG: 5 TABLET, FILM COATED ORAL at 10:22

## 2019-06-01 RX ADMIN — MECLIZINE 12.5 MG: 12.5 TABLET ORAL at 10:22

## 2019-06-01 RX ADMIN — MECLIZINE 12.5 MG: 12.5 TABLET ORAL at 22:17

## 2019-06-01 RX ADMIN — OXYCODONE HYDROCHLORIDE AND ACETAMINOPHEN 1 TABLET: 5; 325 TABLET ORAL at 00:55

## 2019-06-01 RX ADMIN — Medication 10 ML: at 22:50

## 2019-06-01 RX ADMIN — Medication 10 ML: at 14:15

## 2019-06-01 RX ADMIN — MECLIZINE 12.5 MG: 12.5 TABLET ORAL at 17:19

## 2019-06-01 RX ADMIN — ISOSORBIDE MONONITRATE 30 MG: 30 TABLET, EXTENDED RELEASE ORAL at 10:22

## 2019-06-01 RX ADMIN — POTASSIUM CHLORIDE 40 MEQ: 1.5 POWDER, FOR SOLUTION ORAL at 10:21

## 2019-06-01 RX ADMIN — PANTOPRAZOLE SODIUM 40 MG: 40 TABLET, DELAYED RELEASE ORAL at 10:22

## 2019-06-01 RX ADMIN — BUMETANIDE 2 MG: 0.25 INJECTION INTRAMUSCULAR; INTRAVENOUS at 10:23

## 2019-06-01 RX ADMIN — ENOXAPARIN SODIUM 40 MG: 40 INJECTION SUBCUTANEOUS at 10:23

## 2019-06-01 RX ADMIN — DOCUSATE SODIUM 100 MG: 100 CAPSULE, LIQUID FILLED ORAL at 18:37

## 2019-06-01 RX ADMIN — FEBUXOSTAT 40 MG: 40 TABLET ORAL at 11:51

## 2019-06-01 RX ADMIN — POLYETHYLENE GLYCOL 3350 17 G: 17 POWDER, FOR SOLUTION ORAL at 18:37

## 2019-06-01 RX ADMIN — TAMSULOSIN HYDROCHLORIDE 0.4 MG: 0.4 CAPSULE ORAL at 22:16

## 2019-06-01 RX ADMIN — Medication 10 ML: at 06:08

## 2019-06-01 RX ADMIN — SIMETHICONE CHEW TAB 80 MG 80 MG: 80 TABLET ORAL at 10:22

## 2019-06-01 RX ADMIN — POTASSIUM CHLORIDE 40 MEQ: 1.5 POWDER, FOR SOLUTION ORAL at 17:19

## 2019-06-01 RX ADMIN — SENNOSIDES 8.6 MG: 8.6 TABLET, FILM COATED ORAL at 22:18

## 2019-06-01 NOTE — PROGRESS NOTES
Cardiology Progress Note  6/1/2019     Admit Date: 5/27/2019  Admit Diagnosis: Acute on chronic diastolic congestive heart failure, NYHA class 3 ([facility] [address] [I50.33]  CC: none currently  Usual cardiologist:  Jay Lamb MD     Assessment (as per Dr Arnaud Stapleton): 1. Acute systolic heart failure with volume overload, elevated proBNP, sx.           Last 3 Recorded Weights in this Encounter     05/29/19 0317 05/30/19 0350 05/31/19 0258   Weight: 109.1 kg (240 lb 8 oz) 110.4 kg (243 lb 4.8 oz) 110 kg (242 lb 8.1 oz)      2. Paroxysmal atrial fibrillation and atypical atrial flutter s/p ablation in 5/2018.  NO atrial fibrillation or flutter since the ablation. 3. Hypertensive heart disease with a history of congestive heart failure and CKD stage 4.  EF 55% on prior echo, then 35-40% early 2019. EF 35% by echo in ER. 4. Coronary artery disease with multivessel interventions.  His last stenting was in 2010. Cath in 2014 showed a nondominant RCA with LPDA occluded and filled by collaterals, otherwise up to moderate CAD. 5. Mild idiopathic pericardial effusion in the past.  6. Diabetes mellitus type 2, on chronic insulin. 7. Hyperlipidemia. 8. BRBPR in 1/2019 with grade 1 internal hemorrhoids noted on colo here.  Presumed source of GI bleed. 9. History of esophageal dilation for dysphagia. 10. Hiatal hernia. 11. Anemia of chronic renal disease.  On EPO (and Fe supplement). 12. Moderate thrombocytopenia. 13. Chronic anticoagulation stopped 1/2019. 14. Progressive hyponatremia, multifactorial (volume overload, Na loss during diuresis, etc).    Plan (as per Dr Arnaud Stapleton):      1. Continue IV Bumex but DECREASE to daily. Continue 2g Na diet. Has metolazone 2.5 mg po every other day. 2. Continue beta-blocker. 3. Not a candidate for ACEI or ARB or spironolactone. 4. BIV pacer program change on 5/28:  Changed LV>RV to 50 msec from 20 msec.   Tried to enable MPP using poles 3 and 4 but vectors using these did not capture at max output. 5. Stopped Eliquis given severe anemia requiring transfusion in 2019. On DVT prophylaxis with enoxaparin. 6. Not on antiplatelet therapy. 7. Low suspicion for pulmonary embolism. Will consider pulmonary consult to evaluate for an extracardiac cause of dyspnea beyond CHF if treating CHF doesn't work and ischemia evaluation is nondiagnostic. 8. Overnight pulse oximetry or sleep study?     Regarding the CP, at risk for angina, but may not be, let's medically manage for now, adding oral nitrate. Focus on volume status, still may need an ischemia evaluation as an OP using cardiac PET, a better resolution study than the typical nuclear testing. If his PET is positive, then I'll get Dr. Emani Solis involved to discuss risk:benefit of cath. Exposure to contrast dye with cath +/-PCI would put him at risk for acute renal failure--he says he will NOT due dialysis, would rather die.       Dr. Olivier Lose' help appreciated. 6/1: VSS; Tm 100.5; AV paced on tele. No CP; ?PND vs JAMES: pulse ox tonight. Good UOP overnight (-1.3 per record; net >7L). Hg 9.7; Cr 2.27/BUN 66; HypoNa+: remains on bumex 2 iv bid/metolazone 2.5 qoday; coreg 12.5 bid/imdur 30. Doesn't look overloaded per se: proBNP will be useless with CKD (this hospital doesn't to BNP);   CXR order noted; Hold metolazone for now; bumex to PO starting tomorrow. For other plans, see orders.   Hospital problem list   Active Hospital Problems    Diagnosis Date Noted    Labyrinthitis of both ears 05/29/2019    Acute on chronic diastolic congestive heart failure, NYHA class 3 ([address] 05/27/2019    Hypertension with renal disease 08/05/2017    Controlled type 2 diabetes mellitus with stage 4 chronic kidney disease, without long-term current use of insulin ([address] 08/05/2017    CKD (chronic kidney disease), stage IV ([address] 08/05/2017    ASCVD (arteriosclerotic cardiovascular disease) 08/05/2017    Atrial fibrillation ([address] 2014     S/P ablation          Subjective: Sabina Anjana Salagdo. reports   Chest pain X none  consistent with:  Non-cardiac CP         Atypical CP     None now  On going  Anginal CP     Dyspnea  none  at rest  with exertion        x improved  unchanged  worse              PND  none ? overnight       Orthopnea X none  improved  unchanged  worse   Presyncope X none  improved  unchanged  worse     Ambulated in hallway without symptoms   Yes   Ambulated in room without symptoms  Yes   Objective:    Physical Exam:  Overall VSSAF;    Visit Vitals  /81 (BP 1 Location: Right arm, BP Patient Position: Supine)   Pulse 70   Temp 97.7 °F (36.5 °C)   Resp 18   Ht 6' 4\" (1.93 m)   Wt 110.8 kg (244 lb 4.8 oz)   SpO2 97%   BMI 29.74 kg/m²     Temp (24hrs), Av °F (36.7 °C), Min:97.3 °F (36.3 °C), Max:98.8 °F (37.1 °C)    Patient Vitals for the past 8 hrs:   Pulse   19 0738 70   19 0354 70     Patient Vitals for the past 8 hrs:   Resp   19 0738 18   19 0354 18     Patient Vitals for the past 8 hrs:   BP   19 0738 145/81   19 0354 140/84       Intake/Output Summary (Last 24 hours) at 2019 1136  Last data filed at 2019 0347  Gross per 24 hour   Intake 1200 ml   Output 2125 ml   Net -925 ml     General Appearance: Well developed, well nourished, no acute distress. Ears/Nose/Mouth/Throat:   Normal MM; anicteric. JVP: WNL   Resp:   clear to auscultation bilaterally. Nl resp effort. Cardiovascular:  RRR, S1, S2 normal, no new murmur. No gallop or rub. Abdomen:   Soft, non-tender, bowel sounds are present. Extremities: No edema bilaterally. Skin:  Neuro: Warm and dry.   A/O x3, grossly nonfocal   cath site intact w/o hematoma or new bruit; distal pulse unchanged  Yes   Data Review:     Telemetry independently reviewed  sinus x AV paced  parox afib  NSVT     ECG independently reviewed  NSR  afib  no significant changes  NSST-Tw chgs   x no new ECG provided for review Lab results reviewed as noted below. Current medications reviewed as noted below. No results for input(s): PH, PCO2, PO2 in the last 72 hours. Recent Labs     05/30/19  0533   CPK 48   CKMB 1.9   CKNDX 4.0*   TROIQ <0.05     Recent Labs     06/01/19  0402 05/31/19  0255 05/30/19  0336   * 129* 130*   K 4.0 3.5 3.3*   CL 95* 94* 96*   CO2 27 26 28   BUN 66* 62* 55*   CREA 2.27* 2.16* 2.08*   GFRAA 35* 37* 38*   GLU 94 104* 74   CA 7.9* 8.1* 7.9*   WBC 4.1 4.6  --    HGB 9.7* 9.5*  --    HCT 30.8* 29.7*  --    PLT 69* 71*  --      Lab Results   Component Value Date/Time    Cholesterol, total 112 11/27/2018 02:55 PM    HDL Cholesterol 27 (L) 11/27/2018 02:55 PM    LDL, calculated 68 11/27/2018 02:55 PM    Triglyceride 85 11/27/2018 02:55 PM     No results for input(s): SGOT, GPT, AP, TBIL, TP, ALB, GLOB, GGT, AML, LPSE in the last 72 hours. No lab exists for component: AMYP, HLPSE  No results for input(s): INR, PTP, APTT in the last 72 hours.     No lab exists for component: INREXT   No components found for: GLPOC    Current Facility-Administered Medications   Medication Dose Route Frequency    polyethylene glycol (MIRALAX) packet 17 g  17 g Oral BID    temazepam (RESTORIL) capsule 30 mg  30 mg Oral QHS    simethicone (MYLICON) tablet 80 mg  80 mg Oral TIDAC    metOLazone (ZAROXOLYN) tablet 2.5 mg  2.5 mg Oral EVERY OTHER DAY    potassium chloride (KLOR-CON) packet for solution 40 mEq  40 mEq Oral BID WITH MEALS    bumetanide (BUMEX) injection 2 mg  2 mg IntraVENous DAILY    **Please send message to pharmacy when Pt's Victoza has been brought from home **  1 Each Other BID    nitroglycerin (NITROSTAT) tablet 0.4 mg  0.4 mg SubLINGual PRN    insulin glargine (LANTUS) injection 20 Units  20 Units SubCUTAneous QHS    isosorbide mononitrate ER (IMDUR) tablet 30 mg  30 mg Oral DAILY    enoxaparin (LOVENOX) injection 40 mg  40 mg SubCUTAneous Q24H    meclizine (ANTIVERT) tablet 12.5 mg  12.5 mg Oral TID    pantoprazole (PROTONIX) tablet 40 mg  40 mg Oral ACB    . PHARMACY TO SUBSTITUTE PER PROTOCOL (Reordered from: Liraglutide (VICTOZA) 0.6 mg/0.1 mL (18 mg/3 mL) sub-q pen)    Per Protocol    colchicine tablet 0.6 mg  0.6 mg Oral PRN    pramipexole (MIRAPEX) tablet 0.5 mg  0.5 mg Oral QHS    ferrous sulfate tablet 325 mg  325 mg Oral DAILY    tamsulosin (FLOMAX) capsule 0.4 mg  0.4 mg Oral QHS    carvedilol (COREG) tablet 12.5 mg  12.5 mg Oral BID WITH MEALS    febuxostat (ULORIC) tablet 40 mg  40 mg Oral DAILY    gabapentin (NEURONTIN) capsule 300 mg  300 mg Oral QHS    finasteride (PROSCAR) tablet 5 mg  5 mg Oral DAILY    oxyCODONE-acetaminophen (PERCOCET) 5-325 mg per tablet 1 Tab  1 Tab Oral Q8H PRN    sodium chloride (NS) flush 5-40 mL  5-40 mL IntraVENous Q8H    sodium chloride (NS) flush 5-40 mL  5-40 mL IntraVENous PRN    docusate sodium (COLACE) capsule 100 mg  100 mg Oral BID    senna (SENOKOT) tablet 8.6 mg  1 Tab Oral QHS        Rachel Forde MD

## 2019-06-01 NOTE — PROGRESS NOTES
PROGRESS NOTE    NAME:  Royal Olivier Mckeon. :   1950   MRN:   [id number]     Date/Time:  2019 9:51 AM  Subjective:   History:  Esther Hamilton is a 77 y.o.  white male who presented with increasing SOB and progressive CHF acute on chronic. He asked for me to see him for evaluation of dizziness and nausea which he awakened with on  as well as other medical problems. He has no further dizziness or N/V, but has had a lot of gas and bloating over the last few days and has been 3 days w/o BM. He has been breathing better when he has passage of a lot of gas. He has had good diuresis over the last 24 hr. (-[id number]) yet his weight is recorded as increased so ?? . He has no current orthopnea which he had on admission and has had no SOB on oxygen overnight. On  he had some CP relieved with 2 NTG SL. He has no other cardiac or respiratory c/o. He has no other GI c/o as well as he has no  c/o. There are no further neurologic c/o. He has no other c/o on complete ROS. Of note is that despite his gaining weight and progressive SOB for a few days PTA he had not taken his Zaroxolyn which he has to take in this situation. He has no other c/o on complete ROS.           Medications reviewed:  Current Facility-Administered Medications   Medication Dose Route Frequency    simethicone (MYLICON) tablet 80 mg  80 mg Oral TIDAC    metOLazone (ZAROXOLYN) tablet 2.5 mg  2.5 mg Oral EVERY OTHER DAY    potassium chloride (KLOR-CON) packet for solution 40 mEq  40 mEq Oral BID WITH MEALS    bumetanide (BUMEX) injection 2 mg  2 mg IntraVENous DAILY    **Please send message to pharmacy when Pt's Victoza has been brought from home **  1 Each Other BID    nitroglycerin (NITROSTAT) tablet 0.4 mg  0.4 mg SubLINGual PRN    insulin glargine (LANTUS) injection 20 Units  20 Units SubCUTAneous QHS    isosorbide mononitrate ER (IMDUR) tablet 30 mg  30 mg Oral DAILY    enoxaparin (LOVENOX) injection 40 mg  40 mg SubCUTAneous Q24H    meclizine (ANTIVERT) tablet 12.5 mg  12.5 mg Oral TID    pantoprazole (PROTONIX) tablet 40 mg  40 mg Oral ACB    . PHARMACY TO SUBSTITUTE PER PROTOCOL (Reordered from: Liraglutide (VICTOZA) 0.6 mg/0.1 mL (18 mg/3 mL) sub-q pen)    Per Protocol    colchicine tablet 0.6 mg  0.6 mg Oral PRN    pramipexole (MIRAPEX) tablet 0.5 mg  0.5 mg Oral QHS    ferrous sulfate tablet 325 mg  325 mg Oral DAILY    tamsulosin (FLOMAX) capsule 0.4 mg  0.4 mg Oral QHS    carvedilol (COREG) tablet 12.5 mg  12.5 mg Oral BID WITH MEALS    febuxostat (ULORIC) tablet 40 mg  40 mg Oral DAILY    gabapentin (NEURONTIN) capsule 300 mg  300 mg Oral QHS    finasteride (PROSCAR) tablet 5 mg  5 mg Oral DAILY    oxyCODONE-acetaminophen (PERCOCET) 5-325 mg per tablet 1 Tab  1 Tab Oral Q8H PRN    sodium chloride (NS) flush 5-40 mL  5-40 mL IntraVENous Q8H    sodium chloride (NS) flush 5-40 mL  5-40 mL IntraVENous PRN    docusate sodium (COLACE) capsule 100 mg  100 mg Oral BID    polyethylene glycol (MIRALAX) packet 17 g  17 g Oral DAILY PRN    senna (SENOKOT) tablet 8.6 mg  1 Tab Oral QHS        Objective:   Vitals:  Visit Vitals  /81 (BP 1 Location: Right arm, BP Patient Position: Supine)   Pulse 70   Temp 97.7 °F (36.5 °C)   Resp 18   Ht 6' 4\" (1.93 m)   Wt 244 lb 4.8 oz (110.8 kg)   SpO2 97%   BMI 29.74 kg/m²    O2 Flow Rate (L/min): 2 l/min O2 Device: Room air Temp (24hrs), Av.9 °F (36.6 °C), Min:97.3 °F (36.3 °C), Max:98.8 °F (37.1 °C)      Last 24hr Input/Output:    Intake/Output Summary (Last 24 hours) at 2019 0906  Last data filed at 2019 0347  Gross per 24 hour   Intake 1200 ml   Output 2475 ml   Net -1275 ml        PHYSICAL EXAM:  General:     Alert, cooperative, no distress, appears stated age. Head:    Normocephalic, without obvious abnormality, atraumatic. Eyes:    Conjunctivae/corneas clear. PERRLA  Nose:   Nares normal. No drainage or sinus tenderness.   Throat:     Lips, mucosa, and tongue normal.  No Thrush  Neck:   Supple, symmetrical,  no adenopathy, thyroid: non tender     no carotid bruit and no JVD. Back:     Symmetric,  No CVA tenderness. Lungs:    Clear to auscultation bilaterally. No Wheezing or Rhonchi. No rales. Heart:    Regular rate and rhythm,  no murmur, rub or gallop. Abdomen:    Soft, non-tender. Not distended. Bowel sounds normal. No masses  Extremities:  Extremities normal, atraumatic, No cyanosis. No edema. No clubbing  Lymph nodes:  Cervical, supraclavicular normal.  Neurologic:  Normal strength, Alert and oriented X 3.    Skin:                No rash      Lab Data Reviewed:    Recent Results (from the past 24 hour(s))   GLUCOSE, POC    Collection Time: 05/31/19 11:26 AM   Result Value Ref Range    Glucose (POC) 135 (H) 65 - 100 mg/dL    Performed by Nicole Maldonado (PCT)    GLUCOSE, POC    Collection Time: 05/31/19  4:41 PM   Result Value Ref Range    Glucose (POC) 135 (H) 65 - 100 mg/dL    Performed by Nicole Maldonado (PCT)    GLUCOSE, POC    Collection Time: 05/31/19  8:50 PM   Result Value Ref Range    Glucose (POC) 170 (H) 65 - 100 mg/dL    Performed by Melba Ford (PCT)    METABOLIC PANEL, BASIC    Collection Time: 06/01/19  4:02 AM   Result Value Ref Range    Sodium 128 (L) 136 - 145 mmol/L    Potassium 4.0 3.5 - 5.1 mmol/L    Chloride 95 (L) 97 - 108 mmol/L    CO2 27 21 - 32 mmol/L    Anion gap 6 5 - 15 mmol/L    Glucose 94 65 - 100 mg/dL    BUN 66 (H) 6 - 20 MG/DL    Creatinine 2.27 (H) 0.70 - 1.30 MG/DL    BUN/Creatinine ratio 29 (H) 12 - 20      GFR est AA 35 (L) >60 ml/min/1.73m2    GFR est non-AA 29 (L) >60 ml/min/1.73m2    Calcium 7.9 (L) 8.5 - 10.1 MG/DL   CBC WITH AUTOMATED DIFF    Collection Time: 06/01/19  4:02 AM   Result Value Ref Range    WBC 4.1 4.1 - 11.1 K/uL    RBC 3.30 (L) 4.10 - 5.70 M/uL    HGB 9.7 (L) 12.1 - 17.0 g/dL    HCT 30.8 (L) 36.6 - 50.3 %    MCV 93.3 80.0 - 99.0 FL    MCH 29.4 26.0 - 34.0 PG    MCHC 31.5 30.0 - 36.5 g/dL    RDW 16.3 (H) 11.5 - 14.5 %    PLATELET 69 (L) 404 - 400 K/uL    MPV 11.4 8.9 - 12.9 FL    NRBC 0.0 0  WBC    ABSOLUTE NRBC 0.00 0.00 - 0.01 K/uL    NEUTROPHILS 80 (H) 32 - 75 %    LYMPHOCYTES 9 (L) 12 - 49 %    MONOCYTES 9 5 - 13 %    EOSINOPHILS 0 0 - 7 %    BASOPHILS 1 0 - 1 %    IMMATURE GRANULOCYTES 1 (H) 0.0 - 0.5 %    ABS. NEUTROPHILS 3.3 1.8 - 8.0 K/UL    ABS. LYMPHOCYTES 0.4 (L) 0.8 - 3.5 K/UL    ABS. MONOCYTES 0.4 0.0 - 1.0 K/UL    ABS. EOSINOPHILS 0.0 0.0 - 0.4 K/UL    ABS. BASOPHILS 0.0 0.0 - 0.1 K/UL    ABS. IMM. GRANS. 0.0 0.00 - 0.04 K/UL    DF AUTOMATED     GLUCOSE, POC    Collection Time: 06/01/19  7:20 AM   Result Value Ref Range    Glucose (POC) 108 (H) 65 - 100 mg/dL    Performed by Thalia Walker (PCT)          Assessment/Plan:     Principal Problem:    Acute on chronic diastolic congestive heart failure, NYHA class 3 ([facility] (5/27/2019)    Active Problems:    Atrial fibrillation ([facility] (3/7/2014)      Overview: S/P ablation      ASCVD (arteriosclerotic cardiovascular disease) (8/5/2017)      Controlled type 2 diabetes mellitus with stage 4 chronic kidney disease, without long-term current use of insulin ([facility] (8/5/2017)      CKD (chronic kidney disease), stage IV ([facility] (8/5/2017)      Hypertension with renal disease (8/5/2017)      Labyrinthitis of both ears (5/29/2019)       ___________________________________________________  PLAN:    1. BS s OK and continue to follow and treat with SSI PRN  2. Added meclizine on 5/29 to cover labyrinthitis possibility  3. BP actually elevated on occasion so follow closely  4. Note BUN 66 with baseline when dry  BUN, Follow renal function  5. Continue Diuresis (Takes Bumex 4mg AM and 2 mg PM at home)  6. PRN Zaroxolyn, ordered another dose for tomorrow AM  7. Note ? Of ischemia and agree with Dr. Purvi Roberts thoughts on that; however may need to resort to catherization  8.  Continue Lovenox for DVT prevention, but to be sure of cause of CP and SOB obtained V/Q scan yesterday (Low Probability)  9. Continue Lantus, but decreased to 20 U hs  10. Continue Protonix for nausea and SUP  11. Replete K, 4.0 today  12. Continue all other current orders, reviewed  13. Added Simethicone for bloating   14. Will repeat CXR  15. Flat and upright abdominal films  16.  Start Restoril HS      40 minutes spent in direct care of this high complexity patient today with extremely high risk of decompensation          ___________________________________________________    Attending Physician: Kvng Eastman MD

## 2019-06-01 NOTE — PROGRESS NOTES
Bedside and Verbal shift change report given to [facility], RN (oncoming nurse). Report included the following information SBAR. SHIFT SUMMARY:        CONCERNS TO ADDRESS WITH MD:          [address] NURSING NOTE   Admission Date 5/27/2019   Admission Diagnosis Acute on chronic diastolic congestive heart failure, NYHA class 3 ([facility] [I50.33]   Consults IP CONSULT TO CARDIOLOGY  IP CONSULT TO PALLIATIVE CARE - PROVIDER      Cardiac Monitoring [] Yes [] No      Purposeful Hourly Rounding [] Yes    Thanh Score Total Score: 4   Thanh score 3 or > [] Bed Alarm [] Avasys [] 1:1 sitter [] Patient refused (Signed refusal form in chart)   Aurelio Score Aurelio Score: 19   Aurelio score 14 or < [] PMT consult [] Wound Care consult    []  Specialty bed  [] Nutrition consult      Influenza Vaccine Received Flu Vaccine for Current Season (usually Sept-March): Not Flu Season           Oxygen needs? [] Room air Oxygen @  []1L    []2L    []3L   []4L    []5L   []6L via  NC   Chronic home O2 use? [] Yes [] No  Perform O2 challenge test and document in progress note using [employer])      Last bowel movement Last Bowel Movement Date: 06/01/19      Urinary Catheter             LDAs               Peripheral IV 05/27/19 Left Arm (Active)   Site Assessment Clean, dry, & intact 6/1/2019  9:00 AM   Phlebitis Assessment 0 6/1/2019  9:00 AM   Infiltration Assessment 0 6/1/2019  9:00 AM   Dressing Status Clean, dry, & intact 6/1/2019  9:00 AM   Dressing Type Transparent 6/1/2019  9:00 AM   Hub Color/Line Status Pink;Patent 6/1/2019  3:47 AM                         Readmission Risk Assessment Tool Score High Risk            43       Total Score        3 Has Seen PCP in Last 6 Months (Yes=3, No=0)    3 Patient Length of Stay (>5 days = 3)    4 IP Visits Last 12 Months (1-3=4, 4=9, >4=11)    5 Pt.  Coverage (Medicare=5 , Medicaid, or Self-Pay=4)    28 Charlson Comorbidity Score (Age + Comorbid Conditions)        Criteria that do not apply:    . Living with Significant Other. Assisted Living. LTAC. SNF.  or   Rehab       Expected Length of Stay 4d 2h   Actual Length of Stay 5

## 2019-06-01 NOTE — PROGRESS NOTES
Bedside and Verbal shift change report given to [employer]. Report included the following information SBAR, Kardex and Recent Results.

## 2019-06-01 NOTE — PROGRESS NOTES
Upon assessment, pt noted High fall risk d/t statement \" I've fallen seven times in the past year\", pt also stated, \"no feelings in feet, because of diabetes\". Pt education on fall risk, safety measures, call bell, bed/chair alarm. Pt refused alarms. Pt agreed to signing fall risk refusal form. Awaiting charge nurse discussion with for fall risk/ fall refusal signature. Call bell within reach, walker at bedside. Pt voiced understanding A/0O x4.

## 2019-06-02 LAB
ALBUMIN SERPL-MCNC: 3 G/DL (ref 3.5–5)
ALBUMIN/GLOB SERPL: 0.8 {RATIO} (ref 1.1–2.2)
ALP SERPL-CCNC: 148 U/L (ref 45–117)
ALT SERPL-CCNC: 13 U/L (ref 12–78)
ANION GAP SERPL CALC-SCNC: 6 MMOL/L (ref 5–15)
AST SERPL-CCNC: 16 U/L (ref 15–37)
BASOPHILS # BLD: 0 K/UL (ref 0–0.1)
BASOPHILS NFR BLD: 1 % (ref 0–1)
BILIRUB SERPL-MCNC: 0.7 MG/DL (ref 0.2–1)
BUN SERPL-MCNC: 63 MG/DL (ref 6–20)
BUN/CREAT SERPL: 29 (ref 12–20)
CALCIUM SERPL-MCNC: 8.1 MG/DL (ref 8.5–10.1)
CHLORIDE SERPL-SCNC: 95 MMOL/L (ref 97–108)
CO2 SERPL-SCNC: 28 MMOL/L (ref 21–32)
CREAT SERPL-MCNC: 2.21 MG/DL (ref 0.7–1.3)
DIFFERENTIAL METHOD BLD: ABNORMAL
EOSINOPHIL # BLD: 0 K/UL (ref 0–0.4)
EOSINOPHIL NFR BLD: 0 % (ref 0–7)
ERYTHROCYTE [DISTWIDTH] IN BLOOD BY AUTOMATED COUNT: 16.3 % (ref 11.5–14.5)
GLOBULIN SER CALC-MCNC: 3.8 G/DL (ref 2–4)
GLUCOSE BLD STRIP.AUTO-MCNC: 134 MG/DL (ref 65–100)
GLUCOSE BLD STRIP.AUTO-MCNC: 187 MG/DL (ref 65–100)
GLUCOSE BLD STRIP.AUTO-MCNC: 190 MG/DL (ref 65–100)
GLUCOSE BLD STRIP.AUTO-MCNC: 199 MG/DL (ref 65–100)
GLUCOSE SERPL-MCNC: 114 MG/DL (ref 65–100)
HCT VFR BLD AUTO: 31.1 % (ref 36.6–50.3)
HGB BLD-MCNC: 9.9 G/DL (ref 12.1–17)
IMM GRANULOCYTES # BLD AUTO: 0 K/UL (ref 0–0.04)
IMM GRANULOCYTES NFR BLD AUTO: 0 % (ref 0–0.5)
LYMPHOCYTES # BLD: 0.5 K/UL (ref 0.8–3.5)
LYMPHOCYTES NFR BLD: 14 % (ref 12–49)
MCH RBC QN AUTO: 29.6 PG (ref 26–34)
MCHC RBC AUTO-ENTMCNC: 31.8 G/DL (ref 30–36.5)
MCV RBC AUTO: 92.8 FL (ref 80–99)
MONOCYTES # BLD: 0.4 K/UL (ref 0–1)
MONOCYTES NFR BLD: 10 % (ref 5–13)
NEUTS SEG # BLD: 2.7 K/UL (ref 1.8–8)
NEUTS SEG NFR BLD: 75 % (ref 32–75)
NRBC # BLD: 0 K/UL (ref 0–0.01)
NRBC BLD-RTO: 0 PER 100 WBC
PLATELET # BLD AUTO: 68 K/UL (ref 150–400)
PMV BLD AUTO: 11.1 FL (ref 8.9–12.9)
POTASSIUM SERPL-SCNC: 4.3 MMOL/L (ref 3.5–5.1)
PROT SERPL-MCNC: 6.8 G/DL (ref 6.4–8.2)
RBC # BLD AUTO: 3.35 M/UL (ref 4.1–5.7)
SERVICE CMNT-IMP: ABNORMAL
SODIUM SERPL-SCNC: 129 MMOL/L (ref 136–145)
WBC # BLD AUTO: 3.7 K/UL (ref 4.1–11.1)

## 2019-06-02 PROCEDURE — 74011250636 HC RX REV CODE- 250/636: Performed by: INTERNAL MEDICINE

## 2019-06-02 PROCEDURE — 74011250637 HC RX REV CODE- 250/637: Performed by: INTERNAL MEDICINE

## 2019-06-02 PROCEDURE — 80053 COMPREHEN METABOLIC PANEL: CPT

## 2019-06-02 PROCEDURE — 36415 COLL VENOUS BLD VENIPUNCTURE: CPT

## 2019-06-02 PROCEDURE — 77030011256 HC DRSG MEPILEX <16IN NO BORD MOLN -A

## 2019-06-02 PROCEDURE — 85025 COMPLETE CBC W/AUTO DIFF WBC: CPT

## 2019-06-02 PROCEDURE — 82962 GLUCOSE BLOOD TEST: CPT

## 2019-06-02 PROCEDURE — 65660000000 HC RM CCU STEPDOWN

## 2019-06-02 PROCEDURE — 74011636637 HC RX REV CODE- 636/637: Performed by: INTERNAL MEDICINE

## 2019-06-02 RX ORDER — METOLAZONE 2.5 MG/1
2.5 TABLET ORAL ONCE
Status: COMPLETED | OUTPATIENT
Start: 2019-06-02 | End: 2019-06-02

## 2019-06-02 RX ORDER — BUMETANIDE 1 MG/1
4 TABLET ORAL 2 TIMES DAILY
Status: DISCONTINUED | OUTPATIENT
Start: 2019-06-02 | End: 2019-06-05 | Stop reason: HOSPADM

## 2019-06-02 RX ADMIN — TAMSULOSIN HYDROCHLORIDE 0.4 MG: 0.4 CAPSULE ORAL at 21:41

## 2019-06-02 RX ADMIN — MECLIZINE 12.5 MG: 12.5 TABLET ORAL at 21:41

## 2019-06-02 RX ADMIN — INSULIN GLARGINE 20 UNITS: 100 INJECTION, SOLUTION SUBCUTANEOUS at 21:41

## 2019-06-02 RX ADMIN — METOLAZONE 2.5 MG: 2.5 TABLET ORAL at 09:29

## 2019-06-02 RX ADMIN — OXYCODONE HYDROCHLORIDE AND ACETAMINOPHEN 1 TABLET: 5; 325 TABLET ORAL at 09:41

## 2019-06-02 RX ADMIN — DOCUSATE SODIUM 100 MG: 100 CAPSULE, LIQUID FILLED ORAL at 18:52

## 2019-06-02 RX ADMIN — SENNOSIDES 8.6 MG: 8.6 TABLET, FILM COATED ORAL at 21:41

## 2019-06-02 RX ADMIN — SIMETHICONE CHEW TAB 80 MG 80 MG: 80 TABLET ORAL at 08:04

## 2019-06-02 RX ADMIN — POTASSIUM CHLORIDE 40 MEQ: 1.5 POWDER, FOR SOLUTION ORAL at 18:52

## 2019-06-02 RX ADMIN — POLYETHYLENE GLYCOL 3350 17 G: 17 POWDER, FOR SOLUTION ORAL at 18:52

## 2019-06-02 RX ADMIN — ISOSORBIDE MONONITRATE 30 MG: 30 TABLET, EXTENDED RELEASE ORAL at 08:04

## 2019-06-02 RX ADMIN — PANTOPRAZOLE SODIUM 40 MG: 40 TABLET, DELAYED RELEASE ORAL at 08:04

## 2019-06-02 RX ADMIN — SIMETHICONE CHEW TAB 80 MG 80 MG: 80 TABLET ORAL at 12:27

## 2019-06-02 RX ADMIN — BUMETANIDE 4 MG: 1 TABLET ORAL at 08:03

## 2019-06-02 RX ADMIN — SIMETHICONE CHEW TAB 80 MG 80 MG: 80 TABLET ORAL at 18:52

## 2019-06-02 RX ADMIN — FERROUS SULFATE TAB 325 MG (65 MG ELEMENTAL FE) 325 MG: 325 (65 FE) TAB at 08:04

## 2019-06-02 RX ADMIN — FEBUXOSTAT 40 MG: 40 TABLET ORAL at 09:41

## 2019-06-02 RX ADMIN — POTASSIUM CHLORIDE 40 MEQ: 1.5 POWDER, FOR SOLUTION ORAL at 08:04

## 2019-06-02 RX ADMIN — Medication 10 ML: at 08:12

## 2019-06-02 RX ADMIN — POLYETHYLENE GLYCOL 3350 17 G: 17 POWDER, FOR SOLUTION ORAL at 08:04

## 2019-06-02 RX ADMIN — TEMAZEPAM 30 MG: 15 CAPSULE ORAL at 21:41

## 2019-06-02 RX ADMIN — MECLIZINE 12.5 MG: 12.5 TABLET ORAL at 18:52

## 2019-06-02 RX ADMIN — BUMETANIDE 4 MG: 1 TABLET ORAL at 18:52

## 2019-06-02 RX ADMIN — CARVEDILOL 12.5 MG: 12.5 TABLET, FILM COATED ORAL at 18:52

## 2019-06-02 RX ADMIN — DOCUSATE SODIUM 100 MG: 100 CAPSULE, LIQUID FILLED ORAL at 08:04

## 2019-06-02 RX ADMIN — Medication 10 ML: at 21:42

## 2019-06-02 RX ADMIN — ENOXAPARIN SODIUM 40 MG: 40 INJECTION SUBCUTANEOUS at 08:04

## 2019-06-02 RX ADMIN — FINASTERIDE 5 MG: 5 TABLET, FILM COATED ORAL at 08:04

## 2019-06-02 RX ADMIN — GABAPENTIN 300 MG: 300 CAPSULE ORAL at 21:41

## 2019-06-02 RX ADMIN — CARVEDILOL 12.5 MG: 12.5 TABLET, FILM COATED ORAL at 08:04

## 2019-06-02 RX ADMIN — PRAMIPEXOLE DIHYDROCHLORIDE 0.5 MG: 0.25 TABLET ORAL at 21:41

## 2019-06-02 RX ADMIN — MECLIZINE 12.5 MG: 12.5 TABLET ORAL at 08:04

## 2019-06-02 NOTE — PROGRESS NOTES
Cardiology Progress Note  6/2/2019     Admit Date: 5/27/2019  Admit Diagnosis: Acute on chronic diastolic congestive heart failure, NYHA class 3 ([facility] [I50.33]  CC: none currently  Usual cardiologist:  Hector Solis MD     Assessment (as per Dr Js Lou): 1. Acute systolic heart failure with volume overload, elevated proBNP, sx.           Last 3 Recorded Weights in this Encounter     05/29/19 0317 05/30/19 0350 05/31/19 0258   Weight: 109.1 kg (240 lb 8 oz) 110.4 kg (243 lb 4.8 oz) 110 kg (242 lb 8.1 oz)      2. Paroxysmal atrial fibrillation and atypical atrial flutter s/p ablation in 5/2018.  NO atrial fibrillation or flutter since the ablation. 3. Hypertensive heart disease with a history of congestive heart failure and CKD stage 4.  EF 55% on prior echo, then 35-40% early 2019. EF 35% by echo in ER. 4. Coronary artery disease with multivessel interventions.  His last stenting was in 2010. Cath in 2014 showed a nondominant RCA with LPDA occluded and filled by collaterals, otherwise up to moderate CAD. 5. Mild idiopathic pericardial effusion in the past.  6. Diabetes mellitus type 2, on chronic insulin. 7. Hyperlipidemia. 8. BRBPR in 1/2019 with grade 1 internal hemorrhoids noted on colo here.  Presumed source of GI bleed. 9. History of esophageal dilation for dysphagia. 10. Hiatal hernia. 11. Anemia of chronic renal disease.  On EPO (and Fe supplement). 12. Moderate thrombocytopenia. 13. Chronic anticoagulation stopped 1/2019. 14. Progressive hyponatremia, multifactorial (volume overload, Na loss during diuresis, etc).    Plan (as per Dr Js Lou):      1. Continue IV Bumex but DECREASE to daily. Continue 2g Na diet. Has metolazone 2.5 mg po every other day. 2. Continue beta-blocker. 3. Not a candidate for ACEI or ARB or spironolactone. 4. BIV pacer program change on 5/28:  Changed LV>RV to 50 msec from 20 msec.   Tried to enable MPP using poles 3 and 4 but vectors using these did not capture at max output. 5. Stopped Eliquis given severe anemia requiring transfusion in 2019. On DVT prophylaxis with enoxaparin. 6. Not on antiplatelet therapy. 7. Low suspicion for pulmonary embolism. Will consider pulmonary consult to evaluate for an extracardiac cause of dyspnea beyond CHF if treating CHF doesn't work and ischemia evaluation is nondiagnostic. 8. Overnight pulse oximetry or sleep study?     Regarding the CP, at risk for angina, but may not be, let's medically manage for now, adding oral nitrate. Focus on volume status, still may need an ischemia evaluation as an OP using cardiac PET, a better resolution study than the typical nuclear testing. If his PET is positive, then I'll get Dr. Mehul Covarrubias involved to discuss risk:benefit of cath. Exposure to contrast dye with cath +/-PCI would put him at risk for acute renal failure--he says he will NOT due dialysis, would rather die.       Dr. Mcduffie Mis' help appreciated. 6/1: VSS; Tm 100.5; AV paced on tele. No CP; ?PND vs JAMES: pulse ox tonight. Good UOP overnight (-1.3 per record; net >7L). Hg 9.7; Cr 2.27/BUN 66; HypoNa+: remains on bumex 2 iv bid/metolazone 2.5 qoday; coreg 12.5 bid/imdur 30. Doesn't look overloaded per se: proBNP will be useless with CKD (this hospital doesn't to BNP);   CXR order noted; Hold metolazone for now; bumex to PO starting tomorrow. 6/2: VSSAF; AV paced;   Poor net neg; CXR \"central congestion\"; Bumex to 4 PO bid noted. Na remains 129 & Cr 2.2. Overnight pulse ox report is pending; No CP/intermittent acute dyspnea resolves spont or w/ O2. For other plans, see orders.   Hospital problem list   Active Hospital Problems    Diagnosis Date Noted    Labyrinthitis of both ears 05/29/2019    Acute on chronic diastolic congestive heart failure, NYHA class 3 ([facility] 05/27/2019    Hypertension with renal disease 08/05/2017    Controlled type 2 diabetes mellitus with stage 4 chronic kidney disease, without long-term current use of insulin ([facility] 2017    CKD (chronic kidney disease), stage IV ([facility] 2017    ASCVD (arteriosclerotic cardiovascular disease) 2017    Atrial fibrillation ([facility] 2014     S/P ablation          Subjective: Royal Anjana Salgado. reports   Chest pain X none  consistent with:  Non-cardiac CP         Atypical CP     None now  On going  Anginal CP     Dyspnea  none  at rest  with exertion        x improved  unchanged  worse              PND  none ? overnight       Orthopnea X none  improved  unchanged  worse   Presyncope X none  improved  unchanged  worse     Ambulated in hallway without symptoms   Yes   Ambulated in room without symptoms  Yes   Objective:    Physical Exam:  Overall VSSAF;    Visit Vitals  /86 (BP 1 Location: Right arm, BP Patient Position: At rest)   Pulse 69   Temp 98 °F (36.7 °C)   Resp 18   Ht 6' 4\" (1.93 m)   Wt 109.1 kg (240 lb 8 oz)   SpO2 91%   BMI 29.27 kg/m²     Temp (24hrs), Av.8 °F (36.6 °C), Min:97.5 °F (36.4 °C), Max:98.1 °F (36.7 °C)    Patient Vitals for the past 8 hrs:   Pulse   19 1032 69   19 0929 71   19 0721 69   19 0457 70     Patient Vitals for the past 8 hrs:   Resp   19 1032 18   19 0721 20   19 0457 18     Patient Vitals for the past 8 hrs:   BP   19 1032 136/86   19 0929 135/85   19 0721 135/83   19 0457 134/80       Intake/Output Summary (Last 24 hours) at 2019 1053  Last data filed at 2019 1034  Gross per 24 hour   Intake 720 ml   Output 800 ml   Net -80 ml     General Appearance: Well developed, well nourished, no acute distress. Ears/Nose/Mouth/Throat:   Normal MM; anicteric. JVP: WNL   Resp:   clear to auscultation bilaterally. Nl resp effort. Cardiovascular:  RRR, S1, S2 normal, no new murmur. No gallop or rub. Abdomen:   Soft, non-tender, bowel sounds are present. Extremities: No edema bilaterally.     Skin:  Neuro: Warm and dry. A/O x3, grossly nonfocal   cath site intact w/o hematoma or new bruit; distal pulse unchanged  Yes   Data Review:     Telemetry independently reviewed  sinus x AV paced  parox afib  NSVT     ECG independently reviewed  NSR  afib  no significant changes  NSST-Tw chgs   x no new ECG provided for review   Lab results reviewed as noted below. Current medications reviewed as noted below. No results for input(s): PH, PCO2, PO2 in the last 72 hours. No results for input(s): CPK, CKMB, CKNDX, TROIQ in the last 72 hours. Recent Labs     06/02/19  0435 06/01/19  0402 05/31/19  0255   * 128* 129*   K 4.3 4.0 3.5   CL 95* 95* 94*   CO2 28 27 26   BUN 63* 66* 62*   CREA 2.21* 2.27* 2.16*   GFRAA 36* 35* 37*   * 94 104*   CA 8.1* 7.9* 8.1*   ALB 3.0*  --   --    WBC 3.7* 4.1 4.6   HGB 9.9* 9.7* 9.5*   HCT 31.1* 30.8* 29.7*   PLT 68* 69* 71*     Lab Results   Component Value Date/Time    Cholesterol, total 112 11/27/2018 02:55 PM    HDL Cholesterol 27 (L) 11/27/2018 02:55 PM    LDL, calculated 68 11/27/2018 02:55 PM    Triglyceride 85 11/27/2018 02:55 PM     Recent Labs     06/02/19  0435   SGOT 16   *   TP 6.8   ALB 3.0*   GLOB 3.8     No results for input(s): INR, PTP, APTT in the last 72 hours.     No lab exists for component: INREXT, INREXT   No components found for: GLPOC    Current Facility-Administered Medications   Medication Dose Route Frequency    bumetanide (BUMEX) tablet 4 mg  4 mg Oral BID    polyethylene glycol (MIRALAX) packet 17 g  17 g Oral BID    temazepam (RESTORIL) capsule 30 mg  30 mg Oral QHS    simethicone (MYLICON) tablet 80 mg  80 mg Oral TIDAC    potassium chloride (KLOR-CON) packet for solution 40 mEq  40 mEq Oral BID WITH MEALS    **Please send message to pharmacy when Pt's Victoza has been brought from home **  1 Each Other BID    nitroglycerin (NITROSTAT) tablet 0.4 mg  0.4 mg SubLINGual PRN    insulin glargine (LANTUS) injection 20 Units  20 Units SubCUTAneous QHS    isosorbide mononitrate ER (IMDUR) tablet 30 mg  30 mg Oral DAILY    enoxaparin (LOVENOX) injection 40 mg  40 mg SubCUTAneous Q24H    meclizine (ANTIVERT) tablet 12.5 mg  12.5 mg Oral TID    pantoprazole (PROTONIX) tablet 40 mg  40 mg Oral ACB    liraglutide (VICTOZA) 0.6 mg/0.1 mL (18 mg/3 mL) sub-q pen 0.6 mg (Patient Supplied)  0.6 mg SubCUTAneous QHS    colchicine tablet 0.6 mg  0.6 mg Oral PRN    pramipexole (MIRAPEX) tablet 0.5 mg  0.5 mg Oral QHS    ferrous sulfate tablet 325 mg  325 mg Oral DAILY    tamsulosin (FLOMAX) capsule 0.4 mg  0.4 mg Oral QHS    carvedilol (COREG) tablet 12.5 mg  12.5 mg Oral BID WITH MEALS    febuxostat (ULORIC) tablet 40 mg  40 mg Oral DAILY    gabapentin (NEURONTIN) capsule 300 mg  300 mg Oral QHS    finasteride (PROSCAR) tablet 5 mg  5 mg Oral DAILY    oxyCODONE-acetaminophen (PERCOCET) 5-325 mg per tablet 1 Tab  1 Tab Oral Q8H PRN    sodium chloride (NS) flush 5-40 mL  5-40 mL IntraVENous Q8H    sodium chloride (NS) flush 5-40 mL  5-40 mL IntraVENous PRN    docusate sodium (COLACE) capsule 100 mg  100 mg Oral BID    senna (SENOKOT) tablet 8.6 mg  1 Tab Oral QHS        Marvie Barthel, MD

## 2019-06-02 NOTE — PROGRESS NOTES
BED ALARM REFUSAL    The patient refused bed alarm. Education regarding measures to prevent fall and risk for serious injury related to fall were provided to the patient by Nila Deleon.   The  patient verbalized understand and informed refusal form was signed by the patient (See signed informed refusal form in chart)

## 2019-06-02 NOTE — PROGRESS NOTES
Bedside shift change report given to Boni Castillo (oncoming nurse) by Ger Cota (offgoing nurse). Report included the following information SBAR, Kardex, Intake/Output, MAR, Accordion, Recent Results, Med Rec Status and Cardiac Rhythm nsr.

## 2019-06-02 NOTE — PROGRESS NOTES
PROGRESS NOTE    NAME:  Royal Roya Zhou. :   1950   MRN:   [id number]     Date/Time:  2019 9:04 AM  Subjective:   History:  Chart reviewed and patient seen and examined this AM and D/W his nurse and all events noted. He presented with increasing SOB and progressive CHF acute on chronic. I was asked to see him for evaluation of dizziness and nausea which he awakened with on  as well as other medical problems. He has no further dizziness or N/V, but has had a lot of gas and bloating over the last few days and had been 3 days w/o BM until he had a small BM yesterday. He has been breathing better when he has passage of a lot of gas. He has had good diuresis over the last 24 hr.although Output not completely recorded yet his weight is down 4 pounds from yesterday. He has no current orthopnea which he had on admission but he has had some SOB on oxygen overnight. On  he had some CP relieved with 2 NTG SL. He has no other cardiac or respiratory c/o. He has no other GI c/o as well as he has no  c/o. There are no further neurologic c/o. He has no other c/o on complete ROS. Of note is that despite his gaining weight and progressive SOB for a few days PTA he had not taken his Zaroxolyn which he has to take in this situation. He has no other c/o on complete ROS.           Medications reviewed:  Current Facility-Administered Medications   Medication Dose Route Frequency    polyethylene glycol (MIRALAX) packet 17 g  17 g Oral BID    temazepam (RESTORIL) capsule 30 mg  30 mg Oral QHS    bumetanide (BUMEX) tablet 4 mg  4 mg Oral DAILY    simethicone (MYLICON) tablet 80 mg  80 mg Oral TIDAC    potassium chloride (KLOR-CON) packet for solution 40 mEq  40 mEq Oral BID WITH MEALS    **Please send message to pharmacy when Pt's Victoza has been brought from home **  1 Each Other BID    nitroglycerin (NITROSTAT) tablet 0.4 mg  0.4 mg SubLINGual PRN    insulin glargine (LANTUS) injection 20 Units  20 Units SubCUTAneous QHS    isosorbide mononitrate ER (IMDUR) tablet 30 mg  30 mg Oral DAILY    enoxaparin (LOVENOX) injection 40 mg  40 mg SubCUTAneous Q24H    meclizine (ANTIVERT) tablet 12.5 mg  12.5 mg Oral TID    pantoprazole (PROTONIX) tablet 40 mg  40 mg Oral ACB    liraglutide (VICTOZA) 0.6 mg/0.1 mL (18 mg/3 mL) sub-q pen 0.6 mg (Patient Supplied)  0.6 mg SubCUTAneous QHS    colchicine tablet 0.6 mg  0.6 mg Oral PRN    pramipexole (MIRAPEX) tablet 0.5 mg  0.5 mg Oral QHS    ferrous sulfate tablet 325 mg  325 mg Oral DAILY    tamsulosin (FLOMAX) capsule 0.4 mg  0.4 mg Oral QHS    carvedilol (COREG) tablet 12.5 mg  12.5 mg Oral BID WITH MEALS    febuxostat (ULORIC) tablet 40 mg  40 mg Oral DAILY    gabapentin (NEURONTIN) capsule 300 mg  300 mg Oral QHS    finasteride (PROSCAR) tablet 5 mg  5 mg Oral DAILY    oxyCODONE-acetaminophen (PERCOCET) 5-325 mg per tablet 1 Tab  1 Tab Oral Q8H PRN    sodium chloride (NS) flush 5-40 mL  5-40 mL IntraVENous Q8H    sodium chloride (NS) flush 5-40 mL  5-40 mL IntraVENous PRN    docusate sodium (COLACE) capsule 100 mg  100 mg Oral BID    senna (SENOKOT) tablet 8.6 mg  1 Tab Oral QHS        Objective:   Vitals:  Visit Vitals  /83 (BP 1 Location: Right arm, BP Patient Position: At rest)   Pulse 69   Temp 97.6 °F (36.4 °C)   Resp 20   Ht 6' 4\" (1.93 m)   Wt 240 lb 8 oz (109.1 kg)   SpO2 100%   BMI 29.27 kg/m²    O2 Flow Rate (L/min): 2 l/min O2 Device: Room air Temp (24hrs), Av.8 °F (36.6 °C), Min:97.5 °F (36.4 °C), Max:98.1 °F (36.7 °C)      Last 24hr Input/Output:    Intake/Output Summary (Last 24 hours) at 2019 0904  Last data filed at 2019 1308  Gross per 24 hour   Intake 240 ml   Output 800 ml   Net -560 ml        PHYSICAL EXAM:  General:     Alert, cooperative, no distress, appears stated age. Head:    Normocephalic, without obvious abnormality, atraumatic. Eyes:    Conjunctivae/corneas clear.   PERRLA  Nose:   Nares normal. No drainage or sinus tenderness. Throat:     Lips, mucosa, and tongue normal.  No Thrush  Neck:   Supple, symmetrical,  no adenopathy, thyroid: non tender     no carotid bruit and no JVD. Back:     Symmetric,  No CVA tenderness. Lungs:    Clear to auscultation bilaterally except dry bibasilar rales. No Wheezing or Rhonchi. Heart:    Regular rate and rhythm,  no murmur, rub or gallop. Abdomen:    Soft, non-tender. Not distended. Bowel sounds normal. No masses  Extremities:  Extremities normal, atraumatic, No cyanosis. Tr edema. No clubbing  Lymph nodes:  Cervical, supraclavicular normal.  Neurologic:  Normal strength, Alert and oriented X 3. Skin:                 No rash      Lab Data Reviewed:    Recent Results (from the past 24 hour(s))   GLUCOSE, POC    Collection Time: 06/01/19 12:24 PM   Result Value Ref Range    Glucose (POC) 115 (H) 65 - 100 mg/dL    Performed by [address] [place] [address], POC    Collection Time: 06/01/19  3:14 PM   Result Value Ref Range    Glucose (POC) 123 (H) 65 - 100 mg/dL    Performed by Valerie REYNOLDS)    GLUCOSE, POC    Collection Time: 06/01/19  9:37 PM   Result Value Ref Range    Glucose (POC) 167 (H) 65 - 100 mg/dL    Performed by [address] [place] [address], [facility]    Collection Time: 06/02/19  4:35 AM   Result Value Ref Range    Sodium 129 (L) 136 - 145 mmol/L    Potassium 4.3 3.5 - 5.1 mmol/L    Chloride 95 (L) 97 - 108 mmol/L    CO2 28 21 - 32 mmol/L    Anion gap 6 5 - 15 mmol/L    Glucose 114 (H) 65 - 100 mg/dL    BUN 63 (H) 6 - 20 MG/DL    Creatinine 2.21 (H) 0.70 - 1.30 MG/DL    BUN/Creatinine ratio 29 (H) 12 - 20      GFR est AA 36 (L) >60 ml/min/1.73m2    GFR est non-AA 30 (L) >60 ml/min/1.73m2    Calcium 8.1 (L) 8.5 - 10.1 MG/DL    Bilirubin, total 0.7 0.2 - 1.0 MG/DL    ALT (SGPT) 13 12 - 78 U/L    AST (SGOT) 16 15 - 37 U/L    Alk.  phosphatase 148 (H) 45 - 117 U/L    Protein, total 6.8 6.4 - 8.2 g/dL    Albumin 3.0 (L) 3.5 - 5.0 g/dL    Globulin 3.8 2.0 - 4.0 g/dL A-G Ratio 0.8 (L) 1.1 - 2.2     CBC WITH AUTOMATED DIFF    Collection Time: 06/02/19  4:35 AM   Result Value Ref Range    WBC 3.7 (L) 4.1 - 11.1 K/uL    RBC 3.35 (L) 4.10 - 5.70 M/uL    HGB 9.9 (L) 12.1 - 17.0 g/dL    HCT 31.1 (L) 36.6 - 50.3 %    MCV 92.8 80.0 - 99.0 FL    MCH 29.6 26.0 - 34.0 PG    MCHC 31.8 30.0 - 36.5 g/dL    RDW 16.3 (H) 11.5 - 14.5 %    PLATELET 68 (L) 687 - 400 K/uL    MPV 11.1 8.9 - 12.9 FL    NRBC 0.0 0  WBC    ABSOLUTE NRBC 0.00 0.00 - 0.01 K/uL    NEUTROPHILS 75 32 - 75 %    LYMPHOCYTES 14 12 - 49 %    MONOCYTES 10 5 - 13 %    EOSINOPHILS 0 0 - 7 %    BASOPHILS 1 0 - 1 %    IMMATURE GRANULOCYTES 0 0.0 - 0.5 %    ABS. NEUTROPHILS 2.7 1.8 - 8.0 K/UL    ABS. LYMPHOCYTES 0.5 (L) 0.8 - 3.5 K/UL    ABS. MONOCYTES 0.4 0.0 - 1.0 K/UL    ABS. EOSINOPHILS 0.0 0.0 - 0.4 K/UL    ABS. BASOPHILS 0.0 0.0 - 0.1 K/UL    ABS. IMM. GRANS. 0.0 0.00 - 0.04 K/UL    DF AUTOMATED     GLUCOSE, POC    Collection Time: 06/02/19  7:25 AM   Result Value Ref Range    Glucose (POC) 134 (H) 65 - 100 mg/dL    Performed by Boston Crabtree (PCT)          Assessment/Plan:     Principal Problem:    Acute on chronic diastolic congestive heart failure, NYHA class 3 ([facility] (5/27/2019)    Active Problems:    Atrial fibrillation ([facility] (3/7/2014)      Overview: S/P ablation      ASCVD (arteriosclerotic cardiovascular disease) (8/5/2017)      Controlled type 2 diabetes mellitus with stage 4 chronic kidney disease, without long-term current use of insulin ([facility] (8/5/2017)      CKD (chronic kidney disease), stage IV ([facility] (8/5/2017)      Hypertension with renal disease (8/5/2017)      Labyrinthitis of both ears (5/29/2019)       ___________________________________________________  PLAN:    1. BS s OK and continue to follow and treat with SSI PRN  2. Added meclizine on 5/29 to cover labyrinthitis possibility  3. BP actually elevated on occasion so follow closely  4.  Note BUN 63 with baseline when dry  BUN, Follow renal function  5. Continue Diuresis (Takes Bumex 4mg AM and 2 mg PM at home), 4 mg once daily as ordered is not going to be effective knowing his history so increase to BID  6. PRN Zaroxolyn, ordered another dose for today  7. Note ? Of ischemia and agree with Dr. Alvarez Keegan thoughts on that; however may need to resort to catherization  8. Continue Lovenox for DVT prevention, but to be sure of cause of CP and SOB obtained V/Q scan yesterday (Low Probability)  9. Continue Lantus, but decreased to 20 U hs  10. Continue Protonix for nausea and SUP  11. Replete K, 4.0 today  12. Continue all other current orders, reviewed  13. Added Simethicone for bloating   14. Repeat CXR, Central congestion, I reviewed CXR today  15. Flat and upright abdominal films - normal  16.  Continue Restoril HS      35 minutes spent in direct care of this high complexity patient today with extremely high risk of decompensation          ___________________________________________________    Attending Physician: Rob Chen MD

## 2019-06-03 PROBLEM — I25.5 ACC/AHA STAGE C SYSTOLIC HEART FAILURE DUE TO ISCHEMIC CARDIOMYOPATHY (HCC): Status: ACTIVE | Noted: 2019-06-03

## 2019-06-03 PROBLEM — I50.20 ACC/AHA STAGE C SYSTOLIC HEART FAILURE DUE TO ISCHEMIC CARDIOMYOPATHY (HCC): Status: ACTIVE | Noted: 2019-06-03

## 2019-06-03 PROBLEM — I50.21 SYSTOLIC CHF, ACUTE (HCC): Status: ACTIVE | Noted: 2019-01-02

## 2019-06-03 LAB
ANION GAP SERPL CALC-SCNC: 6 MMOL/L (ref 5–15)
BUN SERPL-MCNC: 65 MG/DL (ref 6–20)
BUN/CREAT SERPL: 27 (ref 12–20)
CALCIUM SERPL-MCNC: 8.3 MG/DL (ref 8.5–10.1)
CHLORIDE SERPL-SCNC: 95 MMOL/L (ref 97–108)
CO2 SERPL-SCNC: 27 MMOL/L (ref 21–32)
CREAT SERPL-MCNC: 2.37 MG/DL (ref 0.7–1.3)
GLUCOSE BLD STRIP.AUTO-MCNC: 104 MG/DL (ref 65–100)
GLUCOSE BLD STRIP.AUTO-MCNC: 135 MG/DL (ref 65–100)
GLUCOSE BLD STRIP.AUTO-MCNC: 141 MG/DL (ref 65–100)
GLUCOSE BLD STRIP.AUTO-MCNC: 194 MG/DL (ref 65–100)
GLUCOSE SERPL-MCNC: 134 MG/DL (ref 65–100)
POTASSIUM SERPL-SCNC: 5 MMOL/L (ref 3.5–5.1)
SERVICE CMNT-IMP: ABNORMAL
SODIUM SERPL-SCNC: 128 MMOL/L (ref 136–145)

## 2019-06-03 PROCEDURE — 80048 BASIC METABOLIC PNL TOTAL CA: CPT

## 2019-06-03 PROCEDURE — 36415 COLL VENOUS BLD VENIPUNCTURE: CPT

## 2019-06-03 PROCEDURE — 65660000000 HC RM CCU STEPDOWN

## 2019-06-03 PROCEDURE — 74011250636 HC RX REV CODE- 250/636: Performed by: INTERNAL MEDICINE

## 2019-06-03 PROCEDURE — 74011250637 HC RX REV CODE- 250/637: Performed by: INTERNAL MEDICINE

## 2019-06-03 PROCEDURE — 82962 GLUCOSE BLOOD TEST: CPT

## 2019-06-03 PROCEDURE — 77010033678 HC OXYGEN DAILY

## 2019-06-03 PROCEDURE — 74011636637 HC RX REV CODE- 636/637: Performed by: INTERNAL MEDICINE

## 2019-06-03 PROCEDURE — 94760 N-INVAS EAR/PLS OXIMETRY 1: CPT

## 2019-06-03 PROCEDURE — 77030011256 HC DRSG MEPILEX <16IN NO BORD MOLN -A

## 2019-06-03 RX ADMIN — SIMETHICONE CHEW TAB 80 MG 80 MG: 80 TABLET ORAL at 08:15

## 2019-06-03 RX ADMIN — TAMSULOSIN HYDROCHLORIDE 0.4 MG: 0.4 CAPSULE ORAL at 21:22

## 2019-06-03 RX ADMIN — SIMETHICONE CHEW TAB 80 MG 80 MG: 80 TABLET ORAL at 18:53

## 2019-06-03 RX ADMIN — POTASSIUM CHLORIDE 40 MEQ: 1.5 POWDER, FOR SOLUTION ORAL at 08:15

## 2019-06-03 RX ADMIN — CARVEDILOL 12.5 MG: 12.5 TABLET, FILM COATED ORAL at 18:53

## 2019-06-03 RX ADMIN — OXYCODONE HYDROCHLORIDE AND ACETAMINOPHEN 1 TABLET: 5; 325 TABLET ORAL at 21:23

## 2019-06-03 RX ADMIN — GABAPENTIN 300 MG: 300 CAPSULE ORAL at 21:22

## 2019-06-03 RX ADMIN — POLYETHYLENE GLYCOL 3350 17 G: 17 POWDER, FOR SOLUTION ORAL at 08:15

## 2019-06-03 RX ADMIN — TEMAZEPAM 30 MG: 15 CAPSULE ORAL at 21:23

## 2019-06-03 RX ADMIN — ISOSORBIDE MONONITRATE 30 MG: 30 TABLET, EXTENDED RELEASE ORAL at 08:14

## 2019-06-03 RX ADMIN — Medication 10 ML: at 14:27

## 2019-06-03 RX ADMIN — SENNOSIDES 8.6 MG: 8.6 TABLET, FILM COATED ORAL at 21:23

## 2019-06-03 RX ADMIN — ENOXAPARIN SODIUM 40 MG: 40 INJECTION SUBCUTANEOUS at 08:16

## 2019-06-03 RX ADMIN — FERROUS SULFATE TAB 325 MG (65 MG ELEMENTAL FE) 325 MG: 325 (65 FE) TAB at 08:15

## 2019-06-03 RX ADMIN — SIMETHICONE CHEW TAB 80 MG 80 MG: 80 TABLET ORAL at 14:27

## 2019-06-03 RX ADMIN — Medication 10 ML: at 05:33

## 2019-06-03 RX ADMIN — PRAMIPEXOLE DIHYDROCHLORIDE 0.5 MG: 0.25 TABLET ORAL at 21:23

## 2019-06-03 RX ADMIN — POTASSIUM CHLORIDE 40 MEQ: 1.5 POWDER, FOR SOLUTION ORAL at 18:53

## 2019-06-03 RX ADMIN — MECLIZINE 12.5 MG: 12.5 TABLET ORAL at 08:15

## 2019-06-03 RX ADMIN — POLYETHYLENE GLYCOL 3350 17 G: 17 POWDER, FOR SOLUTION ORAL at 18:53

## 2019-06-03 RX ADMIN — BUMETANIDE 4 MG: 1 TABLET ORAL at 08:15

## 2019-06-03 RX ADMIN — MECLIZINE 12.5 MG: 12.5 TABLET ORAL at 21:23

## 2019-06-03 RX ADMIN — BUMETANIDE 4 MG: 1 TABLET ORAL at 18:53

## 2019-06-03 RX ADMIN — INSULIN GLARGINE 20 UNITS: 100 INJECTION, SOLUTION SUBCUTANEOUS at 21:22

## 2019-06-03 RX ADMIN — Medication 10 ML: at 21:23

## 2019-06-03 RX ADMIN — MECLIZINE 12.5 MG: 12.5 TABLET ORAL at 18:53

## 2019-06-03 RX ADMIN — FEBUXOSTAT 40 MG: 40 TABLET ORAL at 10:07

## 2019-06-03 RX ADMIN — DOCUSATE SODIUM 100 MG: 100 CAPSULE, LIQUID FILLED ORAL at 18:53

## 2019-06-03 RX ADMIN — PANTOPRAZOLE SODIUM 40 MG: 40 TABLET, DELAYED RELEASE ORAL at 08:15

## 2019-06-03 RX ADMIN — OXYCODONE HYDROCHLORIDE AND ACETAMINOPHEN 1 TABLET: 5; 325 TABLET ORAL at 08:15

## 2019-06-03 RX ADMIN — DOCUSATE SODIUM 100 MG: 100 CAPSULE, LIQUID FILLED ORAL at 08:15

## 2019-06-03 RX ADMIN — CARVEDILOL 12.5 MG: 12.5 TABLET, FILM COATED ORAL at 08:15

## 2019-06-03 RX ADMIN — FINASTERIDE 5 MG: 5 TABLET, FILM COATED ORAL at 08:14

## 2019-06-03 NOTE — PROGRESS NOTES
Problem: Heart Failure: Day 1  Goal: Off Pathway (Use only if patient is Off Pathway)  Outcome: Progressing Towards Goal  Goal: Activity/Safety  Outcome: Progressing Towards Goal  Goal: Consults, if ordered  Outcome: Progressing Towards Goal  Goal: Diagnostic Test/Procedures  Outcome: Progressing Towards Goal  Goal: Nutrition/Diet  Outcome: Progressing Towards Goal  Goal: Discharge Planning  Outcome: Progressing Towards Goal  Goal: Medications  Outcome: Progressing Towards Goal  Goal: Respiratory  Outcome: Progressing Towards Goal  Goal: Treatments/Interventions/Procedures  Outcome: Progressing Towards Goal  Goal: Psychosocial  Outcome: Progressing Towards Goal  Goal: *Oxygen saturation within defined limits  Outcome: Progressing Towards Goal  Goal: *Hemodynamically stable  Outcome: Progressing Towards Goal  Goal: *Optimal pain control at patient's stated goal  Outcome: Progressing Towards Goal  Goal: *Anxiety reduced or absent  Outcome: Progressing Towards Goal     Problem: Heart Failure: Day 2  Goal: Off Pathway (Use only if patient is Off Pathway)  Outcome: Progressing Towards Goal  Goal: Activity/Safety  Outcome: Progressing Towards Goal  Goal: Consults, if ordered  Outcome: Progressing Towards Goal  Goal: Diagnostic Test/Procedures  Outcome: Progressing Towards Goal  Goal: Nutrition/Diet  Outcome: Progressing Towards Goal  Goal: Discharge Planning  Outcome: Progressing Towards Goal  Goal: Medications  Outcome: Progressing Towards Goal  Goal: Respiratory  Outcome: Progressing Towards Goal  Goal: Treatments/Interventions/Procedures  Outcome: Progressing Towards Goal  Goal: Psychosocial  Outcome: Progressing Towards Goal  Goal: *Oxygen saturation within defined limits  Outcome: Progressing Towards Goal  Goal: *Hemodynamically stable  Outcome: Progressing Towards Goal  Goal: *Optimal pain control at patient's stated goal  Outcome: Progressing Towards Goal  Goal: *Anxiety reduced or absent  Outcome: Progressing Towards Goal  Goal: *Demonstrates progressive activity  Outcome: Progressing Towards Goal     Problem: Heart Failure: Day 3  Goal: Off Pathway (Use only if patient is Off Pathway)  Outcome: Progressing Towards Goal  Goal: Activity/Safety  Outcome: Progressing Towards Goal  Goal: Diagnostic Test/Procedures  Outcome: Progressing Towards Goal  Goal: Nutrition/Diet  Outcome: Progressing Towards Goal  Goal: Discharge Planning  Outcome: Progressing Towards Goal  Goal: Medications  Outcome: Progressing Towards Goal  Goal: Respiratory  Outcome: Progressing Towards Goal  Goal: Treatments/Interventions/Procedures  Outcome: Progressing Towards Goal  Goal: Psychosocial  Outcome: Progressing Towards Goal  Goal: *Oxygen saturation within defined limits  Outcome: Progressing Towards Goal  Goal: *Hemodynamically stable  Outcome: Progressing Towards Goal  Goal: *Optimal pain control at patient's stated goal  Outcome: Progressing Towards Goal  Goal: *Anxiety reduced or absent  Outcome: Progressing Towards Goal  Goal: *Demonstrates progressive activity  Outcome: Progressing Towards Goal     Problem: General Infection Care Plan (Adult and Pediatric)  Goal: Improvement in signs and symptoms of infection  Outcome: Progressing Towards Goal  Goal: *Optimize nutritional status  Outcome: Progressing Towards Goal     Problem: Falls - Risk of  Goal: *Absence of Falls  Description  Document [place] Meghna Fall Risk and appropriate interventions in the flowsheet. Outcome: Progressing Towards Goal     Problem: Patient Education: Go to Patient Education Activity  Goal: Patient/Family Education  Outcome: Progressing Towards Goal     Problem: Pressure Injury - Risk of  Goal: *Prevention of pressure injury  Description  Document Aurelio Scale and appropriate interventions in the flowsheet.   Outcome: Progressing Towards Goal     Problem: Patient Education: Go to Patient Education Activity  Goal: Patient/Family Education  Outcome: Progressing Towards Goal

## 2019-06-03 NOTE — PROGRESS NOTES
PROGRESS NOTE    NAME:  Royal Nigel Geiger. :   1950   MRN:   [id number]     Date/Time:  6/3/2019 7:12 AM  Subjective:   History:  Chart reviewed and patient seen and examined this AM and D/W his nurse and all events noted. He presented with increasing SOB and progressive CHF acute on chronic. I was asked to see him for evaluation of dizziness and nausea which he awakened with on  as well as other medical problems. He has no further dizziness or N/V, but has had a lot of gas and bloating over the last few days and had been 3 days w/o BM until he had a small BM on . He has been breathing better when he has passage of a lot of gas. He has had good diuresis over the last 24 hr. After Zaroxolyn yesterday and increased Bumex. He has no current orthopnea which he had on admission but he has had some SOB on oxygen overnight. On  he had some CP relieved with 2 NTG SL. He has no other cardiac or respiratory c/o. He has no other GI c/o as well as he has no  c/o. There are no further neurologic c/o. He has no other c/o on complete ROS. Of note is that despite his gaining weight and progressive SOB for a few days PTA he had not taken his Zaroxolyn which he has to take in this situation. He has no other c/o on complete ROS.           Medications reviewed:  Current Facility-Administered Medications   Medication Dose Route Frequency    bumetanide (BUMEX) tablet 4 mg  4 mg Oral BID    polyethylene glycol (MIRALAX) packet 17 g  17 g Oral BID    temazepam (RESTORIL) capsule 30 mg  30 mg Oral QHS    simethicone (MYLICON) tablet 80 mg  80 mg Oral TIDAC    potassium chloride (KLOR-CON) packet for solution 40 mEq  40 mEq Oral BID WITH MEALS    nitroglycerin (NITROSTAT) tablet 0.4 mg  0.4 mg SubLINGual PRN    insulin glargine (LANTUS) injection 20 Units  20 Units SubCUTAneous QHS    isosorbide mononitrate ER (IMDUR) tablet 30 mg  30 mg Oral DAILY    enoxaparin (LOVENOX) injection 40 mg  40 mg SubCUTAneous Q24H    meclizine (ANTIVERT) tablet 12.5 mg  12.5 mg Oral TID    pantoprazole (PROTONIX) tablet 40 mg  40 mg Oral ACB    liraglutide (VICTOZA) 0.6 mg/0.1 mL (18 mg/3 mL) sub-q pen 0.6 mg (Patient Supplied)  0.6 mg SubCUTAneous QHS    colchicine tablet 0.6 mg  0.6 mg Oral PRN    pramipexole (MIRAPEX) tablet 0.5 mg  0.5 mg Oral QHS    ferrous sulfate tablet 325 mg  325 mg Oral DAILY    tamsulosin (FLOMAX) capsule 0.4 mg  0.4 mg Oral QHS    carvedilol (COREG) tablet 12.5 mg  12.5 mg Oral BID WITH MEALS    febuxostat (ULORIC) tablet 40 mg  40 mg Oral DAILY    gabapentin (NEURONTIN) capsule 300 mg  300 mg Oral QHS    finasteride (PROSCAR) tablet 5 mg  5 mg Oral DAILY    oxyCODONE-acetaminophen (PERCOCET) 5-325 mg per tablet 1 Tab  1 Tab Oral Q8H PRN    sodium chloride (NS) flush 5-40 mL  5-40 mL IntraVENous Q8H    sodium chloride (NS) flush 5-40 mL  5-40 mL IntraVENous PRN    docusate sodium (COLACE) capsule 100 mg  100 mg Oral BID    senna (SENOKOT) tablet 8.6 mg  1 Tab Oral QHS        Objective:   Vitals:  Visit Vitals  /77 (BP 1 Location: Right arm, BP Patient Position: At rest)   Pulse 72   Temp 97.6 °F (36.4 °C)   Resp 20   Ht 6' 4\" (1.93 m)   Wt 240 lb 1.3 oz (108.9 kg)   SpO2 100%   BMI 29.22 kg/m²    O2 Flow Rate (L/min): 2 l/min O2 Device: Room air Temp (24hrs), Av.8 °F (36.6 °C), Min:97.5 °F (36.4 °C), Max:98.6 °F (37 °C)      Last 24hr Input/Output:    Intake/Output Summary (Last 24 hours) at 6/3/2019 0972  Last data filed at 6/3/2019 2380  Gross per 24 hour   Intake 1160 ml   Output 4625 ml   Net -3465 ml        PHYSICAL EXAM:  General:     Alert, cooperative, no distress, appears stated age. Head:    Normocephalic, without obvious abnormality, atraumatic. Eyes:    Conjunctivae/corneas clear. PERRLA  Nose:   Nares normal. No drainage or sinus tenderness.   Throat:     Lips, mucosa, and tongue normal.  No Thrush  Neck:   Supple, symmetrical,  no adenopathy, thyroid: non tender     no carotid bruit and no JVD. Back:     Symmetric,  No CVA tenderness. Lungs:    Clear to auscultation bilaterally except dry bibasilar rales. No Wheezing or Rhonchi. Heart:    Regular rate and rhythm,  no murmur, rub or gallop. Abdomen:    Soft, non-tender. Not distended. Bowel sounds normal. No masses  Extremities:  Extremities normal, atraumatic, No cyanosis. Tr edema. No clubbing  Lymph nodes:  Cervical, supraclavicular normal.  Neurologic:  Normal strength, Alert and oriented X 3.    Skin:                 No rash      Lab Data Reviewed:    Recent Results (from the past 24 hour(s))   GLUCOSE, POC    Collection Time: 06/02/19  7:25 AM   Result Value Ref Range    Glucose (POC) 134 (H) 65 - 100 mg/dL    Performed by Mauricio Draper (PCT)    GLUCOSE, POC    Collection Time: 06/02/19 10:58 AM   Result Value Ref Range    Glucose (POC) 190 (H) 65 - 100 mg/dL    Performed by Mauricio Draper (PCT)    GLUCOSE, POC    Collection Time: 06/02/19  4:41 PM   Result Value Ref Range    Glucose (POC) 199 (H) 65 - 100 mg/dL    Performed by Mauricio Draper (PCT)    GLUCOSE, POC    Collection Time: 06/02/19  8:17 PM   Result Value Ref Range    Glucose (POC) 187 (H) 65 - 100 mg/dL    Performed by Valeriy Acosta (PCT)    METABOLIC PANEL, BASIC    Collection Time: 06/03/19  2:06 AM   Result Value Ref Range    Sodium 128 (L) 136 - 145 mmol/L    Potassium 5.0 3.5 - 5.1 mmol/L    Chloride 95 (L) 97 - 108 mmol/L    CO2 27 21 - 32 mmol/L    Anion gap 6 5 - 15 mmol/L    Glucose 134 (H) 65 - 100 mg/dL    BUN 65 (H) 6 - 20 MG/DL    Creatinine 2.37 (H) 0.70 - 1.30 MG/DL    BUN/Creatinine ratio 27 (H) 12 - 20      GFR est AA 33 (L) >60 ml/min/1.73m2    GFR est non-AA 27 (L) >60 ml/min/1.73m2    Calcium 8.3 (L) 8.5 - 10.1 MG/DL         Assessment/Plan:     Principal Problem:    Acute on chronic diastolic congestive heart failure, NYHA class 3 (HCC) (5/27/2019)    Active Problems:    Atrial fibrillation ([facility] (3/7/2014)      Overview: S/P ablation      ASCVD (arteriosclerotic cardiovascular disease) (8/5/2017)      Controlled type 2 diabetes mellitus with stage 4 chronic kidney disease, without long-term current use of insulin ([facility] (8/5/2017)      CKD (chronic kidney disease), stage IV ([facility] (8/5/2017)      Hypertension with renal disease (8/5/2017)      Labyrinthitis of both ears (5/29/2019)       ___________________________________________________  PLAN:    1. BS s OK and continue to follow and treat with SSI PRN  2. Added meclizine on 5/29 to cover labyrinthitis possibility  3. BP actually elevated on occasion so follow closely  4. Note BUN 65 with baseline when dry  BUN, Follow renal function  5. Continue Diuresis (Takes Bumex 4mg AM and 2 mg PM at home), 4 mg increased to BID  6. PRN Zaroxolyn, ordered another dose for tomorrow  7. Note ? Of ischemia and agree with Dr. Selwyn Harris thoughts on that; however may need to resort to at least Cardiolite stress  8. Continue Lovenox for DVT prevention, but to be sure of cause of CP and SOB obtained V/Q scan on 5/31 (Low Probability)  9. Continue Lantus, but decreased to 20 U hs  10. Continue Protonix for nausea and SUP  11. Replete K, 5.0 today, so decrease supplement  12. Continue all other current orders, reviewed  13. Added Simethicone for bloating   14. Repeat CXR on 6/1, Central congestion  15. Flat and upright abdominal films - normal  16.  Continue Restoril HS  17. Na 128, following      35 minutes spent in direct care of this high complexity patient today with extremely high risk of decompensation          ___________________________________________________    Attending Physician: Myesha Porras MD

## 2019-06-03 NOTE — PROGRESS NOTES
Bedside and Verbal shift change report given to Sonam Vora RN (oncoming nurse). Report included the following information SBAR, Intake/Output, MAR and Cardiac Rhythm Paced. SHIFT SUMMARY:      CONCERNS TO ADDRESS WITH MD:        [facility] NURSING NOTE   Admission Date 5/27/2019   Admission Diagnosis Acute on chronic diastolic congestive heart failure, NYHA class 3 ([facility] [I50.33]   Consults IP CONSULT TO CARDIOLOGY  IP CONSULT TO PALLIATIVE CARE - PROVIDER      Cardiac Monitoring [x] Yes [] No      Purposeful Hourly Rounding [x] Yes    Thanh Score Total Score: 3   Thanh score 3 or > [x] Bed Alarm [] Avasys [] 1:1 sitter [] Patient refused (Signed refusal form in chart)   Aurelio Score Aurelio Score: 19   Aurelio score 14 or < [] PMT consult [] Wound Care consult    []  Specialty bed  [] Nutrition consult      Influenza Vaccine Received Flu Vaccine for Current Season (usually Sept-March): Not Flu Season           Oxygen needs? [x] Room air Oxygen @  []1L    []2L    []3L   []4L    []5L   []6L via  NC   Chronic home O2 use? [] Yes [x] No  Perform O2 challenge test and document in progress note using smartphrase (.Homeoxygen)      Last bowel movement Last Bowel Movement Date: 06/01/19      Urinary Catheter             LDAs               Peripheral IV 05/27/19 Left Arm (Active)   Site Assessment Clean, dry, & intact 6/2/2019  3:16 PM   Phlebitis Assessment 0 6/2/2019  3:16 PM   Infiltration Assessment 0 6/2/2019  3:16 PM   Dressing Status Clean, dry, & intact 6/2/2019  3:16 PM   Dressing Type Tape;Transparent 6/2/2019  3:16 PM   Hub Color/Line Status Pink;Flushed;Patent 6/2/2019  3:16 PM                         Readmission Risk Assessment Tool Score High Risk            43       Total Score        3 Has Seen PCP in Last 6 Months (Yes=3, No=0)    3 Patient Length of Stay (>5 days = 3)    4 IP Visits Last 12 Months (1-3=4, 4=9, >4=11)    5 Pt.  Coverage (Medicare=5 , Medicaid, or Self-Pay=4)    28 Charlson Comorbidity Score (Age + Comorbid Conditions)        Criteria that do not apply:    . Living with Significant Other. Assisted Living. LTAC. SNF.  or   Rehab       Expected Length of Stay 4d 2h   Actual Length of Stay 6

## 2019-06-03 NOTE — PROGRESS NOTES
Bedside shift change report given to Raymona Lefort, RN (oncoming nurse). Report included the following information SBAR, Recent Results and Cardiac Rhythm Paced. SHIFT SUMMARY:  Uneventful night. VSS. Voiding. Room air. No PRN O2 needed. CONCERNS TO ADDRESS WITH MD:  none      [facility] NURSING NOTE   Admission Date 5/27/2019   Admission Diagnosis Acute on chronic diastolic congestive heart failure, NYHA class 3 ([address] [I50.33]   Consults IP CONSULT TO CARDIOLOGY  IP CONSULT TO PALLIATIVE CARE - PROVIDER      Cardiac Monitoring [x] Yes [] No      Purposeful Hourly Rounding [x] Yes    Thanh Score Total Score: 3   Thanh score 3 or > [] Bed Alarm [] Avasys [] 1:1 sitter [] Patient refused (Signed refusal form in chart)   Aurelio Score Aurelio Score: 18   Aurelio score 14 or < [] PMT consult [] Wound Care consult    []  Specialty bed  [] Nutrition consult      Influenza Vaccine Received Flu Vaccine for Current Season (usually Sept-March): Not Flu Season           Oxygen needs? [x] Room air Oxygen @  []1L    []2L    []3L   []4L    []5L   []6L via  NC   Chronic home O2 use? [] Yes [] No  Perform O2 challenge test and document in progress note using smart[employer] (.Homeoxygen)      Last bowel movement Last Bowel Movement Date: 06/02/19      Urinary Catheter             LDAs               Peripheral IV 05/27/19 Left Arm (Active)   Site Assessment Clean, dry, & intact 6/3/2019  3:46 AM   Phlebitis Assessment 0 6/3/2019  3:46 AM   Infiltration Assessment 0 6/3/2019  3:46 AM   Dressing Status Clean, dry, & intact 6/3/2019  3:46 AM   Dressing Type Transparent 6/3/2019  3:46 AM   Hub Color/Line Status Pink;Flushed 6/3/2019  3:46 AM                         Readmission Risk Assessment Tool Score High Risk            43       Total Score        3 Has Seen PCP in Last 6 Months (Yes=3, No=0)    3 Patient Length of Stay (>5 days = 3)    4 IP Visits Last 12 Months (1-3=4, 4=9, >4=11)    5 Pt.  Coverage (Medicare=5 , Medicaid, or Self-Pay=4)    28 Charlson Comorbidity Score (Age + Comorbid Conditions)        Criteria that do not apply:    . Living with Significant Other. Assisted Living. LTAC. SNF.  or   Rehab       Expected Length of Stay 4d 2h   Actual Length of Stay 7

## 2019-06-03 NOTE — WOUND CARE
Wound Care consult: Chart reviewed and patient assessed for his right posterior leg wound that was present on admission. This is a partial thickness pink wound with no edges to it. No drainage and no odor to the wound. The wound is healing nicely and only needs moist wound care every other day starting today. Discussed with patient and Aurelia RAMAN Dears.    Etelvina Reis RN, BSN, [employer]

## 2019-06-04 LAB
ANION GAP SERPL CALC-SCNC: 6 MMOL/L (ref 5–15)
BUN SERPL-MCNC: 69 MG/DL (ref 6–20)
BUN/CREAT SERPL: 28 (ref 12–20)
CALCIUM SERPL-MCNC: 8.5 MG/DL (ref 8.5–10.1)
CHLORIDE SERPL-SCNC: 90 MMOL/L (ref 97–108)
CO2 SERPL-SCNC: 30 MMOL/L (ref 21–32)
CREAT SERPL-MCNC: 2.44 MG/DL (ref 0.7–1.3)
GLUCOSE BLD STRIP.AUTO-MCNC: 109 MG/DL (ref 65–100)
GLUCOSE BLD STRIP.AUTO-MCNC: 148 MG/DL (ref 65–100)
GLUCOSE BLD STRIP.AUTO-MCNC: 157 MG/DL (ref 65–100)
GLUCOSE BLD STRIP.AUTO-MCNC: 230 MG/DL (ref 65–100)
GLUCOSE SERPL-MCNC: 85 MG/DL (ref 65–100)
POTASSIUM SERPL-SCNC: 4.7 MMOL/L (ref 3.5–5.1)
SERVICE CMNT-IMP: ABNORMAL
SODIUM SERPL-SCNC: 126 MMOL/L (ref 136–145)

## 2019-06-04 PROCEDURE — 80048 BASIC METABOLIC PNL TOTAL CA: CPT

## 2019-06-04 PROCEDURE — 74011250637 HC RX REV CODE- 250/637: Performed by: INTERNAL MEDICINE

## 2019-06-04 PROCEDURE — 65660000000 HC RM CCU STEPDOWN

## 2019-06-04 PROCEDURE — 36415 COLL VENOUS BLD VENIPUNCTURE: CPT

## 2019-06-04 PROCEDURE — 82962 GLUCOSE BLOOD TEST: CPT

## 2019-06-04 PROCEDURE — 74011636637 HC RX REV CODE- 636/637: Performed by: INTERNAL MEDICINE

## 2019-06-04 PROCEDURE — 74011250636 HC RX REV CODE- 250/636: Performed by: INTERNAL MEDICINE

## 2019-06-04 RX ORDER — METOLAZONE 5 MG/1
5 TABLET ORAL ONCE
Status: COMPLETED | OUTPATIENT
Start: 2019-06-04 | End: 2019-06-04

## 2019-06-04 RX ADMIN — SIMETHICONE CHEW TAB 80 MG 80 MG: 80 TABLET ORAL at 09:58

## 2019-06-04 RX ADMIN — PRAMIPEXOLE DIHYDROCHLORIDE 0.5 MG: 0.25 TABLET ORAL at 22:06

## 2019-06-04 RX ADMIN — MECLIZINE 12.5 MG: 12.5 TABLET ORAL at 22:06

## 2019-06-04 RX ADMIN — GABAPENTIN 300 MG: 300 CAPSULE ORAL at 22:00

## 2019-06-04 RX ADMIN — SIMETHICONE CHEW TAB 80 MG 80 MG: 80 TABLET ORAL at 13:22

## 2019-06-04 RX ADMIN — POTASSIUM CHLORIDE 40 MEQ: 1.5 POWDER, FOR SOLUTION ORAL at 09:57

## 2019-06-04 RX ADMIN — CARVEDILOL 12.5 MG: 12.5 TABLET, FILM COATED ORAL at 17:13

## 2019-06-04 RX ADMIN — MECLIZINE 12.5 MG: 12.5 TABLET ORAL at 09:58

## 2019-06-04 RX ADMIN — FERROUS SULFATE TAB 325 MG (65 MG ELEMENTAL FE) 325 MG: 325 (65 FE) TAB at 09:58

## 2019-06-04 RX ADMIN — Medication 10 ML: at 22:06

## 2019-06-04 RX ADMIN — DOCUSATE SODIUM 100 MG: 100 CAPSULE, LIQUID FILLED ORAL at 09:58

## 2019-06-04 RX ADMIN — FINASTERIDE 5 MG: 5 TABLET, FILM COATED ORAL at 09:58

## 2019-06-04 RX ADMIN — BUMETANIDE 4 MG: 1 TABLET ORAL at 09:58

## 2019-06-04 RX ADMIN — FEBUXOSTAT 40 MG: 40 TABLET ORAL at 09:57

## 2019-06-04 RX ADMIN — SIMETHICONE CHEW TAB 80 MG 80 MG: 80 TABLET ORAL at 17:10

## 2019-06-04 RX ADMIN — POLYETHYLENE GLYCOL 3350 17 G: 17 POWDER, FOR SOLUTION ORAL at 17:11

## 2019-06-04 RX ADMIN — POLYETHYLENE GLYCOL 3350 17 G: 17 POWDER, FOR SOLUTION ORAL at 09:57

## 2019-06-04 RX ADMIN — BUMETANIDE 4 MG: 1 TABLET ORAL at 17:12

## 2019-06-04 RX ADMIN — DOCUSATE SODIUM 100 MG: 100 CAPSULE, LIQUID FILLED ORAL at 17:10

## 2019-06-04 RX ADMIN — Medication 10 ML: at 13:23

## 2019-06-04 RX ADMIN — CARVEDILOL 12.5 MG: 12.5 TABLET, FILM COATED ORAL at 09:58

## 2019-06-04 RX ADMIN — ENOXAPARIN SODIUM 40 MG: 40 INJECTION SUBCUTANEOUS at 09:59

## 2019-06-04 RX ADMIN — METOLAZONE 5 MG: 5 TABLET ORAL at 09:58

## 2019-06-04 RX ADMIN — Medication 10 ML: at 06:00

## 2019-06-04 RX ADMIN — TAMSULOSIN HYDROCHLORIDE 0.4 MG: 0.4 CAPSULE ORAL at 22:06

## 2019-06-04 RX ADMIN — INSULIN GLARGINE 20 UNITS: 100 INJECTION, SOLUTION SUBCUTANEOUS at 22:06

## 2019-06-04 RX ADMIN — SENNOSIDES 8.6 MG: 8.6 TABLET, FILM COATED ORAL at 22:06

## 2019-06-04 RX ADMIN — MECLIZINE 12.5 MG: 12.5 TABLET ORAL at 17:10

## 2019-06-04 RX ADMIN — POTASSIUM CHLORIDE 40 MEQ: 1.5 POWDER, FOR SOLUTION ORAL at 17:09

## 2019-06-04 RX ADMIN — ISOSORBIDE MONONITRATE 30 MG: 30 TABLET, EXTENDED RELEASE ORAL at 09:58

## 2019-06-04 RX ADMIN — PANTOPRAZOLE SODIUM 40 MG: 40 TABLET, DELAYED RELEASE ORAL at 10:04

## 2019-06-04 RX ADMIN — TEMAZEPAM 30 MG: 15 CAPSULE ORAL at 22:06

## 2019-06-04 NOTE — PROGRESS NOTES
Bedside and Verbal shift change report given to Marianna Miguel RN (oncoming nurse). Report included the following information SBAR and paced. SHIFT SUMMARY:      CONCERNS TO ADDRESS WITH MD:        [facility] NURSING NOTE   Admission Date 5/27/2019   Admission Diagnosis Acute on chronic diastolic congestive heart failure, NYHA class 3 ([facility] [address] [I50.33]   Consults IP CONSULT TO CARDIOLOGY  IP CONSULT TO PALLIATIVE CARE - PROVIDER      Cardiac Monitoring [x] Yes [] No      Purposeful Hourly Rounding [x] Yes    Thanh Score Total Score: 3   Tahnh score 3 or > [x] Bed Alarm [] Avasys [] 1:1 sitter [] Patient refused (Signed refusal form in chart)   Aurelio Score Aurelio Score: 18   Aurelio score 14 or < [] PMT consult [] Wound Care consult    []  Specialty bed  [] Nutrition consult      Influenza Vaccine Received Flu Vaccine for Current Season (usually Sept-March): Not Flu Season           Oxygen needs? [x] Room air Oxygen @  []1L    []2L    []3L   []4L    []5L   []6L via  NC   Chronic home O2 use? [] Yes [] No  Perform O2 challenge test and document in progress note using [employer] (.Homeoxygen)      Last bowel movement Last Bowel Movement Date: 06/02/19      Urinary Catheter             LDAs               Peripheral IV 05/27/19 Left Arm (Active)   Site Assessment Clean, dry, & intact 6/3/2019  2:30 PM   Phlebitis Assessment 0 6/3/2019  2:30 PM   Infiltration Assessment 0 6/3/2019  2:30 PM   Dressing Status Clean, dry, & intact 6/3/2019  2:30 PM   Dressing Type Transparent;Tape 6/3/2019  2:30 PM   Hub Color/Line Status Pink;Flushed;Patent 6/3/2019  2:30 PM                         Readmission Risk Assessment Tool Score High Risk            43       Total Score        3 Has Seen PCP in Last 6 Months (Yes=3, No=0)    3 Patient Length of Stay (>5 days = 3)    4 IP Visits Last 12 Months (1-3=4, 4=9, >4=11)    5 Pt.  Coverage (Medicare=5 , Medicaid, or Self-Pay=4)    28 Charlson Comorbidity Score (Age + Comorbid Conditions) Criteria that do not apply:    . Living with Significant Other. Assisted Living. LTAC. SNF.  or   Rehab       Expected Length of Stay 4d 2h   Actual Length of Stay 7

## 2019-06-04 NOTE — PROGRESS NOTES
CYNTHIA: Home with [facility] to Home Visit and  Follow-up Appointment. CM will continue to follow patient for discharge planning needs and arrange for services as deemed necessary.     Sarbjit Alcaraz [address]  [phone]

## 2019-06-04 NOTE — PROGRESS NOTES
Bedside shift change report given to Kennedy Streeter RN (oncoming nurse). Report included the following information SBAR, Recent Results and Cardiac Rhythm Paced. SHIFT SUMMARY:  Stable night without complaints. Voiding. Used Oxygen PRN    CONCERNS TO ADDRESS WITH MD:  vicki      [facility] NURSING NOTE   Admission Date 5/27/2019   Admission Diagnosis Acute on chronic diastolic congestive heart failure, NYHA class 3 ([address] [I50.33]   Consults IP CONSULT TO CARDIOLOGY  IP CONSULT TO PALLIATIVE CARE - PROVIDER      Cardiac Monitoring [x] Yes [] No      Purposeful Hourly Rounding [x] Yes    Thanh Score Total Score: 3   Thanh score 3 or > [] Bed Alarm [] Avasys [] 1:1 sitter [] Patient refused (Signed refusal form in chart)   Aurelio Score Aurelio Score: 18   Aurelio score 14 or < [] PMT consult [] Wound Care consult    []  Specialty bed  [] Nutrition consult      Influenza Vaccine Received Flu Vaccine for Current Season (usually Sept-March): Not Flu Season           Oxygen needs? [x] Room air Oxygen @  []1L    []2L    []3L   []4L    []5L   []6L via  NC   Chronic home O2 use? [] Yes [] No  Perform O2 challenge test and document in progress note using smart[employer] (.Homeoxygen)      Last bowel movement Last Bowel Movement Date: 06/02/19      Urinary Catheter             LDAs               Peripheral IV 05/27/19 Left Arm (Active)   Site Assessment Clean, dry, & intact 6/4/2019  3:59 AM   Phlebitis Assessment 0 6/4/2019  3:59 AM   Infiltration Assessment 0 6/4/2019  3:59 AM   Dressing Status Clean, dry, & intact 6/4/2019  3:59 AM   Dressing Type Transparent 6/4/2019  3:59 AM   Hub Color/Line Status Pink; Infusing 6/4/2019  3:59 AM                         Readmission Risk Assessment Tool Score High Risk            43       Total Score        3 Has Seen PCP in Last 6 Months (Yes=3, No=0)    3 Patient Length of Stay (>5 days = 3)    4 IP Visits Last 12 Months (1-3=4, 4=9, >4=11)    5 Pt.  Coverage (Medicare=5 , Medicaid, or Self-Pay=4) 28 Charlson Comorbidity Score (Age + Comorbid Conditions)        Criteria that do not apply:    . Living with Significant Other. Assisted Living. LTAC. SNF.  or   Rehab       Expected Length of Stay 4d 2h   Actual Length of Stay 8

## 2019-06-04 NOTE — PROGRESS NOTES
Problem: Heart Failure: Day 1  Goal: Off Pathway (Use only if patient is Off Pathway)  Outcome: Progressing Towards Goal  Goal: Activity/Safety  Outcome: Progressing Towards Goal  Goal: Consults, if ordered  Outcome: Progressing Towards Goal  Goal: Diagnostic Test/Procedures  Outcome: Progressing Towards Goal  Goal: Nutrition/Diet  Outcome: Progressing Towards Goal  Goal: Discharge Planning  Outcome: Progressing Towards Goal  Goal: Medications  Outcome: Progressing Towards Goal  Goal: Respiratory  Outcome: Progressing Towards Goal  Goal: Treatments/Interventions/Procedures  Outcome: Progressing Towards Goal  Goal: Psychosocial  Outcome: Progressing Towards Goal  Goal: *Oxygen saturation within defined limits  Outcome: Progressing Towards Goal  Goal: *Hemodynamically stable  Outcome: Progressing Towards Goal  Goal: *Optimal pain control at patient's stated goal  Outcome: Progressing Towards Goal  Goal: *Anxiety reduced or absent  Outcome: Progressing Towards Goal     Problem: Heart Failure: Day 2  Goal: Off Pathway (Use only if patient is Off Pathway)  Outcome: Progressing Towards Goal  Goal: Activity/Safety  Outcome: Progressing Towards Goal  Goal: Consults, if ordered  Outcome: Progressing Towards Goal  Goal: Diagnostic Test/Procedures  Outcome: Progressing Towards Goal  Goal: Nutrition/Diet  Outcome: Progressing Towards Goal  Goal: Discharge Planning  Outcome: Progressing Towards Goal  Goal: Medications  Outcome: Progressing Towards Goal  Goal: Respiratory  Outcome: Progressing Towards Goal  Goal: Treatments/Interventions/Procedures  Outcome: Progressing Towards Goal  Goal: Psychosocial  Outcome: Progressing Towards Goal  Goal: *Oxygen saturation within defined limits  Outcome: Progressing Towards Goal  Goal: *Hemodynamically stable  Outcome: Progressing Towards Goal  Goal: *Optimal pain control at patient's stated goal  Outcome: Progressing Towards Goal  Goal: *Anxiety reduced or absent  Outcome: Progressing Towards Goal  Goal: *Demonstrates progressive activity  Outcome: Progressing Towards Goal     Problem: Heart Failure: Day 3  Goal: Off Pathway (Use only if patient is Off Pathway)  Outcome: Progressing Towards Goal  Goal: Activity/Safety  Outcome: Progressing Towards Goal  Goal: Diagnostic Test/Procedures  Outcome: Progressing Towards Goal  Goal: Nutrition/Diet  Outcome: Progressing Towards Goal  Goal: Discharge Planning  Outcome: Progressing Towards Goal  Goal: Medications  Outcome: Progressing Towards Goal  Goal: Respiratory  Outcome: Progressing Towards Goal  Goal: Treatments/Interventions/Procedures  Outcome: Progressing Towards Goal  Goal: Psychosocial  Outcome: Progressing Towards Goal  Goal: *Oxygen saturation within defined limits  Outcome: Progressing Towards Goal  Goal: *Hemodynamically stable  Outcome: Progressing Towards Goal  Goal: *Optimal pain control at patient's stated goal  Outcome: Progressing Towards Goal  Goal: *Anxiety reduced or absent  Outcome: Progressing Towards Goal  Goal: *Demonstrates progressive activity  Outcome: Progressing Towards Goal     Problem: General Infection Care Plan (Adult and Pediatric)  Goal: Improvement in signs and symptoms of infection  Outcome: Progressing Towards Goal  Goal: *Optimize nutritional status  Outcome: Progressing Towards Goal     Problem: Falls - Risk of  Goal: *Absence of Falls  Description  Document Edgar Brunner Fall Risk and appropriate interventions in the flowsheet. Outcome: Progressing Towards Goal     Problem: Patient Education: Go to Patient Education Activity  Goal: Patient/Family Education  Outcome: Progressing Towards Goal     Problem: Pressure Injury - Risk of  Goal: *Prevention of pressure injury  Description  Document Aurelio Scale and appropriate interventions in the flowsheet.   Outcome: Progressing Towards Goal     Problem: Patient Education: Go to Patient Education Activity  Goal: Patient/Family Education  Outcome: Progressing Towards Goal

## 2019-06-04 NOTE — PROGRESS NOTES
Bedside shift change report given to Layton Dempsey RN (oncoming nurse). Report included the following information SBAR, Kardex, ED Summary, Procedure Summary, Intake/Output, MAR and Recent Results. SHIFT SUMMARY:  0700: Bedside shift change report given to Taryn Diaz RN (oncoming nurse) by Layton Dempsey RN (offgoing nurse). Report included the following information SBAR, Kardex, ED Summary, Procedure Summary, Intake/Output, MAR and Recent Results. Pt had uneventful shift. Encouraged pt to closely monitor intake     CONCERNS TO ADDRESS WITH MD:  n/a      [facility] NURSING NOTE   Admission Date 5/27/2019   Admission Diagnosis Acute on chronic diastolic congestive heart failure, NYHA class 3 ([facility] [address] [I50.33]   Consults IP CONSULT TO CARDIOLOGY  IP CONSULT TO PALLIATIVE CARE - PROVIDER      Cardiac Monitoring [x] Yes [] No      Purposeful Hourly Rounding [x] Yes    Thanh Score Total Score: 3   Thanh score 3 or > [] Bed Alarm [] Avasys [] 1:1 sitter [] Patient refused (Signed refusal form in chart)   Aurelio Score Aurelio Score: 18   Aurelio score 14 or < [] PMT consult [] Wound Care consult    []  Specialty bed  [] Nutrition consult      Influenza Vaccine Received Flu Vaccine for Current Season (usually Sept-March): Not Flu Season           Oxygen needs? [x] Room air Oxygen @  []1L    []2L    []3L   []4L    []5L   []6L via  NC   Chronic home O2 use?  [] Yes [] No  Perform O2 challenge test and document in progress note using [employer] (.Home[employer])      Last bowel movement Last Bowel Movement Date: 06/02/19      Urinary Catheter             LDAs               Peripheral IV 05/27/19 Left Arm (Active)   Site Assessment Clean, dry, & intact 6/4/2019  3:00 PM   Phlebitis Assessment 0 6/4/2019  3:00 PM   Infiltration Assessment 0 6/4/2019  3:00 PM   Dressing Status Clean, dry, & intact 6/4/2019  3:00 PM   Dressing Type Transparent;Tape 6/4/2019  3:00 PM   Hub Color/Line Status Pink;Patent 6/4/2019  3:00 PM Readmission Risk Assessment Tool Score High Risk            43       Total Score        3 Has Seen PCP in Last 6 Months (Yes=3, No=0)    3 Patient Length of Stay (>5 days = 3)    4 IP Visits Last 12 Months (1-3=4, 4=9, >4=11)    5 Pt. Coverage (Medicare=5 , Medicaid, or Self-Pay=4)    28 Charlson Comorbidity Score (Age + Comorbid Conditions)        Criteria that do not apply:    . Living with Significant Other. Assisted Living. LTAC. SNF.  or   Rehab       Expected Length of Stay 4d 2h   Actual Length of Stay 8                  Problem: Heart Failure: Day 1  Goal: Off Pathway (Use only if patient is Off Pathway)  Outcome: Progressing Towards Goal  Goal: Activity/Safety  Outcome: Progressing Towards Goal  Goal: Consults, if ordered  Outcome: Progressing Towards Goal  Goal: Diagnostic Test/Procedures  Outcome: Progressing Towards Goal  Goal: Nutrition/Diet  Outcome: Progressing Towards Goal  Goal: Discharge Planning  Outcome: Progressing Towards Goal  Goal: Medications  Outcome: Progressing Towards Goal  Goal: Respiratory  Outcome: Progressing Towards Goal  Goal: Treatments/Interventions/Procedures  Outcome: Progressing Towards Goal  Goal: Psychosocial  Outcome: Progressing Towards Goal  Goal: *Oxygen saturation within defined limits  Outcome: Progressing Towards Goal  Goal: *Hemodynamically stable  Outcome: Progressing Towards Goal  Goal: *Optimal pain control at patient's stated goal  Outcome: Progressing Towards Goal  Goal: *Anxiety reduced or absent  Outcome: Progressing Towards Goal     Problem: Heart Failure: Day 2  Goal: Off Pathway (Use only if patient is Off Pathway)  Outcome: Progressing Towards Goal  Goal: Activity/Safety  Outcome: Progressing Towards Goal  Goal: Consults, if ordered  Outcome: Progressing Towards Goal  Goal: Diagnostic Test/Procedures  Outcome: Progressing Towards Goal  Goal: Nutrition/Diet  Outcome: Progressing Towards Goal  Goal: Discharge Planning  Outcome: Progressing Towards Goal  Goal: Medications  Outcome: Progressing Towards Goal  Goal: Respiratory  Outcome: Progressing Towards Goal  Goal: Treatments/Interventions/Procedures  Outcome: Progressing Towards Goal  Goal: Psychosocial  Outcome: Progressing Towards Goal  Goal: *Oxygen saturation within defined limits  Outcome: Progressing Towards Goal  Goal: *Hemodynamically stable  Outcome: Progressing Towards Goal  Goal: *Optimal pain control at patient's stated goal  Outcome: Progressing Towards Goal  Goal: *Anxiety reduced or absent  Outcome: Progressing Towards Goal  Goal: *Demonstrates progressive activity  Outcome: Progressing Towards Goal     Problem: Heart Failure: Day 3  Goal: Off Pathway (Use only if patient is Off Pathway)  Outcome: Progressing Towards Goal  Goal: Activity/Safety  Outcome: Progressing Towards Goal  Goal: Diagnostic Test/Procedures  Outcome: Progressing Towards Goal  Goal: Nutrition/Diet  Outcome: Progressing Towards Goal  Goal: Discharge Planning  Outcome: Progressing Towards Goal  Goal: Medications  Outcome: Progressing Towards Goal  Goal: Respiratory  Outcome: Progressing Towards Goal  Goal: Treatments/Interventions/Procedures  Outcome: Progressing Towards Goal  Goal: Psychosocial  Outcome: Progressing Towards Goal  Goal: *Oxygen saturation within defined limits  Outcome: Progressing Towards Goal  Goal: *Hemodynamically stable  Outcome: Progressing Towards Goal  Goal: *Optimal pain control at patient's stated goal  Outcome: Progressing Towards Goal  Goal: *Anxiety reduced or absent  Outcome: Progressing Towards Goal  Goal: *Demonstrates progressive activity  Outcome: Progressing Towards Goal     Problem: General Infection Care Plan (Adult and Pediatric)  Goal: Improvement in signs and symptoms of infection  Outcome: Progressing Towards Goal  Goal: *Optimize nutritional status  Outcome: Progressing Towards Goal     Problem: Falls - Risk of  Goal: *Absence of Falls  Description  Document Thanh Fall Risk and appropriate interventions in the flowsheet.   Outcome: Progressing Towards Goal     Problem: Patient Education: Go to Patient Education Activity  Goal: Patient/Family Education  Outcome: Progressing Towards Goal     Problem: Patient Education: Go to Patient Education Activity  Goal: Patient/Family Education  Outcome: Progressing Towards Goal

## 2019-06-04 NOTE — PROGRESS NOTES
Kidney function stable hemoglobin is then a little bit to 9.5 so he might want to contact his doctor about getting another Procrit injection Writer received a call from telemetry stating that the pt had 11 beats of V-tach. Writer checked on pt. Patient was asleep with no signs of distress. Notified on call resident, Rena Fleming. Writer given order to give pt 2mg of Magnesium.

## 2019-06-04 NOTE — PROGRESS NOTES
[facility]    Cardiopulmonary Care Interdisciplinary Rounds were held today to discuss patient's plan of care and outcomes. The following members were present:  Pharmacy, Nursing and Case Management.   Expected Length of Stay:  4d 2h    PLAN OF CARE:   Continue current treatment plan

## 2019-06-04 NOTE — PROGRESS NOTES
Nutrition Assessment:    INTERVENTIONS/RECOMMENDATIONS:   Continue current diet     ASSESSMENT:   Chart reviewed. Pt sleeping during visit attempt. Flowsheet documents good appetite and pt consuming 100% of meals the majority of the time. BG fairly well controlled with most readings <180 mg/dl over the past several days. Cardiac rehab has been providing heart failure education. Will continue to monitor PO intake. Diet Order: Consistent carb(2g Na)  % Eaten:    Patient Vitals for the past 72 hrs:   % Diet Eaten   19 1756 100 %   19 0903 100 %   19 0822 30 %   19 1729 100 %   19 1034 100 %   19 1304 50 %     Pertinent Medications: [x]Reviewed: bumex, colace, Fe, lantus, PPI, miralax, KCl,   Pertinent Labs: [x]Reviewed: B-199  Food Allergies: [x]NKFA  []Other   Last BM:    Edema:  trace    [x]RUE   [x]LUE   [x]RLE   [x]LLE      Pressure Ulcer:  n/a    [] Stage I   [] Stage II   [] Stage III   [] Stage IV      Anthropometrics: Height: 6' 4\" (193 cm) Weight: 109.7 kg (241 lb 13.5 oz)    IBW (%IBW):   ( ) UBW (%UBW):   (  %)    BMI: Body mass index is 29.44 kg/m². This BMI is indicative of:  []Underweight   []Normal   [x]Overweight   [] Obesity   [] Extreme Obesity (BMI>40)  Last Weight Metrics:  Weight Loss Metrics 2019 2019 5/15/2019 2019 2019 2019 2019   Today's Wt 241 lb 13.5 oz 243 lb 12.8 oz 235 lb 249 lb 251 lb 12.8 oz 244 lb 9.6 oz 252 lb 12.8 oz   BMI 29.44 kg/m2 29.68 kg/m2 28.61 kg/m2 30.31 kg/m2 30.65 kg/m2 29.77 kg/m2 30.77 kg/m2       Estimated Nutrition Needs (Based on): 7270 Kcals/day(BMR (1950) X1.2 AF  ) , 190 g(1 g/kg BW ) Protein  Carbohydrate:  At Least 130 g/day  Fluids: 2340 mL/day or per primary team    NUTRITION DIAGNOSES:   Problem:  Unintended weight loss      Etiology: related to CKD+CHF and likely due to fluid status       Signs/Symptoms: as evidenced by Patient reports 15 lb weight loss in 2 weeks     Previous Nutrition Dx:  [] Resolved  [] Unresolved           [x] Progressing    NUTRITION INTERVENTIONS:  Meals/Snacks: General/healthful diet                  GOAL:   PO intake >75% of meals next 5-7 days     NUTRITION MONITORING AND EVALUATION      Food/Nutrient Intake Outcomes:  Total energy intake  Physical Signs/Symptoms Outcomes: Weight/weight change, Electrolyte and renal profile, GI profile, Glucose profile    Previous Goal Met:   [x] Met              [] Progressing Towards Goal              [] Not Progressing Towards Goal   Previous Recommendations:   [] Implemented          [] Not Implemented          [x] Not Applicable    LEARNING NEEDS (Diet, Food/Nutrient-Drug Interaction):    [x] None Identified   [] Identified and Education Provided/Documented   [] Identified and Pt declined/was not appropriate     Cultural, Hinduism, OR Ethnic Dietary Needs:    [x] None Identified   [] Identified and Addressed     [x] Interdisciplinary Care Plan Reviewed/Documented    [x] Discharge Planning: Consistent carb, low Na diet   [] Participated in Interdisciplinary Rounds    NUTRITION RISK:    [] High              [] Moderate           [x]  Low  []  Minimal/Uncompromised      Jeremiah Mcneil RDN  Pager [phone]  Weekend Pager [phone]

## 2019-06-04 NOTE — PROGRESS NOTES
PROGRESS NOTE    NAME:  Royal Tabitha Elam. :   1950   MRN:   [id number]     Date/Time:  2019 7:29 AM  Subjective:   History:  Chart reviewed and patient seen and examined this AM and D/W his nurse and all events noted. He presented with increasing SOB and progressive CHF acute on chronic. I was asked to see him for evaluation of dizziness and nausea which he awakened with on  as well as other medical problems. He has no further dizziness or N/V, but has had a lot of gas and bloating over the last few days and had been 3 days w/o BM until he had a small BM on . He has been breathing better when he has passage of a lot of gas. He has had good diuresis over the last 24 hr. After Zaroxolyn yesterday and increased Bumex yet weight up 1 # so question weight accuracy. He has no current orthopnea which he had on admission but he has had some SOB last PM requiring short course of oxygen. On  he had some CP relieved with 2 NTG SL. He has no other cardiac or respiratory c/o. He has no other GI c/o as well as he has no  c/o. There are no further neurologic c/o. He has no other c/o on complete ROS. Of note is that despite his gaining weight and progressive SOB for a few days PTA he had not taken his Zaroxolyn which he has to take in this situation. He has no other c/o on complete ROS.           Medications reviewed:  Current Facility-Administered Medications   Medication Dose Route Frequency    bumetanide (BUMEX) tablet 4 mg  4 mg Oral BID    polyethylene glycol (MIRALAX) packet 17 g  17 g Oral BID    temazepam (RESTORIL) capsule 30 mg  30 mg Oral QHS    simethicone (MYLICON) tablet 80 mg  80 mg Oral TIDAC    potassium chloride (KLOR-CON) packet for solution 40 mEq  40 mEq Oral BID WITH MEALS    nitroglycerin (NITROSTAT) tablet 0.4 mg  0.4 mg SubLINGual PRN    insulin glargine (LANTUS) injection 20 Units  20 Units SubCUTAneous QHS    isosorbide mononitrate ER (IMDUR) tablet 30 mg  30 mg Oral DAILY    enoxaparin (LOVENOX) injection 40 mg  40 mg SubCUTAneous Q24H    meclizine (ANTIVERT) tablet 12.5 mg  12.5 mg Oral TID    pantoprazole (PROTONIX) tablet 40 mg  40 mg Oral ACB    liraglutide (VICTOZA) 0.6 mg/0.1 mL (18 mg/3 mL) sub-q pen 0.6 mg (Patient Supplied)  0.6 mg SubCUTAneous QHS    colchicine tablet 0.6 mg  0.6 mg Oral PRN    pramipexole (MIRAPEX) tablet 0.5 mg  0.5 mg Oral QHS    ferrous sulfate tablet 325 mg  325 mg Oral DAILY    tamsulosin (FLOMAX) capsule 0.4 mg  0.4 mg Oral QHS    carvedilol (COREG) tablet 12.5 mg  12.5 mg Oral BID WITH MEALS    febuxostat (ULORIC) tablet 40 mg  40 mg Oral DAILY    gabapentin (NEURONTIN) capsule 300 mg  300 mg Oral QHS    finasteride (PROSCAR) tablet 5 mg  5 mg Oral DAILY    oxyCODONE-acetaminophen (PERCOCET) 5-325 mg per tablet 1 Tab  1 Tab Oral Q8H PRN    sodium chloride (NS) flush 5-40 mL  5-40 mL IntraVENous Q8H    sodium chloride (NS) flush 5-40 mL  5-40 mL IntraVENous PRN    docusate sodium (COLACE) capsule 100 mg  100 mg Oral BID    senna (SENOKOT) tablet 8.6 mg  1 Tab Oral QHS        Objective:   Vitals:  Visit Vitals  /73 (BP 1 Location: Right arm, BP Patient Position: Sitting)   Pulse 70   Temp 97.5 °F (36.4 °C)   Resp 20   Ht 6' 4\" (1.93 m)   Wt 241 lb 13.5 oz (109.7 kg)   SpO2 100%   BMI 29.44 kg/m²    O2 Flow Rate (L/min): 3 l/min O2 Device: Room air Temp (24hrs), Av.6 °F (36.4 °C), Min:96.8 °F (36 °C), Max:98.3 °F (36.8 °C)      Last 24hr Input/Output:    Intake/Output Summary (Last 24 hours) at 2019 0729  Last data filed at 2019 2234  Gross per 24 hour   Intake 720 ml   Output 4575 ml   Net -3855 ml        PHYSICAL EXAM:  General:     Alert, cooperative, no distress, appears stated age. Head:    Normocephalic, without obvious abnormality, atraumatic. Eyes:    Conjunctivae/corneas clear. PERRLA  Nose:   Nares normal. No drainage or sinus tenderness.   Throat:     Lips, mucosa, and tongue normal.  No Thrush  Neck:   Supple, symmetrical,  no adenopathy, thyroid: non tender     no carotid bruit and no JVD. Back:     Symmetric,  No CVA tenderness. Lungs:    Clear to auscultation bilaterally except dry bibasilar rales. No Wheezing or Rhonchi. Heart:    Regular rate and rhythm,  no murmur, rub or gallop. Abdomen:    Soft, non-tender. Not distended. Bowel sounds normal. No masses  Extremities:  Extremities normal, atraumatic, No cyanosis. Tr edema. No clubbing  Lymph nodes:  Cervical, supraclavicular normal.  Neurologic:  Normal strength, Alert and oriented X 3.    Skin:                 No rash      Lab Data Reviewed:    Recent Results (from the past 24 hour(s))   GLUCOSE, POC    Collection Time: 06/03/19  8:02 AM   Result Value Ref Range    Glucose (POC) 104 (H) 65 - 100 mg/dL    Performed by Armando Kaufman (PCT)    GLUCOSE, POC    Collection Time: 06/03/19 11:40 AM   Result Value Ref Range    Glucose (POC) 135 (H) 65 - 100 mg/dL    Performed by Pj PÉREZ (CON)    GLUCOSE, POC    Collection Time: 06/03/19  4:52 PM   Result Value Ref Range    Glucose (POC) 141 (H) 65 - 100 mg/dL    Performed by Armando Kaufman (PCT)    GLUCOSE, POC    Collection Time: 06/03/19  8:39 PM   Result Value Ref Range    Glucose (POC) 194 (H) 65 - 100 mg/dL    Performed by Jennifer Saleem (CON) PCT    METABOLIC PANEL, BASIC    Collection Time: 06/04/19  4:03 AM   Result Value Ref Range    Sodium 126 (L) 136 - 145 mmol/L    Potassium 4.7 3.5 - 5.1 mmol/L    Chloride 90 (L) 97 - 108 mmol/L    CO2 30 21 - 32 mmol/L    Anion gap 6 5 - 15 mmol/L    Glucose 85 65 - 100 mg/dL    BUN 69 (H) 6 - 20 MG/DL    Creatinine 2.44 (H) 0.70 - 1.30 MG/DL    BUN/Creatinine ratio 28 (H) 12 - 20      GFR est AA 32 (L) >60 ml/min/1.73m2    GFR est non-AA 26 (L) >60 ml/min/1.73m2    Calcium 8.5 8.5 - 10.1 MG/DL   GLUCOSE, POC    Collection Time: 06/04/19  7:23 AM   Result Value Ref Range    Glucose (POC) 109 (H) 65 - 100 mg/dL    Performed by Marci Arango  (PCT) Assessment/Plan:     Principal Problem:    ACC/AHA stage C systolic heart failure due to ischemic cardiomyopathy ([facility] (6/3/2019)    Active Problems:    Atrial fibrillation ([facility] (3/7/2014)      Overview: S/P ablation      ASCVD (arteriosclerotic cardiovascular disease) (8/5/2017)      Mixed hyperlipidemia (8/5/2017)      Controlled type 2 diabetes mellitus with stage 4 chronic kidney disease, without long-term current use of insulin ([facility] (8/5/2017)      CKD (chronic kidney disease), stage IV ([facility] (8/5/2017)      Hypertension with renal disease (8/5/2017)      Anemia (2/2/7078)      Systolic CHF, acute ([facility] (1/2/2019)      Labyrinthitis of both ears (5/29/2019)       ___________________________________________________  PLAN:    1. BS s OK and continue to follow and treat with SSI PRN  2. Added meclizine on 5/29 to cover labyrinthitis possibility  3. BP actually elevated on occasion so follow closely  4. Note BUN 69 with baseline when dry  BUN, Follow renal function  5. Continue Diuresis (Takes Bumex 4mg AM and 2 mg PM at home), 4 mg increased to BID  6. PRN Zaroxolyn, ordered another dose for today  7. Note ? Of ischemia and agree with Dr. Addison Martinez thoughts on that; however may need to resort to at least Cardiolite stress  8. Continue Lovenox for DVT prevention, but to be sure of cause of CP and SOB obtained V/Q scan on 5/31 (Low Probability)  9. Continue Lantus, but decreased to 20 U hs  10. Continue Protonix for nausea and SUP  11. Replete K, 4.7 today, so decreased supplement yesterday when 5.0  12. Continue all other current orders, reviewed  13. Added Simethicone for bloating   14. Repeat CXR on 6/1, Central congestion  15. Flat and upright abdominal films - normal  16.  Continue Restoril HS  17. Na 128 yesterday to 126 today, following      40 minutes spent in direct care of this high complexity patient today with extremely high risk of decompensation          ___________________________________________________    Attending Physician: Manisha Valenzuela MD

## 2019-06-05 ENCOUNTER — HOSPITAL ENCOUNTER (EMERGENCY)
Age: 69
Discharge: HOME | End: 2019-06-05
Attending: EMERGENCY MEDICINE
Payer: MEDICARE

## 2019-06-05 ENCOUNTER — APPOINTMENT (OUTPATIENT)
Dept: GENERAL RADIOLOGY | Age: 69
End: 2019-06-05
Attending: EMERGENCY MEDICINE
Payer: MEDICARE

## 2019-06-05 ENCOUNTER — DOCUMENTATION ONLY (OUTPATIENT)
Dept: CASE MANAGEMENT | Age: 69
End: 2019-06-05

## 2019-06-05 ENCOUNTER — APPOINTMENT (OUTPATIENT)
Dept: CT IMAGING | Age: 69
End: 2019-06-05
Attending: EMERGENCY MEDICINE
Payer: MEDICARE

## 2019-06-05 VITALS
RESPIRATION RATE: 16 BRPM | DIASTOLIC BLOOD PRESSURE: 67 MMHG | HEART RATE: 70 BPM | TEMPERATURE: 100.9 F | OXYGEN SATURATION: 92 % | SYSTOLIC BLOOD PRESSURE: 132 MMHG

## 2019-06-05 VITALS
HEART RATE: 70 BPM | RESPIRATION RATE: 20 BRPM | HEIGHT: 76 IN | BODY MASS INDEX: 28.67 KG/M2 | DIASTOLIC BLOOD PRESSURE: 82 MMHG | WEIGHT: 235.45 LBS | SYSTOLIC BLOOD PRESSURE: 139 MMHG | TEMPERATURE: 98.2 F | OXYGEN SATURATION: 95 %

## 2019-06-05 DIAGNOSIS — A31.0 ATYPICAL MYCOBACTERIAL INFECTION OF LUNG (HCC): Primary | ICD-10-CM

## 2019-06-05 LAB
ALBUMIN SERPL-MCNC: 3.2 G/DL (ref 3.5–5)
ALBUMIN/GLOB SERPL: 0.8 {RATIO} (ref 1.1–2.2)
ALP SERPL-CCNC: 169 U/L (ref 45–117)
ALT SERPL-CCNC: 15 U/L (ref 12–78)
ANION GAP SERPL CALC-SCNC: 6 MMOL/L (ref 5–15)
ANION GAP SERPL CALC-SCNC: 8 MMOL/L (ref 5–15)
APPEARANCE UR: CLEAR
AST SERPL-CCNC: 16 U/L (ref 15–37)
BACTERIA URNS QL MICRO: NEGATIVE /HPF
BASOPHILS # BLD: 0 K/UL (ref 0–0.1)
BASOPHILS NFR BLD: 0 % (ref 0–1)
BILIRUB SERPL-MCNC: 0.9 MG/DL (ref 0.2–1)
BILIRUB UR QL: NEGATIVE
BUN SERPL-MCNC: 75 MG/DL (ref 6–20)
BUN SERPL-MCNC: 78 MG/DL (ref 6–20)
BUN/CREAT SERPL: 27 (ref 12–20)
BUN/CREAT SERPL: 29 (ref 12–20)
CALCIUM SERPL-MCNC: 8.3 MG/DL (ref 8.5–10.1)
CALCIUM SERPL-MCNC: 8.6 MG/DL (ref 8.5–10.1)
CHLORIDE SERPL-SCNC: 87 MMOL/L (ref 97–108)
CHLORIDE SERPL-SCNC: 91 MMOL/L (ref 97–108)
CK SERPL-CCNC: 48 U/L (ref 39–308)
CO2 SERPL-SCNC: 30 MMOL/L (ref 21–32)
CO2 SERPL-SCNC: 33 MMOL/L (ref 21–32)
COLOR UR: ABNORMAL
CREAT SERPL-MCNC: 2.6 MG/DL (ref 0.7–1.3)
CREAT SERPL-MCNC: 2.85 MG/DL (ref 0.7–1.3)
DIFFERENTIAL METHOD BLD: ABNORMAL
EOSINOPHIL # BLD: 0 K/UL (ref 0–0.4)
EOSINOPHIL NFR BLD: 0 % (ref 0–7)
EPITH CASTS URNS QL MICRO: ABNORMAL /LPF
ERYTHROCYTE [DISTWIDTH] IN BLOOD BY AUTOMATED COUNT: 16.1 % (ref 11.5–14.5)
GLOBULIN SER CALC-MCNC: 4.1 G/DL (ref 2–4)
GLUCOSE BLD STRIP.AUTO-MCNC: 112 MG/DL (ref 65–100)
GLUCOSE SERPL-MCNC: 121 MG/DL (ref 65–100)
GLUCOSE SERPL-MCNC: 194 MG/DL (ref 65–100)
GLUCOSE UR STRIP.AUTO-MCNC: NEGATIVE MG/DL
HCT VFR BLD AUTO: 32.1 % (ref 36.6–50.3)
HGB BLD-MCNC: 10.4 G/DL (ref 12.1–17)
HGB UR QL STRIP: ABNORMAL
HYALINE CASTS URNS QL MICRO: ABNORMAL /LPF (ref 0–5)
IMM GRANULOCYTES # BLD AUTO: 0 K/UL (ref 0–0.04)
IMM GRANULOCYTES NFR BLD AUTO: 0 % (ref 0–0.5)
KETONES UR QL STRIP.AUTO: NEGATIVE MG/DL
LACTATE BLD-SCNC: 1.24 MMOL/L (ref 0.4–2)
LACTATE SERPL-SCNC: 1.5 MMOL/L (ref 0.4–2)
LEUKOCYTE ESTERASE UR QL STRIP.AUTO: NEGATIVE
LYMPHOCYTES # BLD: 0.2 K/UL (ref 0.8–3.5)
LYMPHOCYTES NFR BLD: 4 % (ref 12–49)
MCH RBC QN AUTO: 29.8 PG (ref 26–34)
MCHC RBC AUTO-ENTMCNC: 32.4 G/DL (ref 30–36.5)
MCV RBC AUTO: 92 FL (ref 80–99)
MONOCYTES # BLD: 0.5 K/UL (ref 0–1)
MONOCYTES NFR BLD: 8 % (ref 5–13)
NEUTS SEG # BLD: 5.5 K/UL (ref 1.8–8)
NEUTS SEG NFR BLD: 88 % (ref 32–75)
NITRITE UR QL STRIP.AUTO: NEGATIVE
NRBC # BLD: 0 K/UL (ref 0–0.01)
NRBC BLD-RTO: 0 PER 100 WBC
PH UR STRIP: 5.5 [PH] (ref 5–8)
PLATELET # BLD AUTO: 102 K/UL (ref 150–400)
PMV BLD AUTO: 10.5 FL (ref 8.9–12.9)
POTASSIUM SERPL-SCNC: 4.4 MMOL/L (ref 3.5–5.1)
POTASSIUM SERPL-SCNC: 4.5 MMOL/L (ref 3.5–5.1)
PROCALCITONIN SERPL-MCNC: 0.2 NG/ML
PROT SERPL-MCNC: 7.3 G/DL (ref 6.4–8.2)
PROT UR STRIP-MCNC: 100 MG/DL
RBC # BLD AUTO: 3.49 M/UL (ref 4.1–5.7)
RBC #/AREA URNS HPF: ABNORMAL /HPF (ref 0–5)
SERVICE CMNT-IMP: ABNORMAL
SODIUM SERPL-SCNC: 126 MMOL/L (ref 136–145)
SODIUM SERPL-SCNC: 129 MMOL/L (ref 136–145)
SP GR UR REFRACTOMETRY: 1.01 (ref 1–1.03)
TROPONIN I SERPL-MCNC: <0.05 NG/ML
UA: UC IF INDICATED,UAUC: ABNORMAL
UROBILINOGEN UR QL STRIP.AUTO: 0.2 EU/DL (ref 0.2–1)
WBC # BLD AUTO: 6.2 K/UL (ref 4.1–11.1)
WBC URNS QL MICRO: ABNORMAL /HPF (ref 0–4)

## 2019-06-05 PROCEDURE — 84145 PROCALCITONIN (PCT): CPT

## 2019-06-05 PROCEDURE — 71250 CT THORAX DX C-: CPT

## 2019-06-05 PROCEDURE — 80053 COMPREHEN METABOLIC PANEL: CPT

## 2019-06-05 PROCEDURE — 82550 ASSAY OF CK (CPK): CPT

## 2019-06-05 PROCEDURE — 83605 ASSAY OF LACTIC ACID: CPT

## 2019-06-05 PROCEDURE — 74011250637 HC RX REV CODE- 250/637: Performed by: INTERNAL MEDICINE

## 2019-06-05 PROCEDURE — 82962 GLUCOSE BLOOD TEST: CPT

## 2019-06-05 PROCEDURE — 99284 EMERGENCY DEPT VISIT MOD MDM: CPT

## 2019-06-05 PROCEDURE — 74176 CT ABD & PELVIS W/O CONTRAST: CPT

## 2019-06-05 PROCEDURE — 84484 ASSAY OF TROPONIN QUANT: CPT

## 2019-06-05 PROCEDURE — 81001 URINALYSIS AUTO W/SCOPE: CPT

## 2019-06-05 PROCEDURE — 71046 X-RAY EXAM CHEST 2 VIEWS: CPT

## 2019-06-05 PROCEDURE — 94760 N-INVAS EAR/PLS OXIMETRY 1: CPT

## 2019-06-05 PROCEDURE — 87040 BLOOD CULTURE FOR BACTERIA: CPT

## 2019-06-05 PROCEDURE — 74011250636 HC RX REV CODE- 250/636: Performed by: INTERNAL MEDICINE

## 2019-06-05 PROCEDURE — 85025 COMPLETE CBC W/AUTO DIFF WBC: CPT

## 2019-06-05 PROCEDURE — 74011250637 HC RX REV CODE- 250/637: Performed by: EMERGENCY MEDICINE

## 2019-06-05 PROCEDURE — 93005 ELECTROCARDIOGRAM TRACING: CPT

## 2019-06-05 PROCEDURE — 36415 COLL VENOUS BLD VENIPUNCTURE: CPT

## 2019-06-05 RX ORDER — BUMETANIDE 2 MG/1
4 TABLET ORAL 2 TIMES DAILY
Qty: 120 TAB | Refills: 12 | Status: SHIPPED | OUTPATIENT
Start: 2019-06-05 | End: 2020-07-01 | Stop reason: ALTCHOICE

## 2019-06-05 RX ORDER — ISOSORBIDE MONONITRATE 30 MG/1
30 TABLET, EXTENDED RELEASE ORAL DAILY
Qty: 30 TAB | Refills: 12 | Status: SHIPPED | OUTPATIENT
Start: 2019-06-06 | End: 2020-07-01 | Stop reason: ALTCHOICE

## 2019-06-05 RX ORDER — ACETAMINOPHEN 500 MG
1000 TABLET ORAL
Status: COMPLETED | OUTPATIENT
Start: 2019-06-05 | End: 2019-06-05

## 2019-06-05 RX ORDER — LEVOFLOXACIN 500 MG/1
500 TABLET, FILM COATED ORAL DAILY
Qty: 7 TAB | Refills: 0 | Status: SHIPPED | OUTPATIENT
Start: 2019-06-05 | End: 2019-06-28

## 2019-06-05 RX ORDER — FEBUXOSTAT 40 MG/1
40 TABLET, FILM COATED ORAL DAILY
Qty: 30 TAB | Refills: 0 | Status: ON HOLD | OUTPATIENT
Start: 2019-06-06 | End: 2020-02-17

## 2019-06-05 RX ORDER — METOLAZONE 2.5 MG/1
2.5 TABLET ORAL ONCE
Status: COMPLETED | OUTPATIENT
Start: 2019-06-05 | End: 2019-06-05

## 2019-06-05 RX ADMIN — OXYCODONE HYDROCHLORIDE AND ACETAMINOPHEN 1 TABLET: 5; 325 TABLET ORAL at 01:28

## 2019-06-05 RX ADMIN — SIMETHICONE CHEW TAB 80 MG 80 MG: 80 TABLET ORAL at 08:44

## 2019-06-05 RX ADMIN — ACETAMINOPHEN 1000 MG: 500 TABLET ORAL at 19:09

## 2019-06-05 RX ADMIN — PANTOPRAZOLE SODIUM 40 MG: 40 TABLET, DELAYED RELEASE ORAL at 08:42

## 2019-06-05 RX ADMIN — POLYETHYLENE GLYCOL 3350 17 G: 17 POWDER, FOR SOLUTION ORAL at 08:43

## 2019-06-05 RX ADMIN — BUMETANIDE 4 MG: 1 TABLET ORAL at 08:42

## 2019-06-05 RX ADMIN — ISOSORBIDE MONONITRATE 30 MG: 30 TABLET, EXTENDED RELEASE ORAL at 08:44

## 2019-06-05 RX ADMIN — METOLAZONE 2.5 MG: 2.5 TABLET ORAL at 08:43

## 2019-06-05 RX ADMIN — DOCUSATE SODIUM 100 MG: 100 CAPSULE, LIQUID FILLED ORAL at 08:44

## 2019-06-05 RX ADMIN — FINASTERIDE 5 MG: 5 TABLET, FILM COATED ORAL at 08:43

## 2019-06-05 RX ADMIN — Medication 10 ML: at 05:22

## 2019-06-05 RX ADMIN — POTASSIUM CHLORIDE 40 MEQ: 1.5 POWDER, FOR SOLUTION ORAL at 08:40

## 2019-06-05 RX ADMIN — FEBUXOSTAT 40 MG: 40 TABLET ORAL at 08:42

## 2019-06-05 RX ADMIN — FERROUS SULFATE TAB 325 MG (65 MG ELEMENTAL FE) 325 MG: 325 (65 FE) TAB at 08:42

## 2019-06-05 RX ADMIN — ENOXAPARIN SODIUM 40 MG: 40 INJECTION SUBCUTANEOUS at 08:44

## 2019-06-05 RX ADMIN — CARVEDILOL 12.5 MG: 12.5 TABLET, FILM COATED ORAL at 08:47

## 2019-06-05 NOTE — PROGRESS NOTES
Bedside report received from [facility], [address] [place] [address]. Assumed care of patient.

## 2019-06-05 NOTE — PROGRESS NOTES
Pharmacist Discharge Medication Reconciliation    Significant PMH:   Past Medical History:   Diagnosis Date    Anemia 8/5/2017    Anxiety 8/5/2017    Arrhythmia     atrial fibrillation 2014    ASCVD (arteriosclerotic cardiovascular disease) 8/5/2017    BPH (benign prostatic hyperplasia) 8/5/2017    CAD (coronary artery disease)     h/o stents    Cancer ([address]     h/o skin cancer    Cardiomyopathy ([facility] 8/5/2017    CHF (congestive heart failure) ([address] 8/5/2017    Chronic kidney disease     Stage IV    CKD (chronic kidney disease), stage IV ([address] 8/5/2017    Diabetes ([facility]     Diabetes mellitus ([facility] 8/5/2017    Diabetic neuropathy ([facility] 8/5/2017    DJD (degenerative joint disease) 8/5/2017    ED (erectile dysfunction) 8/5/2017    Gout 8/5/2017    High cholesterol     Hyperlipidemia 8/5/2017    Hypertension     Hypertension with renal disease 8/5/2017    Hypothyroid 8/5/2017    Insomnia 8/5/2017    Obesity 8/5/2017    On statin therapy 8/5/2017    Restless leg 8/5/2017    Thyroid disease     hypothyroid     Chief Complaint for this Admission:   Chief Complaint   Patient presents with    Chest Pain    Shortness of Breath     Allergies: Niacin; Imipenem; Levemir [insulin detemir]; Other medication; Primaxin [imipenem-cilastatin]; and Xarelto [rivaroxaban]    Discharge Medications:   Current Discharge Medication List        START taking these medications    Details   isosorbide mononitrate ER (IMDUR) 30 mg tablet Take 1 Tab by mouth daily. Qty: 30 Tab, Refills: 12           CONTINUE these medications which have CHANGED    Details   bumetanide (BUMEX) 2 mg tablet Take 2 Tabs by mouth two (2) times a day. Qty: 120 Tab, Refills: 12      febuxostat (ULORIC) 40 mg tab tablet Take 1 Tab by mouth daily. Qty: 30 Tab, Refills: 0           CONTINUE these medications which have NOT CHANGED    Details   metOLazone (ZAROXOLYN) 2.5 mg tablet Take 2.5 mg by mouth daily as needed (swelling).       timolol (TIMOPTIC) 0.5 % ophthalmic solution Administer 1 Drop to right eye two (2) times a day. oxyCODONE-acetaminophen (PERCOCET) 5-325 mg per tablet Take 1 Tab by mouth every eight (8) hours as needed for Pain for up to 30 days. Max Daily Amount: 3 Tabs. Qty: 30 Tab, Refills: 0    Associated Diagnoses: Chronic pain of both shoulders      finasteride (PROSCAR) 5 mg tablet TAKE 1 TABLET BY MOUTH DAILY  Qty: 90 Tab, Refills: 3      gabapentin (NEURONTIN) 300 mg capsule Take 1 Cap by mouth nightly. Qty: 30 Cap, Refills: 5      potassium chloride (KLOR-CON) 20 mEq pack One packet three times daily  Qty: 90 Packet, Refills: prn    Comments: Dose increased per lab 4-2-2019      ascorbic acid, vitamin C, (VITAMIN C) 250 mg tablet Take 1 Tab by mouth three (3) times daily. Qty: 100 Tab, Refills: prn      insulin glargine (LANTUS SOLOSTAR U-100 INSULIN) 100 unit/mL (3 mL) inpn 35 Units by SubCUTAneous route nightly. OTHER Apply  to affected area daily as needed (Knee Pain). CBD Cream:  Apply daily as needed for knee pain      carvedilol (COREG) 12.5 mg tablet TAKE 1 TABLET BY MOUTH TWICE DAILY  Qty: 60 Tab, Refills: 11      tamsulosin (FLOMAX) 0.4 mg capsule TAKE 2 CAPSULES BY MOUTH EVERY DAY  Qty: 180 Cap, Refills: 3      Ferrous Sulfate (SLOW FE) 47.5 mg iron TbER tablet Take 1 Tab by mouth three (3) times daily. Qty: 90 Tab, Refills: prn      pramipexole (MIRAPEX) 0.5 mg tablet TAKE 1 TABLET BY MOUTH EVERY NIGHT AT BEDTIME  Qty: 90 Tab, Refills: prn    Associated Diagnoses: Restless leg      Liraglutide (VICTOZA) 0.6 mg/0.1 mL (18 mg/3 mL) sub-q pen 0.6 mg by SubCUTAneous route daily. naloxone (NARCAN) 4 mg/actuation nasal spray Use 1 spray intranasally, then discard. Repeat with new spray every 2 min as needed for opioid overdose symptoms, alternating nostrils. Qty: 2 Each, Refills: 0             The patient's chart, MAR and AVS were reviewed by Jacob Selby.     Discharging Provider: Bhupinder Campos MD    Other Interventions (patient counseling, changes made to AVS, etc): Counseled patient on signs and symptoms of hyper/hypokalemia. Patient understood counseling points and stated that he follows up with his PCP every two weeks to check BMP. Febuxostat was removed from the patient's PTA med list but was continued on admission. Tiger Text sent to Dr. Melonie Parish regarding need on discharge, will follow up with [facility] [place] if patient is discharged before he responds.     Thank Ester White

## 2019-06-05 NOTE — PROGRESS NOTES
Pharmacy Progress Note - Transitions of Care Consult    S/O: Mr. Leana Torres. 77 y.o., referred by [facility], to pharmacy for transitions of care. Patient's PCP: Bharat Delacruz MD .      Reason for hospitalization: CHF    Bundle patient?: YES  Bundle: Heart Failure      Other Medications change(s)/finding(s):   Patient's bumetanide dose was doubled to 4 mg BID on discharge. Due to fluctuating potassium, counseled patient on signs and symptoms of hypo/hyperkalemia. Patient was knowledgeable of counseling points and stated that he follows up with his PCP every 2 weeks to check labs. Febuxostat (Uloric) was continued on admission, but later removed from patient's PTA medication list after completion of a medication history by pharmacy. Patient was only prescribed a 5 day supply back in January and he is not currently on this medication. It was continued on discharge; however, it may not be needed at this time. There was no mention of need for this medication in provider notes. Recommendation(s)/Plan(s):  Sent tiger text to Dr. Carlos Mckeon regarding need for febuxostat (Daniela Trevino). If patient is discharged prior to response, will share information with [facility] [place]. Patient has follow-up with PCP on 6/7/19.    - Note will be routed to [facility] team for review.      Thank you,  Shante Lombardo, VENUSD

## 2019-06-05 NOTE — PROGRESS NOTES
CYNTHIA: Home with [facility] to Home Visit and  Follow-up Appointment. CM met with pt at bedside to discuss d/c plan. Medicare pt has received, reviewed, and signed 2nd IM letter informing them of their right to appeal the discharge. Signed copy has been placed on pt bedside chart. Pt stated that his wife will transport at d/c. Gabriel Atkins updated pt's AVS with follow-ups. No further CM needs identified. CM notified pt's nurse of d/c. Care Management Interventions  PCP Verified by CM: Yes  Last Visit to PCP: 05/21/19  Mode of Transport at Discharge: Other (see comment)(Pt's wife will transport at d/c.)  Transition of Care Consult (CM Consult):  Other(Hospital to Home visit. )  Discharge Durable Medical Equipment: No  Physical Therapy Consult: No  Occupational Therapy Consult: No  Speech Therapy Consult: No  Current Support Network: Lives with Spouse  Confirm Follow Up Transport: Self  Plan discussed with Pt/Family/Caregiver: Yes  Freedom of Choice Offered: Yes  Discharge Location  Discharge Placement: Home(Home with [facility] to Home visit and follow-up appointments. )    Sarbjit Lilly Cha [address] [place] [address]  [phone]

## 2019-06-05 NOTE — PROGRESS NOTES
Progress Note    6/5/19     NAME: Royal Gretchen Burk. MRN:  [id number]   Admit Diagnosis: Acute on chronic diastolic congestive heart failure, NYHA class 3 ([facility]) [I50.33]     Assessment:     1. Acute Systolic Heart Failure, stage C ,             Ischemic Cardiomyopathy , LV EF 30 - 35 % on Echo. Mild - Moderate Aortic insufficiency . Moderate Tricuspid regurgitation. Moderate Pulmonary HTN            2. Paroxysmal atrial fibrillation and atypical atrial flutter s/p ablation in 5/2018.  NO atrial fibrillation or flutter since the ablation. 3. Hypertensive heart disease with a history of congestive heart failure and CKD stage 4.  EF 55% on prior echo, then 35-40% early 2019.  EF 35% by echo . 4. Coronary artery disease with multivessel interventions.  His last stenting was in 2010.  Cath in 2014 showed a nondominant RCA with LPDA occluded and filled by collaterals, otherwise up to moderate CAD. 5. Mild idiopathic pericardial effusion in the past.  6. Diabetes mellitus type 2, on chronic insulin. 7. Hyperlipidemia. 8. BRBPR in 1/2019 with grade 1 internal hemorrhoids noted on colo here.  Presumed source of GI bleed. 9. History of esophageal dilation for dysphagia. 10. Hiatal hernia. 11. Anemia of chronic renal disease.  On EPO (and Fe supplement). 12.      thrombocytopenia. 12. Chronic anticoagulation stopped 1/2019. 13. Progressive hyponatremia, multifactorial (volume overload, Na loss during diuresis, etc).           1. Not a candidate for ACEI or ARB or spironolactone. Due to CKD   2. BIV pacer program change on 5/28:  Changed LV>RV to 50 msec from 20 msec.  Tried to enable MPP using poles 3 and 4 but vectors using these did not capture at max output. 3. Stopped Eliquis given severe anemia requiring transfusion in 2019.  On DVT prophylaxis with enoxaparin. 4. Not on antiplatelet therapy. Thrombocytopenia. 6/4    Feels well. Na+has dropped to 126 .     Will have him decrease fluid , ice chips intake and recheck. Ambulate. 6/5 Doing well. No O2 needed    Na+ 129    Still hyponatremic but improving . Will discharge home . He understands to limit free fluid intake. He' ll see Dr Shikha Sequeira in a couple days and get his BMP checked. Discharge. Plan:   Continue diuresis is per orders. [x]        High complexity decision making was performed    Subjective:     Royal KRISTA David. denies chest pain, dyspnea. Discussed with RN events overnight.      Patient Active Problem List   Diagnosis Code    Atrial fibrillation ([facility] I48.91    Primary osteoarthritis involving multiple joints M15.0    ASCVD (arteriosclerotic cardiovascular disease) I25.10    Gout M10.9    CHF (congestive heart failure) ([facility]) I50.9    Anemia D64.9    Mixed hyperlipidemia E78.2    Controlled type 2 diabetes mellitus with stage 4 chronic kidney disease, without long-term current use of insulin ([facility]) E11.22, N18.4    CKD (chronic kidney disease), stage IV ([facility]) N18.4    Hypertension with renal disease I12.9    Restless leg G25.81    Class 1 obesity due to excess calories without serious comorbidity with body mass index (BMI) of 30.0 to 30.9 in adult E66.09, Z68.30    Insomnia G47.00    Acquired hypothyroidism E03.9    Gastroesophageal reflux disease without esophagitis K21.9    ED (erectile dysfunction) N52.9    Diabetic neuropathy ([facility]) E11.40    Cardiomyopathy ([facility] I42.9    BPH (benign prostatic hyperplasia) N40.0    Anxiety F41.9    Type 2 diabetes mellitus with foot ulcer ([facility]) E11.621, L97.509    Age-related cataract H25.9    Medicare annual wellness visit, subsequent Z00.00    Hyperostosis M85.80    Status post amputation of left great toe ([facility]) Z89.412    Right elbow pain M25.521    S/P ablation of atrial fibrillation Z98.890, Z86.79    Primary osteoarthritis of right knee M17.11    Primary osteoarthritis of left knee B21.57    Systolic CHF, acute ([facility]) I50.21  Acute on chronic diastolic congestive heart failure, NYHA class 3 ([facility]) I50.33    Labyrinthitis of both ears H83.03    ACC/AHA stage C systolic heart failure due to ischemic cardiomyopathy ([facility]) I50.20, I25.5       Review of Systems:    Symptom Y/N Comments  Symptom Y/N Comments   Fever/Chills N   Chest Pain N    Poor Appetite N   Edema N    Cough N   Abdominal Pain N    Sputum N   Joint Pain N    SOB/ANDERS N   Pruritis/Rash N    Nausea/vomit N   Tolerating PT/OT Y    Diarrhea N   Tolerating Diet Y    Constipation N   Other       Could NOT obtain due to:      Objective:      Physical Exam:    Last 24hrs VS reviewed since prior progress note. Most recent are:    Visit Vitals  /82 (BP 1 Location: Right arm, BP Patient Position: At rest)   Pulse 70   Temp 98.2 °F (36.8 °C)   Resp 20   Ht 6' 4\" (1.93 m)   Wt 106.8 kg (235 lb 7.2 oz)   SpO2 95%   BMI 28.66 kg/m²       Intake/Output Summary (Last 24 hours) at 6/5/2019 1007  Last data filed at 6/5/2019 2686  Gross per 24 hour   Intake 1900 ml   Output 4275 ml   Net -2375 ml        General Appearance: Well developed, well nourished, alert & oriented x 3,    no acute distress. Ears/Nose/Mouth/Throat: Hearing grossly normal.  Neck: Supple. Chest: Lungs clear to auscultation bilaterally. Cardiovascular: Regular rate and rhythm, S1S2 normal, no murmur. Abdomen: Soft, non-tender, bowel sounds are active. Extremities: No edema bilaterally. Skin: Warm and dry. PMH/SH reviewed - no change compared to H&P    Data Review    Telemetry: normal sinus rhythm     Lab Data Personally Reviewed:    No results for input(s): WBC, HGB, HCT, PLT, HGBEXT, HCTEXT, PLTEXT, HGBEXT, HCTEXT, PLTEXT in the last 72 hours. LABRCNT(INR:3,PTP:3,APTT:3,)  Recent Labs     06/05/19  0241 06/04/19  0403 06/03/19  0206   * 126* 128*   K 4.4 4.7 5.0   CL 91* 90* 95*   CO2 30 30 27   BUN 75* 69* 65*   CREA 2.60* 2.44* 2.37*   * 85 134*   CA 8.6 8.5 8.3* LABRCNT(CPK:3,CpKMB:3,ckndx:3,troiq:3)  Lab Results   Component Value Date/Time    Cholesterol, total 112 11/27/2018 02:55 PM    HDL Cholesterol 27 (L) 11/27/2018 02:55 PM    LDL, calculated 68 11/27/2018 02:55 PM    Triglyceride 85 11/27/2018 02:55 PM   LABRCNT(sgot:3,gpt:3,ap:3,tbiL:3,TP:3,ALB:3,GLOB:3,ggt:3,aml:3,amyp:3,lpse:3,hlpse:3)No results for input(s): PH, PCO2, PO2 in the last 72 hours. Lab Results   Component Value Date/Time    Cholesterol, total 112 11/27/2018 02:55 PM    HDL Cholesterol 27 (L) 11/27/2018 02:55 PM    LDL, calculated 68 11/27/2018 02:55 PM    Triglyceride 85 11/27/2018 02:55 PM   MEDTABLETimothy Gini Rosenthal MD  No results for input(s): PH, PCO2, PO2 in the last 72 hours.     Medications Personally Reviewed:    Current Facility-Administered Medications   Medication Dose Route Frequency    bumetanide (BUMEX) tablet 4 mg  4 mg Oral BID    polyethylene glycol (MIRALAX) packet 17 g  17 g Oral BID    temazepam (RESTORIL) capsule 30 mg  30 mg Oral QHS    simethicone (MYLICON) tablet 80 mg  80 mg Oral TIDAC    potassium chloride (KLOR-CON) packet for solution 40 mEq  40 mEq Oral BID WITH MEALS    nitroglycerin (NITROSTAT) tablet 0.4 mg  0.4 mg SubLINGual PRN    insulin glargine (LANTUS) injection 20 Units  20 Units SubCUTAneous QHS    isosorbide mononitrate ER (IMDUR) tablet 30 mg  30 mg Oral DAILY    enoxaparin (LOVENOX) injection 40 mg  40 mg SubCUTAneous Q24H    pantoprazole (PROTONIX) tablet 40 mg  40 mg Oral ACB    liraglutide (VICTOZA) 0.6 mg/0.1 mL (18 mg/3 mL) sub-q pen 0.6 mg (Patient Supplied)  0.6 mg SubCUTAneous QHS    colchicine tablet 0.6 mg  0.6 mg Oral PRN    pramipexole (MIRAPEX) tablet 0.5 mg  0.5 mg Oral QHS    ferrous sulfate tablet 325 mg  325 mg Oral DAILY    tamsulosin (FLOMAX) capsule 0.4 mg  0.4 mg Oral QHS    carvedilol (COREG) tablet 12.5 mg  12.5 mg Oral BID WITH MEALS    febuxostat (ULORIC) tablet 40 mg  40 mg Oral DAILY    gabapentin (NEURONTIN) capsule 300 mg  300 mg Oral QHS    finasteride (PROSCAR) tablet 5 mg  5 mg Oral DAILY    oxyCODONE-acetaminophen (PERCOCET) 5-325 mg per tablet 1 Tab  1 Tab Oral Q8H PRN    sodium chloride (NS) flush 5-40 mL  5-40 mL IntraVENous Q8H    sodium chloride (NS) flush 5-40 mL  5-40 mL IntraVENous PRN    docusate sodium (COLACE) capsule 100 mg  100 mg Oral BID    senna (SENOKOT) tablet 8.6 mg  1 Tab Oral QHS         Joselin Velazquez MD

## 2019-06-05 NOTE — PROGRESS NOTES
Follow up with cardiology NP Uzma Parham 6/18/19 @ 2:30 pm and PCP Dr Brian Zhong 6/7/19 @ 3:10 on AVS and Hug team notified.

## 2019-06-05 NOTE — DISCHARGE SUMMARY
6/5/19      NAME:            Caitlyn Cid. MRN:               [id number]   Admit Diagnosis: Acute on chronic diastolic congestive heart failure, NYHA class 3 ([facility]) [I50.33]                 Assessment:       1. Acute Systolic Heart Failure, stage C ,             Ischemic Cardiomyopathy , LV EF 30 - 35 % on Echo.      Mild - Moderate Aortic insufficiency . Moderate Tricuspid regurgitation. Moderate Pulmonary HTN            2. Paroxysmal atrial fibrillation and atypical atrial flutter s/p ablation in 5/2018.  NO atrial fibrillation or flutter since the ablation. 3. Hypertensive heart disease with a history of congestive heart failure and CKD stage 4.  EF 55% on prior echo, then 35-40% early 2019.  EF 35% by echo . 4. Coronary artery disease with multivessel interventions.  His last stenting was in 2010.  Cath in 2014 showed a nondominant RCA with LPDA occluded and filled by collaterals, otherwise up to moderate CAD. 5. Mild idiopathic pericardial effusion in the past.  6. Diabetes mellitus type 2, on chronic insulin. 7. Hyperlipidemia. 8. BRBPR in 1/2019 with grade 1 internal hemorrhoids noted on colo here.  Presumed source of GI bleed. 9. History of esophageal dilation for dysphagia. 10. Hiatal hernia. 11. Anemia of chronic renal disease.  On EPO (and Fe supplement). 12.      thrombocytopenia. 12. Chronic anticoagulation stopped 1/2019. 13. Progressive hyponatremia, multifactorial (volume overload, Na loss during diuresis, etc).             1. Not a candidate for ACEI or ARB or spironolactone. Due to CKD   2. BIV pacer program change on 5/28:  Changed LV>RV to 50 msec from 20 msec.  Tried to enable MPP using poles 3 and 4 but vectors using these did not capture at max output. 3. Stopped Eliquis given severe anemia requiring transfusion in 2019.  On DVT prophylaxis with enoxaparin. 4. Not on antiplatelet therapy. Thrombocytopenia.      6/4    Feels well. Na+has dropped to 126 .     Will have him decrease fluid , ice chips intake and recheck.      Ambulate.      6/5 Doing well. No O2 needed    Na+ 129    Still hyponatremic but improving . Will discharge home . He understands to limit free fluid intake. He' ll see Dr Jose Cornelius in a couple days and get his BMP checked.      Discharge.                         Plan:     Continue diuresis is per orders.              [x]        High complexity decision making was performed     Subjective:      Royal KRISTA Peacock Covert. denies chest pain, dyspnea.   Discussed with RN events overnight.           Patient Active Problem List   Diagnosis Code    Atrial fibrillation ([facility]) I48.91    Primary osteoarthritis involving multiple joints M15.0    ASCVD (arteriosclerotic cardiovascular disease) I25.10    Gout M10.9    CHF (congestive heart failure) ([facility]) I50.9    Anemia D64.9    Mixed hyperlipidemia E78.2    Controlled type 2 diabetes mellitus with stage 4 chronic kidney disease, without long-term current use of insulin ([facility]) E11.22, N18.4    CKD (chronic kidney disease), stage IV ([facility]) N18.4    Hypertension with renal disease I12.9    Restless leg G25.81    Class 1 obesity due to excess calories without serious comorbidity with body mass index (BMI) of 30.0 to 30.9 in adult E66.09, Z68.30    Insomnia G47.00    Acquired hypothyroidism E03.9    Gastroesophageal reflux disease without esophagitis K21.9    ED (erectile dysfunction) N52.9    Diabetic neuropathy ([facility]) E11.40    Cardiomyopathy ([facility] I42.9    BPH (benign prostatic hyperplasia) N40.0    Anxiety F41.9    Type 2 diabetes mellitus with foot ulcer ([facility]) E11.621, L97.509    Age-related cataract H25.9    Medicare annual wellness visit, subsequent Z00.00    Hyperostosis M85.80    Status post amputation of left great toe ([facility]) Z89.412    Right elbow pain M25.521    S/P ablation of atrial fibrillation Z98.890, Z86.79    Primary osteoarthritis of right knee M17.11    Primary osteoarthritis of left knee P27.83    Systolic CHF, acute (HCC) I50.21    Acute on chronic diastolic congestive heart failure, NYHA class 3 (HCC) I50.33    Labyrinthitis of both ears H83.03    ACC/AHA stage C systolic heart failure due to ischemic cardiomyopathy (HCC) I50.20, I25.5         Review of Systems:               Symptom Y/N Comments   Symptom Y/N Comments   Fever/Chills N     Chest Pain N      Poor Appetite N     Edema N      Cough N     Abdominal Pain N      Sputum N     Joint Pain N      SOB/ANDERS N     Pruritis/Rash N      Nausea/vomit N     Tolerating PT/OT Y      Diarrhea N     Tolerating Diet Y      Constipation N     Other           Could NOT obtain due to:        Objective:      Physical Exam:     Last 24hrs VS reviewed since prior progress note. Most recent are:     Visit Vitals  /82 (BP 1 Location: Right arm, BP Patient Position: At rest)   Pulse 70   Temp 98.2 °F (36.8 °C)   Resp 20   Ht 6' 4\" (1.93 m)   Wt 106.8 kg (235 lb 7.2 oz)   SpO2 95%   BMI 28.66 kg/m²         Intake/Output Summary (Last 24 hours) at 6/5/2019 1007  Last data filed at 6/5/2019 4393      Gross per 24 hour   Intake 1900 ml   Output 4275 ml   Net -2375 ml         General Appearance: Well developed, well nourished, alert & oriented x 3,               no acute distress. Ears/Nose/Mouth/Throat: Hearing grossly normal.  Neck: Supple. Chest: Lungs clear to auscultation bilaterally. Cardiovascular: Regular rate and rhythm, S1S2 normal, no murmur. Abdomen: Soft, non-tender, bowel sounds are active. Extremities: No edema bilaterally. Skin: Warm and dry.     PMH/SH reviewed - no change compared to H&P     Data Review     Telemetry: normal sinus rhythm      Lab Data Personally Reviewed:     No results for input(s): WBC, HGB, HCT, PLT, HGBEXT, HCTEXT, PLTEXT, HGBEXT, HCTEXT, PLTEXT in the last 72 hours. LABRCNT(INR:3,PTP:3,APTT:3,)        Recent Labs     06/05/19  0241 06/04/19  0403 06/03/19  0206   * 126* 128*   K 4.4 4.7 5.0   CL 91* 90* 95*   CO2 30 30 27   BUN 75* 69* 65*   CREA 2.60* 2.44* 2.37*   * 85 134*   CA 8.6 8.5 8.3*   LABRCNT(CPK:3,CpKMB:3,ckndx:3,troiq:3)        Lab Results   Component Value Date/Time     Cholesterol, total 112 11/27/2018 02:55 PM     HDL Cholesterol 27 (L) 11/27/2018 02:55 PM     LDL, calculated 68 11/27/2018 02:55 PM     Triglyceride 85 11/27/2018 02:55 PM   LABRCNT(sgot:3,gpt:3,ap:3,tbiL:3,TP:3,ALB:3,GLOB:3,ggt:3,aml:3,amyp:3,lpse:3,hlpse:3)No results for input(s): PH, PCO2, PO2 in the last 72 hours.         Lab Results   Component Value Date/Time     Cholesterol, total 112 11/27/2018 02:55 PM     HDL Cholesterol 27 (L) 11/27/2018 02:55 PM     LDL, calculated 68 11/27/2018 02:55 PM     Triglyceride 85 11/27/2018 02:55 PM   Crys Ng MD  No results for input(s): PH, PCO2, PO2 in the last 72 hours.     Medications Personally Reviewed:            Current Facility-Administered Medications   Medication Dose Route Frequency    bumetanide (BUMEX) tablet 4 mg  4 mg Oral BID    polyethylene glycol (MIRALAX) packet 17 g  17 g Oral BID    temazepam (RESTORIL) capsule 30 mg  30 mg Oral QHS    simethicone (MYLICON) tablet 80 mg  80 mg Oral TIDAC    potassium chloride (KLOR-CON) packet for solution 40 mEq  40 mEq Oral BID WITH MEALS    nitroglycerin (NITROSTAT) tablet 0.4 mg  0.4 mg SubLINGual PRN    insulin glargine (LANTUS) injection 20 Units  20 Units SubCUTAneous QHS    isosorbide mononitrate ER (IMDUR) tablet 30 mg  30 mg Oral DAILY    enoxaparin (LOVENOX) injection 40 mg  40 mg SubCUTAneous Q24H    pantoprazole (PROTONIX) tablet 40 mg  40 mg Oral ACB    liraglutide (VICTOZA) 0.6 mg/0.1 mL (18 mg/3 mL) sub-q pen 0.6 mg (Patient Supplied)  0.6 mg SubCUTAneous QHS    colchicine tablet 0.6 mg  0.6 mg Oral PRN    pramipexole (MIRAPEX) tablet 0.5 mg  0.5 mg Oral QHS    ferrous sulfate tablet 325 mg  325 mg Oral DAILY    tamsulosin (FLOMAX) capsule 0.4 mg  0.4 mg Oral QHS    carvedilol (COREG) tablet 12.5 mg  12.5 mg Oral BID WITH MEALS    febuxostat (ULORIC) tablet 40 mg  40 mg Oral DAILY    gabapentin (NEURONTIN) capsule 300 mg  300 mg Oral QHS    finasteride (PROSCAR) tablet 5 mg  5 mg Oral DAILY    oxyCODONE-acetaminophen (PERCOCET) 5-325 mg per tablet 1 Tab  1 Tab Oral Q8H PRN    sodium chloride (NS) flush 5-40 mL  5-40 mL IntraVENous Q8H    sodium chloride (NS) flush 5-40 mL  5-40 mL IntraVENous PRN    docusate sodium (COLACE) capsule 100 mg  100 mg Oral BID    senna (SENOKOT) tablet 8.6 mg  1 Tab Oral QHS          Karen Lamb MD

## 2019-06-05 NOTE — PROGRESS NOTES
0700: Bedside shift change report given to Anh Joy RN (oncoming nurse) by Christopher Chirinos RN (offgoing nurse). Report included the following information SBAR, Kardex, ED Summary, Procedure Summary, Intake/Output, MAR and Recent Results. 1155: Pt is to be discharged home today. Discharge instructions have been reviewed thoroughly and time has been allotted for questions and patient teaching. Pt has no questions at this time. PIV and telemetry have been removed from patient and all personal belongings have been removed from patient room.  Pt is being transported home via private vehicle                            Problem: Heart Failure: Day 1  Goal: Off Pathway (Use only if patient is Off Pathway)  Outcome: Progressing Towards Goal  Goal: Activity/Safety  Outcome: Progressing Towards Goal  Goal: Consults, if ordered  Outcome: Progressing Towards Goal  Goal: Diagnostic Test/Procedures  Outcome: Progressing Towards Goal  Goal: Nutrition/Diet  Outcome: Progressing Towards Goal  Goal: Discharge Planning  Outcome: Progressing Towards Goal  Goal: Medications  Outcome: Progressing Towards Goal  Goal: Respiratory  Outcome: Progressing Towards Goal  Goal: Treatments/Interventions/Procedures  Outcome: Progressing Towards Goal  Goal: Psychosocial  Outcome: Progressing Towards Goal  Goal: *Oxygen saturation within defined limits  Outcome: Progressing Towards Goal  Goal: *Hemodynamically stable  Outcome: Progressing Towards Goal  Goal: *Optimal pain control at patient's stated goal  Outcome: Progressing Towards Goal  Goal: *Anxiety reduced or absent  Outcome: Progressing Towards Goal     Problem: Heart Failure: Day 2  Goal: Off Pathway (Use only if patient is Off Pathway)  Outcome: Progressing Towards Goal  Goal: Activity/Safety  Outcome: Progressing Towards Goal  Goal: Consults, if ordered  Outcome: Progressing Towards Goal  Goal: Diagnostic Test/Procedures  Outcome: Progressing Towards Goal  Goal: Nutrition/Diet  Outcome: Progressing Towards Goal  Goal: Discharge Planning  Outcome: Progressing Towards Goal  Goal: Medications  Outcome: Progressing Towards Goal  Goal: Respiratory  Outcome: Progressing Towards Goal  Goal: Treatments/Interventions/Procedures  Outcome: Progressing Towards Goal  Goal: Psychosocial  Outcome: Progressing Towards Goal  Goal: *Oxygen saturation within defined limits  Outcome: Progressing Towards Goal  Goal: *Hemodynamically stable  Outcome: Progressing Towards Goal  Goal: *Optimal pain control at patient's stated goal  Outcome: Progressing Towards Goal  Goal: *Anxiety reduced or absent  Outcome: Progressing Towards Goal  Goal: *Demonstrates progressive activity  Outcome: Progressing Towards Goal     Problem: Heart Failure: Day 3  Goal: Off Pathway (Use only if patient is Off Pathway)  Outcome: Progressing Towards Goal  Goal: Activity/Safety  Outcome: Progressing Towards Goal  Goal: Diagnostic Test/Procedures  Outcome: Progressing Towards Goal  Goal: Nutrition/Diet  Outcome: Progressing Towards Goal  Goal: Discharge Planning  Outcome: Progressing Towards Goal  Goal: Medications  Outcome: Progressing Towards Goal  Goal: Respiratory  Outcome: Progressing Towards Goal  Goal: Treatments/Interventions/Procedures  Outcome: Progressing Towards Goal  Goal: Psychosocial  Outcome: Progressing Towards Goal  Goal: *Oxygen saturation within defined limits  Outcome: Progressing Towards Goal  Goal: *Hemodynamically stable  Outcome: Progressing Towards Goal  Goal: *Optimal pain control at patient's stated goal  Outcome: Progressing Towards Goal  Goal: *Anxiety reduced or absent  Outcome: Progressing Towards Goal  Goal: *Demonstrates progressive activity  Outcome: Progressing Towards Goal

## 2019-06-05 NOTE — PROGRESS NOTES
PROGRESS NOTE    NAME:  Royal Anni Cárdenas. :   1950   MRN:   [id number]     Date/Time:  2019 7:18 AM  Subjective:   History:  Chart reviewed and patient seen and examined this AM and D/W his nurse and Dr. Melonie Parish all events noted. He presented with increasing SOB and progressive CHF acute on chronic. I was asked to see him for evaluation of dizziness and nausea which he awakened with on  as well as other medical problems. He has no further dizziness or N/V, but has had a lot of gas and bloating over the last few days and had been 3 days w/o BM until he had a small BM on . He has been breathing better when he has passage of a lot of gas. He has had good diuresis over the last 24 hr. after Zaroxolyn yesterday and increased Bumex and weight down 5 #. He has no current orthopnea which he had on admission. On  he had some CP relieved with 2 NTG SL. He has no other cardiac or respiratory c/o. He has no other GI c/o as well as he has no  c/o. There are no further neurologic c/o. He has no other c/o on complete ROS. Of note is that despite his gaining weight and progressive SOB for a few days PTA he had not taken his Zaroxolyn which he has to take in this situation. He has no other c/o on complete ROS.           Medications reviewed:  Current Facility-Administered Medications   Medication Dose Route Frequency    bumetanide (BUMEX) tablet 4 mg  4 mg Oral BID    polyethylene glycol (MIRALAX) packet 17 g  17 g Oral BID    temazepam (RESTORIL) capsule 30 mg  30 mg Oral QHS    simethicone (MYLICON) tablet 80 mg  80 mg Oral TIDAC    potassium chloride (KLOR-CON) packet for solution 40 mEq  40 mEq Oral BID WITH MEALS    nitroglycerin (NITROSTAT) tablet 0.4 mg  0.4 mg SubLINGual PRN    insulin glargine (LANTUS) injection 20 Units  20 Units SubCUTAneous QHS    isosorbide mononitrate ER (IMDUR) tablet 30 mg  30 mg Oral DAILY    enoxaparin (LOVENOX) injection 40 mg  40 mg SubCUTAneous Q24H    meclizine (ANTIVERT) tablet 12.5 mg  12.5 mg Oral TID    pantoprazole (PROTONIX) tablet 40 mg  40 mg Oral ACB    liraglutide (VICTOZA) 0.6 mg/0.1 mL (18 mg/3 mL) sub-q pen 0.6 mg (Patient Supplied)  0.6 mg SubCUTAneous QHS    colchicine tablet 0.6 mg  0.6 mg Oral PRN    pramipexole (MIRAPEX) tablet 0.5 mg  0.5 mg Oral QHS    ferrous sulfate tablet 325 mg  325 mg Oral DAILY    tamsulosin (FLOMAX) capsule 0.4 mg  0.4 mg Oral QHS    carvedilol (COREG) tablet 12.5 mg  12.5 mg Oral BID WITH MEALS    febuxostat (ULORIC) tablet 40 mg  40 mg Oral DAILY    gabapentin (NEURONTIN) capsule 300 mg  300 mg Oral QHS    finasteride (PROSCAR) tablet 5 mg  5 mg Oral DAILY    oxyCODONE-acetaminophen (PERCOCET) 5-325 mg per tablet 1 Tab  1 Tab Oral Q8H PRN    sodium chloride (NS) flush 5-40 mL  5-40 mL IntraVENous Q8H    sodium chloride (NS) flush 5-40 mL  5-40 mL IntraVENous PRN    docusate sodium (COLACE) capsule 100 mg  100 mg Oral BID    senna (SENOKOT) tablet 8.6 mg  1 Tab Oral QHS        Objective:   Vitals:  Visit Vitals  /80 (BP 1 Location: Right arm, BP Patient Position: At rest)   Pulse 70   Temp 98.8 °F (37.1 °C)   Resp 18   Ht 6' 4\" (1.93 m)   Wt 235 lb 7.2 oz (106.8 kg)   SpO2 100%   BMI 28.66 kg/m²    O2 Flow Rate (L/min): 3 l/min O2 Device: Room air Temp (24hrs), Av.2 °F (36.8 °C), Min:97.4 °F (36.3 °C), Max:99.2 °F (37.3 °C)      Last 24hr Input/Output:    Intake/Output Summary (Last 24 hours) at 2019 0718  Last data filed at 2019 0604  Gross per 24 hour   Intake 2140 ml   Output 3875 ml   Net -1735 ml        PHYSICAL EXAM:  General:     Alert, cooperative, no distress, appears stated age. Head:    Normocephalic, without obvious abnormality, atraumatic. Eyes:    Conjunctivae/corneas clear. PERRLA  Nose:   Nares normal. No drainage or sinus tenderness.   Throat:     Lips, mucosa, and tongue normal.  No Thrush  Neck:   Supple, symmetrical,  no adenopathy, thyroid: non tender     no carotid bruit and no JVD. Back:     Symmetric,  No CVA tenderness. Lungs:    Clear to auscultation bilaterally except dry bibasilar rales. No Wheezing or Rhonchi. Heart:    Regular rate and rhythm,  no murmur, rub or gallop. Abdomen:    Soft, non-tender. Not distended. Bowel sounds normal. No masses  Extremities:  Extremities normal, atraumatic, No cyanosis. Tr edema. No clubbing  Lymph nodes:  Cervical, supraclavicular normal.  Neurologic:  Normal strength, Alert and oriented X 3.    Skin:                 No rash      Lab Data Reviewed:    Recent Results (from the past 24 hour(s))   GLUCOSE, POC    Collection Time: 06/04/19  7:23 AM   Result Value Ref Range    Glucose (POC) 109 (H) 65 - 100 mg/dL    Performed by Yo Bass  (PCT)    GLUCOSE, POC    Collection Time: 06/04/19 11:35 AM   Result Value Ref Range    Glucose (POC) 148 (H) 65 - 100 mg/dL    Performed by Yo Bass  (PCT)    GLUCOSE, POC    Collection Time: 06/04/19  4:45 PM   Result Value Ref Range    Glucose (POC) 157 (H) 65 - 100 mg/dL    Performed by Lizeth Mendosa (CON)    GLUCOSE, POC    Collection Time: 06/04/19  8:14 PM   Result Value Ref Range    Glucose (POC) 230 (H) 65 - 100 mg/dL    Performed by John Paul Mayer (CON)    METABOLIC PANEL, BASIC    Collection Time: 06/05/19  2:41 AM   Result Value Ref Range    Sodium 129 (L) 136 - 145 mmol/L    Potassium 4.4 3.5 - 5.1 mmol/L    Chloride 91 (L) 97 - 108 mmol/L    CO2 30 21 - 32 mmol/L    Anion gap 8 5 - 15 mmol/L    Glucose 121 (H) 65 - 100 mg/dL    BUN 75 (H) 6 - 20 MG/DL    Creatinine 2.60 (H) 0.70 - 1.30 MG/DL    BUN/Creatinine ratio 29 (H) 12 - 20      GFR est AA 30 (L) >60 ml/min/1.73m2    GFR est non-AA 25 (L) >60 ml/min/1.73m2    Calcium 8.6 8.5 - 10.1 MG/DL         Assessment/Plan:     Principal Problem:    ACC/AHA stage C systolic heart failure due to ischemic cardiomyopathy ([address] (6/3/2019)    Active Problems:    Atrial fibrillation ([facility] (3/7/2014)      Overview: S/P ablation ASCVD (arteriosclerotic cardiovascular disease) (8/5/2017)      Mixed hyperlipidemia (8/5/2017)      Controlled type 2 diabetes mellitus with stage 4 chronic kidney disease, without long-term current use of insulin ([facility] (8/5/2017)      CKD (chronic kidney disease), stage IV ([facility] (8/5/2017)      Hypertension with renal disease (8/5/2017)      Anemia (7/5/1869)      Systolic CHF, acute ([facility] (1/2/2019)      Labyrinthitis of both ears (5/29/2019)       ___________________________________________________  PLAN:    1. BS s OK and continue to follow and treat with SSI PRN  2. Added meclizine on 5/29 to cover labyrinthitis possibility, D/C now  3. BP actually elevated on occasion so follow closely  4. Note BUN now up to 75 with baseline when dry  BUN, Follow renal function  5. Continue Diuresis (Takes Bumex 4mg AM and 2 mg PM at home), 4 mg increased to BID  6. PRN Zaroxolyn, ordered another dose for today  7. Note ? Of ischemia and agree with Dr. Purvi Roberts thoughts on that; however may need to resort to at least Cardiolite stress  8. Continue Lovenox for DVT prevention, but to be sure of cause of CP and SOB obtained V/Q scan on 5/31 (Low Probability)  9. Continue Lantus, but decreased to 20 U hs  10. Continue Protonix for nausea and SUP  11. Replete K, 4.4 today, so decreased supplement yesterday when 5.0  12. Continue all other current orders, reviewed  13. Added Simethicone for bloating   14. Repeat CXR on 6/1, Central congestion  15. Flat and upright abdominal films - normal  16. Continue Restoril HS  17. Na 126 yesterday to 129 today with decreased PO and improvement of diuresis, following  18.  Looks like he's essentially approaching baseline, Ready for D/C soon      35 minutes spent in direct care of this high complexity patient today with extremely high risk of decompensation          ___________________________________________________    Attending Physician: Lorena Barahona MD

## 2019-06-05 NOTE — PROGRESS NOTES
Progress Note      Note for 6/4 19      NAME: Royal Roseanne Evangelista. MRN:  [id number]   Admit Diagnosis: Acute on chronic diastolic congestive heart failure, NYHA class 3 ([facility]) [I50.33]     Assessment:     1. Acute Systolic Heart Failure, stage C ,             Ischemic Cardiomyopathy , LV EF 30 - 35 % on Echo. Mild - Moderate Aortic insufficiency . Moderate Tricuspid regurgitation. Moderate Pulmonary HTN            2. Paroxysmal atrial fibrillation and atypical atrial flutter s/p ablation in 5/2018.  NO atrial fibrillation or flutter since the ablation. 3. Hypertensive heart disease with a history of congestive heart failure and CKD stage 4.  EF 55% on prior echo, then 35-40% early 2019.  EF 35% by echo . 4. Coronary artery disease with multivessel interventions.  His last stenting was in 2010.  Cath in 2014 showed a nondominant RCA with LPDA occluded and filled by collaterals, otherwise up to moderate CAD. 5. Mild idiopathic pericardial effusion in the past.  6. Diabetes mellitus type 2, on chronic insulin. 7. Hyperlipidemia. 8. BRBPR in 1/2019 with grade 1 internal hemorrhoids noted on colo here.  Presumed source of GI bleed. 9. History of esophageal dilation for dysphagia. 10. Hiatal hernia. 11. Anemia of chronic renal disease.  On EPO (and Fe supplement). 12.      thrombocytopenia. 12. Chronic anticoagulation stopped 1/2019. 13. Progressive hyponatremia, multifactorial (volume overload, Na loss during diuresis, etc).           1. Not a candidate for ACEI or ARB or spironolactone. Due to CKD   2. BIV pacer program change on 5/28:  Changed LV>RV to 50 msec from 20 msec.  Tried to enable MPP using poles 3 and 4 but vectors using these did not capture at max output. 3. Stopped Eliquis given severe anemia requiring transfusion in 2019.  On DVT prophylaxis with enoxaparin. 4. Not on antiplatelet therapy. Thrombocytopenia. 6/4    Feels well. Na+has dropped to 126 .     Will have him decrease fluid , ice chips intake and recheck. Ambulate. Plan:   Continue diuresis is per orders. [x]        High complexity decision making was performed    Subjective:     Royal KRISTA Sanchez. denies chest pain, dyspnea. Discussed with RN events overnight.      Patient Active Problem List   Diagnosis Code    Atrial fibrillation ([facility]) I48.91    Primary osteoarthritis involving multiple joints M15.0    ASCVD (arteriosclerotic cardiovascular disease) I25.10    Gout M10.9    CHF (congestive heart failure) ([facility]) I50.9    Anemia D64.9    Mixed hyperlipidemia E78.2    Controlled type 2 diabetes mellitus with stage 4 chronic kidney disease, without long-term current use of insulin ([facility]) E11.22, N18.4    CKD (chronic kidney disease), stage IV ([facility]) N18.4    Hypertension with renal disease I12.9    Restless leg G25.81    Class 1 obesity due to excess calories without serious comorbidity with body mass index (BMI) of 30.0 to 30.9 in adult E66.09, Z68.30    Insomnia G47.00    Acquired hypothyroidism E03.9    Gastroesophageal reflux disease without esophagitis K21.9    ED (erectile dysfunction) N52.9    Diabetic neuropathy ([facility]) E11.40    Cardiomyopathy ([facility] I42.9    BPH (benign prostatic hyperplasia) N40.0    Anxiety F41.9    Type 2 diabetes mellitus with foot ulcer ([facility]) E11.621, L97.509    Age-related cataract H25.9    Medicare annual wellness visit, subsequent Z00.00    Hyperostosis M85.80    Status post amputation of left great toe ([facility]) Z89.412    Right elbow pain M25.521    S/P ablation of atrial fibrillation Z98.890, Z86.79    Primary osteoarthritis of right knee M17.11    Primary osteoarthritis of left knee W27.90    Systolic CHF, acute ([facility]) I50.21    Acute on chronic diastolic congestive heart failure, NYHA class 3 ([facility]) I50.33    Labyrinthitis of both ears H83.03    ACC/AHA stage C systolic heart failure due to ischemic cardiomyopathy ([facility]) I50.20, I25.5 Review of Systems:    Symptom Y/N Comments  Symptom Y/N Comments   Fever/Chills N   Chest Pain N    Poor Appetite N   Edema N    Cough N   Abdominal Pain N    Sputum N   Joint Pain N    SOB/ANDERS N   Pruritis/Rash N    Nausea/vomit N   Tolerating PT/OT Y    Diarrhea N   Tolerating Diet Y    Constipation N   Other       Could NOT obtain due to:      Objective:      Physical Exam:    Last 24hrs VS reviewed since prior progress note. Most recent are:    Visit Vitals  /82 (BP 1 Location: Right arm, BP Patient Position: At rest)   Pulse 70   Temp 98.2 °F (36.8 °C)   Resp 20   Ht 6' 4\" (1.93 m)   Wt 106.8 kg (235 lb 7.2 oz)   SpO2 95%   BMI 28.66 kg/m²       Intake/Output Summary (Last 24 hours) at 6/5/2019 1006  Last data filed at 6/5/2019 9222  Gross per 24 hour   Intake 1900 ml   Output 4275 ml   Net -2375 ml        General Appearance: Well developed, well nourished, alert & oriented x 3,    no acute distress. Ears/Nose/Mouth/Throat: Hearing grossly normal.  Neck: Supple. Chest: Lungs clear to auscultation bilaterally. Cardiovascular: Regular rate and rhythm, S1S2 normal, no murmur. Abdomen: Soft, non-tender, bowel sounds are active. Extremities: No edema bilaterally. Skin: Warm and dry. PMH/SH reviewed - no change compared to H&P    Data Review    Telemetry: normal sinus rhythm     Lab Data Personally Reviewed:    No results for input(s): WBC, HGB, HCT, PLT, HGBEXT, HCTEXT, PLTEXT, HGBEXT, HCTEXT, PLTEXT in the last 72 hours. LABRCNT(INR:3,PTP:3,APTT:3,)  Recent Labs     06/05/19  0241 06/04/19  0403 06/03/19  0206   * 126* 128*   K 4.4 4.7 5.0   CL 91* 90* 95*   CO2 30 30 27   BUN 75* 69* 65*   CREA 2.60* 2.44* 2.37*   * 85 134*   CA 8.6 8.5 8.3*   LABRCNT(CPK:3,CpKMB:3,ckndx:3,troiq:3)  Lab Results   Component Value Date/Time    Cholesterol, total 112 11/27/2018 02:55 PM    HDL Cholesterol 27 (L) 11/27/2018 02:55 PM    LDL, calculated 68 11/27/2018 02:55 PM    Triglyceride 85 11/27/2018 02:55 PM   LABRCNT(sgot:3,gpt:3,ap:3,tbiL:3,TP:3,ALB:3,GLOB:3,ggt:3,aml:3,amyp:3,lpse:3,hlpse:3)No results for input(s): PH, PCO2, PO2 in the last 72 hours. Lab Results   Component Value Date/Time    Cholesterol, total 112 11/27/2018 02:55 PM    HDL Cholesterol 27 (L) 11/27/2018 02:55 PM    LDL, calculated 68 11/27/2018 02:55 PM    Triglyceride 85 11/27/2018 02:55 PM   MEDTABLECuauhtemoc Caraballo MD  No results for input(s): PH, PCO2, PO2 in the last 72 hours.     Medications Personally Reviewed:    Current Facility-Administered Medications   Medication Dose Route Frequency    bumetanide (BUMEX) tablet 4 mg  4 mg Oral BID    polyethylene glycol (MIRALAX) packet 17 g  17 g Oral BID    temazepam (RESTORIL) capsule 30 mg  30 mg Oral QHS    simethicone (MYLICON) tablet 80 mg  80 mg Oral TIDAC    potassium chloride (KLOR-CON) packet for solution 40 mEq  40 mEq Oral BID WITH MEALS    nitroglycerin (NITROSTAT) tablet 0.4 mg  0.4 mg SubLINGual PRN    insulin glargine (LANTUS) injection 20 Units  20 Units SubCUTAneous QHS    isosorbide mononitrate ER (IMDUR) tablet 30 mg  30 mg Oral DAILY    enoxaparin (LOVENOX) injection 40 mg  40 mg SubCUTAneous Q24H    pantoprazole (PROTONIX) tablet 40 mg  40 mg Oral ACB    liraglutide (VICTOZA) 0.6 mg/0.1 mL (18 mg/3 mL) sub-q pen 0.6 mg (Patient Supplied)  0.6 mg SubCUTAneous QHS    colchicine tablet 0.6 mg  0.6 mg Oral PRN    pramipexole (MIRAPEX) tablet 0.5 mg  0.5 mg Oral QHS    ferrous sulfate tablet 325 mg  325 mg Oral DAILY    tamsulosin (FLOMAX) capsule 0.4 mg  0.4 mg Oral QHS    carvedilol (COREG) tablet 12.5 mg  12.5 mg Oral BID WITH MEALS    febuxostat (ULORIC) tablet 40 mg  40 mg Oral DAILY    gabapentin (NEURONTIN) capsule 300 mg  300 mg Oral QHS    finasteride (PROSCAR) tablet 5 mg  5 mg Oral DAILY    oxyCODONE-acetaminophen (PERCOCET) 5-325 mg per tablet 1 Tab  1 Tab Oral Q8H PRN    sodium chloride (NS) flush 5-40 mL  5-40 mL IntraVENous Q8H  sodium chloride (NS) flush 5-40 mL  5-40 mL IntraVENous PRN    docusate sodium (COLACE) capsule 100 mg  100 mg Oral BID    senna (SENOKOT) tablet 8.6 mg  1 Tab Oral QHS         Jair Lugo MD

## 2019-06-05 NOTE — ED NOTES
Patient called out complaining of SOB. When RN entered room, patient oxygen saturations at 99%. Patient repositioned with HOB elevated. Patient appears to be in no distress, although requesting oxygen. Informed primary RN, Jadiel Bustillos, who states he has been requesting oxygen, although he has a hx of COPD. Patient has not been wearing oxygen at home. Dr. Jessica Barton called at this time and states she will come do initial evaluation of patient.

## 2019-06-05 NOTE — PROGRESS NOTES
Transitional Care Team: Initial HUG Note    Date of Assessment: 06/05/19  Time of Assessment:  11:36 AM    Royal KRISTA John. is a 77 y.o. male inpatient at [facility]. Assessment & Plan   Pt A+O. Denies dyspnea. Minimal lower extremity edema. Aware of follow up appointments with PCP and cardiologist.          Primary Diagnosis: heart failure  Advance Directive:  Pt has AMD with named proxy in his medical record. Current Code Status:  full    Referral to Hospice/ Palliative Care Appropriate: no    Awareness of Medical Conditions: (Trajectory of illness and pts expectations). Pt aware HF is a progressive disease- he states though since he has lost almost 70 pounds he may have extended his life with good quality significantly. Discharge Needs: (to include safety issues)HH visits    Barriers Identified:none  Patient is willing to go to SNF/Inpatient Rehab if recommended: not needed    Medication Review:  Was performed. Can patient afford medications:  yes. Patient is Compliant with Medication regimen:yes    Who manages medications at home:self       HUG (Healthy Understanding of Goals) program introduced to patient/family. The Transitional Care Team bridges the gaps in care and education surrounding discharge from the acute care facility. The objective is to empower the patient and family in taking a proactive role in preventing readmission within the first thirty days after discharge. The team is also involved in the efforts to reduce readmission to the acute care setting after stabilization and discharge from the acute care environment either to skilled nursing facilities or community.      HUG RN/NP reviewed G Calendar/ follow up appointments/ Ambulatory Nurse Navigator name and contact information     Future Appointments   Date Time Provider Leonardo Thomas   6/7/2019  3:10 PM Anish Jefferson MD 3 [address]   6/19/2019 10:00 AM [facility] CHAIR 2 [address] [facility]   7/17/2019  1:00 PM [place] FT CHAIR 2 [address] [place] [address] REG   8/14/2019 10:00 AM [place] FT CHAIR 2 [facility] REG   8/14/2019 12:30 PM Betsy Peña MD [facility] 10.       Non-[facility] follow up appointment(s): per AVS  [facility]: call information given to pt      Patient education focused on readmission zones as described as: The Red Zone: High risk for readmission, days 1-21  The Yellow Zone: Moderate  risk for readmission, days 22-29   The Green Zone: Lower risk for readmission, days                30 and after    The [facility] Team will attempt to follow the patient from a distance while inpatient as well as be available for further transition/disposition needs. The [facility] team will continue to offer support during the 30- 90 day discharge from acute care setting. Will notify Ambulatory , CYNTHIA RN.     Past Medical History:   Diagnosis Date    Anemia 8/5/2017    Anxiety 8/5/2017    Arrhythmia     atrial fibrillation 2014    ASCVD (arteriosclerotic cardiovascular disease) 8/5/2017    BPH (benign prostatic hyperplasia) 8/5/2017    CAD (coronary artery disease)     h/o stents    Cancer ([address]     h/o skin cancer    Cardiomyopathy ([address] 8/5/2017    CHF (congestive heart failure) ([address] 8/5/2017    Chronic kidney disease     Stage IV    CKD (chronic kidney disease), stage IV (HCC) 8/5/2017    Diabetes ([address]     Diabetes mellitus ([address] 8/5/2017    Diabetic neuropathy ([address] 8/5/2017    DJD (degenerative joint disease) 8/5/2017    ED (erectile dysfunction) 8/5/2017    Gout 8/5/2017    High cholesterol     Hyperlipidemia 8/5/2017    Hypertension     Hypertension with renal disease 8/5/2017    Hypothyroid 8/5/2017    Insomnia 8/5/2017    Obesity 8/5/2017    On statin therapy 8/5/2017    Restless leg 8/5/2017    Thyroid disease     hypothyroid

## 2019-06-06 ENCOUNTER — PATIENT OUTREACH (OUTPATIENT)
Dept: FAMILY MEDICINE CLINIC | Age: 69
End: 2019-06-06

## 2019-06-06 VITALS — BODY MASS INDEX: 28.24 KG/M2 | WEIGHT: 232 LBS

## 2019-06-06 PROBLEM — E87.1 HYPONATREMIA: Status: ACTIVE | Noted: 2019-06-06

## 2019-06-06 LAB
ATRIAL RATE: 70 BPM
CALCULATED R AXIS, ECG10: -68 DEGREES
CALCULATED T AXIS, ECG11: 109 DEGREES
DIAGNOSIS, 93000: NORMAL
Q-T INTERVAL, ECG07: 490 MS
QRS DURATION, ECG06: 170 MS
QTC CALCULATION (BEZET), ECG08: 529 MS
VENTRICULAR RATE, ECG03: 70 BPM

## 2019-06-06 NOTE — ED NOTES
Patient discharged to home, physician and RN spoke with patient in regard to follow up care and instruction.

## 2019-06-06 NOTE — PROGRESS NOTES
Hospital Discharge Follow-Up Date/Time:  2019 10:05 PM 
 
Patient was admitted to [facility] on 19 and discharged on 19 for resp failure. The physician discharge summary was not available at the time of outreach. Patient was contacted within 1 business days of discharge. Top Challenges reviewed with the provider ED vist after discharge on 19--ED suspects sleep apnea Uloric Is used as PRN medication will discuss with PCP if needed daily at appointment 19 Method of communication with provider :chart routing Inpatient RRAT score: 40 Was this a readmission? no  
Patient stated reason for the readmission: n/a Nurse Navigator (NN) contacted the patient by telephone to perform post hospital discharge assessment. Verified name and  with patient as identifiers. Provided introduction to self, and explanation of the Nurse Navigator role. Reviewed discharge instructions and red flags with patient who verbalized understanding. Patient given an opportunity to ask questions and does not have any further questions or concerns at this time. The patient agrees to contact the PCP office for questions related to their healthcare. NN provided contact information for future reference. Disease Specific:   CHF Heart Failure Note Do you have a Scale:    yes How often do you weigh:  daily Daily Weight (document daily weights in flowsheets):   Decrease Amount:  3 Provider Notified:   No  
 
Zone:(Pt Reported)  green EF: 31-35 %(result) on 19 (date) Type of HF:   HFrEF Cardiac Device present: pacemaker Heart Failure Medications: BB, Potassium Diuretic,   
 
 
Summary of patient's top problems: 
1. CHF-- congestive heart failure and CKD stage 4., DM2,   EF 55% on prior echo, then 35-40% early .  EF 35% by echo . No ACE/ARB due to CKD. 2. COPD patient is not on home oxygen.  [facility] on day of discharge for increase in SOB. Suspect sleep apnea. Patient will see PCP then pulm for testing 3. Communication- patient has history of not answering phone or returning calls. Home Health orders at discharge: [facility] Home Health company: [id number] [facility] Date of initial visit: TBD Durable Medical Equipment ordered/company: none Durable Medical Equipment received: n/a Barriers to care? lack of knowledge about disease, level of motivation Advance Care Planning:  
Does patient have an Advance Directive:  reviewed and current  Patient revoked DDNR while inpatient. Medication(s):  
New Medications at Discharge: Imdur Changed Medications at Discharge: Bumex, Uloric ( checking with PCP) Discontinued Medications at Discharge: none Medication reconciliation was performed with patient, who verbalizes understanding of administration of home medications. There were no barriers to obtaining medications identified at this time. Referral to Pharm D needed: no  
 
Current Outpatient Medications Medication Sig  bumetanide (BUMEX) 2 mg tablet Take 2 Tabs by mouth two (2) times a day.  febuxostat (ULORIC) 40 mg tab tablet Take 1 Tab by mouth daily.  isosorbide mononitrate ER (IMDUR) 30 mg tablet Take 1 Tab by mouth daily.  levoFLOXacin (LEVAQUIN) 500 mg tablet Take 1 Tab by mouth daily.  metOLazone (ZAROXOLYN) 2.5 mg tablet Take 2.5 mg by mouth daily as needed (swelling).  timolol (TIMOPTIC) 0.5 % ophthalmic solution Administer 1 Drop to right eye two (2) times a day.  oxyCODONE-acetaminophen (PERCOCET) 5-325 mg per tablet Take 1 Tab by mouth every eight (8) hours as needed for Pain for up to 30 days. Max Daily Amount: 3 Tabs.  finasteride (PROSCAR) 5 mg tablet TAKE 1 TABLET BY MOUTH DAILY  gabapentin (NEURONTIN) 300 mg capsule Take 1 Cap by mouth nightly.  naloxone (NARCAN) 4 mg/actuation nasal spray Use 1 spray intranasally, then discard.  Repeat with new spray every 2 min as needed for opioid overdose symptoms, alternating nostrils.  potassium chloride (KLOR-CON) 20 mEq pack One packet three times daily  ascorbic acid, vitamin C, (VITAMIN C) 250 mg tablet Take 1 Tab by mouth three (3) times daily.  insulin glargine (LANTUS SOLOSTAR U-100 INSULIN) 100 unit/mL (3 mL) inpn 35 Units by SubCUTAneous route nightly.  OTHER Apply  to affected area daily as needed (Knee Pain). CBD Cream:  Apply daily as needed for knee pain  carvedilol (COREG) 12.5 mg tablet TAKE 1 TABLET BY MOUTH TWICE DAILY  tamsulosin (FLOMAX) 0.4 mg capsule TAKE 2 CAPSULES BY MOUTH EVERY DAY  Ferrous Sulfate (SLOW FE) 47.5 mg iron TbER tablet Take 1 Tab by mouth three (3) times daily.  pramipexole (MIRAPEX) 0.5 mg tablet TAKE 1 TABLET BY MOUTH EVERY NIGHT AT BEDTIME  Liraglutide (VICTOZA) 0.6 mg/0.1 mL (18 mg/3 mL) sub-q pen 0.6 mg by SubCUTAneous route daily. No current facility-administered medications for this visit. There are no discontinued medications. [facility] follow up appointment(s):  
Future Appointments Date Time Provider Leonardo Ruizi 6/7/2019  3:10 PM Brissa Brantley MD 3 [address]  
6/19/2019 10:00 AM [place] FT CHAIR 2 [facility] REG  
7/17/2019  1:00 PM [place] FT CHAIR 2 [address] REG  
8/14/2019 10:00 AM [place] FT CHAIR 2 [facility]  
8/14/2019 12:30 PM Mak Lin MD [facility] 10. Non-[facility] follow up appointment(s): none [facility]:  information provided as a resource by inpatient [facility] team 
 
 
Goals  Attend follow up appointments on schedule 6/6/19 Patient reports [address] appointment 6/7/19 however PCP office will call if cancellation to be seen today. Cardio appointment is 6/18/19. Patient will report attendance of appointment at next week outreach. JASON  Reduce risk of CHF exacerbations and complications. 6/6/19 Patient report weight is 232.  Patient educated on weight gain of 2-3 pounds in a day or 5 lbs in a week. Patient was seen in ED on 6/5/19 after discharge. Patient given Levaquin prescription and advised importance of completing all ABX. Patient will weigh and record daily, monitor CHF S&S, and complete ABX and report at next week outreach.  JASON

## 2019-06-06 NOTE — PROGRESS NOTES
Transitions Of Care [facility], [facility] 5/27/19 discharged 6/5/19) HPI: 
Princess Polanco. is a 77y.o. year old male who is here for a follow up visit for hospitalization transition of care. He was last seen by me on 5/15/2019 at the office and on 6/5/2019 at time of hospital discharge. Discharged on: 6/5/2019 Diagnosis in hospital: 1. 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 Ischemic cardiomyopathy last ejection fraction 30 to 35% 3. Paroxysmal atrial fibrillation without A. fib since May 2018 4. Hypertension 5. ASCVD 6. Diabetes mellitus 7. Hyperlipidemia 8. History of esophageal dilatation because of dysphasia 9. Hiatal hernia 10. CKD stage IV 11. Anemia of chronic disease Complications in hospital: No complications Medication changes: Bumex increased to 4 mg twice daily without other medications at time of discharge however he subsequently presented to the ER that evening with fever and was found on chest CT to have reticular infiltrate right lower lobe consistent with early pneumonia and was placed on Levaquin 500 daily. Discharge Summary reviewed. Today 6/7/2019 and also reviewed the ER visit from 6/5/2019 with that review being done today as well He reports the following: He feels very well at the present time feeling the best he is felt in some time. He is following his weight daily and weight according his scales at home this morning was 226 pounds which equates to our scales here of 233 pounds with his clothes on. He is reporting to the heart failure program each day as far as his weight. He is taking his Levaquin. He has no significant cough. He has had no fevers or chills since his trip to the emergency room. He denies any chest pain, palpitations, PND, orthopnea or other cardiorespiratory complaints. He notes no GI or  complaints. He notes no headaches, dizziness or neurologic complaints. He has no other current complaints. Visit Vitals /86 (BP 1 Location: Left arm, BP Patient Position: Sitting) Pulse 70 Temp 98.8 °F (37.1 °C) (Oral) Resp 18 Ht 6' 4\" (1.93 m) Wt 233 lb 3.2 oz (105.8 kg) SpO2 97% BMI 28.39 kg/m² Historical Data Past Medical History:  
Diagnosis Date  Anemia 8/5/2017  Anxiety 8/5/2017  Arrhythmia   
 atrial fibrillation 2014  ASCVD (arteriosclerotic cardiovascular disease) 8/5/2017  BPH (benign prostatic hyperplasia) 8/5/2017  CAD (coronary artery disease)   
 h/o stents  Cancer [facility])   
 h/o skin cancer  Cardiomyopathy ([address] 8/5/2017  CHF (congestive heart failure) ([address] 8/5/2017  Chronic kidney disease Stage IV  CKD (chronic kidney disease), stage IV ([address] 8/5/2017  Diabetes ([address]  Diabetes mellitus ([address] 8/5/2017  Diabetic neuropathy ([address] 8/5/2017  DJD (degenerative joint disease) 8/5/2017  ED (erectile dysfunction) 8/5/2017  Gout 8/5/2017  High cholesterol  Hyperlipidemia 8/5/2017  Hypertension  Hypertension with renal disease 8/5/2017  Hypothyroid 8/5/2017  Insomnia 8/5/2017  Obesity 8/5/2017  On statin therapy 8/5/2017  Pneumonia 05/28/2019  Restless leg 8/5/2017  Thyroid disease   
 hypothyroid Past Surgical History:  
Procedure Laterality Date  CARDIAC SURG PROCEDURE UNLIST    
 cardiac stents  CARDIAC SURG PROCEDURE UNLIST Ablation 5/17/2018 ED [facility]  COLONOSCOPY N/A 6/28/2016 COLONOSCOPY performed by Carie Zhou MD at [facility] ENDOSCOPY  COLONOSCOPY N/A 1/9/2019 COLONOSCOPY performed by Guero Martinez MD at [facility] ENDOSCOPY  COLONOSCOPY,DIAGNOSTIC  1/9/2019  HX APPENDECTOMY  HX BUNIONECTOMY    
 and removal of 2 seasmoid bone of the great toe 2/2018  HX HEENT Bilateral Cataract surgery  HX HEENT Tonsils  HX HEENT Axe wound to the head  HX KNEE ARTHROSCOPY  X 2  
 HX ORTHOPAEDIC    
 HX ORTHOPAEDIC    
 partial laminectomy  HX PACEMAKER    
 HX ROTATOR CUFF REPAIR    
 bilateral  
 TN INSJ ELTRD CAR DACIA SYS ATTCH PM/CVDFB PLS GEN N/A 1/28/2019 Lv Lead Placement To Previous Generator performed by Marjorie Muller MD at [facility] CARDIAC CATH LAB  TN REMVL PERM PM PLS GEN W/REPL PLSE GEN MULT LEAD N/A 1/28/2019 Remove & Replace Ppm Gen Biv Multi Leads performed by Marjorie Muller MD at [address] [place] [address] CATH LAB Outpatient Encounter Medications as of 6/7/2019 Medication Sig Dispense Refill  bumetanide (BUMEX) 2 mg tablet Take 2 Tabs by mouth two (2) times a day. 120 Tab 12  
 febuxostat (ULORIC) 40 mg tab tablet Take 1 Tab by mouth daily. 30 Tab 0  
 isosorbide mononitrate ER (IMDUR) 30 mg tablet Take 1 Tab by mouth daily. 30 Tab 12  
 levoFLOXacin (LEVAQUIN) 500 mg tablet Take 1 Tab by mouth daily. 7 Tab 0  
 metOLazone (ZAROXOLYN) 2.5 mg tablet Take 2.5 mg by mouth daily as needed (swelling).  timolol (TIMOPTIC) 0.5 % ophthalmic solution Administer 1 Drop to right eye two (2) times a day.  oxyCODONE-acetaminophen (PERCOCET) 5-325 mg per tablet Take 1 Tab by mouth every eight (8) hours as needed for Pain for up to 30 days. Max Daily Amount: 3 Tabs. 30 Tab 0  
 finasteride (PROSCAR) 5 mg tablet TAKE 1 TABLET BY MOUTH DAILY 90 Tab 3  
 gabapentin (NEURONTIN) 300 mg capsule Take 1 Cap by mouth nightly. 30 Cap 5  
 naloxone (NARCAN) 4 mg/actuation nasal spray Use 1 spray intranasally, then discard. Repeat with new spray every 2 min as needed for opioid overdose symptoms, alternating nostrils. 2 Each 0  
 potassium chloride (KLOR-CON) 20 mEq pack One packet three times daily 90 Packet prn  ascorbic acid, vitamin C, (VITAMIN C) 250 mg tablet Take 1 Tab by mouth three (3) times daily. 100 Tab prn  insulin glargine (LANTUS SOLOSTAR U-100 INSULIN) 100 unit/mL (3 mL) inpn 20 Units by SubCUTAneous route nightly.  OTHER Apply  to affected area daily as needed (Knee Pain). CBD Cream:  Apply daily as needed for knee pain  carvedilol (COREG) 12.5 mg tablet TAKE 1 TABLET BY MOUTH TWICE DAILY 60 Tab 11  tamsulosin (FLOMAX) 0.4 mg capsule TAKE 2 CAPSULES BY MOUTH EVERY  Cap 3  Ferrous Sulfate (SLOW FE) 47.5 mg iron TbER tablet Take 1 Tab by mouth three (3) times daily. 90 Tab prn  pramipexole (MIRAPEX) 0.5 mg tablet TAKE 1 TABLET BY MOUTH EVERY NIGHT AT BEDTIME 90 Tab prn  Liraglutide (VICTOZA) 0.6 mg/0.1 mL (18 mg/3 mL) sub-q pen 0.6 mg by SubCUTAneous route daily. No facility-administered encounter medications on file as of 2019. Allergies Allergen Reactions  Niacin Unknown (comments) Other reaction(s): Unknown (comments)  Imipenem Diarrhea Other reaction(s): Rash  Levemir [Insulin Detemir] Hives  Other Medication Other (comments) ? Allergy to [place] causing chemical burn  Primaxin [Imipenem-Cilastatin] Diarrhea and Rash  Xarelto [Rivaroxaban] Rash and Itching Other reaction(s): Rash Social History Socioeconomic History  Marital status:  Spouse name: Not on file  Number of children: Not on file  Years of education: Not on file  Highest education level: Not on file Occupational History  Not on file Social Needs  Financial resource strain: Not on file  Food insecurity:  
  Worry: Not on file Inability: Not on file  Transportation needs:  
  Medical: Not on file Non-medical: Not on file Tobacco Use  Smoking status: Former Smoker Packs/day: 0.50 Years: 4.00 Pack years: 2.00 Last attempt to quit: 3/6/1972 Years since quittin.3  Smokeless tobacco: Never Used Substance and Sexual Activity  Alcohol use: No  
  Comment: rare, 1 drink per year  Drug use: No  
 Sexual activity: Not Currently Lifestyle  Physical activity:  
  Days per week: Not on file Minutes per session: Not on file  Stress: Not on file Relationships  Social connections:  
  Talks on phone: Not on file Gets together: Not on file Attends [employer] service: Not on file Active member of club or organization: Not on file Attends meetings of clubs or organizations: Not on file Relationship status: Not on file  Intimate partner violence:  
  Fear of current or ex partner: Not on file Emotionally abused: Not on file Physically abused: Not on file Forced sexual activity: Not on file Other Topics Concern  Not on file Social History Narrative  Not on file REVIEW OF SYSTEMS: 
General: negative for - chills or fever ENT: negative for - headaches, nasal congestion or tinnitus Eyes: no blurred or visual changes Neck: No stiffness or swollen nodes Respiratory: negative for - cough, hemoptysis, shortness of breath or wheezing Cardiovascular : negative for - chest pain, edema, palpitations or shortness of breath Gastrointestinal: negative for - abdominal pain, blood in stools, heartburn or nausea/vomiting Genito-Urinary: no dysuria, trouble voiding, or hematuria Musculoskeletal: negative for - gait disturbance, joint pain, joint stiffness or joint swelling Neurological: no TIA or stroke symptoms Hematologic: no bruises, no bleeding Lymphatic: no swollen glands Integument: no lumps, mole changes, nail changes or rash Endocrine:no malaise/lethargy poly uria or polydipsia or unexpected weight changes Visit Vitals /86 (BP 1 Location: Left arm, BP Patient Position: Sitting) Pulse 70 Temp 98.8 °F (37.1 °C) (Oral) Resp 18 Ht 6' 4\" (1.93 m) Wt 233 lb 3.2 oz (105.8 kg) SpO2 97% BMI 28.39 kg/m² CONSTITUTIONAL: well , well nourished, appears age appropriate HEAD: normocephalic, atraumatic EYES: sclera anicteric, PERRL, EOMI 
ENMT:moist mucous membranes, pharynx clear Nares: w/o erythema or edema NECK: supple. Thyroid normal, No JVD or bruits RESPIRATORY: Chest: clear to ascultation and percussion CARDIOVASCULAR: Heart: regular rate and rhythm no murmurs, rubs or gallops, PMI not displaced, no thrill GASTROINTESTINAL: Abdomen: non distended, soft, non-tender, bowel sounds normal 
HEMATOLOGIC: no petechiae or purpura LYMPHATIC: no lymphadenopathy MUSCULOSKELETAL: Extremities: no edema or active synovitis, pulse 1+ BACK; no point or CVAT INTEGUMENT: No unusual rashes or suspicious skin lesions noted. Nails appear normal. 
NEUROLOGIC: non-focal exam  
MENTAL STATUS: alert and oriented, appropriate affect PSYCHIATRIC: normal affect ASSESSMENT:  
1. Acute on chronic diastolic congestive heart failure, NYHA class 3 ([address] 2. Ischemic cardiomyopathy 3. ASCVD (arteriosclerotic cardiovascular disease) 4. Controlled type 2 diabetes mellitus with stage 4 chronic kidney disease, without long-term current use of insulin ([address] 5. Anemia, unspecified type 6. Hyponatremia 7. CKD (chronic kidney disease), stage IV ([address] 8. Pneumonia due to infectious organism, unspecified laterality, unspecified part of lung 9. Sleep disturbance Impression 1. Acute on chronic diastolic CHF seems to be much better at the present time with heart failure since resolved and chest x-ray today reveals no heart failure 2. Ischemic cardiomyopathy last EF of 30 to 35% 3. ASCVD clinically that seems stable and does not appear ischemia is playing any role continue aspirin daily 4. Diabetes we will see what that looks like on the BMP 5. Anemia repeat status is pending 6. Hyponatremia with sodium 129 at hospital discharge and it has gone ahead flow is 126 we will see what it looks like today. 7.  CKD stage IV repeat status pending 8. Pneumonia no evidence on plain chest x-ray today he will complete his course of Levaquin At this point he appears to be stable and will continue his current medications which I discussed with he and his wife present with him today. Close follow-up as he is at extremely high risk of repeat hospitalization with his multiple problems. High complexity decision making on this visit today. Follow-up in 1 week or sooner if there is a problem. Note on room air today his O2 sat is 97%. PLAN: 
. Orders Placed This Encounter  XR CHEST PA LAT  CBC WITH AUTOMATED DIFF ([facility])  METABOLIC PANEL, BASIC ([facility]) [facility]  
 
 
 
ATTENTION:  
This medical record was transcribed using an electronic medical records system. Although proofread, it may and can contain electronic and spelling errors. Other human spelling and other errors may be present. Corrections may be executed at a later time. Please feel free to contact us for any clarifications as needed. Follow-up and Dispositions · Return in about 1 week (around 6/14/2019). Moira Thomson MD 
 
Orders Placed This Encounter  XR CHEST PA LAT Standing Status:   Future Number of Occurrences:   1 Standing Expiration Date:   6/7/2020 Order Specific Question:   Reason for Exam  
  Answer:   Pneumonia  CBC WITH AUTOMATED DIFF ([facility])  METABOLIC PANEL, BASIC ([facility]) [facility] Referral Priority:   Routine Referral Type:   Consultation Referral Reason:   Specialty Services Required Referral Location:   [facility] [address].  
  Referred to Provider:   Tristin Mcqueen MD  
  Number of Visits Requested:   1 Results for orders placed or performed in visit on 06/07/19 XR CHEST PA LAT Narrative Exam:  2 view chest 
 
Indication: Pneumonia. COMPARISON: Chest radiograph and CT scan of 6/5/2019 PA and lateral views demonstrate normal heart size. The left subclavian 
pacemaker is in place. The lungs demonstrate no pneumonia or pulmonary edema.  No 
 pleural effusions. There are degenerative changes of the thoracic spine Impression IMPRESSION: 
1. No pneumonia I have reviewed the patient's medical history in detail and updated the computerized patient record. We had a prolonged discussion about these complex clinical issues and went over the various important aspects to consider. All questions were answered. Advised him to call back or return to office if symptoms do not improve, change in nature, or persist. 
 
He was given an after visit summary or informed of [employer] which includes patient instructions, diagnoses, current medications, & vitals. He expressed understanding with the diagnosis and plan.

## 2019-06-07 ENCOUNTER — OFFICE VISIT (OUTPATIENT)
Dept: INTERNAL MEDICINE CLINIC | Age: 69
End: 2019-06-07

## 2019-06-07 VITALS
BODY MASS INDEX: 28.4 KG/M2 | DIASTOLIC BLOOD PRESSURE: 86 MMHG | SYSTOLIC BLOOD PRESSURE: 138 MMHG | TEMPERATURE: 98.8 F | OXYGEN SATURATION: 97 % | HEIGHT: 76 IN | HEART RATE: 70 BPM | RESPIRATION RATE: 18 BRPM | WEIGHT: 233.2 LBS

## 2019-06-07 DIAGNOSIS — J18.9 PNEUMONIA DUE TO INFECTIOUS ORGANISM, UNSPECIFIED LATERALITY, UNSPECIFIED PART OF LUNG: ICD-10-CM

## 2019-06-07 DIAGNOSIS — E87.1 HYPONATREMIA: ICD-10-CM

## 2019-06-07 DIAGNOSIS — N18.4 CKD (CHRONIC KIDNEY DISEASE), STAGE IV (HCC): ICD-10-CM

## 2019-06-07 DIAGNOSIS — E11.22 CONTROLLED TYPE 2 DIABETES MELLITUS WITH STAGE 4 CHRONIC KIDNEY DISEASE, WITHOUT LONG-TERM CURRENT USE OF INSULIN (HCC): ICD-10-CM

## 2019-06-07 DIAGNOSIS — D64.9 ANEMIA, UNSPECIFIED TYPE: ICD-10-CM

## 2019-06-07 DIAGNOSIS — I25.5 ISCHEMIC CARDIOMYOPATHY: ICD-10-CM

## 2019-06-07 DIAGNOSIS — N18.4 CONTROLLED TYPE 2 DIABETES MELLITUS WITH STAGE 4 CHRONIC KIDNEY DISEASE, WITHOUT LONG-TERM CURRENT USE OF INSULIN (HCC): ICD-10-CM

## 2019-06-07 DIAGNOSIS — G47.9 SLEEP DISTURBANCE: ICD-10-CM

## 2019-06-07 DIAGNOSIS — I25.10 ASCVD (ARTERIOSCLEROTIC CARDIOVASCULAR DISEASE): ICD-10-CM

## 2019-06-07 DIAGNOSIS — I50.33 ACUTE ON CHRONIC DIASTOLIC CONGESTIVE HEART FAILURE, NYHA CLASS 3 (HCC): Primary | ICD-10-CM

## 2019-06-07 LAB
ANION GAP SERPL CALC-SCNC: 13 MMOL/L
BUN SERPL-MCNC: 92 MG/DL (ref 9–20)
CALCIUM SERPL-MCNC: 8.8 MG/DL (ref 8.4–10.2)
CHLORIDE SERPL-SCNC: 86 MMOL/L (ref 98–107)
CO2 SERPL-SCNC: 35 MMOL/L (ref 22–32)
CREAT SERPL-MCNC: 3.2 MG/DL (ref 0.8–1.5)
ERYTHROCYTE [DISTWIDTH] IN BLOOD BY AUTOMATED COUNT: 15.2 %
GLUCOSE SERPL-MCNC: 122 MG/DL (ref 75–110)
HCT VFR BLD AUTO: 35.7 % (ref 37–51)
HGB BLD-MCNC: 11.7 G/DL (ref 12–18)
LYMPHOCYTES ABSOLUTE: 0.8 K/UL (ref 0.6–4.1)
LYMPHOCYTES NFR BLD: 18.4 % (ref 10–58.5)
MCH RBC QN AUTO: 30.1 PG (ref 26–32)
MCHC RBC AUTO-ENTMCNC: 32.8 G/DL (ref 30–36)
MCV RBC AUTO: 91.8 FL (ref 80–97)
MONOCYTES ABS-DIF,2141: 0.6 K/UL (ref 0–1.8)
MONOCYTES NFR BLD: 13.3 % (ref 0.1–24)
NEUTROPHILS # BLD: 68.3 % (ref 37–92)
NEUTROPHILS ABS,2156: 3.1 K/UL (ref 2–7.8)
PLATELET # BLD AUTO: 143 K/UL (ref 140–440)
PMV BLD AUTO: 7.7 FL
POTASSIUM SERPL-SCNC: 3.8 MMOL/L (ref 3.6–5)
RBC # BLD AUTO: 3.89 M/UL (ref 4.2–6.3)
SODIUM SERPL-SCNC: 134 MMOL/L (ref 137–145)
WBC # BLD AUTO: 4.5 K/UL (ref 4.1–10.9)

## 2019-06-07 NOTE — PATIENT INSTRUCTIONS

## 2019-06-07 NOTE — PROGRESS NOTES
Chief Complaint Patient presents with  Transitions Of Care ED [facility] 5/27/19 discharged 6/5/19 Visit Vitals /86 (BP 1 Location: Left arm, BP Patient Position: Sitting) Pulse 70 Temp 98.8 °F (37.1 °C) (Oral) Resp 18 Ht 6' 4\" (1.93 m) Wt 233 lb 3.2 oz (105.8 kg) SpO2 97% BMI 28.39 kg/m² 1. Have you been to the ER, urgent care clinic since your last visit? Hospitalized since your last visit? 5/23/19 ED [facility] discharged 6/5/19 6/5/19 ED [facility] for pneumonia 2. Have you seen or consulted any other health care providers outside of the [address] [place] [address] since your last visit? Include any pap smears or colon screening.  No

## 2019-06-10 LAB
BACTERIA SPEC CULT: NORMAL
SERVICE CMNT-IMP: NORMAL

## 2019-06-13 ENCOUNTER — PATIENT OUTREACH (OUTPATIENT)
Dept: FAMILY MEDICINE CLINIC | Age: 69
End: 2019-06-13

## 2019-06-13 ENCOUNTER — TELEPHONE (OUTPATIENT)
Dept: INTERNAL MEDICINE CLINIC | Age: 69
End: 2019-06-13

## 2019-06-13 ENCOUNTER — HOME CARE VISIT (OUTPATIENT)
Dept: SCHEDULING | Facility: HOME HEALTH | Age: 69
End: 2019-06-13

## 2019-06-13 PROCEDURE — G0299 HHS/HOSPICE OF RN EA 15 MIN: HCPCS

## 2019-06-13 NOTE — TELEPHONE ENCOUNTER
Last Refill: 5/31/2018  Last visit:6/7/2019    Requested Prescriptions     Pending Prescriptions Disp Refills    ferrous sulfate (SLOW FE) 47.5 mg iron TbER tablet 90 Tab 12     Sig: Take 1 Tab by mouth three (3) times daily.

## 2019-06-13 NOTE — TELEPHONE ENCOUNTER
Patient stated that pharmacy declined iron medication and say to speak with your doctor.  He states that he is out

## 2019-06-13 NOTE — PROGRESS NOTES
Chief Complaint Patient presents with  CHF  
  1 week follow up  Diabetes  Chronic Kidney Disease SUBJECTIVE: 
 
Royal KRISTA Gilman is a 77 y.o. male who returns in follow-up after his recent hospitalization for congestive heart failure, CKD, cardiomyopathy, anemia, atrial fibrillation other medical problems. He is taking his medications and trying to follow his diet and generally feels quite well today. He currently denies any chest pain, shortness of breath, palpitations, PND, orthopnea or other cardiorespiratory complaints. He notes no GI or  complaints. He denies any headaches, dizziness or neurologic complaints. He has no other complaints on complete review of systems. Current Outpatient Medications Medication Sig Dispense Refill  ferrous sulfate (SLOW FE) 47.5 mg iron TbER tablet Take 1 Tab by mouth three (3) times daily. 90 Tab 12  
 bumetanide (BUMEX) 2 mg tablet Take 2 Tabs by mouth two (2) times a day. 120 Tab 12  
 febuxostat (ULORIC) 40 mg tab tablet Take 1 Tab by mouth daily. 30 Tab 0  
 isosorbide mononitrate ER (IMDUR) 30 mg tablet Take 1 Tab by mouth daily. 30 Tab 12  
 levoFLOXacin (LEVAQUIN) 500 mg tablet Take 1 Tab by mouth daily. 7 Tab 0  
 metOLazone (ZAROXOLYN) 2.5 mg tablet Take 2.5 mg by mouth daily as needed (swelling).  timolol (TIMOPTIC) 0.5 % ophthalmic solution Administer 1 Drop to right eye two (2) times a day.  oxyCODONE-acetaminophen (PERCOCET) 5-325 mg per tablet Take 1 Tab by mouth every eight (8) hours as needed for Pain for up to 30 days. Max Daily Amount: 3 Tabs. 30 Tab 0  
 finasteride (PROSCAR) 5 mg tablet TAKE 1 TABLET BY MOUTH DAILY 90 Tab 3  
 gabapentin (NEURONTIN) 300 mg capsule Take 1 Cap by mouth nightly. 30 Cap 5  
 naloxone (NARCAN) 4 mg/actuation nasal spray Use 1 spray intranasally, then discard. Repeat with new spray every 2 min as needed for opioid overdose symptoms, alternating nostrils.  2 Each 0  
  potassium chloride (KLOR-CON) 20 mEq pack One packet three times daily 90 Packet prn  ascorbic acid, vitamin C, (VITAMIN C) 250 mg tablet Take 1 Tab by mouth three (3) times daily. 100 Tab prn  insulin glargine (LANTUS SOLOSTAR U-100 INSULIN) 100 unit/mL (3 mL) inpn 20 Units by SubCUTAneous route nightly.  OTHER Apply  to affected area daily as needed (Knee Pain). CBD Cream:  Apply daily as needed for knee pain  carvedilol (COREG) 12.5 mg tablet TAKE 1 TABLET BY MOUTH TWICE DAILY 60 Tab 11  tamsulosin (FLOMAX) 0.4 mg capsule TAKE 2 CAPSULES BY MOUTH EVERY  Cap 3  pramipexole (MIRAPEX) 0.5 mg tablet TAKE 1 TABLET BY MOUTH EVERY NIGHT AT BEDTIME 90 Tab prn  Liraglutide (VICTOZA) 0.6 mg/0.1 mL (18 mg/3 mL) sub-q pen 0.6 mg by SubCUTAneous route daily. Past Medical History:  
Diagnosis Date  Anemia 8/5/2017  Anxiety 8/5/2017  Arrhythmia   
 atrial fibrillation 2014  ASCVD (arteriosclerotic cardiovascular disease) 8/5/2017  BPH (benign prostatic hyperplasia) 8/5/2017  CAD (coronary artery disease)   
 h/o stents  Cancer [facility])   
 h/o skin cancer  Cardiomyopathy ([address] 8/5/2017  CHF (congestive heart failure) ([address] 8/5/2017  Chronic kidney disease Stage IV  CKD (chronic kidney disease), stage IV ([address] 8/5/2017  Diabetes ([address]  Diabetes mellitus ([address] 8/5/2017  Diabetic neuropathy ([address] 8/5/2017  DJD (degenerative joint disease) 8/5/2017  ED (erectile dysfunction) 8/5/2017  Gout 8/5/2017  High cholesterol  Hyperlipidemia 8/5/2017  Hypertension  Hypertension with renal disease 8/5/2017  Hypothyroid 8/5/2017  Insomnia 8/5/2017  Obesity 8/5/2017  On statin therapy 8/5/2017  Pneumonia 05/28/2019  Restless leg 8/5/2017  Thyroid disease   
 hypothyroid Past Surgical History:  
Procedure Laterality Date  CARDIAC SURG PROCEDURE UNLIST    
 cardiac stents  CARDIAC SURG PROCEDURE UNLIST Ablation 5/17/2018 [facility]  COLONOSCOPY N/A 6/28/2016 COLONOSCOPY performed by Logan Crandall MD at [facility] ENDOSCOPY  COLONOSCOPY N/A 1/9/2019 COLONOSCOPY performed by Mariah Barlow MD at [facility] ENDOSCOPY  COLONOSCOPY,DIAGNOSTIC  1/9/2019  HX APPENDECTOMY  HX BUNIONECTOMY    
 and removal of 2 seasmoid bone of the great toe 2/2018  HX HEENT Bilateral Cataract surgery  HX HEENT Tonsils  HX HEENT Axe wound to the head  HX KNEE ARTHROSCOPY X 2  
 HX ORTHOPAEDIC    
 HX ORTHOPAEDIC    
 partial laminectomy  HX PACEMAKER    
 HX ROTATOR CUFF REPAIR    
 bilateral  
 MS INSJ ELTRD CAR DACIA SYS ATTCH PM/CVDFB PLS GEN N/A 1/28/2019 Lv Lead Placement To Previous Generator performed by Yobani Yepez MD at [facility] C[facility] CATH LAB  MS REMVL PERM PM PLS GEN W/REPL PLSE GEN MULT LEAD N/A 1/28/2019 Remove & Replace Ppm Gen Biv Multi Leads performed by Yobani Yepez MD at [address] CATH LAB Allergies Allergen Reactions  Niacin Unknown (comments) Other reaction(s): Unknown (comments)  Imipenem Diarrhea Other reaction(s): Rash  Levemir [Insulin Detemir] Hives  Other Medication Other (comments) ? Allergy to Lubertha Saas causing chemical burn  Primaxin [Imipenem-Cilastatin] Diarrhea and Rash  Xarelto [Rivaroxaban] Rash and Itching Other reaction(s): Rash REVIEW OF SYSTEMS: 
General: negative for - chills or fever, or weight loss or gain ENT: negative for - headaches, nasal congestion or tinnitus Eyes: no blurred or visual changes Neck: No stiffness or swollen nodes Respiratory: negative for - cough, hemoptysis, shortness of breath or wheezing Cardiovascular : negative for - chest pain, edema, palpitations or shortness of breath Gastrointestinal: negative for - abdominal pain, blood in stools, heartburn or nausea/vomiting Genito-Urinary: no dysuria, trouble voiding, or hematuria Musculoskeletal: negative for - gait disturbance, joint pain, joint stiffness or joint swelling Neurological: no TIA or stroke symptoms Hematologic: no bruises, no bleeding Lymphatic: no swollen glands Integument: no lumps, mole changes, nail changes or rash Endocrine:no malaise/lethargy poly uria or polydipsia or unexpected weight changes Social History Socioeconomic History  Marital status:  Spouse name: Not on file  Number of children: Not on file  Years of education: Not on file  Highest education level: Not on file Tobacco Use  Smoking status: Former Smoker Packs/day: 0.50 Years: 4.00 Pack years: 2.00 Last attempt to quit: 3/6/1972 Years since quittin.3  Smokeless tobacco: Never Used Substance and Sexual Activity  Alcohol use: No  
  Comment: rare, 1 drink per year  Drug use: No  
 Sexual activity: Not Currently Family History Problem Relation Age of Onset  Heart Disease Mother  Kidney Disease Mother  Heart Disease Father  Kidney Disease Father  Cancer Sister Breast  
 
 
OBJECTIVE:  
 
Visit Vitals /68 (BP 1 Location: Left arm, BP Patient Position: Sitting) Pulse 70 Temp 98.6 °F (37 °C) (Oral) Resp 19 Ht 6' 4\" (1.93 m) Wt 230 lb 9.6 oz (104.6 kg) SpO2 97% BMI 28.07 kg/m² CONSTITUTIONAL:   well nourished, appears age appropriate EYES: sclera anicteric, PERRL, EOMI 
ENMT:nares clear, moist mucous membranes, pharynx clear NECK: supple. Thyroid normal, No JVD or bruits RESPIRATORY: Chest: clear to ascultation and percussion, normal inspiratory effort CARDIOVASCULAR: Heart: regular rate and rhythm no murmurs, rubs or gallops, PMI not displaced, No thrills GASTROINTESTINAL: Abdomen: non distended, soft, non tender, bowel sounds normal 
HEMATOLOGIC: no purpura, petechiae or bruising LYMPHATIC: No lymph node enlargemant MUSCULOSKELETAL: Extremities: no edema or active synovitis, pulse 1+ INTEGUMENT: No unusual rashes or suspicious skin lesions noted. Nails appear normal. 
PERIPHERAL VASCULAR: normal pulses femoral, PT and DP NEUROLOGIC: non-focal exam, A & O X 3 PSYCHIATRIC:, appropriate affect ASSESSMENT:  
1. ACC/AHA stage C systolic heart failure due to ischemic cardiomyopathy ([address] 2. Ischemic cardiomyopathy 3. CKD (chronic kidney disease), stage IV ([address] 4. Hypertension with renal disease 5. Anemia, unspecified type Impression 1. CHF currently stable weight is down 3 pounds 2. Cardiomyopathy stable 3. CKD stage IV repeat status is pending today 4. Hypertension that is controlled 5. Anemia repeat status pending At this point since he seems to be stable medically I am going to set him up to see me in about 2 weeks with the understanding I will see him sooner should to be a problem arise. All of the above discussed with his wife present with him today. PLAN: 
. Orders Placed This Encounter  CBC WITH AUTOMATED DIFF ([employer] [facility] [employer] In-RxResults)  METABOLIC PANEL, BASIC ([facility] [facility] [employer] In-House) ATTENTION:  
This medical record was transcribed using an electronic medical records system. Although proofread, it may and can contain electronic and spelling errors. Other human spelling and other errors may be present. Corrections may be executed at a later time. Please feel free to contact us for any clarifications as needed. Follow-up and Dispositions · Return in about 2 weeks (around 6/28/2019). No results found for any visits on 06/14/19. Manisha Valenzuela MD 
 
The patient verbalized understanding of the problems and plans as explained.

## 2019-06-14 ENCOUNTER — OFFICE VISIT (OUTPATIENT)
Dept: INTERNAL MEDICINE CLINIC | Age: 69
End: 2019-06-14

## 2019-06-14 VITALS
TEMPERATURE: 98.6 F | WEIGHT: 230.6 LBS | HEART RATE: 70 BPM | SYSTOLIC BLOOD PRESSURE: 116 MMHG | BODY MASS INDEX: 28.08 KG/M2 | RESPIRATION RATE: 19 BRPM | DIASTOLIC BLOOD PRESSURE: 68 MMHG | OXYGEN SATURATION: 97 % | HEIGHT: 76 IN

## 2019-06-14 DIAGNOSIS — N18.4 CKD (CHRONIC KIDNEY DISEASE), STAGE IV (HCC): ICD-10-CM

## 2019-06-14 DIAGNOSIS — D64.9 ANEMIA, UNSPECIFIED TYPE: ICD-10-CM

## 2019-06-14 DIAGNOSIS — I25.5 ISCHEMIC CARDIOMYOPATHY: ICD-10-CM

## 2019-06-14 DIAGNOSIS — I12.9 HYPERTENSION WITH RENAL DISEASE: ICD-10-CM

## 2019-06-14 DIAGNOSIS — I25.5 ACC/AHA STAGE C SYSTOLIC HEART FAILURE DUE TO ISCHEMIC CARDIOMYOPATHY (HCC): Primary | ICD-10-CM

## 2019-06-14 DIAGNOSIS — I50.20 ACC/AHA STAGE C SYSTOLIC HEART FAILURE DUE TO ISCHEMIC CARDIOMYOPATHY (HCC): Primary | ICD-10-CM

## 2019-06-14 LAB
ANION GAP SERPL CALC-SCNC: 12 MMOL/L
BUN SERPL-MCNC: 117 MG/DL (ref 9–20)
CALCIUM SERPL-MCNC: 9 MG/DL (ref 8.4–10.2)
CHLORIDE SERPL-SCNC: 91 MMOL/L (ref 98–107)
CO2 SERPL-SCNC: 32 MMOL/L (ref 22–32)
CREAT SERPL-MCNC: 3.1 MG/DL (ref 0.8–1.5)
ERYTHROCYTE [DISTWIDTH] IN BLOOD BY AUTOMATED COUNT: 15.3 %
GLUCOSE SERPL-MCNC: 132 MG/DL (ref 75–110)
HCT VFR BLD AUTO: 33.6 % (ref 37–51)
HGB BLD-MCNC: 10.9 G/DL (ref 12–18)
LYMPHOCYTES ABSOLUTE: 0.9 K/UL (ref 0.6–4.1)
LYMPHOCYTES NFR BLD: 13 % (ref 10–58.5)
MCH RBC QN AUTO: 30.2 PG (ref 26–32)
MCHC RBC AUTO-ENTMCNC: 32.4 G/DL (ref 30–36)
MCV RBC AUTO: 93 FL (ref 80–97)
MONOCYTES ABS-DIF,2141: 0.6 K/UL (ref 0–1.8)
MONOCYTES NFR BLD: 9.4 % (ref 0.1–24)
NEUTROPHILS # BLD: 77.6 % (ref 37–92)
NEUTROPHILS ABS,2156: 5.1 K/UL (ref 2–7.8)
PLATELET # BLD AUTO: 156 K/UL (ref 140–440)
PMV BLD AUTO: 7.3 FL
POTASSIUM SERPL-SCNC: 3.5 MMOL/L (ref 3.6–5)
RBC # BLD AUTO: 3.61 M/UL (ref 4.2–6.3)
SODIUM SERPL-SCNC: 135 MMOL/L (ref 137–145)
WBC # BLD AUTO: 6.6 K/UL (ref 4.1–10.9)

## 2019-06-14 NOTE — PATIENT INSTRUCTIONS
Arthritis: Care Instructions Your Care Instructions Arthritis, also called osteoarthritis, is a breakdown of the cartilage that cushions your joints. When the cartilage wears down, your bones rub against each other. This causes pain and stiffness. Many people have some arthritis as they age. Arthritis most often affects the joints of the spine, hands, hips, knees, or feet. You can take simple measures to protect your joints, ease your pain, and help you stay active. Follow-up care is a key part of your treatment and safety. Be sure to make and go to all appointments, and call your doctor if you are having problems. It's also a good idea to know your test results and keep a list of the medicines you take. How can you care for yourself at home? · Stay at a healthy weight. Being overweight puts extra strain on your joints. · Talk to your doctor or physical therapist about exercises that will help ease joint pain. ? Stretch. You may enjoy gentle forms of yoga to help keep your joints and muscles flexible. ? Walk instead of jog. Other types of exercise that are less stressful on the joints include riding a bicycle, swimming, bang chi, or water exercise. ? Lift weights. Strong muscles help reduce stress on your joints. Stronger thigh muscles, for example, take some of the stress off of the knees and hips. Learn the right way to lift weights so you do not make joint pain worse. · Take your medicines exactly as prescribed. Call your doctor if you think you are having a problem with your medicine. · Take pain medicines exactly as directed. ? If the doctor gave you a prescription medicine for pain, take it as prescribed. ? If you are not taking a prescription pain medicine, ask your doctor if you can take an over-the-counter medicine. · Use a cane, crutch, walker, or another device if you need help to get around. These can help rest your joints.  You also can use other things to make life easier, such as a higher toilet seat and padded handles on kitchen utensils. · Do not sit in low chairs, which can make it hard to get up. · Put heat or cold on your sore joints as needed. Use whichever helps you most. You also can take turns with hot and cold packs. ? Apply heat 2 or 3 times a day for 20 to 30 minutesusing a heating pad, hot shower, or hot packto relieve pain and stiffness. ? Put ice or a cold pack on your sore joint for 10 to 20 minutes at a time. Put a thin cloth between the ice and your skin. When should you call for help? Call your doctor now or seek immediate medical care if: 
  · You have sudden swelling, warmth, or pain in any joint.  
  · You have joint pain and a fever or rash.  
  · You have such bad pain that you cannot use a joint.  
 Watch closely for changes in your health, and be sure to contact your doctor if: 
  · You have mild joint symptoms that continue even with more than 6 weeks of care at home.  
  · You have stomach pain or other problems with your medicine. Where can you learn more? Go to http://gildardo-palak.info/. Enter E461 in the search box to learn more about \"Arthritis: Care Instructions. \" Current as of: Gi 10, 2018 Content Version: 11.9 © 9521-1460 [employer]. Care instructions adapted under license by [employer] (which disclaims liability or warranty for this information). If you have questions about a medical condition or this instruction, always ask your healthcare professional. [place] [address] any warranty or liability for your use of this information.

## 2019-06-14 NOTE — PROGRESS NOTES
Chief Complaint Patient presents with  CHF  
  1 week follow up  Diabetes  Chronic Kidney Disease Visit Vitals /68 (BP 1 Location: Left arm, BP Patient Position: Sitting) Pulse 70 Temp 98.6 °F (37 °C) (Oral) Resp 19 Ht 6' 4\" (1.93 m) Wt 230 lb 9.6 oz (104.6 kg) SpO2 97% BMI 28.07 kg/m² 1. Have you been to the ER, urgent care clinic since your last visit? Hospitalized since your last visit? No 
 
2. Have you seen or consulted any other health care providers outside of the [address] [place] [address] since your last visit? Include any pap smears or colon screening. 6/14/19  Dr. Ji Ann

## 2019-06-19 ENCOUNTER — HOSPITAL ENCOUNTER (OUTPATIENT)
Dept: INFUSION THERAPY | Age: 69
Discharge: HOME | End: 2019-06-19
Payer: MEDICARE

## 2019-06-19 VITALS
SYSTOLIC BLOOD PRESSURE: 118 MMHG | RESPIRATION RATE: 18 BRPM | HEART RATE: 72 BPM | TEMPERATURE: 97.7 F | DIASTOLIC BLOOD PRESSURE: 68 MMHG | OXYGEN SATURATION: 96 %

## 2019-06-19 LAB
ALBUMIN SERPL-MCNC: 3.2 G/DL (ref 3.5–5)
ANION GAP SERPL CALC-SCNC: 6 MMOL/L (ref 5–15)
BUN SERPL-MCNC: 126 MG/DL (ref 6–20)
BUN/CREAT SERPL: 49 (ref 12–20)
CALCIUM SERPL-MCNC: 8.7 MG/DL (ref 8.5–10.1)
CHLORIDE SERPL-SCNC: 92 MMOL/L (ref 97–108)
CO2 SERPL-SCNC: 33 MMOL/L (ref 21–32)
CREAT SERPL-MCNC: 2.59 MG/DL (ref 0.7–1.3)
FERRITIN SERPL-MCNC: 176 NG/ML (ref 26–388)
GLUCOSE SERPL-MCNC: 221 MG/DL (ref 65–100)
HCT VFR BLD AUTO: 31.1 % (ref 36.6–50.3)
HGB BLD-MCNC: 10.2 G/DL (ref 12.1–17)
IRON SATN MFR SERPL: 20 % (ref 20–50)
IRON SERPL-MCNC: 56 UG/DL (ref 35–150)
PHOSPHATE SERPL-MCNC: 4.9 MG/DL (ref 2.6–4.7)
POTASSIUM SERPL-SCNC: 3.2 MMOL/L (ref 3.5–5.1)
SODIUM SERPL-SCNC: 131 MMOL/L (ref 136–145)
TIBC SERPL-MCNC: 286 UG/DL (ref 250–450)

## 2019-06-19 PROCEDURE — 36415 COLL VENOUS BLD VENIPUNCTURE: CPT

## 2019-06-19 PROCEDURE — 83540 ASSAY OF IRON: CPT

## 2019-06-19 PROCEDURE — 82728 ASSAY OF FERRITIN: CPT

## 2019-06-19 PROCEDURE — 74011250636 HC RX REV CODE- 250/636: Performed by: INTERNAL MEDICINE

## 2019-06-19 PROCEDURE — 80069 RENAL FUNCTION PANEL: CPT

## 2019-06-19 PROCEDURE — 96372 THER/PROPH/DIAG INJ SC/IM: CPT

## 2019-06-19 PROCEDURE — 85018 HEMOGLOBIN: CPT

## 2019-06-19 RX ADMIN — EPOETIN ALFA-EPBX 60000 UNITS: 40000 INJECTION, SOLUTION INTRAVENOUS; SUBCUTANEOUS at 10:52

## 2019-06-19 NOTE — PROGRESS NOTES
[facility] Stay Note: 
Arrived - 1002 Visit Vitals /68 (BP 1 Location: Left arm, BP Patient Position: Sitting) Pulse 72 Temp 97.7 °F (36.5 °C) Resp 18 SpO2 96% Assessment - pt reports shortness of breath when climbing stairs, weakness in legs, fatigue, and numbness in hands and feet. Labs obtained- H&H, Renal Panel, Iron Profile, and Ferritin. Hgb - 10.2, Hct 31.1 Recent Results (from the past 12 hour(s)) RENAL FUNCTION PANEL Collection Time: 06/19/19  9:59 AM  
Result Value Ref Range Sodium 131 (L) 136 - 145 mmol/L Potassium 3.2 (L) 3.5 - 5.1 mmol/L Chloride 92 (L) 97 - 108 mmol/L  
 CO2 33 (H) 21 - 32 mmol/L Anion gap 6 5 - 15 mmol/L Glucose 221 (H) 65 - 100 mg/dL  (H) 6 - 20 MG/DL Creatinine 2.59 (H) 0.70 - 1.30 MG/DL  
 BUN/Creatinine ratio 49 (H) 12 - 20 GFR est AA 30 (L) >60 ml/min/1.73m2 GFR est non-AA 25 (L) >60 ml/min/1.73m2 Calcium 8.7 8.5 - 10.1 MG/DL Phosphorus 4.9 (H) 2.6 - 4.7 MG/DL Albumin 3.2 (L) 3.5 - 5.0 g/dL HGB & HCT Collection Time: 06/19/19  9:59 AM  
Result Value Ref Range HGB 10.2 (L) 12.1 - 17.0 g/dL HCT 31.1 (L) 36.6 - 50.3 % Iron profile and Ferritin pending at time of note. See [facility] for results. Retacrit 60,000 units SQ (30,000 units in each arm) dose divided into two injections d/t volume 1100 - Tolerated well. Pt denies any acute problems/changes. Discharged from [facility] ambulatory. No distress. Next appt: 7/17/19 @ 1300.

## 2019-06-21 ENCOUNTER — PATIENT OUTREACH (OUTPATIENT)
Dept: FAMILY MEDICINE CLINIC | Age: 69
End: 2019-06-21

## 2019-06-26 ENCOUNTER — PATIENT OUTREACH (OUTPATIENT)
Dept: CASE MANAGEMENT | Age: 69
End: 2019-06-26

## 2019-06-26 ENCOUNTER — PATIENT OUTREACH (OUTPATIENT)
Dept: FAMILY MEDICINE CLINIC | Age: 69
End: 2019-06-26

## 2019-06-26 NOTE — PROGRESS NOTES
Goals  Attend follow up appointments on schedule 6/6/19 Patient reports [facility] appointment 6/7/19 however PCP office will call if cancellation to be seen today. Cardio appointment is 6/18/19. Patient will report attendance of appointment at next week outreach. [facility] 
 
6/13/19 Per review of chart patient attended appointment with PCP on 6/7/19. Patient  will report attendance of appointment of cardio at next week outreach. [facility] 
 
6/26/19 Patient reports attendance of cardio appointment. No change to plan of care. [facility]  Reduce risk of CHF exacerbations and complications. 6/6/19 Patient report weight is 232. Patient educated on weight gain of 2-3 pounds in a day or 5 lbs in a week. Patient was seen in ED on 6/5/19 after discharge. Patient given Levaquin prescription and advised importance of completing all ABX. Patient will weigh and record daily, monitor CHF S&S, and complete ABX and report at next week outreach. [facility] 
 
6/13/19 Patient reports weight is 224.2 lbs and completion of ABX. Denies SOB at rest, chest pain, increase in pillow use, rapid heart rate/breathing, and fever. Patient will continue to monitor CHF S&S and report at next week outreach. [facility] 
 
6/26/19 Patient reports weight as 224 today. Has been between 224-226. Feels good able to walk grocery store and up stairs with stopping. Denies SOB, chest pain, increase in pillow use, rapid heart rate/breathing, AMS,and fever. Patient is to mail ipad in tomorrow. Will remain on diet during holiday and monitor CHF S&S and report at outreach in 2 weeks.  [facility]

## 2019-06-28 ENCOUNTER — HOSPITAL ENCOUNTER (OUTPATIENT)
Dept: WOUND CARE | Age: 69
Discharge: HOME | End: 2019-06-28
Payer: MEDICARE

## 2019-06-28 VITALS
RESPIRATION RATE: 16 BRPM | TEMPERATURE: 98 F | DIASTOLIC BLOOD PRESSURE: 81 MMHG | SYSTOLIC BLOOD PRESSURE: 159 MMHG | HEART RATE: 70 BPM

## 2019-06-28 PROCEDURE — 97597 DBRDMT OPN WND 1ST 20 CM/<: CPT

## 2019-06-28 NOTE — WOUND CARE
06/28/19 1345 Wound Foot Plantar;Left 06/28/19 Date First Assessed/Time First Assessed: 06/28/19 1334   Present on Hospital Admission: Yes  Primary Wound Type: Diabetic  Location: Foot  Wound Location Orientation: Plantar;Left  Date of First Observation: 06/28/19 Dressing Type Applied Silver products; Alginate;Gauze;Special tape (comment) (Aquacel Ag) Wound Procedure Type Selective Debridement Procedure Time Out 4707 Consent Obtained  Yes Procedure Bleeding Minimal  
Procedure Hemostasis  Silver Nitrate Procedure Instrument  Curette Procedure Pain Scale Numeric 0/10 Debridement Procedure Performed by Dr Juan Hodges Post Procedure Pain Scale Numeric 0/10 Procedure Tolerated Well Patient was discharged with family to home and was with cane. In stable condition with c/o pain:_0_/10_

## 2019-06-28 NOTE — WOUND CARE
06/28/19 1335 Wound Foot Plantar;Left 06/28/19 Date First Assessed/Time First Assessed: 06/28/19 1334   Present on Hospital Admission: Yes  Primary Wound Type: Diabetic  Location: Foot  Wound Location Orientation: Plantar;Left  Date of First Observation: 06/28/19 Dressing Status Removed Dressing Type  
(Magic Mud, gauze, tape) Wound Length (cm) 1.4 cm Wound Width (cm) 1.1 cm Wound Depth (cm) 0.1 cm Wound Volume (cm^3) 0.15 cm^3 Condition of Base Granulation Condition of Edges Calloused; Open Drainage Amount Small Drainage Color Serosanguinous Wound Odor None Angela-wound Assessment Intact Cleansing and Cleansing Agents  Normal saline Visit Vitals /81 (BP 1 Location: Right arm, BP Patient Position: At rest;Sitting) Pulse 70 Temp 98 °F (36.7 °C) Resp 16

## 2019-06-29 NOTE — H&P
[address] HISTORY AND PHYSICAL Name:  Ludivina Dodd 
MR#:  [id number] :  1950 ACCOUNT #:  [de-identified] ADMIT DATE:  2019 HISTORY OF CHIEF COMPLAINT:  This 75-year-old white male presented for treatment of a wound, plantar left foot, of unknown duration. PAST MEDICAL HISTORY:  Anemia, anxiety, arrhythmias, atherosclerotic cardiovascular disease, atrial fibrillation, benign prostatic hyperplasia, coronary artery disease with a history of stent, previous skin cancer, congestive heart failure, stage IV chronic kidney disease, diabetes with neuropathy, degenerative joint disease, erectile dysfunction, gout, hypercholesteremia, hyperlipidemia, hypertension with renal disease, hypothyroidism, insomnia, restless leg syndrome, cardiomyopathy, age-related cataracts. PAST SURGICAL HISTORY:  The patient has had previous amputation of first left MPJ. History of appendectomy. The patient has a pacemaker. Rotator cuff repair. ALLERGIES:  NIACIN, IMIPENEM, LEVEMIR, XARELTO, PRIMAXIN, AND DURAPREP. CURRENT MEDICATIONS:  Vitamin C 250 mg three times a day, Bumex 2 mg twice a day, Coreg 12.5 mg twice a day, Uloric 40 mg daily, ferrous sulfate 47.5 mg t.i.d., Proscar 5 mg daily, Neurontin 300 mg twice a day, Lantus insulin 20 units at night, Imdur 30 mg daily, Victoza 0.6 mg/0.1 mL 0.6 mg subcu daily, Narcan nasal spray as needed, Klor-Con 20 mEq t.i.d., MiraLax  at night, Flomax 0.4 mg daily, timolol 0.5% eye drops to the right eye twice a day. SOCIAL HISTORY:  The patient is a former smoker; he quit in . FAMILY HISTORY:  Father is ; he had a history of heart disease and kidney disease. Mother is ; she had a history of heart disease and kidney disease. He has a sister who is living; she has a history of breast cancer. REVIEW OF SYSTEMS: 
CONSTITUTIONAL:  No fever, sweats, or chills. HEAD AND NECK:  Occasional earaches. CARDIOVASCULAR:  No chest pain or palpitations. ENDOCRINE:  The patient does have a history of diabetes as well as hypothyroidism. GASTROINTESTINAL:  No heartburn or indigestion. MUSCULOSKELETAL:  Occasional muscle aches and arthralgias of the back. NEUROLOGIC:  No headaches; however, the patient does state that he has recently had frequent falling and walks with a cane secondary to neuropathy. PSYCHIATRIC:  Normal mood and behavior. RESPIRATORY:  No shortness of breath. PHYSICAL EXAMINATION: 
VITAL SIGNS:  Temperature 98.0, pulse rate 70, blood pressure 159/81. HEAD AND NECK:  No abnormalities. LUNGS:  Normal upon auscultation. HEART:  Normal heart sounds. ABDOMEN:  No tenderness. EXTREMITIES:  Focused evaluation of lower extremities revealed palpable pedal pulses. Fairly good muscle strength, lower extremities, bilateral.  Grossly diminished epicritic sensation. He has an asymptomatic dorsomedial bunion, right foot. Second left toe is abducted at DIPJ; it appears that the patient at some point fractured this toe, however, he did not realize it because of the neuropathy. Plantar second left MPJ is with a beefy red, granular, stage II, grade II wound with surrounding hyperkeratotic tissue that measures 1.4 x 1.1 x 0.1 cm; does not appear to be infected. MUSCULOSKELETAL:  Normal strength and muscle tone. Extremities are symmetrical, upper and lower limbs. NEUROLOGIC:  The patient has a slow cautious gait and walks with a cane. He has diminished epicritic sensation, lower extremities. PSYCHIATRIC:  Normal mood and behavior. ASSESSMENT:  Diabetic ulcer, plantar left foot, on a diabetic with neuropathy. PLAN:  Selective debridement was performed on the wound, left foot, today. It was dressed with silver calcium alginate followed by one layer of gauze; the patient is to continue every other day. He will have followup visit in the wound clinic with myself in two weeks.   If the wound fails to thrive, we have discussed the patient going into a total contact cast; however, he will need a walker to assist because of his balance issues. DORIS [email or web address] 
D:  06/28/2019 16:31 
T:  06/28/2019 21:53 
JOB #:  [id number]

## 2019-06-29 NOTE — OP NOTES
[address] 
OPERATIVE REPORT Name:  Jamir Powell 
MR#:  [id number] :  1950 ACCOUNT #:  [de-identified] DATE OF SERVICE:  2019 PREOPERATIVE DIAGNOSIS:  Grade 2 diabetic ulcer, plantar aspect second left metacarpal phalangeal joint. POSTOPERATIVE DIAGNOSIS:  Grade 2 diabetic ulcer, plantar aspect second left metacarpal phalangeal joint. PROCEDURE PERFORMED:  Selective debridement diabetic ulcer grade 2 plantar second left metacarpal phalangeal joint. SURGEON:  Rory Cramer DPM. ASSISTANT:  none ANESTHESIA:  None needed. COMPLICATIONS:  None. SPECIMENS REMOVED:  None. IMPLANTS:  none ESTIMATED BLOOD LOSS:  Minimal. 
 
INDICATIONS:  This 78-year-old white male presents for continued treatment of a chronic wound plantar aspect of the left foot. History and physical unchanged from history and physical dictated today. PHYSICAL EXAMINATION:  Temperature 98, pulse rate 70, blood pressure 159/81. Physical examination of lower extremities revealed barely palpable pedal pulses, good muscle strength lower extremities bilateral.  Diminished epicritic sensation on the dorsal medial asymptomatic bunion right foot. The second left toe is abducted at the DIPJ, appears to be secondary to an old fracture. Plantar aspect of the second left MPJ is with a rare granular grade 2 ulceration with surrounding hyperkeratotic tissue, does not appear to be infected nor inflamed. DESCRIPTION OF PROCEDURE:  Using a curette, a selective debridement was performed of the wound, plantar left foot into the dermal layer, removing dermal, granular and hyperkeratotic tissue. Good bleeding base was achieved and it was cauterized using a chemical cautery with nitrate sticks. The wound was now dressed with one layer of silver calcium alginate, followed by a layer of gauze.   The patient will change the dressings every other day and will have a followup visit in the wound clinic with myself in 2 weeks. I have advised this patient to reduce his activity level. Hosea Borden DPM 
 
 
SF/[id number]_T/RUPAL_JDNES_P 
D:  06/28/2019 16:35 
T:  06/28/2019 21:48 JOB #:  [id number]

## 2019-07-11 ENCOUNTER — PATIENT OUTREACH (OUTPATIENT)
Dept: FAMILY MEDICINE CLINIC | Age: 69
End: 2019-07-11

## 2019-07-11 NOTE — PROGRESS NOTES
Goals  Reduce risk of CHF exacerbations and complications. 6/6/19 Patient report weight is 232. Patient educated on weight gain of 2-3 pounds in a day or 5 lbs in a week. Patient was seen in ED on 6/5/19 after discharge. Patient given Levaquin prescription and advised importance of completing all ABX. Patient will weigh and record daily, monitor CHF S&S, and complete ABX and report at next week outreach. [facility] 
 
6/13/19 Patient reports weight is 224.2 lbs and completion of ABX. Denies SOB at rest, chest pain, increase in pillow use, rapid heart rate/breathing, and fever. Patient will continue to monitor CHF S&S and report at next week outreach. [facility] 
 
6/26/19 Patient reports weight as 224 today. Has been between 224-226. Feels good able to walk grocery store and up stairs with stopping. Denies SOB, chest pain, increase in pillow use, rapid heart rate/breathing, AMS,and fever. Patient is to mail ipad in tomorrow. Will remain on diet during holiday and monitor CHF S&S and report at outreach in 2 weeks. [facility] 
 
7/11/19 Patient reports he feels good and is able to walk distances better than before. He was able to stick with diet during holiday. Denies SOB, chest pain, increase in pillow use, and fever. Patient will monitor CHF S&S and report at next week outreach.  [facility]

## 2019-07-12 ENCOUNTER — HOSPITAL ENCOUNTER (OUTPATIENT)
Dept: WOUND CARE | Age: 69
Discharge: HOME | End: 2019-07-12
Payer: MEDICARE

## 2019-07-12 VITALS
DIASTOLIC BLOOD PRESSURE: 69 MMHG | SYSTOLIC BLOOD PRESSURE: 150 MMHG | TEMPERATURE: 97.8 F | HEART RATE: 67 BPM | RESPIRATION RATE: 16 BRPM

## 2019-07-12 PROCEDURE — 97597 DBRDMT OPN WND 1ST 20 CM/<: CPT

## 2019-07-12 NOTE — WOUND CARE
07/12/19 1504 Wound Foot Plantar;Left 06/28/19 Date First Assessed/Time First Assessed: 06/28/19 1334   Present on Hospital Admission: Yes  Primary Wound Type: Diabetic  Location: Foot  Wound Location Orientation: Plantar;Left  Date of First Observation: 06/28/19 Dressing Status Removed Dressing Type Gauze;Gauze wrap (susanne) Wound Length (cm) 1.2 cm Wound Width (cm) 0.9 cm Wound Depth (cm) 0.1 cm Wound Volume (cm^3) 0.11 cm^3 Visit Vitals /69 (BP 1 Location: Right arm, BP Patient Position: At rest) Pulse 67 Temp 97.8 °F (36.6 °C) Resp 16

## 2019-07-12 NOTE — WOUND CARE
07/12/19 1545 Wound Foot Plantar;Left 06/28/19 Date First Assessed/Time First Assessed: 06/28/19 1334   Present on Hospital Admission: Yes  Primary Wound Type: Diabetic  Location: Foot  Wound Location Orientation: Plantar;Left  Date of First Observation: 06/28/19 Dressing Type Applied Silver products; Alginate; Foam 
(TCC) Wound Procedure Type Selective Debridement Procedure Time Out 0358 Consent Obtained  Yes Procedure Bleeding Minimal  
Procedure Hemostasis  Pressure Procedure Instrument  Curette Procedure Pain Scale Numeric 0/10 Debridement Procedure Performed by DR Charmayne Shaw Post Procedure Pain Scale Numeric 0/10 Procedure Tolerated Well Patient was discharged with family to home and was with cane. In stable condition with c/o pain:_0_/10_

## 2019-07-13 NOTE — OP NOTES
[address] 
OPERATIVE REPORT Name:  Nigel Garrison 
MR#:  [id number] :  1950 ACCOUNT #:  [de-identified] DATE OF SERVICE:  2019 PREOPERATIVE DIAGNOSIS:  Grade II diabetic ulcer, plantar second left metacarpophalangeal joint. POSTOPERATIVE DIAGNOSIS:  Grade II diabetic ulcer, plantar second left metacarpophalangeal joint. PROCEDURE PERFORMED:  Selective debridement, diabetic ulcer, plantar aspect of the left foot followed by application of a total contact cast. 
 
SURGEON:  Mj Portillo DPM 
 
ASSISTANT: none ANESTHESIA:  None needed. COMPLICATIONS:  none SPECIMENS REMOVED:  None. IMPLANTS:  none. ESTIMATED BLOOD LOSS:  Minimal. 
 
INDICATIONS:  This 75-year-old white male presents for continued treatment of a chronic wound, plantar aspect second left MPJ, of unknown duration. Most recent therapy consisted of debridement as well as Aquacel Ag. History and physical unchanged from admitting history and physical; no change in medications, no change in allergies. PAST MEDICAL HISTORY:  Diabetes with neuropathy, anemia, anxiety, atrial fibrillation, atherosclerotic cardiovascular disease, benign prostatic hyperplasia, coronary artery disease, previous cancer of the skin, cardiomyopathy, congestive heart failure, chronic kidney disease, degenerative joint disease, gout, hypercholesteremia, hyperlipidemia, hypertension, hypothyroidism, restless leg syndrome, GERD. ALLERGIES:  NIACIN, IMIPENEM, LEVEMIR, PRIMAXIN, AND Radha Boyers. SOCIAL HISTORY:  The patient was a previous smoker; he stopped in 0. PHYSICAL EXAMINATION: 
VITAL SIGNS:  Temperature 97.8, pulse rate 67, respirations 16, blood pressure 150/69. EXTREMITIES:  Physical examination of lower extremities revealed palpable pedal pulses with fairly good muscle strength, lower extremities, bilateral.  Grossly diminished epicritic sensation.   Plantar aspect of second left MPJ is with a granular grade II wound with surrounding hyperkeratotic tissue, does not appear to be infected, measures 1.2 x 0.9 x 0.1 cm. At last visit, it measured 1.4 x 1.1 x 0.1 cm. DESCRIPTION OF PROCEDURE:  Using a curette, a selective debridement was performed into the dermal layer, removing dermal granulomatous and hyperkeratotic tissue. Good bleeding base was achieved. An aerobic as well as anaerobic wound cultures were taken. Next, Aquacel Ag was applied to the wound followed by gauze followed by the application of a total contact cast.  The patient is to reduce activity level and he will have a followup visit in the wound clinic with myself, Dr. Jose Madison, in one week. Antibiotic therapy will be adjusted pending wound culture results. Natalia Wise DPM 
 
 
SF/V_[id number]_I/ 
D:  07/12/2019 16:43 
T:  07/12/2019 20:27 
JOB #:  [id number]

## 2019-07-15 LAB
BACTERIA SPEC AEROBE CULT: ABNORMAL
BACTERIA SPEC AEROBE CULT: ABNORMAL

## 2019-07-17 ENCOUNTER — APPOINTMENT (OUTPATIENT)
Dept: INFUSION THERAPY | Age: 69
End: 2019-07-17

## 2019-07-18 ENCOUNTER — PATIENT OUTREACH (OUTPATIENT)
Dept: FAMILY MEDICINE CLINIC | Age: 69
End: 2019-07-18

## 2019-07-18 VITALS — BODY MASS INDEX: 27.51 KG/M2 | WEIGHT: 226 LBS

## 2019-07-19 ENCOUNTER — HOSPITAL ENCOUNTER (OUTPATIENT)
Dept: WOUND CARE | Age: 69
Discharge: HOME | End: 2019-07-19
Payer: MEDICARE

## 2019-07-19 VITALS
TEMPERATURE: 98.1 F | DIASTOLIC BLOOD PRESSURE: 81 MMHG | HEART RATE: 71 BPM | RESPIRATION RATE: 16 BRPM | SYSTOLIC BLOOD PRESSURE: 145 MMHG

## 2019-07-19 PROCEDURE — 97597 DBRDMT OPN WND 1ST 20 CM/<: CPT

## 2019-07-19 NOTE — WOUND CARE
OUTPATIENT WOUND CARE DISCHARGE NOTE       07/19/19 1517   Wound Foot Plantar;Left 06/28/19   Date First Assessed/Time First Assessed: 06/28/19 1334   Present on Hospital Admission: Yes  Primary Wound Type: Diabetic  Location: Foot  Wound Location Orientation: Plantar;Left  Date of First Observation: 06/28/19   Cleansing and Cleansing Agents  Normal saline   Dressing Type Applied Alginate;Foam;Silver products; Other (Comment)  (Total Contact Cast)   Wound Procedure Type Selective Debridement   Procedure Time Out 1515   Consent Obtained  Yes   Procedure Bleeding Minimal   Procedure Hemostasis  Pressure   Type of Tissue Removed  Devitalized  (Devitalized and periwound callous.)   Procedure Instrument  Curette   Procedure Pain Scale Numeric 0/10   Debridement Procedure Performed by Dr. Piedad Esposito Procedure Pain Scale Numeric 0/10   Procedure Tolerated Well     Wound Dressing Applied - Per order, as noted above. Pain - Denies pain    Ambulatory Aid - Cane    Follow Up Appointments - 2 weeks    Discharge Instructions Reviewed. Instructed to call The [address] [place] [address] Road with any questions or concerns. Patient  Verbalizes understanding.

## 2019-07-19 NOTE — WOUND CARE
07/19/19 1503   Wound Foot Plantar;Left 06/28/19   Date First Assessed/Time First Assessed: 06/28/19 1334   Present on Hospital Admission: Yes  Primary Wound Type: Diabetic  Location: Foot  Wound Location Orientation: Plantar;Left  Date of First Observation: 06/28/19   Dressing Status Removed   Dressing Type Aquacel;Foam;Silver products; Other (Comment)  (Aquacel ag, Total Contact cast)   Wound Length (cm) 1.1 cm   Wound Width (cm) 0.8 cm   Wound Depth (cm) 0.1 cm   Wound Volume (cm^3) 0.09 cm^3   Condition of Base Pink   Condition of Edges Calloused   Drainage Amount Small   Drainage Color Serosanguinous   Wound Odor None   Angela-wound Assessment Other (Comment)  (Calloused)   Cleansing and Cleansing Agents  Normal saline   Wound Procedure Type Selective Debridement
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
## 2019-07-20 NOTE — OP NOTES
[facility]  OPERATIVE REPORT    Name:  Jeimy Chen  MR#:  [id number]  :  1950  ACCOUNT #:  [de-identified]  DATE OF SERVICE:  2019    PREOPERATIVE DIAGNOSIS:  Diabetic ulcer grade II, plantar aspect of the left foot. POSTOPERATIVE DIAGNOSIS:  Diabetic ulcer grade II, plantar aspect of the left foot. PROCEDURE PERFORMED:  Selective debridement of diabetic ulcer, left foot, followed by application of total contact cast.    SURGEON:  SNEHA Fonseca DPM.    ASSISTANT:  none    ANESTHESIA:  None needed. COMPLICATIONS:  None. SPECIMENS REMOVED:  None. IMPLANTS:  none    ESTIMATED BLOOD LOSS:  Minimal.    INDICATIONS:  This 24-year-old white male presents for continued treatment of chronic wound, plantar aspect of the left foot who presents today in a total contact cast.    History and physical unchanged from admitting history and physical; no change in medications, no change in allergies. PAST MEDICAL HISTORY:  Diabetes with neuropathy, anxiety, arrhythmia, atherosclerotic cardiovascular disease, benign prostatic hyperplasia of the prostate, coronary artery disease, cancer, cardiomyopathy, congestive heart failure, kidney disease, hypertension, hyperlipidemia, hypothyroidism, insomnia, restless legs syndrome. SOCIAL HISTORY:  Former smoker, he stopped in 0. ALLERGIES: TO NIACIN, IMIPENEM, LEVEMIR, PRIMAXIN, AND Timoteo Lux. PHYSICAL EXAMINATION:  VITAL SIGNS:  Temperature 98.1, pulse rate 71, respirations 16, blood pressure 145/81. EXTREMITIES:  Physical examination of lower extremities revealed palpable pedal pulses. Fairly good muscle strength in lower extremities bilateral.  Diminished epicritic sensation in lower extremities. Plantar aspect of the second left MPJ with a granular, grade II ulcer with surrounding hyperkeratotic tissue, does not appear to be infected, measures 1.1 x 0.8 x 0.1 cm.     DESCRIPTION OF PROCEDURE:  Using a curette, a selective debridement was performed of the ulcer, plantar left foot, into the dermal layer removing dermal, granulomatous and hyperkeratotic tissue. Good bleeding base was achieved. Wound was now dressed with silver calcium alginate followed by roll gauze followed by application of a total contact cast.  The patient has reduced activity level, will have a followup visit in [address] [place] [address] with myself, Dr. Claudette Quiroz, in 2 weeks.       Carlos Gonsalves DPM      [id number]  D:  07/19/2019 16:40  T:  07/19/2019 16:49  JOB #:  [id number]

## 2019-07-22 ENCOUNTER — OFFICE VISIT (OUTPATIENT)
Dept: INTERNAL MEDICINE CLINIC | Age: 69
End: 2019-07-22

## 2019-07-22 VITALS
TEMPERATURE: 98.5 F | SYSTOLIC BLOOD PRESSURE: 120 MMHG | OXYGEN SATURATION: 98 % | BODY MASS INDEX: 27.4 KG/M2 | WEIGHT: 225 LBS | HEIGHT: 76 IN | HEART RATE: 88 BPM | RESPIRATION RATE: 18 BRPM | DIASTOLIC BLOOD PRESSURE: 74 MMHG

## 2019-07-22 DIAGNOSIS — I12.9 HYPERTENSION WITH RENAL DISEASE: Primary | ICD-10-CM

## 2019-07-22 DIAGNOSIS — D64.9 ANEMIA, UNSPECIFIED TYPE: ICD-10-CM

## 2019-07-22 DIAGNOSIS — N18.4 CKD (CHRONIC KIDNEY DISEASE), STAGE IV (HCC): ICD-10-CM

## 2019-07-22 DIAGNOSIS — I48.0 PAROXYSMAL ATRIAL FIBRILLATION (HCC): ICD-10-CM

## 2019-07-22 DIAGNOSIS — I50.20 ACC/AHA STAGE C SYSTOLIC HEART FAILURE DUE TO ISCHEMIC CARDIOMYOPATHY (HCC): ICD-10-CM

## 2019-07-22 DIAGNOSIS — I25.5 ACC/AHA STAGE C SYSTOLIC HEART FAILURE DUE TO ISCHEMIC CARDIOMYOPATHY (HCC): ICD-10-CM

## 2019-07-22 DIAGNOSIS — I25.10 ASCVD (ARTERIOSCLEROTIC CARDIOVASCULAR DISEASE): ICD-10-CM

## 2019-07-22 DIAGNOSIS — I25.5 ISCHEMIC CARDIOMYOPATHY: ICD-10-CM

## 2019-07-22 LAB
BACTERIA SPEC ANAEROBE CULT: ABNORMAL
BACTERIA SPEC ANAEROBE CULT: ABNORMAL
ERYTHROCYTE [DISTWIDTH] IN BLOOD BY AUTOMATED COUNT: 16.1 %
HCT VFR BLD AUTO: 37 % (ref 37–51)
HGB BLD-MCNC: 12 G/DL (ref 12–18)
LYMPHOCYTES ABSOLUTE: 0.9 K/UL (ref 0.6–4.1)
LYMPHOCYTES NFR BLD: 14.8 % (ref 10–58.5)
MCH RBC QN AUTO: 30.7 PG (ref 26–32)
MCHC RBC AUTO-ENTMCNC: 32.4 G/DL (ref 30–36)
MCV RBC AUTO: 94.7 FL (ref 80–97)
MONOCYTES ABS-DIF,2141: 0.7 K/UL (ref 0–1.8)
MONOCYTES NFR BLD: 11.2 % (ref 0.1–24)
NEUTROPHILS # BLD: 74 % (ref 37–92)
NEUTROPHILS ABS,2156: 4.5 K/UL (ref 2–7.8)
PLATELET # BLD AUTO: 124 K/UL (ref 140–440)
PMV BLD AUTO: 7.5 FL
RBC # BLD AUTO: 3.91 M/UL (ref 4.2–6.3)
SPECIMEN STATUS REPORT, ROLRST: NORMAL
WBC # BLD AUTO: 6.1 K/UL (ref 4.1–10.9)

## 2019-07-22 RX ORDER — INSULIN GLARGINE 100 [IU]/ML
INJECTION, SOLUTION SUBCUTANEOUS
Qty: 30 ML | Refills: 0 | Status: SHIPPED | OUTPATIENT
Start: 2019-07-22 | End: 2019-10-08 | Stop reason: SDUPTHER

## 2019-07-22 NOTE — PROGRESS NOTES
[facility] Arya. Identified pt with two pt identifiers(name and ). Chief Complaint   Patient presents with    Hypertension     2 week follow up    Diabetes       1. Have you been to the ER, urgent care clinic since your last visit? Hospitalized since your last visit? No    2. Have you seen or consulted any other health care providers outside of the [address] [place] [address] since your last visit? Include any pap smears or colon screening. No      Health Maintenance Topics with due status: Overdue       Topic Date Due    DTaP/Tdap/Td series 1969    Shingrix Vaccine Age 50> 1998    AAA Screening 73-69 YO Male Smoking Patients 2013     Health Maintenance Topics with due status: Due Soon       Topic Date Due    MICROALBUMIN Q1 2019    MEDICARE YEARLY EXAM 2019    HEMOGLOBIN A1C Q6M 08/15/2019     Health Maintenance Topics with due status: Not Due       Topic Last Completion Date    Influenza Age 5 to Adult 2018    LIPID PANEL Q1 2018    COLONOSCOPY 2019    FOOT EXAM Q1 2019    GLAUCOMA SCREENING Q2Y 2019    EYE EXAM RETINAL OR DILATED 2019     Health Maintenance Topics with due status: Completed       Topic Last Completion Date    Pneumococcal 65+ years 2015     Health Maintenance Topics with due status: Addressed       Topic Date Due    Hepatitis C Screening Addressed           Medication reconciliation up to date and corrected with patient at this time. Today's provider has been notified of reason for visit, vitals and flowsheets obtained on patients. Reviewed record in preparation for visit, huddled with provider and have obtained necessary documentation.         Wt Readings from Last 3 Encounters:   19 225 lb (102.1 kg)   19 226 lb (102.5 kg)   19 230 lb 9.6 oz (104.6 kg)     Temp Readings from Last 3 Encounters:   19 98.5 °F (36.9 °C) (Oral)   19 98.1 °F (36.7 °C)   19 97.8 °F (36.6 °C) BP Readings from Last 3 Encounters:   07/22/19 120/90   07/19/19 145/81   07/12/19 150/69     Pulse Readings from Last 3 Encounters:   07/22/19 88   07/19/19 71   07/12/19 67     Vitals:    07/22/19 1313   BP: 120/90   Pulse: 88   Resp: 18   Temp: 98.5 °F (36.9 °C)   TempSrc: Oral   SpO2: 98%   Weight: 225 lb (102.1 kg)   Height: 6' 4\" (1.93 m)   PainSc:   0 - No pain         Learning Assessment:  :     Learning Assessment 8/28/2017   PRIMARY LEARNER Patient   HIGHEST LEVEL OF EDUCATION - PRIMARY LEARNER  SOME COLLEGE   PRIMARY LANGUAGE ENGLISH   LEARNER PREFERENCE PRIMARY LISTENING   ANSWERED BY patient   RELATIONSHIP SELF       Depression Screening:  :     3 most recent PHQ Screens 6/14/2019   Little interest or pleasure in doing things Not at all   Feeling down, depressed, irritable, or hopeless Not at all   Total Score PHQ 2 0       No flowsheet data found. Fall Risk Assessment:  :     Fall Risk Assessment, last 12 mths 6/14/2019   Able to walk? Yes   Fall in past 12 months? No   Fall with injury? -   Number of falls in past 12 months -   Fall Risk Score -       Abuse Screening:  :     Abuse Screening Questionnaire 5/20/2019 4/22/2019 2/15/2019 8/28/2017   Do you ever feel afraid of your partner? N N N N   Are you in a relationship with someone who physically or mentally threatens you? N N N N   Is it safe for you to go home?  Y Y Y Y       ADL Screening:  :     ADL Assessment 5/15/2018   Feeding yourself No Help Needed   Getting from bed to chair No Help Needed   Getting dressed No Help Needed   Bathing or showering No Help Needed   Walk across the room (includes cane/walker) No Help Needed   Using the telphone No Help Needed   Taking your medications No Help Needed   Preparing meals No Help Needed   Managing money (expenses/bills) No Help Needed   Moderately strenuous housework (laundry) No Help Needed   Shopping for personal items (toiletries/medicines) No Help Needed   Shopping for groceries No Help Needed   Driving No Help Needed   Climbing a flight of stairs No Help Needed   Getting to places beyond walking distances No Help Needed

## 2019-07-22 NOTE — PATIENT INSTRUCTIONS
Arthritis: Care Instructions  Your Care Instructions  Arthritis, also called osteoarthritis, is a breakdown of the cartilage that cushions your joints. When the cartilage wears down, your bones rub against each other. This causes pain and stiffness. Many people have some arthritis as they age. Arthritis most often affects the joints of the spine, hands, hips, knees, or feet. You can take simple measures to protect your joints, ease your pain, and help you stay active. Follow-up care is a key part of your treatment and safety. Be sure to make and go to all appointments, and call your doctor if you are having problems. It's also a good idea to know your test results and keep a list of the medicines you take. How can you care for yourself at home? · Stay at a healthy weight. Being overweight puts extra strain on your joints. · Talk to your doctor or physical therapist about exercises that will help ease joint pain. ? Stretch. You may enjoy gentle forms of yoga to help keep your joints and muscles flexible. ? Walk instead of jog. Other types of exercise that are less stressful on the joints include riding a bicycle, swimming, bang chi, or water exercise. ? Lift weights. Strong muscles help reduce stress on your joints. Stronger thigh muscles, for example, take some of the stress off of the knees and hips. Learn the right way to lift weights so you do not make joint pain worse. · Take your medicines exactly as prescribed. Call your doctor if you think you are having a problem with your medicine. · Take pain medicines exactly as directed. ? If the doctor gave you a prescription medicine for pain, take it as prescribed. ? If you are not taking a prescription pain medicine, ask your doctor if you can take an over-the-counter medicine. · Use a cane, crutch, walker, or another device if you need help to get around. These can help rest your joints.  You also can use other things to make life easier, such as a higher toilet seat and padded handles on kitchen utensils. · Do not sit in low chairs, which can make it hard to get up. · Put heat or cold on your sore joints as needed. Use whichever helps you most. You also can take turns with hot and cold packs. ? Apply heat 2 or 3 times a day for 20 to 30 minutes--using a heating pad, hot shower, or hot pack--to relieve pain and stiffness. ? Put ice or a cold pack on your sore joint for 10 to 20 minutes at a time. Put a thin cloth between the ice and your skin. When should you call for help? Call your doctor now or seek immediate medical care if:    · You have sudden swelling, warmth, or pain in any joint.     · You have joint pain and a fever or rash.     · You have such bad pain that you cannot use a joint.    Watch closely for changes in your health, and be sure to contact your doctor if:    · You have mild joint symptoms that continue even with more than 6 weeks of care at home.     · You have stomach pain or other problems with your medicine. Where can you learn more? Go to http://gildardo-palak.info/. Enter B711 in the search box to learn more about \"Arthritis: Care Instructions. \"  Current as of: April 1, 2019  Content Version: 12.1  © 3061-7088 [employer]. Care instructions adapted under license by [employer] (which disclaims liability or warranty for this information). If you have questions about a medical condition or this instruction, always ask your healthcare professional. [address] any warranty or liability for your use of this information.

## 2019-07-23 LAB
ANION GAP SERPL CALC-SCNC: 19 MMOL/L
BUN SERPL-MCNC: 124 MG/DL (ref 9–20)
CALCIUM SERPL-MCNC: 9.5 MG/DL (ref 8.4–10.2)
CHLORIDE SERPL-SCNC: 84 MMOL/L (ref 98–107)
CO2 SERPL-SCNC: 34 MMOL/L (ref 22–32)
CREAT SERPL-MCNC: 2.7 MG/DL (ref 0.8–1.5)
GLUCOSE SERPL-MCNC: 145 MG/DL (ref 75–110)
POTASSIUM SERPL-SCNC: 3.8 MMOL/L (ref 3.6–5)
SODIUM SERPL-SCNC: 137 MMOL/L (ref 137–145)

## 2019-07-23 NOTE — PROGRESS NOTES
Pt has appointment for Retacrit tomorrow. Labs drawn yesterday Hgb 12.0. Appointment canceled since Retacrit would be held. Next appointment in 4 weeks, 8/21.

## 2019-07-24 ENCOUNTER — HOSPITAL ENCOUNTER (OUTPATIENT)
Dept: INFUSION THERAPY | Age: 69
Discharge: HOME | End: 2019-07-24

## 2019-07-24 ENCOUNTER — OFFICE VISIT (OUTPATIENT)
Dept: INTERNAL MEDICINE CLINIC | Age: 69
End: 2019-07-24

## 2019-07-24 VITALS
RESPIRATION RATE: 18 BRPM | OXYGEN SATURATION: 94 % | SYSTOLIC BLOOD PRESSURE: 112 MMHG | HEART RATE: 70 BPM | BODY MASS INDEX: 28.23 KG/M2 | HEIGHT: 76 IN | WEIGHT: 231.8 LBS | TEMPERATURE: 97.8 F | DIASTOLIC BLOOD PRESSURE: 72 MMHG

## 2019-07-24 DIAGNOSIS — M25.522 LEFT ELBOW PAIN: Primary | ICD-10-CM

## 2019-07-24 DIAGNOSIS — M77.12 LATERAL EPICONDYLITIS OF LEFT ELBOW: ICD-10-CM

## 2019-07-24 RX ORDER — METHYLPREDNISOLONE ACETATE 40 MG/ML
40 INJECTION, SUSPENSION INTRA-ARTICULAR; INTRALESIONAL; INTRAMUSCULAR; SOFT TISSUE ONCE
Qty: 1 VIAL | Refills: 0
Start: 2019-07-24 | End: 2019-07-24

## 2019-07-24 NOTE — PROGRESS NOTES
Chief Complaint   Patient presents with    Elbow Pain     left elbow pain x 1 week     Visit Vitals  /72 (BP 1 Location: Right arm, BP Patient Position: Sitting)   Pulse 70   Temp 97.8 °F (36.6 °C) (Oral)   Resp 18   Ht 6' 4\" (1.93 m)   Wt 231 lb 12.8 oz (105.1 kg)   SpO2 94%   BMI 28.22 kg/m²     1. Have you been to the ER, urgent care clinic since your last visit? Hospitalized since your last visit? No    2. Have you seen or consulted any other health care providers outside of the [address] [place] [address] since your last visit? Include any pap smears or colon screening.  No

## 2019-07-24 NOTE — PATIENT INSTRUCTIONS
Arthritis: Care Instructions  Your Care Instructions  Arthritis, also called osteoarthritis, is a breakdown of the cartilage that cushions your joints. When the cartilage wears down, your bones rub against each other. This causes pain and stiffness. Many people have some arthritis as they age. Arthritis most often affects the joints of the spine, hands, hips, knees, or feet. You can take simple measures to protect your joints, ease your pain, and help you stay active. Follow-up care is a key part of your treatment and safety. Be sure to make and go to all appointments, and call your doctor if you are having problems. It's also a good idea to know your test results and keep a list of the medicines you take. How can you care for yourself at home? · Stay at a healthy weight. Being overweight puts extra strain on your joints. · Talk to your doctor or physical therapist about exercises that will help ease joint pain. ? Stretch. You may enjoy gentle forms of yoga to help keep your joints and muscles flexible. ? Walk instead of jog. Other types of exercise that are less stressful on the joints include riding a bicycle, swimming, bang chi, or water exercise. ? Lift weights. Strong muscles help reduce stress on your joints. Stronger thigh muscles, for example, take some of the stress off of the knees and hips. Learn the right way to lift weights so you do not make joint pain worse. · Take your medicines exactly as prescribed. Call your doctor if you think you are having a problem with your medicine. · Take pain medicines exactly as directed. ? If the doctor gave you a prescription medicine for pain, take it as prescribed. ? If you are not taking a prescription pain medicine, ask your doctor if you can take an over-the-counter medicine. · Use a cane, crutch, walker, or another device if you need help to get around. These can help rest your joints.  You also can use other things to make life easier, such as a higher toilet seat and padded handles on kitchen utensils. · Do not sit in low chairs, which can make it hard to get up. · Put heat or cold on your sore joints as needed. Use whichever helps you most. You also can take turns with hot and cold packs. ? Apply heat 2 or 3 times a day for 20 to 30 minutesusing a heating pad, hot shower, or hot packto relieve pain and stiffness. ? Put ice or a cold pack on your sore joint for 10 to 20 minutes at a time. Put a thin cloth between the ice and your skin. When should you call for help? Call your doctor now or seek immediate medical care if:    · You have sudden swelling, warmth, or pain in any joint.     · You have joint pain and a fever or rash.     · You have such bad pain that you cannot use a joint.    Watch closely for changes in your health, and be sure to contact your doctor if:    · You have mild joint symptoms that continue even with more than 6 weeks of care at home.     · You have stomach pain or other problems with your medicine. Where can you learn more? Go to http://gildardo-palak.info/. Enter V925 in the search box to learn more about \"Arthritis: Care Instructions. \"  Current as of: April 1, 2019  Content Version: 12.1  © 1165-8499 [employer]wise, Incorporated. Care instructions adapted under license by [employer] (which disclaims liability or warranty for this information). If you have questions about a medical condition or this instruction, always ask your healthcare professional. [place] [address] any warranty or liability for your use of this information.

## 2019-07-24 NOTE — PROGRESS NOTES
Per verbal order of Dr. María Elena Samuels, Depo-Medrol 40 mg/mL and 1/2 mL of 1% Lidocaine were drawn up. Consent form was filled out and signed by patient, witnessed by nurse and signed by provider. Dr. Ratna Lott injected patient's left elbow. Patient tolerated procedure well and was monitored for 15 minutes. No side effects noted. Notified patient to call the office with any adverse reactions.

## 2019-07-24 NOTE — PROGRESS NOTES
Subjective:   Royal KRISTA Quiros is a 77 y.o. male      Chief Complaint   Patient presents with    Elbow Pain     left elbow pain x 1 week        History of present illness: He presents today complaining of severe pain in his left elbow that has become significantly worse over the past 48 hours. We discussed this pain when he was seen by me 2 days ago at which time he did not feel like he wanted to proceed with a cortisone shot. He notes that since that time the pain is gotten much worse without any history of trauma. He notes no other significant joint aches or pains. He notes no swelling of the elbow. He notes no redness and he has had no fevers or chills.     Patient Active Problem List   Diagnosis Code    Atrial fibrillation ([facility]) I48.91    Primary osteoarthritis involving multiple joints M15.0    ASCVD (arteriosclerotic cardiovascular disease) I25.10    Gout M10.9    Anemia D64.9    Mixed hyperlipidemia E78.2    Controlled type 2 diabetes mellitus with stage 4 chronic kidney disease, without long-term current use of insulin ([facility]) E11.22, N18.4    CKD (chronic kidney disease), stage IV ([facility]) N18.4    Hypertension with renal disease I12.9    Restless leg G25.81    Class 1 obesity due to excess calories without serious comorbidity with body mass index (BMI) of 30.0 to 30.9 in adult E66.09, Z68.30    Insomnia G47.00    Acquired hypothyroidism E03.9    Gastroesophageal reflux disease without esophagitis K21.9    ED (erectile dysfunction) N52.9    Diabetic neuropathy ([facility]) E11.40    Cardiomyopathy ([facility] [address] I42.9    BPH (benign prostatic hyperplasia) N40.0    Anxiety F41.9    Type 2 diabetes mellitus with foot ulcer ([facility]) E11.621, L97.509    Age-related cataract H25.9    Medicare annual wellness visit, subsequent Z00.00    Hyperostosis M85.80    Status post amputation of left great toe ([facility]) Z89.412    Left elbow pain M25.522    S/P ablation of atrial fibrillation Z98.890, Z86.79    Primary osteoarthritis of right knee M17.11    Primary osteoarthritis of left knee M17.12    Acute on chronic diastolic congestive heart failure, NYHA class 3 (HCC) I50.33    Labyrinthitis of both ears H83.03    ACC/AHA stage C systolic heart failure due to ischemic cardiomyopathy (HCC) I50.20, I25.5    Hyponatremia E87.1    Pneumonia J18.9    Lateral epicondylitis of left elbow M77.12      Past Medical History:   Diagnosis Date    Anemia 8/5/2017    Anxiety 8/5/2017    Arrhythmia     atrial fibrillation 2014    ASCVD (arteriosclerotic cardiovascular disease) 8/5/2017    BPH (benign prostatic hyperplasia) 8/5/2017    CAD (coronary artery disease)     h/o stents    Cancer ([address]     h/o skin cancer    Cardiomyopathy ([address] 8/5/2017    CHF (congestive heart failure) ([address] 8/5/2017    Chronic kidney disease     Stage IV    CKD (chronic kidney disease), stage IV ([address] 8/5/2017    Diabetes ([address]     Diabetes mellitus ([address] 8/5/2017    Diabetic neuropathy ([address] 8/5/2017    DJD (degenerative joint disease) 8/5/2017    ED (erectile dysfunction) 8/5/2017    Gout 8/5/2017    High cholesterol     Hyperlipidemia 8/5/2017    Hypertension     Hypertension with renal disease 8/5/2017    Hypothyroid 8/5/2017    Insomnia 8/5/2017    Obesity 8/5/2017    On statin therapy 8/5/2017    Pneumonia 05/28/2019    Restless leg 8/5/2017    Thyroid disease     hypothyroid    Ulcer of foot due to secondary diabetes ([address] 07/12/2019      Allergies   Allergen Reactions    Niacin Unknown (comments)     Other reaction(s): Unknown (comments)    Imipenem Diarrhea     Other reaction(s): Rash    Levemir [Insulin Detemir] Hives    Other Medication Other (comments)     ?  Allergy to Duraprep causing chemical burn    Primaxin [Imipenem-Cilastatin] Diarrhea and Rash    Xarelto [Rivaroxaban] Rash and Itching     Other reaction(s): Rash      Family History   Problem Relation Age of Onset    Heart Disease Mother    Paola Isaac Kidney Disease Mother     Heart Disease Father     Kidney Disease Father     Cancer Sister         Breast      Social History     Socioeconomic History    Marital status:      Spouse name: Not on file    Number of children: Not on file    Years of education: Not on file    Highest education level: Not on file   Occupational History    Not on file   Social Needs    Financial resource strain: Not on file    Food insecurity:     Worry: Not on file     Inability: Not on file    Transportation needs:     Medical: Not on file     Non-medical: Not on file   Tobacco Use    Smoking status: Former Smoker     Packs/day: 0.50     Years: 4.00     Pack years: 2.00     Last attempt to quit: 3/6/1972     Years since quittin.4    Smokeless tobacco: Never Used   Substance and Sexual Activity    Alcohol use: No     Comment: rare, 1 drink per year    Drug use: No    Sexual activity: Not Currently   Lifestyle    Physical activity:     Days per week: Not on file     Minutes per session: Not on file    Stress: Not on file   Relationships    Social connections:     Talks on phone: Not on file     Gets together: Not on file     Attends Voodoo service: Not on file     Active member of club or organization: Not on file     Attends meetings of clubs or organizations: Not on file     Relationship status: Not on file    Intimate partner violence:     Fear of current or ex partner: Not on file     Emotionally abused: Not on file     Physically abused: Not on file     Forced sexual activity: Not on file   Other Topics Concern     Service Not Asked    Blood Transfusions Not Asked    Caffeine Concern Not Asked    Occupational Exposure Not Asked   Bobcarl Scottly Hazards Not Asked    Sleep Concern Not Asked    Stress Concern Not Asked    Weight Concern Not Asked    Special Diet Not Asked    Back Care Not Asked    Exercise Not Asked    Bike Helmet Not Asked    Seat Belt Not Asked    Self-Exams Not Asked Social History Narrative    Not on file     Prior to Admission medications    Medication Sig Start Date End Date Taking? Authorizing Provider   methylPREDNISolone acetate (DEPO-MEDROL) 40 mg/mL injection 1 mL by IntraMUSCular route once for 1 dose. 7/24/19 7/24/19 Yes Chantal Nolan MD   insulin glargine (LANTUS SOLOSTAR U-100 INSULIN) 100 unit/mL (3 mL) inpn USE TO INJECT 35 UNITS UNDER THE SKIN EVERY DAY 7/22/19  Yes Chantal Nolan MD   ferrous sulfate (SLOW FE) 47.5 mg iron TbER tablet Take 1 Tab by mouth three (3) times daily. 6/13/19  Yes Chantal Nolan MD   bumetanide (BUMEX) 2 mg tablet Take 2 Tabs by mouth two (2) times a day. 6/5/19  Yes Izabel Strong MD   febuxostat (ULORIC) 40 mg tab tablet Take 1 Tab by mouth daily. 6/6/19  Yes Izabel Strong MD   isosorbide mononitrate ER (IMDUR) 30 mg tablet Take 1 Tab by mouth daily. 6/6/19  Yes Izabel Strong MD   metOLazone (ZAROXOLYN) 2.5 mg tablet Take 2.5 mg by mouth daily as needed (swelling). Yes Provider, Historical   timolol (TIMOPTIC) 0.5 % ophthalmic solution Administer 1 Drop to right eye two (2) times a day. Yes Provider, Historical   finasteride (PROSCAR) 5 mg tablet TAKE 1 TABLET BY MOUTH DAILY 4/26/19  Yes Chantal Nolan MD   gabapentin (NEURONTIN) 300 mg capsule Take 1 Cap by mouth nightly. 4/22/19  Yes Ishaan Pace MD   naloxone [facility]) 4 mg/actuation nasal spray Use 1 spray intranasally, then discard. Repeat with new spray every 2 min as needed for opioid overdose symptoms, alternating nostrils. 4/22/19  Yes Ishaan Pace MD   potassium chloride (KLOR-CON) 20 mEq pack One packet three times daily 4/4/19  Yes Chantal Nolan MD   ascorbic acid, vitamin C, (VITAMIN C) 250 mg tablet Take 1 Tab by mouth three (3) times daily. 1/10/19  Yes Chantal Nolan MD   insulin glargine (LANTUS SOLOSTAR U-100 INSULIN) 100 unit/mL (3 mL) inpn 20 Units by SubCUTAneous route nightly.    Yes Provider, Historical   OTHER Apply  to affected area daily as needed (Knee Pain). CBD Cream:  Apply daily as needed for knee pain   Yes Provider, Historical   carvedilol (COREG) 12.5 mg tablet TAKE 1 TABLET BY MOUTH TWICE DAILY 12/17/18  Yes Angel Apple MD   tamsulosin [facility]) 0.4 mg capsule TAKE 2 CAPSULES BY MOUTH EVERY DAY 8/14/18  Yes Angel Apple MD   pramipexole (MIRAPEX) 0.5 mg tablet TAKE 1 TABLET BY MOUTH EVERY NIGHT AT BEDTIME 2/14/18  Yes Angel Apple MD   Liraglutide (VICTOZA) 0.6 mg/0.1 mL (18 mg/3 mL) sub-q pen 0.6 mg by SubCUTAneous route daily. Yes Provider, Historical        Review of Systems              Constitutional:  He denies fever, weight loss, sweats or fatigue. EYES: No blurred or double vision,               ENT: no nasal congestion, no headache or dizziness. No difficulty with               swallowing, taste, speech or smell. Respiratory:  No cough, wheezing or shortness of breath. No sputum production. Cardiac:  Denies chest pain, palpitations, unexplained indigestion, syncope, edema, PND or orthopnea. GI:  No changes in bowel movements, no abdominal pain, no bloating, anorexia, nausea, vomiting or heartburn. :  No frequency or dysuria. Denies incontinence or sexual dysfunction. Extremities:  No joint pain, stiffness or swelling except left elbow as noted  Back:.no pain or soreness  Skin:  No recent rashes or mole changes. Neurological:  No numbness, tingling, burning paresthesias or loss of motor strength. No syncope, dizziness, frequent headaches or memory loss. Hematologic:  No easy bruising  Lymphatic: No lymph node enlargement    Objective:     Vitals:    07/24/19 1127   BP: 112/72   Pulse: 70   Resp: 18   Temp: 97.8 °F (36.6 °C)   TempSrc: Oral   SpO2: 94%   Weight: 231 lb 12.8 oz (105.1 kg)   Height: 6' 4\" (1.93 m)   PainSc:  10 - Worst pain ever   PainLoc: Elbow       Body mass index is 28.22 kg/m².    Physical Examination: General Appearance:  Well-developed, well-nourished, no acute distress. HEENT:      Ears:  The TMs and ear canals were clear. Eyes:  The pupillary responses were normal.  Extraocular muscle function intact. Lids and conjunctiva not injected. Funduscopic exam revealed sharp disc margins. Nares: Clear w/o edema or erythema  Pharynx:  Clear with teeth in good repair. No masses were noted. Neck:  Supple without thyromegaly or adenopathy. No JVD noted. No carotid                bruits. Lungs:  Clear to auscultation and percussion. Cardiac:  Regular rate and rhythm without murmur. PMI not displaced. No gallop, rub or click. Abdominal: Soft, non-tender, no hepata-spleenomegally or masses  Extremities:  No clubbing, cyanosis or edema. Marked tenderness left elbow with increased discomfort range of motion and marked tenderness over the lateral epicondyle without erythema, increased temperature or soft tissue swelling or joint effusion. Skin:  No rash or unusual mole changes noted. Lymph Nodes:  None felt in the cervical, supraclavicular, axillary or inguinal region. Neurological: . DTRs 2+ and symmetric. Station and gait normal.   Hematologic:   No purpura or petechiae        Assessment/Plan:         1. Left elbow pain    2. Lateral epicondylitis of left elbow        Impressions/Plan:  Impression  1. Left elbow pain secondary to lateral epicondylitis. We discussed treatment options at this point he wants to proceed with injection. After informed consent and Betadine scrub the left elbow was entered laterally injected with Depo-Medrol 40 mg and a cc lidocaine which tolerated quite well. Recheck if not resolved otherwise per previous schedule. Orders Placed This Encounter    DRAIN/INJECT LARGE JOINT/BURSA    methylPREDNISolone acetate (DEPO-MEDROL) 40 mg/mL injection       Follow-up and Dispositions    · Return At prior appt.          No results found for any visits on 07/24/19. Mendez Vera MD    The patient was given after the visit summary the patient verbalized an understanding of the plans and problems as explained.

## 2019-07-26 ENCOUNTER — PATIENT OUTREACH (OUTPATIENT)
Dept: FAMILY MEDICINE CLINIC | Age: 69
End: 2019-07-26

## 2019-07-26 NOTE — PROGRESS NOTES
Goals      Reduce risk of CHF exacerbations and complications. 6/6/19 Patient report weight is 232. Patient educated on weight gain of 2-3 pounds in a day or 5 lbs in a week. Patient was seen in ED on 6/5/19 after discharge. Patient given Levaquin prescription and advised importance of completing all ABX. Patient will weigh and record daily, monitor CHF S&S, and complete ABX and report at next week outreach. [facility]    6/13/19 Patient reports weight is 224.2 lbs and completion of ABX. Denies SOB at rest, chest pain, increase in pillow use, rapid heart rate/breathing, and fever. Patient will continue to monitor CHF S&S and report at next week outreach. [facility]    6/26/19 Patient reports weight as 224 today. Has been between 224-226. Feels good able to walk grocery store and up stairs with stopping. Denies SOB, chest pain, increase in pillow use, rapid heart rate/breathing, AMS,and fever. Patient is to mail ipad in tomorrow. Will remain on diet during holiday and monitor CHF S&S and report at outreach in 2 weeks. [facility]    7/11/19 Patient reports he feels good and is able to walk distances better than before. He was able to stick with diet during holiday. Denies SOB, chest pain, increase in pillow use, and fever. Patient will monitor CHF S&S and report at next week outreach. [facility]    7/18/19 Patient reports weight is 226. Denies SOB, chest pain. Currently has cast that will be changed on Friday. He is complaint with diet. He will see Dr. Brent De Oliveira in the next week and report any changes to plan of care at next week outreach. [facility]    7/26/19 Pateint report weight is 225. Still wearing cast. Denies CHF S&S. Reports no changes made to plan of care by Dr. Brent De Oliveira. Will continue to monitor CHF S&S and report at next week outreach.  [facility]

## 2019-07-31 ENCOUNTER — TELEPHONE (OUTPATIENT)
Dept: PALLATIVE CARE | Age: 69
End: 2019-07-31

## 2019-08-01 ENCOUNTER — PATIENT OUTREACH (OUTPATIENT)
Dept: FAMILY MEDICINE CLINIC | Age: 69
End: 2019-08-01

## 2019-08-01 VITALS — BODY MASS INDEX: 27.51 KG/M2 | WEIGHT: 226 LBS

## 2019-08-01 NOTE — PROGRESS NOTES
Goals  Reduce risk of CHF exacerbations and complications. 6/6/19 Patient report weight is 232. Patient educated on weight gain of 2-3 pounds in a day or 5 lbs in a week. Patient was seen in ED on 6/5/19 after discharge. Patient given Levaquin prescription and advised importance of completing all ABX. Patient will weigh and record daily, monitor CHF S&S, and complete ABX and report at next week outreach. [facility] 
 
6/13/19 Patient reports weight is 224.2 lbs and completion of ABX. Denies SOB at rest, chest pain, increase in pillow use, rapid heart rate/breathing, and fever. Patient will continue to monitor CHF S&S and report at next week outreach. [facility] 
 
6/26/19 Patient reports weight as 224 today. Has been between 224-226. Feels good able to walk grocery store and up stairs with stopping. Denies SOB, chest pain, increase in pillow use, rapid heart rate/breathing, AMS,and fever. Patient is to mail ipad in tomorrow. Will remain on diet during holiday and monitor CHF S&S and report at outreach in 2 weeks. [facility] 
 
7/11/19 Patient reports he feels good and is able to walk distances better than before. He was able to stick with diet during holiday. Denies SOB, chest pain, increase in pillow use, and fever. Patient will monitor CHF S&S and report at next week outreach. [facility] 
 
7/18/19 Patient reports weight is 226. Denies SOB, chest pain. Currently has cast that will be changed on Friday. He is complaint with diet. He will see Dr. Pooja Gresham in the next week and report any changes to plan of care at next week outreach. [facility] 
 
7/26/19 Pateint report weight is 225. Still wearing cast. Denies CHF S&S. Reports no changes made to plan of care by Dr. Pooja Gresham. Will continue to monitor CHF S&S and report at next week outreach. [facility] 
 
8/1/19 Patient reports weight is stable of 226.  Denies chest pain, SOB, still wearing cast will be seen tomorrow to determine if it is need longer. No issues with pacemaker. Will continue with diet and monitor CHF S&S.  JASON

## 2019-08-02 ENCOUNTER — HOSPITAL ENCOUNTER (OUTPATIENT)
Dept: WOUND CARE | Age: 69
Discharge: HOME | End: 2019-08-02
Payer: MEDICARE

## 2019-08-02 VITALS
DIASTOLIC BLOOD PRESSURE: 80 MMHG | TEMPERATURE: 97 F | HEART RATE: 70 BPM | RESPIRATION RATE: 16 BRPM | SYSTOLIC BLOOD PRESSURE: 138 MMHG

## 2019-08-02 PROCEDURE — 97597 DBRDMT OPN WND 1ST 20 CM/<: CPT

## 2019-08-02 PROCEDURE — 29445 APPL RIGID TOT CNTC LEG CAST: CPT

## 2019-08-05 ENCOUNTER — TELEPHONE (OUTPATIENT)
Dept: PALLATIVE CARE | Age: 69
End: 2019-08-05

## 2019-08-05 NOTE — TELEPHONE ENCOUNTER
Calling patient to r/s his appt on 8/14/19 . No answer so left voicemail for him to call office back to r/s his appt.

## 2019-08-08 ENCOUNTER — PATIENT OUTREACH (OUTPATIENT)
Dept: FAMILY MEDICINE CLINIC | Age: 69
End: 2019-08-08

## 2019-08-14 ENCOUNTER — APPOINTMENT (OUTPATIENT)
Dept: INFUSION THERAPY | Age: 69
End: 2019-08-14
Payer: MEDICARE

## 2019-08-14 ENCOUNTER — OFFICE VISIT (OUTPATIENT)
Dept: INTERNAL MEDICINE CLINIC | Age: 69
End: 2019-08-14

## 2019-08-14 VITALS
HEART RATE: 70 BPM | DIASTOLIC BLOOD PRESSURE: 76 MMHG | HEIGHT: 76 IN | WEIGHT: 236.2 LBS | RESPIRATION RATE: 19 BRPM | BODY MASS INDEX: 28.76 KG/M2 | TEMPERATURE: 98.4 F | SYSTOLIC BLOOD PRESSURE: 122 MMHG | OXYGEN SATURATION: 98 %

## 2019-08-14 DIAGNOSIS — I12.9 HYPERTENSION WITH RENAL DISEASE: Primary | ICD-10-CM

## 2019-08-14 DIAGNOSIS — Z12.5 PROSTATE CANCER SCREENING: ICD-10-CM

## 2019-08-14 DIAGNOSIS — M15.9 PRIMARY OSTEOARTHRITIS INVOLVING MULTIPLE JOINTS: ICD-10-CM

## 2019-08-14 DIAGNOSIS — E11.22 CONTROLLED TYPE 2 DIABETES MELLITUS WITH STAGE 4 CHRONIC KIDNEY DISEASE, WITHOUT LONG-TERM CURRENT USE OF INSULIN (HCC): ICD-10-CM

## 2019-08-14 DIAGNOSIS — I25.10 ASCVD (ARTERIOSCLEROTIC CARDIOVASCULAR DISEASE): ICD-10-CM

## 2019-08-14 DIAGNOSIS — Z00.00 MEDICARE ANNUAL WELLNESS VISIT, SUBSEQUENT: ICD-10-CM

## 2019-08-14 DIAGNOSIS — N18.4 CKD (CHRONIC KIDNEY DISEASE), STAGE IV (HCC): ICD-10-CM

## 2019-08-14 DIAGNOSIS — E78.2 MIXED HYPERLIPIDEMIA: ICD-10-CM

## 2019-08-14 DIAGNOSIS — N18.4 CONTROLLED TYPE 2 DIABETES MELLITUS WITH STAGE 4 CHRONIC KIDNEY DISEASE, WITHOUT LONG-TERM CURRENT USE OF INSULIN (HCC): ICD-10-CM

## 2019-08-14 DIAGNOSIS — I25.5 ISCHEMIC CARDIOMYOPATHY: ICD-10-CM

## 2019-08-14 DIAGNOSIS — D64.9 ANEMIA, UNSPECIFIED TYPE: ICD-10-CM

## 2019-08-14 DIAGNOSIS — E66.09 CLASS 1 OBESITY DUE TO EXCESS CALORIES WITHOUT SERIOUS COMORBIDITY WITH BODY MASS INDEX (BMI) OF 30.0 TO 30.9 IN ADULT: ICD-10-CM

## 2019-08-14 DIAGNOSIS — I48.0 PAROXYSMAL ATRIAL FIBRILLATION (HCC): ICD-10-CM

## 2019-08-14 DIAGNOSIS — K21.9 GASTROESOPHAGEAL REFLUX DISEASE WITHOUT ESOPHAGITIS: ICD-10-CM

## 2019-08-14 LAB
A-G RATIO,AGRAT: 1.1 RATIO
ALBUMIN SERPL-MCNC: 3.8 G/DL (ref 3.9–5.4)
ALP SERPL-CCNC: 128 U/L (ref 38–126)
ALT SERPL-CCNC: 18 U/L (ref 0–50)
ANION GAP SERPL CALC-SCNC: 16 MMOL/L
AST SERPL W P-5'-P-CCNC: 21 U/L (ref 14–36)
BILIRUB SERPL-MCNC: 0.6 MG/DL (ref 0.2–1.3)
BILIRUB UR QL: NEGATIVE
BUN SERPL-MCNC: 86 MG/DL (ref 9–20)
BUN/CREATININE RATIO,BUCR: 36 RATIO
CALCIUM SERPL-MCNC: 9 MG/DL (ref 8.4–10.2)
CHLORIDE SERPL-SCNC: 92 MMOL/L (ref 98–107)
CHOL/HDL RATIO,CHHD: 5 RATIO (ref 0–4)
CHOLEST SERPL-MCNC: 137 MG/DL (ref 0–200)
CK SERPL-CCNC: 61 U/L (ref 30–135)
CLARITY: CLEAR
CO2 SERPL-SCNC: 28 MMOL/L (ref 22–32)
COLOR UR: ABNORMAL
CREAT SERPL-MCNC: 2.4 MG/DL (ref 0.8–1.5)
GLOBULIN,GLOB: 3.4
GLUCOSE 24H UR-MRATE: NEGATIVE G/(24.H)
GLUCOSE SERPL-MCNC: 124 MG/DL (ref 75–110)
HBA1C MFR BLD HPLC: 6.9 % (ref 4–5.7)
HDLC SERPL-MCNC: 30 MG/DL (ref 35–130)
HGB UR QL STRIP: ABNORMAL
KETONES UR QL STRIP.AUTO: NEGATIVE
LDL/HDL RATIO,LDHD: 3 RATIO
LDLC SERPL CALC-MCNC: 79 MG/DL (ref 0–130)
LEUKOCYTE ESTERASE: NEGATIVE
MICROALBUMIN, URINE: 100 MG/L (ref 0–20)
NITRITE UR QL STRIP.AUTO: NEGATIVE
PH UR STRIP: 5 [PH] (ref 5–7)
POTASSIUM SERPL-SCNC: 3.9 MMOL/L (ref 3.6–5)
PROT SERPL-MCNC: 7.2 G/DL (ref 6.3–8.2)
PROT UR STRIP-MCNC: ABNORMAL MG/DL
RBC #/AREA URNS HPF: ABNORMAL #/HPF
SODIUM SERPL-SCNC: 136 MMOL/L (ref 137–145)
SP GR UR REFRACTOMETRY: 1.01 (ref 1–1.03)
TRIGL SERPL-MCNC: 142 MG/DL (ref 0–200)
UROBILINOGEN UR QL STRIP.AUTO: NEGATIVE
VLDLC SERPL CALC-MCNC: 28 MG/DL
WBC URNS QL MICRO: 0 #/HPF

## 2019-08-14 NOTE — PROGRESS NOTES
This is a Subsequent Medicare Annual Wellness Visit providing Personalized Prevention Plan Services (PPPS) (Performed 12 months after initial AWV and PPPS ) I have reviewed the patient's medical history in detail and updated the computerized patient record. He returns in follow-up for his Medicare subsequent annual wellness examination and screening questionnaire. He is accompanied by his wife today. He is also here in follow-up of his multiple medical problems include hypertension, diabetes, hyperlipidemia, ASCVD, cardiomyopathy, atrial fibrillation status post ablation, GERD, DJD, CKD stage IV and other medical problems. He has taken his medications and trying to follow his diet and generally seems to be doing quite well at the present time. He denies any chest pain, shortness of breath, palpitations, PND, orthopnea or other cardiorespiratory complaints. He notes no GI or  complaints. He notes no headaches, dizziness or neurologic complaints. He has some chronic arthritic complaints have not changed. He does have a small wound on his left leg is being followed by vascular surgery and has a occlusive wrapping on that is being changed every 2 weeks now. There are no other complaints on complete review of systems. History Past Medical History:  
Diagnosis Date  Anemia 8/5/2017  Anxiety 8/5/2017  Arrhythmia   
 atrial fibrillation 2014  ASCVD (arteriosclerotic cardiovascular disease) 8/5/2017  BPH (benign prostatic hyperplasia) 8/5/2017  CAD (coronary artery disease)   
 h/o stents  Cancer [facility])   
 h/o skin cancer  Cardiomyopathy ([address] 8/5/2017  CHF (congestive heart failure) ([address] 8/5/2017  Chronic kidney disease Stage IV  CKD (chronic kidney disease), stage IV ([address] 8/5/2017  Diabetes ([address]  Diabetes mellitus ([address] 8/5/2017  Diabetic neuropathy ([address] 8/5/2017  DJD (degenerative joint disease) 8/5/2017  ED (erectile dysfunction) 8/5/2017  Gout 2017  High cholesterol  Hyperlipidemia 2017  Hypertension  Hypertension with renal disease 2017  Hypothyroid 2017  Insomnia 2017  Obesity 2017  On statin therapy 2017  Pneumonia 2019  Restless leg 2017  Thyroid disease   
 hypothyroid  Ulcer of foot due to secondary diabetes ([facility] [address] 2019 Past Surgical History:  
Procedure Laterality Date  CARDIAC SURG PROCEDURE UNLIST    
 cardiac stents  CARDIAC SURG PROCEDURE UNLIST Ablation 2018 [facility] [place]  COLONOSCOPY N/A 2016 COLONOSCOPY performed by Aly Buchanan MD at [facility] ENDOSCOPY  COLONOSCOPY N/A 2019 COLONOSCOPY performed by Dhruv Villarreal MD at [facility] ENDOSCOPY  COLONOSCOPY,DIAGNOSTIC  2019  HX APPENDECTOMY  HX BUNIONECTOMY    
 and removal of 2 seasmoid bone of the great toe 2018  HX HEENT Bilateral Cataract surgery  HX HEENT Tonsils  HX HEENT Axe wound to the head  HX KNEE ARTHROSCOPY X 2  
 HX ORTHOPAEDIC    
 HX ORTHOPAEDIC    
 partial laminectomy  HX PACEMAKER    
 HX ROTATOR CUFF REPAIR    
 bilateral  
 MA INSJ ELTRD CAR DACIA SYS ATTCH PM/CVDFB PLS GEN N/A 2019 Lv Lead Placement To Previous Generator performed by Wyn Nissen, MD at [facility] CARDIAC CATH LAB  MA REMVL PERM PM PLS GEN W/REPL PLSE GEN MULT LEAD N/A 2019 Remove & Replace Ppm Gen Biv Multi Leads performed by Wyn Nissen, MD at [address] [place] [address] CATH LAB Social History Tobacco Use  Smoking status: Former Smoker Packs/day: 0.50 Years: 4.00 Pack years: 2.00 Last attempt to quit: 3/6/1972 Years since quittin.4  Smokeless tobacco: Never Used Substance Use Topics  Alcohol use: No  
  Comment: rare, 1 drink per year  Drug use: No  
 
Current Outpatient Medications Medication Sig Dispense Refill  insulin glargine (LANTUS SOLOSTAR U-100 INSULIN) 100 unit/mL (3 mL) inpn USE TO INJECT 35 UNITS UNDER THE SKIN EVERY DAY (Patient taking differently: USE TO INJECT 25 UNITS UNDER THE SKIN EVERY DAY) 30 mL 0  
 ferrous sulfate (SLOW FE) 47.5 mg iron TbER tablet Take 1 Tab by mouth three (3) times daily. 90 Tab 12  
 bumetanide (BUMEX) 2 mg tablet Take 2 Tabs by mouth two (2) times a day. 120 Tab 12  
 febuxostat (ULORIC) 40 mg tab tablet Take 1 Tab by mouth daily. 30 Tab 0  
 isosorbide mononitrate ER (IMDUR) 30 mg tablet Take 1 Tab by mouth daily. 30 Tab 12  
 metOLazone (ZAROXOLYN) 2.5 mg tablet Take 2.5 mg by mouth daily as needed (swelling).  timolol (TIMOPTIC) 0.5 % ophthalmic solution Administer 1 Drop to right eye two (2) times a day.  finasteride (PROSCAR) 5 mg tablet TAKE 1 TABLET BY MOUTH DAILY 90 Tab 3  
 gabapentin (NEURONTIN) 300 mg capsule Take 1 Cap by mouth nightly. 30 Cap 5  
 naloxone (NARCAN) 4 mg/actuation nasal spray Use 1 spray intranasally, then discard. Repeat with new spray every 2 min as needed for opioid overdose symptoms, alternating nostrils. 2 Each 0  
 potassium chloride (KLOR-CON) 20 mEq pack One packet three times daily 90 Packet prn  ascorbic acid, vitamin C, (VITAMIN C) 250 mg tablet Take 1 Tab by mouth three (3) times daily. 100 Tab prn  
 OTHER Apply  to affected area daily as needed (Knee Pain). CBD Cream:  Apply daily as needed for knee pain  carvedilol (COREG) 12.5 mg tablet TAKE 1 TABLET BY MOUTH TWICE DAILY 60 Tab 11  tamsulosin (FLOMAX) 0.4 mg capsule TAKE 2 CAPSULES BY MOUTH EVERY  Cap 3  pramipexole (MIRAPEX) 0.5 mg tablet TAKE 1 TABLET BY MOUTH EVERY NIGHT AT BEDTIME 90 Tab prn  Liraglutide (VICTOZA) 0.6 mg/0.1 mL (18 mg/3 mL) sub-q pen 0.6 mg by SubCUTAneous route daily. Allergies Allergen Reactions  Niacin Unknown (comments) Other reaction(s): Unknown (comments)  Imipenem Diarrhea Other reaction(s): Rash  Levemir [Insulin Detemir] Hives  Other Medication Other (comments) ? Allergy to Dalphine Gongora causing chemical burn  Primaxin [Imipenem-Cilastatin] Diarrhea and Rash  Xarelto [Rivaroxaban] Rash and Itching Other reaction(s): Rash Family History Problem Relation Age of Onset  Heart Disease Mother  Kidney Disease Mother  Heart Disease Father  Kidney Disease Father  Cancer Sister Breast  
 
 
Patient Active Problem List  
 Diagnosis  ACC/AHA stage C systolic heart failure due to ischemic cardiomyopathy ([address]  Primary osteoarthritis involving multiple joints  ASCVD (arteriosclerotic cardiovascular disease)  Mixed hyperlipidemia  Controlled type 2 diabetes mellitus with stage 4 chronic kidney disease, without long-term current use of insulin ([address]  CKD (chronic kidney disease), stage IV ([address]  Hypertension with renal disease  Gastroesophageal reflux disease without esophagitis  Cardiomyopathy ([address]  Atrial fibrillation (HCC)  
  S/P ablation  Gout  Anemia  Class 1 obesity due to excess calories without serious comorbidity with body mass index (BMI) of 30.0 to 30.9 in adult  Acquired hypothyroidism  Diabetic neuropathy ([address]  BPH (benign prostatic hyperplasia)  Lateral epicondylitis of left elbow  Pneumonia  Hyponatremia  Labyrinthitis of both ears  Acute on chronic diastolic congestive heart failure, NYHA class 3 (HCC)  Primary osteoarthritis of right knee  Primary osteoarthritis of left knee  S/P ablation of atrial fibrillation  Left elbow pain  Status post amputation of left great toe ([address]  Hyperostosis 1st left MPJ  Medicare annual wellness visit, subsequent  Age-related cataract  Type 2 diabetes mellitus with foot ulcer ([address]  Restless leg  Insomnia  ED (erectile dysfunction)  Anxiety Patient Care Team: Brissa Qiu MD as PCP - General (Internal Medicine) Chely Iglesias MD (Cardiology) Laith Magallon, RN as Ambulatory Care Navigator (Cardiology) Depression Risk Factor Screening:  
 
3 most recent PHQ Screens 8/14/2019 Little interest or pleasure in doing things Not at all Feeling down, depressed, irritable, or hopeless Not at all Total Score PHQ 2 0 Alcohol Risk Factor Screening: You do not drink alcohol or very rarely. Functional Ability and Level of Safety:  
 
Fall Risk Fall Risk Assessment, last 12 mths 8/14/2019 Able to walk? Yes Fall in past 12 months? Yes Fall with injury? Yes  
Number of falls in past 12 months 4 Fall Risk Score 5 Hearing Loss  
mild Activities of Daily Living Self-care. ADL Assessment 8/14/2019 Feeding yourself No Help Needed Getting from bed to chair No Help Needed Getting dressed Help Needed Bathing or showering No Help Needed Walk across the room (includes cane/walker) Help Needed Using the telphone No Help Needed Taking your medications No Help Needed Preparing meals No Help Needed Managing money (expenses/bills) No Help Needed Moderately strenuous housework (laundry) No Help Needed Shopping for personal items (toiletries/medicines) No Help Needed Shopping for groceries No Help Needed Driving Help Needed Climbing a flight of stairs Help Needed Getting to places beyond walking distances Help Needed Abuse Screen Patient is not abused Social History Social History Narrative  Not on file Review of Systems ROS: 
 
Constitutional: He denies fevers, weight loss, sweats. Eyes: No blurred or double vision. ENT: No difficulty with swallowing, taste, speech or smell. Neck: no stiffness or swelling Respiratory: No cough wheezing or shortness of breath. Cardiovascular: Denies chest pain, palpitations, unexplained indigestion or syncope. Gastrointestinal:  No changes in bowel movements, no abdominal pain, no bloating. Genitourinary:  He denies frequency, nocturia or stranguria. Extremities: No joint pain, stiffness or swelling. Neurological:  No numbness, tingling, burring paresthesias or loss of motor strength. No syncope, dizziness or frequent headache Lymphatic: no adenopathy noted Hematologic: no easy bruising or bleeding gums Skin:  No recent rashes or mole changes. Positive tear with abrasion left leg being followed and treated by vascular surgery with occlusive bandage on Psychiatric/Behavioral:  Negative for depression. Physical Examination Evaluation of Cognitive Function: 
Mood/affect:  happy Appearance: age appropriate Family member/caregiver input: wife Visit Vitals /76 (BP 1 Location: Left arm, BP Patient Position: Sitting) Pulse 70 Temp 98.4 °F (36.9 °C) (Oral) Resp 19 Ht 6' 4\" (1.93 m) Wt 236 lb 3.2 oz (107.1 kg) SpO2 98% BMI 28.75 kg/m² Vitals:  
 08/14/19 1511 BP: 122/76 Pulse: 70 Resp: 19 Temp: 98.4 °F (36.9 °C) TempSrc: Oral  
SpO2: 98% Weight: 236 lb 3.2 oz (107.1 kg) Height: 6' 4\" (1.93 m) PainSc:   0 - No pain PHYSICAL EXAM: 
 
General appearance - alert, well appearing, and in no distress Mental status - alert, oriented to person, place, and time HEENT: 
Ears - bilateral TM's and external ear canals clear Eyes - pupillary responses were normal.  Extraocular muscle function intact. Lids and conjunctiva not injected. Fundoscopic exam revealed sharp disc margins. eye movements intact Pharynx- clear with teeth in good repair. No masses were noted Neck - supple without thyromegaly or burit. No JVD noted Lungs - clear to auscultation and percussion Cardiac- normal rate, regular rhythm without murmurs. PMI not displaced. No gallop, rub or click Abdomen - flat, soft, non-tender without palpable organomegaly or mass. No pulsatile mass was felt, and not bruit was heard. Bowel sounds were active : Circumcised, Testes descended w/o masses Rectal: normal sphincter tone, prostate normal, no masses, stool brown and hemacult negative Extremities -  no clubbing cyanosis or edema. Occlusive bandage left leg Lymphatics - no palpable lymphadenopathy, no hepatosplenomegaly Hematologic: no petechiae or purpura Peripheral vascular -Femoral, Dorsalis pedis and posterior tibial pulses felt without difficulty Skin - no rash or unusual mole change noted Neurological - Cranial nerves II-XII grossly intact. Motor strength 5/5. DTR's 2+ and symmetric. Station and gait normal 
Back exam - full range of motion, no tenderness, palpable spasm or pain on motion Musculoskeletal - no joint tenderness, deformity or swelling Results for orders placed or performed in visit on 08/14/19 URINE, MICROALBUMIN, SEMIQUANTITATIVE Result Value Ref Range Microalbumin, Urine 100 (A) 0 - 20 mg/L Advice/Referrals/Counseling Education and counseling provided: 
Are appropriate based on today's review and evaluation End-of-Life planning (with patient's consent) Pneumococcal Vaccine Influenza Vaccine Colorectal cancer screening tests Assessment/Plan ASSESSMENT:  
1. Hypertension with renal disease 2. Controlled type 2 diabetes mellitus with stage 4 chronic kidney disease, without long-term current use of insulin ([address] 3. Mixed hyperlipidemia 4. CKD (chronic kidney disease), stage IV ([address] 5. Gastroesophageal reflux disease without esophagitis 6. ASCVD (arteriosclerotic cardiovascular disease) 7. Paroxysmal atrial fibrillation (HCC) 8. Primary osteoarthritis involving multiple joints 9. Ischemic cardiomyopathy 10. Anemia, unspecified type 11. Class 1 obesity due to excess calories without serious comorbidity with body mass index (BMI) of 30.0 to 30.9 in adult 12. Medicare annual wellness visit, subsequent 13. Prostate cancer screening Impression 1. Hypertension that is controlled so continue current therapy reviewed with him. 2.  Diabetes repeat status pending and prior lab reviewed and I will make adjustments if necessary. 3.  Hyperlipidemia prior lab reviewed and repeat status pending and I will adjust if needed. 4.  CKD stage IV repeat status is pending 5. GERD currently stable 6. ASCVD clinically stable and EKG obtained today reveals no acute process. Continue aspirin daily 7. Paroxysmal atrial fibrillation he is status post ablation and remains in sinus rhythm 8. DJD that is stable 9. Cardiomyopathy currently stable 10. Anemia repeat status pending 11. Obesity weight is overall markedly improved as he was previously around 300 pounds and is now at 236 pounds so continue same as he is not obese considering his height of 6 foot 4. Medicare subsequent annual wellness examination screening questionnaire is completed today. The results were reviewed with he and his wife and their questions were answered. Lifestyle recommendations and modifications discussed and made. I will call with lab results and make further recommendations or adjustments if necessary. Follow-up as scheduled again for 1 month or sooner if there is a problem. PLAN: 
. Orders Placed This Encounter  CBC WITH AUTOMATED DIFF  
 METABOLIC PANEL, COMPREHENSIVE ([facility])  LIPID PANEL ([facility])  CK ([facility])  HEMOGLOBIN A1C W/O EAG ([facility])  T4, FREE ([facility])  TSH 3RD GENERATION ([facility])  URINALYSIS W/O MICRO ([facility])  URINE, MICROALBUMIN, SEMIQUANTITATIVE ([facility])  PROSTATE SPECIFIC AG ([facility])  AMB POC EKG ROUTINE W/ 12 LEADS, INTER & REP  
 
 
 
ATTENTION:  
This medical record was transcribed using an electronic medical records system. Although proofread, it may and can contain electronic and spelling errors. Other human spelling and other errors may be present. Corrections may be executed at a later time. Please feel free to contact us for any clarifications as needed. Follow-up and Dispositions · Return in about 1 month (around 9/11/2019). Mario Mittal MD 
 
Recommended healthy diet low in carbohydrates, fats, sodium and cholesterol. Recommended regular cardiovascular exercise 3-6 times per week for 30-60 minutes daily. Current Outpatient Medications Medication Sig Dispense Refill  insulin glargine (LANTUS SOLOSTAR U-100 INSULIN) 100 unit/mL (3 mL) inpn USE TO INJECT 35 UNITS UNDER THE SKIN EVERY DAY (Patient taking differently: USE TO INJECT 25 UNITS UNDER THE SKIN EVERY DAY) 30 mL 0  
 ferrous sulfate (SLOW FE) 47.5 mg iron TbER tablet Take 1 Tab by mouth three (3) times daily. 90 Tab 12  
 bumetanide (BUMEX) 2 mg tablet Take 2 Tabs by mouth two (2) times a day. 120 Tab 12  
 febuxostat (ULORIC) 40 mg tab tablet Take 1 Tab by mouth daily. 30 Tab 0  
 isosorbide mononitrate ER (IMDUR) 30 mg tablet Take 1 Tab by mouth daily. 30 Tab 12  
 metOLazone (ZAROXOLYN) 2.5 mg tablet Take 2.5 mg by mouth daily as needed (swelling).  timolol (TIMOPTIC) 0.5 % ophthalmic solution Administer 1 Drop to right eye two (2) times a day.  finasteride (PROSCAR) 5 mg tablet TAKE 1 TABLET BY MOUTH DAILY 90 Tab 3  
 gabapentin (NEURONTIN) 300 mg capsule Take 1 Cap by mouth nightly. 30 Cap 5  
 naloxone (NARCAN) 4 mg/actuation nasal spray Use 1 spray intranasally, then discard. Repeat with new spray every 2 min as needed for opioid overdose symptoms, alternating nostrils. 2 Each 0  
 potassium chloride (KLOR-CON) 20 mEq pack One packet three times daily 90 Packet prn  ascorbic acid, vitamin C, (VITAMIN C) 250 mg tablet Take 1 Tab by mouth three (3) times daily. 100 Tab prn  OTHER Apply  to affected area daily as needed (Knee Pain). CBD Cream:  Apply daily as needed for knee pain  carvedilol (COREG) 12.5 mg tablet TAKE 1 TABLET BY MOUTH TWICE DAILY 60 Tab 11  tamsulosin (FLOMAX) 0.4 mg capsule TAKE 2 CAPSULES BY MOUTH EVERY  Cap 3  pramipexole (MIRAPEX) 0.5 mg tablet TAKE 1 TABLET BY MOUTH EVERY NIGHT AT BEDTIME 90 Tab prn  Liraglutide (VICTOZA) 0.6 mg/0.1 mL (18 mg/3 mL) sub-q pen 0.6 mg by SubCUTAneous route daily. Results for orders placed or performed in visit on 08/14/19 URINE, MICROALBUMIN, SEMIQUANTITATIVE Result Value Ref Range Microalbumin, Urine 100 (A) 0 - 20 mg/L Verbal and written instructions (see AVS) provided. Patient expresses understanding of diagnosis and treatment plan.  
 
Ninfa Claude, MD

## 2019-08-14 NOTE — PROGRESS NOTES
Chief Complaint Patient presents with [facility] Annual Wellness Visit Visit Vitals /76 (BP 1 Location: Left arm, BP Patient Position: Sitting) Pulse 70 Temp 98.4 °F (36.9 °C) (Oral) Resp 19 Ht 6' 4\" (1.93 m) Wt 236 lb 3.2 oz (107.1 kg) SpO2 98% BMI 28.75 kg/m² 1. Have you been to the ER, urgent care clinic since your last visit? Hospitalized since your last visit? No 
 
2. Have you seen or consulted any other health care providers outside of the [address] [place] [address] since your last visit? Include any pap smears or colon screening. Dr. Martínez Fowler dermatologist removed basal carcinoma from scalp 7/31/19

## 2019-08-14 NOTE — PATIENT INSTRUCTIONS
Anemia: Care Instructions Your Care Instructions Anemia is a low level of red blood cells, which carry oxygen throughout your body. Many things can cause anemia. Lack of iron is one of the most common causes. Your body needs iron to make hemoglobin, a substance in red blood cells that carries oxygen from the lungs to your body's cells. Without enough iron, the body produces fewer and smaller red blood cells. As a result, your body's cells do not get enough oxygen, and you feel tired and weak. And you may have trouble concentrating. Bleeding is the most common cause of a lack of iron. You may have heavy menstrual bleeding or bleeding caused by conditions such as ulcers, hemorrhoids, or cancer. Regular use of aspirin or other anti-inflammatory medicines (such as ibuprofen) also can cause bleeding in some people. A lack of iron in your diet also can cause anemia, especially at times when the body needs more iron, such as during pregnancy, infancy, and the teen years. Your doctor may have prescribed iron pills. It may take several months of treatment for your iron levels to return to normal. Your doctor also may suggest that you eat foods that are rich in iron, such as meat and beans. There are many other causes of anemia. It is not always due to a lack of iron. Finding the specific cause of your anemia will help your doctor find the right treatment for you. Follow-up care is a key part of your treatment and safety. Be sure to make and go to all appointments, and call your doctor if you are having problems. It's also a good idea to know your test results and keep a list of the medicines you take. How can you care for yourself at home? · Take your medicines exactly as prescribed. Call your doctor if you think you are having a problem with your medicine. · If your doctor recommends iron pills, take them as directed: ? Try to take the pills on an empty stomach about 1 hour before or 2 hours after meals. But you may need to take iron with food to avoid an upset stomach. ? Do not take antacids or drink milk or caffeine drinks (such as coffee, tea, or cola) at the same time or within 2 hours of the time that you take your iron. They can make it hard for your body to absorb the iron. ? Vitamin C (from food or supplements) helps your body absorb iron. Try taking iron pills with a glass of orange juice or some other food that is high in vitamin C, such as citrus fruits. ? Iron pills may cause stomach problems, such as heartburn, nausea, diarrhea, constipation, and cramps. Be sure to drink plenty of fluids, and include fruits, vegetables, and fiber in your diet each day. Iron pills often make your bowel movements dark or green. ? If you forget to take an iron pill, do not take a double dose of iron the next time you take a pill. ? Keep iron pills out of the reach of small children. An overdose of iron can be very dangerous. · Follow your doctor's advice about eating iron-rich foods. These include red meat, shellfish, poultry, eggs, beans, raisins, whole-grain bread, and leafy green vegetables. · Steam vegetables to help them keep their iron content. When should you call for help? Call 911 anytime you think you may need emergency care. For example, call if: 
  · You have symptoms of a heart attack. These may include: 
? Chest pain or pressure, or a strange feeling in the chest. 
? Sweating. ? Shortness of breath. ? Nausea or vomiting. ? Pain, pressure, or a strange feeling in the back, neck, jaw, or upper belly or in one or both shoulders or arms. ? Lightheadedness or sudden weakness. ? A fast or irregular heartbeat. After you call 911, the  may tell you to chew 1 adult-strength or 2 to 4 low-dose aspirin. Wait for an ambulance. Do not try to drive yourself.  
  · You passed out (lost consciousness).  
 Call your doctor now or seek immediate medical care if:   · You have new or increased shortness of breath.  
  · You are dizzy or lightheaded, or you feel like you may faint.  
  · Your fatigue and weakness continue or get worse.  
  · You have any abnormal bleeding, such as: 
? Nosebleeds. ? Vaginal bleeding that is different (heavier, more frequent, at a different time of the month) than what you are used to. 
? Bloody or black stools, or rectal bleeding. ? Bloody or pink urine.  
 Watch closely for changes in your health, and be sure to contact your doctor if: 
  · You do not get better as expected. Where can you learn more? Go to http://gildardo-palak.info/. Enter R301 in the search box to learn more about \"Anemia: Care Instructions. \" Current as of: March 28, 2019 Content Version: 12.1 © 0258-9259 [employer]. Care instructions adapted under license by [employer] (which disclaims liability or warranty for this information). If you have questions about a medical condition or this instruction, always ask your healthcare professional. [place] [address] any warranty or liability for your use of this information.

## 2019-08-15 LAB
BASOPHILS # BLD AUTO: 0 X10E3/UL (ref 0–0.2)
BASOPHILS NFR BLD AUTO: 1 %
EOSINOPHIL # BLD AUTO: 0.5 X10E3/UL (ref 0–0.4)
EOSINOPHIL NFR BLD AUTO: 8 %
ERYTHROCYTE [DISTWIDTH] IN BLOOD BY AUTOMATED COUNT: 15.8 % (ref 12.3–15.4)
HCT VFR BLD AUTO: 33.2 % (ref 37.5–51)
HGB BLD-MCNC: 10.8 G/DL (ref 13–17.7)
IMM GRANULOCYTES # BLD AUTO: 0 X10E3/UL (ref 0–0.1)
IMM GRANULOCYTES NFR BLD AUTO: 0 %
LYMPHOCYTES # BLD AUTO: 0.6 X10E3/UL (ref 0.7–3.1)
LYMPHOCYTES NFR BLD AUTO: 10 %
MCH RBC QN AUTO: 30.4 PG (ref 26.6–33)
MCHC RBC AUTO-ENTMCNC: 32.5 G/DL (ref 31.5–35.7)
MCV RBC AUTO: 94 FL (ref 79–97)
MONOCYTES # BLD AUTO: 0.6 X10E3/UL (ref 0.1–0.9)
MONOCYTES NFR BLD AUTO: 10 %
NEUTROPHILS # BLD AUTO: 4.3 X10E3/UL (ref 1.4–7)
NEUTROPHILS NFR BLD AUTO: 71 %
PLATELET # BLD AUTO: 123 X10E3/UL (ref 150–450)
PSA, TEST22: 0.6 NG/ML (ref 0–4)
RBC # BLD AUTO: 3.55 X10E6/UL (ref 4.14–5.8)
T4 FREE SERPL-MCNC: 0.98 NG/DL (ref 0.58–2.3)
TSH SERPL DL<=0.05 MIU/L-ACNC: 1.85 UIU/ML (ref 0.34–5.6)
WBC # BLD AUTO: 6 X10E3/UL (ref 3.4–10.8)

## 2019-08-15 NOTE — PROGRESS NOTES
Kidney function is little better and all the labs are without significant change so continue current treatment. Please respond that note is received.

## 2019-08-16 ENCOUNTER — PATIENT OUTREACH (OUTPATIENT)
Dept: FAMILY MEDICINE CLINIC | Age: 69
End: 2019-08-16

## 2019-08-16 ENCOUNTER — HOSPITAL ENCOUNTER (OUTPATIENT)
Dept: WOUND CARE | Age: 69
Discharge: HOME | End: 2019-08-16
Payer: MEDICARE

## 2019-08-16 VITALS
DIASTOLIC BLOOD PRESSURE: 65 MMHG | TEMPERATURE: 98.3 F | RESPIRATION RATE: 16 BRPM | SYSTOLIC BLOOD PRESSURE: 108 MMHG | HEART RATE: 69 BPM

## 2019-08-16 PROCEDURE — 97597 DBRDMT OPN WND 1ST 20 CM/<: CPT

## 2019-08-16 NOTE — WOUND CARE
08/16/19 1333   Wound Foot Plantar;Left 06/28/19   Date First Assessed/Time First Assessed: 06/28/19 1334   Present on Hospital Admission: Yes  Primary Wound Type: Diabetic  Location: Foot  Wound Location Orientation: Plantar;Left  Date of First Observation: 06/28/19   Dressing Status Clean   Dressing Type   (aquacel/ TCC)   Wound Length (cm) 0 cm   Wound Width (cm) 0 cm   Wound Depth (cm) 0 cm   Wound Volume (cm^3) 0 cm^3   Drainage Amount None   Angela-wound Assessment   (calloused)   Cleansing and Cleansing Agents  Chlorhexidine gluconate 4%

## 2019-08-17 NOTE — OP NOTES
[address]  OPERATIVE REPORT    Name:  Jamir Powell  MR#:  [id number]  :  1950  ACCOUNT #:  [de-identified]  DATE OF SERVICE:  2019    PREOPERATIVE DIAGNOSIS:  Grade II diabetic ulcer, chronic wound, plantar second left MPJ. POSTOPERATIVE DIAGNOSIS:  Grade II diabetic ulcer, chronic wound, plantar second left MPJ. PROCEDURE PERFORMED:  Selective debridement, chronic diabetic wound, plantar second left MPJ. SURGEON:  Rory Cramer DPM    ASSISTANT:  none    ANESTHESIA:  None needed. COMPLICATIONS:  None. SPECIMENS REMOVED:  None. IMPLANTS:  none    ESTIMATED BLOOD LOSS:  Minimal.    INDICATIONS:  This 58-year-old white male presents for continued treatment of chronic wound, plantar second left MPJ. Most recent therapy has consisted of Aquacel Ag followed by total contact casting. History and physical unchanged from admitting history and physical; no change in medication, no change in allergies. ALLERGIES:  TO IMIPENEM, LEVEMIR, PRIMAXIN, AND Willia Gale. PAST MEDICAL HISTORY:  Anemia, anxiety, arrhythmias, atherosclerotic cardiovascular disease, benign prostatic hyperplasia, coronary artery disease, cancer, cardiomyopathy, congestive heart failure, chronic kidney disease, diabetes with neuropathy, degenerative joint disease, gout, hypercholesterolemia, hyperlipidemia, hypertension, hypothyroidism, insomnia, restless leg syndrome. SOCIAL HISTORY:  The patient is a former smoker, he quit in . PHYSICAL EXAMINATION:  VITAL SIGNS:  Temperature 98.3, pulse rate 69, respirations 16, blood pressure 108/65. EXTREMITIES:  Physical examination of lower extremities revealed barely palpable pedal pulses. Fairly good muscle strength in lower extremities, bilateral.  Diminished epicritic sensation. He has a shortened first left toe and the second left toe is abducted at the DIPJ. Plantar aspect of the second left MPJ, there is a healing ulceration.   At last visit, it measured 0.7 x 0.6 x 0.1 cm. Today, it is a scabbed wound, which upon debridement into the dermal layer, there is remaining wound that measured 0.1 x 0.1 x 0.1. DESCRIPTION OF PROCEDURE:  Using a curette as well as a #15 blade, a selective debridement was performed of the wound, plantar second left MPJ into the dermal layer, removing dermal and scab tissue. There is minimal bleeding. Post-debridement measurements were 0.1 x 0.1 x 0.1. Wound was dressed with Betadine and a Band-Aid. The patient's wife is to continue daily and he is to return to wearing his diabetic shoes with the current metatarsal pad. He is to reduce his activity level. He will have a followup visit in the [address] [place] [address] with myself in two weeks, one week if needed, meaning if the wound returns prior to his 2-week visit.         DORIS [id number]  D:  08/16/2019 16:25  T:  08/16/2019 16:34  JOB #:  [id number]

## 2019-08-21 ENCOUNTER — HOSPITAL ENCOUNTER (OUTPATIENT)
Dept: INFUSION THERAPY | Age: 69
Discharge: HOME | End: 2019-08-21
Payer: MEDICARE

## 2019-08-21 VITALS
SYSTOLIC BLOOD PRESSURE: 133 MMHG | HEART RATE: 69 BPM | DIASTOLIC BLOOD PRESSURE: 84 MMHG | RESPIRATION RATE: 18 BRPM | TEMPERATURE: 97 F

## 2019-08-21 LAB
ALBUMIN SERPL-MCNC: 3.2 G/DL (ref 3.5–5)
ANION GAP SERPL CALC-SCNC: 10 MMOL/L (ref 5–15)
BUN SERPL-MCNC: 104 MG/DL (ref 6–20)
BUN/CREAT SERPL: 41 (ref 12–20)
CALCIUM SERPL-MCNC: 9 MG/DL (ref 8.5–10.1)
CHLORIDE SERPL-SCNC: 98 MMOL/L (ref 97–108)
CO2 SERPL-SCNC: 28 MMOL/L (ref 21–32)
CREAT SERPL-MCNC: 2.52 MG/DL (ref 0.7–1.3)
ERYTHROCYTE [DISTWIDTH] IN BLOOD BY AUTOMATED COUNT: 14.7 % (ref 11.5–14.5)
FERRITIN SERPL-MCNC: 165 NG/ML (ref 26–388)
GLUCOSE SERPL-MCNC: 178 MG/DL (ref 65–100)
HCT VFR BLD AUTO: 28.6 % (ref 36.6–50.3)
HGB BLD-MCNC: 9.2 G/DL (ref 12.1–17)
IRON SATN MFR SERPL: 26 % (ref 20–50)
IRON SERPL-MCNC: 73 UG/DL (ref 35–150)
MCH RBC QN AUTO: 30.1 PG (ref 26–34)
MCHC RBC AUTO-ENTMCNC: 32.2 G/DL (ref 30–36.5)
MCV RBC AUTO: 93.5 FL (ref 80–99)
NRBC # BLD: 0 K/UL (ref 0–0.01)
NRBC BLD-RTO: 0 PER 100 WBC
PHOSPHATE SERPL-MCNC: 4.2 MG/DL (ref 2.6–4.7)
PLATELET # BLD AUTO: 80 K/UL (ref 150–400)
PMV BLD AUTO: 10.1 FL (ref 8.9–12.9)
POTASSIUM SERPL-SCNC: 4 MMOL/L (ref 3.5–5.1)
RBC # BLD AUTO: 3.06 M/UL (ref 4.1–5.7)
SODIUM SERPL-SCNC: 136 MMOL/L (ref 136–145)
TIBC SERPL-MCNC: 277 UG/DL (ref 250–450)
WBC # BLD AUTO: 4 K/UL (ref 4.1–11.1)

## 2019-08-21 PROCEDURE — 74011250636 HC RX REV CODE- 250/636: Performed by: INTERNAL MEDICINE

## 2019-08-21 PROCEDURE — 36415 COLL VENOUS BLD VENIPUNCTURE: CPT

## 2019-08-21 PROCEDURE — 96372 THER/PROPH/DIAG INJ SC/IM: CPT

## 2019-08-21 PROCEDURE — 83540 ASSAY OF IRON: CPT

## 2019-08-21 PROCEDURE — 80069 RENAL FUNCTION PANEL: CPT

## 2019-08-21 PROCEDURE — 85027 COMPLETE CBC AUTOMATED: CPT

## 2019-08-21 PROCEDURE — 82728 ASSAY OF FERRITIN: CPT

## 2019-08-21 RX ADMIN — EPOETIN ALFA-EPBX 60000 UNITS: 40000 INJECTION, SOLUTION INTRAVENOUS; SUBCUTANEOUS at 10:58

## 2019-08-21 NOTE — PROGRESS NOTES
Pt arrived to [facility] ambulatory with cane and wife in no acute distress at 1005.  Assessment unremarkable except fatigue and numbness to bilat feet. Labs obtained - CBC, renal panel, iron profile, and ferritin. Visit Vitals  /84 (BP 1 Location: Right arm, BP Patient Position: Sitting)   Pulse 69   Temp 97 °F (36.1 °C)   Resp 18     Recent Results (from the past 12 hour(s))   CBC W/O DIFF    Collection Time: 08/21/19 10:11 AM   Result Value Ref Range    WBC 4.0 (L) 4.1 - 11.1 K/uL    RBC 3.06 (L) 4.10 - 5.70 M/uL    HGB 9.2 (L) 12.1 - 17.0 g/dL    HCT 28.6 (L) 36.6 - 50.3 %    MCV 93.5 80.0 - 99.0 FL    MCH 30.1 26.0 - 34.0 PG    MCHC 32.2 30.0 - 36.5 g/dL    RDW 14.7 (H) 11.5 - 14.5 %    PLATELET 80 (L) 708 - 400 K/uL    MPV 10.1 8.9 - 12.9 FL    NRBC 0.0 0  WBC    ABSOLUTE NRBC 0.00 0.00 - 0.01 K/uL   RENAL FUNCTION PANEL    Collection Time: 08/21/19 10:11 AM   Result Value Ref Range    Sodium 136 136 - 145 mmol/L    Potassium 4.0 3.5 - 5.1 mmol/L    Chloride 98 97 - 108 mmol/L    CO2 28 21 - 32 mmol/L    Anion gap 10 5 - 15 mmol/L    Glucose 178 (H) 65 - 100 mg/dL     (H) 6 - 20 MG/DL    Creatinine 2.52 (H) 0.70 - 1.30 MG/DL    BUN/Creatinine ratio 41 (H) 12 - 20      GFR est AA 31 (L) >60 ml/min/1.73m2    GFR est non-AA 25 (L) >60 ml/min/1.73m2    Calcium 9.0 8.5 - 10.1 MG/DL    Phosphorus 4.2 2.6 - 4.7 MG/DL    Albumin 3.2 (L) 3.5 - 5.0 g/dL     See [facility] for complete labs. Hct is 28.6    The following medications administered:  Retacrit 60,000 SQ to R and L arms    Pt tolerated treatment well.  Pt discharged ambulatory with cane in no acute distress at 100, accompanied by his wife. Next appointment 9/19 at 1000.

## 2019-08-27 ENCOUNTER — PATIENT OUTREACH (OUTPATIENT)
Dept: FAMILY MEDICINE CLINIC | Age: 69
End: 2019-08-27

## 2019-08-27 VITALS — BODY MASS INDEX: 27.75 KG/M2 | WEIGHT: 228 LBS

## 2019-08-27 NOTE — PROGRESS NOTES
Goals      Reduce risk of CHF exacerbations and complications. 6/6/19 Patient report weight is 232. Patient educated on weight gain of 2-3 pounds in a day or 5 lbs in a week. Patient was seen in ED on 6/5/19 after discharge. Patient given Levaquin prescription and advised importance of completing all ABX. Patient will weigh and record daily, monitor CHF S&S, and complete ABX and report at next week outreach. [facility]    6/13/19 Patient reports weight is 224.2 lbs and completion of ABX. Denies SOB at rest, chest pain, increase in pillow use, rapid heart rate/breathing, and fever. Patient will continue to monitor CHF S&S and report at next week outreach. [facility]    6/26/19 Patient reports weight as 224 today. Has been between 224-226. Feels good able to walk grocery store and up stairs with stopping. Denies SOB, chest pain, increase in pillow use, rapid heart rate/breathing, AMS,and fever. Patient is to mail ipad in tomorrow. Will remain on diet during holiday and monitor CHF S&S and report at outreach in 2 weeks. [facility]    7/11/19 Patient reports he feels good and is able to walk distances better than before. He was able to stick with diet during holiday. Denies SOB, chest pain, increase in pillow use, and fever. Patient will monitor CHF S&S and report at next week outreach. [facility]    7/18/19 Patient reports weight is 226. Denies SOB, chest pain. Currently has cast that will be changed on Friday. He is complaint with diet. He will see Dr. Carlito Ulloa in the next week and report any changes to plan of care at next week outreach. [facility]    7/26/19 Pateint report weight is 225. Still wearing cast. Denies CHF S&S. Reports no changes made to plan of care by Dr. Carlito Ulloa. Will continue to monitor CHF S&S and report at next week outreach. [facility]    8/1/19 Patient reports weight is stable of 226. Denies chest pain, SOB, still wearing cast will be seen tomorrow to determine if it is need longer. No issues with pacemaker.  Will continue with diet and monitor CHF S&S. JASON    8/16/19 Per review of chart patient weight on 8/14/19 was 236. He is being seen by wound care today. Will outreach patient next week. JASON    8/27/19 Patient reports feeling really good. Reports weight is 228 today. No longer in cast. Denies SOB, chest pain. Will monitor CHF S&S and report at next week outreach.  JASON

## 2019-08-29 RX ORDER — TAMSULOSIN HYDROCHLORIDE 0.4 MG/1
CAPSULE ORAL
Qty: 180 CAP | Refills: 3 | Status: SHIPPED | OUTPATIENT
Start: 2019-08-29 | End: 2020-07-01 | Stop reason: ALTCHOICE

## 2019-08-29 NOTE — TELEPHONE ENCOUNTER
RX refill request from the patient/pharmacy. Patient last seen 08- with labs, and next appt. scheduled for 09-  Requested Prescriptions     Pending Prescriptions Disp Refills    tamsulosin (FLOMAX) 0.4 mg capsule [Pharmacy Med Name: TAMSULOSIN 0.4MG CAPSULES] 180 Cap 3     Sig: TAKE 2 CAPSULES BY MOUTH EVERY DAY   .

## 2019-08-30 ENCOUNTER — HOSPITAL ENCOUNTER (OUTPATIENT)
Dept: WOUND CARE | Age: 69
Discharge: HOME | End: 2019-08-30
Payer: MEDICARE

## 2019-08-30 VITALS
SYSTOLIC BLOOD PRESSURE: 137 MMHG | RESPIRATION RATE: 16 BRPM | HEART RATE: 73 BPM | DIASTOLIC BLOOD PRESSURE: 76 MMHG | TEMPERATURE: 98.8 F

## 2019-08-30 PROBLEM — M77.40 METATARSALGIA: Status: ACTIVE | Noted: 2019-08-30

## 2019-08-30 PROCEDURE — 99212 OFFICE O/P EST SF 10 MIN: CPT

## 2019-08-30 NOTE — WOUND CARE
08/30/19 1312   Wound Foot Plantar;Left 06/28/19   Date First Assessed/Time First Assessed: 06/28/19 1334   Present on Hospital Admission: Yes  Primary Wound Type: Diabetic  Location: Foot  Wound Location Orientation: Plantar;Left  Date of First Observation: 06/28/19   Dressing Status Removed   Dressing Type Adhesive wound dressing (Band-Aid)   Wound Length (cm) 0 cm   Wound Width (cm) 0 cm   Wound Depth (cm) 0 cm   Wound Volume (cm^3) 0 cm^3   Condition of Edges Calloused;Closed   Drainage Amount None   Wound Odor None   Cleansing and Cleansing Agents  Normal saline     Visit Vitals  /76   Pulse 73   Temp 98.8 °F (37.1 °C)   Resp 16     LLE Peripheral Vascular   Capillary Refill: Less than/equal to 3 seconds (08/30/19 1311)  Color: Appropriate for race (08/30/19 1311)  Temperature: Warm (08/30/19 1311)  Sensation: Decreased (08/30/19 1311)  Pedal Pulse: Palpable (08/30/19 1311)

## 2019-08-30 NOTE — WOUND CARE
Clinic Level of Care Assessment    NAME:  Royal Amanda Ellis. YOB: 1950 GENDER: male  MEDICAL RECORD NUMBER:  [id number]   DATE:  8/30/2019      Wound Count Document in LDA  Number of Wounds Assessed Points   No Wounds/Ulcers [x]   0   Less than Three Wounds/Ulcers []   1   3-6 Wounds/Ulcers []   2   Greater than 6 Wounds/Ulcers []   3     Ambulation Status Document in Coord/MOE/Mobility tab  Status Definition Points   Independent Independently able to ambulate. Fully able (without any assistance) to get on/off exam table/chair. [x]   0   Minimal Physical Assistance Requires physical assistance of one person to ambulate and/or position patient to be examined. Includes necessary physical assistance to position lower extremities on/off stool. []   1   Moderate Physical Assistance Requires at least one staff member to physically assist patient in ambulating into treatment room, and on/off exam table. []   2   Full Assistance Requires assistance of at least two staff members to transfer patient into treatment room and/or on/off exam table/chair. \"Total Transfer\". []   3     Dressing Complexity Document in LDA and Write Appropriate Order  Complexity Definition Points   No Dressing  [x]   0   Simple Minimal, simple dressing. i.e. Band-aid, gauze, simple wrap. []   1   Intermediate Moderately complicated requiring licensed personnel to apply i.e. collagen matrix, ointments, gels, alginates. []   2   Complex Complicated requiring licensed personnel to apply dressings 6 or more wounds. []   3     Teaching Effort Document in Education Tab   Effort Definition Points   No Teaching  []   0   Simple Reinforce two or less topics. Document in Education navigator.  []   1   Intermediate Reinforce three to five topics and/or one additional   new topic. Document in Education navigator. [x]   2   Complex Teach more than one new topic.  New patient information   packet reviewed and/or reinforce more than three topics. Document in Education navigator. HBO initial instruction. []   3       Patient Assessment and Planning  Planning Definition Points   Simple Multiple System Simple: Simple follow-up with routine assessment and planning. If Discharged, instructions and long term/follow-up care given to patient/caregiver. Discharged, instructions and/or After Visit Summary given to patient/caregiver and instructions completed. [x]   1   Intermediate Multiple System Intermediate: Contact with outside resources; i.e. Telephone calls to home health, [facility] May include filling out forms and writing letters, arranging transportation, communication with insurance , vendors, etc.  Discharged, instructions and/or After Visit Summary given to patient/caregiver and instructions completed. []   2   Complex Multiple System Complex: Full, comprehensive assessment and planning. Follow the entire navigator under Wound Visit charting filling out each tab which includes OP Adm Database Screening, Education and CarePlan  HBO risk assessment completed. Discharged, instructions and/or After Visit Summary given to patient/caregiver and instructions completed.    []   3           Is this the Patient's First Visit to the [address] [place] [address] Road  No      Is this Patient Established @ [facility]  Yes             Clinical Level of Care      Points  0-2  Level 1 []     Points  3-5  Level 2 [x]     Points  6-9  Level 3 []     Points  10-12  Level 4 []     Points  13-15  Level 5 []       Electronically signed by Romana Westbrook RN on 8/30/2019 at 1:38 PM

## 2019-08-31 NOTE — PROGRESS NOTES
[address]  WOUND CARE PROGRESS NOTE    Name:  Ochoa Mahmood  MR#:  [id number]  :  1950  ACCOUNT #:  [de-identified]  DATE OF SERVICE:  2019      HISTORY OF CHIEF COMPLAINT:  This 68-year-old white male presents for continued treatment of chronic wound plantar left foot. Most recent therapy had consisted of total contact cast, which was removed 2 weeks ago. History and physical unchanged from admitting history and physical; no change in medications, no change in allergies. ALLERGIES:  TO NIACIN, IMIPENEM, LEVEMIR, PRIMAXIN, AND Diane Foots. PAST MEDICAL HISTORY:  Atrial fibrillation, chronic degenerative joint disease, atherosclerotic cardiovascular disease, hyperlipidemia, gout, diabetes with neuropathy, chronic kidney disease, hypertension with renal disease, restless leg syndrome, insomnia, hypothyroidism, GERD, cardiomyopathy, benign prostatic hyperplasia, anxiety, coronary artery disease, arrhythmias. SOCIAL HISTORY:  The patient was previous smoker. He stopped in . PHYSICAL EXAMINATION:  Revealed palpable pedal pulses with failed muscle strength, lower extremities bilateral with diminished epicritic sensation. Second left toe is abducted at the DIPJ. He has a surgically shortened first right toe. There was previously the wound plantar aspect of the second left MPJ. Today that wound is well healed and has a shallow hyperkeratosis. He does have a palpable prominent second left metatarsal plantarly. ASSESSMENT:  Resolved diabetic wound, plantar aspect of the left foot, on a diabetic with neuropathy and metatarsalgia left foot. PLAN:  Hyperkeratosis was debrided. We discussed diabetic foot care. The patient can discontinue any dressing changes or wound care on the left foot. He is to wear his diabetic insoles at all times that have metatarsal pad to alleviate forefoot pressure.   I do recommend that he follow with myself or the podiatrist of his choice every 2 to 3 months just for diabetic foot exam and to keep any hyperkeratosis removed. The patient is discharged from the [address] [place] [address] today.         DORIS [id number]  D:  08/30/2019 15:04  T:  08/30/2019 22:21  JOB #:  [id number]

## 2019-09-04 ENCOUNTER — PATIENT OUTREACH (OUTPATIENT)
Dept: FAMILY MEDICINE CLINIC | Age: 69
End: 2019-09-04
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
## 2019-09-17 ENCOUNTER — OFFICE VISIT (OUTPATIENT)
Dept: INTERNAL MEDICINE CLINIC | Age: 69
End: 2019-09-17

## 2019-09-17 VITALS
HEART RATE: 100 BPM | WEIGHT: 236.6 LBS | OXYGEN SATURATION: 70 % | TEMPERATURE: 98.6 F | SYSTOLIC BLOOD PRESSURE: 134 MMHG | RESPIRATION RATE: 16 BRPM | DIASTOLIC BLOOD PRESSURE: 82 MMHG | BODY MASS INDEX: 28.81 KG/M2 | HEIGHT: 76 IN

## 2019-09-17 DIAGNOSIS — N18.4 CONTROLLED TYPE 2 DIABETES MELLITUS WITH STAGE 4 CHRONIC KIDNEY DISEASE, WITHOUT LONG-TERM CURRENT USE OF INSULIN (HCC): ICD-10-CM

## 2019-09-17 DIAGNOSIS — I48.0 PAROXYSMAL ATRIAL FIBRILLATION (HCC): ICD-10-CM

## 2019-09-17 DIAGNOSIS — D64.9 ANEMIA, UNSPECIFIED TYPE: ICD-10-CM

## 2019-09-17 DIAGNOSIS — N18.4 CKD (CHRONIC KIDNEY DISEASE), STAGE IV (HCC): ICD-10-CM

## 2019-09-17 DIAGNOSIS — I12.9 HYPERTENSION WITH RENAL DISEASE: Primary | ICD-10-CM

## 2019-09-17 DIAGNOSIS — I25.5 ISCHEMIC CARDIOMYOPATHY: ICD-10-CM

## 2019-09-17 DIAGNOSIS — E11.22 CONTROLLED TYPE 2 DIABETES MELLITUS WITH STAGE 4 CHRONIC KIDNEY DISEASE, WITHOUT LONG-TERM CURRENT USE OF INSULIN (HCC): ICD-10-CM

## 2019-09-17 LAB
ANION GAP SERPL CALC-SCNC: 15 MMOL/L
BUN SERPL-MCNC: 117 MG/DL (ref 9–20)
CALCIUM SERPL-MCNC: 9.2 MG/DL (ref 8.4–10.2)
CHLORIDE SERPL-SCNC: 88 MMOL/L (ref 98–107)
CO2 SERPL-SCNC: 32 MMOL/L (ref 22–32)
CREAT SERPL-MCNC: 2.6 MG/DL (ref 0.8–1.5)
GLUCOSE SERPL-MCNC: 204 MG/DL (ref 75–110)
POTASSIUM SERPL-SCNC: 3.2 MMOL/L (ref 3.6–5)
SODIUM SERPL-SCNC: 135 MMOL/L (ref 137–145)

## 2019-09-17 NOTE — PROGRESS NOTES
Identified pt with two pt identifiers(name and ). Reviewed record in preparation for visit and have obtained necessary documentation.   Chief Complaint   Patient presents with    Diabetes      Visit Vitals  /84 (BP 1 Location: Left arm, BP Patient Position: Sitting)   Pulse 100   Temp 98.6 °F (37 °C) (Oral)   Resp 16   Ht 6' 4\" (1.93 m)   Wt 236 lb 9.6 oz (107.3 kg)   SpO2 (!) 70%   BMI 28.80 kg/m²       Pain Scale: 10 - Worst pain ever/10  Pain Location:     Health Maintenance Due   Topic    DTaP/Tdap/Td series (1 - Tdap)    Shingrix Vaccine Age 49> (1 of 2)    AAA Screening 73-69 YO Male Smoking Patients     Influenza Age 5 to Adult

## 2019-09-17 NOTE — PATIENT INSTRUCTIONS

## 2019-09-18 ENCOUNTER — APPOINTMENT (OUTPATIENT)
Dept: INFUSION THERAPY | Age: 69
End: 2019-09-18
Payer: MEDICARE

## 2019-09-18 LAB
BASOPHILS # BLD AUTO: 0 X10E3/UL (ref 0–0.2)
BASOPHILS NFR BLD AUTO: 1 %
EOSINOPHIL # BLD AUTO: 0.1 X10E3/UL (ref 0–0.4)
EOSINOPHIL NFR BLD AUTO: 2 %
ERYTHROCYTE [DISTWIDTH] IN BLOOD BY AUTOMATED COUNT: 14 % (ref 12.3–15.4)
HCT VFR BLD AUTO: 30 % (ref 37.5–51)
HGB BLD-MCNC: 9.9 G/DL (ref 13–17.7)
IMM GRANULOCYTES # BLD AUTO: 0 X10E3/UL (ref 0–0.1)
IMM GRANULOCYTES NFR BLD AUTO: 0 %
LYMPHOCYTES # BLD AUTO: 0.5 X10E3/UL (ref 0.7–3.1)
LYMPHOCYTES NFR BLD AUTO: 8 %
MCH RBC QN AUTO: 30.8 PG (ref 26.6–33)
MCHC RBC AUTO-ENTMCNC: 33 G/DL (ref 31.5–35.7)
MCV RBC AUTO: 94 FL (ref 79–97)
MONOCYTES # BLD AUTO: 0.6 X10E3/UL (ref 0.1–0.9)
MONOCYTES NFR BLD AUTO: 10 %
NEUTROPHILS # BLD AUTO: 4.9 X10E3/UL (ref 1.4–7)
NEUTROPHILS NFR BLD AUTO: 79 %
PLATELET # BLD AUTO: 114 X10E3/UL (ref 150–450)
RBC # BLD AUTO: 3.21 X10E6/UL (ref 4.14–5.8)
WBC # BLD AUTO: 6.1 X10E3/UL (ref 3.4–10.8)

## 2019-09-18 NOTE — PROGRESS NOTES
Labs are without significant change except potassium is low. Make sure still taking potassium. Increase dose by 20 meq daily.

## 2019-09-18 NOTE — PROGRESS NOTES
Labs are without significant change except potassium is low. Make sure still taking potassium. Increase dose by 20 meq daily. Patient reports he has not been taking his potassium tid as instructed. Advised due to low potassium he needs to take all 3 doses regularly.

## 2019-09-19 ENCOUNTER — HOSPITAL ENCOUNTER (OUTPATIENT)
Dept: INFUSION THERAPY | Age: 69
Discharge: HOME | End: 2019-09-19
Payer: MEDICARE

## 2019-09-19 VITALS
HEIGHT: 76 IN | OXYGEN SATURATION: 100 % | DIASTOLIC BLOOD PRESSURE: 81 MMHG | HEART RATE: 70 BPM | SYSTOLIC BLOOD PRESSURE: 141 MMHG | WEIGHT: 238 LBS | BODY MASS INDEX: 28.98 KG/M2 | RESPIRATION RATE: 18 BRPM | TEMPERATURE: 98.4 F

## 2019-09-19 LAB
FERRITIN SERPL-MCNC: 184 NG/ML (ref 26–388)
IRON SATN MFR SERPL: 17 % (ref 20–50)
IRON SERPL-MCNC: 49 UG/DL (ref 35–150)
TIBC SERPL-MCNC: 286 UG/DL (ref 250–450)

## 2019-09-19 PROCEDURE — 36415 COLL VENOUS BLD VENIPUNCTURE: CPT

## 2019-09-19 PROCEDURE — 96372 THER/PROPH/DIAG INJ SC/IM: CPT

## 2019-09-19 PROCEDURE — 74011250636 HC RX REV CODE- 250/636: Performed by: INTERNAL MEDICINE

## 2019-09-19 PROCEDURE — 83540 ASSAY OF IRON: CPT

## 2019-09-19 PROCEDURE — 82728 ASSAY OF FERRITIN: CPT

## 2019-09-19 RX ADMIN — EPOETIN ALFA-EPBX 60000 UNITS: 40000 INJECTION, SOLUTION INTRAVENOUS; SUBCUTANEOUS at 10:13

## 2019-09-19 NOTE — PROGRESS NOTES
[facility] Progress Note 
 
5580 Pt arrived ambulatory and in no distress for Retacrit Assessment unremarkable, no new concerns voiced. CBC & BMP obtained on 9/17. Hgb 9.9. Iron profile and Ferritin drawn per order and sent for processing. Patient Vitals for the past 12 hrs: 
 Temp Pulse Resp BP SpO2  
09/19/19 0958 98.4 °F (36.9 °C) 70 18 141/81 100 % The following medications administered: 
Medications Administered   
 epoetin mary-epbx (RETACRIT) 60,000 Units combo injection Admin Date 
09/19/2019 Action Given Dose 43284 Units Route SubCUTAneous Administered By 
Ross Contreras RN  
  
  
  
 
Pt tolerated treatment well. No s/s of adverse reaction noted. *Labs pending at time of note. Please follow up in [address] [facility] [address].  
 
1015 Pt discharged ambulatory in no acute distress. Next appointment: 
 
Future Appointments Date Time Provider Leonardo Thomas 10/16/2019  1:20 PM Marlen De MD [address] [facility] [facility]  
10/17/2019 10:00 AM DEBRA FT CHAIR 2 [facility]  
11/14/2019 10:00 AM DEBRA FT CHAIR 2 [facility]

## 2019-09-24 PROBLEM — Z00.00 MEDICARE ANNUAL WELLNESS VISIT, SUBSEQUENT: Status: RESOLVED | Noted: 2017-10-17 | Resolved: 2019-09-24

## 2019-10-08 RX ORDER — INSULIN GLARGINE 100 [IU]/ML
INJECTION, SOLUTION SUBCUTANEOUS
Qty: 30 ML | Status: ON HOLD | OUTPATIENT
Start: 2019-10-08 | End: 2020-01-03

## 2019-10-08 NOTE — TELEPHONE ENCOUNTER
RX refill request from the patient/pharmacy. Patient last seen 09- with labs, and next appt. scheduled for 10-  Requested Prescriptions     Pending Prescriptions Disp Refills    insulin glargine (LANTUS SOLOSTAR U-100 INSULIN) 100 unit/mL (3 mL) inpn 30 mL prn     Sig: INJECT 35 UNITS UNDER THE SKIN EVERY DAY   .

## 2019-10-11 ENCOUNTER — HOSPITAL ENCOUNTER (OUTPATIENT)
Dept: WOUND CARE | Age: 69
Discharge: HOME | End: 2019-10-11
Payer: MEDICARE

## 2019-10-11 VITALS
TEMPERATURE: 98.5 F | DIASTOLIC BLOOD PRESSURE: 75 MMHG | SYSTOLIC BLOOD PRESSURE: 145 MMHG | HEART RATE: 70 BPM | RESPIRATION RATE: 18 BRPM

## 2019-10-11 PROBLEM — L03.115 CELLULITIS OF RIGHT ANTERIOR LOWER LEG: Status: ACTIVE | Noted: 2019-10-11

## 2019-10-11 PROCEDURE — 97597 DBRDMT OPN WND 1ST 20 CM/<: CPT

## 2019-10-11 NOTE — WOUND CARE
10/11/19 9602 Wound Leg lower Right;Posterior Date First Assessed/Time First Assessed: 10/11/19 0936   Present on Hospital Admission: Yes  Wound Approximate Age at First Assessment (Weeks): 2 weeks  Primary Wound Type: Vascular  Location: Leg lower  Wound Location Orientation: Right;Posterior Dressing Status (Open to air) Non-staged Wound Description Full thickness Wound Length (cm) 2.5 cm Wound Width (cm) 2.5 cm Wound Depth (cm) 0.1 cm Wound Volume (cm^3) 0.62 cm^3 Condition of Base Pink;Slough Condition of Edges Open Drainage Amount Moderate Drainage Color Serosanguinous Wound Odor None Angela-wound Assessment Intact Cleansing and Cleansing Agents  Normal saline Wound Foot Right;Medial 10/11/19 Date First Assessed/Time First Assessed: 10/11/19 0937   Present on Hospital Admission: Yes  Wound Approximate Age at First Assessment (Weeks): 2 weeks  Primary Wound Type: Diabetic Ulcer  Location: Foot  Wound Location Orientation: Right;Medial  Date. .. Dressing Status (Open to air) Non-staged Wound Description Full thickness Wound Length (cm) 1 cm Wound Width (cm) 1.4 cm Wound Depth (cm) 0.2 cm Wound Volume (cm^3) 0.28 cm^3 Condition of Base Pink;Slough Condition of Edges Open Drainage Amount Moderate Drainage Color Serosanguinous Wound Odor None Angela-wound Assessment Intact Cleansing and Cleansing Agents  Normal saline Visit Vitals /75 (BP Patient Position: At rest;Sitting) Pulse 70 Temp 98.5 °F (36.9 °C) Resp 18

## 2019-10-11 NOTE — WOUND CARE
10/11/19 1005 Wound Leg lower Right;Posterior Date First Assessed/Time First Assessed: 10/11/19 0936   Present on Hospital Admission: Yes  Wound Approximate Age at First Assessment (Weeks): 2 weeks  Primary Wound Type: Vascular  Location: Leg lower  Wound Location Orientation: Right;Posterior Dressing Type Applied  
(Xeroform, gauze, roll gauze, tubigrip) Wound Foot Right;Medial 10/11/19 Date First Assessed/Time First Assessed: 10/11/19 0937   Present on Hospital Admission: Yes  Wound Approximate Age at First Assessment (Weeks): 2 weeks  Primary Wound Type: Diabetic Ulcer  Location: Foot  Wound Location Orientation: Right;Medial  Date. .. Dressing Type Applied  
(Xeroform, gauze, roll gauze, tubigrip) Wound Leg lower Right;Medial 10/11/19 Date First Assessed/Time First Assessed: 10/11/19 0956   Present on Hospital Admission: Yes  Wound Approximate Age at First Assessment (Weeks): 2 weeks  Primary Wound Type: Vascular  Location: Leg lower  Wound Location Orientation: Right;Medial  Date . .. Dressing Type Applied  
(Xeroform, gauze, roll gauze, tubigrip) Discharge Condition: Stable Pain: 0 Ambulatory Status: [place] Slates Discharge Destination: Home Transportation: Car Accompanied by: Self  and Family/Caregiver Discharge instructions reviewed with Patient and copy or written instructions have been provided. All questions/concerns have been addressed at this time.

## 2019-10-11 NOTE — PROGRESS NOTES
[address] [facility]     Prior to today's conversation, did individual have existing ACP documents? [] Yes  [x] No      Date of conversation: 1/4/19  Location [facility] room 2215    Participants: (in person or by phone)   [x] Patient    [x] Prospective agent (not yet named in a document)    Name:Sophie Newman    Relationship to patient: spouse                 [x] Other:  Name: Wilma Jennings- son    Individual's Fears/Concerns about planning: none     Goals/Narrative of Conversation:to complete advanced care planning             Conversation topics for Individual    Has learned the following from prior experiences with serious illness: I continue to get a little sicker as I get older    Identifies the following as important for living well: Able to visit with family    \"What cultural, Adventism, spiritual, or personal beliefs (if any) do you have that might help you choose the care you want, or do not want? \"  Patient response: none    Conversation topics for Agent / Stephon Davalos agent's understanding of the role: Speak for him if he is not able to communicate his wishes to the health care tem    Agent confirms Willingness to:   [x]Yes []No Accept role   [x] Yes []No Talk with individual about his/her goals, values, & preferences   [x] Yes [] No   Follow individual's decisions, even if do not agree with those    decisions   [x]Yes  []No Make decisions in difficult moments      Topics for Chronic Illness heart failure  \"What do you understand about your (Heart failure) and the complications that may occur? \"  Patient response: I will likely continue to get sicker as time passes    \"What do you understand about CPR? \" Patient response: Possible return of circulation    \"What would be an unacceptable outcome of CPR to you? \" Patient response: circulation without brain function    [x] Yes  [] No   Educated patient regarding differences between AMD and DDNR  [] Yes  [x] No   If patient requested DDNR order, referred to provider for follow-up    [employer]® ACP Facilitator: Carmelina Rivera RN    Length (minutes): 72    The following was provided (check all that apply):   [x] Written information on the planning process      Healthcare Decision Information Cards:   [x] Help with Breathing Facts   [x] Tube Feeding Facts   [x] CPR Facts      [x] Review of advance directive form          Meeting Outcomes:   [x] ACP discussion completed   [x] Advance directive form completed   [x] Original of completed advance directive given to patient   [x] Copy given to healthcare agent    [x] Copy scanned to electronic medical record         Follow-up plan:       [x] Recommended to review completed AMD annually or upon change in health status     [x] This note routed to one or more involved healthcare providers High Dose Vitamin A Pregnancy And Lactation Text: High dose vitamin A therapy is contraindicated during pregnancy and breast feeding.

## 2019-10-12 NOTE — PROGRESS NOTES
[address] 
WOUND CARE PROGRESS NOTE Name:  Armando Saba 
MR#:  [id number] :  1950 ACCOUNT #:  [de-identified] DATE OF SERVICE:  10/11/2019 HISTORY OF CHIEF COMPLAINT:  This 77-year-old white male presents with a new wound on his right foot, previous wound treated was on the left foot. Wounds are of one week in duration per the patient. He did not recall any trauma. History and physical unchanged from admitting history and physical; no change in medications, no change in allergies. CURRENT MEDICATIONS:  Lantus SoloSTAR insulin 35 units daily, Flomax 0.4 mg two times a day, ferrous sulfate 47.5 mg 3 times a day, Bumex 2 mg twice a day, Uloric 40 mg daily, isosorbide ER 30 mg daily, Zaroxolyn 2.5 mg daily, Timoptic eye drops daily, Proscar 5 mg daily, Neurontin 300 mg at night, potassium chloride 20 mEq daily, vitamin C 500 mg 3 times a day, Coreg 12.5 mg twice a day, Mirapex 0.5 mg twice a day, Victoza 0.6 mg daily. ALLERGIES:  TO NIACIN, LEVEMIR, PRIMAXIN AND Bertha Daring. PAST MEDICAL HISTORY:  Diabetes with neuropathy, anemia, anxiety, arrhythmia, atherosclerotic cardiovascular disease, benign prostatic hyperplasia, coronary artery disease, previous cancer, cardiomyopathy, congestive heart failure, chronic kidney disease, degenerative joint disease, gout, hypercholesterolemia, hyperlipidemia, hypertension, hypothyroidism, insomnia, restless leg syndrome. SOCIAL HISTORY:  Previous smoker, he quit in . PHYSICAL EXAMINATION: 
VITAL SIGNS:  All vitals are stable. EXTREMITIES:  Physical examination of the lower extremities revealed barely palpable pedal pulses, fairly good muscle strength lower extremities bilateral.  Diminished epicritic sensation. There is a shallow callus plantar aspect of the second left MPJ.   Both legs are somewhat edematous +1 on the left edema, +2 edema with erythema on the right lower leg. There is also mild warmth right lower leg. Posterior aspect of the right lower leg is with a venous stasis wound with beefy red base serous drainage, measures 2.5 x 2.5 x 0.1 cm, has surrounding dry crusted deroofed skin. First right toe is abducted. Dorsal medial aspect is a grade 2 diabetic wound with a granular base with some dried, scabbed three fourth of the wound, it measures 1 x 1.4 x 0.2, has some serous drainage. ASSESSMENT:  Diabetic wound dorsal aspect of the right foot, venous stasis wound posterior aspect of the right leg, cellulitis right leg on a diabetic with neuropathy. PLAN:  The deroofed and nonvital tissue surrounding the wound posterior aspect of the right leg was gently removed with finger pressure. Both wounds were cleansed with saline and cultures were taken from both wounds right foot as well as posterior right leg. The wounds are now dressed with Xeroform and gauze. Double layer Tubigrip was placed on both lower legs. The patient's wife is to change dressings daily. He will have a follow up visit in the [facility] with myself, Dr. Cynthia Horan, in 1 week. The patient is also advised to wear an open surgical shoe on the right foot. DORIS Calderon/[email or web address] 
D:  10/11/2019 12:46 
T:  10/12/2019 6:17 JOB #:  [id number]

## 2019-10-14 LAB
BACTERIA SPEC AEROBE CULT: NORMAL
BACTERIA SPEC AEROBE CULT: NORMAL

## 2019-10-15 NOTE — PROGRESS NOTES
Chief Complaint Patient presents with  Hypertension  Diabetes  Chronic Kidney Disease SUBJECTIVE: 
 
Royal KRISTA Mcgowan is a 77 y.o. male who returns in follow-up for his medical problems include hypertension, compensated CHF, cardiomyopathy, diabetes, CKD stage IV, ASCVD, anemia, severe DJD and other medical problems. He is taking his medications and trying to follow his diet and he had a problem with a recent ulceration on his right foot for which he ended up seeing his podiatrist Dr. Joseph Villarreal and has been treated with doxycycline as well as local care for that. He does note that is getting better he finished antibiotic. His mobility was decreased at that time. He does note he still having quite a bit of difficulty sleeping at night although he had a prescription leftover for Percocet from a year or so ago where he took sparingly and about every fourth or fifth night when he would take it he was able to sleep through the night because of the arthritis being under control he is questioning if he can have a prescription for that to take at nighttime. He denies any chest pain, shortness of breath, palpitations, PND, orthopnea or other cardiorespiratory complaints. He denies any GI or  complaints. He does have his chronic unchanged arthritic complaints is worse in his knees and shoulders. He has had no falls and there are no other complaints on complete review of systems other than ulcer on his foot has been followed and treated by Dr. Joseph Villarreal. Current Outpatient Medications Medication Sig Dispense Refill  doxycycline (VIBRA-TABS) 100 mg tablet TK 1 T PO  BID  1  
 oxyCODONE-acetaminophen (PERCOCET) 5-325 mg per tablet Take 1 Tab by mouth nightly as needed for Pain for up to 3 days. Max Daily Amount: 1 Tab.  30 Tab 0  
 insulin glargine (LANTUS SOLOSTAR U-100 INSULIN) 100 unit/mL (3 mL) inpn INJECT 35 UNITS UNDER THE SKIN EVERY DAY 30 mL prn  
  tamsulosin (FLOMAX) 0.4 mg capsule TAKE 2 CAPSULES BY MOUTH EVERY  Cap 3  ferrous sulfate (SLOW FE) 47.5 mg iron TbER tablet Take 1 Tab by mouth three (3) times daily. 90 Tab 12  
 bumetanide (BUMEX) 2 mg tablet Take 2 Tabs by mouth two (2) times a day. 120 Tab 12  
 febuxostat (ULORIC) 40 mg tab tablet Take 1 Tab by mouth daily. 30 Tab 0  
 isosorbide mononitrate ER (IMDUR) 30 mg tablet Take 1 Tab by mouth daily. 30 Tab 12  
 metOLazone (ZAROXOLYN) 2.5 mg tablet Take 2.5 mg by mouth daily as needed (swelling).  timolol (TIMOPTIC) 0.5 % ophthalmic solution Administer 1 Drop to right eye two (2) times a day.  finasteride (PROSCAR) 5 mg tablet TAKE 1 TABLET BY MOUTH DAILY 90 Tab 3  
 gabapentin (NEURONTIN) 300 mg capsule Take 1 Cap by mouth nightly. 30 Cap 5  potassium chloride (KLOR-CON) 20 mEq pack One packet three times daily 90 Packet prn  ascorbic acid, vitamin C, (VITAMIN C) 250 mg tablet Take 1 Tab by mouth three (3) times daily. 100 Tab prn  
 OTHER Apply  to affected area daily as needed (Knee Pain). CBD Cream:  Apply daily as needed for knee pain  carvedilol (COREG) 12.5 mg tablet TAKE 1 TABLET BY MOUTH TWICE DAILY 60 Tab 11  
 pramipexole (MIRAPEX) 0.5 mg tablet TAKE 1 TABLET BY MOUTH EVERY NIGHT AT BEDTIME (Patient taking differently: 1 mg. TAKE 1 TABLET BY MOUTH EVERY NIGHT AT BEDTIME) 90 Tab prn  Liraglutide (VICTOZA) 0.6 mg/0.1 mL (18 mg/3 mL) sub-q pen 0.6 mg by SubCUTAneous route daily. Facility-Administered Medications Ordered in Other Visits Medication Dose Route Frequency Provider Last Rate Last Dose  [START ON 10/17/2019] epoetin mary-epbx (RETACRIT) 60,000 Units combo injection  60,000 Units SubCUTAneous Yomi Carlos MD      
 
Past Medical History:  
Diagnosis Date  Anemia 8/5/2017  Anxiety 8/5/2017  Arrhythmia   
 atrial fibrillation 2014  ASCVD (arteriosclerotic cardiovascular disease) 8/5/2017  BPH (benign prostatic hyperplasia) 8/5/2017  CAD (coronary artery disease)   
 h/o stents  Cancer [facility])   
 h/o skin cancer  Cardiomyopathy ([address] 8/5/2017  CHF (congestive heart failure) ([address] 8/5/2017  Chronic kidney disease Stage IV  CKD (chronic kidney disease), stage IV ([address] 8/5/2017  Diabetes ([address]  Diabetes mellitus ([address] 8/5/2017  Diabetic neuropathy ([address] 8/5/2017  DJD (degenerative joint disease) 8/5/2017  ED (erectile dysfunction) 8/5/2017  Gout 8/5/2017  High cholesterol  Hyperlipidemia 8/5/2017  Hypertension  Hypertension with renal disease 8/5/2017  Hypothyroid 8/5/2017  Insomnia 8/5/2017  Obesity 8/5/2017  On statin therapy 8/5/2017  Pneumonia 05/28/2019  Restless leg 8/5/2017  Thyroid disease   
 hypothyroid  Ulcer of foot due to secondary diabetes ([address] 07/12/2019 Past Surgical History:  
Procedure Laterality Date  CARDIAC SURG PROCEDURE UNLIST    
 cardiac stents  CARDIAC SURG PROCEDURE UNLIST Ablation 5/17/2018 ED [facility]  COLONOSCOPY N/A 6/28/2016 COLONOSCOPY performed by Indra Matute MD at [facility] ENDOSCOPY  COLONOSCOPY N/A 1/9/2019 COLONOSCOPY performed by Chayo De Guzman MD at [facility] ENDOSCOPY  COLONOSCOPY,DIAGNOSTIC  1/9/2019  HX APPENDECTOMY  HX BUNIONECTOMY    
 and removal of 2 seasmoid bone of the great toe 2/2018  HX HEENT Bilateral Cataract surgery  HX HEENT Tonsils  HX HEENT Axe wound to the head  HX KNEE ARTHROSCOPY X 2  
 HX ORTHOPAEDIC    
 HX ORTHOPAEDIC    
 partial laminectomy  HX PACEMAKER    
 HX ROTATOR CUFF REPAIR    
 bilateral  
 GA INSJ ELTRD CAR DACIA SYS ATTCH PM/CVDFB PLS GEN N/A 1/28/2019 Lv Lead Placement To Previous Generator performed by Florance Aschoff, MD at [facility] CARDIAC CATH LAB  GA REMVL PERM PM PLS GEN W/REPL PLSE GEN MULT LEAD N/A 1/28/2019 Remove & Replace Ppm Gen Biv Multi Leads performed by Juliet Dietrich MD at [address] CATH LAB Allergies Allergen Reactions  Niacin Unknown (comments) Other reaction(s): Unknown (comments)  Imipenem Diarrhea Other reaction(s): Rash  Levemir [Insulin Detemir] Hives  Other Medication Other (comments) ? Allergy to Reyes Milliner causing chemical burn  Primaxin [Imipenem-Cilastatin] Diarrhea and Rash  Xarelto [Rivaroxaban] Rash and Itching Other reaction(s): Rash REVIEW OF SYSTEMS: 
General: negative for - chills or fever, or weight loss or gain ENT: negative for - headaches, nasal congestion or tinnitus Eyes: no blurred or visual changes Neck: No stiffness or swollen nodes Respiratory: negative for - cough, hemoptysis, shortness of breath or wheezing Cardiovascular : negative for - chest pain, edema, palpitations or shortness of breath Gastrointestinal: negative for - abdominal pain, blood in stools, heartburn or nausea/vomiting Genito-Urinary: no dysuria, trouble voiding, or hematuria Musculoskeletal: negative for - gait disturbance, change of his severe joint pain, joint stiffness or joint swelling Neurological: no TIA or stroke symptoms Hematologic: no bruises, no bleeding Lymphatic: no swollen glands Integument: no lumps, mole changes, nail changes or rash. Also right foot pain treated by Dr. Susan Duffy Endocrine:no malaise/lethargy poly uria or polydipsia or unexpected weight changes Social History Socioeconomic History  Marital status:  Spouse name: Not on file  Number of children: Not on file  Years of education: Not on file  Highest education level: Not on file Tobacco Use  Smoking status: Former Smoker Packs/day: 0.50 Years: 4.00 Pack years: 2.00 Last attempt to quit: 3/6/1972 Years since quittin.5  Smokeless tobacco: Never Used Substance and Sexual Activity  Alcohol use:  No  
 Comment: rare, 1 drink per year  Drug use: No  
 Sexual activity: Not Currently Other Topics Concern Family History Problem Relation Age of Onset  Heart Disease Mother  Kidney Disease Mother  Heart Disease Father  Kidney Disease Father  Cancer Sister Breast  
 
 
OBJECTIVE:  
 
Visit Vitals /80 Pulse 70 Temp 98.7 °F (37.1 °C) (Oral) Resp 18 Ht 6' 4\" (1.93 m) Wt 229 lb (103.9 kg) SpO2 99% BMI 27.87 kg/m² CONSTITUTIONAL:   well nourished, appears age appropriate EYES: sclera anicteric, PERRL, EOMI 
ENMT:nares clear, moist mucous membranes, pharynx clear NECK: supple. Thyroid normal, No JVD or bruits RESPIRATORY: Chest: clear to ascultation and percussion, normal inspiratory effort CARDIOVASCULAR: Heart: regular rate and rhythm no murmurs, rubs or gallops, PMI not displaced, No thrills GASTROINTESTINAL: Abdomen: non distended, soft, non tender, bowel sounds normal 
HEMATOLOGIC: no purpura, petechiae or bruising LYMPHATIC: No lymph node enlargemant MUSCULOSKELETAL: Extremities: no edema or active synovitis, pulse 1+ INTEGUMENT: No unusual rashes or suspicious skin lesions noted. Nails appear normal. 
PERIPHERAL VASCULAR: normal pulses femoral, PT and DP NEUROLOGIC: non-focal exam, A & O X 3 PSYCHIATRIC:, appropriate affect ASSESSMENT:  
1. Hypertension with renal disease 2. CKD (chronic kidney disease), stage IV ([facility] 3. Paroxysmal atrial fibrillation (HCC) 4. Anemia, unspecified type 5. ASCVD (arteriosclerotic cardiovascular disease) 6. Encounter for immunization 7. Primary osteoarthritis involving multiple joints Impression 1. Hypertension that is controlled so continue current therapy reviewed with he and his wife. 2.  CKD stage IV we will see what that status he is on repeat today and I reviewed last numbers 3. Paroxysmal atrial fibrillation appears to be in sinus rhythm today 4.  Anemia repeat status is pending 5. ASCVD clinically stable continue aspirin daily 6. Severe DJD with a long discussion regarding this and we decided to proceed with the Percocet 5/325 at nighttime. 7.  Diabetes mellitus we did fill out a form for him to get diabetic shoes since he is having a problem with foot ulcers. I will call with lab results and make further recommendations or adjustments if necessary. Follow-up in 1 month or sooner if there is a problem. PLAN: 
. Orders Placed This Encounter  Influenza Vaccine Inactivated (IIV)(FLUAD), Subunit, Adjuvanted, IM, (05817)  CBC WITH AUTOMATED DIFF  
 METABOLIC PANEL, BASIC [facility])  doxycycline (VIBRA-TABS) 100 mg tablet  oxyCODONE-acetaminophen (PERCOCET) 5-325 mg per tablet ATTENTION:  
This medical record was transcribed using an electronic medical records system. Although proofread, it may and can contain electronic and spelling errors. Other human spelling and other errors may be present. Corrections may be executed at a later time. Please feel free to contact us for any clarifications as needed. Follow-up and Dispositions · Return in about 4 weeks (around 11/13/2019). No results found for any visits on 10/16/19. Kartik Tee MD 
 
The patient verbalized understanding of the problems and plans as explained.

## 2019-10-16 ENCOUNTER — APPOINTMENT (OUTPATIENT)
Dept: INFUSION THERAPY | Age: 69
End: 2019-10-16
Payer: MEDICARE

## 2019-10-16 ENCOUNTER — OFFICE VISIT (OUTPATIENT)
Dept: INTERNAL MEDICINE CLINIC | Age: 69
End: 2019-10-16

## 2019-10-16 VITALS
WEIGHT: 229 LBS | TEMPERATURE: 98.7 F | BODY MASS INDEX: 27.89 KG/M2 | DIASTOLIC BLOOD PRESSURE: 80 MMHG | HEIGHT: 76 IN | SYSTOLIC BLOOD PRESSURE: 138 MMHG | RESPIRATION RATE: 18 BRPM | HEART RATE: 70 BPM | OXYGEN SATURATION: 99 %

## 2019-10-16 DIAGNOSIS — N18.4 CKD (CHRONIC KIDNEY DISEASE), STAGE IV (HCC): ICD-10-CM

## 2019-10-16 DIAGNOSIS — I25.10 ASCVD (ARTERIOSCLEROTIC CARDIOVASCULAR DISEASE): ICD-10-CM

## 2019-10-16 DIAGNOSIS — I48.0 PAROXYSMAL ATRIAL FIBRILLATION (HCC): ICD-10-CM

## 2019-10-16 DIAGNOSIS — I12.9 HYPERTENSION WITH RENAL DISEASE: Primary | ICD-10-CM

## 2019-10-16 DIAGNOSIS — D64.9 ANEMIA, UNSPECIFIED TYPE: ICD-10-CM

## 2019-10-16 DIAGNOSIS — M15.9 PRIMARY OSTEOARTHRITIS INVOLVING MULTIPLE JOINTS: ICD-10-CM

## 2019-10-16 DIAGNOSIS — Z23 ENCOUNTER FOR IMMUNIZATION: ICD-10-CM

## 2019-10-16 LAB
ANION GAP SERPL CALC-SCNC: 14 MMOL/L
BUN SERPL-MCNC: 107 MG/DL (ref 9–20)
CALCIUM SERPL-MCNC: 8.8 MG/DL (ref 8.4–10.2)
CHLORIDE SERPL-SCNC: 93 MMOL/L (ref 98–107)
CO2 SERPL-SCNC: 29 MMOL/L (ref 22–32)
CREAT SERPL-MCNC: 2.2 MG/DL (ref 0.8–1.5)
GLUCOSE SERPL-MCNC: 142 MG/DL (ref 75–110)
POTASSIUM SERPL-SCNC: 3.3 MMOL/L (ref 3.6–5)
SODIUM SERPL-SCNC: 136 MMOL/L (ref 137–145)

## 2019-10-16 RX ORDER — OXYCODONE AND ACETAMINOPHEN 5; 325 MG/1; MG/1
1 TABLET ORAL
Qty: 30 TAB | Refills: 0 | Status: SHIPPED | OUTPATIENT
Start: 2019-10-16 | End: 2019-10-19

## 2019-10-16 RX ORDER — DOXYCYCLINE HYCLATE 100 MG
TABLET ORAL
Refills: 1 | COMMUNITY
Start: 2019-10-11 | End: 2019-11-08

## 2019-10-16 NOTE — PROGRESS NOTES
Chief Complaint Patient presents with  Hypertension  Diabetes  Chronic Kidney Disease 1. Have you been to the ER, urgent care clinic since your last visit? Hospitalized since your last visit? No 
 
2. Have you seen or consulted any other health care providers outside of the [address] [place] [address] since your last visit? Include any pap smears or colon screening.  No

## 2019-10-16 NOTE — PATIENT INSTRUCTIONS

## 2019-10-17 ENCOUNTER — HOSPITAL ENCOUNTER (OUTPATIENT)
Dept: INFUSION THERAPY | Age: 69
Discharge: HOME | End: 2019-10-17
Payer: MEDICARE

## 2019-10-17 VITALS
TEMPERATURE: 97.6 F | OXYGEN SATURATION: 100 % | BODY MASS INDEX: 27.16 KG/M2 | DIASTOLIC BLOOD PRESSURE: 87 MMHG | HEART RATE: 79 BPM | RESPIRATION RATE: 16 BRPM | WEIGHT: 223 LBS | SYSTOLIC BLOOD PRESSURE: 148 MMHG | HEIGHT: 76 IN

## 2019-10-17 LAB
ALBUMIN SERPL-MCNC: 3.4 G/DL (ref 3.5–5)
ANION GAP SERPL CALC-SCNC: 9 MMOL/L (ref 5–15)
BASOPHILS # BLD AUTO: 0.1 X10E3/UL (ref 0–0.2)
BASOPHILS NFR BLD AUTO: 1 %
BUN SERPL-MCNC: 101 MG/DL (ref 6–20)
BUN/CREAT SERPL: 40 (ref 12–20)
CALCIUM SERPL-MCNC: 9.2 MG/DL (ref 8.5–10.1)
CHLORIDE SERPL-SCNC: 96 MMOL/L (ref 97–108)
CO2 SERPL-SCNC: 30 MMOL/L (ref 21–32)
CREAT SERPL-MCNC: 2.55 MG/DL (ref 0.7–1.3)
EOSINOPHIL # BLD AUTO: 0.1 X10E3/UL (ref 0–0.4)
EOSINOPHIL NFR BLD AUTO: 1 %
ERYTHROCYTE [DISTWIDTH] IN BLOOD BY AUTOMATED COUNT: 13.5 % (ref 12.3–15.4)
FERRITIN SERPL-MCNC: 180 NG/ML (ref 26–388)
GLUCOSE SERPL-MCNC: 98 MG/DL (ref 65–100)
HCT VFR BLD AUTO: 30.7 % (ref 37.5–51)
HGB BLD-MCNC: 10.7 G/DL (ref 13–17.7)
IMM GRANULOCYTES # BLD AUTO: 0 X10E3/UL (ref 0–0.1)
IMM GRANULOCYTES NFR BLD AUTO: 0 %
IRON SATN MFR SERPL: 22 % (ref 20–50)
IRON SERPL-MCNC: 58 UG/DL (ref 35–150)
LYMPHOCYTES # BLD AUTO: 0.7 X10E3/UL (ref 0.7–3.1)
LYMPHOCYTES NFR BLD AUTO: 10 %
MCH RBC QN AUTO: 31.3 PG (ref 26.6–33)
MCHC RBC AUTO-ENTMCNC: 34.9 G/DL (ref 31.5–35.7)
MCV RBC AUTO: 90 FL (ref 79–97)
MONOCYTES # BLD AUTO: 0.5 X10E3/UL (ref 0.1–0.9)
MONOCYTES NFR BLD AUTO: 8 %
NEUTROPHILS # BLD AUTO: 5.3 X10E3/UL (ref 1.4–7)
NEUTROPHILS NFR BLD AUTO: 80 %
PHOSPHATE SERPL-MCNC: 4.6 MG/DL (ref 2.6–4.7)
PLATELET # BLD AUTO: 138 X10E3/UL (ref 150–450)
POTASSIUM SERPL-SCNC: 3.4 MMOL/L (ref 3.5–5.1)
RBC # BLD AUTO: 3.42 X10E6/UL (ref 4.14–5.8)
SODIUM SERPL-SCNC: 135 MMOL/L (ref 136–145)
TIBC SERPL-MCNC: 266 UG/DL (ref 250–450)
WBC # BLD AUTO: 6.7 X10E3/UL (ref 3.4–10.8)

## 2019-10-17 PROCEDURE — 96372 THER/PROPH/DIAG INJ SC/IM: CPT

## 2019-10-17 PROCEDURE — 82728 ASSAY OF FERRITIN: CPT

## 2019-10-17 PROCEDURE — 74011250636 HC RX REV CODE- 250/636: Performed by: INTERNAL MEDICINE

## 2019-10-17 PROCEDURE — 36415 COLL VENOUS BLD VENIPUNCTURE: CPT

## 2019-10-17 PROCEDURE — 83540 ASSAY OF IRON: CPT

## 2019-10-17 PROCEDURE — 80069 RENAL FUNCTION PANEL: CPT

## 2019-10-17 RX ADMIN — EPOETIN ALFA-EPBX 60000 UNITS: 40000 INJECTION, SOLUTION INTRAVENOUS; SUBCUTANEOUS at 10:03

## 2019-10-18 ENCOUNTER — HOSPITAL ENCOUNTER (OUTPATIENT)
Dept: WOUND CARE | Age: 69
Discharge: HOME | End: 2019-10-18
Payer: MEDICARE

## 2019-10-18 VITALS — RESPIRATION RATE: 16 BRPM | TEMPERATURE: 97.6 F | HEART RATE: 70 BPM

## 2019-10-18 PROBLEM — M25.80 SESAMOIDITIS: Status: ACTIVE | Noted: 2019-10-18

## 2019-10-18 PROBLEM — L97.909 VENOUS STASIS ULCER OF LEG WITHOUT VARICOSE VEINS (HCC): Status: ACTIVE | Noted: 2019-10-18

## 2019-10-18 PROBLEM — I87.2 VENOUS STASIS ULCER OF LEG WITHOUT VARICOSE VEINS (HCC): Status: ACTIVE | Noted: 2019-10-18

## 2019-10-18 PROCEDURE — 99212 OFFICE O/P EST SF 10 MIN: CPT

## 2019-10-18 PROCEDURE — 11055 PARING/CUTG B9 HYPRKER LES 1: CPT

## 2019-10-18 RX ORDER — HYDROXYZINE 25 MG/1
25 TABLET, FILM COATED ORAL
COMMUNITY
End: 2019-11-15

## 2019-10-18 NOTE — WOUND CARE
Visit Vitals Pulse 70 Temp 97.6 °F (36.4 °C) Resp 16 Discharge Condition: Stable Pain: 0 Ambulatory Status: Eliezer Gill Discharge Destination: Home Transportation: Car Accompanied by: Family/Caregiver Discharge instructions reviewed with Patient and copy or written instructions have been provided. All questions/concerns have been addressed at this time.

## 2019-10-18 NOTE — DISCHARGE INSTRUCTIONS
WOUND CARE, DRESSING CHANGE [[id number]] (Order [id number])   Nursing   Date and Time: 10/18/2019  2:38 PM Department: [facility] OP WOUND CARE Ordering/Authorizing: Eduard Brooks DPM   Order Providers     Authorizing Encounter   Tomas Sunshine          Order Information     Order Date/Time Release Date/Time Start Date/Time End Date/Time   10/18/19 02:38 PM None 10/18/19 02:45 PM 10/18/19 02:45 PM   CSN:   [id number]   Order Details     Frequency Duration Priority Order Class   ONE TIME 1  occurrence Routine Hospital Performed   Reprint OrderRequisition - Outpatient Only     WOUND CARE, DRESSING CHANGE (Order #[id number]) on 10/18/19   Standing Order Information     Remaining Occurrences Interval Last Released      0/1 ONE TIME 10/18/2019             Released Orders       Released On Scheduled For Released By    1. 10/18/2019  2:38 PM 10/18/2019  2:45 PM Luis Fernando Glover RN (auto-released)                  Comments     Clean wounds with NS, apply xeroform, gauze RG double tubigrip. Sandie Maria Discharge Instructions for  [place] [id number] [address]  Telephone: [phone]     FAX [phone]    NAME: Marcos Johnson. DATE OF BIRTH:  1950  MEDICAL RECORD NUMBER:  [id number]  DATE:  10/18/2019    Wound Cleansing:   Do not scrub or use excessive force. Cleanse wound prior to applying a clean dressing with:  [ ] Normal Saline [ ] Keep Wound Dry in Shower    [ ] Wound Cleanser   [ ] Cleanse wound with Mild Soap & Water  [ ] May Shower at Discharge   [ ] Other:      Topical Treatments:  Do not apply lotions, creams, or ointments to wound bed unless directed. [ ] Apply moisturizing lotion to skin surrounding the wound prior to dressing change. [ ] Apply antifungal ointment to skin surrounding the wound prior to dressing change. [ ] Apply thin film of moisture barrier ointment to skin immediately around wound.   [ ] Other:      Dressings:         Wound Location   Right leg wounds  [ ] Apply Primary Dressing:   [ ] Naaman Drier Gel [ ] Alginate with Silver [ ] Alginate  [ ] Collagen [ ] Collagen with Silver   [ ] Santyl with Moisten saline gauze    [ ] Hydrocolloid  [ ] MediHoney Alginate [ ] Foam with Silver  [ ] Foam   [ ] Hydrofera Blue   [ ] Real Padilla   [ ] Moisten with Saline [ ] Hydrogel [ ] Ulises Pavy   [ ] Bactroban/Mupirocin [ ] Yesica Godfrey [ ] Other:    [ ] Pack wound loosely with [ ] Iodoform   [ ] Bia Sep [ ] Other   [ ] Cover and Secure with:   [ ] Gauze [ ] Abidasusan Cabrera [ ] Krzysztof Schwartz  [ ] Carolyn Gutierrez [ ] Hailey Yuan [ ] ABD   [ ] Other:   Avoid contact of tape with skin. [ ] Change dressing: [ ] Daily   [ ] Every Other Day [ ] Three times per week  [ ] Once a week [ ] Do Not Change Dressing  [ ] Other:      Edema Control:  Apply: [ ] Compression Stocking [ ]Right Leg [ ]Left Leg  [x ] Tubigrip [x ]Right Leg Double Layer [x ]Left Leg Double Layer    [x ] Elevate leg(s) above the level of the heart when sitting. [ ] Avoid prolonged standing in one place. Dietary:  [ ] Diet as tolerated: [ ] Calorie Diabetic Diet: [x ] No Added Salt:  [ ] Increase Protein: [ ] Other:   Activity:  [x ] Activity as tolerated:  [ ] Patient has no activity restrictions     [ ] Strict Bedrest: [ ] Remain off Work:     [ ] May return to full duty work:                                   [x ]elevate legs when sitting  Return Appointment:  1 week[ ] Return Appointment: With MD  in  1Week(s)  [ ] Ordered tests:      Electronically signed Candida Carrillo RN on 10/18/2019 at 2:34 PM     [address] [place] [address] Information: Should you experience any significant changes in your wound(s) or have questions about your wound care, please contact the [address] [place] [address] at [address] [place] [address] 8:00 am - 4:30.  If you need help with your wound outside these hours and cannot wait until we are again available, contact your PCP or go to the hospital emergency room.      PLEASE NOTE: IF YOU ARE UNABLE TO OBTAIN WOUND SUPPLIES, CONTINUE TO USE THE SUPPLIES YOU HAVE AVAILABLE UNTIL YOU ARE ABLE TO REACH US.  IT IS MOST IMPORTANT TO KEEP THE WOUND COVERED AT ALL TIMES.     Physician Signature:_______________________    Date: ___________ Time:  ____________          Collection Information     Verbal Order Info     Action Created on Order Mode Entered by Comment Responsible Provider Signed by Signed on   Ordering 10/18/19 3810 Verbal with readback LAMINE Gee DPM     Additional Information     Associated Reports   View Encounter   Priority and Order Details   NPI Information        Electronically signed by Authorizing Provider: Burak Garcia [id number]     Order start date/time: 10/18/2019 2:45 PM  Electronically signed by Co-signing Provider (if required):                 Order Report     Order Details   Tracking Reports      Cosign Tracking Order Transmittal Tracking

## 2019-10-18 NOTE — WOUND CARE
Visit Vitals Pulse 70 Temp 97.6 °F (36.4 °C) Resp 16 LLE Peripheral Vascular Capillary Refill: Less than/equal to 3 seconds (10/18/19 1429) Color: Appropriate for race (10/18/19 1429) Temperature: Warm (10/18/19 1429) Sensation: Decreased (10/18/19 1429) Circumference of Calf (cm): 36.7 cm (10/18/19 1429) Location of Measurement (Calf): Mid  (10/18/19 1429) Circumference of Ankle (cm): 25.3 cm (10/18/19 1429) Location of Measurement (Ankle): Upper  (10/18/19 1429) RLE Peripheral Vascular Capillary Refill: Less than/equal to 3 seconds (10/18/19 1429) Color: Appropriate for race (10/18/19 1429) Temperature: Warm (10/18/19 1429) Sensation: Decreased (10/18/19 1429) Circumference of Calf (cm): 36.3 cm (10/18/19 1429) Location of Measurement (Calf): Mid  (10/18/19 1429) Circumference of Ankle (cm): 24.7 cm (10/18/19 1429) Location of Measurement (Ankle): Upper  (10/18/19 1429)

## 2019-10-18 NOTE — WOUND CARE
Clinic Level of Care Assessment NAME:  Leslie Kunz. YOB: 1950 GENDER: male MEDICAL RECORD NUMBER:  [id number] DATE:  10/18/2019 Wound Count Document in [facility] Number of Wounds Assessed Points No Wounds/Ulcers []   0 Less than Three Wounds/Ulcers [x]   1  
3-6 Wounds/Ulcers []   2 Greater than 6 Wounds/Ulcers []   3 Ambulation Status Document in Coord/MOE/Mobility tab Status Definition Points Independent Independently able to ambulate. Fully able (without any assistance) to get on/off exam table/chair. [x]   0 Minimal Physical Assistance Requires physical assistance of one person to ambulate and/or position patient to be examined. Includes necessary physical assistance to position lower extremities on/off stool. []   1 Moderate Physical Assistance Requires at least one staff member to physically assist patient in ambulating into treatment room, and on/off exam table. []   2 Full Assistance Requires assistance of at least two staff members to transfer patient into treatment room and/or on/off exam table/chair. \"Total Transfer\". []   3 Dressing Complexity Document in [facility] and Write Appropriate Order Complexity Definition Points No Dressing  []   0 Simple Minimal, simple dressing. i.e. Band-aid, gauze, simple wrap. []   1 Intermediate Moderately complicated requiring licensed personnel to apply i.e. collagen matrix, ointments, gels, alginates. [x]   2 Complex Complicated requiring licensed personnel to apply dressings 6 or more wounds. []   3 Teaching Effort Document in Education Tab Effort Definition Points No Teaching  []   0 Simple Reinforce two or less topics. Document in Education navigator. [x]   1 Intermediate Reinforce three to five topics and/or one additional  
new topic. Document in Education navigator. []   2 Complex Teach more than one new topic. New patient information packet reviewed and/or reinforce more than three topics. Document in Education navigator. HBO initial instruction. []   3 Patient Assessment and Planning Planning Definition Points Simple Multiple System Simple: Simple follow-up with routine assessment and planning. If Discharged, instructions and long term/follow-up care given to patient/caregiver. Discharged, instructions and/or After Visit Summary given to patient/caregiver and instructions completed. n[x]   1 Intermediate Multiple System Intermediate: Contact with outside resources; i.e. Telephone calls to home health, [facility]. May include filling out forms and writing letters, arranging transportation, communication with insurance , vendors, etc.  Discharged, instructions and/or After Visit Summary given to patient/caregiver and instructions completed. []   2 Complex Multiple System Complex: Full, comprehensive assessment and planning. Follow the entire navigator under Wound Visit charting filling out each tab which includes OP Adm Database Screening, Education and CarePlan  HBO risk assessment completed. Discharged, instructions and/or After Visit Summary given to patient/caregiver and instructions completed. []   3 Is this the Patient's First Visit to the [facility] [address] No 
 
 
Is this Patient Established @ [facility] 
Yes Clinical Level of Care Points  0-2  Level 1 [] Points  3-5  Level 2 [x] Points  6-9  Level 3 [] Points  10-12  Level 4 [] Points  13-15  Level 5 [] Electronically signed by Tejinder Lamb RN on 10/18/2019 at 2:45 PM

## 2019-10-19 NOTE — PROGRESS NOTES
[address] 
WOUND CARE PROGRESS NOTE Name:  Frankie Nazario 
MR#:  [id number] :  1950 ACCOUNT #:  [de-identified] DATE OF SERVICE:  10/18/2019 HISTORY OF CHIEF COMPLAINT:  A 22-year-old white male presents for continued treatment of chronic wound dorsal first right MPJ and plantar left foot. At last visit, the wound on plantar left foot was resolved. Wound care on the left foot consisted of Xeroform to open wounds followed by Tubigrip bilateral lower extremities. History and physical unchanged from admitting history and physical; no change in medications, no change in allergies. CURRENT MEDICATIONS:  Atarax 25 mg every 8 hours as needed for itching, doxycycline 100 mg twice a day, hydrocodone 5/325 every 6 hours as needed for pain, Lantus SoloSTAR insulin 35 units daily, Flomax 0.4 mg daily, ferrous sulfate 47.5 mg 3 times a day, Bumex 2 mg twice a day, Uloric 40 mg daily, isosorbide ER 30 mg daily, Zaroxolyn 2.5 mg daily, Timoptic ophthalmic solution daily, Proscar 5 mg daily, gabapentin 300 mg at night, potassium chloride 20 mEq 3 times a day, ascorbic acid 250 mg 3 times a day, Coreg 12.5 mg twice a day, Mirapex 0.5 mg at bedtime, Victoza 0.6 mg subcu daily. ALLERGIES:  TO NIACIN, IMIPENEM, LEVEMIR, PRIMAXIN, AND Leyla Stallion. PAST MEDICAL HISTORY:  Atrial fibrillation, diabetes with neuropathy, anxiety, atherosclerotic cardiovascular disease, benign prostatic hyperplasia, coronary artery disease, cardiomyopathy, previous cancer, congestive heart failure, chronic kidney disease, degenerative joint disease, gout, hypercholesteremia, hypertension, hypothyroidism, insomnia, obesity, restless leg syndrome. SOCIAL HISTORY:  The patient is a previous smoker, he quit in . PHYSICAL EXAMINATION: 
VITAL SIGNS:  Temperature 97.6, pulse rate 70, respirations 16, blood pressure is stable.  
EXTREMITIES:  Physical examination of lower extremities revealed palpable pedal pulses, fairly good muscle strength lower extremities bilateral with diminished epicritic sensation. Plantar second left MPJ is a hemorrhagic callus with a prominent palpable second metatarsal head. Right foot, he has an abducted hallux with a dorsal healing ulceration, scant drainage granular base measures 1 x 1.4 x 0.1 cm, no signs of infection. Posterior aspect of the right lower leg has a venous stasis wound that measures 2.4 x 1.9 x 0.1 cm and medial aspect of the leg venous stasis that is granular measures 0.1 x 0.1 x 0.1 cm. Legs are no longer erythematous. All wounds were closed with saline, dressed with Xeroform, followed by roll gauze and Tubigrip lower extremities bilateral.  The patient's wife is to change dressings every other day. First right toenail was debrided as well as the hyperkeratosis plantar aspect of the second left MPJ. The patient has diabetic shoes on order, which hopefully will alleviate the pressure, plantar aspect second left MPJ. He is to have a followup visit in [address] [place] [address] with myself in 1 week. DROIS Brown/[email or web address] 
D:  10/18/2019 15:00 
T:  10/19/2019 1:57 JOB #:  [id number]

## 2019-10-25 ENCOUNTER — HOSPITAL ENCOUNTER (OUTPATIENT)
Dept: WOUND CARE | Age: 69
Discharge: HOME | End: 2019-10-25
Payer: MEDICARE

## 2019-10-25 VITALS
HEART RATE: 69 BPM | TEMPERATURE: 96.6 F | RESPIRATION RATE: 16 BRPM | DIASTOLIC BLOOD PRESSURE: 72 MMHG | SYSTOLIC BLOOD PRESSURE: 139 MMHG

## 2019-10-25 PROCEDURE — 97597 DBRDMT OPN WND 1ST 20 CM/<: CPT

## 2019-10-25 NOTE — WOUND CARE
10/25/19 1610 Wound Foot Right;Medial 10/11/19 Date First Assessed/Time First Assessed: 10/11/19 0937   Present on Hospital Admission: Yes  Wound Approximate Age at First Assessment (Weeks): 2 weeks  Primary Wound Type: Diabetic Ulcer  Location: Foot  Wound Location Orientation: Right;Medial  Date. .. Dressing Type Applied (Betadine gauze, dry gauze, RG, double tubi size F) Discharge Condition: Stable Pain: 0 Ambulatory Status: [place] Discharge Destination: Home Transportation: Car Accompanied by: Self  and Family/Caregiver Discharge instructions reviewed with Patient and Family/Caregiver  and copy or written instructions have been provided. All questions/concerns have been addressed at this time.

## 2019-10-25 NOTE — WOUND CARE
10/25/19 1509 Wound Leg lower Right;Posterior Date First Assessed/Time First Assessed: 10/11/19 0936   Present on Hospital Admission: Yes  Wound Approximate Age at First Assessment (Weeks): 2 weeks  Primary Wound Type: Vascular  Location: Leg lower  Wound Location Orientation: Right;Posterior Dressing Status Removed Dressing Type Xeroform;Gauze;Gauze wrap (susanne) Wound Length (cm) 1.3 cm Wound Width (cm) 0.8 cm Wound Depth (cm) 0.1 cm Wound Volume (cm^3) 0.1 cm^3 Condition of Base  
(scab) Condition of Edges Open Drainage Amount Scant Drainage Color Serosanguinous Wound Odor None Angela-wound Assessment Intact Cleansing and Cleansing Agents  Soap and water Wound Foot Right;Medial 10/11/19 Date First Assessed/Time First Assessed: 10/11/19 0937   Present on Hospital Admission: Yes  Wound Approximate Age at First Assessment (Weeks): 2 weeks  Primary Wound Type: Diabetic Ulcer  Location: Foot  Wound Location Orientation: Right;Medial  Date. .. Dressing Status Removed Dressing Type Xeroform;Gauze;Gauze wrap (susanne) Wound Length (cm) 0.9 cm Wound Width (cm) 1.1 cm Wound Depth (cm) 0.1 cm Wound Volume (cm^3) 0.1 cm^3 Condition of Base Pink Condition of Edges Open Drainage Amount Scant Drainage Color Serosanguinous Wound Odor None Angela-wound Assessment Intact Cleansing and Cleansing Agents  Soap and water Wound Leg lower Right;Medial 10/11/19 Date First Assessed/Time First Assessed: 10/11/19 0956   Present on Hospital Admission: Yes  Wound Approximate Age at First Assessment (Weeks): 2 weeks  Primary Wound Type: Vascular  Location: Leg lower  Wound Location Orientation: Right;Medial  Date . .. Dressing Status Removed Dressing Type Xeroform;Gauze;Gauze wrap (susanne) Wound Length (cm) 0.2 cm 
(scab) Wound Width (cm) 0.2 cm Wound Depth (cm) 0.1 cm Wound Volume (cm^3) 0 cm^3 Condition of Base  
(scab) Drainage Amount Scant Drainage Color Serous Wound Odor None Angela-wound Assessment Blanchable erythema

## 2019-10-26 NOTE — OP NOTES
[address] 
OPERATIVE REPORT Name:  Cirilo Kasper 
MR#:  [id number] :  1950 ACCOUNT #:  [de-identified] DATE OF SERVICE:  10/25/2019 PREOPERATIVE DIAGNOSIS:  Chronic wound, right foot. POSTOPERATIVE DIAGNOSIS:  Chronic wound, right foot. PROCEDURE PERFORMED:  Selective debridement chronic wound right foot. SURGEON:  Jere Villanueva DPM. ASSISTANT:  none ANESTHESIA:  None needed. COMPLICATIONS:  none SPECIMENS REMOVED:  None. IMPLANTS:  none ESTIMATED BLOOD LOSS:  Minimal. 
 
INDICATIONS:  This 64-year-old white male presents for continued treatment of chronic wound, right foot. Most recent therapy has consisted of silver calcium alginate. The patient also was treated for wounds lower legs which appeared to be healed. History and physical unchanged from admitting history and physical.  No change in medications, no change in allergies. CURRENT MEDICATIONS:  Atarax 25 mg every 8 hours as needed for itching, doxycycline 1 tablet p.o. b.i.d., Lantus SoloSTAR insulin 35 units daily, Flomax 0.4 mg 2 tablets daily, ferrous sulfate 47.5 mg 3 times a day, Bumex 2 mg twice a day, Uloric 1 tablet daily, Imdur 30 mg daily, metolazone 2.5 mg daily as needed for swelling, Timoptic ophthalmic solution right eye 2 times a day, Proscar 5 mg daily, gabapentin 300 mg at night, Klor-Con 20 mEq 3 times a day, vitamin C 250 mg 2 times a day, carvedilol 12.5 mg twice a day, Mirapex 1 tablet by mouth at night time, Victoza 0.6 mg subcu daily. ALLERGIES:  TO NIACIN, IMIPENEM, PRIMAXIN, AND Natasha Chakco. PAST MEDICAL HISTORY:  Diabetes with neuropathy, atrial fibrillation, anxiety, atherosclerotic cardiovascular disease, benign prostatic hyperplasia, coronary artery disease, cardiomyopathy, congestive heart failure, chronic kidney disease, gout, hypercholesteremia, hyperlipidemia, hypertension, renal disease, hypothyroidism, and restless leg syndrome. SOCIAL HISTORY:  The patient is a previous smoker, he quit in 1972. PHYSICAL EXAMINATION: 
VITAL SIGNS:  Temperature 96.6, pulse rate 69, respirations 16, blood pressure 139/72. EXTREMITIES:  Physical examination of lower extremities revealed barely palpable pedal pulses with noted lack of hair growth lower extremities. Diminished epicritic sensation. Dorsal aspect first right toe, there is a granular wound with scant drainage, rolled edges measured 0.9 x 1.1 x 0.1 cm. Posterior lower right leg, there is a well scabbed wound, medial aspect right lower leg there is a well scabbed wound. DESCRIPTION OF PROCEDURE:  Using a curette, a selective debridement was performed on the ulceration dorsal first right MPJ through the dermal layer removing dermal granulomatous hyperkeratotic, and rolled tissue. Good bleeding base was achieved. The wound is now dressed with Betadine wet-to-dry dressings. The patient's wife is to change every other day and he is to wear compression hose lower extremities bilateral. 
 
He will have followup visit in the [facility] with myself, Dr. Faby Ross in 2 week. DORIS Ribera/[email or web address] 
D:  10/25/2019 16:14 
T:  10/26/2019 1:22 
JOB #:  [id number]

## 2019-11-05 NOTE — TELEPHONE ENCOUNTER
Patient needs a restless leg medication changed.   Stated hes going to pay out of pocket for it    Call back [phone]

## 2019-11-06 DIAGNOSIS — G25.81 RESTLESS LEG: ICD-10-CM

## 2019-11-06 LAB
SPECIMEN STATUS REPORT, ROLRST: NORMAL
SPECIMEN STATUS REPORT, ROLRST: NORMAL

## 2019-11-06 RX ORDER — PRAMIPEXOLE DIHYDROCHLORIDE 1 MG/1
TABLET ORAL
Qty: 30 TAB | Status: SHIPPED | OUTPATIENT
Start: 2019-11-06 | End: 2020-12-11 | Stop reason: SDUPTHER

## 2019-11-06 NOTE — TELEPHONE ENCOUNTER
Discussed with Dr. José Miguel Bey and he gave a verbal to increase the Mirapex to 1 mg qhs. Rx sent to patient's pharmacy.

## 2019-11-06 NOTE — TELEPHONE ENCOUNTER
RX refill request from the patient/pharmacy. Patient last seen 10- with labs, and next appt. scheduled for 11-  Requested Prescriptions     Pending Prescriptions Disp Refills    pramipexole (MIRAPEX) 1 mg tablet 30 Tab prn     Sig: TAKE 1 TABLET BY MOUTH EVERY NIGHT AT BEDTIME   .

## 2019-11-08 ENCOUNTER — HOSPITAL ENCOUNTER (OUTPATIENT)
Dept: WOUND CARE | Age: 69
Discharge: HOME | End: 2019-11-08
Payer: MEDICARE

## 2019-11-08 VITALS
SYSTOLIC BLOOD PRESSURE: 110 MMHG | TEMPERATURE: 97.1 F | HEART RATE: 70 BPM | RESPIRATION RATE: 16 BRPM | DIASTOLIC BLOOD PRESSURE: 62 MMHG

## 2019-11-08 PROBLEM — L02.619 ABSCESS OF FOOT: Status: ACTIVE | Noted: 2019-11-08

## 2019-11-08 PROCEDURE — 97597 DBRDMT OPN WND 1ST 20 CM/<: CPT

## 2019-11-08 PROCEDURE — 10060 I&D ABSCESS SIMPLE/SINGLE: CPT

## 2019-11-08 NOTE — WOUND CARE
11/08/19 1509 Wound Foot Right;Medial 10/11/19 Date First Assessed/Time First Assessed: 10/11/19 0937   Present on Hospital Admission: Yes  Wound Approximate Age at First Assessment (Weeks): 2 weeks  Primary Wound Type: Diabetic Ulcer  Location: Foot  Wound Location Orientation: Right;Medial  Date. .. Dressing Type Applied Moist to dry;Gauze;Gauze wrap (susanne); Special tape (comment) (betadine moist gauze,single tubi F) Wound Procedure Type Selective Debridement Procedure Time Out 1037 Consent Obtained  Yes Procedure Bleeding Minimal  
Procedure Hemostasis  Pressure Procedure Instrument  Blade Procedure Pain Scale Numeric 0/10 Debridement Procedure Performed by Dr Amy Mueller Post Procedure Pain Scale Numeric 0/10 Procedure Tolerated Well Wound Foot Plantar;Left 06/28/19 Date First Assessed/Time First Assessed: 06/28/19 1334   Present on Hospital Admission: Yes  Primary Wound Type: Diabetic  Location: Foot  Wound Location Orientation: Plantar;Left  Date of First Observation: 06/28/19 Dressing Type Applied Moist to dry;Gauze wrap (susanne);Gauze;Special tape (comment) (betadine moist gauze,single tubi F) Wound Procedure Type Selective Debridement Procedure Time Out 1304 Consent Obtained  Yes Procedure Bleeding Minimal  
Procedure Hemostasis  Pressure Procedure Instrument  Blade; Forceps Procedure Pain Scale Numeric 0/10 Debridement Procedure Performed by DR Amy Mueller Post Procedure Pain Scale Numeric 0/10 Procedure Tolerated Well Discharge Condition: Stable Pain: 0 Ambulatory Status: [place] Discharge Destination: Home Transportation: Car Accompanied by: Family/Caregiver Discharge instructions reviewed with Patient and Family/Caregiver  and copy or written instructions have been provided. All questions/concerns have been addressed at this time.

## 2019-11-08 NOTE — DISCHARGE INSTRUCTIONS
Discharge Instructions for  [place] [address] [facility], [address] [facility]  Telephone: [phone]     FAX [phone]    NAME: Maryjane Zapata. DATE OF BIRTH:  1950  MEDICAL RECORD NUMBER:  [id number]  DATE:  11/8/2019    Wound Cleansing:   Do not scrub or use excessive force. Cleanse wound prior to applying a clean dressing with:  [x ] Normal Saline [ ] Keep Wound Dry in Shower    [ ] Wound Cleanser   [ ] Cleanse wound with Mild Soap & Water  [ ] May Shower at Discharge   [ ] Other:      Topical Treatments:  Do not apply lotions, creams, or ointments to wound bed unless directed. [x ] Apply moisturizing lotion to skin surrounding the wound prior to dressing change. [ ] Apply antifungal ointment to skin surrounding the wound prior to dressing change. [ ] Apply thin film of moisture barrier ointment to skin immediately around wound. [ ] Other:      Dressings:         Wound Location R foot left plantar foot  [x ] Apply Primary Dressing:   [ ] MediHoney Gel [ ] Alginate with Silver [ ] Alginate  [ ] Collagen [ ] Collagen with Silver   [ ] Santyl with Moisten saline gauze    [ ] Hydrocolloid  [ ] MediHoney Alginate [ ] Foam with Silver  [ ] Foam   [ ] Hydrofera Blue   [ ] Espinal Peer   [ ] Moisten with Saline [ ] Hydrogel [ ] Mepitel [x ] Betadine moist gauze  [ ] Bactroban/Mupirocin [ ] Gwenetta Yaron [ ] Other:    [ ] Pack wound loosely with [ ] Iodoform   [ ] Anola Kiss [ ] Other   [ x] Cover and Secure with:   [x ] Gauze [x ] Ubaldo Rocks [ ] Kerlix  [ ] Sujey Fontan [x ]  Tape [ ] ABD [ ] Exudry   [ ] Other:   Avoid contact of tape with skin.   [ x] Change dressing: [ ] Daily   [ x] Every Other Day [ ] Three times per week  [ ] Once a week [ ] Do Not Change Dressing  [ ] Other:    Edema Control:  Apply: [ ] Compression Stocking [ ]Right Leg [ ]Left Leg  [ x Tubigrip [ ]Right Leg Double Layer [ ]Left Leg Double Layer    [x ]Right Leg Single Layer [ x]Left Leg Single Layer  [ ] SpandaGrip [ ]Right Leg [ ]Left Leg    [ ]Low compression 5-10 mm/Hg   [ ]Medium compression 10-20 mm/Hg  [ ]High compression  20-30 mm/Hg  every morning immediately when getting up should be applied to affected leg(s) from mid foot to knee making sure to cover the heel.  Remove every night before going to bed. [x Elevate leg(s) above the level of the heart when sitting. [x ] Avoid prolonged standing in one place. [ ] Elevate arm/hand above the level of the heart [ ]RightArm [ ]LeftArm      Dietary:  [x] Diet as tolerated: [x ] Diabetic Diet: Low carb no sugar [ x] No Added Salt:  [ ] Increase Protein: [ ] Other:   Activity:  [x ] Activity as tolerated:  [ ] Patient has no activity restrictions     [ ] Strict Bedrest: [ ] Remain off Work:     [ ] May return to full duty work:                                   [ ] Return to work with restrictions:   [address]   Return Appointment:  [ ] Wound and dressing supply provider:   [ ] ECF or Home Healthcare:  [ ] Wound Assessment: [ ] Physician or NP scheduled for Wound Assessment:   [ x] Return Appointment: With DR Jayro Morrissey  in  1 Week(s)  [ ] Ordered tests:      Electronically signed Tony Retana RN on 11/8/2019 at 3:16 PM     [address] [facility] Information: Should you experience any significant changes in your wound(s) or have questions about your wound care, please contact the [facility] Main at [address] [place] [address] 8:00 am - 4:30.  If you need help with your wound outside these hours and cannot wait until we are again available, contact your PCP or go to the hospital emergency room. PLEASE NOTE: IF YOU ARE UNABLE TO OBTAIN WOUND SUPPLIES, CONTINUE TO USE THE SUPPLIES YOU HAVE AVAILABLE UNTIL YOU ARE ABLE TO REACH US.  IT IS MOST IMPORTANT TO KEEP THE WOUND COVERED AT ALL TIMES.     Physician Signature:_______________________    Date: ___________ Time:  ____________

## 2019-11-08 NOTE — WOUND CARE
11/08/19 1440 [REMOVED] Wound Leg lower Right;Posterior Final Assessment Date/Final Assessment Time: 11/08/19 1442  Date First Assessed/Time First Assessed: 10/11/19 0936   Present on Hospital Admission: Yes  Wound Approximate Age at First Assessment (Weeks): 2 weeks  Primary Wound Type: Vascular  Location. .. Dressing Status (Open to air) Wound Length (cm) 0 cm Wound Width (cm) 0 cm Wound Depth (cm) 0 cm Wound Surface Area (cm^2) 0 cm^2 Wound Volume (cm^3) 0 cm^3 Condition of Base Epithelializing Condition of Edges Closed Drainage Amount None Wound Odor None [REMOVED] Wound Leg lower Right;Medial 10/11/19 Final Assessment Date/Final Assessment Time: 11/08/19 1443  Date First Assessed/Time First Assessed: 10/11/19 0956   Present on Hospital Admission: Yes  Wound Approximate Age at First Assessment (Weeks): 2 weeks  Primary Wound Type: Vascular  Location. .. Dressing Status (Open to air) Wound Length (cm) 0 cm Wound Width (cm) 0 cm Wound Depth (cm) 0 cm Wound Surface Area (cm^2) 0 cm^2 Wound Volume (cm^3) 0 cm^3 Condition of Base Epithelializing Condition of Edges Closed Drainage Amount None Wound Odor None Wound Foot Right;Medial 10/11/19 Date First Assessed/Time First Assessed: 10/11/19 0937   Present on Hospital Admission: Yes  Wound Approximate Age at First Assessment (Weeks): 2 weeks  Primary Wound Type: Diabetic Ulcer  Location: Foot  Wound Location Orientation: Right;Medial  Date. .. Dressing Status Removed Dressing Type (Betadine gauze, roll gauze, tape) Non-staged Wound Description Full thickness Wound Length (cm) 1 cm Wound Width (cm) 1 cm Wound Depth (cm) 0.1 cm Wound Surface Area (cm^2) 1 cm^2 Wound Volume (cm^3) 0.1 cm^3 Condition of Base Pink Condition of Edges Open Drainage Amount Scant Drainage Color Serosanguinous Wound Odor None Angela-wound Assessment Intact Cleansing and Cleansing Agents  Normal saline Wound Foot Plantar;Left 06/28/19 Date First Assessed/Time First Assessed: 06/28/19 1334   Present on Hospital Admission: Yes  Primary Wound Type: Diabetic  Location: Foot  Wound Location Orientation: Plantar;Left  Date of First Observation: 06/28/19 Dressing Status (Open to air) Wound Length (cm) 4.5 cm Wound Width (cm) 4 cm Wound Depth (cm) 0.1 cm Wound Surface Area (cm^2) 18 cm^2 Wound Volume (cm^3) 1.8 cm^3 Condition of [facility] Condition of Edges Closed Drainage Amount Moderate Drainage Color Serous Wound Odor None Angela-wound Assessment Intact Cleansing and Cleansing Agents  Normal saline Visit Vitals /62 (BP 1 Location: Left arm) Pulse 70 Temp 97.1 °F (36.2 °C) Resp 16

## 2019-11-09 NOTE — OP NOTES
[address] 
OPERATIVE REPORT Name:  Rubén Bello 
MR#:  [id number] :  1950 ACCOUNT #:  [de-identified] DATE OF SERVICE:  2019 PREOPERATIVE DIAGNOSIS:  Abscess, plantar left foot. POSTOPERATIVE DIAGNOSIS:  Abscess, plantar left foot. PROCEDURE PERFORMED:  Incision and drainage of an abscess plantar left foot. SURGEON:  Jose Varghese DPM. ASSISTANT:  none ANESTHESIA:  None needed. COMPLICATIONS:  None. SPECIMENS REMOVED:  None. IMPLANTS:  none ESTIMATED BLOOD LOSS:  Minimal. 
 
INDICATION:  This 70-year-old white male presents for continued treatment of a wound on his right foot. However, there is an incidental abscess noted on the plantar aspect of his left foot. History and physical unchanged from admitting history and physical; no change in medications, and no change in allergies. CURRENT MEDICATIONS:  Mirapex 1 tablet at bedtime, Lantus SoloSTAR insulin 35 units subcu daily, Flomax 0.4 mg 2 times a day, ferrous sulfate 47.5 mg 1 tablet 3 times a day, Bumex 2 mg 2 tablets twice a day, isosorbide ER 30 mg once daily, Zaroxolyn 2.5 mg tablets daily, Timoptic 0.5% ophthalmiac solution 1 drop to the right eye 2 times a day, Proscar 5 mg tablet once daily, potassium chloride 20 mEq 3 times a day, vitamin C 250 mg 3 times a day, carvedilol 12.5 mg twice a day, Victoza 0.6 mg subcu daily, Atarax 25 mg every 8 hours as needed for itching, Uloric 40 mg daily. ALLERGIES:  TO NIACIN, LEVEMIR, IMIPENEM, AND Sundra Lux. PAST MEDICAL HISTORY:  Anemia, anxiety, arrhythmias, atherosclerotic cardiovascular disease, benign prostatic hyperplasia, coronary artery disease, cancer, cardiomyopathy, congestive heart failure, chronic kidney disease, the patient has a pacemaker, diabetes with neuropathy, degenerative joint disease, gout, hypercholesterolemia, hyperlipidemia, hypertension, hypothyroidism, insomnia, and restless leg syndrome. SOCIAL HISTORY:  The patient is a previous smoker. He stopped in 1972. PHYSICAL EXAMINATION:  Temperature 97.1, pulse rate 70, respirations 16, blood pressure 110/62. He has palpable pedal pulses, fairly good muscle strength lower extremities bilateral.  Grossly diminished epicritic sensation. Dorsal aspect of the first right MPJ is a drying scabbed wound with no drainage, measures 1 x 1 x 0.1 cm. Plantar aspect of the left foot, there is a pustular abscess that appears to be fluid filled. DESCRIPTION OF PROCEDURE:  Using a #15 blade, an I and D of this abscess of the plantar left foot was performed through the dermal layer removing dermal and serous drainage. There is minimal erythema, it does not appeared to be infected. Post debridement measurements were 4.5 x 4 x 0.1 cm. There is no significant depth to the wound. Wound was cleansed with saline, dressed with Betadine wet-to-dry dressings. The patient is to apply Betadine wet-to-dry dressings to the wounds, left foot and right foot daily. At this time, I have advised the patient to start wearing the compression hose because I do feel that the compression hose may have caused friction from the latex on the left foot. The patient states that the wound did not appear until after he wore the compression hose. He states the same thing has happened years ago. He will have followup visit in [address] [place] [address] with myself, Dr. Uche Snow in 1 week. DORIS Coronel/[email or web address] 
D:  11/08/2019 15:50 
T:  11/09/2019 2:12 JOB #:  [id number]

## 2019-11-13 ENCOUNTER — APPOINTMENT (OUTPATIENT)
Dept: INFUSION THERAPY | Age: 69
End: 2019-11-13
Payer: MEDICARE

## 2019-11-14 ENCOUNTER — HOSPITAL ENCOUNTER (OUTPATIENT)
Dept: INFUSION THERAPY | Age: 69
Discharge: HOME | End: 2019-11-14
Payer: MEDICARE

## 2019-11-14 VITALS
HEART RATE: 69 BPM | DIASTOLIC BLOOD PRESSURE: 78 MMHG | RESPIRATION RATE: 18 BRPM | OXYGEN SATURATION: 100 % | TEMPERATURE: 97.4 F | SYSTOLIC BLOOD PRESSURE: 130 MMHG | WEIGHT: 229.6 LBS | BODY MASS INDEX: 27.95 KG/M2

## 2019-11-14 LAB
ALBUMIN SERPL-MCNC: 3.4 G/DL (ref 3.5–5)
ANION GAP SERPL CALC-SCNC: 8 MMOL/L (ref 5–15)
BUN SERPL-MCNC: 119 MG/DL (ref 6–20)
BUN/CREAT SERPL: 43 (ref 12–20)
CALCIUM SERPL-MCNC: 9.1 MG/DL (ref 8.5–10.1)
CHLORIDE SERPL-SCNC: 93 MMOL/L (ref 97–108)
CO2 SERPL-SCNC: 32 MMOL/L (ref 21–32)
CREAT SERPL-MCNC: 2.74 MG/DL (ref 0.7–1.3)
ERYTHROCYTE [DISTWIDTH] IN BLOOD BY AUTOMATED COUNT: 13.7 % (ref 11.5–14.5)
FERRITIN SERPL-MCNC: 281 NG/ML (ref 26–388)
GLUCOSE SERPL-MCNC: 169 MG/DL (ref 65–100)
HCT VFR BLD AUTO: 33.1 % (ref 36.6–50.3)
HGB BLD-MCNC: 10.5 G/DL (ref 12.1–17)
IRON SATN MFR SERPL: 23 % (ref 20–50)
IRON SERPL-MCNC: 61 UG/DL (ref 35–150)
MCH RBC QN AUTO: 29.3 PG (ref 26–34)
MCHC RBC AUTO-ENTMCNC: 31.7 G/DL (ref 30–36.5)
MCV RBC AUTO: 92.5 FL (ref 80–99)
NRBC # BLD: 0 K/UL (ref 0–0.01)
NRBC BLD-RTO: 0 PER 100 WBC
PHOSPHATE SERPL-MCNC: 4.4 MG/DL (ref 2.6–4.7)
PLATELET # BLD AUTO: 107 K/UL (ref 150–400)
PMV BLD AUTO: 10.3 FL (ref 8.9–12.9)
POTASSIUM SERPL-SCNC: 3.3 MMOL/L (ref 3.5–5.1)
RBC # BLD AUTO: 3.58 M/UL (ref 4.1–5.7)
SODIUM SERPL-SCNC: 133 MMOL/L (ref 136–145)
TIBC SERPL-MCNC: 265 UG/DL (ref 250–450)
WBC # BLD AUTO: 5.9 K/UL (ref 4.1–11.1)

## 2019-11-14 PROCEDURE — 83540 ASSAY OF IRON: CPT

## 2019-11-14 PROCEDURE — 85027 COMPLETE CBC AUTOMATED: CPT

## 2019-11-14 PROCEDURE — 80069 RENAL FUNCTION PANEL: CPT

## 2019-11-14 PROCEDURE — 36415 COLL VENOUS BLD VENIPUNCTURE: CPT

## 2019-11-14 PROCEDURE — 82728 ASSAY OF FERRITIN: CPT

## 2019-11-14 NOTE — PROGRESS NOTES
[facility] [place]: 
[place] - 0486 Labs obtained: CBC, Renal Panel, Iron Profile, Ferritin Visit Vitals /78 (BP 1 Location: Left arm, BP Patient Position: Sitting) Pulse 69 Temp 97.4 °F (36.3 °C) Resp 18 Wt 104.1 kg (229 lb 9.6 oz) SpO2 100% BMI 27.95 kg/m² Recent Results (from the past 12 hour(s)) CBC W/O DIFF Collection Time: 11/14/19  9:58 AM  
Result Value Ref Range WBC 5.9 4.1 - 11.1 K/uL  
 RBC 3.58 (L) 4.10 - 5.70 M/uL  
 HGB 10.5 (L) 12.1 - 17.0 g/dL HCT 33.1 (L) 36.6 - 50.3 % MCV 92.5 80.0 - 99.0 FL  
 MCH 29.3 26.0 - 34.0 PG  
 MCHC 31.7 30.0 - 36.5 g/dL  
 RDW 13.7 11.5 - 14.5 % PLATELET 280 (L) 340 - 400 K/uL MPV 10.3 8.9 - 12.9 FL  
 NRBC 0.0 0  WBC ABSOLUTE NRBC 0.00 0.00 - 0.01 K/uL RENAL FUNCTION PANEL Collection Time: 11/14/19  9:58 AM  
Result Value Ref Range Sodium 133 (L) 136 - 145 mmol/L Potassium 3.3 (L) 3.5 - 5.1 mmol/L Chloride 93 (L) 97 - 108 mmol/L  
 CO2 32 21 - 32 mmol/L Anion gap 8 5 - 15 mmol/L Glucose 169 (H) 65 - 100 mg/dL  (H) 6 - 20 MG/DL Creatinine 2.74 (H) 0.70 - 1.30 MG/DL  
 BUN/Creatinine ratio 43 (H) 12 - 20 GFR est AA 28 (L) >60 ml/min/1.73m2 GFR est non-AA 23 (L) >60 ml/min/1.73m2 Calcium 9.1 8.5 - 10.1 MG/DL Phosphorus 4.4 2.6 - 4.7 MG/DL Albumin 3.4 (L) 3.5 - 5.0 g/dL See [facility] for pending results. Assessment - unchanged. Retacrit HELD per order 1025 - Tolerated well. Pt [place] es any acute problems/changes. Discharged from [facility] ambulatory. No distress. Next appt: 12/12/19 at 1100

## 2019-11-15 ENCOUNTER — HOSPITAL ENCOUNTER (OUTPATIENT)
Dept: WOUND CARE | Age: 69
Discharge: HOME | End: 2019-11-15
Payer: MEDICARE

## 2019-11-15 VITALS
HEART RATE: 70 BPM | TEMPERATURE: 97.7 F | RESPIRATION RATE: 16 BRPM | SYSTOLIC BLOOD PRESSURE: 128 MMHG | DIASTOLIC BLOOD PRESSURE: 64 MMHG

## 2019-11-15 PROBLEM — L03.119 CELLULITIS, LEG: Status: ACTIVE | Noted: 2019-11-15

## 2019-11-15 PROCEDURE — 97597 DBRDMT OPN WND 1ST 20 CM/<: CPT

## 2019-11-15 NOTE — WOUND CARE
11/15/19 1034 Wound Foot Right;Medial 10/11/19 Date First Assessed/Time First Assessed: 10/11/19 0937   Present on Hospital Admission: Yes  Wound Approximate Age at First Assessment (Weeks): 2 weeks  Primary Wound Type: Diabetic Ulcer  Location: Foot  Wound Location Orientation: Right;Medial  Date. .. Dressing Type Applied (Betadine, gauze, roll gauze, tape) Wound Foot Plantar;Left 06/28/19 Date First Assessed/Time First Assessed: 06/28/19 1334   Present on Hospital Admission: Yes  Primary Wound Type: Diabetic  Location: Foot  Wound Location Orientation: Plantar;Left  Date of First Observation: 06/28/19 Dressing Type Applied (Betadine, gauze, roll gauze, tape) Wound Foot Left;Dorsal Erupted blister 11/15/19 Date First Assessed/Time First Assessed: 11/15/19 0925   Wound Approximate Age at First Assessment (Weeks): 2 weeks  Primary Wound Type: (c) Diabetic  Location: Foot  Wound Location Orientation: Left;Dorsal  Wound Description: Erupted blister  Date of. .. Wound Procedure Type (Betadine, gauze, roll gauze, tape) Discharge Condition: Stable Pain: 0 Ambulatory Status: [place] Discharge Destination: Home Transportation: Car Accompanied by: Self  and Family/Caregiver Discharge instructions reviewed with Patient and Family/Caregiver  and copy or written instructions have been provided. All questions/concerns have been addressed at this time.

## 2019-11-15 NOTE — WOUND CARE
11/15/19 4565 Wound Foot Right;Medial 10/11/19 Date First Assessed/Time First Assessed: 10/11/19 0937   Present on Hospital Admission: Yes  Wound Approximate Age at First Assessment (Weeks): 2 weeks  Primary Wound Type: Diabetic Ulcer  Location: Foot  Wound Location Orientation: Right;Medial  Date. .. Dressing Status Removed Dressing Type Other (Comment);4 x 4;Gauze wrap (susanne) (Betadine, gauze, roll gauze.) Non-staged Wound Description Full thickness Wound Length (cm) 0.8 cm Wound Width (cm) 0.7 cm Wound Depth (cm) 0.1 cm Wound Surface Area (cm^2) 0.56 cm^2 Wound Volume (cm^3) 0.06 cm^3 Condition of Base Pink Condition of Edges Open (Dry scaly around edges) Drainage Amount Small Drainage Color Serosanguinous Wound Odor None Angela-wound Assessment Intact Cleansing and Cleansing Agents  Normal saline Wound Foot Plantar;Left 06/28/19 Date First Assessed/Time First Assessed: 06/28/19 1334   Present on Hospital Admission: Yes  Primary Wound Type: Diabetic  Location: Foot  Wound Location Orientation: Plantar;Left  Date of First Observation: 06/28/19 Dressing Status Removed Dressing Type 4 x 4;Gauze wrap (susanne); Other (Comment) (Betadine, gauze, roll gauze.) Wound Length (cm) 1.8 cm Wound Width (cm) 1.6 cm Wound Depth (cm) 0.1 cm Wound Surface Area (cm^2) 2.88 cm^2 Wound Volume (cm^3) 0.29 cm^3 Condition of Base Slough;Pink Condition of Edges Calloused; Open Drainage Amount Moderate Drainage Color Serous Wound Odor None Angela-wound Assessment Intact Cleansing and Cleansing Agents  Normal saline Wound Foot Left;Dorsal Erupted blister 11/15/19 Date First Assessed/Time First Assessed: 11/15/19 0925   Wound Approximate Age at First Assessment (Weeks): 2 weeks  Primary Wound Type: (c) Diabetic  Location: Foot  Wound Location Orientation: Left;Dorsal  Wound Description: Erupted blister  Date of. .. Dressing Status Removed Dressing Type Gauze;Gauze wrap (susanne); Other (Comment) (Betadine, gauze.) Wound Length (cm) 2.8 cm Wound Width (cm) 1.1 cm Wound Depth (cm) 0.1 cm Wound Surface Area (cm^2) 3.08 cm^2 Wound Volume (cm^3) 0.31 cm^3 Condition of Base Other (comment) (Red & portion covered with nonviable skin--erupted blister.) Condition of Edges (Portion of wound covered with nonviable skin-erupted blister) Drainage Amount Moderate Drainage Color Sanguinous Wound Odor None Angela-wound Assessment Intact Cleansing and Cleansing Agents  Normal saline

## 2019-11-16 NOTE — OP NOTES
[facility] 
OPERATIVE REPORT Name:  Lorna Maya 
MR#:  [id number] :  1950 ACCOUNT #:  [de-identified] DATE OF SERVICE:  11/15/2019 PREOPERATIVE DIAGNOSIS:  Diabetic ulcer, plantar and dorsal aspect, left forefoot. POSTOPERATIVE DIAGNOSIS:  Diabetic ulcer, plantar and dorsal aspect, left forefoot. PROCEDURE PERFORMED:  Selective debridement, diabetic ulcers, plantar and dorsal aspect, left foot. SURGEON:  Joe Penn DPM 
 
ASSISTANT: none ANESTHESIA:  None needed. COMPLICATIONS: none SPECIMENS REMOVED:  None. IMPLANTS:  none ESTIMATED BLOOD LOSS:  Minimal. 
 
INDICATION:  This 75-year-old white male presents for continued treatment of chronic wounds in both feet. Most recent therapy is consisted of Betadine wet-to-dry dressings on both feet. Upon reviewing newly received diabetic shoes, the insoles were not made to specification and they are also a quater of an inch too short in his shoes and his toes tend to overhang with the plastizote insole, which the patient states is uncomfortable. History and physical unchanged from admitting history and physical; no change in medications, no change in allergies. CURRENT MEDICATIONS:  Mirapex 1 mg at night, Lantus SoloStar insulin 35 units on a sliding scale, Flomax 0.4 mg twice a day, ferrous sulfate 47.5 mg tablet three times a day, Bumex 2 mg twice a day, Uloric 40 mg daily, isosorbide ER 30 mg daily, Zaroxolyn 2.5 mg as needed for swelling, Timoptic eye drops to the right eye daily, Proscar 5 mg daily, potassium chloride 20 mEq one tab three times a day, vitamin C 250 mg three times a day, Coreg 12.5 mg twice a day, Victoza 0.6 mg subcutaneously daily. ALLERGIES:  LEVEMIR, IMIPENEM, AND XARELTO.  
 
PAST MEDICAL HISTORY:  Diabetes with neuropathy, atrial fibrillation, anxiety, arrhythmias, atherosclerotic cardiovascular disease, benign prostatic hyperplasia, coronary artery disease, cancer, cardiomyopathy, congestive heart failure, chronic kidney disease, DJD, gout, hypercholesteremia, hyperlipidemia, hypertension, hypothyroidism, insomnia, restless legs syndrome. SOCIAL HISTORY:  The patient is a former smoker. He quit in 1972. PHYSICAL EXAMINATION: 
VITAL SIGNS:  Temperature 97.7, pulse rate 70, respirations 16, blood pressure 120/64. EXTREMITIES:  Physical examination of lower extremities, palpable pedal pulses. Fairly good muscle strength in lower extremities bilaterally. The left lower leg is somewhat erythematous and warm indicative of cellulitis. He has a dry crusted healing wound with no drainage, dorsal aspect first right MPJ that measures 0.8 x 0.7 x 0.1. Plantar second left MPJ is of granular wound with central area of necrosis that measures 1.8 x 1.6 x 0.1 cm. In dorsal aspect of the second left webspace, there is a granular beefy red wound with yellow exudative coating that measures 2.8 x 1.1 x 0.1 cm. DESCRIPTION OF PROCEDURE:  Using a curette, a selective debridement was performed on the wound, plantar left foot and dorsal left foot into dermal layers on both removing dermal tissue, exudative tissue from the dorsal wound, necrotic tissue from the plantar wound, and hyperkeratotic tissue. Aerobic as well as anaerobic wound cultures were taken of the wound, dorsal left foot. Wound care consisted of Betadine wet-to-dry dressings. The patient's wife is to change dressings every other day. He was given a prescription of Augmentin 500 mg #20 to take one p.o. b.i.d. with one refill. I spoke with [facility] and the patient is to return to [facility] for refabrication of custom-molded insole. DORIS [id number] 
D:  11/15/2019 13:21 T:  11/15/2019 23:06 JOB #:  [id number]

## 2019-11-18 LAB
BACTERIA SPEC AEROBE CULT: ABNORMAL
BACTERIA SPEC AEROBE CULT: ABNORMAL

## 2019-11-18 NOTE — OP NOTES
HISTORY OF PRESENT ILLNESS  Patient presents with:  Weight Check: down 3 lb      Pierce Garcia is a 46year old female here for follow up in medical weight loss program.     Denies chest pain, shortness of breath, dizziness, blurred vision, headache, [address] 
OPERATIVE REPORT Name:  Itzel Ott 
MR#:  [id number] :  1950 ACCOUNT #:  [de-identified] DATE OF SERVICE:  2019 PREOPERATIVE DIAGNOSIS:   Diabetic ulcer, plantar aspect, left foot. POSTOPERATIVE DIAGNOSIS:  Diabetic ulcer, plantar aspect, left foot. PROCEDURE PERFORMED:  Selective debridement, diabetic ulcer, plantar aspect of the left foot followed by application of a total contact cast. 
 
SURGEON:  Sandra Boss DPM 
 
ASSISTANT: none ANESTHESIA:  None needed. COMPLICATIONS:  None. SPECIMENS REMOVED:  None. IMPLANTS:  none ESTIMATED BLOOD LOSS:  Minimal. 
 
INDICATIONS:  This 68-year-old white male presents for continued treatment of chronic wound, plantar aspect of the left foot. Most recent therapy has consisted of Aquacel Ag followed by total contact casting. Wound cultures were taken at last visit and the wound grew Pseudomonas. History and physical unchanged from admitting history and physical; no change in medications, no change in allergies. ALLERGIES:  TO NIACIN, IMIPENEM, LEVEMIR, PRIMAXIN AND Nicky Ebbing. PAST MEDICAL HISTORY:  Atrial fibrillation, atherosclerotic cardiovascular disease, gout, hyperlipidemia, chronic kidney disease, diabetes with neuropathy, restless legs syndrome, hypothyroidism, GERD, cardiomyopathy, benign prostatic hyperplasia, anxiety, cataracts, congestive heart disease, insomnia. SOCIAL HISTORY:  The patient is a current smoker, he stopped in 0. PHYSICAL EXAMINATION: 
VITAL SIGNS:  Temperature 97.0, pulse rate 70, respirations 16, blood pressure 138/80. EXTREMITIES:  Physical examination of lower extremities revealed palpable pedal pulses. Fairly good muscle strength, lower extremities bilateral.  Diminished epicritic sensation.   Plantar aspect, second left MPJ is with a resolving grade II ulceration, beefy red base surrounding hyperkeratotic 03/04/2017    ALB 3.4 (L) 03/04/2017     (H) 03/04/2017    K 4.4 03/04/2017     03/04/2017    CO2 28.0 03/04/2017     Lab Results   Component Value Date     02/08/2018    A1C 5.3 02/08/2018     Lab Results   Component Value Date    AKILA tissue with thin yellow exudative coating, wound measures 0.7 x 0.6 x 0.1 cm. DESCRIPTION OF PROCEDURE: Using a curette, a selective debridement was performed into the dermal layer removing dermal granulomatous and hyperkeratotic tissue. Good bleeding base was achieved. Wound was now dressed with Aquacel Ag, followed by a roll gauze, followed by application of a total contact cast.  The patient is asked to reduce his activity level, have a followup visit in wound clinic with myself, Dr. Juan Hodges, in 2 weeks. The patient is also given a prescription today of Cipro 500 mg #20 to take one p.o. b.i.d. 
 
 
DORIS [id number] 
D:  08/02/2019 17:47 
T:  08/02/2019 17:57 JOB #:  [id number] recommended. · Discussed the role of sleep and stress in weight management. · Counseled on comprehensive weight loss plan including attention to nutrition, exercise and behavior/stress management for success.  See patient instruction below for more detail

## 2019-11-19 NOTE — PROGRESS NOTES
Chief Complaint Patient presents with  CHF  
  1 month follow up  Diabetes  Benign Prostatic Hypertrophy SUBJECTIVE: 
 
Royal KRISTA Moise is a 77 y.o. male who returns in follow-up for his medical problems include severe DJD, CKD stage V, atrial fibrillation, ASCVD, cardiomyopathy with compensated CHF and other multiple medical problems. He notes his arthritis is becoming severe to the point where he thinks he now needs a Rollator walker to get around rather just a, cane because of stability. He denies any chest pain, shortness of breath, palpitations, PND, orthopnea or other cardiorespiratory complaints. He notes no GI or  complaints. He notes no headaches, dizziness or neurologic complaints. He has a chronic arthritic complaints and there are no other complaints noted on complete review of systems. Current Outpatient Medications Medication Sig Dispense Refill  amoxicillin-clavulanate (AUGMENTIN) 500-125 mg per tablet TK 1 T PO BID  1  
 oxyCODONE-acetaminophen (PERCOCET) 5-325 mg per tablet Take 1 Tab by mouth every eight (8) hours as needed for Pain for up to 30 days. Max Daily Amount: 3 Tabs. 30 Tab 0  pramipexole (MIRAPEX) 1 mg tablet TAKE 1 TABLET BY MOUTH EVERY NIGHT AT BEDTIME 30 Tab prn  insulin glargine (LANTUS SOLOSTAR U-100 INSULIN) 100 unit/mL (3 mL) inpn INJECT 35 UNITS UNDER THE SKIN EVERY DAY (Patient taking differently: 20 Units nightly. INJECT 20 UNITS UNDER THE SKIN AT BEDTIME.) 30 mL prn  tamsulosin (FLOMAX) 0.4 mg capsule TAKE 2 CAPSULES BY MOUTH EVERY  Cap 3  ferrous sulfate (SLOW FE) 47.5 mg iron TbER tablet Take 1 Tab by mouth three (3) times daily. 90 Tab 12  
 bumetanide (BUMEX) 2 mg tablet Take 2 Tabs by mouth two (2) times a day. 120 Tab 12  
 febuxostat (ULORIC) 40 mg tab tablet Take 1 Tab by mouth daily. 30 Tab 0  
 isosorbide mononitrate ER (IMDUR) 30 mg tablet Take 1 Tab by mouth daily. 30 Tab 12  metOLazone (ZAROXOLYN) 2.5 mg tablet Take 2.5 mg by mouth daily as needed (swelling).  timolol (TIMOPTIC) 0.5 % ophthalmic solution Administer 1 Drop to right eye two (2) times a day.  finasteride (PROSCAR) 5 mg tablet TAKE 1 TABLET BY MOUTH DAILY 90 Tab 3  potassium chloride (KLOR-CON) 20 mEq pack One packet three times daily 90 Packet prn  ascorbic acid, vitamin C, (VITAMIN C) 250 mg tablet Take 1 Tab by mouth three (3) times daily. 100 Tab prn  
 OTHER Apply  to affected area daily as needed (Knee Pain). CBD Cream:  Apply daily as needed for knee pain  carvedilol (COREG) 12.5 mg tablet TAKE 1 TABLET BY MOUTH TWICE DAILY 60 Tab 11  Liraglutide (VICTOZA) 0.6 mg/0.1 mL (18 mg/3 mL) sub-q pen 0.6 mg by SubCUTAneous route daily. Past Medical History:  
Diagnosis Date  Anemia 8/5/2017  Anxiety 8/5/2017  Arrhythmia   
 atrial fibrillation 2014  ASCVD (arteriosclerotic cardiovascular disease) 8/5/2017  BPH (benign prostatic hyperplasia) 8/5/2017  CAD (coronary artery disease)   
 h/o stents  Cancer [facility])   
 h/o skin cancer  Cardiomyopathy ([address] 8/5/2017  CHF (congestive heart failure) ([address] 8/5/2017  Chronic kidney disease Stage IV  CKD (chronic kidney disease), stage IV ([address] 8/5/2017  Diabetes ([address]  Diabetes mellitus ([address] 8/5/2017  Diabetic neuropathy ([address] 8/5/2017  DJD (degenerative joint disease) 8/5/2017  ED (erectile dysfunction) 8/5/2017  Gout 8/5/2017  High cholesterol  Hyperlipidemia 8/5/2017  Hypertension  Hypertension with renal disease 8/5/2017  Hypothyroid 8/5/2017  Insomnia 8/5/2017  Obesity 8/5/2017  On statin therapy 8/5/2017  Pneumonia 05/28/2019  Restless leg 8/5/2017  Thyroid disease   
 hypothyroid  Ulcer of foot due to secondary diabetes ([address] 07/12/2019 Past Surgical History:  
Procedure Laterality Date  CARDIAC SURG PROCEDURE UNLIST    
 cardiac stents  CARDIAC SURG PROCEDURE UNLIST Ablation 5/17/2018 [facility]  COLONOSCOPY N/A 6/28/2016 COLONOSCOPY performed by Joan Blake MD at [facility] ENDOSCOPY  COLONOSCOPY N/A 1/9/2019 COLONOSCOPY performed by Louis Westbrook MD at [facility] ENDOSCOPY  COLONOSCOPY,DIAGNOSTIC  1/9/2019  HX APPENDECTOMY  HX BUNIONECTOMY    
 and removal of 2 seasmoid bone of the great toe 2/2018  HX HEENT Bilateral Cataract surgery  HX HEENT Tonsils  HX HEENT Axe wound to the head  HX KNEE ARTHROSCOPY X 2  
 HX ORTHOPAEDIC    
 HX ORTHOPAEDIC    
 partial laminectomy  HX PACEMAKER    
 HX ROTATOR CUFF REPAIR    
 bilateral  
 MI INSJ ELTRD CAR DACIA SYS ATTCH PM/CVDFB PLS GEN N/A 1/28/2019 Lv Lead Placement To Previous Generator performed by Marci Danielle MD at [facility] CARDIAC CATH LAB  MI REMVL PERM PM PLS GEN W/REPL PLSE GEN MULT LEAD N/A 1/28/2019 Remove & Replace Ppm Gen Biv Multi Leads performed by Marci Danielle MD at [address] [facility] CATH LAB Allergies Allergen Reactions  Niacin Unknown (comments) Other reaction(s): Unknown (comments)  Imipenem Diarrhea Other reaction(s): Rash  Levemir [Insulin Detemir] Hives  Other Medication Other (comments) ? Allergy to Tristan Lars causing chemical burn  Primaxin [Imipenem-Cilastatin] Diarrhea and Rash  Xarelto [Rivaroxaban] Rash and Itching Other reaction(s): Rash REVIEW OF SYSTEMS: 
General: negative for - chills or fever, or weight loss or gain ENT: negative for - headaches, nasal congestion or tinnitus Eyes: no blurred or visual changes Neck: No stiffness or swollen nodes Respiratory: negative for - cough, hemoptysis, shortness of breath or wheezing Cardiovascular : negative for - chest pain, edema, palpitations or shortness of breath Gastrointestinal: negative for - abdominal pain, blood in stools, heartburn or nausea/vomiting Genito-Urinary: no dysuria, trouble voiding, or hematuria Musculoskeletal: negative for - gait disturbance, joint pain, joint stiffness or joint swelling Neurological: no TIA or stroke symptoms Hematologic: no bruises, no bleeding Lymphatic: no swollen glands Integument: no lumps, mole changes, nail changes or rash Endocrine:no malaise/lethargy poly uria or polydipsia or unexpected weight changes Social History Socioeconomic History  Marital status:  Spouse name: Not on file  Number of children: Not on file  Years of education: Not on file  Highest education level: Not on file Tobacco Use  Smoking status: Former Smoker Packs/day: 0.50 Years: 4.00 Pack years: 2.00 Last attempt to quit: 3/6/1972 Years since quittin.7  Smokeless tobacco: Never Used Substance and Sexual Activity  Alcohol use: No  
  Comment: rare, 1 drink per year  Drug use: No  
 Sexual activity: Not Currently Other Topics Concern Family History Problem Relation Age of Onset  Heart Disease Mother  Kidney Disease Mother  Heart Disease Father  Kidney Disease Father  Cancer Sister Breast  
 
 
OBJECTIVE:  
 
Visit Vitals /86 Pulse 70 Temp 98.5 °F (36.9 °C) (Oral) Resp 19 Ht 6' 4\" (1.93 m) Wt 232 lb 6.4 oz (105.4 kg) SpO2 96% BMI 28.29 kg/m² CONSTITUTIONAL:   well nourished, appears age appropriate EYES: sclera anicteric, PERRL, EOMI 
ENMT:nares clear, moist mucous membranes, pharynx clear NECK: supple. Thyroid normal, No JVD or bruits RESPIRATORY: Chest: clear to ascultation and percussion, normal inspiratory effort CARDIOVASCULAR: Heart: regular rate and rhythm no murmurs, rubs or gallops, PMI not displaced, No thrills GASTROINTESTINAL: Abdomen: non distended, soft, non tender, bowel sounds normal 
HEMATOLOGIC: no purpura, petechiae or bruising LYMPHATIC: No lymph node enlargemant MUSCULOSKELETAL: Extremities: no edema or active synovitis, pulse 1+ INTEGUMENT: No unusual rashes or suspicious skin lesions noted. Nails appear normal. 
PERIPHERAL VASCULAR: normal pulses femoral, PT and DP NEUROLOGIC: non-focal exam, A & O X 3 PSYCHIATRIC:, appropriate affect ASSESSMENT:  
1. Hypertension with renal disease 2. Controlled type 2 diabetes mellitus with stage 4 chronic kidney disease, without long-term current use of insulin ([address] 3. Paroxysmal atrial fibrillation (HCC) 4. CKD (chronic kidney disease), stage IV ([address] 5. Anemia, unspecified type 6. Primary osteoarthritis involving multiple joints Impression 1. Hypertension that is controlled so continue current therapy reviewed with him and his wife. 2.  Diabetes we will see what the blood sugar looks like on the BMP 3. Atrial fibrillation that is stable 4. CKD repeat status pending 5   Anemia repeat status pending 6. DJD becoming more severe I did write a prescription for Rollator walker and I think at some point because of his inability to get around he is going to require a scooter. He does require a Rollator walker because of his inability to get around without loss of balance. Recheck scheduled again for 1 month or sooner should to be a problem. I will call with lab results. I did renew his Percocet as needed for severe arthritic pain. PLAN: 
. Orders Placed This Encounter  CBC WITH AUTOMATED DIFF  
 METABOLIC PANEL, BASIC ([place])  amoxicillin-clavulanate (AUGMENTIN) 500-125 mg per tablet  oxyCODONE-acetaminophen (PERCOCET) 5-325 mg per tablet ATTENTION:  
This medical record was transcribed using an electronic medical records system. Although proofread, it may and can contain electronic and spelling errors. Other human spelling and other errors may be present. Corrections may be executed at a later time.   Please feel free to contact us for any clarifications as needed. Follow-up and Dispositions · Return in about 4 weeks (around 12/18/2019). No results found for any visits on 11/20/19. Tammy Miller MD 
 
The patient verbalized understanding of the problems and plans as explained.

## 2019-11-20 ENCOUNTER — OFFICE VISIT (OUTPATIENT)
Dept: INTERNAL MEDICINE CLINIC | Age: 69
End: 2019-11-20

## 2019-11-20 VITALS
DIASTOLIC BLOOD PRESSURE: 86 MMHG | OXYGEN SATURATION: 96 % | HEIGHT: 76 IN | RESPIRATION RATE: 19 BRPM | SYSTOLIC BLOOD PRESSURE: 138 MMHG | TEMPERATURE: 98.5 F | WEIGHT: 232.4 LBS | BODY MASS INDEX: 28.3 KG/M2 | HEART RATE: 70 BPM

## 2019-11-20 DIAGNOSIS — N18.4 CONTROLLED TYPE 2 DIABETES MELLITUS WITH STAGE 4 CHRONIC KIDNEY DISEASE, WITHOUT LONG-TERM CURRENT USE OF INSULIN (HCC): ICD-10-CM

## 2019-11-20 DIAGNOSIS — E11.22 CONTROLLED TYPE 2 DIABETES MELLITUS WITH STAGE 4 CHRONIC KIDNEY DISEASE, WITHOUT LONG-TERM CURRENT USE OF INSULIN (HCC): ICD-10-CM

## 2019-11-20 DIAGNOSIS — D64.9 ANEMIA, UNSPECIFIED TYPE: ICD-10-CM

## 2019-11-20 DIAGNOSIS — M15.9 PRIMARY OSTEOARTHRITIS INVOLVING MULTIPLE JOINTS: ICD-10-CM

## 2019-11-20 DIAGNOSIS — I48.0 PAROXYSMAL ATRIAL FIBRILLATION (HCC): ICD-10-CM

## 2019-11-20 DIAGNOSIS — I12.9 HYPERTENSION WITH RENAL DISEASE: Primary | ICD-10-CM

## 2019-11-20 DIAGNOSIS — N18.4 CKD (CHRONIC KIDNEY DISEASE), STAGE IV (HCC): ICD-10-CM

## 2019-11-20 LAB
ANION GAP SERPL CALC-SCNC: 11 MMOL/L
BUN SERPL-MCNC: 76 MG/DL (ref 9–20)
CALCIUM SERPL-MCNC: 9.3 MG/DL (ref 8.4–10.2)
CHLORIDE SERPL-SCNC: 94 MMOL/L (ref 98–107)
CO2 SERPL-SCNC: 32 MMOL/L (ref 22–32)
CREAT SERPL-MCNC: 2.4 MG/DL (ref 0.8–1.5)
GLUCOSE SERPL-MCNC: 134 MG/DL (ref 75–110)
POTASSIUM SERPL-SCNC: 3.7 MMOL/L (ref 3.6–5)
SODIUM SERPL-SCNC: 137 MMOL/L (ref 137–145)

## 2019-11-20 RX ORDER — AMOXICILLIN AND CLAVULANATE POTASSIUM 500; 125 MG/1; MG/1
TABLET, FILM COATED ORAL
Refills: 1 | COMMUNITY
Start: 2019-11-15 | End: 2019-12-06

## 2019-11-20 RX ORDER — OXYCODONE AND ACETAMINOPHEN 5; 325 MG/1; MG/1
1 TABLET ORAL
Qty: 30 TAB | Refills: 0 | Status: SHIPPED | OUTPATIENT
Start: 2019-11-20 | End: 2019-12-20

## 2019-11-20 NOTE — PATIENT INSTRUCTIONS
Arthritis: Care Instructions Your Care Instructions Arthritis, also called osteoarthritis, is a breakdown of the cartilage that cushions your joints. When the cartilage wears down, your bones rub against each other. This causes pain and stiffness. Many people have some arthritis as they age. Arthritis most often affects the joints of the spine, hands, hips, knees, or feet. You can take simple measures to protect your joints, ease your pain, and help you stay active. Follow-up care is a key part of your treatment and safety. Be sure to make and go to all appointments, and call your doctor if you are having problems. It's also a good idea to know your test results and keep a list of the medicines you take. How can you care for yourself at home? · Stay at a healthy weight. Being overweight puts extra strain on your joints. · Talk to your doctor or physical therapist about exercises that will help ease joint pain. ? Stretch. You may enjoy gentle forms of yoga to help keep your joints and muscles flexible. ? Walk instead of jog. Other types of exercise that are less stressful on the joints include riding a bicycle, swimming, bang chi, or water exercise. ? Lift weights. Strong muscles help reduce stress on your joints. Stronger thigh muscles, for example, take some of the stress off of the knees and hips. Learn the right way to lift weights so you do not make joint pain worse. · Take your medicines exactly as prescribed. Call your doctor if you think you are having a problem with your medicine. · Take pain medicines exactly as directed. ? If the doctor gave you a prescription medicine for pain, take it as prescribed. ? If you are not taking a prescription pain medicine, ask your doctor if you can take an over-the-counter medicine. · Use a cane, crutch, walker, or another device if you need help to get around. These can help rest your joints.  You also can use other things to make life easier, such as a higher toilet seat and padded handles on kitchen utensils. · Do not sit in low chairs, which can make it hard to get up. · Put heat or cold on your sore joints as needed. Use whichever helps you most. You also can take turns with hot and cold packs. ? Apply heat 2 or 3 times a day for 20 to 30 minutesusing a heating pad, hot shower, or hot packto relieve pain and stiffness. ? Put ice or a cold pack on your sore joint for 10 to 20 minutes at a time. Put a thin cloth between the ice and your skin. When should you call for help? Call your doctor now or seek immediate medical care if: 
  · You have sudden swelling, warmth, or pain in any joint.  
  · You have joint pain and a fever or rash.  
  · You have such bad pain that you cannot use a joint.  
 Watch closely for changes in your health, and be sure to contact your doctor if: 
  · You have mild joint symptoms that continue even with more than 6 weeks of care at home.  
  · You have stomach pain or other problems with your medicine. Where can you learn more? Go to http://gildardo-palak.info/. Enter J534 in the search box to learn more about \"Arthritis: Care Instructions. \" Current as of: April 1, 2019 Content Version: 12.2 © 2822-3224 [employer]. Care instructions adapted under license by [employer] (which disclaims liability or warranty for this information). If you have questions about a medical condition or this instruction, always ask your healthcare professional. [place] [address] any warranty or liability for your use of this information.

## 2019-11-20 NOTE — PROGRESS NOTES
Chief Complaint Patient presents with  CHF  
  1 month follow up  Diabetes  Benign Prostatic Hypertrophy Visit Vitals /88 (BP 1 Location: Left arm, BP Patient Position: Sitting) Pulse 70 Temp 98.5 °F (36.9 °C) (Oral) Resp 19 Ht 6' 4\" (1.93 m) Wt 232 lb 6.4 oz (105.4 kg) SpO2 96% BMI 28.29 kg/m² 1. Have you been to the ER, urgent care clinic since your last visit? Hospitalized since your last visit? No 
 
2. Have you seen or consulted any other health care providers outside of the [address] [place] [address] since your last visit? Include any pap smears or colon screening. Dr. Candice Finney

## 2019-11-21 LAB
BASOPHILS # BLD AUTO: 0.1 X10E3/UL (ref 0–0.2)
BASOPHILS NFR BLD AUTO: 1 %
EOSINOPHIL # BLD AUTO: 0.1 X10E3/UL (ref 0–0.4)
EOSINOPHIL NFR BLD AUTO: 2 %
ERYTHROCYTE [DISTWIDTH] IN BLOOD BY AUTOMATED COUNT: 13.3 % (ref 12.3–15.4)
HCT VFR BLD AUTO: 31.5 % (ref 37.5–51)
HGB BLD-MCNC: 10.1 G/DL (ref 13–17.7)
IMM GRANULOCYTES # BLD AUTO: 0 X10E3/UL (ref 0–0.1)
IMM GRANULOCYTES NFR BLD AUTO: 0 %
LYMPHOCYTES # BLD AUTO: 0.6 X10E3/UL (ref 0.7–3.1)
LYMPHOCYTES NFR BLD AUTO: 10 %
MCH RBC QN AUTO: 28.9 PG (ref 26.6–33)
MCHC RBC AUTO-ENTMCNC: 32.1 G/DL (ref 31.5–35.7)
MCV RBC AUTO: 90 FL (ref 79–97)
MONOCYTES # BLD AUTO: 0.4 X10E3/UL (ref 0.1–0.9)
MONOCYTES NFR BLD AUTO: 8 %
MORPHOLOGY BLD-IMP: ABNORMAL
NEUTROPHILS # BLD AUTO: 4.6 X10E3/UL (ref 1.4–7)
NEUTROPHILS NFR BLD AUTO: 79 %
PLATELET # BLD AUTO: 95 X10E3/UL (ref 150–450)
RBC # BLD AUTO: 3.5 X10E6/UL (ref 4.14–5.8)
WBC # BLD AUTO: 5.7 X10E3/UL (ref 3.4–10.8)

## 2019-11-22 ENCOUNTER — HOSPITAL ENCOUNTER (OUTPATIENT)
Dept: WOUND CARE | Age: 69
Discharge: HOME | End: 2019-11-22
Payer: MEDICARE

## 2019-11-22 VITALS
TEMPERATURE: 96.6 F | DIASTOLIC BLOOD PRESSURE: 68 MMHG | RESPIRATION RATE: 16 BRPM | SYSTOLIC BLOOD PRESSURE: 123 MMHG | HEART RATE: 70 BPM

## 2019-11-22 PROCEDURE — 99212 OFFICE O/P EST SF 10 MIN: CPT

## 2019-11-22 NOTE — WOUND CARE
11/22/19 1328 Wound Foot Left;Dorsal Erupted blister 11/15/19 Date First Assessed/Time First Assessed: 11/15/19 0925   Wound Approximate Age at First Assessment (Weeks): 2 weeks  Primary Wound Type: (c) Diabetic  Location: Foot  Wound Location Orientation: Left;Dorsal  Wound Description: Erupted blister  Date of. .. Dressing Status Removed Dressing Type Gauze;Gauze wrap (susanne) (Betadine ) Wound Length (cm) 0 cm Wound Width (cm) 0 cm Wound Depth (cm) 0 cm Wound Surface Area (cm^2) 0 cm^2 Wound Volume (cm^3) 0 cm^3 Drainage Amount None Wound Foot Right;Medial 10/11/19 Date First Assessed/Time First Assessed: 10/11/19 0937   Present on Hospital Admission: Yes  Wound Approximate Age at First Assessment (Weeks): 2 weeks  Primary Wound Type: Diabetic Ulcer  Location: Foot  Wound Location Orientation: Right;Medial  Date. .. Dressing Status Removed Dressing Type Gauze;Gauze wrap (susanne) (Betadine ) Non-staged Wound Description Full thickness Wound Length (cm) 0.5 cm Wound Width (cm) 0.6 cm Wound Depth (cm) 0.1 cm Wound Surface Area (cm^2) 0.3 cm^2 Wound Volume (cm^3) 0.03 cm^3 Condition of Base Pink Condition of Edges Open Drainage Amount Small Drainage Color Serosanguinous Wound Odor None Angela-wound Assessment Intact Cleansing and Cleansing Agents  Normal saline Wound Foot Plantar;Left 06/28/19 Date First Assessed/Time First Assessed: 06/28/19 1334   Present on Hospital Admission: Yes  Primary Wound Type: Diabetic  Location: Foot  Wound Location Orientation: Plantar;Left  Date of First Observation: 06/28/19 Dressing Status Removed Dressing Type Gauze;Gauze wrap (susanne) (Betadine) Wound Length (cm) 1.6 cm Wound Width (cm) 1.3 cm Wound Depth (cm) 0.2 cm Wound Surface Area (cm^2) 2.08 cm^2 Wound Volume (cm^3) 0.42 cm^3 Condition of Base Pink Condition of Edges Calloused; Open Drainage Amount Moderate Drainage Color Serous Wound Odor None Angela-wound Assessment Intact Cleansing and Cleansing Agents  Normal saline Visit Vitals /68 (BP 1 Location: Left arm, BP Patient Position: Sitting) Pulse 70 Temp 96.6 °F (35.9 °C) Resp 16

## 2019-11-22 NOTE — WOUND CARE
Clinic Level of Care Assessment NAME:  Ludivina Gonsales. YOB: 1950 GENDER: male MEDICAL RECORD NUMBER:  [id number] DATE:  11/22/2019 Wound Count Document in [facility] Number of Wounds Assessed Points No Wounds/Ulcers []   0 Less than Three Wounds/Ulcers [x]   1  
3-6 Wounds/Ulcers []   2 Greater than 6 Wounds/Ulcers []   3 Ambulation Status Document in Coord/MOE/Mobility tab Status Definition Points Independent Independently able to ambulate. Fully able (without any assistance) to get on/off exam table/chair. [x]   0 Minimal Physical Assistance Requires physical assistance of one person to ambulate and/or position patient to be examined. Includes necessary physical assistance to position lower extremities on/off stool. []   1 Moderate Physical Assistance Requires at least one staff member to physically assist patient in ambulating into treatment room, and on/off exam table. []   2 Full Assistance Requires assistance of at least two staff members to transfer patient into treatment room and/or on/off exam table/chair. \"Total Transfer\". []   3 Dressing Complexity Document in [facility] and Write Appropriate Order Complexity Definition Points No Dressing  []   0 Simple Minimal, simple dressing. i.e. Band-aid, gauze, simple wrap. []   1 Intermediate Moderately complicated requiring licensed personnel to apply i.e. collagen matrix, ointments, gels, alginates. [x]   2 Complex Complicated requiring licensed personnel to apply dressings 6 or more wounds. []   3 Teaching Effort Document in Education Tab Effort Definition Points No Teaching  []   0 Simple Reinforce two or less topics. Document in Education navigator. [x]   1 Intermediate Reinforce three to five topics and/or one additional  
new topic. Document in Education navigator. []   2 Complex Teach more than one new topic. New patient information packet reviewed and/or reinforce more than three topics. Document in Education navigator. HBO initial instruction. []   3 Patient Assessment and Planning Planning Definition Points Simple Multiple System Simple: Simple follow-up with routine assessment and planning. If Discharged, instructions and long term/follow-up care given to patient/caregiver. Discharged, instructions and/or After Visit Summary given to patient/caregiver and instructions completed. [x]   1 Intermediate Multiple System Intermediate: Contact with outside resources; i.e. Telephone calls to home health, [facility]. May include filling out forms and writing letters, arranging transportation, communication with insurance , vendors, etc.  Discharged, instructions and/or After Visit Summary given to patient/caregiver and instructions completed. []   2 Complex Multiple System Complex: Full, comprehensive assessment and planning. Follow the entire navigator under Wound Visit charting filling out each tab which includes OP Adm Database Screening, Education and CarePlan  HBO risk assessment completed. Discharged, instructions and/or After Visit Summary given to patient/caregiver and instructions completed. []   3 Is this the Patient's First Visit to the [address] [place] [address] Road No 
 
 
Is this Patient Established @ [facility] 
Yes Clinical Level of Care Points  0-2  Level 1 [] Points  3-5  Level 2 [x] Points  6-9  Level 3 [] Points  10-12  Level 4 [] Points  13-15  Level 5 [] Electronically signed by Jill Marshall RN on 11/22/2019 at 1:57 PM

## 2019-11-22 NOTE — DISCHARGE INSTRUCTIONS
Discharge Instructions for  [place] [address] [facility], [address] [facility]  Telephone: [phone]     FAX [phone]    NAME: Safia Mathews. DATE OF BIRTH:  1950  MEDICAL RECORD NUMBER:  [id number]  DATE:  11/22/2019    Wound Cleansing:   Do not scrub or use excessive force. Cleanse wound prior to applying a clean dressing with:  [x ] Normal Saline [ ] Keep Wound Dry in Shower    [ ] Wound Cleanser   [ ] Cleanse wound with Mild Soap & Water  [ ] May Shower at Discharge   [ ] Other:      Topical Treatments:  Do not apply lotions, creams, or ointments to wound bed unless directed. [x ] Apply moisturizing lotion to skin surrounding the wound prior to dressing change. [ ] Apply antifungal ointment to skin surrounding the wound prior to dressing change. [ ] Apply thin film of moisture barrier ointment to skin immediately around wound. [ ] Other:      Dressings:         Wound Location R foot left plantar foot  [x ] Apply Primary Dressing:   [ ] MediHoney Gel [ ] Alginate with Silver [ ] Alginate  [ ] Collagen [ ] Collagen with Silver   [ ] Santyl with Moisten saline gauze    [ ] Hydrocolloid  [ ] MediHoney Alginate [ ] Foam with Silver  [ ] Foam   [ ] Hydrofera Blue   [ ] Araceli Large   [ ] Moisten with Saline [ ] Hydrogel [ ] Mepitel [x ] Betadine moist gauze  [ ] Bactroban/Mupirocin [ ] Denis Cataula [ ] Other:    [ ] Pack wound loosely with [ ] Iodoform   [ ] Ru Mays [ ] Other   [ x] Cover and Secure with:   [x ] Gauze [x ] Mat Amble [ ] Kerlix  [ ] Taylor Daniel [x ]  Tape [ ] ABD [ ] Exudry   [ ] Other:   Avoid contact of tape with skin.   [ x] Change dressing: [ ] Daily   [ x] Every Other Day [ ] Three times per week  [ ] Once a week [ ] Do Not Change Dressing  [ ] Other:    Edema Control:  Apply: [ ] Compression Stocking [ ]Right Leg [ ]Left Leg  [ x Tubigrip [ ]Right Leg Double Layer [ ]Left Leg Double Layer    [x ]Right Leg Single Layer [ x]Left Leg Single Layer  [ ] SpandaGrip [ ]Right Leg [ ]Left Leg    [ ]Low compression 5-10 mm/Hg   [ ]Medium compression 10-20 mm/Hg  [ ]High compression  20-30 mm/Hg  every morning immediately when getting up should be applied to affected leg(s) from mid foot to knee making sure to cover the heel.  Remove every night before going to bed. [x Elevate leg(s) above the level of the heart when sitting. [x ] Avoid prolonged standing in one place. [ ] Elevate arm/hand above the level of the heart [ ]RightArm [ ]LeftArm      Dietary:  [x] Diet as tolerated: [x ] Diabetic Diet: Low carb no sugar [ x] No Added Salt:  [ ] Increase Protein: [ ] Other:   Activity:  [x ] Activity as tolerated:  [ ] Patient has no activity restrictions     [ ] Strict Bedrest: [ ] Remain off Work:     [ ] May return to full duty work:                                   [ ] Return to work with restrictions:   [address]   Return Appointment:  [ ] Wound and dressing supply provider:   [ ] ECF or Home Healthcare:  [ ] Wound Assessment: [ ] Physician or NP scheduled for Wound Assessment:   [ x] Return Appointment: With DR Augie Bryant)  [ ] Ordered tests:      Electronically signed Terry Chung RN on 11/22/2019 at 13:23 PM     [address] [facility] Information: Should you experience any significant changes in your wound(s) or have questions about your wound care, please contact the [facility] at [address] [place] [address] 8:00 am - 4:30.  If you need help with your wound outside these hours and cannot wait until we are again available, contact your PCP or go to the hospital emergency room. PLEASE NOTE: IF YOU ARE UNABLE TO OBTAIN WOUND SUPPLIES, CONTINUE TO USE THE SUPPLIES YOU HAVE AVAILABLE UNTIL YOU ARE ABLE TO REACH US.  IT IS MOST IMPORTANT TO KEEP THE WOUND COVERED AT ALL TIMES.     Physician Signature:_______________________    Date: ___________ Time:  ____________

## 2019-11-22 NOTE — WOUND CARE
11/22/19 1420 Wound Foot Left;Dorsal Erupted blister 11/15/19 Date First Assessed/Time First Assessed: 11/15/19 0925   Wound Approximate Age at First Assessment (Weeks): 2 weeks  Primary Wound Type: (c) Diabetic  Location: Foot  Wound Location Orientation: Left;Dorsal  Wound Description: Erupted blister  Date of. .. Cleansing and Cleansing Agents  Betadine Dressing Type Applied Gauze;Gauze wrap (susanne) Wound Foot Right;Medial 10/11/19 Date First Assessed/Time First Assessed: 10/11/19 0937   Present on Hospital Admission: Yes  Wound Approximate Age at First Assessment (Weeks): 2 weeks  Primary Wound Type: Diabetic Ulcer  Location: Foot  Wound Location Orientation: Right;Medial  Date. .. Cleansing and Cleansing Agents  Normal saline Dressing Type Applied 2 x 2;Compression dressing;Gauze;Gauze wrap (susanne); Other (Comment) (Betadine moist to dry gauze, roll gauze, Single layer Tubi-F) Wound Foot Plantar;Left 06/28/19 Date First Assessed/Time First Assessed: 06/28/19 1334   Present on Hospital Admission: Yes  Primary Wound Type: Diabetic  Location: Foot  Wound Location Orientation: Plantar;Left  Date of First Observation: 06/28/19 Cleansing and Cleansing Agents  Normal saline Dressing Type Applied Gauze;Gauze wrap (susanne); Other (Comment); Compression dressing (Betadine moist to dry gauze, roll gauze. Single layer Tubi-F) Discharge Condition: Stable Pain: 0 Ambulatory Status: Walking Discharge Destination: Home Transportation: Car Accompanied by: Family/Caregiver Discharge instructions reviewed with Patient and Family/Caregiver  and copy or written instructions have been provided. All questions/concerns have been addressed at this time.

## 2019-11-23 NOTE — PROGRESS NOTES
[address] 
WOUND CARE PROGRESS NOTE Name:  Rony Mo 
MR#:  [id number] :  1950 ACCOUNT #:  [de-identified] DATE OF SERVICE:  2019 HISTORY OF CHIEF COMPLAINT:  This 70-year-old white male presents for continued treatment of a chronic wound on dorsal first right MPJ, dorsal first left webspace, and plantar second MPJ left foot. The wound in the left foot had previously healed using a total contact cast.  Most recent therapy had consisted of Betadine wet-to-dry dressings. Wound cultures that were taken from the left foot showed no growth. Most recent ABIs taken 2018 showed adequate arterial perfusion, lower extremities. However, there is some evidence of probable medial wall calcifications. CURRENT MEDICATIONS:  Augmentin 500 mg twice a day, Mirapex 1 g one tablet at night, Lantus insulin 35 units daily and 20 units at bedtime, Flomax 0.4 mg daily, ferrous sulfate 47.5 mg tablet 3 times a day, Bumex 2 mg twice a day, isosorbide ER 30 mg daily, Timoptic eye solution 0.5% 1 drop to the right eye 2 times a day, Proscar 5 mg daily, potassium chloride 20 mEq 1 packet 3 times a day, vitamin C 250 mg 3 times a day, Coreg 12.5 mg once daily, Victoza insulin 0.6 mg daily, Percocet 5/325 every 8 hours as needed for pain, Uloric 40 mg daily, Zaroxolyn 2.5 mg daily. ALLERGIES:  IMIPENEM, LEVEMIR, AND XARELTO. PAST MEDICAL HISTORY:  Gout, atrial fibrillation, diabetes with neuropathy, anxiety, arrhythmias, atherosclerotic cardiovascular disease, benign prostatic hyperplasia, coronary artery disease, cardiomyopathy, previous cancer, congestive heart failure, chronic kidney disease, DJD, hyperlipidemia, hypercholesterolemia, hypertension, hypothyroidism, insomnia, restless leg syndrome. SOCIAL HISTORY:  The patient is a previous smoker, he quit in .  
 
PHYSICAL EXAMINATION: 
VITAL SIGNS:  Temperature 96.6, pulse rate 70, respirations 16, blood pressure 123/68. EXTREMITIES:  Physical examination of the lower extremities reveal barely palpable pedal pulses, fairly good muscle strength, lower extremities bilateral.  Noted lack of hair growth or chronic stasis dermatitis. Cellulitis appears to have been resolved. Dorsal aspect first right MPJ, there is a scabbed wound that has no drainage. The scab measures 0.5 x 0.6 x 0.1. There is a scabbed wound dorsal second webspace left foot that is well scabbed and healed over. Plantar aspect of the second left MPJ, there is a grade II wound with surrounding hyperkeratotic tissue, beefy red granulation tissue, scant drainage, and it measures 1.6 x 1.3 x 0.7 cm. There is no bone probed. ASSESSMENT:  Resolved cellulitis, lower extremities, who has remaining diabetic ulcer, plantar left foot on a diabetic with neuropathy. PLAN:  At this time, I do not feel comfortable putting a total contact cast on this patient. The wound was cleansed with saline, dressed with Betadine wet-to-dry dressings, the patient's wife is to continue. Once I am assured that the cellulitis is totally resolved, we may reconsider the total contact cast on the left foot; otherwise the patient will have followup visit in the wound clinic with my self, Dr. Yenny Salguero, in 2 weeks. DORIS [email or web address] 
D:  11/22/2019 14:40 T:  11/23/2019 2:33 JOB #:  [id number]

## 2019-12-06 ENCOUNTER — HOSPITAL ENCOUNTER (OUTPATIENT)
Dept: WOUND CARE | Age: 69
Discharge: HOME | End: 2019-12-06
Payer: MEDICARE

## 2019-12-06 VITALS
DIASTOLIC BLOOD PRESSURE: 66 MMHG | HEART RATE: 70 BPM | RESPIRATION RATE: 16 BRPM | TEMPERATURE: 97.2 F | SYSTOLIC BLOOD PRESSURE: 138 MMHG

## 2019-12-06 PROCEDURE — 11042 DBRDMT SUBQ TIS 1ST 20SQCM/<: CPT

## 2019-12-06 NOTE — DISCHARGE INSTRUCTIONS
Discharge Instructions for  [facility]  [address] [facility]  [place], [address] [facility]  Telephone: [phone]    NAME:  Bonifacio Cao [facility] YOB: 1950  MEDICAL RECORD NUMBER:  [id number]  DATE:  12/6/2019    Wound Cleansing:   Do not scrub or use excessive force. Cleanse wound prior to applying a clean dressing with:  [] Normal Saline [] Keep Wound Dry in Shower    [] Wound Cleanser   [] Cleanse wound with Mild Soap & Water  [] May Shower at Discharge   [] Other:      Topical Treatments:  Do not apply lotions, creams, or ointments to wound bed unless directed. [] Apply moisturizing lotion to skin surrounding the wound prior to dressing change.  [] Apply antifungal ointment to skin surrounding the wound prior to dressing change.  [] Apply thin film of moisture barrier ointment to skin immediately around wound. [] Other:       Dressings:           Wound Location left plantar foot  [x] Apply Primary Dressing:       [] MediHoney Gel [] Alginate with Silver [] Alginate   [] Collagen [] Collagen with Silver   [] Santyl with Moisten saline gauze     [] Hydrocolloid   [] MediHoney Alginate [] Foam with Silver   [] Foam   [x] Hydrofera Blue  Classic [] Mepilex Border    [] Moisten with Saline [] Hydrogel [] Mepitel  [] Betadine moist gauze   [] Bactroban/Mupirocin [] Polysporin  [] Other:    [] Pack wound loosely with  [] Iodoform   [] Plain Packing  [] Other   [] Cover and Secure with:     [] Gauze [x] Carlos [] Kerlix   [] Ace Wrap [] Cover Roll Tape [] ABD [] Exudry    [x] Other: Exudry   Avoid contact of tape with skin. [x] Change dressing: [x] Daily  To every 3 days as needed to contain drainage.    [] Every Other Day [] Three times per week   [] Once a week [] Do Not Change Dressing   [] Other:     Edema Control:  Apply: [x] Compression Stocking []Right Leg [x]Left Leg 20-30mm/hg knee high stockings   [] Tubigrip []Right Leg Double Layer []Left Leg Double Layer       []Right Leg Single Layer []Left Leg Single Layer   [] SpandaGrip []Right Leg  []Left Leg      []Low compression 5-10 mm/Hg      []Medium compression 10-20 mm/Hg     []High compression  20-30 mm/Hg   every morning immediately when getting up should be applied to affected leg(s) from mid foot to knee making sure to cover the heel. Remove every night before going to bed. [x] Elevate leg(s) above the level of the heart when sitting. [x] Avoid prolonged standing in one place. [] Elevate arm/hand above the level of the heart []RightArm []LeftArm    Off-Loading:   [] Off-loading when [] walking  [] in bed [] sitting  [] Total non-weight bearing  [] Right Leg  [] Left Leg   [x] Assistive Device [] Walker [] Cane  [] Wheelchair  [] Crutches   [] Surgical shoe    [] Podus Boot(s)   [] Foam Boot(s)  [] Roll About    [] Cast Boot [] CROW Boot  [x] Other: Diabetic shoes    Dietary:  [x] Diet as tolerated: [x] Diabetic Diet: Low carb no sugar [] No Added Salt:  [x] Increase Protein: [] Other:   Activity:  [x] Activity as tolerated:  [] Patient has no activity restrictions     [] Strict Bedrest: [] Remain off Work:     [] May return to full duty work:                                   [] Return to work with restrictions:             Return Appointment:  [] Wound and dressing supply provider:   [] ECF or Home Healthcare:  [] Wound Assessment: [] Physician or NP scheduled for Wound Assessment:   [x] Return Appointment: With Dr. Anahy Johnson  in  [address] [place] [address])  [] Ordered tests:      Electronically signed Oziel Jackson RN on 12/6/2019 at 3:33 PM     [address] [facility] [address] Information: Should you experience any significant changes in your wound(s) or have questions about your wound care, please contact the [address] [place] [address] at [address] [place] [address] 8:00 am - 4:30.   If you need help with your wound outside these hours and cannot wait until we are again available, contact your PCP or go to the hospital emergency room. PLEASE NOTE: IF YOU ARE UNABLE TO OBTAIN WOUND SUPPLIES, CONTINUE TO USE THE SUPPLIES YOU HAVE AVAILABLE UNTIL YOU ARE ABLE TO REACH US. IT IS MOST IMPORTANT TO KEEP THE WOUND COVERED AT ALL TIMES.      Physician Signature:_______________________    Date: ___________ Time:  ____________

## 2019-12-06 NOTE — PROGRESS NOTES
[address] 
WOUND CARE PROGRESS NOTE Name:  Bella Huber 
MR#:  [id number] :  1950 ACCOUNT #:  [de-identified] DATE OF SERVICE:  2019 SUBJECTIVE:  The patient returns for treatment of a left diabetic foot ulcer, E11.621. He has noted significant drainage as well as bilateral edema. He takes Bumex twice a day and has not been wearing his 20-30 mmHg compression stockings, but would like permission to start wearing those. He has been using Betadine wet-to-dry dressings and denies any other problems or changes in medications, allergies, or review of systems. OBJECTIVE: 
VITAL SIGNS:  His temperature is 97.2, pulse 70, respirations 16, and blood pressure 138/66. WOUND EXAMINATION:  The wound on the plantar surface of the left foot measures 1.4 x 1.4 x 0.4 cm. It is surrounded by callus and epibole with overhang. There is a central portion of the wound, it was quite deep, but to palpation, I do not feel bone. It is recommended to the callus be pared down and the wound be debrided down to healthy subcutaneous tissue. I explained the risks and benefits. The patient understood and wished to proceed. Using a 5-mm ring curette and a 15-blade, the callus was pared down. The edges of the wound were pared down to remove the epibole and the entire surface of the wound was debrided down to healthy bleeding subcutaneous tissue. Post-debridement dimensions were 2 x 1.8 x .5 cm. There was significant bleeding from the deepest portion of the wound which was addressed with firm pressure until dry. Then, to stimulate neoepithelialization, the entire wound, including the deep, central portion and the edges of the wound, were cauterized with silver nitrate. The wound was irrigated with sterile saline. ASSESSMENT/PLAN:  We are going to continue Betadine to the right dorsal medial foot wound.   We are going to change the dressing of the left plantar wound since it has been so moist.  We are going to place Hydrofera blue classic into the wound and cover this with Exu-Dry. The dressing will be changed every day or two based upon the amount of drainage. The patient will keep his legs elevated. He will use his 20-30 mmHg compression stockings since he has such strong dorsalis pedis pulses bilaterally and since he has tolerated them so well in the past.  I also instructed him on how to do calf muscle exercises to improve venous outflow and all this will be an attempt to try to manage his edema better. He understands the plan. All questions were answered. His condition on discharge is stable. He will return to see Dr. Uche Snow in one week. MD MELISSA [email or web address] 
D:  12/06/2019 15:46 
T:  12/06/2019 17:58 JOB #:  [id number]

## 2019-12-06 NOTE — WOUND CARE
12/06/19 1552 Wound Foot Right;Medial 10/11/19 Date First Assessed/Time First Assessed: 10/11/19 0937   Present on Hospital Admission: Yes  Wound Approximate Age at First Assessment (Weeks): 2 weeks  Primary Wound Type: Diabetic Ulcer  Location: Foot  Wound Location Orientation: Right;Medial  Date. .. Dressing Type Applied Gauze; Moist to dry;Special tape (comment) (betadine moist to dry) Wound Foot Plantar;Left 06/28/19 Date First Assessed/Time First Assessed: 06/28/19 1334   Present on Hospital Admission: Yes  Primary Wound Type: Diabetic  Location: Foot  Wound Location Orientation: Plantar;Left  Date of First Observation: 06/28/19 Dressing Type Applied Foam;Gauze;Gauze wrap (susanne); Special tape (comment) (HFB classic moist with saline to wound bed) Discharge Condition: Stable Pain: 0 Ambulatory Status: Walking and [employer] Discharge Destination: Home Transportation: Car Accompanied by: Family/Caregiver Discharge instructions reviewed with Patient and Family/Caregiver  and copy or written instructions have been provided. All questions/concerns have been addressed at this time.

## 2019-12-06 NOTE — WOUND CARE
12/06/19 1448 Wound Foot Right;Medial 10/11/19 Date First Assessed/Time First Assessed: 10/11/19 0937   Present on Hospital Admission: Yes  Wound Approximate Age at First Assessment (Weeks): 2 weeks  Primary Wound Type: Diabetic Ulcer  Location: Foot  Wound Location Orientation: Right;Medial  Date. .. Dressing Status Removed Dressing Type Gauze 
(betadine wet to dry) Wound Length (cm) 0.1 cm Wound Width (cm) 0.1 cm Wound Depth (cm) 0.1 cm Wound Surface Area (cm^2) 0.01 cm^2 Wound Volume (cm^3) 0 cm^3 Drainage Amount Moderate Drainage Color Serosanguinous Wound Odor None Angela-wound Assessment Intact Cleansing and Cleansing Agents  Normal saline Wound Foot Plantar;Left 06/28/19 Date First Assessed/Time First Assessed: 06/28/19 1334   Present on Hospital Admission: Yes  Primary Wound Type: Diabetic  Location: Foot  Wound Location Orientation: Plantar;Left  Date of First Observation: 06/28/19 Dressing Status Removed Dressing Type Gauze;Gauze wrap (susanne) (betadine wet to dry) Wound Length (cm) 1.4 cm Wound Width (cm) 1.4 cm Wound Depth (cm) 0.4 cm Wound Surface Area (cm^2) 1.96 cm^2 Wound Volume (cm^3) 0.78 cm^3 Condition of Base Pink Condition of Edges Calloused Direction of Undermining 4 o'clock;9 o'clock 
([email or web address]) Drainage Amount Moderate Drainage Color Serous Wound Odor None Angela-wound Assessment Maceration; Intact Cleansing and Cleansing Agents  Normal saline

## 2019-12-12 ENCOUNTER — HOSPITAL ENCOUNTER (OUTPATIENT)
Dept: INFUSION THERAPY | Age: 69
Discharge: HOME | End: 2019-12-12
Payer: MEDICARE

## 2019-12-12 VITALS
BODY MASS INDEX: 28.73 KG/M2 | OXYGEN SATURATION: 98 % | HEART RATE: 72 BPM | SYSTOLIC BLOOD PRESSURE: 125 MMHG | TEMPERATURE: 97.7 F | DIASTOLIC BLOOD PRESSURE: 69 MMHG | WEIGHT: 236 LBS | RESPIRATION RATE: 18 BRPM

## 2019-12-12 LAB
ALBUMIN SERPL-MCNC: 3.4 G/DL (ref 3.5–5)
ANION GAP SERPL CALC-SCNC: 8 MMOL/L (ref 5–15)
BUN SERPL-MCNC: 76 MG/DL (ref 6–20)
BUN/CREAT SERPL: 28 (ref 12–20)
CALCIUM SERPL-MCNC: 8.6 MG/DL (ref 8.5–10.1)
CHLORIDE SERPL-SCNC: 94 MMOL/L (ref 97–108)
CO2 SERPL-SCNC: 33 MMOL/L (ref 21–32)
CREAT SERPL-MCNC: 2.7 MG/DL (ref 0.7–1.3)
ERYTHROCYTE [DISTWIDTH] IN BLOOD BY AUTOMATED COUNT: 15 % (ref 11.5–14.5)
FERRITIN SERPL-MCNC: 176 NG/ML (ref 26–388)
GLUCOSE SERPL-MCNC: 165 MG/DL (ref 65–100)
HCT VFR BLD AUTO: 30.6 % (ref 36.6–50.3)
HGB BLD-MCNC: 9.7 G/DL (ref 12.1–17)
IRON SATN MFR SERPL: 15 % (ref 20–50)
IRON SERPL-MCNC: 41 UG/DL (ref 35–150)
MCH RBC QN AUTO: 30.3 PG (ref 26–34)
MCHC RBC AUTO-ENTMCNC: 31.7 G/DL (ref 30–36.5)
MCV RBC AUTO: 95.6 FL (ref 80–99)
NRBC # BLD: 0 K/UL (ref 0–0.01)
NRBC BLD-RTO: 0 PER 100 WBC
PHOSPHATE SERPL-MCNC: 3.7 MG/DL (ref 2.6–4.7)
PLATELET # BLD AUTO: 96 K/UL (ref 150–400)
PMV BLD AUTO: 10.7 FL (ref 8.9–12.9)
POTASSIUM SERPL-SCNC: 3 MMOL/L (ref 3.5–5.1)
RBC # BLD AUTO: 3.2 M/UL (ref 4.1–5.7)
SODIUM SERPL-SCNC: 135 MMOL/L (ref 136–145)
TIBC SERPL-MCNC: 265 UG/DL (ref 250–450)
WBC # BLD AUTO: 5.3 K/UL (ref 4.1–11.1)

## 2019-12-12 PROCEDURE — 80069 RENAL FUNCTION PANEL: CPT

## 2019-12-12 PROCEDURE — 74011250636 HC RX REV CODE- 250/636: Performed by: INTERNAL MEDICINE

## 2019-12-12 PROCEDURE — 36415 COLL VENOUS BLD VENIPUNCTURE: CPT

## 2019-12-12 PROCEDURE — 85027 COMPLETE CBC AUTOMATED: CPT

## 2019-12-12 PROCEDURE — 96372 THER/PROPH/DIAG INJ SC/IM: CPT

## 2019-12-12 PROCEDURE — 83540 ASSAY OF IRON: CPT

## 2019-12-12 PROCEDURE — 82728 ASSAY OF FERRITIN: CPT

## 2019-12-12 RX ADMIN — EPOETIN ALFA-EPBX 60000 UNITS: 40000 INJECTION, SOLUTION INTRAVENOUS; SUBCUTANEOUS at 10:52

## 2019-12-12 NOTE — PROGRESS NOTES
[address] [facility] Stay Note: 
Arrived - 12 Visit Vitals /69 (BP 1 Location: Left arm, BP Patient Position: Sitting) Pulse 72 Temp 97.7 °F (36.5 °C) Resp 18 Wt 107 kg (236 lb) SpO2 98% BMI 28.73 kg/m² Assessment - unchanged. Recent Results (from the past 12 hour(s)) CBC W/O DIFF Collection Time: 12/12/19 10:19 AM  
Result Value Ref Range WBC 5.3 4.1 - 11.1 K/uL  
 RBC 3.20 (L) 4.10 - 5.70 M/uL HGB 9.7 (L) 12.1 - 17.0 g/dL HCT 30.6 (L) 36.6 - 50.3 % MCV 95.6 80.0 - 99.0 FL  
 MCH 30.3 26.0 - 34.0 PG  
 MCHC 31.7 30.0 - 36.5 g/dL  
 RDW 15.0 (H) 11.5 - 14.5 % PLATELET 96 (L) 136 - 400 K/uL MPV 10.7 8.9 - 12.9 FL  
 NRBC 0.0 0  WBC ABSOLUTE NRBC 0.00 0.00 - 0.01 K/uL RENAL FUNCTION PANEL Collection Time: 12/12/19 10:19 AM  
Result Value Ref Range Sodium 135 (L) 136 - 145 mmol/L Potassium 3.0 (L) 3.5 - 5.1 mmol/L Chloride 94 (L) 97 - 108 mmol/L  
 CO2 33 (H) 21 - 32 mmol/L Anion gap 8 5 - 15 mmol/L Glucose 165 (H) 65 - 100 mg/dL BUN 76 (H) 6 - 20 MG/DL Creatinine 2.70 (H) 0.70 - 1.30 MG/DL  
 BUN/Creatinine ratio 28 (H) 12 - 20 GFR est AA 28 (L) >60 ml/min/1.73m2 GFR est non-AA 23 (L) >60 ml/min/1.73m2 Calcium 8.6 8.5 - 10.1 MG/DL Phosphorus 3.7 2.6 - 4.7 MG/DL Albumin 3.4 (L) 3.5 - 5.0 g/dL Retacrit 60,000 units SQ slowly, in 2 divided doses, 1 in each arm.  
 
1100 - Tolerated well. Pt denies any acute problems/changes. Discharged from [facility] ambulatory. No distress. Next appt: 1/9/20 at 1000.

## 2019-12-13 ENCOUNTER — HOSPITAL ENCOUNTER (OUTPATIENT)
Dept: WOUND CARE | Age: 69
Discharge: HOME | End: 2019-12-13
Payer: MEDICARE

## 2019-12-13 VITALS
SYSTOLIC BLOOD PRESSURE: 123 MMHG | TEMPERATURE: 96.4 F | DIASTOLIC BLOOD PRESSURE: 63 MMHG | HEART RATE: 70 BPM | RESPIRATION RATE: 16 BRPM

## 2019-12-13 PROCEDURE — 99212 OFFICE O/P EST SF 10 MIN: CPT

## 2019-12-13 NOTE — DISCHARGE INSTRUCTIONS
Discharge Instructions for  [facility]  [address], [address] [facility]  Telephone: [phone]    NAME:  Bonifacio Cao [facility] YOB: 1950  MEDICAL RECORD NUMBER:  [id number]  DATE:  12/13/2019    Wound Cleansing:   Do not scrub or use excessive force. Cleanse wound prior to applying a clean dressing with:  [x] Normal Saline [] Keep Wound Dry in Shower    [] Wound Cleanser   [] Cleanse wound with Mild Soap & Water  [] May Shower at Discharge   [] Other:      Topical Treatments:  Do not apply lotions, creams, or ointments to wound bed unless directed. [x] Apply moisturizing lotion to skin surrounding the wound prior to dressing change.  [] Apply antifungal ointment to skin surrounding the wound prior to dressing change.  [] Apply thin film of moisture barrier ointment to skin immediately around wound. [] Other:       Dressings:           Wound Location left plantar foot   [x] Apply Primary Dressing:       [] MediHoney Gel [] Alginate with Silver [] Alginate   [] Collagen [] Collagen with Silver   [] Santyl with Moisten saline gauze     [] Hydrocolloid   [] MediHoney Alginate [] Foam with Silver   [] Foam   [] Hydrofera Blue    [] Mepilex Border    [x] Moisten with Betadine [] Hydrogel [] Mepitel     [] Bactroban/Mupirocin [] Polysporin  [] Other:    [x] Pack wound loosely with  [] Iodoform   [] Plain Packing  [] Other   [x] Cover and Secure with:     [x] Gauze [x] Carlos [] Kerlix   [] Ace Wrap [x] Tape [] ABD     [] Other:    Avoid contact of tape with skin.   [x] Change dressing: [] Daily    [x] Every Other Day [] Three times per week   [] Once a week [] Do Not Change Dressing   [] Other:        Edema Control:  Apply: [x] Compression Stocking [x]Right Leg [x]Left Leg   [] Tubigrip []Right Leg Double Layer []Left Leg Double Layer       []Right Leg Single Layer []Left Leg Single Layer   [] SpandaGrip []Right Leg  []Left Leg      []Low compression 5-10 mm/Hg      []Medium compression 10-20 mm/Hg     []High compression  20-30 mm/Hg   every morning immediately when getting up should be applied to affected leg(s) from mid foot to knee making sure to cover the heel. Remove every night before going to bed. [x] Elevate leg(s) above the level of the heart when sitting. [x] Avoid prolonged standing in one place. [] Elevate arm/hand above the level of the heart []RightArm []LeftArm         Dietary:  [x] Diet as tolerated: [x] Calorie Diabetic Diet: Low carb and no sugar [x] No Added Salt:  [] Increase Protein: [] Other:   Activity:  [x] Activity as tolerated:  [] Patient has no activity restrictions     [] Strict Bedrest: [] Remain off Work:     [] May return to full duty work:                                   [] Return to work with restrictions:             Return Appointment:  [] Wound and dressing supply provider:   [] ECF or Home Healthcare:  [] Wound Assessment: [] Physician or NP scheduled for Wound Assessment:   [x] Return Appointment: With Dr Jalil Donohue  in  [address] [place] [address])  [] Ordered tests:      Electronically signed Hugo Macias RN on 12/13/2019 at 2:20 PM     Lucila Albarran 281: Should you experience any significant changes in your wound(s) or have questions about your wound care, please contact the [facility] at [address] [place] [address] 8:00 am - 4:30. If you need help with your wound outside these hours and cannot wait until we are again available, contact your PCP or go to the hospital emergency room. PLEASE NOTE: IF YOU ARE UNABLE TO OBTAIN WOUND SUPPLIES, CONTINUE TO USE THE SUPPLIES YOU HAVE AVAILABLE UNTIL YOU ARE ABLE TO REACH US. IT IS MOST IMPORTANT TO KEEP THE WOUND COVERED AT ALL TIMES.      Physician Signature:_______________________    Date: ___________ Time:  ____________

## 2019-12-13 NOTE — WOUND CARE
12/13/19 1353 Wound Foot Left;Dorsal Erupted blister 11/15/19 Date First Assessed/Time First Assessed: 11/15/19 0925   Wound Approximate Age at First Assessment (Weeks): 2 weeks  Primary Wound Type: (c) Diabetic  Location: Foot  Wound Location Orientation: Left;Dorsal  Wound Description: Erupted blister  Date of. .. Dressing Status (open to air) Dressing Type Open to air Wound Length (cm) 0 cm Wound Width (cm) 0 cm Wound Depth (cm) 0 cm Wound Surface Area (cm^2) 0 cm^2 Wound Volume (cm^3) 0 cm^3 Drainage Amount None Wound Foot Right;Medial 10/11/19 Date First Assessed/Time First Assessed: 10/11/19 0937   Present on Hospital Admission: Yes  Wound Approximate Age at First Assessment (Weeks): 2 weeks  Primary Wound Type: Diabetic Ulcer  Location: Foot  Wound Location Orientation: Right;Medial  Date. .. Dressing Status Removed Dressing Type Gauze;Gauze wrap (susanne) (Betadine) Non-staged Wound Description Full thickness Wound Length (cm) 0.1 cm Wound Width (cm) 0.1 cm Wound Depth (cm) 0.1 cm Wound Surface Area (cm^2) 0.01 cm^2 Wound Volume (cm^3) 0 cm^3 Condition of Base Pink Condition of Edges Open Drainage Amount Small Drainage Color Serosanguinous Wound Odor None Angela-wound Assessment Intact Cleansing and Cleansing Agents  Normal saline Dressing Changed Changed/New Dressing Type Applied Gauze;Gauze wrap (susanne) (Betadine moist to dry gauze; tape) Wound Foot Plantar;Left 06/28/19 Date First Assessed/Time First Assessed: 06/28/19 1334   Present on Hospital Admission: Yes  Primary Wound Type: Diabetic  Location: Foot  Wound Location Orientation: Plantar;Left  Date of First Observation: 06/28/19 Dressing Status Removed Dressing Type Gauze;Gauze wrap (susanne) (HFBR) Wound Length (cm) 1.5 cm Wound Width (cm) 1.4 cm Wound Depth (cm) 0.4 cm Wound Surface Area (cm^2) 2.1 cm^2 Wound Volume (cm^3) 0.84 cm^3 Condition of Base Pink Condition of Edges Calloused Undermining (cm) 0.3 cm Direction of Undermining 10 o'clock 
(to 1 o'clock) Drainage Amount Moderate Drainage Color Serosanguinous Wound Odor None Angela-wound Assessment Maceration Cleansing and Cleansing Agents  Normal saline Dressing Changed Changed/New Dressing Type Applied Gauze;Gauze wrap (susanne) 
(; tape) Discharge Condition: Stable Pain: 0 Ambulatory Status: Matthew Wolf Discharge Destination: Home Transportation: Car Accompanied by: Self  and Family/Caregiver Discharge instructions reviewed with Patient and Family/Caregiver  and copy or written instructions have been provided. All questions/concerns have been addressed at this time.

## 2019-12-13 NOTE — WOUND CARE
12/13/19 1353 Wound Foot Left;Dorsal Erupted blister 11/15/19 Date First Assessed/Time First Assessed: 11/15/19 0925   Wound Approximate Age at First Assessment (Weeks): 2 weeks  Primary Wound Type: (c) Diabetic  Location: Foot  Wound Location Orientation: Left;Dorsal  Wound Description: Erupted blister  Date of. .. Dressing Status (open to air) Dressing Type Open to air Wound Length (cm) 0 cm Wound Width (cm) 0 cm Wound Depth (cm) 0 cm Wound Surface Area (cm^2) 0 cm^2 Wound Volume (cm^3) 0 cm^3 Drainage Amount None Wound Foot Right;Medial 10/11/19 Date First Assessed/Time First Assessed: 10/11/19 0937   Present on Hospital Admission: Yes  Wound Approximate Age at First Assessment (Weeks): 2 weeks  Primary Wound Type: Diabetic Ulcer  Location: Foot  Wound Location Orientation: Right;Medial  Date. .. Dressing Status Removed Dressing Type Gauze;Gauze wrap (susanne) (Betadine) Non-staged Wound Description Full thickness Wound Length (cm) 0.1 cm Wound Width (cm) 0.1 cm Wound Depth (cm) 0.1 cm Wound Surface Area (cm^2) 0.01 cm^2 Wound Volume (cm^3) 0 cm^3 Condition of Base Pink Condition of Edges Open Drainage Amount Small Drainage Color Serosanguinous Wound Odor None Angela-wound Assessment Intact Cleansing and Cleansing Agents  Normal saline Wound Foot Plantar;Left 06/28/19 Date First Assessed/Time First Assessed: 06/28/19 1334   Present on Hospital Admission: Yes  Primary Wound Type: Diabetic  Location: Foot  Wound Location Orientation: Plantar;Left  Date of First Observation: 06/28/19 Dressing Status Removed Dressing Type Gauze;Gauze wrap (susanne) (HFBR) Wound Length (cm) 1.5 cm Wound Width (cm) 1.4 cm Wound Depth (cm) 0.4 cm Wound Surface Area (cm^2) 2.1 cm^2 Wound Volume (cm^3) 0.84 cm^3 Condition of Base Pink Condition of Edges Calloused Undermining (cm) 0.3 cm Direction of Undermining 10 o'clock 
(to 1 o'clock) Drainage Amount Moderate Drainage Color Serosanguinous Wound Odor None Angela-wound Assessment Maceration Cleansing and Cleansing Agents  Normal saline Visit Vitals /63 (BP 1 Location: Right arm, BP Patient Position: Sitting; At rest) Pulse 70 Temp 96.4 °F (35.8 °C) Resp 16

## 2019-12-13 NOTE — WOUND CARE
Clinic Level of Care Assessment NAME:  Paramjit Pal. YOB: 1950 GENDER: male MEDICAL RECORD NUMBER:  [id number] DATE:  12/13/2019 Wound Count Document in [facility] Number of Wounds Assessed Points No Wounds/Ulcers []   0 Less than Three Wounds/Ulcers [x]   1  
3-6 Wounds/Ulcers []   2 Greater than 6 Wounds/Ulcers []   3 Ambulation Status Document in Coord/MOE/Mobility tab Status Definition Points Independent Independently able to ambulate. Fully able (without any assistance) to get on/off exam table/chair. [x]   0 Minimal Physical Assistance Requires physical assistance of one person to ambulate and/or position patient to be examined. Includes necessary physical assistance to position lower extremities on/off stool. []   1 Moderate Physical Assistance Requires at least one staff member to physically assist patient in ambulating into treatment room, and on/off exam table. []   2 Full Assistance Requires assistance of at least two staff members to transfer patient into treatment room and/or on/off exam table/chair. \"Total Transfer\". []   3 Dressing Complexity Document in [facility] and Write Appropriate Order Complexity Definition Points No Dressing  []   0 Simple Minimal, simple dressing. i.e. Band-aid, gauze, simple wrap. []   1 Intermediate Moderately complicated requiring licensed personnel to apply i.e. collagen matrix, ointments, gels, alginates. [x]   2 Complex Complicated requiring licensed personnel to apply dressings 6 or more wounds. []   3 Teaching Effort Document in Education Tab Effort Definition Points No Teaching  []   0 Simple Reinforce two or less topics. Document in Education navigator. [x]   1 Intermediate Reinforce three to five topics and/or one additional  
new topic. Document in Education navigator. []   2 Complex Teach more than one new topic. New patient information packet reviewed and/or reinforce more than three topics. Document in Education navigator. HBO initial instruction. []   3 Patient Assessment and Planning Planning Definition Points Simple Multiple System Simple: Simple follow-up with routine assessment and planning. If Discharged, instructions and long term/follow-up care given to patient/caregiver. Discharged, instructions and/or After Visit Summary given to patient/caregiver and instructions completed. [x]   1 Intermediate Multiple System Intermediate: Contact with outside resources; i.e. Telephone calls to home health, [facility]. May include filling out forms and writing letters, arranging transportation, communication with insurance , vendors, etc.  Discharged, instructions and/or After Visit Summary given to patient/caregiver and instructions completed. []   2 Complex Multiple System Complex: Full, comprehensive assessment and planning. Follow the entire navigator under Wound Visit charting filling out each tab which includes OP Adm Database Screening, Education and CarePlan  HBO risk assessment completed. Discharged, instructions and/or After Visit Summary given to patient/caregiver and instructions completed. []   3 Is this the Patient's First Visit to the [address] [place] [address] Road No 
 
 
Is this Patient Established @ [facility] 
Yes Clinical Level of Care Points  0-2  Level 1 [] Points  3-5  Level 2 [x] Points  6-9  Level 3 [] Points  10-12  Level 4 [] Points  13-15  Level 5 [] Electronically signed by Kvng Marcum RN on 12/13/2019 at 2:29 PM

## 2019-12-14 NOTE — PROGRESS NOTES
[address] 
WOUND CARE PROGRESS NOTE Name:  Abby Roach 
MR#:  [id number] :  1950 ACCOUNT #:  [de-identified] DATE OF SERVICE:  2019 ADDENDUM to JOB #:  [id number] The patient has been scheduled for ABIs, bilateral lower extremities. Keely Sharpe DPM 
 
 
[id number] 
D:  2019 16:09 
T:  2019 0:53 JOB #:  [id number]

## 2019-12-14 NOTE — PROGRESS NOTES
[address] 
WOUND CARE PROGRESS NOTE Name:  Misael Francis 
MR#:  [id number] :  1950 ACCOUNT #:  [de-identified] DATE OF SERVICE:  2019 HISTORY OF CHIEF COMPLAINT:  This 25-year-old white male presented for continued treatment of a chronic wound, left foot. The last visit, the patient was seen by Dr. Latoya Castillo and wound was dressed with [facility] and Exu-Dry. Most recent wound cultures had shown streptococcus B. History and physical unchanged from admitting history and physical; no change in medications, no change in allergies. CURRENT MEDICATIONS:  Mirapex 1 mg at night, Lantus SoloSTAR insulin 35 units daily and 20 units nightly, Flomax 0.4 mg twice a day, ferrous sulfate 47.5 mg three times a day, Bumex 2 mg twice a day, Imdur 30 mg daily, Zaroxolyn 2.5 mg daily, Timoptic eye solution 0.5% 1 drop to the right eye twice a day, Proscar 5 mg daily, potassium chloride 20 mEq three packs daily, vitamin C 500 mg three times a day, Coreg 12.5 mg twice a day, Victoza 0.6 mg daily, oxycodone 5/325 every 8 hours as needed for pain, Uloric 40 mg daily. ALLERGIES:  TO NIACIN, IMIPENEM, LEVEMIR, PRIMAXIN AND Negrita Aparna. PAST MEDICAL HISTORY:  Gout, atrial fibrillation, diabetes with neuropathy, anemia, anxiety, arrhythmia, atherosclerotic cardiovascular disease, benign prostatic hyperplasia, congestive heart failure, chronic kidney disease, degenerative joint disease, hypercholesterolemia, hyperlipidemia, hypertension, insomnia, hypothyroidism, restless leg syndrome. SOCIAL HISTORY:  Previous smoker. He stopped in . PHYSICAL EXAMINATION: 
VITAL SIGNS:  Temperature 96.4, pulse rate 70, respirations 16, blood pressure 123/63.  
EXTREMITIES:  Physical examination of lower extremities reveal barely palpable pedal pulses with noted lack of hair growth lower extremities bilateral, diminished epicritic sensation, plantar aspect of the second left MPJ is a granular ulceration with erythema extending to the base of toes 2 and 3, minimal warmth, inconclusive if bone is probed. The wound measures 1.5 x 1.4 x 0.4 cm. Dorsal medial aspect of the first right MPJ is well scabbed wound, it measures 0.1 x 0.1 x 0.1. ASSESSMENT:  Chronic diabetic ulcer, plantar second left MPJ with probable osteomyelitis of the second metatarsal head. PLAN:  The patient was given a prescription of doxycycline 500 mg #20 to take one p.o. b.i.d.  I discussed surgical intervention to remove the second and third metatarsal head with the patient and his wife. I did not feel because of the cellulitis and size of the wound that using fixation would be appropriate. We discussed risks, benefits and expected outcome of surgical intervention and both agree. At this time, I do not feel like he needs to be admitted. There is minimal swelling lower left leg and foot. No warmth and the patient is afebrile. Wound today was dressed with Betadine wet-to-dry dressing. The patient is to continue. The patient's wife is to obtain cardiac clearance as well as Internal Medicine clearance for removal of second and third left metatarsal heads within the next week. I will stay in constant contact with the patient every other day and he is to alert me if any changes in the wound. DORIS Salmon/[id number]/[email or web address] 
D:  12/13/2019 16:07 
T:  12/14/2019 5:43 JOB #:  [id number]

## 2019-12-17 PROBLEM — Z01.810 PRE-OPERATIVE CARDIOVASCULAR EXAMINATION: Status: ACTIVE | Noted: 2019-12-17

## 2019-12-17 LAB
BACTERIA SPEC AEROBE CULT: ABNORMAL
BACTERIA SPEC AEROBE CULT: ABNORMAL

## 2019-12-17 NOTE — PROGRESS NOTES
HPI:  
77 y.o.  presents for today for preoperative medical consultation clearance for planned surgery on his left foot to be done by Dr. Katharina Duane with excision of the second and third metatarsal heads due to a chronic nonhealing ulcer on the bottom of his foot which was recently evaluated and found to have some surrounding cellulitis which he was started on doxycycline. He has been going to the wound care clinic regularly and despite this the foot ulcer is not healing and is felt related to pressure from the second and third metatarsal heads. He is regular followed by me for hypertension, cardiomyopathy, atrial fibrillation, ASCVD, GERD, diabetes, diabetic neuropathy as well as other multiple medical problems. He currently denies any chest pain, shortness of breath, palpitations, PND, orthopnea or other cardiorespiratory complaints. He denies any GI or  complaints. He denies any headaches, dizziness or neurologic complaints other than decreased sensation in both feet related to his diabetic neuropathy which is chronic and unchanged. He has his chronic unchanged arthritic complaints. There are no other complaints on complete review of systems. Patient Active Problem List  
 Diagnosis  ACC/AHA stage C systolic heart failure due to ischemic cardiomyopathy ([address]  Primary osteoarthritis involving multiple joints  ASCVD (arteriosclerotic cardiovascular disease)  Mixed hyperlipidemia  Controlled type 2 diabetes mellitus with stage 4 chronic kidney disease, without long-term current use of insulin ([address]  CKD (chronic kidney disease), stage IV ([address]  Hypertension with renal disease  Gastroesophageal reflux disease without esophagitis  Cardiomyopathy ([address]  Atrial fibrillation (HCC)  
  S/P ablation  Gout  Anemia  Class 1 obesity due to excess calories without serious comorbidity with body mass index (BMI) of 30.0 to 30.9 in adult  Acquired hypothyroidism  Diabetic neuropathy ([address]  BPH (benign prostatic hyperplasia)  Ulcer of left foot ([address]  Pre-operative cardiovascular examination  Cellulitis, leg  Abscess of foot  Sesamoiditis  Venous stasis ulcer of leg without varicose veins (HCC)  Cellulitis of right anterior lower leg  Metatarsalgia  Lateral epicondylitis of left elbow  Pneumonia  Hyponatremia  Labyrinthitis of both ears  Acute on chronic diastolic congestive heart failure, NYHA class 3 (HCC)  Primary osteoarthritis of right knee  Primary osteoarthritis of left knee  S/P ablation of atrial fibrillation  Left elbow pain  Status post amputation of left great toe ([address]  Hyperostosis 1st left MPJ  Age-related cataract  Type 2 diabetes mellitus with foot ulcer ([address]  Restless leg  Insomnia  ED (erectile dysfunction)  Anxiety Past Medical History:  
Diagnosis Date  Anemia 8/5/2017  Anxiety 8/5/2017  Arrhythmia   
 atrial fibrillation 2014  ASCVD (arteriosclerotic cardiovascular disease) 8/5/2017  BPH (benign prostatic hyperplasia) 8/5/2017  CAD (coronary artery disease)   
 h/o stents  Cancer [facility])   
 h/o skin cancer  Cardiomyopathy ([address] 8/5/2017  CHF (congestive heart failure) ([address] 8/5/2017  Chronic kidney disease Stage IV  CKD (chronic kidney disease), stage IV ([address] 8/5/2017  Diabetes ([address]  Diabetes mellitus ([address] 8/5/2017  Diabetic neuropathy ([address] 8/5/2017  DJD (degenerative joint disease) 8/5/2017  ED (erectile dysfunction) 8/5/2017  Gout 8/5/2017  High cholesterol  Hyperlipidemia 8/5/2017  Hypertension  Hypertension with renal disease 8/5/2017  Hypothyroid 8/5/2017  Insomnia 8/5/2017  Obesity 8/5/2017  On statin therapy 8/5/2017  Pneumonia 05/28/2019  Restless leg 8/5/2017  Thyroid disease   
 hypothyroid  Ulcer of foot due to secondary diabetes ([address] 07/12/2019 Social History Tobacco Use  Smoking status: Former Smoker Packs/day: 0.50 Years: 4.00 Pack years: 2.00 Last attempt to quit: 3/6/1972 Years since quittin.8  Smokeless tobacco: Never Used Substance Use Topics  Alcohol use: No  
  Comment: rare, 1 drink per year  Drug use: No  
 
 
Family History Problem Relation Age of Onset  Heart Disease Mother  Kidney Disease Mother  Heart Disease Father  Kidney Disease Father  Cancer Sister Breast  
 
 
 
 
Allergies Allergen Reactions  Niacin Unknown (comments) Other reaction(s): Unknown (comments)  Imipenem Diarrhea Other reaction(s): Rash  Levemir [Insulin Detemir] Hives  Other Medication Other (comments) ? Allergy to Tania Rich causing chemical burn  Primaxin [Imipenem-Cilastatin] Diarrhea and Rash  Xarelto [Rivaroxaban] Rash and Itching Other reaction(s): Rash ROS:  
 
Constitutional: He denies fevers, weight loss, sweats. Eyes: No blurred or double vision. ENT: No difficulty with swallowing, taste, speech or smell. Neck: no stiffness or swelling Respiratory: No cough wheezing or shortness of breath. Cardiovascular: Denies chest pain, palpitations, unexplained indigestion or syncope. Gastrointestinal:  No changes in bowel movements, no abdominal pain, no bloating. Genitourinary:  He denies frequency, nocturia or stranguria. Extremities: No joint pain, stiffness or swelling. Neurological:  No tingling, burring paresthesias or loss of motor strength. No syncope, dizziness or frequent headache. Positive for chronic numbness of both feet from ankles distally Lymphatic: no adenopathy noted Hematologic: no easy bruising or bleeding gums Skin:  No recent rashes or mole changes. Chronic nonhealing ulcer base of his left foot over the second and third metatarsal heads Psychiatric/Behavioral:  Negative for depression. The patient is not nervous/anxious. PE:  
 
Vitals:  
 12/18/19 1424 12/18/19 1452 BP: 140/82 138/84 Pulse: 70 Resp: 19 Temp: 97.7 °F (36.5 °C) TempSrc: Oral   
SpO2: 93% Weight: 240 lb 1.6 oz (108.9 kg) Height: 6' 4\" (1.93 m) PainSc:   0 - No pain General appearance - alert, well appearing, and in no distress Mental status - alert, oriented to person, place, and time HEENT: 
Ears - bilateral TM's and external ear canals clear Eyes - pupillary responses were normal.  Extraocular muscle function intact. Lids and conjunctiva not injected. Fundoscopic exam revealed sharp disc margins. eye movements intact Pharynx- clear with teeth in good repair. No masses were noted Neck - supple without thyromegaly or burit. No JVD noted Lungs - clear to auscultation and percussion Cardiac- normal rate, regular rhythm without murmurs. PMI not displaced. No gallop, rub or click Abdomen - flat, soft, non-tender without palpable organomegaly or mass. No pulsatile mass was felt, and not bruit was heard. Bowel sounds were active : Circumcised, Testes descended w/o masses Extremities -  no clubbing cyanosis or edema Lymphatics - no palpable lymphadenopathy, no hepatosplenomegaly Hematologic: no petechiae or purpura Peripheral vascular -Femoral, Dorsalis pedis and posterior tibial pulses felt without difficulty on the right but less palpable on the left although the foot is warm and does not appear to be ischemic. Skin - no rash or unusual mole change noted. Chronic nonhealing foot ulcer on the base of his foot over the second and third metatarsal heads. Neurological - Cranial nerves II-XII grossly intact. Motor strength 5/5. DTR's 2+ and symmetric. Station and gait normal 
Back exam - full range of motion, no tenderness, palpable spasm or pain on motion Musculoskeletal - no joint tenderness, deformity or swelling Assessment/Plan: 1. Pre-operative cardiovascular examination 2. Skin ulcer of left foot, limited to breakdown of skin ([facility] 3. Hypertension with renal disease 4. Paroxysmal atrial fibrillation (HCC) 5. Ischemic cardiomyopathy 6. ASCVD (arteriosclerotic cardiovascular disease) 7. Controlled type 2 diabetes mellitus with stage 4 chronic kidney disease, without long-term current use of insulin ([facility] [address] 8. Diabetic polyneuropathy associated with type 2 diabetes mellitus ([facility] [address] Impression 1. Preoperative medical consultation completed today. 2.  Skin ulcer left foot due to pressure with planned surgery for removal of second and third metatarsal heads as noted. 3.  Hypertension that is controlled 4. Paroxysmal atrial fibrillation in sinus rhythm today 5. Cardiomyopathy that is currently stable 6 ASCVD clinically stable with no recent medication changes 7. Diabetes mellitus with most recent A1c at 6.9 8. Diabetic polyneuropathy chronic but stable He is at moderate risk for surgery but is stable and currently optimized to proceed with the surgery as scheduled as I think not having the surgery would increase the risk of infection and further complications. I reviewed his most recent EKG from August which shows paced rhythm at 70 with left bundle branch block pattern no acute process Copy to Dr. Jose Centeno. Health Maintenance reviewed - updated. No orders of the defined types were placed in this encounter. There are no discontinued medications. Current Outpatient Medications Medication Sig Dispense Refill  oxyCODONE-acetaminophen (PERCOCET) 5-325 mg per tablet Take 1 Tab by mouth every eight (8) hours as needed for Pain for up to 30 days. Max Daily Amount: 3 Tabs. 30 Tab 0  pramipexole (MIRAPEX) 1 mg tablet TAKE 1 TABLET BY MOUTH EVERY NIGHT AT BEDTIME 30 Tab prn  insulin glargine (LANTUS SOLOSTAR U-100 INSULIN) 100 unit/mL (3 mL) inpn INJECT 35 UNITS UNDER THE SKIN EVERY DAY (Patient taking differently: 20 Units nightly. INJECT 20 UNITS UNDER THE SKIN AT BEDTIME.) 30 mL prn  tamsulosin (FLOMAX) 0.4 mg capsule TAKE 2 CAPSULES BY MOUTH EVERY  Cap 3  ferrous sulfate (SLOW FE) 47.5 mg iron TbER tablet Take 1 Tab by mouth three (3) times daily. 90 Tab 12  
 bumetanide (BUMEX) 2 mg tablet Take 2 Tabs by mouth two (2) times a day. 120 Tab 12  
 febuxostat (ULORIC) 40 mg tab tablet Take 1 Tab by mouth daily. 30 Tab 0  
 isosorbide mononitrate ER (IMDUR) 30 mg tablet Take 1 Tab by mouth daily. 30 Tab 12  
 metOLazone (ZAROXOLYN) 2.5 mg tablet Take 2.5 mg by mouth daily as needed (swelling).  timolol (TIMOPTIC) 0.5 % ophthalmic solution Administer 1 Drop to right eye two (2) times a day.  finasteride (PROSCAR) 5 mg tablet TAKE 1 TABLET BY MOUTH DAILY 90 Tab 3  potassium chloride (KLOR-CON) 20 mEq pack One packet three times daily 90 Packet prn  ascorbic acid, vitamin C, (VITAMIN C) 250 mg tablet Take 1 Tab by mouth three (3) times daily. 100 Tab prn  
 OTHER Apply  to affected area daily as needed (Knee Pain). CBD Cream:  Apply daily as needed for knee pain  carvedilol (COREG) 12.5 mg tablet TAKE 1 TABLET BY MOUTH TWICE DAILY 60 Tab 11  Liraglutide (VICTOZA) 0.6 mg/0.1 mL (18 mg/3 mL) sub-q pen 0.6 mg by SubCUTAneous route daily. No results found for any visits on 12/18/19. Recommended healthy diet low in carbohydrates, fats, sodium and cholesterol. Recommended regular cardiovascular exercise 3-6 times per week for 30-60 minutes daily. Verbal and written instructions (see AVS) provided. Patient expresses understanding of diagnosis and treatment plan.  
 
 
Sesar Nicole MD

## 2019-12-18 ENCOUNTER — OFFICE VISIT (OUTPATIENT)
Dept: INTERNAL MEDICINE CLINIC | Age: 69
End: 2019-12-18

## 2019-12-18 VITALS
HEIGHT: 76 IN | BODY MASS INDEX: 29.24 KG/M2 | TEMPERATURE: 97.7 F | DIASTOLIC BLOOD PRESSURE: 84 MMHG | WEIGHT: 240.1 LBS | RESPIRATION RATE: 19 BRPM | HEART RATE: 70 BPM | OXYGEN SATURATION: 93 % | SYSTOLIC BLOOD PRESSURE: 138 MMHG

## 2019-12-18 DIAGNOSIS — I25.5 ISCHEMIC CARDIOMYOPATHY: ICD-10-CM

## 2019-12-18 DIAGNOSIS — N18.4 CONTROLLED TYPE 2 DIABETES MELLITUS WITH STAGE 4 CHRONIC KIDNEY DISEASE, WITHOUT LONG-TERM CURRENT USE OF INSULIN (HCC): ICD-10-CM

## 2019-12-18 DIAGNOSIS — I25.10 ASCVD (ARTERIOSCLEROTIC CARDIOVASCULAR DISEASE): ICD-10-CM

## 2019-12-18 DIAGNOSIS — Z01.810 PRE-OPERATIVE CARDIOVASCULAR EXAMINATION: Primary | ICD-10-CM

## 2019-12-18 DIAGNOSIS — E11.42 DIABETIC POLYNEUROPATHY ASSOCIATED WITH TYPE 2 DIABETES MELLITUS (HCC): ICD-10-CM

## 2019-12-18 DIAGNOSIS — I48.0 PAROXYSMAL ATRIAL FIBRILLATION (HCC): ICD-10-CM

## 2019-12-18 DIAGNOSIS — E11.22 CONTROLLED TYPE 2 DIABETES MELLITUS WITH STAGE 4 CHRONIC KIDNEY DISEASE, WITHOUT LONG-TERM CURRENT USE OF INSULIN (HCC): ICD-10-CM

## 2019-12-18 DIAGNOSIS — I12.9 HYPERTENSION WITH RENAL DISEASE: ICD-10-CM

## 2019-12-18 DIAGNOSIS — L97.521 SKIN ULCER OF LEFT FOOT, LIMITED TO BREAKDOWN OF SKIN (HCC): ICD-10-CM

## 2019-12-18 PROBLEM — L97.529 ULCER OF LEFT FOOT (HCC): Status: ACTIVE | Noted: 2019-12-18

## 2019-12-18 NOTE — PROGRESS NOTES
Chief Complaint Patient presents with  Pre-op Exam  
  foot surgery Visit Vitals /82 (BP 1 Location: Left arm, BP Patient Position: Sitting) Pulse 70 Temp 97.7 °F (36.5 °C) (Oral) Resp 19 Ht 6' 4\" (1.93 m) Wt 240 lb 1.6 oz (108.9 kg) SpO2 93% BMI 29.23 kg/m² 1. Have you been to the ER, urgent care clinic since your last visit? Hospitalized since your last visit? No 
 
2. Have you seen or consulted any other health care providers outside of the [address] [place] [address] since your last visit? Include any pap smears or colon screening. Dr. Candida Steinberg

## 2019-12-18 NOTE — PATIENT INSTRUCTIONS
Arthritis: Care Instructions Your Care Instructions Arthritis, also called osteoarthritis, is a breakdown of the cartilage that cushions your joints. When the cartilage wears down, your bones rub against each other. This causes pain and stiffness. Many people have some arthritis as they age. Arthritis most often affects the joints of the spine, hands, hips, knees, or feet. You can take simple measures to protect your joints, ease your pain, and help you stay active. Follow-up care is a key part of your treatment and safety. Be sure to make and go to all appointments, and call your doctor if you are having problems. It's also a good idea to know your test results and keep a list of the medicines you take. How can you care for yourself at home? · Stay at a healthy weight. Being overweight puts extra strain on your joints. · Talk to your doctor or physical therapist about exercises that will help ease joint pain. ? Stretch. You may enjoy gentle forms of yoga to help keep your joints and muscles flexible. ? Walk instead of jog. Other types of exercise that are less stressful on the joints include riding a bicycle, swimming, bang chi, or water exercise. ? Lift weights. Strong muscles help reduce stress on your joints. Stronger thigh muscles, for example, take some of the stress off of the knees and hips. Learn the right way to lift weights so you do not make joint pain worse. · Take your medicines exactly as prescribed. Call your doctor if you think you are having a problem with your medicine. · Take pain medicines exactly as directed. ? If the doctor gave you a prescription medicine for pain, take it as prescribed. ? If you are not taking a prescription pain medicine, ask your doctor if you can take an over-the-counter medicine. · Use a cane, crutch, walker, or another device if you need help to get around. These can help rest your joints.  You also can use other things to make life easier, such as a higher toilet seat and padded handles on kitchen utensils. · Do not sit in low chairs, which can make it hard to get up. · Put heat or cold on your sore joints as needed. Use whichever helps you most. You also can take turns with hot and cold packs. ? Apply heat 2 or 3 times a day for 20 to 30 minutesusing a heating pad, hot shower, or hot packto relieve pain and stiffness. ? Put ice or a cold pack on your sore joint for 10 to 20 minutes at a time. Put a thin cloth between the ice and your skin. When should you call for help? Call your doctor now or seek immediate medical care if: 
  · You have sudden swelling, warmth, or pain in any joint.  
  · You have joint pain and a fever or rash.  
  · You have such bad pain that you cannot use a joint.  
 Watch closely for changes in your health, and be sure to contact your doctor if: 
  · You have mild joint symptoms that continue even with more than 6 weeks of care at home.  
  · You have stomach pain or other problems with your medicine. Where can you learn more? Go to http://gildardo-palak.info/. Enter S053 in the search box to learn more about \"Arthritis: Care Instructions. \" Current as of: April 1, 2019 Content Version: 12.2 © 3270-3770 [employer]. Care instructions adapted under license by [employer] (which disclaims liability or warranty for this information). If you have questions about a medical condition or this instruction, always ask your healthcare professional. [place] [address] any warranty or liability for your use of this information.

## 2019-12-19 RX ORDER — CARVEDILOL 12.5 MG/1
TABLET ORAL
Qty: 60 TAB | Refills: 11 | Status: SHIPPED | OUTPATIENT
Start: 2019-12-19 | End: 2020-02-21

## 2019-12-20 ENCOUNTER — HOSPITAL ENCOUNTER (OUTPATIENT)
Dept: WOUND CARE | Age: 69
Discharge: HOME | End: 2019-12-20
Payer: MEDICARE

## 2019-12-20 VITALS
HEART RATE: 70 BPM | RESPIRATION RATE: 16 BRPM | DIASTOLIC BLOOD PRESSURE: 64 MMHG | TEMPERATURE: 98.4 F | SYSTOLIC BLOOD PRESSURE: 131 MMHG

## 2019-12-20 PROBLEM — M77.42 METATARSALGIA OF LEFT FOOT: Status: ACTIVE | Noted: 2019-12-20

## 2019-12-20 PROCEDURE — 99212 OFFICE O/P EST SF 10 MIN: CPT

## 2019-12-20 RX ORDER — MUPIROCIN 20 MG/G
OINTMENT TOPICAL ONCE
Status: COMPLETED | OUTPATIENT
Start: 2019-12-20 | End: 2019-12-20

## 2019-12-20 RX ADMIN — MUPIROCIN: 20 OINTMENT TOPICAL at 15:33

## 2019-12-20 NOTE — WOUND CARE
12/20/19 1428 Wound Leg lower Right;Medial  
Date First Assessed: 12/20/19   Present on Hospital Admission: Yes  Wound Approximate Age at First Assessment (Weeks): 3 weeks  Location: Leg lower  Wound Location Orientation: Right;Medial  
Dressing Status Removed Dressing Type Gauze;Gauze wrap (susanne) Wound Length (cm) 1 cm Wound Width (cm) 1 cm Wound Depth (cm) 0.1 cm Wound Surface Area (cm^2) 1 cm^2 Wound Volume (cm^3) 0.1 cm^3 Condition of Base Pink Condition of Edges Open Drainage Amount Moderate Drainage Color Serosanguinous Wound Odor None Angela-wound Assessment Intact Cleansing and Cleansing Agents  Normal saline Dressing Changed Changed/New Dressing Type Applied Gauze;Gauze wrap (susanne); Special tape (comment) (Bactroban oinment to bilateral lower leg sup. wounds) Wound Foot Plantar;Left 06/28/19 Date First Assessed/Time First Assessed: 06/28/19 1334   Present on Hospital Admission: Yes  Primary Wound Type: Diabetic  Location: Foot  Wound Location Orientation: Plantar;Left  Date of First Observation: 06/28/19 Dressing Status Removed Dressing Type Gauze;Gauze wrap (susanne) Wound Length (cm) 1.8 cm Wound Width (cm) 1.1 cm Wound Depth (cm) 0.3 cm Wound Surface Area (cm^2) 1.98 cm^2 Wound Volume (cm^3) 0.59 cm^3 Condition of Base Pink Condition of Edges Calloused Drainage Amount Moderate Drainage Color Serosanguinous Wound Odor None Angela-wound Assessment Maceration Cleansing and Cleansing Agents  Normal saline Dressing Changed Changed/New Dressing Type Applied Gauze;Gauze wrap (ussanne); Special tape (comment) 
(painted with Betadine after cleansing site with Normal Salin) Discharge Condition: Stable Pain: 0 Ambulatory Status: Ilana Roman Discharge Destination: Work Transportation: Car Accompanied by: Family/Caregiver Discharge instructions reviewed with Patient and copy or written instructions have been provided. All questions/concerns have been addressed at this time.

## 2019-12-20 NOTE — WOUND CARE
12/20/19 1428 Wound Leg lower Right;Medial  
Date First Assessed: 12/20/19   Present on Hospital Admission: Yes  Wound Approximate Age at First Assessment (Weeks): 3 weeks  Location: Leg lower  Wound Location Orientation: Right;Medial  
Dressing Status Removed Dressing Type Gauze;Gauze wrap (susanne) Wound Length (cm) 1 cm Wound Width (cm) 1 cm Wound Depth (cm) 0.1 cm Wound Surface Area (cm^2) 1 cm^2 Wound Volume (cm^3) 0.1 cm^3 Condition of Base Pink Condition of Edges Open Drainage Amount Moderate Drainage Color Serosanguinous Wound Odor None Angela-wound Assessment Intact Cleansing and Cleansing Agents  Normal saline Wound Foot Plantar;Left 06/28/19 Date First Assessed/Time First Assessed: 06/28/19 1334   Present on Hospital Admission: Yes  Primary Wound Type: Diabetic  Location: Foot  Wound Location Orientation: Plantar;Left  Date of First Observation: 06/28/19 Dressing Status Removed Dressing Type Gauze;Gauze wrap (susanne) Wound Length (cm) 1.8 cm Wound Width (cm) 1.1 cm Wound Depth (cm) 0.3 cm Wound Surface Area (cm^2) 1.98 cm^2 Wound Volume (cm^3) 0.59 cm^3 Condition of Base Pink Condition of Edges Calloused Drainage Amount Moderate Drainage Color Serosanguinous Wound Odor None Angela-wound Assessment Maceration Cleansing and Cleansing Agents  Normal saline

## 2019-12-20 NOTE — WOUND CARE
Clinic Level of Care Assessment NAME:  Warner Dunn. YOB: 1950 GENDER: male MEDICAL RECORD NUMBER:  [id number] DATE:  12/20/2019 Wound Count Document in [facility] Number of Wounds Assessed Points No Wounds/Ulcers []   0 Less than Three Wounds/Ulcers [x]   1  
3-6 Wounds/Ulcers []   2 Greater than 6 Wounds/Ulcers []   3 Ambulation Status Document in Coord/MOE/Mobility tab Status Definition Points Independent Independently able to ambulate. Fully able (without any assistance) to get on/off exam table/chair. [x]   0 Minimal Physical Assistance Requires physical assistance of one person to ambulate and/or position patient to be examined. Includes necessary physical assistance to position lower extremities on/off stool. []   1 Moderate Physical Assistance Requires at least one staff member to physically assist patient in ambulating into treatment room, and on/off exam table. []   2 Full Assistance Requires assistance of at least two staff members to transfer patient into treatment room and/or on/off exam table/chair. \"Total Transfer\". []   3 Dressing Complexity Document in [facility] and Write Appropriate Order Complexity Definition Points No Dressing  []   0 Simple Minimal, simple dressing. i.e. Band-aid, gauze, simple wrap. []   1 Intermediate Moderately complicated requiring licensed personnel to apply i.e. collagen matrix, ointments, gels, alginates. [x]   2 Complex Complicated requiring licensed personnel to apply dressings 6 or more wounds. []   3 Teaching Effort Document in Education Tab Effort Definition Points No Teaching  []   0 Simple Reinforce two or less topics. Document in Education navigator. [x]   1 Intermediate Reinforce three to five topics and/or one additional  
new topic. Document in Education navigator. []   2 Complex Teach more than one new topic. New patient information packet reviewed and/or reinforce more than three topics. Document in Education navigator. HBO initial instruction. []   3 Patient Assessment and Planning Planning Definition Points Simple Multiple System Simple: Simple follow-up with routine assessment and planning. If Discharged, instructions and long term/follow-up care given to patient/caregiver. Discharged, instructions and/or After Visit Summary given to patient/caregiver and instructions completed. [x]   1 Intermediate Multiple System Intermediate: Contact with outside resources; i.e. Telephone calls to home health, [facility]. May include filling out forms and writing letters, arranging transportation, communication with insurance , vendors, etc.  Discharged, instructions and/or After Visit Summary given to patient/caregiver and instructions completed. []   2 Complex Multiple System Complex: Full, comprehensive assessment and planning. Follow the entire navigator under Wound Visit charting filling out each tab which includes OP Adm Database Screening, Education and CarePlan  HBO risk assessment completed. Discharged, instructions and/or After Visit Summary given to patient/caregiver and instructions completed. []   3 Is this the Patient's First Visit to the [facility] [address] No 
 
 
Is this Patient Established @ [facility] 
Yes Clinical Level of Care Points  0-2  Level 1 [] Points  3-5  Level 2 [x] Points  6-9  Level 3 [] Points  10-12  Level 4 [] Points  13-15  Level 5 [] Electronically signed by Vane Dee RN on 12/20/2019 at 3:01 PM

## 2019-12-20 NOTE — DISCHARGE INSTRUCTIONS
Discharge Instructions for  [place] [id number] [facility]  [place], [address] [facility]  Telephone: [phone]    NAME:  Bonifacio [facility] YOB: 1950  MEDICAL RECORD NUMBER:  [id number]  DATE:  12/20/2019    Wound Cleansing:   Do not scrub or use excessive force. Cleanse wound prior to applying a clean dressing with:  [x] Normal Saline [] Keep Wound Dry in Shower    [] Wound Cleanser   [] Cleanse wound with Mild Soap & Water  [] May Shower at Discharge   [] Other:      Topical Treatments:  Do not apply lotions, creams, or ointments to wound bed unless directed. [x] Apply moisturizing lotion to skin surrounding the wound prior to dressing change.  [] Apply antifungal ointment to skin surrounding the wound prior to dressing change.  [] Apply thin film of moisture barrier ointment to skin immediately around wound. [] Other:       Dressings:           Wound Location left plantar foot   [x] Apply Primary Dressing:       [] MediHoney Gel [] Alginate with Silver [] Alginate   [] Collagen [] Collagen with Silver   [] Santyl with Moisten saline gauze     [] Hydrocolloid   [] MediHoney Alginate [] Foam with Silver   [] Foam   [] Hydrofera Blue    [] Mepilex Border    [x] Moisten with Betadine [] Hydrogel [] Mepitel     [] Bactroban/Mupirocin [] Polysporin  [] Other:    [x] Pack wound loosely with  [] Iodoform   [] Plain Packing  [] Other   [x] Cover and Secure with:     [x] Gauze [x] Carlos [] Kerlix   [] Ace Wrap [x] Tape [] ABD     [] Other:    Avoid contact of tape with skin.   [x] Change dressing: [] Daily    [x] Every Other Day [] Three times per week   [] Once a week [] Do Not Change Dressing   [] Other:        Edema Control:  Apply: [x] Compression Stocking [x]Right Leg [x]Left Leg   [] Tubigrip []Right Leg Double Layer []Left Leg Double Layer       []Right Leg Single Layer []Left Leg Single Layer   [] SpandaGrip []Right Leg  []Left Leg      []Low compression 5-10 mm/Hg      []Medium compression 10-20 mm/Hg     []High compression  20-30 mm/Hg   every morning immediately when getting up should be applied to affected leg(s) from mid foot to knee making sure to cover the heel. Remove every night before going to bed. [x] Elevate leg(s) above the level of the heart when sitting. [x] Avoid prolonged standing in one place. [] Elevate arm/hand above the level of the heart []RightArm []LeftArm         Dietary:  [x] Diet as tolerated: [x] Calorie Diabetic Diet: Low carb and no sugar [x] No Added Salt:  [] Increase Protein: [] Other:   Activity:  [x] Activity as tolerated:  [] Patient has no activity restrictions     [] Strict Bedrest: [] Remain off Work:     [] May return to full duty work:                                   [] Return to work with restrictions:             Return Appointment:  [] Wound and dressing supply provider:   [] ECF or Home Healthcare:  [] Wound Assessment: [] Physician or NP scheduled for Wound Assessment:   [x] Return Appointment: With Dr Mehreen Reeves  in  [address] [place] [address])  [] Ordered tests:      Electronically signed Jazzy Villeda RN on 12/20/2019 at 2:50 PM     Lucila Albarran 281: Should you experience any significant changes in your wound(s) or have questions about your wound care, please contact the [facility] at [address] [place] [address] 8:00 am - 4:30. If you need help with your wound outside these hours and cannot wait until we are again available, contact your PCP or go to the hospital emergency room. PLEASE NOTE: IF YOU ARE UNABLE TO OBTAIN WOUND SUPPLIES, CONTINUE TO USE THE SUPPLIES YOU HAVE AVAILABLE UNTIL YOU ARE ABLE TO REACH US. IT IS MOST IMPORTANT TO KEEP THE WOUND COVERED AT ALL TIMES.      Physician Signature:_______________________    Date: ___________ Time:  ____________

## 2019-12-21 NOTE — PROGRESS NOTES
[address] 
WOUND CARE PROGRESS NOTE Name:  Kayden Warren 
MR#:  [id number] :  1950 ACCOUNT #:  [de-identified] DATE OF SERVICE:  2019 HISTORY OF CHIEF COMPLAINT:  This 64-year-old white male presents for continued treatment of chronic cellulitis and chronic wound plantar left foot. At last visit, the patient had cellulitis distal left forefoot with plantar ulceration. We discussed removing second and third metatarsal here, he has pending CAM. History and physical unchanged from admitting history and physical; no change in medications, no change in allergies. CURRENT ALLERGIES:  LEVEMIR, IMIPENEM, PRIMAXIN AND Amanda Goodwin. CURRENT MEDICATIONS:  Carvedilol 12.5 mg twice a day, oxycodone 5/325 mg every 8 hours as needed for pain, Mirapex 1 mg daily, Lantus SoloSTAR insulin 35 units daily with 20 at bedtime, Flomax 0.4 mg daily, ferrous sulfate 47.5 mg three times a day, Bumex 2 mg twice a day, Uloric 40 mg daily, isosorbide ER 30 mg tablet daily, Zaroxolyn 2.5 mg daily, Timoptic eye drops to the right eye daily, Proscar 5 mg daily, potassium chloride 20 mEq daily, vitamin C 250 mg three times a day, Victoza 0.6 mg daily. PHYSICAL EXAMINATION: 
VITAL SIGNS:  Temperature 98.4, pulse rate 70, respirations 16, blood pressure 131/64. EXTREMITIES:  Physical examination of lower extremities reveal barely palpable pedal pulses, fairly good muscle strength lower extremities bilateral.  Chronic edema and chronic stasis dermatitis lower extremities bilateral.  Diminished epicritic sensation. Plantar aspect of the second left MPJ is a granular, grade II wound that measures 1.8 x 1.1 x 0.3 cm, scant drainage, no longer erythematous. There is a well healed scabbed wound dorsal medial aspect, first right MPJ. ASSESSMENT:  Diabetic ulcer, plantar aspect of the left foot.  
 
PLAN:  The wound was dressed with Betadine wet-to-dry dressings, the patient is to continue daily. We have discussed surgical intervention and the patient will be scheduled for amputation of the second and third metatarsal heads pending CAM results. Meanwhile, he will have followup visits in the [address] [place] [address] with myself until we can schedule that surgery. DORIS [email or web address] 
D:  12/20/2019 16:29 
T:  12/21/2019 8:28 JOB #:  [id number]

## 2019-12-31 NOTE — PERIOP NOTES
Called and LM on medical assistant  for Dr. Blake Dawson informing patient allergy to imipenem and MD ordered ancef which is contraindicated with this allergy. Requested new orders for antibiotic.

## 2020-01-01 ENCOUNTER — HOSPITAL ENCOUNTER (OUTPATIENT)
Dept: WOUND CARE | Age: 70
Discharge: HOME | End: 2020-12-18
Admitting: PODIATRIST
Payer: MEDICARE

## 2020-01-01 ENCOUNTER — TELEPHONE (OUTPATIENT)
Dept: PALLATIVE CARE | Age: 70
End: 2020-01-01

## 2020-01-01 ENCOUNTER — VIRTUAL VISIT (OUTPATIENT)
Dept: PALLATIVE CARE | Age: 70
End: 2020-01-01
Payer: MEDICARE

## 2020-01-01 VITALS
SYSTOLIC BLOOD PRESSURE: 146 MMHG | TEMPERATURE: 98.7 F | DIASTOLIC BLOOD PRESSURE: 66 MMHG | HEART RATE: 70 BPM | RESPIRATION RATE: 18 BRPM

## 2020-01-01 DIAGNOSIS — Z99.2 END-STAGE RENAL DISEASE ON HEMODIALYSIS (HCC): ICD-10-CM

## 2020-01-01 DIAGNOSIS — N18.6 END-STAGE RENAL DISEASE ON HEMODIALYSIS (HCC): ICD-10-CM

## 2020-01-01 DIAGNOSIS — M15.9 PRIMARY OSTEOARTHRITIS INVOLVING MULTIPLE JOINTS: ICD-10-CM

## 2020-01-01 DIAGNOSIS — L97.929 VENOUS ULCER OF LEFT LEG (HCC): ICD-10-CM

## 2020-01-01 DIAGNOSIS — G25.81 RESTLESS LEG: ICD-10-CM

## 2020-01-01 DIAGNOSIS — G47.00 INSOMNIA, UNSPECIFIED TYPE: Primary | ICD-10-CM

## 2020-01-01 DIAGNOSIS — I83.029 VENOUS ULCER OF LEFT LEG (HCC): ICD-10-CM

## 2020-01-01 PROCEDURE — 99214 OFFICE O/P EST MOD 30 MIN: CPT | Performed by: INTERNAL MEDICINE

## 2020-01-01 PROCEDURE — 97597 DBRDMT OPN WND 1ST 20 CM/<: CPT

## 2020-01-01 RX ORDER — TRAZODONE HYDROCHLORIDE 50 MG/1
TABLET ORAL
Qty: 90 TAB | Refills: 1 | Status: SHIPPED | OUTPATIENT
Start: 2020-01-01 | End: 2021-01-01

## 2020-01-02 RX ORDER — OXYCODONE AND ACETAMINOPHEN 5; 325 MG/1; MG/1
TABLET ORAL
COMMUNITY
End: 2020-01-06 | Stop reason: ALTCHOICE

## 2020-01-02 NOTE — PERIOP NOTES
Second message left with Dr Shanice Carlisle office in regards to ABX, Patient has allergy to Imipenem (Ancef) reaction rash and diarrhea. Request callback or fax new orders.

## 2020-01-03 ENCOUNTER — HOSPITAL ENCOUNTER (OUTPATIENT)
Age: 70
Setting detail: OUTPATIENT SURGERY
Discharge: HOME | End: 2020-01-03
Attending: PODIATRIST | Admitting: PODIATRIST
Payer: MEDICARE

## 2020-01-03 VITALS
RESPIRATION RATE: 18 BRPM | OXYGEN SATURATION: 100 % | SYSTOLIC BLOOD PRESSURE: 135 MMHG | HEART RATE: 70 BPM | TEMPERATURE: 98.6 F | HEIGHT: 76 IN | WEIGHT: 232.14 LBS | DIASTOLIC BLOOD PRESSURE: 78 MMHG | BODY MASS INDEX: 28.27 KG/M2

## 2020-01-03 DIAGNOSIS — M77.42 METATARSALGIA OF LEFT FOOT: Primary | ICD-10-CM

## 2020-01-03 LAB
GLUCOSE BLD STRIP.AUTO-MCNC: 138 MG/DL (ref 65–100)
GLUCOSE BLD STRIP.AUTO-MCNC: 152 MG/DL (ref 65–100)
SERVICE CMNT-IMP: ABNORMAL
SERVICE CMNT-IMP: ABNORMAL

## 2020-01-03 PROCEDURE — 77030012881 HC SHOE PSTOP DERY -A

## 2020-01-03 PROCEDURE — 76210000063 HC OR PH I REC FIRST 0.5 HR: Performed by: PODIATRIST

## 2020-01-03 PROCEDURE — 77030002933 HC SUT MCRYL J&J -A: Performed by: PODIATRIST

## 2020-01-03 PROCEDURE — 74011000250 HC RX REV CODE- 250: Performed by: PODIATRIST

## 2020-01-03 PROCEDURE — 76210000020 HC REC RM PH II FIRST 0.5 HR: Performed by: PODIATRIST

## 2020-01-03 PROCEDURE — 88304 TISSUE EXAM BY PATHOLOGIST: CPT

## 2020-01-03 PROCEDURE — 77030011640 HC PAD GRND REM COVD -A: Performed by: PODIATRIST

## 2020-01-03 PROCEDURE — 77030006788 HC BLD SAW OSC STRY -B: Performed by: PODIATRIST

## 2020-01-03 PROCEDURE — 87075 CULTR BACTERIA EXCEPT BLOOD: CPT

## 2020-01-03 PROCEDURE — 77030002916 HC SUT ETHLN J&J -A: Performed by: PODIATRIST

## 2020-01-03 PROCEDURE — 87186 SC STD MICRODIL/AGAR DIL: CPT

## 2020-01-03 PROCEDURE — 87205 SMEAR GRAM STAIN: CPT

## 2020-01-03 PROCEDURE — 77030000032 HC CUF TRNQT ZIMM -B: Performed by: PODIATRIST

## 2020-01-03 PROCEDURE — C1713 ANCHOR/SCREW BN/BN,TIS/BN: HCPCS | Performed by: PODIATRIST

## 2020-01-03 PROCEDURE — 88311 DECALCIFY TISSUE: CPT

## 2020-01-03 PROCEDURE — 74011000272 HC RX REV CODE- 272: Performed by: PODIATRIST

## 2020-01-03 PROCEDURE — 76010000153 HC OR TIME 1.5 TO 2 HR: Performed by: PODIATRIST

## 2020-01-03 PROCEDURE — 74011250636 HC RX REV CODE- 250/636: Performed by: PODIATRIST

## 2020-01-03 PROCEDURE — 74011250637 HC RX REV CODE- 250/637: Performed by: PODIATRIST

## 2020-01-03 PROCEDURE — 77030018836 HC SOL IRR NACL ICUM -A: Performed by: PODIATRIST

## 2020-01-03 PROCEDURE — 77030031139 HC SUT VCRL2 J&J -A: Performed by: PODIATRIST

## 2020-01-03 PROCEDURE — 74011250636 HC RX REV CODE- 250/636: Performed by: ANESTHESIOLOGY

## 2020-01-03 PROCEDURE — 82962 GLUCOSE BLOOD TEST: CPT

## 2020-01-03 PROCEDURE — 87077 CULTURE AEROBIC IDENTIFY: CPT

## 2020-01-03 DEVICE — STIMULAN® RAPID CURE PROVIDED STERILE FOR SINGLE PATIENT USE. STIMULAN® RAPID CURE CONTAINS CALCIUM SULFATE POWDER AND MIXING SOLUTION IN PRE-MEASURED QUANTITIES SO THAT WHEN MIXED TOGETHER IN A STERILE MIXING BOWL, THE RESULTANT PASTE IS TO BE DIGITALLY PACKED INTO OPEN BONE VOID/GAP TO SET INSITU OR PLACED INTO THE MOULD PROVIDED, THE MIXTURE SETS TO FORM BEADS. THE BIODEGRADABLE, RADIOPAQUE BEADS ARE RESORBED IN APPROXIMATELY 30 – 60 DAYS WHEN USED IN ACCORDANCE WITH THE DEVICE LABELLING. STIMULAN® RAPID CURE IS MANUFACTURED FROM SYNTHETIC IMPLANT GRADE CALCIUM SULFATE DIHYDRATE(CASO4.2H2O) THAT RESORBS AND IS REPLACED WITH BONE DURING THE HEALING PROCESS. ALSO, AS THE BONE VOID FILLER BEADS ARE BIODEGRADABLE AND BIOCOMPATIBLE, THEY MAY BE USED AT AN INFECTED SITE.
Type: IMPLANTABLE DEVICE | Site: FOOT | Status: FUNCTIONAL
Brand: STIMULAN® RAPID CURE

## 2020-01-03 RX ORDER — CLINDAMYCIN PHOSPHATE 900 MG/50ML
900 INJECTION INTRAVENOUS ONCE
Status: COMPLETED | OUTPATIENT
Start: 2020-01-03 | End: 2020-01-03

## 2020-01-03 RX ORDER — VANCOMYCIN HYDROCHLORIDE 1 G/20ML
INJECTION, POWDER, LYOPHILIZED, FOR SOLUTION INTRAVENOUS AS NEEDED
Status: DISCONTINUED | OUTPATIENT
Start: 2020-01-03 | End: 2020-01-03 | Stop reason: HOSPADM

## 2020-01-03 RX ORDER — SODIUM CHLORIDE, SODIUM LACTATE, POTASSIUM CHLORIDE, CALCIUM CHLORIDE 600; 310; 30; 20 MG/100ML; MG/100ML; MG/100ML; MG/100ML
25 INJECTION, SOLUTION INTRAVENOUS CONTINUOUS
Status: DISCONTINUED | OUTPATIENT
Start: 2020-01-03 | End: 2020-01-03 | Stop reason: HOSPADM

## 2020-01-03 RX ORDER — INSULIN GLARGINE 100 [IU]/ML
10-20 INJECTION, SOLUTION SUBCUTANEOUS
COMMUNITY
End: 2020-07-01 | Stop reason: ALTCHOICE

## 2020-01-03 RX ORDER — LIDOCAINE HYDROCHLORIDE AND EPINEPHRINE 20; 10 MG/ML; UG/ML
INJECTION, SOLUTION INFILTRATION; PERINEURAL AS NEEDED
Status: DISCONTINUED | OUTPATIENT
Start: 2020-01-03 | End: 2020-01-03 | Stop reason: HOSPADM

## 2020-01-03 RX ORDER — LIDOCAINE HYDROCHLORIDE AND EPINEPHRINE 20; 5 MG/ML; UG/ML
INJECTION, SOLUTION EPIDURAL; INFILTRATION; INTRACAUDAL; PERINEURAL AS NEEDED
Status: DISCONTINUED | OUTPATIENT
Start: 2020-01-03 | End: 2020-01-03 | Stop reason: HOSPADM

## 2020-01-03 RX ORDER — OXYCODONE AND ACETAMINOPHEN 5; 325 MG/1; MG/1
1 TABLET ORAL
Qty: 25 TAB | Refills: 0 | Status: SHIPPED | OUTPATIENT
Start: 2020-01-03 | End: 2020-01-08

## 2020-01-03 RX ADMIN — CLINDAMYCIN PHOSPHATE 900 MG: 18 INJECTION, SOLUTION INTRAVENOUS at 17:00

## 2020-01-03 RX ADMIN — SODIUM CHLORIDE, SODIUM LACTATE, POTASSIUM CHLORIDE, AND CALCIUM CHLORIDE 25 ML/HR: 600; 310; 30; 20 INJECTION, SOLUTION INTRAVENOUS at 16:53

## 2020-01-03 RX ADMIN — Medication 3 AMPULE: at 16:52

## 2020-01-03 NOTE — PERIOP NOTES
[id number] Patient ready for OR.   1726 MD at bedside and spoke with patient and wife. 1740 Patient transferred to OR, belongings to PACU.
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
## 2020-01-04 NOTE — OP NOTES
[address]  OPERATIVE REPORT    Name:  Antoni Hope  MR#:  [id number]  :  1950  ACCOUNT #:  [de-identified]  DATE OF SERVICE:  2020    PREOPERATIVE DIAGNOSES:  Metatarsalgia left foot and chronic wound plantar left foot. POSTOPERATIVE DIAGNOSES:  Metatarsalgia left foot and chronic wound plantar left foot. PROCEDURE PERFORMED:  Metatarsal head resections 2 and 3 left foot and excisional debridement of chronic wound plantar left foot. SURGEON:  Kalpana Yun DPM    ASSISTANT:  none    ANESTHESIA:  Local.    COMPLICATIONS:  None. SPECIMENS REMOVED:  Wound cultures and bone. IMPLANTS:  none. ESTIMATED BLOOD LOSS:  20 mL. INDICATIONS:  This 73-year-old white male presents with a chronic wound plantar left foot of several weeks. Conservative treatment have failed to relieve the patient of his symptoms. At this time, surgical intervention has been opted as a treatment choice by the patient. Medical history and physical has been performed. The patient needs the surgery. Physical examination revealed palpable pedal pulses with fairly good muscle strength lower extremities bilateral.  Grossly diminished epicritic sensation in lower extremities bilateral.  He has noted lack of hair growth with chronic stasis dermatitis lower extremities. Plantar aspect of second left MPJ is grade II ulceration with surrounding hyperkeratotic tissue, measured 1 x 1 x 0.3 cm. X-rays taken did not reveal any evidence of osteomyelitis along second and third metatarsals, previous resection of first left metatarsal head. DESCRIPTION OF PROCEDURE:  The patient was brought into the operating room, placed on the operating table in supine position. Anesthesia was achieved to left foot using 2% Xylocaine with epinephrine. Left foot was now prepped and draped in usual sterile manner. A successful time-out was completed. Next, pneumatic ankle tourniquet inflated to 250 mmHg. Using a #15 blade, a curvilinear incision was made centered over the MPJ of second and third left metatarsal.  The incision was carried down to the bone. Electrocautery was used at hemostasis. Using sagittal saw, the metatarsals 2 and 3 were resected just proximal to the metatarsal neck. There was significant bleeding. Wounds were packed with antibiotic beads made with 1 g vancomycin, they were dissolvable antibiotic beads. All incisions were reapproximated using 2-0 nylon with simple interrupted sutures. Next, using a #15 blade, an excisional debridement was performed at the wound plantar second left metatarsal and to the subcutaneous tissue removing subcu granular tissue and hyperkeratotic tissue. Pre-debridement measurements were 1 x 1 x 0.3 cm. Post-debridement measurements were 1.5 x 1.5 x 0.4 cm. Prior to packing the wound, the dorsal wounds with antibiotic beads, aerobic as well as anaerobic wound cultures were taken and the wounds were flushed with copious amounts of sterile saline with bacitracin dissolved in it. Betadine soaked Adaptic was placed on the dorsal incisions and plantar wound followed by dry sterile gauze, ABD pads, roll gauze in an outer base. The patient appeared to tolerate anesthesia and procedure well, was transferred from the operating room to recovery with all vital signs stable. Prior to placing on the dressings, the pneumatic ankle tourniquet was released and preoperatively, he was given 900 mg Cleocin IV.         DORIS Adkins/[id number]  D:  01/03/2020 19:50  T:  01/04/2020 3:26  JOB #:  [id number]

## 2020-01-04 NOTE — PERIOP NOTES
Handoff Report from Operating Room to PACU    Report received from Jonah Jonas RN and Jonas Ayala RN  regarding [place] [facility]. .      Surgeon(s):  Kalpana Fonseca DPM  And Procedure(s) (LRB):  RESECTION METATARSAL HEAD 2 AND 3 LEFT FOOT/EXCISIONAL DEBRIDEMENT CHRONIC WOUND LEFT FOOT WITH ANTIBIOTIC BEAD INSERTION (Left)  confirmed   with allergies and dressings discussed. Anesthesia type, drugs, patient history, complications, estimated blood loss, vital signs, intake and output, and last pain medication were reviewed.

## 2020-01-04 NOTE — BRIEF OP NOTE
BRIEF OPERATIVE NOTE    Date of Procedure: 1/3/2020   Preoperative Diagnosis: SESMOIDITIS/CHRONIC  WOUND LEFT FOOT  Postoperative Diagnosis: SESMOIDITIS/CHRONIC  WOUND LEFT FOOT    Procedure(s):  RESECTION METATARSAL HEAD 2 AND 3 LEFT FOOT/EXCISIONAL DEBRIDEMENT CHRONIC WOUND LEFT FOOT WITH ANTIBIOTIC BEAD INSERTION  Surgeon(s) and Role:     * Nick Goodwin DPM - Primary         Surgical Assistant: none    Surgical Staff:  Circ-1: Jf Vasquez RN  Circ-Relief: Aiden Berg  Local Nurse Monitor: Remi Odom RN  Scrub Tech-1: Deepak Barros  Scrub Tech-Relief: Marlena Moya  Event Time In Time Out   Incision Start 1809    Incision Close 1910      Anesthesia: Local   Estimated Blood Loss: 20cc  Specimens:   ID Type Source Tests Collected by Time Destination   1 : 3rd left metatarsal head Preservative Foot, left  Nick Goodwin DPM 1/3/2020 1900 Pathology   2 : 4th left metatarsal head Preservative Foot, left  Nick Goodwin DPM 1/3/2020 1900 Pathology   1 : Left foot wound Wound Incision CULTURE, ANAEROBIC, CULTURE, WOUND W Myna Pier 1/3/2020 1844 Microbiology      Findings: enlarged bone; wound left foot  Complications: none  Implants:   Implant Name Type Inv.  Item Serial No.  Lot No. LRB No. Used Action   GRAFT BNE PASTE RAPID CUR 10ML -- [employer] - [id number]  GRAFT BNE PASTE RAPID CUR 10ML -- [employer] [id number] [employer] [id number] Left 1 Implanted
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
## 2020-01-06 ENCOUNTER — OFFICE VISIT (OUTPATIENT)
Dept: INTERNAL MEDICINE CLINIC | Age: 70
End: 2020-01-06

## 2020-01-06 VITALS
TEMPERATURE: 98.3 F | RESPIRATION RATE: 18 BRPM | OXYGEN SATURATION: 98 % | WEIGHT: 236 LBS | HEART RATE: 70 BPM | DIASTOLIC BLOOD PRESSURE: 78 MMHG | SYSTOLIC BLOOD PRESSURE: 126 MMHG | BODY MASS INDEX: 28.73 KG/M2

## 2020-01-06 DIAGNOSIS — N18.4 CKD (CHRONIC KIDNEY DISEASE), STAGE IV (HCC): ICD-10-CM

## 2020-01-06 DIAGNOSIS — I25.5 ISCHEMIC CARDIOMYOPATHY: ICD-10-CM

## 2020-01-06 DIAGNOSIS — I48.0 PAROXYSMAL ATRIAL FIBRILLATION (HCC): ICD-10-CM

## 2020-01-06 DIAGNOSIS — I25.10 ASCVD (ARTERIOSCLEROTIC CARDIOVASCULAR DISEASE): ICD-10-CM

## 2020-01-06 DIAGNOSIS — G47.00 INSOMNIA, UNSPECIFIED TYPE: ICD-10-CM

## 2020-01-06 DIAGNOSIS — I25.5 ACC/AHA STAGE C SYSTOLIC HEART FAILURE DUE TO ISCHEMIC CARDIOMYOPATHY (HCC): ICD-10-CM

## 2020-01-06 DIAGNOSIS — E78.2 MIXED HYPERLIPIDEMIA: ICD-10-CM

## 2020-01-06 DIAGNOSIS — I12.9 HYPERTENSION WITH RENAL DISEASE: Primary | ICD-10-CM

## 2020-01-06 DIAGNOSIS — N18.4 CONTROLLED TYPE 2 DIABETES MELLITUS WITH STAGE 4 CHRONIC KIDNEY DISEASE, WITHOUT LONG-TERM CURRENT USE OF INSULIN (HCC): ICD-10-CM

## 2020-01-06 DIAGNOSIS — E11.22 CONTROLLED TYPE 2 DIABETES MELLITUS WITH STAGE 4 CHRONIC KIDNEY DISEASE, WITHOUT LONG-TERM CURRENT USE OF INSULIN (HCC): ICD-10-CM

## 2020-01-06 DIAGNOSIS — I50.20 ACC/AHA STAGE C SYSTOLIC HEART FAILURE DUE TO ISCHEMIC CARDIOMYOPATHY (HCC): ICD-10-CM

## 2020-01-06 DIAGNOSIS — L97.521 SKIN ULCER OF LEFT FOOT, LIMITED TO BREAKDOWN OF SKIN (HCC): ICD-10-CM

## 2020-01-06 DIAGNOSIS — Z89.412 STATUS POST AMPUTATION OF LEFT GREAT TOE (HCC): ICD-10-CM

## 2020-01-06 DIAGNOSIS — F11.90 CHRONIC NARCOTIC USE: ICD-10-CM

## 2020-01-06 LAB
A-G RATIO,AGRAT: 1.1 RATIO
ALBUMIN SERPL-MCNC: 4 G/DL (ref 3.9–5.4)
ALP SERPL-CCNC: 94 U/L (ref 38–126)
ALT SERPL-CCNC: 8 U/L (ref 0–50)
ANION GAP SERPL CALC-SCNC: 17 MMOL/L
AST SERPL W P-5'-P-CCNC: 20 U/L (ref 14–36)
BILIRUB SERPL-MCNC: 1.1 MG/DL (ref 0.2–1.3)
BUN SERPL-MCNC: 77 MG/DL (ref 9–20)
BUN/CREATININE RATIO,BUCR: 31 RATIO
CALCIUM SERPL-MCNC: 9.4 MG/DL (ref 8.4–10.2)
CHLORIDE SERPL-SCNC: 89 MMOL/L (ref 98–107)
CHOL/HDL RATIO,CHHD: 7 RATIO (ref 0–4)
CHOLEST SERPL-MCNC: 104 MG/DL (ref 0–200)
CK SERPL-CCNC: 206 U/L (ref 30–135)
CO2 SERPL-SCNC: 31 MMOL/L (ref 22–32)
CREAT SERPL-MCNC: 2.5 MG/DL (ref 0.8–1.5)
GLOBULIN,GLOB: 3.8
GLUCOSE SERPL-MCNC: 133 MG/DL (ref 75–110)
HBA1C MFR BLD HPLC: 6.6 % (ref 4–5.7)
HDLC SERPL-MCNC: 14 MG/DL (ref 35–130)
LDL/HDL RATIO,LDHD: 5 RATIO
LDLC SERPL CALC-MCNC: 67 MG/DL (ref 0–130)
POTASSIUM SERPL-SCNC: 3.6 MMOL/L (ref 3.6–5)
PROT SERPL-MCNC: 7.8 G/DL (ref 6.3–8.2)
SODIUM SERPL-SCNC: 137 MMOL/L (ref 137–145)
TRIGL SERPL-MCNC: 115 MG/DL (ref 0–200)
VLDLC SERPL CALC-MCNC: 23 MG/DL

## 2020-01-06 RX ORDER — ZOLPIDEM TARTRATE 12.5 MG/1
12.5 TABLET, FILM COATED, EXTENDED RELEASE ORAL
Qty: 30 TAB | Refills: 5 | Status: SHIPPED | OUTPATIENT
Start: 2020-01-06 | End: 2020-02-07 | Stop reason: ALTCHOICE

## 2020-01-06 NOTE — PATIENT INSTRUCTIONS
Anemia: Care Instructions  Your Care Instructions    Anemia is a low level of red blood cells, which carry oxygen throughout your body. Many things can cause anemia. Lack of iron is one of the most common causes. Your body needs iron to make hemoglobin, a substance in red blood cells that carries oxygen from the lungs to your body's cells. Without enough iron, the body produces fewer and smaller red blood cells. As a result, your body's cells do not get enough oxygen, and you feel tired and weak. And you may have trouble concentrating. Bleeding is the most common cause of a lack of iron. You may have heavy menstrual bleeding or bleeding caused by conditions such as ulcers, hemorrhoids, or cancer. Regular use of aspirin or other anti-inflammatory medicines (such as ibuprofen) also can cause bleeding in some people. A lack of iron in your diet also can cause anemia, especially at times when the body needs more iron, such as during pregnancy, infancy, and the teen years. Your doctor may have prescribed iron pills. It may take several months of treatment for your iron levels to return to normal. Your doctor also may suggest that you eat foods that are rich in iron, such as meat and beans. There are many other causes of anemia. It is not always due to a lack of iron. Finding the specific cause of your anemia will help your doctor find the right treatment for you. Follow-up care is a key part of your treatment and safety. Be sure to make and go to all appointments, and call your doctor if you are having problems. It's also a good idea to know your test results and keep a list of the medicines you take. How can you care for yourself at home? · Take your medicines exactly as prescribed. Call your doctor if you think you are having a problem with your medicine. · If your doctor recommends iron pills, take them as directed:  ? Try to take the pills on an empty stomach about 1 hour before or 2 hours after meals. But you may need to take iron with food to avoid an upset stomach. ? Do not take antacids or drink milk or caffeine drinks (such as coffee, tea, or cola) at the same time or within 2 hours of the time that you take your iron. They can make it hard for your body to absorb the iron. ? Vitamin C (from food or supplements) helps your body absorb iron. Try taking iron pills with a glass of orange juice or some other food that is high in vitamin C, such as citrus fruits. ? Iron pills may cause stomach problems, such as heartburn, nausea, diarrhea, constipation, and cramps. Be sure to drink plenty of fluids, and include fruits, vegetables, and fiber in your diet each day. Iron pills often make your bowel movements dark or green. ? If you forget to take an iron pill, do not take a double dose of iron the next time you take a pill. ? Keep iron pills out of the reach of small children. An overdose of iron can be very dangerous. · Follow your doctor's advice about eating iron-rich foods. These include red meat, shellfish, poultry, eggs, beans, raisins, whole-grain bread, and leafy green vegetables. · Steam vegetables to help them keep their iron content. When should you call for help? Call 911 anytime you think you may need emergency care. For example, call if:    · You have symptoms of a heart attack. These may include:  ? Chest pain or pressure, or a strange feeling in the chest.  ? Sweating. ? Shortness of breath. ? Nausea or vomiting. ? Pain, pressure, or a strange feeling in the back, neck, jaw, or upper belly or in one or both shoulders or arms. ? Lightheadedness or sudden weakness. ? A fast or irregular heartbeat. After you call 911, the  may tell you to chew 1 adult-strength or 2 to 4 low-dose aspirin. Wait for an ambulance.  Do not try to drive yourself.     · You passed out (lost consciousness).    Call your doctor now or seek immediate medical care if:    · You have new or increased shortness of breath.     · You are dizzy or lightheaded, or you feel like you may faint.     · Your fatigue and weakness continue or get worse.     · You have any abnormal bleeding, such as:  ? Nosebleeds. ? Vaginal bleeding that is different (heavier, more frequent, at a different time of the month) than what you are used to.  ? Bloody or black stools, or rectal bleeding. ? Bloody or pink urine.    Watch closely for changes in your health, and be sure to contact your doctor if:    · You do not get better as expected. Where can you learn more? Go to http://gildardo-palak.info/. Enter R301 in the search box to learn more about \"Anemia: Care Instructions. \"  Current as of: March 28, 2019  Content Version: 12.2  © 8594-0873 [employer]. Care instructions adapted under license by [employer] (which disclaims liability or warranty for this information). If you have questions about a medical condition or this instruction, always ask your healthcare professional. [address] any warranty or liability for your use of this information.

## 2020-01-06 NOTE — PROGRESS NOTES
Chief Complaint   Patient presents with    Hypertension     1 month f/up    Diabetes    Chronic Kidney Disease    Cholesterol Problem    Thyroid Problem    Cardiomyopathy     1. Have you been to the ER, urgent care clinic since your last visit? Hospitalized since your last visit? Had surgery on his left foot at [facility] center at [facility] 1-3-2019 by Dr. Loni Ray. 2. Have you seen or consulted any other health care providers outside of the [address] [place] [address] since your last visit? Include any pap smears or colon screening.  No

## 2020-01-07 DIAGNOSIS — G47.00 INSOMNIA, UNSPECIFIED TYPE: Primary | ICD-10-CM

## 2020-01-07 LAB
BACTERIA SPEC CULT: ABNORMAL
BACTERIA SPEC CULT: ABNORMAL
BACTERIA SPEC CULT: NORMAL
GRAM STN SPEC: ABNORMAL
GRAM STN SPEC: ABNORMAL
SERVICE CMNT-IMP: ABNORMAL
SERVICE CMNT-IMP: NORMAL

## 2020-01-07 RX ORDER — ZOLPIDEM TARTRATE 10 MG/1
10 TABLET ORAL
Qty: 30 TAB | Refills: 5 | Status: ON HOLD | OUTPATIENT
Start: 2020-01-07 | End: 2020-02-17

## 2020-01-07 NOTE — TELEPHONE ENCOUNTER
RX refill request from the patient/pharmacy. Patient last seen 01- with labs, and next appt. scheduled for 02-  Requested Prescriptions     Pending Prescriptions Disp Refills    zolpidem (AMBIEN) 10 mg tablet 30 Tab 5     Sig: Take 1 Tab by mouth nightly as needed for Sleep. Max Daily Amount: 10 mg.   .

## 2020-01-08 NOTE — PROGRESS NOTES
Labs okay except extremely low HDL. If not taking fish oil would start 2/day and if on fish oil increase by 2/day.

## 2020-01-09 ENCOUNTER — HOSPITAL ENCOUNTER (OUTPATIENT)
Dept: INFUSION THERAPY | Age: 70
Discharge: HOME | End: 2020-01-09
Payer: MEDICARE

## 2020-01-09 VITALS
TEMPERATURE: 97.7 F | RESPIRATION RATE: 18 BRPM | HEIGHT: 76 IN | SYSTOLIC BLOOD PRESSURE: 132 MMHG | DIASTOLIC BLOOD PRESSURE: 83 MMHG | BODY MASS INDEX: 28.13 KG/M2 | HEART RATE: 70 BPM | WEIGHT: 231 LBS | OXYGEN SATURATION: 99 %

## 2020-01-09 LAB
ALBUMIN SERPL-MCNC: 3.3 G/DL (ref 3.5–5)
ANION GAP SERPL CALC-SCNC: 8 MMOL/L (ref 5–15)
BUN SERPL-MCNC: 85 MG/DL (ref 6–20)
BUN/CREAT SERPL: 31 (ref 12–20)
CALCIUM SERPL-MCNC: 8.8 MG/DL (ref 8.5–10.1)
CHLORIDE SERPL-SCNC: 96 MMOL/L (ref 97–108)
CO2 SERPL-SCNC: 31 MMOL/L (ref 21–32)
CREAT SERPL-MCNC: 2.76 MG/DL (ref 0.7–1.3)
ERYTHROCYTE [DISTWIDTH] IN BLOOD BY AUTOMATED COUNT: 15.5 % (ref 11.5–14.5)
FERRITIN SERPL-MCNC: 164 NG/ML (ref 26–388)
GLUCOSE SERPL-MCNC: 193 MG/DL (ref 65–100)
HCT VFR BLD AUTO: 30.7 % (ref 36.6–50.3)
HGB BLD-MCNC: 9.5 G/DL (ref 12.1–17)
IRON SATN MFR SERPL: 17 % (ref 20–50)
IRON SERPL-MCNC: 43 UG/DL (ref 35–150)
MCH RBC QN AUTO: 29.8 PG (ref 26–34)
MCHC RBC AUTO-ENTMCNC: 30.9 G/DL (ref 30–36.5)
MCV RBC AUTO: 96.2 FL (ref 80–99)
NRBC # BLD: 0 K/UL (ref 0–0.01)
NRBC BLD-RTO: 0 PER 100 WBC
PHOSPHATE SERPL-MCNC: 3.9 MG/DL (ref 2.6–4.7)
PLATELET # BLD AUTO: 99 K/UL (ref 150–400)
PMV BLD AUTO: 11.2 FL (ref 8.9–12.9)
POTASSIUM SERPL-SCNC: 4 MMOL/L (ref 3.5–5.1)
RBC # BLD AUTO: 3.19 M/UL (ref 4.1–5.7)
SODIUM SERPL-SCNC: 135 MMOL/L (ref 136–145)
TIBC SERPL-MCNC: 247 UG/DL (ref 250–450)
WBC # BLD AUTO: 4.7 K/UL (ref 4.1–11.1)

## 2020-01-09 PROCEDURE — 74011250636 HC RX REV CODE- 250/636: Performed by: INTERNAL MEDICINE

## 2020-01-09 PROCEDURE — 96372 THER/PROPH/DIAG INJ SC/IM: CPT

## 2020-01-09 PROCEDURE — 83540 ASSAY OF IRON: CPT

## 2020-01-09 PROCEDURE — 80069 RENAL FUNCTION PANEL: CPT

## 2020-01-09 PROCEDURE — 82728 ASSAY OF FERRITIN: CPT

## 2020-01-09 PROCEDURE — 85027 COMPLETE CBC AUTOMATED: CPT

## 2020-01-09 PROCEDURE — 36415 COLL VENOUS BLD VENIPUNCTURE: CPT

## 2020-01-09 RX ADMIN — EPOETIN ALFA-EPBX 60000 UNITS: 40000 INJECTION, SOLUTION INTRAVENOUS; SUBCUTANEOUS at 10:55

## 2020-01-09 NOTE — PROGRESS NOTES
[facility] Progress Note    3582  Pt arrived ambulatory and in no distress for Retacrit    Assessment completed. Labs obtained per order and sent for processing. Patient Vitals for the past 12 hrs:   Temp Pulse Resp BP SpO2   01/09/20 1015 97.7 °F (36.5 °C) 70 18 132/83 99 %      Recent Results (from the past 12 hour(s))   CBC W/O DIFF    Collection Time: 01/09/20 10:23 AM   Result Value Ref Range    WBC 4.7 4.1 - 11.1 K/uL    RBC 3.19 (L) 4.10 - 5.70 M/uL    HGB 9.5 (L) 12.1 - 17.0 g/dL    HCT 30.7 (L) 36.6 - 50.3 %    MCV 96.2 80.0 - 99.0 FL    MCH 29.8 26.0 - 34.0 PG    MCHC 30.9 30.0 - 36.5 g/dL    RDW 15.5 (H) 11.5 - 14.5 %    PLATELET 99 (L) 364 - 400 K/uL    MPV 11.2 8.9 - 12.9 FL    NRBC 0.0 0  WBC    ABSOLUTE NRBC 0.00 0.00 - 0.01 K/uL   RENAL FUNCTION PANEL    Collection Time: 01/09/20 10:23 AM   Result Value Ref Range    Sodium 135 (L) 136 - 145 mmol/L    Potassium 4.0 3.5 - 5.1 mmol/L    Chloride 96 (L) 97 - 108 mmol/L    CO2 31 21 - 32 mmol/L    Anion gap 8 5 - 15 mmol/L    Glucose 193 (H) 65 - 100 mg/dL    BUN 85 (H) 6 - 20 MG/DL    Creatinine 2.76 (H) 0.70 - 1.30 MG/DL    BUN/Creatinine ratio 31 (H) 12 - 20      GFR est AA 28 (L) >60 ml/min/1.73m2    GFR est non-AA 23 (L) >60 ml/min/1.73m2    Calcium 8.8 8.5 - 10.1 MG/DL    Phosphorus 3.9 2.6 - 4.7 MG/DL    Albumin 3.3 (L) 3.5 - 5.0 g/dL      The following medications administered:  Medications Administered     epoetin mary-epbx (RETACRIT) 60,000 Units combo injection     Admin Date  01/09/2020 Action  Given Dose  99998 Units Route  SubCUTAneous Administered By  Marguerite Barrera RN              Pt tolerated treatment well. No s/s of adverse reaction noted. Pt provided with education on possible side effects of medication along with discharge instructions. Pt verbalized understanding. 1055  Pt discharged ambulatory in no acute distress.  Next appointment:    Future Appointments   Date Time Provider Department [place]   1/10/2020  3:15 PM Marcus Miller DPM [facility] REG   2/6/2020 10:00 AM DEBRA  CHAIR 2 [facility] REG   2/7/2020  1:10 PM Rayshawn Reyes MD [address]

## 2020-01-09 NOTE — PROGRESS NOTES
Called and spoke to patient  Two pt identifiers confirmed  Informed patient per Dr. Reshma De La Cruz that labs are ok except HDL is low. Patient confirmed he has not been taking fish oil lately. Advised patient per Dr. Reshma De La Cruz to start taking 2 fish oil tablets daily. Patient verbalized understanding of information discussed  with no further questions at this time.

## 2020-01-10 ENCOUNTER — HOSPITAL ENCOUNTER (OUTPATIENT)
Dept: WOUND CARE | Age: 70
Discharge: HOME | End: 2020-01-10
Payer: MEDICARE

## 2020-01-10 LAB
BASOPHILS # BLD AUTO: 0.1 X10E3/UL (ref 0–0.2)
BASOPHILS NFR BLD AUTO: 1 %
DRUGS UR: NORMAL
EOSINOPHIL # BLD AUTO: 0.2 X10E3/UL (ref 0–0.4)
EOSINOPHIL NFR BLD AUTO: 3 %
ERYTHROCYTE [DISTWIDTH] IN BLOOD BY AUTOMATED COUNT: 14.1 % (ref 11.6–15.4)
HCT VFR BLD AUTO: 30 % (ref 37.5–51)
HGB BLD-MCNC: 10 G/DL (ref 13–17.7)
IMM GRANULOCYTES # BLD AUTO: 0 X10E3/UL (ref 0–0.1)
IMM GRANULOCYTES NFR BLD AUTO: 1 %
LYMPHOCYTES # BLD AUTO: 0.5 X10E3/UL (ref 0.7–3.1)
LYMPHOCYTES NFR BLD AUTO: 8 %
MCH RBC QN AUTO: 30.2 PG (ref 26.6–33)
MCHC RBC AUTO-ENTMCNC: 33.3 G/DL (ref 31.5–35.7)
MCV RBC AUTO: 91 FL (ref 79–97)
MONOCYTES # BLD AUTO: 0.5 X10E3/UL (ref 0.1–0.9)
MONOCYTES NFR BLD AUTO: 9 %
NEUTROPHILS # BLD AUTO: 4.8 X10E3/UL (ref 1.4–7)
NEUTROPHILS NFR BLD AUTO: 78 %
PLATELET # BLD AUTO: 120 X10E3/UL (ref 150–450)
RBC # BLD AUTO: 3.31 X10E6/UL (ref 4.14–5.8)
WBC # BLD AUTO: 6.2 X10E3/UL (ref 3.4–10.8)

## 2020-01-10 PROCEDURE — 99213 OFFICE O/P EST LOW 20 MIN: CPT

## 2020-01-10 NOTE — DISCHARGE INSTRUCTIONS
Discharge Instructions for  [facility]  [address]  [place], [address] [facility]  Telephone: [phone]    NAME:  Bonifacio [facility] YOB: 1950  MEDICAL RECORD NUMBER:  [id number]  DATE:  1/10/2020    Wound Cleansing:   Do not scrub or use excessive force. Cleanse wound prior to applying a clean dressing with:  [] Normal Saline [] Keep Wound Dry in Shower    [] Wound Cleanser   [] Cleanse wound with Mild Soap & Water  [] May Shower at Discharge   [x] Other: May cleanse Right Lower leg wound and Right Medial foot wound w/mild soap and water, or Normal Saline. Left Foot Wounds--DO NOT REMOVE DRESSING. LEAVE INTACT UNTIL NEXT APPOINTMENT    Dressings:  Right lower Leg and Right Medial foot wounds only         [x] Apply Primary Dressing:  Right Medial Foot and      [] MediHoney Gel [] Alginate with Silver [] Alginate   [x]Bordered Foam with Silver-Right medial leg wound (cluster of 2 wounds)   [x] Bordered Foam (without silver)- Right Medial Foot     Avoid contact of tape with skin. [x] Change dressings: Left Foot Wounds - Do not change dressing. Right lower leg & medial foot-Every 2 to 3 days. Edema Control:  Apply: [] Compression Stocking [x]Right Leg-Patient's compression stocking.       [] SpandaGrip []Right Leg  []Left Leg      []Low compression 5-10 mm/Hg      []Medium compression 10-20 mm/Hg     []High compression  20-30 mm/Hg   Apply Compression stocking to Right leg only, every morning,immediately after getting up out of bed. Remove every night before going to bed. [x] Elevate leg(s) above the level of the heart when sitting. [x] Avoid prolonged standing in one place.       Off-Loading:   [] Limit weight bearing on Left Foot, as much as possible      Dietary:  [] Diet as tolerated: [x] Diabetic Diet: Low carb no sugar [x] No Added Salt:  [] Increase Protein: [] Other:               Return Appointment:  [] Wound and dressing supply provider:   [] ECF or Home Healthcare:  [] Wound Assessment: [] Physician or NP scheduled for Wound Assessment:   [x] Return Appointment: With Dr. Jg Donnelly in  [address] [place] [address])  [] Prescription provided: Augmentin  #20     Electronically signed Marilee Milton RN on 1/10/2020 at 4:07 PM     Lucila Albarran 281: Should you experience any significant changes in your wound(s) or have questions about your wound care, please contact the [facility] at [address] [place] [address] 8:00 am - 4:30. If you need help with your wound outside these hours and cannot wait until we are again available, contact your PCP or go to the hospital emergency room. PLEASE NOTE: IF YOU ARE UNABLE TO OBTAIN WOUND SUPPLIES, CONTINUE TO USE THE SUPPLIES YOU HAVE AVAILABLE UNTIL YOU ARE ABLE TO REACH US. IT IS MOST IMPORTANT TO KEEP THE WOUND COVERED AT ALL TIMES.

## 2020-01-10 NOTE — WOUND CARE
01/10/20 1528   Wound Leg lower Right;Medial   Date First Assessed: 12/20/19   Present on Hospital Admission: Yes  Wound Approximate Age at First Assessment (Weeks): 3 weeks  Location: Leg lower  Wound Location Orientation: Right;Medial   Dressing Type Open to air   Wound Length (cm) 5 cm   Wound Width (cm) 2.3 cm   Wound Depth (cm) 0.1 cm   Wound Surface Area (cm^2) 11.5 cm^2   Wound Volume (cm^3) 1.15 cm^3   Condition of Base Pink   Condition of Edges Open   Drainage Amount Moderate   Drainage Color Serosanguinous   Wound Odor None   Angela-wound Assessment Intact   Cleansing and Cleansing Agents  Normal saline   Wound Foot Plantar;Left 06/28/19   Date First Assessed/Time First Assessed: 06/28/19 1334   Present on Hospital Admission: Yes  Primary Wound Type: Diabetic  Location: Foot  Wound Location Orientation: Plantar;Left  Date of First Observation: 06/28/19   Dressing Status Removed   Dressing Type Gauze;Gauze wrap (susanne)  (betadine)   Wound Length (cm) 0.7 cm   Wound Width (cm) 0.4 cm   Wound Depth (cm) 0 cm   Wound Surface Area (cm^2) 0.28 cm^2   Wound Volume (cm^3) 0 cm^3   Condition of Base Pink   Condition of Edges Calloused   Undermining (cm) 0.3 cm   Direction of Undermining   (12-4)   Drainage Amount Moderate   Drainage Color Serosanguinous   Wound Odor None   Angela-wound Assessment Maceration   Cleansing and Cleansing Agents  Normal saline   Wound Foot Left;Dorsal Erupted blister 11/15/19   Date First Assessed/Time First Assessed: 11/15/19 0925   Wound Approximate Age at First Assessment (Weeks): 2 weeks  Primary Wound Type: (c) Diabetic  Location: Foot  Wound Location Orientation: Left;Dorsal  Wound Description: Erupted blister  Date of. ..    Wound Length (cm) 0 cm   Wound Width (cm) 0 cm   Wound Depth (cm) 0 cm   Wound Surface Area (cm^2) 0 cm^2   Wound Volume (cm^3) 0 cm^3   Wound Foot Right;Medial 10/11/19   Date First Assessed/Time First Assessed: 10/11/19 0937   Present on Hospital Admission: Yes Wound Approximate Age at First Assessment (Weeks): 2 weeks  Primary Wound Type: Diabetic Ulcer  Location: Foot  Wound Location Orientation: Right;Medial  Date. .. Dressing Status Removed   Dressing Type Gauze;Gauze wrap (susanne)   Wound Length (cm) 0.5 cm   Wound Width (cm) 0.3 cm   Wound Depth (cm) 0.1 cm   Wound Surface Area (cm^2) 0.15 cm^2   Wound Volume (cm^3) 0.02 cm^3   Condition of Base Pink   Condition of Edges Open   Drainage Amount Small   Drainage Color Serosanguinous   Wound Odor None   Angela-wound Assessment Intact   Cleansing and Cleansing Agents  Normal saline                   L dorsal surgical incision 3.5X30.1 well approx.

## 2020-01-10 NOTE — WOUND CARE
Clinic Level of Care Assessment    NAME:  Royal Mayank Wagoner YOB: 1950 GENDER: male  MEDICAL RECORD NUMBER:  [id number]   DATE:  1/10/2020      Wound Count Document in [address] [place] [address]  Number of Wounds Assessed Points   No Wounds/Ulcers []   0   Less than Three Wounds/Ulcers [x]   1   3-6 Wounds/Ulcers []   2   Greater than 6 Wounds/Ulcers []   3     Ambulation Status Document in Coord/MOE/Mobility tab  Status Definition Points   Independent Independently able to ambulate. Fully able (without any assistance) to get on/off exam table/chair. [x]   0   Minimal Physical Assistance Requires physical assistance of one person to ambulate and/or position patient to be examined. Includes necessary physical assistance to position lower extremities on/off stool. []   1   Moderate Physical Assistance Requires at least one staff member to physically assist patient in ambulating into treatment room, and on/off exam table. []   2   Full Assistance Requires assistance of at least two staff members to transfer patient into treatment room and/or on/off exam table/chair. \"Total Transfer\". []   3     Dressing Complexity Document in LDA and Write Appropriate Order  Complexity Definition Points   No Dressing  []   0   Simple Minimal, simple dressing. i.e. Band-aid, gauze, simple wrap. []   1   Intermediate Moderately complicated requiring licensed personnel to apply i.e. collagen matrix, ointments, gels, alginates. [x]   2   Complex Complicated requiring licensed personnel to apply dressings 6 or more wounds. []   3     Teaching Effort Document in Education Tab   Effort Definition Points   No Teaching  []   0   Simple Reinforce two or less topics. Document in Education navigator.  []   1   Intermediate Reinforce three to five topics and/or one additional   new topic. Document in Education navigator. [x]   2   Complex Teach more than one new topic.  New patient information   packet reviewed and/or reinforce more than three topics. Document in Education navigator. HBO initial instruction. []   3       Patient Assessment and Planning  Planning Definition Points   Simple Multiple System Simple: Simple follow-up with routine assessment and planning. If Discharged, instructions and long term/follow-up care given to patient/caregiver. Discharged, instructions and/or After Visit Summary given to patient/caregiver and instructions completed. [x]   1   Intermediate Multiple System Intermediate: Contact with outside resources; i.e. Telephone calls to home health, [facility]. May include filling out forms and writing letters, arranging transportation, communication with insurance , vendors, etc.  Discharged, instructions and/or After Visit Summary given to patient/caregiver and instructions completed. []   2   Complex Multiple System Complex: Full, comprehensive assessment and planning. Follow the entire navigator under Wound Visit charting filling out each tab which includes OP Adm Database Screening, Education and CarePlan  HBO risk assessment completed. Discharged, instructions and/or After Visit Summary given to patient/caregiver and instructions completed.    []   3           Is this the Patient's First Visit to the [address] [place] [address] Road  No      Is this Patient Established @ [facility]  Yes             Clinical Level of Care      Points  0-2  Level 1 []     Points  3-5  Level 2 []     Points  6-9  Level 3 [x]     Points  10-12  Level 4 []     Points  13-15  Level 5 []       Electronically signed by Praneeth Bernal RN on 1/10/2020 at 4:36 PM

## 2020-01-10 NOTE — WOUND CARE
01/10/20 1615   Wound Leg lower Right;Medial   Date First Assessed: 12/20/19   Present on Hospital Admission: Yes  Wound Approximate Age at First Assessment (Weeks): 3 weeks  Location: Leg lower  Wound Location Orientation: Right;Medial   Cleansing and Cleansing Agents  Normal saline   Dressing Type Applied Foam;Silver products  (Bordered foam w/silver)   Wound Foot Plantar;Left 06/28/19   Date First Assessed/Time First Assessed: 06/28/19 1334   Present on Hospital Admission: Yes  Primary Wound Type: Diabetic  Location: Foot  Wound Location Orientation: Plantar;Left  Date of First Observation: 06/28/19   Cleansing and Cleansing Agents  Normal saline   Dressing Type Applied Other (Comment); Moist to dry;ABD pad;Gauze  (Betadine moist to dry ,ABD pad,Kerlix,Ace(applied by DPM))   Wound Foot Right;Medial 10/11/19   Date First Assessed/Time First Assessed: 10/11/19 0937   Present on Hospital Admission: Yes  Wound Approximate Age at First Assessment (Weeks): 2 weeks  Primary Wound Type: Diabetic Ulcer  Location: Foot  Wound Location Orientation: Right;Medial  Date. .. Cleansing and Cleansing Agents  Normal saline   Dressing Type Applied Foam  (Bordered Foam (Without silver))   Discharge Condition: Stable     Pain: 0    Ambulatory Status: Walker     Discharge Destination: Home     Transportation: Car    Accompanied by: Family/Caregiver     Discharge instructions reviewed with Patient and Family/Caregiver  and copy or written instructions have been provided. All questions/concerns have been addressed at this time. Prescription for Augmentin (#20) provided today.

## 2020-01-11 NOTE — PROGRESS NOTES
[address]  WOUND CARE PROGRESS NOTE    Name:  Joslyn Velasquez  MR#:  [id number]  :  1950  ACCOUNT #:  [de-identified]  DATE OF SERVICE:  01/10/2020      HISTORY OF CHIEF COMPLAINT:  This 69-year-old white male presents postop resection of metatarsal heads 2 and 3 and debridement of wound plantar left foot. Procedure was performed one week ago. He presents today with dressings and surgical shoe left foot. History and physical unchanged from admitting history and physical; no change in medication, no change allergies. PHYSICAL EXAMINATION:  VITAL SIGNS:  All vitals are stable. EXTREMITIES:  Physical examination of the lower extremities reveal barely palpable pedal pulses, fairly good muscle strength lower extremities bilateral, diminished epicritic sensation. Sutures loosely coapted dorsal aspect of the left foot. Wound plantar left foot is smaller and resolving from last visit. The patient has multiple excoriations of lower left leg. ASSESSMENT:  Postoperative resection of metatarsals 2 and 3 left foot with insertion of antibiotic beads and debridement of wound plantar left foot with multiple excoriations lower right leg. PLAN:  Betadine soaked Adaptic was applied to incisions dorsal left foot and wound plantar left foot, followed by dry gauze with an Ace wrap. The patient will leave the dressings intact. Mepilex AG foam was applied to excoriations medial right lower leg. The patient's wife is to change it every other day. He will have followup visit in the wound clinic with myself, Dr. Levi Pearce, in one week. Intraoperative wound cultures show Enterococcus fecalis. He was given a prescription of Augmentin 500 mg a day to take one p.o. b.i.d. He is to discontinue doxycycline. Intraoperative bone cultures showed no evidence of osteomyelitis.       DORIS [id number]  D:  01/10/2020 16:51  T:  2020 2:11  JOB #:  [id number]

## 2020-01-16 PROBLEM — Z01.810 PRE-OPERATIVE CARDIOVASCULAR EXAMINATION: Status: RESOLVED | Noted: 2019-12-17 | Resolved: 2020-01-16

## 2020-01-17 ENCOUNTER — HOSPITAL ENCOUNTER (OUTPATIENT)
Dept: WOUND CARE | Age: 70
Discharge: HOME | End: 2020-01-17
Payer: MEDICARE

## 2020-01-17 VITALS
TEMPERATURE: 98 F | SYSTOLIC BLOOD PRESSURE: 125 MMHG | HEART RATE: 70 BPM | RESPIRATION RATE: 16 BRPM | DIASTOLIC BLOOD PRESSURE: 60 MMHG

## 2020-01-17 PROCEDURE — 99212 OFFICE O/P EST SF 10 MIN: CPT

## 2020-01-17 NOTE — WOUND CARE
Clinic Level of Care Assessment NAME:  Krystyna Box. YOB: 1950 GENDER: male MEDICAL RECORD NUMBER:  [id number] DATE:  1/17/2020 Wound Count Document in [address] [place] [address] Number of Wounds Assessed Points No Wounds/Ulcers []   0 Less than Three Wounds/Ulcers [x]   1  
3-6 Wounds/Ulcers []   2 Greater than 6 Wounds/Ulcers []   3 Ambulation Status Document in Coord/MOE/Mobility tab Status Definition Points Independent Independently able to ambulate. Fully able (without any assistance) to get on/off exam table/chair. [x]   0 Minimal Physical Assistance Requires physical assistance of one person to ambulate and/or position patient to be examined. Includes necessary physical assistance to position lower extremities on/off stool. []   1 Moderate Physical Assistance Requires at least one staff member to physically assist patient in ambulating into treatment room, and on/off exam table. []   2 Full Assistance Requires assistance of at least two staff members to transfer patient into treatment room and/or on/off exam table/chair. \"Total Transfer\". []   3 Dressing Complexity Document in [address] [place] [address] and Write Appropriate Order Complexity Definition Points No Dressing  []   0 Simple Minimal, simple dressing. i.e. Band-aid, gauze, simple wrap. []   1 Intermediate Moderately complicated requiring licensed personnel to apply i.e. collagen matrix, ointments, gels, alginates. [x]   2 Complex Complicated requiring licensed personnel to apply dressings 6 or more wounds. []   3 Teaching Effort Document in Education Tab Effort Definition Points No Teaching  []   0 Simple Reinforce two or less topics. Document in Education navigator. [x]   1 Intermediate Reinforce three to five topics and/or one additional  
new topic. Document in Education navigator. []   2 Complex Teach more than one new topic. New patient information packet reviewed and/or reinforce more than three topics. Document in Education navigator. HBO initial instruction. []   3 Patient Assessment and Planning Planning Definition Points Simple Multiple System Simple: Simple follow-up with routine assessment and planning. If Discharged, instructions and long term/follow-up care given to patient/caregiver. Discharged, instructions and/or After Visit Summary given to patient/caregiver and instructions completed. [x]   1 Intermediate Multiple System Intermediate: Contact with outside resources; i.e. Telephone calls to home health, [facility]. May include filling out forms and writing letters, arranging transportation, communication with insurance , vendors, etc.  Discharged, instructions and/or After Visit Summary given to patient/caregiver and instructions completed. []   2 Complex Multiple System Complex: Full, comprehensive assessment and planning. Follow the entire navigator under Wound Visit charting filling out each tab which includes OP Adm Database Screening, Education and CarePlan  HBO risk assessment completed. Discharged, instructions and/or After Visit Summary given to patient/caregiver and instructions completed. []   3 Is this the Patient's First Visit to the [facility] Road No 
 
 
Is this Patient Established @ [facility] 
Yes Clinical Level of Care Points  0-2  Level 1 [] Points  3-5  Level 2 [x] Points  6-9  Level 3 [] Points  10-12  Level 4 [] Points  13-15  Level 5 [] Electronically signed by Brit Tobar RN on 1/17/2020 at 2:45 PM

## 2020-01-17 NOTE — WOUND CARE
01/17/20 1435 Wound Leg lower Right;Medial  
Date First Assessed: 12/20/19   Present on Hospital Admission: Yes  Wound Approximate Age at First Assessment (Weeks): 3 weeks  Location: Leg lower  Wound Location Orientation: Right;Medial  
Dressing Status Removed Dressing Type Silver products; Foam  
Dressing Type Applied Silver products; Foam 
(Border foam) Wound Foot Right;Medial 10/11/19 Date First Assessed/Time First Assessed: 10/11/19 0937   Present on Hospital Admission: Yes  Wound Approximate Age at First Assessment (Weeks): 2 weeks  Primary Wound Type: Diabetic Ulcer  Location: Foot  Wound Location Orientation: Right;Medial  Date. .. Dressing Status Removed Dressing Type Foam  
Dressing Type Applied Foam 
(Border foam) Wound Foot Plantar;Left 06/28/19 Date First Assessed/Time First Assessed: 06/28/19 1334   Present on Hospital Admission: Yes  Primary Wound Type: Diabetic  Location: Foot  Wound Location Orientation: Plantar;Left  Date of First Observation: 06/28/19 Dressing Status Removed Dressing Type Gauze;Gauze wrap (susanne) 
(Batadine moist to dry, ace bandage) Dressing Type Applied ABD pad;Gauze wrap (susanne); Special tape (comment) 
(Batadine moist to dry, ace bandage, tubi x2 LLE) Discharge Condition: Stable Pain: 0 Ambulatory Status: Walking and Walker Discharge Destination: Home Transportation: Car Accompanied by: Self  and Family/Caregiver Discharge instructions reviewed with Patient and Family/Caregiver  and copy or written instructions have been provided. All questions/concerns have been addressed at this time.

## 2020-01-24 ENCOUNTER — HOSPITAL ENCOUNTER (OUTPATIENT)
Dept: WOUND CARE | Age: 70
Discharge: HOME | End: 2020-01-24
Payer: MEDICARE

## 2020-01-24 VITALS
HEART RATE: 70 BPM | TEMPERATURE: 97.2 F | DIASTOLIC BLOOD PRESSURE: 66 MMHG | RESPIRATION RATE: 16 BRPM | SYSTOLIC BLOOD PRESSURE: 128 MMHG

## 2020-01-24 PROCEDURE — 99212 OFFICE O/P EST SF 10 MIN: CPT

## 2020-01-24 NOTE — WOUND CARE
01/24/20 1625 Wound Leg lower Right;Medial  
Date First Assessed: 12/20/19   Present on Hospital Admission: Yes  Wound Approximate Age at First Assessment (Weeks): 3 weeks  Location: Leg lower  Wound Location Orientation: Right;Medial  
Cleansing and Cleansing Agents  Normal saline Dressing Changed Changed/New Dressing Type Applied Foam;Silver products 
(Bordered foam dressing.) Wound Foot Plantar;Left 06/28/19 Date First Assessed/Time First Assessed: 06/28/19 1334   Present on Hospital Admission: Yes  Primary Wound Type: Diabetic  Location: Foot  Wound Location Orientation: Plantar;Left  Date of First Observation: 06/28/19 Cleansing and Cleansing Agents  Normal saline Dressing Changed Changed/New Dressing Type Applied ABD pad;Gauze; Moist to dry; Other (Comment) (Betadine moist to dry gauze,ABD,Kerlix,Light Ace Wrap) Wound Foot Right;Medial 10/11/19 Date First Assessed/Time First Assessed: 10/11/19 0937   Present on Hospital Admission: Yes  Wound Approximate Age at First Assessment (Weeks): 2 weeks  Primary Wound Type: Diabetic Ulcer  Location: Foot  Wound Location Orientation: Right;Medial  Date. .. Cleansing and Cleansing Agents  Normal saline Dressing Changed Changed/New Dressing Type Applied Foam;Silver products 
(Bordered foam) Betadine moist to dry gauze, ABD pad, Kerlix, & lightly applied ace wrap, also applied to Left Dorsal foot surgical site, per order. Discharge Condition: Stable Pain: 0 Ambulatory Status: [facility]  / [facility] Discharge Destination: Home Transportation: Car Accompanied by: Family/Caregiver Discharge instructions reviewed with Patient and Family/Caregiver  and copy or written instructions have been provided. All questions/concerns have been addressed at this time.

## 2020-01-24 NOTE — WOUND CARE
Clinic Level of Care Assessment NAME:  Mirta Mack. YOB: 1950 GENDER: male MEDICAL RECORD NUMBER:  [id number] DATE:  1/24/2020 Wound Count Document in [facility] Number of Wounds Assessed Points No Wounds/Ulcers []   0 Less than Three Wounds/Ulcers [x]   1  
3-6 Wounds/Ulcers []   2 Greater than 6 Wounds/Ulcers []   3 Ambulation Status Document in Coord/MOE/Mobility tab Status Definition Points Independent Independently able to ambulate. Fully able (without any assistance) to get on/off exam table/chair. [x]   0 Minimal Physical Assistance Requires physical assistance of one person to ambulate and/or position patient to be examined. Includes necessary physical assistance to position lower extremities on/off stool. []   1 Moderate Physical Assistance Requires at least one staff member to physically assist patient in ambulating into treatment room, and on/off exam table. []   2 Full Assistance Requires assistance of at least two staff members to transfer patient into treatment room and/or on/off exam table/chair. \"Total Transfer\". []   3 Dressing Complexity Document in [facility] and Write Appropriate Order Complexity Definition Points No Dressing  []   0 Simple Minimal, simple dressing. i.e. Band-aid, gauze, simple wrap. []   1 Intermediate Moderately complicated requiring licensed personnel to apply i.e. collagen matrix, ointments, gels, alginates. [x]   2 Complex Complicated requiring licensed personnel to apply dressings 6 or more wounds. []   3 Teaching Effort Document in Education Tab Effort Definition Points No Teaching  []   0 Simple Reinforce two or less topics. Document in Education navigator. [x]   1 Intermediate Reinforce three to five topics and/or one additional  
new topic. Document in Education navigator. []   2 Complex Teach more than one new topic. New patient information packet reviewed and/or reinforce more than three topics. Document in Education navigator. HBO initial instruction. []   3 Patient Assessment and Planning Planning Definition Points Simple Multiple System Simple: Simple follow-up with routine assessment and planning. If Discharged, instructions and long term/follow-up care given to patient/caregiver. Discharged, instructions and/or After Visit Summary given to patient/caregiver and instructions completed. [x]   1 Intermediate Multiple System Intermediate: Contact with outside resources; i.e. Telephone calls to home health, [facility]. May include filling out forms and writing letters, arranging transportation, communication with insurance , vendors, etc.  Discharged, instructions and/or After Visit Summary given to patient/caregiver and instructions completed. []   2 Complex Multiple System Complex: Full, comprehensive assessment and planning. Follow the entire navigator under Wound Visit charting filling out each tab which includes OP Adm Database Screening, Education and CarePlan  HBO risk assessment completed. Discharged, instructions and/or After Visit Summary given to patient/caregiver and instructions completed. []   3 Is this the Patient's First Visit to the [facility] Road No 
 
 
Is this Patient Established @ [facility] 
Yes Clinical Level of Care Points  0-2  Level 1 [] Points  3-5  Level 2 [x] Points  6-9  Level 3 [] Points  10-12  Level 4 [] Points  13-15  Level 5 [] Electronically signed by Amalia Hyde RN on 1/24/2020 at 4:15 PM

## 2020-01-24 NOTE — DISCHARGE INSTRUCTIONS
Discharge Instructions for  [place] [id number] [address]  Telephone: [phone]    NAME:  Carlos Jean YOB: 1950  MEDICAL RECORD NUMBER:  [id number]  DATE:  1/24/2020    Wound Cleansing:   Do not scrub or use excessive force. Cleanse wound prior to applying a clean dressing with:  [x] Normal Saline [] Keep Wound Dry in Shower    [] Wound Cleanser   [] Cleanse wound with Mild Soap & Water  [] May Shower at Discharge   [] Other:      Topical Treatments:  Do not apply lotions, creams, or ointments to wound bed unless directed. [x] Apply moisturizing lotion to skin surrounding the wound prior to dressing change.  [] Apply antifungal ointment to skin surrounding the wound prior to dressing change.  [] Apply thin film of moisture barrier ointment to skin immediately around wound. [] Other:       Dressings:           Wound Location Left surgical foot and left plantar foot   [x] Apply Primary Dressing:       [] MediHoney Gel [] Alginate with Silver [] Alginate   [] Collagen [] Collagen with Silver   [] Santyl with Moisten saline gauze     [] Hydrocolloid   [] MediHoney Alginate [] Foam with Silver   [] Foam   [] Hydrofera Blue    [] Mepilex Border    [x] Moisten with betadine [] Hydrogel [] Mepitel     [] Bactroban/Mupirocin [] Polysporin  [] Other:    [] Pack wound loosely with  [] Iodoform   [] Plain Packing  [] Other   [x] Cover and Secure with:     [x] Gauze [x] Carlos [] Kerlix   [x] Ace Wrap lightly [x]  Tape [x] ABD     [] Other:    Avoid contact of tape with skin.   [x] Change dressing: [] Daily    [] Every Other Day [] Three times per week   [x] Once a week [] Do Not Change Dressing   [] Other:        Edema Control:  Apply: [x] Compression Stocking [x]Right Leg []Left Leg or   [x] Tubigrip [x]Right Leg Double Layer []Left Leg Double Layer       []Right Leg Single Layer []Left Leg Single Layer   [] SpandaGrip []Right Leg  []Left Leg      []Low compression 5-10 mm/Hg      []Medium compression 10-20 mm/Hg     []High compression  20-30 mm/Hg   every morning immediately when getting up should be applied to affected leg(s) from mid foot to knee making sure to cover the heel. Remove every night before going to bed. [x] Elevate leg(s) above the level of the heart when sitting. [x] Avoid prolonged standing in one place. [] Elevate arm/hand above the level of the heart []RightArm []LeftArm     Right medial foot : Mepilex Ag  Dietary:  [x] Diet as tolerated: [x] Calorie Diabetic Diet: Low carb and no sugar [] No Added Salt:  [] Increase Protein: [] Other:   Activity:  [x] Activity as tolerated:  [] Patient has no activity restrictions     [] Strict Bedrest: [] Remain off Work:     [] May return to full duty work:                                   [] Return to work with restrictions:             Return Appointment:  [] Wound and dressing supply provider:   [] ECF or Home Healthcare:  [] Wound Assessment: [] Physician or NP scheduled for Wound Assessment:   [x] Return Appointment: With DR Shea Goldberg   in  1 Week(s)  [] Ordered tests:      Electronically signed Dalia Smith RN on 1/24/2020 at 4:09 PM     Lucila Albarran 281: Should you experience any significant changes in your wound(s) or have questions about your wound care, please contact the [facility] at [address] [place] [address] 8:00 am - 4:30. If you need help with your wound outside these hours and cannot wait until we are again available, contact your PCP or go to the hospital emergency room. PLEASE NOTE: IF YOU ARE UNABLE TO OBTAIN WOUND SUPPLIES, CONTINUE TO USE THE SUPPLIES YOU HAVE AVAILABLE UNTIL YOU ARE ABLE TO REACH US. IT IS MOST IMPORTANT TO KEEP THE WOUND COVERED AT ALL TIMES.      Physician Signature:_______________________    Date: ___________ Time:  ____________

## 2020-01-24 NOTE — WOUND CARE
01/24/20 1518 Wound Leg lower Right;Medial  
Date First Assessed: 12/20/19   Present on Hospital Admission: Yes  Wound Approximate Age at First Assessment (Weeks): 3 weeks  Location: Leg lower  Wound Location Orientation: Right;Medial  
Dressing Status Removed Dressing Type Foam 
(Bordered foam) Wound Length (cm) 0 cm Wound Width (cm) 0 cm Wound Depth (cm) 0 cm Wound Surface Area (cm^2) 0 cm^2 Wound Volume (cm^3) 0 cm^3 Condition of Base Pink; Other (comment) (Epithelial tissue) Condition of Edges Closed Drainage Amount None Wound Odor None Angela-wound Assessment Intact Wound Foot Plantar;Left 06/28/19 Date First Assessed/Time First Assessed: 06/28/19 1334   Present on Hospital Admission: Yes  Primary Wound Type: Diabetic  Location: Foot  Wound Location Orientation: Plantar;Left  Date of First Observation: 06/28/19 Dressing Status Removed Dressing Type Other (Comment) (Betadine, gauze, ABD pad,  roll gauze, Ace wrap) Wound Length (cm) 0.5 cm Wound Width (cm) 0.2 cm Wound Depth (cm) 0.2 cm Wound Surface Area (cm^2) 0.1 cm^2 Wound Volume (cm^3) 0.02 cm^3 Condition of Base Pink (Slight yellow/orange discoloration for betadine.) Condition of Edges Calloused Drainage Amount Scant Drainage Color Serous Wound Odor None Angela-wound Assessment Intact; Other (Comment) 
(Calloused) Cleansing and Cleansing Agents  Normal saline Wound Foot Right;Medial 10/11/19 Date First Assessed/Time First Assessed: 10/11/19 0937   Present on Hospital Admission: Yes  Wound Approximate Age at First Assessment (Weeks): 2 weeks  Primary Wound Type: Diabetic Ulcer  Location: Foot  Wound Location Orientation: Right;Medial  Date. .. Dressing Status Removed Dressing Type Other (Comment) 
(Bordered foam dressing.) Wound Length (cm) 0 cm Wound Width (cm) 0 cm Wound Depth (cm) 0 cm Wound Surface Area (cm^2) 0 cm^2 Wound Volume (cm^3) 0 cm^3 Condition of Base Pink; Other (comment) (Epithelial tissue) Condition of Edges Closed Drainage Amount None Wound Odor None Angela-wound Assessment Intact Photo directly above: Surgical site, Left Dorsal Foot.

## 2020-01-25 NOTE — PROGRESS NOTES
[facility] 
WOUND CARE PROGRESS NOTE Name:  Shon Eason 
MR#:  [id number] :  1950 ACCOUNT #:  [de-identified] DATE OF SERVICE:  2020 HISTORY OF CHIEF COMPLAINT:  This 61-year-old white male presents for continued treatment of a chronic wound plantar left foot. Today, he is postop 3 weeks resection of metatarsal, he has 2 and 3 followed by antibiotic bead insertion with debridement of chronic wound. He presents with dressing and surgical shoe left foot. History and physical unchanged from admitting history and physical; no change in medications, no change in allergies. ALLERGIES:  TO NIACIN, IMIPENEM, LEVEMIR, PRIMAXIN, ADHESIVES AND Celso Jb. CURRENT MEDICATIONS:  Ambien 12.5 mg at night as needed for sleep, Lantus SoloSTAR insulin 10 units daily, Coreg 12.5 mg twice a day, Mirapex 1 mg tablet at night, Flomax 0.4 mg twice a day, Imdur 30 mg daily, Zaroxolyn 2.5 mg daily, Timoptic ophthalmic solution 0.5% into the right eye daily, Proscar 5 mg daily, potassium chloride 20 mEq 3 times a day, vitamin C 250 mg 3 times a day, Victoza 0.6 mg at night, Ambien 10 mg as needed for sleep, potassium chloride p.o. 20 mEq by mouth, ferrous sulfate 47.5 mg take 3 times a day, Bumex 2 mg twice a day, Uloric 40 mg daily. PAST MEDICAL HISTORY:  Diabetes with neuropathy, atherosclerotic cardiovascular disease, osteoarthritis, gout, hyperlipidemia, chronic kidney disease, anxiety, anemia, benign prostatic hyperplasia, cardiomyopathy, cancer, congestive heart disease, DJD, obesity, restless leg syndrome, sleep apnea, hypothyroidism. SOCIAL HISTORY:  Previous smoker, he quit in . PHYSICAL EXAMINATION: 
VITAL SIGNS:  Temperature 97.2, pulse rate 70, respirations 16, blood pressure 128/66.  
EXTREMITIES:  Physical examination of lower extremities revealed barely palpable pedal pulses, fairly good muscle strength lower extremities bilateral, diminished epicritic sensation, dried epithelialized wound medial aspect right lower leg and dorsal first right MPJ. Chronic wound that was plantar second left MPJ, appears to be well scabbed over. Sutures dorsal second and third metatarsals, left foot are intact. There is no evidence of infection or warmth, no erythema. Scabbed wound plantar left foot measures 0.5 x 0.2 x 0.2. ASSESSMENT:  The patient is status post resection of metatarsal head 2 and 3 left foot and debridement of chronic wound. PLAN:  Mepilex Ag foam was placed on scab wounds plantar left foot and right foot. Betadine gauze was placed on incisions left foot. The patient is to leave dressing intact. He will have a followup visit in the [address] [place] [address] with myself, Dr. Ayleen Mccollum in 1 week, at which time, I plan to remove sutures, left foot. DORIS Fall/[email or web address] 
D:  01/24/2020 16:48 
T:  01/25/2020 8:26 
JOB #:  [id number]

## 2020-01-31 ENCOUNTER — HOSPITAL ENCOUNTER (OUTPATIENT)
Dept: WOUND CARE | Age: 70
Discharge: HOME | End: 2020-01-31
Payer: MEDICARE

## 2020-01-31 VITALS
DIASTOLIC BLOOD PRESSURE: 69 MMHG | HEART RATE: 70 BPM | SYSTOLIC BLOOD PRESSURE: 140 MMHG | RESPIRATION RATE: 16 BRPM | TEMPERATURE: 95.5 F

## 2020-01-31 PROCEDURE — 99212 OFFICE O/P EST SF 10 MIN: CPT

## 2020-01-31 NOTE — DISCHARGE INSTRUCTIONS
Discharge Instructions for  [facility]  [address] [facility], [address] [facility]  Telephone: [phone]    NAME:  Fabio Zamudio YOB: 1950  MEDICAL RECORD NUMBER:  [id number]  DATE:  1/31/2020    Wound Cleansing:   Do not scrub or use excessive force. Cleanse wound prior to applying a clean dressing with:  [x] Normal Saline [] Keep Wound Dry in Shower    [] Wound Cleanser   [] Cleanse wound with Mild Soap & Water  [] May Shower at Discharge   [] Other:         Dressings:           Wound Location Left dorsal surgical foot and Right plantar foot   [x] Apply Primary Dressing:       [] MediHoney Gel [] Alginate with Silver [] Alginate   [] Collagen [] Collagen with Silver   [] Santyl with Moisten saline gauze     [] Hydrocolloid   [] MediHoney Alginate [] Foam with Silver   [] Foam   [] Hydrofera Blue    [] Mepilex Border    [] Moisten with Saline [] Hydrogel [] Mepitel  [x] Betadine moist gauze   [] Bactroban/Mupirocin [] Polysporin  [] Other:    [] Pack wound loosely with  [] Iodoform   [] Plain Packing  [] Other   [x] Cover and Secure with:     [x] Gauze [x] Carlos [] Kerlix   [x] Ace Wrap [] Cover Roll Tape [] ABD [] Exudry    [] Other:    Avoid contact of tape with skin. [] Change dressing: [] Daily    [] Every Other Day [] Three times per week   [] Once a week [] Do Not Change Dressing   [] Other:     Edema Control:  Apply: [x] Compression Stocking [x]Right Leg [x]Left Leg   [] Tubigrip []Right Leg Double Layer []Left Leg Double Layer       []Right Leg Single Layer []Left Leg Single Layer   [] SpandaGrip []Right Leg  []Left Leg      []Low compression 5-10 mm/Hg      []Medium compression 10-20 mm/Hg     []High compression  20-30 mm/Hg   every morning immediately when getting up should be applied to affected leg(s) from mid foot to knee making sure to cover the heel. Remove every night before going to bed.    [] Elevate leg(s) above the level of the heart when sitting. [] Avoid prolonged standing in one place. [] Elevate arm/hand above the level of the heart []RightArm []LeftArm    Off-Loading:   [x] Off-loading when [x] walking  [] in bed [] sitting  [] Total non-weight bearing  [] Right Leg  [] Left Leg   [x] Assistive Device [] Walker [] Cane  [] Wheelchair  [] Crutches   [x] Surgical shoe    [] Podus Boot(s)   [] Foam Boot(s)  [] Roll About    [] Cast Boot [] CROW Boot  [] Other:      Dietary:  [x] Diet as tolerated: [x] Diabetic Diet: Low carb no sugar [] No Added Salt:  [] Increase Protein: [] Other:   Activity:  [x] Activity as tolerated:  [] Patient has no activity restrictions     [] Strict Bedrest: [] Remain off Work:     [] May return to full duty work:                                   [] Return to work with restrictions:             Return Appointment:  [] Wound and dressing supply provider:   [] ECF or Home Healthcare:  [] Wound Assessment: [] Physician or NP scheduled for Wound Assessment:   [x] Return Appointment: With DR Nisa Hendrix  in  2 Week(s)  [] Ordered tests:      Electronically signed Ely Carey RN on 1/31/2020 at 3:16 PM     Lucila Albarran 281: Should you experience any significant changes in your wound(s) or have questions about your wound care, please contact the [facility] at [address] [place] [address] 8:00 am - 4:30. If you need help with your wound outside these hours and cannot wait until we are again available, contact your PCP or go to the hospital emergency room. PLEASE NOTE: IF YOU ARE UNABLE TO OBTAIN WOUND SUPPLIES, CONTINUE TO USE THE SUPPLIES YOU HAVE AVAILABLE UNTIL YOU ARE ABLE TO REACH US. IT IS MOST IMPORTANT TO KEEP THE WOUND COVERED AT ALL TIMES.      Physician Signature:_______________________    Date: ___________ Time:  ____________

## 2020-01-31 NOTE — WOUND CARE
01/31/20 1533 Wound Foot Plantar;Left 06/28/19 Date First Assessed/Time First Assessed: 06/28/19 1334   Present on Hospital Admission: Yes  Primary Wound Type: Diabetic  Location: Foot  Wound Location Orientation: Plantar;Left  Date of First Observation: 06/28/19 Dressing Type Applied (betadine, gauze, rolled gauze) Discharge Condition: Stable Pain: 0 Ambulatory Status: Walking Discharge Destination: Home Transportation: Car Accompanied by: Family/Caregiver Discharge instructions reviewed with Family/Caregiver  and copy or written instructions have been provided. All questions/concerns have been addressed at this time.

## 2020-01-31 NOTE — WOUND CARE
Clinic Level of Care Assessment NAME:  Fabio Zamudio. YOB: 1950 GENDER: male MEDICAL RECORD NUMBER:  [id number] DATE:  1/31/2020 Wound Count Document in [address] [place] [address] Number of Wounds Assessed Points No Wounds/Ulcers []   0 Less than Three Wounds/Ulcers [x]   1  
3-6 Wounds/Ulcers []   2 Greater than 6 Wounds/Ulcers []   3 Ambulation Status Document in Coord/MOE/Mobility tab Status Definition Points Independent Independently able to ambulate. Fully able (without any assistance) to get on/off exam table/chair. [x]   0 Minimal Physical Assistance Requires physical assistance of one person to ambulate and/or position patient to be examined. Includes necessary physical assistance to position lower extremities on/off stool. []   1 Moderate Physical Assistance Requires at least one staff member to physically assist patient in ambulating into treatment room, and on/off exam table. []   2 Full Assistance Requires assistance of at least two staff members to transfer patient into treatment room and/or on/off exam table/chair. \"Total Transfer\". []   3 Dressing Complexity Document in [address] [place] [address] and Write Appropriate Order Complexity Definition Points No Dressing  []   0 Simple Minimal, simple dressing. i.e. Band-aid, gauze, simple wrap. []   1 Intermediate Moderately complicated requiring licensed personnel to apply i.e. collagen matrix, ointments, gels, alginates. [x]   2 Complex Complicated requiring licensed personnel to apply dressings 6 or more wounds. []   3 Teaching Effort Document in Education Tab Effort Definition Points No Teaching  []   0 Simple Reinforce two or less topics. Document in Education navigator. [x]   1 Intermediate Reinforce three to five topics and/or one additional  
new topic. Document in Education navigator. []   2 Complex Teach more than one new topic. New patient information packet reviewed and/or reinforce more than three topics. Document in Education navigator. HBO initial instruction. []   3 Patient Assessment and Planning Planning Definition Points Simple Multiple System Simple: Simple follow-up with routine assessment and planning. If Discharged, instructions and long term/follow-up care given to patient/caregiver. Discharged, instructions and/or After Visit Summary given to patient/caregiver and instructions completed. [x]   1 Intermediate Multiple System Intermediate: Contact with outside resources; i.e. Telephone calls to home health, [facility]. May include filling out forms and writing letters, arranging transportation, communication with insurance , vendors, etc.  Discharged, instructions and/or After Visit Summary given to patient/caregiver and instructions completed. []   2 Complex Multiple System Complex: Full, comprehensive assessment and planning. Follow the entire navigator under Wound Visit charting filling out each tab which includes OP Adm Database Screening, Education and CarePlan  HBO risk assessment completed. Discharged, instructions and/or After Visit Summary given to patient/caregiver and instructions completed. []   3 Is this the Patient's First Visit to the [address] [place] [address] Road No 
 
 
Is this Patient Established @ [facility] 
Yes Clinical Level of Care Points  0-2  Level 1 [] Points  3-5  Level 2 [x] Points  6-9  Level 3 [] Points  10-12  Level 4 [] Points  13-15  Level 5 [] Electronically signed by Kimberly Claudio RN on 1/31/2020 at 3:29 PM

## 2020-01-31 NOTE — WOUND CARE
01/31/20 1449 Wound Foot Plantar;Left 06/28/19 Date First Assessed/Time First Assessed: 06/28/19 1334   Present on Hospital Admission: Yes  Primary Wound Type: Diabetic  Location: Foot  Wound Location Orientation: Plantar;Left  Date of First Observation: 06/28/19 Dressing Status Removed Dressing Type Gauze;Gauze wrap (susanne) Wound Length (cm) 0.5 cm Wound Width (cm) 0.4 cm Wound Depth (cm) 0.1 cm Wound Surface Area (cm^2) 0.2 cm^2 Wound Volume (cm^3) 0.02 cm^3 Condition of Base Pink Condition of Edges Calloused Drainage Amount Scant Drainage Color Serous Wound Odor None Angela-wound Assessment Intact Cleansing and Cleansing Agents  Normal saline

## 2020-02-01 NOTE — PROGRESS NOTES
[address] 
WOUND CARE PROGRESS NOTE Name:  Tessa Denton 
MR#:  [id number] :  1950 ACCOUNT #:  [de-identified] DATE OF SERVICE:  2020 HISTORY OF CHIEF COMPLAINT:  This 68-year-old white male is postop resection of metatarsal heads 2 and 3 performed on 2020. The patient has no complaints, presents with dressings on left foot. History and physical unchanged from admitting history and physical; no change in medications, no change in allergies. CURRENT MEDICATIONS:  Ambien 10 mg at night as needed, Lantus SoloSTAR insulin 10 units at night, passion flower 20 mL by mouth daily, Coreg 12.5 mg one tablet twice a day, Mirapex 1 mg tablet at night, Flomax 0.4 mg two capsules daily, ferrous sulfate 47.5 mg 3 times a day, Bumex 2 mg two tablets twice a day, Uloric 40 mg daily, Imdur 30 mg daily, Zaroxolyn 2.5 mg daily as needed, Timoptic 0.5% ophthalmic solution to the right eye two times a day, Proscar 5 mg daily, potassium chloride 20 mEq 3 times a day, vitamin C 250 mg 3 times a day, Victoza 0.6 mg subcu. ALLERGIES:  TO ADHESIVES, IMIPENEM, LEVEMIR, PRIMAXIN, AND Caralyn Melody. PAST MEDICAL HISTORY:  Diabetes with neuropathy, chronic kidney disease, gout, anxiety, arrhythmias, atherosclerotic cerebrovascular disease, benign prostatic hyperplasia, cancer, cardiomyopathy, congestive heart disease, degenerative joint disease, hyperlipidemia, hypertension, hypothyroidism, insomnia, restless leg syndrome, sleep apnea. SOCIAL HISTORY:  The patient is a previous smoker. He stopped in . PHYSICAL EXAMINATION: 
VITAL SIGNS:  Temperature 95.5, pulse rate 70, respirations 16, blood pressure 140/69.  
EXTREMITIES:  Physical examination of lower extremities revealed barely palpable pedal pulses, fairly good muscle strength, lower extremity bilateral, grossly diminished epicritic sensation, noted lack of hair growth in lower extremity. Well-coapted incisions, dorsal second and third metatarsal left foot, previous wound plantar second MPJ is well scabbed over; however, the second digit is moderately swollen today. There is no erythema. There is no warmth. There is no drainage. ASSESSMENT:  Resolved diabetic wound, plantar left foot and sesamoiditis on a diabetic with neuropathy. Treatment rendered today consisted of removal of all sutures, left foot. Scabbed wound, plantar left foot; scab was removed. There was no remaining opening in the integument. Intraoperative bone pathology did not show any evidence of osteomyelitis. Intraoperative wound cultures showed Enterococcus fecalis. The patient's Augmentin 500 mg was renewed today to take one p.o. twice a day for 20 days. He will have a followup visit in the Wound Clinic with myself, Dr. Cyndi Lux, in two weeks; however, if there is any erythema of the second toe or continued swelling, the patient's wife is to contact me. DORIS Gamble/[email or web address] 
D:  01/31/2020 15:47 
T:  02/01/2020 1:47 JOB #:  [id number]

## 2020-02-06 ENCOUNTER — HOSPITAL ENCOUNTER (OUTPATIENT)
Dept: INFUSION THERAPY | Age: 70
Discharge: HOME | End: 2020-02-06
Payer: MEDICARE

## 2020-02-06 VITALS
WEIGHT: 234 LBS | TEMPERATURE: 97.8 F | HEIGHT: 76 IN | SYSTOLIC BLOOD PRESSURE: 140 MMHG | DIASTOLIC BLOOD PRESSURE: 81 MMHG | OXYGEN SATURATION: 100 % | RESPIRATION RATE: 18 BRPM | HEART RATE: 71 BPM | BODY MASS INDEX: 28.49 KG/M2

## 2020-02-06 LAB
ALBUMIN SERPL-MCNC: 3.2 G/DL (ref 3.5–5)
ANION GAP SERPL CALC-SCNC: 6 MMOL/L (ref 5–15)
BUN SERPL-MCNC: 80 MG/DL (ref 6–20)
BUN/CREAT SERPL: 31 (ref 12–20)
CALCIUM SERPL-MCNC: 8.8 MG/DL (ref 8.5–10.1)
CHLORIDE SERPL-SCNC: 98 MMOL/L (ref 97–108)
CO2 SERPL-SCNC: 33 MMOL/L (ref 21–32)
CREAT SERPL-MCNC: 2.57 MG/DL (ref 0.7–1.3)
ERYTHROCYTE [DISTWIDTH] IN BLOOD BY AUTOMATED COUNT: 15.6 % (ref 11.5–14.5)
FERRITIN SERPL-MCNC: 162 NG/ML (ref 26–388)
GLUCOSE SERPL-MCNC: 113 MG/DL (ref 65–100)
HCT VFR BLD AUTO: 31.5 % (ref 36.6–50.3)
HGB BLD-MCNC: 9.7 G/DL (ref 12.1–17)
IRON SATN MFR SERPL: 15 % (ref 20–50)
IRON SERPL-MCNC: 43 UG/DL (ref 35–150)
MCH RBC QN AUTO: 29.5 PG (ref 26–34)
MCHC RBC AUTO-ENTMCNC: 30.8 G/DL (ref 30–36.5)
MCV RBC AUTO: 95.7 FL (ref 80–99)
NRBC # BLD: 0 K/UL (ref 0–0.01)
NRBC BLD-RTO: 0 PER 100 WBC
PHOSPHATE SERPL-MCNC: 3.8 MG/DL (ref 2.6–4.7)
PLATELET # BLD AUTO: 87 K/UL (ref 150–400)
PMV BLD AUTO: 11.4 FL (ref 8.9–12.9)
POTASSIUM SERPL-SCNC: 3.5 MMOL/L (ref 3.5–5.1)
RBC # BLD AUTO: 3.29 M/UL (ref 4.1–5.7)
SODIUM SERPL-SCNC: 137 MMOL/L (ref 136–145)
TIBC SERPL-MCNC: 280 UG/DL (ref 250–450)
WBC # BLD AUTO: 4.2 K/UL (ref 4.1–11.1)

## 2020-02-06 PROCEDURE — 80069 RENAL FUNCTION PANEL: CPT

## 2020-02-06 PROCEDURE — 85027 COMPLETE CBC AUTOMATED: CPT

## 2020-02-06 PROCEDURE — 96372 THER/PROPH/DIAG INJ SC/IM: CPT

## 2020-02-06 PROCEDURE — 36415 COLL VENOUS BLD VENIPUNCTURE: CPT

## 2020-02-06 PROCEDURE — 83540 ASSAY OF IRON: CPT

## 2020-02-06 PROCEDURE — 74011250636 HC RX REV CODE- 250/636: Performed by: INTERNAL MEDICINE

## 2020-02-06 PROCEDURE — 82728 ASSAY OF FERRITIN: CPT

## 2020-02-06 RX ADMIN — EPOETIN ALFA-EPBX 60000 UNITS: 40000 INJECTION, SOLUTION INTRAVENOUS; SUBCUTANEOUS at 10:42

## 2020-02-06 NOTE — PROGRESS NOTES
[address] [facility] Stay Note: 
Arrived - 1005 Pt reports he has had a fall since last visit. Otherwise, unchanged. Labs drawn peripherally by Reddy Ghosh. Visit Vitals /81 (BP 1 Location: Left arm, BP Patient Position: Sitting) Pulse 71 Temp 97.8 °F (36.6 °C) Resp 18 Ht 6' 4\" (1.93 m) Wt 106.1 kg (234 lb) SpO2 100% BMI 28.48 kg/m² Recent Results (from the past 12 hour(s)) CBC W/O DIFF Collection Time: 02/06/20 10:06 AM  
Result Value Ref Range WBC 4.2 4.1 - 11.1 K/uL  
 RBC 3.29 (L) 4.10 - 5.70 M/uL HGB 9.7 (L) 12.1 - 17.0 g/dL HCT 31.5 (L) 36.6 - 50.3 % MCV 95.7 80.0 - 99.0 FL  
 MCH 29.5 26.0 - 34.0 PG  
 MCHC 30.8 30.0 - 36.5 g/dL  
 RDW 15.6 (H) 11.5 - 14.5 % PLATELET 87 (L) 887 - 400 K/uL MPV 11.4 8.9 - 12.9 FL  
 NRBC 0.0 0  WBC ABSOLUTE NRBC 0.00 0.00 - 0.01 K/uL RENAL FUNCTION PANEL Collection Time: 02/06/20 10:06 AM  
Result Value Ref Range Sodium 137 136 - 145 mmol/L Potassium 3.5 3.5 - 5.1 mmol/L Chloride 98 97 - 108 mmol/L  
 CO2 33 (H) 21 - 32 mmol/L Anion gap 6 5 - 15 mmol/L Glucose 113 (H) 65 - 100 mg/dL BUN 80 (H) 6 - 20 MG/DL Creatinine 2.57 (H) 0.70 - 1.30 MG/DL  
 BUN/Creatinine ratio 31 (H) 12 - 20 GFR est AA 30 (L) >60 ml/min/1.73m2 GFR est non-AA 25 (L) >60 ml/min/1.73m2 Calcium 8.8 8.5 - 10.1 MG/DL Phosphorus 3.8 2.6 - 4.7 MG/DL Albumin 3.2 (L) 3.5 - 5.0 g/dL Assessment - unchanged. Hgb - 9.7/Hct 31.5. Retacrit 60,000 units SQ slowly in both arms in divided doses. 1050 - Tolerated well. Discharged from [facility] ambulatory. No distress. Next appt: 3/5/20 at 1000.

## 2020-02-06 NOTE — PROGRESS NOTES
Chief Complaint Patient presents with  Diabetes 1 month follow up  CHF  Hypertension SUBJECTIVE: 
 
Royal KRISTA Huang. is a 68 y.o. male who returns today in follow-up for his medical problems include hypertension, chronic kidney disease stage IV, compensated CHF, atrial fibrillation, cardiomyopathy and other medical problems. In addition to his chronic problems he has an acute problem today of increasing abdominal pain and bloating that is been present now over the past several days. He notes that he has been constipated the past 3 days and abdominal discomfort has been progressively worse over that time. He did take 2 Dulcolax last night which is helped give him a minimal bowel movement today but has not helped abdominal bloating or pain. He has no nausea or vomiting but has absolutely no appetite and nothing tastes good to him. He notes some swelling in his ankles but not anything significant compared to what he has had before. His weight has gone up as his abdominal bloating is increased. He also notes he is more short of breath because he cannot take a deep breath secondary to abdominal bloating. He denies any chest pain, palpitations, PND, orthopnea but has had to sit up to sleep because of the abdominal discomfort and actually has not slept hardly at all for the past 3 nights. He denies any  complaints. He denies any headaches, dizziness or neurologic except generalized weakness. He notes no change of his chronic arthritic complaints and no other complaints noted. Current Outpatient Medications Medication Sig Dispense Refill  zolpidem (AMBIEN) 10 mg tablet Take 1 Tab by mouth nightly as needed for Sleep. Max Daily Amount: 10 mg. (Patient taking differently: Take 10 mg by mouth nightly as needed for Sleep.  Indications: difficulty falling asleep) 30 Tab 5  
 insulin glargine (LANTUS SOLOSTAR U-100 INSULIN) 100 unit/mL (3 mL) inpn 10 Units by SubCUTAneous route nightly.  PASSION FLOWER PO Take 20 mL by mouth daily.  carvedilol (COREG) 12.5 mg tablet TAKE 1 TABLET BY MOUTH TWICE DAILY 60 Tab 11  
 pramipexole (MIRAPEX) 1 mg tablet TAKE 1 TABLET BY MOUTH EVERY NIGHT AT BEDTIME 30 Tab prn  tamsulosin (FLOMAX) 0.4 mg capsule TAKE 2 CAPSULES BY MOUTH EVERY  Cap 3  ferrous sulfate (SLOW FE) 47.5 mg iron TbER tablet Take 1 Tab by mouth three (3) times daily. 90 Tab 12  
 bumetanide (BUMEX) 2 mg tablet Take 2 Tabs by mouth two (2) times a day. 120 Tab 12  
 febuxostat (ULORIC) 40 mg tab tablet Take 1 Tab by mouth daily. (Patient taking differently: Take 40 mg by mouth daily as needed.) 30 Tab 0  
 isosorbide mononitrate ER (IMDUR) 30 mg tablet Take 1 Tab by mouth daily. 30 Tab 12  
 metOLazone (ZAROXOLYN) 2.5 mg tablet Take 2.5 mg by mouth daily as needed (swelling).  timolol (TIMOPTIC) 0.5 % ophthalmic solution Administer 1 Drop to right eye two (2) times a day.  finasteride (PROSCAR) 5 mg tablet TAKE 1 TABLET BY MOUTH DAILY 90 Tab 3  potassium chloride (KLOR-CON) 20 mEq pack One packet three times daily 90 Packet prn  ascorbic acid, vitamin C, (VITAMIN C) 250 mg tablet Take 1 Tab by mouth three (3) times daily. 100 Tab prn  
 OTHER Apply  to affected area daily as needed (Knee Pain). CBD Cream:  Apply daily as needed for knee pain  Liraglutide (VICTOZA) 0.6 mg/0.1 mL (18 mg/3 mL) sub-q pen 0.6 mg by SubCUTAneous route nightly. Past Medical History:  
Diagnosis Date  Anemia 8/5/2017  Anxiety 8/5/2017  Arrhythmia   
 atrial fibrillation 2014  ASCVD (arteriosclerotic cardiovascular disease) 8/5/2017  BPH (benign prostatic hyperplasia) 8/5/2017  CAD (coronary artery disease)   
 h/o stents  Cancer [facility])   
 h/o skin cancer  Cardiomyopathy ([facility] 8/5/2017  CHF (congestive heart failure) ([facility] 8/5/2017  Chronic kidney disease  Stage IV  
  CKD (chronic kidney disease), stage IV ([address] 8/5/2017  Diabetes ([address]  Diabetes mellitus ([address] 8/5/2017  Diabetic neuropathy ([address] 8/5/2017  DJD (degenerative joint disease) 8/5/2017  ED (erectile dysfunction) 8/5/2017  Gout 8/5/2017  High cholesterol  Hyperlipidemia 8/5/2017  Hypertension  Hypertension with renal disease 8/5/2017  Hypothyroid 8/5/2017  Insomnia 8/5/2017  Obesity 8/5/2017  On statin therapy 8/5/2017  Pneumonia 05/28/2019  Restless leg 8/5/2017  Sleep apnea Bipap  Thyroid disease   
 hypothyroid  Ulcer of foot due to secondary diabetes ([address] 07/12/2019 Past Surgical History:  
Procedure Laterality Date  CARDIAC SURG PROCEDURE UNLIST    
 cardiac stents 3  
 CARDIAC SURG PROCEDURE UNLIST Ablation 5/17/2018 ED [facility]  COLONOSCOPY N/A 6/28/2016 COLONOSCOPY performed by Tena Liu MD at [facility] ENDOSCOPY  COLONOSCOPY N/A 1/9/2019 COLONOSCOPY performed by aNy Varner MD at [facility] ENDOSCOPY  COLONOSCOPY,DIAGNOSTIC  1/9/2019  HX APPENDECTOMY  HX BUNIONECTOMY    
 and removal of 2 seasmoid bone of the great toe 2/2018  HX HEENT Bilateral Cataract surgery  HX HEENT Tonsils  HX HEENT Axe wound to the head  HX KNEE ARTHROSCOPY X 2  
 HX ORTHOPAEDIC    
 HX ORTHOPAEDIC    
 partial laminectomy  HX PACEMAKER    
 HX ROTATOR CUFF REPAIR    
 bilateral  
 VT INSJ ELTRD CAR DACIA SYS ATTCH PREV PM/DFB PLS GEN N/A 1/28/2019 Lv Lead Placement To Previous Generator performed by Alexia Chowdhury MD at [facility] CARDIAC CATH LAB Left side  VT REMVL PERM PM PLS GEN W/REPL PLSE GEN MULT LEAD N/A 1/28/2019 Remove & Replace Ppm Gen Biv Multi Leads performed by Alexia Chowdhury MD at [address] [place] [address] 28 CATH LAB Allergies Allergen Reactions  Niacin Unknown (comments) Niaspan  Adhesive Rash  Imipenem Diarrhea Other reaction(s): Rash  Levemir [Insulin Detemir] Hives  Other Medication Other (comments) ? Allergy to [employer] causing chemical burn  Primaxin [Imipenem-Cilastatin] Diarrhea and Rash  Xarelto [Rivaroxaban] Rash and Itching Other reaction(s): Rash REVIEW OF SYSTEMS: 
General: negative for - chills or fever, or weight loss or gain ENT: negative for - headaches, nasal congestion or tinnitus Eyes: no blurred or visual changes Neck: No stiffness or swollen nodes Respiratory: negative for - cough, hemoptysis, shortness of breath or wheezing Cardiovascular : negative for - chest pain, edema, palpitations but increased shortness of breath although he thinks is because he cannot take a deep breath secondary abdominal bloating Gastrointestinal: negative for -  blood in stools, heartburn or nausea/vomiting. Positive abdominal pain with bloating and no appetite. Constipation for the past 3 days Genito-Urinary: no dysuria, trouble voiding, or hematuria Musculoskeletal: negative for - gait disturbance, joint pain, joint stiffness or joint swelling Neurological: no TIA or stroke symptoms Hematologic: no bruises, no bleeding Lymphatic: no swollen glands Integument: no lumps, mole changes, nail changes or rash Endocrine:no malaise/lethargy poly uria or polydipsia or unexpected weight changes Social History Socioeconomic History  Marital status:  Spouse name: Not on file  Number of children: Not on file  Years of education: Not on file  Highest education level: Not on file Tobacco Use  Smoking status: Former Smoker Packs/day: 0.50 Years: 4.00 Pack years: 2.00 Last attempt to quit: 3/6/1972 Years since quittin.9  Smokeless tobacco: Never Used Substance and Sexual Activity  Alcohol use: No  
  Comment: rare, 1 drink per year  Drug use: No  
 Sexual activity: Not Currently Other Topics Concern Family History Problem Relation Age of Onset  Heart Disease Mother  Kidney Disease Mother  Heart Disease Father  Kidney Disease Father  Cancer Sister Breast  
 
 
OBJECTIVE:  
 
Visit Vitals /88 (BP 1 Location: Left arm, BP Patient Position: Sitting) Pulse 70 Temp 97.5 °F (36.4 °C) (Oral) Resp 20 Ht 6' 4\" (1.93 m) Wt 242 lb 4.8 oz (109.9 kg) SpO2 95% BMI 29.49 kg/m² CONSTITUTIONAL:   well nourished, appears age appropriate EYES: sclera anicteric, PERRL, EOMI 
ENMT:nares clear, moist mucous membranes, pharynx clear NECK: supple. Thyroid normal, No JVD or bruits RESPIRATORY: Chest: clear to ascultation and percussion with overall decreased breath sound, normal inspiratory effort CARDIOVASCULAR: Heart: regular rate and rhythm no murmurs, rubs or gallops, PMI not displaced, No thrills 1-2+ peripheral edema bilateral 
GASTROINTESTINAL: Abdomen: Mildly distended and tympanitic with decreased bowel sounds and mild diffuse discomfort HEMATOLOGIC: no purpura, petechiae or bruising LYMPHATIC: No lymph node enlargemant MUSCULOSKELETAL: Extremities: no edema or active synovitis, pulse 1+ INTEGUMENT: No unusual rashes or suspicious skin lesions noted. Nails appear normal. 
PERIPHERAL VASCULAR: normal pulses femoral, PT and DP NEUROLOGIC: non-focal exam, A & O X 3 PSYCHIATRIC:, appropriate affect ASSESSMENT:  
1. Hypertension with renal disease 2. Controlled type 2 diabetes mellitus with stage 4 chronic kidney disease, without long-term current use of insulin ([address] 3. ACC/AHA stage C systolic heart failure due to ischemic cardiomyopathy ([address] 4. Ischemic cardiomyopathy 5. Paroxysmal atrial fibrillation (HCC) 6. CKD (chronic kidney disease), stage IV ([address] 7. Anemia, unspecified type 8. Generalized abdominal pain Impression 1.   Hypertension that is a little high today but he thinks is because of the abdominal discomfort so I will not make any changes as his blood pressure has been running okay 2. Diabetes mellitus repeat status pending a prior lab review not make adjustments if necessary. 3.  CHF compensated although slightly increased peripheral edema and weight up 6 pounds so I will ask him to increase his metolazone to 5 mg before his morning Bumex and continued on a as needed basis up to 2 times per week. He is to take the first dose tomorrow morning. 4.  Cardiomyopathy currently stable 5. Paroxysmal atrial fibrillation appears to be in sinus rhythm now 6. CKD stage IV repeat status pending 7   Anemia status pending 8. Generalized abdominal pain I got a flat and upright abdominal with an upright chest x-ray today and that shows some nonspecific bowel gas pattern and he actually had a bowel movement after getting the x-ray and feels better after that so based upon that I think his shortness of breath is related to diaphragmatic elevation related to his constipation and we will work on treating his constipation as he already has I will recheck him again in a month or sooner should it be a problem. I did discuss with him that should his abdominal discomfort or bloating or shortness of breath get worse to notify me or go to the emergency room and certainly if not better in the next 3 days to see me. This is discussed with his wife present with him today. I will call with lab results. PLAN: 
. Orders Placed This Encounter  XR ABD ACUTE W 1 V CHEST  CBC WITH AUTOMATED DIFF  
 METABOLIC PANEL, COMPREHENSIVE ([facility]) ATTENTION:  
This medical record was transcribed using an electronic medical records system. Although proofread, it may and can contain electronic and spelling errors. Other human spelling and other errors may be present. Corrections may be executed at a later time. Please feel free to contact us for any clarifications as needed. Follow-up and Dispositions · Return in about 4 weeks (around 3/6/2020). No results found for any visits on 02/07/20. Setfania Bell MD 
 
The patient verbalized understanding of the problems and plans as explained.

## 2020-02-07 ENCOUNTER — OFFICE VISIT (OUTPATIENT)
Dept: INTERNAL MEDICINE CLINIC | Age: 70
End: 2020-02-07

## 2020-02-07 VITALS
SYSTOLIC BLOOD PRESSURE: 152 MMHG | WEIGHT: 242.3 LBS | OXYGEN SATURATION: 95 % | RESPIRATION RATE: 20 BRPM | HEIGHT: 76 IN | HEART RATE: 70 BPM | TEMPERATURE: 97.5 F | BODY MASS INDEX: 29.51 KG/M2 | DIASTOLIC BLOOD PRESSURE: 88 MMHG

## 2020-02-07 DIAGNOSIS — I25.5 ISCHEMIC CARDIOMYOPATHY: ICD-10-CM

## 2020-02-07 DIAGNOSIS — I25.5 ACC/AHA STAGE C SYSTOLIC HEART FAILURE DUE TO ISCHEMIC CARDIOMYOPATHY (HCC): ICD-10-CM

## 2020-02-07 DIAGNOSIS — R10.84 GENERALIZED ABDOMINAL PAIN: ICD-10-CM

## 2020-02-07 DIAGNOSIS — I12.9 HYPERTENSION WITH RENAL DISEASE: Primary | ICD-10-CM

## 2020-02-07 DIAGNOSIS — I50.20 ACC/AHA STAGE C SYSTOLIC HEART FAILURE DUE TO ISCHEMIC CARDIOMYOPATHY (HCC): ICD-10-CM

## 2020-02-07 DIAGNOSIS — I48.0 PAROXYSMAL ATRIAL FIBRILLATION (HCC): ICD-10-CM

## 2020-02-07 DIAGNOSIS — N18.4 CONTROLLED TYPE 2 DIABETES MELLITUS WITH STAGE 4 CHRONIC KIDNEY DISEASE, WITHOUT LONG-TERM CURRENT USE OF INSULIN (HCC): ICD-10-CM

## 2020-02-07 DIAGNOSIS — D64.9 ANEMIA, UNSPECIFIED TYPE: ICD-10-CM

## 2020-02-07 DIAGNOSIS — E11.22 CONTROLLED TYPE 2 DIABETES MELLITUS WITH STAGE 4 CHRONIC KIDNEY DISEASE, WITHOUT LONG-TERM CURRENT USE OF INSULIN (HCC): ICD-10-CM

## 2020-02-07 DIAGNOSIS — N18.4 CKD (CHRONIC KIDNEY DISEASE), STAGE IV (HCC): ICD-10-CM

## 2020-02-07 LAB
A-G RATIO,AGRAT: 1.1 RATIO
ALBUMIN SERPL-MCNC: 3.9 G/DL (ref 3.9–5.4)
ALP SERPL-CCNC: 109 U/L (ref 38–126)
ALT SERPL-CCNC: 11 U/L (ref 0–50)
ANION GAP SERPL CALC-SCNC: 17 MMOL/L
AST SERPL W P-5'-P-CCNC: 23 U/L (ref 14–36)
BILIRUB SERPL-MCNC: 1 MG/DL (ref 0.2–1.3)
BUN SERPL-MCNC: 78 MG/DL (ref 9–20)
BUN/CREATININE RATIO,BUCR: 33 RATIO
CALCIUM SERPL-MCNC: 9.5 MG/DL (ref 8.4–10.2)
CHLORIDE SERPL-SCNC: 90 MMOL/L (ref 98–107)
CO2 SERPL-SCNC: 34 MMOL/L (ref 22–32)
CREAT SERPL-MCNC: 2.4 MG/DL (ref 0.8–1.5)
GLOBULIN,GLOB: 3.6
GLUCOSE SERPL-MCNC: 157 MG/DL (ref 75–110)
POTASSIUM SERPL-SCNC: 4.3 MMOL/L (ref 3.6–5)
PROT SERPL-MCNC: 7.5 G/DL (ref 6.3–8.2)
SODIUM SERPL-SCNC: 141 MMOL/L (ref 137–145)

## 2020-02-07 NOTE — PROGRESS NOTES
Chief Complaint Patient presents with  Diabetes 1 month follow up  CHF  Hypertension Visit Vitals /88 (BP 1 Location: Left arm, BP Patient Position: Sitting) Pulse 70 Temp 97.5 °F (36.4 °C) (Oral) Resp 20 Ht 6' 4\" (1.93 m) Wt 242 lb 4.8 oz (109.9 kg) SpO2 95% BMI 29.49 kg/m² 1. Have you been to the ER, urgent care clinic since your last visit? Hospitalized since your last visit? No 
 
2. Have you seen or consulted any other health care providers outside of the [address] [place] [address] since your last visit? Include any pap smears or colon screening. Dr. Levi Pearce

## 2020-02-07 NOTE — PATIENT INSTRUCTIONS
Anemia: Care Instructions Your Care Instructions Anemia is a low level of red blood cells, which carry oxygen throughout your body. Many things can cause anemia. Lack of iron is one of the most common causes. Your body needs iron to make hemoglobin, a substance in red blood cells that carries oxygen from the lungs to your body's cells. Without enough iron, the body produces fewer and smaller red blood cells. As a result, your body's cells do not get enough oxygen, and you feel tired and weak. And you may have trouble concentrating. Bleeding is the most common cause of a lack of iron. You may have heavy menstrual bleeding or bleeding caused by conditions such as ulcers, hemorrhoids, or cancer. Regular use of aspirin or other anti-inflammatory medicines (such as ibuprofen) also can cause bleeding in some people. A lack of iron in your diet also can cause anemia, especially at times when the body needs more iron, such as during pregnancy, infancy, and the teen years. Your doctor may have prescribed iron pills. It may take several months of treatment for your iron levels to return to normal. Your doctor also may suggest that you eat foods that are rich in iron, such as meat and beans. There are many other causes of anemia. It is not always due to a lack of iron. Finding the specific cause of your anemia will help your doctor find the right treatment for you. Follow-up care is a key part of your treatment and safety. Be sure to make and go to all appointments, and call your doctor if you are having problems. It's also a good idea to know your test results and keep a list of the medicines you take. How can you care for yourself at home? · Take your medicines exactly as prescribed. Call your doctor if you think you are having a problem with your medicine. · If your doctor recommends iron pills, take them as directed: ? Try to take the pills on an empty stomach about 1 hour before or 2 hours after meals. But you may need to take iron with food to avoid an upset stomach. ? Do not take antacids or drink milk or caffeine drinks (such as coffee, tea, or cola) at the same time or within 2 hours of the time that you take your iron. They can make it hard for your body to absorb the iron. ? Vitamin C (from food or supplements) helps your body absorb iron. Try taking iron pills with a glass of orange juice or some other food that is high in vitamin C, such as citrus fruits. ? Iron pills may cause stomach problems, such as heartburn, nausea, diarrhea, constipation, and cramps. Be sure to drink plenty of fluids, and include fruits, vegetables, and fiber in your diet each day. Iron pills often make your bowel movements dark or green. ? If you forget to take an iron pill, do not take a double dose of iron the next time you take a pill. ? Keep iron pills out of the reach of small children. An overdose of iron can be very dangerous. · Follow your doctor's advice about eating iron-rich foods. These include red meat, shellfish, poultry, eggs, beans, raisins, whole-grain bread, and leafy green vegetables. · Steam vegetables to help them keep their iron content. When should you call for help? Call 911 anytime you think you may need emergency care. For example, call if: 
  · You have symptoms of a heart attack. These may include: 
? Chest pain or pressure, or a strange feeling in the chest. 
? Sweating. ? Shortness of breath. ? Nausea or vomiting. ? Pain, pressure, or a strange feeling in the back, neck, jaw, or upper belly or in one or both shoulders or arms. ? Lightheadedness or sudden weakness. ? A fast or irregular heartbeat. After you call 911, the  may tell you to chew 1 adult-strength or 2 to 4 low-dose aspirin. Wait for an ambulance. Do not try to drive yourself.  
  · You passed out (lost consciousness).  
 Call your doctor now or seek immediate medical care if:   · You have new or increased shortness of breath.  
  · You are dizzy or lightheaded, or you feel like you may faint.  
  · Your fatigue and weakness continue or get worse.  
  · You have any abnormal bleeding, such as: 
? Nosebleeds. ? Vaginal bleeding that is different (heavier, more frequent, at a different time of the month) than what you are used to. 
? Bloody or black stools, or rectal bleeding. ? Bloody or pink urine.  
 Watch closely for changes in your health, and be sure to contact your doctor if: 
  · You do not get better as expected. Where can you learn more? Go to http://gildardo-palak.info/. Enter R301 in the search box to learn more about \"Anemia: Care Instructions. \" Current as of: March 28, 2019 Content Version: 12.2 © 2024-5502 [employer]. Care instructions adapted under license by [employer] (which disclaims liability or warranty for this information). If you have questions about a medical condition or this instruction, always ask your healthcare professional. [place] [address] any warranty or liability for your use of this information.

## 2020-02-08 LAB
BASOPHILS # BLD AUTO: 0 X10E3/UL (ref 0–0.2)
BASOPHILS NFR BLD AUTO: 1 %
EOSINOPHIL # BLD AUTO: 0 X10E3/UL (ref 0–0.4)
EOSINOPHIL NFR BLD AUTO: 0 %
ERYTHROCYTE [DISTWIDTH] IN BLOOD BY AUTOMATED COUNT: 14.2 % (ref 11.6–15.4)
HCT VFR BLD AUTO: 31.3 % (ref 37.5–51)
HGB BLD-MCNC: 10.1 G/DL (ref 13–17.7)
IMM GRANULOCYTES # BLD AUTO: 0 X10E3/UL (ref 0–0.1)
IMM GRANULOCYTES NFR BLD AUTO: 0 %
LYMPHOCYTES # BLD AUTO: 0.3 X10E3/UL (ref 0.7–3.1)
LYMPHOCYTES NFR BLD AUTO: 9 %
MCH RBC QN AUTO: 30.1 PG (ref 26.6–33)
MCHC RBC AUTO-ENTMCNC: 32.3 G/DL (ref 31.5–35.7)
MCV RBC AUTO: 93 FL (ref 79–97)
MONOCYTES # BLD AUTO: 0.3 X10E3/UL (ref 0.1–0.9)
MONOCYTES NFR BLD AUTO: 9 %
MORPHOLOGY BLD-IMP: ABNORMAL
NEUTROPHILS # BLD AUTO: 3 X10E3/UL (ref 1.4–7)
NEUTROPHILS NFR BLD AUTO: 81 %
PLATELET # BLD AUTO: 87 X10E3/UL (ref 150–450)
RBC # BLD AUTO: 3.36 X10E6/UL (ref 4.14–5.8)
WBC # BLD AUTO: 3.7 X10E3/UL (ref 3.4–10.8)

## 2020-02-10 RX ORDER — METOLAZONE 2.5 MG/1
TABLET ORAL
Qty: 45 TAB | Refills: 2 | Status: ON HOLD | OUTPATIENT
Start: 2020-02-10 | End: 2020-02-17

## 2020-02-10 NOTE — TELEPHONE ENCOUNTER
RX refill request from the patient/pharmacy. Patient last seen 02- with labs, and next appt. scheduled for 03-  Requested Prescriptions     Pending Prescriptions Disp Refills    metOLazone (ZAROXOLYN) 2.5 mg tablet [Pharmacy Med Name: METOLAZONE 2.5MG TABLETS] 45 Tab 2     Sig: TAKE 1 TABLET BY MOUTH EVERY OTHER DAY AS DIRECTED   .

## 2020-02-14 ENCOUNTER — APPOINTMENT (OUTPATIENT)
Dept: WOUND CARE | Age: 70
End: 2020-02-14

## 2020-02-14 ENCOUNTER — APPOINTMENT (OUTPATIENT)
Dept: ULTRASOUND IMAGING | Age: 70
DRG: 291 | End: 2020-02-14
Attending: INTERNAL MEDICINE
Payer: MEDICARE

## 2020-02-14 ENCOUNTER — HOSPITAL ENCOUNTER (INPATIENT)
Age: 70
LOS: 7 days | Discharge: HOME HEALTH | DRG: 291 | End: 2020-02-21
Attending: EMERGENCY MEDICINE | Admitting: INTERNAL MEDICINE
Payer: MEDICARE

## 2020-02-14 ENCOUNTER — APPOINTMENT (OUTPATIENT)
Dept: GENERAL RADIOLOGY | Age: 70
DRG: 291 | End: 2020-02-14
Attending: EMERGENCY MEDICINE
Payer: MEDICARE

## 2020-02-14 DIAGNOSIS — I50.9 ACUTE ON CHRONIC CONGESTIVE HEART FAILURE, UNSPECIFIED HEART FAILURE TYPE (HCC): Primary | ICD-10-CM

## 2020-02-14 DIAGNOSIS — E87.8 HYPOCHLOREMIA: ICD-10-CM

## 2020-02-14 DIAGNOSIS — I12.9 HYPERTENSION WITH RENAL DISEASE: ICD-10-CM

## 2020-02-14 DIAGNOSIS — K21.9 GASTROESOPHAGEAL REFLUX DISEASE WITHOUT ESOPHAGITIS: ICD-10-CM

## 2020-02-14 DIAGNOSIS — E87.1 HYPONATREMIA: ICD-10-CM

## 2020-02-14 DIAGNOSIS — Z98.890 S/P ABLATION OF ATRIAL FIBRILLATION: ICD-10-CM

## 2020-02-14 DIAGNOSIS — Z86.79 S/P ABLATION OF ATRIAL FIBRILLATION: ICD-10-CM

## 2020-02-14 DIAGNOSIS — N18.4 CONTROLLED TYPE 2 DIABETES MELLITUS WITH STAGE 4 CHRONIC KIDNEY DISEASE, WITHOUT LONG-TERM CURRENT USE OF INSULIN (HCC): ICD-10-CM

## 2020-02-14 DIAGNOSIS — E11.22 CONTROLLED TYPE 2 DIABETES MELLITUS WITH STAGE 4 CHRONIC KIDNEY DISEASE, WITHOUT LONG-TERM CURRENT USE OF INSULIN (HCC): ICD-10-CM

## 2020-02-14 DIAGNOSIS — I48.0 PAROXYSMAL ATRIAL FIBRILLATION (HCC): ICD-10-CM

## 2020-02-14 DIAGNOSIS — D64.9 ANEMIA, UNSPECIFIED TYPE: ICD-10-CM

## 2020-02-14 DIAGNOSIS — I25.10 ASCVD (ARTERIOSCLEROTIC CARDIOVASCULAR DISEASE): ICD-10-CM

## 2020-02-14 DIAGNOSIS — I25.5 ISCHEMIC CARDIOMYOPATHY: ICD-10-CM

## 2020-02-14 DIAGNOSIS — N18.4 CKD (CHRONIC KIDNEY DISEASE), STAGE IV (HCC): ICD-10-CM

## 2020-02-14 DIAGNOSIS — I50.42 CHRONIC COMBINED SYSTOLIC AND DIASTOLIC CHF, NYHA CLASS 4 (HCC): ICD-10-CM

## 2020-02-14 LAB
ALBUMIN SERPL-MCNC: 3.2 G/DL (ref 3.5–5)
ALBUMIN/GLOB SERPL: 0.7 {RATIO} (ref 1.1–2.2)
ALP SERPL-CCNC: 108 U/L (ref 45–117)
ALT SERPL-CCNC: 14 U/L (ref 12–78)
ANION GAP SERPL CALC-SCNC: 9 MMOL/L (ref 5–15)
APPEARANCE UR: CLEAR
AST SERPL-CCNC: 27 U/L (ref 15–37)
BACTERIA URNS QL MICRO: NEGATIVE /HPF
BASOPHILS # BLD: 0 K/UL (ref 0–0.1)
BASOPHILS NFR BLD: 0 % (ref 0–1)
BILIRUB SERPL-MCNC: 1 MG/DL (ref 0.2–1)
BILIRUB UR QL: NEGATIVE
BUN SERPL-MCNC: 94 MG/DL (ref 6–20)
BUN/CREAT SERPL: 31 (ref 12–20)
CALCIUM SERPL-MCNC: 9.1 MG/DL (ref 8.5–10.1)
CHLORIDE SERPL-SCNC: 85 MMOL/L (ref 97–108)
CHLORIDE UR-SCNC: 43 MMOL/L
CK MB CFR SERPL CALC: 0.5 % (ref 0–2.5)
CK MB SERPL-MCNC: 2.7 NG/ML (ref 5–25)
CK SERPL-CCNC: 573 U/L (ref 39–308)
CO2 SERPL-SCNC: 34 MMOL/L (ref 21–32)
COLOR UR: ABNORMAL
CREAT SERPL-MCNC: 3.04 MG/DL (ref 0.7–1.3)
CREAT UR-MCNC: 24.2 MG/DL
CREAT UR-MCNC: 28.4 MG/DL
DIFFERENTIAL METHOD BLD: ABNORMAL
EOSINOPHIL # BLD: 0.4 K/UL (ref 0–0.4)
EOSINOPHIL NFR BLD: 11 % (ref 0–7)
EPITH CASTS URNS QL MICRO: ABNORMAL /LPF
ERYTHROCYTE [DISTWIDTH] IN BLOOD BY AUTOMATED COUNT: 17.4 % (ref 11.5–14.5)
GLOBULIN SER CALC-MCNC: 4.4 G/DL (ref 2–4)
GLUCOSE BLD STRIP.AUTO-MCNC: 147 MG/DL (ref 65–100)
GLUCOSE SERPL-MCNC: 167 MG/DL (ref 65–100)
GLUCOSE UR STRIP.AUTO-MCNC: NEGATIVE MG/DL
HCT VFR BLD AUTO: 30.2 % (ref 36.6–50.3)
HGB BLD-MCNC: 9.5 G/DL (ref 12.1–17)
HGB UR QL STRIP: NEGATIVE
HYALINE CASTS URNS QL MICRO: ABNORMAL /LPF (ref 0–5)
IMM GRANULOCYTES # BLD AUTO: 0 K/UL (ref 0–0.04)
IMM GRANULOCYTES NFR BLD AUTO: 0 % (ref 0–0.5)
KETONES UR QL STRIP.AUTO: NEGATIVE MG/DL
LEUKOCYTE ESTERASE UR QL STRIP.AUTO: NEGATIVE
LYMPHOCYTES # BLD: 0.4 K/UL (ref 0.8–3.5)
LYMPHOCYTES NFR BLD: 11 % (ref 12–49)
MCH RBC QN AUTO: 30.5 PG (ref 26–34)
MCHC RBC AUTO-ENTMCNC: 31.5 G/DL (ref 30–36.5)
MCV RBC AUTO: 97.1 FL (ref 80–99)
MONOCYTES # BLD: 0.2 K/UL (ref 0–1)
MONOCYTES NFR BLD: 6 % (ref 5–13)
NEUTS BAND NFR BLD MANUAL: 1 %
NEUTS SEG # BLD: 2.9 K/UL (ref 1.8–8)
NEUTS SEG NFR BLD: 71 % (ref 32–75)
NITRITE UR QL STRIP.AUTO: NEGATIVE
NRBC # BLD: 0 K/UL (ref 0–0.01)
NRBC BLD-RTO: 0 PER 100 WBC
OSMOLALITY UR: 314 MOSM/KG H2O
PH UR STRIP: 7.5 [PH] (ref 5–8)
PLATELET # BLD AUTO: 93 K/UL (ref 150–400)
PMV BLD AUTO: 12.1 FL (ref 8.9–12.9)
POTASSIUM SERPL-SCNC: 4 MMOL/L (ref 3.5–5.1)
POTASSIUM SERPL-SCNC: 4.3 MMOL/L (ref 3.5–5.1)
POTASSIUM UR-SCNC: 30 MMOL/L
PROT SERPL-MCNC: 7.6 G/DL (ref 6.4–8.2)
PROT UR STRIP-MCNC: 30 MG/DL
PROT UR-MCNC: 44 MG/DL (ref 0–11.9)
PROT/CREAT UR-RTO: 1.5
RBC # BLD AUTO: 3.11 M/UL (ref 4.1–5.7)
RBC #/AREA URNS HPF: ABNORMAL /HPF (ref 0–5)
RBC MORPH BLD: ABNORMAL
RBC MORPH BLD: ABNORMAL
SERVICE CMNT-IMP: ABNORMAL
SODIUM SERPL-SCNC: 128 MMOL/L (ref 136–145)
SODIUM UR-SCNC: 57 MMOL/L
SODIUM UR-SCNC: 73 MMOL/L
SP GR UR REFRACTOMETRY: 1.01 (ref 1–1.03)
TROPONIN I SERPL-MCNC: <0.05 NG/ML
UA: UC IF INDICATED,UAUC: ABNORMAL
UROBILINOGEN UR QL STRIP.AUTO: 0.2 EU/DL (ref 0.2–1)
UUN UR-MCNC: 363 MG/DL
WBC # BLD AUTO: 3.9 K/UL (ref 4.1–11.1)
WBC URNS QL MICRO: ABNORMAL /HPF (ref 0–4)

## 2020-02-14 PROCEDURE — 71046 X-RAY EXAM CHEST 2 VIEWS: CPT

## 2020-02-14 PROCEDURE — 94761 N-INVAS EAR/PLS OXIMETRY MLT: CPT

## 2020-02-14 PROCEDURE — 84156 ASSAY OF PROTEIN URINE: CPT

## 2020-02-14 PROCEDURE — 84132 ASSAY OF SERUM POTASSIUM: CPT

## 2020-02-14 PROCEDURE — 74011250637 HC RX REV CODE- 250/637: Performed by: INTERNAL MEDICINE

## 2020-02-14 PROCEDURE — 76770 US EXAM ABDO BACK WALL COMP: CPT

## 2020-02-14 PROCEDURE — 74011250636 HC RX REV CODE- 250/636: Performed by: INTERNAL MEDICINE

## 2020-02-14 PROCEDURE — 74011250637 HC RX REV CODE- 250/637: Performed by: HOSPITALIST

## 2020-02-14 PROCEDURE — 51798 US URINE CAPACITY MEASURE: CPT

## 2020-02-14 PROCEDURE — 65660000000 HC RM CCU STEPDOWN

## 2020-02-14 PROCEDURE — 83935 ASSAY OF URINE OSMOLALITY: CPT

## 2020-02-14 PROCEDURE — 99285 EMERGENCY DEPT VISIT HI MDM: CPT

## 2020-02-14 PROCEDURE — 36415 COLL VENOUS BLD VENIPUNCTURE: CPT

## 2020-02-14 PROCEDURE — 84300 ASSAY OF URINE SODIUM: CPT

## 2020-02-14 PROCEDURE — 74011000250 HC RX REV CODE- 250: Performed by: INTERNAL MEDICINE

## 2020-02-14 PROCEDURE — 82436 ASSAY OF URINE CHLORIDE: CPT

## 2020-02-14 PROCEDURE — 84484 ASSAY OF TROPONIN QUANT: CPT

## 2020-02-14 PROCEDURE — 84540 ASSAY OF URINE/UREA-N: CPT

## 2020-02-14 PROCEDURE — 82553 CREATINE MB FRACTION: CPT

## 2020-02-14 PROCEDURE — 84133 ASSAY OF URINE POTASSIUM: CPT

## 2020-02-14 PROCEDURE — 82962 GLUCOSE BLOOD TEST: CPT

## 2020-02-14 PROCEDURE — 82570 ASSAY OF URINE CREATININE: CPT

## 2020-02-14 PROCEDURE — P9047 ALBUMIN (HUMAN), 25%, 50ML: HCPCS | Performed by: INTERNAL MEDICINE

## 2020-02-14 PROCEDURE — 85025 COMPLETE CBC W/AUTO DIFF WBC: CPT

## 2020-02-14 PROCEDURE — 74011636637 HC RX REV CODE- 636/637: Performed by: INTERNAL MEDICINE

## 2020-02-14 PROCEDURE — 81001 URINALYSIS AUTO W/SCOPE: CPT

## 2020-02-14 PROCEDURE — 80053 COMPREHEN METABOLIC PANEL: CPT

## 2020-02-14 PROCEDURE — 82550 ASSAY OF CK (CPK): CPT

## 2020-02-14 RX ORDER — HEPARIN SODIUM 5000 [USP'U]/ML
5000 INJECTION, SOLUTION INTRAVENOUS; SUBCUTANEOUS EVERY 8 HOURS
Status: DISCONTINUED | OUTPATIENT
Start: 2020-02-14 | End: 2020-02-21 | Stop reason: HOSPADM

## 2020-02-14 RX ORDER — ZOLPIDEM TARTRATE 5 MG/1
10 TABLET ORAL
Status: DISCONTINUED | OUTPATIENT
Start: 2020-02-14 | End: 2020-02-21 | Stop reason: HOSPADM

## 2020-02-14 RX ORDER — INSULIN GLARGINE 100 [IU]/ML
20 INJECTION, SOLUTION SUBCUTANEOUS
Status: DISCONTINUED | OUTPATIENT
Start: 2020-02-14 | End: 2020-02-21 | Stop reason: HOSPADM

## 2020-02-14 RX ORDER — INSULIN LISPRO 100 [IU]/ML
INJECTION, SOLUTION INTRAVENOUS; SUBCUTANEOUS
Status: DISCONTINUED | OUTPATIENT
Start: 2020-02-14 | End: 2020-02-21 | Stop reason: HOSPADM

## 2020-02-14 RX ORDER — INSULIN GLARGINE 100 [IU]/ML
20 INJECTION, SOLUTION SUBCUTANEOUS
Status: DISCONTINUED | OUTPATIENT
Start: 2020-02-14 | End: 2020-02-18 | Stop reason: SDUPTHER

## 2020-02-14 RX ORDER — SODIUM CHLORIDE 0.9 % (FLUSH) 0.9 %
5-40 SYRINGE (ML) INJECTION EVERY 8 HOURS
Status: DISCONTINUED | OUTPATIENT
Start: 2020-02-14 | End: 2020-02-21 | Stop reason: HOSPADM

## 2020-02-14 RX ORDER — ACETAMINOPHEN 325 MG/1
650 TABLET ORAL
Status: DISCONTINUED | OUTPATIENT
Start: 2020-02-14 | End: 2020-02-21 | Stop reason: HOSPADM

## 2020-02-14 RX ORDER — DEXTROSE 50 % IN WATER (D50W) INTRAVENOUS SYRINGE
25-50 AS NEEDED
Status: DISCONTINUED | OUTPATIENT
Start: 2020-02-14 | End: 2020-02-21 | Stop reason: HOSPADM

## 2020-02-14 RX ORDER — TAMSULOSIN HYDROCHLORIDE 0.4 MG/1
0.8 CAPSULE ORAL DAILY
Status: DISCONTINUED | OUTPATIENT
Start: 2020-02-15 | End: 2020-02-21 | Stop reason: HOSPADM

## 2020-02-14 RX ORDER — MAGNESIUM SULFATE 100 %
4 CRYSTALS MISCELLANEOUS AS NEEDED
Status: DISCONTINUED | OUTPATIENT
Start: 2020-02-14 | End: 2020-02-21 | Stop reason: HOSPADM

## 2020-02-14 RX ORDER — CARVEDILOL 12.5 MG/1
12.5 TABLET ORAL 2 TIMES DAILY WITH MEALS
Status: DISCONTINUED | OUTPATIENT
Start: 2020-02-14 | End: 2020-02-18

## 2020-02-14 RX ORDER — TIMOLOL MALEATE 5 MG/ML
1 SOLUTION/ DROPS OPHTHALMIC 2 TIMES DAILY
Status: DISCONTINUED | OUTPATIENT
Start: 2020-02-14 | End: 2020-02-21 | Stop reason: HOSPADM

## 2020-02-14 RX ORDER — ONDANSETRON 2 MG/ML
4 INJECTION INTRAMUSCULAR; INTRAVENOUS
Status: DISCONTINUED | OUTPATIENT
Start: 2020-02-14 | End: 2020-02-21 | Stop reason: HOSPADM

## 2020-02-14 RX ORDER — SODIUM CHLORIDE 0.9 % (FLUSH) 0.9 %
5-40 SYRINGE (ML) INJECTION AS NEEDED
Status: DISCONTINUED | OUTPATIENT
Start: 2020-02-14 | End: 2020-02-21 | Stop reason: HOSPADM

## 2020-02-14 RX ORDER — PRAMIPEXOLE DIHYDROCHLORIDE 0.25 MG/1
0.5 TABLET ORAL
Status: DISCONTINUED | OUTPATIENT
Start: 2020-02-14 | End: 2020-02-21 | Stop reason: HOSPADM

## 2020-02-14 RX ORDER — ALBUMIN HUMAN 250 G/1000ML
25 SOLUTION INTRAVENOUS ONCE
Status: COMPLETED | OUTPATIENT
Start: 2020-02-15 | End: 2020-02-15

## 2020-02-14 RX ORDER — ISOSORBIDE MONONITRATE 30 MG/1
30 TABLET, EXTENDED RELEASE ORAL DAILY
Status: DISCONTINUED | OUTPATIENT
Start: 2020-02-15 | End: 2020-02-21 | Stop reason: HOSPADM

## 2020-02-14 RX ORDER — DIPHENHYDRAMINE HCL 25 MG
25 CAPSULE ORAL
Status: DISCONTINUED | OUTPATIENT
Start: 2020-02-14 | End: 2020-02-21 | Stop reason: HOSPADM

## 2020-02-14 RX ORDER — ALBUMIN HUMAN 250 G/1000ML
25 SOLUTION INTRAVENOUS ONCE
Status: COMPLETED | OUTPATIENT
Start: 2020-02-14 | End: 2020-02-14

## 2020-02-14 RX ORDER — BUMETANIDE 0.25 MG/ML
4 INJECTION INTRAMUSCULAR; INTRAVENOUS 3 TIMES DAILY
Status: DISCONTINUED | OUTPATIENT
Start: 2020-02-14 | End: 2020-02-17

## 2020-02-14 RX ADMIN — ALBUMIN (HUMAN) 25 G: 0.25 INJECTION, SOLUTION INTRAVENOUS at 17:26

## 2020-02-14 RX ADMIN — ZOLPIDEM TARTRATE 10 MG: 5 TABLET ORAL at 21:19

## 2020-02-14 RX ADMIN — TIMOLOL MALEATE 1 DROP: 5 SOLUTION/ DROPS OPHTHALMIC at 18:48

## 2020-02-14 RX ADMIN — Medication 10 ML: at 17:46

## 2020-02-14 RX ADMIN — HEPARIN SODIUM 5000 UNITS: 5000 INJECTION INTRAVENOUS; SUBCUTANEOUS at 21:21

## 2020-02-14 RX ADMIN — Medication 10 ML: at 21:20

## 2020-02-14 RX ADMIN — PRAMIPEXOLE DIHYDROCHLORIDE 0.5 MG: 0.25 TABLET ORAL at 21:19

## 2020-02-14 RX ADMIN — CARVEDILOL 12.5 MG: 12.5 TABLET, FILM COATED ORAL at 17:48

## 2020-02-14 RX ADMIN — INSULIN GLARGINE 20 UNITS: 100 INJECTION, SOLUTION SUBCUTANEOUS at 21:19

## 2020-02-14 RX ADMIN — BUMETANIDE 4 MG: 0.25 INJECTION INTRAMUSCULAR; INTRAVENOUS at 18:41

## 2020-02-14 NOTE — CONSULTS
CARDIOLOGY CONSULT Patient ID: 
Patient: Carey Campbell. MRN: [id number] Age: 68 y.o.  : 1950 Date of  Admission: 2020 12:06 PM  
PCP:  Jamal Ordonez MD  
Usual cardiologist:  Donna Sanders MD 
  
Assessment: 1. Acute on chronic combined systolic and diastolic heart failure with symptomatic volume overload including dyspnea at rest, abdominal fullness, lower extremity edema but nothing on CXR--a lot of R-sided sx. Biventricular failure with NYHA CHF class 4 symptoms acutely. 
  
Last 3 Recorded Weights in this Encounter 20 1157 Weight: 113.4 kg (250 lb) 2. Paroxysmal atrial fibrillation and atypical atrial flutter s/p ablation in 2018.  NO significant atrial fibrillation or flutter since the ablation. 3. Remote dual chamber SJM pacer for sick sinus syndrome, upgraded to biventricular pacemaker in 2019 due to CHF, cardiomyopathy, chronic V pacing. (Program change 2019--LV>RV to 50 msec from 20 msec. Tried to enable MPP using poles 3 and 4 but vectors using these did not capture at max output.) 4. Hypertensive heart disease with a history of congestive heart failure and CKD stage 4.  EF 55% on prior echo, then 35-40% early , EF 33% by echo in 2019. 
5. Coronary artery disease with multivessel interventions.  His last stenting was in . Cath in  showed a nondominant RCA with LPDA occluded and filled by collaterals, otherwise up to moderate CAD. 6. Mild idiopathic pericardial effusion in the past. 
7. Diabetes mellitus type 2, on chronic insulin. 8. Hyperlipidemia. 9. BRBPR in 2019 with grade 1 internal hemorrhoids noted on colo here.  Presumed source of GI bleed. 10. History of esophageal dilation for dysphagia. 11. Hiatal hernia. 12. Anemia of chronic renal disease.  On EPO (and Fe supplement). 13. Moderate thrombocytopenia.  
14. Chronic anticoagulation stopped 2019. 
 
  
Plan:  
  
 1. Discussed with renal consultant, Dr. Elizabeth English. Trial of IV bumetanide and metolazone this weekend, if no success, then move to dialysis. 2. Continue beta-blocker. 3. Not a candidate for ACEI or ARB or spironolactone. 4. Stopped Eliquis given severe anemia requiring transfusion in 2019. 
5. Not on antiplatelet therapy. I think he has biventricular failure. Echo ordered. I'll notify Dr. Warner Eller of his admission. I looked at the fluoroscopy of the LV lead placement and the lead sits in a low lateral CS branch with distal poles (which capture) in decent position. Don't think an LV lead repositioning would help much. No BIV pacemaker program changes today. Don't think I can narrow his QRS much more with programming, remains very wide with pacing. He has been progressively symptomatic for 3 weeks, told him he's got to get to or consider the hospital setting earlier if OP regimen is failing. If renal function were not an issue, I'd recommend a L heart cath. If it could be done carefully so as to not dislodge the LV lead, a R heart cath may be helpful.  
  
 [x]?        High complexity decision making was performed in this patient with multiple organ involvement. 
  
Royal Mike Shankar Jr. is a 68 y. o. male with a history of atrial arrhythmias s/p ablation 5/2018, has done well from an arrhythmia standpoint. Esau Broderick has had chronic issues with fluid retention. For 3 weeks he's reported progressive ANDERS and this really intensified the last couple of days. +orthopnea, PND. Today, he was dyspneic at rest and didn't respond to increasing his diuretic regimen at home. He has gained >20 lbs over 3 weeks. +dietary indiscretion (salt, fluid). No chest pain. No bryanna syncope. 
  
 
CATH from Dr. Warner Eller 2014: \"Left main trunk: Normal. 
  
Left anterior descending artery: This is a large artery. It is moderately 
calcified. There is diffuse atherosclerosis, areas of up to 30% narrowing with no significant obstructive disease. The diagonal branch is small to 
moderate sizes and is free of disease. The LAD wraps around the apex of the 
heart. 
  
The circumflex artery is dominant. This is a large artery with diffuse 
atherosclerotic changes and diffuse calcification. There is no significant 
obstructive disease in the AV circumflex artery. A high lateral branch 
shows 50% stenosis in its proximal segment. A marginal branch has about 50% 
narrowing in its proximal segment. The  posterior ventricular branch has 
diffuse moderate atherosclerosis. The PDA comes off the circ distally. It 
appeared to be occluded in its mid segment and the distal segment fills by 
collaterals from the other circumflex branches. 
  
The right coronary artery is nondominant. There is 70% stenosis in the 
proximal right coronary artery, in this nondominant vessel, and then more 
distally it is diffusely and severely diseased. 
  
Left ventricle: Ventricle contractility is very dynamic. All segments seem 
to contract well. Ejection fraction estimated to be 65%. 
  
CONCLUSIONS 1. Diffuse moderate coronary atherosclerosis as described. 2. Normal left ventricular systolic function. 
  
Based on the angiographic findings, there is no indication for intervention 
and I think this is best treated medically at this time. \" 
 
 
Past Medical History:  
Diagnosis Date  Anemia 8/5/2017  Anxiety 8/5/2017  Arrhythmia   
 atrial fibrillation 2014  ASCVD (arteriosclerotic cardiovascular disease) 8/5/2017  BPH (benign prostatic hyperplasia) 8/5/2017  CAD (coronary artery disease)   
 h/o stents  Cancer [facility])   
 h/o skin cancer  Cardiomyopathy ([address] 8/5/2017  CHF (congestive heart failure) ([address] 8/5/2017  Chronic kidney disease Stage IV  CKD (chronic kidney disease), stage IV ([address] 8/5/2017  Diabetes ([address]  Diabetes mellitus ([address] 8/5/2017  Diabetic neuropathy ([address] 8/5/2017  DJD (degenerative joint disease) 2017  ED (erectile dysfunction) 2017  Gout 2017  High cholesterol  Hyperlipidemia 2017  Hypertension  Hypertension with renal disease 2017  Hypothyroid 2017  Insomnia 2017  Obesity 2017  On statin therapy 2017  Pneumonia 2019  Restless leg 2017  Sleep apnea Bipap  Thyroid disease   
 hypothyroid  Ulcer of foot due to secondary diabetes ([address] 2019 Past Surgical History:  
Procedure Laterality Date  CARDIAC SURG PROCEDURE UNLIST    
 cardiac stents 3  
 CARDIAC SURG PROCEDURE UNLIST Ablation 2018 ED [facility]  COLONOSCOPY N/A 2016 COLONOSCOPY performed by Marily Wilhelm MD at [facility] ENDOSCOPY  COLONOSCOPY N/A 2019 COLONOSCOPY performed by Isabel Zuluaga MD at [facility] ENDOSCOPY  COLONOSCOPY,DIAGNOSTIC  2019  HX APPENDECTOMY  HX BUNIONECTOMY    
 and removal of 2 seasmoid bone of the great toe 2018  HX HEENT Bilateral Cataract surgery  HX HEENT Tonsils  HX HEENT Axe wound to the head  HX KNEE ARTHROSCOPY X 2  
 HX ORTHOPAEDIC    
 HX ORTHOPAEDIC    
 partial laminectomy  HX PACEMAKER    
 HX ROTATOR CUFF REPAIR    
 bilateral  
 [place] INSJ ELTRD CAR DACIA SYS ATTCH PREV PM/DFB PLS GEN N/A 2019 Lv Lead Placement To Previous Generator performed by Rosaura Daniels MD at [facility]ARDIAC CATH LAB Left side  [place] REMVL PERM PM PLS GEN W/REPL PLSE GEN MULT LEAD N/A 2019 Remove & Replace Ppm Gen Biv Multi Leads performed by Rosaura Daniels MD at [address] [place] [address] CATH LAB Social History Tobacco Use  Smoking status: Former Smoker Packs/day: 0.50 Years: 4.00 Pack years: 2.00 Last attempt to quit: 3/6/1972 Years since quittin.9  Smokeless tobacco: Never Used Substance Use Topics  Alcohol use: No  
  Comment: rare, 1 drink per year Family History Problem Relation Age of Onset  Heart Disease Mother  Kidney Disease Mother  Heart Disease Father  Kidney Disease Father  Cancer Sister Breast  
  
 
Allergies Allergen Reactions  Niacin Unknown (comments) Niaspan  Adhesive Rash  Imipenem Diarrhea Other reaction(s): Rash  Levemir [Insulin Detemir] Hives  Other Medication Other (comments) ? Allergy to Jaquan Decent causing chemical burn  Primaxin [Imipenem-Cilastatin] Diarrhea and Rash  Xarelto [Rivaroxaban] Rash and Itching Other reaction(s): Rash No current facility-administered medications for this encounter. Current Outpatient Medications Medication Sig  
 metOLazone (ZAROXOLYN) 2.5 mg tablet TAKE 1 TABLET BY MOUTH EVERY OTHER DAY AS DIRECTED  zolpidem (AMBIEN) 10 mg tablet Take 1 Tab by mouth nightly as needed for Sleep. Max Daily Amount: 10 mg. (Patient taking differently: Take 10 mg by mouth nightly as needed for Sleep. Indications: difficulty falling asleep)  insulin glargine (LANTUS SOLOSTAR U-100 INSULIN) 100 unit/mL (3 mL) inpn 10 Units by SubCUTAneous route nightly.  PASSION FLOWER PO Take 20 mL by mouth daily.  carvedilol (COREG) 12.5 mg tablet TAKE 1 TABLET BY MOUTH TWICE DAILY  pramipexole (MIRAPEX) 1 mg tablet TAKE 1 TABLET BY MOUTH EVERY NIGHT AT BEDTIME  tamsulosin (FLOMAX) 0.4 mg capsule TAKE 2 CAPSULES BY MOUTH EVERY DAY  ferrous sulfate (SLOW FE) 47.5 mg iron TbER tablet Take 1 Tab by mouth three (3) times daily.  bumetanide (BUMEX) 2 mg tablet Take 2 Tabs by mouth two (2) times a day.  febuxostat (ULORIC) 40 mg tab tablet Take 1 Tab by mouth daily. (Patient taking differently: Take 40 mg by mouth daily as needed.)  isosorbide mononitrate ER (IMDUR) 30 mg tablet Take 1 Tab by mouth daily.  timolol (TIMOPTIC) 0.5 % ophthalmic solution Administer 1 Drop to right eye two (2) times a day.  finasteride (PROSCAR) 5 mg tablet TAKE 1 TABLET BY MOUTH DAILY  potassium chloride (KLOR-CON) 20 mEq pack One packet three times daily  ascorbic acid, vitamin C, (VITAMIN C) 250 mg tablet Take 1 Tab by mouth three (3) times daily.  OTHER Apply  to affected area daily as needed (Knee Pain). CBD Cream:  Apply daily as needed for knee pain  Liraglutide (VICTOZA) 0.6 mg/0.1 mL (18 mg/3 mL) sub-q pen 0.6 mg by SubCUTAneous route nightly. Review of Symptoms: 
General: +weakness and weight gain, negative for fever, chills, sweats, weight loss Eyes: negative for blurred vision, eye pain, loss of vision, diplopia Ear Nose and Throat: negative for rhinorrhea, pharyngitis, otalgia, tinnitus, speech or swallowing difficulties Respiratory: +dyspnea, negative for cough, sputum production, wheezing, pleuritic pain  
Cardiology: negative for chest pain, palpitations, syncope Gastrointestinal: +constipation and abdominal fullness, negative for N/V, dysphagia, bleeding Genitourinary: negative for frequency, urgency, dysuria, gross hematuria Muskuloskeletal : negative for arthralgia, myalgia Hematology: negative for easy bruising, bleeding, lymphadenopathy Dermatological: negative for rash, ulceration, mole change, new lesion Endocrine: negative for hot flashes or polydipsia Neurological: negative for headache, dizziness, confusion, focal weakness, paresthesia, memory loss, gait disturbance Psychological: negative for anxiety, depression, agitation Objective:  
  
Physical Exam: 
Temp (24hrs), Av.2 °F (36.2 °C), Min:96.2 °F (35.7 °C), Max:98.2 °F (36.8 °C) Patient Vitals for the past 8 hrs: 
 Pulse 20 1300 70  
20 1249 71  
20 1157 69 Patient Vitals for the past 8 hrs: 
 Resp  
20 1300 20  
20 1249 15  
20 1157 24 Patient Vitals for the past 8 hrs: 
 BP  
20 1300 134/76  
20 1248 135/81  
20 1157 139/72 No intake or output data in the 24 hours ending 02/14/20 1616 Nondiaphoretic, not in acute distress. Supple, no palpable thyromegaly. No scleral icterus, mucous membranes moist, conjuctivae pink, no xanthelasma. Unlabored, clear to auscultation bilaterally anteriorly, symmetric air movement. Regular rate and rhythm, +murmur, no pericardial rub, knock, or gallop. +JVD and +peripheral edema. Palpable radial pulses bilaterally. Abdomen obese, soft, nontender, nondistended. Extremities without cyanosis or clubbing. Muscle tone and bulk normal. 
Skin warm and dry. Bilateral erythema of the lower extremities 2/3 up toward the knee, no ulcers. Neuro grossly nonfocal.  No tremor. Awake and appropriate. CARDIOGRAPHICS and STUDIES, I reviewed: 
 
Telemetry:   AV pacing. ECG:   Not yet done. Echo:  PENDING. CXR:  No edema. Labs: 
Recent Labs  
  02/14/20 
1241 CKMB 2.7 CKNDX 0.5  
TROIQ <0.05 Lab Results Component Value Date/Time Cholesterol, total 104 01/06/2020 03:22 PM  
 HDL Cholesterol 14 (L) 01/06/2020 03:22 PM  
 LDL, calculated 67 01/06/2020 03:22 PM  
 Triglyceride 115 01/06/2020 03:22 PM  
 CHOL/HDL Ratio 7 (H) 01/06/2020 03:22 PM  
 
No results for input(s): INR, PTP, APTT, INREXT in the last 72 hours. Recent Labs  
  02/14/20 
1241 * K 4.3 CL 85* CO2 34* BUN 94* CREA 3.04* * CA 9.1 ALB 3.2* WBC 3.9* HGB 9.5* HCT 30.2* PLT 93* Recent Labs  
  02/14/20 
1241 SGOT 27  TP 7.6 ALB 3.2*  
GLOB 4.4* No components found for: [place] No results for input(s): PH, PCO2, PO2 in the last 72 hours. Brian Massey MD 
2/14/2020

## 2020-02-14 NOTE — ED PROVIDER NOTES
EMERGENCY DEPARTMENT HISTORY AND PHYSICAL EXAM 
 
 
Date: 2/14/2020 Patient Name: Carlos Mann. Please note that this dictation was completed with [employer], the computer voice recognition software. Quite often unanticipated grammatical, syntax, homophones, and other interpretive errors are inadvertently transcribed by the computer software. Please disregard these errors. Please excuse any errors that have escaped final proofreading. History of Presenting Illness Chief Complaint Patient presents with  Shortness of Breath  
  onset an hour ago severe shortness of breath pt has work of breathing increased in triage. reports has congestive hear failure History Provided By: Patient HPI: Royal Avila April., 68 y.o. male, with past medical history significant for ischemic cardiomyopathy with an EF of 35% status post pacemaker placement, chronic kidney disease stage IV. He takes 4 mg of Bumex twice daily and takes metolazone twice a week. He reports increasing weight gain, 25 pound weight gain over the last several weeks. Reports bilateral lower extremity edema, reports dyspnea on exertion, no orthopnea. No PND. Reports general fatigue, malaise. No fevers or chills. No nausea or vomiting. PCP: Delmi Emerson MD 
 
No current facility-administered medications on file prior to encounter. Current Outpatient Medications on File Prior to Encounter Medication Sig Dispense Refill  metOLazone (ZAROXOLYN) 2.5 mg tablet TAKE 1 TABLET BY MOUTH EVERY OTHER DAY AS DIRECTED 45 Tab 2  
 zolpidem (AMBIEN) 10 mg tablet Take 1 Tab by mouth nightly as needed for Sleep. Max Daily Amount: 10 mg. (Patient taking differently: Take 10 mg by mouth nightly as needed for Sleep. Indications: difficulty falling asleep) 30 Tab 5  
 insulin glargine (LANTUS SOLOSTAR U-100 INSULIN) 100 unit/mL (3 mL) inpn 10 Units by SubCUTAneous route nightly.  PASSION FLOWER PO Take 20 mL by mouth daily.  carvedilol (COREG) 12.5 mg tablet TAKE 1 TABLET BY MOUTH TWICE DAILY 60 Tab 11  
 pramipexole (MIRAPEX) 1 mg tablet TAKE 1 TABLET BY MOUTH EVERY NIGHT AT BEDTIME 30 Tab prn  tamsulosin (FLOMAX) 0.4 mg capsule TAKE 2 CAPSULES BY MOUTH EVERY  Cap 3  ferrous sulfate (SLOW FE) 47.5 mg iron TbER tablet Take 1 Tab by mouth three (3) times daily. 90 Tab 12  
 bumetanide (BUMEX) 2 mg tablet Take 2 Tabs by mouth two (2) times a day. 120 Tab 12  
 febuxostat (ULORIC) 40 mg tab tablet Take 1 Tab by mouth daily. (Patient taking differently: Take 40 mg by mouth daily as needed.) 30 Tab 0  
 isosorbide mononitrate ER (IMDUR) 30 mg tablet Take 1 Tab by mouth daily. 30 Tab 12  timolol (TIMOPTIC) 0.5 % ophthalmic solution Administer 1 Drop to right eye two (2) times a day.  finasteride (PROSCAR) 5 mg tablet TAKE 1 TABLET BY MOUTH DAILY 90 Tab 3  potassium chloride (KLOR-CON) 20 mEq pack One packet three times daily 90 Packet prn  ascorbic acid, vitamin C, (VITAMIN C) 250 mg tablet Take 1 Tab by mouth three (3) times daily. 100 Tab prn  
 OTHER Apply  to affected area daily as needed (Knee Pain). CBD Cream:  Apply daily as needed for knee pain  Liraglutide (VICTOZA) 0.6 mg/0.1 mL (18 mg/3 mL) sub-q pen 0.6 mg by SubCUTAneous route nightly. Past History Past Medical History: 
Past Medical History:  
Diagnosis Date  Anemia 8/5/2017  Anxiety 8/5/2017  Arrhythmia   
 atrial fibrillation 2014  ASCVD (arteriosclerotic cardiovascular disease) 8/5/2017  BPH (benign prostatic hyperplasia) 8/5/2017  CAD (coronary artery disease)   
 h/o stents  Cancer [facility])   
 h/o skin cancer  Cardiomyopathy ([facility] [address] 8/5/2017  CHF (congestive heart failure) ([facility] [address] 8/5/2017  Chronic kidney disease Stage IV  CKD (chronic kidney disease), stage IV ([facility] [address] 8/5/2017  Diabetes ([facility] [address]  Diabetes mellitus ([facility] [address] 8/5/2017  Diabetic neuropathy [facility] 8/5/2017  DJD (degenerative joint disease) 8/5/2017  ED (erectile dysfunction) 8/5/2017  Gout 8/5/2017  High cholesterol  Hyperlipidemia 8/5/2017  Hypertension  Hypertension with renal disease 8/5/2017  Hypothyroid 8/5/2017  Insomnia 8/5/2017  Obesity 8/5/2017  On statin therapy 8/5/2017  Pneumonia 05/28/2019  Restless leg 8/5/2017  Sleep apnea Bipap  Thyroid disease   
 hypothyroid  Ulcer of foot due to secondary diabetes ([address] 07/12/2019 Past Surgical History: 
Past Surgical History:  
Procedure Laterality Date  CARDIAC SURG PROCEDURE UNLIST    
 cardiac stents 3  
 CARDIAC SURG PROCEDURE UNLIST Ablation 5/17/2018 [id number] [facility]  COLONOSCOPY N/A 6/28/2016 COLONOSCOPY performed by Margaret Glez MD at [facility] ENDOSCOPY  COLONOSCOPY N/A 1/9/2019 COLONOSCOPY performed by Marlynn Gosselin, MD at [facility] ENDOSCOPY  COLONOSCOPY,DIAGNOSTIC  1/9/2019  HX APPENDECTOMY  HX BUNIONECTOMY    
 and removal of 2 seasmoid bone of the great toe 2/2018  HX HEENT Bilateral Cataract surgery  HX HEENT Tonsils  HX HEENT Axe wound to the head  HX KNEE ARTHROSCOPY X 2  
 HX ORTHOPAEDIC    
 HX ORTHOPAEDIC    
 partial laminectomy  HX PACEMAKER    
 HX ROTATOR CUFF REPAIR    
 bilateral  
 MS INSJ ELTRD CAR DACIA SYS ATTCH PREV PM/DFB PLS GEN N/A 1/28/2019 Lv Lead Placement To Previous Generator performed by Anuel Bunn MD at [facility] CARDIAC CATH LAB Left side  MS REMVL PERM PM PLS GEN W/REPL PLSE GEN MULT LEAD N/A 1/28/2019 Remove & Replace Ppm Gen Biv Multi Leads performed by Anuel Bunn MD at [id number] [place] [address] CATH LAB Family History: 
Family History Problem Relation Age of Onset  Heart Disease Mother  Kidney Disease Mother  Heart Disease Father  Kidney Disease Father  Cancer Sister Breast  
 
 
Social History: 
Social History Tobacco Use  
  Smoking status: Former Smoker Packs/day: 0.50 Years: 4.00 Pack years: 2.00 Last attempt to quit: 3/6/1972 Years since quittin.9  Smokeless tobacco: Never Used Substance Use Topics  Alcohol use: No  
  Comment: rare, 1 drink per year  Drug use: No  
 
 
Allergies: Allergies Allergen Reactions  Niacin Unknown (comments) Niaspan  Adhesive Rash  Imipenem Diarrhea Other reaction(s): Rash  Levemir [Insulin Detemir] Hives  Other Medication Other (comments) ? Allergy to Lella Colleton causing chemical burn  Primaxin [Imipenem-Cilastatin] Diarrhea and Rash  Xarelto [Rivaroxaban] Rash and Itching Other reaction(s): Rash Review of Systems Review of Systems Constitutional: Positive for fatigue. Negative for chills and fever. HENT: Negative for congestion and sore throat. Eyes: Negative for visual disturbance. Respiratory: Positive for shortness of breath. Negative for cough. Cardiovascular: Positive for leg swelling. Negative for chest pain. Gastrointestinal: Negative for abdominal pain, blood in stool, diarrhea, nausea and vomiting. Endocrine: Negative for polyuria. Genitourinary: Negative for dysuria and testicular pain. Musculoskeletal: Negative for arthralgias, joint swelling and myalgias. Skin: Negative for rash. Allergic/Immunologic: Negative for immunocompromised state. Neurological: Positive for weakness. Negative for headaches. Hematological: Does not bruise/bleed easily. Psychiatric/Behavioral: Negative for confusion. Physical Exam  
Physical Exam 
Vitals signs and nursing note reviewed. Constitutional:   
   Appearance: He is well-developed. HENT:  
   Head: Normocephalic and atraumatic. Eyes:  
   General:     
   Right eye: No discharge. Left eye: No discharge. Conjunctiva/sclera: Conjunctivae normal.  
   Pupils: Pupils are equal, round, and reactive to light. Neck: Musculoskeletal: Normal range of motion and neck supple. Trachea: No tracheal deviation. Cardiovascular:  
   Rate and Rhythm: Normal rate and regular rhythm. Heart sounds: Normal heart sounds. No murmur. Pulmonary:  
   Effort: Pulmonary effort is normal. No respiratory distress. Breath sounds: Normal breath sounds. No wheezing or rales. Chest:  
   Comments: Pacemaker left chest wall Abdominal:  
   General: Bowel sounds are normal.  
   Palpations: Abdomen is soft. Tenderness: There is no abdominal tenderness. There is no guarding or rebound. Musculoskeletal: Normal range of motion. General: No tenderness or deformity. Right lower leg: Edema present. Left lower leg: Edema present. Comments: Pitting edema in the bilateral lower extremities, chronic venous stasis dermatitis of the bilateral lower extremities. Skin: 
   General: Skin is warm and dry. Findings: No erythema or rash. Neurological:  
   Mental Status: He is alert and oriented to person, place, and time. Psychiatric:     
   Behavior: Behavior normal.  
 
 
 
Diagnostic Study Results Labs - Recent Results (from the past 12 hour(s)) CBC WITH AUTOMATED DIFF Collection Time: 02/14/20 12:41 PM  
Result Value Ref Range WBC 3.9 (L) 4.1 - 11.1 K/uL  
 RBC 3.11 (L) 4.10 - 5.70 M/uL HGB 9.5 (L) 12.1 - 17.0 g/dL HCT 30.2 (L) 36.6 - 50.3 % MCV 97.1 80.0 - 99.0 FL  
 MCH 30.5 26.0 - 34.0 PG  
 MCHC 31.5 30.0 - 36.5 g/dL  
 RDW 17.4 (H) 11.5 - 14.5 % PLATELET 93 (L) 015 - 400 K/uL MPV 12.1 8.9 - 12.9 FL  
 NRBC 0.0 0  WBC ABSOLUTE NRBC 0.00 0.00 - 0.01 K/uL NEUTROPHILS PENDING % LYMPHOCYTES PENDING % MONOCYTES PENDING % EOSINOPHILS PENDING % BASOPHILS PENDING % IMMATURE GRANULOCYTES PENDING %  
 ABS. NEUTROPHILS PENDING K/UL  
 ABS. LYMPHOCYTES PENDING K/UL  
 ABS. MONOCYTES PENDING K/UL  
 ABS. EOSINOPHILS PENDING K/UL ABS. BASOPHILS PENDING K/UL  
 ABS. IMM. GRANS. PENDING K/UL  
 DF PENDING Radiologic Studies -  
XR CHEST PA LAT Final Result  
 impression: No acute changes. CT Results  (Last 48 hours) None CXR Results  (Last 48 hours) 02/14/20 1227  XR CHEST PA LAT Final result Impression:   impression: No acute changes. Narrative:  Clinical indication: Severe shortness of breath. Frontal and lateral views of the chest obtained, comparison June 7, 2019. Inspiration is shallow. Cardiac pacemaker, no acute infiltrate. Medical Decision Making I am the first provider for this patient. I reviewed the vital signs, available nursing notes, past medical history, past surgical history, family history and social history. Vital Signs-Reviewed the patient's vital signs. Patient Vitals for the past 12 hrs: 
 Temp Pulse Resp BP SpO2  
02/14/20 1300 98.2 °F (36.8 °C) 70 20 134/76 99 % 02/14/20 1252     100 % 02/14/20 1249  71 15  96 % 02/14/20 1248    135/81   
02/14/20 1157 96.2 °F (35.7 °C) 69 24 139/72 100 % EKG interpretation: (Preliminary) EKG shows AV paced rhythm. Rate 70. Records Reviewed: Nursing Notes and Old Medical Records Provider Notes (Medical Decision Making):  
Patient here with weight gain, been using diuretics with no relief. His electrolytes are abnormal.  He is hypochloremic, hyponatremic. He appears total body volume up, but intravascularly depleted. Given his electrolyte abnormalities I have concerns about further diuresis, I do have concerns about his increasing creatinine and BUN, will discuss with nephrology, will need to be admitted. With his EF of 30% I would not want to give this patient a lot of fluids at this point as that could send him into pulmonary edema. He likely needs a repeat echo to assure that his cardiac function has not significantly worsened. ED Course: Initial assessment performed. The patients presenting problems have been discussed, and they are in agreement with the care plan formulated and outlined with them. I have encouraged them to ask questions as they arise throughout their visit. ED Course as of Feb 14 1413 Fri Feb 14, 2020  
1410 Discussed with Dr. Danielle Figueredo, nephrology. He is recommending IV fluids and no more diuretic. He will come and evaluate the patient after clinic. Recommended normal saline at 100 an hour.  
 [AR] ED Course User Index [AR] Papi Yoon DO  
 
 
 
Critical Care Time:  
none Disposition: 
Patient is being admitted to the hospital by Dr. Danica Vega. The results of their tests and reasons for their admission have been discussed with them and/or available family. They convey agreement and understanding for the need to be admitted and for their admission diagnosis. Consultation has been made with the inpatient physician specialist for hospitalization. Diagnosis Clinical Impression: 1. Acute on chronic congestive heart failure, unspecified heart failure type ([address] 2. Hyponatremia 3. Hypochloremia Attestations:  
This note was completed by Katia Gotti DO

## 2020-02-14 NOTE — ED NOTES
TRANSFER - OUT REPORT: 
 
Verbal report given to Agustín Bunn on [address]  being transferred to [facility] for routine progression of care Report consisted of patients Situation, Background, Assessment and  
Recommendations(SBAR). Information from the following report(s) SBAR, ED Summary, Intake/Output, MAR, Recent Results and Cardiac Rhythm paced at 70 was reviewed with the receiving nurse. Lines:    
 
Opportunity for questions and clarification was provided. Patient transported with: 
 [employer]

## 2020-02-14 NOTE — H&P
Hospitalist Admission Note NAME: Krystyna Box. :  [id number] MRN:  [id number] Date/Time:  2020 3:23 PM 
 
Patient PCP: Soham Gann MD 
______________________________________________________________________ Given the patient's current clinical presentation, I have a high level of concern for decompensation if discharged from the emergency department. Complex decision making was performed, which includes reviewing the patient's available past medical records, laboratory results, and x-ray films. My assessment of this patient's clinical condition and my plan of care is as follows. Assessment / Plan: 
JOSE ANTONIO, likely cardiorenal synd 
-less responsive to OP diuretic regimen. Had been responding to metolazone prn which hasn't been as effective recently 
-admit to tele 
-cont IV diuresis w bumex 
-nephrology consult 
-may need HD this admission pending course, patient willing to start if needed 
-renal US pending PAF 
-cont carvedilol 
-not on [facility], can discuss with OP cardiologist 
-s/p ablation 2018 Ischemic cardiomyopathy SSS 
CAD s/p multiple stentings -s/p pacemaker 
-cont DM2 
-complicated by neuropathy 
-cont lantus 
-start SSI with POCs Hx of severe anemia requiring transfusion 
-not on antiplatelet Tx 
-stable, monitor cbc Code Status: Full Surrogate Decision Maker: DVT Prophylaxis: heparin GI Prophylaxis: not indicated Baseline: independent ADLs, functional  
  
Subjective: CHIEF COMPLAINT: weight gain, SOB HISTORY OF PRESENT ILLNESS:    
Radha Baez is a very pleasant 77-year-old gentleman past medical history of ischemic cardiomyopathy, stage IV chronic kidney disease, CHF, paroxysmal atrial fibrillation, and hypertension who presents to the ED today with complaint of progressive shortness of breath and a very significant weight gain of approximately 25lbs over the last 3 weeks.  He is on an outpatient diuretic regimen of bumex 2mg twice daily and prn metolazone every other day. Previously when he was retaining fluid, the metolazone had been very effective but he notes that he has gained weight and had diminished urine output over the last few weeks despite the metolazone. We were asked to admit for work up and evaluation of the above problems. Past Medical History:  
Diagnosis Date  Anemia 8/5/2017  Anxiety 8/5/2017  Arrhythmia   
 atrial fibrillation 2014  ASCVD (arteriosclerotic cardiovascular disease) 8/5/2017  BPH (benign prostatic hyperplasia) 8/5/2017  CAD (coronary artery disease)   
 h/o stents  Cancer [facility])   
 h/o skin cancer  Cardiomyopathy ([address] 8/5/2017  CHF (congestive heart failure) ([address] 8/5/2017  Chronic kidney disease Stage IV  CKD (chronic kidney disease), stage IV ([address] 8/5/2017  Diabetes ([address]  Diabetes mellitus ([address] 8/5/2017  Diabetic neuropathy ([address] 8/5/2017  DJD (degenerative joint disease) 8/5/2017  ED (erectile dysfunction) 8/5/2017  Gout 8/5/2017  High cholesterol  Hyperlipidemia 8/5/2017  Hypertension  Hypertension with renal disease 8/5/2017  Hypothyroid 8/5/2017  Insomnia 8/5/2017  Obesity 8/5/2017  On statin therapy 8/5/2017  Pneumonia 05/28/2019  Restless leg 8/5/2017  Sleep apnea Bipap  Thyroid disease   
 hypothyroid  Ulcer of foot due to secondary diabetes ([address] 07/12/2019 Past Surgical History:  
Procedure Laterality Date  CARDIAC SURG PROCEDURE UNLIST    
 cardiac stents 3  
 CARDIAC SURG PROCEDURE UNLIST Ablation 5/17/2018 [facility]  COLONOSCOPY N/A 6/28/2016 COLONOSCOPY performed by Bonny Hinson MD at [facility] ENDOSCOPY  COLONOSCOPY N/A 1/9/2019 COLONOSCOPY performed by Óscar Bar MD at [facility] ENDOSCOPY  COLONOSCOPY,DIAGNOSTIC  1/9/2019  HX APPENDECTOMY  HX BUNIONECTOMY and removal of 2 seasmoid bone of the great toe 2018  HX HEENT Bilateral Cataract surgery  HX HEENT Tonsils  HX HEENT Axe wound to the head  HX KNEE ARTHROSCOPY X 2  
 HX ORTHOPAEDIC    
 HX ORTHOPAEDIC    
 partial laminectomy  HX PACEMAKER    
 HX ROTATOR CUFF REPAIR    
 bilateral  
 VT INSJ ELTRD CAR DACIA SYS ATTCH PREV PM/DFB PLS GEN N/A 2019 Lv Lead Placement To Previous Generator performed by Vish Ramsay MD at [facility] CARD[facility]AC CATH LAB Left side  VT REMVL PERM PM PLS GEN W/REPL PLSE GEN MULT LEAD N/A 2019 Remove & Replace Ppm Gen Biv Multi Leads performed by Vish Ramsay MD at [id number] CATH LAB Social History Tobacco Use  Smoking status: Former Smoker Packs/day: 0.50 Years: 4.00 Pack years: 2.00 Last attempt to quit: 3/6/1972 Years since quittin.9  Smokeless tobacco: Never Used Substance Use Topics  Alcohol use: No  
  Comment: rare, 1 drink per year Family History Problem Relation Age of Onset  Heart Disease Mother  Kidney Disease Mother  Heart Disease Father  Kidney Disease Father  Cancer Sister Breast  
 
Allergies Allergen Reactions  Niacin Unknown (comments) Niaspan  Adhesive Rash  Imipenem Diarrhea Other reaction(s): Rash  Levemir [Insulin Detemir] Hives  Other Medication Other (comments) ? Allergy to Eldamohit Knox causing chemical burn  Primaxin [Imipenem-Cilastatin] Diarrhea and Rash  Xarelto [Rivaroxaban] Rash and Itching Other reaction(s): Rash Prior to Admission medications Medication Sig Start Date End Date Taking? Authorizing Provider  
metOLazone (ZAROXOLYN) 2.5 mg tablet TAKE 1 TABLET BY MOUTH EVERY OTHER DAY AS DIRECTED 2/10/20   Kathy Muhammad MD  
zolpidem (AMBIEN) 10 mg tablet Take 1 Tab by mouth nightly as needed for Sleep. Max Daily Amount: 10 mg. Patient taking differently: Take 10 mg by mouth nightly as needed for Sleep. Indications: difficulty falling asleep 1/7/20   Kathy Muhammad MD  
insulin glargine (LANTUS SOLOSTAR U-100 INSULIN) 100 unit/mL (3 mL) inpn 10 Units by SubCUTAneous route nightly. Provider, Historical  
PASSION FLOWER PO Take 20 mL by mouth daily. Provider, Historical  
carvedilol (COREG) 12.5 mg tablet TAKE 1 TABLET BY MOUTH TWICE DAILY 12/19/19   Kathy Muhammad MD  
pramipexole (MIRAPEX) 1 mg tablet TAKE 1 TABLET BY MOUTH EVERY NIGHT AT BEDTIME 11/6/19   Kathy Muhammad MD  
tamsulosin [facility]) 0.4 mg capsule TAKE 2 CAPSULES BY MOUTH EVERY DAY 8/29/19   Kathy Muhammad MD  
ferrous sulfate (SLOW FE) 47.5 mg iron TbER tablet Take 1 Tab by mouth three (3) times daily. 6/13/19   Kathy Muhammad MD  
bumetanide (BUMEX) 2 mg tablet Take 2 Tabs by mouth two (2) times a day. 6/5/19   Parish Francois MD  
febuxostat (ULORIC) 40 mg tab tablet Take 1 Tab by mouth daily. Patient taking differently: Take 40 mg by mouth daily as needed. 6/6/19   Parish Francois MD  
isosorbide mononitrate ER (IMDUR) 30 mg tablet Take 1 Tab by mouth daily. 6/6/19   Amnita Last MD  
timolol (TIMOPTIC) 0.5 % ophthalmic solution Administer 1 Drop to right eye two (2) times a day. Provider, Historical  
finasteride (PROSCAR) 5 mg tablet TAKE 1 TABLET BY MOUTH DAILY 4/26/19   Kathy Muhammad MD  
potassium chloride (KLOR-CON) 20 mEq pack One packet three times daily 4/4/19   Kathy Muhammad MD  
ascorbic acid, vitamin C, (VITAMIN C) 250 mg tablet Take 1 Tab by mouth three (3) times daily. 1/10/19   Kathy Muhammad MD  
OTHER Apply  to affected area daily as needed (Knee Pain). CBD Cream:  Apply daily as needed for knee pain    Provider, Historical  
Liraglutide (VICTOZA) 0.6 mg/0.1 mL (18 mg/3 mL) sub-q pen 0.6 mg by SubCUTAneous route nightly.     Provider, Historical  
 
 
REVIEW OF SYSTEMS:    
 I am not able to complete the review of systems because: The patient is intubated and sedated The patient has altered mental status due to his acute medical problems The patient has baseline aphasia from prior stroke(s) The patient has baseline dementia and is not reliable historian The patient is in acute medical distress and unable to provide information Total of 12 systems reviewed as follows:   
   POSITIVE= underlined text  Negative = text not underlined General:  fever, chills, sweats, generalized weakness, weight gain,  
   loss of appetite Eyes:    blurred vision, eye pain, loss of vision, double vision ENT:    rhinorrhea, pharyngitis Respiratory:   cough, sputum production, SOB, ANDERS, wheezing, pleuritic pain  
Cardiology:   chest pain, palpitations, orthopnea, PND, edema, syncope Gastrointestinal:  abdominal pain , N/V, diarrhea, dysphagia, constipation, bleeding Genitourinary:  frequency, urgency, dysuria, hematuria, incontinence Muskuloskeletal :  arthralgia, myalgia, back pain Hematology:  easy bruising, nose or gum bleeding, lymphadenopathy Dermatological: rash, ulceration, pruritis, color change / jaundice Endocrine:   hot flashes or polydipsia Neurological:  headache, dizziness, confusion, focal weakness, paresthesia, Speech difficulties, memory loss, gait difficulty Psychological: Feelings of anxiety, depression, agitation Objective: VITALS:   
Visit Vitals /76 Pulse 70 Temp 98.2 °F (36.8 °C) Resp 20 Ht 6' 4\" (1.93 m) Wt 113.4 kg (250 lb) SpO2 99% BMI 30.43 kg/m² PHYSICAL EXAM: 
 
General:    Alert, cooperative, no distress, appears stated age. HEENT: Atraumatic, anicteric sclerae, pink conjunctivae No oral ulcers, mucosa moist, throat clear, dentition fair Neck:  Supple, symmetrical,  thyroid: non tender Lungs:   Clear to auscultation bilaterally. No Wheezing or Rhonchi. No rales. Chest wall:  No tenderness  No Accessory muscle use. Heart:   Regular  rhythm,  No  murmur   +mild LE edema involving thighgs Abdomen:   Soft, non-tender. Not distended. Bowel sounds normal 
Extremities: No cyanosis. No clubbing,   
  Skin turgor normal, Capillary refill normal, Radial dial pulse 2+ Skin:     Not pale. Not Jaundiced  No rashes Psych:  Good insight. Not depressed. Not anxious or agitated. Neurologic: EOMs intact. No facial asymmetry. No aphasia or slurred speech. Symmetrical strength, Sensation grossly intact. Alert and oriented X 4.  
 
_______________________________________________________________________ Care Plan discussed with: 
  Comments Patient x Family  x   
RN x Care Manager Consultant:     
_______________________________________________________________________ Expected  Disposition:  
Home with Family x HH/PT/OT/RN   
SNF/LTC   
TIA   
________________________________________________________________________ TOTAL TIME:  65 Minutes Critical Care Provided     Minutes non procedure based Comments  
 x Reviewed previous records  
>50% of visit spent in counseling and coordination of care  Discussion with patient and/or family and questions answered 
  
 
________________________________________________________________________ Signed: Brett Chu, DO 
 
Procedures: see electronic medical records for all procedures/Xrays and details which were not copied into this note but were reviewed prior to creation of Plan. LAB DATA REVIEWED:   
Recent Results (from the past 24 hour(s)) CBC WITH AUTOMATED DIFF Collection Time: 02/14/20 12:41 PM  
Result Value Ref Range WBC 3.9 (L) 4.1 - 11.1 K/uL  
 RBC 3.11 (L) 4.10 - 5.70 M/uL HGB 9.5 (L) 12.1 - 17.0 g/dL HCT 30.2 (L) 36.6 - 50.3 % MCV 97.1 80.0 - 99.0 FL  
 MCH 30.5 26.0 - 34.0 PG  
 MCHC 31.5 30.0 - 36.5 g/dL  
 RDW 17.4 (H) 11.5 - 14.5 % PLATELET 93 (L) 997 - 400 K/uL MPV 12.1 8.9 - 12.9 FL  
 NRBC 0.0 0  WBC ABSOLUTE NRBC 0.00 0.00 - 0.01 K/uL NEUTROPHILS 71 32 - 75 % BAND NEUTROPHILS 1 % LYMPHOCYTES 11 (L) 12 - 49 % MONOCYTES 6 5 - 13 % EOSINOPHILS 11 (H) 0 - 7 % BASOPHILS 0 0 - 1 % IMMATURE GRANULOCYTES 0 0.0 - 0.5 % ABS. NEUTROPHILS 2.9 1.8 - 8.0 K/UL  
 ABS. LYMPHOCYTES 0.4 (L) 0.8 - 3.5 K/UL  
 ABS. MONOCYTES 0.2 0.0 - 1.0 K/UL  
 ABS. EOSINOPHILS 0.4 0.0 - 0.4 K/UL  
 ABS. BASOPHILS 0.0 0.0 - 0.1 K/UL  
 ABS. IMM. GRANS. 0.0 0.00 - 0.04 K/UL  
 DF MANUAL    
 RBC COMMENTS ANISOCYTOSIS 1+ 
    
 RBC COMMENTS MACROCYTOSIS 
1+ METABOLIC PANEL, COMPREHENSIVE Collection Time: 02/14/20 12:41 PM  
Result Value Ref Range Sodium 128 (L) 136 - 145 mmol/L Potassium 4.3 3.5 - 5.1 mmol/L Chloride 85 (L) 97 - 108 mmol/L  
 CO2 34 (H) 21 - 32 mmol/L Anion gap 9 5 - 15 mmol/L Glucose 167 (H) 65 - 100 mg/dL BUN 94 (H) 6 - 20 MG/DL Creatinine 3.04 (H) 0.70 - 1.30 MG/DL  
 BUN/Creatinine ratio 31 (H) 12 - 20 GFR est AA 25 (L) >60 ml/min/1.73m2 GFR est non-AA 20 (L) >60 ml/min/1.73m2 Calcium 9.1 8.5 - 10.1 MG/DL Bilirubin, total 1.0 0.2 - 1.0 MG/DL  
 ALT (SGPT) 14 12 - 78 U/L  
 AST (SGOT) 27 15 - 37 U/L Alk. phosphatase 108 45 - 117 U/L Protein, total 7.6 6.4 - 8.2 g/dL Albumin 3.2 (L) 3.5 - 5.0 g/dL Globulin 4.4 (H) 2.0 - 4.0 g/dL A-G Ratio 0.7 (L) 1.1 - 2.2 CK W/ REFLX CKMB Collection Time: 02/14/20 12:41 PM  
Result Value Ref Range  (H) 39 - 308 U/L  
TROPONIN I Collection Time: 02/14/20 12:41 PM  
Result Value Ref Range Troponin-I, Qt. <0.05 <0.05 ng/mL CK-MB,QUANT. Collection Time: 02/14/20 12:41 PM  
Result Value Ref Range CK - MB 2.7 <3.6 NG/ML  
 CK-MB Index 0.5 0.0 - 2.5

## 2020-02-14 NOTE — CONSULTS
Nephrology Consult Note Mohinder Goodwin  
 
[email or web address]. [email or web address]              Phone - [phone] Patient: Hector Ortega. YOB: 1950 Date- 2/14/2020 MRN: [id number] REASON FOR CONSULTATION: JOSE ANTONIO ON CKD CONSULTING PHYSICIAN: DR. LIZAMA ADMIT DATE:2/14/2020 PATIENT PCP:Monica Marie MD  
 
ASSESSMENT & PLAN:  
 
· JOSE ANTONIO due to cardiorenal syndrome - most likely · ckd 4- cr. 2.5 baseline · Chf, edema- ef 35% · Anemia due to ckd and iron defi. Iron sats 15% · Hyponatremia - na 128- due to metolazone use + CHF  
· H/o DM PLAN- 
? bumex 4 mg iv tid ? Give diuril tomorrow if na stable or gets better ? He may need dialysis if no response to diuretics ? He is okay to start hd but he wants to try iv diuretics first - 
? Consent for hd and hd cath obtained. Procedure explained and patient understand and agrees to proceed ? Give iv iron in am  
? Daily bmp ? Check urine lytes ? Check bladder scan ? Check renal usg · Active Problems: ·   Acute exacerbation of CHF (congestive heart failure) ([facility] (2/14/2020) ·  
· [x] High complexity decision making was performed 
[x] Patient is at high-risk of decompensation with multiple organ involvement Subjective: HPI: Hector Ortega. is a 68 y.o.  male. He presented to er with sob and leg edema. He is followed by DR. CHAIREZ. His cr. 3.0 , na 128 today. His cr. 2.5 in oct 2019 He has been on bumex 4 mg bid with metolazone twice a week He is denies taking NSAIDS No h/o recent abx use He has anemia. Iron sats 15 % He follows low salt diet No c/o chest pain, No c/o nausea or vomiting No c/o  fever. No dysuria or hematuria. Review of Systems: A 11 point review of system was performed today. Pertinent positives and negatives are mentioned in the HPI. The reminder of the ROS is negative and noncontributory. Past Medical History:  
Diagnosis Date  Anemia 8/5/2017  Anxiety 8/5/2017  Arrhythmia   
 atrial fibrillation 2014  ASCVD (arteriosclerotic cardiovascular disease) 8/5/2017  BPH (benign prostatic hyperplasia) 8/5/2017  CAD (coronary artery disease)   
 h/o stents  Cancer [facility])   
 h/o skin cancer  Cardiomyopathy ([address] 8/5/2017  CHF (congestive heart failure) ([address] 8/5/2017  Chronic kidney disease Stage IV  CKD (chronic kidney disease), stage IV ([address] 8/5/2017  Diabetes ([address]  Diabetes mellitus ([address] 8/5/2017  Diabetic neuropathy ([address] 8/5/2017  DJD (degenerative joint disease) 8/5/2017  ED (erectile dysfunction) 8/5/2017  Gout 8/5/2017  High cholesterol  Hyperlipidemia 8/5/2017  Hypertension  Hypertension with renal disease 8/5/2017  Hypothyroid 8/5/2017  Insomnia 8/5/2017  Obesity 8/5/2017  On statin therapy 8/5/2017  Pneumonia 05/28/2019  Restless leg 8/5/2017  Sleep apnea Bipap  Thyroid disease   
 hypothyroid  Ulcer of foot due to secondary diabetes ([address] 07/12/2019 Past Surgical History:  
Procedure Laterality Date  CARDIAC SURG PROCEDURE UNLIST    
 cardiac stents 3  
 CARDIAC SURG PROCEDURE UNLIST Ablation 5/17/2018 ED [facility]  COLONOSCOPY N/A 6/28/2016 COLONOSCOPY performed by Rusty Moreno MD at [facility] ENDOSCOPY  COLONOSCOPY N/A 1/9/2019 COLONOSCOPY performed by Charity Love MD at [facility] ENDOSCOPY  COLONOSCOPY,DIAGNOSTIC  1/9/2019  HX APPENDECTOMY  HX BUNIONECTOMY    
 and removal of 2 seasmoid bone of the great toe 2/2018  HX HEENT Bilateral Cataract surgery  HX HEENT Tonsils  HX HEENT Axe wound to the head  HX KNEE ARTHROSCOPY X 2  
 HX ORTHOPAEDIC    
 HX ORTHOPAEDIC    
 partial laminectomy  HX PACEMAKER    
 HX ROTATOR CUFF REPAIR    
 bilateral  
 FL INSJ ELTRD CAR DACIA SYS ATTCH PREV PM/DFB PLS GEN N/A 1/28/2019 Lv Lead Placement To Previous Generator performed by Yeni Calderon MD at [facility] CARD[facility]AC CATH LAB Left side  TN REMVL PERM PM PLS GEN W/REPL PLSE GEN MULT LEAD N/A 1/28/2019 Remove & Replace Ppm Gen Biv Multi Leads performed by Yeni Calderon MD at [address] CATH LAB Prior to Admission medications Medication Sig Start Date End Date Taking? Authorizing Provider  
metOLazone (ZAROXOLYN) 2.5 mg tablet TAKE 1 TABLET BY MOUTH EVERY OTHER DAY AS DIRECTED 2/10/20   Delmi Emerson MD  
zolpidem (AMBIEN) 10 mg tablet Take 1 Tab by mouth nightly as needed for Sleep. Max Daily Amount: 10 mg. Patient taking differently: Take 10 mg by mouth nightly as needed for Sleep. Indications: difficulty falling asleep 1/7/20   Delmi Emerson MD  
insulin glargine (LANTUS SOLOSTAR U-100 INSULIN) 100 unit/mL (3 mL) inpn 10 Units by SubCUTAneous route nightly. Provider, Historical  
PASSION FLOWER PO Take 20 mL by mouth daily. Provider, Historical  
carvedilol (COREG) 12.5 mg tablet TAKE 1 TABLET BY MOUTH TWICE DAILY 12/19/19   Delmi Emerson MD  
pramipexole (MIRAPEX) 1 mg tablet TAKE 1 TABLET BY MOUTH EVERY NIGHT AT BEDTIME 11/6/19   Delmi Emerson MD  
tamsulosin [facility]) 0.4 mg capsule TAKE 2 CAPSULES BY MOUTH EVERY DAY 8/29/19   Delmi Emerson MD  
ferrous sulfate (SLOW FE) 47.5 mg iron TbER tablet Take 1 Tab by mouth three (3) times daily. 6/13/19   Delmi Emerson MD  
bumetanide (BUMEX) 2 mg tablet Take 2 Tabs by mouth two (2) times a day. 6/5/19   Quynh Finney MD  
febuxostat (ULORIC) 40 mg tab tablet Take 1 Tab by mouth daily. Patient taking differently: Take 40 mg by mouth daily as needed. 6/6/19   Quynh Finney MD  
isosorbide mononitrate ER (IMDUR) 30 mg tablet Take 1 Tab by mouth daily. 6/6/19   Stormy Last MD  
timolol (TIMOPTIC) 0.5 % ophthalmic solution Administer 1 Drop to right eye two (2) times a day.     Provider, Historical  
 finasteride (PROSCAR) 5 mg tablet TAKE 1 TABLET BY MOUTH DAILY 19   Stephanie Gonzalez MD  
potassium chloride (KLOR-CON) 20 mEq pack One packet three times daily 19   Stephanie Gonzalez MD  
ascorbic acid, vitamin C, (VITAMIN C) 250 mg tablet Take 1 Tab by mouth three (3) times daily. 1/10/19   Stephanie Gonzalez MD  
OTHER Apply  to affected area daily as needed (Knee Pain). CBD Cream:  Apply daily as needed for knee pain    Provider, Historical  
Liraglutide (VICTOZA) 0.6 mg/0.1 mL (18 mg/3 mL) sub-q pen 0.6 mg by SubCUTAneous route nightly. Provider, Historical  
 
Allergies Allergen Reactions  Niacin Unknown (comments) Niaspan  Adhesive Rash  Imipenem Diarrhea Other reaction(s): Rash  Levemir [Insulin Detemir] Hives  Other Medication Other (comments) ? Allergy to Xiomara Hawking causing chemical burn  Primaxin [Imipenem-Cilastatin] Diarrhea and Rash  Xarelto [Rivaroxaban] Rash and Itching Other reaction(s): Rash Social History Tobacco Use  Smoking status: Former Smoker Packs/day: 0.50 Years: 4.00 Pack years: 2.00 Last attempt to quit: 3/6/1972 Years since quittin.9  Smokeless tobacco: Never Used Substance Use Topics  Alcohol use: No  
  Comment: rare, 1 drink per year Family History Problem Relation Age of Onset  Heart Disease Mother  Kidney Disease Mother  Heart Disease Father  Kidney Disease Father  Cancer Sister Breast  
 
 
 Objective:   
 
Patient Vitals for the past 24 hrs: 
 Temp Pulse Resp BP SpO2  
20 1600  73 20 135/80 99 % 20 1530  72 12 138/80 96 % 20 1500  71 15 131/73 99 % 20 1430  70 15 127/76 92 % 20 1400  72 21 132/65 91 % 20 1330  73 9 131/75 99 % 20 1300 98.2 °F (36.8 °C) 70 20 134/76 99 % 20 1252     100 % 20 1249  71 15  96 % 20 1248    135/81  02/14/20 1157 96.2 °F (35.7 °C) 69 24 139/72 100 % No intake/output data recorded. Physical Exam: 
General:Alert, No distress, Eyes:No scleral icterus, No conjunctival pallor Neck:Supple,no mass palpable,no thyromegaly Lungs:Clears to auscultation Bilaterally, normal respiratory effort CVS:RRR, S1 S2 normal,  No rub, Abdomen:Soft, Non tender, No hepatosplenomegaly Extremities: +++ LE edema Skin:No rash or lesions, Warm and DRY Psych: appropriate affect :  No boyer Musculoskeletal : no redness, no joint tenderness NEURO: Normal Speech, Non focal     
 
CODE STATUS:  full Care Plan discussed with:  Patient, FAMILY, Chart reviewed. TOTAL TIME: 76 Minutes  
y Reviewed previous records  
y Discussion with patient and/or family and questions answered  
y >50% of visit spent in counseling and coordination of care ECG[de-identified] Rev:yes Xray/CT/US/MRI REV:yes Lab Data Personally Reviewed: (see below) Recent Labs  
  02/14/20 
1241 WBC 3.9* HGB 9.5* PLT 93* ANEU 2.9 * K 4.3 * BUN 94* CREA 3.04* ALT 14 SGOT 27 TBILI 1.0  CA 9.1 Lab Results Component Value Date/Time Color YELLOW/STRAW 02/14/2020 03:48 PM  
 Appearance CLEAR 02/14/2020 03:48 PM  
 Specific gravity 1.012 02/14/2020 03:48 PM  
 Specific gravity 1.015 08/14/2019 04:51 PM  
 pH (UA) 7.5 02/14/2020 03:48 PM  
 Protein 30 (A) 02/14/2020 03:48 PM  
 Glucose NEGATIVE  02/14/2020 03:48 PM  
 Ketone NEGATIVE  02/14/2020 03:48 PM  
 Bilirubin NEGATIVE  02/14/2020 03:48 PM  
 Urobilinogen 0.2 02/14/2020 03:48 PM  
 Nitrites NEGATIVE  02/14/2020 03:48 PM  
 Leukocyte Esterase NEGATIVE  02/14/2020 03:48 PM  
 Epithelial cells FEW 02/14/2020 03:48 PM  
 Bacteria NEGATIVE  02/14/2020 03:48 PM  
 WBC 0-4 02/14/2020 03:48 PM  
 RBC 0-5 02/14/2020 03:48 PM  
 
 
Lab Results Component Value Date/Time  Iron 43 02/06/2020 10:06 AM  
 TIBC 280 02/06/2020 10:06 AM  
 Iron % saturation 15 (L) 2020 10:06 AM  
 Ferritin 162 2020 10:06 AM  
 
Lab Results Component Value Date/Time Culture result: NO GROWTH 4 DAYS 2020 06:44 PM  
 Culture result: NO GROWTH ON SOLID MEDIA AT 4 DAYS 2020 06:44 PM  
 Culture result: (A) 2020 06:44 PM  
  ENTEROCOCCUS FAECALIS GROUP D ISOLATED FROM THIO BROTH ONLY Prior to Admission Medications Prescriptions Last Dose Informant Patient Reported? Taking? Liraglutide (VICTOZA) 0.6 mg/0.1 mL (18 mg/3 mL) sub-q pen  Self Yes No  
Si.6 mg by SubCUTAneous route nightly. OTHER  Self Yes No  
Sig: Apply  to affected area daily as needed (Knee Pain). CBD Cream:  Apply daily as needed for knee pain PASSION FLOWER PO   Yes No  
Sig: Take 20 mL by mouth daily. ascorbic acid, vitamin C, (VITAMIN C) 250 mg tablet  Self No No  
Sig: Take 1 Tab by mouth three (3) times daily. bumetanide (BUMEX) 2 mg tablet   No No  
Sig: Take 2 Tabs by mouth two (2) times a day. carvedilol (COREG) 12.5 mg tablet   No No  
Sig: TAKE 1 TABLET BY MOUTH TWICE DAILY  
febuxostat (ULORIC) 40 mg tab tablet   No No  
Sig: Take 1 Tab by mouth daily. Patient taking differently: Take 40 mg by mouth daily as needed. ferrous sulfate (SLOW FE) 47.5 mg iron TbER tablet   No No  
Sig: Take 1 Tab by mouth three (3) times daily. finasteride (PROSCAR) 5 mg tablet  Self No No  
Sig: TAKE 1 TABLET BY MOUTH DAILY  
insulin glargine (LANTUS SOLOSTAR U-100 INSULIN) 100 unit/mL (3 mL) inpn   Yes No  
Sig: 10 Units by SubCUTAneous route nightly. isosorbide mononitrate ER (IMDUR) 30 mg tablet   No No  
Sig: Take 1 Tab by mouth daily.   
metOLazone (ZAROXOLYN) 2.5 mg tablet   No No  
Sig: TAKE 1 TABLET BY MOUTH EVERY OTHER DAY AS DIRECTED  
potassium chloride (KLOR-CON) 20 mEq pack  Self No No  
Sig: One packet three times daily  
pramipexole (MIRAPEX) 1 mg tablet   No No  
Sig: TAKE 1 TABLET BY MOUTH EVERY NIGHT AT BEDTIME  
 tamsulosin (FLOMAX) 0.4 mg capsule   No No  
Sig: TAKE 2 CAPSULES BY MOUTH EVERY DAY  
timolol (TIMOPTIC) 0.5 % ophthalmic solution  Self Yes No  
Sig: Administer 1 Drop to right eye two (2) times a day. zolpidem (AMBIEN) 10 mg tablet  Parent No No  
Sig: Take 1 Tab by mouth nightly as needed for Sleep. Max Daily Amount: 10 mg. Patient taking differently: Take 10 mg by mouth nightly as needed for Sleep. Indications: difficulty falling asleep Facility-Administered Medications: None Imaging: 
 
Medications list Personally Reviewed   [x]      Yes     []               No   
Thank you for allowing us to participate in the care this patient. We will follow patient with you. Signed By: Noemi Reddy MD 
[address] [place] [address] [facility] [address] [place], [address] Phone - [phone] Fax - [phone] [place] [id number], [address] [place] [address] Phone - [phone] Fax - [phone]    
www. [email or web address]

## 2020-02-14 NOTE — PROGRESS NOTES
Bedside and Verbal shift change report GIVEN TO monty ascencio RN. Report included the following information SBAR, Kardex, ED Summary, Procedure Summary, Intake/Output, MAR and Recent Results. Uneventful shift.

## 2020-02-14 NOTE — ED TRIAGE NOTES
Pt presents today for SOB onset this morning. Pt has a history of CHF and states he has been retaining fluid for the last few weeks- approx 25lb in 3 weeks. Pt feels weak, unable to take a deep breath, and lethargic. Pt takes bumex BID and denies missing any doses. Pt denies fever, nausea, chills, etc. Pt has a pacemaker, but is uncertain what his underlying rhythm is. Pt on monitor x3, family at bedside. Will continue to monitor.

## 2020-02-14 NOTE — PROGRESS NOTES
Primary Nurse Radha Hansen RN and english RN performed a dual skin assessment on this patient Impairment noted- see wound doc flow sheet BLEs eldon L foot has scab on bottom R foot, medial bone by great toe has scab Buttocks/sacrum red and blanching Aurelio score is 16

## 2020-02-14 NOTE — PROGRESS NOTES
TRANSFER - IN REPORT: 
 
Verbal report received from crow(name) on [facility].  being received from ED (unit) for routine progression of care Report consisted of patients Situation, Background, Assessment and  
Recommendations(SBAR). Information from the following report(s) SBAR, Kardex, ED Summary, Procedure Summary, Intake/Output, MAR and Recent Results was reviewed with the receiving nurse. Opportunity for questions and clarification was provided. Assessment completed upon patients arrival to unit and care assumed.

## 2020-02-15 ENCOUNTER — APPOINTMENT (OUTPATIENT)
Dept: NON INVASIVE DIAGNOSTICS | Age: 70
DRG: 291 | End: 2020-02-15
Attending: EMERGENCY MEDICINE
Payer: MEDICARE

## 2020-02-15 PROBLEM — I50.20 ACC/AHA STAGE C SYSTOLIC HEART FAILURE DUE TO ISCHEMIC CARDIOMYOPATHY (HCC): Status: RESOLVED | Noted: 2019-06-03 | Resolved: 2020-02-15

## 2020-02-15 PROBLEM — I50.42 CHRONIC COMBINED SYSTOLIC AND DIASTOLIC CHF, NYHA CLASS 4 (HCC): Status: ACTIVE | Noted: 2020-02-15

## 2020-02-15 PROBLEM — E11.621 TYPE 2 DIABETES MELLITUS WITH FOOT ULCER (HCC): Status: RESOLVED | Noted: 2017-08-28 | Resolved: 2020-02-15

## 2020-02-15 PROBLEM — I25.5 ACC/AHA STAGE C SYSTOLIC HEART FAILURE DUE TO ISCHEMIC CARDIOMYOPATHY (HCC): Status: RESOLVED | Noted: 2019-06-03 | Resolved: 2020-02-15

## 2020-02-15 PROBLEM — I50.33 ACUTE ON CHRONIC DIASTOLIC CONGESTIVE HEART FAILURE, NYHA CLASS 3 (HCC): Status: RESOLVED | Noted: 2019-05-27 | Resolved: 2020-02-15

## 2020-02-15 PROBLEM — L03.115 CELLULITIS OF RIGHT ANTERIOR LOWER LEG: Status: RESOLVED | Noted: 2019-10-11 | Resolved: 2020-02-15

## 2020-02-15 PROBLEM — H83.03 LABYRINTHITIS OF BOTH EARS: Status: RESOLVED | Noted: 2019-05-29 | Resolved: 2020-02-15

## 2020-02-15 PROBLEM — L02.619 ABSCESS OF FOOT: Status: RESOLVED | Noted: 2019-11-08 | Resolved: 2020-02-15

## 2020-02-15 PROBLEM — L97.509 TYPE 2 DIABETES MELLITUS WITH FOOT ULCER (HCC): Status: RESOLVED | Noted: 2017-08-28 | Resolved: 2020-02-15

## 2020-02-15 LAB
ALBUMIN SERPL-MCNC: 3.4 G/DL (ref 3.5–5)
ANION GAP SERPL CALC-SCNC: 9 MMOL/L (ref 5–15)
BNP SERPL-MCNC: ABNORMAL PG/ML
BUN SERPL-MCNC: 97 MG/DL (ref 6–20)
BUN/CREAT SERPL: 33 (ref 12–20)
CALCIUM SERPL-MCNC: 9.1 MG/DL (ref 8.5–10.1)
CHLORIDE SERPL-SCNC: 86 MMOL/L (ref 97–108)
CO2 SERPL-SCNC: 35 MMOL/L (ref 21–32)
COMMENT, HOLDF: NORMAL
CREAT SERPL-MCNC: 2.97 MG/DL (ref 0.7–1.3)
GLUCOSE BLD STRIP.AUTO-MCNC: 126 MG/DL (ref 65–100)
GLUCOSE BLD STRIP.AUTO-MCNC: 136 MG/DL (ref 65–100)
GLUCOSE BLD STRIP.AUTO-MCNC: 151 MG/DL (ref 65–100)
GLUCOSE BLD STRIP.AUTO-MCNC: 155 MG/DL (ref 65–100)
GLUCOSE SERPL-MCNC: 118 MG/DL (ref 65–100)
HBV CORE IGM SER QL: NONREACTIVE
HBV SURFACE AB SER QL: NONREACTIVE
HBV SURFACE AB SER-ACNC: <3.1 MIU/ML
HBV SURFACE AG SER QL: <0.1 INDEX
HBV SURFACE AG SER QL: NEGATIVE
HCV AB SERPL QL IA: NONREACTIVE
HCV COMMENT,HCGAC: NORMAL
OSMOLALITY SERPL: 313 MOSM/KG H2O
PHOSPHATE SERPL-MCNC: 4.3 MG/DL (ref 2.6–4.7)
PHOSPHATE SERPL-MCNC: 4.3 MG/DL (ref 2.6–4.7)
POTASSIUM SERPL-SCNC: 3.7 MMOL/L (ref 3.5–5.1)
SAMPLES BEING HELD,HOLD: NORMAL
SERVICE CMNT-IMP: ABNORMAL
SODIUM SERPL-SCNC: 130 MMOL/L (ref 136–145)
TSH SERPL DL<=0.05 MIU/L-ACNC: 2.31 UIU/ML (ref 0.36–3.74)

## 2020-02-15 PROCEDURE — 80069 RENAL FUNCTION PANEL: CPT

## 2020-02-15 PROCEDURE — 82962 GLUCOSE BLOOD TEST: CPT

## 2020-02-15 PROCEDURE — 86803 HEPATITIS C AB TEST: CPT

## 2020-02-15 PROCEDURE — 74011250637 HC RX REV CODE- 250/637: Performed by: HOSPITALIST

## 2020-02-15 PROCEDURE — 74011250637 HC RX REV CODE- 250/637: Performed by: INTERNAL MEDICINE

## 2020-02-15 PROCEDURE — 74011250636 HC RX REV CODE- 250/636: Performed by: INTERNAL MEDICINE

## 2020-02-15 PROCEDURE — 74011636637 HC RX REV CODE- 636/637: Performed by: INTERNAL MEDICINE

## 2020-02-15 PROCEDURE — 86706 HEP B SURFACE ANTIBODY: CPT

## 2020-02-15 PROCEDURE — 94760 N-INVAS EAR/PLS OXIMETRY 1: CPT

## 2020-02-15 PROCEDURE — 65660000000 HC RM CCU STEPDOWN

## 2020-02-15 PROCEDURE — 36415 COLL VENOUS BLD VENIPUNCTURE: CPT

## 2020-02-15 PROCEDURE — 83930 ASSAY OF BLOOD OSMOLALITY: CPT

## 2020-02-15 PROCEDURE — 84443 ASSAY THYROID STIM HORMONE: CPT

## 2020-02-15 PROCEDURE — 84100 ASSAY OF PHOSPHORUS: CPT

## 2020-02-15 PROCEDURE — 74011000250 HC RX REV CODE- 250: Performed by: INTERNAL MEDICINE

## 2020-02-15 PROCEDURE — 83880 ASSAY OF NATRIURETIC PEPTIDE: CPT

## 2020-02-15 PROCEDURE — 87340 HEPATITIS B SURFACE AG IA: CPT

## 2020-02-15 PROCEDURE — P9047 ALBUMIN (HUMAN), 25%, 50ML: HCPCS | Performed by: INTERNAL MEDICINE

## 2020-02-15 PROCEDURE — 93306 TTE W/DOPPLER COMPLETE: CPT

## 2020-02-15 PROCEDURE — 86705 HEP B CORE ANTIBODY IGM: CPT

## 2020-02-15 RX ORDER — POLYETHYLENE GLYCOL 3350 17 G/17G
17 POWDER, FOR SOLUTION ORAL DAILY PRN
Status: DISCONTINUED | OUTPATIENT
Start: 2020-02-15 | End: 2020-02-21 | Stop reason: HOSPADM

## 2020-02-15 RX ORDER — POTASSIUM CHLORIDE 7.45 MG/ML
10 INJECTION INTRAVENOUS
Status: DISCONTINUED | OUTPATIENT
Start: 2020-02-15 | End: 2020-02-15

## 2020-02-15 RX ORDER — POTASSIUM CHLORIDE 20 MEQ/1
40 TABLET, EXTENDED RELEASE ORAL
Status: COMPLETED | OUTPATIENT
Start: 2020-02-15 | End: 2020-02-15

## 2020-02-15 RX ORDER — SIMETHICONE 80 MG
80 TABLET,CHEWABLE ORAL
Status: DISCONTINUED | OUTPATIENT
Start: 2020-02-15 | End: 2020-02-21 | Stop reason: HOSPADM

## 2020-02-15 RX ADMIN — Medication 10 ML: at 22:18

## 2020-02-15 RX ADMIN — BUMETANIDE 4 MG: 0.25 INJECTION INTRAMUSCULAR; INTRAVENOUS at 17:53

## 2020-02-15 RX ADMIN — BUMETANIDE 4 MG: 0.25 INJECTION INTRAMUSCULAR; INTRAVENOUS at 22:29

## 2020-02-15 RX ADMIN — SIMETHICONE CHEW TAB 80 MG 80 MG: 80 TABLET ORAL at 20:22

## 2020-02-15 RX ADMIN — EPOETIN ALFA-EPBX 10000 UNITS: 10000 INJECTION, SOLUTION INTRAVENOUS; SUBCUTANEOUS at 22:12

## 2020-02-15 RX ADMIN — POTASSIUM CHLORIDE 40 MEQ: 20 TABLET, EXTENDED RELEASE ORAL at 13:03

## 2020-02-15 RX ADMIN — Medication 10 ML: at 13:04

## 2020-02-15 RX ADMIN — Medication 10 ML: at 11:37

## 2020-02-15 RX ADMIN — Medication 10 ML: at 05:36

## 2020-02-15 RX ADMIN — TIMOLOL MALEATE 1 DROP: 5 SOLUTION/ DROPS OPHTHALMIC at 11:38

## 2020-02-15 RX ADMIN — INSULIN GLARGINE 20 UNITS: 100 INJECTION, SOLUTION SUBCUTANEOUS at 22:13

## 2020-02-15 RX ADMIN — HEPARIN SODIUM 5000 UNITS: 5000 INJECTION INTRAVENOUS; SUBCUTANEOUS at 22:15

## 2020-02-15 RX ADMIN — IRON SUCROSE 200 MG: 20 INJECTION, SOLUTION INTRAVENOUS at 09:04

## 2020-02-15 RX ADMIN — POLYETHYLENE GLYCOL 3350 17 G: 17 POWDER, FOR SOLUTION ORAL at 22:27

## 2020-02-15 RX ADMIN — TAMSULOSIN HYDROCHLORIDE 0.8 MG: 0.4 CAPSULE ORAL at 09:04

## 2020-02-15 RX ADMIN — ISOSORBIDE MONONITRATE 30 MG: 30 TABLET, EXTENDED RELEASE ORAL at 09:04

## 2020-02-15 RX ADMIN — POTASSIUM BICARBONATE 20 MEQ: 782 TABLET, EFFERVESCENT ORAL at 08:57

## 2020-02-15 RX ADMIN — POTASSIUM CHLORIDE 10 MEQ: 7.46 INJECTION, SOLUTION INTRAVENOUS at 11:40

## 2020-02-15 RX ADMIN — CARVEDILOL 12.5 MG: 12.5 TABLET, FILM COATED ORAL at 09:04

## 2020-02-15 RX ADMIN — CARVEDILOL 12.5 MG: 12.5 TABLET, FILM COATED ORAL at 18:28

## 2020-02-15 RX ADMIN — PRAMIPEXOLE DIHYDROCHLORIDE 0.5 MG: 0.25 TABLET ORAL at 22:11

## 2020-02-15 RX ADMIN — ALBUMIN (HUMAN) 25 G: 0.25 INJECTION, SOLUTION INTRAVENOUS at 09:32

## 2020-02-15 RX ADMIN — BUMETANIDE 4 MG: 0.25 INJECTION INTRAMUSCULAR; INTRAVENOUS at 09:03

## 2020-02-15 RX ADMIN — HEPARIN SODIUM 5000 UNITS: 5000 INJECTION INTRAVENOUS; SUBCUTANEOUS at 05:36

## 2020-02-15 RX ADMIN — HEPARIN SODIUM 5000 UNITS: 5000 INJECTION INTRAVENOUS; SUBCUTANEOUS at 17:54

## 2020-02-15 NOTE — PROGRESS NOTES
Problem: Falls - Risk of 
Goal: *Absence of Falls Description Document Park President Fall Risk and appropriate interventions in the flowsheet. Outcome: Progressing Towards Goal 
Note: Fall Risk Interventions: 
Mobility Interventions: Bed/chair exit alarm, Communicate number of staff needed for ambulation/transfer, Mechanical lift, OT consult for ADLs, Patient to call before getting OOB, PT Consult for mobility concerns, PT Consult for assist device competence Medication Interventions: Bed/chair exit alarm, Evaluate medications/consider consulting pharmacy, Patient to call before getting OOB, Teach patient to arise slowly, Utilize gait belt for transfers/ambulation Elimination Interventions: Bed/chair exit alarm, Call light in reach, Elevated toilet seat, Patient to call for help with toileting needs, Stay With Me (per policy), Toilet paper/wipes in reach, Toileting schedule/hourly rounds, Urinal in reach History of Falls Interventions: Bed/chair exit alarm, Consult care management for discharge planning, Door open when patient unattended, Evaluate medications/consider consulting pharmacy, Investigate reason for fall, Room close to nurse's station, Utilize gait belt for transfer/ambulation, Assess for delayed presentation/identification of injury for 48 hrs (comment for end date) Problem: Patient Education: Go to Patient Education Activity Goal: Patient/Family Education Outcome: Progressing Towards Goal 
  
Problem: Heart Failure: Day 1 Goal: Off Pathway (Use only if patient is Off Pathway) Outcome: Progressing Towards Goal 
Goal: Activity/Safety Outcome: Progressing Towards Goal 
Goal: Consults, if ordered Outcome: Progressing Towards Goal 
Goal: Diagnostic Test/Procedures Outcome: Progressing Towards Goal 
Goal: Nutrition/Diet Outcome: Progressing Towards Goal 
Goal: Discharge Planning Outcome: Progressing Towards Goal 
Goal: Medications Outcome: Progressing Towards Goal 
 Goal: Respiratory Outcome: Progressing Towards Goal 
Goal: Treatments/Interventions/Procedures Outcome: Progressing Towards Goal 
Goal: Psychosocial 
Outcome: Progressing Towards Goal 
Goal: *Oxygen saturation within defined limits Outcome: Progressing Towards Goal 
Goal: *Hemodynamically stable Outcome: Progressing Towards Goal 
Goal: *Optimal pain control at patient's stated goal 
Outcome: Progressing Towards Goal 
Goal: *Anxiety reduced or absent Outcome: Progressing Towards Goal

## 2020-02-15 NOTE — PROGRESS NOTES
Progress Note 2/15/2020 11:46 AM 
NAME: Carey Campbell. MRN:  [id number] Admit Diagnosis: Acute exacerbation of CH[facility]est[facility] [I50.9] Problem List: 1. Acute on chronic combined systolic and diastolic heart failure with symptomatic volume overload including dyspnea at rest, abdominal fullness, lower extremity edema but nothing on CXR--a lot of R-sided sx. Biventricular failure with NYHA CHF class 4 symptoms acutely. 2. Paroxysmal atrial fibrillation and atypical atrial flutter s/p ablation in 5/2018.  NO significant atrial fibrillation or flutter since the ablation. 3. Remote dual chamber SJM pacer for sick sinus syndrome, upgraded to biventricular pacemaker in 1/2019 due to CHF, cardiomyopathy, chronic V pacing. (Program change 5/2019--LV>RV to 50 msec from 20 msec.  Tried to enable MPP using poles 3 and 4 but vectors using these did not capture at max output.) 4. Hypertensive heart disease with a history of congestive heart failure and CKD stage 4.  EF 55% on prior echo, then 35-40% early 2019, EF 33% by echo in 5/2019. 
5. Coronary artery disease with multivessel interventions.  His last stenting was in 2010.  Cath in 2014 showed a nondominant RCA with LPDA occluded and filled by collaterals, otherwise up to moderate CAD. 6. Mild idiopathic pericardial effusion in the past. 
7. Diabetes mellitus type 2, on chronic insulin. 8. Hyperlipidemia. 9. BRBPR in 1/2019 with grade 1 internal hemorrhoids noted on colo here.  Presumed source of GI bleed. 10. History of esophageal dilation for dysphagia. 11. Hiatal hernia. 12. Anemia of chronic renal disease.  On EPO (and Fe supplement). 13. Moderate thrombocytopenia. 14. Chronic anticoagulation stopped 1/2019. Assessment/Plan:  
Last 3 Recorded Weights in this Encounter 02/15/20 0535 02/15/20 1046 02/15/20 1110 Weight: 112.3 kg (247 lb 9.6 oz) 112 kg (247 lb) 112 kg (247 lb) 1. Continue diuresis 2. Consented for HD if needed 3. Echo pending 4. Continue beta blocker 5. Continue ISMN; can change to NTGp if needed 6. Keep K and Mg up  
 
  
 []       High complexity decision making was performed in this patient at high risk for decompensation with multiple organ involvement. Subjective:  
 
[place] Arabella. denies chest pain. Still w/ some SOB. Discussed with RN events overnight. Review of Systems: 
 
Symptom Y/N Comments  Symptom Y/N Comments Fever/Chills N   Chest Pain N Poor Appetite N   Edema N   
Cough N   Abdominal Pain N Sputum N   Joint Pain N   
SOB/ANDERS N   Pruritis/Rash N   
Nausea/vomit N   Tolerating PT/OT Y Diarrhea N   Tolerating Diet Y Constipation N   Other Could NOT obtain due to:   
 
Objective:  
  
Physical Exam: 
 
Last 24hrs VS reviewed since prior progress note. Most recent are: 
 
Visit Vitals /68 Pulse 70 Temp 97.7 °F (36.5 °C) Resp 16 Ht 6' 4\" (1.93 m) Wt 112 kg (247 lb) SpO2 98% BMI 30.07 kg/m² Intake/Output Summary (Last 24 hours) at 2/15/2020 1146 Last data filed at 2/15/2020 1027 Gross per 24 hour Intake 1560 ml Output 2650 ml Net -1090 ml General Appearance: Well developed, well nourished, alert & oriented x 3,  
 no acute distress. Ears/Nose/Mouth/Throat: Hearing grossly normal. 
Neck: Supple. Chest: Lungs w/ diminished breath sounds in the bases Cardiovascular: Regular rate and rhythm, S1S2 normal, no murmur. Abdomen: Soft, non-tender, bowel sounds are active. Extremities: 2+ erythematous edema bilaterally. Skin: Warm and dry. []         Post-cath site without hematoma, bruit, tenderness, or thrill. Distal pulses intact. PMH/SH reviewed - no change compared to H&P Data Review Telemetry: paced EKG:  
[x]  No new EKG for review Lab Data Personally Reviewed: 
 
Recent Labs  
  02/14/20 
1241 WBC 3.9* HGB 9.5* HCT 30.2* PLT 93*  
 
 No results for input(s): INR, PTP, APTT, INREXT in the last 72 hours. Recent Labs  
  02/15/20 
0122 02/14/20 
1629 02/14/20 
1241 *  --  128* K 3.7 4.0 4.3 CL 86*  --  85* CO2 35*  --  34* BUN 97*  --  94* CREA 2.97*  --  3.04* *  --  167* CA 9.1  --  9.1 Recent Labs  
  02/14/20 
1241 CKNDX 0.5  
TROIQ <0.05 Lab Results Component Value Date/Time Cholesterol, total 104 01/06/2020 03:22 PM  
 HDL Cholesterol 14 (L) 01/06/2020 03:22 PM  
 LDL, calculated 67 01/06/2020 03:22 PM  
 Triglyceride 115 01/06/2020 03:22 PM  
 CHOL/HDL Ratio 7 (H) 01/06/2020 03:22 PM  
 
 
Recent Labs  
  02/15/20 
0122 02/14/20 
1241 SGOT  --  27  
AP  --  108 TP  --  7.6 ALB 3.4* 3.2*  
GLOB  --  4.4* No results for input(s): PH, PCO2, PO2 in the last 72 hours. Medications Personally Reviewed: 
 
Current Facility-Administered Medications Medication Dose Route Frequency  epoetin mary (EPOGEN;PROCRIT) injection 10,000 Units  10,000 Units SubCUTAneous Q TUE, THU & SAT  potassium chloride 10 mEq in 100 ml IVPB  10 mEq IntraVENous Q1H  
 bumetanide (BUMEX) injection 4 mg  4 mg IntraVENous TID  iron sucrose (VENOFER) injection 200 mg  200 mg IntraVENous DAILY  sodium chloride (NS) flush 5-40 mL  5-40 mL IntraVENous Q8H  
 sodium chloride (NS) flush 5-40 mL  5-40 mL IntraVENous PRN  
 acetaminophen (TYLENOL) tablet 650 mg  650 mg Oral Q4H PRN  
 ondansetron (ZOFRAN) injection 4 mg  4 mg IntraVENous Q4H PRN  
 heparin (porcine) injection 5,000 Units  5,000 Units SubCUTAneous Q8H  
 carvediloL (COREG) tablet 12.5 mg  12.5 mg Oral BID WITH MEALS  isosorbide mononitrate ER (IMDUR) tablet 30 mg  30 mg Oral DAILY  potassium bicarb-citric acid (EFFER-K) tablet 20 mEq  20 mEq Oral DAILY  zolpidem (AMBIEN) tablet 10 mg  10 mg Oral QHS PRN  
 timolol (TIMOPTIC) 0.5 % ophthalmic solution 1 Drop  1 Drop Right Eye BID  
  tamsulosin (FLOMAX) capsule 0.8 mg  0.8 mg Oral DAILY  insulin glargine (LANTUS) injection 20 Units  20 Units SubCUTAneous QHS  glucose chewable tablet 16 g  4 Tab Oral PRN  
 dextrose (D50W) injection syrg 12.5-25 g  25-50 mL IntraVENous PRN  
 glucagon (GLUCAGEN) injection 1 mg  1 mg IntraMUSCular PRN  
 insulin lispro (HUMALOG) injection   SubCUTAneous AC&HS  insulin glargine (LANTUS) injection 20 Units  20 Units SubCUTAneous QHS  diphenhydrAMINE (BENADRYL) capsule 25 mg  25 mg Oral Q6H PRN  pramipexole (MIRAPEX) tablet 0.5 mg  0.5 mg Oral QHS Brandee Zimmerman III, DO

## 2020-02-15 NOTE — PROGRESS NOTES
PROGRESS NOTE 
 
NAME:  Royal Rio Lombardo. :   1950 MRN:   [id number] Date/Time:  2/15/2020 7:36 AM 
Subjective:  
History:  Chart reviewed and patient seen and examined this AM and D/W his nurse and all events noted. He was admitted yesterday with acute on chronic combined diastolic and systolic CHF with underlying HTN, DM,  ASCVD, Cardiomyopathy and CKD stage 4 with a history of Afib S/P ablation. He had become severely SOB over the 3 week period PTA until the night before admission he couldn't sleep at all and thus he presented to the ER. In the ER his weight was recorded at 250# up from 242# when last seen by me at the office on  (240# on 19). He still feels that he can't get a good breath although he has diuresed 3# since admission and lungs are clear on exam as well as admission CXR was clear. He has no CP, Palpitations or other cardiac or respiratory c/o. He noted at home that his urine output had decreased despite compliance with his diuretics. He has had excellent UO since admission and renal sono w/o obstruction. He denies any N/V or GI c/o. He has no other c/o on complete ROS. Medications reviewed: 
Current Facility-Administered Medications Medication Dose Route Frequency  potassium chloride 10 mEq in 100 ml IVPB  10 mEq IntraVENous Q1H  
 epoetin mary (EPOGEN;PROCRIT) injection 10,000 Units  10,000 Units SubCUTAneous Q TUE, THU & SAT  bumetanide (BUMEX) injection 4 mg  4 mg IntraVENous TID  iron sucrose (VENOFER) injection 200 mg  200 mg IntraVENous DAILY  sodium chloride (NS) flush 5-40 mL  5-40 mL IntraVENous Q8H  
 sodium chloride (NS) flush 5-40 mL  5-40 mL IntraVENous PRN  
 acetaminophen (TYLENOL) tablet 650 mg  650 mg Oral Q4H PRN  
 ondansetron (ZOFRAN) injection 4 mg  4 mg IntraVENous Q4H PRN  
 heparin (porcine) injection 5,000 Units  5,000 Units SubCUTAneous Q8H  
 carvediloL (COREG) tablet 12.5 mg  12.5 mg Oral BID WITH MEALS  
  isosorbide mononitrate ER (IMDUR) tablet 30 mg  30 mg Oral DAILY  potassium bicarb-citric acid (EFFER-K) tablet 20 mEq  20 mEq Oral DAILY  zolpidem (AMBIEN) tablet 10 mg  10 mg Oral QHS PRN  
 timolol (TIMOPTIC) 0.5 % ophthalmic solution 1 Drop  1 Drop Right Eye BID  tamsulosin (FLOMAX) capsule 0.8 mg  0.8 mg Oral DAILY  insulin glargine (LANTUS) injection 20 Units  20 Units SubCUTAneous QHS  glucose chewable tablet 16 g  4 Tab Oral PRN  
 dextrose (D50W) injection syrg 12.5-25 g  25-50 mL IntraVENous PRN  
 glucagon (GLUCAGEN) injection 1 mg  1 mg IntraMUSCular PRN  
 insulin lispro (HUMALOG) injection   SubCUTAneous AC&HS  insulin glargine (LANTUS) injection 20 Units  20 Units SubCUTAneous QHS  diphenhydrAMINE (BENADRYL) capsule 25 mg  25 mg Oral Q6H PRN  pramipexole (MIRAPEX) tablet 0.5 mg  0.5 mg Oral QHS Objective:  
Vitals: 
Visit Vitals /76 Pulse 70 Temp 97.6 °F (36.4 °C) Resp 20 Ht 6' 4\" (1.93 m) Wt 247 lb 9.6 oz (112.3 kg) SpO2 97% BMI 30.14 kg/m² O2 Device: Room air Temp (24hrs), Av.7 °F (36.5 °C), Min:96.2 °F (35.7 °C), Max:98.8 °F (37.1 °C) Last 24hr Input/Output: 
 
Intake/Output Summary (Last 24 hours) at 2/15/2020 0104 Last data filed at 2/15/2020 6174 Gross per 24 hour Intake 1560 ml Output 1825 ml Net -265 ml PHYSICAL EXAM: 
General:     Alert, cooperative, no distress, appears stated age. Head:    Normocephalic, without obvious abnormality, atraumatic. Eyes:    Conjunctivae/corneas clear. PERRLA Nose:   Nares normal. No drainage or sinus tenderness. Throat:     Lips, mucosa, and tongue normal.  No Thrush Neck:   Supple, symmetrical,  no adenopathy, thyroid: non tender 
   no carotid bruit and no JVD. Back:     Symmetric,  No CVA tenderness. Lungs:    Clear to auscultation bilaterally. No Wheezing or Rhonchi. No rales. Heart:    Regular rate and rhythm,  no murmur, rub or gallop. Abdomen:    Soft, non-tender. Not distended. Bowel sounds normal. No masses Extremities:  Extremities normal, atraumatic, No cyanosis. No edema. No clubbing Lymph nodes:  Cervical, supraclavicular normal. 
Neurologic:  Normal strength, Alert and oriented X 3. Skin:                No rash Lab Data Reviewed: 
 
Recent Results (from the past 24 hour(s)) CBC WITH AUTOMATED DIFF Collection Time: 02/14/20 12:41 PM  
Result Value Ref Range WBC 3.9 (L) 4.1 - 11.1 K/uL  
 RBC 3.11 (L) 4.10 - 5.70 M/uL HGB 9.5 (L) 12.1 - 17.0 g/dL HCT 30.2 (L) 36.6 - 50.3 % MCV 97.1 80.0 - 99.0 FL  
 MCH 30.5 26.0 - 34.0 PG  
 MCHC 31.5 30.0 - 36.5 g/dL  
 RDW 17.4 (H) 11.5 - 14.5 % PLATELET 93 (L) 696 - 400 K/uL MPV 12.1 8.9 - 12.9 FL  
 NRBC 0.0 0  WBC ABSOLUTE NRBC 0.00 0.00 - 0.01 K/uL NEUTROPHILS 71 32 - 75 % BAND NEUTROPHILS 1 % LYMPHOCYTES 11 (L) 12 - 49 % MONOCYTES 6 5 - 13 % EOSINOPHILS 11 (H) 0 - 7 % BASOPHILS 0 0 - 1 % IMMATURE GRANULOCYTES 0 0.0 - 0.5 % ABS. NEUTROPHILS 2.9 1.8 - 8.0 K/UL  
 ABS. LYMPHOCYTES 0.4 (L) 0.8 - 3.5 K/UL  
 ABS. MONOCYTES 0.2 0.0 - 1.0 K/UL  
 ABS. EOSINOPHILS 0.4 0.0 - 0.4 K/UL  
 ABS. BASOPHILS 0.0 0.0 - 0.1 K/UL  
 ABS. IMM. GRANS. 0.0 0.00 - 0.04 K/UL  
 DF MANUAL    
 RBC COMMENTS ANISOCYTOSIS 1+ 
    
 RBC COMMENTS MACROCYTOSIS 
1+ METABOLIC PANEL, COMPREHENSIVE Collection Time: 02/14/20 12:41 PM  
Result Value Ref Range Sodium 128 (L) 136 - 145 mmol/L Potassium 4.3 3.5 - 5.1 mmol/L Chloride 85 (L) 97 - 108 mmol/L  
 CO2 34 (H) 21 - 32 mmol/L Anion gap 9 5 - 15 mmol/L Glucose 167 (H) 65 - 100 mg/dL BUN 94 (H) 6 - 20 MG/DL Creatinine 3.04 (H) 0.70 - 1.30 MG/DL  
 BUN/Creatinine ratio 31 (H) 12 - 20 GFR est AA 25 (L) >60 ml/min/1.73m2 GFR est non-AA 20 (L) >60 ml/min/1.73m2 Calcium 9.1 8.5 - 10.1 MG/DL  Bilirubin, total 1.0 0.2 - 1.0 MG/DL  
 ALT (SGPT) 14 12 - 78 U/L  
 AST (SGOT) 27 15 - 37 U/L Alk. phosphatase 108 45 - 117 U/L Protein, total 7.6 6.4 - 8.2 g/dL Albumin 3.2 (L) 3.5 - 5.0 g/dL Globulin 4.4 (H) 2.0 - 4.0 g/dL A-G Ratio 0.7 (L) 1.1 - 2.2 CK W/ REFLX CKMB Collection Time: 02/14/20 12:41 PM  
Result Value Ref Range  (H) 39 - 308 U/L  
TROPONIN I Collection Time: 02/14/20 12:41 PM  
Result Value Ref Range Troponin-I, Qt. <0.05 <0.05 ng/mL CK-MB,QUANT. Collection Time: 02/14/20 12:41 PM  
Result Value Ref Range CK - MB 2.7 <3.6 NG/ML  
 CK-MB Index 0.5 0.0 - 2.5 URINALYSIS W/ REFLEX CULTURE Collection Time: 02/14/20  3:48 PM  
Result Value Ref Range Color YELLOW/STRAW Appearance CLEAR CLEAR Specific gravity 1.012 1.003 - 1.030    
 pH (UA) 7.5 5.0 - 8.0 Protein 30 (A) NEG mg/dL Glucose NEGATIVE  NEG mg/dL Ketone NEGATIVE  NEG mg/dL Bilirubin NEGATIVE  NEG Blood NEGATIVE  NEG Urobilinogen 0.2 0.2 - 1.0 EU/dL Nitrites NEGATIVE  NEG Leukocyte Esterase NEGATIVE  NEG    
 WBC 0-4 0 - 4 /hpf  
 RBC 0-5 0 - 5 /hpf Epithelial cells FEW FEW /lpf Bacteria NEGATIVE  NEG /hpf  
 UA:UC IF INDICATED CULTURE NOT INDICATED BY UA RESULT Hyaline cast 0-2 0 - 5 /lpf SODIUM, UR, RANDOM Collection Time: 02/14/20  3:48 PM  
Result Value Ref Range Sodium,urine random 73 MMOL/L  
CREATININE, UR, RANDOM Collection Time: 02/14/20  3:48 PM  
Result Value Ref Range Creatinine, urine 24.20 mg/dL POTASSIUM Collection Time: 02/14/20  4:29 PM  
Result Value Ref Range Potassium 4.0 3.5 - 5.1 mmol/L  
SODIUM, UR, RANDOM Collection Time: 02/14/20  6:54 PM  
Result Value Ref Range Sodium,urine random 57 MMOL/L  
CHLORIDE, URINE RANDOM Collection Time: 02/14/20  6:54 PM  
Result Value Ref Range Chloride,urine random 43 MMOL/L  
POTASSIUM, UR, RANDOM Collection Time: 02/14/20  6:54 PM  
Result Value Ref Range  Potassium urine, random 30 MMOL/L  
 PROTEIN/CREATININE RATIO, URINE Collection Time: 02/14/20  6:54 PM  
Result Value Ref Range Protein, urine random 44 (H) 0.0 - 11.9 mg/dL Creatinine, urine 28.40 mg/dL Protein/Creat. urine Ratio 1.5    
UREA NITROGEN, UR, RANDOM Collection Time: 02/14/20  6:54 PM  
Result Value Ref Range Urea Nitrogen,urine random 363 MG/DL  
OSMOLALITY, UR Collection Time: 02/14/20  6:54 PM  
Result Value Ref Range Osmolality,urine 314 MOSM/kg H2O  
GLUCOSE, POC Collection Time: 02/14/20  8:47 PM  
Result Value Ref Range Glucose (POC) 147 (H) 65 - 100 mg/dL Performed by Gini Robles RENAL FUNCTION PANEL Collection Time: 02/15/20  1:22 AM  
Result Value Ref Range Sodium 130 (L) 136 - 145 mmol/L Potassium 3.7 3.5 - 5.1 mmol/L Chloride 86 (L) 97 - 108 mmol/L  
 CO2 35 (H) 21 - 32 mmol/L Anion gap 9 5 - 15 mmol/L Glucose 118 (H) 65 - 100 mg/dL BUN 97 (H) 6 - 20 MG/DL Creatinine 2.97 (H) 0.70 - 1.30 MG/DL  
 BUN/Creatinine ratio 33 (H) 12 - 20 GFR est AA 25 (L) >60 ml/min/1.73m2 GFR est non-AA 21 (L) >60 ml/min/1.73m2 Calcium 9.1 8.5 - 10.1 MG/DL Phosphorus 4.3 2.6 - 4.7 MG/DL Albumin 3.4 (L) 3.5 - 5.0 g/dL TSH 3RD GENERATION Collection Time: 02/15/20  1:22 AM  
Result Value Ref Range TSH 2.31 0.36 - 3.74 uIU/mL PHOSPHORUS Collection Time: 02/15/20  1:22 AM  
Result Value Ref Range Phosphorus 4.3 2.6 - 4.7 MG/DL  
NT-PRO BNP Collection Time: 02/15/20  1:22 AM  
Result Value Ref Range NT pro-BNP 12,910 (H) <125 PG/ML  
SAMPLES BEING HELD Collection Time: 02/15/20  1:22 AM  
Result Value Ref Range SAMPLES BEING HELD SST   
 COMMENT Add-on orders for these samples will be processed based on acceptable specimen integrity and analyte stability, which may vary by analyte. GLUCOSE, POC Collection Time: 02/15/20  8:12 AM  
Result Value Ref Range Glucose (POC) 136 (H) 65 - 100 mg/dL Performed by Jamie Jean (PCT) Assessment/Plan:  
 
Principal Problem: 
  Acute exacerbation of CHF (congestive heart failure) ([facility] (2/14/2020) Active Problems: 
  ASCVD (arteriosclerotic cardiovascular disease) (8/5/2017) Controlled type 2 diabetes mellitus with stage 4 chronic kidney disease, without long-term current use of insulin ([facility] (8/5/2017) CKD (chronic kidney disease), stage IV ([facility] (8/5/2017) Hypertension with renal disease (8/5/2017) Gastroesophageal reflux disease without esophagitis (8/5/2017) Cardiomyopathy ([facility] (8/5/2017) Anemia (8/5/2017) S/P ablation of atrial fibrillation (5/17/2018) Chronic combined systolic and diastolic CHF, NYHA class 4 ([facility] (2/15/2020) 
 
  
___________________________________________________ PLAN: 
 
1. Continue diuresis with IV Bumex as had had excellent output despite inaccurate recorded I & O s 
2. Follow weights (recent baseline 240# in December) 250# on admission and 247# today and I & O which need to be more accurate 3. Hold off on Zaroxolyn as long as excellent UOP with IV Bumex 4. He will need dialysis at some point which I D/W him this AM, Has been reluctant in past although followed by renal Dr. Rachel Arango for at least 2 years (Renal function baseline 77/2.5 on 1/6/20, 78/2.4 on 2/7/20), 94/3.04 on admission, today 97/2.97 
5. Continue Coreg for cardiomyopathy, Unable to use ACE or ARB due to renal disease 6. Imdur with Ischemic Cardiomyopathy 7. Follow K with diuresis and replete as needed 8. Procrit ordered as well as IV Venofer 9. Continue Lantus for DM 
10. Follow BS and treat with SSI 11. Flomax for BPH 12. Mirapex for Restless legs 13. No current symptoms c/w Angina but follow closely, Certainly if needs cath will end up with dialysis from this point foreward 14. Nasal oxygen for Hypoxemia/SOB 15. Echocardiogram, Last EFx May 2019 was 33% 16.  Hyponatremia due to volume overload is improving, doubt related to metolazone as has been taking it for several years 65 minutes spent in direct care of this patient today with High complexity decision making today on this extremely high complexity patient who is critically ill with high risk of further decompensation  
 
 
___________________________________________________ Attending Physician: Geena Gonzales MD

## 2020-02-15 NOTE — PROGRESS NOTES
[facility] 
 [address] [address], [facility] [address] [facility] Phone: [phone]   [phone]   
  
Nephrology Progress Note Laxmi Mata.     1950     [id number] Date of Admission : 2/14/2020 
02/15/20 CC: Follow up for ckd 4 /chf  
 
Assessment and Plan · JOSE ANTONIO due to cardiorenal syndrome - most likely · ckd 4- cr. 2.5 baseline · Chf, edema- ef 35% · Anemia due to ckd and iron defi. Iron sats 15% · Hyponatremia - na 128- due to metolazone use + CHF  
· H/o DM PLAN- 
· Continue bumex 4 mg iv tid · Good response to Bumex and continue · He may need dialysis if no response to diuretics, still a possibility but not needed for now Consent for hd and hd cath obtained. Procedure explained and patient understand and agrees to proceed  In case he needs it · iv iron + epogen · Daily bmp · Renal us was MRD Interval History: 
 
Seen in am , breathing better and had made 1800 cc of urine . Continue Diuretic management. NA has increased and K has been replaced Review of Systems: A comprehensive review of systems was negative except for that written in the HPI. Current Medications:  
Current Facility-Administered Medications Medication Dose Route Frequency  potassium chloride 10 mEq in 100 ml IVPB  10 mEq IntraVENous Q1H  
 epoetin mary (EPOGEN;PROCRIT) injection 10,000 Units  10,000 Units SubCUTAneous Q TUE, THU & SAT  bumetanide (BUMEX) injection 4 mg  4 mg IntraVENous TID  albumin human 25% (BUMINATE) solution 25 g  25 g IntraVENous ONCE  
 iron sucrose (VENOFER) injection 200 mg  200 mg IntraVENous DAILY  sodium chloride (NS) flush 5-40 mL  5-40 mL IntraVENous Q8H  
 sodium chloride (NS) flush 5-40 mL  5-40 mL IntraVENous PRN  
 acetaminophen (TYLENOL) tablet 650 mg  650 mg Oral Q4H PRN  
 ondansetron (ZOFRAN) injection 4 mg  4 mg IntraVENous Q4H PRN  
 heparin (porcine) injection 5,000 Units  5,000 Units SubCUTAneous Q8H  
  carvediloL (COREG) tablet 12.5 mg  12.5 mg Oral BID WITH MEALS  isosorbide mononitrate ER (IMDUR) tablet 30 mg  30 mg Oral DAILY  potassium bicarb-citric acid (EFFER-K) tablet 20 mEq  20 mEq Oral DAILY  zolpidem (AMBIEN) tablet 10 mg  10 mg Oral QHS PRN  
 timolol (TIMOPTIC) 0.5 % ophthalmic solution 1 Drop  1 Drop Right Eye BID  tamsulosin (FLOMAX) capsule 0.8 mg  0.8 mg Oral DAILY  insulin glargine (LANTUS) injection 20 Units  20 Units SubCUTAneous QHS  glucose chewable tablet 16 g  4 Tab Oral PRN  
 dextrose (D50W) injection syrg 12.5-25 g  25-50 mL IntraVENous PRN  
 glucagon (GLUCAGEN) injection 1 mg  1 mg IntraMUSCular PRN  
 insulin lispro (HUMALOG) injection   SubCUTAneous AC&HS  insulin glargine (LANTUS) injection 20 Units  20 Units SubCUTAneous QHS  diphenhydrAMINE (BENADRYL) capsule 25 mg  25 mg Oral Q6H PRN  pramipexole (MIRAPEX) tablet 0.5 mg  0.5 mg Oral QHS Allergies Allergen Reactions  Niacin Unknown (comments) Niaspan  Adhesive Rash  Imipenem Diarrhea Other reaction(s): Rash  Levemir [Insulin Detemir] Hives  Other Medication Other (comments) ? Allergy to Lella Lenora causing chemical burn  Primaxin [Imipenem-Cilastatin] Diarrhea and Rash  Xarelto [Rivaroxaban] Rash and Itching Other reaction(s): Rash Objective: 
Vitals:   
Vitals:  
 02/14/20 2237 02/15/20 0327 02/15/20 0535 02/15/20 1514 BP: 121/85 136/82  137/85 Pulse: 70 70  70 Resp: 18 18  20 Temp: 98.1 °F (36.7 °C) 97.9 °F (36.6 °C)  97.6 °F (36.4 °C) SpO2: 100% 100%  97% Weight:   112.3 kg (247 lb 9.6 oz) Height:      
 
Intake and Output: 
02/15 0701 - 02/15 1900 In: 600 [P.O.:600] Out: -  
02/13 1901 - 02/15 0700 In: 960 [P.O.:960] Out: 1825 [[id number]] Physical Examination: 
Pt intubated    Yes / No 
General: NAD,Conversant Neck:  Supple, no mass Resp:  Lungs CTA B/L, no wheezing , normal respiratory effort CV:  RRR,  no murmur or rub,1+ LE edema GI:  Soft, NT, + Bowel sounds, no hepatosplenomegaly Neurologic:  Non focal 
Psych:             AAO x 3 appropriate affect Skin:  No Rash :  No boyer [x]    High complexity decision making was performed 
[x]    Patient is at high-risk of decompensation with multiple organ involvement Lab Data Personally Reviewed: I have reviewed all the pertinent labs, microbiology data and radiology studies during assessment. Recent Labs  
  02/15/20 
0122 02/14/20 
1629 02/14/20 
1241 *  --  128* K 3.7 4.0 4.3 CL 86*  --  85* CO2 35*  --  34* *  --  167* BUN 97*  --  94* CREA 2.97*  --  3.04* CA 9.1  --  9.1 PHOS 4.3  4.3  --   --   
ALB 3.4*  --  3.2* SGOT  --   --  27 ALT  --   --  14 Recent Labs  
  02/14/20 
1241 WBC 3.9* HGB 9.5* HCT 30.2* PLT 93* No results found for: SDES Lab Results Component Value Date/Time Culture result: NO GROWTH 4 DAYS 01/03/2020 06:44 PM  
 Culture result: NO GROWTH ON SOLID MEDIA AT 4 DAYS 01/03/2020 06:44 PM  
 Culture result: (A) 01/03/2020 06:44 PM  
  ENTEROCOCCUS FAECALIS GROUP D ISOLATED FROM THIO BROTH ONLY Culture result: NO GROWTH 5 DAYS 06/05/2019 07:07 PM  
 Culture result: MRSA NOT PRESENT 05/28/2019 04:19 AM  
 Culture result:  05/28/2019 04:19 AM  
      Screening of patient nares for MRSA is for surveillance purposes and, if positive, to facilitate isolation considerations in high risk settings. It is not intended for automatic decolonization interventions per se as regimens are not sufficiently effective to warrant routine use. Recent Results (from the past 24 hour(s)) CBC WITH AUTOMATED DIFF Collection Time: 02/14/20 12:41 PM  
Result Value Ref Range WBC 3.9 (L) 4.1 - 11.1 K/uL  
 RBC 3.11 (L) 4.10 - 5.70 M/uL HGB 9.5 (L) 12.1 - 17.0 g/dL HCT 30.2 (L) 36.6 - 50.3 % MCV 97.1 80.0 - 99.0 FL  
 MCH 30.5 26.0 - 34.0 PG  
 MCHC 31.5 30.0 - 36.5 g/dL RDW 17.4 (H) 11.5 - 14.5 % PLATELET 93 (L) 619 - 400 K/uL MPV 12.1 8.9 - 12.9 FL  
 NRBC 0.0 0  WBC ABSOLUTE NRBC 0.00 0.00 - 0.01 K/uL NEUTROPHILS 71 32 - 75 % BAND NEUTROPHILS 1 % LYMPHOCYTES 11 (L) 12 - 49 % MONOCYTES 6 5 - 13 % EOSINOPHILS 11 (H) 0 - 7 % BASOPHILS 0 0 - 1 % IMMATURE GRANULOCYTES 0 0.0 - 0.5 % ABS. NEUTROPHILS 2.9 1.8 - 8.0 K/UL  
 ABS. LYMPHOCYTES 0.4 (L) 0.8 - 3.5 K/UL  
 ABS. MONOCYTES 0.2 0.0 - 1.0 K/UL  
 ABS. EOSINOPHILS 0.4 0.0 - 0.4 K/UL  
 ABS. BASOPHILS 0.0 0.0 - 0.1 K/UL  
 ABS. IMM. GRANS. 0.0 0.00 - 0.04 K/UL  
 DF MANUAL    
 RBC COMMENTS ANISOCYTOSIS 1+ 
    
 RBC COMMENTS MACROCYTOSIS 
1+ METABOLIC PANEL, COMPREHENSIVE Collection Time: 02/14/20 12:41 PM  
Result Value Ref Range Sodium 128 (L) 136 - 145 mmol/L Potassium 4.3 3.5 - 5.1 mmol/L Chloride 85 (L) 97 - 108 mmol/L  
 CO2 34 (H) 21 - 32 mmol/L Anion gap 9 5 - 15 mmol/L Glucose 167 (H) 65 - 100 mg/dL BUN 94 (H) 6 - 20 MG/DL Creatinine 3.04 (H) 0.70 - 1.30 MG/DL  
 BUN/Creatinine ratio 31 (H) 12 - 20 GFR est AA 25 (L) >60 ml/min/1.73m2 GFR est non-AA 20 (L) >60 ml/min/1.73m2 Calcium 9.1 8.5 - 10.1 MG/DL Bilirubin, total 1.0 0.2 - 1.0 MG/DL  
 ALT (SGPT) 14 12 - 78 U/L  
 AST (SGOT) 27 15 - 37 U/L Alk. phosphatase 108 45 - 117 U/L Protein, total 7.6 6.4 - 8.2 g/dL Albumin 3.2 (L) 3.5 - 5.0 g/dL Globulin 4.4 (H) 2.0 - 4.0 g/dL A-G Ratio 0.7 (L) 1.1 - 2.2 CK W/ REFLX CKMB Collection Time: 02/14/20 12:41 PM  
Result Value Ref Range  (H) 39 - 308 U/L  
TROPONIN I Collection Time: 02/14/20 12:41 PM  
Result Value Ref Range Troponin-I, Qt. <0.05 <0.05 ng/mL CK-MB,QUANT. Collection Time: 02/14/20 12:41 PM  
Result Value Ref Range CK - MB 2.7 <3.6 NG/ML  
 CK-MB Index 0.5 0.0 - 2.5 URINALYSIS W/ REFLEX CULTURE Collection Time: 02/14/20  3:48 PM  
Result Value Ref Range Color YELLOW/STRAW Appearance CLEAR CLEAR Specific gravity 1.012 1.003 - 1.030    
 pH (UA) 7.5 5.0 - 8.0 Protein 30 (A) NEG mg/dL Glucose NEGATIVE  NEG mg/dL Ketone NEGATIVE  NEG mg/dL Bilirubin NEGATIVE  NEG Blood NEGATIVE  NEG Urobilinogen 0.2 0.2 - 1.0 EU/dL Nitrites NEGATIVE  NEG Leukocyte Esterase NEGATIVE  NEG    
 WBC 0-4 0 - 4 /hpf  
 RBC 0-5 0 - 5 /hpf Epithelial cells FEW FEW /lpf Bacteria NEGATIVE  NEG /hpf  
 UA:UC IF INDICATED CULTURE NOT INDICATED BY UA RESULT Hyaline cast 0-2 0 - 5 /lpf SODIUM, UR, RANDOM Collection Time: 02/14/20  3:48 PM  
Result Value Ref Range Sodium,urine random 73 MMOL/L  
CREATININE, UR, RANDOM Collection Time: 02/14/20  3:48 PM  
Result Value Ref Range Creatinine, urine 24.20 mg/dL POTASSIUM Collection Time: 02/14/20  4:29 PM  
Result Value Ref Range Potassium 4.0 3.5 - 5.1 mmol/L  
SODIUM, UR, RANDOM Collection Time: 02/14/20  6:54 PM  
Result Value Ref Range Sodium,urine random 57 MMOL/L  
CHLORIDE, URINE RANDOM Collection Time: 02/14/20  6:54 PM  
Result Value Ref Range Chloride,urine random 43 MMOL/L  
POTASSIUM, UR, RANDOM Collection Time: 02/14/20  6:54 PM  
Result Value Ref Range Potassium urine, random 30 MMOL/L  
PROTEIN/CREATININE RATIO, URINE Collection Time: 02/14/20  6:54 PM  
Result Value Ref Range Protein, urine random 44 (H) 0.0 - 11.9 mg/dL Creatinine, urine 28.40 mg/dL Protein/Creat. urine Ratio 1.5    
UREA NITROGEN, UR, RANDOM Collection Time: 02/14/20  6:54 PM  
Result Value Ref Range Urea Nitrogen,urine random 363 MG/DL  
OSMOLALITY, UR Collection Time: 02/14/20  6:54 PM  
Result Value Ref Range Osmolality,urine 314 MOSM/kg H2O  
GLUCOSE, POC Collection Time: 02/14/20  8:47 PM  
Result Value Ref Range Glucose (POC) 147 (H) 65 - 100 mg/dL Performed by Susan Downey RENAL FUNCTION PANEL  Collection Time: 02/15/20  1:22 AM  
 Result Value Ref Range Sodium 130 (L) 136 - 145 mmol/L Potassium 3.7 3.5 - 5.1 mmol/L Chloride 86 (L) 97 - 108 mmol/L  
 CO2 35 (H) 21 - 32 mmol/L Anion gap 9 5 - 15 mmol/L Glucose 118 (H) 65 - 100 mg/dL BUN 97 (H) 6 - 20 MG/DL Creatinine 2.97 (H) 0.70 - 1.30 MG/DL  
 BUN/Creatinine ratio 33 (H) 12 - 20 GFR est AA 25 (L) >60 ml/min/1.73m2 GFR est non-AA 21 (L) >60 ml/min/1.73m2 Calcium 9.1 8.5 - 10.1 MG/DL Phosphorus 4.3 2.6 - 4.7 MG/DL Albumin 3.4 (L) 3.5 - 5.0 g/dL TSH 3RD GENERATION Collection Time: 02/15/20  1:22 AM  
Result Value Ref Range TSH 2.31 0.36 - 3.74 uIU/mL PHOSPHORUS Collection Time: 02/15/20  1:22 AM  
Result Value Ref Range Phosphorus 4.3 2.6 - 4.7 MG/DL  
NT-PRO BNP Collection Time: 02/15/20  1:22 AM  
Result Value Ref Range NT pro-BNP 12,910 (H) <125 PG/ML  
SAMPLES BEING HELD Collection Time: 02/15/20  1:22 AM  
Result Value Ref Range SAMPLES BEING HELD SST   
 COMMENT Add-on orders for these samples will be processed based on acceptable specimen integrity and analyte stability, which may vary by analyte. GLUCOSE, POC Collection Time: 02/15/20  8:12 AM  
Result Value Ref Range Glucose (POC) 136 (H) 65 - 100 mg/dL Performed by Valerie Salmon (PCT) Total time spent with patient:  33   min. Care Plan discussed with: 
Patient  y Family RN  y   
Consulting Physician [id number] [facility],      
 
I have reviewed the flowsheets. Chart and Pertinent Notes have been reviewed. No change in PMH ,family and social history from Consult note.  
 
 
Karishma Ray MD

## 2020-02-16 ENCOUNTER — APPOINTMENT (OUTPATIENT)
Dept: ULTRASOUND IMAGING | Age: 70
DRG: 291 | End: 2020-02-16
Attending: INTERNAL MEDICINE
Payer: MEDICARE

## 2020-02-16 LAB
ALBUMIN SERPL-MCNC: 3.2 G/DL (ref 3.5–5)
ANION GAP SERPL CALC-SCNC: 9 MMOL/L (ref 5–15)
AV PEAK GRADIENT: 68.38 MMHG
AV VELOCITY RATIO: 0.59
AV VTI RATIO: 0.7
BUN SERPL-MCNC: 96 MG/DL (ref 6–20)
BUN/CREAT SERPL: 33 (ref 12–20)
CALCIUM SERPL-MCNC: 8.4 MG/DL (ref 8.5–10.1)
CHLORIDE SERPL-SCNC: 84 MMOL/L (ref 97–108)
CO2 SERPL-SCNC: 33 MMOL/L (ref 21–32)
CREAT SERPL-MCNC: 2.94 MG/DL (ref 0.7–1.3)
ECHO AO ROOT DIAM: 3.95 CM
ECHO AV AREA PEAK VELOCITY: 1.9 CM2
ECHO AV AREA VTI: 2.2 CM2
ECHO AV MEAN GRADIENT: 7.8 MMHG
ECHO AV MEAN VELOCITY: 1.27 M/S
ECHO AV PEAK GRADIENT: 17.2 MMHG
ECHO AV PEAK VELOCITY: 207.35 CM/S
ECHO AV REGURGITANT PHT: 460.4 CM
ECHO AV VTI: 39.02 CM
ECHO EST RA PRESSURE: 10 MMHG
ECHO LA AREA 4C: 31.6 CM2
ECHO LA MAJOR AXIS: 4.6 CM
ECHO LA TO AORTIC ROOT RATIO: 1.17
ECHO LA VOL 4C: 103.9 ML (ref 18–58)
ECHO LA VOLUME INDEX A4C: 42.87 ML/M2 (ref 16–28)
ECHO LV E' LATERAL VELOCITY: 3.52 CM/S
ECHO LV E' SEPTAL VELOCITY: 2.82 CM/S
ECHO LV EDV A4C: 137.5 ML
ECHO LV EDV INDEX A4C: 56.7 ML/M2
ECHO LV EJECTION FRACTION A4C: 11 %
ECHO LV ESV A4C: 122.7 ML
ECHO LV ESV INDEX A4C: 50.6 ML/M2
ECHO LV INTERNAL DIMENSION DIASTOLIC MMODE: 5.07 CM
ECHO LV INTERNAL DIMENSION DIASTOLIC: 4.33 CM (ref 4.2–5.9)
ECHO LV INTERNAL DIMENSION SYSTOLIC MMODE: 4.07 CM
ECHO LV INTERNAL DIMENSION SYSTOLIC: 4.14 CM
ECHO LV IVSD: 1.83 CM (ref 0.6–1)
ECHO LV MASS 2D: 354.8 G (ref 88–224)
ECHO LV MASS INDEX 2D: 146.4 G/M2 (ref 49–115)
ECHO LV POSTERIOR WALL DIASTOLIC: 1.42 CM (ref 0.6–1)
ECHO LVOT CARDIAC OUTPUT: 6 L/MIN
ECHO LVOT DIAM: 2.02 CM
ECHO LVOT PEAK GRADIENT: 6.1 MMHG
ECHO LVOT PEAK VELOCITY: 123.1 CM/S
ECHO LVOT SV: 86.4 ML
ECHO LVOT VTI: 27 CM
ECHO MV A VELOCITY: 34.7 CM/S
ECHO MV AREA PHT: 4.4 CM2
ECHO MV AREA VTI: 2.9 CM2
ECHO MV E DECELERATION TIME (DT): 149.3 MS
ECHO MV E VELOCITY: 89.03 CM/S
ECHO MV E/A RATIO: 2.57
ECHO MV E/E' LATERAL: 25.29
ECHO MV E/E' RATIO (AVERAGED): 28.43
ECHO MV E/E' SEPTAL: 31.57
ECHO MV MAX VELOCITY: 107.06 CM/S
ECHO MV MEAN GRADIENT: 2.1 MMHG
ECHO MV MEAN INFLOW VELOCITY: 0.69 M/S
ECHO MV PEAK GRADIENT: 4.6 MMHG
ECHO MV PRESSURE HALF TIME (PHT): 49.7 MS
ECHO MV VTI: 30.16 CM
ECHO PULMONARY ARTERY SYSTOLIC PRESSURE (PASP): 43.1 MMHG
ECHO PV MAX VELOCITY: 83.78 CM/S
ECHO PV MEAN GRADIENT: 1.7 MMHG
ECHO PV PEAK GRADIENT: 2.8 MMHG
ECHO PV VTI: 18.38 CM
ECHO RA AREA 4C: 23 CM2
ECHO RIGHT VENTRICULAR SYSTOLIC PRESSURE (RVSP): 43.1 MMHG
ECHO TV REGURGITANT MAX VELOCITY: 287.76 CM/S
ECHO TV REGURGITANT PEAK GRADIENT: 33.1 MMHG
ERYTHROCYTE [DISTWIDTH] IN BLOOD BY AUTOMATED COUNT: 17.5 % (ref 11.5–14.5)
GLUCOSE BLD STRIP.AUTO-MCNC: 117 MG/DL (ref 65–100)
GLUCOSE BLD STRIP.AUTO-MCNC: 137 MG/DL (ref 65–100)
GLUCOSE BLD STRIP.AUTO-MCNC: 157 MG/DL (ref 65–100)
GLUCOSE BLD STRIP.AUTO-MCNC: 164 MG/DL (ref 65–100)
GLUCOSE SERPL-MCNC: 97 MG/DL (ref 65–100)
HCT VFR BLD AUTO: 27.2 % (ref 36.6–50.3)
HGB BLD-MCNC: 8.6 G/DL (ref 12.1–17)
LVFS 2D: 4.3 %
LVFS: 19.59 %
LVOT MG: 3.09 MMHG
LVOT MV: 0.8 CM/S
MCH RBC QN AUTO: 30 PG (ref 26–34)
MCHC RBC AUTO-ENTMCNC: 31.6 G/DL (ref 30–36.5)
MCV RBC AUTO: 94.8 FL (ref 80–99)
MV DEC SLOPE: 5.97
NRBC # BLD: 0 K/UL (ref 0–0.01)
NRBC BLD-RTO: 0 PER 100 WBC
PHOSPHATE SERPL-MCNC: 4.3 MG/DL (ref 2.6–4.7)
PISA AR MAX VEL: 413.47 CM/S
PLATELET # BLD AUTO: 76 K/UL (ref 150–400)
PMV BLD AUTO: 11.5 FL (ref 8.9–12.9)
POTASSIUM SERPL-SCNC: 3.4 MMOL/L (ref 3.5–5.1)
RBC # BLD AUTO: 2.87 M/UL (ref 4.1–5.7)
SERVICE CMNT-IMP: ABNORMAL
SODIUM SERPL-SCNC: 126 MMOL/L (ref 136–145)
WBC # BLD AUTO: 4.4 K/UL (ref 4.1–11.1)

## 2020-02-16 PROCEDURE — 74011250636 HC RX REV CODE- 250/636: Performed by: INTERNAL MEDICINE

## 2020-02-16 PROCEDURE — 65660000000 HC RM CCU STEPDOWN

## 2020-02-16 PROCEDURE — 74011000250 HC RX REV CODE- 250: Performed by: INTERNAL MEDICINE

## 2020-02-16 PROCEDURE — 80069 RENAL FUNCTION PANEL: CPT

## 2020-02-16 PROCEDURE — 85027 COMPLETE CBC AUTOMATED: CPT

## 2020-02-16 PROCEDURE — 74011000258 HC RX REV CODE- 258: Performed by: INTERNAL MEDICINE

## 2020-02-16 PROCEDURE — 74011250637 HC RX REV CODE- 250/637: Performed by: INTERNAL MEDICINE

## 2020-02-16 PROCEDURE — 74011250637 HC RX REV CODE- 250/637: Performed by: HOSPITALIST

## 2020-02-16 PROCEDURE — 82962 GLUCOSE BLOOD TEST: CPT

## 2020-02-16 PROCEDURE — 94760 N-INVAS EAR/PLS OXIMETRY 1: CPT

## 2020-02-16 PROCEDURE — 76705 ECHO EXAM OF ABDOMEN: CPT

## 2020-02-16 PROCEDURE — 36415 COLL VENOUS BLD VENIPUNCTURE: CPT

## 2020-02-16 PROCEDURE — 74011636637 HC RX REV CODE- 636/637: Performed by: INTERNAL MEDICINE

## 2020-02-16 RX ORDER — POTASSIUM CHLORIDE 14.9 MG/ML
10 INJECTION INTRAVENOUS
Status: DISCONTINUED | OUTPATIENT
Start: 2020-02-16 | End: 2020-02-16

## 2020-02-16 RX ORDER — POTASSIUM CHLORIDE 20 MEQ/1
40 TABLET, EXTENDED RELEASE ORAL 2 TIMES DAILY
Status: DISCONTINUED | OUTPATIENT
Start: 2020-02-16 | End: 2020-02-19

## 2020-02-16 RX ORDER — ADHESIVE BANDAGE
30 BANDAGE TOPICAL DAILY PRN
Status: DISCONTINUED | OUTPATIENT
Start: 2020-02-16 | End: 2020-02-21 | Stop reason: HOSPADM

## 2020-02-16 RX ORDER — OXYCODONE AND ACETAMINOPHEN 5; 325 MG/1; MG/1
1 TABLET ORAL DAILY PRN
Status: DISCONTINUED | OUTPATIENT
Start: 2020-02-16 | End: 2020-02-17

## 2020-02-16 RX ADMIN — HEPARIN SODIUM 5000 UNITS: 5000 INJECTION INTRAVENOUS; SUBCUTANEOUS at 05:53

## 2020-02-16 RX ADMIN — CARVEDILOL 12.5 MG: 12.5 TABLET, FILM COATED ORAL at 09:48

## 2020-02-16 RX ADMIN — POTASSIUM CHLORIDE 40 MEQ: 20 TABLET, EXTENDED RELEASE ORAL at 18:56

## 2020-02-16 RX ADMIN — BUMETANIDE 4 MG: 0.25 INJECTION INTRAMUSCULAR; INTRAVENOUS at 09:46

## 2020-02-16 RX ADMIN — SODIUM CHLORIDE 0.25 MCG/KG/MIN: 9 INJECTION, SOLUTION INTRAVENOUS at 13:52

## 2020-02-16 RX ADMIN — MAGNESIUM HYDROXIDE 30 ML: 400 SUSPENSION ORAL at 13:52

## 2020-02-16 RX ADMIN — Medication 10 ML: at 22:19

## 2020-02-16 RX ADMIN — Medication 10 ML: at 05:53

## 2020-02-16 RX ADMIN — HEPARIN SODIUM 5000 UNITS: 5000 INJECTION INTRAVENOUS; SUBCUTANEOUS at 13:52

## 2020-02-16 RX ADMIN — HEPARIN SODIUM 5000 UNITS: 5000 INJECTION INTRAVENOUS; SUBCUTANEOUS at 22:16

## 2020-02-16 RX ADMIN — BUMETANIDE 4 MG: 0.25 INJECTION INTRAMUSCULAR; INTRAVENOUS at 18:55

## 2020-02-16 RX ADMIN — PRAMIPEXOLE DIHYDROCHLORIDE 0.5 MG: 0.25 TABLET ORAL at 22:15

## 2020-02-16 RX ADMIN — POTASSIUM CHLORIDE 40 MEQ: 20 TABLET, EXTENDED RELEASE ORAL at 09:48

## 2020-02-16 RX ADMIN — ZOLPIDEM TARTRATE 10 MG: 5 TABLET ORAL at 22:33

## 2020-02-16 RX ADMIN — TIMOLOL MALEATE 1 DROP: 5 SOLUTION/ DROPS OPHTHALMIC at 10:13

## 2020-02-16 RX ADMIN — ZOLPIDEM TARTRATE 10 MG: 5 TABLET ORAL at 00:24

## 2020-02-16 RX ADMIN — TIMOLOL MALEATE 1 DROP: 5 SOLUTION/ DROPS OPHTHALMIC at 18:57

## 2020-02-16 RX ADMIN — TAMSULOSIN HYDROCHLORIDE 0.8 MG: 0.4 CAPSULE ORAL at 09:48

## 2020-02-16 RX ADMIN — IRON SUCROSE 200 MG: 20 INJECTION, SOLUTION INTRAVENOUS at 09:46

## 2020-02-16 RX ADMIN — SIMETHICONE CHEW TAB 80 MG 80 MG: 80 TABLET ORAL at 13:52

## 2020-02-16 RX ADMIN — ISOSORBIDE MONONITRATE 30 MG: 30 TABLET, EXTENDED RELEASE ORAL at 09:48

## 2020-02-16 RX ADMIN — SIMETHICONE CHEW TAB 80 MG 80 MG: 80 TABLET ORAL at 00:24

## 2020-02-16 RX ADMIN — ACETAMINOPHEN 650 MG: 325 TABLET ORAL at 10:23

## 2020-02-16 RX ADMIN — INSULIN GLARGINE 20 UNITS: 100 INJECTION, SOLUTION SUBCUTANEOUS at 22:18

## 2020-02-16 RX ADMIN — CARVEDILOL 12.5 MG: 12.5 TABLET, FILM COATED ORAL at 18:55

## 2020-02-16 RX ADMIN — BUMETANIDE 4 MG: 0.25 INJECTION INTRAMUSCULAR; INTRAVENOUS at 22:17

## 2020-02-16 RX ADMIN — OXYCODONE HYDROCHLORIDE AND ACETAMINOPHEN 1 TABLET: 5; 325 TABLET ORAL at 20:46

## 2020-02-16 RX ADMIN — Medication 10 ML: at 14:01

## 2020-02-16 NOTE — PROGRESS NOTES
Bedside shift change report GIVEN TO Boni Castillo RN. Report included the following information SBAR and Kardex. SIGNIFICANT CHANGES DURING SHIFT:  None. CONCERNS TO ADDRESS WITH MD:  None. [id number] NURSING NOTE Admission Date 2/14/2020 Admission Diagnosis Acute exacerbation of CHF (congestive heart failure) ([facility] [address] [I50.9] Consults IP CONSULT TO NEPHROLOGY 
IP CONSULT TO CARDIOLOGY Cardiac Monitoring [x] Yes [] No  
  
Purposeful Hourly Rounding [x] Yes   
Thanh Score Total Score: 4 Thanh score 3 or > [x] Bed Alarm [] Avasys [] 1:1 sitter [] Patient refused (Signed refusal form in chart) Aurelio Score Aurelio Score: 17 Aurelio score 14 or < [] PMT consult [] Wound Care consult  
 []  Specialty bed  [] Nutrition consult Influenza Vaccine Received Flu Vaccine for Current Season (usually Sept-March): Yes Oxygen needs? [x] Room air Oxygen @  []1L    []2L    []3L   []4L    []5L   []6L via NC Chronic home O2 use? [] Yes [x] No 
Perform O2 challenge test and document in progress note using smartphTHE BEARDED LADYe (.[employer]) Last bowel movement Last Bowel Movement Date: 02/15/20 Urinary Catheter LDAs Peripheral IV 02/14/20 Left Arm (Active) Site Assessment Clean, dry, & intact 2/16/2020  3:00 AM  
Phlebitis Assessment 0 2/16/2020  3:00 AM  
Infiltration Assessment 0 2/16/2020  3:00 AM  
Dressing Status Clean, dry, & intact 2/16/2020  3:00 AM  
Dressing Type Transparent;Tape 2/16/2020  3:00 AM  
Hub Color/Line Status Flushed;Pink 2/16/2020  3:00 AM  
                  
  
Readmission Risk Assessment Tool Score High Risk 43 Total Score 3 Has Seen PCP in Last 6 Months (Yes=3, No=0) 2 . Living with Significant Other. Assisted Living. LTAC. SNF. or  
Rehab  
 4 IP Visits Last 12 Months (1-3=4, 4=9, >4=11) 5 Pt. Coverage (Medicare=5 , Medicaid, or Self-Pay=4) 28 Charlson Comorbidity Score (Age + Comorbid Conditions) Criteria that do not apply:  
 Patient Length of Stay (>5 days = 3) Expected Length of Stay - - - Actual Length of Stay 2

## 2020-02-16 NOTE — PROGRESS NOTES
PROGRESS NOTE 
 
NAME:  Royal Miguel Angel Farrell. :   1950 MRN:   [id number] Date/Time:  2020 10:13 AM 
Subjective:  
History:  Chart reviewed and patient seen and examined this AM and D/W his nurse, Pharmacist and Cardiology and all events noted. He was admitted on  with acute on chronic combined diastolic and systolic CHF with underlying HTN, DM,  ASCVD, Cardiomyopathy and CKD stage 4 with a history of Afib S/P ablation. He had become severely SOB over the 3 week period PTA until the night before admission he couldn't sleep at all and thus he presented to the ER. In the ER his weight was recorded at 250# up from 242# when last seen by me at the office on  (240# on 19). He still feels that he can't get a good breath although he is better now and feels some of his current problem due to constipation and abdominal bloating. He has diuresed well since admission with 3875 reported out last 24 hr and first 24 hr output inaccurately recorded according to what patient stated and lungs are clear on exam as well as admission CXR was clear. He has no CP, Palpitations or other cardiac or respiratory c/o. He noted at home that his urine output had decreased despite compliance with his diuretics. He has had excellent UO since admission and renal sono w/o obstruction. He denies any N/V or GI c/o. He has no other c/o on complete ROS. Medications reviewed: 
Current Facility-Administered Medications Medication Dose Route Frequency  potassium chloride (K-DUR, KLOR-CON) SR tablet 40 mEq  40 mEq Oral BID  
 oxyCODONE-acetaminophen (PERCOCET) 5-325 mg per tablet 1 Tab  1 Tab Oral DAILY PRN  
 magnesium hydroxide (MILK OF MAGNESIA) 400 mg/5 mL oral suspension 30 mL  30 mL Oral DAILY PRN  
 epoetin mary-epbx (RETACRIT) injection 10,000 Units  10,000 Units SubCUTAneous Q TUE, THU & SAT  polyethylene glycol (MIRALAX) packet 17 g  17 g Oral DAILY PRN  
  simethicone (MYLICON) tablet 80 mg  80 mg Oral QID PRN  
 bumetanide (BUMEX) injection 4 mg  4 mg IntraVENous TID  iron sucrose (VENOFER) injection 200 mg  200 mg IntraVENous DAILY  sodium chloride (NS) flush 5-40 mL  5-40 mL IntraVENous Q8H  
 sodium chloride (NS) flush 5-40 mL  5-40 mL IntraVENous PRN  
 acetaminophen (TYLENOL) tablet 650 mg  650 mg Oral Q4H PRN  
 ondansetron (ZOFRAN) injection 4 mg  4 mg IntraVENous Q4H PRN  
 heparin (porcine) injection 5,000 Units  5,000 Units SubCUTAneous Q8H  
 carvediloL (COREG) tablet 12.5 mg  12.5 mg Oral BID WITH MEALS  isosorbide mononitrate ER (IMDUR) tablet 30 mg  30 mg Oral DAILY  zolpidem (AMBIEN) tablet 10 mg  10 mg Oral QHS PRN  
 timolol (TIMOPTIC) 0.5 % ophthalmic solution 1 Drop  1 Drop Right Eye BID  tamsulosin (FLOMAX) capsule 0.8 mg  0.8 mg Oral DAILY  insulin glargine (LANTUS) injection 20 Units  20 Units SubCUTAneous QHS  glucose chewable tablet 16 g  4 Tab Oral PRN  
 dextrose (D50W) injection syrg 12.5-25 g  25-50 mL IntraVENous PRN  
 glucagon (GLUCAGEN) injection 1 mg  1 mg IntraMUSCular PRN  
 insulin lispro (HUMALOG) injection   SubCUTAneous AC&HS  insulin glargine (LANTUS) injection 20 Units  20 Units SubCUTAneous QHS  diphenhydrAMINE (BENADRYL) capsule 25 mg  25 mg Oral Q6H PRN  pramipexole (MIRAPEX) tablet 0.5 mg  0.5 mg Oral QHS Objective:  
Vitals: 
Visit Vitals /70 Pulse 70 Temp 97.8 °F (36.6 °C) Resp 18 Ht 6' 4\" (1.93 m) Wt 248 lb 6.4 oz (112.7 kg) SpO2 98% BMI 30.24 kg/m² O2 Flow Rate (L/min): 2.5 l/min O2 Device: Room air Temp (24hrs), Av.6 °F (36.4 °C), Min:97 °F (36.1 °C), Max:98.1 °F (36.7 °C) Last 24hr Input/Output: 
 
Intake/Output Summary (Last 24 hours) at 2020 1013 Last data filed at 2020 3473 Gross per 24 hour Intake 400 ml Output 3875 ml Net -3475 ml PHYSICAL EXAM: 
 General:     Alert, cooperative, no distress, appears stated age. Head:    Normocephalic, without obvious abnormality, atraumatic. Eyes:    Conjunctivae/corneas clear. PERRLA Nose:   Nares normal. No drainage or sinus tenderness. Throat:     Lips, mucosa, and tongue normal.  No Thrush Neck:   Supple, symmetrical,  no adenopathy, thyroid: non tender 
   no carotid bruit and no JVD. Back:     Symmetric,  No CVA tenderness. Lungs:    Clear to auscultation bilaterally. No Wheezing or Rhonchi. No rales. Heart:    Regular rate and rhythm,  no murmur, rub or gallop. Abdomen:    Soft, non-tender. Not distended. Bowel sounds normal. No masses Extremities:  Extremities normal, atraumatic, No cyanosis. No edema. No clubbing Lymph nodes:  Cervical, supraclavicular normal. 
Neurologic:  Normal strength, Alert and oriented X 3. Skin:                No rash Lab Data Reviewed: 
 
Recent Results (from the past 24 hour(s)) GLUCOSE, POC Collection Time: 02/15/20 11:11 AM  
Result Value Ref Range Glucose (POC) 155 (H) 65 - 100 mg/dL Performed by Nadiya Friend (PCT) ECHO ADULT COMPLETE Collection Time: 02/15/20 11:11 AM  
Result Value Ref Range Right Atrial Area 4C 23.00 cm2 LV E' Lateral Velocity 3.52 cm/s LV E' Septal Velocity 2.82 cm/s Ao Root D 3.95 cm Aortic Valve Systolic Peak Velocity 795.81 cm/s AoV VTI 39.02 cm Aortic Valve Area by Continuity of Peak Velocity 1.9 cm2 Aortic Valve Area by Continuity of VTI 2.2 cm2 AoV PG 17.2 mmHg LVIDd 4.33 4.2 - 5.9 cm  
 LVPWd 1.42 (A) 0.6 - 1.0 cm LVIDs 4.14 cm IVSd 1.83 (A) 0.6 - 1.0 cm  
 LV ES Vol A4C 122.7 mL  
 LVOT d 2.02 cm  
 LVOT Peak Velocity 123.10 cm/s LVOT Peak Gradient 6.1 mmHg LVOT VTI 27.00 cm  
 MVA (PHT) 4.4 cm2  
 MV A Chong 34.70 cm/s  
 MV E Chong 89.03 cm/s  
 MV E/A 2.60   
 MV Mean Gradient 2.1 mmHg  Mitral Valve Annulus Velocity Time Integral 30.16 cm  
 Left Atrium to Aortic Root Ratio 1.17 Pulmonic Valve Systolic Mean Gradient 1.7 mmHg Pulmonic Valve Systolic Velocity Time Integral 18.38 cm Aortic Valve Systolic Mean Gradient 7.8 mmHg LV Ejection Fraction MOD 4C 11 % LVOT Cardiac Output 6.0 L/min LA Vol 4C 103.90 (A) 18 - 58 mL  
 LA Area 4C 31.6 cm2 LV Mass .8 (A) 88 - 224 g LV Mass AL Index 146.4 49 - 115 g/m2 E/E' lateral 25.29   
 E/E' septal 31.57   
 RVSP 43.1 mmHg LVIDs (M-mode) 4.07 cm LVIDd (M-mode) 5.07 cm  
 MV Peak Gradient 4.6 mmHg E/E' ratio (averaged) 28.43   
 LV ED Vol A4C 137.5 mL Est. RA Pressure 10.0 mmHg Mitral Valve E Wave Deceleration Time 149.3 ms  
 Mitral Valve Pressure Half-time 49.7 ms Left Atrium Major Axis 4.60 cm Triscuspid Valve Regurgitation Peak Gradient 33.1 mmHg Aortic Regurgitant Pressure Half-time 460.4 cm Pulmonic Valve Max Velocity 83.78 cm/s Mitral Valve Max Velocity 107.06 cm/s MVA VTI 2.9 cm2 LVOT SV 86.4 ml  
 TR Max Velocity 287.76 cm/s PASP 43.1 mmHg LA Vol Index 42.87 16 - 28 ml/m2 LVED Vol Index A4C 56.7 mL/m2 LVES Vol Index A4C 50.6 mL/m2 AR Max Chong 413.47 cm/s Left Ventricular Fractional Shortening 10.724695459 % Left Ventricular Fractional Shortening by 2D 7.5746785222 % Left Ventricular Outflow Tract Mean Gradient 5.7953965858496 mmHg Left Ventricular Outflow Tract Mean Velocity 2.94822574910676 cm/s Mitral Valve Deceleration Luce 5.5875397615 Mitral valve mean inflow velocity 0.51711815945879 m/s AV Velocity Ratio 0.59   
 AV VTI Ratio 0.7 Aortic valve mean velocity 1.7081669439392 m/s AV peak gradient 41.666413809 mmHg PV peak gradient 2.8 mmHg GLUCOSE, POC Collection Time: 02/15/20  4:59 PM  
Result Value Ref Range Glucose (POC) 151 (H) 65 - 100 mg/dL Performed by River Duong (PCT) GLUCOSE, POC Collection Time: 02/15/20  9:12 PM  
Result Value Ref Range Glucose (POC) 126 (H) 65 - 100 mg/dL Performed by Indira Mak CBC W/O DIFF Collection Time: 02/16/20 12:28 AM  
Result Value Ref Range WBC 4.4 4.1 - 11.1 K/uL  
 RBC 2.87 (L) 4.10 - 5.70 M/uL HGB 8.6 (L) 12.1 - 17.0 g/dL HCT 27.2 (L) 36.6 - 50.3 % MCV 94.8 80.0 - 99.0 FL  
 MCH 30.0 26.0 - 34.0 PG  
 MCHC 31.6 30.0 - 36.5 g/dL  
 RDW 17.5 (H) 11.5 - 14.5 % PLATELET 76 (L) 962 - 400 K/uL MPV 11.5 8.9 - 12.9 FL  
 NRBC 0.0 0  WBC ABSOLUTE NRBC 0.00 0.00 - 0.01 K/uL RENAL FUNCTION PANEL Collection Time: 02/16/20 12:28 AM  
Result Value Ref Range Sodium 126 (L) 136 - 145 mmol/L Potassium 3.4 (L) 3.5 - 5.1 mmol/L Chloride 84 (L) 97 - 108 mmol/L  
 CO2 33 (H) 21 - 32 mmol/L Anion gap 9 5 - 15 mmol/L Glucose 97 65 - 100 mg/dL BUN 96 (H) 6 - 20 MG/DL Creatinine 2.94 (H) 0.70 - 1.30 MG/DL  
 BUN/Creatinine ratio 33 (H) 12 - 20 GFR est AA 26 (L) >60 ml/min/1.73m2 GFR est non-AA 21 (L) >60 ml/min/1.73m2 Calcium 8.4 (L) 8.5 - 10.1 MG/DL Phosphorus 4.3 2.6 - 4.7 MG/DL Albumin 3.2 (L) 3.5 - 5.0 g/dL GLUCOSE, POC Collection Time: 02/16/20  7:18 AM  
Result Value Ref Range Glucose (POC) 117 (H) 65 - 100 mg/dL Performed by Sana Soto (PCT) Assessment/Plan:  
 
Principal Problem: 
  Acute exacerbation of CHF (congestive heart failure) ([facility] [address] (2/14/2020) Active Problems: 
  ASCVD (arteriosclerotic cardiovascular disease) (8/5/2017) Controlled type 2 diabetes mellitus with stage 4 chronic kidney disease, without long-term current use of insulin ([facility] [address] (8/5/2017) CKD (chronic kidney disease), stage IV ([facility] [address] (8/5/2017) Hypertension with renal disease (8/5/2017) Gastroesophageal reflux disease without esophagitis (8/5/2017) Cardiomyopathy ([facility] [address] (8/5/2017) Anemia (8/5/2017) S/P ablation of atrial fibrillation (5/17/2018)   Chronic combined systolic and diastolic CHF, NYHA class 4 (HCC) (2/15/2020) 
 
  
___________________________________________________ PLAN: 
 
1. Continue diuresis with IV Bumex as had had excellent output despite inaccurate recorded I & O s initial 24 hr (3875 yesterday) 2. Follow weights (recent baseline 240# in December) 250# on admission and 247# yesterday so ? 248# today and I & O which need to be more accurate 3. Hold off on Zaroxolyn as long as excellent UOP with IV Bumex 4. He will need dialysis at some point which I D/W him, Has been reluctant in past although followed by renal Dr. Adonis Benitez for at least 2 years (Renal function baseline 77/2.5 on 1/6/20, 78/2.4 on 2/7/20), 94/3.04 on admission, today 96/2.94 
5. Continue Coreg for cardiomyopathy, Unable to use ACE or ARB due to renal disease 6. Imdur with Ischemic Cardiomyopathy 7. Follow K with diuresis and replete as needed, Not able to tolerate IV so increase PO 
8. Procrit ordered as well as IV Venofer (D#2 today) 9. Continue Lantus for DM 
10. Follow BS and treat with SSI 11. Flomax for BPH 12. Mirapex for Restless legs 13. No current symptoms c/w Angina but follow closely, Certainly if needs cath will end up with dialysis from this point foreward 14. Nasal oxygen for Hypoxemia/SOB, sat improved now and on RA 
15. Echocardiogram, Last EFx May 2019 was 33% 16. Hyponatremia due to volume overload was improving but today down to 126 so follow closely, doubt related to metolazone as has been taking it for several years 40 minutes spent in direct care of this patient today with High complexity decision making today on this extremely high complexity patient who is critically ill with high risk of further decompensation  
 
 
___________________________________________________ Attending Physician: Mandeep Guzman MD

## 2020-02-16 NOTE — PROGRESS NOTES
[facility] 
 [address] [facility], [address] [facility], [facility] Phone: [phone]   [phone]   
  
Nephrology Progress Note Carlos Jean.     1950     [id number] Date of Admission : 2/14/2020 02/16/20 CC: Follow up for ckd 4 /chf  
 
Assessment and Plan · JOSE ANTONIO due to cardiorenal syndrome - most likely · ckd 4- cr. 2.5 baseline · Chf, edema- ef 35% · Anemia due to ckd and iron defi. Iron sats 15% · Hyponatremia - na 128- due to metolazone use + CHF  
· H/o DM PLAN- 
· Continue bumex 4 mg iv tid · Still at risk for worsening renal function and dialysis - pt consented if needed · Milrinone per cardiology · iv iron + epogen · Daily bmp · Renal us was MRD Interval History: 
 
Seen and examined. UOP stable, Cr stable. No cp, sob, n/v/d. Still w/ some abd fullness. Review of Systems: A comprehensive review of systems was negative except for that written in the HPI. Current Medications:  
Current Facility-Administered Medications Medication Dose Route Frequency  potassium chloride (K-DUR, KLOR-CON) SR tablet 40 mEq  40 mEq Oral BID  
 oxyCODONE-acetaminophen (PERCOCET) 5-325 mg per tablet 1 Tab  1 Tab Oral DAILY PRN  
 magnesium hydroxide (MILK OF MAGNESIA) 400 mg/5 mL oral suspension 30 mL  30 mL Oral DAILY PRN  
 milrinone (PRIMACOR) 200 mcg/mL in 0.9% sodium chloride 100 mL infusion  0.25 mcg/kg/min IntraVENous CONTINUOUS  
 epoetin mary-epbx (RETACRIT) injection 10,000 Units  10,000 Units SubCUTAneous Q TUE, THU & SAT  polyethylene glycol (MIRALAX) packet 17 g  17 g Oral DAILY PRN  
 simethicone (MYLICON) tablet 80 mg  80 mg Oral QID PRN  
 bumetanide (BUMEX) injection 4 mg  4 mg IntraVENous TID  iron sucrose (VENOFER) injection 200 mg  200 mg IntraVENous DAILY  sodium chloride (NS) flush 5-40 mL  5-40 mL IntraVENous Q8H  
 sodium chloride (NS) flush 5-40 mL  5-40 mL IntraVENous PRN  
  acetaminophen (TYLENOL) tablet 650 mg  650 mg Oral Q4H PRN  
 ondansetron (ZOFRAN) injection 4 mg  4 mg IntraVENous Q4H PRN  
 heparin (porcine) injection 5,000 Units  5,000 Units SubCUTAneous Q8H  
 carvediloL (COREG) tablet 12.5 mg  12.5 mg Oral BID WITH MEALS  isosorbide mononitrate ER (IMDUR) tablet 30 mg  30 mg Oral DAILY  zolpidem (AMBIEN) tablet 10 mg  10 mg Oral QHS PRN  
 timolol (TIMOPTIC) 0.5 % ophthalmic solution 1 Drop  1 Drop Right Eye BID  tamsulosin (FLOMAX) capsule 0.8 mg  0.8 mg Oral DAILY  insulin glargine (LANTUS) injection 20 Units  20 Units SubCUTAneous QHS  glucose chewable tablet 16 g  4 Tab Oral PRN  
 dextrose (D50W) injection syrg 12.5-25 g  25-50 mL IntraVENous PRN  
 glucagon (GLUCAGEN) injection 1 mg  1 mg IntraMUSCular PRN  
 insulin lispro (HUMALOG) injection   SubCUTAneous AC&HS  insulin glargine (LANTUS) injection 20 Units  20 Units SubCUTAneous QHS  diphenhydrAMINE (BENADRYL) capsule 25 mg  25 mg Oral Q6H PRN  pramipexole (MIRAPEX) tablet 0.5 mg  0.5 mg Oral QHS Allergies Allergen Reactions  Niacin Unknown (comments) Niaspan  Adhesive Rash  Imipenem Diarrhea Other reaction(s): Rash  Levemir [Insulin Detemir] Hives  Other Medication Other (comments) ? Allergy to Tripp Johann causing chemical burn  Primaxin [Imipenem-Cilastatin] Diarrhea and Rash  Xarelto [Rivaroxaban] Rash and Itching Other reaction(s): Rash Objective: 
Vitals:   
Vitals:  
 02/16/20 0423 02/16/20 3587 02/16/20 0946 02/16/20 1041 BP: 124/71 120/71 119/70 120/81 Pulse: 70 72 70 72 Resp: 20 18  18 Temp: 97.6 °F (36.4 °C) 97.8 °F (36.6 °C)  97.9 °F (36.6 °C) SpO2: 95% 98%  100% Weight:      
Height:      
 
Intake and Output: 
No intake/output data recorded. 02/14 1901 - 02/16 0700 In: 9952 [P.O.:1720] Out: Hernandez Trinh [[id number]] Physical Examination: 
Pt intubated    Yes / No 
General: NAD,Conversant Neck:  Supple, no mass Resp:  Lungs CTA B/L, no wheezing , normal respiratory effort CV:  RRR,  no murmur or rub,1+ LE edema GI:  Soft, NT, + Bowel sounds, no hepatosplenomegaly Neurologic:  Non focal 
Psych:             AAO x 3 appropriate affect Skin:  No Rash :  No boyer [x]    High complexity decision making was performed 
[x]    Patient is at high-risk of decompensation with multiple organ involvement Lab Data Personally Reviewed: I have reviewed all the pertinent labs, microbiology data and radiology studies during assessment. Recent Labs  
  02/16/20 
0028 02/15/20 
0122 02/14/20 
1629 02/14/20 
1241 * 130*  --  128* K 3.4* 3.7 4.0 4.3 CL 84* 86*  --  85* CO2 33* 35*  --  34* GLU 97 118*  --  167* BUN 96* 97*  --  94* CREA 2.94* 2.97*  --  3.04* CA 8.4* 9.1  --  9.1 PHOS 4.3 4.3  4.3  --   --   
ALB 3.2* 3.4*  --  3.2* SGOT  --   --   --  27 ALT  --   --   --  14 Recent Labs  
  02/16/20 
0028 02/14/20 
1241 WBC 4.4 3.9* HGB 8.6* 9.5* HCT 27.2* 30.2* PLT 76* 93* No results found for: SDES Lab Results Component Value Date/Time Culture result: NO GROWTH 4 DAYS 01/03/2020 06:44 PM  
 Culture result: NO GROWTH ON SOLID MEDIA AT 4 DAYS 01/03/2020 06:44 PM  
 Culture result: (A) 01/03/2020 06:44 PM  
  ENTEROCOCCUS FAECALIS GROUP D ISOLATED FROM THIO BROTH ONLY Culture result: NO GROWTH 5 DAYS 06/05/2019 07:07 PM  
 Culture result: MRSA NOT PRESENT 05/28/2019 04:19 AM  
 Culture result:  05/28/2019 04:19 AM  
      Screening of patient nares for MRSA is for surveillance purposes and, if positive, to facilitate isolation considerations in high risk settings. It is not intended for automatic decolonization interventions per se as regimens are not sufficiently effective to warrant routine use. Recent Results (from the past 24 hour(s)) GLUCOSE, POC Collection Time: 02/15/20  4:59 PM  
Result Value Ref Range Glucose (POC) 151 (H) 65 - 100 mg/dL Performed by Jimmy Taveras ([facility]) GLUCOSE, POC Collection Time: 02/15/20  9:12 PM  
Result Value Ref Range Glucose (POC) 126 (H) 65 - 100 mg/dL Performed by Aubrey Broderick CBC W/O DIFF Collection Time: 02/16/20 12:28 AM  
Result Value Ref Range WBC 4.4 4.1 - 11.1 K/uL  
 RBC 2.87 (L) 4.10 - 5.70 M/uL HGB 8.6 (L) 12.1 - 17.0 g/dL HCT 27.2 (L) 36.6 - 50.3 % MCV 94.8 80.0 - 99.0 FL  
 MCH 30.0 26.0 - 34.0 PG  
 MCHC 31.6 30.0 - 36.5 g/dL  
 RDW 17.5 (H) 11.5 - 14.5 % PLATELET 76 (L) 217 - 400 K/uL MPV 11.5 8.9 - 12.9 FL  
 NRBC 0.0 0  WBC ABSOLUTE NRBC 0.00 0.00 - 0.01 K/uL RENAL FUNCTION PANEL Collection Time: 02/16/20 12:28 AM  
Result Value Ref Range Sodium 126 (L) 136 - 145 mmol/L Potassium 3.4 (L) 3.5 - 5.1 mmol/L Chloride 84 (L) 97 - 108 mmol/L  
 CO2 33 (H) 21 - 32 mmol/L Anion gap 9 5 - 15 mmol/L Glucose 97 65 - 100 mg/dL BUN 96 (H) 6 - 20 MG/DL Creatinine 2.94 (H) 0.70 - 1.30 MG/DL  
 BUN/Creatinine ratio 33 (H) 12 - 20 GFR est AA 26 (L) >60 ml/min/1.73m2 GFR est non-AA 21 (L) >60 ml/min/1.73m2 Calcium 8.4 (L) 8.5 - 10.1 MG/DL Phosphorus 4.3 2.6 - 4.7 MG/DL Albumin 3.2 (L) 3.5 - 5.0 g/dL GLUCOSE, POC Collection Time: 02/16/20  7:18 AM  
Result Value Ref Range Glucose (POC) 117 (H) 65 - 100 mg/dL Performed by Severiano Currie (PCT) GLUCOSE, POC Collection Time: 02/16/20 10:55 AM  
Result Value Ref Range Glucose (POC) 164 (H) 65 - 100 mg/dL Performed by Severiano Currie (PCT) Total time spent with patient:  33   min. Care Plan discussed with: 
Patient  y Family RN  y   
Consulting Physician [address] [facility],      
 
I have reviewed the flowsheets. Chart and Pertinent Notes have been reviewed. No change in PMH ,family and social history from Consult note.  
 
 
Arabella Hercules MD

## 2020-02-16 NOTE — PROGRESS NOTES
Reason for Admission:  Acute exacerbation of CHF   
            
RUR Score: 42 Resources/supports as identified by patient/family:    
             
Top Challenges facing patient (as identified by patient/family and CM): Finances/Medication cost?     Dual coverage Medicare & [employer]. Patient expressed no financial difficulty in affording medications. [address]). Transportation? Family to transport at time of discharge. Support system or lack thereof? Family support in place. Living arrangements? Lives w/spouse in two story home; however only occupies the first level of his home. Self-care/ADLs/Cognition? Pt is alert and oriented x4. Independent w/ADL's & IADL's and requires no assistance at this present time. Patient doesn't drive and spouse transports to all medical appointments'. Current Advanced Directive/Advance Care Plan:  FULL Code. Advance Care Plan Directive on file w/daughter Kelli Lang being primary decision maker. Plan for utilizing home health:    Prior [facility] in the past.  Not currently open for [facility] services. Transition of Care Plan:   Pt to d/c home w/family transporting. Lives w/spouse in two story home and occupies the first level of his home. No O2. DME (rollator BiPap machine, walker, and walking stick). Last seen in PCP office 2/7/2020. [address]). Pt to consider HH option. CM to follow up on this. Care Management Interventions PCP Verified by CM: Yes(Feb 7th, 2020) Mode of Transport at Discharge: Other (see comment)(Family) Transition of Care Consult (CM Consult): Discharge Planning Discharge Durable Medical Equipment: (DME (rollator, BiPap, walker, walking stick) ) Current Support Network: Lives with Spouse, Own Home(Two story home .) Confirm Follow Up Transport: Family Discharge Location Discharge Placement: (Home) Pedrito Esteban, MSW 
[id number]

## 2020-02-16 NOTE — PROGRESS NOTES
Progress Note 2/16/2020 11:46 AM 
NAME: Leana Alonso MRN:  [id number] Admit Diagnosis: Acute exacerbation of CHF (congestive heart failure) ([facility] [I50.9] Problem List: 1. Acute on chronic combined systolic and diastolic heart failure with symptomatic volume overload including dyspnea at rest, abdominal fullness, lower extremity edema but nothing on CXR--a lot of R-sided sx. Biventricular failure with NYHA CHF class 4 symptoms acutely. 2. Paroxysmal atrial fibrillation and atypical atrial flutter s/p ablation in 5/2018.  NO significant atrial fibrillation or flutter since the ablation. 3. Remote dual chamber SJM pacer for sick sinus syndrome, upgraded to biventricular pacemaker in 1/2019 due to CHF, cardiomyopathy, chronic V pacing. (Program change 5/2019--LV>RV to 50 msec from 20 msec.  Tried to enable MPP using poles 3 and 4 but vectors using these did not capture at max output.) 4. Hypertensive heart disease with a history of congestive heart failure and CKD stage 4.  EF 55% on prior echo, then 35-40% early 2019, EF 33% by echo in 5/2019. 
5. Coronary artery disease with multivessel interventions.  His last stenting was in 2010.  Cath in 2014 showed a nondominant RCA with LPDA occluded and filled by collaterals, otherwise up to moderate CAD. 6. Mild idiopathic pericardial effusion in the past. 
7. Diabetes mellitus type 2, on chronic insulin. 8. Hyperlipidemia. 9. BRBPR in 1/2019 with grade 1 internal hemorrhoids noted on colo here.  Presumed source of GI bleed. 10. History of esophageal dilation for dysphagia. 11. Hiatal hernia. 12. Anemia of chronic renal disease.  On EPO (and Fe supplement). 13. Moderate thrombocytopenia. 14. Chronic anticoagulation stopped 1/2019. Assessment/Plan:  
Last 3 Recorded Weights in this Encounter 02/15/20 1046 02/15/20 1110 02/16/20 0036 Weight: 112 kg (247 lb) 112 kg (247 lb) 112.7 kg (248 lb 6.4 oz) Intake/Output Summary (Last 24 hours) at 2/16/2020 1032 Last data filed at 2/16/2020 5027 Gross per 24 hour Intake 400 ml Output 3050 ml Net -2650 ml  
 
sCr same Na lower; 126 Echo w/ EF 35-40%; mostly unchanged. 1. Continue diuresis; per renal 
2. Consented for HD if needed 3. Continue beta blocker 4. Continue ISMN; can change to NTGp if needed 5. Keep K and Mg up; per renal 
6. Add milrinone 0.25mcg/kg/min 7. ABD ultrasound [x]       High complexity decision making was performed in this patient at high risk for decompensation with multiple organ involvement. Subjective:  
 
Royal Nelta Sensor. denies chest pain. Still w/ SOB. ABD bloating is an issue; feels like he \"swallowed a water buffalo\". Discussed with RN events overnight. Review of Systems: 
 
Symptom Y/N Comments  Symptom Y/N Comments Fever/Chills N   Chest Pain N Poor Appetite N   Edema N   
Cough N   Abdominal Pain N Sputum N   Joint Pain N   
SOB/ANDERS N   Pruritis/Rash N   
Nausea/vomit N   Tolerating PT/OT Y Diarrhea N   Tolerating Diet Y Constipation N   Other Could NOT obtain due to:   
 
Objective:  
  
Physical Exam: 
 
Last 24hrs VS reviewed since prior progress note. Most recent are: 
 
Visit Vitals /70 Pulse 70 Temp 97.8 °F (36.6 °C) Resp 18 Ht 6' 4\" (1.93 m) Wt 112.7 kg (248 lb 6.4 oz) SpO2 98% BMI 30.24 kg/m² Intake/Output Summary (Last 24 hours) at 2/16/2020 1030 Last data filed at 2/16/2020 4322 Gross per 24 hour Intake 400 ml Output 3050 ml Net -2650 ml General Appearance: Well developed, well nourished, alert & oriented x 3,  
 no acute distress. Ears/Nose/Mouth/Throat: Hearing grossly normal. 
Neck: Supple. Chest: Lungs w/ diminished breath sounds in the bases Cardiovascular: Regular rate and rhythm, S1S2 normal, no murmur. Abdomen: Soft, non-tender, bowel sounds are active. Distended. Extremities: 2+ erythematous edema bilaterally. Skin: Warm and dry. []         Post-cath site without hematoma, bruit, tenderness, or thrill. Distal pulses intact. PMH/SH reviewed - no change compared to H&P Data Review Telemetry: paced EKG:  
[x]  No new EKG for review Lab Data Personally Reviewed: 
 
Recent Labs  
  02/16/20 
0028 02/14/20 
1241 WBC 4.4 3.9* HGB 8.6* 9.5* HCT 27.2* 30.2* PLT 76* 93* No results for input(s): INR, PTP, APTT, INREXT, INREXT in the last 72 hours. Recent Labs  
  02/16/20 
0028 02/15/20 
0122 02/14/20 
1629 02/14/20 
1241 * 130*  --  128* K 3.4* 3.7 4.0 4.3 CL 84* 86*  --  85* CO2 33* 35*  --  34* BUN 96* 97*  --  94* CREA 2.94* 2.97*  --  3.04* GLU 97 118*  --  167* CA 8.4* 9.1  --  9.1 Recent Labs  
  02/14/20 
1241 CKNDX 0.5  
TROIQ <0.05 Lab Results Component Value Date/Time Cholesterol, total 104 01/06/2020 03:22 PM  
 HDL Cholesterol 14 (L) 01/06/2020 03:22 PM  
 LDL, calculated 67 01/06/2020 03:22 PM  
 Triglyceride 115 01/06/2020 03:22 PM  
 CHOL/HDL Ratio 7 (H) 01/06/2020 03:22 PM  
 
 
Recent Labs  
  02/16/20 
0028 02/15/20 
0122 02/14/20 
1241 SGOT  --   --  27  
AP  --   --  108 TP  --   --  7.6 ALB 3.2* 3.4* 3.2*  
GLOB  --   --  4.4* No results for input(s): PH, PCO2, PO2 in the last 72 hours. Medications Personally Reviewed: 
 
Current Facility-Administered Medications Medication Dose Route Frequency  potassium chloride (K-DUR, KLOR-CON) SR tablet 40 mEq  40 mEq Oral BID  
 oxyCODONE-acetaminophen (PERCOCET) 5-325 mg per tablet 1 Tab  1 Tab Oral DAILY PRN  
 magnesium hydroxide (MILK OF MAGNESIA) 400 mg/5 mL oral suspension 30 mL  30 mL Oral DAILY PRN  
 epoetin mary-epbx (RETACRIT) injection 10,000 Units  10,000 Units SubCUTAneous Q TUE, THU & SAT  polyethylene glycol (MIRALAX) packet 17 g  17 g Oral DAILY PRN  
 simethicone (MYLICON) tablet 80 mg  80 mg Oral QID PRN  
  bumetanide (BUMEX) injection 4 mg  4 mg IntraVENous TID  iron sucrose (VENOFER) injection 200 mg  200 mg IntraVENous DAILY  sodium chloride (NS) flush 5-40 mL  5-40 mL IntraVENous Q8H  
 sodium chloride (NS) flush 5-40 mL  5-40 mL IntraVENous PRN  
 acetaminophen (TYLENOL) tablet 650 mg  650 mg Oral Q4H PRN  
 ondansetron (ZOFRAN) injection 4 mg  4 mg IntraVENous Q4H PRN  
 heparin (porcine) injection 5,000 Units  5,000 Units SubCUTAneous Q8H  
 carvediloL (COREG) tablet 12.5 mg  12.5 mg Oral BID WITH MEALS  isosorbide mononitrate ER (IMDUR) tablet 30 mg  30 mg Oral DAILY  zolpidem (AMBIEN) tablet 10 mg  10 mg Oral QHS PRN  
 timolol (TIMOPTIC) 0.5 % ophthalmic solution 1 Drop  1 Drop Right Eye BID  tamsulosin (FLOMAX) capsule 0.8 mg  0.8 mg Oral DAILY  insulin glargine (LANTUS) injection 20 Units  20 Units SubCUTAneous QHS  glucose chewable tablet 16 g  4 Tab Oral PRN  
 dextrose (D50W) injection syrg 12.5-25 g  25-50 mL IntraVENous PRN  
 glucagon (GLUCAGEN) injection 1 mg  1 mg IntraMUSCular PRN  
 insulin lispro (HUMALOG) injection   SubCUTAneous AC&HS  insulin glargine (LANTUS) injection 20 Units  20 Units SubCUTAneous QHS  diphenhydrAMINE (BENADRYL) capsule 25 mg  25 mg Oral Q6H PRN  pramipexole (MIRAPEX) tablet 0.5 mg  0.5 mg Oral QHS Trang Mcmillan III, DO

## 2020-02-16 NOTE — PROGRESS NOTES
Problem: Falls - Risk of 
Goal: *Absence of Falls Description Document Bebo Rios Fall Risk and appropriate interventions in the flowsheet. 2/16/2020 0116 by Bony Almendarez, RN Outcome: Progressing Towards Goal 
Note: Fall Risk Interventions: 
Mobility Interventions: Bed/chair exit alarm, Patient to call before getting OOB Medication Interventions: Bed/chair exit alarm, Patient to call before getting OOB, Teach patient to arise slowly Elimination Interventions: Bed/chair exit alarm, Call light in reach History of Falls Interventions: Bed/chair exit alarm 2/16/2020 0116 by Bony Almendarez RN Outcome: Progressing Towards Goal 
Note: Fall Risk Interventions: 
Mobility Interventions: Bed/chair exit alarm, Patient to call before getting OOB Medication Interventions: Bed/chair exit alarm, Patient to call before getting OOB, Teach patient to arise slowly Elimination Interventions: Bed/chair exit alarm, Call light in reach History of Falls Interventions: Bed/chair exit alarm Problem: Pressure Injury - Risk of 
Goal: *Prevention of pressure injury Description Document Aurelio Scale and appropriate interventions in the flowsheet. Outcome: Progressing Towards Goal 
Note: Pressure Injury Interventions: 
Sensory Interventions: Assess changes in LOC Moisture Interventions: Absorbent underpads, Apply protective barrier, creams and emollients Activity Interventions: Increase time out of bed, PT/OT evaluation Mobility Interventions: Float heels, PT/OT evaluation Nutrition Interventions: Document food/fluid/supplement intake, Offer support with meals,snacks and hydration Friction and Shear Interventions: Feet elevated on foot rest, Lift sheet

## 2020-02-17 ENCOUNTER — PATIENT OUTREACH (OUTPATIENT)
Dept: CASE MANAGEMENT | Age: 70
End: 2020-02-17

## 2020-02-17 LAB
ALBUMIN SERPL-MCNC: 3.1 G/DL (ref 3.5–5)
ANION GAP SERPL CALC-SCNC: 10 MMOL/L (ref 5–15)
ANION GAP SERPL CALC-SCNC: 11 MMOL/L (ref 5–15)
BUN SERPL-MCNC: 98 MG/DL (ref 6–20)
BUN SERPL-MCNC: 98 MG/DL (ref 6–20)
BUN/CREAT SERPL: 31 (ref 12–20)
BUN/CREAT SERPL: 34 (ref 12–20)
CALCIUM SERPL-MCNC: 7.9 MG/DL (ref 8.5–10.1)
CALCIUM SERPL-MCNC: 8.1 MG/DL (ref 8.5–10.1)
CHLORIDE SERPL-SCNC: 80 MMOL/L (ref 97–108)
CHLORIDE SERPL-SCNC: 81 MMOL/L (ref 97–108)
CO2 SERPL-SCNC: 30 MMOL/L (ref 21–32)
CO2 SERPL-SCNC: 31 MMOL/L (ref 21–32)
CREAT SERPL-MCNC: 2.91 MG/DL (ref 0.7–1.3)
CREAT SERPL-MCNC: 3.15 MG/DL (ref 0.7–1.3)
GLUCOSE BLD STRIP.AUTO-MCNC: 141 MG/DL (ref 65–100)
GLUCOSE BLD STRIP.AUTO-MCNC: 175 MG/DL (ref 65–100)
GLUCOSE BLD STRIP.AUTO-MCNC: 189 MG/DL (ref 65–100)
GLUCOSE BLD STRIP.AUTO-MCNC: 97 MG/DL (ref 65–100)
GLUCOSE SERPL-MCNC: 176 MG/DL (ref 65–100)
GLUCOSE SERPL-MCNC: 82 MG/DL (ref 65–100)
MAGNESIUM SERPL-MCNC: 2.3 MG/DL (ref 1.6–2.4)
PHOSPHATE SERPL-MCNC: 3.8 MG/DL (ref 2.6–4.7)
POTASSIUM SERPL-SCNC: 3.7 MMOL/L (ref 3.5–5.1)
POTASSIUM SERPL-SCNC: 3.9 MMOL/L (ref 3.5–5.1)
SERVICE CMNT-IMP: ABNORMAL
SERVICE CMNT-IMP: NORMAL
SODIUM SERPL-SCNC: 120 MMOL/L (ref 136–145)
SODIUM SERPL-SCNC: 123 MMOL/L (ref 136–145)

## 2020-02-17 PROCEDURE — 97530 THERAPEUTIC ACTIVITIES: CPT | Performed by: OCCUPATIONAL THERAPIST

## 2020-02-17 PROCEDURE — 97165 OT EVAL LOW COMPLEX 30 MIN: CPT | Performed by: OCCUPATIONAL THERAPIST

## 2020-02-17 PROCEDURE — 74011000250 HC RX REV CODE- 250: Performed by: INTERNAL MEDICINE

## 2020-02-17 PROCEDURE — 80069 RENAL FUNCTION PANEL: CPT

## 2020-02-17 PROCEDURE — 65660000000 HC RM CCU STEPDOWN

## 2020-02-17 PROCEDURE — 74011250636 HC RX REV CODE- 250/636: Performed by: INTERNAL MEDICINE

## 2020-02-17 PROCEDURE — 83735 ASSAY OF MAGNESIUM: CPT

## 2020-02-17 PROCEDURE — 97530 THERAPEUTIC ACTIVITIES: CPT

## 2020-02-17 PROCEDURE — 74011636637 HC RX REV CODE- 636/637: Performed by: INTERNAL MEDICINE

## 2020-02-17 PROCEDURE — 74011250637 HC RX REV CODE- 250/637: Performed by: INTERNAL MEDICINE

## 2020-02-17 PROCEDURE — 80048 BASIC METABOLIC PNL TOTAL CA: CPT

## 2020-02-17 PROCEDURE — 97162 PT EVAL MOD COMPLEX 30 MIN: CPT

## 2020-02-17 PROCEDURE — 82962 GLUCOSE BLOOD TEST: CPT

## 2020-02-17 PROCEDURE — 97116 GAIT TRAINING THERAPY: CPT

## 2020-02-17 PROCEDURE — 36415 COLL VENOUS BLD VENIPUNCTURE: CPT

## 2020-02-17 PROCEDURE — 94760 N-INVAS EAR/PLS OXIMETRY 1: CPT

## 2020-02-17 PROCEDURE — 74011250637 HC RX REV CODE- 250/637: Performed by: HOSPITALIST

## 2020-02-17 PROCEDURE — 97535 SELF CARE MNGMENT TRAINING: CPT | Performed by: OCCUPATIONAL THERAPIST

## 2020-02-17 PROCEDURE — 74011000258 HC RX REV CODE- 258: Performed by: INTERNAL MEDICINE

## 2020-02-17 RX ORDER — DORZOLAMIDE HYDROCHLORIDE AND TIMOLOL MALEATE 20; 5 MG/ML; MG/ML
1 SOLUTION/ DROPS OPHTHALMIC
COMMUNITY
End: 2020-05-27 | Stop reason: ALTCHOICE

## 2020-02-17 RX ORDER — FEBUXOSTAT 40 MG/1
40 TABLET, FILM COATED ORAL
COMMUNITY
End: 2020-09-25

## 2020-02-17 RX ORDER — TOLVAPTAN 15 MG/1
15 TABLET ORAL ONCE
Status: DISCONTINUED | OUTPATIENT
Start: 2020-02-17 | End: 2020-02-17

## 2020-02-17 RX ORDER — BUMETANIDE 0.25 MG/ML
4 INJECTION INTRAMUSCULAR; INTRAVENOUS 2 TIMES DAILY
Status: DISCONTINUED | OUTPATIENT
Start: 2020-02-17 | End: 2020-02-18

## 2020-02-17 RX ORDER — OXYCODONE AND ACETAMINOPHEN 5; 325 MG/1; MG/1
1 TABLET ORAL
Status: DISCONTINUED | OUTPATIENT
Start: 2020-02-17 | End: 2020-02-21 | Stop reason: HOSPADM

## 2020-02-17 RX ORDER — ZOLPIDEM TARTRATE 10 MG/1
10 TABLET ORAL
COMMUNITY
End: 2020-07-13

## 2020-02-17 RX ORDER — TOLVAPTAN 15 MG/1
15 TABLET ORAL ONCE
Status: COMPLETED | OUTPATIENT
Start: 2020-02-17 | End: 2020-02-17

## 2020-02-17 RX ORDER — BUMETANIDE 0.25 MG/ML
4 INJECTION INTRAMUSCULAR; INTRAVENOUS ONCE
Status: COMPLETED | OUTPATIENT
Start: 2020-02-17 | End: 2020-02-17

## 2020-02-17 RX ORDER — POTASSIUM CHLORIDE 1.5 G/1.77G
40 POWDER, FOR SOLUTION ORAL DAILY
COMMUNITY
End: 2020-04-28

## 2020-02-17 RX ORDER — METOLAZONE 2.5 MG/1
2.5 TABLET ORAL
COMMUNITY
End: 2020-02-21

## 2020-02-17 RX ORDER — OXYCODONE AND ACETAMINOPHEN 5; 325 MG/1; MG/1
1 TABLET ORAL
COMMUNITY
End: 2020-03-03 | Stop reason: SDUPTHER

## 2020-02-17 RX ADMIN — TOLVAPTAN 15 MG: 15 TABLET ORAL at 11:32

## 2020-02-17 RX ADMIN — POTASSIUM CHLORIDE 40 MEQ: 20 TABLET, EXTENDED RELEASE ORAL at 17:49

## 2020-02-17 RX ADMIN — IRON SUCROSE 200 MG: 20 INJECTION, SOLUTION INTRAVENOUS at 08:49

## 2020-02-17 RX ADMIN — CARVEDILOL 12.5 MG: 12.5 TABLET, FILM COATED ORAL at 17:50

## 2020-02-17 RX ADMIN — TIMOLOL MALEATE 1 DROP: 5 SOLUTION/ DROPS OPHTHALMIC at 08:51

## 2020-02-17 RX ADMIN — ZOLPIDEM TARTRATE 10 MG: 5 TABLET ORAL at 23:28

## 2020-02-17 RX ADMIN — HEPARIN SODIUM 5000 UNITS: 5000 INJECTION INTRAVENOUS; SUBCUTANEOUS at 21:02

## 2020-02-17 RX ADMIN — SIMETHICONE CHEW TAB 80 MG 80 MG: 80 TABLET ORAL at 15:25

## 2020-02-17 RX ADMIN — TIMOLOL MALEATE 1 DROP: 5 SOLUTION/ DROPS OPHTHALMIC at 17:51

## 2020-02-17 RX ADMIN — SIMETHICONE CHEW TAB 80 MG 80 MG: 80 TABLET ORAL at 21:02

## 2020-02-17 RX ADMIN — OXYCODONE HYDROCHLORIDE AND ACETAMINOPHEN 1 TABLET: 5; 325 TABLET ORAL at 21:34

## 2020-02-17 RX ADMIN — BUMETANIDE 4 MG: 0.25 INJECTION INTRAMUSCULAR; INTRAVENOUS at 08:49

## 2020-02-17 RX ADMIN — TAMSULOSIN HYDROCHLORIDE 0.8 MG: 0.4 CAPSULE ORAL at 08:49

## 2020-02-17 RX ADMIN — Medication 10 ML: at 21:03

## 2020-02-17 RX ADMIN — SODIUM CHLORIDE 0.25 MCG/KG/MIN: 9 INJECTION, SOLUTION INTRAVENOUS at 12:57

## 2020-02-17 RX ADMIN — ISOSORBIDE MONONITRATE 30 MG: 30 TABLET, EXTENDED RELEASE ORAL at 08:49

## 2020-02-17 RX ADMIN — Medication 10 ML: at 13:02

## 2020-02-17 RX ADMIN — PRAMIPEXOLE DIHYDROCHLORIDE 0.5 MG: 0.25 TABLET ORAL at 21:02

## 2020-02-17 RX ADMIN — HEPARIN SODIUM 5000 UNITS: 5000 INJECTION INTRAVENOUS; SUBCUTANEOUS at 06:10

## 2020-02-17 RX ADMIN — SODIUM CHLORIDE 0.25 MCG/KG/MIN: 9 INJECTION, SOLUTION INTRAVENOUS at 01:12

## 2020-02-17 RX ADMIN — INSULIN GLARGINE 20 UNITS: 100 INJECTION, SOLUTION SUBCUTANEOUS at 23:39

## 2020-02-17 RX ADMIN — CARVEDILOL 12.5 MG: 12.5 TABLET, FILM COATED ORAL at 08:49

## 2020-02-17 RX ADMIN — POTASSIUM CHLORIDE 40 MEQ: 20 TABLET, EXTENDED RELEASE ORAL at 08:49

## 2020-02-17 RX ADMIN — HEPARIN SODIUM 5000 UNITS: 5000 INJECTION INTRAVENOUS; SUBCUTANEOUS at 13:01

## 2020-02-17 RX ADMIN — BUMETANIDE 4 MG: 0.25 INJECTION, SOLUTION INTRAMUSCULAR; INTRAVENOUS at 17:50

## 2020-02-17 RX ADMIN — Medication 10 ML: at 06:10

## 2020-02-17 RX ADMIN — OXYCODONE HYDROCHLORIDE AND ACETAMINOPHEN 1 TABLET: 5; 325 TABLET ORAL at 07:44

## 2020-02-17 NOTE — PROGRESS NOTES
Patient evaluated for OT needs. No acute OT needs indicated 2/2 patient being at his independent to min A baseline for ADLs. Full note to follow and patient will need HHOT and PT after discharge

## 2020-02-17 NOTE — PROGRESS NOTES
Orders received, chart reviewed and patient evaluated by physical therapy. Pending progression with skilled acute physical therapy, recommend: 
Physical therapy at least 2 days/week in the home AND ensure assist and/or supervision for safety with ADLs and mobility. Recommend with nursing patient to complete as able in order to maintain strength, endurance and independence: OOB to chair 3x/day with assist x 1-2 and ambulating with RW to bathroom. Thank you for your assistance. Full evaluation to follow.   
 
Noe Quintanilla, PT

## 2020-02-17 NOTE — PROGRESS NOTES
Problem: Falls - Risk of 
Goal: *Absence of Falls Description Document Easter Getting Fall Risk and appropriate interventions in the flowsheet. Outcome: Progressing Towards Goal 
Note: Fall Risk Interventions: 
Mobility Interventions: Bed/chair exit alarm, Patient to call before getting OOB Medication Interventions: Bed/chair exit alarm, Patient to call before getting OOB, Teach patient to arise slowly Elimination Interventions: Bed/chair exit alarm, Call light in reach History of Falls Interventions: Bed/chair exit alarm Problem: Patient Education: Go to Patient Education Activity Goal: Patient/Family Education Outcome: Progressing Towards Goal 
  
Problem: Pressure Injury - Risk of 
Goal: *Prevention of pressure injury Description Document Aurelio Scale and appropriate interventions in the flowsheet. Outcome: Progressing Towards Goal 
Note: Pressure Injury Interventions: 
Sensory Interventions: Assess changes in LOC Moisture Interventions: Absorbent underpads, Apply protective barrier, creams and emollients Activity Interventions: Increase time out of bed, PT/OT evaluation Mobility Interventions: Chair cushion, Float heels Nutrition Interventions: Document food/fluid/supplement intake, Offer support with meals,snacks and hydration Friction and Shear Interventions: Feet elevated on foot rest, Lift sheet Problem: Patient Education: Go to Patient Education Activity Goal: Patient/Family Education Outcome: Progressing Towards Goal

## 2020-02-17 NOTE — PROGRESS NOTES
Progress Note 2/17/2020 NAME: Hector Ortega. MRN:  [id number] Admit Diagnosis: Acute exacerbation of CHF (congestive heart failure) ([facility] [I50.9] Problem List: 1. Acute on chronic combined systolic and diastolic heart failure with symptomatic volume overload including dyspnea at rest, abdominal fullness, ascites, lower extremity edema but no pulmonary edema on CXR--a lot of R-sided sx. Biventricular failure with NYHA CHF class 4 symptoms acutely. 2. Paroxysmal atrial fibrillation and atypical atrial flutter s/p ablation in 5/2018.  NO significant atrial fibrillation or flutter since the ablation. 3. Remote dual chamber SJM pacer for sick sinus syndrome, upgraded to biventricular pacemaker in 1/2019 due to CHF, cardiomyopathy, chronic V pacing. (Program change 5/2019--LV>RV to 50 msec from 20 msec.  Tried to enable MPP using poles 3 and 4 but vectors using these did not capture at max output.) 4. Hypertensive heart disease with a history of congestive heart failure and CKD stage 4.  EF 55% on prior echo, then 35-40% early 2019, 33% in 5/2019, 35-40% this admission. 5. Coronary artery disease with multivessel interventions.  His last stenting was in 2010.  Cath in 2014 showed a nondominant RCA with LPDA occluded and filled by collaterals, otherwise up to moderate CAD. 6. Mild idiopathic pericardial effusion in the past. 
7. Diabetes mellitus type 2, on chronic insulin. 8. Hyperlipidemia. 9. BRBPR in 1/2019 with grade 1 internal hemorrhoids noted on colo here.  Presumed source of GI bleed. 10. History of esophageal dilation for dysphagia. 11. Hiatal hernia. 12. Anemia of chronic renal disease.  On EPO (and Fe supplement). 13. Moderate thrombocytopenia. 14. Chronic anticoagulation stopped 1/2019. Assessment/Plan:  
Last 3 Recorded Weights in this Encounter 02/15/20 1110 02/16/20 0036 02/17/20 8849 Weight: 112 kg (247 lb) 112.7 kg (248 lb 6.4 oz) 110.2 kg (243 lb) Intake/Output Summary (Last 24 hours) at 2/17/2020 1143 Last data filed at 2/17/2020 0163 Gross per 24 hour Intake 800 ml Output 1950 ml Net -1150 ml 1. Continue diuresis per renal (on Bumex IV, no thiazide, got dose of Tolvaptan 2/17). He's on 2g Na diet and fluid restricted to 1L. 2. Consented for HD if needed 3. Continue beta blocker 4. Continue isosorbide 5. Keep K and Mag up per renal 
6. Continue milrinone 0.25mcg/kg/min 7. No program changes to his BIV pacemaker today. Ascites noted on abdominal ultrasound. Remote CT 6/2019 suggested cirrhosis, no comment made on the ultrasound regarding this. [x]       High complexity decision making was performed in this patient at high risk for decompensation with multiple organ involvement. Subjective:  
 
Royal Krysta Goodman. denies chest pain. Discussed with RN events overnight. Review of Systems: 
 
Symptom Y/N Comments  Symptom Y/N Comments Fever/Chills N   Chest Pain N Poor Appetite N   Edema Y Cough N   Abdominal Pain N Sputum N   Joint Pain N   
SOB/ANDERS Y   Pruritis/Rash N   
Nausea/vomit N   Tolerating PT/OT Y Diarrhea N   Tolerating Diet Y Constipation N   Other Could NOT obtain due to:   
 
Objective:  
  
Physical Exam: 
 
Last 24hrs VS reviewed since prior progress note. Most recent are: 
 
Visit Vitals /68 (BP 1 Location: Left arm, BP Patient Position: Sitting) Pulse 69 Temp 97.6 °F (36.4 °C) Resp 16 Ht 6' 4\" (1.93 m) Wt 110.2 kg (243 lb) SpO2 100% BMI 29.58 kg/m² Intake/Output Summary (Last 24 hours) at 2/17/2020 1143 Last data filed at 2/17/2020 0555 Gross per 24 hour Intake 800 ml Output 1950 ml Net -1150 ml General Appearance: Well developed, well nourished, alert & oriented x 3, no acute distress. Ears/Nose/Mouth/Throat: Hearing grossly normal. 
Neck: Supple, nontender.  
Chest: Lungs w/ diminished breath sounds in the bases, unlabored, symmetric movement. L chest BIV-ICD site OK. Cardiovascular: Regular rate and rhythm, S1S2 normal, no murmur. Abdomen: Soft, non-tender, bowel sounds are active. Distended. Extremities: 1+ erythematous edema bilaterally, now wearing socks. No cyanosis or clubbing. Skin: Warm and dry. No petechiae, purpura, or jaundice. Neuro:  Awake, appropriate, grossly nonfocal.  No asterixis. []         Post-cath site without hematoma, bruit, tenderness, or thrill. Distal pulses intact. PMH/SH reviewed - no change compared to H&P Data Review Telemetry:  AV paced EKG:  
[x]  No new EKG for review ECHO: 
Left Ventricle Normal cavity size and wall thickness. Mild systolic dysfunction. The estimated ejection fraction is 35 - 40%. Visually measured ejection fraction. Global hypokinesis observed. Left Atrium Mildly dilated left atrium. Right Ventricle Normal wall thickness. Mildly dilated right ventricle. Mildly reduced systolic function. Right Atrium Mildly dilated right atrium. Aortic Valve No stenosis. Aortic valve sclerosis. Mild aortic valve regurgitation. Mitral Valve Normal valve structure and no stenosis. Mild regurgitation. Tricuspid Valve Normal valve structure and no stenosis. Moderate regurgitation. Pulmonary arterial systolic pressure is 41.9 mmHg. Pulmonic Valve Pulmonic valve not well visualized. No stenosis. Trace regurgitation. Aorta Normal aortic root, ascending aortic, and aortic arch. IVC/Hepatic Veins Dilated inferior vena cava. Severely elevated central venous pressure (15+ mmHg); IVC diameter is larger than 21 mm and collapses less than 50% with respiration. Pericardium Insignificant pericardial effusion or fat. Lab Data Personally Reviewed: 
 
Recent Labs  
  02/16/20 
0028 02/14/20 
1241 WBC 4.4 3.9* HGB 8.6* 9.5* HCT 27.2* 30.2* PLT 76* 93* No results for input(s): INR, PTP, APTT, INREXT, INREXT in the last 72 hours. Recent Labs 02/17/20 
6094 02/16/20 
0028 02/15/20 
0122 * 126* 130*  
K 3.7 3.4* 3.7 CL 81* 84* 86* CO2 31 33* 35* BUN 98* 96* 97* CREA 2.91* 2.94* 2.97* GLU 82 97 118* CA 8.1* 8.4* 9.1 MG 2.3  --   --   
 
Recent Labs  
  02/14/20 
1241 CKNDX 0.5  
TROIQ <0.05 Lab Results Component Value Date/Time Cholesterol, total 104 01/06/2020 03:22 PM  
 HDL Cholesterol 14 (L) 01/06/2020 03:22 PM  
 LDL, calculated 67 01/06/2020 03:22 PM  
 Triglyceride 115 01/06/2020 03:22 PM  
 CHOL/HDL Ratio 7 (H) 01/06/2020 03:22 PM  
 
 
Recent Labs  
  02/17/20 
0351 02/16/20 
0028 02/15/20 
0122 02/14/20 
1241 SGOT  --   --   --  27  
AP  --   --   --  108 TP  --   --   --  7.6 ALB 3.1* 3.2* 3.4* 3.2*  
GLOB  --   --   --  4.4* No results for input(s): PH, PCO2, PO2 in the last 72 hours. Medications Personally Reviewed: 
 
Current Facility-Administered Medications Medication Dose Route Frequency  bumetanide (BUMEX) injection 4 mg  4 mg IntraVENous BID  potassium chloride (K-DUR, KLOR-CON) SR tablet 40 mEq  40 mEq Oral BID  
 oxyCODONE-acetaminophen (PERCOCET) 5-325 mg per tablet 1 Tab  1 Tab Oral DAILY PRN  
 magnesium hydroxide (MILK OF MAGNESIA) 400 mg/5 mL oral suspension 30 mL  30 mL Oral DAILY PRN  
 milrinone (PRIMACOR) 200 mcg/mL in 0.9% sodium chloride 100 mL infusion  0.25 mcg/kg/min IntraVENous CONTINUOUS  
 epoetin mary-epbx (RETACRIT) injection 10,000 Units  10,000 Units SubCUTAneous Q TUE, THU & SAT  polyethylene glycol (MIRALAX) packet 17 g  17 g Oral DAILY PRN  
 simethicone (MYLICON) tablet 80 mg  80 mg Oral QID PRN  
 iron sucrose (VENOFER) injection 200 mg  200 mg IntraVENous DAILY  sodium chloride (NS) flush 5-40 mL  5-40 mL IntraVENous Q8H  
 sodium chloride (NS) flush 5-40 mL  5-40 mL IntraVENous PRN  
 acetaminophen (TYLENOL) tablet 650 mg  650 mg Oral Q4H PRN  
 ondansetron (ZOFRAN) injection 4 mg  4 mg IntraVENous Q4H PRN  
  heparin (porcine) injection 5,000 Units  5,000 Units SubCUTAneous Q8H  
 carvediloL (COREG) tablet 12.5 mg  12.5 mg Oral BID WITH MEALS  isosorbide mononitrate ER (IMDUR) tablet 30 mg  30 mg Oral DAILY  zolpidem (AMBIEN) tablet 10 mg  10 mg Oral QHS PRN  
 timolol (TIMOPTIC) 0.5 % ophthalmic solution 1 Drop  1 Drop Right Eye BID  tamsulosin (FLOMAX) capsule 0.8 mg  0.8 mg Oral DAILY  insulin glargine (LANTUS) injection 20 Units  20 Units SubCUTAneous QHS  glucose chewable tablet 16 g  4 Tab Oral PRN  
 dextrose (D50W) injection syrg 12.5-25 g  25-50 mL IntraVENous PRN  
 glucagon (GLUCAGEN) injection 1 mg  1 mg IntraMUSCular PRN  
 insulin lispro (HUMALOG) injection   SubCUTAneous AC&HS  insulin glargine (LANTUS) injection 20 Units  20 Units SubCUTAneous QHS  diphenhydrAMINE (BENADRYL) capsule 25 mg  25 mg Oral Q6H PRN  pramipexole (MIRAPEX) tablet 0.5 mg  0.5 mg Oral QHS Eileen Butts MD

## 2020-02-17 NOTE — PROGRESS NOTES
Transitional Care Team: Initial HUG Note    Date of Assessment: 02/17/20  Time of Assessment:  2:41 PM     KRISTA Sanchez. is a 68 y.o. male inpatient at  [facility]. Assessment & Plan   Pt A+O, states breathing much better since he has diuresed almost 9 pounds. Continues on Milrinone GTT. Pt has stated would accept dialysis if medical interventions do not stabilize him. Will need [place] visits or short term rehab       Primary Diagnosis: heart failure  Advance Directive:   See ACP note from me. Current Code Status:  full    Referral to Hospice/ Palliative Care Appropriate: yes. Awareness of Medical Conditions: (Trajectory of illness and pts expectations). Pt aware his HF is worsening, requiring inotrope therapy at this time, with consideration he may need dialysis. Pt is hoping to return to baseline with good quality of life. Discharge Needs: (to include safety issues) HH versus short term rehab    Barriers Identified: possible need for home inotrope    Patient is willing to go to SNF/Inpatient Rehab if recommended: yes    Medication Review:  Await AVS.    Can patient afford medications:  Current meds. Patient is Compliant with Medication regimen:yes    Who manages medications at home: family  Best Patient Contact Number: spouse Alexandria Engle- [phone]    HUG (Healthy Understanding of Goals) program introduced to patient/family. The Transitional Care Team bridges the gaps in care and education surrounding discharge from the acute care facility. The objective is to empower the patient and family in taking a proactive role in preventing readmission within the first thirty days after discharge. The team is also involved in the efforts to reduce readmission to the acute care setting after stabilization and discharge from the acute care environment either to skilled nursing facilities or community.      HUG RN/NP will return with G Calendar/ follow up appointments/ Ambulatory Nurse Navigator name and contact information when the patient is ready for discharge. Future Appointments   Date Time Provider Leonardo Thomas   2/21/2020  3:00 PM [facility] REG   3/5/2020 10:00 AM [facility]AIR 2 [facility]   3/6/2020  2:00 PM Stephanie Gonzalez MD 3 [facility]   4/2/2020 10:00 AM [facility] CHAIR 2 [facility]   4/30/2020 10:00 AM [facility] CHAIR 2 [facility]       Non-[facility] follow up appointment(s): none yet    [facility]: call information given to on discharge calendar      Patient education focused on readmission zones as described as: The Red Zone: High risk for readmission, days 1-21  The Yellow Zone: Moderate  risk for readmission, days 22-29   The Green Zone: Lower risk for readmission, days                30 and after    The [facility] will attempt to follow the patient from a distance while inpatient as well as be available for further transition/disposition needs. The [facility] team will continue to offer support during the 30- 90 day discharge from acute care setting. Will notify Ambulatory {Blank Single Select Template:[id number][de-identified] \"CYNTHIA   RN.     Past Medical History:   Diagnosis Date    Anemia 8/5/2017    Anxiety 8/5/2017    Arrhythmia     atrial fibrillation 2014    ASCVD (arteriosclerotic cardiovascular disease) 8/5/2017    BPH (benign prostatic hyperplasia) 8/5/2017    CAD (coronary artery disease)     h/o stents    Cancer ([address]     h/o skin cancer    Cardiomyopathy ([address] 8/5/2017    CHF (congestive heart failure) ([address] 8/5/2017    Chronic kidney disease     Stage IV    CKD (chronic kidney disease), stage IV (HCC) 8/5/2017    Diabetes ([address]     Diabetes mellitus ([address] 8/5/2017    Diabetic neuropathy ([address] 8/5/2017    DJD (degenerative joint disease) 8/5/2017    ED (erectile dysfunction) 8/5/2017    Gout 8/5/2017    High cholesterol     Hyperlipidemia 8/5/2017    Hypertension     Hypertension with renal disease 8/5/2017    Hypothyroid 8/5/2017    Insomnia 8/5/2017    Obesity 8/5/2017    On statin therapy 8/5/2017    Pneumonia 05/28/2019    Restless leg 8/5/2017    Sleep apnea     Bipap    Thyroid disease     hypothyroid    [facility] to [facility] 07/12/2019

## 2020-02-17 NOTE — PROGRESS NOTES
OCCUPATIONAL THERAPY EVALUATION/DISCHARGE Patient: Regla Gordon (73 y.o. male) Date: 2/17/2020 Primary Diagnosis: Acute exacerbation of CHF (congestive heart failure) ([facility] [I50.9] Precautions:   Fall ASSESSMENT Based on the objective data described below, the patient presents with BLE weakness, h/o B knee arthritis, decreased standing balance and decreased activity tolerance, all of which impedes his functional independence at baseline. Patient reports he is independent to mod I for ADLs, only needing assistance for compression socks at baseline. He did come in s/p a fall and reports needing assistance for sit to stand transfers for a year or more now. At this point he is functioning near his ADL baseline, now performing at an independent to min A level. He is also requiring as much as min A for for transfers from lower surfaces which is his baseline. PT is following to address his ambulation, which he is performing with his rollator at a CGA level. The patient is not in need of acute OT services, but he would benefit from a [address] [place] [address] for a home safety evaluation. Functional Outcome Measure: The patient scored 65/100 on the Barthel Index outcome measure which is indicative of a 65% decline in function. PLAN : 
Recommendation for discharge: (in order for the patient to meet his/her long term goals) Occupational therapy and Physical Therapy at least 2 days/week in the home AND ensure assist and/or supervision for safety with ADLs and functional mobility Equipment recommendations for successful discharge: Patient has recommended raised toilet seat. OBJECTIVE DATA SUMMARY:  
HISTORY:  
Past Medical History:  
Diagnosis Date  Anemia 8/5/2017  Anxiety 8/5/2017  Arrhythmia   
 atrial fibrillation 2014  ASCVD (arteriosclerotic cardiovascular disease) 8/5/2017  BPH (benign prostatic hyperplasia) 8/5/2017  CAD (coronary artery disease)   
 h/o stents  [facility] h/o skin cancer  Cardiomyopathy ([facility] 8/5/2017  CHF (congestive heart failure) ([facility] 8/5/2017  Chronic kidney disease Stage IV  CKD (chronic kidney disease), stage IV ([facility] 8/5/2017  Diabetes ([facility]  Diabetes mellitus ([facility] 8/5/2017  Diabetic neuropathy ([facility] 8/5/2017  DJD (degenerative joint disease) 8/5/2017  ED (erectile dysfunction) 8/5/2017  Gout 8/5/2017  High cholesterol  Hyperlipidemia 8/5/2017  Hypertension  Hypertension with renal disease 8/5/2017  Hypothyroid 8/5/2017  Insomnia 8/5/2017  Obesity 8/5/2017  On statin therapy 8/5/2017  Pneumonia 05/28/2019  Restless leg 8/5/2017  Sleep apnea Bipap  Thyroid disease   
 hypothyroid  Ulcer of foot due to secondary diabetes ([facility] 07/12/2019 Past Surgical History:  
Procedure Laterality Date  CARDIAC SURG PROCEDURE UNLIST    
 cardiac stents 3  
 CARDIAC SURG PROCEDURE UNLIST Ablation 5/17/2018 [id number]  COLONOSCOPY N/A 6/28/2016 COLONOSCOPY performed by Denton Hanks MD at [facility] ENDOSCOPY  COLONOSCOPY N/A 1/9/2019 COLONOSCOPY performed by Reubin Epley, MD at [facility] ENDOSCOPY  COLONOSCOPY,DIAGNOSTIC  1/9/2019  HX APPENDECTOMY  HX BUNIONECTOMY    
 and removal of 2 seasmoid bone of the great toe 2/2018  HX HEENT Bilateral Cataract surgery  HX HEENT Tonsils  HX HEENT Axe wound to the head  HX KNEE ARTHROSCOPY X 2  
 HX ORTHOPAEDIC    
 HX ORTHOPAEDIC    
 partial laminectomy  HX PACEMAKER    
 HX ROTATOR CUFF REPAIR    
 bilateral  
 DE INSJ ELTRD CAR DACIA SYS ATTCH PREV PM/DFB PLS GEN N/A 1/28/2019 Lv Lead Placement To Previous Generator performed by Kenan Nugent MD at [facility] C[facility] CATH LAB Left side  DE REMVL PERM PM PLS GEN W/REPL PLSE GEN MULT LEAD N/A 1/28/2019  Remove & Replace Ppm Gen Biv Multi Leads performed by Kenan Nugent MD at [address] [facility] CATH LAB  
 
 
 Prior Level of Function/Environment/Context: Lives with his wife who assists with some LB ADLs, helps lift patient to transition sit to stand from lower surfaces. Patient reports sleeping and mainly sitting in his reclining that can elevated him to a standing position. In regards to ADLs he is typically performing at an independent to mod I level, with the exception of needing assistance to don his compression socks. Patient ambulates with a rollator. He has required assistance for sit to stand transfers from low surfaces for a year or more and he reports having had 7 falls in the past 2 years. Expanded or extensive additional review of patient history:  
Home Situation Home Environment: Private residence # Steps to Enter: 0 Wheelchair Ramp: Yes One/Two Story Residence: Split level(lives on 1st floor - no steps) # of Interior Steps: (lives on main floor) Lift Chair Available: No 
Living Alone: No 
Support Systems: Spouse/Significant Other/Partner Patient Expects to be Discharged to[de-identified] Private residence Current DME Used/Available at Home: CPAP, Grab bars, Raised toilet seat, Walker, rollator Tub or Shower Type: Other (comment)(sponge bathes) Hand dominance: Right EXAMINATION OF PERFORMANCE DEFICITS: 
Cognitive/Behavioral Status: 
Neurologic State: Alert Orientation Level: Oriented X4 Cognition: Appropriate decision making; Appropriate for age attention/concentration; Appropriate safety awareness; Follows commands Perception: Appears intact Perseveration: No perseveration noted Safety/Judgement: Awareness of environment; Fall prevention(poor standing balance due to neuropathy) Hearing: Auditory Auditory Impairment: None Vision/Perceptual:   
Acuity: Within Defined Limits Corrective Lenses: Reading glasses Range of Motion: 
AROM: Within functional limits Strength: 
Strength: Generally decreased, functional(bilateral ankle df weakness and knee weakness: L>R) Coordination: Coordination: Within functional limits(except legs) Fine Motor Skills-Upper: Left Intact; Right Intact Gross Motor Skills-Upper: Left Intact; Right Intact Tone & Sensation: 
Tone: Normal 
Sensation: Impaired(severe loss of sensation bilateral feet and hands) Balance: 
Sitting: Intact Standing: Impaired Standing - Static: Constant support;Good Standing - Dynamic : Fair;Constant support Functional Mobility and Transfers for ADLs: 
Bed Mobility: 
Rolling: Stand-by assistance Supine to Sit: Stand-by assistance Scooting: Modified independent Transfers: 
Sit to Stand: Minimum assistance Stand to Sit: Minimum assistance Bed to Chair: Contact guard assistance; Adaptive equipment(Rollator) Bathroom Mobility: Contact guard assistance(ambulating with rollator) Toilet Transfer : Contact guard assistance; Additional time(from [facility] over toilet) ADL Assessment: 
Feeding: Independent Oral Facial Hygiene/Grooming: Stand-by assistance Bathing: Stand-by assistance Upper Body Dressing: Supervision;Setup Lower Body Dressing: Minimum assistance Toileting: Stand by assistance ADL Intervention and task modifications: 
Lower Body Dressing Assistance Socks: Maximum assistance(compression socks. Patient receives assist from wife) Shoes with Cloth Laces: Supervision;Set-up Position Performed: Seated edge of bed;Bending forward method Cues: Verbal cues provided Toileting Toileting Assistance: Stand-by assistance Clothing Management: Stand-by assistance Cognitive Retraining Safety/Judgement: Awareness of environment; Fall prevention(poor standing balance due to neuropathy) Functional Measure: 
Barthel Index: 
 
Bathin Bladder: 10 Bowels: 10 
Groomin Dressin Feeding: 10 Mobility: 10 Stairs: 0(no stairs at home) Toilet Use: 5 Transfer (Bed to Chair and Back): 10 Total: 65/100 The Barthel ADL Index: Guidelines 1. The index should be used as a record of what a patient does, not as a record of what a patient could do. 2. The main aim is to establish degree of independence from any help, physical or verbal, however minor and for whatever reason. 3. The need for supervision renders the patient not independent. 4. A patient's performance should be established using the best available evidence. Asking the patient, friends/relatives and nurses are the usual sources, but direct observation and common sense are also important. However direct testing is not needed. 5. Usually the patient's performance over the preceding 24-48 hours is important, but occasionally longer periods will be relevant. 6. Middle categories imply that the patient supplies over 50 per cent of the effort. 7. Use of aids to be independent is allowed. Darryle Chant., Barthel, D.W. (5748). Functional evaluation: the Barthel Index. [address] [facility] (14)2. Sonia Lopez sarina DIANA Saeed, Tyler Nieto., [employer] [place], [address] [place] [address] (1999). Measuring the change indisability after inpatient rehabilitation; comparison of the responsiveness of the Barthel Index and Functional [place] Measure. Journal of Neurology, Neurosurgery, and Psychiatry, 66(4), 143-822. Bam Tovar, N.J.A, ALIZA Lui, & Evi Simpson MCRISPIN. (2004.) Assessment of post-stroke quality of life in cost-effectiveness studies: The usefulness of the Barthel Index and the EuroQoL-5D. [facility], 68, 000-61 Activity Tolerance:  
Fair Please refer to the flowsheet for vital signs taken during this treatment. After treatment patient left in no apparent distress:   
Sitting in chair and Call bell within reach COMMUNICATION/EDUCATION:  
The patients plan of care was discussed with: Physical Therapist and Registered Nurse. Thank you for this referral. 
Katlin Gleason OTR/ABHISHEK Time Calculation: 39 mins

## 2020-02-17 NOTE — PROGRESS NOTES
Bedside shift change report GIVEN TO Talya Rogers RN. Report included the following information SBAR and Kardex. SIGNIFICANT CHANGES DURING SHIFT:  None. CONCERNS TO ADDRESS WITH MD:  None. [facility] NURSING NOTE Admission Date 2/14/2020 Admission Diagnosis Acute exacerbation of CHF (congestive heart failure) ([facility] [I50.9] Consults IP CONSULT TO NEPHROLOGY 
IP CONSULT TO CARDIOLOGY Cardiac Monitoring [x] Yes [] No  
  
Purposeful Hourly Rounding [x] Yes   
Thanh Score Total Score: 4 Thanh score 3 or > [x] Bed Alarm [] Avasys [] 1:1 sitter [] Patient refused (Signed refusal form in chart) Aurelio Score Aurelio Score: 17 Uarelio score 14 or < [] PMT consult [] Wound Care consult  
 []  Specialty bed  [] Nutrition consult Influenza Vaccine Received Flu Vaccine for Current Season (usually Sept-March): Yes Oxygen needs? [x] Room air Oxygen @  []1L    []2L    []3L   []4L    []5L   []6L via NC Chronic home O2 use? [] Yes [x] No 
Perform O2 challenge test and document in progress note using smartVital [employer] (.Homeoxygen) Last bowel movement Last Bowel Movement Date: 02/16/20 Urinary Catheter LDAs Peripheral IV 02/14/20 Left Arm (Active) Site Assessment Clean, dry, & intact 2/17/2020  3:00 AM  
Phlebitis Assessment 0 2/17/2020  3:00 AM  
Infiltration Assessment 0 2/17/2020  3:00 AM  
Dressing Status Clean, dry, & intact 2/17/2020  3:00 AM  
Dressing Type Transparent;Tape 2/17/2020  3:00 AM  
Hub Color/Line Status Pink; Infusing 2/17/2020  3:00 AM  
                  
  
Readmission Risk Assessment Tool Score High Risk 43 Total Score 3 Has Seen PCP in Last 6 Months (Yes=3, No=0) 2 . Living with Significant Other. Assisted Living. LTAC. SNF. or  
Rehab  
 4 IP Visits Last 12 Months (1-3=4, 4=9, >4=11) 5 Pt. Coverage (Medicare=5 , Medicaid, or Self-Pay=4) 28 Charlson Comorbidity Score (Age + Comorbid Conditions) Criteria that do not apply:  
 Patient Length of Stay (>5 days = 3) Expected Length of Stay - - - Actual Length of Stay 3

## 2020-02-17 NOTE — PROGRESS NOTES
PROGRESS NOTE 
 
NAME:  Royal Callie Tarango. :   1950 MRN:   [id number] Date/Time:  2020 6:07 AM 
Subjective:  
History:  Chart reviewed and patient seen and examined this AM and D/W his nurses,  and Cardiology and all events noted. He was admitted on  with acute on chronic combined diastolic and systolic CHF with underlying HTN, DM,  ASCVD, Cardiomyopathy and CKD stage 4 with a history of Afib S/P ablation. He had become severely SOB over the 3 week period PTA until the night before admission he couldn't sleep at all and thus he presented to the ER. In the ER his weight was recorded at 250# up from 242# when last seen by me at the office on  (240# on 19). He still feels that he is better now and also improved regarding constipation and abdominal bloating. He has diuresed well since admission (although output inaccurately recorded on the initial day and yesterday one shift not recorded at all) and lungs are clear on exam as well as admission CXR was clear. He has no CP, Palpitations or other cardiac or respiratory c/o. He noted at home that his urine output had decreased despite compliance with his diuretics. He has had excellent UO since admission and renal sono w/o obstruction. He denies any N/V or GI c/o. He has no other c/o on complete ROS. Medications reviewed: 
Current Facility-Administered Medications Medication Dose Route Frequency  potassium chloride (K-DUR, KLOR-CON) SR tablet 40 mEq  40 mEq Oral BID  
 oxyCODONE-acetaminophen (PERCOCET) 5-325 mg per tablet 1 Tab  1 Tab Oral DAILY PRN  
 magnesium hydroxide (MILK OF MAGNESIA) 400 mg/5 mL oral suspension 30 mL  30 mL Oral DAILY PRN  
 milrinone (PRIMACOR) 200 mcg/mL in 0.9% sodium chloride 100 mL infusion  0.25 mcg/kg/min IntraVENous CONTINUOUS  
 epoetin mary-epbx (RETACRIT) injection 10,000 Units  10,000 Units SubCUTAneous Q TUE, THU & SAT  polyethylene glycol (MIRALAX) packet 17 g  17 g Oral DAILY PRN  
  simethicone (MYLICON) tablet 80 mg  80 mg Oral QID PRN  
 bumetanide (BUMEX) injection 4 mg  4 mg IntraVENous TID  iron sucrose (VENOFER) injection 200 mg  200 mg IntraVENous DAILY  sodium chloride (NS) flush 5-40 mL  5-40 mL IntraVENous Q8H  
 sodium chloride (NS) flush 5-40 mL  5-40 mL IntraVENous PRN  
 acetaminophen (TYLENOL) tablet 650 mg  650 mg Oral Q4H PRN  
 ondansetron (ZOFRAN) injection 4 mg  4 mg IntraVENous Q4H PRN  
 heparin (porcine) injection 5,000 Units  5,000 Units SubCUTAneous Q8H  
 carvediloL (COREG) tablet 12.5 mg  12.5 mg Oral BID WITH MEALS  isosorbide mononitrate ER (IMDUR) tablet 30 mg  30 mg Oral DAILY  zolpidem (AMBIEN) tablet 10 mg  10 mg Oral QHS PRN  
 timolol (TIMOPTIC) 0.5 % ophthalmic solution 1 Drop  1 Drop Right Eye BID  tamsulosin (FLOMAX) capsule 0.8 mg  0.8 mg Oral DAILY  insulin glargine (LANTUS) injection 20 Units  20 Units SubCUTAneous QHS  glucose chewable tablet 16 g  4 Tab Oral PRN  
 dextrose (D50W) injection syrg 12.5-25 g  25-50 mL IntraVENous PRN  
 glucagon (GLUCAGEN) injection 1 mg  1 mg IntraMUSCular PRN  
 insulin lispro (HUMALOG) injection   SubCUTAneous AC&HS  insulin glargine (LANTUS) injection 20 Units  20 Units SubCUTAneous QHS  diphenhydrAMINE (BENADRYL) capsule 25 mg  25 mg Oral Q6H PRN  pramipexole (MIRAPEX) tablet 0.5 mg  0.5 mg Oral QHS Objective:  
Vitals: 
Visit Vitals /71 (BP 1 Location: Left arm, BP Patient Position: At rest) Pulse 70 Temp 97.2 °F (36.2 °C) Resp 18 Ht 6' 4\" (1.93 m) Wt 243 lb (110.2 kg) SpO2 98% BMI 29.58 kg/m² O2 Flow Rate (L/min): 2.5 l/min O2 Device: Room air Temp (24hrs), Av.6 °F (36.4 °C), Min:97.2 °F (36.2 °C), Max:98.1 °F (36.7 °C) Last 24hr Input/Output: 
 
Intake/Output Summary (Last 24 hours) at 2020 5331 Last data filed at 2020 2281 Gross per 24 hour Intake 440 ml Output 1600 ml Net -1160 ml PHYSICAL EXAM: 
 General:     Alert, cooperative, no distress, appears stated age. Head:    Normocephalic, without obvious abnormality, atraumatic. Eyes:    Conjunctivae/corneas clear. PERRLA Nose:   Nares normal. No drainage or sinus tenderness. Throat:     Lips, mucosa, and tongue normal.  No Thrush Neck:   Supple, symmetrical,  no adenopathy, thyroid: non tender 
   no carotid bruit and no JVD. Back:     Symmetric,  No CVA tenderness. Lungs:    Clear to auscultation bilaterally. No Wheezing or Rhonchi. No rales. Heart:    Regular rate and rhythm,  no murmur, rub or gallop. Abdomen:    Soft, non-tender. Not distended. Bowel sounds normal. No masses Extremities:  Extremities normal, atraumatic, No cyanosis. No edema. No clubbing Lymph nodes:  Cervical, supraclavicular normal. 
Neurologic:  Normal strength, Alert and oriented X 3. Skin:                No rash Lab Data Reviewed: 
 
Recent Results (from the past 24 hour(s)) GLUCOSE, POC Collection Time: 02/16/20 10:55 AM  
Result Value Ref Range Glucose (POC) 164 (H) 65 - 100 mg/dL Performed by Kiah Mota (PCT) GLUCOSE, POC Collection Time: 02/16/20  4:10 PM  
Result Value Ref Range Glucose (POC) 137 (H) 65 - 100 mg/dL Performed by Kiah Mota (PCT) GLUCOSE, POC Collection Time: 02/16/20  9:30 PM  
Result Value Ref Range Glucose (POC) 157 (H) 65 - 100 mg/dL Performed by Wade Briones RENAL FUNCTION PANEL Collection Time: 02/17/20  3:51 AM  
Result Value Ref Range Sodium 123 (L) 136 - 145 mmol/L Potassium 3.7 3.5 - 5.1 mmol/L Chloride 81 (L) 97 - 108 mmol/L  
 CO2 31 21 - 32 mmol/L Anion gap 11 5 - 15 mmol/L Glucose 82 65 - 100 mg/dL BUN 98 (H) 6 - 20 MG/DL Creatinine 2.91 (H) 0.70 - 1.30 MG/DL  
 BUN/Creatinine ratio 34 (H) 12 - 20 GFR est AA 26 (L) >60 ml/min/1.73m2 GFR est non-AA 21 (L) >60 ml/min/1.73m2 Calcium 8.1 (L) 8.5 - 10.1 MG/DL  Phosphorus 3.8 2.6 - 4.7 MG/DL  
 Albumin 3.1 (L) 3.5 - 5.0 g/dL GLUCOSE, POC Collection Time: 02/17/20  7:07 AM  
Result Value Ref Range Glucose (POC) 97 65 - 100 mg/dL Performed by Melba Eli (PCT) Assessment/Plan:  
 
Principal Problem: 
  Acute exacerb[facility] of CH[facility] (congestive [facility] failure[facility] (2/14/2020) Active Problems: 
  ASCVD (arteriosclerotic cardiovascular disease) (8/5/2017) Controlled type 2 diabetes mellitus with stage 4 chronic kidney disease, without long-term current use of insulin ([facility] (8/5/2017) CKD (chronic kidney disease), stage IV ([facility] (8/5/2017) Hypertension with renal disease (8/5/2017) Gastroesophageal reflux disease without esophagitis (8/5/2017) Cardiomyopathy ([facility] (8/5/2017) Anemia (8/5/2017) S/P ablation of atrial fibrillation (5/17/2018) Chronic combined systolic and diastolic CHF, NYHA class 4 ([facility] (2/15/2020) 
 
  
___________________________________________________ PLAN: 
 
1. Continue diuresis with IV Bumex as had had excellent output despite inaccurate recorded I & O s initial 24 hr as well as yesterday (3875 on 2/15) 2. Follow weights (recent baseline 240# in December) 250# on admission and 243# now and I & O which need to be more accurate 3. Hold off on Zaroxolyn as long as excellent UOP with IV Bumex 4. He will need dialysis at some point which I D/W him, Has been reluctant in past although followed by renal Dr. Faith Reza for at least 2 years (Renal function baseline 77/2.5 on 1/6/20, 78/2.4 on 2/7/20), 94/3.04 on admission, today 98/2.91 
5. Continue Coreg for cardiomyopathy, Unable to use ACE or ARB due to renal disease 6. Imdur with Ischemic Cardiomyopathy 7. Follow K with diuresis and replete as needed, Not able to tolerate IV so increased PO 
8. Procrit ordered as well as IV Venofer (D#3 today) 9. Continue Lantus for DM 
10. Follow BS and treat with SSI 11. Flomax for BPH 12. Mirapex for Restless legs 13. No current symptoms c/w Angina but follow closely, Certainly if needs cath will end up with dialysis from this point foreward 14. Nasal oxygen for Hypoxemia/SOB, sat improved now and on RA 
15. Echocardiogram, Last EFx May 2019 was 33% 16. Hyponatremia due to volume overload was improving but today down to 123 so follow closely, doubt related to metolazone as has been taking it for several years, now fluid restrict PO, No need for Tolvaptan at this point 17. Milrinone added yesterday by Cardiology 435 minutes spent in direct care of this patient today with High complexity decision making on this extremely high complexity patient who is critically ill with high risk of further decompensation  
 
 
___________________________________________________ Attending Physician: Manisha Valenzuela MD

## 2020-02-17 NOTE — PROGRESS NOTES
Problem: Falls - Risk of 
Goal: *Absence of Falls Description Document Francine Bunn Fall Risk and appropriate interventions in the flowsheet. Outcome: Progressing Towards Goal 
Note: Fall Risk Interventions: 
Mobility Interventions: Bed/chair exit alarm Medication Interventions: Bed/chair exit alarm Elimination Interventions: Call light in reach, Bed/chair exit alarm History of Falls Interventions: Bed/chair exit alarm Problem: Patient Education: Go to Patient Education Activity Goal: Patient/Family Education Outcome: Progressing Towards Goal 
  
Problem: Pressure Injury - Risk of 
Goal: *Prevention of pressure injury Description Document Aurelio Scale and appropriate interventions in the flowsheet. Outcome: Progressing Towards Goal 
Note: Pressure Injury Interventions: 
Sensory Interventions: Assess changes in LOC Moisture Interventions: Absorbent underpads Activity Interventions: Increase time out of bed Mobility Interventions: Chair cushion Nutrition Interventions: Document food/fluid/supplement intake Friction and Shear Interventions: Minimize layers Problem: Patient Education: Go to Patient Education Activity Goal: Patient/Family Education Outcome: Progressing Towards Goal

## 2020-02-17 NOTE — PROGRESS NOTES
Problem: Mobility Impaired (Adult and Pediatric) Goal: *Acute Goals and Plan of Care (Insert Text) Description FUNCTIONAL STATUS PRIOR TO ADMISSION: Patient was modified independent using a rollator for functional mobility. His spouse prepares all meals which is on a higher level in the home. He sponge bathes rather than using the shower. He admits increasing frequency of falls due to legs buckling. HOME SUPPORT PRIOR TO ADMISSION: The patient lived with spouse who assists with ADLs as needed and transportation needs. Physical Therapy Goals Initiated 2/17/2020 1. Patient will move from supine to sit and sit to supine , scoot up and down and roll side to side in bed with independence within 7 day(s). 2.  Patient will transfer from bed to chair and chair to bed with independence using the least restrictive device within 7 day(s). 3.  Patient will perform sit to stand with modified independence within 7 day(s). 4.  Patient will ambulate with supervision/set-up for 150 feet with the least restrictive device within 7 day(s). Outcome: Progressing Towards Goal 
 PHYSICAL THERAPY EVALUATION Patient: Regla Gordon (73 y.o. male) Date: 2/17/2020 Primary Diagnosis: Acute exacerbation of CHF (congestive heart failure) ([facility] [I50.9] Precautions:  Fall ASSESSMENT Based on the objective data described below, the patient presents with severe loss of sensation in feet due to known diabetic neuropathy with h/o falls and delayed balance reactions, BLE weakness of 4-/5 to 3-/5, decreased activity tolerance and impaired mobility skills. He was able to stand from edge of bed with min a x 1, standing from raised toilet was min a x 1. Bed to chair transfers with rollator was cg assistance. Gait was tolerated for 75 feet using rollator and 1 standing rest due to fatigue. He would benefit from [facility] PT services to assess home safety and to assist patient in regaining maximum functional independence. Current Level of Function Impacting Discharge (mobility/balance): min to cg assistance Functional Outcome Measure: The patient scored 65/100 on the Barthel outcome measure which is indicative of 35% functional impairment. Other factors to consider for discharge: good home support from spouse and good community support. Modified independent PLOF Patient will benefit from skilled therapy intervention to address the above noted impairments. PLAN : 
Recommendations and Planned Interventions: bed mobility training, transfer training, gait training, therapeutic exercises, neuromuscular re-education, patient and family training/education, and therapeutic activities Frequency/Duration: Patient will be followed by physical therapy:  4 times a week to address goals. Recommendation for discharge: (in order for the patient to meet his/her long term goals) Physical therapy at least 2 days/week in the home AND ensure assist and/or supervision for safety with ADLs and mobility. This discharge recommendation: 
Has not yet been discussed the attending provider and/or case management IF patient discharges home will need the following DME: patient owns DME required for discharge SUBJECTIVE:  
Patient stated Just call me \"Tiny\".  
 
OBJECTIVE DATA SUMMARY:  
HISTORY:   
Past Medical History:  
Diagnosis Date Anemia 8/5/2017 Anxiety 8/5/2017 Arrhythmia   
 atrial fibrillation 2014 ASCVD (arteriosclerotic cardiovascular disease) 8/5/2017 BPH (benign prostatic hyperplasia) 8/5/2017 CAD (coronary artery disease)   
 h/o stents Cancer [facility])   
 h/o skin cancer Cardiomyopathy ([address] 8/5/2017 CHF (congestive heart failure) ([address] 8/5/2017 Chronic kidney disease Stage IV  
 CKD (chronic kidney disease), stage IV ([address] 8/5/2017 Diabetes ([address] Diabetes mellitus ([address] 8/5/2017 Diabetic neuropathy ([address] 8/5/2017 DJD (degenerative joint disease) 8/5/2017 ED (erectile dysfunction) 8/5/2017 Gout 8/5/2017 High cholesterol Hyperlipidemia 8/5/2017 Hypertension Hypertension with renal disease 8/5/2017 Hypothyroid 8/5/2017 Insomnia 8/5/2017 Obesity 8/5/2017 On statin therapy 8/5/2017 Pneumonia 05/28/2019 Restless leg 8/5/2017 Sleep apnea Bipap Thyroid disease   
 hypothyroid Ulcer of foot due to secondary diabetes ([facility] 07/12/2019 Past Surgical History:  
Procedure Laterality Date CARDIAC SURG PROCEDURE UNLIST    
 cardiac stents 3 CARDIAC SURG PROCEDURE UNLIST Ablation 5/17/2018 [id number] [facility] COLONOSCOPY N/A 6/28/2016 COLONOSCOPY performed by Margaret Glez MD at [facility] ENDOSCOPY  
 COLONOSCOPY N/A 1/9/2019 COLONOSCOPY performed by Marlynn Gosselin, MD at [facility] ENDOSCOPY  
 COLONOSCOPY,DIAGNOSTIC  1/9/2019 HX APPENDECTOMY HX BUNIONECTOMY    
 and removal of 2 seasmoid bone of the great toe 2/2018 HX HEENT Bilateral Cataract surgery HX HEENT Tonsils HX HEENT Axe wound to the head HX KNEE ARTHROSCOPY    
 X 2 HX ORTHOPAEDIC    
 HX ORTHOPAEDIC    
 partial laminectomy HX PACEMAKER    
 HX ROTATOR CUFF REPAIR    
 bilateral  
 MI INSJ ELTRD CAR DACIA SYS ATTCH PREV PM/DFB PLS GEN N/A 1/28/2019 Lv Lead Placement To Previous Generator performed by Anuel Bunn MD at [facility] CARDIAC CATH LAB Left side MI REMVL PERM PM PLS GEN W/REPL PLSE GEN MULT LEAD N/A 1/28/2019 Remove & Replace Ppm Gen Biv Multi Leads performed by Anuel Bunn MD at [address] [facility] CATH LAB Personal factors and/or comorbidities impacting plan of care: cardiomyopathy, dm2 with severe neuropathy, heart failure Home Situation Home Environment: Private residence # Steps to Enter: 0 Wheelchair Ramp: Yes One/Two Story Residence: Split level(lives on 1st floor - no steps) # of Interior Steps: (lives on main floor) Lift Chair Available: No 
Living Alone: No 
 Support Systems: Spouse/Significant Other/Partner Patient Expects to be Discharged to[de-identified] Private residence Current DME Used/Available at Home: CPAP, Grab bars, Raised toilet seat, Walker, rollator Tub or Shower Type: Other (comment)(sponge bathes) EXAMINATION/PRESENTATION/DECISION MAKING:  
Critical Behavior: 
Neurologic State: Alert Orientation Level: Oriented X4 Cognition: Appropriate decision making, Appropriate for age attention/concentration, Appropriate safety awareness, Follows commands Safety/Judgement: Awareness of environment, Fall prevention(poor standing balance due to neuropathy) Hearing: Auditory Auditory Impairment: None Skin:  no findings Edema: mild bilateral legs Range Of Motion: 
AROM: Within functional limits PROM: Within functional limits Strength:   
Strength: Generally decreased, functional(bilateral ankle df weakness and knee weakness: L>R) Tone & Sensation:  
Tone: Normal 
  
  
  
  
Sensation: Impaired(severe loss of sensation bilateral feet) Coordination: 
Coordination: Within functional limits(except legs) Functional Mobility: 
Bed Mobility: 
Rolling: Stand-by assistance Supine to Sit: Stand-by assistance Scooting: Modified independent Transfers: 
Sit to Stand: Minimum assistance Stand to Sit: Minimum assistance Bed to Chair: Contact guard assistance; Adaptive equipment(Rollator) Balance:  
Sitting: Intact Standing: Impaired Standing - Static: Constant support;Good Standing - Dynamic : Fair;Constant support Ambulation/Gait Training: 
Distance (ft): 75 Feet (ft) Assistive Device: Walker, rollator Ambulation - Level of Assistance: Contact guard assistance Gait Description (WDL): Exceptions to [facility] Gait Abnormalities: Decreased step clearance; Path deviations;Trunk sway increased Right Side Weight Bearing: Full Left Side Weight Bearing: Full Stance: (inadequate L knee extension') Speed/Anna: Pace decreased (<100 feet/min) Step Length: Right shortened;Left shortened Functional Measure: 
Barthel Index: 
 
Bathin Bladder: 10 Bowels: 10 
Groomin Dressin Feeding: 10 Mobility: 10 Stairs: 0(no stairs at home) Toilet Use: 5 Transfer (Bed to Chair and Back): 10 Total: 65/100 The Barthel ADL Index: Guidelines 1. The index should be used as a record of what a patient does, not as a record of what a patient could do. 2. The main aim is to establish degree of independence from any help, physical or verbal, however minor and for whatever reason. 3. The need for supervision renders the patient not independent. 4. A patient's performance should be established using the best available evidence. Asking the patient, friends/relatives and nurses are the usual sources, but direct observation and common sense are also important. However direct testing is not needed. 5. Usually the patient's performance over the preceding 24-48 hours is important, but occasionally longer periods will be relevant. 6. Middle categories imply that the patient supplies over 50 per cent of the effort. 7. Use of aids to be independent is allowed. Jairo Marin., Barthel, DSivaW. (0976). Functional evaluation: the Barthel Index. [address] [facility] (14)2. Deana Salinas, ELAINAMSivaF, Mikey Cedeño., Ryan Jolly., [place], [address] [place] [address] (). Measuring the change indisability after inpatient rehabilitation; comparison of the responsiveness of the Barthel Index and Functional [place] Measure. Journal of Neurology, Neurosurgery, and Psychiatry, 66(4), 618-432. Joe Antoine, N.J.A, ALIZA Lui, & Ar Swartz MCRISPIN. (2004.) Assessment of post-stroke quality of life in cost-effectiveness studies: The usefulness of the Barthel Index and the EuroQoL-5D. [facility], 13, 597-22 Physical Therapy Evaluation Charge Determination History Examination Presentation Decision-Making HIGH Complexity :3+ comorbidities / personal factors will impact the outcome/ POC  HIGH Complexity : 4+ Standardized tests and measures addressing body structure, function, activity limitation and / or participation in recreation  MEDIUM Complexity : Evolving with changing characteristics  Other outcome measures Barthel  MEDIUM Based on the above components, the patient evaluation is determined to be of the following complexity level: MEDIUM Pain Rating: 
None reported Activity Tolerance:  
Fair, SpO2 stable on RA, and requires rest breaks Please refer to the flowsheet for vital signs taken during this treatment. After treatment patient left in no apparent distress:  
Sitting in chair and Call bell within reach COMMUNICATION/EDUCATION:  
The patients plan of care was discussed with: Occupational Therapist and Registered Nurse. Fall prevention education was provided and the patient/caregiver indicated understanding., Patient/family have participated as able in goal setting and plan of care. , and Patient/family agree to work toward stated goals and plan of care. Thank you for this referral. 
Noe Quitnanilla, PT Time Calculation: 37 mins

## 2020-02-17 NOTE — PROGRESS NOTES
0700: Bedside shift change report given to HARLEY Marshall RN (oncoming nurse) by JUANI Villavicencio RN (offgoing nurse). Report included the following information SBAR and Kardex. 1900:  
Bedside shift change report GIVEN TO Jonny Mendoza RN. Report included the following information SBAR and Kardex. SIGNIFICANT CHANGES DURING SHIFT:  Pt had a good day. CONCERNS TO ADDRESS WITH MD:  D/c planning? [facility] NURSING NOTE Admission Date 2/14/2020 Admission Diagnosis Acute exacerbation of CHF (congestive heart failure) ([facility] [I50.9] Consults IP CONSULT TO NEPHROLOGY 
IP CONSULT TO CARDIOLOGY Cardiac Monitoring [x] Yes [] No  
  
Purposeful Hourly Rounding [x] Yes   
Thanh Score Total Score: 4 Thanh score 3 or > [x] Bed Alarm [] Avasys [] 1:1 sitter [] Patient refused (Signed refusal form in chart) Aurelio Score Aurelio Score: 17 Aurelio score 14 or < [] PMT consult [] Wound Care consult  
 []  Specialty bed  [] Nutrition consult Influenza Vaccine Received Flu Vaccine for Current Season (usually Sept-March): Yes Oxygen needs? [x] Room air Oxygen @  []1L    []2L    []3L   []4L    []5L   []6L via NC Chronic home O2 use? [] Yes [] No 
Perform O2 challenge test and document in progress note using smartphrase (.Homeoxygen) Last bowel movement Last Bowel Movement Date: 02/16/20 Urinary Catheter LDAs Peripheral IV 02/14/20 Left Arm (Active) Site Assessment Clean, dry, & intact 2/17/2020  2:59 PM  
Phlebitis Assessment 0 2/17/2020  2:59 PM  
Infiltration Assessment 0 2/17/2020  2:59 PM  
Dressing Status Clean, dry, & intact 2/17/2020  2:59 PM  
Dressing Type Transparent;Tape 2/17/2020  2:59 PM  
Hub Color/Line Status Pink 2/17/2020  2:59 PM  
                  
  
Readmission Risk Assessment Tool Score High Risk 43 Total Score 3 Has Seen PCP in Last 6 Months (Yes=3, No=0) 2 . Living with Significant Other. Assisted Living. LTAC. SNF. or  
Rehab  
 4 IP Visits Last 12 Months (1-3=4, 4=9, >4=11) 5 Pt. Coverage (Medicare=5 , Medicaid, or Self-Pay=4) 28 Charlson Comorbidity Score (Age + Comorbid Conditions) Criteria that do not apply:  
 Patient Length of Stay (>5 days = 3) Expected Length of Stay 4d 2h Actual Length of Stay 3

## 2020-02-17 NOTE — PROGRESS NOTES
Nephrology Progress Note [facility] 
[email or web address]                  Phone - [phone] Patient: Leana Torres. Date- 2/17/2020 Admit Date: 2/14/2020 YOB: 1950 CC: Follow up for  Dickson, hyponatremia Subjective: Interval History:  
-  Cr. Stable 5250 ml negative since admission Sob improved Leg edema better NA DROPPED  No c/o chest pain, No c/o nausea or vomiting No c/o  fever. ROS:- as above Assessment & Plan: · DICKSON due to cardiorenal syndrome - most likely · ckd 4- cr. 2.5 baseline · Chf, edema- ef 35% · Anemia due to ckd and iron defi. Iron sats 15% · Hyponatremia - na 128- due to metolazone use + CHF  
· H/o DM PLAN- 
? Give tolvaptan 15 mg po today 
? Hold bumex this afternoon ? Avoid diuril or metolazone ? Fluid restriction 1200 ml per day ? Check bmp in am 
? Milrinone per cards ? No RRT indicated ? Continue iv iron ? Continue epogen 
? kcl po Physical exam:  
GEN: NAD NECK- Supple, no mass RESP: Clear b/l, no wheezing, decreased BS at base CVS: S1,S2  RRR 
ABDO: soft , non tender, No mass NEURO: normal speech, non focal 
EXT: ++ Edema Care Plan discussed with: patient, nurse Objective:  
Visit Vitals /71 (BP 1 Location: Left arm, BP Patient Position: At rest) Pulse 70 Temp 97.2 °F (36.2 °C) Resp 18 Ht 6' 4\" (1.93 m) Wt 110.2 kg (243 lb) SpO2 98% BMI 29.58 kg/m² Last 3 Recorded Weights in this Encounter 02/15/20 1110 02/16/20 0036 02/17/20 5473 Weight: 112 kg (247 lb) 112.7 kg (248 lb 6.4 oz) 110.2 kg (243 lb) 02/15 1901 - 02/17 0700 In: 840 [P.O.:840] Out: Audery Pretty [DMCUJ:9150] Intake/Output Summary (Last 24 hours) at 2/17/2020 7410 Last data filed at 2/17/2020 6231 Gross per 24 hour Intake 440 ml Output 1600 ml Net -1160 ml Chart reviewed. Pertinent Notes reviewed. Medication list  reviewed Current Facility-Administered Medications Medication  potassium chloride (K-DUR, KLOR-CON) SR tablet 40 mEq  oxyCODONE-acetaminophen (PERCOCET) 5-325 mg per tablet 1 Tab  magnesium hydroxide (MILK OF MAGNESIA) 400 mg/5 mL oral suspension 30 mL  milrinone (PRIMACOR) 200 mcg/mL in 0.9% sodium chloride 100 mL infusion  epoetin mary-epbx (RETACRIT) injection 10,000 Units  polyethylene glycol (MIRALAX) packet 17 g  
 simethicone (MYLICON) tablet 80 mg  bumetanide (BUMEX) injection 4 mg  iron sucrose (VENOFER) injection 200 mg  
 sodium chloride (NS) flush 5-40 mL  sodium chloride (NS) flush 5-40 mL  acetaminophen (TYLENOL) tablet 650 mg  
 ondansetron (ZOFRAN) injection 4 mg  heparin (porcine) injection 5,000 Units  carvediloL (COREG) tablet 12.5 mg  
 isosorbide mononitrate ER (IMDUR) tablet 30 mg  
 zolpidem (AMBIEN) tablet 10 mg  
 timolol (TIMOPTIC) 0.5 % ophthalmic solution 1 Drop  tamsulosin (FLOMAX) capsule 0.8 mg  
 insulin glargine (LANTUS) injection 20 Units  glucose chewable tablet 16 g  
 dextrose (D50W) injection syrg 12.5-25 g  
 glucagon (GLUCAGEN) injection 1 mg  
 insulin lispro (HUMALOG) injection  insulin glargine (LANTUS) injection 20 Units  diphenhydrAMINE (BENADRYL) capsule 25 mg  
 pramipexole (MIRAPEX) tablet 0.5 mg Data Review : 
Recent Labs  
  02/17/20 
0351 02/16/20 
0028 02/15/20 
0122 * 126* 130*  
K 3.7 3.4* 3.7 CL 81* 84* 86* CO2 31 33* 35* BUN 98* 96* 97* CREA 2.91* 2.94* 2.97* GLU 82 97 118* CA 8.1* 8.4* 9.1 PHOS 3.8 4.3 4.3  4.3 Recent Labs  
  02/16/20 
0028 02/14/20 
1241 WBC 4.4 3.9* HGB 8.6* 9.5* HCT 27.2* 30.2* PLT 76* 93* No results for input(s): FE, TIBC, PSAT, FERR in the last 72 hours. Recent Labs  
  02/14/20 
1241 CKNDX 0.5  
TROIQ <0.05 Lab Results Component Value Date/Time  Color YELLOW/STRAW 02/14/2020 03:48 PM  
 Appearance CLEAR 02/14/2020 03:48 PM  
 Specific gravity 1.012 02/14/2020 03:48 PM  
 Specific gravity 1.015 08/14/2019 04:51 PM  
 pH (UA) 7.5 02/14/2020 03:48 PM  
 Protein 30 (A) 02/14/2020 03:48 PM  
 Glucose NEGATIVE  02/14/2020 03:48 PM  
 Ketone NEGATIVE  02/14/2020 03:48 PM  
 Bilirubin NEGATIVE  02/14/2020 03:48 PM  
 Urobilinogen 0.2 02/14/2020 03:48 PM  
 Nitrites NEGATIVE  02/14/2020 03:48 PM  
 Leukocyte Esterase NEGATIVE  02/14/2020 03:48 PM  
 Epithelial cells FEW 02/14/2020 03:48 PM  
 Bacteria NEGATIVE  02/14/2020 03:48 PM  
 WBC 0-4 02/14/2020 03:48 PM  
 RBC 0-5 02/14/2020 03:48 PM  
 
Lab Results Component Value Date/Time Culture result: NO GROWTH 4 DAYS 01/03/2020 06:44 PM  
 Culture result: NO GROWTH ON SOLID MEDIA AT 4 DAYS 01/03/2020 06:44 PM  
 Culture result: (A) 01/03/2020 06:44 PM  
  ENTEROCOCCUS FAECALIS GROUP D ISOLATED FROM THIO BROTH ONLY No results found for: SDES Lab Results Component Value Date/Time Sodium,urine random 57 02/14/2020 06:54 PM  
 Creatinine, urine 28.40 02/14/2020 06:54 PM  
 
[facility] XR CHEST PA LAT Narrative Clinical indication: Severe shortness of breath. Frontal and lateral views of the chest obtained, comparison June 7, 2019. Inspiration is shallow. Cardiac pacemaker, no acute infiltrate. Impression  impression: No acute changes. Juana Nance MD 
[address] [facility] 
 [email or web address] [place] / [employer] [address] [place], [address] Phone - [phone] Fax - [phone]

## 2020-02-17 NOTE — PROGRESS NOTES
Pharmacy Medication Reconciliation The patient was interviewed regarding current PTA medication list, use and drug allergies;  patient's wife was present in room and obtained permission from patient to discuss drug regimen with visitor(s) present. The patient was questioned regarding use of any other inhalers, topical products, over the counter medications, herbal medications, vitamin products or ophthalmic/nasal/otic medication use. Allergy Update: Niacin; Adhesive; Imipenem; Levemir [insulin detemir]; Other medication; Primaxin [imipenem-cilastatin]; and Xarelto [rivaroxaban] Recommendations/Findings: The following amendments were made to the patient's active medication list on file at [facility]:  
1) Additions:  
      - Lumigan - Cosopt - Hemp saul cream 
      - Percocet 2) Deletions:  
      - Passion flower - Timolol 3) Changes:  
      - Lantus:  Patient reports that he usually uses 20 units at bedtime but if his sugar is low he will use 10 units (when asked what low was he said < 100). - Metolazone: Patient reports taking it as needed for excess fluid. Pertinent Findings:  
     - Patient reports that he uses Ambien CR but the notes on the order state that insurance does not cover the CR product and he has only filled the IR product per the pharmacy. 
 
-Clarified PTA med list with Rx Query, patient, and patient's wife. PTA medication list was corrected to the following:  
 
Prior to Admission Medications Prescriptions Last Dose Informant Taking? Liraglutide (VICTOZA) 0.6 mg/0.1 mL (18 mg/3 mL) sub-q pen 2020 at Unknown time Self Yes Si.6 mg by SubCUTAneous route nightly. OTHER 2020 at Unknown time Self Yes Sig: Apply  to affected area daily as needed (Knee Pain). CBD Cream:  Apply daily as needed for knee pain OTHER   Yes Sig: Apply  to affected area four (4) times daily as needed for Pain.  Hemp Strong Fries. Apply to affected areas as needed for pain. ascorbic acid, vitamin C, (VITAMIN C) 250 mg tablet 2/13/2020 at Unknown time Self Yes Sig: Take 1 Tab by mouth three (3) times daily. bimatoprost (LUMIGAN) 0.01 % ophthalmic drops   Yes Sig: Administer 1 Drop to right eye every evening. bumetanide (BUMEX) 2 mg tablet 2/13/2020 at Unknown time  Yes Sig: Take 2 Tabs by mouth two (2) times a day. carvedilol (COREG) 12.5 mg tablet 2/13/2020 at Unknown time  Yes Sig: TAKE 1 TABLET BY MOUTH TWICE DAILY  
dorzolamide-timoloL (COSOPT) 22.3-6.8 mg/mL ophthalmic solution   Yes Sig: Administer 1 Drop to right eye nightly. febuxostat (ULORIC) 40 mg tab tablet   Yes Sig: Take 40 mg by mouth daily as needed for Other. ferrous sulfate (SLOW FE) 47.5 mg iron TbER tablet 2/13/2020 at Unknown time  Yes Sig: Take 1 Tab by mouth three (3) times daily. finasteride (PROSCAR) 5 mg tablet 2/13/2020 at Unknown time Self Yes Sig: TAKE 1 TABLET BY MOUTH DAILY  
insulin glargine (LANTUS SOLOSTAR U-100 INSULIN) 100 unit/mL (3 mL) inpn 2/13/2020 at Unknown time  Yes Sig: 10-20 Units by SubCUTAneous route nightly. Will use 10 units if glucose < 100. isosorbide mononitrate ER (IMDUR) 30 mg tablet 2/13/2020 at Unknown time  Yes Sig: Take 1 Tab by mouth daily. metOLazone (ZAROXOLYN) 2.5 mg tablet   Yes Sig: Take 2.5 mg by mouth daily as needed (Fluid). oxyCODONE-acetaminophen (PERCOCET) 5-325 mg per tablet   Yes Sig: Take 1 Tab by mouth nightly as needed for Pain.  
potassium chloride (KLOR-CON) 20 mEq pack   Yes Sig: Take 40 mEq by mouth daily. pramipexole (MIRAPEX) 1 mg tablet 2/13/2020 at Unknown time  Yes Sig: TAKE 1 TABLET BY MOUTH EVERY NIGHT AT BEDTIME  
tamsulosin (FLOMAX) 0.4 mg capsule 2/13/2020 at Unknown time  Yes Sig: TAKE 2 CAPSULES BY MOUTH EVERY DAY  
zolpidem (AMBIEN) 10 mg tablet   Yes Sig: Take 10 mg by mouth nightly as needed for Sleep. Facility-Administered Medications: None Thank you, Haley Brenner, PHARMD

## 2020-02-18 LAB
ALBUMIN SERPL-MCNC: 3 G/DL (ref 3.5–5)
ANION GAP SERPL CALC-SCNC: 10 MMOL/L (ref 5–15)
ANION GAP SERPL CALC-SCNC: 10 MMOL/L (ref 5–15)
ANION GAP SERPL CALC-SCNC: 9 MMOL/L (ref 5–15)
ANION GAP SERPL CALC-SCNC: 9 MMOL/L (ref 5–15)
BUN SERPL-MCNC: 100 MG/DL (ref 6–20)
BUN SERPL-MCNC: 92 MG/DL (ref 6–20)
BUN SERPL-MCNC: 97 MG/DL (ref 6–20)
BUN SERPL-MCNC: 98 MG/DL (ref 6–20)
BUN/CREAT SERPL: 29 (ref 12–20)
BUN/CREAT SERPL: 31 (ref 12–20)
CALCIUM SERPL-MCNC: 8.1 MG/DL (ref 8.5–10.1)
CALCIUM SERPL-MCNC: 8.2 MG/DL (ref 8.5–10.1)
CALCIUM SERPL-MCNC: 8.4 MG/DL (ref 8.5–10.1)
CALCIUM SERPL-MCNC: 8.5 MG/DL (ref 8.5–10.1)
CHLORIDE SERPL-SCNC: 82 MMOL/L (ref 97–108)
CHLORIDE SERPL-SCNC: 82 MMOL/L (ref 97–108)
CHLORIDE SERPL-SCNC: 84 MMOL/L (ref 97–108)
CHLORIDE SERPL-SCNC: 85 MMOL/L (ref 97–108)
CO2 SERPL-SCNC: 28 MMOL/L (ref 21–32)
CO2 SERPL-SCNC: 29 MMOL/L (ref 21–32)
CO2 SERPL-SCNC: 29 MMOL/L (ref 21–32)
CO2 SERPL-SCNC: 30 MMOL/L (ref 21–32)
CORTIS AM PEAK SERPL-MCNC: 6.7 UG/DL (ref 4.3–22.45)
CREAT SERPL-MCNC: 3.19 MG/DL (ref 0.7–1.3)
CREAT SERPL-MCNC: 3.21 MG/DL (ref 0.7–1.3)
CREAT SERPL-MCNC: 3.29 MG/DL (ref 0.7–1.3)
CREAT SERPL-MCNC: 3.43 MG/DL (ref 0.7–1.3)
ERYTHROCYTE [DISTWIDTH] IN BLOOD BY AUTOMATED COUNT: 17 % (ref 11.5–14.5)
GLUCOSE BLD STRIP.AUTO-MCNC: 115 MG/DL (ref 65–100)
GLUCOSE BLD STRIP.AUTO-MCNC: 115 MG/DL (ref 65–100)
GLUCOSE BLD STRIP.AUTO-MCNC: 143 MG/DL (ref 65–100)
GLUCOSE BLD STRIP.AUTO-MCNC: 145 MG/DL (ref 65–100)
GLUCOSE SERPL-MCNC: 123 MG/DL (ref 65–100)
GLUCOSE SERPL-MCNC: 129 MG/DL (ref 65–100)
GLUCOSE SERPL-MCNC: 138 MG/DL (ref 65–100)
GLUCOSE SERPL-MCNC: 83 MG/DL (ref 65–100)
HCT VFR BLD AUTO: 25.2 % (ref 36.6–50.3)
HGB BLD-MCNC: 8.1 G/DL (ref 12.1–17)
MCH RBC QN AUTO: 29.7 PG (ref 26–34)
MCHC RBC AUTO-ENTMCNC: 32.1 G/DL (ref 30–36.5)
MCV RBC AUTO: 92.3 FL (ref 80–99)
NRBC # BLD: 0 K/UL (ref 0–0.01)
NRBC BLD-RTO: 0 PER 100 WBC
PHOSPHATE SERPL-MCNC: 4.4 MG/DL (ref 2.6–4.7)
PLATELET # BLD AUTO: 77 K/UL (ref 150–400)
PMV BLD AUTO: 11.4 FL (ref 8.9–12.9)
POTASSIUM SERPL-SCNC: 3.8 MMOL/L (ref 3.5–5.1)
POTASSIUM SERPL-SCNC: 3.9 MMOL/L (ref 3.5–5.1)
POTASSIUM SERPL-SCNC: 4.2 MMOL/L (ref 3.5–5.1)
POTASSIUM SERPL-SCNC: 4.3 MMOL/L (ref 3.5–5.1)
RBC # BLD AUTO: 2.73 M/UL (ref 4.1–5.7)
SERVICE CMNT-IMP: ABNORMAL
SODIUM SERPL-SCNC: 120 MMOL/L (ref 136–145)
SODIUM SERPL-SCNC: 121 MMOL/L (ref 136–145)
SODIUM SERPL-SCNC: 123 MMOL/L (ref 136–145)
SODIUM SERPL-SCNC: 123 MMOL/L (ref 136–145)
WBC # BLD AUTO: 3.8 K/UL (ref 4.1–11.1)

## 2020-02-18 PROCEDURE — 74011250636 HC RX REV CODE- 250/636: Performed by: INTERNAL MEDICINE

## 2020-02-18 PROCEDURE — 80048 BASIC METABOLIC PNL TOTAL CA: CPT

## 2020-02-18 PROCEDURE — 36415 COLL VENOUS BLD VENIPUNCTURE: CPT

## 2020-02-18 PROCEDURE — 82962 GLUCOSE BLOOD TEST: CPT

## 2020-02-18 PROCEDURE — 94760 N-INVAS EAR/PLS OXIMETRY 1: CPT

## 2020-02-18 PROCEDURE — 74011000258 HC RX REV CODE- 258: Performed by: INTERNAL MEDICINE

## 2020-02-18 PROCEDURE — 74011250637 HC RX REV CODE- 250/637: Performed by: INTERNAL MEDICINE

## 2020-02-18 PROCEDURE — 80069 RENAL FUNCTION PANEL: CPT

## 2020-02-18 PROCEDURE — 82533 TOTAL CORTISOL: CPT

## 2020-02-18 PROCEDURE — 65660000000 HC RM CCU STEPDOWN

## 2020-02-18 PROCEDURE — 85027 COMPLETE CBC AUTOMATED: CPT

## 2020-02-18 PROCEDURE — 74011636637 HC RX REV CODE- 636/637: Performed by: INTERNAL MEDICINE

## 2020-02-18 PROCEDURE — 74011250637 HC RX REV CODE- 250/637: Performed by: HOSPITALIST

## 2020-02-18 RX ORDER — CARVEDILOL 3.12 MG/1
3.12 TABLET ORAL 2 TIMES DAILY WITH MEALS
Status: DISCONTINUED | OUTPATIENT
Start: 2020-02-18 | End: 2020-02-21 | Stop reason: HOSPADM

## 2020-02-18 RX ORDER — TOLVAPTAN 15 MG/1
15 TABLET ORAL ONCE
Status: COMPLETED | OUTPATIENT
Start: 2020-02-18 | End: 2020-02-18

## 2020-02-18 RX ORDER — TOLVAPTAN 15 MG/1
15 TABLET ORAL ONCE
Status: DISCONTINUED | OUTPATIENT
Start: 2020-02-18 | End: 2020-02-18 | Stop reason: SDUPTHER

## 2020-02-18 RX ADMIN — TOLVAPTAN 15 MG: 15 TABLET ORAL at 10:48

## 2020-02-18 RX ADMIN — SIMETHICONE CHEW TAB 80 MG 80 MG: 80 TABLET ORAL at 15:46

## 2020-02-18 RX ADMIN — INSULIN GLARGINE 20 UNITS: 100 INJECTION, SOLUTION SUBCUTANEOUS at 22:06

## 2020-02-18 RX ADMIN — ZOLPIDEM TARTRATE 10 MG: 5 TABLET ORAL at 22:07

## 2020-02-18 RX ADMIN — PRAMIPEXOLE DIHYDROCHLORIDE 0.5 MG: 0.25 TABLET ORAL at 18:44

## 2020-02-18 RX ADMIN — POTASSIUM CHLORIDE 40 MEQ: 20 TABLET, EXTENDED RELEASE ORAL at 08:56

## 2020-02-18 RX ADMIN — Medication 10 ML: at 15:45

## 2020-02-18 RX ADMIN — POTASSIUM CHLORIDE 40 MEQ: 20 TABLET, EXTENDED RELEASE ORAL at 18:36

## 2020-02-18 RX ADMIN — CARVEDILOL 12.5 MG: 12.5 TABLET, FILM COATED ORAL at 08:56

## 2020-02-18 RX ADMIN — HEPARIN SODIUM 5000 UNITS: 5000 INJECTION INTRAVENOUS; SUBCUTANEOUS at 05:32

## 2020-02-18 RX ADMIN — SIMETHICONE CHEW TAB 80 MG 80 MG: 80 TABLET ORAL at 09:05

## 2020-02-18 RX ADMIN — SODIUM CHLORIDE 0.25 MCG/KG/MIN: 9 INJECTION, SOLUTION INTRAVENOUS at 00:54

## 2020-02-18 RX ADMIN — OXYCODONE HYDROCHLORIDE AND ACETAMINOPHEN 1 TABLET: 5; 325 TABLET ORAL at 21:16

## 2020-02-18 RX ADMIN — HEPARIN SODIUM 5000 UNITS: 5000 INJECTION INTRAVENOUS; SUBCUTANEOUS at 15:45

## 2020-02-18 RX ADMIN — TAMSULOSIN HYDROCHLORIDE 0.8 MG: 0.4 CAPSULE ORAL at 08:56

## 2020-02-18 RX ADMIN — Medication 10 ML: at 22:12

## 2020-02-18 RX ADMIN — TIMOLOL MALEATE 1 DROP: 5 SOLUTION/ DROPS OPHTHALMIC at 18:37

## 2020-02-18 RX ADMIN — CARVEDILOL 3.12 MG: 3.12 TABLET, FILM COATED ORAL at 18:36

## 2020-02-18 RX ADMIN — IRON SUCROSE 200 MG: 20 INJECTION, SOLUTION INTRAVENOUS at 08:56

## 2020-02-18 RX ADMIN — TIMOLOL MALEATE 1 DROP: 5 SOLUTION/ DROPS OPHTHALMIC at 09:01

## 2020-02-18 RX ADMIN — HEPARIN SODIUM 5000 UNITS: 5000 INJECTION INTRAVENOUS; SUBCUTANEOUS at 22:06

## 2020-02-18 RX ADMIN — EPOETIN ALFA-EPBX 10000 UNITS: 10000 INJECTION, SOLUTION INTRAVENOUS; SUBCUTANEOUS at 22:07

## 2020-02-18 RX ADMIN — ISOSORBIDE MONONITRATE 30 MG: 30 TABLET, EXTENDED RELEASE ORAL at 08:56

## 2020-02-18 RX ADMIN — SODIUM CHLORIDE 0.25 MCG/KG/MIN: 9 INJECTION, SOLUTION INTRAVENOUS at 16:10

## 2020-02-18 RX ADMIN — Medication 10 ML: at 05:32

## 2020-02-18 NOTE — PROGRESS NOTES
Bedside and Verbal shift change report received from [employer]. Report included the following information SBAR, Kardex and Recent Results.

## 2020-02-18 NOTE — CARDIO/PULMONARY
Cardiac Rehab Note: chart review Pt is a 69 yo admitting diagnosis acute on chronic combined diastolic and systolic heart failure. CHF BUNDLE   
 
EF 40%  on 2/15/2020 per echo. Smoking history assessed. Patient is a former smoker. Quit in 1972. Smoking Cessation Program link has not been added to the AVS. Current inpatient diet: DIET DIABETIC CONSISTENT CARB \"Living with Heart Failure\" book with daily weight calendar and s/s of heart failure magnet provided to Markus Hatch. on prior admission 5/2019. Educated using teach back method. Instruction given on s/s of CHF, checking weight every am and calling MD if weight is up 2-3 lbs in a day or 5 lbs in a week (or as directed by the physician), fluid/Na restrictions, s/s of worsening CHF and when to call MD. Reviewed activity as tolerated with frequent rest periods as needed, taking medications as prescribed, and the importance of follow up visits with physician. Pt has been seen by CR staff to provide written materials and education. He is well versed in home recommendations. PT reports he may need HD as his kidney failure is worsening. Pt is hopeful that the bumex and cardiac drip may help him without the need for HD. He has a strong support system at home that he feels he is burdening. He has a realistic outlook on his prognosis, \" It is what it is. \" Royal KRISTA Castillo. verbalized understanding with questions answered.   Des Hammer RN

## 2020-02-18 NOTE — PROGRESS NOTES
PROGRESS NOTE 
 
NAME:  Royal Gretchen Burk. :   1950 MRN:   [id number] Date/Time:  2020 6:13 AM 
Subjective:  
History:  Chart reviewed and patient seen and examined this AM and D/W his nurses,  and Cardiology and all events noted. He was admitted on  with acute on chronic combined diastolic and systolic CHF with underlying HTN, DM,  ASCVD, Cardiomyopathy and CKD stage 4 with a history of Afib S/P ablation. He had become severely SOB over the 3 week period PTA until the night before admission he couldn't sleep at all and thus he presented to the ER. In the ER his weight was recorded at 250# up from 242# when last seen by me at the office on  (240# on 19). He feels that he is better now and also improved regarding constipation and abdominal bloating. He has diuresed well since admission (although output inaccurately recorded on the initial day and on  one shift not recorded at all) and lungs are clear on exam as well as admission CXR was clear. He has no CP, Palpitations or other cardiac or respiratory c/o. He noted at home that his urine output had decreased despite compliance with his diuretics. He has had excellent UO since admission and renal sono w/o obstruction. He denies any N/V or GI c/o. He has no other c/o on complete ROS except extremely weak although worked with PT yesterday but it appears from talking with him that he needs to get stronger to manage at home. Kavitha Gonzalez Medications reviewed: 
Current Facility-Administered Medications Medication Dose Route Frequency  bumetanide (BUMEX) injection 4 mg  4 mg IntraVENous BID  Sodium Monitoring- Please call physician if sodium increases more than 12 mmol/L in 24 hours  1 Each Other Q6H  
 oxyCODONE-acetaminophen (PERCOCET) 5-325 mg per tablet 1 Tab  1 Tab Oral Q6H PRN  potassium chloride (K-DUR, KLOR-CON) SR tablet 40 mEq  40 mEq Oral BID  magnesium hydroxide (MILK OF MAGNESIA) 400 mg/5 mL oral suspension 30 mL 30 mL Oral DAILY PRN  
 milrinone (PRIMACOR) 200 mcg/mL in 0.9% sodium chloride 100 mL infusion  0.25 mcg/kg/min IntraVENous CONTINUOUS  
 epoetin mary-epbx (RETACRIT) injection 10,000 Units  10,000 Units SubCUTAneous Q TUE, THU & SAT  polyethylene glycol (MIRALAX) packet 17 g  17 g Oral DAILY PRN  
 simethicone (MYLICON) tablet 80 mg  80 mg Oral QID PRN  
 iron sucrose (VENOFER) injection 200 mg  200 mg IntraVENous DAILY  sodium chloride (NS) flush 5-40 mL  5-40 mL IntraVENous Q8H  
 sodium chloride (NS) flush 5-40 mL  5-40 mL IntraVENous PRN  
 acetaminophen (TYLENOL) tablet 650 mg  650 mg Oral Q4H PRN  
 ondansetron (ZOFRAN) injection 4 mg  4 mg IntraVENous Q4H PRN  
 heparin (porcine) injection 5,000 Units  5,000 Units SubCUTAneous Q8H  
 carvediloL (COREG) tablet 12.5 mg  12.5 mg Oral BID WITH MEALS  isosorbide mononitrate ER (IMDUR) tablet 30 mg  30 mg Oral DAILY  zolpidem (AMBIEN) tablet 10 mg  10 mg Oral QHS PRN  
 timolol (TIMOPTIC) 0.5 % ophthalmic solution 1 Drop  1 Drop Right Eye BID  tamsulosin (FLOMAX) capsule 0.8 mg  0.8 mg Oral DAILY  insulin glargine (LANTUS) injection 20 Units  20 Units SubCUTAneous QHS  glucose chewable tablet 16 g  4 Tab Oral PRN  
 dextrose (D50W) injection syrg 12.5-25 g  25-50 mL IntraVENous PRN  
 glucagon (GLUCAGEN) injection 1 mg  1 mg IntraMUSCular PRN  
 insulin lispro (HUMALOG) injection   SubCUTAneous AC&HS  insulin glargine (LANTUS) injection 20 Units  20 Units SubCUTAneous QHS  diphenhydrAMINE (BENADRYL) capsule 25 mg  25 mg Oral Q6H PRN  pramipexole (MIRAPEX) tablet 0.5 mg  0.5 mg Oral QHS Objective:  
Vitals: 
Visit Vitals /77 (BP 1 Location: Left arm, BP Patient Position: Sitting) Pulse 68 Temp 97 °F (36.1 °C) Resp 18 Ht 6' 4\" (1.93 m) Wt 248 lb 1.6 oz (112.5 kg) SpO2 97% BMI 30.20 kg/m²  O2 Flow Rate (L/min): 2.5 l/min O2 Device: Room air Temp (24hrs), Av.7 °F (36.5 °C), Min:97 °F (36.1 °C), Max:98.2 °F (36.8 °C) Last 24hr Input/Output: 
 
Intake/Output Summary (Last 24 hours) at 2020 0750 Last data filed at 2020 3225 Gross per 24 hour Intake 1283.99 ml Output 2050 ml Net -766.01 ml PHYSICAL EXAM: 
General:     Alert, cooperative, no distress, appears stated age. Head:    Normocephalic, without obvious abnormality, atraumatic. Eyes:    Conjunctivae/corneas clear. PERRLA Nose:   Nares normal. No drainage or sinus tenderness. Throat:     Lips, mucosa, and tongue normal.  No Thrush Neck:   Supple, symmetrical,  no adenopathy, thyroid: non tender 
   no carotid bruit and no JVD. Back:     Symmetric,  No CVA tenderness. Lungs:    Clear to auscultation bilaterally. No Wheezing or Rhonchi. No rales. Heart:    Regular rate and rhythm,  no murmur, rub or gallop. Abdomen:    Soft, non-tender. Not distended. Bowel sounds normal. No masses Extremities:  Extremities normal, atraumatic, No cyanosis. No edema. No clubbing Lymph nodes:  Cervical, supraclavicular normal. 
Neurologic:  Normal strength, Alert and oriented X 3. Skin:                No rash Lab Data Reviewed: 
 
Recent Results (from the past 24 hour(s)) GLUCOSE, POC Collection Time: 20 11:10 AM  
Result Value Ref Range Glucose (POC) 175 (H) 65 - 100 mg/dL Performed by Alice Lazcano (JULIA) GLUCOSE, POC Collection Time: 20  4:53 PM  
Result Value Ref Range Glucose (POC) 141 (H) 65 - 100 mg/dL Performed by RALPH LANG(RN)   
METABOLIC PANEL, BASIC Collection Time: 20  5:38 PM  
Result Value Ref Range Sodium 120 (L) 136 - 145 mmol/L Potassium 3.9 3.5 - 5.1 mmol/L Chloride 80 (L) 97 - 108 mmol/L  
 CO2 30 21 - 32 mmol/L Anion gap 10 5 - 15 mmol/L Glucose 176 (H) 65 - 100 mg/dL BUN 98 (H) 6 - 20 MG/DL  Creatinine 3.15 (H) 0.70 - 1.30 MG/DL  
 BUN/Creatinine ratio 31 (H) 12 - 20    
 GFR est AA 24 (L) >60 ml/min/1.73m2 GFR est non-AA 20 (L) >60 ml/min/1.73m2 Calcium 7.9 (L) 8.5 - 10.1 MG/DL  
GLUCOSE, POC Collection Time: 02/17/20  8:29 PM  
Result Value Ref Range Glucose (POC) 189 (H) 65 - 100 mg/dL Performed by [facility] METABOLIC PANEL, BASIC Collection Time: 02/17/20 11:25 PM  
Result Value Ref Range Sodium 120 (L) 136 - 145 mmol/L Potassium 3.9 3.5 - 5.1 mmol/L Chloride 82 (L) 97 - 108 mmol/L  
 CO2 29 21 - 32 mmol/L Anion gap 9 5 - 15 mmol/L Glucose 123 (H) 65 - 100 mg/dL  (H) 6 - 20 MG/DL Creatinine 3.19 (H) 0.70 - 1.30 MG/DL  
 BUN/Creatinine ratio 31 (H) 12 - 20 GFR est AA 23 (L) >60 ml/min/1.73m2 GFR est non-AA 19 (L) >60 ml/min/1.73m2 Calcium 8.2 (L) 8.5 - 10.1 MG/DL RENAL FUNCTION PANEL Collection Time: 02/18/20  5:23 AM  
Result Value Ref Range Sodium 121 (L) 136 - 145 mmol/L Potassium 3.8 3.5 - 5.1 mmol/L Chloride 82 (L) 97 - 108 mmol/L  
 CO2 29 21 - 32 mmol/L Anion gap 10 5 - 15 mmol/L Glucose 83 65 - 100 mg/dL BUN 92 (H) 6 - 20 MG/DL Creatinine 3.21 (H) 0.70 - 1.30 MG/DL  
 BUN/Creatinine ratio 29 (H) 12 - 20 GFR est AA 23 (L) >60 ml/min/1.73m2 GFR est non-AA 19 (L) >60 ml/min/1.73m2 Calcium 8.4 (L) 8.5 - 10.1 MG/DL Phosphorus 4.4 2.6 - 4.7 MG/DL Albumin 3.0 (L) 3.5 - 5.0 g/dL CBC W/O DIFF Collection Time: 02/18/20  5:23 AM  
Result Value Ref Range WBC 3.8 (L) 4.1 - 11.1 K/uL  
 RBC 2.73 (L) 4.10 - 5.70 M/uL HGB 8.1 (L) 12.1 - 17.0 g/dL HCT 25.2 (L) 36.6 - 50.3 % MCV 92.3 80.0 - 99.0 FL  
 MCH 29.7 26.0 - 34.0 PG  
 MCHC 32.1 30.0 - 36.5 g/dL  
 RDW 17.0 (H) 11.5 - 14.5 % PLATELET 77 (L) 260 - 400 K/uL MPV 11.4 8.9 - 12.9 FL  
 NRBC 0.0 0  WBC ABSOLUTE NRBC 0.00 0.00 - 0.01 K/uL GLUCOSE, POC Collection Time: 02/18/20  7:40 AM  
Result Value Ref Range Glucose (POC) 115 (H) 65 - 100 mg/dL Performed by Asha Garcia (PCT) Assessment/Plan:  
 
Principal Problem: 
  Acute exacerb[facility] of [facility] (congestive heart failure[facility] (2/14/2020) Active Problems: 
  ASCVD (arteriosclerotic cardiovascular disease) (8/5/2017) Controlled type 2 diabetes mellitus with stage 4 chronic kidney disease, without long-term current use of insulin ([facility] (8/5/2017) CKD (chronic kidney disease), stage IV ([facility] (8/5/2017) Hypertension with renal disease (8/5/2017) Gastroesophageal reflux disease without esophagitis (8/5/2017) Cardiomyopathy ([facility] (8/5/2017) Anemia (8/5/2017) S/P ablation of atrial fibrillation (5/17/2018) Chronic combined systolic and diastolic CHF, NYHA class 4 ([facility] (2/15/2020) 
 
  
___________________________________________________ PLAN: 
 
1. Continue diuresis with IV Bumex as had had excellent output despite inaccurate recorded I & O s initial 24 hr as well as on 2/16 (3875 on 2/15) 2. Follow weights (recent baseline 240# in December) 250# on admission and 243# yesterday although today taken with bed scales and included \"8# of blankets\" and I & O which need to be more accurate 3. Hold off on Zaroxolyn as long as excellent UOP with IV Bumex decrease frequency of Bumex 4. He will need dialysis at some point which I D/W him, Has been reluctant in past although followed by renal Dr. Jose Leone for at least 2 years (Renal function baseline 77/2.5 on 1/6/20, 78/2.4 on 2/7/20), 94/3.04 on admission, today 92/3.21 
5. Continue Coreg for cardiomyopathy, Unable to use ACE or ARB due to renal disease 6. Imdur with Ischemic Cardiomyopathy 7. Follow K (3.8 today) with diuresis and replete as needed, Not able to tolerate IV so increased PO 
8. Procrit ordered as well as IV Venofer (D#4 today) 9. Continue Lantus for DM 
10. Follow BS and treat with SSI 11. Flomax for BPH 12. Mirapex for Restless legs 13.  No current symptoms c/w Angina but follow closely, Certainly if needs cath will end up with dialysis from this point foreward 14. Nasal oxygen for Hypoxemia/SOB, sat improved now and on RA 
15. Echocardiogram, Last EFx May 2019 was 33%, this admission 35-40% 16. Hyponatremia due to volume overload was improving but yesterday down to 123 to 120 so given Tolvapatn  so follow closely, doubt related to metolazone as has been taking it for several years, now fluid restrict PO, Today 121 so repeat Tolvaptan at this point 17. Milrinone added 2/16 by Cardiology 35 minutes spent in direct care of this patient today with High complexity decision making on this extremely high complexity patient who is critically ill with high risk of further decompensation  
 
 
___________________________________________________ Attending Physician: Ivy Siemens, MD

## 2020-02-18 NOTE — PROGRESS NOTES
Patient declined PT this afternoon due to not feeling \"energetic\". Requests PT return tomorrow.  
 
Ping Neves, PT

## 2020-02-18 NOTE — PROGRESS NOTES
[address] INTERDISCIPLINARY ROUNDS Cardiopulmonary Care Interdisciplinary Rounds were held today to discuss patient's plan of care and outcomes. The following members were present: NP/Physician, Pharmacy, Nursing and Case Management. Expected Length of Stay:  4d 2h 
 
PLAN OF CARE:  
Continue current treatment plan

## 2020-02-18 NOTE — PROGRESS NOTES
Progress Note 2/18/2020 NAME: Jessy Gonsales MRN:  [id number] Admit Diagnosis: Acute exacerbation of CHF (congestive heart failure) ([facility] [I50.9] Problem List: 1. Acute on chronic combined systolic and diastolic heart failure with symptomatic volume overload including dyspnea at rest, abdominal fullness, ascites, lower extremity edema but no pulmonary edema on CXR--a lot of R-sided sx. Biventricular failure with NYHA CHF class 4 symptoms acutely. 2. Paroxysmal atrial fibrillation and atypical atrial flutter s/p ablation in 5/2018.  NO significant atrial fibrillation or flutter since the ablation. 3. Remote dual chamber SJM pacer for sick sinus syndrome, upgraded to biventricular pacemaker in 1/2019 due to CHF, cardiomyopathy, chronic V pacing. (Program change 5/2019--LV>RV to 50 msec from 20 msec.  Tried to enable MPP using poles 3 and 4 but vectors using these did not capture at max output.) 4. Hypertensive heart disease with a history of congestive heart failure and CKD stage 4.  EF 55% on prior echo, then 35-40% early 2019, 33% in 5/2019, 35-40% this admission. 5. Coronary artery disease with multivessel interventions.  His last stenting was in 2010.  Cath in 2014 showed a nondominant RCA with LPDA occluded and filled by collaterals, otherwise up to moderate CAD. 6. Mild idiopathic pericardial effusion in the past. 
7. Diabetes mellitus type 2, on chronic insulin. 8. Hyperlipidemia. 9. BRBPR in 1/2019 with grade 1 internal hemorrhoids noted on colo here.  Presumed source of GI bleed. 10. History of esophageal dilation for dysphagia. 11. Ascites noted on abdominal ultrasound. Remote CT 6/2019 suggested cirrhosis, no comment made on the ultrasound regarding this. 12. Hiatal hernia. 13. Anemia of chronic renal disease.  On EPO (and Fe supplement). 14. Moderate thrombocytopenia. 15. Chronic anticoagulation stopped 1/2019. Assessment/Plan: Last 3 Recorded Weights in this Encounter 02/16/20 0036 02/17/20 9554 02/18/20 9324 Weight: 112.7 kg (248 lb 6.4 oz) 110.2 kg (243 lb) 112.5 kg (248 lb 1.6 oz) Intake/Output Summary (Last 24 hours) at 2/18/2020 1842 Last data filed at 2/18/2020 1839 Gross per 24 hour Intake 1655.99 ml Output 2275 ml Net -619.01 ml 1. Continue diuresis per renal (on Bumex IV, no thiazide, got dose of Tolvaptan 2/17). He's on 2g Na diet and fluid restricted to 1L. 2. Consented for HD if needed--I read the nephrology note from today 3. Continue beta blocker, dose adjusted to BP 4. Continue isosorbide 5. Keep K and Mag up per renal 
6. Continue milrinone 0.25mcg/kg/min 7. No program changes to his BIV pacemaker today. [x]       High complexity decision making was performed in this patient at high risk for decompensation with multiple organ involvement. Subjective:  
 
Flowood Nigel Harness. denies chest pain. He ate less and feels less bloated. Isn't hungry. Discussed with RN events overnight. Review of Systems: 
 
Symptom Y/N Comments  Symptom Y/N Comments Fever/Chills N   Chest Pain N Poor Appetite +/-   Edema Y better Cough N   Abdominal Pain N Sputum N   Joint Pain N   
SOB/ANDERS Y better  Pruritis/Rash N   
Nausea/vomit N   Tolerating PT/OT N refused Diarrhea N   Tolerating Diet Y Constipation N   Other Could NOT obtain due to:   
 
Objective:  
  
Physical Exam: 
 
Last 24hrs VS reviewed since prior progress note. Most recent are: 
 
Visit Vitals /67 (BP 1 Location: Left arm, BP Patient Position: At rest) Pulse 70 Temp 97.5 °F (36.4 °C) Resp 18 Ht 6' 4\" (1.93 m) Wt 112.5 kg (248 lb 1.6 oz) SpO2 98% BMI 30.20 kg/m² Intake/Output Summary (Last 24 hours) at 2/18/2020 1842 Last data filed at 2/18/2020 1839 Gross per 24 hour Intake 1655.99 ml Output 2275 ml Net -619.01 ml General Appearance: Well developed, well nourished, alert & oriented x 3, no acute distress. Ears/Nose/Mouth/Throat: Hearing grossly normal. 
Neck: Supple, nontender. Chest: Lungs w/ diminished breath sounds in the bases, unlabored, symmetric movement. L chest BIV-ICD site OK. Cardiovascular: Regular rate and rhythm, S1S2 normal, no murmur. Abdomen: Soft, non-tender, bowel sounds are active. Distended. Extremities: 1+ erythematous edema bilaterally, now wearing socks. No cyanosis or clubbing. Skin: Warm and dry. No petechiae, purpura, or jaundice. Neuro:  Awake, appropriate, grossly nonfocal.  No asterixis. []         Post-cath site without hematoma, bruit, tenderness, or thrill. Distal pulses intact. PMH/SH reviewed - no change compared to H&P Data Review Telemetry:  AV paced EKG:  
[x]  No new EKG for review ECHO: 
Left Ventricle Normal cavity size and wall thickness. Mild systolic dysfunction. The estimated ejection fraction is 35 - 40%. Visually measured ejection fraction. Global hypokinesis observed. Left Atrium Mildly dilated left atrium. Right Ventricle Normal wall thickness. Mildly dilated right ventricle. Mildly reduced systolic function. Right Atrium Mildly dilated right atrium. Aortic Valve No stenosis. Aortic valve sclerosis. Mild aortic valve regurgitation. Mitral Valve Normal valve structure and no stenosis. Mild regurgitation. Tricuspid Valve Normal valve structure and no stenosis. Moderate regurgitation. Pulmonary arterial systolic pressure is 01.6 mmHg. Pulmonic Valve Pulmonic valve not well visualized. No stenosis. Trace regurgitation. Aorta Normal aortic root, ascending aortic, and aortic arch. IVC/Hepatic Veins Dilated inferior vena cava. Severely elevated central venous pressure (15+ mmHg); IVC diameter is larger than 21 mm and collapses less than 50% with respiration. Pericardium Insignificant pericardial effusion or fat. Lab Data Personally Reviewed: 
 
Recent Labs  
  02/18/20 
0523 02/16/20 
0028 WBC 3.8* 4.4 HGB 8.1* 8.6* HCT 25.2* 27.2*  
PLT 77* 76* No results for input(s): INR, PTP, APTT, INREXT, INREXT in the last 72 hours. Recent Labs  
  02/18/20 
1235 02/18/20 
0523 02/17/20 
2325  02/17/20 
4187 * 121* 120*   < > 123* K 4.3 3.8 3.9   < > 3.7 CL 84* 82* 82*   < > 81* CO2 30 29 29   < > 31 BUN 97* 92* 100*   < > 98* CREA 3.29* 3.21* 3.19*   < > 2.91* * 83 123*   < > 82  
CA 8.1* 8.4* 8.2*   < > 8.1*  
MG  --   --   --   --  2.3  
 < > = values in this interval not displayed. No results for input(s): CPK, CKNDX, TROIQ in the last 72 hours. No lab exists for component: CPKMB Lab Results Component Value Date/Time Cholesterol, total 104 01/06/2020 03:22 PM  
 HDL Cholesterol 14 (L) 01/06/2020 03:22 PM  
 LDL, calculated 67 01/06/2020 03:22 PM  
 Triglyceride 115 01/06/2020 03:22 PM  
 CHOL/HDL Ratio 7 (H) 01/06/2020 03:22 PM  
 
 
Recent Labs  
  02/18/20 
4530 02/17/20 
0351 02/16/20 
0028 ALB 3.0* 3.1* 3.2* No results for input(s): PH, PCO2, PO2 in the last 72 hours. Medications Personally Reviewed: 
 
Current Facility-Administered Medications Medication Dose Route Frequency  carvediloL (COREG) tablet 3.125 mg  3.125 mg Oral BID WITH MEALS  
 Sodium Monitoring- Please call physician if sodium increases more than 12 mmol/L in 24 hours  1 Each Other Q6H  
 oxyCODONE-acetaminophen (PERCOCET) 5-325 mg per tablet 1 Tab  1 Tab Oral Q6H PRN  potassium chloride (K-DUR, KLOR-CON) SR tablet 40 mEq  40 mEq Oral BID  magnesium hydroxide (MILK OF MAGNESIA) 400 mg/5 mL oral suspension 30 mL  30 mL Oral DAILY PRN  
 milrinone (PRIMACOR) 200 mcg/mL in 0.9% sodium chloride 100 mL infusion  0.25 mcg/kg/min IntraVENous CONTINUOUS  
 epoetin mary-epbx (RETACRIT) injection 10,000 Units  10,000 Units SubCUTAneous Q TUE, THU & SAT  
  polyethylene glycol (MIRALAX) packet 17 g  17 g Oral DAILY PRN  
 simethicone (MYLICON) tablet 80 mg  80 mg Oral QID PRN  
 sodium chloride (NS) flush 5-40 mL  5-40 mL IntraVENous Q8H  
 sodium chloride (NS) flush 5-40 mL  5-40 mL IntraVENous PRN  
 acetaminophen (TYLENOL) tablet 650 mg  650 mg Oral Q4H PRN  
 ondansetron (ZOFRAN) injection 4 mg  4 mg IntraVENous Q4H PRN  
 heparin (porcine) injection 5,000 Units  5,000 Units SubCUTAneous Q8H  
 isosorbide mononitrate ER (IMDUR) tablet 30 mg  30 mg Oral DAILY  zolpidem (AMBIEN) tablet 10 mg  10 mg Oral QHS PRN  
 timolol (TIMOPTIC) 0.5 % ophthalmic solution 1 Drop  1 Drop Right Eye BID  tamsulosin (FLOMAX) capsule 0.8 mg  0.8 mg Oral DAILY  glucose chewable tablet 16 g  4 Tab Oral PRN  
 dextrose (D50W) injection syrg 12.5-25 g  25-50 mL IntraVENous PRN  
 glucagon (GLUCAGEN) injection 1 mg  1 mg IntraMUSCular PRN  
 insulin lispro (HUMALOG) injection   SubCUTAneous AC&HS  insulin glargine (LANTUS) injection 20 Units  20 Units SubCUTAneous QHS  diphenhydrAMINE (BENADRYL) capsule 25 mg  25 mg Oral Q6H PRN  pramipexole (MIRAPEX) tablet 0.5 mg  0.5 mg Oral QHS Colt Osborn MD

## 2020-02-18 NOTE — PROGRESS NOTES
Nephrology Progress Note [facility] 
www. [email or web address]                  Phone - [phone] Patient: Chi Conway. Date- 2/18/2020 Admit Date: 2/14/2020 YOB: 1950 CC: Follow up for  JOSE ANTONIO , hyponatremia Subjective: Interval History:  
-  CR. WORSE Bun increased NO SOB Na 121 today. He received samsca yesterday No chest pain No vomiting ROS:- as above Assessment & Plan: · ACUTE KIDNEY INJURY due to cardiorenal syndrome - most likely · ckd 4- cr. 2.5 baseline · Chf, edema- ef 35% · Anemia due to ckd and iron defi. Iron sats 15% · Hyponatremia - na 128- due to metolazone use + CHF  
· H/o DM PLAN- 
? TOLVAPTAN 30 MG TODAY 
? STOP BUMEX FOR NOW 
? Check bmp 3 pm 
? Lower coreg dose ? Avoid diuril or metolazone ? Fluid restriction 1200 ml per day ? Check bmp in am 
? Milrinone per cards ? D/w patient. May need dialysis if cr. Continue to get worse. ? S/p venofer ? Continue epogen Physical exam:  
GEN:  NAD NECK:  Supple, no thyromegaly RESP: basal rales  b/l, no  wheezing, CVS: RRR,S1,S2 ABDO:  soft , non tender, No mass NEURO: non focal, normal speech EXT: Edema +nt Care Plan discussed with: patient, nurse Objective:  
Visit Vitals /77 (BP 1 Location: Left arm, BP Patient Position: Sitting) Pulse 68 Temp 97 °F (36.1 °C) Resp 18 Ht 6' 4\" (1.93 m) Wt 112.5 kg (248 lb 1.6 oz) SpO2 97% BMI 30.20 kg/m² Last 3 Recorded Weights in this Encounter 02/16/20 0036 02/17/20 3402 02/18/20 1193 Weight: 112.7 kg (248 lb 6.4 oz) 110.2 kg (243 lb) 112.5 kg (248 lb 1.6 oz) 02/16 1901 - 02/18 0700 In: 5885 [P.O.:1400; I.V.:324] Out: 9057 [YKWIR:0136] Intake/Output Summary (Last 24 hours) at 2/18/2020 0251 Last data filed at 2/18/2020 1872 Gross per 24 hour Intake 1643.99 ml Output 2050 ml Net -406.01 ml Chart reviewed. Pertinent Notes reviewed. Medication list  reviewed Current Facility-Administered Medications Medication  tolvaptan (SAMSCA) tablet 15 mg  carvediloL (COREG) tablet 3.125 mg  Sodium Monitoring- Please call physician if sodium increases more than 12 mmol/L in 24 hours  oxyCODONE-acetaminophen (PERCOCET) 5-325 mg per tablet 1 Tab  potassium chloride (K-DUR, KLOR-CON) SR tablet 40 mEq  magnesium hydroxide (MILK OF MAGNESIA) 400 mg/5 mL oral suspension 30 mL  milrinone (PRIMACOR) 200 mcg/mL in 0.9% sodium chloride 100 mL infusion  epoetin mary-epbx (RETACRIT) injection 10,000 Units  polyethylene glycol (MIRALAX) packet 17 g  
 simethicone (MYLICON) tablet 80 mg  
 sodium chloride (NS) flush 5-40 mL  sodium chloride (NS) flush 5-40 mL  acetaminophen (TYLENOL) tablet 650 mg  
 ondansetron (ZOFRAN) injection 4 mg  heparin (porcine) injection 5,000 Units  isosorbide mononitrate ER (IMDUR) tablet 30 mg  
 zolpidem (AMBIEN) tablet 10 mg  
 timolol (TIMOPTIC) 0.5 % ophthalmic solution 1 Drop  tamsulosin (FLOMAX) capsule 0.8 mg  
 insulin glargine (LANTUS) injection 20 Units  glucose chewable tablet 16 g  
 dextrose (D50W) injection syrg 12.5-25 g  
 glucagon (GLUCAGEN) injection 1 mg  
 insulin lispro (HUMALOG) injection  insulin glargine (LANTUS) injection 20 Units  diphenhydrAMINE (BENADRYL) capsule 25 mg  
 pramipexole (MIRAPEX) tablet 0.5 mg Data Review : 
Recent Labs  
  02/18/20 
0523 02/17/20 
2325 02/17/20 
1738 02/17/20 
0351 02/16/20 
0028 * 120* 120* 123* 126*  
K 3.8 3.9 3.9 3.7 3.4*  
CL 82* 82* 80* 81* 84* CO2 29 29 30 31 33* BUN 92* 100* 98* 98* 96* CREA 3.21* 3.19* 3.15* 2.91* 2.94* GLU 83 123* 176* 82 97  
CA 8.4* 8.2* 7.9* 8.1* 8.4* MG  --   --   --  2.3  --   
PHOS 4.4  --   --  3.8 4.3 Recent Labs  
  02/18/20 
0523 02/16/20 
0028 WBC 3.8* 4.4 HGB 8.1* 8.6* HCT 25.2* 27.2*  
 PLT 77* 76* No results for input(s): FE, TIBC, PSAT, FERR in the last 72 hours. No results for input(s): CPK, CKNDX, TROIQ in the last 72 hours. No lab exists for component: CPKMB Lab Results Component Value Date/Time Color YELLOW/STRAW 02/14/2020 03:48 PM  
 Appearance CLEAR 02/14/2020 03:48 PM  
 Specific gravity 1.012 02/14/2020 03:48 PM  
 Specific gravity 1.015 08/14/2019 04:51 PM  
 pH (UA) 7.5 02/14/2020 03:48 PM  
 Protein 30 (A) 02/14/2020 03:48 PM  
 Glucose NEGATIVE  02/14/2020 03:48 PM  
 Ketone NEGATIVE  02/14/2020 03:48 PM  
 Bilirubin NEGATIVE  02/14/2020 03:48 PM  
 Urobilinogen 0.2 02/14/2020 03:48 PM  
 Nitrites NEGATIVE  02/14/2020 03:48 PM  
 Leukocyte Esterase NEGATIVE  02/14/2020 03:48 PM  
 Epithelial cells FEW 02/14/2020 03:48 PM  
 Bacteria NEGATIVE  02/14/2020 03:48 PM  
 WBC 0-4 02/14/2020 03:48 PM  
 RBC 0-5 02/14/2020 03:48 PM  
 
Lab Results Component Value Date/Time Culture result: NO GROWTH 4 DAYS 01/03/2020 06:44 PM  
 Culture result: NO GROWTH ON SOLID MEDIA AT 4 DAYS 01/03/2020 06:44 PM  
 Culture result: (A) 01/03/2020 06:44 PM  
  ENTEROCOCCUS FAECALIS GROUP D ISOLATED FROM THIO BROTH ONLY No results found for: SDES Lab Results Component Value Date/Time Sodium,urine random 57 02/14/2020 06:54 PM  
 Creatinine, urine 28.40 02/14/2020 06:54 PM  
 
Results from [facility] Encounter encounter on 02/14/20 XR CHEST PA LAT Narrative Clinical indication: Severe shortness of breath. Frontal and lateral views of the chest obtained, comparison June 7, 2019. Inspiration is shallow. Cardiac pacemaker, no acute infiltrate. Impression  impression: No acute changes. Gem Todd MD 
[facility] 
 www. [facility] / [employer] [address] [place], [address] Phone - [phone] Fax - [phone]

## 2020-02-19 LAB
ALBUMIN SERPL-MCNC: 3.1 G/DL (ref 3.5–5)
ANION GAP SERPL CALC-SCNC: 8 MMOL/L (ref 5–15)
ANION GAP SERPL CALC-SCNC: 9 MMOL/L (ref 5–15)
ANION GAP SERPL CALC-SCNC: 9 MMOL/L (ref 5–15)
BUN SERPL-MCNC: 103 MG/DL (ref 6–20)
BUN SERPL-MCNC: 97 MG/DL (ref 6–20)
BUN SERPL-MCNC: 98 MG/DL (ref 6–20)
BUN/CREAT SERPL: 29 (ref 12–20)
CALCIUM SERPL-MCNC: 8.2 MG/DL (ref 8.5–10.1)
CALCIUM SERPL-MCNC: 8.5 MG/DL (ref 8.5–10.1)
CALCIUM SERPL-MCNC: 8.7 MG/DL (ref 8.5–10.1)
CHLORIDE SERPL-SCNC: 86 MMOL/L (ref 97–108)
CHLORIDE SERPL-SCNC: 86 MMOL/L (ref 97–108)
CHLORIDE SERPL-SCNC: 88 MMOL/L (ref 97–108)
CO2 SERPL-SCNC: 29 MMOL/L (ref 21–32)
CO2 SERPL-SCNC: 30 MMOL/L (ref 21–32)
CO2 SERPL-SCNC: 30 MMOL/L (ref 21–32)
CREAT SERPL-MCNC: 3.36 MG/DL (ref 0.7–1.3)
CREAT SERPL-MCNC: 3.39 MG/DL (ref 0.7–1.3)
CREAT SERPL-MCNC: 3.51 MG/DL (ref 0.7–1.3)
GLUCOSE BLD STRIP.AUTO-MCNC: 138 MG/DL (ref 65–100)
GLUCOSE BLD STRIP.AUTO-MCNC: 150 MG/DL (ref 65–100)
GLUCOSE BLD STRIP.AUTO-MCNC: 169 MG/DL (ref 65–100)
GLUCOSE BLD STRIP.AUTO-MCNC: 206 MG/DL (ref 65–100)
GLUCOSE BLD STRIP.AUTO-MCNC: 80 MG/DL (ref 65–100)
GLUCOSE SERPL-MCNC: 131 MG/DL (ref 65–100)
GLUCOSE SERPL-MCNC: 170 MG/DL (ref 65–100)
GLUCOSE SERPL-MCNC: 79 MG/DL (ref 65–100)
PHOSPHATE SERPL-MCNC: 4.2 MG/DL (ref 2.6–4.7)
POTASSIUM SERPL-SCNC: 3.9 MMOL/L (ref 3.5–5.1)
POTASSIUM SERPL-SCNC: 4 MMOL/L (ref 3.5–5.1)
POTASSIUM SERPL-SCNC: 4.2 MMOL/L (ref 3.5–5.1)
SERVICE CMNT-IMP: ABNORMAL
SERVICE CMNT-IMP: NORMAL
SODIUM SERPL-SCNC: 124 MMOL/L (ref 136–145)
SODIUM SERPL-SCNC: 125 MMOL/L (ref 136–145)
SODIUM SERPL-SCNC: 126 MMOL/L (ref 136–145)

## 2020-02-19 PROCEDURE — 80048 BASIC METABOLIC PNL TOTAL CA: CPT

## 2020-02-19 PROCEDURE — 74011250637 HC RX REV CODE- 250/637: Performed by: INTERNAL MEDICINE

## 2020-02-19 PROCEDURE — 97116 GAIT TRAINING THERAPY: CPT

## 2020-02-19 PROCEDURE — 36415 COLL VENOUS BLD VENIPUNCTURE: CPT

## 2020-02-19 PROCEDURE — 74011636637 HC RX REV CODE- 636/637: Performed by: INTERNAL MEDICINE

## 2020-02-19 PROCEDURE — 65660000000 HC RM CCU STEPDOWN

## 2020-02-19 PROCEDURE — 74011250636 HC RX REV CODE- 250/636: Performed by: INTERNAL MEDICINE

## 2020-02-19 PROCEDURE — 82962 GLUCOSE BLOOD TEST: CPT

## 2020-02-19 PROCEDURE — 74011250637 HC RX REV CODE- 250/637: Performed by: HOSPITALIST

## 2020-02-19 PROCEDURE — 94760 N-INVAS EAR/PLS OXIMETRY 1: CPT

## 2020-02-19 PROCEDURE — 74011000258 HC RX REV CODE- 258: Performed by: INTERNAL MEDICINE

## 2020-02-19 PROCEDURE — 77010033678 HC OXYGEN DAILY

## 2020-02-19 PROCEDURE — 80069 RENAL FUNCTION PANEL: CPT

## 2020-02-19 RX ORDER — TOLVAPTAN 15 MG/1
30 TABLET ORAL ONCE
Status: COMPLETED | OUTPATIENT
Start: 2020-02-19 | End: 2020-02-19

## 2020-02-19 RX ADMIN — CARVEDILOL 3.12 MG: 3.12 TABLET, FILM COATED ORAL at 17:36

## 2020-02-19 RX ADMIN — SODIUM CHLORIDE 0.25 MCG/KG/MIN: 9 INJECTION, SOLUTION INTRAVENOUS at 12:55

## 2020-02-19 RX ADMIN — HEPARIN SODIUM 5000 UNITS: 5000 INJECTION INTRAVENOUS; SUBCUTANEOUS at 14:34

## 2020-02-19 RX ADMIN — HEPARIN SODIUM 5000 UNITS: 5000 INJECTION INTRAVENOUS; SUBCUTANEOUS at 22:57

## 2020-02-19 RX ADMIN — LACTULOSE 45 ML: 20 SOLUTION ORAL at 12:54

## 2020-02-19 RX ADMIN — SODIUM CHLORIDE 0.25 MCG/KG/MIN: 9 INJECTION, SOLUTION INTRAVENOUS at 00:19

## 2020-02-19 RX ADMIN — Medication 10 ML: at 22:57

## 2020-02-19 RX ADMIN — POTASSIUM CHLORIDE 40 MEQ: 20 TABLET, EXTENDED RELEASE ORAL at 09:45

## 2020-02-19 RX ADMIN — INSULIN LISPRO 1 UNITS: 100 INJECTION, SOLUTION INTRAVENOUS; SUBCUTANEOUS at 22:58

## 2020-02-19 RX ADMIN — OXYCODONE HYDROCHLORIDE AND ACETAMINOPHEN 1 TABLET: 5; 325 TABLET ORAL at 20:19

## 2020-02-19 RX ADMIN — TAMSULOSIN HYDROCHLORIDE 0.8 MG: 0.4 CAPSULE ORAL at 09:45

## 2020-02-19 RX ADMIN — TOLVAPTAN 30 MG: 15 TABLET ORAL at 09:47

## 2020-02-19 RX ADMIN — ZOLPIDEM TARTRATE 10 MG: 5 TABLET ORAL at 23:23

## 2020-02-19 RX ADMIN — CARVEDILOL 3.12 MG: 3.12 TABLET, FILM COATED ORAL at 09:46

## 2020-02-19 RX ADMIN — TIMOLOL MALEATE 1 DROP: 5 SOLUTION/ DROPS OPHTHALMIC at 17:36

## 2020-02-19 RX ADMIN — Medication 10 ML: at 05:43

## 2020-02-19 RX ADMIN — Medication 10 ML: at 13:13

## 2020-02-19 RX ADMIN — ISOSORBIDE MONONITRATE 30 MG: 30 TABLET, EXTENDED RELEASE ORAL at 09:45

## 2020-02-19 RX ADMIN — HEPARIN SODIUM 5000 UNITS: 5000 INJECTION INTRAVENOUS; SUBCUTANEOUS at 05:43

## 2020-02-19 RX ADMIN — TIMOLOL MALEATE 1 DROP: 5 SOLUTION/ DROPS OPHTHALMIC at 09:44

## 2020-02-19 RX ADMIN — PRAMIPEXOLE DIHYDROCHLORIDE 0.5 MG: 0.25 TABLET ORAL at 22:56

## 2020-02-19 RX ADMIN — INSULIN GLARGINE 20 UNITS: 100 INJECTION, SOLUTION SUBCUTANEOUS at 22:58

## 2020-02-19 NOTE — PROGRESS NOTES
Bedside and Verbal shift change report given to Sujey Yen RN (oncoming nurse) by Margarito Coleman (offgoing nurse). Report given with SBAR, Kardex, Intake/Output, MAR and Recent Results.

## 2020-02-19 NOTE — PROGRESS NOTES
Problem: Mobility Impaired (Adult and Pediatric) Goal: *Acute Goals and Plan of Care (Insert Text) Description FUNCTIONAL STATUS PRIOR TO ADMISSION: Patient was modified independent using a rollator for functional mobility. His spouse prepares all meals which is on a higher level in the home. He sponge bathes rather than using the shower. He admits increasing frequency of falls due to legs buckling. HOME SUPPORT PRIOR TO ADMISSION: The patient lived with spouse who assists with ADLs as needed and transportation needs. Physical Therapy Goals Initiated 2020 1. Patient will move from supine to sit and sit to supine , scoot up and down and roll side to side in bed with independence within 7 day(s). Met 20 2. Patient will transfer from bed to chair and chair to bed with independence using the least restrictive device within 7 day(s). 3.  Patient will perform sit to stand with modified independence within 7 day(s). 4.  Patient will ambulate with supervision/set-up for 150 feet with the least restrictive device within 7 day(s). Outcome: Progressing Towards Goal 
 PHYSICAL THERAPY TREATMENT Patient: Bg Barton. (73 y.o. male) Date: 2020 Diagnosis: Acute exacerbation of CHF (congestive heart failure) ([facility] [address] [facility] [I50.9] Acute exacerbation of CHF (congestive heart failure) ([facility] Precautions: Fall Chart, physical therapy assessment, plan of care and goals were reviewed. ASSESSMENT Patient continues with skilled PT services and is progressing towards goals. He reports feeling better today. Activity tolerance was increased and able to ambulate 325 feet with his rollator walker with 2 standing rests only. O2 sats high 90s on RA and HR less than 100 during gait training. He demonstrated good understanding of energy conservation and self pacing with minimal instruction needed. Now independent with bed mobility and supine to sit to supine transfers.  Sit to stand from adequately high surface is sba, but lower surfaces require min/mod a x 1. Current Level of Function Impacting Discharge (mobility/balance): sba to min a Other factors to consider for discharge: good home support, modified independent PLOF 
    
 
PLAN : 
Patient continues to benefit from skilled intervention to address the above impairments. Continue treatment per established plan of care. to address goals. Recommendation for discharge: (in order for the patient to meet his/her long term goals) Physical therapy at least 2 days/week in the home AND ensure assist and/or supervision for safety with ADLs and mobility skills. This discharge recommendation: 
Has been made in collaboration with the attending provider and/or case management IF patient discharges home will need the following DME: patient owns DME required for discharge SUBJECTIVE:  
Patient stated I feel much better today.  OBJECTIVE DATA SUMMARY:  
Critical Behavior: 
Neurologic State: Alert Orientation Level: Oriented X4 Cognition: Appropriate decision making, Appropriate for age attention/concentration, Appropriate safety awareness, Follows commands Safety/Judgement: Awareness of environment, Good awareness of safety precautions, Fall prevention(severe BLE neuropathy) Functional Mobility Training: 
Bed Mobility: 
Rolling: Independent Supine to Sit: Independent Scooting: Independent Transfers: 
Sit to Stand: Stand-by assistance(bed height raised) Stand to Sit: Stand-by assistance Bed to Chair: Stand-by assistance; Adaptive equipment(RW) 
     
  
  
  
  
Balance: 
Sitting: Intact Standing: Impaired Standing - Static: Good;Constant support Standing - Dynamic : Fair;Constant support Ambulation/Gait Training: 
Distance (ft): 325 Feet (ft)(needed 2 standing rests) Assistive Device: Walker, rollator;Gait belt Ambulation - Level of Assistance: Stand-by assistance Gait Abnormalities: Decreased step clearance Right Side Weight Bearing: Full Left Side Weight Bearing: Full Speed/Anna: Pace decreased (<100 feet/min) Pain Rating: 
None reported Activity Tolerance:  
Good, SpO2 stable on RA, and requires rest breaks Please refer to the flowsheet for vital signs taken during this treatment. After treatment patient left in no apparent distress:  
Sitting in chair and Call bell within reach COMMUNICATION/COLLABORATION:  
The patients plan of care was discussed with: Registered Nurse, , and Rehabilitation Attendant Georganna Rubinstein, PT Time Calculation: 19 mins

## 2020-02-19 NOTE — PROGRESS NOTES
Nephrology Progress Note [facility] 
www. [facility].[employer]                  Phone - [phone] Patient: Timbo Rodriguez. Date- 2/19/2020 Admit Date: 2/14/2020 YOB: 1950 CC: Follow up for acute kidney injury, hyponatremia Subjective: Interval History:  
-Creatinine remains high Good urine output with tolvaptan Sodium improved to 125 No shortness of breath He is not requiring any oxygen Complaint of constipation  
no vomiting ROS:- as above Assessment & Plan: · Acute kidney injury due to cardiorenal syndrome - most likely · Hyponatremia -due to metolazone use + CHF · Ckd 4- cr. 2.5 baseline-followed by Dr. Faith Reaz · Chf, edema- ef 35% · Anemia due to ckd and iron defi. Iron sats 15% · H/o DM PLAN- 
? Tolvaptan 30 MG TODAY 
? Hold Bumex ? Check bmp 3 pm 
? Hold KCl as needed and holding the Bumex also ? Avoid diuril or metolazone ? Fluid restriction 1200 ml per day ? Check bmp in am 
? Milrinone per cards ? D/w patient. May need dialysis if cr. Continue to get worse. ? S/p venofer ? Continue epogen Physical exam:  
GEN:  NAD NECK:  Supple, no thyromegaly RESP: CTA  b/l, no  wheezing, CVS: RRR,S1,S2 ABDO:  soft , non tender, No mass NEURO: non focal, normal speech EXT: trace Edema +nt Care Plan discussed with: patient Objective:  
Visit Vitals /73 Pulse 70 Temp 97.8 °F (36.6 °C) Resp 18 Ht 6' 4\" (1.93 m) Wt 108.6 kg (239 lb 6.7 oz) SpO2 100% BMI 29.14 kg/m² Last 3 Recorded Weights in this Encounter 02/17/20 1417 02/18/20 7154 02/19/20 0451 Weight: 110.2 kg (243 lb) 112.5 kg (248 lb 1.6 oz) 108.6 kg (239 lb 6.7 oz) 02/17 1901 - 02/19 0700 In: 1964.7 [P.O.:1400; I.V.:492.7] Out: 0670 [YPHYM:5199] Intake/Output Summary (Last 24 hours) at 2/19/2020 1034 Last data filed at 2/19/2020 8309 Gross per 24 hour Intake 1549.73 ml Output 2650 ml Net -1100.27 ml Chart reviewed. Pertinent Notes reviewed. Medication list  reviewed Current Facility-Administered Medications Medication  carvediloL (COREG) tablet 3.125 mg  Sodium Monitoring- Please call physician if sodium increases more than 12 mmol/L in 24 hours  oxyCODONE-acetaminophen (PERCOCET) 5-325 mg per tablet 1 Tab  potassium chloride (K-DUR, KLOR-CON) SR tablet 40 mEq  magnesium hydroxide (MILK OF MAGNESIA) 400 mg/5 mL oral suspension 30 mL  milrinone (PRIMACOR) 200 mcg/mL in 0.9% sodium chloride 100 mL infusion  epoetin mary-epbx (RETACRIT) injection 10,000 Units  polyethylene glycol (MIRALAX) packet 17 g  
 simethicone (MYLICON) tablet 80 mg  
 sodium chloride (NS) flush 5-40 mL  sodium chloride (NS) flush 5-40 mL  acetaminophen (TYLENOL) tablet 650 mg  
 ondansetron (ZOFRAN) injection 4 mg  heparin (porcine) injection 5,000 Units  isosorbide mononitrate ER (IMDUR) tablet 30 mg  
 zolpidem (AMBIEN) tablet 10 mg  
 timolol (TIMOPTIC) 0.5 % ophthalmic solution 1 Drop  tamsulosin (FLOMAX) capsule 0.8 mg  
 glucose chewable tablet 16 g  
 dextrose (D50W) injection syrg 12.5-25 g  
 glucagon (GLUCAGEN) injection 1 mg  
 insulin lispro (HUMALOG) injection  insulin glargine (LANTUS) injection 20 Units  diphenhydrAMINE (BENADRYL) capsule 25 mg  
 pramipexole (MIRAPEX) tablet 0.5 mg Data Review : 
Recent Labs  
  02/19/20 
0259 02/18/20 
2104 02/18/20 
1235 02/18/20 
0523  02/17/20 
0351 * 123* 123* 121*   < > 123* K 4.0 4.2 4.3 3.8   < > 3.7 CL 86* 85* 84* 82*   < > 81* CO2 30 28 30 29   < > 31 BUN 97* 98* 97* 92*   < > 98* CREA 3.39* 3.43* 3.29* 3.21*   < > 2.91* GLU 79 129* 138* 83   < > 82  
CA 8.2* 8.5 8.1* 8.4*   < > 8.1*  
MG  --   --   --   --   --  2.3 PHOS 4.2  --   --  4.4  --  3.8  
 < > = values in this interval not displayed. Recent Labs 02/18/20 
0877 WBC 3.8* HGB 8.1* HCT 25.2*  
PLT 77* No results for input(s): FE, TIBC, PSAT, FERR in the last 72 hours. No results for input(s): CPK, CKNDX, TROIQ in the last 72 hours. No lab exists for component: CPKMB Lab Results Component Value Date/Time Color YELLOW/STRAW 02/14/2020 03:48 PM  
 Appearance CLEAR 02/14/2020 03:48 PM  
 Specific gravity 1.012 02/14/2020 03:48 PM  
 Specific gravity 1.015 08/14/2019 04:51 PM  
 pH (UA) 7.5 02/14/2020 03:48 PM  
 Protein 30 (A) 02/14/2020 03:48 PM  
 Glucose NEGATIVE  02/14/2020 03:48 PM  
 Ketone NEGATIVE  02/14/2020 03:48 PM  
 Bilirubin NEGATIVE  02/14/2020 03:48 PM  
 Urobilinogen 0.2 02/14/2020 03:48 PM  
 Nitrites NEGATIVE  02/14/2020 03:48 PM  
 Leukocyte Esterase NEGATIVE  02/14/2020 03:48 PM  
 Epithelial cells FEW 02/14/2020 03:48 PM  
 Bacteria NEGATIVE  02/14/2020 03:48 PM  
 WBC 0-4 02/14/2020 03:48 PM  
 RBC 0-5 02/14/2020 03:48 PM  
 
Lab Results Component Value Date/Time Culture result: NO GROWTH 4 DAYS 01/03/2020 06:44 PM  
 Culture result: NO GROWTH ON SOLID MEDIA AT 4 DAYS 01/03/2020 06:44 PM  
 Culture result: (A) 01/03/2020 06:44 PM  
  ENTEROCOCCUS FAECALIS GROUP D ISOLATED FROM THIO BROTH ONLY No results found for: SDES Lab Results Component Value Date/Time Sodium,urine random 57 02/14/2020 06:54 PM  
 Creatinine, urine 28.40 02/14/2020 06:54 PM  
 
Results from [facility] Encounter encounter on 02/14/20 XR CHEST PA LAT Narrative Clinical indication: Severe shortness of breath. Frontal and lateral views of the chest obtained, comparison June 7, 2019. Inspiration is shallow. Cardiac pacemaker, no acute infiltrate. Impression  impression: No acute changes. Kiersten Cruz MD 
[facility] 
 www. [facility] / [employer] [address] [place], [address] Phone - [phone] Fax - [phone]

## 2020-02-19 NOTE — PROGRESS NOTES
PROGRESS NOTE 
 
NAME:  Royal Lucille Calderon. :   1950 MRN:   [id number] Date/Time:  2020 5:58 AM 
Subjective:  
History:  Chart reviewed and patient seen and examined this AM and D/W his nurses,  and Cardiology and all events noted. He was admitted on  with acute on chronic combined diastolic and systolic CHF with underlying HTN, DM,  ASCVD, Cardiomyopathy and CKD stage 4 with a history of Afib S/P ablation. He had become severely SOB over the 3 week period PTA until the night before admission he couldn't sleep at all and thus he presented to the ER. In the ER his weight was recorded at 250# up from 242# when last seen by me at the office on  (240# on 19). He feels that he is better now and also improved regarding constipation and abdominal bloating. He has diuresed well since admission (although output inaccurately recorded on the initial day and on  one shift not recorded at all) and lungs are clear on exam as well as admission CXR was clear. He has no CP, Palpitations or other cardiac or respiratory c/o. He noted at home that his urine output had decreased despite compliance with his diuretics. He has had excellent UO since admission and renal sono w/o obstruction. He denies any N/V or GI c/o. He has no other c/o on complete ROS except extremely weak although worked with PT yesterday but it appears from talking with him that he needs to get stronger to manage at home. Jan Gamboa Medications reviewed: 
Current Facility-Administered Medications Medication Dose Route Frequency  carvediloL (COREG) tablet 3.125 mg  3.125 mg Oral BID WITH MEALS  
 Sodium Monitoring- Please call physician if sodium increases more than 12 mmol/L in 24 hours  1 Each Other Q6H  
 oxyCODONE-acetaminophen (PERCOCET) 5-325 mg per tablet 1 Tab  1 Tab Oral Q6H PRN  potassium chloride (K-DUR, KLOR-CON) SR tablet 40 mEq  40 mEq Oral BID  
  magnesium hydroxide (MILK OF MAGNESIA) 400 mg/5 mL oral suspension 30 mL  30 mL Oral DAILY PRN  
 milrinone (PRIMACOR) 200 mcg/mL in 0.9% sodium chloride 100 mL infusion  0.25 mcg/kg/min IntraVENous CONTINUOUS  
 epoetin mary-epbx (RETACRIT) injection 10,000 Units  10,000 Units SubCUTAneous Q TUE, THU & SAT  polyethylene glycol (MIRALAX) packet 17 g  17 g Oral DAILY PRN  
 simethicone (MYLICON) tablet 80 mg  80 mg Oral QID PRN  
 sodium chloride (NS) flush 5-40 mL  5-40 mL IntraVENous Q8H  
 sodium chloride (NS) flush 5-40 mL  5-40 mL IntraVENous PRN  
 acetaminophen (TYLENOL) tablet 650 mg  650 mg Oral Q4H PRN  
 ondansetron (ZOFRAN) injection 4 mg  4 mg IntraVENous Q4H PRN  
 heparin (porcine) injection 5,000 Units  5,000 Units SubCUTAneous Q8H  
 isosorbide mononitrate ER (IMDUR) tablet 30 mg  30 mg Oral DAILY  zolpidem (AMBIEN) tablet 10 mg  10 mg Oral QHS PRN  
 timolol (TIMOPTIC) 0.5 % ophthalmic solution 1 Drop  1 Drop Right Eye BID  tamsulosin (FLOMAX) capsule 0.8 mg  0.8 mg Oral DAILY  glucose chewable tablet 16 g  4 Tab Oral PRN  
 dextrose (D50W) injection syrg 12.5-25 g  25-50 mL IntraVENous PRN  
 glucagon (GLUCAGEN) injection 1 mg  1 mg IntraMUSCular PRN  
 insulin lispro (HUMALOG) injection   SubCUTAneous AC&HS  insulin glargine (LANTUS) injection 20 Units  20 Units SubCUTAneous QHS  diphenhydrAMINE (BENADRYL) capsule 25 mg  25 mg Oral Q6H PRN  pramipexole (MIRAPEX) tablet 0.5 mg  0.5 mg Oral QHS Objective:  
Vitals: 
Visit Vitals /56 (BP 1 Location: Left arm, BP Patient Position: At rest) Pulse 70 Temp 98.2 °F (36.8 °C) Resp 18 Ht 6' 4\" (1.93 m) Wt 239 lb 6.7 oz (108.6 kg) SpO2 99% BMI 29.14 kg/m² O2 Flow Rate (L/min): 3 l/min O2 Device: Room air Temp (24hrs), Av.8 °F (36.6 °C), Min:97 °F (36.1 °C), Max:98.2 °F (36.8 °C) Last 24hr Input/Output: 
 
Intake/Output Summary (Last 24 hours) at 2020 6591 Last data filed at 2/19/2020 4018 Gross per 24 hour Intake 1520.73 ml Output 2475 ml Net -954.27 ml PHYSICAL EXAM: 
General:     Alert, cooperative, no distress, appears stated age. Head:    Normocephalic, without obvious abnormality, atraumatic. Eyes:    Conjunctivae/corneas clear. PERRLA Nose:   Nares normal. No drainage or sinus tenderness. Throat:     Lips, mucosa, and tongue normal.  No Thrush Neck:   Supple, symmetrical,  no adenopathy, thyroid: non tender 
   no carotid bruit and no JVD. Back:     Symmetric,  No CVA tenderness. Lungs:    Clear to auscultation bilaterally. No Wheezing or Rhonchi. No rales. Heart:    Regular rate and rhythm,  no murmur, rub or gallop. Abdomen:    Soft, non-tender. Not distended. Bowel sounds normal. No masses Extremities:  Extremities normal, atraumatic, No cyanosis. No edema. No clubbing Lymph nodes:  Cervical, supraclavicular normal. 
Neurologic:  Normal strength, Alert and oriented X 3. Skin:                No rash Lab Data Reviewed: 
 
Recent Results (from the past 24 hour(s)) GLUCOSE, POC Collection Time: 02/18/20  7:40 AM  
Result Value Ref Range Glucose (POC) 115 (H) 65 - 100 mg/dL Performed by Josie Sanchez (PCT) GLUCOSE, POC Collection Time: 02/18/20 11:22 AM  
Result Value Ref Range Glucose (POC) 143 (H) 65 - 100 mg/dL Performed by Maryanne Carrasquillo METABOLIC PANEL, BASIC Collection Time: 02/18/20 12:35 PM  
Result Value Ref Range Sodium 123 (L) 136 - 145 mmol/L Potassium 4.3 3.5 - 5.1 mmol/L Chloride 84 (L) 97 - 108 mmol/L  
 CO2 30 21 - 32 mmol/L Anion gap 9 5 - 15 mmol/L Glucose 138 (H) 65 - 100 mg/dL BUN 97 (H) 6 - 20 MG/DL Creatinine 3.29 (H) 0.70 - 1.30 MG/DL  
 BUN/Creatinine ratio 29 (H) 12 - 20 GFR est AA 23 (L) >60 ml/min/1.73m2 GFR est non-AA 19 (L) >60 ml/min/1.73m2 Calcium 8.1 (L) 8.5 - 10.1 MG/DL  
GLUCOSE, POC  Collection Time: 02/18/20  4:19 PM  
 Result Value Ref Range Glucose (POC) 115 (H) 65 - 100 mg/dL Performed by Paris Miller [facility]) METABOLIC PANEL, BASIC Collection Time: 02/18/20  9:04 PM  
Result Value Ref Range Sodium 123 (L) 136 - 145 mmol/L Potassium 4.2 3.5 - 5.1 mmol/L Chloride 85 (L) 97 - 108 mmol/L  
 CO2 28 21 - 32 mmol/L Anion gap 10 5 - 15 mmol/L Glucose 129 (H) 65 - 100 mg/dL BUN 98 (H) 6 - 20 MG/DL Creatinine 3.43 (H) 0.70 - 1.30 MG/DL  
 BUN/Creatinine ratio 29 (H) 12 - 20 GFR est AA 22 (L) >60 ml/min/1.73m2 GFR est non-AA 18 (L) >60 ml/min/1.73m2 Calcium 8.5 8.5 - 10.1 MG/DL  
GLUCOSE, POC Collection Time: 02/18/20  9:57 PM  
Result Value Ref Range Glucose (POC) 145 (H) 65 - 100 mg/dL Performed by Mdaalyn Nickerson RENAL FUNCTION PANEL Collection Time: 02/19/20  2:59 AM  
Result Value Ref Range Sodium 124 (L) 136 - 145 mmol/L Potassium 4.0 3.5 - 5.1 mmol/L Chloride 86 (L) 97 - 108 mmol/L  
 CO2 30 21 - 32 mmol/L Anion gap 8 5 - 15 mmol/L Glucose 79 65 - 100 mg/dL BUN 97 (H) 6 - 20 MG/DL Creatinine 3.39 (H) 0.70 - 1.30 MG/DL  
 BUN/Creatinine ratio 29 (H) 12 - 20 GFR est AA 22 (L) >60 ml/min/1.73m2 GFR est non-AA 18 (L) >60 ml/min/1.73m2 Calcium 8.2 (L) 8.5 - 10.1 MG/DL Phosphorus 4.2 2.6 - 4.7 MG/DL Albumin 3.1 (L) 3.5 - 5.0 g/dL Assessment/Plan:  
 
Principal Problem: 
  Acute exacerbation of CHF (congestive heart failure) ([facility] [address] (2/14/2020) Active Problems: 
  ASCVD (arteriosclerotic cardiovascular disease) (8/5/2017) Controlled type 2 diabetes mellitus with stage 4 chronic kidney disease, without long-term current use of insulin [facility] (8/5/2017) CKD (chronic kidney disease), stage IV ([facility] (8/5/2017) Hypertension with renal disease (8/5/2017) Gastroesophageal reflux disease without esophagitis (8/5/2017) Cardiomyopathy ([facility] (8/5/2017) Anemia (8/5/2017) S/P ablation of atrial fibrillation (5/17/2018) Chronic combined systolic and diastolic CHF, NYHA class 4 ([facility] [address] (2/15/2020) 
 
  
___________________________________________________ PLAN: 
 
1. Holding diuresis with IV Bumex as has had excellent output and now slight bump Creat 2. Follow weights (recent baseline 240# in December) 250# on admission and 239# today, Follow I & Os 
3. Hold off on Zaroxolyn as long as excellent UOP with IV Bumex decrease frequency of Bumex 4. He will need dialysis at some point which I D/W him, Has been reluctant in past although followed by renal Dr. Jimena Wick for at least 2 years (Renal function baseline 77/2.5 on 1/6/20, 78/2.4 on 2/7/20), 94/3.04 on admission, today 97/3.39 
5. Continue Coreg for cardiomyopathy, Unable to use ACE or ARB due to renal disease 6. Imdur with Ischemic Cardiomyopathy 7. Follow K (4.0 today) with diuresis and replete as needed, Not able to tolerate IV so increased PO 
8. Procrit ordered as well as IV Venofer (D#4 yesterday) 9. Continue Lantus for DM 
10. Follow BS and treat with SSI 11. Flomax for BPH 12. Mirapex for Restless legs 13. No current symptoms c/w Angina but follow closely, Certainly if needs cath will end up with dialysis from this point foreward 14. Nasal oxygen for Hypoxemia/SOB, sat improved now and on RA 
15. Echocardiogram, Last EFx May 2019 was 33%, this admission 35-40% 16. Hyponatremia due to volume overload was improving but on 2/17 down to 123 to 120 so given Tolvapatan  Then and again yesterday and today 124, so follow closely, doubt related to metolazone as has been taking it for several years, now fluid restrict PO, Repeat Tolvaptan at this point 17. Milrinone added 2/16 by Cardiology 
 
 
 
 
___________________________________________________ Attending Physician: Celio Zayas MD

## 2020-02-19 NOTE — PROGRESS NOTES
Problem: Falls - Risk of 
Goal: *Absence of Falls Description Document [place] Fall Risk and appropriate interventions in the flowsheet. Outcome: Progressing Towards Goal 
Note: Fall Risk Interventions: 
Mobility Interventions: Assess mobility with egress test 
 
  
 
Medication Interventions: Patient to call before getting OOB Elimination Interventions: Call light in reach History of Falls Interventions: Door open when patient unattended Problem: Pressure Injury - Risk of 
Goal: *Prevention of pressure injury Description Document Aurelio Scale and appropriate interventions in the flowsheet. Outcome: Progressing Towards Goal 
Note: Pressure Injury Interventions: 
Sensory Interventions: Assess changes in LOC, Minimize linen layers, Keep linens dry and wrinkle-free Moisture Interventions: Absorbent underpads Activity Interventions: Increase time out of bed Mobility Interventions: HOB 30 degrees or less Nutrition Interventions: Document food/fluid/supplement intake Friction and Shear Interventions: HOB 30 degrees or less

## 2020-02-19 NOTE — ROUTINE PROCESS
Bedside shift change report given to Arjun Valenzuela RN (oncoming nurse) by Roberto Carlos Knox (offgoing nurse). Report included the following information SBAR, Procedure Summary, Intake/Output, MAR, Recent Results and Cardiac Rhythm Paced.

## 2020-02-19 NOTE — PROGRESS NOTES
0700: Bedside shift change report given to Zacarias Jaimes [facility] (oncoming nurse) by Mercedes Mendieta RN (offgoing nurse). Report included the following information SBAR and Kardex.

## 2020-02-19 NOTE — PROGRESS NOTES
CYNTHIA: 
1. Home Health 2. OP f/u appts - PCP, Cardio, Nephro CM met with pt at the bedside to discuss d/c needs. -  Pt agreeable to [facility]. FOC offered and [facility] chosen. Signed and placed on chart. Referral sent to [facility] via [facility]. -  Pt declined further d/c needs at this time. Pt has support from wife/family and owns all necessary DME.  
-  BCPI-A letter regarding Heart Failure has been delivered to the patient/caregiver. CHRISTA Ramires Care Manager [phone]

## 2020-02-19 NOTE — PROGRESS NOTES
Progress Note 2/19/2020 NAME: Princess Polanco. MRN:  [id number] Admit Diagnosis: Acute exacerbation of CHF (congestive heart failure) ([facility] [address] [I50.9] Problem List: 1. Acute on chronic combined systolic and diastolic heart failure with symptomatic volume overload including dyspnea at rest, abdominal fullness, ascites, lower extremity edema but no pulmonary edema on CXR--a lot of R-sided sx. Biventricular failure with NYHA CHF class 4 symptoms acutely. 2. Paroxysmal atrial fibrillation and atypical atrial flutter s/p ablation in 5/2018.  NO significant atrial fibrillation or flutter since the ablation. 3. Remote dual chamber SJM pacer for sick sinus syndrome, upgraded to biventricular pacemaker in 1/2019 due to CHF, cardiomyopathy, chronic V pacing. (Program change 5/2019--LV>RV to 50 msec from 20 msec.  Tried to enable MPP using poles 3 and 4 but vectors using these did not capture at max output.) 4. Hypertensive heart disease with a history of congestive heart failure and CKD stage 4.  EF 55% on prior echo, then 35-40% early 2019, 33% in 5/2019, 35-40% this admission. 5. Coronary artery disease with multivessel interventions.  His last stenting was in 2010.  Cath in 2014 showed a nondominant RCA with LPDA occluded and filled by collaterals, otherwise up to moderate CAD. 6. Mild idiopathic pericardial effusion in the past. 
7. Diabetes mellitus type 2, on chronic insulin. 8. Hyperlipidemia. 9. BRBPR in 1/2019 with grade 1 internal hemorrhoids noted on colo here.  Presumed source of GI bleed. 10. History of esophageal dilation for dysphagia. 11. Ascites noted on abdominal ultrasound. Remote CT 6/2019 suggested cirrhosis, no comment made on the ultrasound regarding this. 12. Hiatal hernia. 13. Anemia of chronic renal disease.  On EPO (and Fe supplement). 14. Moderate thrombocytopenia. 15. Chronic anticoagulation stopped 1/2019. Assessment/Plan: Last 3 Recorded Weights in this Encounter 02/17/20 1930 02/18/20 6036 02/19/20 0451 Weight: 110.2 kg (243 lb) 112.5 kg (248 lb 1.6 oz) 108.6 kg (239 lb 6.7 oz) Intake/Output Summary (Last 24 hours) at 2/19/2020 1842 Last data filed at 2/19/2020 1400 Gross per 24 hour Intake 1355.15 ml Output 2075 ml Net -719.85 ml 1. Continue diuresis per renal (currently Bumex IV being held, not on thiazide since admission, getting Tolvaptan). He's on 2g Na diet and fluid restricted to 1L. 2. Consented for HD if needed--nephrology team following. 3. Continue beta blocker, dose adjusted to BP. 4. Continue isosorbide. 5. Keep K and Mag up per renal. 
6. Continue milrinone 0.25mcg/kg/min for now. If he ends up getting dialysis, I'd stop it. 7. No program changes to his BIV pacemaker today. No changes to cardiac regimen. [x]       High complexity decision making was performed in this patient at high risk for decompensation with multiple organ involvement. Subjective:  
 
[place] Earing. denies chest pain. Review of Systems: 
 
Symptom Y/N Comments  Symptom Y/N Comments Fever/Chills N   Chest Pain N Poor Appetite +/-   Edema Y better Cough N   Abdominal Pain N Sputum N   Joint Pain N   
SOB/ANDERS Y better  Pruritis/Rash N   
Nausea/vomit N   Tolerating PT/OT N refused Diarrhea N   Tolerating Diet Y Constipation N   Other Could NOT obtain due to:   
 
Objective:  
  
Physical Exam: 
 
Last 24hrs VS reviewed since prior progress note. Most recent are: 
 
Visit Vitals /66 Pulse 70 Temp 98.3 °F (36.8 °C) Resp 18 Ht 6' 4\" (1.93 m) Wt 108.6 kg (239 lb 6.7 oz) SpO2 97% BMI 29.14 kg/m² Intake/Output Summary (Last 24 hours) at 2/19/2020 1842 Last data filed at 2/19/2020 1400 Gross per 24 hour Intake 1355.15 ml Output 2075 ml Net -719.85 ml General Appearance: Well developed, well nourished, alert & oriented x 3, no acute distress. Ears/Nose/Mouth/Throat: Hearing grossly normal. 
Neck: Supple, nontender. Chest: Lungs w/ diminished breath sounds in the bases, unlabored, symmetric movement. L chest BIV-ICD site OK. Cardiovascular: Regular rate and rhythm, S1S2 normal, no murmur. Abdomen: Soft, non-tender, bowel sounds are active. Distended. Extremities: 1+ erythematous edema bilaterally, now wearing socks. No cyanosis or clubbing. Skin: Warm and dry. No petechiae, purpura, or jaundice. Neuro:  Awake, appropriate, grossly nonfocal.  No asterixis. []         Post-cath site without hematoma, bruit, tenderness, or thrill. Distal pulses intact. PMH/SH reviewed - no change compared to H&P Data Review Telemetry:  AV paced EKG:  
[x]  No new EKG for review ECHO: 
Left Ventricle Normal cavity size and wall thickness. Mild systolic dysfunction. The estimated ejection fraction is 35 - 40%. Visually measured ejection fraction. Global hypokinesis observed. Left Atrium Mildly dilated left atrium. Right Ventricle Normal wall thickness. Mildly dilated right ventricle. Mildly reduced systolic function. Right Atrium Mildly dilated right atrium. Aortic Valve No stenosis. Aortic valve sclerosis. Mild aortic valve regurgitation. Mitral Valve Normal valve structure and no stenosis. Mild regurgitation. Tricuspid Valve Normal valve structure and no stenosis. Moderate regurgitation. Pulmonary arterial systolic pressure is 96.6 mmHg. Pulmonic Valve Pulmonic valve not well visualized. No stenosis. Trace regurgitation. Aorta Normal aortic root, ascending aortic, and aortic arch. IVC/Hepatic Veins Dilated inferior vena cava. Severely elevated central venous pressure (15+ mmHg); IVC diameter is larger than 21 mm and collapses less than 50% with respiration. Pericardium Insignificant pericardial effusion or fat. Lab Data Personally Reviewed: 
 
Recent Labs  
  02/18/20 
8482 WBC 3.8* HGB 8.1* HCT 25.2*  
PLT 77* No results for input(s): INR, PTP, APTT, INREXT, INREXT in the last 72 hours. Recent Labs  
  02/19/20 
1648 02/19/20 
1010 02/19/20 
0259  02/17/20 
8318 * 125* 124*   < > 123* K 4.2 3.9 4.0   < > 3.7 CL 88* 86* 86*   < > 81* CO2 29 30 30   < > 31 * 98* 97*   < > 98* CREA 3.51* 3.36* 3.39*   < > 2.91* * 131* 79   < > 82  
CA 8.5 8.7 8.2*   < > 8.1*  
MG  --   --   --   --  2.3  
 < > = values in this interval not displayed. No results for input(s): CPK, CKNDX, TROIQ in the last 72 hours. No lab exists for component: CPKMB Lab Results Component Value Date/Time Cholesterol, total 104 01/06/2020 03:22 PM  
 HDL Cholesterol 14 (L) 01/06/2020 03:22 PM  
 LDL, calculated 67 01/06/2020 03:22 PM  
 Triglyceride 115 01/06/2020 03:22 PM  
 CHOL/HDL Ratio 7 (H) 01/06/2020 03:22 PM  
 
 
Recent Labs  
  02/19/20 
0259 02/18/20 
0523 02/17/20 
5668 ALB 3.1* 3.0* 3.1* No results for input(s): PH, PCO2, PO2 in the last 72 hours. Medications Personally Reviewed: 
 
Current Facility-Administered Medications Medication Dose Route Frequency  Sodium Monitoring- Please call physician if sodium increases more than 12 mmol/L in 24 hours  1 Each Other Q6H  
 carvediloL (COREG) tablet 3.125 mg  3.125 mg Oral BID WITH MEALS  
 oxyCODONE-acetaminophen (PERCOCET) 5-325 mg per tablet 1 Tab  1 Tab Oral Q6H PRN  
 magnesium hydroxide (MILK OF MAGNESIA) 400 mg/5 mL oral suspension 30 mL  30 mL Oral DAILY PRN  
 milrinone (PRIMACOR) 200 mcg/mL in 0.9% sodium chloride 100 mL infusion  0.25 mcg/kg/min IntraVENous CONTINUOUS  
 epoetin mary-epbx (RETACRIT) injection 10,000 Units  10,000 Units SubCUTAneous Q TUE, THU & SAT  polyethylene glycol (MIRALAX) packet 17 g  17 g Oral DAILY PRN  
 simethicone (MYLICON) tablet 80 mg  80 mg Oral QID PRN  
 sodium chloride (NS) flush 5-40 mL  5-40 mL IntraVENous Q8H  
 sodium chloride (NS) flush 5-40 mL  5-40 mL IntraVENous PRN  
  acetaminophen (TYLENOL) tablet 650 mg  650 mg Oral Q4H PRN  
 ondansetron (ZOFRAN) injection 4 mg  4 mg IntraVENous Q4H PRN  
 heparin (porcine) injection 5,000 Units  5,000 Units SubCUTAneous Q8H  
 isosorbide mononitrate ER (IMDUR) tablet 30 mg  30 mg Oral DAILY  zolpidem (AMBIEN) tablet 10 mg  10 mg Oral QHS PRN  
 timolol (TIMOPTIC) 0.5 % ophthalmic solution 1 Drop  1 Drop Right Eye BID  tamsulosin (FLOMAX) capsule 0.8 mg  0.8 mg Oral DAILY  glucose chewable tablet 16 g  4 Tab Oral PRN  
 dextrose (D50W) injection syrg 12.5-25 g  25-50 mL IntraVENous PRN  
 glucagon (GLUCAGEN) injection 1 mg  1 mg IntraMUSCular PRN  
 insulin lispro (HUMALOG) injection   SubCUTAneous AC&HS  insulin glargine (LANTUS) injection 20 Units  20 Units SubCUTAneous QHS  diphenhydrAMINE (BENADRYL) capsule 25 mg  25 mg Oral Q6H PRN  pramipexole (MIRAPEX) tablet 0.5 mg  0.5 mg Oral QHS Yin Driscoll MD

## 2020-02-20 ENCOUNTER — HOME HEALTH ADMISSION (OUTPATIENT)
Dept: HOME HEALTH SERVICES | Facility: HOME HEALTH | Age: 70
End: 2020-02-20
Payer: MEDICARE

## 2020-02-20 LAB
ALBUMIN SERPL-MCNC: 3.1 G/DL (ref 3.5–5)
ANION GAP SERPL CALC-SCNC: 10 MMOL/L (ref 5–15)
ANION GAP SERPL CALC-SCNC: 10 MMOL/L (ref 5–15)
ANION GAP SERPL CALC-SCNC: 6 MMOL/L (ref 5–15)
ANION GAP SERPL CALC-SCNC: 9 MMOL/L (ref 5–15)
BUN SERPL-MCNC: 100 MG/DL (ref 6–20)
BUN SERPL-MCNC: 102 MG/DL (ref 6–20)
BUN SERPL-MCNC: 95 MG/DL (ref 6–20)
BUN SERPL-MCNC: 96 MG/DL (ref 6–20)
BUN/CREAT SERPL: 27 (ref 12–20)
BUN/CREAT SERPL: 28 (ref 12–20)
CALCIUM SERPL-MCNC: 8 MG/DL (ref 8.5–10.1)
CALCIUM SERPL-MCNC: 8.2 MG/DL (ref 8.5–10.1)
CALCIUM SERPL-MCNC: 8.2 MG/DL (ref 8.5–10.1)
CALCIUM SERPL-MCNC: 8.3 MG/DL (ref 8.5–10.1)
CHLORIDE SERPL-SCNC: 88 MMOL/L (ref 97–108)
CHLORIDE SERPL-SCNC: 88 MMOL/L (ref 97–108)
CHLORIDE SERPL-SCNC: 89 MMOL/L (ref 97–108)
CHLORIDE SERPL-SCNC: 89 MMOL/L (ref 97–108)
CO2 SERPL-SCNC: 27 MMOL/L (ref 21–32)
CO2 SERPL-SCNC: 28 MMOL/L (ref 21–32)
CO2 SERPL-SCNC: 29 MMOL/L (ref 21–32)
CO2 SERPL-SCNC: 31 MMOL/L (ref 21–32)
CREAT SERPL-MCNC: 3.48 MG/DL (ref 0.7–1.3)
CREAT SERPL-MCNC: 3.55 MG/DL (ref 0.7–1.3)
CREAT SERPL-MCNC: 3.66 MG/DL (ref 0.7–1.3)
CREAT SERPL-MCNC: 3.77 MG/DL (ref 0.7–1.3)
ERYTHROCYTE [DISTWIDTH] IN BLOOD BY AUTOMATED COUNT: 17.5 % (ref 11.5–14.5)
GLUCOSE BLD STRIP.AUTO-MCNC: 113 MG/DL (ref 65–100)
GLUCOSE BLD STRIP.AUTO-MCNC: 158 MG/DL (ref 65–100)
GLUCOSE BLD STRIP.AUTO-MCNC: 165 MG/DL (ref 65–100)
GLUCOSE BLD STRIP.AUTO-MCNC: 169 MG/DL (ref 65–100)
GLUCOSE SERPL-MCNC: 128 MG/DL (ref 65–100)
GLUCOSE SERPL-MCNC: 135 MG/DL (ref 65–100)
GLUCOSE SERPL-MCNC: 174 MG/DL (ref 65–100)
GLUCOSE SERPL-MCNC: 92 MG/DL (ref 65–100)
HBV SURFACE AG SER QL: <0.1 INDEX
HBV SURFACE AG SER QL: NEGATIVE
HCT VFR BLD AUTO: 26.9 % (ref 36.6–50.3)
HCV AB SERPL QL IA: NONREACTIVE
HCV COMMENT,HCGAC: NORMAL
HGB BLD-MCNC: 8.6 G/DL (ref 12.1–17)
HIV1 P24 AG SERPL QL IA: NONREACTIVE
HIV1+2 AB SERPL QL IA: NONREACTIVE
MCH RBC QN AUTO: 30.6 PG (ref 26–34)
MCHC RBC AUTO-ENTMCNC: 32 G/DL (ref 30–36.5)
MCV RBC AUTO: 95.7 FL (ref 80–99)
NRBC # BLD: 0 K/UL (ref 0–0.01)
NRBC BLD-RTO: 0 PER 100 WBC
PHOSPHATE SERPL-MCNC: 4.2 MG/DL (ref 2.6–4.7)
PLATELET # BLD AUTO: 82 K/UL (ref 150–400)
PMV BLD AUTO: 11.1 FL (ref 8.9–12.9)
POTASSIUM SERPL-SCNC: 4 MMOL/L (ref 3.5–5.1)
POTASSIUM SERPL-SCNC: 4.1 MMOL/L (ref 3.5–5.1)
POTASSIUM SERPL-SCNC: 4.2 MMOL/L (ref 3.5–5.1)
POTASSIUM SERPL-SCNC: 4.2 MMOL/L (ref 3.5–5.1)
RBC # BLD AUTO: 2.81 M/UL (ref 4.1–5.7)
SERVICE CMNT-IMP: ABNORMAL
SODIUM SERPL-SCNC: 125 MMOL/L (ref 136–145)
SODIUM SERPL-SCNC: 126 MMOL/L (ref 136–145)
SODIUM SERPL-SCNC: 126 MMOL/L (ref 136–145)
SODIUM SERPL-SCNC: 127 MMOL/L (ref 136–145)
WBC # BLD AUTO: 3.5 K/UL (ref 4.1–11.1)

## 2020-02-20 PROCEDURE — 97530 THERAPEUTIC ACTIVITIES: CPT

## 2020-02-20 PROCEDURE — 74011250637 HC RX REV CODE- 250/637: Performed by: INTERNAL MEDICINE

## 2020-02-20 PROCEDURE — 85027 COMPLETE CBC AUTOMATED: CPT

## 2020-02-20 PROCEDURE — 80069 RENAL FUNCTION PANEL: CPT

## 2020-02-20 PROCEDURE — 65660000000 HC RM CCU STEPDOWN

## 2020-02-20 PROCEDURE — 74011250636 HC RX REV CODE- 250/636: Performed by: INTERNAL MEDICINE

## 2020-02-20 PROCEDURE — 94760 N-INVAS EAR/PLS OXIMETRY 1: CPT

## 2020-02-20 PROCEDURE — 36415 COLL VENOUS BLD VENIPUNCTURE: CPT

## 2020-02-20 PROCEDURE — 97116 GAIT TRAINING THERAPY: CPT

## 2020-02-20 PROCEDURE — 82962 GLUCOSE BLOOD TEST: CPT

## 2020-02-20 PROCEDURE — 80048 BASIC METABOLIC PNL TOTAL CA: CPT

## 2020-02-20 PROCEDURE — 74011636637 HC RX REV CODE- 636/637: Performed by: INTERNAL MEDICINE

## 2020-02-20 PROCEDURE — 74011000258 HC RX REV CODE- 258: Performed by: INTERNAL MEDICINE

## 2020-02-20 PROCEDURE — 74011250637 HC RX REV CODE- 250/637: Performed by: HOSPITALIST

## 2020-02-20 RX ORDER — TOLVAPTAN 15 MG/1
30 TABLET ORAL ONCE
Status: COMPLETED | OUTPATIENT
Start: 2020-02-20 | End: 2020-02-20

## 2020-02-20 RX ADMIN — TAMSULOSIN HYDROCHLORIDE 0.8 MG: 0.4 CAPSULE ORAL at 09:43

## 2020-02-20 RX ADMIN — Medication 10 ML: at 06:29

## 2020-02-20 RX ADMIN — HEPARIN SODIUM 5000 UNITS: 5000 INJECTION INTRAVENOUS; SUBCUTANEOUS at 06:29

## 2020-02-20 RX ADMIN — PRAMIPEXOLE DIHYDROCHLORIDE 0.5 MG: 0.25 TABLET ORAL at 21:03

## 2020-02-20 RX ADMIN — OXYCODONE HYDROCHLORIDE AND ACETAMINOPHEN 1 TABLET: 5; 325 TABLET ORAL at 21:03

## 2020-02-20 RX ADMIN — SODIUM CHLORIDE 0.25 MCG/KG/MIN: 9 INJECTION, SOLUTION INTRAVENOUS at 00:57

## 2020-02-20 RX ADMIN — ISOSORBIDE MONONITRATE 30 MG: 30 TABLET, EXTENDED RELEASE ORAL at 09:43

## 2020-02-20 RX ADMIN — HEPARIN SODIUM 5000 UNITS: 5000 INJECTION INTRAVENOUS; SUBCUTANEOUS at 21:04

## 2020-02-20 RX ADMIN — INSULIN GLARGINE 20 UNITS: 100 INJECTION, SOLUTION SUBCUTANEOUS at 21:04

## 2020-02-20 RX ADMIN — TOLVAPTAN 30 MG: 15 TABLET ORAL at 09:47

## 2020-02-20 RX ADMIN — EPOETIN ALFA-EPBX 10000 UNITS: 10000 INJECTION, SOLUTION INTRAVENOUS; SUBCUTANEOUS at 23:05

## 2020-02-20 RX ADMIN — ZOLPIDEM TARTRATE 10 MG: 5 TABLET ORAL at 23:05

## 2020-02-20 RX ADMIN — TIMOLOL MALEATE 1 DROP: 5 SOLUTION/ DROPS OPHTHALMIC at 17:48

## 2020-02-20 RX ADMIN — CARVEDILOL 3.12 MG: 3.12 TABLET, FILM COATED ORAL at 09:43

## 2020-02-20 RX ADMIN — SODIUM CHLORIDE 0.25 MCG/KG/MIN: 9 INJECTION, SOLUTION INTRAVENOUS at 13:58

## 2020-02-20 RX ADMIN — TIMOLOL MALEATE 1 DROP: 5 SOLUTION/ DROPS OPHTHALMIC at 09:43

## 2020-02-20 RX ADMIN — CARVEDILOL 3.12 MG: 3.12 TABLET, FILM COATED ORAL at 16:46

## 2020-02-20 RX ADMIN — Medication 10 ML: at 13:59

## 2020-02-20 RX ADMIN — Medication 10 ML: at 23:06

## 2020-02-20 RX ADMIN — HEPARIN SODIUM 5000 UNITS: 5000 INJECTION INTRAVENOUS; SUBCUTANEOUS at 16:16

## 2020-02-20 NOTE — PROGRESS NOTES
Nephrology Progress Note [facility] 
[email or web address]                  Phone - [phone] Patient: Caitlyn Cid. Date- 2/20/2020 Admit Date: 2/14/2020 YOB: 1950 CC: Follow up for JOSE ANTONIO, hyponatremia Subjective: Interval History:  
Sodium improved to 126 Creatinine slightly better No hypoxia No shortness of breath He is not requiring any oxygen Complaint of constipation  
no vomiting ROS:- as above Assessment & Plan: · Acute kidney injury due to cardiorenal syndrome - most likely · Hyponatremia -due to metolazone use + CHF · Ckd 4- cr. 2.5 baseline-followed by Dr. Kenny Grant · Chf, edema- ef 35% · Anemia due to ckd and iron defi. Iron sats 15% · H/o DM PLAN- 
? S/p tolvaptan 30 MG TODAY 
? Hold Bumex ? Check bmp 4 pm 
? As sodium level is more than 125 -no more tolvaptan tomorrow ? We will resume Bumex 2 mg twice daily starting tomorrow -if creatinine level stable ? Fluid restriction 1200 ml per day ? Check bmp in am 
? Milrinone per cards ? S/p venofer ? Continue epogen Physical exam:  
GEN:  NAD NECK:  Supple, no thyromegaly RESP: CTA  b/l, no  wheezing, CVS: RRR,S1,S2 ABDO:  soft , non tender, No mass NEURO: non focal, normal speech EXT: trace Edema +nt Care Plan discussed with: patient Objective:  
Visit Vitals /65 (BP 1 Location: Right arm, BP Patient Position: At rest) Pulse 70 Temp 97.5 °F (36.4 °C) Resp 18 Ht 6' 4\" (1.93 m) Wt 112.3 kg (247 lb 8 oz) SpO2 98% BMI 30.13 kg/m² Last 3 Recorded Weights in this Encounter 02/18/20 3664 02/19/20 0451 02/20/20 0244 Weight: 112.5 kg (248 lb 1.6 oz) 108.6 kg (239 lb 6.7 oz) 112.3 kg (247 lb 8 oz) 02/18 1901 - 02/20 0700 In: 1915.2 [P.O.:1626; I.V.:289.2] Out: 2965 [VFTFL:5356] Intake/Output Summary (Last 24 hours) at 2/20/2020 0820 Last data filed at 2/20/2020 8850 Gross per 24 hour Intake 1145.42 ml Output 1785 ml Net -639.58 ml Chart reviewed. Pertinent Notes reviewed. Medication list  reviewed Current Facility-Administered Medications Medication  tolvaptan (SAMSCA) tablet 30 mg  Sodium Monitoring- Please call physician if sodium increases more than 12 mmol/L in 24 hours  carvediloL (COREG) tablet 3.125 mg  
 oxyCODONE-acetaminophen (PERCOCET) 5-325 mg per tablet 1 Tab  magnesium hydroxide (MILK OF MAGNESIA) 400 mg/5 mL oral suspension 30 mL  milrinone (PRIMACOR) 200 mcg/mL in 0.9% sodium chloride 100 mL infusion  epoetin mary-epbx (RETACRIT) injection 10,000 Units  polyethylene glycol (MIRALAX) packet 17 g  
 simethicone (MYLICON) tablet 80 mg  
 sodium chloride (NS) flush 5-40 mL  sodium chloride (NS) flush 5-40 mL  acetaminophen (TYLENOL) tablet 650 mg  
 ondansetron (ZOFRAN) injection 4 mg  heparin (porcine) injection 5,000 Units  isosorbide mononitrate ER (IMDUR) tablet 30 mg  
 zolpidem (AMBIEN) tablet 10 mg  
 timolol (TIMOPTIC) 0.5 % ophthalmic solution 1 Drop  tamsulosin (FLOMAX) capsule 0.8 mg  
 glucose chewable tablet 16 g  
 dextrose (D50W) injection syrg 12.5-25 g  
 glucagon (GLUCAGEN) injection 1 mg  
 insulin lispro (HUMALOG) injection  insulin glargine (LANTUS) injection 20 Units  diphenhydrAMINE (BENADRYL) capsule 25 mg  
 pramipexole (MIRAPEX) tablet 0.5 mg Data Review : 
Recent Labs  
  02/20/20 
0504 02/19/20 
2324 02/19/20 
1648  02/19/20 
0259  02/18/20 
7883 * 125* 126*   < > 124*   < > 121*  
K 4.0 4.1 4.2   < > 4.0   < > 3.8 CL 89* 88* 88*   < > 86*   < > 82* CO2 27 31 29   < > 30   < > 29 BUN 95* 102* 103*   < > 97*   < > 92* CREA 3.48* 3.66* 3.51*   < > 3.39*   < > 3.21* GLU 92 135* 170*   < > 79   < > 83  
CA 8.2* 8.3* 8.5   < > 8.2*   < > 8.4* PHOS 4.2  --   --   --  4.2  --  4.4 < > = values in this interval not displayed. Recent Labs  
  02/20/20 
0504 02/18/20 
6610 WBC 3.5* 3.8* HGB 8.6* 8.1* HCT 26.9* 25.2*  
PLT 82* 77* No results for input(s): FE, TIBC, PSAT, FERR in the last 72 hours. No results for input(s): CPK, CKNDX, TROIQ in the last 72 hours. No lab exists for component: CPKMB Lab Results Component Value Date/Time Color YELLOW/STRAW 02/14/2020 03:48 PM  
 Appearance CLEAR 02/14/2020 03:48 PM  
 Specific gravity 1.012 02/14/2020 03:48 PM  
 Specific gravity 1.015 08/14/2019 04:51 PM  
 pH (UA) 7.5 02/14/2020 03:48 PM  
 Protein 30 (A) 02/14/2020 03:48 PM  
 Glucose NEGATIVE  02/14/2020 03:48 PM  
 Ketone NEGATIVE  02/14/2020 03:48 PM  
 Bilirubin NEGATIVE  02/14/2020 03:48 PM  
 Urobilinogen 0.2 02/14/2020 03:48 PM  
 Nitrites NEGATIVE  02/14/2020 03:48 PM  
 Leukocyte Esterase NEGATIVE  02/14/2020 03:48 PM  
 Epithelial cells FEW 02/14/2020 03:48 PM  
 Bacteria NEGATIVE  02/14/2020 03:48 PM  
 WBC 0-4 02/14/2020 03:48 PM  
 RBC 0-5 02/14/2020 03:48 PM  
 
Lab Results Component Value Date/Time Culture result: NO GROWTH 4 DAYS 01/03/2020 06:44 PM  
 Culture result: NO GROWTH ON SOLID MEDIA AT 4 DAYS 01/03/2020 06:44 PM  
 Culture result: (A) 01/03/2020 06:44 PM  
  ENTEROCOCCUS FAECALIS GROUP D ISOLATED FROM THIO BROTH ONLY No results found for: SDES Lab Results Component Value Date/Time Sodium,urine random 57 02/14/2020 06:54 PM  
 Creatinine, urine 28.40 02/14/2020 06:54 PM  
 
Results from [facility] Encounter encounter on 02/14/20 XR CHEST PA LAT Narrative Clinical indication: Severe shortness of breath. Frontal and lateral views of the chest obtained, comparison June 7, 2019. Inspiration is shallow. Cardiac pacemaker, no acute infiltrate. Impression  impression: No acute changes. Lawyer Davian MD 
[facility] 
 [email or web address] [place] [facility] [employer] [address] [place], [address] Phone - [phone] Fax - [phone]

## 2020-02-20 NOTE — PROGRESS NOTES
Problem: Mobility Impaired (Adult and Pediatric) Goal: *Acute Goals and Plan of Care (Insert Text) Description FUNCTIONAL STATUS PRIOR TO ADMISSION: Patient was modified independent using a rollator for functional mobility. His spouse prepares all meals which is on a higher level in the home. He sponge bathes rather than using the shower. He admits increasing frequency of falls due to legs buckling. HOME SUPPORT PRIOR TO ADMISSION: The patient lived with spouse who assists with ADLs as needed and transportation needs. Physical Therapy Goals Initiated 2/17/2020 1. Patient will move from supine to sit and sit to supine , scoot up and down and roll side to side in bed with independence within 7 day(s). Met 2/19/20 2. Patient will transfer from bed to chair and chair to bed with independence using the least restrictive device within 7 day(s). 3.  Patient will perform sit to stand with modified independence within 7 day(s). 4.  Patient will ambulate with supervision/set-up for 150 feet with the least restrictive device within 7 day(s). Outcome: Progressing Towards Goal 
 PHYSICAL THERAPY TREATMENT Patient: Carlton Bello (73 y.o. male) Date: 2/20/2020 Diagnosis: Acute exacerbation of CHF (congestive heart failure) ([facility] [address] [facility] [I50.9] [facility] of [facility] Precautions: Fall Chart, physical therapy assessment, plan of care and goals were reviewed. ASSESSMENT Patient continues with skilled PT services and is progressing towards goals. Patient continues to demonstrate generalized weakness, impaired gait mechanics, decreased endurance, and impaired balance. SpO2 <92% on RA throughout activity. Patient with improvements in all functional mobility, however unable to tolerate increased gait distance without standing rest break x ~ 1 minute.  Needed min VCs for safe hand placement with use of rollator during transfers. Exhibited mildly uncontrolled descent into sitting despite use of BUEs on armrests. Provided extensive education on activity pacing and energy conservation to improve activity tolerance. Pt was left sitting in bedside chair with all needs met and RN aware following therapy session. Current Level of Function Impacting Discharge (mobility/balance): supervision to CGA Other factors to consider for discharge: good family support; decline from baseline mobility PLAN : 
Patient continues to benefit from skilled intervention to address the above impairments. Continue treatment per established plan of care. to address goals. Recommendation for discharge: (in order for the patient to meet his/her long term goals) Physical therapy at least 2 days/week in the home AND ensure assist and/or supervision for safety with functional mobility and ADLs This discharge recommendation: 
Has been made in collaboration with the attending provider and/or case management IF patient discharges home will need the following DME: patient owns DME required for discharge SUBJECTIVE:  
Patient stated My leg are giving out.  OBJECTIVE DATA SUMMARY:  
Critical Behavior: 
Neurologic State: Alert, Appropriate for age Orientation Level: Oriented X4 Cognition: Appropriate for age attention/concentration, Appropriate decision making, Appropriate safety awareness Safety/Judgement: Awareness of environment, Good awareness of safety precautions, Fall prevention(severe BLE neuropathy) Functional Mobility Training: 
Bed Mobility: 
  
Supine to Sit: Supervision; Additional time;Bed Modified(HOB elevated) Scooting: Independent Transfers: 
Sit to Stand: Contact guard assistance; Additional time(elevated bed height) Stand to Sit: Contact guard assistance(mildly uncontrolled descent into sitting) Balance: 
Sitting: Intact Standing: Impaired; With support Standing - Static: Good;Constant support Standing - Dynamic : Good;Constant support Ambulation/Gait Training: 
Distance (ft): 260 Feet (ft) Assistive Device: Gait belt;Walker, rollator Ambulation - Level of Assistance: Stand-by assistance;Contact guard assistance; Additional time; Adaptive equipment Gait Abnormalities: Decreased step clearance Speed/Anna: Pace decreased (<100 feet/min) Pain Rating: 
Reported CARLO shoulder pain with weight bearing through BUEs on rollator; did not rate pain Activity Tolerance:  
Good and requires rest breaks Please refer to the flowsheet for vital signs taken during this treatment. After treatment patient left in no apparent distress:  
Sitting in chair and Call bell within reach COMMUNICATION/COLLABORATION:  
The patients plan of care was discussed with: Physical Therapist and Registered Nurse Aida Soriano, PT, DPT Time Calculation: 24 mins

## 2020-02-20 NOTE — PROGRESS NOTES
Problem: Falls - Risk of 
Goal: *Absence of Falls Description Document Omega Paiz Fall Risk and appropriate interventions in the flowsheet. Outcome: Progressing Towards Goal 
Note: Fall Risk Interventions: 
Mobility Interventions: Bed/chair exit alarm, Patient to call before getting OOB Medication Interventions: Bed/chair exit alarm, Patient to call before getting OOB, Teach patient to arise slowly Elimination Interventions: Bed/chair exit alarm, Call light in reach History of Falls Interventions: Bed/chair exit alarm Problem: Pressure Injury - Risk of 
Goal: *Prevention of pressure injury Description Document Aurelio Scale and appropriate interventions in the flowsheet. Outcome: Progressing Towards Goal 
Note: Pressure Injury Interventions: 
Sensory Interventions: Assess changes in LOC Moisture Interventions: Absorbent underpads Activity Interventions: Increase time out of bed Mobility Interventions: PT/OT evaluation Nutrition Interventions: Document food/fluid/supplement intake, Offer support with meals,snacks and hydration Friction and Shear Interventions: Feet elevated on foot rest, Lift sheet Problem: Patient Education: Go to Patient Education Activity Goal: Patient/Family Education Outcome: Progressing Towards Goal 
  
Problem: Patient Education: Go to Patient Education Activity Goal: Patient/Family Education Outcome: Progressing Towards Goal

## 2020-02-20 NOTE — PROGRESS NOTES
Bedside shift change report GIVEN TO Tara Locke RN. Report included the following information SBAR and Kardex. SIGNIFICANT CHANGES DURING SHIFT:  None. CONCERNS TO ADDRESS WITH MD:  None. [facility] NURSING NOTE Admission Date 2/14/2020 Admission Diagnosis Acute exacerbation of CHF (congestive heart failure) ([facility] [address] [I50.9] Consults IP CONSULT TO NEPHROLOGY 
IP CONSULT TO CARDIOLOGY Cardiac Monitoring [x] Yes [] No  
  
Purposeful Hourly Rounding [x] Yes   
Thanh Score Total Score: 4 Thanh score 3 or > [x] Bed Alarm [] Avasys [] 1:1 sitter [] Patient refused (Signed refusal form in chart) Aurelio Score Aurelio Score: 18 Aurelio score 14 or < [] PMT consult [] Wound Care consult  
 []  Specialty bed  [] Nutrition consult Influenza Vaccine Received Flu Vaccine for Current Season (usually Sept-March): Yes Oxygen needs? [x] Room air Oxygen @  []1L    []2L    []3L   []4L    []5L   []6L via NC Chronic home O2 use? [] Yes [x] No 
Perform O2 challenge test and document in progress note using smartphrase (.Homeoxygen) Last bowel movement Last Bowel Movement Date: 02/19/20 Urinary Catheter LDAs Peripheral IV 02/14/20 Left Arm (Active) Site Assessment Clean, dry, & intact 2/20/2020  3:00 AM  
Phlebitis Assessment 0 2/20/2020  3:00 AM  
Infiltration Assessment 0 2/20/2020  3:00 AM  
Dressing Status Clean, dry, & intact 2/20/2020  3:00 AM  
Dressing Type Transparent;Tape 2/20/2020  3:00 AM  
Hub Color/Line Status Flushed;Pink 2/20/2020  3:00 AM  
Action Taken Open ports on tubing capped 2/19/2020  3:45 AM  
Alcohol Cap Used Yes 2/19/2020  3:45 AM  
   
Peripheral IV 02/19/20 Anterior; Left Forearm (Active) Site Assessment Clean, dry, & intact 2/20/2020  3:00 AM  
Phlebitis Assessment 0 2/20/2020  3:00 AM  
Infiltration Assessment 0 2/20/2020  3:00 AM  
Dressing Status Clean, dry, & intact 2/20/2020  3:00 AM  
 Dressing Type Transparent;Tape 2/20/2020  3:00 AM  
Hub Color/Line Status Flushed;Blue 2/20/2020  3:00 AM  
                  
  
Readmission Risk Assessment Tool Score High Risk 39 Total Score 3 Has Seen PCP in Last 6 Months (Yes=3, No=0) 2 . Living with Significant Other. Assisted Living. LTAC. SNF. or  
Rehab  
 3 Patient Length of Stay (>5 days = 3) 4 IP Visits Last 12 Months (1-3=4, 4=9, >4=11) 5 Pt. Coverage (Medicare=5 , Medicaid, or Self-Pay=4) 28 Charlson Comorbidity Score (Age + Comorbid Conditions) Expected Length of Stay 4d 2h Actual Length of Stay 6

## 2020-02-20 NOTE — PROGRESS NOTES
0700: Bedside shift change report given to Jaci Mack RN (oncoming nurse) by KARI Villavicencio RN (offgoing nurse). Report included the following information SBAR and Kardex.

## 2020-02-20 NOTE — PROGRESS NOTES
PROGRESS NOTE 
 
NAME:  Royal Elisha Lama. :   1950 MRN:   [id number] Date/Time:  2020 5:51 AM 
Subjective:  
History:  Chart reviewed and patient seen and examined this AM and D/W his nurses,   and all events noted. He was admitted on  with acute on chronic combined diastolic and systolic CHF with underlying HTN, DM,  ASCVD, Cardiomyopathy and CKD stage 4 with a history of Afib S/P ablation. He had become severely SOB over the 3 week period PTA until the night before admission he couldn't sleep at all and thus he presented to the ER. In the ER his weight was recorded at 250# up from 242# when last seen by me at the office on  (240# on 19). He feels that he is better now and also improved regarding constipation and abdominal bloating. He has diuresed well since admission (although output inaccurately recorded on the initial day and on  one shift not recorded at all) and lungs are clear on exam as well as admission CXR was clear. He has no CP, Palpitations or other cardiac or respiratory c/o. He noted at home that his urine output had decreased despite compliance with his diuretics. He has had excellent UO since admission and renal sono w/o obstruction. He denies any N/V or GI c/o. He has no other c/o on complete ROS except extremely weak although worked with PT yesterday and marked improvement from 2 days prior. Medications reviewed: 
Current Facility-Administered Medications Medication Dose Route Frequency  Sodium Monitoring- Please call physician if sodium increases more than 12 mmol/L in 24 hours  1 Each Other Q6H  
 carvediloL (COREG) tablet 3.125 mg  3.125 mg Oral BID WITH MEALS  
 oxyCODONE-acetaminophen (PERCOCET) 5-325 mg per tablet 1 Tab  1 Tab Oral Q6H PRN  
 magnesium hydroxide (MILK OF MAGNESIA) 400 mg/5 mL oral suspension 30 mL  30 mL Oral DAILY PRN  
 milrinone (PRIMACOR) 200 mcg/mL in 0.9% sodium chloride 100 mL infusion 0.25 mcg/kg/min IntraVENous CONTINUOUS  
 epoetin mary-epbx (RETACRIT) injection 10,000 Units  10,000 Units SubCUTAneous Q TUE, THU & SAT  polyethylene glycol (MIRALAX) packet 17 g  17 g Oral DAILY PRN  
 simethicone (MYLICON) tablet 80 mg  80 mg Oral QID PRN  
 sodium chloride (NS) flush 5-40 mL  5-40 mL IntraVENous Q8H  
 sodium chloride (NS) flush 5-40 mL  5-40 mL IntraVENous PRN  
 acetaminophen (TYLENOL) tablet 650 mg  650 mg Oral Q4H PRN  
 ondansetron (ZOFRAN) injection 4 mg  4 mg IntraVENous Q4H PRN  
 heparin (porcine) injection 5,000 Units  5,000 Units SubCUTAneous Q8H  
 isosorbide mononitrate ER (IMDUR) tablet 30 mg  30 mg Oral DAILY  zolpidem (AMBIEN) tablet 10 mg  10 mg Oral QHS PRN  
 timolol (TIMOPTIC) 0.5 % ophthalmic solution 1 Drop  1 Drop Right Eye BID  tamsulosin (FLOMAX) capsule 0.8 mg  0.8 mg Oral DAILY  glucose chewable tablet 16 g  4 Tab Oral PRN  
 dextrose (D50W) injection syrg 12.5-25 g  25-50 mL IntraVENous PRN  
 glucagon (GLUCAGEN) injection 1 mg  1 mg IntraMUSCular PRN  
 insulin lispro (HUMALOG) injection   SubCUTAneous AC&HS  insulin glargine (LANTUS) injection 20 Units  20 Units SubCUTAneous QHS  diphenhydrAMINE (BENADRYL) capsule 25 mg  25 mg Oral Q6H PRN  pramipexole (MIRAPEX) tablet 0.5 mg  0.5 mg Oral QHS Objective:  
Vitals: 
Visit Vitals /65 (BP 1 Location: Right arm, BP Patient Position: At rest) Pulse 70 Temp 97.5 °F (36.4 °C) Resp 18 Ht 6' 4\" (1.93 m) Wt 247 lb 8 oz (112.3 kg) SpO2 98% BMI 30.13 kg/m² O2 Flow Rate (L/min): 3 l/min O2 Device: Room air Temp (24hrs), Av °F (36.7 °C), Min:97.5 °F (36.4 °C), Max:98.3 °F (36.8 °C) Last 24hr Input/Output: 
 
Intake/Output Summary (Last 24 hours) at 2020 0759 Last data filed at 2020 5904 Gross per 24 hour Intake 1145.42 ml Output 1785 ml Net -639.58 ml PHYSICAL EXAM: 
 General:     Alert, cooperative, no distress, appears stated age. Head:    Normocephalic, without obvious abnormality, atraumatic. Eyes:    Conjunctivae/corneas clear. PERRLA Nose:   Nares normal. No drainage or sinus tenderness. Throat:     Lips, mucosa, and tongue normal.  No Thrush Neck:   Supple, symmetrical,  no adenopathy, thyroid: non tender 
   no carotid bruit and no JVD. Back:     Symmetric,  No CVA tenderness. Lungs:    Clear to auscultation bilaterally. No Wheezing or Rhonchi. No rales. Heart:    Regular rate and rhythm,  no murmur, rub or gallop. Abdomen:    Soft, non-tender. Not distended. Bowel sounds normal. No masses Extremities:  Extremities normal, atraumatic, No cyanosis. No edema. No clubbing Lymph nodes:  Cervical, supraclavicular normal. 
Neurologic:  Normal strength, Alert and oriented X 3. Skin:                No rash Lab Data Reviewed: 
 
Recent Results (from the past 24 hour(s)) METABOLIC PANEL, BASIC Collection Time: 02/19/20 10:10 AM  
Result Value Ref Range Sodium 125 (L) 136 - 145 mmol/L Potassium 3.9 3.5 - 5.1 mmol/L Chloride 86 (L) 97 - 108 mmol/L  
 CO2 30 21 - 32 mmol/L Anion gap 9 5 - 15 mmol/L Glucose 131 (H) 65 - 100 mg/dL BUN 98 (H) 6 - 20 MG/DL Creatinine 3.36 (H) 0.70 - 1.30 MG/DL  
 BUN/Creatinine ratio 29 (H) 12 - 20 GFR est AA 22 (L) >60 ml/min/1.73m2 GFR est non-AA 18 (L) >60 ml/min/1.73m2 Calcium 8.7 8.5 - 10.1 MG/DL  
GLUCOSE, POC Collection Time: 02/19/20 10:51 AM  
Result Value Ref Range Glucose (POC) 150 (H) 65 - 100 mg/dL Performed by Savannah Wan GLUCOSE, POC Collection Time: 02/19/20 11:52 AM  
Result Value Ref Range Glucose (POC) 138 (H) 65 - 100 mg/dL Performed by Savannah Wan GLUCOSE, POC Collection Time: 02/19/20  4:17 PM  
Result Value Ref Range Glucose (POC) 169 (H) 65 - 100 mg/dL Performed by Savannah Wan METABOLIC PANEL, BASIC  
 Collection Time: 02/19/20  4:48 PM  
Result Value Ref Range Sodium 126 (L) 136 - 145 mmol/L Potassium 4.2 3.5 - 5.1 mmol/L Chloride 88 (L) 97 - 108 mmol/L  
 CO2 29 21 - 32 mmol/L Anion gap 9 5 - 15 mmol/L Glucose 170 (H) 65 - 100 mg/dL  (H) 6 - 20 MG/DL Creatinine 3.51 (H) 0.70 - 1.30 MG/DL  
 BUN/Creatinine ratio 29 (H) 12 - 20 GFR est AA 21 (L) >60 ml/min/1.73m2 GFR est non-AA 17 (L) >60 ml/min/1.73m2 Calcium 8.5 8.5 - 10.1 MG/DL  
GLUCOSE, POC Collection Time: 02/19/20  9:41 PM  
Result Value Ref Range Glucose (POC) 206 (H) 65 - 100 mg/dL Performed by Rueben Galeazzi RN   
METABOLIC PANEL, BASIC Collection Time: 02/19/20 11:24 PM  
Result Value Ref Range Sodium 125 (L) 136 - 145 mmol/L Potassium 4.1 3.5 - 5.1 mmol/L Chloride 88 (L) 97 - 108 mmol/L  
 CO2 31 21 - 32 mmol/L Anion gap 6 5 - 15 mmol/L Glucose 135 (H) 65 - 100 mg/dL  (H) 6 - 20 MG/DL Creatinine 3.66 (H) 0.70 - 1.30 MG/DL  
 BUN/Creatinine ratio 28 (H) 12 - 20 GFR est AA 20 (L) >60 ml/min/1.73m2 GFR est non-AA 16 (L) >60 ml/min/1.73m2 Calcium 8.3 (L) 8.5 - 10.1 MG/DL RENAL FUNCTION PANEL Collection Time: 02/20/20  5:04 AM  
Result Value Ref Range Sodium 126 (L) 136 - 145 mmol/L Potassium 4.0 3.5 - 5.1 mmol/L Chloride 89 (L) 97 - 108 mmol/L  
 CO2 27 21 - 32 mmol/L Anion gap 10 5 - 15 mmol/L Glucose 92 65 - 100 mg/dL BUN 95 (H) 6 - 20 MG/DL Creatinine 3.48 (H) 0.70 - 1.30 MG/DL  
 BUN/Creatinine ratio 27 (H) 12 - 20 GFR est AA 21 (L) >60 ml/min/1.73m2 GFR est non-AA 17 (L) >60 ml/min/1.73m2 Calcium 8.2 (L) 8.5 - 10.1 MG/DL Phosphorus 4.2 2.6 - 4.7 MG/DL Albumin 3.1 (L) 3.5 - 5.0 g/dL CBC W/O DIFF Collection Time: 02/20/20  5:04 AM  
Result Value Ref Range WBC 3.5 (L) 4.1 - 11.1 K/uL  
 RBC 2.81 (L) 4.10 - 5.70 M/uL HGB 8.6 (L) 12.1 - 17.0 g/dL HCT 26.9 (L) 36.6 - 50.3 %  MCV 95.7 80.0 - 99.0 FL  
 MCH 30.6 26.0 - 34.0 PG  
 MCHC 32.0 30.0 - 36.5 g/dL  
 RDW 17.5 (H) 11.5 - 14.5 % PLATELET 82 (L) 195 - 400 K/uL MPV 11.1 8.9 - 12.9 FL  
 NRBC 0.0 0  WBC ABSOLUTE NRBC 0.00 0.00 - 0.01 K/uL GLUCOSE, POC Collection Time: 02/20/20  7:29 AM  
Result Value Ref Range Glucose (POC) 113 (H) 65 - 100 mg/dL Performed by Rochelle Duke Assessment/Plan:  
 
Principal Problem: 
  Acute exacerbation of CHF (congestive heart failure) ([facility] (2/14/2020) Active Problems: 
  ASCVD (arteriosclerotic cardiovascular disease) (8/5/2017) Controlled type 2 diabetes mellitus with stage 4 chronic kidney disease, without long-term current use of insulin ([facility] (8/5/2017) CKD (chronic kidney disease), [facility] IV [facility] (8/5/2017) Hypertension with renal disease (8/5/2017) Gastroesophageal reflux disease without esophagitis (8/5/2017) Cardiomyopathy ([facility] (8/5/2017) Anemia (8/5/2017) S/P ablation of atrial fibrillation (5/17/2018) Chronic combined systolic and diastolic CHF, NYHA class 4 ([facility] (2/15/2020) 
 
  
___________________________________________________ PLAN: 
 
1. Holding diuresis with IV Bumex as has had excellent output and now slight bump Creat probably some increase due to oral fluid restriction 2. Follow weights (recent baseline 240# in December) 250# on admission and 239# yesterday so I DOUBT ACCURACY of 247# recorded today, Follow I & Os, O > I yesterday) 3. Hold off on Zaroxolyn as long as excellent UOP with IV Bumex decrease frequency of Bumex 4. He will need dialysis at some point which I D/W him, Has been reluctant in past although followed by renal Dr. Jose Alfredo Dorado for at least 2 years (Renal function baseline 77/2.5 on 1/6/20, 78/2.4 on 2/7/20), 94/3.04 on admission, today 95/3.48 
5. Continue Coreg for cardiomyopathy, Unable to use ACE or ARB due to renal disease 6. Imdur with Ischemic Cardiomyopathy 7.  Follow K (4.0 today) with diuresis and replete as needed, Not able to tolerate IV so increased PO 
8. Procrit ordered as well as IV Venofer (D#4 done) 9. Continue Lantus for DM 
10. Follow BS and treat with SSI 11. Flomax for BPH 12. Mirapex for Restless legs 13. No current symptoms c/w Angina but follow closely, Certainly if needs cath will end up with dialysis from this point foreward 14. Nasal oxygen for Hypoxemia/SOB, sat improved now and on RA 
15. Echocardiogram, Last EFx May 2019 was 33%, this admission 35-40% 16. Hyponatremia due to volume overload was improving but on 2/17 down to 123 to 120 so given Tolvapatan  Then and again last 2 days and today 125-126, so follow closely, doubt related to metolazone as has been taking it for several years, now fluid restrict PO, Repeat Tolvaptan at this point 17. Milrinone added 2/16 by Cardiology, ? D/C Milrinone now to see how he does off of it with potential D/C home tomorrow 
 
 
 
 
___________________________________________________ Attending Physician: Phillip Chan MD

## 2020-02-21 VITALS
HEIGHT: 76 IN | BODY MASS INDEX: 29.4 KG/M2 | DIASTOLIC BLOOD PRESSURE: 73 MMHG | SYSTOLIC BLOOD PRESSURE: 130 MMHG | WEIGHT: 241.4 LBS | OXYGEN SATURATION: 96 % | RESPIRATION RATE: 18 BRPM | TEMPERATURE: 97.8 F | HEART RATE: 70 BPM

## 2020-02-21 LAB
ALBUMIN SERPL-MCNC: 3.3 G/DL (ref 3.5–5)
ANION GAP SERPL CALC-SCNC: 7 MMOL/L (ref 5–15)
BUN SERPL-MCNC: 102 MG/DL (ref 6–20)
BUN/CREAT SERPL: 27 (ref 12–20)
CALCIUM SERPL-MCNC: 8.4 MG/DL (ref 8.5–10.1)
CHLORIDE SERPL-SCNC: 90 MMOL/L (ref 97–108)
CO2 SERPL-SCNC: 29 MMOL/L (ref 21–32)
CREAT SERPL-MCNC: 3.72 MG/DL (ref 0.7–1.3)
GLUCOSE BLD STRIP.AUTO-MCNC: 118 MG/DL (ref 65–100)
GLUCOSE BLD STRIP.AUTO-MCNC: 127 MG/DL (ref 65–100)
GLUCOSE BLD STRIP.AUTO-MCNC: 78 MG/DL (ref 65–100)
GLUCOSE SERPL-MCNC: 87 MG/DL (ref 65–100)
PHOSPHATE SERPL-MCNC: 4.1 MG/DL (ref 2.6–4.7)
POTASSIUM SERPL-SCNC: 4.1 MMOL/L (ref 3.5–5.1)
SERVICE CMNT-IMP: ABNORMAL
SERVICE CMNT-IMP: ABNORMAL
SERVICE CMNT-IMP: NORMAL
SODIUM SERPL-SCNC: 126 MMOL/L (ref 136–145)

## 2020-02-21 PROCEDURE — 74011250636 HC RX REV CODE- 250/636: Performed by: INTERNAL MEDICINE

## 2020-02-21 PROCEDURE — 36415 COLL VENOUS BLD VENIPUNCTURE: CPT

## 2020-02-21 PROCEDURE — 97116 GAIT TRAINING THERAPY: CPT

## 2020-02-21 PROCEDURE — 80069 RENAL FUNCTION PANEL: CPT

## 2020-02-21 PROCEDURE — 74011000258 HC RX REV CODE- 258: Performed by: INTERNAL MEDICINE

## 2020-02-21 PROCEDURE — 74011250637 HC RX REV CODE- 250/637: Performed by: INTERNAL MEDICINE

## 2020-02-21 PROCEDURE — 94760 N-INVAS EAR/PLS OXIMETRY 1: CPT

## 2020-02-21 PROCEDURE — 82962 GLUCOSE BLOOD TEST: CPT

## 2020-02-21 RX ORDER — CARVEDILOL 3.12 MG/1
3.12 TABLET ORAL 2 TIMES DAILY WITH MEALS
Qty: 60 TAB | Status: SHIPPED | OUTPATIENT
Start: 2020-02-21 | End: 2021-01-01

## 2020-02-21 RX ORDER — BUMETANIDE 1 MG/1
4 TABLET ORAL DAILY
Status: DISCONTINUED | OUTPATIENT
Start: 2020-02-21 | End: 2020-02-21 | Stop reason: HOSPADM

## 2020-02-21 RX ADMIN — CARVEDILOL 3.12 MG: 3.12 TABLET, FILM COATED ORAL at 17:12

## 2020-02-21 RX ADMIN — TAMSULOSIN HYDROCHLORIDE 0.8 MG: 0.4 CAPSULE ORAL at 10:15

## 2020-02-21 RX ADMIN — HEPARIN SODIUM 5000 UNITS: 5000 INJECTION INTRAVENOUS; SUBCUTANEOUS at 05:48

## 2020-02-21 RX ADMIN — SODIUM CHLORIDE 0.25 MCG/KG/MIN: 9 INJECTION, SOLUTION INTRAVENOUS at 02:54

## 2020-02-21 RX ADMIN — TIMOLOL MALEATE 1 DROP: 5 SOLUTION/ DROPS OPHTHALMIC at 17:12

## 2020-02-21 RX ADMIN — TIMOLOL MALEATE 1 DROP: 5 SOLUTION/ DROPS OPHTHALMIC at 10:16

## 2020-02-21 RX ADMIN — Medication 10 ML: at 17:12

## 2020-02-21 RX ADMIN — BUMETANIDE 4 MG: 1 TABLET ORAL at 10:15

## 2020-02-21 RX ADMIN — CARVEDILOL 3.12 MG: 3.12 TABLET, FILM COATED ORAL at 10:15

## 2020-02-21 RX ADMIN — Medication 10 ML: at 05:48

## 2020-02-21 RX ADMIN — ISOSORBIDE MONONITRATE 30 MG: 30 TABLET, EXTENDED RELEASE ORAL at 10:15

## 2020-02-21 NOTE — PROGRESS NOTES
PROGRESS NOTE 
 
NAME:  Royal Rio Lombardo. :   1950 MRN:   [id number] Date/Time:  2020 7:56 AM 
Subjective:  
History:  Chart reviewed and patient seen and examined this AM and D/W his nurse,   and all events noted. He was admitted on  with acute on chronic combined diastolic and systolic CHF with underlying HTN, DM,  ASCVD, Cardiomyopathy and CKD stage 4 with a history of Afib S/P ablation. He had become severely SOB over the 3 week period PTA until the night before admission he couldn't sleep at all and thus he presented to the ER. In the ER his weight was recorded at 250# up from 242# when last seen by me at the office on  (240# on 19). He feels that he is better now and also improved regarding constipation and abdominal bloating. He has diuresed well since admission (although output inaccurately recorded on the initial day and on  one shift not recorded at all) and lungs are clear on exam as well as admission CXR was clear. He has no CP, Palpitations or other cardiac or respiratory c/o. He noted at home that his urine output had decreased despite compliance with his diuretics. He has had excellent UO since admission and renal sono w/o obstruction. He denies any N/V or GI c/o. He has no other c/o on complete ROS except extremely weak although worked with PT yesterday and marked improvement from 3 days prior. Medications reviewed: 
Current Facility-Administered Medications Medication Dose Route Frequency  carvediloL (COREG) tablet 3.125 mg  3.125 mg Oral BID WITH MEALS  
 oxyCODONE-acetaminophen (PERCOCET) 5-325 mg per tablet 1 Tab  1 Tab Oral Q6H PRN  
 magnesium hydroxide (MILK OF MAGNESIA) 400 mg/5 mL oral suspension 30 mL  30 mL Oral DAILY PRN  
 epoetin mary-epbx (RETACRIT) injection 10,000 Units  10,000 Units SubCUTAneous Q TUE, THU & SAT  polyethylene glycol (MIRALAX) packet 17 g  17 g Oral DAILY PRN  
  simethicone (MYLICON) tablet 80 mg  80 mg Oral QID PRN  
 sodium chloride (NS) flush 5-40 mL  5-40 mL IntraVENous Q8H  
 sodium chloride (NS) flush 5-40 mL  5-40 mL IntraVENous PRN  
 acetaminophen (TYLENOL) tablet 650 mg  650 mg Oral Q4H PRN  
 ondansetron (ZOFRAN) injection 4 mg  4 mg IntraVENous Q4H PRN  
 heparin (porcine) injection 5,000 Units  5,000 Units SubCUTAneous Q8H  
 isosorbide mononitrate ER (IMDUR) tablet 30 mg  30 mg Oral DAILY  zolpidem (AMBIEN) tablet 10 mg  10 mg Oral QHS PRN  
 timolol (TIMOPTIC) 0.5 % ophthalmic solution 1 Drop  1 Drop Right Eye BID  tamsulosin (FLOMAX) capsule 0.8 mg  0.8 mg Oral DAILY  glucose chewable tablet 16 g  4 Tab Oral PRN  
 dextrose (D50W) injection syrg 12.5-25 g  25-50 mL IntraVENous PRN  
 glucagon (GLUCAGEN) injection 1 mg  1 mg IntraMUSCular PRN  
 insulin lispro (HUMALOG) injection   SubCUTAneous AC&HS  insulin glargine (LANTUS) injection 20 Units  20 Units SubCUTAneous QHS  diphenhydrAMINE (BENADRYL) capsule 25 mg  25 mg Oral Q6H PRN  pramipexole (MIRAPEX) tablet 0.5 mg  0.5 mg Oral QHS Objective:  
Vitals: 
Visit Vitals /74 (BP 1 Location: Right arm, BP Patient Position: At rest) Pulse 73 Temp 97.9 °F (36.6 °C) Resp 18 Ht 6' 4\" (1.93 m) Wt 241 lb 6.5 oz (109.5 kg) SpO2 97% BMI 29.38 kg/m² O2 Flow Rate (L/min): 3 l/min O2 Device: Room air Temp (24hrs), Av.1 °F (36.7 °C), Min:97.9 °F (36.6 °C), Max:98.3 °F (36.8 °C) Last 24hr Input/Output: 
 
Intake/Output Summary (Last 24 hours) at 2020 Last data filed at 2020 Gross per 24 hour Intake 855 ml Output 1175 ml Net -320 ml PHYSICAL EXAM: 
General:     Alert, cooperative, no distress, appears stated age. Head:    Normocephalic, without obvious abnormality, atraumatic. Eyes:    Conjunctivae/corneas clear. PERRLA Nose:   Nares normal. No drainage or sinus tenderness. Throat:     Lips, mucosa, and tongue normal.  No Thrush Neck:   Supple, symmetrical,  no adenopathy, thyroid: non tender 
   no carotid bruit and no JVD. Back:     Symmetric,  No CVA tenderness. Lungs:    Clear to auscultation bilaterally. No Wheezing or Rhonchi. No rales. Heart:    Regular rate and rhythm,  no murmur, rub or gallop. Abdomen:    Soft, non-tender. Not distended. Bowel sounds normal. No masses Extremities:  Extremities normal, atraumatic, No cyanosis. No edema. No clubbing Lymph nodes:  Cervical, supraclavicular normal. 
Neurologic:  Normal strength, Alert and oriented X 3. Skin:                 No rash Lab Data Reviewed: 
 
Recent Results (from the past 24 hour(s)) METABOLIC PANEL, BASIC Collection Time: 02/20/20 10:45 AM  
Result Value Ref Range Sodium 127 (L) 136 - 145 mmol/L Potassium 4.2 3.5 - 5.1 mmol/L Chloride 89 (L) 97 - 108 mmol/L  
 CO2 28 21 - 32 mmol/L Anion gap 10 5 - 15 mmol/L Glucose 128 (H) 65 - 100 mg/dL BUN 96 (H) 6 - 20 MG/DL Creatinine 3.55 (H) 0.70 - 1.30 MG/DL  
 BUN/Creatinine ratio 27 (H) 12 - 20 GFR est AA 21 (L) >60 ml/min/1.73m2 GFR est non-AA 17 (L) >60 ml/min/1.73m2 Calcium 8.2 (L) 8.5 - 10.1 MG/DL  
GLUCOSE, POC Collection Time: 02/20/20 11:09 AM  
Result Value Ref Range Glucose (POC) 165 (H) 65 - 100 mg/dL Performed by Leslie Jones GLUCOSE, POC Collection Time: 02/20/20  4:15 PM  
Result Value Ref Range Glucose (POC) 158 (H) 65 - 100 mg/dL Performed by Payal Wright METABOLIC PANEL, BASIC Collection Time: 02/20/20  6:04 PM  
Result Value Ref Range Sodium 126 (L) 136 - 145 mmol/L Potassium 4.2 3.5 - 5.1 mmol/L Chloride 88 (L) 97 - 108 mmol/L  
 CO2 29 21 - 32 mmol/L Anion gap 9 5 - 15 mmol/L Glucose 174 (H) 65 - 100 mg/dL  (H) 6 - 20 MG/DL  Creatinine 3.77 (H) 0.70 - 1.30 MG/DL  
 BUN/Creatinine ratio 27 (H) 12 - 20    
 GFR est AA 19 (L) >60 ml/min/1.73m2 GFR est non-AA 16 (L) >60 ml/min/1.73m2 Calcium 8.0 (L) 8.5 - 10.1 MG/DL POST EXPOSURE PROFILE Collection Time: 02/20/20  7:16 PM  
Result Value Ref Range p24 Antigen NONREACTIVE NR    
 HIV-1,2 Ab NONREACTIVE NR Hep C  virus Ab Interp. NONREACTIVE NR Hep C  virus Ab comment Method used is [facility] Hepatitis B surface Ag <0.10 Index Hep B surface Ag Interp. NEGATIVE  NEG    
GLUCOSE, POC Collection Time: 02/20/20  9:06 PM  
Result Value Ref Range Glucose (POC) 169 (H) 65 - 100 mg/dL Performed by Graham Moreauk (PCT) RENAL FUNCTION PANEL Collection Time: 02/21/20  1:58 AM  
Result Value Ref Range Sodium 126 (L) 136 - 145 mmol/L Potassium 4.1 3.5 - 5.1 mmol/L Chloride 90 (L) 97 - 108 mmol/L  
 CO2 29 21 - 32 mmol/L Anion gap 7 5 - 15 mmol/L Glucose 87 65 - 100 mg/dL  (H) 6 - 20 MG/DL Creatinine 3.72 (H) 0.70 - 1.30 MG/DL  
 BUN/Creatinine ratio 27 (H) 12 - 20 GFR est AA 20 (L) >60 ml/min/1.73m2 GFR est non-AA 16 (L) >60 ml/min/1.73m2 Calcium 8.4 (L) 8.5 - 10.1 MG/DL Phosphorus 4.1 2.6 - 4.7 MG/DL Albumin 3.3 (L) 3.5 - 5.0 g/dL GLUCOSE, POC Collection Time: 02/21/20  7:03 AM  
Result Value Ref Range Glucose (POC) 78 65 - 100 mg/dL Performed by [place] Tammy (PCT) Assessment/Plan:  
 
Principal Problem: 
  Acute exacerbation of CHF (congestive heart failure) ([facility] (2/14/2020) Active Problems: 
  ASCVD (arteriosclerotic cardiovascular disease) (8/5/2017) Controlled type 2 diabetes mellitus with stage 4 chronic kidney disease, without long-term current use of insulin ([facility] (8/5/2017) CKD (chronic kidney disease), stage IV ([facility] (8/5/2017) Hypertension with renal disease (8/5/2017) Gastroesophageal reflux disease without esophagitis (8/5/2017) Cardiomyopathy ([facility] (8/5/2017) Anemia (8/5/2017) S/P ablation of atrial fibrillation (5/17/2018) Chronic combined systolic and diastolic CHF, NYHA class 4 ([facility] (2/15/2020) 
 
  
___________________________________________________ PLAN: 
 
1. Holding diuresis with IV Bumex as has had excellent output and now slight bump Creat probably some increase due to oral fluid restriction 2. Follow weights (recent baseline 240# in December) 250# on admission and 241# today, Follow I & Os, O > I yesterday) 3. Hold off on Zaroxolyn as long as excellent UOP Resume PO Bumex 4. He will need dialysis at some point which I D/W him, Has been reluctant in past although followed by renal Dr. Nakul Sol for at least 2 years (Renal function baseline 77/2.5 on 1/6/20, 78/2.4 on 2/7/20), 94/3.04 on admission, today 102/3.72 
5. Continue Coreg for cardiomyopathy, Unable to use ACE or ARB due to renal disease 6. Imdur with Ischemic Cardiomyopathy 7. Follow K (4.1 today) with diuresis and replete as needed, Not able to tolerate IV so increased PO 
8. Procrit ordered as well as IV Venofer (D#4 done) 9. Continue Lantus for DM 
10. Follow BS and treat with SSI 11. Flomax for BPH 12. Mirapex for Restless legs 13. No current symptoms c/w Angina but follow closely, Certainly if needs cath will end up with dialysis from this point foreward 14. Nasal oxygen for Hypoxemia/SOB, sat improved now and on RA 
15. Echocardiogram, Last EFx May 2019 was 33%, this admission 35-40% 16. Hyponatremia due to volume overload was improving but on 2/17 down to 123 to 120 so given Tolvapatan  Then and again last 3 days and today 126-127, so follow closely, doubt related to metolazone as has been taking it for several years, now fluid restrict PO, Off Tolvaptan at this point 17. Milrinone added 2/16 by Cardiology, D/C Milrinone now to see how he does off of it with 18. Potential D/C home Later today 
 
 
 
 
___________________________________________________ Attending Physician: Celio Zayas MD

## 2020-02-21 NOTE — PROGRESS NOTES
Bedside shift change report GIVEN TO Radha Elder RN. Report included the following information SBAR and Kardex. SIGNIFICANT CHANGES DURING SHIFT:  None. CONCERNS TO ADDRESS WITH MD:  None. [id number] NURSING NOTE Admission Date 2/14/2020 Admission Diagnosis Acute exacerbation of CHF (congestive heart failure) ([facility] [I50.9] Consults IP CONSULT TO NEPHROLOGY 
IP CONSULT TO CARDIOLOGY Cardiac Monitoring [x] Yes [] No  
  
Purposeful Hourly Rounding [x] Yes   
Thanh Score Total Score: 4 Thanh score 3 or > [x] Bed Alarm [] Avasys [] 1:1 sitter [] Patient refused (Signed refusal form in chart) Aurelio Score Aurelio Score: 17 Aurelio score 14 or < [] PMT consult [] Wound Care consult  
 []  Specialty bed  [] Nutrition consult Influenza Vaccine Received Flu Vaccine for Current Season (usually Sept-March): Yes Oxygen needs? [x] Room air Oxygen @  []1L    []2L    []3L   []4L    []5L   []6L via NC Chronic home O2 use? [] Yes [x] No 
Perform O2 challenge test and document in progress note using [facility]) Last bowel movement Last Bowel Movement Date: 02/19/20 Urinary Catheter LDAs Peripheral IV 02/14/20 Left Arm (Active) Site Assessment Clean, dry, & intact 2/21/2020  3:00 AM  
Phlebitis Assessment 0 2/21/2020  3:00 AM  
Infiltration Assessment 0 2/21/2020  3:00 AM  
Dressing Status Clean, dry, & intact 2/21/2020  3:00 AM  
Dressing Type Transparent;Tape 2/21/2020  3:00 AM  
Hub Color/Line Status Flushed;Pink 2/21/2020  3:00 AM  
Action Taken Open ports on tubing capped 2/19/2020  3:45 AM  
Alcohol Cap Used Yes 2/19/2020  3:45 AM  
   
Peripheral IV 02/19/20 Anterior; Left Forearm (Active) Site Assessment Clean, dry, & intact 2/21/2020  3:00 AM  
Phlebitis Assessment 0 2/21/2020  3:00 AM  
Infiltration Assessment 0 2/21/2020  3:00 AM  
Dressing Status Clean, dry, & intact 2/21/2020  3:00 AM  
 Dressing Type Transparent;Tape 2/21/2020  3:00 AM  
Hub Color/Line Status Infusing;Blue 2/21/2020  3:00 AM  
                  
  
Readmission Risk Assessment Tool Score High Risk 39 Total Score 3 Has Seen PCP in Last 6 Months (Yes=3, No=0) 2 . Living with Significant Other. Assisted Living. LTAC. SNF. or  
Rehab  
 3 Patient Length of Stay (>5 days = 3) 4 IP Visits Last 12 Months (1-3=4, 4=9, >4=11) 5 Pt. Coverage (Medicare=5 , Medicaid, or Self-Pay=4) 28 Charlson Comorbidity Score (Age + Comorbid Conditions) Expected Length of Stay 4d 2h Actual Length of Stay 7

## 2020-02-21 NOTE — PROGRESS NOTES
Problem: Falls - Risk of 
Goal: *Absence of Falls Description Document Shyla Reyes Fall Risk and appropriate interventions in the flowsheet. Outcome: Progressing Towards Goal 
Note: Fall Risk Interventions: 
Mobility Interventions: Bed/chair exit alarm, Patient to call before getting OOB Medication Interventions: Bed/chair exit alarm, Teach patient to arise slowly, Patient to call before getting OOB Elimination Interventions: Call light in reach, Bed/chair exit alarm History of Falls Interventions: Bed/chair exit alarm Problem: Patient Education: Go to Patient Education Activity Goal: Patient/Family Education Outcome: Progressing Towards Goal 
  
Problem: Pressure Injury - Risk of 
Goal: *Prevention of pressure injury Description Document Aurelio Scale and appropriate interventions in the flowsheet. Outcome: Progressing Towards Goal 
Note: Pressure Injury Interventions: 
Sensory Interventions: Assess changes in LOC Moisture Interventions: Absorbent underpads, Apply protective barrier, creams and emollients Activity Interventions: Increase time out of bed, PT/OT evaluation Mobility Interventions: Float heels, PT/OT evaluation Nutrition Interventions: Document food/fluid/supplement intake, Offer support with meals,snacks and hydration Friction and Shear Interventions: Feet elevated on foot rest

## 2020-02-21 NOTE — PROGRESS NOTES
Problem: Mobility Impaired (Adult and Pediatric) Goal: *Acute Goals and Plan of Care (Insert Text) Description FUNCTIONAL STATUS PRIOR TO ADMISSION: Patient was modified independent using a rollator for functional mobility. His spouse prepares all meals which is on a higher level in the home. He sponge bathes rather than using the shower. He admits increasing frequency of falls due to legs buckling. HOME SUPPORT PRIOR TO ADMISSION: The patient lived with spouse who assists with ADLs as needed and transportation needs. Physical Therapy Goals Initiated 2/17/2020 1. Patient will move from supine to sit and sit to supine , scoot up and down and roll side to side in bed with independence within 7 day(s). Met 2/19/20 2. Patient will transfer from bed to chair and chair to bed with independence using the least restrictive device within 7 day(s). 3.  Patient will perform sit to stand with modified independence within 7 day(s). 4.  Patient will ambulate with supervision/set-up for 150 feet with the least restrictive device within 7 day(s). Outcome: Progressing Towards Goal 
 PHYSICAL THERAPY TREATMENT Patient: Briseyda Grewal. (73 y.o. male) Date: 2/21/2020 Diagnosis: Acute exacerbation of CHF (congestive heart failure) ([facility] [address] [I50.9] [facility] Precautions: Fall(severe diabetic neuropathy) Chart, physical therapy assessment, plan of care and goals were reviewed. ASSESSMENT Patient continues with skilled PT services and is progressing towards goals. Sit to stand transfers still limited by bilateral quad/hip extensor weakness requiring cg to min a depending on height of surface he is standing up from. Gait pattern and tolerance improved with upright posture, speed, step lengths and distance tolerated. He was able to ambulate 425 feet with 2 standing rests.  Safe to go home with spouse from PT standpoint. Recommend follow-up with  PT and OT. Current Level of Function Impacting Discharge (mobility/balance): independent bed mobility and supine to sit transfers. Sit to stand is cg to min a; gait with rollator walker with sba Other factors to consider for discharge: good home/community support; has all needed dme. PLAN : 
Patient continues to benefit from skilled intervention to address the above impairments. Continue treatment per established plan of care. to address goals. Recommendation for discharge: (in order for the patient to meet his/her long term goals) Physical therapy at least 2 days/week in the home AND ensure assist and/or supervision for safety with ADLs and mobility This discharge recommendation: 
Has been made in collaboration with the attending provider and/or case management IF patient discharges home will need the following DME: patient owns DME required for discharge SUBJECTIVE:  
Patient stated I'm hoping to go home.  OBJECTIVE DATA SUMMARY:  
Critical Behavior: 
Neurologic State: Alert Orientation Level: Oriented X4 Cognition: Appropriate decision making, Appropriate for age attention/concentration, Appropriate safety awareness, Follows commands Safety/Judgement: Fall prevention, Awareness of environment, Good awareness of safety precautions(h/o falls; diabetic neuropathy) Functional Mobility Training: 
Bed Mobility: 
Rolling: (up in recliner when PT arrived) Transfers: 
Sit to Stand: Minimum assistance Stand to Sit: Contact guard assistance Bed to Chair: Stand-by assistance; Adaptive equipment(rollator) Balance: 
Sitting: Intact Standing: Impaired Standing - Static: Good;Occasional 
Standing - Dynamic : Fair;Constant support Ambulation/Gait Training: 
Distance (ft): 425 Feet (ft)(2 standing rests) Assistive Device: Gait belt;Walker, rollator Ambulation - Level of Assistance: Stand-by assistance Gait Abnormalities: Decreased step clearance Speed/Anna: Pace decreased (<100 feet/min) Step Length: Right shortened;Left shortened Therapeutic Exercises:  
Sit to stand exercises x 5 reps using armrests - instructed to do TID using bed height to assist. 
Pain Rating: 
Bilateral feet numbness Activity Tolerance:  
Good and SpO2 stable on RA Please refer to the flowsheet for vital signs taken during this treatment. After treatment patient left in no apparent distress:  
Sitting in chair and Call bell within reach COMMUNICATION/COLLABORATION:  
The patients plan of care was discussed with: Registered Nurse Sal Tello, PT Time Calculation: 25 mins

## 2020-02-21 NOTE — PROGRESS NOTES
Nephrology Progress Note [facility] 
www. [email or web address]                  Phone - [phone] Patient: Jessica Franklin. Date- 2/21/2020 Admit Date: 2/14/2020 YOB: 1950 CC: Follow up for JOSE ANTONIO , hyponatremia Subjective: Interval History:  
NA LOW BUT STABLE No hypoxia No sob Leg edema improved No uremic symptoms ROS:- as above Assessment & Plan: · JOSE ANTONIO due to cardiorenal syndrome - most likely · HYPONATREMIA DUE TO  CHF · Ckd 4- cr. 2.5 baseline-followed by Dr. Crista Sarmiento · Chf, edema- ef 35% · Anemia due to ckd and iron defi. Iron sats 15% · H/o DM PLAN- 
? No more tolvaptan today ? Resume bumex 4 mg bid ( home dose ) ? Avoid metolazone this week 
? CHECK BMP Monday ? Fluid restriction 1200 ml per day ? OKAY TO D/C HOME- RENAL STAND POINT ? F/U with  in 1-2 weeks Physical exam:  
GENERAL ASSESSMENT: NAD 
CHEST: CTA b/l, no wheezing HEART: S1,S2,RRR 
ABDOMEN: Soft,Non tender NEURO: Non focal, normal speech EXTREMITY: + EDEMA Care Plan discussed with: patient Objective:  
Visit Vitals /74 (BP 1 Location: Right arm, BP Patient Position: At rest) Pulse 73 Temp 97.9 °F (36.6 °C) Resp 18 Ht 6' 4\" (1.93 m) Wt 109.5 kg (241 lb 6.5 oz) SpO2 97% BMI 29.38 kg/m² Last 3 Recorded Weights in this Encounter 02/19/20 0451 02/20/20 3078 02/21/20 1003 Weight: 108.6 kg (239 lb 6.7 oz) 112.3 kg (247 lb 8 oz) 109.5 kg (241 lb 6.5 oz) 02/19 1901 - 02/21 0700 In: 1295 [P.O.:1295] Out: 1808 [Urine:2285] Intake/Output Summary (Last 24 hours) at 2/21/2020 7081 Last data filed at 2/21/2020 3308 Gross per 24 hour Intake 855 ml Output 1175 ml Net -320 ml Chart reviewed. Pertinent Notes reviewed. Medication list  reviewed Current Facility-Administered Medications Medication  bumetanide (BUMEX) tablet 4 mg  carvediloL (COREG) tablet 3.125 mg  
 oxyCODONE-acetaminophen (PERCOCET) 5-325 mg per tablet 1 Tab  magnesium hydroxide (MILK OF MAGNESIA) 400 mg/5 mL oral suspension 30 mL  epoetin mary-epbx (RETACRIT) injection 10,000 Units  polyethylene glycol (MIRALAX) packet 17 g  
 simethicone (MYLICON) tablet 80 mg  
 sodium chloride (NS) flush 5-40 mL  sodium chloride (NS) flush 5-40 mL  acetaminophen (TYLENOL) tablet 650 mg  
 ondansetron (ZOFRAN) injection 4 mg  heparin (porcine) injection 5,000 Units  isosorbide mononitrate ER (IMDUR) tablet 30 mg  
 zolpidem (AMBIEN) tablet 10 mg  
 timolol (TIMOPTIC) 0.5 % ophthalmic solution 1 Drop  tamsulosin (FLOMAX) capsule 0.8 mg  
 glucose chewable tablet 16 g  
 dextrose (D50W) injection syrg 12.5-25 g  
 glucagon (GLUCAGEN) injection 1 mg  
 insulin lispro (HUMALOG) injection  insulin glargine (LANTUS) injection 20 Units  diphenhydrAMINE (BENADRYL) capsule 25 mg  
 pramipexole (MIRAPEX) tablet 0.5 mg Data Review : 
Recent Labs  
  02/21/20 
0158 02/20/20 
1804 02/20/20 
1045 02/20/20 
0504  02/19/20 
0259 * 126* 127* 126*   < > 124* K 4.1 4.2 4.2 4.0   < > 4.0  
CL 90* 88* 89* 89*   < > 86* CO2 29 29 28 27   < > 30 * 100* 96* 95*   < > 97* CREA 3.72* 3.77* 3.55* 3.48*   < > 3.39* GLU 87 174* 128* 92   < > 79  
CA 8.4* 8.0* 8.2* 8.2*   < > 8.2* PHOS 4.1  --   --  4.2  --  4.2  
 < > = values in this interval not displayed. Recent Labs  
  02/20/20 
3940 WBC 3.5* HGB 8.6* HCT 26.9*  
PLT 82* No results for input(s): FE, TIBC, PSAT, FERR in the last 72 hours. No results for input(s): CPK, CKNDX, TROIQ in the last 72 hours. No lab exists for component: CPKMB Lab Results Component Value Date/Time  Color YELLOW/STRAW 02/14/2020 03:48 PM  
 Appearance CLEAR 02/14/2020 03:48 PM  
 Specific gravity 1.012 02/14/2020 03:48 PM  
 Specific gravity 1.015 08/14/2019 04:51 PM  
 pH (UA) 7.5 02/14/2020 03:48 PM  
 Protein 30 (A) 02/14/2020 03:48 PM  
 Glucose NEGATIVE  02/14/2020 03:48 PM  
 Ketone NEGATIVE  02/14/2020 03:48 PM  
 Bilirubin NEGATIVE  02/14/2020 03:48 PM  
 Urobilinogen 0.2 02/14/2020 03:48 PM  
 Nitrites NEGATIVE  02/14/2020 03:48 PM  
 Leukocyte Esterase NEGATIVE  02/14/2020 03:48 PM  
 Epithelial cells FEW 02/14/2020 03:48 PM  
 Bacteria NEGATIVE  02/14/2020 03:48 PM  
 WBC 0-4 02/14/2020 03:48 PM  
 RBC 0-5 02/14/2020 03:48 PM  
 
Lab Results Component Value Date/Time Culture result: NO GROWTH 4 DAYS 01/03/2020 06:44 PM  
 Culture result: NO GROWTH ON SOLID MEDIA AT 4 DAYS 01/03/2020 06:44 PM  
 Culture result: (A) 01/03/2020 06:44 PM  
  ENTEROCOCCUS FAECALIS GROUP D ISOLATED FROM THIO BROTH ONLY No results found for: SDES Lab Results Component Value Date/Time Sodium,urine random 57 02/14/2020 06:54 PM  
 Creatinine, urine 28.40 02/14/2020 06:54 PM  
 
Results from [facility] Encounter encounter on 02/14/20 XR CHEST PA LAT Narrative Clinical indication: Severe shortness of breath. Frontal and lateral views of the chest obtained, comparison June 7, 2019. Inspiration is shallow. Cardiac pacemaker, no acute infiltrate. Impression  impression: No acute changes. Lawyer Davian MD 
[facility] 
 www. [facility] / [employer] [address] [facility]ton[facility], [address] [place], [address] Phone - [phone] Fax - [phone]

## 2020-02-21 NOTE — DISCHARGE INSTRUCTIONS
[address]  [phone]      Patient Discharge Instructions    Royal [place]ta. / [id number] : 1950    Admitted 2020 Discharged: 2020     Principal Problem:    Acute exacerbation of CHF (congestive heart failure) ([address] (2020)    Active Problems:    ASCVD (arteriosclerotic cardiovascular disease) (2017)      Controlled type 2 diabetes mellitus with stage 4 chronic kidney disease, without long-term current use of insulin ([address] (2017)      CKD (chronic kidney disease), stage IV ([facility] [address] (2017)      Hypertension with renal disease (2017)      Gastroesophageal reflux disease without esophagitis (2017)      Cardiomyopathy ([facility] [address] (2017)      Anemia (2017)      S/P ablation of atrial fibrillation (2018)      Chronic combined systolic and diastolic CHF, NYHA class 4 (HCC) (2/15/2020)          Allergies   Allergen Reactions    Niacin Unknown (comments)     Niaspan    Adhesive Rash    Imipenem Diarrhea     Other reaction(s): Rash    Levemir [Insulin Detemir] Hives    Other Medication Other (comments)     ? Allergy to Duraprep causing chemical burn    Primaxin [Imipenem-Cilastatin] Diarrhea and Rash    Xarelto [Rivaroxaban] Rash and Itching     Other reaction(s): Rash       · It is important that you take the medication exactly as they are prescribed. · Do not take other medications without consulting your doctor. What to do at Next Level of Care    Disposition:  home    Recommended diet: Cardiac, diabetic    Recommended activity: Usual          Information obtained by :  I understand that if any problems occur once I am at home I am to contact my physician. I understand and acknowledge receipt of the instructions indicated above.                                                                                                                                            [facility] or R.N.'s Signature                                                                  Date/Time                                                                                                                                              Patient or Representative Signature                                                          Date/Time

## 2020-02-21 NOTE — PROGRESS NOTES
Discharge instructions reviewed with pt to his satisfaction. Signed copy on pt's ppr chart and originla given to pt. New rx sent to pt's pharmacy. Iv/tele removed. Pt d/c'd to home.

## 2020-02-21 NOTE — PROGRESS NOTES
Attempted to see patient this morning for PT. He declined due to wanting to rest now and work with PT later before lunch. Will follow up per patients request later this morning.  
 
America Rangel, PT

## 2020-02-21 NOTE — ROUTINE PROCESS
PCP CYNTHIA apt scheduled with Dr. Janet Whelan on 3/2/20(earliest avail apt at 11:10AM).   Apt added to AVS

## 2020-02-21 NOTE — PROGRESS NOTES
Initial Nutrition Assessment: 
 
INTERVENTIONS/RECOMMENDATIONS:  
· Continue current diet ASSESSMENT:  
Chart reviewed, medically noted Acute exacerbation of CHF, T2DM, CKD and PMH shown below. Assessing pt due to LOS. Flowsheet documents good appetite and consuming 100% of meals. Weight history shows no significant weight loss PTA. Cardiac rehab RN has provided heart healthy diet education. Past Medical History:  
Diagnosis Date  Anemia 8/5/2017  Anxiety 8/5/2017  Arrhythmia   
 atrial fibrillation 2014  ASCVD (arteriosclerotic cardiovascular disease) 8/5/2017  BPH (benign prostatic hyperplasia) 8/5/2017  CAD (coronary artery disease)   
 h/o stents  Cancer [facility])   
 h/o skin cancer  Cardiomyopathy ([address] 8/5/2017  CHF (congestive heart failure) ([address] 8/5/2017  Chronic kidney disease Stage IV  CKD (chronic kidney disease), stage IV ([address] 8/5/2017  Diabetes ([address]  Diabetes mellitus ([address] 8/5/2017  Diabetic neuropathy ([address] 8/5/2017  DJD (degenerative joint disease) 8/5/2017  ED (erectile dysfunction) 8/5/2017  Gout 8/5/2017  High cholesterol  Hyperlipidemia 8/5/2017  Hypertension  Hypertension with renal disease 8/5/2017  Hypothyroid 8/5/2017  Insomnia 8/5/2017  Obesity 8/5/2017  On statin therapy 8/5/2017  Pneumonia 05/28/2019  Restless leg 8/5/2017  Sleep apnea Bipap  Thyroid disease   
 hypothyroid  Ulcer of foot due to secondary diabetes ([address] 07/12/2019 Diet Order: Consistent carb(2 g Na) 
% Eaten:   
Patient Vitals for the past 72 hrs: 
 % Diet Eaten 02/21/20 0927 100 % 02/20/20 1257 100 % 02/20/20 0943 100 % 02/19/20 1859 100 % 02/19/20 0945 100 % Pertinent Medications: [x]Reviewed: bumex, epo, lantus, humalog Pertinent Labs: [x]Reviewed: 126 Na Food Allergies: [x]NKFA  []Other Last BM: 2/19 Edema:        []RUE   []LUE   [x]RLE 1+  [x]LLE  1+   
 Pressure Injury:   n/a   [] Stage I   [] Stage II   [] Stage III   [] Stage IV Wt Readings from Last 30 Encounters:  
02/21/20 109.5 kg (241 lb 6.5 oz) 02/07/20 109.9 kg (242 lb 4.8 oz) 02/06/20 106.1 kg (234 lb) 01/09/20 104.8 kg (231 lb) 01/06/20 107 kg (236 lb) 01/03/20 105.3 kg (232 lb 2.3 oz) 12/18/19 108.9 kg (240 lb 1.6 oz)  
12/12/19 107 kg (236 lb)  
11/20/19 105.4 kg (232 lb 6.4 oz)  
11/14/19 104.1 kg (229 lb 9.6 oz) 10/17/19 101.2 kg (223 lb) 10/16/19 103.9 kg (229 lb)  
09/19/19 108 kg (238 lb)  
09/17/19 107.3 kg (236 lb 9.6 oz) 08/27/19 103.4 kg (228 lb) 08/14/19 107.1 kg (236 lb 3.2 oz) 08/01/19 102.5 kg (226 lb)  
07/24/19 105.1 kg (231 lb 12.8 oz)  
07/22/19 102.1 kg (225 lb)  
07/18/19 102.5 kg (226 lb)  
06/14/19 104.6 kg (230 lb 9.6 oz) 06/07/19 105.8 kg (233 lb 3.2 oz) 06/06/19 105.2 kg (232 lb) 06/05/19 106.8 kg (235 lb 7.2 oz) 05/20/19 110.6 kg (243 lb 12.8 oz) 05/15/19 106.6 kg (235 lb) 05/01/19 112.9 kg (249 lb) 04/22/19 114.2 kg (251 lb 12.8 oz) 04/17/19 110.9 kg (244 lb 9.6 oz) 04/02/19 114.7 kg (252 lb 12.8 oz) Anthropometrics:  
Height: 6' 4\" (193 cm) Weight: 109.5 kg (241 lb 6.5 oz) IBW (%IBW):   ( ) UBW (%UBW):   (  %) Last Weight Metrics: 
Weight Loss Metrics 2/21/2020 2/14/2020 2/7/2020 2/6/2020 1/9/2020 1/6/2020 1/3/2020 Today's Wt 241 lb 6.5 oz - 242 lb 4.8 oz 234 lb 231 lb 236 lb 232 lb 2.3 oz  
BMI - 29.38 kg/m2 29.49 kg/m2 28.48 kg/m2 28.12 kg/m2 28.73 kg/m2 28.26 kg/m2 BMI: Body mass index is 29.38 kg/m². This BMI is indicative of: 
 []Underweight    []Normal    [x]Overweight    [] Obesity   [] Extreme Obesity (BMI>40) Estimated Nutrition Needs (Based on):  
4963 Kcals/day(BMR: 1950 x 1.2) , 110 g(1 g/kg) Protein Carbohydrate: At Least 130 g/day  Fluids: 2000 mL/day (1ml/kcal) or per primary team 
 
NUTRITION DIAGNOSES:  
Problem:  No nutritional diagnosis at this time Etiology: related to      
 Signs/Symptoms: as evidenced by NUTRITION INTERVENTIONS: 
Meals/Snacks: General/healthful diet GOAL:  
consume >75% of meals in 5-7 days LEARNING NEEDS (Diet, Food/Nutrient-Drug Interaction):  
 [x] None Identified (provided by cardiac rehab RN) [] Identified and Education Provided/Documented 
 [] Identified and Pt declined/was not appropriate Cultureal, Lutheran, OR Ethnic Dietary Needs:  
 [x] None Identified 
 [] Identified and Addressed 
 
 [x] Interdisciplinary Care Plan Reviewed/Documented  
 [x] Discharge Planning:  Heart healthy diet MONITORING /EVALUATION:  
  
Food/Nutrient Intake Outcomes: Total energy intake Physical Signs/Symptoms Outcomes: Weight/weight change, Electrolyte and renal profile, GI 
 
NUTRITION RISK:  
 [] High              [] Moderate           [x]  Low  []  Minimal/Uncompromised PT SEEN FOR:  
 []  MD Consult: []Calorie Count []Diabetic Diet Education []Diet Education []Electrolyte Management []General Nutrition Management and Supplements []Management of Tube Feeding []TPN Recommendations []  RN Referral:  []MST score >=2 
   []Enteral/Parenteral Nutrition PTA []Pregnant: Gestational DM or Multigestation 
   []Pressure Ulcer/Wound Care needs 
     
[]  Low BMI [x]  LOS Referral  
 
 
Bismark Hurley RDN Pager [phone] Weekend Pager [phone]

## 2020-02-21 NOTE — PROGRESS NOTES
Progress Note 2/21/2020 NAME: Carlos Krueger MRN:  [id number] Admit Diagnosis: Acute exacerbation of CHF (congestive heart failure) ([facility] [I50.9] Problem List: 1. Acute on chronic combined systolic and diastolic heart failure with symptomatic volume overload including dyspnea at rest, abdominal fullness, ascites, lower extremity edema but no pulmonary edema on CXR--a lot of R-sided sx. Biventricular failure with NYHA CHF class 4 symptoms acutely. 2. Paroxysmal atrial fibrillation and atypical atrial flutter s/p ablation in 5/2018.  NO significant atrial fibrillation or flutter since the ablation. 3. Remote dual chamber SJM pacer for sick sinus syndrome, upgraded to biventricular pacemaker in 1/2019 due to CHF, cardiomyopathy, chronic V pacing. (Program change 5/2019--LV>RV to 50 msec from 20 msec.  Tried to enable MPP using poles 3 and 4 but vectors using these did not capture at max output.) 4. Hypertensive heart disease with a history of congestive heart failure and CKD stage 4.  EF 55% on prior echo, then 35-40% early 2019, 33% in 5/2019, 35-40% this admission. 5. Coronary artery disease with multivessel interventions.  His last stenting was in 2010.  Cath in 2014 showed a nondominant RCA with LPDA occluded and filled by collaterals, otherwise up to moderate CAD. 6. Mild idiopathic pericardial effusion in the past. 
7. Diabetes mellitus type 2, on chronic insulin. 8. Hyperlipidemia. 9. BRBPR in 1/2019 with grade 1 internal hemorrhoids noted on colo here.  Presumed source of GI bleed. 10. History of esophageal dilation for dysphagia. 11. Ascites noted on abdominal ultrasound. Remote CT 6/2019 suggested cirrhosis, no comment made on the ultrasound regarding this. 12. Hiatal hernia. 13. Anemia of chronic renal disease.  On EPO (and Fe supplement). 14. Moderate thrombocytopenia. 15. Chronic anticoagulation stopped 1/2019. Assessment/Plan: Last 3 Recorded Weights in this Encounter 02/19/20 0451 02/20/20 8980 02/21/20 9674 Weight: 108.6 kg (239 lb 6.7 oz) 112.3 kg (247 lb 8 oz) 109.5 kg (241 lb 6.5 oz) Intake/Output Summary (Last 24 hours) at 2/21/2020 1819 Last data filed at 2/21/2020 1807 Gross per 24 hour Intake 1023 ml Output 2500 ml Net -1477 ml 1. Continue diuresis per renal (currently Bumex IV being held, not on thiazide since admission, getting Tolvaptan). He's on 2g Na diet and fluid restricted to 1L. 2. Consented for HD if needed--nephrology team following. 3. Continue beta blocker, dose adjusted to BP. 4. Continue isosorbide. 5. Keep K and Mag up per renal. 
6. Continue milrinone 0.25mcg/kg/min with plan to stop soon. 7. No program changes to his BIV pacemaker today. He is doing OK, decision to not get dialysis at this time noted. [x]       High complexity decision making was performed in this patient at high risk for decompensation with multiple organ involvement. Subjective:  
 
Royal Roya Zhou. denies chest pain. Review of Systems: 
 
Symptom Y/N Comments  Symptom Y/N Comments Fever/Chills N   Chest Pain N Poor Appetite +/-   Edema Y better Cough N   Abdominal Pain N Sputum N   Joint Pain N   
SOB/ANDERS Y better  Pruritis/Rash N   
Nausea/vomit N   Tolerating PT/OT N refused Diarrhea N   Tolerating Diet Y Constipation N   Other Could NOT obtain due to:   
 
Objective:  
  
Physical Exam: 
 
Last 24hrs VS reviewed since prior progress note. Most recent are: 
 
Visit Vitals /73 (BP 1 Location: Left arm, BP Patient Position: Sitting) Pulse 70 Temp 97.8 °F (36.6 °C) Resp 18 Ht 6' 4\" (1.93 m) Wt 109.5 kg (241 lb 6.5 oz) SpO2 96% BMI 29.38 kg/m² Intake/Output Summary (Last 24 hours) at 2/21/2020 1819 Last data filed at 2/21/2020 1807 Gross per 24 hour Intake 1023 ml Output 2500 ml Net -1477 ml  
  
 
 General Appearance: Well developed, well nourished, alert & oriented x 3, no acute distress. Ears/Nose/Mouth/Throat: Hearing grossly normal. 
Neck: Supple, nontender. Chest: Lungs w/ diminished breath sounds in the bases, unlabored, symmetric movement. L chest BIV-ICD site OK. Cardiovascular: Regular rate and rhythm, S1S2 normal, no murmur. Abdomen: Soft, non-tender, bowel sounds are active. Distended. Extremities: 1+ erythematous edema bilaterally, now wearing socks. No cyanosis or clubbing. Skin: Warm and dry. No petechiae, purpura, or jaundice. Neuro:  Awake, appropriate, grossly nonfocal.  No asterixis. []         Post-cath site without hematoma, bruit, tenderness, or thrill. Distal pulses intact. PMH/SH reviewed - no change compared to H&P Data Review Telemetry:  AV paced EKG:  
[x]  No new EKG for review ECHO: 
Left Ventricle Normal cavity size and wall thickness. Mild systolic dysfunction. The estimated ejection fraction is 35 - 40%. Visually measured ejection fraction. Global hypokinesis observed. Left Atrium Mildly dilated left atrium. Right Ventricle Normal wall thickness. Mildly dilated right ventricle. Mildly reduced systolic function. Right Atrium Mildly dilated right atrium. Aortic Valve No stenosis. Aortic valve sclerosis. Mild aortic valve regurgitation. Mitral Valve Normal valve structure and no stenosis. Mild regurgitation. Tricuspid Valve Normal valve structure and no stenosis. Moderate regurgitation. Pulmonary arterial systolic pressure is 16.5 mmHg. Pulmonic Valve Pulmonic valve not well visualized. No stenosis. Trace regurgitation. Aorta Normal aortic root, ascending aortic, and aortic arch. IVC/Hepatic Veins Dilated inferior vena cava. Severely elevated central venous pressure (15+ mmHg); IVC diameter is larger than 21 mm and collapses less than 50% with respiration. Pericardium Insignificant pericardial effusion or fat. Lab Data Personally Reviewed: 
 
Recent Labs  
  02/20/20 
1318 WBC 3.5* HGB 8.6* HCT 26.9*  
PLT 82* No results for input(s): INR, PTP, APTT, INREXT, INREXT in the last 72 hours. Recent Labs  
  02/21/20 
0158 02/20/20 
1804 02/20/20 
1045 * 126* 127* K 4.1 4.2 4.2 CL 90* 88* 89* CO2 29 29 28 * 100* 96* CREA 3.72* 3.77* 3.55* GLU 87 174* 128* CA 8.4* 8.0* 8.2* No results for input(s): CPK, CKNDX, TROIQ in the last 72 hours. No lab exists for component: CPKMB Lab Results Component Value Date/Time Cholesterol, total 104 01/06/2020 03:22 PM  
 HDL Cholesterol 14 (L) 01/06/2020 03:22 PM  
 LDL, calculated 67 01/06/2020 03:22 PM  
 Triglyceride 115 01/06/2020 03:22 PM  
 CHOL/HDL Ratio 7 (H) 01/06/2020 03:22 PM  
 
 
Recent Labs  
  02/21/20 
0158 02/20/20 
0504 02/19/20 
0259 ALB 3.3* 3.1* 3.1* No results for input(s): PH, PCO2, PO2 in the last 72 hours. Medications Personally Reviewed: 
 
Current Facility-Administered Medications Medication Dose Route Frequency  bumetanide (BUMEX) tablet 4 mg  4 mg Oral DAILY  carvediloL (COREG) tablet 3.125 mg  3.125 mg Oral BID WITH MEALS  
 oxyCODONE-acetaminophen (PERCOCET) 5-325 mg per tablet 1 Tab  1 Tab Oral Q6H PRN  
 magnesium hydroxide (MILK OF MAGNESIA) 400 mg/5 mL oral suspension 30 mL  30 mL Oral DAILY PRN  
 epoetin mary-epbx (RETACRIT) injection 10,000 Units  10,000 Units SubCUTAneous Q TUE, THU & SAT  polyethylene glycol (MIRALAX) packet 17 g  17 g Oral DAILY PRN  
 simethicone (MYLICON) tablet 80 mg  80 mg Oral QID PRN  
 sodium chloride (NS) flush 5-40 mL  5-40 mL IntraVENous Q8H  
 sodium chloride (NS) flush 5-40 mL  5-40 mL IntraVENous PRN  
 acetaminophen (TYLENOL) tablet 650 mg  650 mg Oral Q4H PRN  
 ondansetron (ZOFRAN) injection 4 mg  4 mg IntraVENous Q4H PRN  
 heparin (porcine) injection 5,000 Units  5,000 Units SubCUTAneous Q8H  
  isosorbide mononitrate ER (IMDUR) tablet 30 mg  30 mg Oral DAILY  zolpidem (AMBIEN) tablet 10 mg  10 mg Oral QHS PRN  
 timolol (TIMOPTIC) 0.5 % ophthalmic solution 1 Drop  1 Drop Right Eye BID  tamsulosin (FLOMAX) capsule 0.8 mg  0.8 mg Oral DAILY  glucose chewable tablet 16 g  4 Tab Oral PRN  
 dextrose (D50W) injection syrg 12.5-25 g  25-50 mL IntraVENous PRN  
 glucagon (GLUCAGEN) injection 1 mg  1 mg IntraMUSCular PRN  
 insulin lispro (HUMALOG) injection   SubCUTAneous AC&HS  insulin glargine (LANTUS) injection 20 Units  20 Units SubCUTAneous QHS  diphenhydrAMINE (BENADRYL) capsule 25 mg  25 mg Oral Q6H PRN  pramipexole (MIRAPEX) tablet 0.5 mg  0.5 mg Oral QHS Becky Lagos MD

## 2020-02-22 NOTE — DISCHARGE SUMMARY
[address] DISCHARGE SUMMARY Name:  Jose Alfredo Rose 
MR#:  [id number] :  1950 ACCOUNT #:  [de-identified] ADMIT DATE:  2020 DISCHARGE DATE:  2020 FINAL DIAGNOSES: 
1. Acute exacerbation of congestive heart failure. 2.  Chronic combined systolic and diastolic congestive heart failure. 3.  Atherosclerotic cardiovascular disease. 4.  Chronic kidney disease stage IV. 5.  Diabetes. 6.  Hypertension. 7.  Gastroesophageal reflux disease. 8.  Cardiomyopathy. 9.  Anemia of chronic disease. 10.  Status post ablation of atrial fibrillation. CONSULTATIONS:  Renal, Dr. Reyna Velazquez; Cardiology Dr. Alexia Morgan. PROCEDURES:  2D echocardiogram. 
 
For details of admission history and physical, please see admit note. HOSPITAL COURSE:  Briefly, the patient is a 47-year-old white male who presented to the hospital with increasing shortness of breath and was noted to have significant weight gain up to 250 pounds with a recent baseline of 241-242 pounds. He was started on IV Bumex and with that he did have significant diuresis. He also was seen by Cardiology and started on milrinone and kept on milrinone drip for 5 days. He initially had good improvement with the Bumex and then Bumex was held as his diuresis seemed to be on a standstill. He had a more prolonged hospitalization secondary to significant hyponatremia with sodium falling as low as 120 and he did require Tolvaptan on a daily basis for four days before sodium stabilized to 126-127 range prior to discharge. It was felt at this point since he had diuresed to a baseline weight of 241 pounds and his dyspnea had resolved to the point, where he was oxygenating well on room air. He was able to ambulate 400 feet with physical therapy and reached maximal hospital benefit.   At this point, his baseline creatinine, which was 2.5 had risen to 3.7 with a BUN of 102 and this will be followed closely by Renal as well as myself as an outpatient. He will have discharge with followup by me in about 10 days as well as followup with Dr. Sana Collins from a Renal stand point and he will have followup with Dr. José Brooks as previously scheduled. His discharge medications will consist of discontinuation of Zaroxolyn and his Coreg dose was changed to 3.125 b.i.d. His other medicines are the same as on admission, which consisted of vitamin C 250 mg three times daily, Lumigan 0.01% ophthalmic drops one drop each eye every evening, Cosopt one drop right eye nightly, Uloric 40 mg daily, Slow iron one tablet three times daily, Proscar 5 mg daily, Victoza 0.6 injected subcutaneously at nighttime, Ambien 10 mg at bedtime p.r.n., Bumex 4 mg b.i.d., Lantus insulin 20 units daily, Flomax 0.4 daily, Mirapex 1 mg at night time, Percocet 5/325 one nightly as needed for pain, Imdur 30 mg daily. DISCHARGE INSTRUCTIONS:  He will have followup by me in office in about 10 days. Raiza Moon MD 
 
 
[id number] 
D:  02/21/2020 17:15 
T:  02/22/2020 7:30 
JOB #:  [id number] CC:  MD Del Chase MD 
     [facility], [facility].

## 2020-02-23 ENCOUNTER — HOME CARE VISIT (OUTPATIENT)
Dept: SCHEDULING | Facility: HOME HEALTH | Age: 70
End: 2020-02-23
Payer: MEDICARE

## 2020-02-23 VITALS
RESPIRATION RATE: 20 BRPM | OXYGEN SATURATION: 98 % | HEART RATE: 71 BPM | SYSTOLIC BLOOD PRESSURE: 136 MMHG | TEMPERATURE: 98.1 F | DIASTOLIC BLOOD PRESSURE: 90 MMHG

## 2020-02-23 PROCEDURE — 3331090002 HH PPS REVENUE DEBIT

## 2020-02-23 PROCEDURE — G0299 HHS/HOSPICE OF RN EA 15 MIN: HCPCS

## 2020-02-23 PROCEDURE — 400013 HH SOC

## 2020-02-23 PROCEDURE — 3331090001 HH PPS REVENUE CREDIT

## 2020-02-24 ENCOUNTER — PATIENT OUTREACH (OUTPATIENT)
Dept: FAMILY MEDICINE CLINIC | Age: 70
End: 2020-02-24

## 2020-02-24 ENCOUNTER — HOME CARE VISIT (OUTPATIENT)
Dept: SCHEDULING | Facility: HOME HEALTH | Age: 70
End: 2020-02-24
Payer: MEDICARE

## 2020-02-24 VITALS
HEART RATE: 71 BPM | OXYGEN SATURATION: 99 % | SYSTOLIC BLOOD PRESSURE: 138 MMHG | RESPIRATION RATE: 16 BRPM | DIASTOLIC BLOOD PRESSURE: 70 MMHG | TEMPERATURE: 97.3 F

## 2020-02-24 PROCEDURE — 3331090002 HH PPS REVENUE DEBIT

## 2020-02-24 PROCEDURE — G0151 HHCP-SERV OF PT,EA 15 MIN: HCPCS

## 2020-02-24 PROCEDURE — 3331090001 HH PPS REVENUE CREDIT

## 2020-02-25 ENCOUNTER — HOME CARE VISIT (OUTPATIENT)
Dept: SCHEDULING | Facility: HOME HEALTH | Age: 70
End: 2020-02-25
Payer: MEDICARE

## 2020-02-25 PROCEDURE — 3331090001 HH PPS REVENUE CREDIT

## 2020-02-25 PROCEDURE — 3331090002 HH PPS REVENUE DEBIT

## 2020-02-25 PROCEDURE — G0152 HHCP-SERV OF OT,EA 15 MIN: HCPCS

## 2020-02-26 ENCOUNTER — HOME CARE VISIT (OUTPATIENT)
Dept: SCHEDULING | Facility: HOME HEALTH | Age: 70
End: 2020-02-26
Payer: MEDICARE

## 2020-02-26 VITALS
RESPIRATION RATE: 18 BRPM | HEART RATE: 74 BPM | BODY MASS INDEX: 28.36 KG/M2 | TEMPERATURE: 97.6 F | OXYGEN SATURATION: 100 % | WEIGHT: 233 LBS | DIASTOLIC BLOOD PRESSURE: 70 MMHG | SYSTOLIC BLOOD PRESSURE: 132 MMHG

## 2020-02-26 VITALS
OXYGEN SATURATION: 97 % | DIASTOLIC BLOOD PRESSURE: 70 MMHG | RESPIRATION RATE: 18 BRPM | SYSTOLIC BLOOD PRESSURE: 132 MMHG | TEMPERATURE: 98.3 F | HEART RATE: 70 BPM

## 2020-02-26 PROCEDURE — 3331090001 HH PPS REVENUE CREDIT

## 2020-02-26 PROCEDURE — 3331090002 HH PPS REVENUE DEBIT

## 2020-02-26 PROCEDURE — G0151 HHCP-SERV OF PT,EA 15 MIN: HCPCS

## 2020-02-26 PROCEDURE — G0300 HHS/HOSPICE OF LPN EA 15 MIN: HCPCS

## 2020-02-27 VITALS
HEART RATE: 70 BPM | TEMPERATURE: 98.2 F | OXYGEN SATURATION: 98 % | SYSTOLIC BLOOD PRESSURE: 120 MMHG | DIASTOLIC BLOOD PRESSURE: 80 MMHG | RESPIRATION RATE: 18 BRPM

## 2020-02-27 PROCEDURE — 3331090002 HH PPS REVENUE DEBIT

## 2020-02-27 PROCEDURE — 3331090001 HH PPS REVENUE CREDIT

## 2020-02-28 ENCOUNTER — HOSPITAL ENCOUNTER (OUTPATIENT)
Dept: WOUND CARE | Age: 70
Discharge: HOME | End: 2020-02-28
Payer: MEDICARE

## 2020-02-28 VITALS
HEART RATE: 70 BPM | RESPIRATION RATE: 16 BRPM | SYSTOLIC BLOOD PRESSURE: 130 MMHG | TEMPERATURE: 96.3 F | DIASTOLIC BLOOD PRESSURE: 66 MMHG

## 2020-02-28 PROCEDURE — 3331090001 HH PPS REVENUE CREDIT

## 2020-02-28 PROCEDURE — 3331090002 HH PPS REVENUE DEBIT

## 2020-02-28 PROCEDURE — 99212 OFFICE O/P EST SF 10 MIN: CPT

## 2020-02-28 NOTE — DISCHARGE INSTRUCTIONS
Discharge Instructions/Wound [address] [facility]  [address]  [place], [address] [facility]  Telephone: [phone]       Wound Care Orders:  No open wound. Foam dressing to left plantar foot today at clinic. Patient Discharged from clinic. Treatment Orders:   Elevate leg(s) above the level of the heart when sitting, if swelling present. Avoid prolonged standing in one place. Wear off-loading shoe/boot on the affected foot when walking. Do not get dressing/cast wet. Do not use objects to scratch inside cast/wrap. Follow diet as prescribed:  [x] Diet as tolerated: [x] Diabetic Diet: Low carb no sugar [] No Added Salt:  [] Increase Protein: [] Other:                Follow-up:  [x] Return Appointment: No appointment required  [] Ordered tests:        Electronically signed Mansoro Esparza RN on 2/28/2020 at 2:38 PM     [address] [place] [address] Information: Should you experience any significant changes in your wound(s) or have questions about your wound care, please contact the [address] [place] [address] at [address] [place] [address] 8:00 am - 4:30. If you need help with your wound outside these hours and cannot wait until we are again available, contact your PCP or go to the hospital emergency room. PLEASE NOTE: IF YOU ARE UNABLE TO OBTAIN WOUND SUPPLIES, CONTINUE TO USE THE SUPPLIES YOU HAVE AVAILABLE UNTIL YOU ARE ABLE TO REACH US. IT IS MOST IMPORTANT TO KEEP THE WOUND COVERED AT ALL TIMES.      Physician Signature:_______________________    Date: ___________ Time:  ____________

## 2020-02-29 PROCEDURE — 3331090001 HH PPS REVENUE CREDIT

## 2020-02-29 PROCEDURE — 3331090002 HH PPS REVENUE DEBIT

## 2020-02-29 NOTE — PROGRESS NOTES
[address] 
WOUND CARE PROGRESS NOTE Name:  Javier Zhou 
MR#:  [id number] :  1950 ACCOUNT #:  [de-identified] DATE OF SERVICE:  2020 HISTORY OF CHIEF COMPLAINT:  This 43-year-old white male presents for postop visit amputation of metatarsals 2, 3, and 4. History and physical unchanged from admitting history and physical; no change in medications, no change in allergies. ALLERGIES:  TO IMIPENEM, LEVEMIR, PRIMAXIN, AND Rendell Spittle. PAST MEDICAL HISTORY:  Congestive heart disease, atrial fibrillation, DJD, atherosclerotic cardiovascular disease, congestive heart failure, gout, anemia, mixed hyperlipidemia, chronic kidney disease, hypertension, restless leg syndrome, hypothyroidism, GERD, anxiety, benign prostatic hyperplasia. CURRENT MEDICATIONS:  MiraLax 17 g daily as needed for constipation, Coreg 3.125 two tablets a day, potassium chloride 40 mEq daily, Ambien 10 mg at night as needed, Lumigan eyedrops 0.01% right eye daily, Lantus SoloSTAR insulin on a sliding scale, Mirapex 1 mg as needed daily for constipation, Flomax 0.4 mg daily, ferrous sulfate 47.5 mg three tablets a day, Bumex 2 mg two times a day, Imdur 30 mg daily, Proscar 5 mg daily, vitamin C 250 mg 3 times a day, Victoza 0.6 mg subcu at night, Dulcolax 1-3 tablets daily as needed, Uloric 40 mg daily, oxycodone 5/325 as needed for pain at night. PHYSICAL EXAMINATION: 
VITAL SIGNS:  All vitals are stable. EXTREMITIES:  Physical examination of lower extremities reveal barely palpable pedal pulses, fairly good muscle strength lower extremities bilateral with diminished epicritic sensation. Wound dorsal left metatarsals 2, 3, and 4 are well healed. Previous wound plantar second metatarsal head is well healed. ASSESSMENT:  Healed surgical wound secondary to sesamoiditis left foot on a diabetic with neuropathy.  
 
PLAN:  The patient was advised to wear his diabetic shoes only with diabetic insoles. He is to followup with myself or the podiatrist of his choice within 2 months for regular diabetic care. Otherwise, he is discharged from the wound clinic today. DORIS Fall/RUPAL_BASIL_MILLIE/RUPAL_SHERICE_P 
D:  02/28/2020 15:14 
T:  02/29/2020 0:17 JOB #:  [id number]

## 2020-03-01 PROCEDURE — 3331090002 HH PPS REVENUE DEBIT

## 2020-03-01 PROCEDURE — 3331090001 HH PPS REVENUE CREDIT

## 2020-03-02 ENCOUNTER — HOME CARE VISIT (OUTPATIENT)
Dept: SCHEDULING | Facility: HOME HEALTH | Age: 70
End: 2020-03-02
Payer: MEDICARE

## 2020-03-02 ENCOUNTER — HOME CARE VISIT (OUTPATIENT)
Dept: HOME HEALTH SERVICES | Facility: HOME HEALTH | Age: 70
End: 2020-03-02
Payer: MEDICARE

## 2020-03-02 VITALS
HEART RATE: 69 BPM | DIASTOLIC BLOOD PRESSURE: 84 MMHG | RESPIRATION RATE: 18 BRPM | OXYGEN SATURATION: 69 % | SYSTOLIC BLOOD PRESSURE: 140 MMHG | TEMPERATURE: 98.4 F

## 2020-03-02 PROCEDURE — 3331090001 HH PPS REVENUE CREDIT

## 2020-03-02 PROCEDURE — 3331090002 HH PPS REVENUE DEBIT

## 2020-03-02 PROCEDURE — G0151 HHCP-SERV OF PT,EA 15 MIN: HCPCS

## 2020-03-03 ENCOUNTER — HOSPITAL ENCOUNTER (OUTPATIENT)
Dept: INTERVENTIONAL RADIOLOGY/VASCULAR | Age: 70
Discharge: HOME | End: 2020-03-03
Attending: INTERNAL MEDICINE | Admitting: RADIOLOGY
Payer: MEDICARE

## 2020-03-03 ENCOUNTER — OFFICE VISIT (OUTPATIENT)
Dept: INTERNAL MEDICINE CLINIC | Age: 70
End: 2020-03-03

## 2020-03-03 ENCOUNTER — HOME CARE VISIT (OUTPATIENT)
Dept: SCHEDULING | Facility: HOME HEALTH | Age: 70
End: 2020-03-03
Payer: MEDICARE

## 2020-03-03 VITALS
WEIGHT: 235 LBS | BODY MASS INDEX: 28.62 KG/M2 | OXYGEN SATURATION: 98 % | SYSTOLIC BLOOD PRESSURE: 126 MMHG | HEIGHT: 76 IN | TEMPERATURE: 98.7 F | RESPIRATION RATE: 19 BRPM | DIASTOLIC BLOOD PRESSURE: 70 MMHG | HEART RATE: 66 BPM

## 2020-03-03 VITALS
SYSTOLIC BLOOD PRESSURE: 120 MMHG | RESPIRATION RATE: 16 BRPM | WEIGHT: 235 LBS | OXYGEN SATURATION: 99 % | BODY MASS INDEX: 28.62 KG/M2 | DIASTOLIC BLOOD PRESSURE: 69 MMHG | HEART RATE: 68 BPM | HEIGHT: 76 IN | TEMPERATURE: 97.7 F

## 2020-03-03 DIAGNOSIS — M19.90 ARTHRITIS: ICD-10-CM

## 2020-03-03 DIAGNOSIS — N18.4 CKD (CHRONIC KIDNEY DISEASE), STAGE IV (HCC): ICD-10-CM

## 2020-03-03 DIAGNOSIS — N18.6 ESRD (END STAGE RENAL DISEASE) (HCC): ICD-10-CM

## 2020-03-03 DIAGNOSIS — I50.9 ACUTE ON CHRONIC CONGESTIVE HEART FAILURE, UNSPECIFIED HEART FAILURE TYPE (HCC): Primary | ICD-10-CM

## 2020-03-03 DIAGNOSIS — I50.42 CHRONIC COMBINED SYSTOLIC AND DIASTOLIC CHF, NYHA CLASS 4 (HCC): ICD-10-CM

## 2020-03-03 DIAGNOSIS — E87.1 HYPONATREMIA: ICD-10-CM

## 2020-03-03 DIAGNOSIS — I25.5 ISCHEMIC CARDIOMYOPATHY: ICD-10-CM

## 2020-03-03 DIAGNOSIS — I48.0 PAROXYSMAL ATRIAL FIBRILLATION (HCC): ICD-10-CM

## 2020-03-03 DIAGNOSIS — Z98.890 S/P ABLATION OF ATRIAL FIBRILLATION: ICD-10-CM

## 2020-03-03 DIAGNOSIS — I12.9 HYPERTENSION WITH RENAL DISEASE: ICD-10-CM

## 2020-03-03 DIAGNOSIS — Z86.79 S/P ABLATION OF ATRIAL FIBRILLATION: ICD-10-CM

## 2020-03-03 DIAGNOSIS — I25.10 ASCVD (ARTERIOSCLEROTIC CARDIOVASCULAR DISEASE): ICD-10-CM

## 2020-03-03 PROCEDURE — 3331090001 HH PPS REVENUE CREDIT

## 2020-03-03 PROCEDURE — 36558 INSERT TUNNELED CV CATH: CPT

## 2020-03-03 PROCEDURE — C1892 INTRO/SHEATH,FIXED,PEEL-AWAY: HCPCS

## 2020-03-03 PROCEDURE — 74011000250 HC RX REV CODE- 250: Performed by: RADIOLOGY

## 2020-03-03 PROCEDURE — C1750 CATH, HEMODIALYSIS,LONG-TERM: HCPCS

## 2020-03-03 PROCEDURE — 3331090002 HH PPS REVENUE DEBIT

## 2020-03-03 PROCEDURE — 74011250636 HC RX REV CODE- 250/636: Performed by: RADIOLOGY

## 2020-03-03 PROCEDURE — 77030031139 HC SUT VCRL2 J&J -A

## 2020-03-03 RX ORDER — MIDAZOLAM HYDROCHLORIDE 1 MG/ML
5 INJECTION, SOLUTION INTRAMUSCULAR; INTRAVENOUS
Status: DISCONTINUED | OUTPATIENT
Start: 2020-03-03 | End: 2020-03-07 | Stop reason: HOSPADM

## 2020-03-03 RX ORDER — HEPARIN SODIUM 200 [USP'U]/100ML
400 INJECTION, SOLUTION INTRAVENOUS ONCE
Status: COMPLETED | OUTPATIENT
Start: 2020-03-03 | End: 2020-03-03

## 2020-03-03 RX ORDER — HEPARIN SODIUM 1000 [USP'U]/ML
10000 INJECTION, SOLUTION INTRAVENOUS; SUBCUTANEOUS ONCE
Status: COMPLETED | OUTPATIENT
Start: 2020-03-03 | End: 2020-03-03

## 2020-03-03 RX ORDER — SODIUM CHLORIDE 9 MG/ML
25 INJECTION, SOLUTION INTRAVENOUS CONTINUOUS
Status: DISCONTINUED | OUTPATIENT
Start: 2020-03-03 | End: 2020-03-07 | Stop reason: HOSPADM

## 2020-03-03 RX ORDER — LIDOCAINE HYDROCHLORIDE 20 MG/ML
20 INJECTION, SOLUTION INFILTRATION; PERINEURAL ONCE
Status: COMPLETED | OUTPATIENT
Start: 2020-03-03 | End: 2020-03-03

## 2020-03-03 RX ORDER — LIDOCAINE HYDROCHLORIDE AND EPINEPHRINE 10; 10 MG/ML; UG/ML
20 INJECTION, SOLUTION INFILTRATION; PERINEURAL ONCE
Status: ACTIVE | OUTPATIENT
Start: 2020-03-03 | End: 2020-03-03

## 2020-03-03 RX ORDER — FENTANYL CITRATE 50 UG/ML
200 INJECTION, SOLUTION INTRAMUSCULAR; INTRAVENOUS
Status: DISCONTINUED | OUTPATIENT
Start: 2020-03-03 | End: 2020-03-07 | Stop reason: HOSPADM

## 2020-03-03 RX ORDER — OXYCODONE AND ACETAMINOPHEN 5; 325 MG/1; MG/1
1 TABLET ORAL
Qty: 30 TAB | Refills: 0 | Status: SHIPPED | OUTPATIENT
Start: 2020-03-03 | End: 2020-03-31 | Stop reason: SDUPTHER

## 2020-03-03 RX ADMIN — FENTANYL CITRATE 25 MCG: 50 INJECTION, SOLUTION INTRAMUSCULAR; INTRAVENOUS at 08:30

## 2020-03-03 RX ADMIN — HEPARIN SODIUM 3800 UNITS: 1000 INJECTION, SOLUTION INTRAVENOUS; SUBCUTANEOUS at 08:42

## 2020-03-03 RX ADMIN — LIDOCAINE HYDROCHLORIDE 15 ML: 20 INJECTION, SOLUTION INFILTRATION; PERINEURAL at 08:42

## 2020-03-03 RX ADMIN — MIDAZOLAM 1 MG: 1 INJECTION INTRAMUSCULAR; INTRAVENOUS at 08:32

## 2020-03-03 RX ADMIN — MIDAZOLAM 2 MG: 1 INJECTION INTRAMUSCULAR; INTRAVENOUS at 08:29

## 2020-03-03 RX ADMIN — SODIUM CHLORIDE 25 ML/HR: 900 INJECTION, SOLUTION INTRAVENOUS at 08:17

## 2020-03-03 RX ADMIN — FENTANYL CITRATE 25 MCG: 50 INJECTION, SOLUTION INTRAMUSCULAR; INTRAVENOUS at 08:36

## 2020-03-03 RX ADMIN — FENTANYL CITRATE 25 MCG: 50 INJECTION, SOLUTION INTRAMUSCULAR; INTRAVENOUS at 08:39

## 2020-03-03 RX ADMIN — FENTANYL CITRATE 25 MCG: 50 INJECTION, SOLUTION INTRAMUSCULAR; INTRAVENOUS at 08:32

## 2020-03-03 RX ADMIN — WATER 2 G: 1 INJECTION INTRAMUSCULAR; INTRAVENOUS; SUBCUTANEOUS at 08:16

## 2020-03-03 RX ADMIN — HEPARIN SODIUM IN SODIUM CHLORIDE: 200 INJECTION INTRAVENOUS at 08:42

## 2020-03-03 NOTE — ROUTINE PROCESS
2045  Patient arrived ambulatory to Radiology Recovery, alert and oriented x 4, accompanied by wife. 18  Dr. Toby Diaz at bedside explaining procedure to patient and providing patient with risks and benefits of procedure. Patient verbalized understanding and signed consent for procedure. 0930  Patient alert and oriented x 4, denies pain to procedure site, procedure site dressing dry and intact. Patient ate crackers and drank ginger ale without difficulty. Patient provided with verbal and written discharge instructions. Patient discharged via wheelchair with wife to transport home.

## 2020-03-03 NOTE — ROUTINE PROCESS
Procedure:  Perm cath placement    Start  3665:      End:  0845    Medications administered:  Versed 3 mg IV, Fentanyl 100 mcg IV

## 2020-03-03 NOTE — H&P
Interventional and Vascular Radiology History and Physical    Patient: Royal Whitley Ferreira. 68 y.o. male       Chief Complaint: No chief complaint on file.       History of Present Illness: dialysis     History:    Past Medical History:   Diagnosis Date    Anemia 8/5/2017    Anxiety 8/5/2017    Arrhythmia     atrial fibrillation 2014    ASCVD (arteriosclerotic cardiovascular disease) 8/5/2017    BPH (benign prostatic hyperplasia) 8/5/2017    CAD (coronary artery disease)     h/o stents    Cancer ([address]     h/o skin cancer    Cardiomyopathy ([address] 8/5/2017    CHF (congestive heart failure) ([address] 8/5/2017    Chronic kidney disease     Stage IV    CKD (chronic kidney disease), stage IV (HCC) 8/5/2017    Diabetes ([address]     Diabetes mellitus ([address] 8/5/2017    Diabetic neuropathy ([address] 8/5/2017    DJD (degenerative joint disease) 8/5/2017    ED (erectile dysfunction) 8/5/2017    Gout 8/5/2017    High cholesterol     Hyperlipidemia 8/5/2017    Hypertension     Hypertension with renal disease 8/5/2017    Hypothyroid 8/5/2017    Insomnia 8/5/2017    Obesity 8/5/2017    On statin therapy 8/5/2017    Pneumonia 05/28/2019    Restless leg 8/5/2017    Sleep apnea     Bipap    Thyroid disease     hypothyroid    Ulcer of foot due to secondary diabetes ([address] 07/12/2019     Family History   Problem Relation Age of Onset    Heart Disease Mother     Kidney Disease Mother     Heart Disease Father     Kidney Disease Father     Cancer Sister         Breast     Social History     Socioeconomic History    Marital status:      Spouse name: Not on file    Number of children: Not on file    Years of education: Not on file    Highest education level: Not on file   Occupational History    Not on file   Social Needs    Financial resource strain: Not on file    Food insecurity:     Worry: Not on file     Inability: Not on file    Transportation needs:     Medical: Not on file     Non-medical: Not on file   Tobacco Use    Smoking status: Former Smoker     Packs/day: 0.50     Years: 4.00     Pack years: 2.00     Last attempt to quit: 3/6/1972     Years since quittin.0    Smokeless tobacco: Never Used   Substance and Sexual Activity    Alcohol use: No     Comment: rare, 1 drink per year    Drug use: No    Sexual activity: Not Currently   Lifestyle    Physical activity:     Days per week: Not on file     Minutes per session: Not on file    Stress: Not on file   Relationships    Social connections:     Talks on phone: Not on file     Gets together: Not on file     Attends Judaism service: Not on file     Active member of club or organization: Not on file     Attends meetings of clubs or organizations: Not on file     Relationship status: Not on file    Intimate partner violence:     Fear of current or ex partner: Not on file     Emotionally abused: Not on file     Physically abused: Not on file     Forced sexual activity: Not on file   Other Topics Concern     Service Not Asked    Blood Transfusions Not Asked    Caffeine Concern Not Asked    Occupational Exposure Not Asked   [employer] Hazards Not Asked    Sleep Concern Not Asked    Stress Concern Not Asked    Weight Concern Not Asked    Special Diet Not Asked    Back Care Not Asked    Exercise Not Asked    Bike Helmet Not Asked    [address] Not Asked    Self-Exams Not Asked   Social History Narrative    Not on file       Allergies: Allergies   Allergen Reactions    Niacin Unknown (comments)     Niaspan    Adhesive Rash    Imipenem Diarrhea     Other reaction(s): Rash    Levemir [Insulin Detemir] Hives    Other Medication Other (comments)     ?  Allergy to Duraprep causing chemical burn    Primaxin [Imipenem-Cilastatin] Diarrhea and Rash    Xarelto [Rivaroxaban] Rash and Itching     Other reaction(s): Rash       Current Medications:  Current Outpatient Medications   Medication Sig    polyethylene glycol (MIRALAX) 17 gram packet Take 17 g by mouth daily as needed for Other (constipation).  DULCOLAX, BISACODYL, PO Take 1-3 Tabs by mouth daily as needed for Other (constipation).  carvediloL (COREG) 3.125 mg tablet Take 1 Tab by mouth two (2) times daily (with meals).  febuxostat (ULORIC) 40 mg tab tablet Take 40 mg by mouth daily as needed for Other.  potassium chloride (KLOR-CON) 20 mEq pack Take 40 mEq by mouth daily.  zolpidem (AMBIEN) 10 mg tablet Take 10 mg by mouth nightly as needed for Sleep.  OTHER Apply  to affected area four (4) times daily as needed for Pain. Marny Mule. Apply to affected areas as needed for pain. topical    bimatoprost (LUMIGAN) 0.01 % ophthalmic drops Administer 1 Drop to right eye every evening.  dorzolamide-timoloL (COSOPT) 22.3-6.8 mg/mL ophthalmic solution Administer 1 Drop to right eye nightly.  oxyCODONE-acetaminophen (PERCOCET) 5-325 mg per tablet Take 1 Tab by mouth nightly as needed for Pain.  insulin glargine (LANTUS SOLOSTAR U-100 INSULIN) 100 unit/mL (3 mL) inpn 10-20 Units by SubCUTAneous route nightly. Will use 10 units if glucose < 100.  pramipexole (MIRAPEX) 1 mg tablet TAKE 1 TABLET BY MOUTH EVERY NIGHT AT BEDTIME    tamsulosin (FLOMAX) 0.4 mg capsule TAKE 2 CAPSULES BY MOUTH EVERY DAY    ferrous sulfate (SLOW FE) 47.5 mg iron TbER tablet Take 1 Tab by mouth three (3) times daily.  bumetanide (BUMEX) 2 mg tablet Take 2 Tabs by mouth two (2) times a day.  isosorbide mononitrate ER (IMDUR) 30 mg tablet Take 1 Tab by mouth daily.  finasteride (PROSCAR) 5 mg tablet TAKE 1 TABLET BY MOUTH DAILY    ascorbic acid, vitamin C, (VITAMIN C) 250 mg tablet Take 1 Tab by mouth three (3) times daily.  OTHER Apply  to affected area daily as needed (Knee Pain). CBD Cream:  Apply daily as needed for knee pain , topical    Liraglutide (VICTOZA) 0.6 mg/0.1 mL (18 mg/3 mL) sub-q pen 0.6 mg by SubCUTAneous route nightly.      Current Facility-Administered Medications   Medication Dose Route Frequency    lidocaine-EPINEPHrine (XYLOCAINE) 1 %-1:100,000 injection 200 mg  20 mL IntraDERMal ONCE    midazolam (VERSED) injection 5 mg  5 mg IntraVENous Multiple    fentaNYL citrate (PF) injection 200 mcg  200 mcg IntraVENous Multiple    0.9% sodium chloride infusion  25 mL/hr IntraVENous CONTINUOUS        Physical Exam:  Blood pressure 120/69, pulse 68, temperature 97.7 °F (36.5 °C), resp. rate 16, height 6' 4\" (1.93 m), weight 106.6 kg (235 lb), SpO2 99 %. LUNG: clear to auscultation bilaterally, HEART: regular rate and rhythm, S1, S2 normal, no murmur, click, rub or gallop      Alerts:    Hospital Problems  Date Reviewed: 2/7/2020    None          Laboratory:    No results for input(s): HGB, HCT, WBC, PLT, INR, BUN, CREA, K, CRCLT, HGBEXT, HCTEXT, PLTEXT, INREXT in the last 72 hours. No lab exists for component: PTT, PT      Plan of Care/Planned Procedure:  Risks, benefits, and alternatives reviewed with patient and he agrees to proceed with the procedure. Conscious sedation will be performed with IV fentanyl and versed.  Plan is for permcath placement       Chandan Akhtar MD

## 2020-03-03 NOTE — DISCHARGE INSTRUCTIONS
[address]  Special Procedures/Radiology Department      Radiologist:   Dr. Victor Manuel Ruiz    Date:  3/3/2020         [facility] Discharge Instructions    Keep the dressing clean and dry. Keep dressing in place for three (3) days. If you see bleeding at the site, or blood on the dressing:  hold pressure to the area for at least 15 minutes. If you cannot get the bleeding to stop, go to the Emergency Room. Resume your previous diet and follow the medication reconciliation form. Rest today. Watch for signs of infection at the puncture site:  redness, swelling, pus, fever or chills. If this occurs, call your doctor immediately or go to the nearest Emergency Room. If you have any questions or concerns, please call [phone], and ask to speak to the nurse on-call.
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
## 2020-03-03 NOTE — PATIENT INSTRUCTIONS

## 2020-03-03 NOTE — PROGRESS NOTES
Chief Complaint   Patient presents with   [place] 2/14-2/21/2020      Visit Vitals  /70 (BP 1 Location: Right arm, BP Patient Position: Sitting)   Pulse 66   Temp 98.7 °F (37.1 °C) (Oral)   Resp 19   Ht 6' 4\" (1.93 m)   Wt 235 lb (106.6 kg)   SpO2 98%   BMI 28.61 kg/m²     1. Have you been to the ER, urgent care clinic since your last visit? Hospitalized since your last visit? ED [facility] 2/14-2/21/2020    2. Have you seen or consulted any other health care providers outside of the [address] [place] [address] since your last visit? Include any pap smears or colon screening.    Dr. Miya Luna 3/2/2020 Dr. Canelo Brown

## 2020-03-04 ENCOUNTER — HOME CARE VISIT (OUTPATIENT)
Dept: SCHEDULING | Facility: HOME HEALTH | Age: 70
End: 2020-03-04
Payer: MEDICARE

## 2020-03-04 ENCOUNTER — OFFICE VISIT (OUTPATIENT)
Dept: INTERNAL MEDICINE CLINIC | Age: 70
End: 2020-03-04

## 2020-03-04 VITALS
SYSTOLIC BLOOD PRESSURE: 144 MMHG | HEART RATE: 70 BPM | OXYGEN SATURATION: 98 % | RESPIRATION RATE: 18 BRPM | TEMPERATURE: 98.3 F | DIASTOLIC BLOOD PRESSURE: 80 MMHG | BODY MASS INDEX: 29.38 KG/M2 | WEIGHT: 241.4 LBS

## 2020-03-04 DIAGNOSIS — N18.6 ESRD (END STAGE RENAL DISEASE) (HCC): ICD-10-CM

## 2020-03-04 DIAGNOSIS — R10.84 GENERALIZED ABDOMINAL PAIN: Primary | ICD-10-CM

## 2020-03-04 DIAGNOSIS — I50.9 ACUTE ON CHRONIC CONGESTIVE HEART FAILURE, UNSPECIFIED HEART FAILURE TYPE (HCC): ICD-10-CM

## 2020-03-04 DIAGNOSIS — E87.1 HYPONATREMIA: ICD-10-CM

## 2020-03-04 PROCEDURE — 3331090002 HH PPS REVENUE DEBIT

## 2020-03-04 PROCEDURE — G0151 HHCP-SERV OF PT,EA 15 MIN: HCPCS

## 2020-03-04 PROCEDURE — G0152 HHCP-SERV OF OT,EA 15 MIN: HCPCS

## 2020-03-04 PROCEDURE — 3331090001 HH PPS REVENUE CREDIT

## 2020-03-04 NOTE — PROGRESS NOTES
Chief Complaint Patient presents with  CHF  
  4 lb weight gain  Fatigue  Bloated 1. Have you been to the ER, urgent care clinic since your last visit? Hospitalized since your last visit? No 
 
2. Have you seen or consulted any other health care providers outside of the [address] [place] [address] since your last visit? Include any pap smears or colon screening. Saw PT and OT both today at home

## 2020-03-04 NOTE — PATIENT INSTRUCTIONS

## 2020-03-04 NOTE — PROGRESS NOTES
Subjective:  
Royal KRISTA Nj is a 68 y.o. male Chief Complaint Patient presents with  CHF  
  4 lb weight gain  Fatigue  Bloated History of present illness: He presents today emergent with increasing abdominal pain and bloating as well as increasing weight with a 4 pound weight gain by scales at home since yesterday. He is noted a little urine output and generalized fatigue. He notes increased shortness of breath if he lays down flat. He denies any chest pain, palpitations, cough or other cardiorespiratory complaints except for increased swelling in his ankles as well as the shortness of breath with laying down. He has so little energy really does not feel like doing anything as far as getting around when he normally is able to get around with a Rollator walker. He does have a recent hospitalization with acute exacerbation of CHF and also had acute on chronic renal failure and has been set up to start hemodialysis beginning tomorrow. Patient Active Problem List  
Diagnosis Code  Atrial fibrillation (HCC) I48.91  
 Primary osteoarthritis involving multiple joints M15.0  ASCVD (arteriosclerotic cardiovascular disease) I25.10  Gout M10.9  Anemia D64.9  Mixed hyperlipidemia E78.2  Controlled type 2 diabetes mellitus with stage 4 chronic kidney disease, without long-term current use of insulin ([facility]) E11.22, N18.4  CKD (chronic kidney disease), stage IV ([facility]) N18.4  Hypertension with renal disease I12.9  Restless leg G25.81  
 Class 1 obesity due to excess calories without serious comorbidity with body mass index (BMI) of 30.0 to 30.9 in adult E66.09, Z68.30  Insomnia G47.00  Acquired hypothyroidism E03.9  Gastroesophageal reflux disease without esophagitis K21.9  ED (erectile dysfunction) N52.9  Diabetic neuropathy (HCC) E11.40  Cardiomyopathy ([facility] I42.9  BPH (benign prostatic hyperplasia) N40.0  Anxiety F41.9  Age-related cataract H25.9  Hyperostosis M85.80  Status post amputation of left great toe ([address] C23.415  Left elbow pain M25.522  S/P ablation of atrial fibrillation Z98.890, Z86.79  
 Primary osteoarthritis of right knee M17.11  
 Primary osteoarthritis of left knee M17.12  
 Hyponatremia E87.1  Pneumonia J18.9  Lateral epicondylitis of left elbow M77.12  
 Metatarsalgia M77.40  Sesamoiditis M25.80  Venous stasis ulcer of leg without varicose veins ([facility]) I87.2, L97.909  Cellulitis, leg L03.119  
 Ulcer of left foot ([address] L97.529  Metatarsalgia of left foot M77.42  
 Acute exacerbation of CHF (congestive heart failure) ([facility]) I50.9  Chronic combined systolic and diastolic CHF, NYHA class 4 ([facility]) I50.42  Generalized abdominal pain R10.84  
 ESRD (end stage renal disease) ([address] N18.6 Past Medical History:  
Diagnosis Date  Anemia 8/5/2017  Anxiety 8/5/2017  Arrhythmia   
 atrial fibrillation 2014  ASCVD (arteriosclerotic cardiovascular disease) 8/5/2017  BPH (benign prostatic hyperplasia) 8/5/2017  CAD (coronary artery disease)   
 h/o stents  Cancer [facility])   
 h/o skin cancer  Cardiomyopathy ([address] 8/5/2017  CHF (congestive heart failure) ([address] 8/5/2017  Chronic kidney disease Stage IV  CKD (chronic kidney disease), stage IV ([address] 8/5/2017  Diabetes ([address]  Diabetes mellitus ([address] 8/5/2017  Diabetic neuropathy ([address] 8/5/2017  DJD (degenerative joint disease) 8/5/2017  ED (erectile dysfunction) 8/5/2017  Gout 8/5/2017  High cholesterol  Hyperlipidemia 8/5/2017  Hypertension  Hypertension with renal disease 8/5/2017  Hypothyroid 8/5/2017  Insomnia 8/5/2017  Obesity 8/5/2017  On statin therapy 8/5/2017  Pneumonia 05/28/2019  Restless leg 8/5/2017  Sleep apnea Bipap  Thyroid disease   
 hypothyroid  Ulcer of foot due to secondary diabetes ([address] 07/12/2019 Allergies Allergen Reactions  Niacin Unknown (comments) Niaspan  Adhesive Rash  Imipenem Diarrhea Other reaction(s): Rash  Levemir [Insulin Detemir] Hives  Other Medication Other (comments) ? Allergy to [place] causing chemical burn  Primaxin [Imipenem-Cilastatin] Diarrhea and Rash  Xarelto [Rivaroxaban] Rash and Itching Other reaction(s): Rash Family History Problem Relation Age of Onset  Heart Disease Mother  Kidney Disease Mother  Heart Disease Father  Kidney Disease Father  Cancer Sister Breast  
  
Social History Socioeconomic History  Marital status:  Spouse name: Not on file  Number of children: Not on file  Years of education: Not on file  Highest education level: Not on file Occupational History  Not on file Social Needs  Financial resource strain: Not on file  Food insecurity:  
  Worry: Not on file Inability: Not on file  Transportation needs:  
  Medical: Not on file Non-medical: Not on file Tobacco Use  Smoking status: Former Smoker Packs/day: 0.50 Years: 4.00 Pack years: 2.00 Last attempt to quit: 3/6/1972 Years since quittin.0  Smokeless tobacco: Never Used Substance and Sexual Activity  Alcohol use: No  
  Comment: rare, 1 drink per year  Drug use: No  
 Sexual activity: Not Currently Lifestyle  Physical activity:  
  Days per week: Not on file Minutes per session: Not on file  Stress: Not on file Relationships  Social connections:  
  Talks on phone: Not on file Gets together: Not on file Attends [employer] service: Not on file Active member of club or organization: Not on file Attends meetings of clubs or organizations: Not on file Relationship status: Not on file  Intimate partner violence:  
  Fear of current or ex partner: Not on file Emotionally abused: Not on file Physically abused: Not on file Forced sexual activity: Not on file Other Topics Concern [address] Service Not Asked  Blood Transfusions Not Asked  Caffeine Concern Not Asked  Occupational Exposure Not Asked [place] Hazards Not Asked  Sleep Concern Not Asked  Stress Concern Not Asked  Weight Concern Not Asked  Special Diet Not Asked  Back Care Not Asked  Exercise Not Asked  Bike Helmet Not Asked  Seat Belt Not Asked  Self-Exams Not Asked Social History Narrative  Not on file Prior to Admission medications Medication Sig Start Date End Date Taking? Authorizing Provider  
menthol-zinc oxide (CALMOSEPTINE) 0.44-20.6 % oint Apply 1 Each to affected area daily. Yes Provider, Historical  
oxyCODONE-acetaminophen (PERCOCET) 5-325 mg per tablet Take 1 Tab by mouth nightly as needed for Pain for up to 30 days. Max Daily Amount: 1 Tab. 3/3/20 4/2/20 Yes Mary Gonzalez MD  
polyethylene glycol [facility]) 17 gram packet Take 17 g by mouth daily as needed for Other (constipation). Yes Provider, Historical  
DULCOLAX, BISACODYL, PO Take 1-3 Tabs by mouth daily as needed for Other (constipation). Yes Provider, Historical  
carvediloL (COREG) 3.125 mg tablet Take 1 Tab by mouth two (2) times daily (with meals). 2/21/20  Yes Mary Gonzalez MD  
febuxostat (ULORIC) 40 mg tab tablet Take 40 mg by mouth daily as needed for Other. Yes Provider, Historical  
potassium chloride (KLOR-CON) 20 mEq pack Take 40 mEq by mouth daily. Yes Provider, Historical  
zolpidem (AMBIEN) 10 mg tablet Take 10 mg by mouth nightly as needed for Sleep. Yes Provider, Historical  
OTHER Apply  to affected area four (4) times daily as needed for Pain. [place]. Apply to affected areas as needed for pain. topical, small amount   Yes Provider, Historical  
bimatoprost (LUMIGAN) 0.01 % ophthalmic drops Administer 1 Drop to right eye every evening.    Yes Provider, Historical  
 dorzolamide-timoloL (COSOPT) 22.3-6.8 mg/mL ophthalmic solution Administer 1 Drop to right eye nightly. Yes Provider, Historical  
insulin glargine (LANTUS SOLOSTAR U-100 INSULIN) 100 unit/mL (3 mL) inpn 10-20 Units by SubCUTAneous route nightly. Will use 10 units if glucose < 100. Yes Provider, Historical  
pramipexole (MIRAPEX) 1 mg tablet TAKE 1 TABLET BY MOUTH EVERY NIGHT AT BEDTIME 11/6/19  Yes Rolanda Rodriguez MD  
tamsulosin [facility]) 0.4 mg capsule TAKE 2 CAPSULES BY MOUTH EVERY DAY 8/29/19  Yes Rolanda Rodriguez MD  
ferrous sulfate (SLOW FE) 47.5 mg iron TbER tablet Take 1 Tab by mouth three (3) times daily. 6/13/19  Yes Rolanda Rodriguez MD  
bumetanide (BUMEX) 2 mg tablet Take 2 Tabs by mouth two (2) times a day. 6/5/19  Yes Marbin Berrios MD  
isosorbide mononitrate ER (IMDUR) 30 mg tablet Take 1 Tab by mouth daily. 6/6/19  Yes Marbin Berrios MD  
finasteride (PROSCAR) 5 mg tablet TAKE 1 TABLET BY MOUTH DAILY 4/26/19  Yes Rolanda Rodriguez MD  
ascorbic acid, vitamin C, (VITAMIN C) 250 mg tablet Take 1 Tab by mouth three (3) times daily. 1/10/19  Yes Rolanda Rodriguez MD  
OTHER Apply  to affected area daily as needed (Knee Pain). CBD Cream:  Apply daily as needed for knee pain , topical, small amount   Yes Provider, Historical  
Liraglutide (VICTOZA) 0.6 mg/0.1 mL (18 mg/3 mL) sub-q pen 0.6 mg by SubCUTAneous route nightly. Yes Provider, Historical  
  
 
Review of Systems Constitutional:  He denies fever, weight loss, sweats or fatigue. EYES: No blurred or double vision, ENT: no nasal congestion, no headache or dizziness. No difficulty with               swallowing, taste, speech or smell. Respiratory:  No cough, wheezing or shortness of breath. No sputum production. Cardiac:  Denies chest pain, palpitations, unexplained indigestion, syncope, edema, PND or orthopnea. GI:  No changes in bowel movements, anorexia, nausea, vomiting or heartburn. Positive increased abdominal pain and bloating :  No frequency or dysuria. Denies incontinence or sexual dysfunction. Extremities:  No joint pain, stiffness or swelling Back:.no pain or soreness Skin:  No recent rashes or mole changes. Neurological:  No numbness, tingling, burning paresthesias or loss of motor strength. No syncope, dizziness, frequent headaches or memory loss. Hematologic:  No easy bruising Lymphatic: No lymph node enlargement Objective:  
 
Vitals:  
 03/04/20 1413 BP: 144/80 Pulse: 70 Resp: 18 Temp: 98.3 °F (36.8 °C) SpO2: 98% Weight: 241 lb 6.4 oz (109.5 kg) PainSc:   9 PainLoc: Shoulder Body mass index is 29.38 kg/m². Physical Examination:  
           General Appearance:  Well-developed, well-nourished, no acute distress. HEENT:   
  Ears:  The TMs and ear canals were clear. Eyes:  The pupillary responses were normal.  Extraocular muscle function intact. Lids and conjunctiva not injected. Funduscopic exam revealed sharp disc margins. Nares: Clear w/o edema or erythema Pharynx:  Clear with teeth in good repair. No masses were noted. Neck:  Supple without thyromegaly or adenopathy. No JVD noted. No carotid                bruits. Lungs:  Clear to auscultation and percussion with decreased breath sounds. Cardiac:  Regular rate and rhythm without murmur. PMI not displaced. No gallop, rub or click. .  1-2+ peripheral edema Abdominal: Soft, no hepata-spleenomegally or masses. Distended tympanitic and diffusely tender Extremities:  No clubbing, cyanosis Skin:  No rash or unusual mole changes noted. Lymph Nodes:  None felt in the cervical, supraclavicular, axillary or inguinal region. Neurological: . DTRs 2+ and symmetric. Station and gait normal.  
Hematologic:   No purpura or petechiae Assessment/Plan: 1. Generalized abdominal pain 2. ESRD (end stage renal disease) ([address] 3. Hyponatremia 4. Acute on chronic congestive heart failure, unspecified heart failure type ([address] Impressions/Plan: 
Impression 1. Generalized abdominal pain abdominal flat and upright film obtained today shows a nonspecific bowel gas pattern no evidence of obstruction so I think were dealing with end-stage renal disease and increased fluid retention causing this discomfort. 2.  End-stage renal disease due to start dialysis tomorrow at this point I am going to give him 4 mg of Bumex additional nail as he already took 4 mg this morning and had about 500 cc out we will also have him take his full milligrams in the evening. And as noted he is to start dialysis tomorrow 3. Hyponatremia I did not repeat a BMP today since that will be done tomorrow at dialysis 4. CHF I did a chest x-ray today which shows mild increase fluid but not overt pulmonary edema and his O2 sat on room air is 98% so I am not convinced that he is significantly fluid overloaded as far as a lung standpoint. He does have peripheral edema but this should be able to be managed with dialysis. At this point I think he probably does need a hospital bed for home use since he unfortunately cannot get up and down stairs to be able to sleep upstairs with a bedroom is and is been sleeping in a recliner. Unfortunately his legs are not able to be elevated above the level of his hips which a hospital bed would allow. Also with a hospital bed he would be able to elevate the head of his bed to prevent significant shortness of breath at night. I will recheck him as prior scheduled or sooner should there be a problem. Note that this was an emergent visit today that was worked in as an office visit with my schedule full of all the other patients. Orders Placed This Encounter  XR ABD FLAT/ ERECT  XR CHEST PA LAT Follow-up and Dispositions · Return TBD. No results found for any visits on 03/04/20. Princess Myke MD 
 
The patient was given after the visit summary the patient verbalized an understanding of the plans and problems as explained.

## 2020-03-05 ENCOUNTER — HOSPITAL ENCOUNTER (OUTPATIENT)
Dept: INFUSION THERAPY | Age: 70
End: 2020-03-05

## 2020-03-05 VITALS
SYSTOLIC BLOOD PRESSURE: 120 MMHG | OXYGEN SATURATION: 99 % | DIASTOLIC BLOOD PRESSURE: 80 MMHG | HEART RATE: 53 BPM | RESPIRATION RATE: 18 BRPM

## 2020-03-05 PROBLEM — N18.6 CONTROLLED TYPE 2 DIABETES MELLITUS WITH CHRONIC KIDNEY DISEASE ON CHRONIC DIALYSIS, WITH LONG-TERM CURRENT USE OF INSULIN (HCC): Status: ACTIVE | Noted: 2017-08-05

## 2020-03-05 PROBLEM — Z79.4 CONTROLLED TYPE 2 DIABETES MELLITUS WITH CHRONIC KIDNEY DISEASE ON CHRONIC DIALYSIS, WITH LONG-TERM CURRENT USE OF INSULIN (HCC): Status: ACTIVE | Noted: 2017-08-05

## 2020-03-05 PROBLEM — Z99.2 CONTROLLED TYPE 2 DIABETES MELLITUS WITH CHRONIC KIDNEY DISEASE ON CHRONIC DIALYSIS, WITH LONG-TERM CURRENT USE OF INSULIN (HCC): Status: ACTIVE | Noted: 2017-08-05

## 2020-03-05 PROCEDURE — 3331090001 HH PPS REVENUE CREDIT

## 2020-03-05 PROCEDURE — 3331090002 HH PPS REVENUE DEBIT

## 2020-03-06 ENCOUNTER — HOME CARE VISIT (OUTPATIENT)
Dept: HOME HEALTH SERVICES | Facility: HOME HEALTH | Age: 70
End: 2020-03-06
Payer: MEDICARE

## 2020-03-06 ENCOUNTER — PATIENT OUTREACH (OUTPATIENT)
Dept: FAMILY MEDICINE CLINIC | Age: 70
End: 2020-03-06

## 2020-03-06 PROCEDURE — 3331090002 HH PPS REVENUE DEBIT

## 2020-03-06 PROCEDURE — 3331090001 HH PPS REVENUE CREDIT

## 2020-03-06 NOTE — PROGRESS NOTES
Goals  Reduce risk of CHF exacerbations and complications. 3/6/2020 patient attended appointment with PCP. Denies chest pain, SOB, fever. Reports he had first dialysis on 3/5/2020 for 2.5 hours. He is on T, [place], Sat. with [facility] on [address]. He is currently on 32 ounce fluid restriction. . Patient will monitor S&S of CHF and report compliance with fluid restriction on next outreach.  JASON

## 2020-03-07 PROCEDURE — 3331090001 HH PPS REVENUE CREDIT

## 2020-03-07 PROCEDURE — 3331090002 HH PPS REVENUE DEBIT

## 2020-03-08 PROCEDURE — 3331090002 HH PPS REVENUE DEBIT

## 2020-03-08 PROCEDURE — 3331090001 HH PPS REVENUE CREDIT

## 2020-03-09 ENCOUNTER — HOME CARE VISIT (OUTPATIENT)
Dept: SCHEDULING | Facility: HOME HEALTH | Age: 70
End: 2020-03-09
Payer: MEDICARE

## 2020-03-09 VITALS
RESPIRATION RATE: 18 BRPM | OXYGEN SATURATION: 98 % | TEMPERATURE: 98.7 F | DIASTOLIC BLOOD PRESSURE: 60 MMHG | SYSTOLIC BLOOD PRESSURE: 132 MMHG | HEART RATE: 70 BPM

## 2020-03-09 VITALS
DIASTOLIC BLOOD PRESSURE: 90 MMHG | OXYGEN SATURATION: 98 % | SYSTOLIC BLOOD PRESSURE: 140 MMHG | TEMPERATURE: 98.7 F | HEART RATE: 78 BPM | RESPIRATION RATE: 17 BRPM

## 2020-03-09 PROCEDURE — 3331090002 HH PPS REVENUE DEBIT

## 2020-03-09 PROCEDURE — G0299 HHS/HOSPICE OF RN EA 15 MIN: HCPCS

## 2020-03-09 PROCEDURE — G0152 HHCP-SERV OF OT,EA 15 MIN: HCPCS

## 2020-03-09 PROCEDURE — 3331090001 HH PPS REVENUE CREDIT

## 2020-03-10 PROCEDURE — 3331090002 HH PPS REVENUE DEBIT

## 2020-03-10 PROCEDURE — 3331090001 HH PPS REVENUE CREDIT

## 2020-03-11 ENCOUNTER — HOME CARE VISIT (OUTPATIENT)
Dept: SCHEDULING | Facility: HOME HEALTH | Age: 70
End: 2020-03-11
Payer: MEDICARE

## 2020-03-11 VITALS
RESPIRATION RATE: 18 BRPM | HEART RATE: 70 BPM | OXYGEN SATURATION: 99 % | SYSTOLIC BLOOD PRESSURE: 130 MMHG | DIASTOLIC BLOOD PRESSURE: 74 MMHG

## 2020-03-11 PROCEDURE — G0299 HHS/HOSPICE OF RN EA 15 MIN: HCPCS

## 2020-03-11 PROCEDURE — G0151 HHCP-SERV OF PT,EA 15 MIN: HCPCS

## 2020-03-11 PROCEDURE — 3331090002 HH PPS REVENUE DEBIT

## 2020-03-11 PROCEDURE — 3331090001 HH PPS REVENUE CREDIT

## 2020-03-12 ENCOUNTER — PATIENT OUTREACH (OUTPATIENT)
Dept: FAMILY MEDICINE CLINIC | Age: 70
End: 2020-03-12

## 2020-03-12 PROCEDURE — 3331090001 HH PPS REVENUE CREDIT

## 2020-03-12 PROCEDURE — 3331090002 HH PPS REVENUE DEBIT

## 2020-03-12 NOTE — PROGRESS NOTES
Goals  Reduce risk of CHF exacerbations and complications. 3/6/2020 patient attended appointment with PCP. Denies chest pain, SOB, fever. Reports he had first dialysis on 3/5/2020 for 2.5 hours. He is on T, [facility]. with [facility] on [address]. He is currently on 32 ounce fluid restriction. . Patient will monitor S&S of CHF and report compliance with fluid restriction on next outreach. JASON 
 
3/12/2020 Patient reports attendance of dialysis appointments and seeing vascular specialist today. Cardio appointment is next week. Denies chest pain SOB, fever, rash. Endorses fluid restriction. Patient will monitor S&S of CHF and report compliance with fluid restriction on next outreach.  AJSON

## 2020-03-13 ENCOUNTER — HOME CARE VISIT (OUTPATIENT)
Dept: HOME HEALTH SERVICES | Facility: HOME HEALTH | Age: 70
End: 2020-03-13
Payer: MEDICARE

## 2020-03-13 ENCOUNTER — HOME CARE VISIT (OUTPATIENT)
Dept: SCHEDULING | Facility: HOME HEALTH | Age: 70
End: 2020-03-13
Payer: MEDICARE

## 2020-03-13 VITALS
RESPIRATION RATE: 17 BRPM | HEART RATE: 75 BPM | DIASTOLIC BLOOD PRESSURE: 70 MMHG | SYSTOLIC BLOOD PRESSURE: 118 MMHG | OXYGEN SATURATION: 97 % | TEMPERATURE: 98.2 F

## 2020-03-13 PROCEDURE — G0152 HHCP-SERV OF OT,EA 15 MIN: HCPCS

## 2020-03-13 PROCEDURE — 3331090002 HH PPS REVENUE DEBIT

## 2020-03-13 PROCEDURE — 3331090001 HH PPS REVENUE CREDIT

## 2020-03-13 PROCEDURE — G0151 HHCP-SERV OF PT,EA 15 MIN: HCPCS

## 2020-03-14 PROCEDURE — 3331090001 HH PPS REVENUE CREDIT

## 2020-03-14 PROCEDURE — 3331090002 HH PPS REVENUE DEBIT

## 2020-03-15 VITALS
OXYGEN SATURATION: 98 % | HEART RATE: 79 BPM | SYSTOLIC BLOOD PRESSURE: 127 MMHG | TEMPERATURE: 98.3 F | DIASTOLIC BLOOD PRESSURE: 69 MMHG

## 2020-03-15 PROCEDURE — 3331090002 HH PPS REVENUE DEBIT

## 2020-03-15 PROCEDURE — 3331090001 HH PPS REVENUE CREDIT

## 2020-03-16 ENCOUNTER — HOME CARE VISIT (OUTPATIENT)
Dept: SCHEDULING | Facility: HOME HEALTH | Age: 70
End: 2020-03-16
Payer: MEDICARE

## 2020-03-16 VITALS
WEIGHT: 240 LBS | TEMPERATURE: 97.8 F | DIASTOLIC BLOOD PRESSURE: 80 MMHG | SYSTOLIC BLOOD PRESSURE: 123 MMHG | BODY MASS INDEX: 29.21 KG/M2 | OXYGEN SATURATION: 99 % | RESPIRATION RATE: 18 BRPM | HEART RATE: 69 BPM

## 2020-03-16 VITALS — RESPIRATION RATE: 18 BRPM | SYSTOLIC BLOOD PRESSURE: 140 MMHG | DIASTOLIC BLOOD PRESSURE: 80 MMHG

## 2020-03-16 PROBLEM — N18.4 CKD (CHRONIC KIDNEY DISEASE), STAGE IV (HCC): Status: RESOLVED | Noted: 2017-08-05 | Resolved: 2020-03-16

## 2020-03-16 PROBLEM — I50.9 ACUTE EXACERBATION OF CHF (CONGESTIVE HEART FAILURE) (HCC): Status: RESOLVED | Noted: 2020-02-14 | Resolved: 2020-03-16

## 2020-03-16 PROCEDURE — 3331090001 HH PPS REVENUE CREDIT

## 2020-03-16 PROCEDURE — 3331090002 HH PPS REVENUE DEBIT

## 2020-03-16 PROCEDURE — G0300 HHS/HOSPICE OF LPN EA 15 MIN: HCPCS

## 2020-03-16 NOTE — PROGRESS NOTES
Chief Complaint Patient presents with  CHF  Chronic Kidney Disease  Shoulder Pain Left shouder pain- pain scale 12.5 SUBJECTIVE: 
 
Royal VELASCO Melida Robison. is a 68 y.o. male returns in follow-up for his hypertension, CHF, atrial fibrillation, end-stage renal disease on dialysis, cardiomyopathy and other medical problems. He is taking his medication and seems to be doing well with the dialysis now. He currently denies any chest pain, shortness of breath, palpitations, PND, orthopnea or other cardiorespiratory complaints. He notes no GI or  complaints. He notes no headaches, dizziness or neurologic complaints. He does have a lot of arthritic complaints in his shoulder but is not to the point where he wants to go with a cortisone shot at this point as they have not really been very effective in the past. 
 
 
Current Outpatient Medications Medication Sig Dispense Refill  menthol-zinc oxide (CALMOSEPTINE) 0.44-20.6 % oint Apply 1 Each to affected area daily.  oxyCODONE-acetaminophen (PERCOCET) 5-325 mg per tablet Take 1 Tab by mouth nightly as needed for Pain for up to 30 days. Max Daily Amount: 1 Tab. 30 Tab 0  
 polyethylene glycol (MIRALAX) 17 gram packet Take 17 g by mouth daily as needed for Other (constipation).  DULCOLAX, BISACODYL, PO Take 1-3 Tabs by mouth daily as needed for Other (constipation).  carvediloL (COREG) 3.125 mg tablet Take 1 Tab by mouth two (2) times daily (with meals). 60 Tab prn  febuxostat (ULORIC) 40 mg tab tablet Take 40 mg by mouth daily as needed for Other.  potassium chloride (KLOR-CON) 20 mEq pack Take 40 mEq by mouth daily.  zolpidem (AMBIEN) 10 mg tablet Take 10 mg by mouth nightly as needed for Sleep.  OTHER Apply  to affected area four (4) times daily as needed for Pain. Dufm Medici. Apply to affected areas as needed for pain. topical, small amount  bimatoprost (LUMIGAN) 0.01 % ophthalmic drops Administer 1 Drop to right eye every evening.  dorzolamide-timoloL (COSOPT) 22.3-6.8 mg/mL ophthalmic solution Administer 1 Drop to right eye nightly.  insulin glargine (LANTUS SOLOSTAR U-100 INSULIN) 100 unit/mL (3 mL) inpn 10-20 Units by SubCUTAneous route nightly. Will use 10 units if glucose < 100.  pramipexole (MIRAPEX) 1 mg tablet TAKE 1 TABLET BY MOUTH EVERY NIGHT AT BEDTIME 30 Tab prn  tamsulosin (FLOMAX) 0.4 mg capsule TAKE 2 CAPSULES BY MOUTH EVERY  Cap 3  ferrous sulfate (SLOW FE) 47.5 mg iron TbER tablet Take 1 Tab by mouth three (3) times daily. 90 Tab 12  
 bumetanide (BUMEX) 2 mg tablet Take 2 Tabs by mouth two (2) times a day. 120 Tab 12  
 isosorbide mononitrate ER (IMDUR) 30 mg tablet Take 1 Tab by mouth daily. 30 Tab 12  
 finasteride (PROSCAR) 5 mg tablet TAKE 1 TABLET BY MOUTH DAILY 90 Tab 3  
 ascorbic acid, vitamin C, (VITAMIN C) 250 mg tablet Take 1 Tab by mouth three (3) times daily. 100 Tab prn  
 OTHER Apply  to affected area daily as needed (Knee Pain). CBD Cream:  Apply daily as needed for knee pain , topical, small amount  Liraglutide (VICTOZA) 0.6 mg/0.1 mL (18 mg/3 mL) sub-q pen 0.6 mg by SubCUTAneous route nightly. Past Medical History:  
Diagnosis Date  Anemia 8/5/2017  Anxiety 8/5/2017  Arrhythmia   
 atrial fibrillation 2014  ASCVD (arteriosclerotic cardiovascular disease) 8/5/2017  BPH (benign prostatic hyperplasia) 8/5/2017  CAD (coronary artery disease)   
 h/o stents  Cancer [facility])   
 h/o skin cancer  Cardiomyopathy ([facility] [address] 8/5/2017  CHF (congestive heart failure) ([facility] [address] 8/5/2017  Chronic kidney disease Stage IV  CKD (chronic kidney disease), stage IV ([address] 8/5/2017  Diabetes ([facility] [address]  Diabetes mellitus ([facility] [address] 8/5/2017  Diabetic neuropathy ([facility] [address] 8/5/2017  DJD (degenerative joint disease) 8/5/2017  ED (erectile dysfunction) 8/5/2017  Gout 8/5/2017  High cholesterol  Hyperlipidemia 8/5/2017  Hypertension  Hypertension with renal disease 8/5/2017  Hypothyroid 8/5/2017  Insomnia 8/5/2017  Obesity 8/5/2017  On statin therapy 8/5/2017  Pneumonia 05/28/2019  Restless leg 8/5/2017  Sleep apnea Bipap  Thyroid disease   
 hypothyroid  Ulcer of foot due to secondary diabetes ([address] 07/12/2019 Past Surgical History:  
Procedure Laterality Date  CARDIAC SURG PROCEDURE UNLIST    
 cardiac stents 3  
 CARDIAC SURG PROCEDURE UNLIST Ablation 5/17/2018 [facility]  COLONOSCOPY N/A 6/28/2016 COLONOSCOPY performed by Yuli Ramirez MD at [facility] ENDOSCOPY  COLONOSCOPY N/A 1/9/2019 COLONOSCOPY performed by Radha Vasquez MD at [facility] ENDOSCOPY  COLONOSCOPY,DIAGNOSTIC  1/9/2019  HX APPENDECTOMY  HX BUNIONECTOMY    
 and removal of 2 seasmoid bone of the great toe 2/2018  HX HEENT Bilateral Cataract surgery  HX HEENT Tonsils  HX HEENT Axe wound to the head  HX KNEE ARTHROSCOPY X 2  
 HX ORTHOPAEDIC    
 HX ORTHOPAEDIC    
 partial laminectomy  HX PACEMAKER    
 HX ROTATOR CUFF REPAIR    
 bilateral  
 IR INSERT TUNL CVC W/O PORT OVER 5 YR  3/3/2020  HI INSJ ELTRD CAR DACIA SYS ATTCH PREV PM/DFB PLS GEN N/A 1/28/2019 Lv Lead Placement To Previous Generator performed by Alfonso Lujan MD at [facility] CARDIAC CATH LAB Left side  HI REMVL PERM PM PLS GEN W/REPL PLSE GEN MULT LEAD N/A 1/28/2019 Remove & Replace Ppm Gen Biv Multi Leads performed by Alfonso Lujan MD at [id number] [place] [address] CATH LAB Allergies Allergen Reactions  Niacin Unknown (comments) Niaspan  Adhesive Rash  Imipenem Diarrhea Other reaction(s): Rash  Levemir [Insulin Detemir] Hives  Other Medication Other (comments) ? Allergy to [place] causing chemical burn  Primaxin [Imipenem-Cilastatin] Diarrhea and Rash  Xarelto [Rivaroxaban] Rash and Itching Other reaction(s): Rash REVIEW OF SYSTEMS: 
General: negative for - chills or fever, or weight loss or gain ENT: negative for - headaches, nasal congestion or tinnitus Eyes: no blurred or visual changes Neck: No stiffness or swollen nodes Respiratory: negative for - cough, hemoptysis, shortness of breath or wheezing Cardiovascular : negative for - chest pain, edema, palpitations or shortness of breath Gastrointestinal: negative for - abdominal pain, blood in stools, heartburn or nausea/vomiting Genito-Urinary: no dysuria, trouble voiding, or hematuria Musculoskeletal: negative for - gait disturbance, joint pain, joint stiffness or joint swelling except left shoulder pain Neurological: no TIA or stroke symptoms Hematologic: no bruises, no bleeding Lymphatic: no swollen glands Integument: no lumps, mole changes, nail changes or rash Endocrine:no malaise/lethargy poly uria or polydipsia or unexpected weight changes Social History Socioeconomic History  Marital status:  Spouse name: Not on file  Number of children: Not on file  Years of education: Not on file  Highest education level: Not on file Tobacco Use  Smoking status: Former Smoker Packs/day: 0.50 Years: 4.00 Pack years: 2.00 Last attempt to quit: 3/6/1972 Years since quittin.0  Smokeless tobacco: Never Used Substance and Sexual Activity  Alcohol use: No  
  Comment: rare, 1 drink per year  Drug use: No  
 Sexual activity: Not Currently Other Topics Concern Family History Problem Relation Age of Onset  Heart Disease Mother  Kidney Disease Mother  Heart Disease Father  Kidney Disease Father  Cancer Sister Breast  
 
 
OBJECTIVE:  
 
Visit Vitals /80 (BP 1 Location: Right arm, BP Patient Position: Sitting) Pulse 70 Temp 99.3 °F (37.4 °C) (Oral) Ht 6' 4\" (1.93 m) Wt 240 lb 11.2 oz (109.2 kg) SpO2 97% BMI 29.30 kg/m² CONSTITUTIONAL:   well nourished, appears age appropriate EYES: sclera anicteric, PERRL, EOMI 
ENMT:nares clear, moist mucous membranes, pharynx clear NECK: supple. Thyroid normal, No JVD or bruits RESPIRATORY: Chest: clear to ascultation and percussion, normal inspiratory effort CARDIOVASCULAR: Heart: regular rate and rhythm no murmurs, rubs or gallops, PMI not displaced, No thrills GASTROINTESTINAL: Abdomen: non distended, soft, non tender, bowel sounds normal 
HEMATOLOGIC: no purpura, petechiae or bruising LYMPHATIC: No lymph node enlargemant MUSCULOSKELETAL: Extremities: no edema or active synovitis, pulse 1+ INTEGUMENT: No unusual rashes or suspicious skin lesions noted. Nails appear normal. 
PERIPHERAL VASCULAR: normal pulses femoral, PT and DP NEUROLOGIC: non-focal exam, A & O X 3 PSYCHIATRIC:, appropriate affect ASSESSMENT:  
1. Hypertension with renal disease 2. Ischemic cardiomyopathy 3. ESRD (end stage renal disease) ([address] 4. Paroxysmal atrial fibrillation ([address] 5. Chronic combined systolic and diastolic CHF, NYHA class 4 (HCC) 6. Anemia, unspecified type Impression 1. Hypertension that is controlled so continue current therapy reviewed with him. 2.  Cardiomyopathy stable 3. End-stage renal disease on dialysis 3 times weekly and doing well 4. Paroxysmal atrial fibrillation has had no recent atrial fib 5. Diastolic and systolic combined CHF compensated 6. Anemia stable on last check and being followed at dialysis now; repeat labs today I will recheck a myself again in 2 weeks or sooner if there is a problem. We did discuss treatment for her shoulder with a possible cortisone shot and he will wait on that and decide what he wants to proceed with that. PLAN: 
. No orders of the defined types were placed in this encounter.  
 
 
 
ATTENTION:  
 This medical record was transcribed using an electronic medical records system. Although proofread, it may and can contain electronic and spelling errors. Other human spelling and other errors may be present. Corrections may be executed at a later time. Please feel free to contact us for any clarifications as needed. Follow-up and Dispositions · Return in about 2 weeks (around 3/31/2020). No results found for any visits on 03/17/20. Temitope Alonso MD 
 
The patient verbalized understanding of the problems and plans as explained.

## 2020-03-17 ENCOUNTER — OFFICE VISIT (OUTPATIENT)
Dept: INTERNAL MEDICINE CLINIC | Age: 70
End: 2020-03-17

## 2020-03-17 VITALS
OXYGEN SATURATION: 97 % | TEMPERATURE: 99.3 F | WEIGHT: 240.7 LBS | BODY MASS INDEX: 29.31 KG/M2 | DIASTOLIC BLOOD PRESSURE: 80 MMHG | HEART RATE: 70 BPM | HEIGHT: 76 IN | SYSTOLIC BLOOD PRESSURE: 136 MMHG

## 2020-03-17 DIAGNOSIS — I12.9 HYPERTENSION WITH RENAL DISEASE: Primary | ICD-10-CM

## 2020-03-17 DIAGNOSIS — I50.42 CHRONIC COMBINED SYSTOLIC AND DIASTOLIC CHF, NYHA CLASS 4 (HCC): ICD-10-CM

## 2020-03-17 DIAGNOSIS — I25.5 ISCHEMIC CARDIOMYOPATHY: ICD-10-CM

## 2020-03-17 DIAGNOSIS — I48.0 PAROXYSMAL ATRIAL FIBRILLATION (HCC): ICD-10-CM

## 2020-03-17 DIAGNOSIS — D64.9 ANEMIA, UNSPECIFIED TYPE: ICD-10-CM

## 2020-03-17 DIAGNOSIS — N18.6 ESRD (END STAGE RENAL DISEASE) (HCC): ICD-10-CM

## 2020-03-17 PROCEDURE — 3331090002 HH PPS REVENUE DEBIT

## 2020-03-17 PROCEDURE — 3331090001 HH PPS REVENUE CREDIT

## 2020-03-17 NOTE — PATIENT INSTRUCTIONS
Arthritis: Care Instructions Your Care Instructions Arthritis, also called osteoarthritis, is a breakdown of the cartilage that cushions your joints. When the cartilage wears down, your bones rub against each other. This causes pain and stiffness. Many people have some arthritis as they age. Arthritis most often affects the joints of the spine, hands, hips, knees, or feet. You can take simple measures to protect your joints, ease your pain, and help you stay active. Follow-up care is a key part of your treatment and safety. Be sure to make and go to all appointments, and call your doctor if you are having problems. It's also a good idea to know your test results and keep a list of the medicines you take. How can you care for yourself at home? · Stay at a healthy weight. Being overweight puts extra strain on your joints. · Talk to your doctor or physical therapist about exercises that will help ease joint pain. ? Stretch. You may enjoy gentle forms of yoga to help keep your joints and muscles flexible. ? Walk instead of jog. Other types of exercise that are less stressful on the joints include riding a bicycle, swimming, bang chi, or water exercise. ? Lift weights. Strong muscles help reduce stress on your joints. Stronger thigh muscles, for example, take some of the stress off of the knees and hips. Learn the right way to lift weights so you do not make joint pain worse. · Take your medicines exactly as prescribed. Call your doctor if you think you are having a problem with your medicine. · Take pain medicines exactly as directed. ? If the doctor gave you a prescription medicine for pain, take it as prescribed. ? If you are not taking a prescription pain medicine, ask your doctor if you can take an over-the-counter medicine. · Use a cane, crutch, walker, or another device if you need help to get around. These can help rest your joints.  You also can use other things to make life easier, such as a higher toilet seat and padded handles on kitchen utensils. · Do not sit in low chairs, which can make it hard to get up. · Put heat or cold on your sore joints as needed. Use whichever helps you most. You also can take turns with hot and cold packs. ? Apply heat 2 or 3 times a day for 20 to 30 minutesusing a heating pad, hot shower, or hot packto relieve pain and stiffness. ? Put ice or a cold pack on your sore joint for 10 to 20 minutes at a time. Put a thin cloth between the ice and your skin. When should you call for help? Call your doctor now or seek immediate medical care if: 
  · You have sudden swelling, warmth, or pain in any joint.  
  · You have joint pain and a fever or rash.  
  · You have such bad pain that you cannot use a joint.  
 Watch closely for changes in your health, and be sure to contact your doctor if: 
  · You have mild joint symptoms that continue even with more than 6 weeks of care at home.  
  · You have stomach pain or other problems with your medicine. Where can you learn more? Go to http://gildardo-palak.info/ Enter W041 in the search box to learn more about \"Arthritis: Care Instructions. \" Current as of: December 8, 2019Content Version: 12.4 © 1549-1091 [employer]. Care instructions adapted under license by [employer] (which disclaims liability or warranty for this information). If you have questions about a medical condition or this instruction, always ask your healthcare professional. [place] [address] any warranty or liability for your use of this information.

## 2020-03-17 NOTE — PROGRESS NOTES
Chief Complaint Patient presents with  CHF  Chronic Kidney Disease  Shoulder Pain Left shouder pain- pain scale 12.5 1. Have you been to the ER, urgent care clinic since your last visit? Hospitalized since your last visit? No 
 
2. Have you seen or consulted any other health care providers outside of the [address] [place] [address] since your last visit? Include any pap smears or colon screening.  No

## 2020-03-18 ENCOUNTER — HOME CARE VISIT (OUTPATIENT)
Dept: SCHEDULING | Facility: HOME HEALTH | Age: 70
End: 2020-03-18
Payer: MEDICARE

## 2020-03-18 ENCOUNTER — HOME CARE VISIT (OUTPATIENT)
Dept: HOME HEALTH SERVICES | Facility: HOME HEALTH | Age: 70
End: 2020-03-18
Payer: MEDICARE

## 2020-03-18 VITALS
HEART RATE: 67 BPM | RESPIRATION RATE: 18 BRPM | SYSTOLIC BLOOD PRESSURE: 140 MMHG | TEMPERATURE: 98.7 F | DIASTOLIC BLOOD PRESSURE: 60 MMHG | OXYGEN SATURATION: 98 %

## 2020-03-18 VITALS
TEMPERATURE: 98.4 F | BODY MASS INDEX: 29.21 KG/M2 | HEART RATE: 70 BPM | WEIGHT: 240 LBS | DIASTOLIC BLOOD PRESSURE: 60 MMHG | OXYGEN SATURATION: 100 % | SYSTOLIC BLOOD PRESSURE: 115 MMHG | RESPIRATION RATE: 18 BRPM

## 2020-03-18 PROCEDURE — G0152 HHCP-SERV OF OT,EA 15 MIN: HCPCS

## 2020-03-18 PROCEDURE — G0300 HHS/HOSPICE OF LPN EA 15 MIN: HCPCS

## 2020-03-18 PROCEDURE — 3331090002 HH PPS REVENUE DEBIT

## 2020-03-18 PROCEDURE — 3331090001 HH PPS REVENUE CREDIT

## 2020-03-19 PROCEDURE — 3331090001 HH PPS REVENUE CREDIT

## 2020-03-19 PROCEDURE — 3331090002 HH PPS REVENUE DEBIT

## 2020-03-20 ENCOUNTER — HOME CARE VISIT (OUTPATIENT)
Dept: HOME HEALTH SERVICES | Facility: HOME HEALTH | Age: 70
End: 2020-03-20
Payer: MEDICARE

## 2020-03-20 ENCOUNTER — HOME CARE VISIT (OUTPATIENT)
Dept: SCHEDULING | Facility: HOME HEALTH | Age: 70
End: 2020-03-20
Payer: MEDICARE

## 2020-03-20 VITALS
SYSTOLIC BLOOD PRESSURE: 130 MMHG | OXYGEN SATURATION: 98 % | RESPIRATION RATE: 18 BRPM | TEMPERATURE: 98.8 F | DIASTOLIC BLOOD PRESSURE: 70 MMHG | HEART RATE: 70 BPM

## 2020-03-20 PROCEDURE — 3331090002 HH PPS REVENUE DEBIT

## 2020-03-20 PROCEDURE — G0151 HHCP-SERV OF PT,EA 15 MIN: HCPCS

## 2020-03-20 PROCEDURE — 3331090001 HH PPS REVENUE CREDIT

## 2020-03-21 PROCEDURE — 3331090002 HH PPS REVENUE DEBIT

## 2020-03-21 PROCEDURE — 3331090001 HH PPS REVENUE CREDIT

## 2020-03-22 PROCEDURE — 3331090002 HH PPS REVENUE DEBIT

## 2020-03-22 PROCEDURE — 3331090001 HH PPS REVENUE CREDIT

## 2020-03-23 ENCOUNTER — HOME CARE VISIT (OUTPATIENT)
Dept: SCHEDULING | Facility: HOME HEALTH | Age: 70
End: 2020-03-23
Payer: MEDICARE

## 2020-03-23 PROCEDURE — 3331090002 HH PPS REVENUE DEBIT

## 2020-03-23 PROCEDURE — 3331090001 HH PPS REVENUE CREDIT

## 2020-03-24 ENCOUNTER — HOME CARE VISIT (OUTPATIENT)
Dept: HOME HEALTH SERVICES | Facility: HOME HEALTH | Age: 70
End: 2020-03-24
Payer: MEDICARE

## 2020-03-24 PROCEDURE — 3331090002 HH PPS REVENUE DEBIT

## 2020-03-24 PROCEDURE — 3331090001 HH PPS REVENUE CREDIT

## 2020-03-25 ENCOUNTER — HOME CARE VISIT (OUTPATIENT)
Dept: SCHEDULING | Facility: HOME HEALTH | Age: 70
End: 2020-03-25
Payer: MEDICARE

## 2020-03-25 VITALS
TEMPERATURE: 98.1 F | RESPIRATION RATE: 18 BRPM | SYSTOLIC BLOOD PRESSURE: 118 MMHG | OXYGEN SATURATION: 98 % | DIASTOLIC BLOOD PRESSURE: 60 MMHG | HEART RATE: 69 BPM

## 2020-03-25 PROCEDURE — 400013 HH SOC

## 2020-03-25 PROCEDURE — G0300 HHS/HOSPICE OF LPN EA 15 MIN: HCPCS

## 2020-03-25 PROCEDURE — 3331090001 HH PPS REVENUE CREDIT

## 2020-03-25 PROCEDURE — 3331090002 HH PPS REVENUE DEBIT

## 2020-03-26 ENCOUNTER — PATIENT OUTREACH (OUTPATIENT)
Dept: FAMILY MEDICINE CLINIC | Age: 70
End: 2020-03-26

## 2020-03-26 PROCEDURE — 3331090001 HH PPS REVENUE CREDIT

## 2020-03-26 PROCEDURE — 3331090002 HH PPS REVENUE DEBIT

## 2020-03-27 PROCEDURE — 3331090002 HH PPS REVENUE DEBIT

## 2020-03-27 PROCEDURE — 3331090001 HH PPS REVENUE CREDIT

## 2020-03-28 PROCEDURE — 3331090002 HH PPS REVENUE DEBIT

## 2020-03-28 PROCEDURE — 3331090001 HH PPS REVENUE CREDIT

## 2020-03-29 PROCEDURE — 3331090002 HH PPS REVENUE DEBIT

## 2020-03-29 PROCEDURE — 3331090001 HH PPS REVENUE CREDIT

## 2020-03-30 ENCOUNTER — HOME CARE VISIT (OUTPATIENT)
Dept: SCHEDULING | Facility: HOME HEALTH | Age: 70
End: 2020-03-30
Payer: MEDICARE

## 2020-03-30 VITALS
TEMPERATURE: 98.1 F | HEART RATE: 71 BPM | DIASTOLIC BLOOD PRESSURE: 66 MMHG | RESPIRATION RATE: 18 BRPM | OXYGEN SATURATION: 100 % | SYSTOLIC BLOOD PRESSURE: 130 MMHG

## 2020-03-30 PROCEDURE — G0300 HHS/HOSPICE OF LPN EA 15 MIN: HCPCS

## 2020-03-30 PROCEDURE — 3331090001 HH PPS REVENUE CREDIT

## 2020-03-30 PROCEDURE — 3331090002 HH PPS REVENUE DEBIT

## 2020-03-31 DIAGNOSIS — M19.90 ARTHRITIS: ICD-10-CM

## 2020-03-31 PROCEDURE — 3331090001 HH PPS REVENUE CREDIT

## 2020-03-31 PROCEDURE — 3331090002 HH PPS REVENUE DEBIT

## 2020-03-31 RX ORDER — OXYCODONE AND ACETAMINOPHEN 5; 325 MG/1; MG/1
1 TABLET ORAL
Qty: 30 TAB | Refills: 0 | Status: SHIPPED | OUTPATIENT
Start: 2020-03-31 | End: 2020-04-15 | Stop reason: SDUPTHER

## 2020-03-31 NOTE — TELEPHONE ENCOUNTER
RX refill request from the patient/pharmacy. Patient last seen 02- with labs, and next appt. scheduled for 03-  Requested Prescriptions     Pending Prescriptions Disp Refills    oxyCODONE-acetaminophen (Percocet) 5-325 mg per tablet 30 Tab 0     Sig: Take 1 Tab by mouth nightly as needed for Pain for up to 30 days. Max Daily Amount: 1 Tab. Gonzales Chun

## 2020-04-01 PROCEDURE — 3331090002 HH PPS REVENUE DEBIT

## 2020-04-01 PROCEDURE — 3331090001 HH PPS REVENUE CREDIT

## 2020-04-02 ENCOUNTER — HOSPITAL ENCOUNTER (OUTPATIENT)
Dept: INFUSION THERAPY | Age: 70
End: 2020-04-02

## 2020-04-02 PROCEDURE — 3331090002 HH PPS REVENUE DEBIT

## 2020-04-02 PROCEDURE — 3331090001 HH PPS REVENUE CREDIT

## 2020-04-03 PROCEDURE — 3331090001 HH PPS REVENUE CREDIT

## 2020-04-03 PROCEDURE — 3331090002 HH PPS REVENUE DEBIT

## 2020-04-04 PROCEDURE — 3331090002 HH PPS REVENUE DEBIT

## 2020-04-04 PROCEDURE — 3331090001 HH PPS REVENUE CREDIT

## 2020-04-05 PROCEDURE — 3331090001 HH PPS REVENUE CREDIT

## 2020-04-05 PROCEDURE — 3331090002 HH PPS REVENUE DEBIT

## 2020-04-06 PROCEDURE — 3331090001 HH PPS REVENUE CREDIT

## 2020-04-06 PROCEDURE — 3331090002 HH PPS REVENUE DEBIT

## 2020-04-07 PROCEDURE — 3331090002 HH PPS REVENUE DEBIT

## 2020-04-07 PROCEDURE — 3331090001 HH PPS REVENUE CREDIT

## 2020-04-08 ENCOUNTER — HOME CARE VISIT (OUTPATIENT)
Dept: SCHEDULING | Facility: HOME HEALTH | Age: 70
End: 2020-04-08
Payer: MEDICARE

## 2020-04-08 VITALS
TEMPERATURE: 98.3 F | RESPIRATION RATE: 18 BRPM | OXYGEN SATURATION: 99 % | BODY MASS INDEX: 27.56 KG/M2 | HEART RATE: 71 BPM | WEIGHT: 226.4 LBS | SYSTOLIC BLOOD PRESSURE: 130 MMHG | DIASTOLIC BLOOD PRESSURE: 70 MMHG

## 2020-04-08 PROCEDURE — 3331090002 HH PPS REVENUE DEBIT

## 2020-04-08 PROCEDURE — 3331090001 HH PPS REVENUE CREDIT

## 2020-04-08 PROCEDURE — G0300 HHS/HOSPICE OF LPN EA 15 MIN: HCPCS

## 2020-04-09 ENCOUNTER — PATIENT OUTREACH (OUTPATIENT)
Dept: FAMILY MEDICINE CLINIC | Age: 70
End: 2020-04-09

## 2020-04-09 PROCEDURE — 3331090002 HH PPS REVENUE DEBIT

## 2020-04-09 PROCEDURE — 3331090001 HH PPS REVENUE CREDIT

## 2020-04-09 NOTE — PROGRESS NOTES
COVID-19 Screening Follow-up Note Patient contacted regarding COVID-19 risk and screening. Care Transition Nurse/ Ambulatory Care Manager contacted the patient by telephone to perform follow-up assessment. Verified name and  with patient as identifiers. Patient has following risk factors of: ESRD, heart failure Symptoms reviewed with patient who verbalized the following symptoms: less urine output, no new/worsening symptoms due to ESRD on dialysis Due to no new or worsening symptoms encounter was not routed to provider for escalation. Education provided regarding infection prevention, and signs and symptoms of COVID-19 and when to seek medical attention with patient who verbalized understanding. Discussed exposure protocols and quarantine from [address] you at higher risk for severe illness  and given an opportunity for questions and concerns. The patient agrees to contact the COVID-19 hotline [phone] or PCP office for questions related to their healthcare. CTN/ACM provided contact information for future reference. From CDC: Are you at higher risk for severe illness?  Wash your hands often.  Avoid close contact (6 feet, which is about two arm lengths) with people who are sick.  Put distance between yourself and other people if COVID-19 is spreading in your community.  Clean and disinfect frequently touched surfaces.  Avoid all cruise travel and non-essential air travel.  Call your healthcare professional if you have concerns about COVID-19 and your underlying condition or if you are sick. For more information on steps you can take to protect yourself, see CDC's How to Protect Yourself Plan for follow-up call in 14 days based on severity of symptoms and risk factors

## 2020-04-10 PROCEDURE — 3331090001 HH PPS REVENUE CREDIT

## 2020-04-10 PROCEDURE — 3331090002 HH PPS REVENUE DEBIT

## 2020-04-11 PROCEDURE — 3331090002 HH PPS REVENUE DEBIT

## 2020-04-11 PROCEDURE — 3331090001 HH PPS REVENUE CREDIT

## 2020-04-12 PROCEDURE — 3331090002 HH PPS REVENUE DEBIT

## 2020-04-12 PROCEDURE — 3331090001 HH PPS REVENUE CREDIT

## 2020-04-13 PROCEDURE — 3331090002 HH PPS REVENUE DEBIT

## 2020-04-13 PROCEDURE — 3331090001 HH PPS REVENUE CREDIT

## 2020-04-14 PROCEDURE — 3331090002 HH PPS REVENUE DEBIT

## 2020-04-14 PROCEDURE — 3331090001 HH PPS REVENUE CREDIT

## 2020-04-15 ENCOUNTER — VIRTUAL VISIT (OUTPATIENT)
Dept: INTERNAL MEDICINE CLINIC | Age: 70
End: 2020-04-15

## 2020-04-15 ENCOUNTER — HOME CARE VISIT (OUTPATIENT)
Dept: SCHEDULING | Facility: HOME HEALTH | Age: 70
End: 2020-04-15
Payer: MEDICARE

## 2020-04-15 VITALS
RESPIRATION RATE: 18 BRPM | SYSTOLIC BLOOD PRESSURE: 126 MMHG | DIASTOLIC BLOOD PRESSURE: 66 MMHG | HEART RATE: 70 BPM | TEMPERATURE: 96.3 F | OXYGEN SATURATION: 98 %

## 2020-04-15 DIAGNOSIS — M25.531 RIGHT WRIST PAIN: Primary | ICD-10-CM

## 2020-04-15 DIAGNOSIS — M19.90 ARTHRITIS: ICD-10-CM

## 2020-04-15 DIAGNOSIS — N18.6 ESRD (END STAGE RENAL DISEASE) (HCC): ICD-10-CM

## 2020-04-15 DIAGNOSIS — M10.9 ACUTE GOUT OF RIGHT WRIST, UNSPECIFIED CAUSE: ICD-10-CM

## 2020-04-15 PROCEDURE — 3331090001 HH PPS REVENUE CREDIT

## 2020-04-15 PROCEDURE — 3331090002 HH PPS REVENUE DEBIT

## 2020-04-15 PROCEDURE — G0300 HHS/HOSPICE OF LPN EA 15 MIN: HCPCS

## 2020-04-15 RX ORDER — INDOMETHACIN 25 MG/1
25 CAPSULE ORAL 3 TIMES DAILY
Qty: 30 CAP | Refills: 0 | Status: SHIPPED | OUTPATIENT
Start: 2020-04-15 | End: 2020-04-23

## 2020-04-15 RX ORDER — OXYCODONE AND ACETAMINOPHEN 5; 325 MG/1; MG/1
1 TABLET ORAL
Qty: 60 TAB | Refills: 0 | Status: SHIPPED | OUTPATIENT
Start: 2020-04-15 | End: 2020-05-15

## 2020-04-15 NOTE — PROGRESS NOTES
Consent: Rochelle Serrano, who was seen by synchronous (real-time) audio-video technology, and/or his healthcare decision maker, is aware that this patient-initiated, Telehealth encounter on 4/15/2020 is a billable service, with coverage as determined by his insurance carrier. He is aware that he may receive a bill and has provided verbal consent to proceed: Yes. Assessment & Plan:  
Diagnoses and all orders for this visit: 1. Right wrist pain 2. Acute gout of right wrist, unspecified cause 3. ESRD (end stage renal disease) ([facility] [address] 4. Arthritis 
-     oxyCODONE-acetaminophen (Percocet) 5-325 mg per tablet; Take 1 Tab by mouth nightly as needed for Pain for up to 30 days. Max Daily Amount: 1 Tab. Take 1 or 2 tablets by mouth nightly as needed for pain Other orders 
-     indomethacin (INDOCIN) 25 mg capsule; Take 1 Cap by mouth three (3) times daily for 30 doses. Take 1 tablet 3 times daily with food until right wrist pain has resolved I spent at least 15 minutes with this established patient, and >50% of the time was spent counseling and/or coordinating care regarding Right wrist/hand pain and swelling [address] Subjective:  
Royal KRISTA Thacker is a 68 y.o. male who was seen for Hand Swelling (right. Went to bed two nights ago with pain and then this am woke up with swelling.) Prior to Admission medications Medication Sig Start Date End Date Taking? Authorizing Provider  
oxyCODONE-acetaminophen (Percocet) 5-325 mg per tablet Take 1 Tab by mouth nightly as needed for Pain for up to 30 days. Max Daily Amount: 1 Tab. Take 1 or 2 tablets by mouth nightly as needed for pain 4/15/20 5/15/20 Yes Charity Caballero MD  
indomethacin (INDOCIN) 25 mg capsule Take 1 Cap by mouth three (3) times daily for 30 doses.  Take 1 tablet 3 times daily with food until right wrist pain has resolved 4/15/20 4/25/20 Yes Charity Caballero MD  
 polyethylene glycol (MIRALAX) 17 gram packet Take 17 g by mouth daily as needed for Other (constipation). Yes Provider, Historical  
DULCOLAX, BISACODYL, PO Take 1-3 Tabs by mouth daily as needed for Other (constipation). Yes Provider, Historical  
carvediloL (COREG) 3.125 mg tablet Take 1 Tab by mouth two (2) times daily (with meals). 2/21/20  Yes Santiago Reno MD  
febuxostat (ULORIC) 40 mg tab tablet Take 40 mg by mouth daily as needed for Other. Yes Provider, Historical  
zolpidem (AMBIEN) 10 mg tablet Take 10 mg by mouth nightly as needed for Sleep. Yes Provider, Historical  
OTHER Apply  to affected area four (4) times daily as needed for Pain. Mica Lathe. Apply to affected areas as needed for pain. topical, small amount   Yes Provider, Historical  
bimatoprost (LUMIGAN) 0.01 % ophthalmic drops Administer 1 Drop to right eye every evening. Yes Provider, Historical  
dorzolamide-timoloL (COSOPT) 22.3-6.8 mg/mL ophthalmic solution Administer 1 Drop to right eye nightly. Yes Provider, Historical  
insulin glargine (LANTUS SOLOSTAR U-100 INSULIN) 100 unit/mL (3 mL) inpn 10-20 Units by SubCUTAneous route nightly. Will use 10 units if glucose < 100. Indications: taking 20 qhs   Yes Provider, Historical  
pramipexole (MIRAPEX) 1 mg tablet TAKE 1 TABLET BY MOUTH EVERY NIGHT AT BEDTIME 11/6/19  Yes Santiago Reno MD  
tamsulosin (FLOMAX) 0.4 mg capsule TAKE 2 CAPSULES BY MOUTH EVERY DAY 8/29/19  Yes Santiago Reno MD  
bumetanide (BUMEX) 2 mg tablet Take 2 Tabs by mouth two (2) times a day. 6/5/19  Yes Anjel Koch MD  
isosorbide mononitrate ER (IMDUR) 30 mg tablet Take 1 Tab by mouth daily. 6/6/19  Yes Anjel Koch MD  
finasteride (PROSCAR) 5 mg tablet TAKE 1 TABLET BY MOUTH DAILY 4/26/19  Yes Santiago Reno MD  
OTHER Apply  to affected area daily as needed (Knee Pain).  CBD Cream:  Apply daily as needed for knee pain , topical, small amount   Yes Provider, Historical  
Liraglutide (VICTOZA) 0.6 mg/0.1 mL (18 mg/3 mL) sub-q pen 0.6 mg by SubCUTAneous route nightly. Yes Provider, Historical  
menthol-zinc oxide (CALMOSEPTINE) 0.44-20.6 % oint Apply 1 Each to affected area daily. Provider, Historical  
potassium chloride (KLOR-CON) 20 mEq pack Take 40 mEq by mouth daily. Provider, Historical  
ferrous sulfate (SLOW FE) 47.5 mg iron TbER tablet Take 1 Tab by mouth three (3) times daily. 6/13/19   Magalie Pacheco MD  
ascorbic acid, vitamin C, (VITAMIN C) 250 mg tablet Take 1 Tab by mouth three (3) times daily. 1/10/19   Magalie Pacheco MD  
 
Allergies Allergen Reactions  Niacin Unknown (comments) Niaspan  Adhesive Rash  Imipenem Diarrhea Other reaction(s): Rash  Levemir [Insulin Detemir] Hives  Other Medication Other (comments) ? Allergy to Caryle Ivans causing chemical burn  Primaxin [Imipenem-Cilastatin] Diarrhea and Rash  Xarelto [Rivaroxaban] Rash and Itching Other reaction(s): Rash Patient Active Problem List  
Diagnosis Code  Atrial fibrillation (HCC) I48.91  
 Primary osteoarthritis involving multiple joints M15.0  ASCVD (arteriosclerotic cardiovascular disease) I25.10  Gout M10.9  Anemia D64.9  Mixed hyperlipidemia E78.2  Controlled type 2 diabetes mellitus with chronic kidney disease on chronic dialysis, with long-term current use of insulin ([facility]) E11.22, N18.6, Z79.4, Z99.2  Hypertension with renal disease I12.9  Restless leg G25.81  
 Class 1 obesity due to excess calories without serious comorbidity with body mass index (BMI) of 30.0 to 30.9 in adult E66.09, Z68.30  Insomnia G47.00  Acquired hypothyroidism E03.9  Gastroesophageal reflux disease without esophagitis K21.9  ED (erectile dysfunction) N52.9  Diabetic neuropathy (HCC) E11.40  Cardiomyopathy ([address] I42.9  BPH (benign prostatic hyperplasia) N40.0  Anxiety F41.9  Age-related cataract H25.9  Hyperostosis M85.80  Status post amputation of left great toe ([facility] [address] H94.716  Left elbow pain M25.522  S/P ablation of atrial fibrillation Z98.890, Z86.79  
 Primary osteoarthritis of right knee M17.11  
 Primary osteoarthritis of left knee M17.12  
 Hyponatremia E87.1  Pneumonia J18.9  Lateral epicondylitis of left elbow M77.12  
 Metatarsalgia M77.40  Sesamoiditis M25.80  Venous stasis ulcer of leg without varicose veins ([facility]) I87.2, L97.909  Cellulitis, leg L03.119  
 Ulcer of left foot ([facility] [address] L97.529  Metatarsalgia of left foot M77.42  Chronic combined systolic and diastolic CHF, NYHA class 4 ([facility]) I50.42  Generalized abdominal pain R10.84  
 ESRD (end stage renal disease) ([facility] [address] N18.6  Right wrist pain M25.531 Patient Active Problem List  
 Diagnosis Date Noted  ESRD (end stage renal disease) ([facility] [address] 03/04/2020 Priority: 1 - One  Chronic combined systolic and diastolic CHF, NYHA class 4 ([facility] [address] 02/15/2020 Priority: 1 - One  
 Primary osteoarthritis involving multiple joints 08/05/2017 Priority: 1 - One  
 ASCVD (arteriosclerotic cardiovascular disease) 08/05/2017 Priority: 1 - One  Mixed hyperlipidemia 08/05/2017 Priority: 1 - One  
 Controlled type 2 diabetes mellitus with chronic kidney disease on chronic dialysis, with long-term current use of insulin ([facility] [address] 08/05/2017 Priority: 1 - One  
 Hypertension with renal disease 08/05/2017 Priority: 1 - One  Gastroesophageal reflux disease without esophagitis 08/05/2017 Priority: 1 - One  Cardiomyopathy ([facility] [address] 08/05/2017 Priority: 1 - One  [facility] 03/07/2014 Priority: 1 - One  Gout 08/05/2017 Priority: 2 - Two  Anemia 08/05/2017 Priority: 2 - Two  Class 1 obesity due to excess calories without serious comorbidity with body mass index (BMI) of 30.0 to 30.9 in adult 08/05/2017   Priority: 2 - Two  
  Acquired hypothyroidism 08/05/2017 Priority: 2 - Two  Diabetic neuropathy ([facility] [address] 08/05/2017 Priority: 2 - Two  BPH (benign prostatic hyperplasia) 08/05/2017 Priority: 2 - Two  Right wrist pain 04/15/2020  Generalized abdominal pain 03/04/2020  Metatarsalgia of left foot 12/20/2019  Ulcer of left foot ([facility] [address] 12/18/2019  Cellulitis, leg 11/15/2019  Sesamoiditis 10/18/2019  Venous stasis ulcer of leg without varicose veins (HCC) 10/18/2019  Metatarsalgia 08/30/2019  Lateral epicondylitis of left elbow 07/24/2019  Pneumonia 06/07/2019  Hyponatremia 06/06/2019  Primary osteoarthritis of right knee 08/07/2018  Primary osteoarthritis of left knee 08/07/2018  S/P ablation of atrial fibrillation 05/17/2018  Left elbow pain 05/15/2018  Status post amputation of left great toe ([facility] 05/07/2018  Hyperostosis 03/10/2018  Age-related cataract 09/19/2017  Restless leg 08/05/2017  Insomnia 08/05/2017  ED (erectile dysfunction) 08/05/2017  Anxiety 08/05/2017 Current Outpatient Medications Medication Sig Dispense Refill  oxyCODONE-acetaminophen (Percocet) 5-325 mg per tablet Take 1 Tab by mouth nightly as needed for Pain for up to 30 days. Max Daily Amount: 1 Tab. Take 1 or 2 tablets by mouth nightly as needed for pain 60 Tab 0  
 indomethacin (INDOCIN) 25 mg capsule Take 1 Cap by mouth three (3) times daily for 30 doses. Take 1 tablet 3 times daily with food until right wrist pain has resolved 30 Cap 0  
 polyethylene glycol (MIRALAX) 17 gram packet Take 17 g by mouth daily as needed for Other (constipation).  DULCOLAX, BISACODYL, PO Take 1-3 Tabs by mouth daily as needed for Other (constipation).  carvediloL (COREG) 3.125 mg tablet Take 1 Tab by mouth two (2) times daily (with meals). 60 Tab prn  febuxostat (ULORIC) 40 mg tab tablet Take 40 mg by mouth daily as needed for Other.  zolpidem (AMBIEN) 10 mg tablet Take 10 mg by mouth nightly as needed for Sleep.  OTHER Apply  to affected area four (4) times daily as needed for Pain. Sima Goo. Apply to affected areas as needed for pain. topical, small amount  bimatoprost (LUMIGAN) 0.01 % ophthalmic drops Administer 1 Drop to right eye every evening.  dorzolamide-timoloL (COSOPT) 22.3-6.8 mg/mL ophthalmic solution Administer 1 Drop to right eye nightly.  insulin glargine (LANTUS SOLOSTAR U-100 INSULIN) 100 unit/mL (3 mL) inpn 10-20 Units by SubCUTAneous route nightly. Will use 10 units if glucose < 100. Indications: taking 20 qhs  pramipexole (MIRAPEX) 1 mg tablet TAKE 1 TABLET BY MOUTH EVERY NIGHT AT BEDTIME 30 Tab prn  tamsulosin (FLOMAX) 0.4 mg capsule TAKE 2 CAPSULES BY MOUTH EVERY  Cap 3  
 bumetanide (BUMEX) 2 mg tablet Take 2 Tabs by mouth two (2) times a day. 120 Tab 12  
 isosorbide mononitrate ER (IMDUR) 30 mg tablet Take 1 Tab by mouth daily. 30 Tab 12  
 finasteride (PROSCAR) 5 mg tablet TAKE 1 TABLET BY MOUTH DAILY 90 Tab 3  
 OTHER Apply  to affected area daily as needed (Knee Pain). CBD Cream:  Apply daily as needed for knee pain , topical, small amount  Liraglutide (VICTOZA) 0.6 mg/0.1 mL (18 mg/3 mL) sub-q pen 0.6 mg by SubCUTAneous route nightly.  menthol-zinc oxide (CALMOSEPTINE) 0.44-20.6 % oint Apply 1 Each to affected area daily.  potassium chloride (KLOR-CON) 20 mEq pack Take 40 mEq by mouth daily.  ferrous sulfate (SLOW FE) 47.5 mg iron TbER tablet Take 1 Tab by mouth three (3) times daily. 90 Tab 12  
 ascorbic acid, vitamin C, (VITAMIN C) 250 mg tablet Take 1 Tab by mouth three (3) times daily. 100 Tab prn Allergies Allergen Reactions  Niacin Unknown (comments) Niaspan  Adhesive Rash  Imipenem Diarrhea Other reaction(s): Rash  Levemir [Insulin Detemir] Hives  Other Medication Other (comments) ? Allergy to [place] Harry causing chemical burn  Primaxin [Imipenem-Cilastatin] Diarrhea and Rash  Xarelto [Rivaroxaban] Rash and Itching Other reaction(s): Rash Past Medical History:  
Diagnosis Date  Anemia 8/5/2017  Anxiety 8/5/2017  Arrhythmia   
 atrial fibrillation 2014  ASCVD (arteriosclerotic cardiovascular disease) 8/5/2017  BPH (benign prostatic hyperplasia) 8/5/2017  CAD (coronary artery disease)   
 h/o stents  Cancer [facility])   
 h/o skin cancer  Cardiomyopathy ([address] 8/5/2017  CHF (congestive heart failure) ([address] 8/5/2017  Chronic kidney disease Stage IV  CKD (chronic kidney disease), stage IV ([address] 8/5/2017  Diabetes ([address]  Diabetes mellitus ([address] 8/5/2017  Diabetic neuropathy ([address] 8/5/2017  DJD (degenerative joint disease) 8/5/2017  ED (erectile dysfunction) 8/5/2017  Gout 8/5/2017  High cholesterol  Hyperlipidemia 8/5/2017  Hypertension  Hypertension with renal disease 8/5/2017  Hypothyroid 8/5/2017  Insomnia 8/5/2017  Obesity 8/5/2017  On statin therapy 8/5/2017  Pneumonia 05/28/2019  Restless leg 8/5/2017  Sleep apnea Bipap  Thyroid disease   
 hypothyroid  Ulcer of foot due to secondary diabetes ([address] 07/12/2019 Past Surgical History:  
Procedure Laterality Date  CARDIAC SURG PROCEDURE UNLIST    
 cardiac stents 3  
 CARDIAC SURG PROCEDURE UNLIST Ablation 5/17/2018 ED [facility]  COLONOSCOPY N/A 6/28/2016 COLONOSCOPY performed by Karie Reddy MD at [facility] ENDOSCOPY  COLONOSCOPY N/A 1/9/2019 COLONOSCOPY performed by Beto Jack MD at [facility] ENDOSCOPY  COLONOSCOPY,DIAGNOSTIC  1/9/2019  HX APPENDECTOMY  HX BUNIONECTOMY    
 and removal of 2 seasmoid bone of the great toe 2/2018  HX HEENT Bilateral Cataract surgery  HX HEENT Tonsils  HX HEENT Axe wound to the head  HX KNEE ARTHROSCOPY  X 2  
 HX ORTHOPAEDIC    
  HX ORTHOPAEDIC    
 partial laminectomy  HX PACEMAKER    
 HX ROTATOR CUFF REPAIR    
 bilateral  
 IR INSERT TUNL CVC W/O PORT OVER 5 YR  3/3/2020  MO INSJ ELTRD CAR DACIA SYS ATTCH PREV PM/DFB PLS GEN N/A 2019 Lv Lead Placement To Previous Generator performed by Kandy Cintron MD at [facility] CARDIAC CATH LAB Left side  MO REMVL PERM PM PLS GEN W/REPL PLSE GEN MULT LEAD N/A 2019 Remove & Replace Ppm Gen Biv Multi Leads performed by Kandy Cintron MD at [id number] [facility] CATH LAB Family History Problem Relation Age of Onset  Heart Disease Mother  Kidney Disease Mother  Heart Disease Father  Kidney Disease Father  Cancer Sister Breast  
 
Social History Tobacco Use  Smoking status: Former Smoker Packs/day: 0.50 Years: 4.00 Pack years: 2.00 Last attempt to quit: 3/6/1972 Years since quittin.1  Smokeless tobacco: Never Used Substance Use Topics  Alcohol use: No  
  Comment: rare, 1 drink per year Review of Systems General: Not Present- Anorexia, Chills, Dietary Changes, Fatigue, Fever, Medication Changes, Night Sweats, Weight Gain > 10lbs. and Weight Loss > 10lbs. Cleophus Banning Skin: Not Present- Bruising and Excessive Sweating. HEENT: Not Present- Headache, Visual Loss and Vertigo. Respiratory: Not Present- Cough, Decreased Exercise Tolerance, Difficulty Breathing, Snoring and Wheezing. Cardiovascular: Not Present- Abnormal Blood Pressure, Chest Pain, Claudications, Difficulty Breathing On Exertion, Edema, Fainting / Blacking Out, Irregular Heart Beat, Night Cramps, Orthopnea, Palpitations, Paroxysmal Nocturnal Dyspnea, Rapid Heart Rate, Shortness of Breath and Swelling of Extremities. Gastrointestinal: Not Present- Black, Tarry Stool, Bloody Stool, Diarrhea, Hematemesis, Rectal Bleeding and Vomiting. Musculoskeletal: Not Present- Muscle Pain and Muscle Weakness. Positive for right wrist pain and swelling Neurological: Not Present- Dizziness. Psychiatric: Not Present- Depression. Endocrine: Not Present- Cold Intolerance, Heat Intolerance and Thyroid Problems. Hematology: Not Present- Abnormal Bleeding, Anemia, Blood Clots and Easy Bruising. Objective:  
Vital Signs: (As obtained by patient/caregiver at home) There were no vitals taken for this visit. [ 
 
Constitutional: [x] Appears well-developed and well-nourished [x] No apparent distress except obvious pain related to right wrist discomfort Mental status: [x] Alert and awake  [x] Oriented to person/place/time [x] Able to follow commands Eyes:   EOM    [x]  Normal    
Sclera  [x]  Normal  
        Discharge [x]  None visible HENT: [x] Normocephalic, atraumatic [x] Mouth/Throat: Mucous membranes are moist 
 
External Ears [x] Normal  
 
Neck: [x] No visualized mass Pulmonary/Chest: [x] Respiratory effort normal   [x] No visualized signs of difficulty breathing or respiratory distress Musculoskeletal:   [x] Gait limited by weakness requiring Rollator walker 
      [x] Normal range of motion , Positive for soft tissue swelling of right wrist with discomfort to palpation by his exam without erythema observed Neurological:        [x] No Facial Asymmetry (Cranial nerve 7 motor function) (limited exam due to video visit) [x] No gaze palsy Skin:        [x] No significant exanthematous lesions or discoloration noted on facial skin Psychiatric:       [x] Normal Affect [x] No Hallucinations Other pertinent observable physical exam findings:- 
 
 
 
We discussed the expected course, resolution and complications of the diagnosis(es) in detail. Medication risks, benefits, costs, interactions, and alternatives were discussed as indicated.   I advised him to contact the office if his condition worsens, changes or fails to improve as anticipated. He expressed understanding with the diagnosis(es) and plan. Impression 1. Right wrist swelling and pain certainly the possibility of gout is there although he took  2 colchicine tablets yesterday without benefit. I do not see erythema or anything to indicate infection. He has not had any falls on that. Based upon this we can try Indocin at a lower dose in light of his renal failure at 25 mg 3 times daily until pain resolves. He is to take this with food. If symptoms increase or not noting some improvement within 48 hours and he needs to be physically seen. This has been discussed with him and his wife via video exam today. 2.  Probable gout with a history of gout thus treatment as noted 3. End-stage renal disease and warned of potential problems with nonsteroidals although at 25 mg 3 times daily with food he should be okay Lore Vieyra. is a 68 y.o. male being evaluated by a video visit encounter for concerns as above. A caregiver was present when appropriate. Due to this being a TeleHealth encounter (During [place] [address] public health emergency), evaluation of the following organ systems was limited: Vitals/Constitutional/EENT/Resp/CV/GI//MS/Neuro/Skin/Heme-Lymph-Imm. Pursuant to the emergency declaration under the [facility], [facility]5 waiver authority and the [employer] and [employer] Act, this Virtual  Visit was conducted, with patient's (and/or legal guardian's) consent, to reduce the patient's risk of exposure to COVID-19 and provide necessary medical care. Services were provided through a video synchronous discussion virtually to substitute for in-person clinic visit. Patient and provider were located at their individual homes.  
 
 
 
Roverto Chand MD

## 2020-04-16 PROCEDURE — 3331090001 HH PPS REVENUE CREDIT

## 2020-04-16 PROCEDURE — 3331090002 HH PPS REVENUE DEBIT

## 2020-04-17 PROCEDURE — 3331090001 HH PPS REVENUE CREDIT

## 2020-04-17 PROCEDURE — 3331090002 HH PPS REVENUE DEBIT

## 2020-04-18 PROCEDURE — 3331090002 HH PPS REVENUE DEBIT

## 2020-04-18 PROCEDURE — 3331090001 HH PPS REVENUE CREDIT

## 2020-04-19 PROCEDURE — 3331090002 HH PPS REVENUE DEBIT

## 2020-04-19 PROCEDURE — 3331090001 HH PPS REVENUE CREDIT

## 2020-04-20 PROCEDURE — 3331090002 HH PPS REVENUE DEBIT

## 2020-04-20 PROCEDURE — 3331090001 HH PPS REVENUE CREDIT

## 2020-04-21 PROCEDURE — 3331090001 HH PPS REVENUE CREDIT

## 2020-04-21 PROCEDURE — 3331090002 HH PPS REVENUE DEBIT

## 2020-04-22 ENCOUNTER — PATIENT OUTREACH (OUTPATIENT)
Dept: FAMILY MEDICINE CLINIC | Age: 70
End: 2020-04-22

## 2020-04-22 ENCOUNTER — HOME CARE VISIT (OUTPATIENT)
Dept: SCHEDULING | Facility: HOME HEALTH | Age: 70
End: 2020-04-22
Payer: MEDICARE

## 2020-04-22 ENCOUNTER — HOME CARE VISIT (OUTPATIENT)
Dept: HOME HEALTH SERVICES | Facility: HOME HEALTH | Age: 70
End: 2020-04-22
Payer: MEDICARE

## 2020-04-22 PROCEDURE — 3331090002 HH PPS REVENUE DEBIT

## 2020-04-22 PROCEDURE — G0299 HHS/HOSPICE OF RN EA 15 MIN: HCPCS

## 2020-04-22 PROCEDURE — 3331090001 HH PPS REVENUE CREDIT

## 2020-04-22 NOTE — PROGRESS NOTES
Patient reports no S&S of CHF. Denies fever, SOB, N/V/D. Attending dialysis. Following diet plan. CTN will follow up with patient/Family in 2 weeks

## 2020-04-23 VITALS
HEART RATE: 71 BPM | OXYGEN SATURATION: 98 % | SYSTOLIC BLOOD PRESSURE: 120 MMHG | DIASTOLIC BLOOD PRESSURE: 80 MMHG | TEMPERATURE: 98.1 F | RESPIRATION RATE: 18 BRPM

## 2020-04-23 RX ORDER — INDOMETHACIN 25 MG/1
CAPSULE ORAL
Qty: 30 CAP | Refills: 0 | Status: SHIPPED | OUTPATIENT
Start: 2020-04-23 | End: 2020-05-04

## 2020-04-23 NOTE — TELEPHONE ENCOUNTER
RX refill request from the patient/pharmacy. Patient last seen 04- with labs, and does not have a f/up appointment.   Requested Prescriptions     Pending Prescriptions Disp Refills    indomethacin (INDOCIN) 25 mg capsule [Pharmacy Med Name: INDOMETHACIN 25MG CAPSULES] 30 Cap 0     Sig: TAKE 1 CAPSULE BY MOUTH THREE TIMES DAILY WITH FOOD UNTIL RIGHT WRIST PAIN HAS RESOLVED

## 2020-04-28 RX ORDER — SODIUM CHLORIDE 9 MG/ML
25 INJECTION, SOLUTION INTRAVENOUS CONTINUOUS
Status: CANCELLED | OUTPATIENT
Start: 2020-05-04

## 2020-04-28 NOTE — PERIOP NOTES
[facility] Preoperative Instructions Surgery Date May 4, 2020       Time of [place] 1. On the day of your surgery, please report to the Surgical Services Registration Desk and sign in at your designated time. The Surgery Center is located to the right of the Emergency Room. 2. You must have someone with you to drive you home. You should not drive a car for 24 hours following surgery. Please make arrangements for a friend or family member to stay with you for the first 24 hours after your surgery. 3. Do not have anything to eat or drink (including water, gum, mints, coffee, juice) after midnight ?5/3/2020? [place] ? This may not apply to medications prescribed by your physician. ?(Please note below the special instructions with medications to take the morning of your procedure.) 4. We recommend you do not drink any alcoholic beverages for 24 hours before and after your surgery. 5. Contact your surgeons office for instructions on the following medications: non-steroidal anti-inflammatory drugs (i.e. Advil, Aleve), vitamins, and supplements. (Some surgeons will want you to stop these medications prior to surgery and others may allow you to take them) **If you are currently taking Plavix, Coumadin, Aspirin and/or other blood-thinning agents, contact your surgeon for instructions. ** Your surgeon will partner with the physician prescribing these medications to determine if it is safe to stop or if you need to continue taking. Please do not stop taking these medications without instructions from your surgeon 6. Wear comfortable clothes. Wear glasses instead of contacts. Do not bring any money or jewelry. Please bring picture ID, insurance card, and any prearranged co-payment or hospital payment. Do not wear make-up, particularly mascara the morning of your surgery. Do not wear nail polish, particularly if you are having foot /hand surgery.   Wear your hair loose or down, no ponytails, buns, lisa pins or clips. All body piercings must be removed. Please shower with antibacterial soap for three consecutive days before and on the morning of surgery, but do not apply any lotions, powders or deodorants after the shower on the day of surgery. Please use a fresh towels after each shower. Please sleep in clean clothes and change bed linens the night before surgery. Please do not shave for 48 hours prior to surgery. Shaving of the face is acceptable. 7. You should understand that if you do not follow these instructions your surgery may be cancelled. If your physical condition changes (I.e. fever, cold or flu) please contact your surgeon as soon as possible. 8. It is important that you be on time. If a situation occurs where you may be late, please call [phone] (OR Holding Area). 9. If you have any questions and or problems, please call [phone] (Pre-admission Testing). 10. Your surgery time may be subject to change. You will receive a phone call the evening prior if your time changes. 11.  If having outpatient surgery, you must have someone to drive you here, stay with you during the duration of your stay, and to drive you home at time of discharge. 12.   In an effort to improve the efficiency, privacy, and safety for all of our Pre-op patients visitors are not allowed in the Holding area. Once you arrive and are registered your family/visitors will be asked to remain in the waiting room. The Pre-op staff will get you from the Surgical Waiting Area and will explain to you and your family/visitors that the Pre-op phase is beginning. The staff will answer any questions and provide instructions for tracking of the patient, by use of the existing tracking number and color-coded status board in the waiting room.   At this time the staff will also ask for your designated spokesperson information in the event that the physician or staff need to provide an update or obtain any pertinent information. The designated spokesperson will be notified if the physician needs to speak to family during the pre-operative phase. If at any time your family/visitors has questions or concerns they may approach the volunteer desk in the waiting area for assistance. Special Instructions: As a precaution [facility] has implemented a restricted visitation policy to limit to 1 visitor per patient. No visitors under the age of 15 will be permitted in the Hospital.  If you are ill, please call to reschedule your procedure. (For pediatric patients, 2 visitors per patient) Follow surgeon instructions TAKE ALL MEDICATIONS DAY OF SURGERY EXCEPT:none Only take half of Lantus insulin the night before OR holding number emphasized of BS is \"off\" morning of surgery I understand a pre-operative phone call will be made to verify my surgery time. In the event that I am not available, I give permission for a message to be left on my answering service and/or with another person? yes  
 
 
 
 ___________________      __________   _________ 
  (Signature of Patient)             (Witness)                (Date and Time)

## 2020-04-28 NOTE — PERIOP NOTES
Faxed pacemaker form to DR Yang's office- fax confirmed Phone call to Dr Debbie Shelton office to request office notes from last month - received [facility] called- to request labs- received Phone call to Kahlil's office to inquire about patient reporting history of MRSA-spoke to Renea who states she will relay message [phone] new order sheet received- vancomycin checked

## 2020-04-30 ENCOUNTER — APPOINTMENT (OUTPATIENT)
Dept: INFUSION THERAPY | Age: 70
End: 2020-04-30

## 2020-05-04 ENCOUNTER — HOSPITAL ENCOUNTER (OUTPATIENT)
Age: 70
Setting detail: OUTPATIENT SURGERY
Discharge: HOME | End: 2020-05-04
Attending: SURGERY | Admitting: SURGERY
Payer: MEDICARE

## 2020-05-04 ENCOUNTER — ANESTHESIA (OUTPATIENT)
Dept: SURGERY | Age: 70
End: 2020-05-04
Payer: MEDICARE

## 2020-05-04 ENCOUNTER — ANESTHESIA EVENT (OUTPATIENT)
Dept: SURGERY | Age: 70
End: 2020-05-04
Payer: MEDICARE

## 2020-05-04 VITALS
HEIGHT: 76 IN | RESPIRATION RATE: 11 BRPM | TEMPERATURE: 98 F | HEART RATE: 70 BPM | OXYGEN SATURATION: 95 % | SYSTOLIC BLOOD PRESSURE: 117 MMHG | BODY MASS INDEX: 29.37 KG/M2 | DIASTOLIC BLOOD PRESSURE: 77 MMHG | WEIGHT: 241.18 LBS

## 2020-05-04 DIAGNOSIS — N18.6 ESRD (END STAGE RENAL DISEASE) (HCC): Primary | ICD-10-CM

## 2020-05-04 LAB
ANION GAP SERPL CALC-SCNC: 8 MMOL/L (ref 5–15)
BUN SERPL-MCNC: 51 MG/DL (ref 6–20)
BUN/CREAT SERPL: 9 (ref 12–20)
CALCIUM SERPL-MCNC: 8.9 MG/DL (ref 8.5–10.1)
CHLORIDE SERPL-SCNC: 101 MMOL/L (ref 97–108)
CO2 SERPL-SCNC: 26 MMOL/L (ref 21–32)
CREAT SERPL-MCNC: 5.48 MG/DL (ref 0.7–1.3)
GLUCOSE BLD STRIP.AUTO-MCNC: 114 MG/DL (ref 65–100)
GLUCOSE SERPL-MCNC: 147 MG/DL (ref 65–100)
POTASSIUM SERPL-SCNC: 4.1 MMOL/L (ref 3.5–5.1)
SERVICE CMNT-IMP: ABNORMAL
SODIUM SERPL-SCNC: 135 MMOL/L (ref 136–145)

## 2020-05-04 PROCEDURE — 74011250636 HC RX REV CODE- 250/636: Performed by: SURGERY

## 2020-05-04 PROCEDURE — 77030031753 HC SHR ENDO COAG HARM J&J -E: Performed by: SURGERY

## 2020-05-04 PROCEDURE — 77030011640 HC PAD GRND REM COVD -A: Performed by: SURGERY

## 2020-05-04 PROCEDURE — 74011000250 HC RX REV CODE- 250: Performed by: SURGERY

## 2020-05-04 PROCEDURE — 74011250637 HC RX REV CODE- 250/637: Performed by: SURGERY

## 2020-05-04 PROCEDURE — 74011000272 HC RX REV CODE- 272: Performed by: SURGERY

## 2020-05-04 PROCEDURE — 77030020256 HC SOL INJ NACL 0.9%  500ML: Performed by: SURGERY

## 2020-05-04 PROCEDURE — 74011250636 HC RX REV CODE- 250/636: Performed by: ANESTHESIOLOGY

## 2020-05-04 PROCEDURE — 77030008463 HC STPLR SKN PROX J&J -B: Performed by: SURGERY

## 2020-05-04 PROCEDURE — 80048 BASIC METABOLIC PNL TOTAL CA: CPT

## 2020-05-04 PROCEDURE — 77030018836 HC SOL IRR NACL ICUM -A: Performed by: SURGERY

## 2020-05-04 PROCEDURE — 74011000250 HC RX REV CODE- 250: Performed by: ANESTHESIOLOGY

## 2020-05-04 PROCEDURE — 77030002986 HC SUT PROL J&J -A: Performed by: SURGERY

## 2020-05-04 PROCEDURE — 76210000020 HC REC RM PH II FIRST 0.5 HR: Performed by: SURGERY

## 2020-05-04 PROCEDURE — 76010000131 HC OR TIME 2 TO 2.5 HR: Performed by: SURGERY

## 2020-05-04 PROCEDURE — 77030002996 HC SUT SLK J&J -A: Performed by: SURGERY

## 2020-05-04 PROCEDURE — 77030003601 HC NDL NRV BLK BBMI -A: Performed by: ANESTHESIOLOGY

## 2020-05-04 PROCEDURE — 76210000016 HC OR PH I REC 1 TO 1.5 HR: Performed by: SURGERY

## 2020-05-04 PROCEDURE — 77030010929 HC CLMP VASC FGRTY EDWD -B: Performed by: SURGERY

## 2020-05-04 PROCEDURE — 77030031139 HC SUT VCRL2 J&J -A: Performed by: SURGERY

## 2020-05-04 PROCEDURE — 77030014008 HC SPNG HEMSTAT J&J -C: Performed by: SURGERY

## 2020-05-04 PROCEDURE — 36415 COLL VENOUS BLD VENIPUNCTURE: CPT

## 2020-05-04 PROCEDURE — 77030040922 HC BLNKT HYPOTHRM STRY -A

## 2020-05-04 PROCEDURE — 76060000035 HC ANESTHESIA 2 TO 2.5 HR: Performed by: SURGERY

## 2020-05-04 PROCEDURE — 77030002933 HC SUT MCRYL J&J -A: Performed by: SURGERY

## 2020-05-04 PROCEDURE — 74011250636 HC RX REV CODE- 250/636: Performed by: NURSE ANESTHETIST, CERTIFIED REGISTERED

## 2020-05-04 PROCEDURE — 82962 GLUCOSE BLOOD TEST: CPT

## 2020-05-04 RX ORDER — INDOMETHACIN 25 MG/1
CAPSULE ORAL
Qty: 30 CAP | Refills: 0 | Status: SHIPPED | OUTPATIENT
Start: 2020-05-04 | End: 2020-07-29

## 2020-05-04 RX ORDER — SODIUM CHLORIDE 0.9 % (FLUSH) 0.9 %
5-40 SYRINGE (ML) INJECTION AS NEEDED
Status: DISCONTINUED | OUTPATIENT
Start: 2020-05-04 | End: 2020-05-04 | Stop reason: HOSPADM

## 2020-05-04 RX ORDER — SODIUM CHLORIDE 9 MG/ML
25 INJECTION, SOLUTION INTRAVENOUS CONTINUOUS
Status: DISCONTINUED | OUTPATIENT
Start: 2020-05-04 | End: 2020-05-04 | Stop reason: HOSPADM

## 2020-05-04 RX ORDER — PROTAMINE SULFATE 10 MG/ML
INJECTION, SOLUTION INTRAVENOUS AS NEEDED
Status: DISCONTINUED | OUTPATIENT
Start: 2020-05-04 | End: 2020-05-04 | Stop reason: HOSPADM

## 2020-05-04 RX ORDER — HEPARIN SODIUM 1000 [USP'U]/ML
INJECTION, SOLUTION INTRAVENOUS; SUBCUTANEOUS AS NEEDED
Status: DISCONTINUED | OUTPATIENT
Start: 2020-05-04 | End: 2020-05-04 | Stop reason: HOSPADM

## 2020-05-04 RX ORDER — FENTANYL CITRATE 50 UG/ML
25 INJECTION, SOLUTION INTRAMUSCULAR; INTRAVENOUS
Status: DISCONTINUED | OUTPATIENT
Start: 2020-05-04 | End: 2020-05-04 | Stop reason: HOSPADM

## 2020-05-04 RX ORDER — LIDOCAINE HYDROCHLORIDE 10 MG/ML
0.1 INJECTION, SOLUTION EPIDURAL; INFILTRATION; INTRACAUDAL; PERINEURAL AS NEEDED
Status: DISCONTINUED | OUTPATIENT
Start: 2020-05-04 | End: 2020-05-04 | Stop reason: HOSPADM

## 2020-05-04 RX ORDER — HYDROMORPHONE HYDROCHLORIDE 1 MG/ML
0.2 INJECTION, SOLUTION INTRAMUSCULAR; INTRAVENOUS; SUBCUTANEOUS
Status: DISCONTINUED | OUTPATIENT
Start: 2020-05-04 | End: 2020-05-04 | Stop reason: HOSPADM

## 2020-05-04 RX ORDER — DIPHENHYDRAMINE HYDROCHLORIDE 50 MG/ML
12.5 INJECTION, SOLUTION INTRAMUSCULAR; INTRAVENOUS AS NEEDED
Status: DISCONTINUED | OUTPATIENT
Start: 2020-05-04 | End: 2020-05-04 | Stop reason: HOSPADM

## 2020-05-04 RX ORDER — SODIUM CHLORIDE, SODIUM LACTATE, POTASSIUM CHLORIDE, CALCIUM CHLORIDE 600; 310; 30; 20 MG/100ML; MG/100ML; MG/100ML; MG/100ML
25 INJECTION, SOLUTION INTRAVENOUS CONTINUOUS
Status: DISCONTINUED | OUTPATIENT
Start: 2020-05-04 | End: 2020-05-04 | Stop reason: HOSPADM

## 2020-05-04 RX ORDER — PROPOFOL 10 MG/ML
INJECTION, EMULSION INTRAVENOUS AS NEEDED
Status: DISCONTINUED | OUTPATIENT
Start: 2020-05-04 | End: 2020-05-04 | Stop reason: HOSPADM

## 2020-05-04 RX ORDER — SODIUM CHLORIDE 0.9 % (FLUSH) 0.9 %
5-40 SYRINGE (ML) INJECTION EVERY 8 HOURS
Status: DISCONTINUED | OUTPATIENT
Start: 2020-05-04 | End: 2020-05-04 | Stop reason: HOSPADM

## 2020-05-04 RX ORDER — PROPOFOL 10 MG/ML
INJECTION, EMULSION INTRAVENOUS
Status: DISCONTINUED | OUTPATIENT
Start: 2020-05-04 | End: 2020-05-04 | Stop reason: HOSPADM

## 2020-05-04 RX ORDER — VANCOMYCIN/0.9 % SOD CHLORIDE 1.5G/250ML
1500 PLASTIC BAG, INJECTION (ML) INTRAVENOUS ONCE
Status: COMPLETED | OUTPATIENT
Start: 2020-05-04 | End: 2020-05-04

## 2020-05-04 RX ORDER — PHENYLEPHRINE HCL IN 0.9% NACL 0.4MG/10ML
SYRINGE (ML) INTRAVENOUS AS NEEDED
Status: DISCONTINUED | OUTPATIENT
Start: 2020-05-04 | End: 2020-05-04 | Stop reason: HOSPADM

## 2020-05-04 RX ORDER — LIDOCAINE HYDROCHLORIDE AND EPINEPHRINE 20; 10 MG/ML; UG/ML
INJECTION, SOLUTION INFILTRATION; PERINEURAL AS NEEDED
Status: DISCONTINUED | OUTPATIENT
Start: 2020-05-04 | End: 2020-05-04 | Stop reason: HOSPADM

## 2020-05-04 RX ORDER — ROPIVACAINE HYDROCHLORIDE 5 MG/ML
INJECTION, SOLUTION EPIDURAL; INFILTRATION; PERINEURAL AS NEEDED
Status: DISCONTINUED | OUTPATIENT
Start: 2020-05-04 | End: 2020-05-04 | Stop reason: HOSPADM

## 2020-05-04 RX ORDER — MIDAZOLAM HYDROCHLORIDE 1 MG/ML
INJECTION, SOLUTION INTRAMUSCULAR; INTRAVENOUS AS NEEDED
Status: DISCONTINUED | OUTPATIENT
Start: 2020-05-04 | End: 2020-05-04 | Stop reason: HOSPADM

## 2020-05-04 RX ORDER — FENTANYL CITRATE 50 UG/ML
INJECTION, SOLUTION INTRAMUSCULAR; INTRAVENOUS AS NEEDED
Status: DISCONTINUED | OUTPATIENT
Start: 2020-05-04 | End: 2020-05-04 | Stop reason: HOSPADM

## 2020-05-04 RX ORDER — HYDROCODONE BITARTRATE AND ACETAMINOPHEN 5; 325 MG/1; MG/1
1 TABLET ORAL
Qty: 30 TAB | Refills: 0 | Status: SHIPPED | OUTPATIENT
Start: 2020-05-04 | End: 2020-05-09

## 2020-05-04 RX ADMIN — HEPARIN SODIUM 5000 UNITS: 1000 INJECTION, SOLUTION INTRAVENOUS; SUBCUTANEOUS at 08:39

## 2020-05-04 RX ADMIN — LIDOCAINE HYDROCHLORIDE AND EPINEPHRINE 15 ML: 20; 10 INJECTION, SOLUTION INFILTRATION; PERINEURAL at 07:07

## 2020-05-04 RX ADMIN — PROPOFOL 35 MCG/KG/MIN: 10 INJECTION, EMULSION INTRAVENOUS at 07:31

## 2020-05-04 RX ADMIN — FENTANYL CITRATE 50 MCG: 50 INJECTION, SOLUTION INTRAMUSCULAR; INTRAVENOUS at 07:05

## 2020-05-04 RX ADMIN — MIDAZOLAM HYDROCHLORIDE 2 MG: 1 INJECTION, SOLUTION INTRAMUSCULAR; INTRAVENOUS at 07:05

## 2020-05-04 RX ADMIN — Medication 3 AMPULE: at 06:50

## 2020-05-04 RX ADMIN — ROPIVACAINE HYDROCHLORIDE 15 ML: 5 INJECTION, SOLUTION EPIDURAL; INFILTRATION; PERINEURAL at 07:07

## 2020-05-04 RX ADMIN — LIDOCAINE HYDROCHLORIDE AND EPINEPHRINE 5 ML: 20; 10 INJECTION, SOLUTION INFILTRATION; PERINEURAL at 07:09

## 2020-05-04 RX ADMIN — Medication 80 MCG: at 08:40

## 2020-05-04 RX ADMIN — SODIUM CHLORIDE 25 ML/HR: 900 INJECTION, SOLUTION INTRAVENOUS at 06:50

## 2020-05-04 RX ADMIN — VANCOMYCIN HYDROCHLORIDE 1500 MG: 10 INJECTION, POWDER, LYOPHILIZED, FOR SOLUTION INTRAVENOUS at 06:50

## 2020-05-04 RX ADMIN — PROPOFOL 10 MG: 10 INJECTION, EMULSION INTRAVENOUS at 07:55

## 2020-05-04 RX ADMIN — FENTANYL CITRATE 25 MCG: 50 INJECTION, SOLUTION INTRAMUSCULAR; INTRAVENOUS at 07:54

## 2020-05-04 RX ADMIN — MIDAZOLAM HYDROCHLORIDE 1 MG: 1 INJECTION, SOLUTION INTRAMUSCULAR; INTRAVENOUS at 07:31

## 2020-05-04 RX ADMIN — ROPIVACAINE HYDROCHLORIDE 5 ML: 5 INJECTION, SOLUTION EPIDURAL; INFILTRATION; PERINEURAL at 07:09

## 2020-05-04 RX ADMIN — PROTAMINE SULFATE 40 MG: 10 INJECTION, SOLUTION INTRAVENOUS at 09:13

## 2020-05-04 RX ADMIN — Medication 80 MCG: at 08:31

## 2020-05-04 NOTE — ANESTHESIA PROCEDURE NOTES
Peripheral Block Start time: 5/4/2020 7:04 AM 
End time: 5/4/2020 7:09 AM 
Performed by: Jaison Mercedes MD 
Authorized by: Jaison Mercedes MD  
 
 
Pre-procedure: Indications: at surgeon's request and post-op pain management Preanesthetic Checklist: risks and benefits discussed, site marked and timeout performed Timeout Time: 07:04 Block Type:  
Block Type:  Supraclavicular Laterality:  Left Monitoring:  Standard ASA monitoring, frequent vital sign checks, responsive to questions and oxygen Injection Technique:  Single shot Procedures: ultrasound guided Prep: chlorhexidine Location:  Supraclavicular Needle Type:  Stimuplex Needle Gauge:  22 G Needle Localization:  Ultrasound guidance Assessment: 
Number of attempts:  1 Injection Assessment:  Incremental injection every 5 mL, local visualized surrounding nerve on ultrasound, negative aspiration for blood, ultrasound image on chart, no intravascular symptoms and no paresthesia Patient tolerance:  Patient tolerated the procedure well with no immediate complications Supraclavicular Nerve Block Note Left Supraclavicular nerve block: 
 
Risks, benefits, alternatives explained at length and patient agrees to proceed. Time out performed and site for block/surgery identified. Standard monitors applied, 3 L NC O2, and sedation given as recorded by RN so as to achieve patient comfort and anxiolysis, but maintain meaningful verbal contact. Sterile prep followed by 1-2 mL local at insertion site. A 40 mm, 22G insulated stimiplex needle was inserted in the interscalene groove, approximately one centimeter from the mid-clavicle. The nerve bundle was identified under [address] [facility] guidance. Local anesthetic injected without resistance and with gentle aspiration in 3-5 ml increments. An extremity ring block was performed with 5 ml of 0.5% ropivacaine + 5 ml of 2% lidocaine w/epi. Patient tolerated procedure well. No pain, paresthesia, or blood noted. VSS throughout. No complications noted.

## 2020-05-04 NOTE — BRIEF OP NOTE
Brief Postoperative Note Patient: Juan Stratton. YOB: 1950 MRN: [id number] Date of Procedure: 5/4/2020 Pre-Op Diagnosis: END STAGE RENAL DISEASE Post-Op Diagnosis: Same as preoperative diagnosis. Procedure(s): LEFT BASILIC VEIN TRANSPOSITION AV FISTULA (AXILLARY BLOCK) (ESSENTIAL) Surgeon(s): 
Kenny Forde MD 
 
Surgical Assistant: None Anesthesia: Other Estimated Blood Loss (mL): 200 Complications: None Specimens: * No specimens in log * Implants: * No implants in log * Drains: * No LDAs found * Findings: Left upper arm BVT. Good artery and vein. Some bleeding from tunnel tract which was located and ligated. Good thrill and radial pulse.  
 
Electronically Signed by Karen Hartman MD on 5/4/2020 at 9:53 AM

## 2020-05-04 NOTE — PERIOP NOTES
[facility] from Operating Room to PACU Report received from [address] [place] [address] 21 and Kathy Duff CRNA regarding Ambrosio Robledo. Amina Jesus Surgeon(s): 
Ramila Hairston MD  And Procedure(s) (LRB): LEFT BASILIC VEIN TRANSPOSITION AV FISTULA (AXILLARY BLOCK) (ESSENTIAL) (Left)  confirmed  
with allergies and dressings discussed. Anesthesia type, drugs, patient history, complications, estimated blood loss, vital signs, intake and output, and last pain medication, lines and temperature were reviewed. [id number] Discharge brochure provided; Reviewed DC instructions with patient's wife over the phone. Opportunity for questions and clarifications was provided; verbalized understanding. Follow-up appointments reviewed/written for patient. MAR reviewed with patient to clarify medications given during hospital stay, today. 1110 HD cath flushed with saline and heparin and capped. (without difficulty/pt tolerated well) Telemetry discontinued. Assisted patient with dressing. Sling placed to LUE due to block received for procedure. 1120 Pt stable and ambulatory with assist for discharge; Patient's wife, Drew Villasenor,  to provide transportation to home. Room checked and all belongings sent with patient.

## 2020-05-04 NOTE — ANESTHESIA PREPROCEDURE EVALUATION
Anesthetic History No history of anesthetic complications Review of Systems / Medical History Patient summary reviewed, nursing notes reviewed and pertinent labs reviewed Pulmonary Sleep apnea Comments: Distant smoking hx, 2 pk yr, quit in 1972 Neuro/Psych Comments: Restless Legs Syndrome Severe Diabetic Neuropathy - Lower extremities numb from knees down, hands numb Anxiety Cardiovascular Hypertension Valvular problems/murmurs: tricuspid insufficiency, mitral insufficiency and aortic insufficiency Dysrhythmias : atrial fibrillation Pacemaker (PM), CAD, cardiac stents and hyperlipidemia Exercise tolerance: <4 METS: Mostly non-ambulatory, can walk some with walker Comments: TTE (2/15/20): ·Normal cavity size and wall thickness. Mild systolic dysfunction. Estimated left ventricular ejection fraction is 35 - 40%. Visually measured ejection fraction. Left ventricular global hypokinesis. ·Mildly dilated left atrium. · Aortic valve sclerosis. Mild aortic valve regurgitation is present. ·Moderate tricuspid valve regurgitation is present. ·Severely elevated central venous pressure (15+ mmHg); IVC diameter is larger than 21 mm and collapses less than 50% with respiration. ·Mildly dilated right ventricle. Mildly reduced systolic function. ·Mildly dilated right atrium. ·Image quality for this study was suboptimal. 
·Mild mitral valve regurgitation is present. GI/Hepatic/Renal 
  
 
 
Renal disease (CKD Stage 4): ESRD Comments: H/O recent GI Bleed CRI, Stage IV Endo/Other Diabetes: type 2, using insulin Hypothyroidism Arthritis Pertinent negatives: No obesity and morbid obesity Other Findings Comments: Gout BPH 
ED Physical Exam 
 
Airway Mallampati: II 
TM Distance: 4 - 6 cm Neck ROM: normal range of motion Cardiovascular Regular rate and rhythm,  S1 and S2 normal,  no murmur, click, rub, or gallop  Dental 
 
 Dentition: Bridges and Caps/crowns Comments: Several missing molars Pulmonary Breath sounds clear to auscultation Abdominal 
GI exam deferred Other Findings Anesthetic Plan ASA: 4 Anesthesia type: MAC and regional - supraclavicular block Induction: Intravenous Anesthetic plan and risks discussed with: Patient

## 2020-05-04 NOTE — ANESTHESIA POSTPROCEDURE EVALUATION
Procedure(s): LEFT BASILIC VEIN TRANSPOSITION AV FISTULA (AXILLARY BLOCK) (ESSENTIAL). MAC, regional 
 
Anesthesia Post Evaluation Patient location during evaluation: PACU Note status: Adequate. Level of consciousness: responsive to verbal stimuli and sleepy but conscious Pain management: satisfactory to patient Airway patency: patent Anesthetic complications: no 
Cardiovascular status: acceptable Respiratory status: acceptable Hydration status: acceptable Comments: +Post-Anesthesia Evaluation and Assessment Patient: Ambrosio Montesinos MRN: [id number]  SSN: [id number] YOB: 1950  Age: 68 y.o. Sex: male Cardiovascular Function/Vital Signs /73   Pulse 70   Temp 36.8 °C (98.3 °F)   Resp 15   Ht 6' 4\" (1.93 m)   Wt 109.4 kg (241 lb 2.9 oz)   SpO2 97%   BMI 29.36 kg/m² Patient is status post Procedure(s): LEFT BASILIC VEIN TRANSPOSITION AV FISTULA (AXILLARY BLOCK) (ESSENTIAL). Nausea/Vomiting: Controlled. Postoperative hydration reviewed and adequate. Pain: 
Pain Scale 1: Numeric (0 - 10) (05/04/20 1030) Pain Intensity 1: 0 (05/04/20 1030) Managed. Neurological Status:  
Neuro (WDL): Exceptions to WDL (05/04/20 0947) At baseline. Mental Status and Level of Consciousness: Arousable. Pulmonary Status:  
O2 Device: Room air (05/04/20 1030) Adequate oxygenation and airway patent. Complications related to anesthesia: None Post-anesthesia assessment completed. No concerns. Signed By: Tamera Lopez MD  
 5/4/2020 Post anesthesia nausea and vomiting:  controlled Vitals Value Taken Time /66 5/4/2020 10:50 AM  
Temp 36.8 °C (98.3 °F) 5/4/2020  9:54 AM  
Pulse 72 5/4/2020 10:55 AM  
Resp 17 5/4/2020 10:55 AM  
SpO2 97 % 5/4/2020 10:55 AM  
Vitals shown include unvalidated device data.

## 2020-05-04 NOTE — DISCHARGE INSTRUCTIONS
Patient Discharge Instructions    Royal Gilbert Koenig / [id number] : 1950    Admitted 2020 Discharged: 2020     Take Home Medications     · It is important that you take the medication exactly as they are prescribed. · Keep your medication in the bottles provided by the pharmacist and keep a list of the medication names, dosages, and times to be taken in your wallet. · Do not take other medications without consulting your doctor. What to do at [address] [place] [address] [place]: Remove current bandages in two days then apply dry dressing to inner upper arm incisions daily. Recommended diet: Renal    Recommended activity: As Tolerated. No Strenuous activity or heavy lifting with left arm. If you experience any of the following symptoms severe left hand pain, numbness, weakness, or discoloration, please follow up with Dr Jalil Roach immediately. If there is significant left arm swelling, elevate the arm whenever possible and notify Dr Jalil Roach. Follow-up with Dr Jalil Roach in 2-3 weeks [phone]. Ask for [facility], or [facility]. DISCHARGE SUMMARY from Nurse    PATIENT INSTRUCTIONS:    After general anesthesia or intravenous sedation, for 24 hours or while taking prescription Narcotics:  · Limit your activities  · Do not drive and operate hazardous machinery  · Do not make important personal or business decisions  · Do  not drink alcoholic beverages  · If you have not urinated within 8 hours after discharge, please contact your surgeon on call.     Report the following to your surgeon:  · Excessive pain, swelling, redness or odor of or around the surgical area  · Temperature over 100.5  · Nausea and vomiting lasting longer than 4 hours or if unable to take medications  · Any signs of decreased circulation or nerve impairment to extremity: change in color, persistent  numbness, tingling, coldness or increase pain  · Any questions    What to do at Home:  Recommended activity: {discharge activity:75935}, ***    If you experience any of the following symptoms ***, please follow up with ***. *  Please give a list of your current medications to your Primary Care Provider. *  Please update this list whenever your medications are discontinued, doses are      changed, or new medications (including over-the-counter products) are added. *  Please carry medication information at all times in case of emergency situations. These are general instructions for a healthy lifestyle:    No smoking/ No tobacco products/ Avoid exposure to second hand smoke  Surgeon General's Warning:  Quitting smoking now greatly reduces serious risk to your health. Obesity, smoking, and sedentary lifestyle greatly increases your risk for illness    A healthy diet, regular physical exercise & weight monitoring are important for maintaining a healthy lifestyle    You may be retaining fluid if you have a history of heart failure or if you experience any of the following symptoms:  Weight gain of 3 pounds or more overnight or 5 pounds in a week, increased swelling in our hands or feet or shortness of breath while lying flat in bed. Please call your doctor as soon as you notice any of these symptoms; do not wait until your next office visit. The discharge information has been reviewed with the {PATIENT PARENT GUARDIAN:39394}. The {PATIENT PARENT GUARDIAN:21706} verbalized understanding. Discharge medications reviewed with the {Dishcarge meds reviewed DXMT:73033} and appropriate educational materials and side effects teaching were provided. ___________________________________________________________________________________________________________________________________      A common side effect of anesthesia following surgery is nausea and/or vomiting. In order to decrease symptoms, it is wise to avoid foods that are high in fat, greasy foods, milk products, and spicy foods for the first 24 hours.     Acceptable foods for the first 24 hours following surgery include but are not limited to:     soup   broth    toast    crackers    applesauce    bananas    mashed potatoes,   soft or scrambled eggs   oatmeal    jello    It is important to eat when taking your pain medication. This will help to prevent nausea. If possible, please try to time your meals with your medications. It is very important to stay hydrated following surgery. Sip fluids frequently while awake. Avoid acidic drinks such as citrus juices and soda for 24 hours. Carbonated beverages may cause bloating and gas. Acceptable fluids include:    - water (flavor packets may add variety)  - coffee or tea (in moderation)  - Gatorade  - Josef-aid  - apple juice  - cranberry juice    You are encouraged to cough and deep breathe every hour when awake. This will help to prevent respiratory complications following anesthesia. You may want to hug a pillow when coughing and sneezing to add additional support to the surgical area and to decrease discomfort if you had abdominal or chest surgery. If you are discharged home with support stockings, you may remove them after 24 hours. Support stockings are used to help prevent blood clots in the legs following surgery. Please take time to review all of your Home Care Instructions and Medication Information sheets provided in your discharge packet. If you have any questions, please contact your surgeons office. Thank you. TO PREVENT AN INFECTION      1. [address] YOUR HANDS     To prevent infection, good handwashing is the most important thing you or your caregiver can do.  Wash your hands with soap and water or use the hand  we gave you before you touch any wounds. 2. SHOWER     Use the antibacterial soap we gave you when you take a shower.  Shower with this soap until your wounds are healed.  To reach all areas of your body, you may need someone to help you.       Dont forget to clean your belly button with every shower. 3.  USE CLEAN SHEETS     Use freshly cleaned sheets on your bed after surgery.  To keep the surgery site clean, do not allow pets to sleep with you while your wound is still healing. 4. STOP SMOKING     Stop smoking, or at least cut back on smoking     Smoking slows your healing. 5.  CONTROL YOUR BLOOD SUGAR     High blood sugars slow wound healing.  If you are diabetic, control your blood sugar levels before and after your surgery.

## 2020-05-04 NOTE — H&P
History and Physical 
 
Subjective:  
 
Royal KRISTA Thacker is a 68 y.o. male who has ESRD and needs AV access. Past Medical History:  
Diagnosis Date  Anemia 8/5/2017  Anxiety 8/5/2017  Arrhythmia   
 atrial fibrillation 2014  ASCVD (arteriosclerotic cardiovascular disease) 8/5/2017  BPH (benign prostatic hyperplasia) 8/5/2017  CAD (coronary artery disease)   
 h/o stents  Cancer [facility])   
 h/o skin cancer  Cardiomyopathy ([address] 8/5/2017  CHF (congestive heart failure) ([address] 8/5/2017  Chronic kidney disease Stage IV  Chronic pain   
 shoulders and feeet- neuropathy  CKD (chronic kidney disease), stage IV ([address] 8/5/2017  Diabetes ([address] type II  
 Diabetes mellitus ([address] 8/5/2017  Diabetic neuropathy ([address] 8/5/2017  DJD (degenerative joint disease) 8/5/2017  ED (erectile dysfunction) 8/5/2017  Gout 8/5/2017  High cholesterol  Hyperlipidemia 8/5/2017  Hypertension  Hypertension with renal disease 8/5/2017  Hypothyroid 8/5/2017  Ill-defined condition   
 glycoma and diabetic macular degeneration  Insomnia 8/5/2017  Obesity 8/5/2017  On statin therapy 8/5/2017  Pneumonia 05/28/2019  Restless leg 8/5/2017  Sleep apnea   
 untreated  Thyroid disease   
 hypothyroid  Ulcer of foot due to secondary diabetes ([address] 07/12/2019 Past Surgical History:  
Procedure Laterality Date  CARDIAC SURG PROCEDURE UNLIST  2000  
 cardiac stents 3  
 [facility]  CARDIAC SURG PROCEDURE UNLIST    
 pacemaker  CARDIAC SURG PROCEDURE UNLIST  2018  
 cardio version x2  
 COLONOSCOPY N/A 6/28/2016 COLONOSCOPY performed by Nani Silva MD at [facility] ENDOSCOPY  COLONOSCOPY N/A 1/9/2019 COLONOSCOPY performed by Gonzalo Garrido MD at [facility] ENDOSCOPY  COLONOSCOPY,DIAGNOSTIC  1/9/2019  HX APPENDECTOMY  HX BUNIONECTOMY and removal of 2 seasmoid bone of the great toe 2018  HX HEENT Bilateral Cataract surgery  HX HEENT Tonsils  HX HEENT Axe wound to the head  HX KNEE ARTHROSCOPY X 2  
 HX ORTHOPAEDIC    
 HX ORTHOPAEDIC    
 partial laminectomy  HX PACEMAKER    
 HX ROTATOR CUFF REPAIR    
 bilateral- not operable per patient  IR INSERT TUNL CVC W/O PORT OVER 5 YR  3/3/2020  DC INSJ ELTRD CAR DACIA SYS ATTCH PREV PM/DFB PLS GEN N/A 2019 Lv Lead Placement To Previous Generator performed by Marcie Cintron MD at [facility] CARDIAC CATH LAB Left side  DC REMVL PERM PM PLS GEN W/REPL PLSE GEN MULT LEAD N/A 2019 Remove & Replace Ppm Gen Biv Multi Leads performed by Marcie Cintron MD at [address] [place] [address] 28 CATH LAB Family History Problem Relation Age of Onset  Heart Disease Mother  Kidney Disease Mother  Heart Disease Father  Kidney Disease Father  Cancer Sister Breast  
  
Social History Tobacco Use  Smoking status: Former Smoker Packs/day: 0.50 Years: 4.00 Pack years: 2.00 Last attempt to quit: 3/6/1972 Years since quittin.1  Smokeless tobacco: Never Used Substance Use Topics  Alcohol use: No  
  Comment: rare, 1 drink per year Prior to Admission medications Medication Sig Start Date End Date Taking? Authorizing Provider  
indomethacin (INDOCIN) 25 mg capsule TAKE 1 CAPSULE BY MOUTH THREE TIMES DAILY WITH FOOD UNTIL RIGHT WRIST PAIN HAS RESOLVED 20  Yes Arleen Russo MD  
oxyCODONE-acetaminophen (Percocet) 5-325 mg per tablet Take 1 Tab by mouth nightly as needed for Pain for up to 30 days. Max Daily Amount: 1 Tab. Take 1 or 2 tablets by mouth nightly as needed for pain 4/15/20 5/15/20 Yes Arleen Russo MD  
polyethylene glycol [facility]) 17 gram packet Take 17 g by mouth daily as needed for Other (constipation).    Yes Provider, Historical  
 DULCOLAX, BISACODYL, PO Take 1-3 Tabs by mouth daily as needed for Other (constipation). Yes Provider, Historical  
carvediloL (COREG) 3.125 mg tablet Take 1 Tab by mouth two (2) times daily (with meals). 2/21/20  Yes Carry MD Ada  
febuxostat (ULORIC) 40 mg tab tablet Take 40 mg by mouth daily as needed for Other. Yes Provider, Historical  
zolpidem (AMBIEN) 10 mg tablet Take 10 mg by mouth nightly as needed for Sleep. Yes Provider, Historical  
OTHER Apply  to affected area four (4) times daily as needed for Pain. Shelly Rinne. Apply to affected areas as needed for pain. topical, small amount   Yes Provider, Historical  
bimatoprost (LUMIGAN) 0.01 % ophthalmic drops Administer 1 Drop to right eye every evening. Yes Provider, Historical  
dorzolamide-timoloL (COSOPT) 22.3-6.8 mg/mL ophthalmic solution Administer 1 Drop to right eye nightly. Yes Provider, Historical  
insulin glargine (LANTUS SOLOSTAR U-100 INSULIN) 100 unit/mL (3 mL) inpn 10-20 Units by SubCUTAneous route nightly. Will use 10 units if glucose < 100. Indications: taking 20 qhs   Yes Provider, Historical  
pramipexole (MIRAPEX) 1 mg tablet TAKE 1 TABLET BY MOUTH EVERY NIGHT AT BEDTIME 11/6/19  Yes Carry MD Ada  
tamsulosin (FLOMAX) 0.4 mg capsule TAKE 2 CAPSULES BY MOUTH EVERY DAY 8/29/19  Yes Carry MD Ada  
bumetanide (BUMEX) 2 mg tablet Take 2 Tabs by mouth two (2) times a day. 6/5/19  Yes Bertha Carmen MD  
isosorbide mononitrate ER (IMDUR) 30 mg tablet Take 1 Tab by mouth daily. 6/6/19  Yes Bertha Carmen MD  
finasteride (PROSCAR) 5 mg tablet TAKE 1 TABLET BY MOUTH DAILY 4/26/19  Yes Carry MD Ada  
OTHER Apply  to affected area daily as needed (Knee Pain).  CBD Cream:  Apply daily as needed for knee pain , topical, small amount   Yes Provider, Historical  
Liraglutide (VICTOZA) 0.6 mg/0.1 mL (18 mg/3 mL) sub-q pen 0.6 mg by SubCUTAneous route nightly. Depending on blood sugar   Yes Provider, Historical  
ferrous sulfate (SLOW FE) 47.5 mg iron TbER tablet Take 1 Tab by mouth three (3) times daily. 6/13/19   Virgie Alvarez MD  
 
Allergies Allergen Reactions  Niacin Rash and Unknown (comments) Niaspan  Adhesive Rash  Imipenem Diarrhea Other reaction(s): Rash  Levemir [Insulin Detemir] Hives  Other Medication Other (comments) ? Allergy to Harrusty Kida causing chemical burn  Primaxin [Imipenem-Cilastatin] Diarrhea and Rash  Xarelto [Rivaroxaban] Rash and Itching Other reaction(s): Rash Review of Systems: 
Denies CP/SOB Objective:  
 
Physical Exam:  
Visit Vitals BP (!) 158/96 (BP 1 Location: Right arm, BP Patient Position: At rest) Pulse 70 Temp 98 °F (36.7 °C) Resp 16 Ht 6' 4\" (1.93 m) Wt 109.4 kg (241 lb 2.9 oz) SpO2 100% BMI 29.36 kg/m² General:  Alert, cooperative, no distress, appears stated age. Head:  Normocephalic, without obvious abnormality, atraumatic. Neck: Supple, symmetrical, trachea midline, no adenopathy, thyroid: no enlargement/tenderness/nodules, no carotid bruit and no JVD. Lungs:   Clear to auscultation bilaterally. Heart:  Regular rate and rhythm, S1, S2 normal, no murmur, click, rub or gallop. Abdomen:   Soft, non-tender. Bowel sounds normal. No masses,  No organomegaly. Extremities: Extremities normal, atraumatic, no cyanosis or edema. Pulses: 2+ and symmetric upper extremities. Neurologic: Normal strength, sensation and reflexes throughout. Assessment:  
 
ESRD Plan: LUE AVF Signed By: Edmond Christianson MD   
 May 4, 2020

## 2020-05-05 ENCOUNTER — PATIENT OUTREACH (OUTPATIENT)
Dept: FAMILY MEDICINE CLINIC | Age: 70
End: 2020-05-05

## 2020-05-06 NOTE — PROGRESS NOTES
Patient reports the surgical site looks good however he swelling from elbow to finger able to wave fingers but not close hand. Patient will speak  With dr. Donato Tyler and dialysis nurse regarding swelling. Patient is currently taking pain medication medication every 6-7 hours. Patient advised prescription is written for every 4 hours as needed since currently only taking edge off. He will alos speak with provider if increase in medication does not improve pain. Patient will report POC to CTN at next outreach.

## 2020-05-08 ENCOUNTER — PATIENT OUTREACH (OUTPATIENT)
Dept: FAMILY MEDICINE CLINIC | Age: 70
End: 2020-05-08

## 2020-05-11 NOTE — OP NOTES
[address] 
OPERATIVE REPORT Name:  Nasima Soto 
MR#:  [id number] :  1950 ACCOUNT #:  [de-identified] DATE OF SERVICE:  2020 PREOPERATIVE DIAGNOSIS:  End-stage renal disease. POSTOPERATIVE DIAGNOSIS:  End-stage renal disease. PROCEDURE PERFORMED:  Creation of left basilic vein transposition arteriovenous fistula. SURGEON:  Pura De Anda MD 
 
ASSISTANT:  None. ANESTHESIA:  Block. COMPLICATIONS:  None. SPECIMENS REMOVED:  None. IMPLANTS:  None. ESTIMATED BLOOD LOSS:  200 mL. DRAINS:  None. INDICATIONS:  The patient is a 17-year-old male with end-stage renal disease. He requires permanent dialysis access. He presents for creation of a left basilic vein transposition fistula. PROCEDURE:  After informed consent was obtained, the patient was given preoperative intravenous antibiotics within 1 hour of the incision. He was taken to the operating room with a satisfactory left upper extremity block in place. After establishing adequate sedation, the left arm was prepped and draped as a sterile field. Through two incisions on the medial upper arm, the basilic vein was dissected free from the antecubital crease to the axilla taking care to avoid injury to the adjacent medial antebrachial cutaneous nerve. All side branches were divided between silk ties and clips. The vein was a good conduit. After it was mobilized, it was transected just proximal to distal ligature. The vein was tested with heparinized saline and was hemostatic. It was marked on its anterior surface to prevent any kinking or twisting during tunneling. The brachial artery was isolated just proximal to the antecubital crease and the patient was systemically heparinized.   The vein was transposed and tunneled in a subdermal plane on the anterior aspect of the upper arm using a small counter incision on the proximal anterior upper arm to assist with tunneling. An end-to-side anastomosis was then created between the vein and the brachial artery using running 5-0 Prolene suture. Flow was released to the fistula and then the hand. There was an excellent thrill in the fistula and a good radial pulse. The wounds were irrigated with antibiotic irrigation and closed with Vicryl suture and skin staples. Dry dressings were applied. The patient was transferred to the PACU in stable condition. All counts were correct. Roseline Duarte MD 
 
 
[id number] 
D:  05/11/2020 11:16 
T:  05/11/2020 11:38 
JOB #:  [id number]

## 2020-05-15 ENCOUNTER — PATIENT OUTREACH (OUTPATIENT)
Dept: FAMILY MEDICINE CLINIC | Age: 70
End: 2020-05-15

## 2020-05-15 NOTE — PROGRESS NOTES
Patient report seeing surgeon on 5/14/2020. He currently has arm wrapped in ace wrap and will see surgeon on 5/18/2020 for a procedure. CTN will follow up in 5-7 days.

## 2020-05-20 ENCOUNTER — PATIENT OUTREACH (OUTPATIENT)
Dept: FAMILY MEDICINE CLINIC | Age: 70
End: 2020-05-20

## 2020-05-20 NOTE — PROGRESS NOTES
Patient report had angioplasty on Monday. Patient report he may need additional procedure. On ABX due to area around staples is red per dialysis and following up with surgeon on 5/27/2020. CTN will follow up with patient in 7-14 days.

## 2020-05-26 DIAGNOSIS — M15.9 PRIMARY OSTEOARTHRITIS INVOLVING MULTIPLE JOINTS: Primary | ICD-10-CM

## 2020-05-26 RX ORDER — HYDROCODONE BITARTRATE AND ACETAMINOPHEN 5; 325 MG/1; MG/1
1 TABLET ORAL
Qty: 30 TAB | Refills: 0 | Status: SHIPPED | OUTPATIENT
Start: 2020-05-26 | End: 2020-05-27 | Stop reason: ALTCHOICE

## 2020-05-26 NOTE — PROGRESS NOTES
Chief Complaint Patient presents with  Medication Refill  Shoulder Pain  
  both   left worse SUBJECTIVE: 
 
Royal VELASCO Jhonny Tesfaye. is a 68 y.o. male who presents after an absence for follow-up of his medical problems to include hypertension, cardiomyopathy, paroxysmal atrial fibrillation, a CHF compensated, end-stage renal disease and severe DJD for which she has since left shoulder has become worse without any history of recent trauma. He also has now been tolerating the dialysis and has recently had an AV fistula placed in his left upper arm. The left shoulder is what is bothering him most about when he would like to get a cortisone shot today. He denies any chest pain, shortness of breath, palpitations, PND, orthopnea or cardiorespiratory complaints. He notes no GI or  complaints. He notes no headaches, dizziness or neurologic complaints. He still has his chronic insomnia but has no other complaints on complete review of systems. Current Outpatient Medications Medication Sig Dispense Refill  methylPREDNISolone acetate (DEPO-MEDROL) 40 mg/mL injection 1 mL by IntraMUSCular route once for 1 dose. 1 Vial 0  
 oxyCODONE-acetaminophen (PERCOCET) 5-325 mg per tablet Take 1 Tab by mouth every eight (8) hours as needed for Pain for up to 30 days. Max Daily Amount: 3 Tabs. 60 Tab 0  
 sorbitoL 70 % solution Take 15 mL by mouth daily for 30 days. 450 mL 3  
 indomethacin (INDOCIN) 25 mg capsule TAKE 1 CAPSULE BY MOUTH THREE TIMES DAILY WITH FOOD UNTIL RIGHT WRIST PAIN HAS RESOLVED 30 Cap 0  
 polyethylene glycol (MIRALAX) 17 gram packet Take 17 g by mouth daily as needed for Other (constipation).  DULCOLAX, BISACODYL, PO Take 1-3 Tabs by mouth daily as needed for Other (constipation).  carvediloL (COREG) 3.125 mg tablet Take 1 Tab by mouth two (2) times daily (with meals). 60 Tab prn  febuxostat (ULORIC) 40 mg tab tablet Take 40 mg by mouth daily as needed for Other.  zolpidem (AMBIEN) 10 mg tablet Take 10 mg by mouth nightly as needed for Sleep.  OTHER Apply  to affected area four (4) times daily as needed for Pain. Villaseñor Felt. Apply to affected areas as needed for pain. topical, small amount  bimatoprost (LUMIGAN) 0.01 % ophthalmic drops Administer 1 Drop to right eye every evening.  insulin glargine (LANTUS SOLOSTAR U-100 INSULIN) 100 unit/mL (3 mL) inpn 10-20 Units by SubCUTAneous route nightly. Will use 10 units if glucose < 100. Indications: taking 20 qhs  pramipexole (MIRAPEX) 1 mg tablet TAKE 1 TABLET BY MOUTH EVERY NIGHT AT BEDTIME 30 Tab prn  tamsulosin (FLOMAX) 0.4 mg capsule TAKE 2 CAPSULES BY MOUTH EVERY  Cap 3  
 bumetanide (BUMEX) 2 mg tablet Take 2 Tabs by mouth two (2) times a day. 120 Tab 12  
 isosorbide mononitrate ER (IMDUR) 30 mg tablet Take 1 Tab by mouth daily. 30 Tab 12  
 finasteride (PROSCAR) 5 mg tablet TAKE 1 TABLET BY MOUTH DAILY 90 Tab 3  
 OTHER Apply  to affected area daily as needed (Knee Pain). CBD Cream:  Apply daily as needed for knee pain , topical, small amount  Liraglutide (VICTOZA) 0.6 mg/0.1 mL (18 mg/3 mL) sub-q pen 0.6 mg by SubCUTAneous route nightly. Depending on blood sugar Past Medical History:  
Diagnosis Date  Anemia 8/5/2017  Anxiety 8/5/2017  Arrhythmia   
 atrial fibrillation 2014  ASCVD (arteriosclerotic cardiovascular disease) 8/5/2017  BPH (benign prostatic hyperplasia) 8/5/2017  CAD (coronary artery disease)   
 h/o stents  Cancer [facility])   
 h/o skin cancer  Cardiomyopathy ([address] 8/5/2017  CHF (congestive heart failure) ([address] 8/5/2017  Chronic kidney disease Stage IV  Chronic pain   
 shoulders and feeet- neuropathy  CKD (chronic kidney disease), stage IV ([address] 8/5/2017  Diabetes ([address] type II  
 Diabetes mellitus ([address] 8/5/2017  Diabetic neuropathy ([address] 8/5/2017  DJD (degenerative joint disease) 8/5/2017  ED (erectile dysfunction) 8/5/2017  Gout 8/5/2017  High cholesterol  Hyperlipidemia 8/5/2017  Hypertension  Hypertension with renal disease 8/5/2017  Hypothyroid 8/5/2017  Ill-defined condition   
 glycoma and diabetic macular degeneration  Insomnia 8/5/2017  Obesity 8/5/2017  On statin therapy 8/5/2017  Pneumonia 05/28/2019  Restless leg 8/5/2017  Sleep apnea   
 untreated  Thyroid disease   
 hypothyroid  Ulcer of foot due to secondary diabetes ([address] 07/12/2019 Past Surgical History:  
Procedure Laterality Date  CARDIAC SURG PROCEDURE UNLIST  2000  
 cardiac stents 3  
 CARDIAC SURG PROCEDURE UNLIST Ablation 5/17/2018 [id number] [place]  CARDIAC SURG PROCEDURE UNLIST    
 pacemaker  CARDIAC SURG PROCEDURE UNLIST  2018  
 cardio version x2  
 COLONOSCOPY N/A 6/28/2016 COLONOSCOPY performed [facility] ENDOSCOPY  COLONOSCOPY N/A 1/9/2019 COLONOSCOPY performed by Kenney Wayne MD at [facility] ENDOSCOPY  COLONOSCOPY,DIAGNOSTIC  1/9/2019  HX APPENDECTOMY  HX ARTERIOVENOUS FISTULA  05/2020  
 dr Brenner Brought  HX BUNIONECTOMY    
 and removal of 2 seasmoid bone of the great toe 2/2018  HX HEENT Bilateral Cataract surgery  HX HEENT Tonsils  HX HEENT Axe wound to the head  HX KNEE ARTHROSCOPY X 2  
 HX ORTHOPAEDIC    
 HX ORTHOPAEDIC    
 partial laminectomy  HX PACEMAKER    
 HX ROTATOR CUFF REPAIR    
 bilateral- not operable per patient  IR INSERT TUNL CVC W/O PORT OVER 5 YR  3/3/2020  MS INSJ ELTRD CAR DACIA SYS ATTCH PREV PM/DFB PLS GEN N/A 1/28/2019 Lv Lead Placement To Previous Generator performed by Shira Ngo MD at [facility] CARDIAC CATH LAB Left side  MS REMVL PERM PM PLS GEN W/REPL PLSE GEN MULT LEAD N/A 1/28/2019 Remove & Replace Ppm Gen Biv Multi Leads performed by Shira Ngo MD at [address] [address] CATH LAB Allergies Allergen Reactions  Niacin Rash and Unknown (comments) Niaspan  Adhesive Rash  Imipenem Diarrhea Other reaction(s): Rash  Levemir [Insulin Detemir] Hives  Other Medication Other (comments) ? Allergy to Nadia Ridley causing chemical burn  Primaxin [Imipenem-Cilastatin] Diarrhea and Rash  Xarelto [Rivaroxaban] Rash and Itching Other reaction(s): Rash REVIEW OF SYSTEMS: 
General: negative for - chills or fever, or weight loss or gain ENT: negative for - headaches, nasal congestion or tinnitus Eyes: no blurred or visual changes Neck: No stiffness or swollen nodes Respiratory: negative for - cough, hemoptysis, shortness of breath or wheezing Cardiovascular : negative for - chest pain, edema, palpitations or shortness of breath Gastrointestinal: negative for - abdominal pain, blood in stools, heartburn or nausea/vomiting Genito-Urinary: no dysuria, trouble voiding, or hematuria Musculoskeletal: Positive for multiple joint aches and pains with the left shoulder is worse Neurological: no TIA or stroke symptoms Hematologic: no bruises, no bleeding Lymphatic: no swollen glands Integument: no lumps, mole changes, nail changes or rash Endocrine:no malaise/lethargy poly uria or polydipsia or unexpected weight changes Social History Socioeconomic History  Marital status:  Spouse name: Not on file  Number of children: Not on file  Years of education: Not on file  Highest education level: Not on file Tobacco Use  Smoking status: Former Smoker Packs/day: 0.50 Years: 4.00 Pack years: 2.00 Last attempt to quit: 3/6/1972 Years since quittin.2  Smokeless tobacco: Never Used Substance and Sexual Activity  Alcohol use: No  
  Comment: rare, 1 drink per year  Drug use: No  
 Sexual activity: Not Currently Other Topics Concern Family History Problem Relation Age of Onset  Heart Disease Mother  Kidney Disease Mother  Heart Disease Father  Kidney Disease Father  Cancer Sister Breast  
 
 
OBJECTIVE:  
 
Visit Vitals /80 Pulse 70 Temp 98.2 °F (36.8 °C) (Oral) Ht 6' 4\" (1.93 m) Wt 229 lb 11.2 oz (104.2 kg) SpO2 97% BMI 27.96 kg/m² CONSTITUTIONAL:   well nourished, appears age appropriate EYES: sclera anicteric, PERRL, EOMI 
ENMT:nares clear, moist mucous membranes, pharynx clear NECK: supple. Thyroid normal, No JVD or bruits RESPIRATORY: Chest: clear to ascultation and percussion, normal inspiratory effort CARDIOVASCULAR: Heart: regular rate and rhythm no murmurs, rubs or gallops, PMI not displaced, No thrills, no peripheral edema GASTROINTESTINAL: Abdomen: non distended, soft, non tender, bowel sounds normal 
HEMATOLOGIC: no purpura, petechiae or bruising LYMPHATIC: No lymph node enlargemant MUSCULOSKELETAL: Extremities: no active synovitis except left shoulder markedly tender to palpation with increased discomfort with range of motion and without complete range of motion INTEGUMENT: No unusual rashes or suspicious skin lesions noted. Nails appear normal. 
PERIPHERAL VASCULAR: normal pulses femoral, PT and DP NEUROLOGIC: non-focal exam, A & O X 3 PSYCHIATRIC:, appropriate affect ASSESSMENT:  
1. Hypertension with renal disease 2. ESRD (end stage renal disease) ([facility] 3. Ischemic cardiomyopathy 4. ASCVD (arteriosclerotic cardiovascular disease) 5. Chronic combined systolic and diastolic CHF, NYHA class 4 (HCC) 6. Primary osteoarthritis involving multiple joints 7. Arthritis of shoulder Impression 1. Hypertension that was initially elevated but subsequent check was okay 2. End-stage renal disease on dialysis with recent have vascular access in his left upper extremity 3. Cardiomyopathy stable 4. ASCVD clinically stable on aspirin daily 5. Diastolic and systolic CHF compensated 6.  DJD stable except for multiple joint aches and pains are keeping awake at night so I did renew his Percocet 1 twice daily as needed #60 given 7. DJD severe left shoulder we discussed treatment options and per his request elected to go with injection. After informed consent and Betadine scrub left shoulder is entered posteriorly injected with Depo-Medrol 40 mg a cc lidocaine which he tolerated quite well. I will not check labs today since he is not fasting. All of the above discussed with his wife present with him. Recheck in 1 month with fasting lab studies. PLAN: 
. Orders Placed This Encounter  DRAIN/INJECT LARGE JOINT/BURSA  methylPREDNISolone acetate (DEPO-MEDROL) 40 mg/mL injection  oxyCODONE-acetaminophen (PERCOCET) 5-325 mg per tablet  sorbitoL 70 % solution ATTENTION:  
This medical record was transcribed using an electronic medical records system. Although proofread, it may and can contain electronic and spelling errors. Other human spelling and other errors may be present. Corrections may be executed at a later time. Please feel free to contact us for any clarifications as needed. Follow-up and Dispositions · Return in about 4 weeks (around 6/24/2020). No results found for any visits on 05/27/20. Lidia Montague MD 
 
The patient verbalized understanding of the problems and plans as explained.

## 2020-05-26 NOTE — TELEPHONE ENCOUNTER
RX refill request from the patient/pharmacy. Patient last seen 05- with labs, and advised needs to schedule a f/up appointment. Requested Prescriptions     Pending Prescriptions Disp Refills    HYDROcodone-acetaminophen (NORCO) 5-325 mg per tablet 30 Tab 0     Sig: Take 1 Tab by mouth every four (4) hours as needed for Pain for up to 30 days. Max Daily Amount: 6 Tabs.

## 2020-05-27 ENCOUNTER — OFFICE VISIT (OUTPATIENT)
Dept: INTERNAL MEDICINE CLINIC | Age: 70
End: 2020-05-27

## 2020-05-27 VITALS
HEIGHT: 76 IN | TEMPERATURE: 98.2 F | SYSTOLIC BLOOD PRESSURE: 138 MMHG | DIASTOLIC BLOOD PRESSURE: 80 MMHG | WEIGHT: 229.7 LBS | BODY MASS INDEX: 27.97 KG/M2 | OXYGEN SATURATION: 97 % | HEART RATE: 70 BPM

## 2020-05-27 DIAGNOSIS — I25.10 ASCVD (ARTERIOSCLEROTIC CARDIOVASCULAR DISEASE): ICD-10-CM

## 2020-05-27 DIAGNOSIS — I50.42 CHRONIC COMBINED SYSTOLIC AND DIASTOLIC CHF, NYHA CLASS 4 (HCC): ICD-10-CM

## 2020-05-27 DIAGNOSIS — M15.9 PRIMARY OSTEOARTHRITIS INVOLVING MULTIPLE JOINTS: ICD-10-CM

## 2020-05-27 DIAGNOSIS — I12.9 HYPERTENSION WITH RENAL DISEASE: Primary | ICD-10-CM

## 2020-05-27 DIAGNOSIS — M19.019 ARTHRITIS OF SHOULDER: ICD-10-CM

## 2020-05-27 DIAGNOSIS — N18.6 ESRD (END STAGE RENAL DISEASE) (HCC): ICD-10-CM

## 2020-05-27 DIAGNOSIS — I25.5 ISCHEMIC CARDIOMYOPATHY: ICD-10-CM

## 2020-05-27 RX ORDER — OXYCODONE AND ACETAMINOPHEN 5; 325 MG/1; MG/1
1 TABLET ORAL
Qty: 60 TAB | Refills: 0 | Status: SHIPPED | OUTPATIENT
Start: 2020-05-27 | End: 2020-06-26

## 2020-05-27 RX ORDER — METHYLPREDNISOLONE ACETATE 40 MG/ML
40 INJECTION, SUSPENSION INTRA-ARTICULAR; INTRALESIONAL; INTRAMUSCULAR; SOFT TISSUE ONCE
Qty: 1 VIAL | Refills: 0
Start: 2020-05-27 | End: 2020-05-27

## 2020-05-27 RX ORDER — SORBITOL SOLUTION 70 %
15 SOLUTION, ORAL MISCELLANEOUS DAILY
Qty: 450 ML | Refills: 3 | Status: SHIPPED | OUTPATIENT
Start: 2020-05-27 | End: 2020-06-26

## 2020-05-27 NOTE — PROGRESS NOTES
Per verbal order of Dr. Renetta Adkins, Depo-Medrol 40 mg/mL and 1/2 mL of 1% Lidocaine were drawn up. Consent form was filled out and signed by patient, witnessed by nurse and signed by provider. Dr. Raquel Corbin injected patient's left shoulder. Patient tolerated procedure well and was monitored for 15 minutes. No side effects noted. Notified patient to call the office with any adverse reactions.

## 2020-05-27 NOTE — PROGRESS NOTES
Christel Brower. is a 68 y.o. male HIPAA verified by two patient identifiers. Health Maintenance Due Topic Date Due  
 DTaP/Tdap/Td series (1 - Tdap) 12/28/1969  Shingrix Vaccine Age 50> (1 of 2) 12/28/1998  AAA Screening 73-69 YO Male Smoking Patients  12/28/2013 Chief Complaint Patient presents with  Medication Refill  Shoulder Pain  
  both   left worse Visit Vitals /81 (BP 1 Location: Right arm, BP Patient Position: Sitting) Pulse 70 Temp 98.2 °F (36.8 °C) (Oral) Ht 6' 4\" (1.93 m) Wt 229 lb 11.2 oz (104.2 kg) SpO2 97% BMI 27.96 kg/m² Pain Scale: 10 - Worst pain ever/10 Pain Location: Shoulder (left) 1. Have you been to the ER, urgent care clinic since your last visit? Hospitalized since your last visit? No 
 
2. Have you seen or consulted any other health care providers outside of the [address] [place] [address] since your last visit? Include any pap smears or colon screening.  No

## 2020-05-27 NOTE — PATIENT INSTRUCTIONS
Arthritis: Care Instructions Your Care Instructions Arthritis, also called osteoarthritis, is a breakdown of the cartilage that cushions your joints. When the cartilage wears down, your bones rub against each other. This causes pain and stiffness. Many people have some arthritis as they age. Arthritis most often affects the joints of the spine, hands, hips, knees, or feet. You can take simple measures to protect your joints, ease your pain, and help you stay active. Follow-up care is a key part of your treatment and safety. Be sure to make and go to all appointments, and call your doctor if you are having problems. It's also a good idea to know your test results and keep a list of the medicines you take. How can you care for yourself at home? · Stay at a healthy weight. Being overweight puts extra strain on your joints. · Talk to your doctor or physical therapist about exercises that will help ease joint pain. ? Stretch. You may enjoy gentle forms of yoga to help keep your joints and muscles flexible. ? Walk instead of jog. Other types of exercise that are less stressful on the joints include riding a bicycle, swimming, bang chi, or water exercise. ? Lift weights. Strong muscles help reduce stress on your joints. Stronger thigh muscles, for example, take some of the stress off of the knees and hips. Learn the right way to lift weights so you do not make joint pain worse. · Take your medicines exactly as prescribed. Call your doctor if you think you are having a problem with your medicine. · Take pain medicines exactly as directed. ? If the doctor gave you a prescription medicine for pain, take it as prescribed. ? If you are not taking a prescription pain medicine, ask your doctor if you can take an over-the-counter medicine. · Use a cane, crutch, walker, or another device if you need help to get around. These can help rest your joints.  You also can use other things to make life easier, such as a higher toilet seat and padded handles on kitchen utensils. · Do not sit in low chairs, which can make it hard to get up. · Put heat or cold on your sore joints as needed. Use whichever helps you most. You also can take turns with hot and cold packs. ? Apply heat 2 or 3 times a day for 20 to 30 minutesusing a heating pad, hot shower, or hot packto relieve pain and stiffness. ? Put ice or a cold pack on your sore joint for 10 to 20 minutes at a time. Put a thin cloth between the ice and your skin. When should you call for help? Call your doctor now or seek immediate medical care if: 
  · You have sudden swelling, warmth, or pain in any joint.  
  · You have joint pain and a fever or rash.  
  · You have such bad pain that you cannot use a joint.  
 Watch closely for changes in your health, and be sure to contact your doctor if: 
  · You have mild joint symptoms that continue even with more than 6 weeks of care at home.  
  · You have stomach pain or other problems with your medicine. Where can you learn more? Go to http://gildardo-palak.info/ Enter B158 in the search box to learn more about \"Arthritis: Care Instructions. \" Current as of: December 8, 2019Content Version: 12.4 © 9079-7810 [employer], Incorporated. Care instructions adapted under license by [employer] (which disclaims liability or warranty for this information). If you have questions about a medical condition or this instruction, always ask your healthcare professional. [place] [address] any warranty or liability for your use of this information.

## 2020-05-29 ENCOUNTER — PATIENT OUTREACH (OUTPATIENT)
Dept: FAMILY MEDICINE CLINIC | Age: 70
End: 2020-05-29

## 2020-05-29 NOTE — PROGRESS NOTES
Patient reports feeling good today. Has had staples removed from surgical site and per provider no signs of infection. Denies chest pain, SOB, fever, N/V/D. Patient had completed his 90 day post hospitalization period. Episode resolved.

## 2020-06-01 ENCOUNTER — OFFICE VISIT (OUTPATIENT)
Dept: INTERNAL MEDICINE CLINIC | Age: 70
End: 2020-06-01

## 2020-06-01 VITALS
RESPIRATION RATE: 20 BRPM | TEMPERATURE: 97.4 F | OXYGEN SATURATION: 97 % | BODY MASS INDEX: 27.89 KG/M2 | HEART RATE: 70 BPM | HEIGHT: 76 IN | SYSTOLIC BLOOD PRESSURE: 138 MMHG | WEIGHT: 229 LBS | DIASTOLIC BLOOD PRESSURE: 70 MMHG

## 2020-06-01 DIAGNOSIS — S81.811A LACERATION OF RIGHT LOWER LEG, INITIAL ENCOUNTER: Primary | ICD-10-CM

## 2020-06-01 NOTE — PROGRESS NOTES
Subjective:  
Royal KRISTA Dominique. is a 68 y.o. male Chief Complaint Patient presents with  Leg Injury  
  wound to right leg History of present illness: He presents to have sustaining a laceration to his right lower extremity yesterday although he is not sure what he did at home. He did have a quite a bit of bleeding and his wife dressed it with a tight dressing however he had significant amount of drainage that continued over the last 24 hours and thus he presents here for evaluation. He claims to be up-to-date on his tetanus shot saying he received 1 within the past year although I do not have a record of that so asked him to get me a record of that. He also just finished a 4-day course of vancomycin after having had an infection in his left arm fistula site. Patient Active Problem List  
Diagnosis Code  Atrial fibrillation ([facility]) I48.91  
 Primary osteoarthritis involving multiple joints M89.49  
 ASCVD (arteriosclerotic cardiovascular disease) I25.10  Gout M10.9  Anemia D64.9  Mixed hyperlipidemia E78.2  Controlled type 2 diabetes mellitus with chronic kidney disease on chronic dialysis, with long-term current use of insulin ([facility]) E11.22, N18.6, Z79.4, Z99.2  Hypertension with renal disease I12.9  Restless leg G25.81  
 Class 1 obesity due to excess calories without serious comorbidity with body mass index (BMI) of 30.0 to 30.9 in adult E66.09, Z68.30  Insomnia G47.00  Acquired hypothyroidism E03.9  Gastroesophageal reflux disease without esophagitis K21.9  ED (erectile dysfunction) N52.9  Diabetic neuropathy ([facility]) E11.40  Cardiomyopathy ([facility] I42.9  BPH (benign prostatic hyperplasia) N40.0  Anxiety F41.9  Age-related cataract H25.9  Hyperostosis M85.80  Status post amputation of left great toe ([address] I54.398  Left elbow pain M25.522  S/P ablation of atrial fibrillation Z98.890, Z86.79  
  Primary osteoarthritis of right knee M17.11  
 Primary osteoarthritis of left knee M17.12  
 Hyponatremia E87.1  Pneumonia J18.9  Lateral epicondylitis of left elbow M77.12  
 Metatarsalgia M77.40  Sesamoiditis M25.80  Venous stasis ulcer of leg without varicose veins ([facility]) I87.2, L97.909  Cellulitis, leg L03.119  
 Ulcer of left foot ([address] L97.529  Metatarsalgia of left foot M77.42  Chronic combined systolic and diastolic CHF, NYHA class 4 ([facility]) I50.42  Generalized abdominal pain R10.84  
 ESRD (end stage renal disease) ([address] N18.6  Right wrist pain M25.531  Arthritis of shoulder M19.019  
 Laceration of right lower leg S81.811A Past Medical History:  
Diagnosis Date  Anemia 8/5/2017  Anxiety 8/5/2017  Arrhythmia   
 atrial fibrillation 2014  ASCVD (arteriosclerotic cardiovascular disease) 8/5/2017  BPH (benign prostatic hyperplasia) 8/5/2017  CAD (coronary artery disease)   
 h/o stents  Cancer [facility])   
 h/o skin cancer  Cardiomyopathy ([address] 8/5/2017  CHF (congestive heart failure) ([address] 8/5/2017  Chronic kidney disease Stage IV  Chronic pain   
 shoulders and feeet- neuropathy  CKD (chronic kidney disease), stage IV ([address] 8/5/2017  Diabetes ([address] type II  
 Diabetes mellitus ([address] 8/5/2017  Diabetic neuropathy ([address] 8/5/2017  DJD (degenerative joint disease) 8/5/2017  ED (erectile dysfunction) 8/5/2017  Gout 8/5/2017  High cholesterol  Hyperlipidemia 8/5/2017  Hypertension  Hypertension with renal disease 8/5/2017  Hypothyroid 8/5/2017  Ill-defined condition   
 glycoma and diabetic macular degeneration  Insomnia 8/5/2017  Obesity 8/5/2017  On statin therapy 8/5/2017  Pneumonia 05/28/2019  Restless leg 8/5/2017  Sleep apnea   
 untreated  Thyroid disease   
 hypothyroid  Ulcer of foot due to secondary diabetes ([address] 07/12/2019 Allergies Allergen Reactions  Niacin Rash and Unknown (comments) Niaspan  Adhesive Rash  Imipenem Diarrhea Other reaction(s): Rash  Levemir [Insulin Detemir] Hives  Other Medication Other (comments) ? Allergy to [place] causing chemical burn  Primaxin [Imipenem-Cilastatin] Diarrhea and Rash  Xarelto [Rivaroxaban] Rash and Itching Other reaction(s): Rash Family History Problem Relation Age of Onset  Heart Disease Mother  Kidney Disease Mother  Heart Disease Father  Kidney Disease Father  Cancer Sister Breast  
  
Social History Socioeconomic History  Marital status:  Spouse name: Not on file  Number of children: Not on file  Years of education: Not on file  Highest education level: Not on file Occupational History  Not on file Social Needs  Financial resource strain: Not on file  Food insecurity Worry: Not on file Inability: Not on file  Transportation needs Medical: Not on file Non-medical: Not on file Tobacco Use  Smoking status: Former Smoker Packs/day: 0.50 Years: 4.00 Pack years: 2.00 Last attempt to quit: 3/6/1972 Years since quittin.2  Smokeless tobacco: Never Used Substance and Sexual Activity  Alcohol use: No  
  Comment: rare, 1 drink per year  Drug use: No  
 Sexual activity: Not Currently Lifestyle  Physical activity Days per week: Not on file Minutes per session: Not on file  Stress: Not on file Relationships  Social connections Talks on phone: Not on file Gets together: Not on file Attends Buddhism service: Not on file Active member of club or organization: Not on file Attends meetings of clubs or organizations: Not on file Relationship status: Not on file  Intimate partner violence Fear of current or ex partner: Not on file Emotionally abused: Not on file Physically abused: Not on file Forced sexual activity: Not on file Other Topics Concern [address] Service Not Asked  Blood Transfusions Not Asked  Caffeine Concern Not Asked  Occupational Exposure Not Asked [place] Hazards Not Asked  Sleep Concern Not Asked  Stress Concern Not Asked  Weight Concern Not Asked  Special Diet Not Asked  Back Care Not Asked  Exercise Not Asked  Bike Helmet Not Asked  Seat Belt Not Asked  Self-Exams Not Asked Social History Narrative  Not on file Prior to Admission medications Medication Sig Start Date End Date Taking? Authorizing Provider  
oxyCODONE-acetaminophen (PERCOCET) 5-325 mg per tablet Take 1 Tab by mouth every eight (8) hours as needed for Pain for up to 30 days. Max Daily Amount: 3 Tabs. 5/27/20 6/26/20 Yes Arleen Russo MD  
sorbitoL 70 % solution Take 15 mL by mouth daily for 30 days. 5/27/20 6/26/20 Yes Arleen Russo MD  
indomethacin (INDOCIN) 25 mg capsule TAKE 1 CAPSULE BY MOUTH THREE TIMES DAILY WITH FOOD UNTIL RIGHT WRIST PAIN HAS RESOLVED 5/4/20  Yes Arleen Russo MD  
polyethylene glycol (MIRALAX) 17 gram packet Take 17 g by mouth daily as needed for Other (constipation). Yes Provider, Historical  
DULCOLAX, BISACODYL, PO Take 1-3 Tabs by mouth daily as needed for Other (constipation). Yes Provider, Historical  
carvediloL (COREG) 3.125 mg tablet Take 1 Tab by mouth two (2) times daily (with meals). 2/21/20  Yes Arleen Russo MD  
febuxostat (ULORIC) 40 mg tab tablet Take 40 mg by mouth daily as needed for Other. Yes Provider, Historical  
OTHER Apply  to affected area four (4) times daily as needed for Pain. Mayur Davila. Apply to affected areas as needed for pain. topical, small amount   Yes Provider, Historical  
bimatoprost (LUMIGAN) 0.01 % ophthalmic drops Administer 1 Drop to right eye every evening.    Yes Provider, Historical  
insulin glargine (LANTUS SOLOSTAR U-100 INSULIN) 100 unit/mL (3 mL) inpn 10-20 Units by SubCUTAneous route nightly. Will use 10 units if glucose < 100. Indications: taking 20 qhs   Yes Provider, Historical  
pramipexole (MIRAPEX) 1 mg tablet TAKE 1 TABLET BY MOUTH EVERY NIGHT AT BEDTIME 11/6/19  Yes Blake Lou MD  
tamsulosin (FLOMAX) 0.4 mg capsule TAKE 2 CAPSULES BY MOUTH EVERY DAY 8/29/19  Yes Blake Lou MD  
bumetanide (BUMEX) 2 mg tablet Take 2 Tabs by mouth two (2) times a day. 6/5/19  Yes Bharath Martinez MD  
isosorbide mononitrate ER (IMDUR) 30 mg tablet Take 1 Tab by mouth daily. 6/6/19  Yes Bharath Martinez MD  
finasteride (PROSCAR) 5 mg tablet TAKE 1 TABLET BY MOUTH DAILY 4/26/19  Yes Blake Lou MD  
OTHER Apply  to affected area daily as needed (Knee Pain). CBD Cream:  Apply daily as needed for knee pain , topical, small amount   Yes Provider, Historical  
Liraglutide (VICTOZA) 0.6 mg/0.1 mL (18 mg/3 mL) sub-q pen 0.6 mg by SubCUTAneous route nightly. Depending on blood sugar   Yes Provider, Historical  
zolpidem (AMBIEN) 10 mg tablet Take 10 mg by mouth nightly as needed for Sleep. Provider, Historical  
  
 
Review of Systems Constitutional:  He denies fever, weight loss, sweats or fatigue. Respiratory:  No cough, wheezing or shortness of breath. No sputum production. Cardiac:  Denies chest pain, palpitations, unexplained indigestion, syncope, edema, PND or orthopnea. Skin:  No recent rashes or mole changes. Laceration right lower extremity Neurological:   No syncope, dizziness, or other recent neurologic changes Objective:  
 
Vitals:  
 06/01/20 1631 BP: 138/70 Pulse: 70 Resp: 20 Temp: 97.4 °F (36.3 °C) TempSrc: Oral  
SpO2: 97% Weight: 229 lb (103.9 kg) Height: 6' 4\" (1.93 m) PainSc:  10 - Worst pain ever PainLoc: Shoulder Body mass index is 27.87 kg/m². Physical Examination:  
           General Appearance:  Well-developed, well-nourished, no acute  distress. Eyes:  Anicteric Neck:   No JVD noted. Lungs:  Clear to auscultation and percussion. Cardiac:  Regular rate and rhythm without murmur. PMI not displaced. No gallop, rub or click. Extremities:  No edema. Skin:  No rash or unusual mole changes noted. 11 cm flap type laceration right lower extremity however this is greater than 24 hours old so I do not think at this point sutures would be affected. This is sterilely dressed and cleansed and then edges were opposed using Steri-Strips. Lymph Nodes:  None felt in the cervical, supraclavicular, axillary or inguinal region. Neurological: . No focal deficits. Assessment/Plan: 1. Laceration of right lower leg, initial encounter Impressions/Plan: 
Impression 1. Laceration right lower extremity this is cleansed extensively and irrigated with peroxide. The edges were then opposed and held together with Steri-Strips. Telfa was then used topically and is sterilely dressed with an occlusive dressing. His wife is cautioned to make sure that he does not get swelling distal to the dressing and to watch closely for evidence of infection as I will not put him on antibiotic since he just completed vancomycin. I did ask him to give him record of that tetanus shot. Recheck to be determined based upon response to therapy. His wife is cautioned regarding things to look for that would necessitate return which would be edema or infection. Orders Placed This Encounter  RESUPERF WND BODY 7.6-12.5 No results found for any visits on 06/01/20. Marixa Pratt MD 
 
The patient was given an after visit summary and the patient verbalized an understanding of the plans and problems as explained.

## 2020-06-01 NOTE — PROGRESS NOTES
Chief Complaint Patient presents with  Leg Injury  
  wound to right leg Visit Vitals /70 (BP 1 Location: Left arm, BP Patient Position: Sitting) Pulse 70 Temp 97.4 °F (36.3 °C) (Oral) Resp 20 Ht 6' 4\" (1.93 m) Wt 229 lb (103.9 kg) SpO2 97% BMI 27.87 kg/m² 1. Have you been to the ER, urgent care clinic since your last visit? Hospitalized since your last visit? No 
 
2. Have you seen or consulted any other health care providers outside of the [address] [place] [address] since your last visit? Include any pap smears or colon screening.  No

## 2020-06-01 NOTE — PATIENT INSTRUCTIONS
Body Mass Index: Care Instructions Your Care Instructions Body mass index (BMI) can help you see if your weight is raising your risk for health problems. It uses a formula to compare how much you weigh with how tall you are. · A BMI lower than 18.5 is considered underweight. · A BMI between 18.5 and 24.9 is considered healthy. · A BMI between 25 and 29.9 is considered overweight. A BMI of 30 or higher is considered obese. If your BMI is in the normal range, it means that you have a lower risk for weight-related health problems. If your BMI is in the overweight or obese range, you may be at increased risk for weight-related health problems, such as high blood pressure, heart disease, stroke, arthritis or joint pain, and diabetes. If your BMI is in the underweight range, you may be at increased risk for health problems such as fatigue, lower protection (immunity) against illness, muscle loss, bone loss, hair loss, and hormone problems. BMI is just one measure of your risk for weight-related health problems. You may be at higher risk for health problems if you are not active, you eat an unhealthy diet, or you drink too much alcohol or use tobacco products. Follow-up care is a key part of your treatment and safety. Be sure to make and go to all appointments, and call your doctor if you are having problems. It's also a good idea to know your test results and keep a list of the medicines you take. How can you care for yourself at home? · Practice healthy eating habits. This includes eating plenty of fruits, vegetables, whole grains, lean protein, and low-fat dairy. · If your doctor recommends it, get more exercise. Walking is a good choice. Bit by bit, increase the amount you walk every day. Try for at least 30 minutes on most days of the week. · Do not smoke. Smoking can increase your risk for health problems.  If you need help quitting, talk to your doctor about stop-smoking programs and medicines. These can increase your chances of quitting for good. · Limit alcohol to 2 drinks a day for men and 1 drink a day for women. Too much alcohol can cause health problems. If you have a BMI higher than 25 · Your doctor may do other tests to check your risk for weight-related health problems. This may include measuring the distance around your waist. A waist measurement of more than 40 inches in men or 35 inches in women can increase the risk of weight-related health problems. · Talk with your doctor about steps you can take to stay healthy or improve your health. You may need to make lifestyle changes to lose weight and stay healthy, such as changing your diet and getting regular exercise. If you have a BMI lower than 18.5 · Your doctor may do other tests to check your risk for health problems. · Talk with your doctor about steps you can take to stay healthy or improve your health. You may need to make lifestyle changes to gain or maintain weight and stay healthy, such as getting more healthy foods in your diet and doing exercises to build muscle. Where can you learn more? Go to http://gildardo-palak.info/ Enter S176 in the search box to learn more about \"Body Mass Index: Care Instructions. \" Current as of: December 11, 2019               Content Version: 12.5 © 6404-1719 Healthwise, Incorporated. Care instructions adapted under license by [employer] (which disclaims liability or warranty for this information). If you have questions about a medical condition or this instruction, always ask your healthcare professional. [address] any warranty or liability for your use of this information.

## 2020-06-30 NOTE — PROGRESS NOTES
Chief Complaint Patient presents with  CHF  
  1 month follow up  Hypertension  Diabetes SUBJECTIVE: 
 
Royal KRISTA Hendrix is a 68 y.o. male who returns in follow-up for his hypertension, PAF, cardiomyopathy, compensated CHF, ASCVD, end-stage renal disease on dialysis and other multiple medical problems. He generally seems to be doing okay except he is washed out today he has dialysis. He still notes a laceration of his right leg that is not healing up and he is noting some serous type drainage from it but nothing that has purulence. He notes no fevers or chills no redness around the area. He notes no other complaints including no increased shortness of breath, chest pain, palpitations, PND, orthopnea or cardiorespiratory complaints. Remainder review of systems is negative Current Outpatient Medications Medication Sig Dispense Refill  Sully-Deisy 0.8 mg tab tablet TK 1 T PO QD    
 mupirocin (BACTROBAN) 2 % ointment Apply  to affected area two (2) times a day. 22 g 0  
 indomethacin (INDOCIN) 25 mg capsule TAKE 1 CAPSULE BY MOUTH THREE TIMES DAILY WITH FOOD UNTIL RIGHT WRIST PAIN HAS RESOLVED 30 Cap 0  
 polyethylene glycol (MIRALAX) 17 gram packet Take 17 g by mouth daily as needed for Other (constipation).  DULCOLAX, BISACODYL, PO Take 1-3 Tabs by mouth daily as needed for Other (constipation).  carvediloL (COREG) 3.125 mg tablet Take 1 Tab by mouth two (2) times daily (with meals). 60 Tab prn  febuxostat (ULORIC) 40 mg tab tablet Take 40 mg by mouth daily as needed for Other.  zolpidem (AMBIEN) 10 mg tablet Take 10 mg by mouth nightly as needed for Sleep.  bimatoprost (LUMIGAN) 0.01 % ophthalmic drops Administer 1 Drop to right eye every evening.  pramipexole (MIRAPEX) 1 mg tablet TAKE 1 TABLET BY MOUTH EVERY NIGHT AT BEDTIME 30 Tab prn  
 finasteride (PROSCAR) 5 mg tablet TAKE 1 TABLET BY MOUTH DAILY 90 Tab 3 Past Medical History: Diagnosis Date  Anemia 8/5/2017  Anxiety 8/5/2017  Arrhythmia   
 atrial fibrillation 2014  ASCVD (arteriosclerotic cardiovascular disease) 8/5/2017  BPH (benign prostatic hyperplasia) 8/5/2017  CAD (coronary artery disease)   
 h/o stents  Cancer [facility])   
 h/o skin cancer  Cardiomyopathy ([address] 8/5/2017  CHF (congestive heart failure) ([address] 8/5/2017  Chronic kidney disease Stage IV  Chronic pain   
 shoulders and feeet- neuropathy  CKD (chronic kidney disease), stage IV ([address] 8/5/2017  Diabetes ([address] type II  
 Diabetes mellitus ([address] 8/5/2017  Diabetic neuropathy ([address] 8/5/2017  DJD (degenerative joint disease) 8/5/2017  ED (erectile dysfunction) 8/5/2017  Gout 8/5/2017  High cholesterol  Hyperlipidemia 8/5/2017  Hypertension  Hypertension with renal disease 8/5/2017  Hypothyroid 8/5/2017  Ill-defined condition   
 glycoma and diabetic macular degeneration  Insomnia 8/5/2017  Obesity 8/5/2017  On statin therapy 8/5/2017  Pneumonia 05/28/2019  Restless leg 8/5/2017  Sleep apnea   
 untreated  Thyroid disease   
 hypothyroid  Ulcer of foot due to secondary diabetes ([address] 07/12/2019 Past Surgical History:  
Procedure Laterality Date  CARDIAC SURG PROCEDURE UNLIST  2000  
 cardiac stents 3  
 CARDIAC SURG PROCEDURE UNLIST Ablation 5/17/2018 ED [facility]  CARDIAC SURG PROCEDURE UNLIST    
 pacemaker  CARDIAC SURG PROCEDURE UNLIST  2018  
 cardio version x2  
 COLONOSCOPY N/A 6/28/2016 COLONOSCOPY performed by Edwige Adames [facility]  COLONOSCOPY N/A 1/9/2019 COLONOSCOPY performed by Hermilo Love MD at [facility] END[facility]COPY  COLONOSCOPY,DIAGNOSTIC  1/9/2019  HX APPENDECTOMY  HX ARTERIOVENOUS FISTULA  05/2020  
 dr Elton Clark  HX BUNIONECTOMY    
 and removal of 2 seasmoid bone of the great toe 2/2018  HX HEENT Bilateral Cataract surgery  HX HEENT    
 Tonsils  HX HEENT Axe wound to the head  HX KNEE ARTHROSCOPY X 2  
 HX ORTHOPAEDIC    
 HX ORTHOPAEDIC    
 partial laminectomy  HX PACEMAKER    
 HX ROTATOR CUFF REPAIR    
 bilateral- not operable per patient  IR INSERT TUNL CVC W/O PORT OVER 5 YR  3/3/2020  ME INSJ ELTRD CAR DACIA SYS ATTCH PREV PM/DFB PLS GEN N/A 1/28/2019 Lv Lead Placement To Previous Generator performed by Vincent Goodman MD at [facility] CARDIAC CATH LAB Left side  ME REMVL PERM PM PLS GEN W/REPL PLSE GEN MULT LEAD N/A 1/28/2019 Remove & Replace Ppm Gen Biv Multi Leads performed by Vincent Goodman MD at [address] CATH LAB Allergies Allergen Reactions  Niacin Rash and Unknown (comments) Niaspan  Adhesive Rash  Imipenem Diarrhea Other reaction(s): Rash  Levemir [Insulin Detemir] Hives  Other Medication Other (comments) ? Allergy to Morrow Remak causing chemical burn  Primaxin [Imipenem-Cilastatin] Diarrhea and Rash  Xarelto [Rivaroxaban] Rash and Itching Other reaction(s): Rash REVIEW OF SYSTEMS: 
General: negative for - chills or fever, or weight loss or gain ENT: negative for - headaches, nasal congestion or tinnitus Eyes: no blurred or visual changes Neck: No stiffness or swollen nodes Respiratory: negative for - cough, hemoptysis, shortness of breath or wheezing Cardiovascular : negative for - chest pain, edema, palpitations or shortness of breath Gastrointestinal: negative for - abdominal pain, blood in stools, heartburn or nausea/vomiting Genito-Urinary: no dysuria, trouble voiding, or hematuria Musculoskeletal: negative for - gait disturbance, joint pain, joint stiffness or joint swelling Neurological: no TIA or stroke symptoms Hematologic: no bruises, no bleeding Lymphatic: no swollen glands Integument: no lumps, mole changes, nail changes or rash. Serous drainage from area on the right leg Endocrine:no malaise/lethargy poly uria or polydipsia or unexpected weight changes Social History Socioeconomic History  Marital status:  Spouse name: Not on file  Number of children: Not on file  Years of education: Not on file  Highest education level: Not on file Tobacco Use  Smoking status: Former Smoker Packs/day: 0.50 Years: 4.00 Pack years: 2.00 Last attempt to quit: 3/6/1972 Years since quittin.3  Smokeless tobacco: Never Used Substance and Sexual Activity  Alcohol use: No  
  Comment: rare, 1 drink per year  Drug use: No  
 Sexual activity: Not Currently Other Topics Concern Family History Problem Relation Age of Onset  Heart Disease Mother  Kidney Disease Mother  Heart Disease Father  Kidney Disease Father  Cancer Sister Breast  
 
 
OBJECTIVE:  
 
Visit Vitals /66 (BP 1 Location: Left arm, BP Patient Position: Sitting) Pulse 79 Temp 98.6 °F (37 °C) (Oral) Resp 20 Ht 6' 4\" (1.93 m) Wt 228 lb 11.2 oz (103.7 kg) SpO2 98% BMI 27.84 kg/m² CONSTITUTIONAL:   well nourished, appears age appropriate EYES: sclera anicteric, PERRL, EOMI 
ENMT:nares clear, moist mucous membranes, pharynx clear NECK: supple. Thyroid normal, No JVD or bruits RESPIRATORY: Chest: clear to ascultation and percussion, normal inspiratory effort CARDIOVASCULAR: Heart: regular rate and rhythm no murmurs, rubs or gallops, PMI not displaced, No thrills, no peripheral edema GASTROINTESTINAL: Abdomen: non distended, soft, non tender, bowel sounds normal 
HEMATOLOGIC: no purpura, petechiae or bruising LYMPHATIC: No lymph node enlargemant MUSCULOSKELETAL: Extremities: no active synovitis, pulse 1+ INTEGUMENT: No unusual rashes or suspicious skin lesions noted. Nails appear normal.  Laceration right anterior shin just laterally that has a serous drainage but no erythema surrounding abnormality PERIPHERAL VASCULAR: normal pulses femoral, PT and DP NEUROLOGIC: non-focal exam, A & O X 3 PSYCHIATRIC:, appropriate affect ASSESSMENT:  
1. Hypertension with renal disease 2. Chronic combined systolic and diastolic CHF, NYHA class 4 (HCC) 3. Ischemic cardiomyopathy 4. Paroxysmal atrial fibrillation (HCC) 5. Anemia, unspecified type 6. Cellulitis of right lower extremity Impression 1. Hypertension that is controlled so continue current therapy reviewed with him. 2.  Compensated CHF continue same dialysis 3 times weekly 3. Cardiomyopathy stable 4   Paroxysmal atrial fibrillation is in sinus rhythm today 5. Anemia repeat status pending 6. Cellulitis we will use topical Bactroban I will recheck a myself again and 1 month or sooner if there is a problem. All of this is discussed with his wife. Advance care planning discussed and he does not want to be a DNR DNR forms are completed. PLAN: 
. Orders Placed This Encounter  CBC WITH AUTOMATED DIFF ([facility])  METABOLIC PANEL, COMPREHENSIVE ([facility])  Sully-Deisy 0.8 mg tab tablet  mupirocin (BACTROBAN) 2 % ointment ATTENTION:  
This medical record was transcribed using an electronic medical records system. Although proofread, it may and can contain electronic and spelling errors. Other human spelling and other errors may be present. Corrections may be executed at a later time. Please feel free to contact us for any clarifications as needed. Follow-up and Dispositions · Return in about 4 weeks (around 7/29/2020). No results found for any visits on 07/01/20. Liz Huang MD 
 
The patient verbalized understanding of the problems and plans as explained.

## 2020-07-01 ENCOUNTER — OFFICE VISIT (OUTPATIENT)
Dept: INTERNAL MEDICINE CLINIC | Age: 70
End: 2020-07-01

## 2020-07-01 VITALS
HEIGHT: 76 IN | BODY MASS INDEX: 27.85 KG/M2 | RESPIRATION RATE: 20 BRPM | HEART RATE: 79 BPM | DIASTOLIC BLOOD PRESSURE: 66 MMHG | TEMPERATURE: 98.6 F | SYSTOLIC BLOOD PRESSURE: 124 MMHG | WEIGHT: 228.7 LBS | OXYGEN SATURATION: 98 %

## 2020-07-01 DIAGNOSIS — I12.9 HYPERTENSION WITH RENAL DISEASE: Primary | ICD-10-CM

## 2020-07-01 DIAGNOSIS — L03.115 CELLULITIS OF RIGHT LOWER EXTREMITY: ICD-10-CM

## 2020-07-01 DIAGNOSIS — I48.0 PAROXYSMAL ATRIAL FIBRILLATION (HCC): ICD-10-CM

## 2020-07-01 DIAGNOSIS — I25.5 ISCHEMIC CARDIOMYOPATHY: ICD-10-CM

## 2020-07-01 DIAGNOSIS — D64.9 ANEMIA, UNSPECIFIED TYPE: ICD-10-CM

## 2020-07-01 DIAGNOSIS — Z71.89 ADVANCE CARE PLANNING: ICD-10-CM

## 2020-07-01 DIAGNOSIS — I50.42 CHRONIC COMBINED SYSTOLIC AND DIASTOLIC CHF, NYHA CLASS 4 (HCC): ICD-10-CM

## 2020-07-01 RX ORDER — FOLIC ACID/VIT B COMPLEX AND C 0.8 MG
TABLET ORAL
COMMUNITY
Start: 2020-04-22 | End: 2021-01-01 | Stop reason: ALTCHOICE

## 2020-07-01 RX ORDER — MUPIROCIN 20 MG/G
OINTMENT TOPICAL 2 TIMES DAILY
Qty: 22 G | Refills: 0 | Status: SHIPPED | OUTPATIENT
Start: 2020-07-01 | End: 2020-09-25 | Stop reason: ALTCHOICE

## 2020-07-01 NOTE — PATIENT INSTRUCTIONS
Anemia: Care Instructions Your Care Instructions Anemia is a low level of red blood cells, which carry oxygen throughout your body. Many things can cause anemia. Lack of iron is one of the most common causes. Your body needs iron to make hemoglobin, a substance in red blood cells that carries oxygen from the lungs to your body's cells. Without enough iron, the body produces fewer and smaller red blood cells. As a result, your body's cells do not get enough oxygen, and you feel tired and weak. And you may have trouble concentrating. Bleeding is the most common cause of a lack of iron. You may have heavy menstrual bleeding or bleeding caused by conditions such as ulcers, hemorrhoids, or cancer. Regular use of aspirin or other anti-inflammatory medicines (such as ibuprofen) also can cause bleeding in some people. A lack of iron in your diet also can cause anemia, especially at times when the body needs more iron, such as during pregnancy, infancy, and the teen years. Your doctor may have prescribed iron pills. It may take several months of treatment for your iron levels to return to normal. Your doctor also may suggest that you eat foods that are rich in iron, such as meat and beans. There are many other causes of anemia. It is not always due to a lack of iron. Finding the specific cause of your anemia will help your doctor find the right treatment for you. Follow-up care is a key part of your treatment and safety. Be sure to make and go to all appointments, and call your doctor if you are having problems. It's also a good idea to know your test results and keep a list of the medicines you take. How can you care for yourself at home? · Take your medicines exactly as prescribed. Call your doctor if you think you are having a problem with your medicine. · If your doctor recommends iron pills, take them as directed: ? Try to take the pills on an empty stomach about 1 hour before or 2 hours after meals. But you may need to take iron with food to avoid an upset stomach. ? Do not take antacids or drink milk or caffeine drinks (such as coffee, tea, or cola) at the same time or within 2 hours of the time that you take your iron. They can make it hard for your body to absorb the iron. ? Vitamin C (from food or supplements) helps your body absorb iron. Try taking iron pills with a glass of orange juice or some other food that is high in vitamin C, such as citrus fruits. ? Iron pills may cause stomach problems, such as heartburn, nausea, diarrhea, constipation, and cramps. Be sure to drink plenty of fluids, and include fruits, vegetables, and fiber in your diet each day. Iron pills often make your bowel movements dark or green. ? If you forget to take an iron pill, do not take a double dose of iron the next time you take a pill. ? Keep iron pills out of the reach of small children. An overdose of iron can be very dangerous. · Follow your doctor's advice about eating iron-rich foods. These include red meat, shellfish, poultry, eggs, beans, raisins, whole-grain bread, and leafy green vegetables. · Steam vegetables to help them keep their iron content. When should you call for help? [id number] anytime you think you may need emergency care. For example, call if: 
· You have symptoms of a heart attack. These may include: 
? Chest pain or pressure, or a strange feeling in the chest. 
? Sweating. ? Shortness of breath. ? Nausea or vomiting. ? Pain, pressure, or a strange feeling in the back, neck, jaw, or upper belly or in one or both shoulders or arms. ? Lightheadedness or sudden weakness. ? A fast or irregular heartbeat. After you call 911, the  may tell you to chew 1 adult-strength or 2 to 4 low-dose aspirin. Wait for an ambulance. Do not try to drive yourself. · You passed out (lost consciousness). Call your doctor now or seek immediate medical care if: · You have new or increased shortness of breath. · You are dizzy or lightheaded, or you feel like you may faint. · Your fatigue and weakness continue or get worse. · You have any abnormal bleeding, such as: 
? Nosebleeds. ? Vaginal bleeding that is different (heavier, more frequent, at a different time of the month) than what you are used to. 
? Bloody or black stools, or rectal bleeding. ? Bloody or pink urine. Watch closely for changes in your health, and be sure to contact your doctor if: 
· You do not get better as expected. Where can you learn more? Go to http://gildardo-palak.info/ Enter R301 in the search box to learn more about \"Anemia: Care Instructions. \" Current as of: November 8, 2019               Content Version: 12.5 © 1157-7697 [employer]. Care instructions adapted under license by [employer] (which disclaims liability or warranty for this information). If you have questions about a medical condition or this instruction, always ask your healthcare professional. [address] any warranty or liability for your use of this information.

## 2020-07-01 NOTE — PROGRESS NOTES
Chief Complaint Patient presents with  CHF  
  1 month follow up  Hypertension  Diabetes Visit Vitals /66 (BP 1 Location: Left arm, BP Patient Position: Sitting) Pulse 79 Temp 98.6 °F (37 °C) (Oral) Resp 20 Ht 6' 4\" (1.93 m) Wt 228 lb 11.2 oz (103.7 kg) SpO2 98% BMI 27.84 kg/m² 1. Have you been to the ER, urgent care clinic since your last visit? Hospitalized since your last visit? No 
 
2. Have you seen or consulted any other jessica care providers outside of the [address] [place] [address] since your last visit? Include any pap smears or colon screening. 6/2020 Dr. Rojean Barthel Dr. Burl Sells

## 2020-07-01 NOTE — ACP (ADVANCE CARE PLANNING)
Advance care plan is discussed with he and his wife and he has decided he wants a DO NOT RESUSCITATE order. Forms are completed for DNR. Reviewed what he has on file and that is unchanged. See note.

## 2020-07-11 DIAGNOSIS — F51.01 PRIMARY INSOMNIA: Primary | ICD-10-CM

## 2020-07-13 RX ORDER — ZOLPIDEM TARTRATE 10 MG/1
TABLET ORAL
Qty: 30 TAB | Refills: 5 | Status: SHIPPED | OUTPATIENT
Start: 2020-07-13 | End: 2020-10-09

## 2020-07-17 RX ORDER — FINASTERIDE 5 MG/1
TABLET, FILM COATED ORAL
Qty: 90 TAB | Refills: 3 | Status: SHIPPED | OUTPATIENT
Start: 2020-07-17 | End: 2020-12-04

## 2020-07-17 NOTE — TELEPHONE ENCOUNTER
PCP: Robby Wu MD    Last appt: 7/1/2020  Future Appointments   Date Time Provider Leonardo Ruizi   8/3/2020  1:20 PM Robby Wu MD [address]       Requested Prescriptions     Pending Prescriptions Disp Refills    finasteride (PROSCAR) 5 mg tablet [Pharmacy Med Name: FINASTERIDE 5MG TABLETS] 90 Tab 3     Sig: TAKE 1 TABLET BY MOUTH DAILY

## 2020-07-29 RX ORDER — INDOMETHACIN 25 MG/1
CAPSULE ORAL
Qty: 30 CAP | Refills: 0 | Status: SHIPPED | OUTPATIENT
Start: 2020-07-29 | End: 2020-12-04

## 2020-07-29 NOTE — TELEPHONE ENCOUNTER
PCP: Finn Romo MD    Last appt: 7/1/2020  Future Appointments   Date Time Provider Leonardo Thomas   8/3/2020  1:20 PM Finn Romo MD [facility]       Requested Prescriptions     Pending Prescriptions Disp Refills    indomethacin (INDOCIN) 25 mg capsule [Pharmacy Med Name: INDOMETHACIN 25MG CAPSULES] 30 Cap 0     Sig: TAKE ONE CAPSULE BY MOUTH THREE TIMES DAILY WITH FOOD UNTIL RIGHT WRIST PAIN HAS RESOLVED

## 2020-07-31 PROBLEM — Z13.39 ALCOHOL SCREENING: Status: ACTIVE | Noted: 2020-07-31

## 2020-08-30 PROBLEM — Z00.00 MEDICARE ANNUAL WELLNESS VISIT, SUBSEQUENT: Status: RESOLVED | Noted: 2017-10-17 | Resolved: 2020-08-30

## 2020-09-08 ENCOUNTER — TELEPHONE (OUTPATIENT)
Dept: PALLATIVE CARE | Age: 70
End: 2020-09-08

## 2020-09-08 NOTE — TELEPHONE ENCOUNTER
[facility] Palliative Medicine Office  Nursing Note  [phone]  Fax [phone]     Name:  America Lowry. YOB: 1950     Pt's wife Zhane called requesting a follow up appt for her  Valentino Ammon. Mr. Aubrey Schaumann was seen by Dr. Alicia Johnson on 4/22/19 and 5/20/19. Pt had self-referred to outpatient Palliative Medicine for symptom management and psychosocial support. Pt has  history of hypertension, CKD, anemia, atrial fibrillation acute on chronic compensated systolic CHF with cardiomyopathy , DM type 2 with severe neuropathy, chronic severe bilateral shoulder pain. 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 Wife calls today and states her  is \"ready for Palliative again\". She says he was hospitalized in late May of last year with heart failure and did not follow up with outpatient palliative after his discharge because he was \"doing better for awhile\". In Jan. 2020, pt's CKD progressed to stage 5. He is now on hemodialysis Tues. , Thurs. , Sat. Pt signed a DDNR on 4/22/19 but rescinded it on 5/28/19 when he was in the hospital.  See  Dr. Jt Levin 5/28/19 note.   Pt said he does want CPR \"if I have a heart attack or something at home or here, then I would want them to try to see if they can revive me, including intubate me. If I am not improving then my wife or the doctors can make a decision to take me off life support\".      Appt scheduled for 9/21/20 at 2:15pm with Dr. Joana Koenig, BSN, RN  Palliative Medicine  [phone]

## 2020-09-25 ENCOUNTER — OFFICE VISIT (OUTPATIENT)
Dept: PALLATIVE CARE | Age: 70
End: 2020-09-25
Payer: MEDICARE

## 2020-09-25 ENCOUNTER — HOSPITAL ENCOUNTER (OUTPATIENT)
Dept: WOUND CARE | Age: 70
Discharge: HOME | End: 2020-09-25
Admitting: PODIATRIST
Payer: MEDICARE

## 2020-09-25 VITALS
SYSTOLIC BLOOD PRESSURE: 121 MMHG | HEART RATE: 69 BPM | BODY MASS INDEX: 28.18 KG/M2 | DIASTOLIC BLOOD PRESSURE: 77 MMHG | TEMPERATURE: 98 F | HEIGHT: 76 IN | WEIGHT: 231.4 LBS

## 2020-09-25 VITALS
SYSTOLIC BLOOD PRESSURE: 136 MMHG | RESPIRATION RATE: 18 BRPM | DIASTOLIC BLOOD PRESSURE: 71 MMHG | TEMPERATURE: 97.1 F | HEART RATE: 70 BPM

## 2020-09-25 DIAGNOSIS — M25.512 CHRONIC PAIN OF BOTH SHOULDERS: Primary | ICD-10-CM

## 2020-09-25 DIAGNOSIS — M25.511 CHRONIC PAIN OF BOTH SHOULDERS: Primary | ICD-10-CM

## 2020-09-25 DIAGNOSIS — G47.00 INSOMNIA, UNSPECIFIED TYPE: ICD-10-CM

## 2020-09-25 DIAGNOSIS — G62.9 NEUROPATHY: ICD-10-CM

## 2020-09-25 DIAGNOSIS — Z99.2 END-STAGE RENAL DISEASE ON HEMODIALYSIS (HCC): ICD-10-CM

## 2020-09-25 DIAGNOSIS — G89.29 CHRONIC PAIN OF BOTH SHOULDERS: Primary | ICD-10-CM

## 2020-09-25 DIAGNOSIS — N18.6 END-STAGE RENAL DISEASE ON HEMODIALYSIS (HCC): ICD-10-CM

## 2020-09-25 PROBLEM — L03.116 CELLULITIS OF LEFT LEG: Status: ACTIVE | Noted: 2020-09-25

## 2020-09-25 PROCEDURE — G8536 NO DOC ELDER MAL SCRN: HCPCS | Performed by: INTERNAL MEDICINE

## 2020-09-25 PROCEDURE — 3288F FALL RISK ASSESSMENT DOCD: CPT | Performed by: INTERNAL MEDICINE

## 2020-09-25 PROCEDURE — G8432 DEP SCR NOT DOC, RNG: HCPCS | Performed by: INTERNAL MEDICINE

## 2020-09-25 PROCEDURE — G8427 DOCREV CUR MEDS BY ELIG CLIN: HCPCS | Performed by: INTERNAL MEDICINE

## 2020-09-25 PROCEDURE — G8417 CALC BMI ABV UP PARAM F/U: HCPCS | Performed by: INTERNAL MEDICINE

## 2020-09-25 PROCEDURE — 1100F PTFALLS ASSESS-DOCD GE2>/YR: CPT | Performed by: INTERNAL MEDICINE

## 2020-09-25 PROCEDURE — 99213 OFFICE O/P EST LOW 20 MIN: CPT

## 2020-09-25 PROCEDURE — 3017F COLORECTAL CA SCREEN DOC REV: CPT | Performed by: INTERNAL MEDICINE

## 2020-09-25 PROCEDURE — 99215 OFFICE O/P EST HI 40 MIN: CPT | Performed by: INTERNAL MEDICINE

## 2020-09-25 RX ORDER — MENTHOL
1000 GEL (GRAM) TOPICAL DAILY
COMMUNITY
End: 2020-12-04

## 2020-09-25 RX ORDER — TRAZODONE HYDROCHLORIDE 50 MG/1
150 TABLET ORAL
Qty: 90 TAB | Refills: 0 | Status: SHIPPED | OUTPATIENT
Start: 2020-09-25 | End: 2020-10-26

## 2020-09-25 RX ORDER — CHOLECALCIFEROL (VITAMIN D3) 50 MCG
1000 CAPSULE ORAL DAILY
COMMUNITY
End: 2020-12-04

## 2020-09-25 RX ORDER — CHOLECALCIFEROL (VITAMIN D3) 50 MCG
1 CAPSULE ORAL DAILY
COMMUNITY
End: 2020-01-01

## 2020-09-25 NOTE — PROGRESS NOTES
Palliative Medicine Office Visit Palliative Medicine Nurse Check In 
[phone] Patient Name: Antoni Grajeda. YOB: 1950 Date of Office Visit: 9/25/20 Patient states: increase shoulder pain From Check In Sheet (scanned in Media): 
Has a medical provider talked with you about cardiopulmonary resuscitation (CPR)? [x] Yes   [] No   [] Unable to obtain Nurse reminder to complete or update ACP FlowSheet: 
 
Is ACP on the Problem List?    [x] Yes    [] No 
IF ACP Document is ON FILE; Nurse to place ACP on Problem List  
 
Is there an ACP Note in Chart Review/Note? [x] Yes    [] No  
If NO: ALERT PROVIDER Advance Care Planning 4/28/2020 Patient's Healthcare Decision Maker is: -  
Primary Decision Maker Name -  
Primary Decision Maker Phone Number -  
Primary Decision Maker Relationship to Patient -  
Confirm Advance Directive Yes, on file Patient Would Like to Complete Advance Directive - Does the patient have other document types - Is there anything that we should know about you as a person in order to provide you the best care possible? Have you been to the ER, urgent care clinic since your last visit? [] Yes   [x] No   [] Unable to obtain Have you been hospitalized since your last visit? [] Yes   [x] No   [] Unable to obtain Have you seen or consulted any other health care providers outside of the [address] [place] [address] since your last visit? [] Yes   [x] No   [] Unable to obtain Functional status (describe):  
Reminder to complete Palliative Performance Scale or ECOG Performance Scale if patient has cancer Last BM: 9/24/20  accessed (date): 9/25/20 Bottle review (for opioid pain medication): 
Medication 1:  
Date filled:  
Directions:  
# filled: # left: # pills taking per day: 
Last dose taken: 
 
Medication 2:  
Date filled:  
Directions:  
# filled: # left: # pills taking per day: 
Last dose taken: Medication 3:  
Date filled:  
Directions:  
# filled: # left: # pills taking per day: 
Last dose taken: 
 
Medication 4:  
Date filled:  
Directions:  
# filled: # left: # pills taking per day: 
Last dose taken:

## 2020-09-25 NOTE — WOUND CARE
09/25/20 1519   Wound Leg lower Medial;Right #1 09/25/20   Date First Assessed/Time First Assessed: 09/25/20 1517   Wound Approximate Age at First Assessment (Weeks): (c)   Primary Wound Type: Venous Ulcer  Location: Leg lower  Wound Location Orientation: Medial;Right  Wound Description: #1  Date of First Obs. ..    Dressing Status Removed   Dressing Type   (alginate, bandaid)   Non-staged Wound Description Partial thickness   Wound Length (cm) 2.9 cm   Wound Width (cm) 1.4 cm   Wound Depth (cm) 0.1 cm   Wound Surface Area (cm^2) 4.06 cm^2   Wound Volume (cm^3) 0.41 cm^3   Condition of Base Pink   Condition of Edges Open   Drainage Amount Small   Drainage Color Serosanguinous   Wound Odor None   Angela-wound Assessment Blanchable erythema   Cleansing and Cleansing Agents  Normal saline     Visit Vitals  /71 (BP 1 Location: Right arm, BP Patient Position: Sitting)   Pulse 70   Temp 97.1 °F (36.2 °C)   Resp 18

## 2020-09-25 NOTE — PATIENT INSTRUCTIONS
Dear Grabiel Lujan. , 
 
It was a pleasure seeing you today at [facility] office We will see you again in 1 month Your described symptoms were: Fatigue: 5 Drowsiness: 9 Depression: 1 Pain: 5 Anxiety: 2 Nausea: 0 Anorexia: 5 Dyspnea: 0 Best Well-Bein Constipation: Yes This is the plan we talked about: 1. Insomnia - You are struggling with severe insomnia. You sleep less than 2 hours at night but take several naps during the day. - You have been on Ambien 10 mg for months but no benefit. - You want to wean off Ambien - Take 5 mg Ambien at night for 4 days, decrease to 5 mg every other day for 4 days and then start Trazodone 50 mg at bedtime for 1 week and if desired increase in sleep to at least 4-5 hours is not achieved, then increase to Trazodone 100 mg (2 tabs) on week 2. 
 
2. Neuropathy - You are no longer on any medicines. - You are at high risk for fall and have worked with PT/OT and have all the necessary DME. 3. Hemodialysis - You are on HD and this is not very pleasant. 
- You have a lot of leg cramping, please talk with your nephrologist, this may be related to electrolyte abnormality. 4. Goals of care - You have an AMD and DDNR. This is what you have shared with us about Advance Care Planning: 
 
  Primary Decision Maker: Flor Rod - Spouse - [phone] Secondary Decision Maker: Zana Nava - Son - [phone]  Advance Care Planning 2020 Patient's Healthcare Decision Maker is: -  
Primary Decision Maker Name -  
Primary Decision Maker Phone Number -  
Primary Decision Maker Relationship to Patient -  
Confirm Advance Directive Yes, on file Patient Would Like to Complete Advance Directive - Does the patient have other document types - The Palliative Medicine Team is here to support you and your family.   
 
 
 
Sincerely, 
 
 
Jamie Chua MD and the Palliative Medicine Team

## 2020-09-25 NOTE — H&P
[address] HISTORY AND PHYSICAL Name:  Christine Simon 
MR#:  [id number] :  1950 ACCOUNT #:  [de-identified] ADMIT DATE:  2020 HISTORY OF CHIEF COMPLAINT:  This 68-year-old white male presents with a wound of three days on his left lower leg. The patient's wife states that the wound looks better and they have been using calcium alginate on the wound. PAST MEDICAL HISTORY:  Diabetes with neuropathy, atrial fibrillation, osteoarthritis, atherosclerotic cardiovascular disease, gout, hyperlipidemia, and renal disease; the patient is on dialysis. Insomnia, hypothyroidism, GERD, cardiomyopathy, benign prostatic hyperplasia, anxiety, chronic stasis dermatitis with venous insufficiency in lower extremities, congestive heart failure. ALLERGIES:  TO NIACIN, ADHESIVES, IMIPENEM, LEVEMIR, PRIMAXIN, AND Darin Malden. CURRENT MEDICATIONS:  Omega-3 fish oil 1000 mg daily, vitamin E 1000 mg daily, a probiotic daily, Proscar 5 mg daily, Ambien 10 mg at night as needed for for sleep, Sully-Deisy 0.8 mg one daily, MiraLax 17 g daily as needed for constipation, Coreg 3.125 mg twice a day, Lumigan eyedrops daily, Mirapex 1 mg tablet at night, trazodone 50 mg as needed for sleep, Indocin 25 mg daily, Dulcolax 1-3 tablets daily as needed for constipation. SOCIAL HISTORY:  The patient is a previous smoker, he quit in . Denies any alcohol or drug abuse. FAMILY HISTORY:  Mother, heart disease and kidney disease. Father, heart disease and kidney disease, and a sister with breast cancer. REVIEW OF SYSTEMS:  No fever, sweats, or chills. A 10-point system checked and all within normal. 
 
PHYSICAL EXAMINATION: 
VITAL SIGNS:  Temperature 97.1, pulse rate 70, respirations 16, blood pressure 136/71. HEAD AND NECK:  No abnormalities. LUNGS:  Normal upon auscultation. HEART:  Normal heart sounds. ABDOMEN:  No abdominal pain. MUSCULOSKELETAL:  Some muscle atrophy in upper and lower limbs with diminished muscle strength and tone. The patient uses a walker. NEUROLOGIC:  Diminished epicritic sensation in lower extremities. PSYCHIATRIC:  Normal mood and behavior. EXTREMITIES:  Focused evaluation of lower extremities revealed nonpalpable pedal pulses with fairly good muscle strength in lower extremities bilaterally. Noted lack of hair growth in lower extremities with chronic stasis dermatitis. Left lower leg medial aspect, there is a red granular stage I venous stasis wound that measures 2.9 x 1.4 x 0.1 cm with scant drainage. There is a large area of surrounding erythema with mild warmth. ASSESSMENT:  Cellulitis, left lower leg with venous stasis wound on a diabetic with neuropathy. PLAN:  The wound was cleansed with saline, dressed with silver calcium alginate followed by double Tubigrip. The patient was given a prescription of doxycycline 100 mg to take one p.o. b.i.d. for 10 days of one refill. The patient will have a followup visit in [address] [place] [address] with myself, Dr. Cynthia Horan, in two weeks. DORIS Calderon/[email or web address] 
D:  09/25/2020 15:55 
T:  09/25/2020 17:02 JOB #:  [id number]

## 2020-09-25 NOTE — DISCHARGE INSTRUCTIONS
Discharge Instructions for  [facility]  [address]  [place], [address]  Telephone: [phone]    NAME:  Bonifacio [facility] YOB: 1950  MEDICAL RECORD NUMBER:  [id number]  DATE:  9/25/2020      WOUND CARE ORDERS:  Left Lower leg :Cleanse with saline or wound cleanser, apply Silver alignate cover and secure . Double tubi F  Pt./pcg/HH nurse to change (freq) every other day and as needed for compromise. F/U in 2 week. TREATMENT ORDERS:    Elevate leg(s) above the level of the heart when sitting. Avoid prolonged standing in one place. Do no get dressing/wrap wet. Follow Diet as prescribed:   [] Diet as tolerated: [] Calorie Diabetic Diet: [] No Added Salt:  [] Increase Protein: [] Limit the amount of liquid you are drinking and avoid drinking in between meals               Return Appointment:  [] Return Appointment: With {[id number]}  in  *** Week(s)  [] Nurse Visit : *** days  [] Ordered tests:      Electronically signed Lord Elizabeth RN on 9/25/2020 at 3:28 PM     Lucila Albarran 281: Should you experience any significant changes in your wound(s) or have questions about your wound care, please contact the [address] [place] [address] at [address] [place] [address] 8:00 am - 4:30. If you need help with your wound outside these hours and cannot wait until we are again available, contact your PCP or go to the hospital emergency room. PLEASE NOTE: IF YOU ARE UNABLE TO OBTAIN WOUND SUPPLIES, CONTINUE TO USE THE SUPPLIES YOU HAVE AVAILABLE UNTIL YOU ARE ABLE TO REACH US. IT IS MOST IMPORTANT TO KEEP THE WOUND COVERED AT ALL TIMES.      Physician Signature:_______________________    Date: ___________ Time:  ____________

## 2020-09-28 NOTE — PROGRESS NOTES
Palliative Medicine Outpatient Services [place]: [phone] Patient Name: Jonathan Echavarria. YOB: 1950 Date of Current Visit: 20 Location of Current Visit:   
[] [facility] Office 
[] [facility] Office [x] [facility] Office 
[] Home 
[]Synchronous real-time A/V virtual visit Date of Initial Visit: 2020 Referral from: Self severe Primary Care Physician: Tristin Page MD 
  
 SUMMARY:  
Jonathan Michele is a 68y.o. year old with a  history of diabetic neuropathy, congestive heart failure with cardiomyopathy, chronic kidney disease on hemodialysis since 2020, chronic arthritic shoulder pain, high risk for falls, who was referred to Palliative Medicine by self for management of symptoms and psychosocial support. The patients social history includes retired , lives at home with his wife García. Baseline function, sleeps in a recliner, uses a walker and Rollator to get around the house, needs supervision/assistance with showering, able to toilet himself. High risk for fall from neuropathy. Initial Referral Intake note reviewed PALLIATIVE DIAGNOSES:  
 
  ICD-10-CM ICD-9-CM 1. Chronic pain of both shoulders  M25.511 719.41   
 G89.29 338.29   
 M25.512    
2. Insomnia, unspecified type  G47.00 780.52 3. Neuropathy  G62.9 355.9 4. End-stage renal disease on hemodialysis (HCC)  N18.6 585.6 Z99.2 V45.11 PLAN:  
 
Patient Instructions Dear Jonathan Echavarria. , 
 
It was a pleasure seeing you today at [facility] office We will see you again in 1 month Your described symptoms were: Fatigue: 5 Drowsiness: 9 Depression: 1 Pain: 5 Anxiety: 2 Nausea: 0 Anorexia: 5 Dyspnea: 0 Best Well-Bein Constipation: Yes This is the plan we talked about: 1. Insomnia - You are struggling with severe insomnia. You sleep less than 2 hours at night but take several naps during the day. - You have been on Ambien 10 mg for months but no benefit. - You want to wean off Ambien - Take 5 mg Ambien at night for 4 days, decrease to 5 mg every other day for 4 days and then start Trazodone 50 mg at bedtime for 1 week and if desired increase in sleep to at least 4-5 hours is not achieved, then increase to Trazodone 100 mg (2 tabs) on week 2. 
 
2. Neuropathy - You are no longer on any medicines. - You are at high risk for fall and have worked with PT/OT and have all the necessary DME. 3. Hemodialysis - You are on HD and this is not very pleasant. 
- You have a lot of leg cramping, please talk with your nephrologist, this may be related to electrolyte abnormality. 4. Goals of care - You have an AMD and DDNR. This is what you have shared with us about Advance Care Planning: 
 
  Primary Decision Maker: Rama Lester - Spouse - [phone] Secondary Decision Maker: Malachi Lucio - Son - +[phone]  Advance Care Planning 4/28/2020 Patient's Healthcare Decision Maker is: -  
Primary Decision Maker Name -  
Primary Decision Maker Phone Number -  
Primary Decision Maker Relationship to Patient -  
Confirm Advance Directive Yes, on file Patient Would Like to Complete Advance Directive - Does the patient have other document types - The Palliative Medicine Team is here to support you and your family. Sincerely, 
 
 
Kentrell Gomes MD and the Palliative Medicine Team 
 
 
 GOALS OF CARE / TREATMENT PREFERENCES:  
[====Goals of Care====] GOALS OF CARE: 
Patient / health care proxy stated goals: See Patient Instructions / Summary TREATMENT PREFERENCES:  
Code Status:  [] Attempt Resuscitation       [x] Do Not Attempt Resuscitation Advance Care Planning: 
[x] [facility] Interdisciplinary Team has updated the ACP Navigator with Decision Maker and Patient Capacity Primary Decision Maker: Rama Lester - Spouse - [phone] Secondary Decision Maker: Ivon Amaya - Son - +[phone]  Advance Care Planning 4/28/2020 Patient's Healthcare Decision Maker is: -  
Primary Decision Maker Name -  
Primary Decision Maker Phone Number -  
Primary Decision Maker Relationship to Patient -  
Confirm Advance Directive Yes, on file Patient Would Like to Complete Advance Directive - Does the patient have other document types - Other: 
(If patient appropriate for POST, consider using PALLPOST smart phrase here) The palliative care team has discussed with patient / health care proxy about goals of care / treatment preferences for patient. 
[====Goals of Care====] PRESCRIPTIONS GIVEN:  
 
Medications Ordered Today Medications  traZODone (DESYREL) 50 mg tablet Sig: Take 3 Tabs by mouth nightly. Start by taking 1 tab at night for 1 week, increase to 2 tabs at night in the next week if need be Dispense:  90 Tab Refill:  0  
  
 
 
 FOLLOW UP: Future Appointments Date Time Provider [facility] 10/9/2020  2:15 PM Tirso Arellano DPM [facility]  
10/28/2020 12:30 PM Isha Delgado MD [address] [facility] PHYSICIANS INVOLVED IN CARE:  
Patient Care Team: 
Tammy Dowell MD as PCP - General (Internal Medicine) Tammy Dowell MD as PCP - [facility] Empaneled Provider Rut Morgan MD (Cardiology) HISTORY:  
Reviewed patient-completed ESAS and advance care planning form. Reviewed patient record in prescription monitoring program. 
 
CHIEF COMPLAINT:  
Chief Complaint Patient presents with  Shoulder Pain HPI/SUBJECTIVE: The patient is: [x] Verbal / [] Nonverbal  
 
 Patient seen in office along with his wife Lay. We last saw Mr. Delford Dubin over a year ago, he is since started hemodialysis which he initially thought that he would not ever agreed to. He is regretting starting dialysis and talks at length about how miserable he is when he is at dialysis. Overall his functionality has declined, memory is poor now, very irritable most of the time. He is off all medicines for neuropathy due to the kidney disease. He tolerated gabapentin very poorly to begin with. He has started using cane, walker and Rollator which is new over the last 6 months. He struggles with loss of independence, complains about it but current quality of life is agreeable to him. Insomnia is his biggest complaint, 10 mg Ambien is no longer helping him. He sleeps 1 to 2 hours per night and then is up all night. He naps during the day but never feels refreshed. Wants to try anything else for sleep. He wanted to know about electively discontinuing dialysis. We talked about this at length, he understands that he has the right to stop dialysis whenever he wants. He is having some leg cramps, agreed to talk to the nephrologist about this. Clinical Pain Assessment (nonverbal scale for nonverbal patients):  
[++++ Clinical Pain Assessment++++] [++++Pain Severity++++]: Pain: 5 
[++++Pain Character++++]: generalized pain 
[++++Pain Duration++++]: years [++++Pain Effect++++]: functional 
[++++Pain Factors++++]: none in particular 
[++++Pain Frequency++++]: constant [++++Pain Location++++]: all major joints [++++ Clinical Pain Assessment++++] FUNCTIONAL ASSESSMENT:  
 
Palliative Performance Scale (PPS): PPS: 70 PSYCHOSOCIAL/SPIRITUAL SCREENING:  
 
Any spiritual / Islam concerns: 
[] Yes /  [x] No 
 
Caregiver Burnout: 
[] Yes /  [x] No /  [] No Caregiver Present Anticipatory grief assessment:  
[x] Normal  / [] Maladaptive ESAS Anxiety: Anxiety: 2 ESAS Depression: Depression: 1 REVIEW OF SYSTEMS:  
 
The following systems were [x] reviewed / [] unable to be reviewed Systems: constitutional, ears/nose/mouth/throat, respiratory, gastrointestinal, genitourinary, musculoskeletal, integumentary, neurologic, psychiatric, endocrine. Positive findings noted below. Modified ESAS Completed by: provider Fatigue: 5 Drowsiness: 9 Depression: 1 Pain: 5 Anxiety: 2 Nausea: 0 Anorexia: 5 Dyspnea: 0 Best Well-Bein Constipation: Yes PHYSICAL EXAM:  
 
Wt Readings from Last 3 Encounters:  
20 231 lb 6.4 oz (105 kg) 20 228 lb 11.2 oz (103.7 kg) 20 229 lb (103.9 kg) Blood pressure 121/77, pulse 69, temperature 98 °F (36.7 °C), temperature source Oral, height 6' 4\" (1.93 m), weight 231 lb 6.4 oz (105 kg). Last bowel movement: See Nursing Note Constitutional: Alert, oriented Eyes: pupils equal, anicteric ENMT: no nasal discharge, moist mucous membranes Cardiovascular: regular rhythm, distal pulses intact Respiratory: breathing not labored, symmetric Gastrointestinal: soft non-tender, +bowel sounds Musculoskeletal: no deformity, no tenderness to palpation Skin: warm, dry Neurologic: following commands, moving all extremities Psychiatric: full affect, no hallucinations Other: 
 
 
 HISTORY:  
 
Past Medical History:  
Diagnosis Date  Anemia 2017  Anxiety 2017  Arrhythmia   
 atrial fibrillation   ASCVD (arteriosclerotic cardiovascular disease) 2017  BPH (benign prostatic hyperplasia) 2017  CAD (coronary artery disease)   
 h/o stents  Cancer [facility])   
 h/o skin cancer  Cardiomyopathy ([facility] 2017  CHF (congestive heart failure) ([address] 2017  Chronic kidney disease Stage IV  Chronic pain   
 shoulders and feeet- neuropathy  CKD (chronic kidney disease), stage IV ([address] 2017  Diabetes ([facility] [address] [address] type II  
 Diabetes mellitus ([address] 2017  Diabetic neuropathy ([facility] 8/5/2017  DJD (degenerative joint disease) 8/5/2017  ED (erectile dysfunction) 8/5/2017  Gout 8/5/2017  High cholesterol  Hyperlipidemia 8/5/2017  Hypertension  Hypertension with renal disease 8/5/2017  Hypothyroid 8/5/2017  Ill-defined condition   
 glycoma and diabetic macular degeneration  Insomnia 8/5/2017  Obesity 8/5/2017  On statin therapy 8/5/2017  Pneumonia 05/28/2019  Restless leg 8/5/2017  Sleep apnea   
 untreated  Thyroid disease   
 hypothyroid  Ulcer of foot due to secondary diabetes ([facility] [address] 07/12/2019 Past Surgical History:  
Procedure Laterality Date  CARDIAC SURG PROCEDURE UNLIST  2000  
 cardiac stents 3  
 CARDIAC SURG PROCEDURE UNLIST Ablation 5/17/2018 [facility]  CARDIAC SURG PROCEDURE UNLIST    
 pacemaker  CARDIAC SURG PROCEDURE UNLIST  2018  
 cardio version x2  
 COLONOSCOPY N/A 6/28/2016 COLONOSCOPY performed by Elaina Torre MD at [facility] ENDOSCOPY  COLONOSCOPY N/A 1/9/2019 COLONOSCOPY performed by Frannie Degroot MD at [facility] ENDOSCOPY  COLONOSCOPY,DIAGNOSTIC  1/9/2019  HX APPENDECTOMY  HX ARTERIOVENOUS FISTULA  05/2020  
 dr Varun Tovar  HX BUNIONECTOMY    
 and removal of 2 seasmoid bone of the great toe 2/2018  HX HEENT Bilateral Cataract surgery  HX HEENT Tonsils  HX HEENT Axe wound to the head  HX KNEE ARTHROSCOPY X 2  
 HX ORTHOPAEDIC    
 HX ORTHOPAEDIC    
 partial laminectomy  HX PACEMAKER    
 HX ROTATOR CUFF REPAIR    
 bilateral- not operable per patient  IR INSERT TUNL CVC W/O PORT OVER 5 YR  3/3/2020  [place] INSJ ELTRD CAR DACIA SYS ATTCH PREV PM/DFB PLS GEN N/A 1/28/2019 Lv Lead Placement To Previous Generator performed by Chelsey Herrera MD at [facility] CARDIAC CATH LAB Left side  [place] REMVL PERM PM PLS GEN W/REPL PLSE GEN MULT LEAD N/A 1/28/2019 Remove & Replace Ppm Gen Biv Multi Leads performed by Ha Chamorro MD at [address] CATH LAB Family History Problem Relation Age of Onset  Heart Disease Mother  Kidney Disease Mother  Heart Disease Father  Kidney Disease Father  Cancer Sister Breast  
  
History reviewed, no pertinent family history. Social History Tobacco Use  Smoking status: Former Smoker Packs/day: 0.50 Years: 4.00 Pack years: 2.00 Last attempt to quit: 3/6/1972 Years since quittin.5  Smokeless tobacco: Never Used Substance Use Topics  Alcohol use: No  
  Comment: rare, 1 drink per year Allergies Allergen Reactions  Niacin Rash and Unknown (comments) Niaspan  Adhesive Rash  Imipenem Diarrhea Other reaction(s): Rash  Levemir [Insulin Detemir] Hives  Other Medication Other (comments) ? Allergy to Dayla Asters causing chemical burn  Primaxin [Imipenem-Cilastatin] Diarrhea and Rash  Xarelto [Rivaroxaban] Rash and Itching Other reaction(s): Rash Current Outpatient Medications Medication Sig  
 traZODone (DESYREL) 50 mg tablet Take 3 Tabs by mouth nightly. Start by taking 1 tab at night for 1 week, increase to 2 tabs at night in the next week if need be  
 finasteride (PROSCAR) 5 mg tablet TAKE 1 TABLET BY MOUTH DAILY  zolpidem (AMBIEN) 10 mg tablet TAKE 1 TABLET BY MOUTH EVERY NIGHT AS NEEDED FOR SLEEP. MAX DAILY AMOUNT: 10 MG  Sully-Deisy 0.8 mg tab tablet TK 1 T PO QD  
 polyethylene glycol (MIRALAX) 17 gram packet Take 17 g by mouth daily as needed for Other (constipation).  carvediloL (COREG) 3.125 mg tablet Take 1 Tab by mouth two (2) times daily (with meals).  bimatoprost (LUMIGAN) 0.01 % ophthalmic drops Administer 1 Drop to right eye every evening.   
 pramipexole (MIRAPEX) 1 mg tablet TAKE 1 TABLET BY MOUTH EVERY NIGHT AT BEDTIME  
  omega 3-dha-epa-fish oil (Fish Oil) 100-160-1,000 mg cap Take 1,000 mg by mouth daily.  vitamin e (E GEMS) 1,000 unit capsule Take 1,000 Units by mouth daily.  B.infantis-B.ani-B.long-B.bifi (Probiotic 4X) 10-15 mg TbEC Take 1 Cap by mouth daily.  indomethacin (INDOCIN) 25 mg capsule TAKE ONE CAPSULE BY MOUTH THREE TIMES DAILY WITH FOOD UNTIL RIGHT WRIST PAIN HAS RESOLVED  DULCOLAX, BISACODYL, PO Take 1-3 Tabs by mouth daily as needed for Other (constipation). No current facility-administered medications for this visit. LAB and other DATA REVIEWED:  
 
Lab Results Component Value Date/Time WBC 3.5 (L) 02/20/2020 05:04 AM  
 HGB 8.6 (L) 02/20/2020 05:04 AM  
 PLATELET 82 (L) 14/56/4847 05:04 AM  
 
Lab Results Component Value Date/Time Sodium 135 (L) 05/04/2020 06:40 AM  
 Potassium 4.1 05/04/2020 06:40 AM  
 Chloride 101 05/04/2020 06:40 AM  
 CO2 26 05/04/2020 06:40 AM  
 BUN 51 (H) 05/04/2020 06:40 AM  
 Creatinine 5.48 (H) 05/04/2020 06:40 AM  
 Calcium 8.9 05/04/2020 06:40 AM  
 Magnesium 2.3 02/17/2020 03:51 AM  
 Phosphorus 4.1 02/21/2020 01:58 AM  
  
Lab Results Component Value Date/Time Alk. phosphatase 108 02/14/2020 12:41 PM  
 Protein, total 7.6 02/14/2020 12:41 PM  
 Albumin 3.3 (L) 02/21/2020 01:58 AM  
 Globulin 4.4 (H) 02/14/2020 12:41 PM  
 
Lab Results Component Value Date/Time INR 1.3 (H) 06/01/2018 12:21 PM  
 Prothrombin time 12.6 (H) 06/01/2018 12:21 PM  
 aPTT 26.6 06/10/2014 11:20 AM  
  
Lab Results Component Value Date/Time Iron 43 02/06/2020 10:06 AM  
 TIBC 280 02/06/2020 10:06 AM  
 Iron % saturation 15 (L) 02/06/2020 10:06 AM  
 Ferritin 162 02/06/2020 10:06 AM  
  
 
Detail chart reviewed. Other specialists and referral provider notes reviewed.  
 
 CONTROLLED SUBSTANCES SAFETY ASSESSMENT (IF ON CONTROLLED SUBSTANCES):  
 
Reviewed opioid safety handout:  [x] Yes   [] No 
 24 hour opioid dose >150mg morphine equivalent/day:  [] Yes   [] No 
Benzodiazepines:  [] Yes   [] No 
Sleep apnea:  [] Yes   [] No 
Urine Toxicology Testing within last 6 months:  [] Yes   [] No 
History of or new aberrant medication taking behaviors:  [] Yes   [x] No 
Has Narcan been prescribed [x] Yes   [] No 
 
   
 
Total time: 70 minutes Counseling / coordination time: 60 minutes 
> 50% counseling / coordination?:  Yes Please note that this dictation was completed with [employer] the [employer] voice recognition software. Quite often unanticipated grammatical, syntax, homophones, and other interpretive errors are inadvertently transcribed by the computer software. Please disregard these errors. Please excuse any errors that have escaped final proofreading

## 2020-10-09 ENCOUNTER — HOSPITAL ENCOUNTER (OUTPATIENT)
Dept: WOUND CARE | Age: 70
Discharge: HOME | End: 2020-10-09
Payer: MEDICARE

## 2020-10-09 VITALS
SYSTOLIC BLOOD PRESSURE: 116 MMHG | RESPIRATION RATE: 18 BRPM | DIASTOLIC BLOOD PRESSURE: 72 MMHG | HEART RATE: 70 BPM | TEMPERATURE: 96.9 F

## 2020-10-09 PROBLEM — L97.929 VENOUS ULCER OF LEFT LEG (HCC): Status: ACTIVE | Noted: 2020-10-09

## 2020-10-09 PROBLEM — I83.029 VENOUS ULCER OF LEFT LEG (HCC): Status: ACTIVE | Noted: 2020-10-09

## 2020-10-09 PROCEDURE — 99213 OFFICE O/P EST LOW 20 MIN: CPT

## 2020-10-09 NOTE — DISCHARGE INSTRUCTIONS
Discharge Instructions for  [facility]  [address]  [place], [address] [facility]  Telephone: [phone]    NAME:  Bonifacio [facility] YOB: 1950  MEDICAL RECORD NUMBER:  [id number]  DATE:  10/9/2020    WOUND CARE ORDERS:  Left Lower leg :Cleanse with saline or wound cleanser, apply mepilex AG cover and secure . Double tubi F   Pt./pcg/HH nurse to change (freq) every 3 day and as needed for compromise. F/U in 3 week. TREATMENT ORDERS:    Elevate leg(s) above the level of the heart when sitting. Avoid prolonged standing in one place. Do no get dressing/wrap wet. Follow Diet as prescribed:   [x] Diet as tolerated: [] Calorie Diabetic Diet: [x] No Added Salt:  [] Increase Protein: [] Limit the amount of liquid you are drinking and avoid drinking in between meals           Return Appointment:  [x] Return Appointment: With DR Anam Blake  in  3 Week(s)  [] Nurse Visit : *** days  [] Ordered tests:      Electronically signed Benson Ordonez RN on 10/9/2020 at 2:27 PM     Lucila Albarran 281: Should you experience any significant changes in your wound(s) or have questions about your wound care, please contact the [address] [place] [address] at [address] [place] [address] 8:00 am - 4:30. If you need help with your wound outside these hours and cannot wait until we are again available, contact your PCP or go to the hospital emergency room. PLEASE NOTE: IF YOU ARE UNABLE TO OBTAIN WOUND SUPPLIES, CONTINUE TO USE THE SUPPLIES YOU HAVE AVAILABLE UNTIL YOU ARE ABLE TO REACH US. IT IS MOST IMPORTANT TO KEEP THE WOUND COVERED AT ALL TIMES.      Physician Signature:_______________________    Date: ___________ Time:  ____________

## 2020-10-09 NOTE — WOUND CARE
10/09/20 1409 Wound Leg Anterior; Lower; Left #2 10/09/20 Date First Assessed/Time First Assessed: 10/09/20 1411   Wound Approximate Age at First Assessment (Weeks): 1 weeks  Primary Wound Type: Skin Tear  Location: Leg  Wound Location Orientation: Anterior; Lower; Left  Wound Description: #2  Date of First Ob. .. Dressing Status Removed Dressing Type (Bandaid ; Neosporin ointment) Non-staged Wound Description Full thickness Wound Length (cm) 2.4 cm Wound Width (cm) 1.5 cm Wound Depth (cm) 0.1 cm Wound Surface Area (cm^2) 3.6 cm^2 Wound Volume (cm^3) 0.36 cm^3 Condition of Base Pink Condition of Edges Open Drainage Amount Small Drainage Color Serosanguinous Wound Odor None Angela-wound Assessment Intact Cleansing and Cleansing Agents  Normal saline Wound Leg lower Medial;Right #1 09/25/20 Date First Assessed/Time First Assessed: 09/25/20 1517   Wound Approximate Age at First Assessment (Weeks): (c)   Primary Wound Type: Venous Ulcer  Location: Leg lower  Wound Location Orientation: Medial;Right  Wound Description: #1  Date of First Obs. .. Dressing Status Removed Dressing Type Open to air (bandaid; Neosporin ointment) Non-staged Wound Description Partial thickness Wound Length (cm) 0.1 cm (dry scab) Wound Width (cm) 0.1 cm Wound Depth (cm) 0.1 cm Wound Surface Area (cm^2) 0.01 cm^2 Wound Volume (cm^3) 0 cm^3 Change in Wound Size % 99.75 Condition of Base  
(scab) Drainage Amount None Angela-wound Assessment Blanchable erythema Visit Vitals /72 (BP 1 Location: Left arm, BP Patient Position: At rest) Pulse 70 Temp 96.9 °F (36.1 °C) Resp 18 LLE Peripheral Vascular Capillary Refill: Less than/equal to 3 seconds (10/09/20 1414) Color: (brwonish discoloration) (10/09/20 1414) Temperature: Cool (10/09/20 1414) Sensation: Decreased (10/09/20 1414) Pedal Pulse: Palpable (10/09/20 1414) Circumference of Calf (cm): 38 cm (10/09/20 1414) Location of Measurement (Calf): Mid  (10/09/20 1414) Circumference of Ankle (cm): 26 cm (10/09/20 1414) Location of Measurement (Ankle): Upper  (10/09/20 1414)

## 2020-10-09 NOTE — WOUND CARE
10/09/20 1409 Wound Leg Anterior; Lower #2 10/09/20 Date First Assessed/Time First Assessed: 10/09/20 1411   Wound Approximate Age at First Assessment (Weeks): 1 weeks  Primary Wound Type: Skin Tear  Location: Leg  Wound Location Orientation: Anterior; Lower  Wound Description: #2  Date of First Observa. .. Dressing Status Removed Dressing Type (Bandaid ; Neosporin ointment) Non-staged Wound Description Full thickness Wound Length (cm) 2.4 cm Wound Width (cm) 1.5 cm Wound Depth (cm) 0.1 cm Wound Surface Area (cm^2) 3.6 cm^2 Wound Volume (cm^3) 0.36 cm^3 Condition of Base Pink Condition of Edges Open Drainage Amount Small Drainage Color Serosanguinous Wound Odor None Angela-wound Assessment Intact Cleansing and Cleansing Agents  Normal saline Wound Leg lower Medial;Right #1 09/25/20 Date First Assessed/Time First Assessed: 09/25/20 1517   Wound Approximate Age at First Assessment (Weeks): (c)   Primary Wound Type: Venous Ulcer  Location: Leg lower  Wound Location Orientation: Medial;Right  Wound Description: #1  Date of First Obs. .. Dressing Status Removed Dressing Type Open to air (bandaid; Neosporin ointment) Non-staged Wound Description Partial thickness Wound Length (cm) 0.1 cm (dry scab) Wound Width (cm) 0.1 cm Wound Depth (cm) 0.1 cm Wound Surface Area (cm^2) 0.01 cm^2 Wound Volume (cm^3) 0 cm^3 Change in Wound Size % 99.75 Condition of Base  
(scab) Drainage Amount None Angela-wound Assessment Blanchable erythema Visit Vitals /72 (BP 1 Location: Left arm, BP Patient Position: At rest) Pulse 70 Temp 96.9 °F (36.1 °C) Resp 18 LLE Peripheral Vascular Capillary Refill: Less than/equal to 3 seconds (10/09/20 1414) Color: (brwonish discoloration) (10/09/20 1414) Temperature: Cool (10/09/20 1414) Sensation: Decreased (10/09/20 1414) Pedal Pulse: Palpable (10/09/20 1414) Circumference of Calf (cm): 38 cm (10/09/20 1414) Location of Measurement (Calf): Mid  (10/09/20 1414) Circumference of Ankle (cm): 26 cm (10/09/20 1414) Location of Measurement (Ankle): Upper  (10/09/20 1414)

## 2020-10-10 NOTE — PROGRESS NOTES
[address] 
WOUND CARE PROGRESS NOTE Name:  Juan Alberto Davalos 
MR#:  [id number] :  1950 ACCOUNT #:  [de-identified] DATE OF SERVICE:  10/09/2020 HISTORY OF CHIEF COMPLAINT:  This 43-year-old white male presents for continued treatment of chronic venous stasis wound, anterior aspect of the left lower leg. Most recent therapy has consisted of silver calcium alginate followed by Tubigrip and the patient was placed on doxycycline 100 mg twice a day for 10 days. History and physical unchanged from admitting history and physical; no change in medications, no change in allergies. ALLERGIES:  TO NIACIN, ADHESIVE, IMIPENEM, LEVEMIR, PRIMAXIN, AND Mal Sox. PAST MEDICAL HISTORY:  Atrial fibrillation, DJD of multiple joints, atherosclerotic cardiovascular disease, gout, hyperlipidemia, hypertension with renal disease; diabetes with neuropathy, restless legs syndrome, hypothyroidism, GERD, anxiety, venous stasis ulcer of the left lower leg, congestive heart failure, end-stage renal disease. CURRENT MEDICATIONS:  Trazodone 50 mg at night as needed for sleep, omega-3 fish oil 1000 mg daily, vitamin E 1000 units daily, probiotic daily, Indocin 25 mg daily 3 times a day as needed for wrist pain, Proscar 5 mg daily, Coreg 3.125 mg 2 times a day, Lumigan eyedrops daily, Mirapex 1 mg tablet at night, MiraLax 17 g package daily as needed for constipation. SOCIAL HISTORY:  The patient is a former smoker. PHYSICAL EXAMINATION: 
VITAL SIGNS:  Temperature 96.9, pulse rate 70, respirations 18, blood pressure 116/72. EXTREMITIES:  Physical examination of the lower extremities revealed barely palpable pedal pulses, fairly good muscle strength in lower extremities bilaterally. Grossly diminished epicritic sensation. Previous venous stasis wound, anterior left shin is well healed.   There is now a granular superficial wound that is beefy red that measures 1 x 2 x 0. 1 cm. Previous cellulitis, left lower leg appears well resolved. The wound was cleansed with saline, dressed with Mepilex Ag foam.  The patient's wife is to continue every other day. He will have a followup visit in the [address] [place] [address] with myself, Dr. Gerry Junior, in 3 weeks if the wound has not resolved. Reg Gonzales DPM 
 
 
[id number] 
D:  10/09/2020 14:54 
T:  10/10/2020 3:02 
JOB #:  [id number]

## 2020-10-16 ENCOUNTER — TELEPHONE (OUTPATIENT)
Dept: PALLATIVE CARE | Age: 70
End: 2020-10-16

## 2020-10-16 DIAGNOSIS — M15.9 PRIMARY OSTEOARTHRITIS INVOLVING MULTIPLE JOINTS: ICD-10-CM

## 2020-10-16 RX ORDER — HYDROCODONE BITARTRATE AND ACETAMINOPHEN 5; 325 MG/1; MG/1
1 TABLET ORAL
Qty: 30 TAB | Refills: 0 | Status: SHIPPED | OUTPATIENT
Start: 2020-10-16 | End: 2020-11-15

## 2020-10-16 NOTE — TELEPHONE ENCOUNTER
Patient is requesting a prescription to be called into [address] [place] [address] on [facility] for Percocet due to pain in his shoulder.

## 2020-10-16 NOTE — TELEPHONE ENCOUNTER
Triage for Controlled Substance Refill Request    Pain Diagnosis: _End-stage renal disease on hemodialysis    Last Outpatient Visit: _9/25/2020    Next Outpatient Visit: _10/28/2020    Reason for refill needed outside of office visit? -Appointment not scheduled prior to need for scheduled refill      Pharmacy: [facility] [address] [facility] [facility] AT [place] [address]     Medication:Norco 5-325mg   Dose and directions:1 tab by mouth every 4 hours PRN pain.    Number dispensed:30  Date filled ( or Pharmacy):5/27/2020  # left:       reviewed: _yes     Date of Urine Drug Screen:  _n/a    Opioid Safety Handout given:  _yes     Appropriate for refill:  _yes     Action:  _ please refill

## 2020-10-26 RX ORDER — TRAZODONE HYDROCHLORIDE 50 MG/1
TABLET ORAL
Qty: 90 TAB | Refills: 0 | Status: SHIPPED | OUTPATIENT
Start: 2020-10-26 | End: 2020-11-26

## 2020-10-28 ENCOUNTER — VIRTUAL VISIT (OUTPATIENT)
Dept: PALLATIVE CARE | Age: 70
End: 2020-10-28
Payer: MEDICARE

## 2020-10-28 ENCOUNTER — TELEPHONE (OUTPATIENT)
Dept: PALLATIVE CARE | Age: 70
End: 2020-10-28

## 2020-10-28 DIAGNOSIS — I48.0 PAROXYSMAL ATRIAL FIBRILLATION (HCC): ICD-10-CM

## 2020-10-28 DIAGNOSIS — I50.23 ACUTE ON CHRONIC SYSTOLIC CONGESTIVE HEART FAILURE (HCC): ICD-10-CM

## 2020-10-28 DIAGNOSIS — R53.82 CHRONIC FATIGUE: ICD-10-CM

## 2020-10-28 DIAGNOSIS — N18.6 END-STAGE RENAL DISEASE ON HEMODIALYSIS (HCC): ICD-10-CM

## 2020-10-28 DIAGNOSIS — M15.9 PRIMARY OSTEOARTHRITIS INVOLVING MULTIPLE JOINTS: ICD-10-CM

## 2020-10-28 DIAGNOSIS — Z99.2 END-STAGE RENAL DISEASE ON HEMODIALYSIS (HCC): ICD-10-CM

## 2020-10-28 DIAGNOSIS — G47.00 INSOMNIA, UNSPECIFIED TYPE: Primary | ICD-10-CM

## 2020-10-28 PROCEDURE — 99215 OFFICE O/P EST HI 40 MIN: CPT | Performed by: INTERNAL MEDICINE

## 2020-10-28 RX ORDER — GABAPENTIN 100 MG/1
100 CAPSULE ORAL
Qty: 15 CAP | Refills: 0 | Status: SHIPPED | OUTPATIENT
Start: 2020-10-28 | End: 2020-11-09 | Stop reason: SDUPTHER

## 2020-10-28 NOTE — PROGRESS NOTES
Palliative Medicine Office Visit Palliative Medicine Nurse Check In 
[phone] Patient Name: Concepcion Clayton. YOB: 1950 Date of Office Visit: 10/28/2020 Patient states: \"  \" 
 
From Check In Sheet (scanned in Media): 
Has a medical provider talked with you about cardiopulmonary resuscitation (CPR)? [x] Yes   [] No   [] Unable to obtain Nurse reminder to complete or update ACP FlowSheet: 
 
Is ACP on the Problem List?    [] Yes    [] No 
IF ACP Document is ON FILE; Nurse to place ACP on Problem List  
 
Is there an ACP Note in Chart Review/Note? [] Yes    [] No  
If NO: ALERT PROVIDER Is there anything that we should know about you as a person in order to provide you the best care possible? Have you been to the ER, urgent care clinic since your last visit? [] Yes   [x] No   [] Unable to obtain Have you been hospitalized since your last visit? [] Yes   [x] No   [] Unable to obtain Have you seen or consulted any other health care providers outside of the [address] [place] [address] since your last visit? [] Yes   [x] No   [] Unable to obtain Functional status (describe):  
  Primary Decision Maker: Hernandez Cui - Spouse - [phone] Secondary Decision Maker: Boo Samano - Son - [phone]  Advance Care Planning 10/28/2020 Patient's Healthcare Decision Maker is: Named in scanned ACP document Primary Decision Maker Name -  
Primary Decision Maker Phone Number -  
Primary Decision Maker Relationship to Patient -  
Confirm Advance Directive Yes, on file Patient Would Like to Complete Advance Directive - Does the patient have other document types - Last BM: 10/28/2020  accessed (date): 10/28/2020 Bottle review (for opioid pain medication): 
Medication 1:  
Date filled:  
Directions:  
# filled: # left: # pills taking per day: 
Last dose taken: 
 
Medication 2:  
Date filled:  
Directions: # filled: # left: # pills taking per day: 
Last dose taken: 
 
Medication 3:  
Date filled:  
Directions:  
# filled: # left: # pills taking per day: 
Last dose taken: 
 
Medication 4:  
Date filled:  
Directions:  
# filled: # left: # pills taking per day: 
Last dose taken:

## 2020-10-28 NOTE — PATIENT INSTRUCTIONS
Dear Maria R Mosher. , 
 
It was a pleasure seeing you today in your home virtually We will see you again in 1 month virtually Your described symptoms were: Fatigue: 5 Drowsiness: 5 Depression: 0 Pain: 5 Anxiety: 0 Nausea: 0 Anorexia: 0 Dyspnea: 0 Best Well-Bein Constipation: No  
Other Problem (Comment): 0 This is the plan we talked about: 1. Insomnia - You are struggling with severe insomnia. You sleep less than 2 hours at night but take several naps during the day. - You have been on Ambien 10 mg for months but no benefit. - You wanted to come off of Ambien which you now have 
-We tried trazodone 50 mg and escalated it all the way up to 150 mg at bedtime and still you have not been able to sleep. In fact, you feel more alert after taking trazodone. You desperately want to try a different medication. Given that you are on dialysis, we are seriously limited with a number of medications we can try for you. You found that you slept well when you were on gabapentin and so you want to try that. Try gabapentin 100 mg capsule at bedtime to see if this helps. I sent to 2-week supply just to see how you do with it. 2. Neuropathy - You are no longer on any medicines. - You are at high risk for fall and have worked with PT/OT and have all the necessary DME. 3. Hemodialysis - You are on HD and this is not very pleasant. 
- You have a lot of leg cramping, please talk with your nephrologist, this may be related to electrolyte abnormality. 4. Goals of care - You have an AMD and DDNR. This is what you have shared with us about Advance Care Planning: 
 
  Primary Decision Maker: Andrea Chan - Spouse - [phone] Secondary Decision Maker: Ming Betancur - Son - [phone]  Advance Care Planning 10/28/2020 Patient's Healthcare Decision Maker is: Named in scanned ACP document Primary Decision Maker Name -  
Primary Decision Maker Phone Number -  
 Primary Decision Maker Relationship to Patient -  
Confirm Advance Directive Yes, on file Patient Would Like to Complete Advance Directive - Does the patient have other document types - The Palliative Medicine Team is here to support you and your family.   
 
 
 
Sincerely, 
 
 
Kennedy Mascorro MD and the Palliative Medicine Team

## 2020-11-02 ENCOUNTER — TELEPHONE (OUTPATIENT)
Dept: PALLATIVE CARE | Age: 70
End: 2020-11-02

## 2020-11-02 NOTE — TELEPHONE ENCOUNTER
[facility]  Palliative Social Work  Telephone Call      Time in: 3:02pm  Time out: 3:10pm    Note:  SW returned call to spouse Karina Forde) who attempted to call Medicare local office, but received automated response that calls are not answered until December. Spouse also experiencing overwhelm with changing bandages and providing wound care for patient. SW provided emotional support, collaboration on the palliative medicine team, and community referral.       Plan:  SW to collaborate on palliative medicine team to see if home health is attainable for this family. Spouse to call own insurance company to see what options she has for home health aides, then to reach out to [employer] to find agencies that would provide affordable and attainable help. SW to continue following.         Domenico Reddy, [facility], [place]   Palliative Medicine   Social Work Supervisee for [facility]  [phone]

## 2020-11-04 ENCOUNTER — TELEPHONE (OUTPATIENT)
Dept: PALLATIVE CARE | Age: 70
End: 2020-11-04

## 2020-11-04 NOTE — TELEPHONE ENCOUNTER
Returned call to Mrs. Flori Mari and she stated that she has been communicating and sending pictures of the blisters to the wound care doctor, but wanted to know if she would be better off getting home health. Advised that she may want to continue with the wound care center until the blisters/wounds on his legs are more stable then transition to [facility] as needed. Advised HH will only come into the home 1-2 times weekly depending on how his legs are doing or until they teach her the caregiver to do wound care dressings.   Wife verbalized understanding and will keep appointment as schedule with wound care center on 11/6/20

## 2020-11-04 NOTE — TELEPHONE ENCOUNTER
The patient's wife states he has a fluid filled blister on his leg and another one has developed on his knee. This is rapidly occurring and she needs to speak with Dr. Cory Stevens about how to handle it.

## 2020-11-06 ENCOUNTER — HOSPITAL ENCOUNTER (OUTPATIENT)
Dept: WOUND CARE | Age: 70
Discharge: HOME | End: 2020-11-06
Admitting: PODIATRIST
Payer: MEDICARE

## 2020-11-06 VITALS
RESPIRATION RATE: 18 BRPM | TEMPERATURE: 97.5 F | SYSTOLIC BLOOD PRESSURE: 149 MMHG | DIASTOLIC BLOOD PRESSURE: 71 MMHG | HEART RATE: 80 BPM

## 2020-11-06 PROCEDURE — 99213 OFFICE O/P EST LOW 20 MIN: CPT

## 2020-11-06 NOTE — WOUND CARE
11/06/20 1631 Wound Leg Left;Lateral;Lower # 3 11/06/20 Date First Assessed/Time First Assessed: 11/06/20 1618   Wound Approximate Age at First Assessment (Weeks): 1 weeks  Primary Wound Type: Blister/bullae  Location: Leg  Wound Location Orientation: Left;Lateral;Lower  Wound Description: # 3  Date of Fir. .. Dressing/Treatment Gauze dressing/dressing sponge;Xeroform 
(double tubi F) Wound Leg Anterior; Lower; Left #2 10/09/20 Date First Assessed/Time First Assessed: 10/09/20 1411   Wound Approximate Age at First Assessment (Weeks): 1 weeks  Primary Wound Type: Skin Tear  Location: Leg  Wound Location Orientation: Anterior; Lower; Left  Wound Description: #2  Date of First Ob. .. Dressing/Treatment Xeroform Wound Leg lower Medial;Right #1 09/25/20 Date First Assessed/Time First Assessed: 09/25/20 1517   Wound Approximate Age at First Assessment (Weeks): (c)   Primary Wound Type: Venous Ulcer  Location: Leg lower  Wound Location Orientation: Medial;Right  Wound Description: #1  Date of First Obs. .. Dressing/Treatment Xeroform

## 2020-11-06 NOTE — WOUND CARE
11/06/20 1611 Wound Leg Left;Lateral;Lower 11/06/20 Date First Assessed/Time First Assessed: 11/06/20 1618   Wound Approximate Age at First Assessment (Weeks): 1 weeks  Primary Wound Type: Blister/bullae  Location: Leg  Wound Location Orientation: Left;Lateral;Lower  Date of First Observation: 11/06/20 Wound Image Wound Etiology Other (Comment) Dressing Status Dry; Intact 
(started as a blister as per patient) Cleansed Cleansed with saline Wound Length (cm) 13.5 cm Wound Width (cm) 4.5 cm Wound Depth (cm) 0.1 cm Wound Surface Area (cm^2) 60.75 cm^2 Wound Volume (cm^3) 6.08 cm^3 Drainage Amount Moderate Drainage Description Serosanguinous Wound Odor None Angela-Wound/Incision Assessment Blanchable erythema Edges Epibole (rolled edges) Wound Thickness Description Full thickness Wound Leg Anterior; Lower; Left #2 10/09/20 Date First Assessed/Time First Assessed: 10/09/20 1411   Wound Approximate Age at First Assessment (Weeks): 1 weeks  Primary Wound Type: Skin Tear  Location: Leg  Wound Location Orientation: Anterior; Lower; Left  Wound Description: #2  Date of First Ob. .. Wound Image Wound Etiology  
(skin tear) Dressing Status Dry; Intact Cleansed Cleansed with saline Dressing/Treatment (Removed gauze, roll gauze and tape; single layer tubi) Wound Length (cm) 0.1 cm Wound Width (cm) 0.1 cm Wound Depth (cm) 0.1 cm Wound Surface Area (cm^2) 0.01 cm^2 Change in Wound Size % 99.72 Wound Volume (cm^3) 0 cm^3 Wound Healing % 100 Drainage Amount None Angela-Wound/Incision Assessment Intact Edges (scab) Wound Leg lower Medial;Right #1 09/25/20 Date First Assessed/Time First Assessed: 09/25/20 1517   Wound Approximate Age at First Assessment (Weeks): (c)   Primary Wound Type: Venous Ulcer  Location: Leg lower  Wound Location Orientation: Medial;Right  Wound Description: #1  Date of First Obs. .. Wound Image Dressing Status Dry; Intact Cleansed Cleansed with saline Dressing/Treatment (Removed gauze, roll gauze, tape and single layer tubi) Wound Length (cm) 0.1 cm 
(scab) Wound Width (cm) 0.1 cm Wound Depth (cm) 0.1 cm Wound Surface Area (cm^2) 0.01 cm^2 Change in Wound Size % 99.75 Wound Volume (cm^3) 0 cm^3 Wound Healing % 100 Drainage Amount None Edges (scab) Visit Vitals BP (!) 149/71 (BP 1 Location: Left arm) Pulse 80 Temp 97.5 °F (36.4 °C) Resp 18 LLE Peripheral Vascular Capillary Refill: Less than/equal to 3 seconds (11/06/20 1607) Color: (hemosidirin staining) (11/06/20 1607) Temperature: Cool (11/06/20 1607) Sensation: Decreased (11/06/20 1607) Pedal Pulse: Palpable (11/06/20 1607) Circumference of Calf (cm): 37 cm (11/06/20 1607) Location of Measurement (Calf): Mid  (11/06/20 1607) Circumference of Ankle (cm): 26 cm (11/06/20 1607) Location of Measurement (Ankle): Upper  (11/06/20 1607) RLE Peripheral Vascular Capillary Refill: Less than/equal to 3 seconds (11/06/20 1607) Color: (hemosidirin staining) (11/06/20 1607) Temperature: Warm (11/06/20 1607) Sensation: Present (11/06/20 1607) Pedal Pulse: Palpable (11/06/20 1607) Circumference of Calf (cm): 35 cm (11/06/20 1607) Location of Measurement (Calf): Mid  (11/06/20 1607) Circumference of Ankle (cm): 24 cm (11/06/20 1607) Location of Measurement (Ankle): Upper  (11/06/20 1607)

## 2020-11-06 NOTE — DISCHARGE INSTRUCTIONS
Discharge Instructions for  [facility]  [address] [facility], [address] [facility]  Telephone: [phone]    NAME:  Bonifacio Cao [facility] YOB: 1950  MEDICAL RECORD NUMBER:  [id number]  DATE:  11/6/2020  WOUND CARE ORDERS:  Left Lower leg :Cleanse with saline or wound cleanser, apply xeroform cover with gauze, rollgauze secure with tape . Double tubi F   Pt./pcg/HH nurse to change (freq) every other day and as needed for compromise. F/U in 1 week. TREATMENT ORDERS:    Elevate leg(s) above the level of the heart when sitting. Avoid prolonged standing in one place. Do no get dressing/wrap wet. Follow Diet as prescribed:   [x] Diet as tolerated: [] Calorie Diabetic Diet: Low carb and no Sugar [x] No Added Salt:  [] Increase Protein: [] Limit the amount of liquid you are drinking and avoid drinking in between meals           Return Appointment:  [x] Return Appointment: With DR Tabitha Mai  in  [address] [place] [address])  [] Nurse Visit : *** days  [] Ordered tests:    Electronically signed Dianne Kasper RN on 11/6/2020 at [address]: Should you experience any significant changes in your wound(s) or have questions about your wound care, please contact the [address] [place] [address] at [address] [place] [address] 8:00 am - 4:30. If you need help with your wound outside these hours and cannot wait until we are again available, contact your PCP or go to the hospital emergency room. PLEASE NOTE: IF YOU ARE UNABLE TO OBTAIN WOUND SUPPLIES, CONTINUE TO USE THE SUPPLIES YOU HAVE AVAILABLE UNTIL YOU ARE ABLE TO REACH US. IT IS MOST IMPORTANT TO KEEP THE WOUND COVERED AT ALL TIMES.      Physician Signature:_______________________    Date: ___________ Time:  ____________

## 2020-11-07 NOTE — PROGRESS NOTES
[address] 
WOUND CARE PROGRESS NOTE Name:  Emma Hilton 
MR#:  [id number] :  1950 ACCOUNT #:  [de-identified] DATE OF SERVICE:  2020 HISTORY OF CHIEF COMPLAINT:  This 68-year-old white male presents with a chronic venous stasis wound, left lower leg. History and physical unchanged from admitting history and physical; no change in medications, no change in allergies. PAST MEDICAL HISTORY:  Diabetes with neuropathy, atrial fibrillation, DJD, atherosclerotic cardiovascular disease, gout, hyperlipidemia, hypertension with renal disease, hypothyroidism, GERD, cardiomyopathy, benign prostatic hyperplasia and anxiety. ALLERGIES:  TO ADHESIVE, IMIPENEM, LEVEMIR, PRIMAXIN AND Alva Sicard. CURRENT MEDICATIONS:  Gabapentin 100 mg at night, trazodone 50 mg at night, omega-3 fish oil 1000 mg daily, vitamin E 1000 mg daily, a probiotic daily, Indocin 25 mg daily, Proscar 5 mg daily, Coreg 3.125 mg 2 times a day, Lumigan 0.01% ophthalmic solution once a day, Mirapex 1 mg at night, hydrocodone 5/325 mg every six hours as needed for pain, Sully-Deisy 0.8 mg tablets daily, MiraLax 17 g daily as needed, Dulcolax 1-3 tablets daily as needed for constipation. PHYSICAL EXAMINATION: 
VITAL SIGNS:  Temperature 97.5, pulse rate 80, blood pressure 149/71. EXTREMITIES:  Physical examination of lower extremities reveal fairly palpable pedal pulses. Fairly good muscle strength in lower extremities bilateral.  Chronic stasis dermatitis with noted lack of hair growth lower extremities. WOUND EXAMINATION:  Anterior lateral aspect left lower leg, there is a stage I venous stasis wound with beefy red base that measures 13.5 x 4.5 x 0.1 cm. ASSESSMENT:  Chronic venous stasis wound on a diabetic with neuropathy. Treatment rendered consisted of wound cultures of the left lower leg.   Wound was now dressed with Xeroform followed by gauze, followed by double-layer Tubigrip. The patient was also measured for FarrowWraps. The patient's wife to change dressings every other day and he will have followup visit in the [address] [place] [address] with myself, Dr. Olamide Cruz, in 1 week. Talon Younger DPM 
 
 
[id number] 
D:  11/06/2020 16:42 
T:  11/07/2020 3:28 JOB #:  [id number]

## 2020-11-09 DIAGNOSIS — G47.00 INSOMNIA, UNSPECIFIED TYPE: ICD-10-CM

## 2020-11-09 LAB — BACTERIA SPEC AEROBE CULT: NORMAL

## 2020-11-09 RX ORDER — GABAPENTIN 100 MG/1
100 CAPSULE ORAL
Qty: 30 CAP | Refills: 1 | Status: SHIPPED | OUTPATIENT
Start: 2020-11-09 | End: 2020-11-30

## 2020-11-10 ENCOUNTER — TELEPHONE (OUTPATIENT)
Dept: PALLATIVE CARE | Age: 70
End: 2020-11-10

## 2020-11-10 NOTE — TELEPHONE ENCOUNTER
Patient's wife went to [facility] to  prescription for Gabapentin. There were two ready. One for 300 mg and one for 100 mg. She was not sure which one to take. Please call her back to advise. If you get voicemail, please leave a message.

## 2020-11-10 NOTE — TELEPHONE ENCOUNTER
Returned call to patients wife per Dr Shelby Holman last office note patient to try Gabapentin 100mg tabs a night , refill sent for 100 mg tabs, patient wife notified

## 2020-11-13 ENCOUNTER — HOSPITAL ENCOUNTER (OUTPATIENT)
Dept: WOUND CARE | Age: 70
Discharge: HOME | End: 2020-11-13
Admitting: PODIATRIST
Payer: MEDICARE

## 2020-11-13 VITALS
DIASTOLIC BLOOD PRESSURE: 74 MMHG | SYSTOLIC BLOOD PRESSURE: 135 MMHG | HEART RATE: 80 BPM | RESPIRATION RATE: 18 BRPM | TEMPERATURE: 97.8 F

## 2020-11-13 PROCEDURE — 99213 OFFICE O/P EST LOW 20 MIN: CPT

## 2020-11-13 NOTE — WOUND CARE
11/13/20 1340 Wound Leg Left;Lateral;Lower # 3 11/06/20 Date First Assessed/Time First Assessed: 11/06/20 1618   Wound Approximate Age at First Assessment (Weeks): 1 weeks  Primary Wound Type: Blister/bullae  Location: Leg  Wound Location Orientation: Left;Lateral;Lower  Wound Description: # 3  Date of Fir. .. Dressing/Treatment Gauze dressing/dressing sponge;Roll gauze;Tape/Soft cloth adhesive tape 
(santyl socrates thick, saline moist to dry, double tubi)

## 2020-11-13 NOTE — DISCHARGE INSTRUCTIONS
Discharge Instructions/Wound Care Orders  [facility]  [address]  [place], [address]  Telephone: [phone]    NAME:  Bonifacio Cao [facility] YOB: 1950  MEDICAL RECORD NUMBER:  [id number]  DATE:  11/13/2020      WOUND CARE ORDERS:  Left Leg Wound- Cleanse with Normal Saline, Apply nickel thick layer of Santyl (Collagenase) ointment to wound bed, cover with Saline moist gauze, followed by dry gauze. Secure with Roll gauze & apply Double Layer of Tubigrip. Dressing to be changed daily, and as needed for compromise of dressing. RTC for follow up in 1 week. **If Santyl not covered by insurance, if copayment to costly, Patient to use Medhoney gel & cover with dry gauze, roll gauze & Tubigrip. TREATMENT ORDERS:    Elevate leg(s) above the level of the heart when sitting. Avoid prolonged standing in one place. Do not get dressing/wrap wet. Follow Diet as prescribed: Renal,  Diabetic Diet: [x] No Added Salt  [] Increase Protein: [] Other:                 Return Appointment:  [x] Return Appointment: With Dr. Jed Butterfield in 1 [facility])  [] Ordered tests:      Electronically signed Makayla Petersen RN on 11/13/2020 at 1:16 PM     [address] [facility] Information: Should you experience any significant changes in your wound(s) or have questions about your wound care, please contact the [address] [place] [address] at [address] [place] [address] 8:00 am - 4:30. If you need help with your wound outside these hours and cannot wait until we are again available, contact your PCP or go to the hospital emergency room. PLEASE NOTE: IF YOU ARE UNABLE TO OBTAIN WOUND SUPPLIES, CONTINUE TO USE THE SUPPLIES YOU HAVE AVAILABLE UNTIL YOU ARE ABLE TO REACH US. IT IS MOST IMPORTANT TO KEEP THE WOUND COVERED AT ALL TIMES.      Physician Signature:_______________________    Date: ___________ Time:  ____________

## 2020-11-14 NOTE — PROGRESS NOTES
[address] 
WOUND CARE PROGRESS NOTE Name:  Parish Montez 
MR#:  [id number] :  1950 ACCOUNT #:  [de-identified] DATE OF SERVICE:  2020 HISTORY OF CHIEF COMPLAINT:  This 75-year-old white male presents for continued treatment of chronic venous stasis wound, left lower leg. Most recent therapy has consisted of Xeroform followed by roll gauze and double layer Tubigrip. History and physical unchanged from admitting history and physical; no change in medications, no change in allergies. ALLERGIES:  TO ADHESIVE, IMIPENEM, LEVEMIR, PRIMAXIN AND Cathaleen Munda. PAST MEDICAL HISTORY:  Atrial fibrillation, degenerative joint disease, atherosclerotic cardiovascular disease, gout, hyperlipidemia, chronic kidney disease; the patient is on dialysis, hypertension, diabetes with neuropathy, hypothyroidism, gastroesophageal reflux disease, cardiomyopathy, benign prostatic hyperplasia, Chronic venous stasis ulcer left lower leg, Congestive heart failure. CURRENT MEDICATIONS:  Gabapentin 100 mg at night, trazodone 50 mg at night as needed for sleep, Norco 5/325 mg as needed for pain every 6 hours, omega-3 fish oil daily, vitamin E 1000 units daily, a probiotic daily, Indocin 25 mg 3 times a day as needed for pain, Proscar 5 mg daily, Sully-Deisy 0.8 mg tablets daily, MiraLax 17 mg daily, Dulcolax 1-3 tablets daily as needed for constipation, Coreg 3.1 25 mg 2 times a day, Lumigan 0.01% ophthalmic solution to the right eye daily, Mirapex 1 mg tablet daily. PHYSICAL EXAMINATION: 
VITAL SIGNS:  Temperature is 97.8, pulse rate 80, respirations 18, blood pressure 135/74. LOWER EXTREMITIES:  Physical examination of lower extremities reveal barely palpable pedal pulses. Fairly good muscle strength lower extremities bilateral with diminished hair growth and diminished epicritic sensation lower extremities.  
 
WOUND EXAMINATION:  Anterior lateral aspect of the left shin, there is a stage II venous stasis wound that is beefy red with some dried eschar, scant drainage. Wound measures 13 x 3 x 0.1 cm. There is no pain. There is no warmth. ASSESSMENT:  Chronic venous stasis wound, anterior aspect of the left shin on a diabetic with neuropathy and kidney disease. Treatment rendered consisted of cleansing the wound with saline, was dressed with Santyl followed by saline moistened gauze with double layer Tubigrip. The patient was given a prescription of Santyl 30 g tube with 3 refills. The patient's wife is to continue dressing changes every other day and he will have a followup visit in [address] [place] [address] with myself, Dr. Kimberly Harris, in 1 week. Wound cultures taken 1 week ago did not show any growth. DORIS [email or web address] 
D:  11/13/2020 13:38 
T:  11/13/2020 23:18 
JOB #:  [id number]

## 2020-11-15 LAB
BACTERIA SPEC ANAEROBE CULT: ABNORMAL
BACTERIA SPEC ANAEROBE CULT: ABNORMAL
SPECIMEN STATUS REPORT, ROLRST: NORMAL

## 2020-11-20 ENCOUNTER — HOSPITAL ENCOUNTER (OUTPATIENT)
Dept: WOUND CARE | Age: 70
Discharge: HOME | End: 2020-11-20
Admitting: PODIATRIST
Payer: MEDICARE

## 2020-11-20 VITALS
TEMPERATURE: 97.8 F | SYSTOLIC BLOOD PRESSURE: 125 MMHG | DIASTOLIC BLOOD PRESSURE: 67 MMHG | RESPIRATION RATE: 18 BRPM | HEART RATE: 80 BPM

## 2020-11-20 PROCEDURE — 99213 OFFICE O/P EST LOW 20 MIN: CPT

## 2020-11-20 RX ORDER — AMOXICILLIN 250 MG
2 CAPSULE ORAL
COMMUNITY
End: 2021-01-01 | Stop reason: ALTCHOICE

## 2020-11-20 NOTE — WOUND CARE
11/20/20 1430   LLE Peripheral Vascular    Capillary Refill Less than/equal to 3 seconds   Color Red   Temperature Warm   Sensation Numbness   Pedal Pulse Palpable;Present;+1   Circumference of Calf (cm) 36 cm   Location of Measurement (Calf) Upper   Circumference of Ankle (cm) 27 cm   Location of Measurement (Ankle) Mid   Wound Leg Left;Lateral;Lower # 3 11/06/20   Date First Assessed/Time First Assessed: 11/06/20 1618   Wound Approximate Age at First Assessment (Weeks): 1 weeks  Primary Wound Type: Blister/bullae  Location: Leg  Wound Location Orientation: Left;Lateral;Lower  Wound Description: # 3  Date of Maria T Menezes. .. Wound Image    Wound Etiology Venous   Dressing Status Intact   Cleansed Cleansed with saline   Dressing/Treatment Honey gel/honey paste;Gauze dressing/dressing sponge;Roll gauze   Wound Length (cm) 13 cm   Wound Width (cm) 3 cm   Wound Depth (cm) 0.1 cm   Wound Surface Area (cm^2) 39 cm^2   Change in Wound Size % 35.8   Wound Volume (cm^3) 3.9 cm^3   Wound Healing % 36   Wound Assessment Eschar moist;Pink/red;Slough   Drainage Amount Moderate   Drainage Description Serosanguinous   Wound Odor None   Angela-Wound/Incision Assessment Dry/flaky; Warm;Blanchable erythema   Edges Flat/open edges   Wound Thickness Description Full thickness   Wound Leg lower Medial;Right #1 09/25/20   Date First Assessed/Time First Assessed: 09/25/20 1517   Wound Approximate Age at First Assessment (Weeks): (c)   Primary Wound Type: Venous Ulcer  Location: Leg lower  Wound Location Orientation: Medial;Right  Wound Description: #1  Date of First Obs. ..    Wound Image       Visit Vitals  /67 (BP 1 Location: Left arm, BP Patient Position: At rest)   Pulse 80   Temp 97.8 °F (36.6 °C)   Resp 18

## 2020-11-21 NOTE — PROGRESS NOTES
[address] 
WOUND CARE PROGRESS NOTE Name:  Juan Alberto Davalos 
MR#:  [id number] :  1950 ACCOUNT #:  [de-identified] DATE OF SERVICE:  2020 HISTORY OF CHIEF COMPLAINT:  This 49-year-old white male presents for continued treatment of a chronic venous stasis wound lower lateral aspect of the left foot, most recent therapy has consisted of Medihoney gel, followed by dry sterile dressings every other day. History and physical unchanged from admitting history and physical; no change in medications, no change in allergies. ALLERGIES:  TO ADHESIVE, IMIPENEM, LEVEMIR, PRIMAXIN AND Mal Sox. PAST MEDICAL HISTORY:  Atrial fibrillation, osteoarthritis, atherosclerotic cardiovascular disease, gout, anemia, hyperlipidemia, diabetes with neuropathy, hypertension with renal disease; the patient is on dialysis, restless leg syndrome, insomnia, hypothyroidism, GERD, cardiomyopathy, benign prostatic hyperplasia, anxiety, status post ablation for atrial fibrillation and end-stage renal disease. CURRENT MEDICATIONS:  Senokot 8.6/50 mg 2 tablets every Monday, Wednesday and  as needed for constipation, gabapentin 100 mg at night, trazodone 50 mg at night, omega-3 fish oil daily, vitamin E 1000 units daily, a probiotic daily, Proscar 5 mg daily, Sully-Deisy 0.8 mg daily, Coreg 3.125 mg twice a day, Lumigan eyedrops daily, Mirapex 1 mg tablet at night, Indocin 25 mg three times a day as needed for wrist pain, MiraLax 17 g daily, Dulcolax 1-3 tablets by mouth daily as needed for constipation. PHYSICAL EXAMINATION: 
VITAL SIGNS:  Temperature 97.8, heart rate 80, blood pressure 125/67. EXTREMITIES:  Physical examination of lower extremities reveal barely palpable pedal pulses. Fairly good muscle strength lower extremities bilateral with diminished epicritic sensation. Noted lack of hair growth with chronic stasis dermatitis lower extremities.   There is only one wound remaining and is located lateral aspect, left lower leg, it measures 13 x 3 x 0.1 cm. The most distal one half of this wound is with a free-floating dry plaque of eschar. ASSESSMENT:  Chronic venous stasis wound on a diabetic with neuropathy. PLAN:  Some of the eschar was gently lifted using a Q-tip. The wound was now dressed with Medihoney hydrocolloid sheet, followed by dry gauze. The patient's wife is to change every other day. He will have followup visit in [address] [place] [address] with myself in 2 weeks. DORIS [email or web address] 
D:  11/20/2020 15:24 
T:  11/20/2020 21:20 
JOB #:  [id number]

## 2020-11-26 RX ORDER — TRAZODONE HYDROCHLORIDE 50 MG/1
TABLET ORAL
Qty: 90 TAB | Refills: 0 | Status: SHIPPED | OUTPATIENT
Start: 2020-11-26 | End: 2020-01-01 | Stop reason: SDUPTHER

## 2020-11-27 DIAGNOSIS — G47.00 INSOMNIA, UNSPECIFIED TYPE: ICD-10-CM

## 2020-11-30 NOTE — TELEPHONE ENCOUNTER
Triage for Controlled Substance Refill Request    Pain Diagnosis: _Neuropathy     Last Outpatient Visit: _10/28/2020    Next Outpatient Visit: _12/16/2020    Reason for refill needed outside of office visit? -Appointment not scheduled prior to need for scheduled refill      Pharmacy: [id number] - [place], [place] - [address] [place] [address]    Medication:gabapentin (NEURONTIN) 100 mg  Dose and directions: Take 1 Cap by mouth nightly.   Number dispensed:15  Date filled ( or Pharmacy):  # left:         reviewed: _yes     Date of Urine Drug Screen:  _n/a    Opioid Safety Handout given:  _yes     Appropriate for refill:  _yes     Action:  _ please refill

## 2020-12-01 RX ORDER — GABAPENTIN 100 MG/1
CAPSULE ORAL
Qty: 90 CAP | Refills: 1 | Status: SHIPPED | OUTPATIENT
Start: 2020-12-01 | End: 2020-12-11 | Stop reason: SDUPTHER

## 2020-12-04 ENCOUNTER — HOSPITAL ENCOUNTER (OUTPATIENT)
Dept: WOUND CARE | Age: 70
Discharge: HOME | End: 2020-12-04
Payer: MEDICARE

## 2020-12-04 VITALS — TEMPERATURE: 98.6 F | DIASTOLIC BLOOD PRESSURE: 57 MMHG | SYSTOLIC BLOOD PRESSURE: 120 MMHG | HEART RATE: 70 BPM

## 2020-12-04 PROCEDURE — 99213 OFFICE O/P EST LOW 20 MIN: CPT

## 2020-12-04 NOTE — WOUND CARE
12/04/20 1450   Wound Leg Left;Lateral;Lower # 3 11/06/20   Date First Assessed/Time First Assessed: 11/06/20 1618   Wound Approximate Age at First Assessment (Weeks): 1 weeks  Primary Wound Type: Blister/bullae  Location: Leg  Wound Location Orientation: Left;Lateral;Lower  Wound Description: # 3  Date of Fir. ..    Wound Image    Wound Etiology Venous   Dressing Status Intact   Cleansed Cleansed with saline   Dressing/Treatment   (removed medihoney sheet, abd, RG, maggie)   Wound Length (cm) 8.2 cm   Wound Width (cm) 2.1 cm   Wound Depth (cm) 0.1 cm   Wound Surface Area (cm^2) 17.22 cm^2   Change in Wound Size % 71.65   Wound Volume (cm^3) 1.72 cm^3   Wound Healing % 72   Wound Assessment [place]/red;Slough   Drainage Amount Small   Drainage Description Serosanguinous   Wound Odor None   Angela-Wound/Incision Assessment Intact   Edges Flat/open edges   Wound Thickness Description Full thickness     Visit Vitals  BP (!) 120/57 (BP 1 Location: Right arm, BP Patient Position: At rest)   Pulse 70   Temp 98.6 °F (37 °C)

## 2020-12-04 NOTE — DISCHARGE INSTRUCTIONS
Discharge Instructions for  [facility]  [address]  [place], [address] [facility]  Telephone: [phone]    NAME:  Bonifacio Cao [facility] YOB: 1950  MEDICAL RECORD NUMBER:  [id number]  DATE:  12/4/2020  WOUND CARE ORDERS:  Left lower leg wound - cleanse with saline, apply Medihoney hydrogel sheet, cover gauze, secure with roll gauze. Apply single layer tubigrip size F, then patient Farrow wrap. Change every 3 days. Return to clinic in 2 weeks. TREATMENT ORDERS:    Elevate leg(s) above the level of the heart when sitting. Avoid prolonged standing in one place. Do no get dressing/wrap wet. Follow Diet as prescribed:   [x] Diet as tolerated: [x] Calorie Diabetic Diet: Low carb and no Sugar [x] No Added Salt:  [] Increase Protein: [] Limit the amount of liquid you are drinking and avoid drinking in between meals           Return Appointment:  [x] Return Appointment: With DR Micaela Monterroso  in  2 [facility])  [] Nurse Visit : *** days  [] Ordered tests:    Electronically signed Jian Petersen RN on 12/4/2020 at 3:07 PM     [facility] [address]: Should you experience any significant changes in your wound(s) or have questions about your wound care, please contact the [address] [place] [address] at [address] [place] [address] 8:00 am - 4:30. If you need help with your wound outside these hours and cannot wait until we are again available, contact your PCP or go to the hospital emergency room. PLEASE NOTE: IF YOU ARE UNABLE TO OBTAIN WOUND SUPPLIES, CONTINUE TO USE THE SUPPLIES YOU HAVE AVAILABLE UNTIL YOU ARE ABLE TO REACH US. IT IS MOST IMPORTANT TO KEEP THE WOUND COVERED AT ALL TIMES.      Physician Signature:_______________________    Date: ___________ Time:  ____________

## 2020-12-11 ENCOUNTER — TELEPHONE (OUTPATIENT)
Dept: PALLATIVE CARE | Age: 70
End: 2020-12-11

## 2020-12-11 DIAGNOSIS — G25.81 RESTLESS LEG: ICD-10-CM

## 2020-12-11 DIAGNOSIS — G47.00 INSOMNIA, UNSPECIFIED TYPE: ICD-10-CM

## 2020-12-11 RX ORDER — GABAPENTIN 100 MG/1
CAPSULE ORAL
Qty: 30 CAP | Refills: 1 | Status: SHIPPED | OUTPATIENT
Start: 2020-12-11 | End: 2021-01-01 | Stop reason: SDUPTHER

## 2020-12-11 RX ORDER — PRAMIPEXOLE DIHYDROCHLORIDE 1 MG/1
TABLET ORAL
Qty: 30 TAB | Status: SHIPPED | OUTPATIENT
Start: 2020-12-11 | End: 2021-01-01 | Stop reason: ALTCHOICE

## 2020-12-11 NOTE — TELEPHONE ENCOUNTER
The patient's spouse is requesting refills for Pramipexole and Gabapentin. He is completely out of both. To be called in to [facility] [phone].

## 2020-12-11 NOTE — TELEPHONE ENCOUNTER
Triage for Controlled Substance Refill Request     Pain Diagnosis: Neuropathy      Last Outpatient Visit: 10/28/2020     Next Outpatient Visit:12/16/2020     Reason for refill needed outside of office visit? -Appointment not scheduled prior to need for scheduled refill        Pharmacy: [facility] #[id number] - [facility] [address] AT [place] [address]     Medication:gabapentin (NEURONTIN) 100 mg  Dose and directions: Take 1 Cap by mouth nightly.   Number dispensed:15  Date filled ( or Pharmacy):11/9/20  # left:0            reviewed: yes      Date of Urine Drug Screen: n/a     Opioid Safety Handout given: yes      Appropriate for refill: yes      Action: please refill

## 2020-12-14 ENCOUNTER — TELEPHONE (OUTPATIENT)
Dept: PALLATIVE CARE | Age: 70
End: 2020-12-14

## 2020-12-14 NOTE — TELEPHONE ENCOUNTER
Calling patient to advise of Virtual Visit with Dr. Monica Wei on 12/16/2020     . A nurse will call you between the hours of 9:00am and 1:30pm.  If you have any questions, please call [phone]. No answer on patient's number so left voicemail. Called  Mrs. Kiah Rivera and she Confirmed appt.

## 2020-12-16 NOTE — PROGRESS NOTES
Palliative Medicine Outpatient Services [place]: [phone] ([phone]) Patient Name: Stephan Segura YOB: 1950 Date of Current Visit: 20 Location of Current Visit:   
[] [facility] Office 
[] [facility] Office [] [address] [address] [facility] Office 
[] Home 
[x]Synchronous real-time A/V virtual visit Date of Initial Visit: 2020 Referral from: Self severe Primary Care Physician: Muarilio Luna MD 
  
 SUMMARY:  
Stephan Segura is a 68y.o. year old with a  history of diabetic neuropathy, congestive heart failure with cardiomyopathy, chronic kidney disease on hemodialysis since 2020, chronic arthritic shoulder pain, high risk for falls, who was referred to Palliative Medicine by self for management of symptoms and psychosocial support. The patients social history includes retired , lives at home with his wife García. Baseline function, sleeps in a recliner, uses a walker and Rollator to get around the house, needs supervision/assistance with showering, able to toilet himself. High risk for fall from neuropathy. PALLIATIVE DIAGNOSES:  
 
  ICD-10-CM ICD-9-CM 1. Insomnia, unspecified type  G47.00 780.52 2. Venous ulcer of left leg ([facility])  I83.029 454.0 L97.929    
3. Restless leg  G25.81 333.94   
4. End-stage renal disease on hemodialysis ([facility])  N18.6 585.6 Z99.2 V45.11   
5. Primary osteoarthritis involving multiple joints  M89.49 715.98 PLAN:  
 
Patient Instructions Dear Stephan Segura , 
 
It was a pleasure seeing you today in your home virtually We will see you again in 2 months virtually Your described symptoms were: Fatigue: 5 Drowsiness: 0 Depression: 0 Pain: 0 Anxiety: 0 Nausea: 0 Anorexia: 0 Dyspnea: 0 Best Well-Bein Constipation: No  
Other Problem (Comment): 0 This is the plan we talked about: 1. Insomnia Update-we started you on gabapentin 100 mg capsule plus trazodone 50 mg tablet at night, this combination is making you sleep really well. You are very happy and feel like a whole new person. Is continue this 
---- Previous visit - You are struggling with severe insomnia. You sleep less than 2 hours at night but take several naps during the day. - You have been on Ambien 10 mg for months but no benefit. - You wanted to come off of Ambien which you now have 
-We tried trazodone 50 mg and escalated it all the way up to 150 mg at bedtime and still you have not been able to sleep. In fact, you feel more alert after taking trazodone. You desperately want to try a different medication. Given that you are on dialysis, we are seriously limited with a number of medications we can try for you. You found that you slept well when you were on gabapentin and so you want to try that. Try gabapentin 100 mg capsule at bedtime to see if this helps. I sent to 2-week supply just to see how you do with it. 2. Neuropathy - You are no longer on any medicines. - You are at high risk for fall and have worked with PT/OT and have all the necessary DME. 3. Hemodialysis - You are on HD and this is not very pleasant. 4.  Restless leg syndrome 
-Continue Mirapex 5. Goals of care - You have an AMD and DDNR. 6.  Venous ulcers 
-You are following with the podiatrist and wound clinic for the same This is what you have shared with us about Advance Care Planning: 
 
  Primary Decision Maker: Billy Stacy - Spouse - [phone] Secondary Decision Maker: Kaylen Miranda - Son - [phone] Advance Care Planning 12/16/2020 Patient's Healthcare Decision Maker is: Named in scanned ACP document Primary Decision Maker Name -  
Primary Decision Maker Phone Number -  
Primary Decision Maker Relationship to Patient -  
Confirm Advance Directive Yes, on file Patient Would Like to Complete Advance Directive - Does the patient have other document types Do Not Resuscitate The Palliative Medicine Team is here to support you and your family. Sincerely, 
 
 
Blanca Carroll MD and the Palliative Medicine Team 
 
 
 GOALS OF CARE / TREATMENT PREFERENCES:  
[====Goals of Care====] GOALS OF CARE: 
Patient / health care proxy stated goals: See Patient Instructions / Summary TREATMENT PREFERENCES:  
Code Status:  [] Attempt Resuscitation       [x] Do Not Attempt Resuscitation Advance Care Planning: 
[x] The [facility] Interdisciplinary Team has updated the ACP Navigator with Decision Maker and Patient Capacity Primary Decision Maker: Austin Monreal - Spouse - [phone] Secondary Decision Maker: Tye Romero - Son - [phone]  Advance Care Planning 12/16/2020 Patient's Healthcare Decision Maker is: Named in scanned ACP document Primary Decision Maker Name -  
Primary Decision Maker Phone Number -  
Primary Decision Maker Relationship to Patient -  
Confirm Advance Directive Yes, on file Patient Would Like to Complete Advance Directive - Does the patient have other document types Do Not Resuscitate Other: 
(If patient appropriate for POST, consider using PALLPOST smart phrase here) The palliative care team has discussed with patient / health care proxy about goals of care / treatment preferences for patient. 
[====Goals of Care====] PRESCRIPTIONS GIVEN:  
 
No orders of the defined types were placed in this encounter. FOLLOW UP: Future Appointments Date Time Provider Leonardo Thomas 12/16/2020  2:30 PM Blanca Carroll MD \[facility]\""-[facility]  
12/18/2020  1:15 PM Mercy Pedroza DPM [facility] PHYSICIANS INVOLVED IN CARE:  
Patient Care Team: 
Teresa Herrera MD as PCP - General (Internal Medicine) Teresa Herrera MD as PCP - [facility] Jona Rubinstein, MD (Cardiology) HISTORY:  
Reviewed patient-completed ESAS and advance care planning form. Reviewed patient record in prescription monitoring program. 
 
CHIEF COMPLAINT:  
No chief complaint on file. HPI/SUBJECTIVE: The patient is: [x] Verbal / [] Nonverbal  
 
Patient seen in his home virtually. He is so happy to report that he is sleeping really well. We had escalated his trazodone up to 150 mg but he had no benefit from it. We started him on gabapentin and now he is sleeping really well. It seems to be helping his restless leg as well. He wanted to know about electively discontinuing dialysis. We talked about this at length, he understands that he has the right to stop dialysis whenever he wants. He is having some leg cramps, agreed to talk to the nephrologist about this. Clinical Pain Assessment (nonverbal scale for nonverbal patients):  
[++++ Clinical Pain Assessment++++] [++++Pain Severity++++]: Pain: 0 
[++++Pain Character++++]: generalized pain 
[++++Pain Duration++++]: years [++++Pain Effect++++]: functional 
[++++Pain Factors++++]: none in particular 
[++++Pain Frequency++++]: constant [++++Pain Location++++]: all major joints [++++ Clinical Pain Assessment++++] FUNCTIONAL ASSESSMENT:  
 
Palliative Performance Scale (PPS): PPS: 70 PSYCHOSOCIAL/SPIRITUAL SCREENING:  
 
Any spiritual / Confucianist concerns: 
[] Yes /  [x] No 
 
Caregiver Burnout: 
[] Yes /  [x] No /  [] No Caregiver Present Anticipatory grief assessment:  
[x] Normal  / [] Maladaptive ESAS Anxiety: Anxiety: 0 
 
ESAS Depression: Depression: 0 REVIEW OF SYSTEMS:  
 
The following systems were [x] reviewed / [] unable to be reviewed Systems: constitutional, ears/nose/mouth/throat, respiratory, gastrointestinal, genitourinary, musculoskeletal, integumentary, neurologic, psychiatric, endocrine. Positive findings noted below. Modified ESAS Completed by: provider Fatigue: 5 Drowsiness: 0 Depression: 0 Pain: 0 Anxiety: 0 Nausea: 0 Anorexia: 0 Dyspnea: 0 Best Well-Bein Constipation: No  
Other Problem (Comment): 0 PHYSICAL EXAM:  
 
Wt Readings from Last 3 Encounters:  
20 231 lb 6.4 oz (105 kg) 20 228 lb 11.2 oz (103.7 kg) 20 229 lb (103.9 kg) There were no vitals taken for this visit. Last bowel movement: See Nursing Note Constitutional   
[x] Appears well-developed and well-nourished in no apparent distress   
[] Abnormal: 
Mental status [x] Alert and awake [x] Oriented to person/place/time 
[x] Able to follow commands 
[] Abnormal:  
Eyes 
[x] EOM normal  
[x] Sclera normal  
[x] No visible ocular discharge 
[] Abnormal:  
HENT [x] Normocephalic, atraumatic [x] Mouth/Throat: Moist mucous membranes  
[x] External Ears normal 
[] Abnormal: 
Neck [x] No visualized mass 
[] Abnormal: 
Pulmonary/Chest  
[x] Respiratory effort normal 
[x] No visualized signs of difficulty breathing or respiratory distress 
[] Abnormal: Musculoskeletal 
[x] Normal gait with no signs of ataxia [x] Normal range of motion of neck [] Abnormal: 
Neurological:  
[x] No facial asymmetry (Cranial nerve 7 motor function) [x] No gaze palsy 
[] Abnormal:  
Skin 
[x] No significant exanthematous lesions or discoloration noted on facial skin 
[] Abnormal: Psychiatric [x] Normal affect [x] No hallucinations [] Abnormal: 
 
Other pertinent observable physical exam findings: 
 
Due to this being a TeleHealth evaluation, many elements of the physical examination are unable to be assessed. HISTORY:  
 
Past Medical History:  
Diagnosis Date  Anemia 2017  Anxiety 2017  Arrhythmia   
 atrial fibrillation   ASCVD (arteriosclerotic cardiovascular disease) 2017  BPH (benign prostatic hyperplasia) 2017  CAD (coronary artery disease) h/o stents  Cancer [facility])   
 h/o skin cancer  Cardiomyopathy ([address] 8/5/2017  CHF (congestive heart failure) ([address] 8/5/2017  Chronic kidney disease Stage IV  Chronic pain   
 shoulders and feeet- neuropathy  CKD (chronic kidney disease), stage IV ([address] 8/5/2017  Diabetes ([address] type II  
 Diabetes mellitus ([address] 8/5/2017  Diabetic neuropathy ([address] 8/5/2017  DJD (degenerative joint disease) 8/5/2017  ED (erectile dysfunction) 8/5/2017  Gout 8/5/2017  High cholesterol  Hyperlipidemia 8/5/2017  Hypertension  Hypertension with renal disease 8/5/2017  Hypothyroid 8/5/2017  Ill-defined condition   
 glycoma and diabetic macular degeneration  Insomnia 8/5/2017  Obesity 8/5/2017  On statin therapy 8/5/2017  Pneumonia 05/28/2019  Restless leg 8/5/2017  Sleep apnea   
 untreated  Thyroid disease   
 hypothyroid  Ulcer of foot due to secondary diabetes ([address] 07/12/2019 Past Surgical History:  
Procedure Laterality Date  CARDIAC SURG PROCEDURE UNLIST  2000  
 cardiac stents 3  
 CARDIAC SURG PROCEDURE UNLIST Ablation 5/17/2018 [id number]  CARDIAC SURG PROCEDURE UNLIST    
 pacemaker  CARDIAC SURG PROCEDURE UNLIST  2018  
 cardio version x2  
 COLONOSCOPY N/A 6/28/2016 COLONOSCOPY performed by Ely Rubi MD at [facility] ENDOSCOPY  COLONOSCOPY N/A 1/9/2019 COLONOSCOPY performed by Lizbet Grajeda MD at [facility] ENDOSCOPY  COLONOSCOPY,DIAGNOSTIC  1/9/2019  HX APPENDECTOMY  HX ARTERIOVENOUS FISTULA  05/2020  
 dr Avinash Ford  HX BUNIONECTOMY    
 and removal of 2 seasmoid bone of the great toe 2/2018  HX HEENT Bilateral Cataract surgery  HX HEENT Tonsils  HX HEENT Axe wound to the head  HX KNEE ARTHROSCOPY X 2  
 HX ORTHOPAEDIC    
 HX ORTHOPAEDIC    
 partial laminectomy  HX PACEMAKER    
 HX ROTATOR CUFF REPAIR    
 bilateral- not operable per patient  IR INSERT TUNL CVC W/O PORT OVER 5 YR  3/3/2020  NE INSJ ELTRD CAR DACIA SYS ATTCH PREV PM/DFB PLS GEN N/A 2019 Lv Lead Placement To Previous Generator performed by Jennifer Elkins MD at [facility] CARDIAC CATH LAB Left side  NE REMVL PERM PM PLS GEN W/REPL PLSE GEN MULT LEAD N/A 2019 Remove & Replace Ppm Gen Biv Multi Leads performed by Jennifer Elkins MD at [address] [place] [address] 28 CATH LAB Family History Problem Relation Age of Onset  Heart Disease Mother  Kidney Disease Mother  Heart Disease Father  Kidney Disease Father  Cancer Sister Breast  
  
History reviewed, no pertinent family history. Social History Tobacco Use  Smoking status: Former Smoker Packs/day: 0.50 Years: 4.00 Pack years: 2.00 Quit date: 3/6/1972 Years since quittin.8  Smokeless tobacco: Never Used Substance Use Topics  Alcohol use: No  
  Comment: rare, 1 drink per year Allergies Allergen Reactions  Niacin Rash and Unknown (comments) Niaspan  Adhesive Rash  Imipenem Diarrhea Other reaction(s): Rash  Levemir [Insulin Detemir] Hives  Other Medication Other (comments) ? Allergy to Thomes Solo causing chemical burn  Primaxin [Imipenem-Cilastatin] Diarrhea and Rash  Xarelto [Rivaroxaban] Rash and Itching Other reaction(s): Rash Current Outpatient Medications Medication Sig  
 gabapentin (NEURONTIN) 100 mg capsule TAKE 1 CAPSULE BY MOUTH EVERY NIGHT. MAX DAILY AMOUNT: 100 MG  
 pramipexole (MIRAPEX) 1 mg tablet TAKE 1 TABLET BY MOUTH EVERY NIGHT AT BEDTIME  traZODone (DESYREL) 50 mg tablet TAKE 1 TABLET BY MOUTH EVERY NIGHT AT BEDTIME FOR 1 WEEK; INCREASE TO 2 TABLETS THE NEXT WEEK IF NEEDED. MAY INCREASE TO 3 TABLETS NIGHTLY  carvediloL (COREG) 3.125 mg tablet Take 1 Tab by mouth two (2) times daily (with meals).  senna-docusate (Senokot-S) 8.6-50 mg per tablet Take 2 Tabs by mouth Every Mon, Wed & Sun. Indications: constipation  B.infantis-B.ani-B.long-B.bifi (Probiotic 4X) 10-15 mg TbEC Take 1 Cap by mouth daily.  Sully-Deisy 0.8 mg tab tablet TK 1 T PO QD  
 bimatoprost (LUMIGAN) 0.01 % ophthalmic drops Administer 1 Drop to right eye every evening. No current facility-administered medications for this visit. LAB and other DATA REVIEWED:  
 
Lab Results Component Value Date/Time WBC 3.5 (L) 02/20/2020 05:04 AM  
 HGB 8.6 (L) 02/20/2020 05:04 AM  
 PLATELET 82 (L) 96/41/2891 05:04 AM  
 
Lab Results Component Value Date/Time Sodium 135 (L) 05/04/2020 06:40 AM  
 Potassium 4.1 05/04/2020 06:40 AM  
 Chloride 101 05/04/2020 06:40 AM  
 CO2 26 05/04/2020 06:40 AM  
 BUN 51 (H) 05/04/2020 06:40 AM  
 Creatinine 5.48 (H) 05/04/2020 06:40 AM  
 Calcium 8.9 05/04/2020 06:40 AM  
 Magnesium 2.3 02/17/2020 03:51 AM  
 Phosphorus 4.1 02/21/2020 01:58 AM  
  
Lab Results Component Value Date/Time Alk. phosphatase 108 02/14/2020 12:41 PM  
 Protein, total 7.6 02/14/2020 12:41 PM  
 Albumin 3.3 (L) 02/21/2020 01:58 AM  
 Globulin 4.4 (H) 02/14/2020 12:41 PM  
 
Lab Results Component Value Date/Time INR 1.3 (H) 06/01/2018 12:21 PM  
 Prothrombin time 12.6 (H) 06/01/2018 12:21 PM  
 aPTT 26.6 06/10/2014 11:20 AM  
  
Lab Results Component Value Date/Time Iron 43 02/06/2020 10:06 AM  
 TIBC 280 02/06/2020 10:06 AM  
 Iron % saturation 15 (L) 02/06/2020 10:06 AM  
 Ferritin 162 02/06/2020 10:06 AM  
  
 
Detail chart reviewed. Other specialists and referral provider notes reviewed.  
 
 CONTROLLED SUBSTANCES SAFETY ASSESSMENT (IF ON CONTROLLED SUBSTANCES):  
 
Reviewed opioid safety handout:  [x] Yes   [] No 
24 hour opioid dose >150mg morphine equivalent/day:  [] Yes   [] No 
Benzodiazepines:  [] Yes   [] No 
Sleep apnea:  [] Yes   [] No 
 Urine Toxicology Testing within last 6 months:  [] Yes   [] No 
History of or new aberrant medication taking behaviors:  [] Yes   [x] No 
Has Narcan been prescribed [x] Yes   [] No 
 
   
 
Total time: 70 minutes Counseling / coordination time: 60 minutes 
> 50% counseling / coordination?:  Yes Please note that this dictation was completed with [employer], the computer voice recognition software. Quite often unanticipated grammatical, syntax, homophones, and other interpretive errors are inadvertently transcribed by the computer software. Please disregard these errors. Please excuse any errors that have escaped final proofreading Consent: 
He and/or health care decision maker is aware that that he may receive a bill for this telehealth service, depending on his insurance coverage, and has provided verbal consent to proceed: Yes CPT Codes 53489-90130 for Established Patients may apply to this Telehealth Visit Pursuant to the emergency declaration under the [facility], [facility] waiver authority and the [employer] Act, this Virtual  Visit was conducted, with patient's consent, to reduce the patient's risk of exposure to COVID-19 and provide continuity of care for an established patient. Services were provided through a video synchronous discussion virtually to substitute for in-person clinic visit.

## 2020-12-16 NOTE — PATIENT INSTRUCTIONS
Deapushpa Clayton. , 
 
It was a pleasure seeing you today in your home virtually We will see you again in 2 months virtually Your described symptoms were: Fatigue: 5 Drowsiness: 0 Depression: 0 Pain: 0 Anxiety: 0 Nausea: 0 Anorexia: 0 Dyspnea: 0 Best Well-Bein Constipation: No  
Other Problem (Comment): 0 This is the plan we talked about: 1. Insomnia Update-we started you on gabapentin 100 mg capsule plus trazodone 50 mg tablet at night, this combination is making you sleep really well. You are very happy and feel like a whole new person. Is continue this 
---- Previous visit - You are struggling with severe insomnia. You sleep less than 2 hours at night but take several naps during the day. - You have been on Ambien 10 mg for months but no benefit. - You wanted to come off of Ambien which you now have 
-We tried trazodone 50 mg and escalated it all the way up to 150 mg at bedtime and still you have not been able to sleep. In fact, you feel more alert after taking trazodone. You desperately want to try a different medication. Given that you are on dialysis, we are seriously limited with a number of medications we can try for you. You found that you slept well when you were on gabapentin and so you want to try that. Try gabapentin 100 mg capsule at bedtime to see if this helps. I sent to 2-week supply just to see how you do with it. 2. Neuropathy - You are no longer on any medicines. - You are at high risk for fall and have worked with PT/OT and have all the necessary DME. 3. Hemodialysis - You are on HD and this is not very pleasant. 4.  Restless leg syndrome 
-Continue Mirapex 5. Goals of care - You have an AMD and DDNR. 6.  Venous ulcers 
-You are following with the podiatrist and wound clinic for the same This is what you have shared with us about Advance Care Planning: 
 
  Primary Decision Maker: Hernandez Cui - Spouse - [phone] Secondary Decision Maker: Jarred Jamil - Son - [phone]  Advance Care Planning 12/16/2020 Patient's Healthcare Decision Maker is: Named in scanned ACP document Primary Decision Maker Name -  
Primary Decision Maker Phone Number -  
Primary Decision Maker Relationship to Patient -  
Confirm Advance Directive Yes, on file Patient Would Like to Complete Advance Directive - Does the patient have other document types Do Not Resuscitate The Palliative Medicine Team is here to support you and your family.   
 
 
 
Sincerely, 
 
 
Daisy Hernandez MD and the Palliative Medicine Team

## 2020-12-16 NOTE — PROGRESS NOTES
Palliative Medicine Office Visit Palliative Medicine Nurse Check In 
[phone] Patient Name: Nayla Green. YOB: 1950 Date of Office Visit: 12/14/2020 Patient states: \"  \" 
 
From Check In Sheet (scanned in Media): 
Has a medical provider talked with you about cardiopulmonary resuscitation (CPR)? [x] Yes   [] No   [] Unable to obtain Nurse reminder to complete or update ACP FlowSheet: 
 
Is ACP on the Problem List?    [x] Yes    [] No 
IF ACP Document is ON FILE; Nurse to place ACP on Problem List  
 
Is there an ACP Note in Chart Review/Note? [x] Yes    [] No  
If NO: ALERT PROVIDER Primary Decision Maker: Kasia Mcdonough - Spouse - [phone] Secondary Decision Maker: Zane Marie - Son - [phone]  Advance Care Planning 12/16/2020 Patient's Healthcare Decision Maker is: Named in scanned ACP document Primary Decision Maker Name -  
Primary Decision Maker Phone Number -  
Primary Decision Maker Relationship to Patient -  
Confirm Advance Directive Yes, on file Patient Would Like to Complete Advance Directive - Does the patient have other document types Do Not Resuscitate Is there anything that we should know about you as a person in order to provide you the best care possible? Have you been to the ER, urgent care clinic since your last visit? [] Yes   [x] No   [] Unable to obtain Have you been hospitalized since your last visit? [] Yes   [x] No   [] Unable to obtain Have you seen or consulted any other health care providers outside of the [address] [place] [address] since your last visit? [] Yes   [x] No   [] Unable to obtain Functional status (describe):  
 
 
 
Last BM: 12/16/2020  accessed (date): 12/16/2020 Bottle review (for opioid pain medication): 
Medication 1:  
Date filled:  
Directions:  
# filled: # left: # pills taking per day: 
Last dose taken: 
 
Medication 2:  
Date filled: Directions:  
# filled: # left: # pills taking per day: 
Last dose taken: 
 
Medication 3:  
Date filled:  
Directions:  
# filled: # left: # pills taking per day: 
Last dose taken: 
 
Medication 4:  
Date filled:  
Directions:  
# filled: # left: # pills taking per day: 
Last dose taken:

## 2020-12-18 PROBLEM — I83.029 VENOUS STASIS ULCER OF LEFT LOWER LEG WITH EDEMA OF LEFT LOWER LEG (HCC): Status: ACTIVE | Noted: 2020-01-01

## 2020-12-18 PROBLEM — R60.9 VENOUS STASIS ULCER OF LEFT LOWER LEG WITH EDEMA OF LEFT LOWER LEG (HCC): Status: ACTIVE | Noted: 2020-01-01

## 2020-12-18 PROBLEM — I83.892 VENOUS STASIS ULCER OF LEFT LOWER LEG WITH EDEMA OF LEFT LOWER LEG (HCC): Status: ACTIVE | Noted: 2020-01-01

## 2020-12-18 PROBLEM — L97.929 VENOUS STASIS ULCER OF LEFT LOWER LEG WITH EDEMA OF LEFT LOWER LEG (HCC): Status: ACTIVE | Noted: 2020-01-01

## 2020-12-18 NOTE — DISCHARGE INSTRUCTIONS
Discharge Instructions/Wound Care Orders  [address]  [address] [facility]  [place], [address] [facility]  Telephone: [phone]    NAME:  Bonifacio [facility] YOB: 1950  MEDICAL RECORD NUMBER:  [id number]  DATE:  12/18/2020      WOUND CARE ORDERS:Left lower leg wound - Cleanse with saline and gauze, apply Therahoney sheet, cover gauze, secure with roll gauze.  Apply single layer tubigrip size F, followed by  patient's Farrow wrap. Pt/PCG to change dressing every 3 days. Return to clinic for follow up with Dr. Tori Avilez in 3 weeks. TREATMENT ORDERS:    Elevate leg(s) above the level of the heart when sitting. Avoid prolonged standing in one place. Do no get dressing/wrap wet. Follow Diet as prescribed:   [] Diet as tolerated: [x] Diabetic, Renal  Diet [x] No Added Salt  [] Increase Protein: [] Other:                 Return Appointment:  [x] Return Appointment: With Dr. Shanel Bhatt in [facility])  [] Ordered tests:      Electronically signed Irais Gómez RN on 12/18/2020 at 1:25 PM     [facility] Information: Should you experience any significant changes in your wound(s) or have questions about your wound care, please contact the [address] [place] [address] at [address] [place] [address] 8:00 am - 4:30. If you need help with your wound outside these hours and cannot wait until we are again available, contact your PCP or go to the hospital emergency room. PLEASE NOTE: IF YOU ARE UNABLE TO OBTAIN WOUND SUPPLIES, CONTINUE TO USE THE SUPPLIES YOU HAVE AVAILABLE UNTIL YOU ARE ABLE TO REACH US. IT IS MOST IMPORTANT TO KEEP THE WOUND COVERED AT ALL TIMES.      Physician Signature:_______________________    Date: ___________ Time:  ____________

## 2020-12-18 NOTE — WOUND CARE
12/18/20 1310 Wound Leg Left;Lateral;Lower # 3 11/06/20 Date First Assessed/Time First Assessed: 11/06/20 1618   Wound Approximate Age at First Assessment (Weeks): 1 weeks  Primary Wound Type: Blister/bullae  Location: Leg  Wound Location Orientation: Left;Lateral;Lower  Wound Description: # 3  Date of Fir. .. Wound Image Wound Etiology Venous Dressing Status Old drainage noted (Removed honey, gauze, Roll gauze, Tubigrip.) Cleansed Cleansed with saline Wound Length (cm) 8.4 cm Wound Width (cm) 1.7 cm Wound Depth (cm) 0.1 cm Wound Surface Area (cm^2) 14.28 cm^2 Change in Wound Size % 76.49 Wound Volume (cm^3) 1.43 cm^3 Wound Healing % 76 Wound Assessment Slough;Pink/red;Granulation tissue Drainage Amount Moderate Drainage Description Serosanguinous Wound Odor None Angela-Wound/Incision Assessment Dry/flaky; Intact; Other (Comment) Edges Flat/open edges 
Jameson Bowling) Wound Thickness Description Full thickness Visit Vitals BP (!) 146/66 (BP 1 Location: Left arm, BP Patient Position: Sitting;Post activity) Pulse 70 Temp 98.7 °F (37.1 °C) Resp 18

## 2020-12-18 NOTE — WOUND CARE
Debridement Wound Care Problem List Items Addressed This Visit None Procedure Note Indications:  Based on my examination of this patient's wound(s)/ulcer(s) today, debridement is required to promote healing and evaluate the wound base. Performed by: Edwige Del Real DPM 
 
Consent obtained: Yes Time out taken: Yes Debridement: Non-excisional/Selective Using curette the wound(s)/ulcer(s) was/were sharply debrided down through and including the removal of   
dermis Devitalized Tissue Debrided: biofilm Pre Debridement Measurements: 
Are located in the [place]  Documentation Flow Sheet Wound/Ulcer #: 1 Post Debridement Measurements: 
Wound/Ulcer Descriptions are Pre Debridement except measurements:Non-Pressure ulcer, fat layer exposed Wound Sacrum (Active) Number of days: 299 Wound Arm Left 4 incision sites on the upper left arm. (Active) Number of days: 228 Wound Leg Left;Lateral;Lower # 3 11/06/20 (Active) Wound Image   12/18/20 1310 Wound Etiology Venous 12/18/20 1310 Dressing Status Old drainage noted 12/18/20 1310 Cleansed Cleansed with saline 12/18/20 1310 Dressing/Treatment Gauze dressing/dressing sponge;Roll gauze;Tape/Soft cloth adhesive tape 12/04/20 1514 Wound Length (cm) 8.4 cm 12/18/20 1310 Wound Width (cm) 1.7 cm 12/18/20 1310 Wound Depth (cm) 0.1 cm 12/18/20 1310 Wound Surface Area (cm^2) 14.28 cm^2 12/18/20 1310 Change in Wound Size % 76.49 12/18/20 1310 Wound Volume (cm^3) 1.43 cm^3 12/18/20 1310 Wound Healing % 76 12/18/20 1310 Post-Procedure Length (cm) 8.4 cm 12/18/20 1320 Post-Procedure Width (cm) 1.7 cm 12/18/20 1320 Post-Procedure Depth (cm) 0.1 cm 12/18/20 1320 Post-Procedure Surface Area (cm^2) 14.28 cm^2 12/18/20 1320 Post-Procedure Volume (cm^3) 1.43 cm^3 12/18/20 1320 Wound Assessment Slough;Pink/red;Granulation tissue 12/18/20 1310 Drainage Amount Moderate 12/18/20 1310 Drainage Description Serosanguinous 12/18/20 1310 Wound Odor None 12/18/20 1310 Angela-Wound/Incision Assessment Dry/flaky; Intact; Other (Comment) 12/18/20 1310 Edges Flat/open edges 12/18/20 1310 Wound Thickness Description Full thickness 12/18/20 1310 Number of days: 42 Percent of Wound(s)/Ulcer(s) Debrided: 100% Total Surface Area Debrided:  14.28 sq cm Diabetic/Pressure/Non Pressure Ulcers only: 
Ulcer:  
 
Estimated Blood Loss:  Minimal  
 
Hemostasis Achieved: Pressure Procedural Pain: 0 / 10 Post Procedural Pain: 0 / 10 Response to treatment: Well tolerated by patient

## 2020-12-23 NOTE — TELEPHONE ENCOUNTER
Returned call to patient wife who wants to start hospice for patient , advising she needs more help with him, and his wounds, reports patient is currently at dialysis for treatment and then will have wound care outpatient treatment today, LPN questioned wife as to if patient was aware of her call, she reports No, and that would want to continue dialysis and out patient wound care if admitted to Dialysis , advised wife to contact our office when  has returned and we could talk about Hospice concerns at that time as patient still has capacity to make his own health care decisions. Wife agreed and will call office back with patient later on today.

## 2020-12-23 NOTE — TELEPHONE ENCOUNTER
The patient's wife called back. States he is not ready to give up dialysis. She will talk to him and call back later. And informational session regarding hospice may be good.

## 2021-01-01 ENCOUNTER — HOME CARE VISIT (OUTPATIENT)
Dept: SCHEDULING | Facility: HOME HEALTH | Age: 71
End: 2021-01-01
Payer: MEDICARE

## 2021-01-01 ENCOUNTER — HOME CARE VISIT (OUTPATIENT)
Dept: HOME HEALTH SERVICES | Facility: HOME HEALTH | Age: 71
End: 2021-01-01
Payer: MEDICARE

## 2021-01-01 ENCOUNTER — TELEPHONE (OUTPATIENT)
Dept: PALLATIVE CARE | Age: 71
End: 2021-01-01

## 2021-01-01 ENCOUNTER — APPOINTMENT (OUTPATIENT)
Dept: CT IMAGING | Age: 71
End: 2021-01-01
Attending: EMERGENCY MEDICINE
Payer: MEDICARE

## 2021-01-01 ENCOUNTER — HOME HEALTH ADMISSION (OUTPATIENT)
Dept: HOME HEALTH SERVICES | Facility: HOME HEALTH | Age: 71
End: 2021-01-01

## 2021-01-01 ENCOUNTER — OFFICE VISIT (OUTPATIENT)
Dept: INTERNAL MEDICINE CLINIC | Age: 71
End: 2021-01-01
Payer: MEDICARE

## 2021-01-01 ENCOUNTER — HOME HEALTH ADMISSION (OUTPATIENT)
Dept: HOME HEALTH SERVICES | Facility: HOME HEALTH | Age: 71
End: 2021-01-01
Payer: MEDICARE

## 2021-01-01 ENCOUNTER — TELEPHONE (OUTPATIENT)
Dept: INTERNAL MEDICINE CLINIC | Age: 71
End: 2021-01-01

## 2021-01-01 ENCOUNTER — APPOINTMENT (OUTPATIENT)
Dept: GENERAL RADIOLOGY | Age: 71
End: 2021-01-01
Attending: EMERGENCY MEDICINE
Payer: MEDICARE

## 2021-01-01 ENCOUNTER — HOSPITAL ENCOUNTER (EMERGENCY)
Age: 71
Discharge: HOME | End: 2021-11-01
Attending: EMERGENCY MEDICINE | Admitting: EMERGENCY MEDICINE
Payer: MEDICARE

## 2021-01-01 ENCOUNTER — VIRTUAL VISIT (OUTPATIENT)
Dept: PALLATIVE CARE | Age: 71
End: 2021-01-01
Payer: MEDICARE

## 2021-01-01 ENCOUNTER — TELEPHONE (OUTPATIENT)
Dept: FAMILY MEDICINE CLINIC | Age: 71
End: 2021-01-01

## 2021-01-01 ENCOUNTER — OFFICE VISIT (OUTPATIENT)
Dept: INTERNAL MEDICINE CLINIC | Age: 71
End: 2021-01-01
Payer: COMMERCIAL

## 2021-01-01 VITALS
HEART RATE: 71 BPM | OXYGEN SATURATION: 98 % | DIASTOLIC BLOOD PRESSURE: 63 MMHG | SYSTOLIC BLOOD PRESSURE: 113 MMHG | TEMPERATURE: 98.1 F

## 2021-01-01 VITALS
OXYGEN SATURATION: 99 % | SYSTOLIC BLOOD PRESSURE: 107 MMHG | DIASTOLIC BLOOD PRESSURE: 60 MMHG | RESPIRATION RATE: 18 BRPM | HEART RATE: 70 BPM | TEMPERATURE: 96.9 F

## 2021-01-01 VITALS
OXYGEN SATURATION: 98 % | RESPIRATION RATE: 18 BRPM | DIASTOLIC BLOOD PRESSURE: 69 MMHG | BODY MASS INDEX: 27.91 KG/M2 | SYSTOLIC BLOOD PRESSURE: 120 MMHG | TEMPERATURE: 97.4 F | HEART RATE: 69 BPM | HEIGHT: 76 IN

## 2021-01-01 VITALS
SYSTOLIC BLOOD PRESSURE: 123 MMHG | RESPIRATION RATE: 18 BRPM | BODY MASS INDEX: 29.57 KG/M2 | DIASTOLIC BLOOD PRESSURE: 71 MMHG | HEART RATE: 72 BPM | HEIGHT: 76 IN | OXYGEN SATURATION: 98 % | TEMPERATURE: 98.6 F

## 2021-01-01 VITALS
HEART RATE: 74 BPM | RESPIRATION RATE: 18 BRPM | OXYGEN SATURATION: 98 % | DIASTOLIC BLOOD PRESSURE: 62 MMHG | TEMPERATURE: 98.1 F | SYSTOLIC BLOOD PRESSURE: 108 MMHG

## 2021-01-01 VITALS
DIASTOLIC BLOOD PRESSURE: 64 MMHG | SYSTOLIC BLOOD PRESSURE: 126 MMHG | RESPIRATION RATE: 17 BRPM | TEMPERATURE: 98.7 F | HEART RATE: 70 BPM | OXYGEN SATURATION: 98 %

## 2021-01-01 VITALS
RESPIRATION RATE: 18 BRPM | OXYGEN SATURATION: 97 % | SYSTOLIC BLOOD PRESSURE: 102 MMHG | DIASTOLIC BLOOD PRESSURE: 58 MMHG | HEART RATE: 70 BPM | TEMPERATURE: 97.1 F

## 2021-01-01 VITALS
TEMPERATURE: 98 F | DIASTOLIC BLOOD PRESSURE: 80 MMHG | RESPIRATION RATE: 18 BRPM | SYSTOLIC BLOOD PRESSURE: 140 MMHG | HEART RATE: 70 BPM | OXYGEN SATURATION: 98 %

## 2021-01-01 VITALS
SYSTOLIC BLOOD PRESSURE: 136 MMHG | RESPIRATION RATE: 17 BRPM | DIASTOLIC BLOOD PRESSURE: 62 MMHG | HEART RATE: 70 BPM | TEMPERATURE: 97.1 F | OXYGEN SATURATION: 99 %

## 2021-01-01 VITALS
SYSTOLIC BLOOD PRESSURE: 124 MMHG | DIASTOLIC BLOOD PRESSURE: 68 MMHG | TEMPERATURE: 97.4 F | HEART RATE: 74 BPM | OXYGEN SATURATION: 98 %

## 2021-01-01 VITALS
SYSTOLIC BLOOD PRESSURE: 113 MMHG | TEMPERATURE: 97.8 F | HEART RATE: 71 BPM | OXYGEN SATURATION: 98 % | DIASTOLIC BLOOD PRESSURE: 68 MMHG

## 2021-01-01 VITALS
OXYGEN SATURATION: 98 % | HEART RATE: 62 BPM | RESPIRATION RATE: 18 BRPM | RESPIRATION RATE: 16 BRPM | DIASTOLIC BLOOD PRESSURE: 58 MMHG | DIASTOLIC BLOOD PRESSURE: 62 MMHG | SYSTOLIC BLOOD PRESSURE: 92 MMHG | TEMPERATURE: 97.7 F | TEMPERATURE: 98.1 F | HEART RATE: 74 BPM | OXYGEN SATURATION: 97 % | SYSTOLIC BLOOD PRESSURE: 108 MMHG

## 2021-01-01 VITALS
HEART RATE: 80 BPM | DIASTOLIC BLOOD PRESSURE: 78 MMHG | RESPIRATION RATE: 18 BRPM | SYSTOLIC BLOOD PRESSURE: 120 MMHG | TEMPERATURE: 97.5 F | OXYGEN SATURATION: 98 %

## 2021-01-01 VITALS
SYSTOLIC BLOOD PRESSURE: 106 MMHG | DIASTOLIC BLOOD PRESSURE: 62 MMHG | OXYGEN SATURATION: 100 % | RESPIRATION RATE: 18 BRPM | TEMPERATURE: 96.5 F | HEART RATE: 63 BPM

## 2021-01-01 VITALS
OXYGEN SATURATION: 98 % | DIASTOLIC BLOOD PRESSURE: 69 MMHG | TEMPERATURE: 97.9 F | SYSTOLIC BLOOD PRESSURE: 111 MMHG | HEART RATE: 70 BPM

## 2021-01-01 VITALS
RESPIRATION RATE: 20 BRPM | HEART RATE: 69 BPM | SYSTOLIC BLOOD PRESSURE: 122 MMHG | DIASTOLIC BLOOD PRESSURE: 75 MMHG | OXYGEN SATURATION: 100 %

## 2021-01-01 VITALS
BODY MASS INDEX: 28.1 KG/M2 | DIASTOLIC BLOOD PRESSURE: 68 MMHG | SYSTOLIC BLOOD PRESSURE: 110 MMHG | HEIGHT: 76 IN | OXYGEN SATURATION: 98 % | HEART RATE: 70 BPM | TEMPERATURE: 98 F | RESPIRATION RATE: 19 BRPM

## 2021-01-01 VITALS
RESPIRATION RATE: 20 BRPM | HEART RATE: 75 BPM | TEMPERATURE: 97 F | SYSTOLIC BLOOD PRESSURE: 110 MMHG | OXYGEN SATURATION: 97 % | DIASTOLIC BLOOD PRESSURE: 70 MMHG

## 2021-01-01 VITALS
DIASTOLIC BLOOD PRESSURE: 70 MMHG | TEMPERATURE: 98.3 F | HEART RATE: 80 BPM | SYSTOLIC BLOOD PRESSURE: 120 MMHG | RESPIRATION RATE: 18 BRPM | OXYGEN SATURATION: 98 %

## 2021-01-01 VITALS
DIASTOLIC BLOOD PRESSURE: 58 MMHG | OXYGEN SATURATION: 100 % | SYSTOLIC BLOOD PRESSURE: 112 MMHG | RESPIRATION RATE: 18 BRPM | TEMPERATURE: 96.4 F | HEART RATE: 80 BPM

## 2021-01-01 VITALS
RESPIRATION RATE: 18 BRPM | HEART RATE: 70 BPM | OXYGEN SATURATION: 99 % | WEIGHT: 230.82 LBS | SYSTOLIC BLOOD PRESSURE: 108 MMHG | DIASTOLIC BLOOD PRESSURE: 58 MMHG | TEMPERATURE: 98.6 F | BODY MASS INDEX: 28.1 KG/M2

## 2021-01-01 VITALS
OXYGEN SATURATION: 97 % | HEART RATE: 70 BPM | RESPIRATION RATE: 16 BRPM | SYSTOLIC BLOOD PRESSURE: 105 MMHG | TEMPERATURE: 97.8 F | DIASTOLIC BLOOD PRESSURE: 62 MMHG

## 2021-01-01 VITALS
SYSTOLIC BLOOD PRESSURE: 120 MMHG | HEART RATE: 80 BPM | TEMPERATURE: 97.6 F | OXYGEN SATURATION: 97 % | DIASTOLIC BLOOD PRESSURE: 78 MMHG | RESPIRATION RATE: 18 BRPM

## 2021-01-01 VITALS
HEART RATE: 72 BPM | OXYGEN SATURATION: 97 % | DIASTOLIC BLOOD PRESSURE: 62 MMHG | SYSTOLIC BLOOD PRESSURE: 110 MMHG | TEMPERATURE: 98.6 F

## 2021-01-01 VITALS
DIASTOLIC BLOOD PRESSURE: 70 MMHG | RESPIRATION RATE: 20 BRPM | OXYGEN SATURATION: 99 % | SYSTOLIC BLOOD PRESSURE: 138 MMHG | HEART RATE: 79 BPM | TEMPERATURE: 97.4 F

## 2021-01-01 VITALS
DIASTOLIC BLOOD PRESSURE: 62 MMHG | RESPIRATION RATE: 18 BRPM | OXYGEN SATURATION: 100 % | HEART RATE: 72 BPM | TEMPERATURE: 98.1 F | SYSTOLIC BLOOD PRESSURE: 110 MMHG

## 2021-01-01 VITALS
SYSTOLIC BLOOD PRESSURE: 102 MMHG | RESPIRATION RATE: 18 BRPM | DIASTOLIC BLOOD PRESSURE: 60 MMHG | TEMPERATURE: 98.3 F | HEART RATE: 70 BPM | OXYGEN SATURATION: 98 %

## 2021-01-01 VITALS
DIASTOLIC BLOOD PRESSURE: 88 MMHG | RESPIRATION RATE: 18 BRPM | SYSTOLIC BLOOD PRESSURE: 118 MMHG | OXYGEN SATURATION: 98 % | TEMPERATURE: 98 F | HEART RATE: 82 BPM

## 2021-01-01 VITALS
OXYGEN SATURATION: 97 % | HEART RATE: 70 BPM | BODY MASS INDEX: 38.36 KG/M2 | WEIGHT: 315 LBS | HEIGHT: 76 IN | SYSTOLIC BLOOD PRESSURE: 122 MMHG | TEMPERATURE: 98.3 F | DIASTOLIC BLOOD PRESSURE: 82 MMHG

## 2021-01-01 VITALS
SYSTOLIC BLOOD PRESSURE: 122 MMHG | RESPIRATION RATE: 18 BRPM | TEMPERATURE: 98 F | OXYGEN SATURATION: 94 % | HEART RATE: 88 BPM | DIASTOLIC BLOOD PRESSURE: 72 MMHG

## 2021-01-01 VITALS
SYSTOLIC BLOOD PRESSURE: 120 MMHG | DIASTOLIC BLOOD PRESSURE: 70 MMHG | TEMPERATURE: 98.3 F | OXYGEN SATURATION: 98 % | HEART RATE: 72 BPM

## 2021-01-01 VITALS
TEMPERATURE: 97.4 F | HEART RATE: 73 BPM | SYSTOLIC BLOOD PRESSURE: 100 MMHG | DIASTOLIC BLOOD PRESSURE: 60 MMHG | RESPIRATION RATE: 16 BRPM | OXYGEN SATURATION: 98 %

## 2021-01-01 VITALS
RESPIRATION RATE: 16 BRPM | SYSTOLIC BLOOD PRESSURE: 101 MMHG | DIASTOLIC BLOOD PRESSURE: 62 MMHG | OXYGEN SATURATION: 97 % | TEMPERATURE: 98.1 F | HEART RATE: 70 BPM

## 2021-01-01 VITALS
OXYGEN SATURATION: 98 % | RESPIRATION RATE: 18 BRPM | HEART RATE: 80 BPM | TEMPERATURE: 98.2 F | DIASTOLIC BLOOD PRESSURE: 70 MMHG | SYSTOLIC BLOOD PRESSURE: 118 MMHG

## 2021-01-01 VITALS
TEMPERATURE: 98.6 F | DIASTOLIC BLOOD PRESSURE: 70 MMHG | OXYGEN SATURATION: 98 % | RESPIRATION RATE: 18 BRPM | SYSTOLIC BLOOD PRESSURE: 116 MMHG | HEART RATE: 68 BPM

## 2021-01-01 VITALS
SYSTOLIC BLOOD PRESSURE: 114 MMHG | TEMPERATURE: 98.3 F | BODY MASS INDEX: 27.91 KG/M2 | OXYGEN SATURATION: 97 % | RESPIRATION RATE: 18 BRPM | DIASTOLIC BLOOD PRESSURE: 70 MMHG | HEIGHT: 76 IN | HEART RATE: 70 BPM

## 2021-01-01 VITALS
RESPIRATION RATE: 17 BRPM | SYSTOLIC BLOOD PRESSURE: 111 MMHG | DIASTOLIC BLOOD PRESSURE: 62 MMHG | TEMPERATURE: 97.2 F | HEART RATE: 62 BPM | OXYGEN SATURATION: 100 %

## 2021-01-01 VITALS
HEART RATE: 72 BPM | DIASTOLIC BLOOD PRESSURE: 56 MMHG | OXYGEN SATURATION: 98 % | TEMPERATURE: 98.2 F | SYSTOLIC BLOOD PRESSURE: 88 MMHG

## 2021-01-01 VITALS
RESPIRATION RATE: 18 BRPM | TEMPERATURE: 97.6 F | HEART RATE: 68 BPM | SYSTOLIC BLOOD PRESSURE: 103 MMHG | OXYGEN SATURATION: 99 % | DIASTOLIC BLOOD PRESSURE: 56 MMHG

## 2021-01-01 VITALS
OXYGEN SATURATION: 100 % | SYSTOLIC BLOOD PRESSURE: 122 MMHG | DIASTOLIC BLOOD PRESSURE: 68 MMHG | HEART RATE: 90 BPM | TEMPERATURE: 97.6 F | RESPIRATION RATE: 16 BRPM

## 2021-01-01 VITALS
SYSTOLIC BLOOD PRESSURE: 106 MMHG | TEMPERATURE: 98 F | HEART RATE: 75 BPM | DIASTOLIC BLOOD PRESSURE: 65 MMHG | OXYGEN SATURATION: 100 %

## 2021-01-01 VITALS
SYSTOLIC BLOOD PRESSURE: 124 MMHG | HEART RATE: 76 BPM | RESPIRATION RATE: 18 BRPM | TEMPERATURE: 96.7 F | DIASTOLIC BLOOD PRESSURE: 60 MMHG | WEIGHT: 229.28 LBS | OXYGEN SATURATION: 98 % | BODY MASS INDEX: 27.92 KG/M2 | HEIGHT: 76 IN

## 2021-01-01 VITALS
OXYGEN SATURATION: 100 % | TEMPERATURE: 98.6 F | RESPIRATION RATE: 18 BRPM | DIASTOLIC BLOOD PRESSURE: 58 MMHG | SYSTOLIC BLOOD PRESSURE: 95 MMHG

## 2021-01-01 VITALS
OXYGEN SATURATION: 97 % | RESPIRATION RATE: 18 BRPM | HEART RATE: 72 BPM | SYSTOLIC BLOOD PRESSURE: 114 MMHG | DIASTOLIC BLOOD PRESSURE: 60 MMHG | TEMPERATURE: 97.6 F

## 2021-01-01 VITALS
TEMPERATURE: 98.6 F | RESPIRATION RATE: 18 BRPM | DIASTOLIC BLOOD PRESSURE: 60 MMHG | SYSTOLIC BLOOD PRESSURE: 118 MMHG | OXYGEN SATURATION: 97 % | HEART RATE: 87 BPM

## 2021-01-01 VITALS
DIASTOLIC BLOOD PRESSURE: 75 MMHG | HEART RATE: 80 BPM | OXYGEN SATURATION: 98 % | RESPIRATION RATE: 16 BRPM | SYSTOLIC BLOOD PRESSURE: 130 MMHG | TEMPERATURE: 98.4 F

## 2021-01-01 VITALS
OXYGEN SATURATION: 97 % | HEART RATE: 80 BPM | RESPIRATION RATE: 17 BRPM | SYSTOLIC BLOOD PRESSURE: 118 MMHG | TEMPERATURE: 97.8 F | RESPIRATION RATE: 18 BRPM | HEART RATE: 80 BPM | SYSTOLIC BLOOD PRESSURE: 130 MMHG | DIASTOLIC BLOOD PRESSURE: 70 MMHG | TEMPERATURE: 97.9 F | DIASTOLIC BLOOD PRESSURE: 60 MMHG | OXYGEN SATURATION: 98 %

## 2021-01-01 VITALS
DIASTOLIC BLOOD PRESSURE: 73 MMHG | SYSTOLIC BLOOD PRESSURE: 139 MMHG | HEART RATE: 73 BPM | OXYGEN SATURATION: 100 % | TEMPERATURE: 97.5 F | RESPIRATION RATE: 18 BRPM

## 2021-01-01 VITALS
RESPIRATION RATE: 17 BRPM | HEART RATE: 71 BPM | DIASTOLIC BLOOD PRESSURE: 85 MMHG | TEMPERATURE: 97.8 F | SYSTOLIC BLOOD PRESSURE: 113 MMHG | OXYGEN SATURATION: 98 %

## 2021-01-01 VITALS
HEART RATE: 86 BPM | BODY MASS INDEX: 29.57 KG/M2 | DIASTOLIC BLOOD PRESSURE: 76 MMHG | RESPIRATION RATE: 18 BRPM | SYSTOLIC BLOOD PRESSURE: 134 MMHG | OXYGEN SATURATION: 98 % | TEMPERATURE: 98.4 F | WEIGHT: 242.95 LBS

## 2021-01-01 VITALS
TEMPERATURE: 97.6 F | SYSTOLIC BLOOD PRESSURE: 119 MMHG | DIASTOLIC BLOOD PRESSURE: 74 MMHG | OXYGEN SATURATION: 98 % | HEART RATE: 71 BPM | RESPIRATION RATE: 18 BRPM

## 2021-01-01 VITALS
HEART RATE: 75 BPM | SYSTOLIC BLOOD PRESSURE: 116 MMHG | DIASTOLIC BLOOD PRESSURE: 60 MMHG | TEMPERATURE: 98.5 F | RESPIRATION RATE: 20 BRPM | OXYGEN SATURATION: 98 %

## 2021-01-01 VITALS
OXYGEN SATURATION: 96 % | SYSTOLIC BLOOD PRESSURE: 100 MMHG | DIASTOLIC BLOOD PRESSURE: 60 MMHG | HEART RATE: 71 BPM | RESPIRATION RATE: 16 BRPM

## 2021-01-01 VITALS
SYSTOLIC BLOOD PRESSURE: 112 MMHG | HEART RATE: 65 BPM | DIASTOLIC BLOOD PRESSURE: 62 MMHG | OXYGEN SATURATION: 97 % | TEMPERATURE: 97.5 F | RESPIRATION RATE: 18 BRPM

## 2021-01-01 VITALS
OXYGEN SATURATION: 98 % | TEMPERATURE: 98 F | DIASTOLIC BLOOD PRESSURE: 70 MMHG | RESPIRATION RATE: 18 BRPM | HEART RATE: 76 BPM | SYSTOLIC BLOOD PRESSURE: 122 MMHG

## 2021-01-01 VITALS
HEART RATE: 68 BPM | BODY MASS INDEX: 38.36 KG/M2 | DIASTOLIC BLOOD PRESSURE: 59 MMHG | RESPIRATION RATE: 14 BRPM | HEIGHT: 76 IN | SYSTOLIC BLOOD PRESSURE: 116 MMHG | TEMPERATURE: 97.9 F | WEIGHT: 315 LBS | OXYGEN SATURATION: 100 %

## 2021-01-01 VITALS
TEMPERATURE: 98.3 F | HEART RATE: 80 BPM | RESPIRATION RATE: 18 BRPM | DIASTOLIC BLOOD PRESSURE: 80 MMHG | OXYGEN SATURATION: 97 % | SYSTOLIC BLOOD PRESSURE: 130 MMHG

## 2021-01-01 VITALS
HEART RATE: 72 BPM | SYSTOLIC BLOOD PRESSURE: 110 MMHG | OXYGEN SATURATION: 100 % | RESPIRATION RATE: 18 BRPM | TEMPERATURE: 98.1 F | DIASTOLIC BLOOD PRESSURE: 62 MMHG

## 2021-01-01 VITALS
OXYGEN SATURATION: 97 % | SYSTOLIC BLOOD PRESSURE: 110 MMHG | DIASTOLIC BLOOD PRESSURE: 80 MMHG | RESPIRATION RATE: 18 BRPM | TEMPERATURE: 98.4 F | HEART RATE: 80 BPM

## 2021-01-01 VITALS
OXYGEN SATURATION: 97 % | HEART RATE: 69 BPM | HEIGHT: 76 IN | TEMPERATURE: 98.3 F | SYSTOLIC BLOOD PRESSURE: 130 MMHG | BODY MASS INDEX: 38.91 KG/M2 | RESPIRATION RATE: 18 BRPM | DIASTOLIC BLOOD PRESSURE: 72 MMHG

## 2021-01-01 VITALS
RESPIRATION RATE: 18 BRPM | SYSTOLIC BLOOD PRESSURE: 115 MMHG | DIASTOLIC BLOOD PRESSURE: 70 MMHG | OXYGEN SATURATION: 95 % | HEART RATE: 79 BPM | TEMPERATURE: 98.5 F

## 2021-01-01 VITALS — RESPIRATION RATE: 16 BRPM | TEMPERATURE: 98.2 F

## 2021-01-01 VITALS
OXYGEN SATURATION: 98 % | RESPIRATION RATE: 18 BRPM | SYSTOLIC BLOOD PRESSURE: 115 MMHG | HEART RATE: 81 BPM | TEMPERATURE: 98.2 F | DIASTOLIC BLOOD PRESSURE: 68 MMHG

## 2021-01-01 VITALS
RESPIRATION RATE: 17 BRPM | SYSTOLIC BLOOD PRESSURE: 105 MMHG | HEART RATE: 76 BPM | TEMPERATURE: 98.6 F | DIASTOLIC BLOOD PRESSURE: 59 MMHG

## 2021-01-01 VITALS
RESPIRATION RATE: 18 BRPM | DIASTOLIC BLOOD PRESSURE: 80 MMHG | SYSTOLIC BLOOD PRESSURE: 140 MMHG | TEMPERATURE: 98.2 F | OXYGEN SATURATION: 95 % | HEART RATE: 80 BPM

## 2021-01-01 VITALS
RESPIRATION RATE: 18 BRPM | SYSTOLIC BLOOD PRESSURE: 108 MMHG | HEART RATE: 70 BPM | DIASTOLIC BLOOD PRESSURE: 60 MMHG | OXYGEN SATURATION: 96 % | TEMPERATURE: 98.8 F

## 2021-01-01 VITALS
DIASTOLIC BLOOD PRESSURE: 58 MMHG | SYSTOLIC BLOOD PRESSURE: 105 MMHG | OXYGEN SATURATION: 99 % | RESPIRATION RATE: 18 BRPM | TEMPERATURE: 97.2 F | HEART RATE: 64 BPM

## 2021-01-01 VITALS
RESPIRATION RATE: 18 BRPM | DIASTOLIC BLOOD PRESSURE: 78 MMHG | TEMPERATURE: 97.8 F | SYSTOLIC BLOOD PRESSURE: 120 MMHG | HEART RATE: 78 BPM | OXYGEN SATURATION: 97 %

## 2021-01-01 VITALS
RESPIRATION RATE: 18 BRPM | DIASTOLIC BLOOD PRESSURE: 60 MMHG | OXYGEN SATURATION: 95 % | HEART RATE: 69 BPM | TEMPERATURE: 98.8 F | SYSTOLIC BLOOD PRESSURE: 108 MMHG

## 2021-01-01 VITALS
OXYGEN SATURATION: 100 % | DIASTOLIC BLOOD PRESSURE: 56 MMHG | TEMPERATURE: 97.2 F | SYSTOLIC BLOOD PRESSURE: 101 MMHG | HEART RATE: 68 BPM | RESPIRATION RATE: 16 BRPM

## 2021-01-01 DIAGNOSIS — N18.6 END-STAGE RENAL DISEASE ON HEMODIALYSIS (HCC): ICD-10-CM

## 2021-01-01 DIAGNOSIS — G25.81 RESTLESS LEG: ICD-10-CM

## 2021-01-01 DIAGNOSIS — R60.9 VENOUS STASIS ULCER OF LEFT LOWER LEG WITH EDEMA OF LEFT LOWER LEG (HCC): ICD-10-CM

## 2021-01-01 DIAGNOSIS — M15.9 PRIMARY OSTEOARTHRITIS INVOLVING MULTIPLE JOINTS: ICD-10-CM

## 2021-01-01 DIAGNOSIS — I48.0 PAROXYSMAL ATRIAL FIBRILLATION (HCC): ICD-10-CM

## 2021-01-01 DIAGNOSIS — Z79.4 CONTROLLED TYPE 2 DIABETES MELLITUS WITH CHRONIC KIDNEY DISEASE ON CHRONIC DIALYSIS, WITH LONG-TERM CURRENT USE OF INSULIN (HCC): ICD-10-CM

## 2021-01-01 DIAGNOSIS — N18.6 CONTROLLED TYPE 2 DIABETES MELLITUS WITH CHRONIC KIDNEY DISEASE ON CHRONIC DIALYSIS, WITH LONG-TERM CURRENT USE OF INSULIN (HCC): ICD-10-CM

## 2021-01-01 DIAGNOSIS — L97.919 VENOUS STASIS ULCERS OF BOTH LOWER EXTREMITIES (HCC): ICD-10-CM

## 2021-01-01 DIAGNOSIS — S50.311A ABRASION OF RIGHT ELBOW, INITIAL ENCOUNTER: ICD-10-CM

## 2021-01-01 DIAGNOSIS — I25.5 ISCHEMIC CARDIOMYOPATHY: ICD-10-CM

## 2021-01-01 DIAGNOSIS — I82.412 ACUTE DEEP VEIN THROMBOSIS (DVT) OF FEMORAL VEIN OF LEFT LOWER EXTREMITY (HCC): ICD-10-CM

## 2021-01-01 DIAGNOSIS — I50.42 CHRONIC COMBINED SYSTOLIC AND DIASTOLIC CHF, NYHA CLASS 4 (HCC): ICD-10-CM

## 2021-01-01 DIAGNOSIS — E11.22 CONTROLLED TYPE 2 DIABETES MELLITUS WITH CHRONIC KIDNEY DISEASE ON CHRONIC DIALYSIS, WITH LONG-TERM CURRENT USE OF INSULIN (HCC): ICD-10-CM

## 2021-01-01 DIAGNOSIS — R31.0 GROSS HEMATURIA: Primary | ICD-10-CM

## 2021-01-01 DIAGNOSIS — S81.812A SKIN TEAR OF LEFT LOWER LEG WITHOUT COMPLICATION, INITIAL ENCOUNTER: Primary | ICD-10-CM

## 2021-01-01 DIAGNOSIS — I82.4Z1 LOWER LEG DVT (DEEP VENOUS THROMBOEMBOLISM), ACUTE, RIGHT (HCC): ICD-10-CM

## 2021-01-01 DIAGNOSIS — I12.9 HYPERTENSION WITH RENAL DISEASE: Primary | ICD-10-CM

## 2021-01-01 DIAGNOSIS — S72.114A CLOSED NONDISPLACED FRACTURE OF GREATER TROCHANTER OF RIGHT FEMUR, INITIAL ENCOUNTER (HCC): Primary | ICD-10-CM

## 2021-01-01 DIAGNOSIS — Z99.2 END-STAGE RENAL DISEASE ON HEMODIALYSIS (HCC): ICD-10-CM

## 2021-01-01 DIAGNOSIS — E11.42 DIABETIC POLYNEUROPATHY ASSOCIATED WITH TYPE 2 DIABETES MELLITUS (HCC): ICD-10-CM

## 2021-01-01 DIAGNOSIS — N18.6 ESRD (END STAGE RENAL DISEASE) (HCC): ICD-10-CM

## 2021-01-01 DIAGNOSIS — Z51.89 ENCOUNTER FOR WOUND CARE: Primary | ICD-10-CM

## 2021-01-01 DIAGNOSIS — N18.6 END STAGE RENAL DISEASE (HCC): Primary | ICD-10-CM

## 2021-01-01 DIAGNOSIS — R60.9 VENOUS STASIS ULCER OF LEFT LOWER LEG WITH EDEMA OF LEFT LOWER LEG (HCC): Primary | ICD-10-CM

## 2021-01-01 DIAGNOSIS — I50.23 ACUTE ON CHRONIC SYSTOLIC CONGESTIVE HEART FAILURE (HCC): ICD-10-CM

## 2021-01-01 DIAGNOSIS — G47.00 INSOMNIA, UNSPECIFIED TYPE: Primary | ICD-10-CM

## 2021-01-01 DIAGNOSIS — I83.892 VENOUS STASIS ULCER OF LEFT LOWER LEG WITH EDEMA OF LEFT LOWER LEG (HCC): ICD-10-CM

## 2021-01-01 DIAGNOSIS — I83.019 VENOUS STASIS ULCERS OF BOTH LOWER EXTREMITIES (HCC): ICD-10-CM

## 2021-01-01 DIAGNOSIS — E66.09 CLASS 1 OBESITY DUE TO EXCESS CALORIES WITHOUT SERIOUS COMORBIDITY WITH BODY MASS INDEX (BMI) OF 30.0 TO 30.9 IN ADULT: ICD-10-CM

## 2021-01-01 DIAGNOSIS — K21.9 GASTROESOPHAGEAL REFLUX DISEASE WITHOUT ESOPHAGITIS: ICD-10-CM

## 2021-01-01 DIAGNOSIS — M79.89 RIGHT LEG SWELLING: Primary | ICD-10-CM

## 2021-01-01 DIAGNOSIS — I83.029 VENOUS STASIS ULCER OF LEFT LOWER LEG WITH EDEMA OF LEFT LOWER LEG (HCC): Primary | ICD-10-CM

## 2021-01-01 DIAGNOSIS — E03.9 ACQUIRED HYPOTHYROIDISM: ICD-10-CM

## 2021-01-01 DIAGNOSIS — E78.2 MIXED HYPERLIPIDEMIA: ICD-10-CM

## 2021-01-01 DIAGNOSIS — L97.221 VENOUS STASIS ULCER OF LEFT CALF LIMITED TO BREAKDOWN OF SKIN WITHOUT VARICOSE VEINS (HCC): ICD-10-CM

## 2021-01-01 DIAGNOSIS — Z99.2 CONTROLLED TYPE 2 DIABETES MELLITUS WITH CHRONIC KIDNEY DISEASE ON CHRONIC DIALYSIS, WITH LONG-TERM CURRENT USE OF INSULIN (HCC): ICD-10-CM

## 2021-01-01 DIAGNOSIS — Z13.39 ALCOHOL SCREENING: ICD-10-CM

## 2021-01-01 DIAGNOSIS — L97.919 STASIS LEG ULCER, RIGHT (HCC): ICD-10-CM

## 2021-01-01 DIAGNOSIS — I25.10 ASCVD (ARTERIOSCLEROTIC CARDIOVASCULAR DISEASE): ICD-10-CM

## 2021-01-01 DIAGNOSIS — I83.029 VENOUS STASIS ULCER OF LEFT LOWER LEG WITH EDEMA OF LEFT LOWER LEG (HCC): ICD-10-CM

## 2021-01-01 DIAGNOSIS — G47.00 INSOMNIA, UNSPECIFIED TYPE: ICD-10-CM

## 2021-01-01 DIAGNOSIS — I83.892 VENOUS STASIS ULCER OF LEFT LOWER LEG WITH EDEMA OF LEFT LOWER LEG (HCC): Primary | ICD-10-CM

## 2021-01-01 DIAGNOSIS — L89.321 PRESSURE INJURY OF LEFT BUTTOCK, STAGE 1: ICD-10-CM

## 2021-01-01 DIAGNOSIS — E66.01 SEVERE OBESITY (BMI 35.0-35.9 WITH COMORBIDITY) (HCC): ICD-10-CM

## 2021-01-01 DIAGNOSIS — I87.2 STASIS DERMATITIS OF BOTH LEGS: ICD-10-CM

## 2021-01-01 DIAGNOSIS — I83.019 STASIS LEG ULCER, RIGHT (HCC): ICD-10-CM

## 2021-01-01 DIAGNOSIS — R53.1 WEAKNESS GENERALIZED: ICD-10-CM

## 2021-01-01 DIAGNOSIS — Z00.00 MEDICARE ANNUAL WELLNESS VISIT, SUBSEQUENT: ICD-10-CM

## 2021-01-01 DIAGNOSIS — I87.2 VENOUS STASIS ULCER OF LEFT CALF LIMITED TO BREAKDOWN OF SKIN WITHOUT VARICOSE VEINS (HCC): ICD-10-CM

## 2021-01-01 DIAGNOSIS — L97.929 VENOUS STASIS ULCER OF LEFT LOWER LEG WITH EDEMA OF LEFT LOWER LEG (HCC): ICD-10-CM

## 2021-01-01 DIAGNOSIS — G25.81 RESTLESS LEG: Primary | ICD-10-CM

## 2021-01-01 DIAGNOSIS — L97.909 VENOUS STASIS ULCER, UNSPECIFIED SITE, UNSPECIFIED ULCER STAGE, UNSPECIFIED WHETHER VARICOSE VEINS PRESENT (HCC): Primary | ICD-10-CM

## 2021-01-01 DIAGNOSIS — L97.929 VENOUS STASIS ULCER OF LEFT LOWER LEG WITH EDEMA OF LEFT LOWER LEG (HCC): Primary | ICD-10-CM

## 2021-01-01 DIAGNOSIS — R60.0 EDEMA OF LEFT LOWER EXTREMITY: ICD-10-CM

## 2021-01-01 DIAGNOSIS — L03.115 CELLULITIS OF RIGHT LOWER EXTREMITY: ICD-10-CM

## 2021-01-01 DIAGNOSIS — R29.898 WEAKNESS OF BOTH LEGS: ICD-10-CM

## 2021-01-01 DIAGNOSIS — L97.929 VENOUS STASIS ULCERS OF BOTH LOWER EXTREMITIES (HCC): ICD-10-CM

## 2021-01-01 DIAGNOSIS — I83.009 VENOUS STASIS ULCER, UNSPECIFIED SITE, UNSPECIFIED ULCER STAGE, UNSPECIFIED WHETHER VARICOSE VEINS PRESENT (HCC): Primary | ICD-10-CM

## 2021-01-01 DIAGNOSIS — I83.029 VENOUS STASIS ULCERS OF BOTH LOWER EXTREMITIES (HCC): ICD-10-CM

## 2021-01-01 LAB
A-G RATIO,AGRAT: 1.1 RATIO
ALBUMIN SERPL-MCNC: 2.9 G/DL (ref 3.5–5)
ALBUMIN SERPL-MCNC: 3 G/DL (ref 3.5–5)
ALBUMIN SERPL-MCNC: 3.5 G/DL (ref 3.9–5.4)
ALBUMIN/GLOB SERPL: 0.7 {RATIO} (ref 1.1–2.2)
ALBUMIN/GLOB SERPL: 0.8 {RATIO} (ref 1.1–2.2)
ALP SERPL-CCNC: 157 U/L (ref 45–117)
ALP SERPL-CCNC: 172 U/L (ref 45–117)
ALP SERPL-CCNC: 182 U/L (ref 38–126)
ALT SERPL-CCNC: 12 U/L (ref 12–78)
ALT SERPL-CCNC: 7 U/L (ref 0–50)
ALT SERPL-CCNC: 9 U/L (ref 12–78)
ANION GAP SERPL CALC-SCNC: 10 MMOL/L
ANION GAP SERPL CALC-SCNC: 6 MMOL/L (ref 5–15)
ANION GAP SERPL CALC-SCNC: 7 MMOL/L (ref 5–15)
AST SERPL W P-5'-P-CCNC: 21 U/L (ref 14–36)
AST SERPL-CCNC: 15 U/L (ref 15–37)
AST SERPL-CCNC: 16 U/L (ref 15–37)
BACTERIA FLD CULT: ABNORMAL
BILIRUB SERPL-MCNC: 0.5 MG/DL (ref 0.2–1)
BILIRUB SERPL-MCNC: 0.6 MG/DL (ref 0.2–1)
BILIRUB SERPL-MCNC: 0.8 MG/DL (ref 0.2–1.3)
BUN SERPL-MCNC: 21 MG/DL (ref 6–20)
BUN SERPL-MCNC: 22 MG/DL (ref 6–20)
BUN SERPL-MCNC: 25 MG/DL (ref 9–20)
BUN/CREAT SERPL: 4 (ref 12–20)
BUN/CREAT SERPL: 4 (ref 12–20)
BUN/CREATININE RATIO,BUCR: 5 RATIO
CALCIUM SERPL-MCNC: 8.7 MG/DL (ref 8.5–10.1)
CALCIUM SERPL-MCNC: 8.7 MG/DL (ref 8.5–10.1)
CALCIUM SERPL-MCNC: 9.2 MG/DL (ref 8.4–10.2)
CHLORIDE SERPL-SCNC: 100 MMOL/L (ref 97–108)
CHLORIDE SERPL-SCNC: 100 MMOL/L (ref 97–108)
CHLORIDE SERPL-SCNC: 96 MMOL/L (ref 98–107)
CHOL/HDL RATIO,CHHD: 3 RATIO (ref 0–4)
CHOLEST SERPL-MCNC: 104 MG/DL
CHOLEST SERPL-MCNC: 106 MG/DL (ref 0–200)
CHOLEST SERPL-MCNC: 112 MG/DL
CK SERPL-CCNC: 95 U/L (ref 39–308)
CO2 SERPL-SCNC: 30 MMOL/L (ref 21–32)
CO2 SERPL-SCNC: 31 MMOL/L (ref 21–32)
CO2 SERPL-SCNC: 33 MMOL/L (ref 22–32)
CREAT SERPL-MCNC: 4.7 MG/DL (ref 0.8–1.5)
CREAT SERPL-MCNC: 5.08 MG/DL (ref 0.7–1.3)
CREAT SERPL-MCNC: 5.29 MG/DL (ref 0.7–1.3)
ERYTHROCYTE [DISTWIDTH] IN BLOOD BY AUTOMATED COUNT: 13.4 %
EST. AVERAGE GLUCOSE BLD GHB EST-MCNC: 103 MG/DL
GLOBULIN SER CALC-MCNC: 3.7 G/DL (ref 2–4)
GLOBULIN SER CALC-MCNC: 3.9 G/DL (ref 2–4)
GLOBULIN,GLOB: 3.1
GLUCOSE SERPL-MCNC: 117 MG/DL (ref 75–110)
GLUCOSE SERPL-MCNC: 72 MG/DL (ref 65–100)
GLUCOSE SERPL-MCNC: 94 MG/DL (ref 65–100)
HBA1C MFR BLD HPLC: 5.1 % (ref 4–5.7)
HBA1C MFR BLD HPLC: 5.2 % (ref 4.5–5.7)
HBA1C MFR BLD: 5.2 % (ref 4–5.6)
HCT VFR BLD AUTO: 33.7 % (ref 37–51)
HDLC SERPL-MCNC: 33 MG/DL
HDLC SERPL-MCNC: 35 MG/DL
HDLC SERPL-MCNC: 42 MG/DL (ref 35–130)
HDLC SERPL: 3 {RATIO} (ref 0–5)
HDLC SERPL: 3.4 {RATIO} (ref 0–5)
HGB BLD-MCNC: 10.7 G/DL (ref 12–18)
LDL/HDL RATIO,LDHD: 1 RATIO
LDLC SERPL CALC-MCNC: 48 MG/DL (ref 0–130)
LDLC SERPL CALC-MCNC: 55.8 MG/DL (ref 0–100)
LDLC SERPL CALC-MCNC: 64.6 MG/DL (ref 0–100)
LYMPHOCYTES ABSOLUTE: 1 K/UL (ref 0.6–4.1)
LYMPHOCYTES NFR BLD: 22.6 % (ref 10–58.5)
MCH RBC QN AUTO: 31.7 PG (ref 26–32)
MCHC RBC AUTO-ENTMCNC: 31.8 G/DL (ref 30–36)
MCV RBC AUTO: 99.8 FL (ref 80–97)
MONOCYTES ABS-DIF,2141: 0.4 K/UL (ref 0–1.8)
MONOCYTES NFR BLD: 8.9 % (ref 0.1–24)
NEUTROPHILS # BLD: 68.5 % (ref 37–92)
NEUTROPHILS ABS,2156: 3.1 K/UL (ref 2–7.8)
PLATELET # BLD AUTO: 96 K/UL (ref 140–440)
POTASSIUM SERPL-SCNC: 3.8 MMOL/L (ref 3.5–5.1)
POTASSIUM SERPL-SCNC: 4.6 MMOL/L (ref 3.5–5.1)
POTASSIUM SERPL-SCNC: 5 MMOL/L (ref 3.6–5)
PROT SERPL-MCNC: 6.6 G/DL (ref 6.3–8.2)
PROT SERPL-MCNC: 6.7 G/DL (ref 6.4–8.2)
PROT SERPL-MCNC: 6.8 G/DL (ref 6.4–8.2)
RBC # BLD AUTO: 3.38 M/UL (ref 4.2–6.3)
SODIUM SERPL-SCNC: 136 MMOL/L (ref 136–145)
SODIUM SERPL-SCNC: 138 MMOL/L (ref 136–145)
SODIUM SERPL-SCNC: 139 MMOL/L (ref 137–145)
T4 FREE SERPL-MCNC: 0.95 NG/DL (ref 0.58–2.3)
TRIGL SERPL-MCNC: 66 MG/DL (ref ?–150)
TRIGL SERPL-MCNC: 72 MG/DL (ref ?–150)
TRIGL SERPL-MCNC: 78 MG/DL (ref 0–200)
TSH SERPL DL<=0.05 MIU/L-ACNC: 2 UIU/ML (ref 0.34–5.6)
VLDLC SERPL CALC-MCNC: 13.2 MG/DL
VLDLC SERPL CALC-MCNC: 14.4 MG/DL
VLDLC SERPL CALC-MCNC: 16 MG/DL
WBC # BLD AUTO: 4.5 K/UL (ref 4.1–10.9)

## 2021-01-01 PROCEDURE — G0300 HHS/HOSPICE OF LPN EA 15 MIN: HCPCS

## 2021-01-01 PROCEDURE — 3331090002 HH PPS REVENUE DEBIT

## 2021-01-01 PROCEDURE — 1101F PT FALLS ASSESS-DOCD LE1/YR: CPT | Performed by: INTERNAL MEDICINE

## 2021-01-01 PROCEDURE — 3331090001 HH PPS REVENUE CREDIT

## 2021-01-01 PROCEDURE — G0151 HHCP-SERV OF PT,EA 15 MIN: HCPCS

## 2021-01-01 PROCEDURE — A6212 FOAM DRG <=16 SQ IN W/BORDER: HCPCS

## 2021-01-01 PROCEDURE — 400018 HH-NO PAY CLAIM PROCEDURE

## 2021-01-01 PROCEDURE — G0157 HHC PT ASSISTANT EA 15: HCPCS

## 2021-01-01 PROCEDURE — G8536 NO DOC ELDER MAL SCRN: HCPCS | Performed by: INTERNAL MEDICINE

## 2021-01-01 PROCEDURE — 3017F COLORECTAL CA SCREEN DOC REV: CPT | Performed by: INTERNAL MEDICINE

## 2021-01-01 PROCEDURE — G0158 HHC OT ASSISTANT EA 15: HCPCS

## 2021-01-01 PROCEDURE — 99213 OFFICE O/P EST LOW 20 MIN: CPT | Performed by: INTERNAL MEDICINE

## 2021-01-01 PROCEDURE — G0156 HHCP-SVS OF AIDE,EA 15 MIN: HCPCS

## 2021-01-01 PROCEDURE — A9270 NON-COVERED ITEM OR SERVICE: HCPCS

## 2021-01-01 PROCEDURE — 1101F PT FALLS ASSESS-DOCD LE1/YR: CPT | Performed by: NURSE PRACTITIONER

## 2021-01-01 PROCEDURE — G0299 HHS/HOSPICE OF RN EA 15 MIN: HCPCS

## 2021-01-01 PROCEDURE — 90715 TDAP VACCINE 7 YRS/> IM: CPT | Performed by: EMERGENCY MEDICINE

## 2021-01-01 PROCEDURE — G8510 SCR DEP NEG, NO PLAN REQD: HCPCS | Performed by: INTERNAL MEDICINE

## 2021-01-01 PROCEDURE — 99215 OFFICE O/P EST HI 40 MIN: CPT | Performed by: INTERNAL MEDICINE

## 2021-01-01 PROCEDURE — G8432 DEP SCR NOT DOC, RNG: HCPCS | Performed by: INTERNAL MEDICINE

## 2021-01-01 PROCEDURE — 99214 OFFICE O/P EST MOD 30 MIN: CPT | Performed by: INTERNAL MEDICINE

## 2021-01-01 PROCEDURE — 15852 DRESSING CHANGE NOT FOR BURN: CPT | Performed by: NURSE PRACTITIONER

## 2021-01-01 PROCEDURE — 72192 CT PELVIS W/O DYE: CPT

## 2021-01-01 PROCEDURE — A6213 FOAM DRG >16<=48 SQ IN W/BDR: HCPCS

## 2021-01-01 PROCEDURE — A6446 CONFORM BAND S W>=3" <5"/YD: HCPCS

## 2021-01-01 PROCEDURE — 3331090003 HH PPS REVENUE ADJ

## 2021-01-01 PROCEDURE — A6252 ABSORPT DRG >16 <=48 W/O BDR: HCPCS

## 2021-01-01 PROCEDURE — G8417 CALC BMI ABV UP PARAM F/U: HCPCS | Performed by: NURSE PRACTITIONER

## 2021-01-01 PROCEDURE — G0439 PPPS, SUBSEQ VISIT: HCPCS | Performed by: INTERNAL MEDICINE

## 2021-01-01 PROCEDURE — 83036 HEMOGLOBIN GLYCOSYLATED A1C: CPT | Performed by: INTERNAL MEDICINE

## 2021-01-01 PROCEDURE — G8427 DOCREV CUR MEDS BY ELIG CLIN: HCPCS | Performed by: INTERNAL MEDICINE

## 2021-01-01 PROCEDURE — G8432 DEP SCR NOT DOC, RNG: HCPCS | Performed by: NURSE PRACTITIONER

## 2021-01-01 PROCEDURE — 70450 CT HEAD/BRAIN W/O DYE: CPT

## 2021-01-01 PROCEDURE — G8417 CALC BMI ABV UP PARAM F/U: HCPCS | Performed by: INTERNAL MEDICINE

## 2021-01-01 PROCEDURE — 84443 ASSAY THYROID STIM HORMONE: CPT | Performed by: INTERNAL MEDICINE

## 2021-01-01 PROCEDURE — 74011250636 HC RX REV CODE- 250/636: Performed by: EMERGENCY MEDICINE

## 2021-01-01 PROCEDURE — 3046F HEMOGLOBIN A1C LEVEL >9.0%: CPT | Performed by: INTERNAL MEDICINE

## 2021-01-01 PROCEDURE — 80053 COMPREHEN METABOLIC PANEL: CPT | Performed by: INTERNAL MEDICINE

## 2021-01-01 PROCEDURE — 2022F DILAT RTA XM EVC RTNOPTHY: CPT | Performed by: INTERNAL MEDICINE

## 2021-01-01 PROCEDURE — 80061 LIPID PANEL: CPT | Performed by: INTERNAL MEDICINE

## 2021-01-01 PROCEDURE — 99284 EMERGENCY DEPT VISIT MOD MDM: CPT

## 2021-01-01 PROCEDURE — A6454 SELF-ADHER BAND W>=3" <5"/YD: HCPCS

## 2021-01-01 PROCEDURE — A4649 SURGICAL SUPPLIES: HCPCS

## 2021-01-01 PROCEDURE — 400013 HH SOC

## 2021-01-01 PROCEDURE — G8427 DOCREV CUR MEDS BY ELIG CLIN: HCPCS | Performed by: NURSE PRACTITIONER

## 2021-01-01 PROCEDURE — A6251 ABSORPT DRG <=16 SQ IN W/O B: HCPCS

## 2021-01-01 PROCEDURE — 3017F COLORECTAL CA SCREEN DOC REV: CPT | Performed by: NURSE PRACTITIONER

## 2021-01-01 PROCEDURE — 99213 OFFICE O/P EST LOW 20 MIN: CPT | Performed by: NURSE PRACTITIONER

## 2021-01-01 PROCEDURE — G0152 HHCP-SERV OF OT,EA 15 MIN: HCPCS

## 2021-01-01 PROCEDURE — 85025 COMPLETE CBC W/AUTO DIFF WBC: CPT | Performed by: INTERNAL MEDICINE

## 2021-01-01 PROCEDURE — A4452 WATERPROOF TAPE: HCPCS

## 2021-01-01 PROCEDURE — A4216 STERILE WATER/SALINE, 10 ML: HCPCS

## 2021-01-01 PROCEDURE — G8536 NO DOC ELDER MAL SCRN: HCPCS | Performed by: NURSE PRACTITIONER

## 2021-01-01 PROCEDURE — G8419 CALC BMI OUT NRM PARAM NOF/U: HCPCS | Performed by: INTERNAL MEDICINE

## 2021-01-01 PROCEDURE — 84439 ASSAY OF FREE THYROXINE: CPT | Performed by: INTERNAL MEDICINE

## 2021-01-01 PROCEDURE — 90471 IMMUNIZATION ADMIN: CPT

## 2021-01-01 PROCEDURE — A6216 NON-STERILE GAUZE<=16 SQ IN: HCPCS

## 2021-01-01 PROCEDURE — 3044F HG A1C LEVEL LT 7.0%: CPT | Performed by: INTERNAL MEDICINE

## 2021-01-01 PROCEDURE — 73080 X-RAY EXAM OF ELBOW: CPT

## 2021-01-01 PROCEDURE — 73502 X-RAY EXAM HIP UNI 2-3 VIEWS: CPT

## 2021-01-01 PROCEDURE — 75810000293 HC SIMP/SUPERF WND  RPR

## 2021-01-01 PROCEDURE — A5120 SKIN BARRIER, WIPE OR SWAB: HCPCS

## 2021-01-01 RX ORDER — BALSAM PERU/CASTOR OIL
OINTMENT (GRAM) TOPICAL 2 TIMES DAILY
Qty: 60 G | Refills: 1 | Status: SHIPPED | OUTPATIENT
Start: 2021-01-01

## 2021-01-01 RX ORDER — ALPRAZOLAM 0.5 MG/1
TABLET ORAL
Qty: 30 TAB | Refills: 1 | Status: SHIPPED | OUTPATIENT
Start: 2021-01-01 | End: 2021-01-01

## 2021-01-01 RX ORDER — HYDROCODONE BITARTRATE AND ACETAMINOPHEN 5; 325 MG/1; MG/1
1 TABLET ORAL
Qty: 10 TABLET | Refills: 0 | Status: SHIPPED | OUTPATIENT
Start: 2021-01-01 | End: 2021-01-01

## 2021-01-01 RX ORDER — ACETAMINOPHEN 500 MG
500 TABLET ORAL EVERY 6 HOURS
Qty: 56 TABLET | Refills: 0 | Status: SHIPPED | OUTPATIENT
Start: 2021-01-01 | End: 2021-01-01

## 2021-01-01 RX ORDER — TRAZODONE HYDROCHLORIDE 150 MG/1
150 TABLET ORAL
Qty: 30 TAB | Refills: 3 | Status: SHIPPED | OUTPATIENT
Start: 2021-01-01 | End: 2021-01-01 | Stop reason: SDUPTHER

## 2021-01-01 RX ORDER — DOXYCYCLINE 100 MG/1
100 TABLET ORAL 2 TIMES DAILY
Qty: 20 TABLET | Refills: 0 | Status: SHIPPED | OUTPATIENT
Start: 2021-01-01 | End: 2021-01-01 | Stop reason: ALTCHOICE

## 2021-01-01 RX ORDER — ALPRAZOLAM 0.5 MG/1
TABLET ORAL
Qty: 30 TABLET | Refills: 0 | Status: SHIPPED | OUTPATIENT
Start: 2021-01-01 | End: 2021-01-01

## 2021-01-01 RX ORDER — CARVEDILOL 3.12 MG/1
TABLET ORAL
Qty: 60 TABLET | Status: SHIPPED | OUTPATIENT
Start: 2021-01-01 | End: 2021-01-01 | Stop reason: ALTCHOICE

## 2021-01-01 RX ORDER — CARVEDILOL 3.12 MG/1
TABLET ORAL
Qty: 60 TABLET | Status: SHIPPED | OUTPATIENT
Start: 2021-01-01 | End: 2021-01-01

## 2021-01-01 RX ORDER — ALPRAZOLAM 0.5 MG/1
0.5 TABLET ORAL
Qty: 30 TAB | Refills: 0 | Status: SHIPPED | OUTPATIENT
Start: 2021-01-01 | End: 2021-01-01

## 2021-01-01 RX ORDER — LIDOCAINE AND PRILOCAINE 25; 25 MG/G; MG/G
CREAM TOPICAL
COMMUNITY
Start: 2021-01-01 | End: 2021-01-01 | Stop reason: ALTCHOICE

## 2021-01-01 RX ORDER — TRAZODONE HYDROCHLORIDE 150 MG/1
150 TABLET ORAL
Qty: 30 TABLET | Refills: 5 | Status: SHIPPED | OUTPATIENT
Start: 2021-01-01 | End: 2021-12-17

## 2021-01-01 RX ORDER — ALPRAZOLAM 0.5 MG/1
TABLET ORAL
Qty: 30 TABLET | Refills: 1 | Status: SHIPPED | OUTPATIENT
Start: 2021-01-01

## 2021-01-01 RX ORDER — NAPROXEN 500 MG/1
500 TABLET ORAL 2 TIMES DAILY WITH MEALS
Qty: 14 TABLET | Refills: 0 | Status: SHIPPED | OUTPATIENT
Start: 2021-01-01 | End: 2021-01-01

## 2021-01-01 RX ORDER — DOXYCYCLINE 100 MG/1
100 TABLET ORAL 2 TIMES DAILY
Qty: 28 TAB | Refills: 0 | Status: SHIPPED | OUTPATIENT
Start: 2021-01-01 | End: 2021-01-01 | Stop reason: ALTCHOICE

## 2021-01-01 RX ORDER — MUPIROCIN 20 MG/G
OINTMENT TOPICAL DAILY
Qty: 22 G | Refills: 0 | Status: SHIPPED | OUTPATIENT
Start: 2021-01-01 | End: 2021-01-01 | Stop reason: ALTCHOICE

## 2021-01-01 RX ORDER — ALPRAZOLAM 0.5 MG/1
TABLET ORAL
Qty: 30 TABLET | Refills: 1 | Status: SHIPPED | OUTPATIENT
Start: 2021-01-01 | End: 2021-01-01

## 2021-01-01 RX ORDER — CYCLOBENZAPRINE HCL 5 MG
5 TABLET ORAL
Qty: 30 TAB | Refills: 0 | Status: SHIPPED | OUTPATIENT
Start: 2021-01-01 | End: 2021-01-01 | Stop reason: ALTCHOICE

## 2021-01-01 RX ORDER — ROPINIROLE 1 MG/1
1 TABLET, FILM COATED ORAL
Qty: 30 TAB | Refills: 3 | Status: SHIPPED | OUTPATIENT
Start: 2021-01-01 | End: 2021-01-01 | Stop reason: SDUPTHER

## 2021-01-01 RX ORDER — ROPINIROLE 2 MG/1
TABLET, FILM COATED ORAL
Qty: 90 TABLET | Refills: 1 | Status: SHIPPED | OUTPATIENT
Start: 2021-01-01

## 2021-01-01 RX ORDER — GABAPENTIN 100 MG/1
CAPSULE ORAL
Qty: 90 CAPSULE | Refills: 0 | Status: SHIPPED | OUTPATIENT
Start: 2021-01-01

## 2021-01-01 RX ORDER — ROPINIROLE 2 MG/1
2 TABLET, FILM COATED ORAL
Qty: 90 TAB | Refills: 1 | Status: SHIPPED | OUTPATIENT
Start: 2021-01-01 | End: 2021-01-01

## 2021-01-01 RX ADMIN — TETANUS TOXOID, REDUCED DIPHTHERIA TOXOID AND ACELLULAR PERTUSSIS VACCINE, ADSORBED 0.5 ML: 5; 2.5; 8; 8; 2.5 SUSPENSION INTRAMUSCULAR at 11:02

## 2021-01-04 NOTE — TELEPHONE ENCOUNTER
The patient's spouse would like to speak to [facility] regarding getting home health care for the patient. She can be reached at [phone].

## 2021-01-04 NOTE — TELEPHONE ENCOUNTER
Kavya with [address] states start of care date they have available is 1/6/21. She can take a verbal for the order. CB [id number].

## 2021-01-04 NOTE — TELEPHONE ENCOUNTER
Patients wife Ammon Newer wants [facility] services for wound care and PT for strengthening

## 2021-01-05 NOTE — TELEPHONE ENCOUNTER
Kavya with [address] [place] [address] is calling to get a verbal order for nurse to go out to do wound care. Call transferred to nurse.

## 2021-01-10 NOTE — PROGRESS NOTES
Bedside and Verbal shift change report given to Zaira Nelson RN (oncoming nurse). Report included the following information SBAR, Kardex, ED Summary, Procedure Summary, Intake/Output, MAR, Recent Results and Cardiac Rhythm (Paced). SHIFT SUMMARY: 
 
 
 
 
 
[address] NURSING NOTE Admission Date 1/2/2019 Admission Diagnosis CHF 
CHF (congestive heart failure) ([facility] [address] Consults IP CONSULT TO CARDIOLOGY Cardiac Monitoring [x] Yes [] No  
  
Purposeful Hourly Rounding [x] Yes   
Thanh Score Total Score: 3 Thanh score 3 or > [] Bed Alarm [] Avasys [] 1:1 sitter [] Patient refused (Signed refusal form in chart) Aurelio Score Aurelio Score: 20 Aurelio score 14 or < [] PMT consult [] Wound Care consult  
 []  Specialty bed  [] Nutrition consult Influenza Vaccine Received Flu Vaccine for Current Season (usually Sept-March): Yes(9/26/18) Oxygen needs? [x] Room air Oxygen @  []1L    []2L    []3L   []4L    []5L   []6L via NC Chronic home O2 use? [] Yes [x] No 
Perform O2 challenge test and document in progress note using smartphrase (.Homeoxygen) Last bowel movement Last Bowel Movement Date: 01/04/19 Urinary Catheter LDAs Peripheral IV 01/02/19 Anterior; Inferior;Left;Lower;Proximal Arm (Active) Site Assessment Clean, dry, & intact 1/5/2019  2:33 AM  
Phlebitis Assessment 0 1/5/2019  2:33 AM  
Infiltration Assessment 0 1/5/2019  2:33 AM  
Dressing Status Clean, dry, & intact 1/5/2019  2:33 AM  
Dressing Type Transparent 1/5/2019  2:33 AM  
Hub Color/Line Status Pink;Flushed 1/5/2019  2:33 AM  
                  
  
Readmission Risk Assessment Tool Score High Risk 45 Total Score 3 Has Seen PCP in Last 6 Months (Yes=3, No=0) 2 . Living with Significant Other. Assisted Living. LTAC. SNF. or  
Rehab  
 5 Pt. Coverage (Medicare=5 , Medicaid, or Self-Pay=4) 28 Charlson Comorbidity Score (Age + Comorbid Conditions) Criteria that do not apply:  
 Patient Length of Stay (>5 days = 3) IP Visits Last 12 Months (1-3=4, 4=9, >4=11) Expected Length of Stay 4d 2h Actual Length of Stay 3  
  
 
 
 yes...

## 2021-02-09 NOTE — TELEPHONE ENCOUNTER
Calling patient to advise of Virtual Visit with Dr. Mylene Peguero on  2/11/2021    . A nurse will call you between the hours of 9:00am and 1:30pm.  If you have any questions, please call [phone]. No answer so left voicemail.

## 2021-02-11 NOTE — TELEPHONE ENCOUNTER
Returned call to wife Zhane who rescheduled appointment to 3/3/21 when she could also be available for visit.

## 2021-02-11 NOTE — TELEPHONE ENCOUNTER
The patient  stating he missed a call for virtual appt this morning. He is in dialysis. Asked to contact his wife, Lidia Srivastava at [phone] regarding appointment.

## 2021-02-15 NOTE — TELEPHONE ENCOUNTER
Triage for Controlled Substance Refill Request    Pain Diagnosis: _    Last Outpatient Visit: _    Next Outpatient Visit: _3/3/2021    Reason for refill needed outside of office visit?   -Appointment not scheduled prior to need for scheduled refill      Pharmacy: [id number] - [facility] [address] [place] [address]        Medication: Trazodone 50 mg   Dose and directions:150 mg by mouth nightly   Number dispensed:90   Date filled ( or Pharmacy):  #left:     reviewed: _yes    Date of Urine Drug Screen:  _n/a     Opioid Safety Handout given:  _yes     Appropriate for refill:  _yes     Action:  _ please refill

## 2021-02-22 NOTE — TELEPHONE ENCOUNTER
Maryjane with [facility]  stating the patient is experiencing discomfort and discharge from his left eye. Please give her a call back to [phone].

## 2021-02-22 NOTE — TELEPHONE ENCOUNTER
Returned call to [facility] and she reported patient having a small white head to corner of left eye which causing some drainage and discomfort - she denies redness or swelling. Recommend patient apply warm compress and ok to use OTC eyedrops as needed.   [facility] verbalized understanding and will relay to patient

## 2021-03-01 NOTE — TELEPHONE ENCOUNTER
Calling patient to advise of Virtual Visit with Dr. Jonah Goss on  3/3/2021    . A nurse will call you between the hours of 9:00am and 1:30pm.  If you have any questions, please call [phone]. No answer so left voicemail.

## 2021-03-03 NOTE — PROGRESS NOTES
Palliative Medicine Office Visit Palliative Medicine Nurse Check In 
[phone] Patient Name: Misty Hernandez. YOB: 1950 Date of Office Visit: 3/3/2021 Patient states: \"  \" 
 
From Check In Sheet (scanned in Media): 
Has a medical provider talked with you about cardiopulmonary resuscitation (CPR)? [x] Yes   [] No   [] Unable to obtain Nurse reminder to complete or update ACP FlowSheet: 
 
Is ACP on the Problem List?    [x] Yes    [] No 
IF ACP Document is ON FILE; Nurse to place ACP on Problem List  
 
Is there an ACP Note in Chart Review/Note? [x] Yes    [] No  
If NO: ALERT PROVIDER Primary Decision Maker: Graciela Oppenheim - Spouse - [phone] Secondary Decision Maker: Ontariocrystal Chao - Son - [phone]  Advance Care Planning 3/3/2021 Patient's Healthcare Decision Maker is: Named in scanned ACP document Primary Decision Maker Name -  
Primary Decision Maker Phone Number -  
Primary Decision Maker Relationship to Patient -  
Confirm Advance Directive Yes, on file Patient Would Like to Complete Advance Directive - Does the patient have other document types Do Not Resuscitate Is there anything that we should know about you as a person in order to provide you the best care possible? Have you been to the ER, urgent care clinic since your last visit? [] Yes   [x] No   [] Unable to obtain Have you been hospitalized since your last visit? [] Yes   [x] No   [] Unable to obtain Have you seen or consulted any other health care providers outside of the [address] [place] [address] since your last visit? [] Yes   [x] No   [] Unable to obtain Functional status (describe):  
 
 
Last BM: 3/2/2021  accessed (date): 3/3/2021 Bottle review (for opioid pain medication): 
Medication 1:  
Date filled:  
Directions:  
# filled: # left: # pills taking per day: 
Last dose taken: 
 
Medication 2:  
Date filled: Directions:  
# filled: # left: # pills taking per day: 
Last dose taken: 
 
Medication 3:  
Date filled:  
Directions:  
# filled: # left: # pills taking per day: 
Last dose taken: 
 
Medication 4:  
Date filled:  
Directions:  
# filled: # left: # pills taking per day: 
Last dose taken:

## 2021-03-03 NOTE — PROGRESS NOTES
Palliative Medicine Outpatient Services [place]: [phone] ([phone]) Patient Name: Christel Brower. YOB: 1950 Date of Current Visit: 21 Location of Current Visit:   
[] [facility] Office 
[] [facility] Office [] [facility] Office 
[] Home 
[x]Synchronous real-time A/V virtual visit Date of Initial Visit: 2020 Referral from: Self severe Primary Care Physician: Katlin Eagle MD 
  
 SUMMARY:  
Christel Tristan is a 65y.o. year old with a  history of diabetic neuropathy, congestive heart failure with cardiomyopathy, chronic kidney disease on hemodialysis since 2020, chronic arthritic shoulder pain, high risk for falls, who was referred to Palliative Medicine by self for management of symptoms and psychosocial support. The patients social history includes retired , lives at home with his wife García. Baseline function, sleeps in a recliner, uses a walker and Rollator to get around the house, needs supervision/assistance with showering, able to toilet himself. High risk for fall from neuropathy. Patient received his COVID-19 vaccine first dose through the [facility] PALLIATIVE DIAGNOSES:  
 
  ICD-10-CM ICD-9-CM 1. Restless leg  G25.81 333.94   
2. Insomnia, unspecified type  G47.00 780.52   
3. End-stage renal disease on hemodialysis (HCC)  N18.6 585.6 Z99.2 V45.11   
4. Acute on chronic systolic congestive heart failure (HCC)  I50.23 428.23   
  428.0 PLAN:  
 
Patient Instructions Dear Christel Brower. , 
 
It was a pleasure seeing you today in your home virtually We will see you again in 2 months virtually Your described symptoms were: Fatigue: 3 Drowsiness: 3 Depression: 0 Pain: 0 Anxiety: 0 Nausea: 0 Anorexia: 0 Dyspnea: 0 Best Well-Bein Constipation: No  
Other Problem (Comment): 0 This is the plan we talked about: 1. Insomnia Update-we started you on gabapentin 100 mg capsule plus trazodone 50 mg tablet at night, this combination is making you sleep really well. You are very happy and feel like a whole new person. Is continue this 
---- Previous visit - You are struggling with severe insomnia. You sleep less than 2 hours at night but take several naps during the day. - You have been on Ambien 10 mg for months but no benefit. - You wanted to come off of Ambien which you now have 
-We tried trazodone 50 mg and escalated it all the way up to 150 mg at bedtime and still you have not been able to sleep. In fact, you feel more alert after taking trazodone. You desperately want to try a different medication. Given that you are on dialysis, we are seriously limited with a number of medications we can try for you. You found that you slept well when you were on gabapentin and so you want to try that. Try gabapentin 100 mg capsule at bedtime to see if this helps. I sent to 2-week supply just to see how you do with it. 2. Neuropathy - You are no longer on any medicines. - You are at high risk for fall and have worked with PT/OT and have all the necessary DME. 3. Hemodialysis - You are on HD and this is not very pleasant. 4.  Restless leg syndrome 
-Continue Mirapex - This is worsening and you are miserable especially at nighttime. You have actual muscle contractions. 
-Please discuss this with your primary care doctor as soon as possible 
-In the meantime I am starting you on Flexeril 5 mg at bedtime, if 5 mg not helping, take 10 mg at night to see if this helps. Mixing Flexeril with trazodone may make you a bit sleepy but you are willing to try it to see if this helps. 5. Goals of care - You have an AMD and DDNR. 6.  Venous ulcers 
-You are following with the podiatrist and wound clinic for the same This is what you have shared with us about Advance Care Planning: 
 
  Primary Decision Maker: Kartik Cross - Spouse - [phone]   Secondary Decision Maker: Edison Cartagena - Son - [phone]  Advance Care Planning 3/3/2021 Patient's Healthcare Decision Maker is: Named in scanned ACP document Primary Decision Maker Name -  
Primary Decision Maker Phone Number -  
Primary Decision Maker Relationship to Patient -  
Confirm Advance Directive Yes, on file Patient Would Like to Complete Advance Directive - Does the patient have other document types Do Not Resuscitate The Palliative Medicine Team is here to support you and your family. Sincerely, 
 
 
Wilbert Jiang MD and the Palliative Medicine Team 
 
 
 GOALS OF CARE / TREATMENT PREFERENCES:  
[====Goals of Care====] GOALS OF CARE: 
Patient / health care proxy stated goals: See Patient Instructions / Summary TREATMENT PREFERENCES:  
Code Status:  [] Attempt Resuscitation       [x] Do Not Attempt Resuscitation Advance Care Planning: 
[x] [facility] Interdisciplinary Team has updated the ACP Navigator with Decision Maker and Patient Capacity Primary Decision Maker: Nate Marroquin - Spouse - [phone] Secondary Decision Maker: Angeli Sherwood - Son - [phone]  Advance Care Planning 3/3/2021 Patient's Healthcare Decision Maker is: Named in scanned ACP document Primary Decision Maker Name -  
Primary Decision Maker Phone Number -  
Primary Decision Maker Relationship to Patient -  
Confirm Advance Directive Yes, on file Patient Would Like to Complete Advance Directive - Does the patient have other document types Do Not Resuscitate Other: 
(If patient appropriate for POST, consider using PALLPOST smart phrase here) The palliative care team has discussed with patient / health care proxy about goals of care / treatment preferences for patient. 
[====Goals of Care====] PRESCRIPTIONS GIVEN:  
 
No orders of the defined types were placed in this encounter. FOLLOW UP: Future Appointments Date Time Provider Leonardo Thomas 3/5/2021 To Be Determined Dion Welch [facility]  
3/5/2021 10:00 AM NABIL Diallo  
3/5/2021 To Be Determined Tacho Felder PHYSICIANS INVOLVED IN CARE:  
Patient Care Team: 
Hernandez Keys MD as PCP - General (Internal Medicine) Hernandez Keys MD as PCP - [facility] Empaneled Provider Jerad Fulton MD (Cardiology) HISTORY:  
Reviewed patient-completed ESAS and advance care planning form. Reviewed patient record in prescription monitoring program. 
 
CHIEF COMPLAINT:  
No chief complaint on file. HPI/SUBJECTIVE: The patient is: [x] Verbal / [] Nonverbal  
 
Patient seen in his home virtually. He is sleeping fairly okay except on the days that he is having really bad exacerbations of restless leg syndrome. He is maxed out on medications but he continues to have muscle contractions and pain in his bilateral legs. He has not seen his PCP in a long time. He wanted to know about electively discontinuing dialysis. We talked about this at length, he understands that he has the right to stop dialysis whenever he wants. He is having some leg cramps, agreed to talk to the nephrologist about this. Clinical Pain Assessment (nonverbal scale for nonverbal patients):  
[++++ Clinical Pain Assessment++++] [++++Pain Severity++++]: Pain: 0 
[++++Pain Character++++]: generalized pain 
[++++Pain Duration++++]: years [++++Pain Effect++++]: functional 
[++++Pain Factors++++]: none in particular 
[++++Pain Frequency++++]: constant [++++Pain Location++++]: all major joints [++++ Clinical Pain Assessment++++] FUNCTIONAL ASSESSMENT:  
 
Palliative Performance Scale (PPS): PPS: 70 PSYCHOSOCIAL/SPIRITUAL SCREENING:  
 
Any spiritual / [employer] concerns: 
[] Yes /  [x] No 
 
Caregiver Burnout: 
[] Yes /  [x] No /  [] No Caregiver Present Anticipatory grief assessment:  
[x] Normal  / [] Maladaptive ESAS Anxiety: Anxiety: 0 
 
ESAS Depression: Depression: 0 REVIEW OF SYSTEMS:  
 
The following systems were [x] reviewed / [] unable to be reviewed Systems: constitutional, ears/nose/mouth/throat, respiratory, gastrointestinal, genitourinary, musculoskeletal, integumentary, neurologic, psychiatric, endocrine. Positive findings noted below. Modified ESAS Completed by: provider Fatigue: 3 Drowsiness: 3 Depression: 0 Pain: 0 Anxiety: 0 Nausea: 0 Anorexia: 0 Dyspnea: 0 Best Well-Bein Constipation: No  
Other Problem (Comment): 0 PHYSICAL EXAM:  
 
Wt Readings from Last 3 Encounters:  
21 229 lb 4.5 oz (104 kg) 20 231 lb 6.4 oz (105 kg) 20 228 lb 11.2 oz (103.7 kg) There were no vitals taken for this visit. Last bowel movement: See Nursing Note Constitutional   
[x] Appears well-developed and well-nourished in no apparent distress   
[] Abnormal: 
Mental status [x] Alert and awake [x] Oriented to person/place/time 
[x] Able to follow commands 
[] Abnormal:  
Eyes 
[x] EOM normal  
[x] Sclera normal  
[x] No visible ocular discharge 
[] Abnormal:  
HENT [x] Normocephalic, atraumatic [x] Mouth/Throat: Moist mucous membranes  
[x] External Ears normal 
[] Abnormal: 
Neck [x] No visualized mass 
[] Abnormal: 
Pulmonary/Chest  
[x] Respiratory effort normal 
[x] No visualized signs of difficulty breathing or respiratory distress 
[] Abnormal: Musculoskeletal 
[x] Normal gait with no signs of ataxia [x] Normal range of motion of neck [] Abnormal: 
Neurological:  
[x] No facial asymmetry (Cranial nerve 7 motor function) [x] No gaze palsy 
[] Abnormal:  
Skin 
[x] No significant exanthematous lesions or discoloration noted on facial skin 
[] Abnormal: Psychiatric [x] Normal affect [x] No hallucinations [] Abnormal: 
 
Other pertinent observable physical exam findings: 
 
Due to this being a TeleHealth evaluation, many elements of the physical examination are unable to be assessed. HISTORY:  
 
Past Medical History:  
Diagnosis Date  Anemia 8/5/2017  Anxiety 8/5/2017  Arrhythmia   
 atrial fibrillation 2014  ASCVD (arteriosclerotic cardiovascular disease) 8/5/2017  BPH (benign prostatic hyperplasia) 8/5/2017  CAD (coronary artery disease)   
 h/o stents  Cancer [facility])   
 h/o skin cancer  Cardiomyopathy ([address] 8/5/2017  CHF (congestive heart failure) ([address] 8/5/2017  Chronic kidney disease Stage IV  Chronic pain   
 shoulders and feeet- neuropathy  CKD (chronic kidney disease), stage IV ([address] 8/5/2017  Diabetes ([address] type II  
 Diabetes mellitus ([address] 8/5/2017  Diabetic neuropathy ([address] 8/5/2017  DJD (degenerative joint disease) 8/5/2017  ED (erectile dysfunction) 8/5/2017  Gout 8/5/2017  High cholesterol  Hyperlipidemia 8/5/2017  Hypertension  Hypertension with renal disease 8/5/2017  Hypothyroid 8/5/2017  Ill-defined condition   
 glycoma and diabetic macular degeneration  Insomnia 8/5/2017  Obesity 8/5/2017  On statin therapy 8/5/2017  Pneumonia 05/28/2019  Restless leg 8/5/2017  Sleep apnea   
 untreated  Thyroid disease   
 hypothyroid  Ulcer of foot due to secondary diabetes ([address] 07/12/2019 Past Surgical History:  
Procedure Laterality Date  CARDIAC SURG PROCEDURE UNLIST  2000  
 cardiac stents 3  
 CARDIAC SURG PROCEDURE UNLIST Ablation 5/17/2018 ED [facility]  CARDIAC SURG PROCEDURE UNLIST    
 pacemaker  CARDIAC SURG PROCEDURE UNLIST  2018  
 cardio version x2  
 COLONOSCOPY N/A 6/28/2016 COLONOSCOPY performed by Blossom Stout MD at [facility] ENDOSCOPY  COLONOSCOPY N/A 1/9/2019 COLONOSCOPY performed by Sanjay Burrell MD at [facility] ENDOSCOPY  COLONOSCOPY,DIAGNOSTIC  1/9/2019  HX APPENDECTOMY  HX ARTERIOVENOUS FISTULA  05/2020  
 dr Lorraine Shrestha  HX BUNIONECTOMY    
 and removal of 2 seasmoid bone of the great toe 2018  HX HEENT Bilateral Cataract surgery  HX HEENT Tonsils  HX HEENT Axe wound to the head  HX KNEE ARTHROSCOPY X 2  
 HX ORTHOPAEDIC    
 HX ORTHOPAEDIC    
 partial laminectomy  HX PACEMAKER    
 HX ROTATOR CUFF REPAIR    
 bilateral- not operable per patient  IR INSERT TUNL CVC W/O PORT OVER 5 YR  3/3/2020  CT INSJ ELTRD CAR DACIA SYS ATTCH PREV PM/DFB PLS GEN N/A 2019 Lv Lead Placement To Previous Generator performed by Master Mello MD at [facility] CARDIAC CATH LAB Left side  CT REMVL PERM PM PLS GEN W/REPL PLSE GEN MULT LEAD N/A 2019 Remove & Replace Ppm Gen Biv Multi Leads performed by Master Mello MD at [address] [place] [address] 28 CATH LAB Family History Problem Relation Age of Onset  Heart Disease Mother  Kidney Disease Mother  Heart Disease Father  Kidney Disease Father  Cancer Sister Breast  
  
History reviewed, no pertinent family history. Social History Tobacco Use  Smoking status: Former Smoker Packs/day: 0.50 Years: 4.00 Pack years: 2.00 Quit date: 3/6/1972 Years since quittin.0  Smokeless tobacco: Never Used Substance Use Topics  Alcohol use: No  
  Comment: rare, 1 drink per year Allergies Allergen Reactions  Niacin Rash and Unknown (comments) Niaspan  Adhesive Rash  Imipenem Diarrhea Other reaction(s): Rash  Levemir [Insulin Detemir] Hives  Other Medication Other (comments) ? Allergy to Ryan Emma causing chemical burn  Primaxin [Imipenem-Cilastatin] Diarrhea and Rash  Xarelto [Rivaroxaban] Rash and Itching Other reaction(s): Rash Current Outpatient Medications Medication Sig  
 traZODone (DESYREL) 150 mg tablet Take 1 Tab by mouth nightly. 1 TABLET NIGHTLY  gabapentin (NEURONTIN) 100 mg capsule TAKE 1 CAPSULE BY MOUTH EVERY NIGHT.  MAX DAILY AMOUNT: 100 MG  
 pramipexole (MIRAPEX) 1 mg tablet TAKE 1 TABLET BY MOUTH EVERY NIGHT AT BEDTIME  senna-docusate (Senokot-S) 8.6-50 mg per tablet Take 2 Tabs by mouth Every Mon, Wed & Sun. Indications: constipation  Sully-Deisy 0.8 mg tab tablet TK 1 T PO QD  
 carvediloL (COREG) 3.125 mg tablet Take 1 Tab by mouth two (2) times daily (with meals). No current facility-administered medications for this visit. LAB and other DATA REVIEWED:  
 
Lab Results Component Value Date/Time WBC 3.5 (L) 02/20/2020 05:04 AM  
 HGB 8.6 (L) 02/20/2020 05:04 AM  
 PLATELET 82 (L) 67/16/6826 05:04 AM  
 
Lab Results Component Value Date/Time Sodium 135 (L) 05/04/2020 06:40 AM  
 Potassium 4.1 05/04/2020 06:40 AM  
 Chloride 101 05/04/2020 06:40 AM  
 CO2 26 05/04/2020 06:40 AM  
 BUN 51 (H) 05/04/2020 06:40 AM  
 Creatinine 5.48 (H) 05/04/2020 06:40 AM  
 Calcium 8.9 05/04/2020 06:40 AM  
 Magnesium 2.3 02/17/2020 03:51 AM  
 Phosphorus 4.1 02/21/2020 01:58 AM  
  
Lab Results Component Value Date/Time Alk. phosphatase 108 02/14/2020 12:41 PM  
 Protein, total 7.6 02/14/2020 12:41 PM  
 Albumin 3.3 (L) 02/21/2020 01:58 AM  
 Globulin 4.4 (H) 02/14/2020 12:41 PM  
 
Lab Results Component Value Date/Time INR 1.3 (H) 06/01/2018 12:21 PM  
 Prothrombin time 12.6 (H) 06/01/2018 12:21 PM  
 aPTT 26.6 06/10/2014 11:20 AM  
  
Lab Results Component Value Date/Time Iron 43 02/06/2020 10:06 AM  
 TIBC 280 02/06/2020 10:06 AM  
 Iron % saturation 15 (L) 02/06/2020 10:06 AM  
 Ferritin 162 02/06/2020 10:06 AM  
  
 
Detail chart reviewed. Other specialists and referral provider notes reviewed.  
 
 CONTROLLED SUBSTANCES SAFETY ASSESSMENT (IF ON CONTROLLED SUBSTANCES):  
 
Reviewed opioid safety handout:  [x] Yes   [] No 
24 hour opioid dose >150mg morphine equivalent/day:  [] Yes   [] No 
Benzodiazepines:  [] Yes   [] No 
Sleep apnea:  [] Yes   [] No 
Urine Toxicology Testing within last 6 months:  [] Yes   [] No 
History of or new aberrant medication taking behaviors: [] Yes   [x] No 
Has Narcan been prescribed [x] Yes   [] No 
 
   
 
Total time: 70 minutes Counseling / coordination time: 60 minutes 
> 50% counseling / coordination?:  Yes Please note that this dictation was completed with [employer], the computer voice recognition software. Quite often unanticipated grammatical, syntax, homophones, and other interpretive errors are inadvertently transcribed by the computer software. Please disregard these errors. Please excuse any errors that have escaped final proofreading Consent: 
He and/or health care decision maker is aware that that he may receive a bill for this telehealth service, depending on his insurance coverage, and has provided verbal consent to proceed: Yes CPT Codes 58064-93538 for Established Patients may apply to this Telehealth Visit Pursuant to the emergency declaration under the [facility], [facility] waiver authority and the [employer] Act, this Virtual  Visit was conducted, with patient's consent, to reduce the patient's risk of exposure to COVID-19 and provide continuity of care for an established patient. Services were provided through a video synchronous discussion virtually to substitute for in-person clinic visit.

## 2021-03-03 NOTE — PATIENT INSTRUCTIONS
Dear Misty Hernandez. , 
 
It was a pleasure seeing you today in your home virtually We will see you again in 2 months virtually Your described symptoms were: Fatigue: 3 Drowsiness: 3 Depression: 0 Pain: 0 Anxiety: 0 Nausea: 0 Anorexia: 0 Dyspnea: 0 Best Well-Bein Constipation: No  
Other Problem (Comment): 0 This is the plan we talked about: 1. Insomnia Update-we started you on gabapentin 100 mg capsule plus trazodone 50 mg tablet at night, this combination is making you sleep really well. You are very happy and feel like a whole new person. Is continue this 
---- Previous visit - You are struggling with severe insomnia. You sleep less than 2 hours at night but take several naps during the day. - You have been on Ambien 10 mg for months but no benefit. - You wanted to come off of Ambien which you now have 
-We tried trazodone 50 mg and escalated it all the way up to 150 mg at bedtime and still you have not been able to sleep. In fact, you feel more alert after taking trazodone. You desperately want to try a different medication. Given that you are on dialysis, we are seriously limited with a number of medications we can try for you. You found that you slept well when you were on gabapentin and so you want to try that. Try gabapentin 100 mg capsule at bedtime to see if this helps. I sent to 2-week supply just to see how you do with it. 2. Neuropathy - You are no longer on any medicines. - You are at high risk for fall and have worked with PT/OT and have all the necessary DME. 3. Hemodialysis - You are on HD and this is not very pleasant. 4.  Restless leg syndrome 
-Continue Mirapex - This is worsening and you are miserable especially at nighttime.   You have actual muscle contractions. 
-Please discuss this with your primary care doctor as soon as possible 
-In the meantime I am starting you on Flexeril 5 mg at bedtime, if 5 mg not helping, take 10 mg at night to see if this helps. Mixing Flexeril with trazodone may make you a bit sleepy but you are willing to try it to see if this helps. 5. Goals of care - You have an AMD and DDNR. 6.  Venous ulcers 
-You are following with the podiatrist and wound clinic for the same This is what you have shared with us about Advance Care Planning: 
 
  Primary Decision Maker: Chan Guidry - Spouse - [phone] Secondary Decision Maker: Ines Trevizo - Son - [phone]  Advance Care Planning 3/3/2021 Patient's Healthcare Decision Maker is: Named in scanned ACP document Primary Decision Maker Name -  
Primary Decision Maker Phone Number -  
Primary Decision Maker Relationship to Patient -  
Confirm Advance Directive Yes, on file Patient Would Like to Complete Advance Directive - Does the patient have other document types Do Not Resuscitate The Palliative Medicine Team is here to support you and your family.   
 
 
 
Sincerely, 
 
 
Rj Robin MD and the Palliative Medicine Team
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
## 2021-03-08 NOTE — PROGRESS NOTES
HIPAA verified by two patient identifiers. Lu Bishop. is a 65 y.o. male    Chief Complaint   Patient presents with    Annual Wellness Visit    Restless Leg Syndrome       Visit Vitals  /69 (BP 1 Location: Right lower arm, BP Patient Position: Sitting, BP Cuff Size: Adult)   Pulse 69   Temp 97.4 °F (36.3 °C) (Oral)   Resp 18   Ht 6' 4\" (1.93 m)   SpO2 98%   BMI 27.91 kg/m²       Pain Scale: /10  Pain Location:       Health Maintenance Due   Topic Date Due    COVID-19 Vaccine (1 of 2) Never done    DTaP/Tdap/Td series (1 - Tdap) Never done    Shingrix Vaccine Age 50> (1 of 2) Never done    AAA Screening 73-67 YO Male Smoking Patients  Never done    Medicare Yearly Exam  08/14/2020    MICROALBUMIN Q1  08/14/2020    Flu Vaccine (1) 09/01/2020    Foot Exam Q1  01/06/2021    A1C test (Diabetic or Prediabetic)  01/06/2021    Lipid Screen  01/06/2021         Coordination of Care Questionnaire:  :   1) Have you been to an emergency room, urgent care, or hospitalized since your last visit? If yes, where when, and reason for visit? no       2. Have seen or consulted any other health care provider since your last visit? If yes, where when, and reason for visit? NO      Patient is accompanied by wife I have received verbal consent from Lu Bishop. to discuss any/all medical information while they are present in the room.

## 2021-03-08 NOTE — PATIENT INSTRUCTIONS
Anemia: Care Instructions Your Care Instructions Anemia is a low level of red blood cells, which carry oxygen throughout your body. Many things can cause anemia. Lack of iron is one of the most common causes. Your body needs iron to make hemoglobin, a substance in red blood cells that carries oxygen from the lungs to your body's cells. Without enough iron, the body produces fewer and smaller red blood cells. As a result, your body's cells do not get enough oxygen, and you feel tired and weak. And you may have trouble concentrating. Bleeding is the most common cause of a lack of iron. You may have heavy menstrual bleeding or bleeding caused by conditions such as ulcers, hemorrhoids, or cancer. Regular use of aspirin or other anti-inflammatory medicines (such as ibuprofen) also can cause bleeding in some people. A lack of iron in your diet also can cause anemia, especially at times when the body needs more iron, such as during pregnancy, infancy, and the teen years. Your doctor may have prescribed iron pills. It may take several months of treatment for your iron levels to return to normal. Your doctor also may suggest that you eat foods that are rich in iron, such as meat and beans. There are many other causes of anemia. It is not always due to a lack of iron. Finding the specific cause of your anemia will help your doctor find the right treatment for you. Follow-up care is a key part of your treatment and safety. Be sure to make and go to all appointments, and call your doctor if you are having problems. It's also a good idea to know your test results and keep a list of the medicines you take. How can you care for yourself at home? · Take your medicines exactly as prescribed. Call your doctor if you think you are having a problem with your medicine. · If your doctor recommends iron pills, take them as directed: ? Try to take the pills on an empty stomach about 1 hour before or 2 hours after meals. But you may need to take iron with food to avoid an upset stomach. ? Do not take antacids or drink milk or caffeine drinks (such as coffee, tea, or cola) at the same time or within 2 hours of the time that you take your iron. They can make it hard for your body to absorb the iron. ? Vitamin C (from food or supplements) helps your body absorb iron. Try taking iron pills with a glass of orange juice or some other food that is high in vitamin C, such as citrus fruits. ? Iron pills may cause stomach problems, such as heartburn, nausea, diarrhea, constipation, and cramps. Be sure to drink plenty of fluids, and include fruits, vegetables, and fiber in your diet each day. Iron pills often make your bowel movements dark or green. ? If you forget to take an iron pill, do not take a double dose of iron the next time you take a pill. ? Keep iron pills out of the reach of small children. An overdose of iron can be very dangerous. · Follow your doctor's advice about eating iron-rich foods. These include red meat, shellfish, poultry, eggs, beans, raisins, whole-grain bread, and leafy green vegetables. · Steam vegetables to help them keep their iron content. When should you call for help? Call 911 anytime you think you may need emergency care. For example, call if: 
  · You have symptoms of a heart attack. These may include: 
? Chest pain or pressure, or a strange feeling in the chest. 
? Sweating. ? Shortness of breath. ? Nausea or vomiting. ? Pain, pressure, or a strange feeling in the back, neck, jaw, or upper belly or in one or both shoulders or arms. ? Lightheadedness or sudden weakness. ? A fast or irregular heartbeat. After you call 911, the  may tell you to chew 1 adult-strength or 2 to 4 low-dose aspirin. Wait for an ambulance. Do not try to drive yourself.  
  · You passed out (lost consciousness).   
Call your doctor now or seek immediate medical care if: 
  · You have new or increased shortness of breath.  
  · You are dizzy or lightheaded, or you feel like you may faint.  
  · Your fatigue and weakness continue or get worse.  
  · You have any abnormal bleeding, such as: 
? Nosebleeds. ? Vaginal bleeding that is different (heavier, more frequent, at a different time of the month) than what you are used to. 
? Bloody or black stools, or rectal bleeding. ? Bloody or pink urine. Watch closely for changes in your health, and be sure to contact your doctor if: 
  · You do not get better as expected. Where can you learn more? Go to http://www.[employer].com/ Enter R301 in the search box to learn more about \"Anemia: Care Instructions. \" Current as of: November 8, 2019               Content Version: 12.6 © 8161-1684 [employer]. Care instructions adapted under license by [employer] (which disclaims liability or warranty for this information). If you have questions about a medical condition or this instruction, always ask your healthcare professional. [address] any warranty or liability for your use of this information.

## 2021-03-16 NOTE — PROGRESS NOTES
Called and spoke to patient  Two pt identifiers confirmed  Informed patient per Dr. Kash Chawla that wound culture showed strep and to continue doxycycline that was prescribed at office visit. Patient verbalized understanding of information discussed  with no further questions at this time.

## 2021-03-16 NOTE — PROGRESS NOTES
Called and spoke to patient  Two pt identifiers confirmed  Informed patient per Dr. Margarita Martinez that wound culture showed staph and strep and to continue doxycycline that was given at office visit. Patient verbalized understanding of information discussed  with no further questions at this time.

## 2021-03-22 NOTE — TELEPHONE ENCOUNTER
[address] Work  [phone]    Narrative  SW called patient's spouse Jackelyn Laurent) on this day to invite to caregiver support group. Spouse provided email address for correspondence. Plan  SW to continue following as needed.       Blair Diet[facility] [place]   Palliative Medicine   Supervisee in Social Work  [employer] [place] [phone] [place] [address]  [phone]      Time in: 11:20am  Time out: 11:23am

## 2021-03-30 NOTE — PATIENT INSTRUCTIONS

## 2021-03-30 NOTE — PROGRESS NOTES
Chief Complaint   Patient presents with    Skin Ulcer     3 week follow up     Visit Vitals  BP (!) 102/58 (BP 1 Location: Right upper arm, BP Patient Position: Sitting, BP Cuff Size: Adult)   Pulse 70   Temp 97.1 °F (36.2 °C) (Temporal)   Resp 18   SpO2 97%     1. Have you been to the ER, urgent care clinic since your last visit? Hospitalized since your last visit? No    2. Have you seen or consulted any other health care providers outside of the [address] [place] [address] since your last visit? Include any pap smears or colon screening.  No

## 2021-04-05 NOTE — TELEPHONE ENCOUNTER
RX refill request from the patient/pharmacy. Patient last seen 03- with labs, and next appt. scheduled for 06-  Requested Prescriptions     Pending Prescriptions Disp Refills    ALPRAZolam (XANAX) 0.5 mg tablet [Pharmacy Med Name: ALPRAZOLAM 0.5MG TABLETS] 30 Tab 1     Sig: TAKE 1 TABLET BY MOUTH EVERY NIGHT AS NEEDED FOR ANXIETY. MAX DAILY AMOUNT: 0.5 MG   .

## 2021-04-06 PROBLEM — Z00.00 MEDICARE ANNUAL WELLNESS VISIT, SUBSEQUENT: Status: RESOLVED | Noted: 2017-10-17 | Resolved: 2021-01-01

## 2021-04-16 NOTE — TELEPHONE ENCOUNTER
The patient's wife Alcira Weber is calling to speak with Oziel Dunbar. She wants to know if they are still having the Caregivers support group today. She is waiting on the Zoom.   # [phone]

## 2021-04-16 NOTE — TELEPHONE ENCOUNTER
Returned call to Mrs. Nicolas Simental - advised Quita Perez will not be in meeting today and will contact her on Monday to reschedule. Mrs. Nicolas Simental verbalized understanding

## 2021-04-19 NOTE — TELEPHONE ENCOUNTER
Latanya Caruso calling to speak to nurse. STates patient fell this morning, states legs just gave way. Ms. Sana Hermosillo is calling to get advise on services that will help patient in the home. Advised nurse would call her back to discuss what needs she is looking for and to check on patient.

## 2021-04-19 NOTE — TELEPHONE ENCOUNTER
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

## 2021-05-11 NOTE — TELEPHONE ENCOUNTER
Wife called regarding a request to increase her 's medicine for restless legs. Is using Reequip 1 mg tablet and usually takes 2 at bedtime. Discussed with Dr. Stefany Wisdom and he verbally gave order to increase this to 2 mg at bedtime. Rx sent to patient's pharmacy.

## 2021-05-11 NOTE — TELEPHONE ENCOUNTER
RX refill request from the patient/pharmacy. Patient last seen 03- with labs, and next appt. scheduled for 06-  Requested Prescriptions     Pending Prescriptions Disp Refills    rOPINIRole (REQUIP) 2 mg tablet 90 Tab 1     Sig: Take 1 Tab by mouth nightly. Valerio Jennings

## 2021-06-01 NOTE — TELEPHONE ENCOUNTER
Calling patient to advise of Virtual Visit with Dr. Aishwarya Yoo on  6/3/2021    . A nurse will call you between the hours of 9:00am and 1:30pm.  If you have any questions, please call [phone]. No answer so left voicemail.

## 2021-06-03 NOTE — PATIENT INSTRUCTIONS
Dear Yobani Jonas. , 
 
It was a pleasure seeing you today in your home virtually We will see you again in 2 months virtually Your described symptoms were: Fatigue: 3 Drowsiness: 3 Depression: 0 Pain: 0 Anxiety: 0 Nausea: 0 Anorexia: 0 Dyspnea: 0 Best Well-Bein Constipation: No  
Other Problem (Comment): 0 This is the plan we talked about: 1. Insomnia Update-we started you on gabapentin 100 mg capsule plus trazodone 50 mg tablet at night, this combination is making you sleep really well. You are very happy and feel like a whole new person. Is continue this 
---- Previous visit - You are struggling with severe insomnia. You sleep less than 2 hours at night but take several naps during the day. - You have been on Ambien 10 mg for months but no benefit. - You wanted to come off of Ambien which you now have 
-We tried trazodone 50 mg and escalated it all the way up to 150 mg at bedtime and still you have not been able to sleep. In fact, you feel more alert after taking trazodone. You desperately want to try a different medication. Given that you are on dialysis, we are seriously limited with a number of medications we can try for you. You found that you slept well when you were on gabapentin and so you want to try that. Try gabapentin 100 mg capsule at bedtime to see if this helps. I sent to 2-week supply just to see how you do with it. 2. Neuropathy - You are no longer on any medicines. - You are at high risk for fall and have worked with PT/OT and have all the necessary DME. 3. Hemodialysis - You are on HD and this is not very pleasant. 4.  Restless leg syndrome 
-Continue Mirapex - This is worsening and you are miserable especially at nighttime.   You have actual muscle contractions. 
-Please discuss this with your primary care doctor as soon as possible 
-In the meantime I am starting you on Flexeril 5 mg at bedtime, if 5 mg not helping, take 10 mg at night to see if this helps. Mixing Flexeril with trazodone may make you a bit sleepy but you are willing to try it to see if this helps. 5. Goals of care - You have an AMD and DDNR. 6.  Venous ulcers 
-You are having repeated venous stasis ulcers which your wife is managing for the most part. I think it would be a good idea to have a wound care nurse visit you and help you with the right treatment option. We will place a wound care referral 
 
7. Diarrhea 
-You are having several episodes of diarrhea especially at nighttime over the last few weeks. Please discuss with your nephrologist to see if there is some change in your dialysis to bring this on. You seem to have more diarrhea on the days of dialysis. You are using Imodium occasionally you can start using this especially on days of dialysis. This is what you have shared with us about Advance Care Planning: 
 
  Primary Decision Maker: Tobias Soria - Spouse - [phone] Secondary Decision Maker: Clement Selby - Son - [phone]  Advance Care Planning 6/3/2021 Patient's Healthcare Decision Maker is: Named in scanned ACP document Primary Decision Maker Name -  
Primary Decision Maker Phone Number -  
Primary Decision Maker Relationship to Patient -  
Confirm Advance Directive Yes, on file Patient Would Like to Complete Advance Directive - Does the patient have other document types Do Not Resuscitate The Palliative Medicine Team is here to support you and your family.   
 
 
 
Sincerely, 
 
 
Raz Matthews MD and the Palliative Medicine Team

## 2021-06-03 NOTE — PROGRESS NOTES
Palliative Medicine Outpatient Services [place]: [phone]) Patient Name: Janise Riedel. YOB: 1950 Date of Current Visit: 21 Location of Current Visit:   
[] [facility] Office 
[] [facility] Office [] [address] [facility] Office 
[] Home 
[x]Synchronous real-time A/V virtual visit Date of Initial Visit: 2020 Referral from: Self severe Primary Care Physician: Lindsey Romero MD 
  
 SUMMARY:  
Janise Riedel. is a 65y.o. year old with a  history of diabetic neuropathy, congestive heart failure with cardiomyopathy, chronic kidney disease on hemodialysis since 2020, chronic arthritic shoulder pain, high risk for falls, who was referred to Palliative Medicine by self for management of symptoms and psychosocial support. The patients social history includes retired , lives at home with his wife García. Baseline function, sleeps in a recliner, uses a walker and Rollator to get around the house, needs supervision/assistance with showering, able to toilet himself. High risk for fall from neuropathy. Patient received his COVID-19 vaccine first dose through the [facility] PALLIATIVE DIAGNOSES:  
 
  ICD-10-CM ICD-9-CM 1. Insomnia, unspecified type  G47.00 780.52 2. Venous stasis ulcers of both lower extremities ([facility])  I83.019 459.81 I83.029 707.10 G71.549 L97.929    
3. End-stage renal disease on hemodialysis ([facility])  N18.6 585.6 Z99.2 V45.11   
4. Acute on chronic systolic congestive heart failure ([facility])  I50.23 428.23   
  428.0 5. Primary osteoarthritis involving multiple joints  M89.49 715.98 PLAN:  
 
Patient Instructions Dear Janise Riedel. , 
 
It was a pleasure seeing you today in your home virtually We will see you again in 2 months virtually Your described symptoms were: Fatigue: 3 Drowsiness: 3 Depression: 0 Pain: 0 Anxiety: 0 Nausea: 0 Anorexia: 0 Dyspnea: 0 Best Well-Bein Constipation: No Other Problem (Comment): 0 This is the plan we talked about: 1. Insomnia Update-we started you on gabapentin 100 mg capsule plus trazodone 50 mg tablet at night, this combination is making you sleep really well. You are very happy and feel like a whole new person. Is continue this 
---- Previous visit - You are struggling with severe insomnia. You sleep less than 2 hours at night but take several naps during the day. - You have been on Ambien 10 mg for months but no benefit. - You wanted to come off of Ambien which you now have 
-We tried trazodone 50 mg and escalated it all the way up to 150 mg at bedtime and still you have not been able to sleep. In fact, you feel more alert after taking trazodone. You desperately want to try a different medication. Given that you are on dialysis, we are seriously limited with a number of medications we can try for you. You found that you slept well when you were on gabapentin and so you want to try that. Try gabapentin 100 mg capsule at bedtime to see if this helps. I sent to 2-week supply just to see how you do with it. 2. Neuropathy - You are no longer on any medicines. - You are at high risk for fall and have worked with PT/OT and have all the necessary DME. 3. Hemodialysis - You are on HD and this is not very pleasant. 4.  Restless leg syndrome 
-Continue Mirapex - This is worsening and you are miserable especially at nighttime. You have actual muscle contractions. 
-Please discuss this with your primary care doctor as soon as possible 
-In the meantime I am starting you on Flexeril 5 mg at bedtime, if 5 mg not helping, take 10 mg at night to see if this helps. Mixing Flexeril with trazodone may make you a bit sleepy but you are willing to try it to see if this helps. 5. Goals of care - You have an AMD and DDNR. 6.  Venous ulcers 
-You are having repeated venous stasis ulcers which your wife is managing for the most part.   I think it would be a good idea to have a wound care nurse visit you and help you with the right treatment option. We will place a wound care referral 
 
7. Diarrhea 
-You are having several episodes of diarrhea especially at nighttime over the last few weeks. Please discuss with your nephrologist to see if there is some change in your dialysis to bring this on. You seem to have more diarrhea on the days of dialysis. You are using Imodium occasionally you can start using this especially on days of dialysis. This is what you have shared with us about Advance Care Planning: 
 
  Primary Decision Maker: Kennedy Jim - Spouse - [phone] Secondary Decision Maker: Génesis Toledo Son - [phone]  Advance Care Planning 6/3/2021 Patient's Healthcare Decision Maker is: Named in scanned ACP document Primary Decision Maker Name -  
Primary Decision Maker Phone Number -  
Primary Decision Maker Relationship to Patient -  
Confirm Advance Directive Yes, on file Patient Would Like to Complete Advance Directive - Does the patient have other document types Do Not Resuscitate The Palliative Medicine Team is here to support you and your family. Sincerely, 
 
 
Rob Beard MD and the Palliative Medicine Team 
 
 
 GOALS OF CARE / TREATMENT PREFERENCES:  
[====Goals of Care====] GOALS OF CARE: 
Patient / health care proxy stated goals: See Patient Instructions / Summary TREATMENT PREFERENCES:  
Code Status:  [] Attempt Resuscitation       [x] Do Not Attempt Resuscitation Advance Care Planning: 
[x] The [facility] Interdisciplinary Team has updated the ACP Navigator with Decision Maker and Patient Capacity Primary Decision Maker: Kennedy Jim - Spouse - [phone] Secondary Decision Maker: Génesis Toledo Son - [phone]  Advance Care Planning 6/3/2021 Patient's Healthcare Decision Maker is: Named in scanned ACP document Primary Decision Maker Name - Primary Decision Maker Phone Number -  
Primary Decision Maker Relationship to Patient -  
Confirm Advance Directive Yes, on file Patient Would Like to Complete Advance Directive - Does the patient have other document types Do Not Resuscitate Other: 
(If patient appropriate for POST, consider using PALLPOST smart phrase here) The palliative care team has discussed with patient / health care proxy about goals of care / treatment preferences for patient. 
[====Goals of Care====] PRESCRIPTIONS GIVEN:  
 
No orders of the defined types were placed in this encounter. FOLLOW UP: Future Appointments Date Time Provider Leonardo Martha 9/10/2021  1:00 PM Crista Domingo MD [facility] BS [facility]B PHYSICIANS INVOLVED IN CARE:  
Patient Care Team: 
Crista Domingo MD as PCP - General (Internal Medicine) Crista Domingo MD as PCP - [address] [place] [address] Provider Stefany Lester MD (Cardiology) HISTORY:  
Reviewed patient-completed ESAS and advance care planning form. Reviewed patient record in prescription monitoring program. 
 
CHIEF COMPLAINT:  
No chief complaint on file. HPI/SUBJECTIVE: The patient is: [x] Verbal / [] Nonverbal  
 
Patient seen in his home virtually. He is sleeping fairly okay except on the days that he is having really bad exacerbations of restless leg syndrome. He is maxed out on medications but he continues to have muscle contractions and pain in his bilateral legs. He has not seen his PCP in a long time. He started having diarrhea especially in the days of dialysis. No blood in stool, no cramping. They have mentioned this to the dialysis nurse who says that it is \"quite common \"I recommended that they talk to the nephrologist about the same. He started having venous stasis ulcers in both his legs again. Wife is managing them with dressings. He is having some leg cramps, agreed to talk to the nephrologist about this.  
 
Clinical Pain Assessment (nonverbal scale for nonverbal patients):  
[++++ Clinical Pain Assessment++++] [++++Pain Severity++++]: Pain: 0 
[++++Pain Character++++]: generalized pain 
[++++Pain Duration++++]: years [++++Pain Effect++++]: functional 
[++++Pain Factors++++]: none in particular 
[++++Pain Frequency++++]: constant [++++Pain Location++++]: all major joints [++++ Clinical Pain Assessment++++] FUNCTIONAL ASSESSMENT:  
 
Palliative Performance Scale (PPS): PPS: 70 PSYCHOSOCIAL/SPIRITUAL SCREENING:  
 
Any spiritual / Congregation concerns: 
[] Yes /  [x] No 
 
Caregiver Burnout: 
[] Yes /  [x] No /  [] No Caregiver Present Anticipatory grief assessment:  
[x] Normal  / [] Maladaptive ESAS Anxiety: Anxiety: 0 
 
ESAS Depression: Depression: 0 REVIEW OF SYSTEMS:  
 
The following systems were [x] reviewed / [] unable to be reviewed Systems: constitutional, ears/nose/mouth/throat, respiratory, gastrointestinal, genitourinary, musculoskeletal, integumentary, neurologic, psychiatric, endocrine. Positive findings noted below. Modified ESAS Completed by: provider Fatigue: 3 Drowsiness: 3 Depression: 0 Pain: 0 Anxiety: 0 Nausea: 0 Anorexia: 0 Dyspnea: 0 Best Well-Bein Constipation: No  
Other Problem (Comment): 0 PHYSICAL EXAM:  
 
Wt Readings from Last 3 Encounters:  
21 229 lb 4.5 oz (104 kg) 20 231 lb 6.4 oz (105 kg) 20 228 lb 11.2 oz (103.7 kg) There were no vitals taken for this visit. Last bowel movement: See Nursing Note Constitutional   
[x] Appears well-developed and well-nourished in no apparent distress   
[] Abnormal: 
Mental status [x] Alert and awake [x] Oriented to person/place/time 
[x] Able to follow commands 
[] Abnormal:  
Eyes 
[x] EOM normal  
[x] Sclera normal  
[x] No visible ocular discharge 
[] Abnormal:  
HENT [x] Normocephalic, atraumatic [x] Mouth/Throat: Moist mucous membranes  
[x] External Ears normal 
[] Abnormal: 
Neck [x] No visualized mass 
[] Abnormal: 
Pulmonary/Chest  
[x] Respiratory effort normal 
[x] No visualized signs of difficulty breathing or respiratory distress 
[] Abnormal: Musculoskeletal 
[] Normal gait with no signs of ataxia [] Normal range of motion of neck [x] Abnormal: Bilateral leg swelling, open wound in the left leg. Neurological:  
[x] No facial asymmetry (Cranial nerve 7 motor function) [x] No gaze palsy 
[] Abnormal:  
Skin 
[x] No significant exanthematous lesions or discoloration noted on facial skin 
[] Abnormal: Psychiatric [x] Normal affect [x] No hallucinations [] Abnormal: 
 
Other pertinent observable physical exam findings: 
 
Due to this being a TeleHealth evaluation, many elements of the physical examination are unable to be assessed. HISTORY:  
 
Past Medical History:  
Diagnosis Date  Anemia 8/5/2017  Anxiety 8/5/2017  Arrhythmia   
 atrial fibrillation 2014  ASCVD (arteriosclerotic cardiovascular disease) 8/5/2017  BPH (benign prostatic hyperplasia) 8/5/2017  CAD (coronary artery disease)   
 h/o stents  Cancer [facility])   
 h/o skin cancer  Cardiomyopathy ([address] 8/5/2017  CHF (congestive heart failure) ([address] 8/5/2017  Chronic kidney disease Stage IV  Chronic pain   
 shoulders and feeet- neuropathy  CKD (chronic kidney disease), stage IV ([address] 8/5/2017  Diabetes ([address] type II  
 Diabetes mellitus ([address] 8/5/2017  Diabetic neuropathy ([address] 8/5/2017  DJD (degenerative joint disease) 8/5/2017  ED (erectile dysfunction) 8/5/2017  Gout 8/5/2017  High cholesterol  Hyperlipidemia 8/5/2017  Hypertension  Hypertension with renal disease 8/5/2017  Hypothyroid 8/5/2017  Ill-defined condition   
 glycoma and diabetic macular degeneration  Insomnia 8/5/2017  Obesity 8/5/2017  On statin therapy 8/5/2017  Pneumonia 05/28/2019  Restless leg 8/5/2017  Sleep apnea   
 untreated  Thyroid disease   
 hypothyroid  Ulcer of foot due to secondary diabetes ([facility] 07/12/2019 Past Surgical History:  
Procedure Laterality Date  COLONOSCOPY N/A 6/28/2016 COLONOSCOPY performed by Octaviano Ramos MD at [facility] ENDOSCOPY  COLONOSCOPY N/A 1/9/2019 COLONOSCOPY performed by Keshawn Lindquist MD at [facility] ENDOSCOPY  COLONOSCOPY,DIAGNOSTIC  1/9/2019  HX APPENDECTOMY  HX ARTERIOVENOUS FISTULA  05/2020  
 dr Calderon Sal[place]  HX BUNIONECTOMY    
 and removal of 2 seasmoid bone of the great toe 2/2018  HX HEENT Bilateral Cataract surgery  HX HEENT Tonsils  HX HEENT Axe wound to the head  HX KNEE ARTHROSCOPY X 2  
 HX ORTHOPAEDIC    
 HX ORTHOPAEDIC    
 partial laminectomy  HX PACEMAKER    
 HX ROTATOR CUFF REPAIR    
 bilateral- not operable per patient  IR INSERT TUNL CVC W/O PORT OVER 5 YR  3/3/2020  [place] CARDIAC SURG PROCEDURE UNLIST  2000  
 cardiac stents 3  
 [place] CARDIAC SUR[place] PROCEDURE UNLIST Ablation 5/17/2018 [facility]  [place] CARDIAC SUR[place] PROCEDURE UNLIST    
 pacemaker  [place] CARDIAC SURG PROCEDURE UNLIST  2018  
 cardio version x2  [place] INSJ ELTRD CAR DACIA SYS ATTCH PREV PM/DFB PLS GEN N/A 1/28/2019 Lv Lead Placement To Previous Generator performed by Wai Aguiar MD at [facility] CARDIAC CATH LAB Left side  [place] REMVL PERM PM PLS GEN W/REPL PLSE GEN MULT LEAD N/A 1/28/2019 Remove & Replace Ppm Gen Biv Multi Leads performed by Wai Aguiar MD at [address] [place] [address] CATH LAB Family History Problem Relation Age of Onset  Heart Disease Mother  Kidney Disease Mother  Heart Disease Father  Kidney Disease Father  Cancer Sister Breast  
  
History reviewed, no pertinent family history. Social History Tobacco Use  Smoking status: Former Smoker Packs/day: 0.50 Years: 4.00 Pack years: 2.00 Quit date: 3/6/1972   Years since quitting: 49.2  
 Smokeless tobacco: Never Used Substance Use Topics  Alcohol use: No  
  Comment: rare, 1 drink per year Allergies Allergen Reactions  Niacin Rash and Unknown (comments) Niaspan  Adhesive Rash  Imipenem Diarrhea Other reaction(s): Rash  Levemir [Insulin Detemir] Hives  Other Medication Other (comments) ? Allergy to [place] causing chemical burn  Primaxin [Imipenem-Cilastatin] Diarrhea and Rash  Xarelto [Rivaroxaban] Rash and Itching Other reaction(s): Rash Current Outpatient Medications Medication Sig  
 rOPINIRole (REQUIP) 2 mg tablet Take 1 Tab by mouth nightly.  ALPRAZolam (XANAX) 0.5 mg tablet TAKE 1 TABLET BY MOUTH EVERY NIGHT AS NEEDED FOR ANXIETY. MAX DAILY AMOUNT: 0.5 MG  
 lidocaine-prilocaine (EMLA) topical cream USE AS DIRECTED  mupirocin (BACTROBAN) 2 % ointment Apply  to affected area daily.  traZODone (DESYREL) 150 mg tablet Take 1 Tab by mouth nightly. 1 TABLET NIGHTLY  gabapentin (NEURONTIN) 100 mg capsule TAKE 1 CAPSULE BY MOUTH EVERY NIGHT. MAX DAILY AMOUNT: 100 MG  Sully-Deisy 0.8 mg tab tablet TK 1 T PO QD  
 carvediloL (COREG) 3.125 mg tablet Take 1 Tab by mouth two (2) times daily (with meals). (Patient taking differently: Take 3.125 mg by mouth two (2) times daily (with meals). Indications: pt is taking once a day) No current facility-administered medications for this visit. LAB and other DATA REVIEWED:  
 
Lab Results Component Value Date/Time WBC 4.5 03/08/2021 04:07 PM  
 HGB 10.7 (L) 03/08/2021 04:07 PM  
 PLATELET 12.0 (L) 74/17/1218 04:07 PM  
 
Lab Results Component Value Date/Time  Sodium 139 03/08/2021 04:07 PM  
 Potassium 5.0 03/08/2021 04:07 PM  
 Chloride 96 (L) 03/08/2021 04:07 PM  
 CO2 33.0 (H) 03/08/2021 04:07 PM  
 BUN 25.0 (H) 03/08/2021 04:07 PM  
 Creatinine 4.7 (H) 03/08/2021 04:07 PM  
 Calcium 9.2 03/08/2021 04:07 PM  
 Magnesium 2.3 02/17/2020 03:51 AM  
 Phosphorus 4.1 02/21/2020 01:58 AM  
  
Lab Results Component Value Date/Time Alk. phosphatase 182 (H) 03/08/2021 04:07 PM  
 Protein, total 6.6 03/08/2021 04:07 PM  
 Albumin 3.5 (L) 03/08/2021 04:07 PM  
 Globulin 3.10 03/08/2021 04:07 PM  
 
Lab Results Component Value Date/Time INR 1.3 (H) 06/01/2018 12:21 PM  
 Prothrombin time 12.6 (H) 06/01/2018 12:21 PM  
 aPTT 26.6 06/10/2014 11:20 AM  
  
Lab Results Component Value Date/Time Iron 43 02/06/2020 10:06 AM  
 TIBC 280 02/06/2020 10:06 AM  
 Iron % saturation 15 (L) 02/06/2020 10:06 AM  
 Ferritin 162 02/06/2020 10:06 AM  
  
 
Detail chart reviewed. Other specialists and referral provider notes reviewed. CONTROLLED SUBSTANCES SAFETY ASSESSMENT (IF ON CONTROLLED SUBSTANCES):  
 
Reviewed opioid safety handout:  [x] Yes   [] No 
24 hour opioid dose >150mg morphine equivalent/day:  [] Yes   [] No 
Benzodiazepines:  [] Yes   [] No 
Sleep apnea:  [] Yes   [] No 
Urine Toxicology Testing within last 6 months:  [] Yes   [] No 
History of or new aberrant medication taking behaviors:  [] Yes   [x] No 
Has Narcan been prescribed [x] Yes   [] No 
 
   
 
Total time: 70 minutes Counseling / coordination time: 60 minutes 
> 50% counseling / coordination?:  Yes Please note that this dictation was completed with [employer], the computer voice recognition software. Quite often unanticipated grammatical, syntax, homophones, and other interpretive errors are inadvertently transcribed by the computer software. Please disregard these errors. Please excuse any errors that have escaped final proofreading Consent: 
He and/or health care decision maker is aware that that he may receive a bill for this telehealth service, depending on his insurance coverage, and has provided verbal consent to proceed: Yes CPT Codes 51836-69764 for Established Patients may apply to this Telehealth Visit Pursuant to the emergency declaration under the [address] Emergencies Act, 1135 waiver authority and the Coronavirus Preparedness and Response Supplemental Appropriations Act, this Virtual  Visit was conducted, with patient's consent, to reduce the patient's risk of exposure to COVID-19 and provide continuity of care for an established patient. Services were provided through a video synchronous discussion virtually to substitute for in-person clinic visit.

## 2021-06-03 NOTE — PROGRESS NOTES
Palliative Medicine Office Visit Palliative Medicine Nurse Check In 
[phone] Patient Name: Clarissa Mauricio. YOB: 1950 Date of Office Visit: 6/3/2021 Patient states: \"  \" 
 
From Check In Sheet (scanned in Media): 
Has a medical provider talked with you about cardiopulmonary resuscitation (CPR)? [x] Yes   [] No   [] Unable to obtain Nurse reminder to complete or update ACP FlowSheet: 
 
Is ACP on the Problem List?    [x] Yes    [] No 
IF ACP Document is ON FILE; Nurse to place ACP on Problem List  
 
Is there an ACP Note in Chart Review/Note? [x] Yes    [] No  
If NO: ALERT PROVIDER Primary Decision Maker: Rina Cox - Spouse - [phone] Secondary Decision Maker: Lara Foy - Son - [phone]  Advance Care Planning 6/3/2021 Patient's Healthcare Decision Maker is: Named in scanned ACP document Primary Decision Maker Name -  
Primary Decision Maker Phone Number -  
Primary Decision Maker Relationship to Patient -  
Confirm Advance Directive Yes, on file Patient Would Like to Complete Advance Directive - Does the patient have other document types Do Not Resuscitate Is there anything that we should know about you as a person in order to provide you the best care possible? Have you been to the ER, urgent care clinic since your last visit? [] Yes   [x] No   [] Unable to obtain Have you been hospitalized since your last visit? [] Yes   [x] No   [] Unable to obtain Have you seen or consulted any other health care providers outside of the [address] [place] [address] since your last visit? [] Yes   [x] No   [] Unable to obtain Functional status (describe):  
 
 
 
Last BM: 6/3/2021  accessed (date): 6/3/2021 Bottle review (for opioid pain medication): 
Medication 1:  
Date filled:  
Directions:  
# filled: # left: # pills taking per day: 
Last dose taken: 
 
Medication 2:  
Date filled:  
Directions: # filled: # left: # pills taking per day: 
Last dose taken: 
 
Medication 3:  
Date filled:  
Directions:  
# filled: # left: # pills taking per day: 
Last dose taken: 
 
Medication 4:  
Date filled:  
Directions:  
# filled: # left: # pills taking per day: 
Last dose taken:

## 2021-06-07 NOTE — PROGRESS NOTES
Chief Complaint   Patient presents with    Hypertension     2 month follow up    CHF    Diabetes     Visit Vitals  /70 (BP 1 Location: Right arm, BP Patient Position: Sitting, BP Cuff Size: Adult)   Pulse 70   Temp 98.3 °F (36.8 °C) (Temporal)   Resp 18   Ht 6' 4\" (1.93 m)   SpO2 97%   BMI 27.91 kg/m²     1. Have you been to the ER, urgent care clinic since your last visit? Hospitalized since your last visit? No    2. Have you seen or consulted any other health care providers outside of the [address] [place] [address] since your last visit? Include any pap smears or colon screening.  No
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
## 2021-06-07 NOTE — PATIENT INSTRUCTIONS

## 2021-06-08 NOTE — TELEPHONE ENCOUNTER
Kavya with [facility] called to advise the patient's wife has cancelled their services. The wound has healed up. If she requires home care again, she will reach out to Dr. Renetta Francis.

## 2021-06-28 NOTE — TELEPHONE ENCOUNTER
Nurse named Luisana Sullivan called to get a verbal order for evaluation of a new leg wound. Dr. Rosario Ware. Nurse informed.

## 2021-07-16 NOTE — TELEPHONE ENCOUNTER
RX refill request from the patient/pharmacy. Patient last seen 06- with labs, and next appt. scheduled for 09-  Requested Prescriptions     Pending Prescriptions Disp Refills    ALPRAZolam (XANAX) 0.5 mg tablet [Pharmacy Med Name: ALPRAZOLAM 0.5MG TABLETS] 30 Tablet 1     Sig: TAKE 1 TABLET BY MOUTH EVERY NIGHT AS NEEDED FOR ANXIETY. MAX DAILY AMOUNT: 0.5 MG   .

## 2021-07-23 NOTE — TELEPHONE ENCOUNTER
Left message for Angel Barclay from [facility] regarding extending his Physical Therapy.

## 2021-09-01 NOTE — TELEPHONE ENCOUNTER
Triage for Controlled Substance Refill Request    Pain Diagnosis: _Neurpathy     Last Outpatient Visit: _6/3/2021    Next Outpatient Visit: _11/19/2021    Reason for refill needed outside of office visit? -Appointment not scheduled prior to need for scheduled refill      Pharmacy: [id number] - [facility] [address] [place] [address]        Medication:gabapentin (NEURONTIN) 100 mg       Dose and directions:TAKE 1 CAPSULE BY MOUTH EVERY NIGHT.   Number dispensed:30  Date filled ( or Pharmacy):5/14/2021  #left:     reviewed: _yes     Date of Urine Drug Screen:  _n/a    Opioid Safety Handout given:  _yes     Appropriate for refill:  _yes     Action:  _ please refill

## 2021-09-01 NOTE — TELEPHONE ENCOUNTER
The patient's wife is calling to f/u on status of Gabapentin refill. [facility] has tried to reach out. Waiting on a response.

## 2021-09-13 NOTE — PROGRESS NOTES
HIPAA verified by two patient identifiers. Royal VELASCO Cee Gomes. is a 65 y.o. male    Chief Complaint   Patient presents with    Hypertension     3 month     Diabetes    Cholesterol Problem    GERD       Visit Vitals  /71 (BP 1 Location: Right arm, BP Patient Position: Sitting, BP Cuff Size: Large adult)   Pulse 72   Temp 98.6 °F (37 °C) (Oral)   Resp 18   Ht 6' 4\" (1.93 m)   SpO2 98%   BMI 29.57 kg/m²       Pain Scale: 0 - No pain/10  Pain Location:       Health Maintenance Due   Topic Date Due    DTaP/Tdap/Td series (1 - Tdap) Never done    Shingrix Vaccine Age 50> (1 of 2) Never done    AAA Screening 73-67 YO Male Smoking Patients  Never done    Flu Vaccine (1) 09/01/2021         Coordination of Care Questionnaire:  :   1) Have you been to an emergency room, urgent care, or hospitalized since your last visit? If yes, where when, and reason for visit? no       2. Have seen or consulted any other health care provider since your last visit? If yes, where when, and reason for visit? NO      Patient is accompanied by wife I have received verbal consent from Markus 12. to discuss any/all medical information while they are present in the room.

## 2021-09-13 NOTE — PATIENT INSTRUCTIONS
Noninsulin Medicines for Type 2 Diabetes: Care Instructions  Overview     There are different types of noninsulin medicines for diabetes. Each works in a different way. But they all help you control your blood sugar. Some types help your body make insulin to lower your blood sugar. Others lower how much insulin your body needs. Some can slow how fast your body digests sugars. And some can remove extra glucose through your urine. You may need to take more than one medicine for diabetes. Two or more medicines may work better to lower your blood sugar level than just one does. · Metformin. This lowers how much glucose your liver makes. And it helps you respond better to insulin. It also lowers the amount of stored sugar that your liver releases when you are not eating. · Sulfonylureas. These help your body release more insulin. Some work for many hours. They can cause low blood sugar if you don't eat as you planned. An example is glipizide. · Thiazolidinediones. These reduce the amount of blood glucose. They also help you respond better to insulin. An example is pioglitazone. · SGLT2 inhibitors. These help to remove extra glucose through your urine. They may also help some people lose weight. An example is ertugliflozin. · DPP-4 inhibitors. These help your body raise the level of insulin after you eat. They also help your body make less of a hormone that raises blood sugar. An example is alogliptin. · Incretin hormones (GLP-1 receptor agonists). These help your body make a protein that can raise your insulin level and make you less hungry. They're given as shots or pills. An example is semaglutide. · Meglitinides. These help your body release insulin. They also help slow how your body digests sugars. So they can keep your blood sugar from rising too fast after you eat. · Alpha-glucosidase inhibitors. These keep starches from breaking down.  This means that they lower the amount of glucose absorbed when you eat. They don't help your body make more insulin. So they will not cause low blood sugar unless you use them with other medicines for diabetes. Follow-up care is a key part of your treatment and safety. Be sure to make and go to all appointments, and call your doctor if you are having problems. It's also a good idea to know your test results and keep a list of the medicines you take. How can you care for yourself at home? · Eat a healthy diet. Get some exercise each day. This may help you to reduce how much medicine you need. · Do not take other prescription or over-the-counter medicines, vitamins, herbal products, or supplements without talking to your doctor first. Some medicines for type 2 diabetes can cause problems with other medicines or supplements. · Tell your doctor if you plan to get pregnant. Some of these drugs are not safe for pregnant women. · Be safe with medicines. Take your medicines exactly as prescribed. Meglitinides and sulfonylureas can cause your blood sugar to drop very low. Call your doctor if you think you are having a problem with your medicine. · Check your blood sugar often. You can use a glucose monitor. Keeping track can help you know how certain foods, activities, and medicines affect your blood sugar. And it can help you keep your blood sugar from getting so low that it's not safe. When should you call for help? Call 911 anytime you think you may need emergency care. For example, call if:    · You passed out (lost consciousness).     · You are confused or cannot think clearly.     · Your blood sugar is very high or very low. Watch closely for changes in your health, and be sure to contact your doctor if:    · Your blood sugar stays outside the level your doctor set for you.     · You have any problems. Where can you learn more?   Go to http://www.gray.com/  Enter H153 in the search box to learn more about \"Noninsulin Medicines for Type 2 Diabetes: Care Instructions. \"  Current as of: August 31, 2020               Content Version: 12.8  © 5631-5159 [employer]. Care instructions adapted under license by [employer] (which disclaims liability or warranty for this information). If you have questions about a medical condition or this instruction, always ask your healthcare professional. [place] [address] any warranty or liability for your use of this information.
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
## 2021-09-27 NOTE — TELEPHONE ENCOUNTER
RX refill request from the patient/pharmacy. Patient last seen 09- with labs, and next appt. scheduled for 12-  Requested Prescriptions     Pending Prescriptions Disp Refills    carvediloL (COREG) 3.125 mg tablet [Pharmacy Med Name: CARVEDILOL 3.125MG TABLETS] 60 Tablet prn     Sig: TAKE 1 TABLET BY MOUTH TWICE DAILY WITH MEALS   .

## 2021-10-22 PROBLEM — I82.4Z1 LOWER LEG DVT (DEEP VENOUS THROMBOEMBOLISM), ACUTE, RIGHT (HCC): Status: ACTIVE | Noted: 2021-01-01

## 2021-10-22 PROBLEM — I82.412 ACUTE DEEP VEIN THROMBOSIS (DVT) OF FEMORAL VEIN OF LEFT LOWER EXTREMITY (HCC): Status: RESOLVED | Noted: 2021-01-01 | Resolved: 2021-01-01

## 2021-10-22 PROBLEM — M79.89 RIGHT LEG SWELLING: Status: ACTIVE | Noted: 2021-01-01

## 2021-10-22 PROBLEM — I82.412 ACUTE DEEP VEIN THROMBOSIS (DVT) OF FEMORAL VEIN OF LEFT LOWER EXTREMITY (HCC): Status: ACTIVE | Noted: 2021-01-01

## 2021-10-22 NOTE — PROGRESS NOTES
Subjective:   Royal KRISTA Matthew is a 65 y.o. male      Chief Complaint   Patient presents with    Swelling        History of present illness: He presents complaining of some swelling in his right leg he just noted this morning. He has [place] no pain in the leg. He has noted no fevers or chills. He does note the leg is very red. He notes no history of trauma. There are no other complaints.     Patient Active Problem List   Diagnosis Code    Atrial fibrillation ([facility]) I48.91    Primary osteoarthritis involving multiple joints M89.49    ASCVD (arteriosclerotic cardiovascular disease) I25.10    Gout M10.9    Anemia D64.9    Mixed hyperlipidemia E78.2    Controlled type 2 diabetes mellitus with chronic kidney disease on chronic dialysis, with long-term current use of insulin ([facility] E11.22, N18.6, Z79.4, Z99.2    Hypertension with renal disease I12.9    Restless leg G25.81    Class 1 obesity due to excess calories without serious comorbidity with body mass index (BMI) of 30.0 to 30.9 in adult E66.09, Z68.30    Insomnia G47.00    Acquired hypothyroidism E03.9    Gastroesophageal reflux disease without esophagitis K21.9    ED (erectile dysfunction) N52.9    Diabetic neuropathy ([facility]) E11.40    Cardiomyopathy ([facility]) I42.9    BPH (benign prostatic hyperplasia) N40.0    Anxiety F41.9    Age-related cataract H25.9    Hyperostosis M85.80    Status post amputation of left great toe ([facility]) Z89.412    Left elbow pain M25.522    S/P ablation of atrial fibrillation Z98.890, Z86.79    Primary osteoarthritis of right knee M17.11    Primary osteoarthritis of left knee M17.12    Hyponatremia E87.1    Pneumonia J18.9    Lateral epicondylitis of left elbow M77.12    Metatarsalgia M77.40    Sesamoiditis M25.80    Venous stasis ulcer of leg without varicose veins ([facility]) I87.2, L97.909    Cellulitis, leg L03.119    Ulcer of left foot ([facility]) L97.529    Metatarsalgia of left foot M77.42    Chronic combined systolic and diastolic CHF, NYHA class 4 ([facility]) I50.42    Generalized abdominal pain R10.84    ESRD (end stage renal disease) ([facility]) N18.6    Right wrist pain M25.531    Arthritis of shoulder M19.019    Laceration of right lower leg S81.811A    Alcohol screening Z13.39    Cellulitis of left leg L03. 116    Venous ulcer of left leg ([facility]) I83.029, L97.929    Venous stasis ulcer of left lower leg with edema of left lower leg ([facility]) I83.029, I83.892, L97.929, R60.9    Right leg swelling M79.89    Lower leg DVT (deep venous thromboembolism), acute, right ([facility]) I82.4Z1      Past Medical History:   Diagnosis Date    Anemia 8/5/2017    Anxiety 8/5/2017    Arrhythmia     atrial fibrillation 2014    ASCVD (arteriosclerotic cardiovascular disease) 8/5/2017    BPH (benign prostatic hyperplasia) 8/5/2017    CAD (coronary artery disease)     h/o stents    Cancer ([address]     h/o skin cancer    Cardiomyopathy ([address] 8/5/2017    CHF (congestive heart failure) ([address] 8/5/2017    Chronic kidney disease     Stage IV    Chronic pain     shoulders and feeet- neuropathy    CKD (chronic kidney disease), stage IV ([address] 8/5/2017    Diabetes ([address]     type II    Diabetes mellitus ([address] 8/5/2017    Diabetic neuropathy ([address] 8/5/2017    DJD (degenerative joint disease) 8/5/2017    ED (erectile dysfunction) 8/5/2017    Gout 8/5/2017    High cholesterol     Hyperlipidemia 8/5/2017    Hypertension     Hypertension with renal disease 8/5/2017    Hypothyroid 8/5/2017    Ill-defined condition     glycoma and diabetic macular degeneration    Insomnia 8/5/2017    Obesity 8/5/2017    On statin therapy 8/5/2017    Pneumonia 05/28/2019    Restless leg 8/5/2017    Sleep apnea     untreated    Thyroid disease     hypothyroid    Ulcer of foot due to secondary diabetes ([address] 07/12/2019      Allergies   Allergen Reactions    Niacin Rash and Unknown (comments)     Niaspan    Adhesive Rash    Imipenem Diarrhea     Other reaction(s): Rash    Levemir [Insulin Detemir] Hives    Other Medication Other (comments)     ? Allergy to Duraprep causing chemical burn    Primaxin [Imipenem-Cilastatin] Diarrhea and Rash    Xarelto [Rivaroxaban] Rash and Itching     Other reaction(s): Rash      Family History   Problem Relation Age of Onset    Heart Disease Mother     Kidney Disease Mother     Heart Disease Father     Kidney Disease Father     Cancer Sister         Breast      Social History     Socioeconomic History    Marital status:      Spouse name: Not on file    Number of children: Not on file    Years of education: Not on file    Highest education level: Not on file   Occupational History    Not on file   Tobacco Use    Smoking status: Former Smoker     Packs/day: 0.50     Years: 4.00     Pack years: 2.00     Quit date: 3/6/1972     Years since quittin.6    Smokeless tobacco: Never Used   Vaping Use    Vaping Use: Never used   Substance and Sexual Activity    Alcohol use: No     Comment: rare, 1 drink per year    Drug use: No    Sexual activity: Not Currently   Other Topics Concern     Service Not Asked    Blood Transfusions Not Asked    Caffeine Concern Not Asked    Occupational Exposure Not Asked    Hobby Hazards Not Asked    Sleep Concern Not Asked    Stress Concern Not Asked    Weight Concern Not Asked    Special Diet Not Asked    Back Care Not Asked    Exercise Not Asked    Bike Helmet Not Asked    [address] Not Asked    Self-Exams Not Asked   Social History Narrative    Not on file     Social Determinants of Health     Financial Resource Strain:     Difficulty of Paying Living Expenses:    Food Insecurity:     Worried About Running Out of Food in the Last Year:     Ran Out of Food in the Last Year:    Transportation Needs:     Lack of Transportation (Medical):      Lack of Transportation (Non-Medical):    Physical Activity:     Days of Exercise per Week:     Minutes of Exercise per Session:    Stress:     Feeling of Stress :    Social Connections:     Frequency of Communication with Friends and Family:     Frequency of Social Gatherings with Friends and Family:     Attends Mormonism Services:     Active Member of Clubs or Organizations:     Attends Club or Organization Meetings:     Marital Status:    Intimate Partner Violence:     Fear of Current or Ex-Partner:     Emotionally Abused:     Physically Abused:     Sexually Abused:      Prior to Admission medications    Medication Sig Start Date End Date Taking? Authorizing Provider   doxycycline (ADOXA) 100 mg tablet Take 1 Tablet by mouth two (2) times a day. 10/22/21  Yes Jesus Manuel Clark MD   apixaban (ELIQUIS) 5 mg tablet Take 1 Tablet by mouth two (2) times a day. 10/22/21  Yes Jesus Manuel Clark MD   carvediloL (COREG) 3.125 mg tablet TAKE 1 TABLET BY MOUTH TWICE DAILY WITH MEALS 10/1/21  Yes Jesus Manuel Clark MD   ALPRAZolam Alvia Loren) 0.5 mg tablet TAKE 1 TABLET BY MOUTH EVERY NIGHT AS NEEDED FOR ANXIETY. MAX DAILY AMOUNT: 0.5 MG 10/1/21  Yes Jesus Manuel Clark MD   gabapentin (NEURONTIN) 100 mg capsule TAKE 1 CAPSULE BY MOUTH EVERY NIGHT. MAX DAILY AMOUNT: 100 MG 9/1/21  Yes Esther Santo MD   traZODone (DESYREL) 150 mg tablet Take 1 Tablet by mouth nightly. 1 TABLET NIGHTLY 6/23/21  Yes Deejay Duff MD   rOPINIRole (REQUIP) 2 mg tablet Take 1 Tab by mouth nightly. 5/11/21  Yes Jesus Manuel Clark MD        Review of Systems              Constitutional:  He denies fever, weight loss, sweats or fatigue. EYES: No blurred or double vision,               ENT: no nasal congestion, no headache or dizziness. No difficulty with               swallowing, taste, speech or smell. Respiratory:  No cough, wheezing or shortness of breath. No sputum production. Cardiac:  Denies chest pain, palpitations, unexplained indigestion, syncope, edema, PND or orthopnea.     GI:  No changes in bowel movements, no abdominal pain, no bloating, anorexia, nausea, vomiting or heartburn. :  No frequency or dysuria. Denies incontinence or sexual dysfunction. Extremities:  No joint pain, stiffness or swelling. Positive for right lower leg swelling and redness  Back:.no pain or soreness  Skin:  No recent rashes or mole changes. Neurological:  No numbness, tingling, burning paresthesias or loss of motor strength. No syncope, dizziness, frequent headaches or memory loss. Hematologic:  No easy bruising  Lymphatic: No lymph node enlargement    Objective:     Vitals:    10/22/21 1350   BP: 122/82   Pulse: 70   Temp: 98.3 °F (36.8 °C)   TempSrc: Oral   SpO2: 97%   Weight: 319 lb 10.7 oz (145 kg)   Height: 6' 4\" (1.93 m)   PainSc:   0 - No pain       Body mass index is 38.91 kg/m². Physical Examination:              General Appearance:  Well-developed, well-nourished, no acute distress. HEENT:      Ears:  The TMs and ear canals were clear. Eyes:  The pupillary responses were normal.  Extraocular muscle function intact. Lids and conjunctiva not injected. Funduscopic exam revealed sharp disc margins. Nares: Clear w/o edema or erythema  Pharynx:  Clear with teeth in good repair. No masses were noted. Neck:  Supple without thyromegaly or adenopathy. No JVD noted. No carotid                bruits. Lungs:  Clear to auscultation and percussion. Cardiac:  Regular rate and rhythm without murmur. PMI not displaced. No gallop, rub or click. Abdominal: Soft, non-tender, no hepata-spleenomegally or masses  Extremities:  No clubbing, cyanosis or edema. Marked soft tissue swelling right leg below the knee with erythema but no increased temperature and no tenderness to palpation. Skin:  No rash or unusual mole changes noted. Lymph Nodes:  None felt in the cervical, supraclavicular, axillary or inguinal region. Neurological: . DTRs 2+ and symmetric.   Station and gait normal.   Hematologic:   No purpura or petechiae        Assessment/Plan:         1. Right leg swelling    2. Cellulitis of right lower extremity    3. Acute deep vein thrombosis (DVT) of femoral vein of left lower extremity (HCC)    4. Severe obesity (BMI 35.0-35.9 with comorbidity) ([address]    5. Lower leg DVT (deep venous thromboembolism), acute, right (HCC)        Impressions/Plan:  Impression  1. Right leg swelling the fact that it does not feel warm to touch argues a little against cellulitis so I am concerned about a clot. Venous Doppler today and if Doppler is negative we will treat with doxycycline to cover his cellulitis aspect. Obviously if evidence of a clot will need anticoagulation. Venous Doppler to be scheduled today. Addendum call from the vascular lab he does have a DVT of the femoral vein right leg and thus we will treat him with Eliquis  Follow-up next week. Orders Placed This Encounter   [facility] [address]    doxycycline (ADOXA) 100 mg tablet    apixaban (ELIQUIS) 5 mg tablet       Follow-up and Dispositions    · Return TBD. Follow-up and Disposition History          No results found for any visits on 10/22/21. Sharlene Espinosa MD    The patient was given after the visit summary the patient verbalized an understanding of the plans and problems as explained.

## 2021-10-22 NOTE — PATIENT INSTRUCTIONS

## 2021-10-22 NOTE — PROGRESS NOTES
Chief Complaint   Patient presents with    Swelling   Patient states his right leg is swelling and redness  Visit Vitals  /82 (BP 1 Location: Right lower arm, BP Patient Position: Sitting, BP Cuff Size: Adult)   Pulse 70   Temp 98.3 °F (36.8 °C) (Oral)   Ht 6' 4\" (1.93 m)   Wt 319 lb 10.7 oz (145 kg)   SpO2 97%   BMI 38.91 kg/m²       1. Have you been to the ER, urgent care clinic since your last visit? Hospitalized since your last visit? No    2. Have you seen or consulted any other health care providers outside of the [address] [place] [address] since your last visit? Include any pap smears or colon screening.  No

## 2021-10-23 NOTE — PROGRESS NOTES
Identified pt with two pt identifiers(name and ). Reviewed record in preparation for visit and have obtained necessary documentation. Chief Complaint Patient presents with  CHF  
  1 week follow up Health Maintenance Due Topic  DTaP/Tdap/Td series (1 - Tdap)  Shingrix Vaccine Age 50> (1 of 2)  AAA Screening 73-69 YO Male Smoking Patients Coordination of Care Questionnaire: 
:  
1) Have you been to an emergency room, urgent care, or hospitalized since your last visit? yes If yes, where when, and reason for visit? Pacemaker placed on Monday and [facility] 
 
2. Have seen or consulted any other health care provider since your last visit? NO If yes, where when, and reason for visit? 3) Do you have an Advanced Directive/ Living Will in place? YES If yes, do we have a copy on file YES Patient is accompanied by wife I have received verbal consent from Markus Hatch. to discuss any/all medical information while they are present in the room. done

## 2021-10-25 PROBLEM — R31.0 GROSS HEMATURIA: Status: ACTIVE | Noted: 2021-01-01

## 2021-10-25 NOTE — PATIENT INSTRUCTIONS
Blood in the Urine: Care Instructions  Your Care Instructions     Blood in the urine, or hematuria, may make the urine look red, brown, or pink. There may be blood every time you urinate or just from time to time. You cannot always see blood in the urine, but it will show up in a urine test.  Blood in the urine may be serious. It should always be checked by a doctor. Your doctor may recommend more tests, including an X-ray, a CT scan, or a cystoscopy (which lets a doctor look inside the urethra and bladder). Blood in the urine can be a sign of another problem. Common causes are bladder infections and kidney stones. An injury to your groin or your genital area can also cause bleeding in the urinary tract. Very hard exercisesuch as running a marathoncan cause blood in the urine. Blood in the urine can also be a sign of kidney disease or cancer in the bladder or kidney. Many cases of blood in the urine are caused by a harmless condition that runs in families. This is called benign familial hematuria. It does not need any treatment. Sometimes your urine may look red or brown even though it does not contain blood. For example, not getting enough fluids (dehydration), taking certain medicines, or having a liver problem can change the color of your urine. Eating foods such as beets, rhubarb, or blackberries or foods with red food coloring can make your urine look red or pink. Follow-up care is a key part of your treatment and safety. Be sure to make and go to all appointments, and call your doctor if you are having problems. It's also a good idea to know your test results and keep a list of the medicines you take. When should you call for help? Call your doctor now or seek immediate medical care if:    · You have symptoms of a urinary infection. For example:  ? You have pus in your urine. ? You have pain in your back just below your rib cage. This is called flank pain. ?  You have a fever, chills, or body aches.  ? It hurts to urinate. ? You have groin or belly pain.     · You have more blood in your urine. Watch closely for changes in your health, and be sure to contact your doctor if:    · You have new urination problems.     · You do not get better as expected. Where can you learn more? Go to http://www.gray.com/  Enter P433 in the search box to learn more about \"Blood in the Urine: Care Instructions. \"  Current as of: February 10, 2021               Content Version: 13.0  © 1348-9096 [employer]. Care instructions adapted under license by [employer] (which disclaims liability or warranty for this information). If you have questions about a medical condition or this instruction, always ask your healthcare professional. [address] any warranty or liability for your use of this information.

## 2021-10-25 NOTE — PROGRESS NOTES
HIPAA verified by two patient identifiers. Jayant Ortiz is a 65 y.o. male    Chief Complaint   Patient presents with    Blood in Urine     came on last night        Visit Vitals  BP (!) 155/71 (BP 1 Location: Right lower arm, BP Patient Position: Sitting, BP Cuff Size: Adult)   Pulse 69   Temp 98.3 °F (36.8 °C) (Oral)   Resp 18   Ht 6' 4\" (1.93 m)   SpO2 97%   BMI 38.91 kg/m²       Pain Scale: 0 - No pain/10  Pain Location:       Health Maintenance Due   Topic Date Due    DTaP/Tdap/Td series (1 - Tdap) Never done    Shingrix Vaccine Age 50> (1 of 2) Never done    AAA Screening 73-67 YO Male Smoking Patients  Never done    Flu Vaccine (1) 09/01/2021         Coordination of Care Questionnaire:  :   1) Have you been to an emergency room, urgent care, or hospitalized since your last visit? If yes, where when, and reason for visit? no       2. Have seen or consulted any other health care provider since your last visit? If yes, where when, and reason for visit? NO      Patient is accompanied by wife I have received verbal consent from Jayant [employer] to discuss any/all medical information while they are present in the room.

## 2021-10-25 NOTE — PROGRESS NOTES
Subjective:   Royal KRISTA Barker. is a 65 y.o. male      Chief Complaint   Patient presents with    Blood in Urine     came on last night         History of present illness: He was recently seen here for evaluation of swelling in his right leg with erythema and empirically placed on doxycycline for possible cellulitis. We also obtained a venous Doppler which did reveal some evidence of DVT in the femoral vein. He was started on Eliquis and seemed to be doing okay with Eliquis until this morning he noted a little bit of blood in his urine although he does not pass much urine as he is on dialysis. He only noted at 1 time he has not passed his urine since that time. He notes no abdominal pain. He denies any current cardiorespiratory complaints and and no other complaints noted.     Patient Active Problem List   Diagnosis Code    Atrial fibrillation (HCC) I48.91    Primary osteoarthritis involving multiple joints M89.49    ASCVD (arteriosclerotic cardiovascular disease) I25.10    Gout M10.9    Anemia D64.9    Mixed hyperlipidemia E78.2    Controlled type 2 diabetes mellitus with chronic kidney disease on chronic dialysis, with long-term current use of insulin ([facility] E11.22, N18.6, Z79.4, Z99.2    Hypertension with renal disease I12.9    Restless leg G25.81    Class 1 obesity due to excess calories without serious comorbidity with body mass index (BMI) of 30.0 to 30.9 in adult E66.09, Z68.30    Insomnia G47.00    Acquired hypothyroidism E03.9    Gastroesophageal reflux disease without esophagitis K21.9    ED (erectile dysfunction) N52.9    Diabetic neuropathy ([facility]) E11.40    Cardiomyopathy ([facility]) I42.9    BPH (benign prostatic hyperplasia) N40.0    Anxiety F41.9    Age-related cataract H25.9    Hyperostosis M85.80    Status post amputation of left great toe ([facility]) Z89.412    Left elbow pain M25.522    S/P ablation of atrial fibrillation Z98.890, Z86.79    Primary osteoarthritis of right knee M17.11    Primary osteoarthritis of left knee M17.12    Hyponatremia E87.1    Pneumonia J18.9    Lateral epicondylitis of left elbow M77.12    Metatarsalgia M77.40    Sesamoiditis M25.80    Venous stasis ulcer of leg without varicose veins ([facility]) I87.2, L97.909    Cellulitis, leg L03.119    Ulcer of left foot ([facility]) L97.529    Metatarsalgia of left foot M77.42    Chronic combined systolic and diastolic CHF, NYHA class 4 ([facility]) I50.42    Generalized abdominal pain R10.84    ESRD (end stage renal disease) ([facility]) N18.6    Right wrist pain M25.531    Arthritis of shoulder M19.019    Laceration of right lower leg S81.811A    Alcohol screening Z13.39    Cellulitis of left leg L03. 116    Venous ulcer of left leg ([facility]) I83.029, L97.929    Venous stasis ulcer of left lower leg with edema of left lower leg ([facility]) I83.029, I83.892, L97.929, R60.9    Right leg swelling M79.89    Lower leg DVT (deep venous thromboembolism), acute, right ([facility]) I82.4Z1    Gross hematuria R31.0      Past Medical History:   Diagnosis Date    Anemia 8/5/2017    Anxiety 8/5/2017    Arrhythmia     atrial fibrillation 2014    ASCVD (arteriosclerotic cardiovascular disease) 8/5/2017    BPH (benign prostatic hyperplasia) 8/5/2017    CAD (coronary artery disease)     h/o stents    Cancer ([address]     h/o skin cancer    Cardiomyopathy ([address] 8/5/2017    CHF (congestive heart failure) ([address] 8/5/2017    Chronic kidney disease     Stage IV    Chronic pain     shoulders and feeet- neuropathy    CKD (chronic kidney disease), stage IV ([address] 8/5/2017    Diabetes ([address]     type II    Diabetes mellitus ([address] 8/5/2017    Diabetic neuropathy ([address] 8/5/2017    DJD (degenerative joint disease) 8/5/2017    ED (erectile dysfunction) 8/5/2017    Gout 8/5/2017    High cholesterol     Hyperlipidemia 8/5/2017    Hypertension     Hypertension with renal disease 8/5/2017    Hypothyroid 8/5/2017    Ill-defined condition     glycoma and diabetic macular degeneration    Insomnia 2017    Obesity 2017    On statin therapy 2017    Pneumonia 2019    Restless leg 2017    Sleep apnea     untreated    Thyroid disease     hypothyroid    Ulcer of foot due to secondary diabetes ([facility] 2019      Allergies   Allergen Reactions    Niacin Rash and Unknown (comments)     Niaspan    Adhesive Rash    Imipenem Diarrhea     Other reaction(s): Rash    Levemir [Insulin Detemir] Hives    Other Medication Other (comments)     ?  Allergy to Duraprep causing chemical burn    Primaxin [Imipenem-Cilastatin] Diarrhea and Rash    Xarelto [Rivaroxaban] Rash and Itching     Other reaction(s): Rash      Family History   Problem Relation Age of Onset    Heart Disease Mother     Kidney Disease Mother     Heart Disease Father     Kidney Disease Father     Cancer Sister         Breast      Social History     Socioeconomic History    Marital status:      Spouse name: Not on file    Number of children: Not on file    Years of education: Not on file    Highest education level: Not on file   Occupational History    Not on file   Tobacco Use    Smoking status: Former Smoker     Packs/day: 0.50     Years: 4.00     Pack years: 2.00     Quit date: 3/6/1972     Years since quittin.6    Smokeless tobacco: Never Used   Vaping Use    Vaping Use: Never used   Substance and Sexual Activity    Alcohol use: No     Comment: rare, 1 drink per year    Drug use: No    Sexual activity: Not Currently   Other Topics Concern     Service Not Asked    Blood Transfusions Not Asked    Caffeine Concern Not Asked    Occupational Exposure Not Asked    Hobby Hazards Not Asked    Sleep Concern Not Asked    Stress Concern Not Asked    Weight Concern Not Asked    Special Diet Not Asked    Back Care Not Asked    Exercise Not Asked    Bike Helmet Not Asked    Seat Belt Not Asked    Self-Exams Not Asked   Social History Narrative  Not on file     Social Determinants of Health     Financial Resource Strain:     Difficulty of Paying Living Expenses:    Food Insecurity:     Worried About Running Out of Food in the Last Year:     [address] in the Last Year:    Transportation Needs:     Lack of Transportation (Medical):  Lack of Transportation (Non-Medical):    Physical Activity:     Days of Exercise per Week:     Minutes of Exercise per Session:    Stress:     Feeling of Stress :    Social Connections:     Frequency of Communication with Friends and Family:     Frequency of Social Gatherings with Friends and Family:     Attends [employer] Services:     Active Member of Clubs or Organizations:     Attends Club or Organization Meetings:     Marital Status:    Intimate Partner Violence:     Fear of Current or Ex-Partner:     Emotionally Abused:     Physically Abused:     Sexually Abused:      Prior to Admission medications    Medication Sig Start Date End Date Taking? Authorizing Provider   doxycycline (ADOXA) 100 mg tablet Take 1 Tablet by mouth two (2) times a day. 10/22/21  Yes Sonia Villasenor MD   apixaban (ELIQUIS) 5 mg tablet Take 1 Tablet by mouth two (2) times a day. 10/22/21  Yes Sonia Villasenor MD   carvediloL (COREG) 3.125 mg tablet TAKE 1 TABLET BY MOUTH TWICE DAILY WITH MEALS 10/1/21  Yes Sonia Villasenor MD   ALPRAZolam Evaline Beams) 0.5 mg tablet TAKE 1 TABLET BY MOUTH EVERY NIGHT AS NEEDED FOR ANXIETY. MAX DAILY AMOUNT: 0.5 MG 10/1/21  Yes Sonia Villasenor MD   gabapentin (NEURONTIN) 100 mg capsule TAKE 1 CAPSULE BY MOUTH EVERY NIGHT. MAX DAILY AMOUNT: 100 MG 9/1/21  Yes Bo Cohen MD   traZODone (DESYREL) 150 mg tablet Take 1 Tablet by mouth nightly. 1 TABLET NIGHTLY 6/23/21  Yes Araseli Yousif MD   rOPINIRole (REQUIP) 2 mg tablet Take 1 Tab by mouth nightly.  5/11/21  Yes Sonia Villasenor MD        Review of Systems              Constitutional:  He denies fever, weight loss, sweats or fatigue. EYES: No blurred or double vision,               ENT: no nasal congestion, no headache or dizziness. No difficulty with               swallowing, taste, speech or smell. Respiratory:  No cough, wheezing or shortness of breath. No sputum production. Cardiac:  Denies chest pain, palpitations, unexplained indigestion, syncope, edema, PND or orthopnea. GI:  No changes in bowel movements, no abdominal pain, no bloating, anorexia, nausea, vomiting or heartburn. :  No frequency or dysuria. Denies incontinence or sexual dysfunction. Extremities:  No joint pain, stiffness or swelling  Back:.no pain or soreness  Skin:  No recent rashes or mole changes. Neurological:  No numbness, tingling, burning paresthesias or loss of motor strength. No syncope, dizziness, frequent headaches or memory loss. Hematologic:  No easy bruising  Lymphatic: No lymph node enlargement    Objective:     Vitals:    10/25/21 1159 10/25/21 1211   BP: (!) 155/71 130/72   Pulse: 69    Resp: 18    Temp: 98.3 °F (36.8 °C)    TempSrc: Oral    SpO2: 97%    Height: 6' 4\" (1.93 m)    PainSc:   0 - No pain        Body mass index is 38.91 kg/m². Physical Examination:              General Appearance:  Well-developed, well-nourished, no acute distress. HEENT:      Ears:  The TMs and ear canals were clear. Eyes:  The pupillary responses were normal.  Extraocular muscle function intact. Lids and conjunctiva not injected. Funduscopic exam revealed sharp disc margins. Nares: Clear w/o edema or erythema  Pharynx:  Clear with teeth in good repair. No masses were noted. Neck:  Supple without thyromegaly or adenopathy. No JVD noted. No carotid                bruits. Lungs:  Clear to auscultation and percussion. Cardiac:  Regular rate and rhythm without murmur. PMI not displaced. No gallop, rub or click.   Abdominal: Soft, non-tender, no hepata-spleenomegally or masses  Extremities:  No clubbing, cyanosis or edema. Skin:  No rash or unusual mole changes noted. Lymph Nodes:  None felt in the cervical, supraclavicular, axillary or inguinal region. Neurological: . DTRs 2+ and symmetric. Station and gait normal.   Hematologic:   No purpura or petechiae        Assessment/Plan:         1. Gross hematuria    2. Cellulitis of right lower extremity    3. Lower leg DVT (deep venous thromboembolism), acute, right (HCC)        Impressions/Plan:  Impression  1. Hematuria x1 does not passes urine very often is not have any current problems. If he has more hematuria then will need to stop the Eliquis. 2.  Cellulitis right lower extremity improved on doxycycline and that should cover an associated urine infection  3. Lower extremity DVT right leg we will continue Eliquis unless further bleeding  This is all discussed with his wife present with him. Follow-up in about 3 weeks or sooner if there is a problem. No orders of the defined types were placed in this encounter. Follow-up and Dispositions    · Return in about 3 weeks (around 11/15/2021). No results found for any visits on 10/25/21. Love Schmitz MD    The patient was given after the visit summary the patient verbalized an understanding of the plans and problems as explained.

## 2021-10-26 NOTE — TELEPHONE ENCOUNTER
RX refill request from the patient/pharmacy. Patient last seen 10- with labs, and next appt. scheduled for 11-  Requested Prescriptions     Pending Prescriptions Disp Refills    rOPINIRole (REQUIP) 2 mg tablet [Pharmacy Med Name: ROPINIROLE 2MG TABLETS] 90 Tablet 1     Sig: TAKE 1 TABLET BY MOUTH EVERY NIGHT   .

## 2021-11-01 NOTE — DISCHARGE INSTRUCTIONS
You may continue to weight-bear as tolerated on the affected leg. He may also resume your Eliquis as scheduled.   Return to the emergency department for uncontrollable bleeding,
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
## 2021-11-01 NOTE — ED TRIAGE NOTES
Patient arrives to the emergency department via EMS with a chief complaint of a GLF when he slipped in the bathroom this morning. Abrasions to just below his R knee, R wrist and R elbow. Pt is on eliquis so they have continued to bleed, bleeding controlled at this time with pressure bandages. Pt denies hitting head or LOC.

## 2021-11-04 NOTE — TELEPHONE ENCOUNTER
RX refill request from the patient/pharmacy. Patient last seen 10- with labs, and next appt. scheduled for 11-  Requested Prescriptions     Pending Prescriptions Disp Refills    ALPRAZolam (XANAX) 0.5 mg tablet [Pharmacy Med Name: ALPRAZOLAM 0.5MG TABLETS] 30 Tablet 1     Sig: TAKE 1 TABLET BY MOUTH EVERY NIGHT AS NEEDED FOR ANXIETY. MAX DAILY AMOUNT: 0.5 MG   .

## 2021-11-05 NOTE — TELEPHONE ENCOUNTER
Spoke to wife and informed Dr. Gannon Esters [facility]. She prefers [facility] as they have used them in the past.  Referral generated.

## 2021-11-05 NOTE — TELEPHONE ENCOUNTER
Wife seeking home health referral.  States her  had a fall this past week and had a fracture of the right femoral trochanter. Is having to use the rescue squad to go back and forth to dialysis. Needs sutures removed from two sites related to this fall next week. Also with blister on the right leg.

## 2021-11-10 NOTE — TELEPHONE ENCOUNTER
Mamaicol Sender from [facility] called regarding getting order for suture removal that would be needed from his elbow and right leg. Dr Mary Acuña and said to removed in 14 days. Also  C/o no BM since 11-2-2021. Denies nausea, belly feels soft, no abdominal pain. Wife gave him some Ducolax last night. Has some Sorbitol on hand from previous problem. Dr. Mary Acuña by verbal order to use. Also with some wounds starting on the buttocks. Wants to use a gauze with a foam dressing. Okayed by Dr. Macdonald.
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
## 2021-12-02 NOTE — PROGRESS NOTES
Identified pt with two pt identifiers(name and ). Reviewed record in preparation for visit and have obtained necessary documentation. Chief Complaint   Patient presents with    Skin Problem     cellulitis         There were no vitals filed for this visit. Health Maintenance Due   Topic    Shingrix Vaccine Age 49> (1 of 2)    AAA Screening 73-69 YO Male Smoking Patients     Flu Vaccine (1)    COVID-19 Vaccine (3 - Booster for Moderna series)       Coordination of Care Questionnaire:  :   1) Have you been to an emergency room, urgent care, or hospitalized since your last visit? If yes, where when, and reason for visit? , [id number] [facility]       2. Have seen or consulted any other health care provider since your last visit? If yes, where when, and reason for visit? NO      Patient is accompanied by self I have received verbal consent from Markus Hatch. to discuss any/all medical information while they are present in the room.

## 2021-12-03 NOTE — TELEPHONE ENCOUNTER
Returned call to Mrs. Kaaren Hodgkin, advised appointment is not for today, but for 12/7/21.   Wife verbalized understanding

## 2021-12-13 PROBLEM — I83.029 VENOUS ULCER OF LEFT LEG (HCC): Status: RESOLVED | Noted: 2020-10-09 | Resolved: 2021-01-01

## 2021-12-13 PROBLEM — L97.919 STASIS LEG ULCER, RIGHT (HCC): Status: ACTIVE | Noted: 2021-01-01

## 2021-12-13 PROBLEM — I83.019 STASIS LEG ULCER, RIGHT (HCC): Status: ACTIVE | Noted: 2021-01-01

## 2021-12-13 PROBLEM — M77.40 METATARSALGIA: Status: RESOLVED | Noted: 2019-08-30 | Resolved: 2021-01-01

## 2021-12-13 PROBLEM — S50.311A ABRASION OF RIGHT ELBOW: Status: ACTIVE | Noted: 2021-01-01

## 2021-12-13 PROBLEM — L89.321 PRESSURE INJURY OF LEFT BUTTOCK, STAGE 1: Status: ACTIVE | Noted: 2021-01-01

## 2021-12-13 PROBLEM — L97.529 ULCER OF LEFT FOOT (HCC): Status: RESOLVED | Noted: 2019-12-18 | Resolved: 2021-01-01

## 2021-12-13 PROBLEM — L97.929 VENOUS ULCER OF LEFT LEG (HCC): Status: RESOLVED | Noted: 2020-10-09 | Resolved: 2021-01-01

## 2021-12-13 NOTE — PROGRESS NOTES
Chief Complaint   Patient presents with    Diabetes     3 month follow up    Cholesterol Problem     Visit Vitals  /64 (BP 1 Location: Left upper arm, BP Patient Position: Sitting, BP Cuff Size: Adult)   Pulse 70   Temp 98.7 °F (37.1 °C) (Oral)   Resp 17   SpO2 98%     1. Have you been to the ER, urgent care clinic since your last visit? Hospitalized since your last visit? No    2. Have you seen or consulted any other health care providers outside of the [address] [place] [address] since your last visit? Include any pap smears or colon screening.  No

## 2021-12-13 NOTE — PATIENT INSTRUCTIONS
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
## 2021-12-15 NOTE — TELEPHONE ENCOUNTER
Received a call from [facility] regarding patient's current status. Spoke to wife and wants consideration for hospice referral.  S/p another fall today, skin tear and not wanting to go to the hospital or go for dialysis tomorrow. Also patient is somewhat confused today. Supposedly playing a game on his tablet but his screen was blank. Discussed with Dr. Carmen Camacho and referral generated.

## 2021-12-17 PROCEDURE — 3331090001 HH PPS REVENUE CREDIT

## 2021-12-17 PROCEDURE — 3331090002 HH PPS REVENUE DEBIT

## 2021-12-17 RX ORDER — TRAZODONE HYDROCHLORIDE 150 MG/1
TABLET ORAL
Qty: 30 TABLET | Refills: 5 | Status: SHIPPED | OUTPATIENT
Start: 2021-12-17

## 2021-12-18 PROCEDURE — 3331090002 HH PPS REVENUE DEBIT

## 2021-12-18 PROCEDURE — 3331090001 HH PPS REVENUE CREDIT

## 2021-12-19 PROCEDURE — 3331090002 HH PPS REVENUE DEBIT

## 2021-12-19 PROCEDURE — 3331090001 HH PPS REVENUE CREDIT

## 2022-01-04 ENCOUNTER — TELEPHONE (OUTPATIENT)
Dept: PALLATIVE CARE | Age: 72
End: 2022-01-04

## 2022-01-04 NOTE — TELEPHONE ENCOUNTER
Calling patient to advise of Virtual Visit with Dr. Kenzie Bansal on   1/5/2022 AT 12:30PM.   . A nurse will call you between the hours of 11:00 and 1:30pm.  If you have any questions, please call [phone]. No answer so left voicemail.

## 2024-03-19 NOTE — PROGRESS NOTES
Problem: Heart Failure: Day 1  Goal: Off Pathway (Use only if patient is Off Pathway)  Outcome: Progressing Towards Goal  Goal: Activity/Safety  Outcome: Progressing Towards Goal  Goal: Consults, if ordered  Outcome: Progressing Towards Goal  Goal: Diagnostic Test/Procedures  Outcome: Progressing Towards Goal  Goal: Nutrition/Diet  Outcome: Progressing Towards Goal  Goal: Discharge Planning  Outcome: Progressing Towards Goal  Goal: Medications  Outcome: Progressing Towards Goal  Goal: Respiratory  Outcome: Progressing Towards Goal  Goal: Treatments/Interventions/Procedures  Outcome: Progressing Towards Goal  Goal: Psychosocial  Outcome: Progressing Towards Goal  Goal: *Oxygen saturation within defined limits  Outcome: Progressing Towards Goal  Goal: *Hemodynamically stable  Outcome: Progressing Towards Goal  Goal: *Optimal pain control at patient's stated goal  Outcome: Progressing Towards Goal  Goal: *Anxiety reduced or absent  Outcome: Progressing Towards Goal     Problem: Heart Failure: Day 2  Goal: Off Pathway (Use only if patient is Off Pathway)  Outcome: Progressing Towards Goal  Goal: Activity/Safety  Outcome: Progressing Towards Goal  Goal: Consults, if ordered  Outcome: Progressing Towards Goal  Goal: Diagnostic Test/Procedures  Outcome: Progressing Towards Goal  Goal: Nutrition/Diet  Outcome: Progressing Towards Goal  Goal: Discharge Planning  Outcome: Progressing Towards Goal  Goal: Medications  Outcome: Progressing Towards Goal  Goal: Respiratory  Outcome: Progressing Towards Goal  Goal: Treatments/Interventions/Procedures  Outcome: Progressing Towards Goal  Goal: Psychosocial  Outcome: Progressing Towards Goal  Goal: *Oxygen saturation within defined limits  Outcome: Progressing Towards Goal  Goal: *Hemodynamically stable  Outcome: Progressing Towards Goal  Goal: *Optimal pain control at patient's stated goal  Outcome: Progressing Towards Goal  Goal: *Anxiety reduced or absent  Outcome: Progressing Towards Goal  Goal: *Demonstrates progressive activity  Outcome: Progressing Towards Goal     Problem: Heart Failure: Day 3  Goal: Off Pathway (Use only if patient is Off Pathway)  Outcome: Progressing Towards Goal  Goal: Activity/Safety  Outcome: Progressing Towards Goal  Goal: Diagnostic Test/Procedures  Outcome: Progressing Towards Goal  Goal: Nutrition/Diet  Outcome: Progressing Towards Goal  Goal: Discharge Planning  Outcome: Progressing Towards Goal  Goal: Medications  Outcome: Progressing Towards Goal  Goal: Respiratory  Outcome: Progressing Towards Goal  Goal: Treatments/Interventions/Procedures  Outcome: Progressing Towards Goal  Goal: Psychosocial  Outcome: Progressing Towards Goal  Goal: *Oxygen saturation within defined limits  Outcome: Progressing Towards Goal  Goal: *Hemodynamically stable  Outcome: Progressing Towards Goal  Goal: *Optimal pain control at patient's stated goal  Outcome: Progressing Towards Goal  Goal: *Anxiety reduced or absent  Outcome: Progressing Towards Goal  Goal: *Demonstrates progressive activity  Outcome: Progressing Towards Goal     Problem: General Infection Care Plan (Adult and Pediatric)  Goal: Improvement in signs and symptoms of infection  Outcome: Progressing Towards Goal  Goal: *Optimize nutritional status  Outcome: Progressing Towards Goal     Problem: Falls - Risk of  Goal: *Absence of Falls  Description  Document Napier Beady Fall Risk and appropriate interventions in the flowsheet. Outcome: Progressing Towards Goal     Problem: Patient Education: Go to Patient Education Activity  Goal: Patient/Family Education  Outcome: Progressing Towards Goal     Problem: Pressure Injury - Risk of  Goal: *Prevention of pressure injury  Description  Document Aurelio Scale and appropriate interventions in the flowsheet.   Outcome: Progressing Towards Goal     Problem: Patient Education: Go to Patient Education Activity  Goal: Patient/Family Education  Outcome: Progressing Towards Goal Bed in lowest position, wheels locked, appropriate side rails in place/Call bell, personal items and telephone in reach/Instruct patient to call for assistance before getting out of bed or chair/Non-slip footwear when patient is out of bed/Gate to call system/Physically safe environment - no spills, clutter or unnecessary equipment/Purposeful Proactive Rounding/Room/bathroom lighting operational, light cord in reach
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
## 2024-05-16 NOTE — PERIOP NOTES
[facility]  Preoperative Instructions        Surgery Date 1/3/20         Time of Arrival 16:00 [facility]    1. On the day of your surgery, please report to the Surgical Services Registration Desk and sign in at your designated time. The Surgery Center is located to the right of the Emergency Room. 2. You must have someone with you to drive you home. You should not drive a car for 24 hours following surgery. Please make arrangements for a friend or family member to stay with you for the first 24 hours after your surgery. 4. We recommend you do not drink any alcoholic beverages for 24 hours before and after your surgery. 5. Contact your surgeons office for instructions on the following medications: non-steroidal anti-inflammatory drugs (i.e. Advil, Aleve), vitamins, and supplements. (Some surgeons will want you to stop these medications prior to surgery and others may allow you to take them)  **If you are currently taking Plavix, Coumadin, Aspirin and/or other blood-thinning agents, contact your surgeon for instructions. ** Your surgeon will partner with the physician prescribing these medications to determine if it is safe to stop or if you need to continue taking. Please do not stop taking these medications without instructions from your surgeon    6. Wear comfortable clothes. Wear glasses instead of contacts. Do not bring any money or jewelry. Please bring picture ID, insurance card, and any prearranged co-payment or hospital payment. Do not wear make-up, particularly mascara the morning of your surgery. Do not wear nail polish, particularly if you are having foot /hand surgery. Wear your hair loose or down, no ponytails, buns, lisa pins or clips. All body piercings must be removed.   Please shower with antibacterial soap for three consecutive days before and on the morning of surgery, but do not apply any lotions, powders or deodorants after the shower on the day of surgery. Please use a fresh towels after each shower. Please sleep in clean clothes and change bed linens the night before surgery. Please do not shave for 48 hours prior to surgery. Shaving of the face is acceptable. 7. You should understand that if you do not follow these instructions your surgery may be cancelled. If your physical condition changes (I.e. fever, cold or flu) please contact your surgeon as soon as possible. 8. It is important that you be on time. If a situation occurs where you may be late, please call [phone] (OR Holding Area). 9. If you have any questions and or problems, please call [phone] (Pre-admission Testing). 10. Your surgery time may be subject to change. You will receive a phone call the evening prior if your time changes. 11.  If having outpatient surgery, you must have someone to drive you here, stay with you during the duration of your stay, and to drive you home at time of discharge. 12.   In an effort to improve the efficiency, privacy, and safety for all of our Pre-op patients visitors are not allowed in the Holding area. Once you arrive and are registered your family/visitors will be asked to remain in the waiting room. The Pre-op staff will get you from the Surgical Waiting Area and will explain to you and your family/visitors that the Pre-op phase is beginning. The staff will answer any questions and provide instructions for tracking of the patient, by use of the existing tracking number and color-coded status board in the waiting room. At this time the staff will also ask for your designated spokesperson information in the event that the physician or staff need to provide an update or obtain any pertinent information. The designated spokesperson will be notified if the physician needs to speak to family during the pre-operative phase.   If at any time your family/visitors has questions or concerns they may approach the volunteer desk in the waiting area for assistance. Special Instructions:    MEDICATIONS TO TAKE THE MORNING OF SURGERY WITH A SIP OF WATER:Meds as usual      I understand a pre-operative phone call will be made to verify my surgery time. In the event that I am not available, I give permission for a message to be left on my answering service and/or with another person?   Yes          ___________________      __________   _________    (Signature of Patient)             (Witness)                (Date and Time) daughter

## 2024-08-26 NOTE — ED NOTES
Assumed care of patient from triage. Patient reports swelling, foul odor, and discoloration of diabetic foot ulcer on bottom of left foot. Patient denies any pain due to peripheral neuropathy. Patient states he has had this ulcer for 18 years, and he has experienced this issue in the past. Patient is alert and oriented x4, respirations even and unlabored, vital signs stable. Monitor x2, call bell within reach. same name as above

## (undated) DEVICE — DEVON™ KNEE AND BODY STRAP 60" X 3" (1.5 M X 7.6 CM): Brand: DEVON

## (undated) DEVICE — GAUZE SPONGES,12 PLY: Brand: CURITY

## (undated) DEVICE — SOLUTION IV 1000ML 0.9% SOD CHL

## (undated) DEVICE — PAD,ABDOMINAL,5"X9",ST,LF,25/BX: Brand: MEDLINE INDUSTRIES, INC.

## (undated) DEVICE — KIT ACCS INTRO 4FR L10CM NDL 21GA L7CM GWIRE L40CM

## (undated) DEVICE — SET ADMIN 16ML TBNG L100IN 2 Y INJ SITE IV PIGGY BK DISP

## (undated) DEVICE — SYR 10ML LUER LOK 1/5ML GRAD --

## (undated) DEVICE — Device

## (undated) DEVICE — SUTURE MCRYL SZ 4-0 L27IN ABSRB UD L19MM PS-2 1/2 CIR PRIM Y426H

## (undated) DEVICE — SOLIDIFIER MEDC 1200ML -- CONVERT TO 356117

## (undated) DEVICE — STERILE POLYISOPRENE POWDER-FREE SURGICAL GLOVES: Brand: PROTEXIS

## (undated) DEVICE — SWAB CULT LIQ STUART AGR AERB MOD IN BRK SGL RAYON TIP PLAS 220099] BECTON DICKINSON MICRO]

## (undated) DEVICE — (D)PREP SKN CHLRAPRP APPL 26ML -- CONVERT TO ITEM 371833

## (undated) DEVICE — SUTURE VCRL 2-0 L27IN ABSRB UD PS-2 L19MM 1/2 CIR J428H

## (undated) DEVICE — HANDLE LT SNAP ON ULT DURABLE LENS FOR TRUMPF ALC DISPOSABLE

## (undated) DEVICE — EXTREMITY III-LF: Brand: MEDLINE INDUSTRIES, INC.

## (undated) DEVICE — SUTURE PERMAHAND SZ 2-0 L30IN NONABSORBABLE BLK SILK W/O A305H

## (undated) DEVICE — PACEMAKER PACK: Brand: MEDLINE INDUSTRIES, INC.

## (undated) DEVICE — KENDALL RADIOLUCENT FOAM MONITORING ELECTRODE RECTANGULAR SHAPE: Brand: KENDALL

## (undated) DEVICE — NEEDLE HYPO 18GA L1.5IN PNK S STL HUB POLYPR SHLD REG BVL

## (undated) DEVICE — 1200 GUARD II KIT W/5MM TUBE W/O VAC TUBE: Brand: GUARDIAN

## (undated) DEVICE — GOWN,SIRUS,NONRNF,SETINSLV,2XL,18/CS: Brand: MEDLINE

## (undated) DEVICE — SYR 3ML LL TIP 1/10ML GRAD --

## (undated) DEVICE — SOLUTION IV 500ML 0.9% SOD CHL FLX CONT

## (undated) DEVICE — INTENDED FOR TISSUE SEPARATION, AND OTHER PROCEDURES THAT REQUIRE A SHARP SURGICAL BLADE TO PUNCTURE OR CUT.: Brand: BARD-PARKER ® CARBON RIB-BACK BLADES

## (undated) DEVICE — SPONGE GZ W4XL4IN COT 12 PLY TYP VII WVN C FLD DSGN

## (undated) DEVICE — SUTURE VCRL SZ 3-0 L27IN ABSRB UD L26MM SH 1/2 CIR J416H

## (undated) DEVICE — (D)ADHESIVE TISS HI VISC 1ML -- DISC USE ITEM 346585

## (undated) DEVICE — PACEMAKER CARD 23GM 12.8CC W52XH52MM THK6MM 2 CHMBR IS-1
Type: IMPLANTABLE DEVICE | Status: NON-FUNCTIONAL
Removed: 2019-01-28

## (undated) DEVICE — DRAPE,EXTREMITY,89X128,STERILE: Brand: MEDLINE

## (undated) DEVICE — LABEL MED CARD MRMC STRL

## (undated) DEVICE — SYSTEM INTRO 9.5FR L13CM STD WHT CAP HEMSTAT SPLITTABLE

## (undated) DEVICE — SUT PROL 6-0 30IN C1 DA BLU --

## (undated) DEVICE — CULTURETTE SGL EVAC TUBE PALL -- 100/CA

## (undated) DEVICE — NEEDLE HYPO 25GA L1.5IN BVL ORIENTED ECLIPSE

## (undated) DEVICE — TRAY PREP DRY W/ PREM GLV 2 APPL 6 SPNG 2 UNDPD 1 OVERWRAP

## (undated) DEVICE — SUTURE VCRL SZ 4-0 L27IN ABSRB UD L19MM FS-2 3/8 CIR REV J422H

## (undated) DEVICE — BANDAGE COMPR 9 FTX4 IN SMOOTH COMFORTABLE SYNTH ESMRK LF

## (undated) DEVICE — Z DISCONTINUED PER MEDLINE LINE GAS SAMPLING O2/CO2 LNG AD 13 FT NSL W/ TBNG FILTERLINE

## (undated) DEVICE — FOGARTY SPRING CLIPS 6MM: Brand: FOGARTY SOFTJAW

## (undated) DEVICE — BANDAGE,GAUZE,BULKEE II,4.5"X4.1YD,STRL: Brand: MEDLINE

## (undated) DEVICE — AGENT HEMSTAT W4XL4IN OXIDIZED REGENERATED CELOS ABSRB SFT

## (undated) DEVICE — REM POLYHESIVE ADULT PATIENT RETURN ELECTRODE: Brand: VALLEYLAB

## (undated) DEVICE — KERLIX BANDAGE ROLL: Brand: KERLIX

## (undated) DEVICE — COVER,MAYO STAND,STERILE: Brand: MEDLINE

## (undated) DEVICE — STAPLER SKIN H3.9MM WIRE DIA0.58MM CRWN 6.9MM 35 STPL ROT

## (undated) DEVICE — SPONGE GZ W4XL4IN COT RADPQ HIGHLY ABSRB

## (undated) DEVICE — SUTURE PERMAHAND SZ 3-0 L30IN NONABSORBABLE BLK SILK BRAID A304H

## (undated) DEVICE — INFECTION CONTROL KIT SYS

## (undated) DEVICE — GDWIRE WHISPER HITORQ EDS CSJ -- ACUITY SOLD BY BX ONLY 4648

## (undated) DEVICE — RADIFOCUS GLIDEWIRE: Brand: GLIDEWIRE

## (undated) DEVICE — STOCKINETTE TUBE BLN 2PLY 6X72 -- MEDICHOICE CONVERT TO 363488

## (undated) DEVICE — CATHETER DIAG 6FR L110CM INTRO 6FR BLLN DIA9MM 1CC PULM ART

## (undated) DEVICE — SUTURE COAT VCRL SZ 4-0 L18IN ABSRB UD L19MM PS-2 1/2 CIR J496G

## (undated) DEVICE — SUTURE NONABSORBABLE MONOFILAMENT 5-0 PS-2 18 IN BLK ETHILON 1666H

## (undated) DEVICE — SOLUTION SCRB 2OZ 10% POVIDONE IOD ANTIMIC BTL

## (undated) DEVICE — SUTURE PROL 5-0 L18IN NONABSORBABLE BLU RB-2 L13MM 1/2 CIR 8713H

## (undated) DEVICE — HOOK LOCK LATEX FREE ELASTIC BANDAGE 4INX5YD

## (undated) DEVICE — STRAP,POSITIONING,KNEE/BODY,FOAM,4X60": Brand: MEDLINE

## (undated) DEVICE — SHEAR RMFG HARMONIC FOCUS 9CM -- OEM ITEM L#322125

## (undated) DEVICE — PRECISION THIN (9.0 X 0.38 X 31.0MM)

## (undated) DEVICE — BANDAGE,GAUZE,CONFORMING,3"X75",STRL,LF: Brand: MEDLINE

## (undated) DEVICE — KIT,1200CC CANISTER,3/16"X6' TUBING: Brand: MEDLINE INDUSTRIES, INC.

## (undated) DEVICE — CATH IV AUTOGRD BC PNK 20GA 25 -- INSYTE

## (undated) DEVICE — BASIN EMSIS 16OZ GRAPHITE PLAS KID SHP MOLD GRAD FOR ORAL

## (undated) DEVICE — NEONATAL-ADULT SPO2 SENSOR: Brand: NELLCOR

## (undated) DEVICE — TOWEL 4 PLY TISS 19X30 SUE WHT

## (undated) DEVICE — PLASMABLADE PS200-040 4.0: Brand: PLASMABLADE™

## (undated) DEVICE — SUTURE ETHLN SZ 2-0 L18IN NONABSORBABLE BLK L19MM PS-2 PRIM 593H

## (undated) DEVICE — STRETCH BANDAGE ROLL: Brand: DERMACEA

## (undated) DEVICE — ZIMMER® STERILE DISPOSABLE TOURNIQUET CUFF WITH PROTECTIVE SLEEVE AND PLC, DUAL PORT, SINGLE BLADDER, 18 IN. (46 CM)

## (undated) DEVICE — SUTURE MCRYL SZ 5-0 L18IN ABSRB UD PC-3 L16MM 3/8 CIR Y844G

## (undated) DEVICE — SUT SLK 0 30IN SH BLK --

## (undated) DEVICE — CATHETER INTRO 9X7FR L50.7CM WRK L47CM 135DEG CRV PEBAX

## (undated) DEVICE — ROCKER SWITCH PENCIL HOLSTER: Brand: VALLEYLAB

## (undated) DEVICE — PROBE VASC 8MHZ WTRPRF

## (undated) DEVICE — CURITY NON-ADHERENT STRIPS: Brand: CURITY